# Patient Record
Sex: MALE | Race: WHITE | NOT HISPANIC OR LATINO | Employment: OTHER | URBAN - METROPOLITAN AREA
[De-identification: names, ages, dates, MRNs, and addresses within clinical notes are randomized per-mention and may not be internally consistent; named-entity substitution may affect disease eponyms.]

---

## 2017-01-12 ENCOUNTER — GENERIC CONVERSION - ENCOUNTER (OUTPATIENT)
Dept: OTHER | Facility: OTHER | Age: 72
End: 2017-01-12

## 2017-01-24 ENCOUNTER — GENERIC CONVERSION - ENCOUNTER (OUTPATIENT)
Dept: OTHER | Facility: OTHER | Age: 72
End: 2017-01-24

## 2017-02-28 ENCOUNTER — ALLSCRIPTS OFFICE VISIT (OUTPATIENT)
Dept: OTHER | Facility: OTHER | Age: 72
End: 2017-02-28

## 2017-04-20 ENCOUNTER — ALLSCRIPTS OFFICE VISIT (OUTPATIENT)
Dept: OTHER | Facility: OTHER | Age: 72
End: 2017-04-20

## 2017-04-24 RX ORDER — MOMETASONE FUROATE 50 UG/1
2 SPRAY, METERED NASAL DAILY
COMMUNITY
End: 2020-04-06

## 2017-04-24 RX ORDER — PRAVASTATIN SODIUM 20 MG
20 TABLET ORAL DAILY
COMMUNITY
End: 2018-03-24 | Stop reason: SDUPTHER

## 2017-04-24 RX ORDER — ASPIRIN 81 MG/1
81 TABLET ORAL EVERY OTHER DAY
COMMUNITY

## 2017-04-24 RX ORDER — ZOLPIDEM TARTRATE 5 MG/1
5 TABLET ORAL
COMMUNITY
End: 2018-12-20

## 2017-04-24 RX ORDER — CALCIUM POLYCARBOPHIL 625 MG 625 MG/1
625 TABLET ORAL DAILY
COMMUNITY

## 2017-04-24 RX ORDER — MELOXICAM 15 MG/1
15 TABLET ORAL DAILY
COMMUNITY
End: 2018-12-20

## 2017-04-24 RX ORDER — TRIAMTERENE AND HYDROCHLOROTHIAZIDE 75; 50 MG/1; MG/1
1 TABLET ORAL DAILY
COMMUNITY
End: 2018-02-23 | Stop reason: SDUPTHER

## 2017-04-24 RX ORDER — FERROUS SULFATE 325(65) MG
325 TABLET ORAL
COMMUNITY
End: 2018-12-20

## 2017-04-24 RX ORDER — MULTIVITAMIN
1 CAPSULE ORAL DAILY
COMMUNITY

## 2017-04-24 RX ORDER — CLOTRIMAZOLE AND BETAMETHASONE DIPROPIONATE 10; .64 MG/G; MG/G
CREAM TOPICAL 2 TIMES DAILY
COMMUNITY
End: 2018-10-20 | Stop reason: SDUPTHER

## 2017-04-24 RX ORDER — COLCHICINE 0.6 MG/1
CAPSULE ORAL
COMMUNITY
End: 2018-05-01 | Stop reason: SDUPTHER

## 2017-04-25 ENCOUNTER — ANESTHESIA EVENT (OUTPATIENT)
Dept: PERIOP | Facility: HOSPITAL | Age: 72
End: 2017-04-25
Payer: MEDICARE

## 2017-04-25 ENCOUNTER — HOSPITAL ENCOUNTER (OUTPATIENT)
Facility: HOSPITAL | Age: 72
Setting detail: OUTPATIENT SURGERY
Discharge: HOME | End: 2017-04-25
Attending: SURGERY | Admitting: SURGERY
Payer: MEDICARE

## 2017-04-25 ENCOUNTER — ANESTHESIA (OUTPATIENT)
Dept: PERIOP | Facility: HOSPITAL | Age: 72
End: 2017-04-25
Payer: MEDICARE

## 2017-04-25 VITALS
SYSTOLIC BLOOD PRESSURE: 127 MMHG | OXYGEN SATURATION: 98 % | WEIGHT: 198 LBS | TEMPERATURE: 98.7 F | BODY MASS INDEX: 28.35 KG/M2 | HEART RATE: 78 BPM | HEIGHT: 70 IN | DIASTOLIC BLOOD PRESSURE: 76 MMHG | RESPIRATION RATE: 16 BRPM

## 2017-04-25 DIAGNOSIS — Z12.11 ENCOUNTER FOR SCREENING FOR MALIGNANT NEOPLASM OF COLON: ICD-10-CM

## 2017-04-25 PROCEDURE — 88305 TISSUE EXAM BY PATHOLOGIST: CPT | Performed by: SURGERY

## 2017-04-25 RX ORDER — ONDANSETRON 2 MG/ML
4 INJECTION INTRAMUSCULAR; INTRAVENOUS ONCE
Status: DISCONTINUED | OUTPATIENT
Start: 2017-04-25 | End: 2017-04-25 | Stop reason: HOSPADM

## 2017-04-25 RX ORDER — SODIUM CHLORIDE 9 MG/ML
20 INJECTION, SOLUTION INTRAVENOUS CONTINUOUS
Status: DISCONTINUED | OUTPATIENT
Start: 2017-04-25 | End: 2017-04-25 | Stop reason: HOSPADM

## 2017-04-25 RX ORDER — PROPOFOL 10 MG/ML
INJECTION, EMULSION INTRAVENOUS AS NEEDED
Status: DISCONTINUED | OUTPATIENT
Start: 2017-04-25 | End: 2017-04-25 | Stop reason: SURG

## 2017-04-25 RX ORDER — SODIUM CHLORIDE 9 MG/ML
125 INJECTION, SOLUTION INTRAVENOUS CONTINUOUS
Status: CANCELLED | OUTPATIENT
Start: 2017-04-25

## 2017-04-25 RX ORDER — MIDAZOLAM HYDROCHLORIDE 1 MG/ML
INJECTION INTRAMUSCULAR; INTRAVENOUS AS NEEDED
Status: DISCONTINUED | OUTPATIENT
Start: 2017-04-25 | End: 2017-04-25 | Stop reason: SURG

## 2017-04-25 RX ADMIN — MIDAZOLAM HYDROCHLORIDE 2 MG: 1 INJECTION, SOLUTION INTRAMUSCULAR; INTRAVENOUS at 07:33

## 2017-04-25 RX ADMIN — PROPOFOL 30 MG: 10 INJECTION, EMULSION INTRAVENOUS at 07:50

## 2017-04-25 RX ADMIN — PROPOFOL 20 MG: 10 INJECTION, EMULSION INTRAVENOUS at 07:40

## 2017-04-25 RX ADMIN — SODIUM CHLORIDE: 0.9 INJECTION, SOLUTION INTRAVENOUS at 07:00

## 2017-04-25 RX ADMIN — PROPOFOL 150 MG: 10 INJECTION, EMULSION INTRAVENOUS at 07:33

## 2017-04-25 RX ADMIN — PROPOFOL 20 MG: 10 INJECTION, EMULSION INTRAVENOUS at 07:44

## 2017-04-25 RX ADMIN — PROPOFOL 30 MG: 10 INJECTION, EMULSION INTRAVENOUS at 07:36

## 2017-05-12 ENCOUNTER — TRANSCRIBE ORDERS (OUTPATIENT)
Dept: ADMINISTRATIVE | Facility: HOSPITAL | Age: 72
End: 2017-05-12

## 2017-05-12 ENCOUNTER — APPOINTMENT (OUTPATIENT)
Dept: LAB | Facility: HOSPITAL | Age: 72
End: 2017-05-12
Payer: MEDICARE

## 2017-05-12 DIAGNOSIS — D50.9 IRON DEFICIENCY ANEMIA: ICD-10-CM

## 2017-05-12 DIAGNOSIS — E78.5 HYPERLIPIDEMIA: ICD-10-CM

## 2017-05-12 DIAGNOSIS — R73.01 IMPAIRED FASTING GLUCOSE: ICD-10-CM

## 2017-05-12 DIAGNOSIS — M19.90 OSTEOARTHRITIS: ICD-10-CM

## 2017-05-12 DIAGNOSIS — M10.9 GOUT: ICD-10-CM

## 2017-05-12 DIAGNOSIS — I10 ESSENTIAL (PRIMARY) HYPERTENSION: ICD-10-CM

## 2017-05-12 LAB
EST. AVERAGE GLUCOSE BLD GHB EST-MCNC: 108 MG/DL
GLUCOSE P FAST SERPL-MCNC: 97 MG/DL (ref 65–99)
HBA1C MFR BLD: 5.4 % (ref 4.2–6.3)

## 2017-05-12 PROCEDURE — 36415 COLL VENOUS BLD VENIPUNCTURE: CPT

## 2017-05-12 PROCEDURE — 82947 ASSAY GLUCOSE BLOOD QUANT: CPT

## 2017-05-12 PROCEDURE — 83036 HEMOGLOBIN GLYCOSYLATED A1C: CPT

## 2017-05-30 ENCOUNTER — ALLSCRIPTS OFFICE VISIT (OUTPATIENT)
Dept: OTHER | Facility: OTHER | Age: 72
End: 2017-05-30

## 2017-06-12 ENCOUNTER — GENERIC CONVERSION - ENCOUNTER (OUTPATIENT)
Dept: OTHER | Facility: OTHER | Age: 72
End: 2017-06-12

## 2017-06-26 ENCOUNTER — GENERIC CONVERSION - ENCOUNTER (OUTPATIENT)
Dept: OTHER | Facility: OTHER | Age: 72
End: 2017-06-26

## 2017-08-29 ENCOUNTER — APPOINTMENT (OUTPATIENT)
Dept: URGENT CARE | Facility: CLINIC | Age: 72
End: 2017-08-29
Payer: OTHER MISCELLANEOUS

## 2017-08-29 ENCOUNTER — TRANSCRIBE ORDERS (OUTPATIENT)
Dept: URGENT CARE | Facility: CLINIC | Age: 72
End: 2017-08-29

## 2017-08-29 DIAGNOSIS — M25.531 WRIST PAIN, RIGHT: Primary | ICD-10-CM

## 2017-09-07 ENCOUNTER — ALLSCRIPTS OFFICE VISIT (OUTPATIENT)
Dept: OTHER | Facility: OTHER | Age: 72
End: 2017-09-07

## 2017-11-13 DIAGNOSIS — D50.9 IRON DEFICIENCY ANEMIA: ICD-10-CM

## 2017-11-13 DIAGNOSIS — I10 ESSENTIAL (PRIMARY) HYPERTENSION: ICD-10-CM

## 2017-11-13 DIAGNOSIS — G47.00 INSOMNIA: ICD-10-CM

## 2017-11-13 DIAGNOSIS — R73.01 IMPAIRED FASTING GLUCOSE: ICD-10-CM

## 2017-11-13 DIAGNOSIS — E78.5 HYPERLIPIDEMIA: ICD-10-CM

## 2017-11-13 DIAGNOSIS — M10.9 GOUT: ICD-10-CM

## 2017-11-13 DIAGNOSIS — K63.5 POLYP OF COLON: ICD-10-CM

## 2017-11-13 DIAGNOSIS — Z12.5 ENCOUNTER FOR SCREENING FOR MALIGNANT NEOPLASM OF PROSTATE: ICD-10-CM

## 2017-11-17 ENCOUNTER — APPOINTMENT (OUTPATIENT)
Dept: LAB | Facility: HOSPITAL | Age: 72
End: 2017-11-17
Payer: MEDICARE

## 2017-11-17 ENCOUNTER — TRANSCRIBE ORDERS (OUTPATIENT)
Dept: ADMINISTRATIVE | Facility: HOSPITAL | Age: 72
End: 2017-11-17

## 2017-11-17 DIAGNOSIS — I10 ESSENTIAL (PRIMARY) HYPERTENSION: ICD-10-CM

## 2017-11-17 DIAGNOSIS — K63.5 POLYP OF COLON: ICD-10-CM

## 2017-11-17 DIAGNOSIS — E78.5 HYPERLIPIDEMIA: ICD-10-CM

## 2017-11-17 DIAGNOSIS — R73.01 IMPAIRED FASTING GLUCOSE: ICD-10-CM

## 2017-11-17 DIAGNOSIS — M10.9 GOUT: ICD-10-CM

## 2017-11-17 DIAGNOSIS — Z12.5 ENCOUNTER FOR SCREENING FOR MALIGNANT NEOPLASM OF PROSTATE: ICD-10-CM

## 2017-11-17 DIAGNOSIS — D50.9 IRON DEFICIENCY ANEMIA: ICD-10-CM

## 2017-11-17 DIAGNOSIS — G47.00 INSOMNIA: ICD-10-CM

## 2017-11-17 LAB
ALBUMIN SERPL BCP-MCNC: 4.2 G/DL (ref 3.5–5)
ALP SERPL-CCNC: 53 U/L (ref 46–116)
ALT SERPL W P-5'-P-CCNC: 44 U/L (ref 12–78)
ANION GAP SERPL CALCULATED.3IONS-SCNC: 10 MMOL/L (ref 4–13)
AST SERPL W P-5'-P-CCNC: 33 U/L (ref 5–45)
BILIRUB SERPL-MCNC: 0.8 MG/DL (ref 0.2–1)
BUN SERPL-MCNC: 16 MG/DL (ref 5–25)
CALCIUM SERPL-MCNC: 9.4 MG/DL (ref 8.3–10.1)
CHLORIDE SERPL-SCNC: 102 MMOL/L (ref 100–108)
CHOLEST SERPL-MCNC: 202 MG/DL (ref 50–200)
CO2 SERPL-SCNC: 31 MMOL/L (ref 21–32)
CREAT SERPL-MCNC: 1.27 MG/DL (ref 0.6–1.3)
ERYTHROCYTE [DISTWIDTH] IN BLOOD BY AUTOMATED COUNT: 13.2 % (ref 11.6–15.1)
EST. AVERAGE GLUCOSE BLD GHB EST-MCNC: 114 MG/DL
FERRITIN SERPL-MCNC: 39 NG/ML (ref 8–388)
GFR SERPL CREATININE-BSD FRML MDRD: 56 ML/MIN/1.73SQ M
GLUCOSE P FAST SERPL-MCNC: 115 MG/DL (ref 65–99)
HBA1C MFR BLD: 5.6 % (ref 4.2–6.3)
HCT VFR BLD AUTO: 48.1 % (ref 36.5–49.3)
HDLC SERPL-MCNC: 57 MG/DL (ref 40–60)
HGB BLD-MCNC: 17.1 G/DL (ref 12–17)
IRON SERPL-MCNC: 105 UG/DL (ref 65–175)
LDLC SERPL CALC-MCNC: 123 MG/DL (ref 0–100)
MCH RBC QN AUTO: 31 PG (ref 26.8–34.3)
MCHC RBC AUTO-ENTMCNC: 35.6 G/DL (ref 31.4–37.4)
MCV RBC AUTO: 87 FL (ref 82–98)
PLATELET # BLD AUTO: 234 THOUSANDS/UL (ref 149–390)
PMV BLD AUTO: 9.1 FL (ref 8.9–12.7)
POTASSIUM SERPL-SCNC: 3.7 MMOL/L (ref 3.5–5.3)
PROT SERPL-MCNC: 7.6 G/DL (ref 6.4–8.2)
PSA SERPL-MCNC: 3.1 NG/ML (ref 0–4)
RBC # BLD AUTO: 5.51 MILLION/UL (ref 3.88–5.62)
SODIUM SERPL-SCNC: 143 MMOL/L (ref 136–145)
TIBC SERPL-MCNC: 409 UG/DL (ref 250–450)
TRIGL SERPL-MCNC: 112 MG/DL
TSH SERPL DL<=0.05 MIU/L-ACNC: 1.14 UIU/ML (ref 0.36–3.74)
WBC # BLD AUTO: 7.15 THOUSAND/UL (ref 4.31–10.16)

## 2017-11-17 PROCEDURE — 85027 COMPLETE CBC AUTOMATED: CPT

## 2017-11-17 PROCEDURE — 84443 ASSAY THYROID STIM HORMONE: CPT

## 2017-11-17 PROCEDURE — 80053 COMPREHEN METABOLIC PANEL: CPT

## 2017-11-17 PROCEDURE — 82728 ASSAY OF FERRITIN: CPT

## 2017-11-17 PROCEDURE — 83550 IRON BINDING TEST: CPT

## 2017-11-17 PROCEDURE — 83036 HEMOGLOBIN GLYCOSYLATED A1C: CPT

## 2017-11-17 PROCEDURE — G0103 PSA SCREENING: HCPCS

## 2017-11-17 PROCEDURE — 80061 LIPID PANEL: CPT

## 2017-11-17 PROCEDURE — 83540 ASSAY OF IRON: CPT

## 2017-11-17 PROCEDURE — 36415 COLL VENOUS BLD VENIPUNCTURE: CPT

## 2017-12-11 ENCOUNTER — ALLSCRIPTS OFFICE VISIT (OUTPATIENT)
Dept: OTHER | Facility: OTHER | Age: 72
End: 2017-12-11

## 2018-01-11 NOTE — RESULT NOTES
Verified Results  (1) GLUCOSE,  FASTING [id number] 01[facility] Order Number: [id number]     Test Name Result Flag Reference   GLUCOSE FASTING 110 mg/dL H 65-99   - Patient Instructions: This is fasting bloodwork, only water, black tea, black coffee after 9pm the night before test   Please drink two glasses of water morning of bloodwork  - Patient Instructions: This is fasting bloodwork, only water, black tea, black coffee after 9pm the night before test Please drink two glasses of water morning of bloodwork  (1) HEMOGLOBIN A1C [id number] 01:[address] [place], [place] [address] Order Number: [id number]     Test Name Result Flag Reference   HEMOGLOBIN A1C 6 0 %  4 2-6 3   EST  AVG  GLUCOSE 126 mg/dl       (1) IRON [id number] 01:[address] [place], [place] [address] Order Number: [id number]     Test Name Result Flag Reference   IRON 33 ug/dL L    Patients treated with metal-binding drugs (ie  Deferoxamine) may have depressed iron values  (1) FERRITIN [id number] 01:[address] [place] [address] Order Number: [id number]     Test Name Result Flag Reference   FERRITIN 6 ng/mL L 8-388     (1) TIBC [id number] 01:[address] [place] [address] Order Number: [id number]     Test Name Result Flag Reference   TOTAL IRON BINDING CAPACITY 542 ug/dL H 250-450        Pt aware & will call back when he is able to write the information down  He is currently driving  [employer] called back & was given the # to [facility]  He agreed to start the OTC supplement          Discussion/Summary   please notify pt that his sugars were still elevated at 110 (nml is <99) and his iron studies indicate iron deficiency anemia - he needs to start iron supplement  mg 1 tab PO twice a day, he also needs to call GI that did his colonoscopy as discussed at last appt as new onset iron def can be associated with GI cancers and he has to see GI for this

## 2018-01-12 VITALS
TEMPERATURE: 97.6 F | SYSTOLIC BLOOD PRESSURE: 152 MMHG | WEIGHT: 219 LBS | DIASTOLIC BLOOD PRESSURE: 90 MMHG | HEIGHT: 69 IN | BODY MASS INDEX: 32.44 KG/M2 | HEART RATE: 72 BPM

## 2018-01-13 VITALS
DIASTOLIC BLOOD PRESSURE: 88 MMHG | TEMPERATURE: 96.6 F | SYSTOLIC BLOOD PRESSURE: 142 MMHG | HEIGHT: 69 IN | HEART RATE: 72 BPM | BODY MASS INDEX: 29.47 KG/M2 | WEIGHT: 199 LBS

## 2018-01-13 VITALS
BODY MASS INDEX: 30.36 KG/M2 | DIASTOLIC BLOOD PRESSURE: 78 MMHG | SYSTOLIC BLOOD PRESSURE: 132 MMHG | HEIGHT: 69 IN | TEMPERATURE: 96.8 F | HEART RATE: 68 BPM | WEIGHT: 205 LBS

## 2018-01-14 VITALS
RESPIRATION RATE: 16 BRPM | BODY MASS INDEX: 40.06 KG/M2 | TEMPERATURE: 98.6 F | HEART RATE: 68 BPM | SYSTOLIC BLOOD PRESSURE: 124 MMHG | DIASTOLIC BLOOD PRESSURE: 76 MMHG | HEIGHT: 60 IN | WEIGHT: 204.03 LBS

## 2018-01-22 VITALS
TEMPERATURE: 98.1 F | WEIGHT: 209 LBS | SYSTOLIC BLOOD PRESSURE: 142 MMHG | HEART RATE: 78 BPM | BODY MASS INDEX: 30.96 KG/M2 | DIASTOLIC BLOOD PRESSURE: 80 MMHG | HEIGHT: 69 IN

## 2018-02-23 DIAGNOSIS — I10 HYPERTENSION, UNSPECIFIED TYPE: Primary | ICD-10-CM

## 2018-02-23 RX ORDER — TRIAMTERENE AND HYDROCHLOROTHIAZIDE 75; 50 MG/1; MG/1
TABLET ORAL
Qty: 30 TABLET | Refills: 3 | Status: SHIPPED | OUTPATIENT
Start: 2018-02-23 | End: 2018-05-26 | Stop reason: SDUPTHER

## 2018-03-24 DIAGNOSIS — E78.5 HYPERLIPIDEMIA, UNSPECIFIED HYPERLIPIDEMIA TYPE: Primary | ICD-10-CM

## 2018-03-26 RX ORDER — PRAVASTATIN SODIUM 20 MG
TABLET ORAL
Qty: 30 TABLET | Refills: 6 | Status: SHIPPED | OUTPATIENT
Start: 2018-03-26 | End: 2018-10-13 | Stop reason: SDUPTHER

## 2018-05-01 DIAGNOSIS — M10.9 GOUT, UNSPECIFIED CAUSE, UNSPECIFIED CHRONICITY, UNSPECIFIED SITE: Primary | ICD-10-CM

## 2018-05-01 RX ORDER — COLCHICINE 0.6 MG/1
0.6 CAPSULE ORAL 4 TIMES DAILY PRN
Qty: 100 CAPSULE | Refills: 3 | Status: SHIPPED | OUTPATIENT
Start: 2018-05-01 | End: 2019-09-21 | Stop reason: SDUPTHER

## 2018-05-17 ENCOUNTER — HOSPITAL ENCOUNTER (EMERGENCY)
Facility: HOSPITAL | Age: 73
Discharge: HOME | End: 2018-05-17
Attending: EMERGENCY MEDICINE | Admitting: EMERGENCY MEDICINE
Payer: OTHER MISCELLANEOUS

## 2018-05-17 ENCOUNTER — APPOINTMENT (OUTPATIENT)
Dept: RADIOLOGY | Facility: CLINIC | Age: 73
End: 2018-05-17
Payer: OTHER MISCELLANEOUS

## 2018-05-17 ENCOUNTER — APPOINTMENT (OUTPATIENT)
Dept: URGENT CARE | Facility: CLINIC | Age: 73
End: 2018-05-17
Payer: OTHER MISCELLANEOUS

## 2018-05-17 VITALS
HEART RATE: 78 BPM | TEMPERATURE: 98 F | DIASTOLIC BLOOD PRESSURE: 88 MMHG | BODY MASS INDEX: 30.86 KG/M2 | RESPIRATION RATE: 20 BRPM | OXYGEN SATURATION: 97 % | SYSTOLIC BLOOD PRESSURE: 177 MMHG | WEIGHT: 209 LBS

## 2018-05-17 DIAGNOSIS — E78.5 HYPERLIPIDEMIA: ICD-10-CM

## 2018-05-17 DIAGNOSIS — T14.8XXA WOUND INFECTION: Primary | ICD-10-CM

## 2018-05-17 DIAGNOSIS — R52 PAIN: ICD-10-CM

## 2018-05-17 DIAGNOSIS — R52 PAIN: Primary | ICD-10-CM

## 2018-05-17 DIAGNOSIS — L03.115 CELLULITIS OF FOOT, RIGHT: ICD-10-CM

## 2018-05-17 DIAGNOSIS — L08.9 WOUND INFECTION: Primary | ICD-10-CM

## 2018-05-17 DIAGNOSIS — R73.01 IMPAIRED FASTING GLUCOSE: ICD-10-CM

## 2018-05-17 PROCEDURE — G0382 LEV 3 HOSP TYPE B ED VISIT: HCPCS

## 2018-05-17 PROCEDURE — 99283 EMERGENCY DEPT VISIT LOW MDM: CPT

## 2018-05-17 PROCEDURE — 73630 X-RAY EXAM OF FOOT: CPT

## 2018-05-17 PROCEDURE — 99282 EMERGENCY DEPT VISIT SF MDM: CPT

## 2018-05-17 RX ORDER — CEPHALEXIN 250 MG/1
500 CAPSULE ORAL ONCE
Status: COMPLETED | OUTPATIENT
Start: 2018-05-17 | End: 2018-05-17

## 2018-05-17 RX ORDER — CEPHALEXIN 500 MG/1
500 CAPSULE ORAL 3 TIMES DAILY
Qty: 15 CAPSULE | Refills: 0 | Status: SHIPPED | OUTPATIENT
Start: 2018-05-17 | End: 2018-05-22

## 2018-05-17 RX ORDER — GINSENG 100 MG
1 CAPSULE ORAL ONCE
Status: COMPLETED | OUTPATIENT
Start: 2018-05-17 | End: 2018-05-17

## 2018-05-17 RX ADMIN — CEPHALEXIN 500 MG: 250 CAPSULE ORAL at 15:39

## 2018-05-17 RX ADMIN — BACITRACIN ZINC 1 SMALL APPLICATION: 500 OINTMENT TOPICAL at 15:39

## 2018-05-17 NOTE — ED PROVIDER NOTES
History  Chief Complaint   Patient presents with    Wound Check     To ED for evaluation of non healing wound on dorsal foot right  Pt was referred by [facility]  States that he initially dropped a box on it  67 yo male who has hx of R ankle injuries stemming from youth and reinjury during time served in [employer] who had ankle "rebroke" for straightening in 2006 by Dr Brendan Cardenas and now presents 1 month after heavy box fell on it while at work 1 mo ago  Pt reports there being small open area which has not healed and now erythema surrounding it  NV intact distally  No fever or chills  Able to ambulate without difficulty  No lymphangitic streaking  History provided by:  Patient   used: No    Foot Injury - Major   Location:  Foot  Time since incident:  4 weeks  Injury: yes    Mechanism of injury: crush    Crush:     Mechanism:  Falling object  Foot location:  Dorsum of R foot  Pain details:     Quality:  Aching and dull    Radiates to:  Does not radiate    Severity:  Mild    Onset quality:  Sudden    Duration:  4 weeks    Timing:  Constant    Progression:  Unchanged (mild pain same, redness around wound worsening in recent days)  Chronicity:  New  Dislocation: no    Foreign body present:  No foreign bodies  Tetanus status:  Up to date  Prior injury to area:  Yes  Relieved by:  Nothing  Worsened by:  Nothing  Ineffective treatments:  None tried  Associated symptoms: no decreased ROM, no fever, no neck pain, no swelling and no tingling        Prior to Admission Medications   Prescriptions Last Dose Informant Patient Reported? Taking?    Colchicine (MITIGARE) 0 6 MG CAPS   No No   Sig: Take 1 capsule (0 6 mg total) by mouth 4 (four) times a day as needed (gout)   Multiple Vitamin (MULTIVITAMIN) capsule   Yes No   Sig: Take 1 capsule by mouth daily   aspirin (ECOTRIN LOW STRENGTH) 81 mg EC tablet   Yes No   Sig: Take 81 mg by mouth daily   calcium polycarbophil (FIBERCON) 625 mg tablet   Yes No   Sig: Take 625 mg by mouth daily   clotrimazole-betamethasone (LOTRISONE) 1-0 05 % cream   Yes No   Sig: Apply topically 2 (two) times a day   ferrous sulfate 325 (65 Fe) mg tablet   Yes No   Sig: Take 325 mg by mouth daily with breakfast   meloxicam (MOBIC) 15 mg tablet   Yes No   Sig: Take 15 mg by mouth daily   mometasone (NASONEX) 50 mcg/act nasal spray   Yes No   Si sprays into each nostril daily   pravastatin (PRAVACHOL) 20 mg tablet   No No   Sig: take 1 tablet by mouth once daily at bedtime   triamterene-hydrochlorothiazide (MAXZIDE) 75-50 MG per tablet   No No   Sig: take 1 tablet by mouth once daily   zolpidem (AMBIEN) 5 mg tablet   Yes No   Sig: Take 5 mg by mouth daily at bedtime as needed for sleep      Facility-Administered Medications: None       Past Medical History:   Diagnosis Date    Anemia     iron def    Arthritis     GERD (gastroesophageal reflux disease)     Gout     Hyperlipidemia     Hypertension     Insomnia     Kidney stone        Past Surgical History:   Procedure Laterality Date    ANKLE SURGERY Right     COLONOSCOPY  2013    polypectomy; complete - 3 yrs d/t polyp - benign submucosal lipoma- path c/w lipoma    COLONOSCOPY  2017    complete - tubular adenoma - repeat 5yrs    CYSTOSCOPY      CYSTOTOMY      bladder  w/ basket extraction of calculus    FEMUR FRACTURE SURGERY      HERNIA REPAIR      inguinal ; sliding    INGUINAL HERNIA REPAIR Right     unilateral    KNEE ARTHROSCOPY Right     w/medial menisectomy    LASIK      corneal    LITHOTRIPSY      renal    MA COLONOSCOPY FLX DX W/COLLJ SPEC WHEN PFRMD N/A 2017    Procedure: SCREENING COLONOSCOPY;  Surgeon: Kush Uribe MD;  Location:  MAIN OR;  Service: General    ROTATOR CUFF REPAIR Bilateral     TONSILLECTOMY         Family History   Problem Relation Age of Onset    Alzheimer's disease Mother     Alzheimer's disease Father     Heart disease Father      CAD    Other Father prediabetes    Breast cancer Other      I have reviewed and agree with the history as documented  Social History   Substance Use Topics    Smoking status: Former Smoker     Packs/day: 0 50     Years: 22 00     Quit date: 1985    Smokeless tobacco: Never Used    Alcohol use No      Comment: stopped drinking alcohol        Review of Systems   Constitutional: Negative for chills and fever  HENT: Negative for congestion and sore throat  Eyes: Negative for visual disturbance  Respiratory: Negative for shortness of breath and wheezing  Cardiovascular: Negative for chest pain and palpitations  Gastrointestinal: Negative for abdominal pain, diarrhea, nausea and vomiting  Genitourinary: Negative for dysuria  Musculoskeletal: Negative for neck pain and neck stiffness  Skin: Positive for wound (dorsal aspect R foot - medial aspect - wound with surrounding erythema)  Negative for pallor and rash  Neurological: Negative for headaches  Psychiatric/Behavioral: Negative for confusion  All other systems reviewed and are negative  Physical Exam  ED Triage Vitals [05/17/18 1536]   Temperature Pulse Respirations Blood Pressure SpO2   98 °F (36 7 °C) 78 20 (!) 177/88 97 %      Temp Source Heart Rate Source Patient Position - Orthostatic VS BP Location FiO2 (%)   Temporal Monitor Sitting Right arm --      Pain Score       2           Orthostatic Vital Signs  Vitals:    05/17/18 1536   BP: (!) 177/88   Pulse: 78   Patient Position - Orthostatic VS: Sitting       Physical Exam   Constitutional: He is oriented to person, place, and time  He appears well-developed and well-nourished  No distress  HENT:   Head: Atraumatic  Cardiovascular: Normal rate and regular rhythm  Pulmonary/Chest: Effort normal and breath sounds normal    Musculoskeletal: Normal range of motion   He exhibits tenderness (dorsal aspect R foot - medial aspect - wound with surrounding erythema - 8cm circumferential, no lymphangitic streaking - NV intact distally)  Neurological: He is alert and oriented to person, place, and time  Skin: Capillary refill takes less than 2 seconds  Psychiatric: He has a normal mood and affect  His behavior is normal        ED Medications  Medications   cephalexin (KEFLEX) capsule 500 mg (500 mg Oral Given 5/17/18 1539)   bacitracin topical ointment 1 small application (1 small application Topical Given 5/17/18 1539)       Diagnostic Studies  Results Reviewed     None                 No orders to display              Procedures  Procedures       Phone Contacts  ED Phone Contact    ED Course  ED Course as of May 17 1623   Thu May 17, 2018   1526 Pt seen and examined  69 yo male who has hx of R ankle injuries stemming from youth and reinjury during time served in [employer] who had ankle "rebroke" for straightening in 2006 by Dr Prasanth Ndiaye and now presents 1 month after heavy box fell on it while at work 1 mo ago  Pt reports there being small open area which has not healed and now erythema surrounding it  NV intact distally  No fever or chills  Able to ambulate without difficulty  No lymphangitic streaking  Will place bacitracin, dress and treat with keflex and f/u with PCP  MDM  CritCare Time    Disposition  Final diagnoses:   Wound infection   Cellulitis of foot, right     Time reflects when diagnosis was documented in both MDM as applicable and the Disposition within this note     Time User Action Codes Description Comment    5/17/2018  3:38 PM Thanh Sr  8XXA,  L08 9] Wound infection     5/17/2018  3:38 PM Bruna Stinson Add [L03 115] Cellulitis of foot, right       ED Disposition     ED Disposition Condition Comment    Discharge  Marvin Brooke  discharge to home/self care      Condition at discharge: Good        Follow-up Information     Follow up With Specialties Details Why Peter Ellis DO Internal Medicine, Family Medicine Call As needed [place]  [address] [place] [address] Road  [phone]          Discharge Medication List as of 5/17/2018  3:39 PM      START taking these medications    Details   cephalexin (KEFLEX) 500 mg capsule Take 1 capsule (500 mg total) by mouth 3 (three) times a day for 5 days, Starting u 5/17/2018, Until Tue 5/22/2018, Print         CONTINUE these medications which have NOT CHANGED    Details   aspirin (ECOTRIN LOW STRENGTH) 81 mg EC tablet Take 81 mg by mouth daily, Until Discontinued, Historical Med      calcium polycarbophil (FIBERCON) 625 mg tablet Take 625 mg by mouth daily, Until Discontinued, Historical Med      clotrimazole-betamethasone (LOTRISONE) 1-0 05 % cream Apply topically 2 (two) times a day, Until Discontinued, Historical Med      Colchicine (MITIGARE) 0 6 MG CAPS Take 1 capsule (0 6 mg total) by mouth 4 (four) times a day as needed (gout), Starting Tue 5/1/2018, Normal      ferrous sulfate 325 (65 Fe) mg tablet Take 325 mg by mouth daily with breakfast, Until Discontinued, Historical Med      meloxicam (MOBIC) 15 mg tablet Take 15 mg by mouth daily, Until Discontinued, Historical Med      mometasone (NASONEX) 50 mcg/act nasal spray 2 sprays into each nostril daily, Until Discontinued, Historical Med      Multiple Vitamin (MULTIVITAMIN) capsule Take 1 capsule by mouth daily, Until Discontinued, Historical Med      pravastatin (PRAVACHOL) 20 mg tablet take 1 tablet by mouth once daily at bedtime, Normal      triamterene-hydrochlorothiazide (MAXZIDE) 75-50 MG per tablet take 1 tablet by mouth once daily, Normal      zolpidem (AMBIEN) 5 mg tablet Take 5 mg by mouth daily at bedtime as needed for sleep, Until Discontinued, Historical Med           No discharge procedures on file      ED Provider  Electronically Signed by           Janell Bolanos DO  05/17/18 5363

## 2018-05-17 NOTE — DISCHARGE INSTRUCTIONS
Cellulitis   WHAT YOU NEED TO KNOW:   Cellulitis is a skin infection caused by bacteria  Cellulitis may go away on its own or you may need treatment  Your healthcare provider may draw a [place] around the outside edges of your cellulitis  If your cellulitis spreads, your healthcare provider will see it outside of the [place]  DISCHARGE INSTRUCTIONS:   Call 911 if:   · You have sudden trouble breathing or chest pain  Seek care immediately if:   · Your wound gets larger and more painful  · You feel a crackling under your skin when you touch it  · You have purple dots or bumps on your skin, or you see bleeding under your skin  · You have new swelling and pain in your legs  · The red, warm, swollen area gets larger  · You see red streaks coming from the infected area  Contact your healthcare provider if:   · You have a fever  · Your fever or pain does not go away or gets worse  · The area does not get smaller after 2 days of antibiotics  · Your skin is flaking or peeling off  · You have questions or concerns about your condition or care  Medicines:   · Antibiotics  help treat the bacterial infection  · NSAIDs , such as ibuprofen, help decrease swelling, pain, and fever  NSAIDs can cause stomach bleeding or kidney problems in certain people  If you take blood thinner medicine, always ask if NSAIDs are safe for you  Always read the medicine label and follow directions  Do not give these medicines to children under 10months of age without direction from your child's healthcare provider  · Acetaminophen  decreases pain and fever  It is available without a doctor's order  Ask how much to take and how often to take it  Follow directions  Read the labels of all other medicines you are using to see if they also contain acetaminophen, or ask your doctor or pharmacist  Acetaminophen can cause liver damage if not taken correctly   Do not use more than 4 grams (4,000 milligrams) total of acetaminophen in one day  · Take your medicine as directed  Contact your healthcare provider if you think your medicine is not helping or if you have side effects  Tell him or her if you are allergic to any medicine  Keep a list of the medicines, vitamins, and herbs you take  Include the amounts, and when and why you take them  Bring the list or the pill bottles to follow-up visits  Carry your medicine list with you in case of an emergency  Self-care:   · Elevate the area above the level of your heart  as often as you can  This will help decrease swelling and pain  Prop the area on pillows or blankets to keep it elevated comfortably  · Clean the area daily until the wound scabs over  Gently wash the area with soap and water  Pat dry  Use dressings as directed  · Place cool or warm, wet cloths on the area as directed  Use clean cloths and clean water  Leave it on the area until the cloth is room temperature  Pat the area dry with a clean, dry cloth  The cloths may help decrease pain  Prevent cellulitis:   · Do not scratch bug bites or areas of injury  You increase your risk for cellulitis by scratching these areas  · Do not share personal items, such as towels, clothing, and razors  · Clean exercise equipment  with germ-killing  before and after you use it  · Wash your hands often  Use soap and water  Wash your hands after you use the bathroom, change a child's diapers, or sneeze  Wash your hands before you prepare or eat food  Use lotion to prevent dry, cracked skin  · Wear pressure stockings as directed  You may be told to wear the stockings if you have peripheral edema  The stockings improve blood flow and decrease swelling  · Treat athlete's foot  This can help prevent the spread of a bacterial skin infection  Follow up with your healthcare provider within 3 days, or as directed: Your healthcare provider will check if your cellulitis is getting better   You may need different medicine  Write down your questions so you remember to ask them during your visits  © 2017 [employer] Information is for End User's use only and may not be sold, redistributed or otherwise used for commercial purposes  All illustrations and images included in CareNotes® are the copyrighted property of [employer]  or [employer]  The above information is an  only  It is not intended as medical advice for individual conditions or treatments  Talk to your doctor, nurse or pharmacist before following any medical regimen to see if it is safe and effective for you  Wound Infection   WHAT YOU NEED TO KNOW:   A wound infection occurs when bacteria enters a break in the skin  The infection may involve just the skin, or affect deeper tissues or organs close to the wound  DISCHARGE INSTRUCTIONS:   Seek care immediately if:   · You feel short of breath  · Your heart is beating faster than usual      · You feel confused  · Blood soaks through your bandages  · Your wound comes apart or feels like it is ripping  · You have severe pain  · You see red streaks coming from the infected area  Contact your healthcare provider if:   · You have a fever or chills  · You have more pain, redness, or swelling near your wound  · Your symptoms do not improve  · The skin around your wound feels numb  · You have questions or concerns about your condition or care  Medicines: You may need any of the following:  · NSAIDs , such as ibuprofen, help decrease swelling, pain, and fever  This medicine is available with or without a doctor's order  NSAIDs can cause stomach bleeding or kidney problems in certain people  If you take blood thinner medicine, always ask your healthcare provider if NSAIDs are safe for you  Always read the medicine label and follow directions  · Antibiotics  help treat a bacterial infection       · Take your medicine as directed  Contact your healthcare provider if you think your medicine is not helping or if you have side effects  Tell him or her if you are allergic to any medicine  Keep a list of the medicines, vitamins, and herbs you take  Include the amounts, and when and why you take them  Bring the list or the pill bottles to follow-up visits  Carry your medicine list with you in case of an emergency  Care for your wound as directed:  Keep your wound clean and dry  You may need to cover your wound when you bathe so it does not get wet  Clean your wound as directed with soap and water or wound   Put on new, clean bandages as directed  Change your bandages when they get wet or dirty  Help your wound heal:   · Eat a variety of healthy foods  Examples include fruits, vegetables, whole-grain breads, low-fat dairy products, beans, lean meats, and fish  Healthy foods may help you heal faster  You may also need to take vitamins and minerals  Ask if you need to be on a special diet  · Manage other health conditions  Follow your healthcare provider's directions to manage health conditions that can cause slow wound healing  Examples include high blood pressure and diabetes  · Do not smoke  Nicotine and other chemicals in cigarettes and cigars can cause slow wound healing  Ask your healthcare provider for information if you currently smoke and need help to quit  E-cigarettes or smokeless tobacco still contain nicotine  Talk to your healthcare provider before you use these products  Follow up with your healthcare provider in 1 to 2 days:  Write down your questions so you remember to ask them during your visits  © 2017 [employer]  Information is for End User's use only and may not be sold, redistributed or otherwise used for commercial purposes  All illustrations and images included in CareNotes® are the copyrighted property of [employer]  or [employer]    The above information is an  only  It is not intended as medical advice for individual conditions or treatments  Talk to your doctor, nurse or pharmacist before following any medical regimen to see if it is safe and effective for you

## 2018-05-18 ENCOUNTER — APPOINTMENT (OUTPATIENT)
Dept: URGENT CARE | Facility: CLINIC | Age: 73
End: 2018-05-18
Payer: OTHER MISCELLANEOUS

## 2018-05-18 DIAGNOSIS — M79.671 RIGHT FOOT PAIN: Primary | ICD-10-CM

## 2018-05-18 PROCEDURE — 99213 OFFICE O/P EST LOW 20 MIN: CPT

## 2018-05-18 PROCEDURE — 87070 CULTURE OTHR SPECIMN AEROBIC: CPT

## 2018-05-18 PROCEDURE — 87205 SMEAR GRAM STAIN: CPT

## 2018-05-18 PROCEDURE — 87186 SC STD MICRODIL/AGAR DIL: CPT

## 2018-05-18 PROCEDURE — 87147 CULTURE TYPE IMMUNOLOGIC: CPT

## 2018-05-22 LAB
BACTERIA WND AEROBE CULT: ABNORMAL
BACTERIA WND AEROBE CULT: ABNORMAL
GRAM STN SPEC: ABNORMAL
GRAM STN SPEC: ABNORMAL

## 2018-05-23 ENCOUNTER — APPOINTMENT (OUTPATIENT)
Dept: URGENT CARE | Facility: CLINIC | Age: 73
End: 2018-05-23
Payer: OTHER MISCELLANEOUS

## 2018-05-23 PROCEDURE — 99213 OFFICE O/P EST LOW 20 MIN: CPT

## 2018-05-26 DIAGNOSIS — I10 HYPERTENSION, UNSPECIFIED TYPE: ICD-10-CM

## 2018-05-28 RX ORDER — TRIAMTERENE AND HYDROCHLOROTHIAZIDE 75; 50 MG/1; MG/1
TABLET ORAL
Qty: 30 TABLET | Refills: 3 | Status: SHIPPED | OUTPATIENT
Start: 2018-05-28 | End: 2018-10-13 | Stop reason: SDUPTHER

## 2018-05-31 ENCOUNTER — APPOINTMENT (OUTPATIENT)
Dept: WOUND CARE | Facility: HOSPITAL | Age: 73
End: 2018-05-31
Attending: PODIATRIST
Payer: OTHER MISCELLANEOUS

## 2018-05-31 PROCEDURE — 99213 OFFICE O/P EST LOW 20 MIN: CPT | Performed by: PODIATRIST

## 2018-05-31 PROCEDURE — 11042 DBRDMT SUBQ TIS 1ST 20SQCM/<: CPT | Performed by: PODIATRIST

## 2018-06-04 PROBLEM — R94.31 PROLONGED QT INTERVAL: Status: ACTIVE | Noted: 2017-09-07

## 2018-06-04 PROBLEM — D50.9 IRON DEFICIENCY ANEMIA: Status: ACTIVE | Noted: 2017-02-28

## 2018-06-04 PROBLEM — M19.90 OSTEOARTHRITIS: Status: ACTIVE | Noted: 2017-02-28

## 2018-06-04 RX ORDER — COLLAGENASE SANTYL 250 [ARB'U]/G
OINTMENT TOPICAL
COMMUNITY
Start: 2018-06-01 | End: 2020-12-15 | Stop reason: ALTCHOICE

## 2018-06-04 RX ORDER — CALCIUM POLYCARBOPHIL 625 MG 625 MG/1
TABLET ORAL
COMMUNITY
Start: 2013-01-29 | End: 2018-12-20

## 2018-06-07 ENCOUNTER — APPOINTMENT (OUTPATIENT)
Dept: WOUND CARE | Facility: HOSPITAL | Age: 73
End: 2018-06-07
Attending: PODIATRIST
Payer: OTHER MISCELLANEOUS

## 2018-06-07 PROCEDURE — 11042 DBRDMT SUBQ TIS 1ST 20SQCM/<: CPT | Performed by: PODIATRIST

## 2018-06-08 ENCOUNTER — OFFICE VISIT (OUTPATIENT)
Dept: FAMILY MEDICINE CLINIC | Facility: HOSPITAL | Age: 73
End: 2018-06-08
Payer: MEDICARE

## 2018-06-08 VITALS
DIASTOLIC BLOOD PRESSURE: 70 MMHG | BODY MASS INDEX: 31.32 KG/M2 | WEIGHT: 218.8 LBS | HEIGHT: 70 IN | TEMPERATURE: 99.1 F | SYSTOLIC BLOOD PRESSURE: 138 MMHG | HEART RATE: 96 BPM

## 2018-06-08 DIAGNOSIS — J20.9 BRONCHITIS WITH BRONCHOSPASM: Primary | ICD-10-CM

## 2018-06-08 PROCEDURE — 99213 OFFICE O/P EST LOW 20 MIN: CPT | Performed by: FAMILY MEDICINE

## 2018-06-08 RX ORDER — AZITHROMYCIN 250 MG/1
TABLET, FILM COATED ORAL
Qty: 6 TABLET | Refills: 0 | Status: SHIPPED | COMMUNITY
Start: 2018-06-08 | End: 2018-06-12

## 2018-06-08 RX ORDER — FLUTICASONE FUROATE AND VILANTEROL 100; 25 UG/1; UG/1
1 POWDER RESPIRATORY (INHALATION) DAILY
Qty: 1 INHALER | Refills: 0 | Status: SHIPPED | OUTPATIENT
Start: 2018-06-08 | End: 2018-06-25

## 2018-06-08 NOTE — PATIENT INSTRUCTIONS

## 2018-06-08 NOTE — PROGRESS NOTES
Assessment/Plan:         Diagnoses and all orders for this visit:    Bronchitis with bronchospasm  -     azithromycin (ZITHROMAX) 250 mg tablet; Take 2 tablets today then 1 tablet daily x 4 days  -     fluticasone-vilanterol (BREO ELLIPTA) 100-25 mcg/inh inhaler; Inhale 1 puff daily Rinse mouth after use  Subjective:      Patient ID: Dario Murillo  is a 68 y o  male  Started with cough 5 days ago, despite Mucinex    Producing whitish phlegm  Not SOB at all  Some sore throat but not interfering        The following portions of the patient's history were reviewed and updated as appropriate: allergies, current medications, past family history, past medical history, past social history, past surgical history and problem list     Review of Systems   Constitutional: Positive for fever  Negative for fatigue  HENT: Positive for congestion, sinus pressure and sore throat  Eyes: Negative for visual disturbance  Respiratory: Positive for cough and wheezing  Gastrointestinal: Negative  Objective:      /70 (BP Location: Left arm, Patient Position: Sitting, Cuff Size: Large)   Pulse 96   Temp 99 1 °F (37 3 °C) (Tympanic)   Ht 5' 10" (1 778 m)   Wt 99 2 kg (218 lb 12 8 oz)   BMI 31 39 kg/m²          Physical Exam   Constitutional: He appears well-developed and well-nourished  HENT:   Right Ear: External ear normal    Left Ear: External ear normal    Mouth/Throat: No oropharyngeal exudate  Eyes: Conjunctivae are normal    Cardiovascular: Normal rate and regular rhythm  Pulmonary/Chest: No respiratory distress  He has wheezes  Skin: No rash noted  Nursing note and vitals reviewed

## 2018-06-11 ENCOUNTER — APPOINTMENT (OUTPATIENT)
Dept: LAB | Facility: HOSPITAL | Age: 73
End: 2018-06-11
Payer: MEDICARE

## 2018-06-11 DIAGNOSIS — R73.01 IMPAIRED FASTING GLUCOSE: ICD-10-CM

## 2018-06-11 DIAGNOSIS — E78.5 HYPERLIPIDEMIA: ICD-10-CM

## 2018-06-11 LAB
CHOLEST SERPL-MCNC: 151 MG/DL (ref 50–200)
EST. AVERAGE GLUCOSE BLD GHB EST-MCNC: 114 MG/DL
GLUCOSE P FAST SERPL-MCNC: 107 MG/DL (ref 65–99)
HBA1C MFR BLD: 5.6 % (ref 4.2–6.3)
HDLC SERPL-MCNC: 53 MG/DL (ref 40–60)
LDLC SERPL CALC-MCNC: 83 MG/DL (ref 0–100)
NONHDLC SERPL-MCNC: 98 MG/DL
TRIGL SERPL-MCNC: 73 MG/DL

## 2018-06-11 PROCEDURE — 82947 ASSAY GLUCOSE BLOOD QUANT: CPT

## 2018-06-11 PROCEDURE — 80061 LIPID PANEL: CPT

## 2018-06-11 PROCEDURE — 36415 COLL VENOUS BLD VENIPUNCTURE: CPT

## 2018-06-11 PROCEDURE — 83036 HEMOGLOBIN GLYCOSYLATED A1C: CPT

## 2018-06-21 ENCOUNTER — APPOINTMENT (OUTPATIENT)
Dept: WOUND CARE | Facility: HOSPITAL | Age: 73
End: 2018-06-21
Attending: PODIATRIST
Payer: OTHER MISCELLANEOUS

## 2018-06-21 PROCEDURE — 11042 DBRDMT SUBQ TIS 1ST 20SQCM/<: CPT | Performed by: PODIATRIST

## 2018-06-25 ENCOUNTER — OFFICE VISIT (OUTPATIENT)
Dept: FAMILY MEDICINE CLINIC | Facility: HOSPITAL | Age: 73
End: 2018-06-25
Payer: MEDICARE

## 2018-06-25 VITALS
BODY MASS INDEX: 31.07 KG/M2 | HEIGHT: 70 IN | SYSTOLIC BLOOD PRESSURE: 142 MMHG | HEART RATE: 95 BPM | WEIGHT: 217 LBS | TEMPERATURE: 98.3 F | DIASTOLIC BLOOD PRESSURE: 82 MMHG

## 2018-06-25 DIAGNOSIS — I10 ESSENTIAL HYPERTENSION: ICD-10-CM

## 2018-06-25 DIAGNOSIS — R73.01 IMPAIRED FASTING GLUCOSE: Primary | ICD-10-CM

## 2018-06-25 DIAGNOSIS — Z12.5 SCREENING FOR PROSTATE CANCER: ICD-10-CM

## 2018-06-25 DIAGNOSIS — E78.5 DYSLIPIDEMIA: ICD-10-CM

## 2018-06-25 DIAGNOSIS — Z00.00 MEDICARE ANNUAL WELLNESS VISIT, SUBSEQUENT: ICD-10-CM

## 2018-06-25 PROBLEM — K21.9 GERD (GASTROESOPHAGEAL REFLUX DISEASE): Status: RESOLVED | Noted: 2018-06-25 | Resolved: 2018-06-25

## 2018-06-25 PROBLEM — D64.9 ANEMIA: Status: RESOLVED | Noted: 2018-06-25 | Resolved: 2018-06-25

## 2018-06-25 PROBLEM — J20.9 BRONCHITIS WITH BRONCHOSPASM: Status: RESOLVED | Noted: 2018-06-08 | Resolved: 2018-06-25

## 2018-06-25 PROBLEM — N20.0 KIDNEY STONE: Status: RESOLVED | Noted: 2018-06-25 | Resolved: 2018-06-25

## 2018-06-25 PROCEDURE — G0439 PPPS, SUBSEQ VISIT: HCPCS | Performed by: INTERNAL MEDICINE

## 2018-06-25 PROCEDURE — 99214 OFFICE O/P EST MOD 30 MIN: CPT | Performed by: INTERNAL MEDICINE

## 2018-06-25 NOTE — PROGRESS NOTES
Assessment/Plan:    Impaired fasting glucose  Sugar still up in IFG range but A1C 5 6 - urged to con't watching sugars/carbs and get back to exercise when foot allow, recheck Bw q 6 mos- order given    Hypertension  Bp upper end of goal but pt ran up the stairs, cont' current meds for now, recheck in 6 mos    Dyslipidemia  FLP at goal, co't' current statin, again diet/exercise/wgt loss encouraged, recheck FLP annually       Diagnoses and all orders for this visit:    Impaired fasting glucose  -     CBC and differential; Future  -     Comprehensive metabolic panel; Future  -     Hemoglobin A1C; Future  -     TSH, 3rd generation with Free T4 reflex; Future    Dyslipidemia  -     CBC and differential; Future  -     Comprehensive metabolic panel; Future  -     Hemoglobin A1C; Future  -     TSH, 3rd generation with Free T4 reflex; Future    Essential hypertension  -     CBC and differential; Future  -     Comprehensive metabolic panel; Future  -     Hemoglobin A1C; Future  -     TSH, 3rd generation with Free T4 reflex; Future    Medicare annual wellness visit, subsequent    Screening for prostate cancer  -     PSA, Total Screen; Future      [place] done Jan 2013    Subjective:      Patient ID: Jules Larry  is a 68 y o  male  HPI Pt here for routine follow up appt and BW results    BW results were d/w pt in detail: FBS/A1C 107/5 6, FLP with TC/LDL/TG/HDL at goal     Def of nml vs IFG vs DM was d/w pt in detail  Diet/exercise was reviewed - wgt up 8 lbs from Dec 2018  He is watching sugar/carbs but is eating [employer] ice cream recently  He has not been walking as much d/t dropping a box on his foot at work  He was following with [facility]  He has appt on Thursday  Goal FLP was reviewed  Diet/exercise was reviewed as noted above  He is taking his Pravastatin once daily as directed w/o SE  He notes no recent stroke/TIA symptoms/CP       BP above goal today and meds were reviewed and no changes have occurred  He denies missing doses of meds or SE with the meds  He does not check his BP outside the office  He notes no frequent Ha's/dizziness/double vision/CP  [place] done Jan 2013    Review of Systems   Constitutional: Negative for chills, fatigue and fever  HENT: Negative for congestion and sinus pain  Eyes: Negative for pain and redness  Respiratory: Negative for cough, chest tightness and shortness of breath  Cardiovascular: Negative for chest pain, palpitations and leg swelling  Gastrointestinal: Negative for abdominal pain, blood in stool, constipation, diarrhea, nausea and vomiting  Endocrine: Negative for polydipsia and polyuria  Genitourinary: Negative for difficulty urinating and dysuria  Musculoskeletal: Negative for arthralgias and myalgias  Skin: Negative for rash and wound  Neurological: Negative for dizziness and headaches  Hematological: Does not bruise/bleed easily  Psychiatric/Behavioral: Negative for behavioral problems and confusion  Objective:    /82   Pulse 95   Temp 98 3 °F (36 8 °C)   Ht 5' 10" (1 778 m)   Wt 98 4 kg (217 lb)   BMI 31 14 kg/m²      Physical Exam   Constitutional: He appears well-developed and well-nourished  No distress  HENT:   Head: Normocephalic and atraumatic  Right Ear: External ear normal    Left Ear: External ear normal    Mouth/Throat: No oropharyngeal exudate  Eyes: Conjunctivae are normal  Right eye exhibits no discharge  Left eye exhibits no discharge  Neck: Neck supple  No tracheal deviation present  Cardiovascular: Normal rate and regular rhythm  No murmur heard  Neg carotid bruits B/L   Pulmonary/Chest: Effort normal and breath sounds normal  No respiratory distress  He has no wheezes  He has no rales  Abdominal: Soft  He exhibits no distension  There is no tenderness  Musculoskeletal: He exhibits no edema or deformity  Lymphadenopathy:     He has no cervical adenopathy  Neurological: He is alert  He exhibits normal muscle tone  Skin: Skin is warm and dry  No rash noted  Psychiatric: He has a normal mood and affect  His behavior is normal    Nursing note and vitals reviewed

## 2018-06-25 NOTE — ASSESSMENT & PLAN NOTE
Sugar still up in IFG range but A1C 5 6 - urged to con't watching sugars/carbs and get back to exercise when foot allow, recheck Bw q 6 mos- order given

## 2018-06-25 NOTE — PATIENT INSTRUCTIONS
Obesity   AMBULATORY CARE:   Obesity  is when your body mass index (BMI) is greater than 30  Your healthcare provider will use your height and weight to measure your BMI  The risks of obesity include  many health problems, such as injuries or physical disability  You may need tests to check for the following:  · Diabetes     · High blood pressure or high cholesterol     · Heart disease     · Gallbladder or liver disease     · Cancer of the colon, breast, prostate, liver, or kidney     · Sleep apnea     · Arthritis or gout  Seek care immediately if:   · You have a severe headache, confusion, or difficulty speaking  · You have weakness on one side of your body  · You have chest pain, sweating, or shortness of breath  Contact your healthcare provider if:   · You have symptoms of gallbladder or liver disease, such as pain in your upper abdomen  · You have knee or hip pain and discomfort while walking  · You have symptoms of diabetes, such as intense hunger and thirst, and frequent urination  · You have symptoms of sleep apnea, such as snoring or daytime sleepiness  · You have questions or concerns about your condition or care  Treatment for obesity  focuses on helping you lose weight to improve your health  Even a small decrease in BMI can reduce the risk for many health problems  Your healthcare provider will help you set a weight-loss goal   · Lifestyle changes  are the first step in treating obesity  These include making healthy food choices and getting regular physical activity  Your healthcare provider may suggest a weight-loss program that involves coaching, education, and therapy  · Medicine  may help you lose weight when it is used with a healthy diet and physical activity  · Surgery  can help you lose weight if you are very obese and have other health problems  There are several types of weight-loss surgery  Ask your healthcare provider for more information    Be successful losing weight:   · Set small, realistic goals  An example of a small goal is to walk for 20 minutes 5 days a week  Anther goal is to lose 5% of your body weight  · Tell friends, family members, and coworkers about your goals  and ask for their support  Ask a friend to lose weight with you, or join a weight-loss support group  · Identify foods or triggers that may cause you to overeat , and find ways to avoid them  Remove tempting high-calorie foods from your home and workplace  Place a bowl of fresh fruit on your kitchen counter  If stress causes you to eat, then find other ways to cope with stress  · Keep a diary to track what you eat and drink  Also write down how many minutes of physical activity you do each day  Weigh yourself once a week and record it in your diary  Eating changes: You will need to eat 500 to 1,000 fewer calories each day than you currently eat to lose 1 to 2 pounds a week  The following changes will help you cut calories:  · Eat smaller portions  Use small plates, no larger than 9 inches in diameter  Fill your plate half full of fruits and vegetables  Measure your food using measuring cups until you know what a serving size looks like  · Eat 3 meals and 1 or 2 snacks each day  Plan your meals in advance  Ameena Lovett and eat at home most of the time  Eat slowly  · Eat fruits and vegetables at every meal   They are low in calories and high in fiber, which makes you feel full  Do not add butter, margarine, or cream sauce to vegetables  Use herbs to season steamed vegetables  · Eat less fat and fewer fried foods  Eat more baked or grilled chicken and fish  These protein sources are lower in calories and fat than red meat  Limit fast food  Dress your salads with olive oil and vinegar instead of bottled dressing  · Limit the amount of sugar you eat  Do not drink sugary beverages  Limit alcohol  Activity changes:  Physical activity is good for your body in many ways   It helps you burn calories and build strong muscles  It decreases stress and depression, and improves your mood  It can also help you sleep better  Talk to your healthcare provider before you begin an exercise program   · Exercise for at least 30 minutes 5 days a week  Start slowly  Set aside time each day for physical activity that you enjoy and that is convenient for you  It is best to do both weight training and an activity that increases your heart rate, such as walking, bicycling, or swimming  · Find ways to be more active  Do yard work and housecleaning  Walk up the stairs instead of using elevators  Spend your leisure time going to events that require walking, such as outdoor festivals or fairs  This extra physical activity can help you lose weight and keep it off  Follow up with your healthcare provider as directed: You may need to meet with a dietitian  Write down your questions so you remember to ask them during your visits  © 2017 [address] [employer] Information is for End User's use only and may not be sold, redistributed or otherwise used for commercial purposes  All illustrations and images included in CareNotes® are the copyrighted property of [employer]  or Berhane Tom  The above information is an  only  It is not intended as medical advice for individual conditions or treatments  Talk to your doctor, nurse or pharmacist before following any medical regimen to see if it is safe and effective for you  Urinary Incontinence   WHAT YOU NEED TO KNOW:   What is urinary incontinence? Urinary incontinence (UI) is when you lose control of your bladder  What causes UI? UI occurs because your bladder cannot store or empty urine properly  The following are the most common types of UI:  · Stress incontinence  is when you leak urine due to increased bladder pressure  This may happen when you cough, sneeze, or exercise       · Urge incontinence  is when you feel the need to urinate right away and leak urine accidentally  · Mixed incontinence  is when you have both stress and urge UI  What are the signs and symptoms of UI?   · You feel like your bladder does not empty completely when you urinate  · You urinate often and need to urinate immediately  · You leak urine when you sleep, or you wake up with the urge to urinate  · You leak urine when you cough, sneeze, exercise, or laugh  How is UI diagnosed? Your healthcare provider will ask how often you leak urine and whether you have stress or urge symptoms  Tell him which medicines you take, how often you urinate, and how much liquid you drink each day  You may need any of the following tests:  · Urine tests  may show infection or kidney function  · A pelvic exam  may be done to check for blockages  A pelvic exam will also show if your bladder, uterus, or other organs have moved out of place  · An x-ray, ultrasound, or CT  may show problems with parts of your urinary system  You may be given contrast liquid to help your organs show up better in the pictures  Tell the healthcare provider if you have ever had an allergic reaction to contrast liquid  Do not enter the MRI room with anything metal  Metal can cause serious injury  Tell the healthcare provider if you have any metal in or on your body  · A bladder scan  will show how much urine is left in your bladder after you urinate  You will be asked to urinate and then healthcare providers will use a small ultrasound machine to check the urine left in your bladder  · Cystometry  is used to check the function of your urinary system  Your healthcare provider checks the pressure in your bladder while filling it with fluid  Your bladder pressure may also be tested when your bladder is full and while you urinate  How is UI treated? · Medicines  can help strengthen your bladder control      · Electrical stimulation  is used to send a small amount of electrical energy to your pelvic floor muscles  This helps control your bladder function  Electrodes may be placed outside your body or in your rectum  For women, the electrodes may be placed in the vagina  · A bulking agent  may be injected into the wall of your urethra to make it thicker  This helps keep your urethra closed and decreases urine leakage  · Devices  such as a clamp, pessary, or tampon may help stop urine leaks  Ask your healthcare provider for more information about these and other devices  · Surgery  may be needed if other treatments do not work  Several types of surgery can help improve your bladder control  Ask your healthcare provider for more information about the surgery you may need  How can I manage my symptoms? · Do pelvic muscle exercises often  Your pelvic muscles help you stop urinating  Squeeze these muscles tight for 5 seconds, then relax for 5 seconds  Gradually work up to squeezing for 10 seconds  Do 3 sets of 15 repetitions a day, or as directed  This will help strengthen your pelvic muscles and improve bladder control  · A catheter  may be used to help empty your bladder  A catheter is a tiny, plastic tube that is put into your bladder to drain your urine  Your healthcare provider may tell you to use a catheter to prevent your bladder from getting too full and leaking urine  · Keep a UI record  Write down how often you leak urine and how much you leak  Make a note of what you were doing when you leaked urine  · Train your bladder  Go to the bathroom at set times, such as every 2 hours, even if you do not feel the urge to go  You can also try to hold your urine when you feel the urge to go  For example, hold your urine for 5 minutes when you feel the urge to go  As that becomes easier, hold your urine for 10 minutes  · Drink liquids as directed  Ask your healthcare provider how much liquid to drink each day and which liquids are best for you   You may need to limit the amount of liquid you drink to help control your urine leakage  Limit or do not have drinks that contain caffeine or alcohol  Do not drink any liquid right before you go to bed  · Prevent constipation  Eat a variety of high-fiber foods  Good examples are high-fiber cereals, beans, vegetables, and whole-grain breads  Prune juice may help make your bowel movement softer  Walking is the best way to trigger your intestines to have a bowel movement  · Exercise regularly and maintain a healthy weight  Ask your healthcare provider how much you should weigh and about the best exercise plan for you  Weight loss and exercise will decrease pressure on your bladder and help you control your leakage  Ask him to help you create a weight loss plan if you are overweight  When should I seek immediate care? · You have severe pain  · You are confused or cannot think clearly  When should I contact my healthcare provider? · You have a fever  · You see blood in your urine  · You have pain when you urinate  · You have new or worse pain, even after treatment  · Your mouth feels dry or you have vision changes  · Your urine is cloudy or smells bad  · You have questions or concerns about your condition or care  CARE AGREEMENT:   You have the right to help plan your care  Learn about your health condition and how it may be treated  Discuss treatment options with your caregivers to decide what care you want to receive  You always have the right to refuse treatment  The above information is an  only  It is not intended as medical advice for individual conditions or treatments  Talk to your doctor, nurse or pharmacist before following any medical regimen to see if it is safe and effective for you  © 2017 [address] [employer] Information is for End User's use only and may not be sold, redistributed or otherwise used for commercial purposes   All illustrations and images included in CareNotes® are the copyrighted property of [employer] M [employer] Inc  or Berhane Tom  Cigarette Smoking and Your Health   AMBULATORY CARE:   Risks to your health if you smoke:  Nicotine and other chemicals found in tobacco damage every cell in your body  Even if you are a light smoker, you have an increased risk for cancer, heart disease, and lung disease  If you are pregnant or have diabetes, smoking increases your risk for complications  Benefits to your health if you stop smoking:   · You decrease respiratory symptoms such as coughing, wheezing, and shortness of breath  · You reduce your risk for cancers of the lung, mouth, throat, kidney, bladder, pancreas, stomach, and cervix  If you already have cancer, you increase the benefits of chemotherapy  You also reduce your risk for cancer returning or a second cancer from developing  · You reduce your risk for heart disease, blood clots, heart attack, and stroke  · You reduce your risk for lung infections, and diseases such as pneumonia, asthma, chronic bronchitis, and emphysema  · Your circulation improves  More oxygen can be delivered to your body  If you have diabetes, you lower your risk for complications, such as kidney, artery, and eye diseases  You also lower your risk for nerve damage  Nerve damage can lead to amputations, poor vision, and blindness  · You improve your body's ability to heal and to fight infections  Benefits to the health of others if you stop smoking:  Tobacco is harmful to nonsmokers who breathe in your secondhand smoke  The following are ways the health of others around you may improve when you stop smoking:  · You lower the risks for lung cancer and heart disease in nonsmoking adults  · If you are pregnant, you lower the risk for miscarriage, early delivery, low birth weight, and stillbirth  You also lower your baby's risk for SIDS, obesity, developmental delay, and neurobehavioral problems, such as ADHD  · If you have children, you lower their risk for ear infections, colds, pneumonia, bronchitis, and asthma  For more information and support to stop smoking:   · Smokefree  gov  Phone: 8- 450 - 573-8386  Web Address: www smokefr[employer]  Follow up with your healthcare provider as directed:  Write down your questions so you remember to ask them during your visits  © 2017 [address] [employer] Information is for End User's use only and may not be sold, redistributed or otherwise used for commercial purposes  All illustrations and images included in CareNotes® are the copyrighted property of [employer]  or Berhane Tom  The above information is an  only  It is not intended as medical advice for individual conditions or treatments  Talk to your doctor, nurse or pharmacist before following any medical regimen to see if it is safe and effective for you  Fall Prevention   WHAT YOU NEED TO KNOW:   What is fall prevention? Fall prevention includes ways to make your home and other areas safer  It also includes ways you can move more carefully to prevent a fall  What increases my risk for falls? · Lack of vitamin D    · Not getting enough sleep each night    · Trouble walking or keeping your balance, or foot problems    · Health conditions that cause changes in your blood pressure, vision, or muscle strength and coordination    · Medicines that make you dizzy, weak, or sleepy    · Problems seeing clearly    · Shoes that have high heels or are not supportive    · Tripping hazards, such as items left on the floor, no handrails on the stairs, or broken steps  How can I help protect myself from falls? · Stand or sit up slowly  This may help you keep your balance and prevent falls  If you need to get up during the night, sit up first  Be sure you are fully awake before you stand  Turn on the light before you start walking  Go slowly in case you are still sleepy   Make sure you will not trip over any pets sleeping in the bedroom  · Use assistive devices as directed  Your healthcare provider may suggest that you use a cane or walker to help you keep your balance  You may need to have grab bars put in your bathroom near the toilet or in the shower  · Wear shoes that fit well and have soles that   Wear shoes both inside and outside  Use slippers with good   Do not wear shoes with high heels  · Wear a personal alarm  This is a device that allows you to call 911 if you fall and need help  Ask your healthcare provider for more information  · Stay active  Exercise can help strengthen your muscles and improve your balance  Your healthcare provider may recommend water aerobics or walking  He or she may also recommend physical therapy to improve your coordination  Never start an exercise program without talking to your healthcare provider first      · Manage medical conditions  Keep all appointments with your healthcare providers  Visit your eye doctor as directed  How can I make my home safer? · Add items to prevent falls in the bathroom  Put nonslip strips on your bath or shower floor to prevent you from slipping  Use a bath mat if you do not have carpet in the bathroom  This will prevent you from falling when you step out of the bath or shower  Use a shower seat so you do not need to stand while you shower  Sit on the toilet or a chair in your bathroom to dry yourself and put on clothing  This will prevent you from losing your balance from drying or dressing yourself while you are standing  · Keep paths clear  Remove books, shoes, and other objects from walkways and stairs  Place cords for telephones and lamps out of the way so that you do not need to walk over them  Tape them down if you cannot move them  Remove small rugs  If you cannot remove a rug, secure it with double-sided tape  This will prevent you from tripping  · Install bright lights in your home  Use night lights to help light paths to the bathroom or kitchen  Always turn on the light before you start walking  · Keep items you use often on shelves within reach  Do not use a step stool to help you reach an item  · Paint or place reflective tape on the edges of your stairs  This will help you see the stairs better  Call 911 or have someone else call if:   · You have fallen and are unconscious  · You have fallen and cannot move part of your body  Contact your healthcare provider if:   · You have fallen and have pain or a headache  · You have questions or concerns about your condition or care  CARE AGREEMENT:   You have the right to help plan your care  Learn about your health condition and how it may be treated  Discuss treatment options with your caregivers to decide what care you want to receive  You always have the right to refuse treatment  The above information is an  only  It is not intended as medical advice for individual conditions or treatments  Talk to your doctor, nurse or pharmacist before following any medical regimen to see if it is safe and effective for you  © 2017 [address] [facility] Information is for End User's use only and may not be sold, redistributed or otherwise used for commercial purposes  All illustrations and images included in CareNotes® are the copyrighted property of [employer]  or Berhane Tom  Advance Directives   WHAT YOU NEED TO KNOW:   What are advance directives? Advance directives are legal documents that state your wishes and plans for medical care  These plans are made ahead of time in case you lose your ability to make decisions for yourself  Advance directives can apply to any medical decision, such as the treatments you want, and if you want to donate organs  What are the types of advance directives? There are many types of advance directives, and each state has rules about how to use them   You may choose a combination of any of the following:  · Living will: This is a written record of the treatment you want  You can also choose which treatments you do not want, which to limit, and which to stop at a certain time  This includes surgery, medicine, IV fluid, and tube feedings  · Durable power of  for healthcare [facility]): This is a written record that states who you want to make healthcare choices for you when you are unable to make them for yourself  This person, called a proxy, is usually a family member or a friend  You may choose more than 1 proxy  · Do not resuscitate (DNR) order:  A DNR order is used in case your heart stops beating or you stop breathing  It is a request not to have certain forms of treatment, such as CPR  A DNR order may be included in other types of advance directives  · Medical directive: This covers the care that you want if you are in a coma, near death, or unable to make decisions for yourself  You can list the treatments you want for each condition  Treatment may include pain medicine, surgery, blood transfusions, dialysis, IV or tube feedings, and a ventilator (breathing machine)  · Values history: This document has questions about your views, beliefs, and how you feel and think about life  This information can help others choose the care that you would choose  Why are advance directives important? An advance directive helps you control your care  Although spoken wishes may be used, it is better to have your wishes written down  Spoken wishes can be misunderstood, or not followed  Treatments may be given even if you do not want them  An advance directive may make it easier for your family to make difficult choices about your care  How do I decide what to put in my advance directives? · Make informed decisions:  Make sure you fully understand treatments or care you may receive   Think about the benefits and problems your decisions could cause for you or your family  Talk to healthcare providers if you have concerns or questions before you write down your wishes  You may also want to talk with your Lutheran or , or a   Check your state laws to make sure that what you put in your advance directive is legal      · Sign all forms:  Sign and date your advance directive when you have finished  You may also need 2 witnesses to sign the forms  Witnesses cannot be your doctor or his staff, your spouse, heirs or beneficiaries, people you owe money to, or your chosen proxy  Talk to your family, proxy, and healthcare providers about your advance directive  Give each person a copy, and keep one for yourself in a place you can get to easily  Do not keep it hidden or locked away  · Review and revise your plans: You can revise your advance directive at any time, as long as you are able to make decisions  Review your plan every year, and when there are changes in your life, or your health  When you make changes, let your family, proxy, and healthcare providers know  Give each a new copy  Where can I find more information? · [employer]  [address] , [address]  Phone: 8- 519 - [phone]  Phone: 0- 287 - [phone]  Web Address: http://www  aafp org  · [address] [facility]  [address] [facility], [address] [facility] , [address] [place] [address]  Phone: 1- [phone] - [phone]  Phone: [phone]  Web Address: Addie leal  [facility] AGREEMENT:   You have the right to help plan your care  To help with this plan, you must learn about your health condition and treatment options  You must also learn about advance directives and how they are used  Work with your healthcare providers to decide what care will be used to treat you  You always have the right to refuse treatment  The above information is an  only   It is not intended as medical advice for individual conditions or treatments  Talk to your doctor, nurse or pharmacist before following any medical regimen to see if it is safe and effective for you  © 2017 [address] [employer] Information is for End User's use only and may not be sold, redistributed or otherwise used for commercial purposes  All illustrations and images included in CareNotes® are the copyrighted property of [employer] M [employer]  or Berhane Tom

## 2018-06-28 ENCOUNTER — APPOINTMENT (OUTPATIENT)
Dept: WOUND CARE | Facility: HOSPITAL | Age: 73
End: 2018-06-28
Attending: PODIATRIST
Payer: OTHER MISCELLANEOUS

## 2018-06-28 PROCEDURE — 11042 DBRDMT SUBQ TIS 1ST 20SQCM/<: CPT | Performed by: PODIATRIST

## 2018-07-05 ENCOUNTER — APPOINTMENT (OUTPATIENT)
Dept: WOUND CARE | Facility: HOSPITAL | Age: 73
End: 2018-07-05
Attending: PODIATRIST
Payer: OTHER MISCELLANEOUS

## 2018-07-05 PROCEDURE — 11042 DBRDMT SUBQ TIS 1ST 20SQCM/<: CPT

## 2018-07-12 ENCOUNTER — APPOINTMENT (OUTPATIENT)
Dept: WOUND CARE | Facility: HOSPITAL | Age: 73
End: 2018-07-12
Attending: PODIATRIST
Payer: OTHER MISCELLANEOUS

## 2018-07-12 PROCEDURE — 11042 DBRDMT SUBQ TIS 1ST 20SQCM/<: CPT | Performed by: PODIATRIST

## 2018-07-19 ENCOUNTER — APPOINTMENT (OUTPATIENT)
Dept: WOUND CARE | Facility: HOSPITAL | Age: 73
End: 2018-07-19
Attending: PODIATRIST
Payer: OTHER MISCELLANEOUS

## 2018-07-19 PROCEDURE — 11042 DBRDMT SUBQ TIS 1ST 20SQCM/<: CPT | Performed by: PODIATRIST

## 2018-08-02 ENCOUNTER — APPOINTMENT (OUTPATIENT)
Dept: WOUND CARE | Facility: HOSPITAL | Age: 73
End: 2018-08-02
Attending: PODIATRIST
Payer: OTHER MISCELLANEOUS

## 2018-08-02 PROCEDURE — 99212 OFFICE O/P EST SF 10 MIN: CPT | Performed by: PODIATRIST

## 2018-08-23 ENCOUNTER — APPOINTMENT (OUTPATIENT)
Dept: WOUND CARE | Facility: HOSPITAL | Age: 73
End: 2018-08-23
Attending: PODIATRIST
Payer: OTHER MISCELLANEOUS

## 2018-08-23 PROCEDURE — 99212 OFFICE O/P EST SF 10 MIN: CPT | Performed by: PODIATRIST

## 2018-09-13 ENCOUNTER — APPOINTMENT (OUTPATIENT)
Dept: WOUND CARE | Facility: HOSPITAL | Age: 73
End: 2018-09-13
Attending: PODIATRIST
Payer: OTHER MISCELLANEOUS

## 2018-09-13 PROCEDURE — 99212 OFFICE O/P EST SF 10 MIN: CPT

## 2018-10-13 DIAGNOSIS — I10 HYPERTENSION, UNSPECIFIED TYPE: ICD-10-CM

## 2018-10-13 DIAGNOSIS — E78.5 HYPERLIPIDEMIA, UNSPECIFIED HYPERLIPIDEMIA TYPE: ICD-10-CM

## 2018-10-14 RX ORDER — TRIAMTERENE AND HYDROCHLOROTHIAZIDE 75; 50 MG/1; MG/1
TABLET ORAL
Qty: 30 TABLET | Refills: 6 | Status: SHIPPED | OUTPATIENT
Start: 2018-10-14 | End: 2019-04-14 | Stop reason: SDUPTHER

## 2018-10-15 RX ORDER — PRAVASTATIN SODIUM 20 MG
TABLET ORAL
Qty: 30 TABLET | Refills: 6 | Status: SHIPPED | OUTPATIENT
Start: 2018-10-15 | End: 2019-05-19 | Stop reason: SDUPTHER

## 2018-10-20 DIAGNOSIS — Z00.00 HEALTH CARE MAINTENANCE: Primary | ICD-10-CM

## 2018-10-21 RX ORDER — CLOTRIMAZOLE AND BETAMETHASONE DIPROPIONATE 10; .64 MG/G; MG/G
CREAM TOPICAL
Qty: 45 G | Refills: 1 | Status: SHIPPED | OUTPATIENT
Start: 2018-10-21 | End: 2018-12-20

## 2018-12-11 ENCOUNTER — APPOINTMENT (OUTPATIENT)
Dept: LAB | Facility: HOSPITAL | Age: 73
End: 2018-12-11
Payer: MEDICARE

## 2018-12-11 DIAGNOSIS — Z12.5 SCREENING FOR PROSTATE CANCER: ICD-10-CM

## 2018-12-11 DIAGNOSIS — I10 ESSENTIAL HYPERTENSION: ICD-10-CM

## 2018-12-11 DIAGNOSIS — E78.5 DYSLIPIDEMIA: ICD-10-CM

## 2018-12-11 DIAGNOSIS — R73.01 IMPAIRED FASTING GLUCOSE: ICD-10-CM

## 2018-12-11 LAB
ALBUMIN SERPL BCP-MCNC: 3.9 G/DL (ref 3.5–5)
ALP SERPL-CCNC: 51 U/L (ref 46–116)
ALT SERPL W P-5'-P-CCNC: 32 U/L (ref 12–78)
ANION GAP SERPL CALCULATED.3IONS-SCNC: 12 MMOL/L (ref 4–13)
AST SERPL W P-5'-P-CCNC: 31 U/L (ref 5–45)
BASOPHILS # BLD AUTO: 0.05 THOUSANDS/ΜL (ref 0–0.1)
BASOPHILS NFR BLD AUTO: 1 % (ref 0–1)
BILIRUB SERPL-MCNC: 0.5 MG/DL (ref 0.2–1)
BUN SERPL-MCNC: 15 MG/DL (ref 5–25)
CALCIUM SERPL-MCNC: 9.1 MG/DL (ref 8.3–10.1)
CHLORIDE SERPL-SCNC: 104 MMOL/L (ref 100–108)
CO2 SERPL-SCNC: 29 MMOL/L (ref 21–32)
CREAT SERPL-MCNC: 1.22 MG/DL (ref 0.6–1.3)
EOSINOPHIL # BLD AUTO: 0.6 THOUSAND/ΜL (ref 0–0.61)
EOSINOPHIL NFR BLD AUTO: 11 % (ref 0–6)
ERYTHROCYTE [DISTWIDTH] IN BLOOD BY AUTOMATED COUNT: 12.8 % (ref 11.6–15.1)
EST. AVERAGE GLUCOSE BLD GHB EST-MCNC: 123 MG/DL
GFR SERPL CREATININE-BSD FRML MDRD: 58 ML/MIN/1.73SQ M
GLUCOSE P FAST SERPL-MCNC: 107 MG/DL (ref 65–99)
HBA1C MFR BLD: 5.9 % (ref 4.2–6.3)
HCT VFR BLD AUTO: 47.4 % (ref 36.5–49.3)
HGB BLD-MCNC: 15.8 G/DL (ref 12–17)
IMM GRANULOCYTES # BLD AUTO: 0.01 THOUSAND/UL (ref 0–0.2)
IMM GRANULOCYTES NFR BLD AUTO: 0 % (ref 0–2)
LYMPHOCYTES # BLD AUTO: 1.28 THOUSANDS/ΜL (ref 0.6–4.47)
LYMPHOCYTES NFR BLD AUTO: 23 % (ref 14–44)
MCH RBC QN AUTO: 30.3 PG (ref 26.8–34.3)
MCHC RBC AUTO-ENTMCNC: 33.3 G/DL (ref 31.4–37.4)
MCV RBC AUTO: 91 FL (ref 82–98)
MONOCYTES # BLD AUTO: 0.58 THOUSAND/ΜL (ref 0.17–1.22)
MONOCYTES NFR BLD AUTO: 10 % (ref 4–12)
NEUTROPHILS # BLD AUTO: 3.08 THOUSANDS/ΜL (ref 1.85–7.62)
NEUTS SEG NFR BLD AUTO: 55 % (ref 43–75)
NRBC BLD AUTO-RTO: 0 /100 WBCS
PLATELET # BLD AUTO: 267 THOUSANDS/UL (ref 149–390)
PMV BLD AUTO: 8.6 FL (ref 8.9–12.7)
POTASSIUM SERPL-SCNC: 3.5 MMOL/L (ref 3.5–5.3)
PROT SERPL-MCNC: 7.4 G/DL (ref 6.4–8.2)
PSA SERPL-MCNC: 4.1 NG/ML (ref 0–4)
RBC # BLD AUTO: 5.21 MILLION/UL (ref 3.88–5.62)
SODIUM SERPL-SCNC: 145 MMOL/L (ref 136–145)
TSH SERPL DL<=0.05 MIU/L-ACNC: 1.29 UIU/ML (ref 0.36–3.74)
WBC # BLD AUTO: 5.6 THOUSAND/UL (ref 4.31–10.16)

## 2018-12-11 PROCEDURE — 84443 ASSAY THYROID STIM HORMONE: CPT

## 2018-12-11 PROCEDURE — 80053 COMPREHEN METABOLIC PANEL: CPT

## 2018-12-11 PROCEDURE — G0103 PSA SCREENING: HCPCS

## 2018-12-11 PROCEDURE — 85025 COMPLETE CBC W/AUTO DIFF WBC: CPT

## 2018-12-11 PROCEDURE — 36415 COLL VENOUS BLD VENIPUNCTURE: CPT

## 2018-12-11 PROCEDURE — 83036 HEMOGLOBIN GLYCOSYLATED A1C: CPT

## 2018-12-20 ENCOUNTER — OFFICE VISIT (OUTPATIENT)
Dept: FAMILY MEDICINE CLINIC | Facility: HOSPITAL | Age: 73
End: 2018-12-20

## 2018-12-20 VITALS
HEART RATE: 94 BPM | WEIGHT: 219 LBS | BODY MASS INDEX: 31.35 KG/M2 | SYSTOLIC BLOOD PRESSURE: 140 MMHG | DIASTOLIC BLOOD PRESSURE: 80 MMHG | TEMPERATURE: 98 F | HEIGHT: 70 IN

## 2018-12-20 DIAGNOSIS — R97.20 ELEVATED PSA: ICD-10-CM

## 2018-12-20 DIAGNOSIS — E78.5 DYSLIPIDEMIA: ICD-10-CM

## 2018-12-20 DIAGNOSIS — E66.9 OBESITY (BMI 30.0-34.9): ICD-10-CM

## 2018-12-20 DIAGNOSIS — I10 ESSENTIAL HYPERTENSION: ICD-10-CM

## 2018-12-20 DIAGNOSIS — R73.01 IMPAIRED FASTING GLUCOSE: Primary | ICD-10-CM

## 2018-12-20 DIAGNOSIS — M10.9 GOUT, UNSPECIFIED CAUSE, UNSPECIFIED CHRONICITY, UNSPECIFIED SITE: ICD-10-CM

## 2018-12-20 PROBLEM — E66.811 OBESITY (BMI 30.0-34.9): Status: ACTIVE | Noted: 2018-12-20

## 2018-12-20 PROBLEM — D50.9 IRON DEFICIENCY ANEMIA: Status: RESOLVED | Noted: 2017-02-28 | Resolved: 2018-12-20

## 2018-12-20 PROCEDURE — 99214 OFFICE O/P EST MOD 30 MIN: CPT | Performed by: INTERNAL MEDICINE

## 2018-12-20 NOTE — ASSESSMENT & PLAN NOTE
Very slight elevation at 4 1 - has slowly trended up from 2 2 to 3 1 to now 4 1 - no symptoms, recheck in 6 mos, d/w pt that can be elevated with benign prostatic hypertrophy but can also be elevated with prostate CA - will need to see Uro if still elevated/con't to trend up

## 2018-12-20 NOTE — PATIENT INSTRUCTIONS
Obesity   AMBULATORY CARE:   Obesity  is when your body mass index (BMI) is greater than 30  Your healthcare provider will use your height and weight to measure your BMI  The risks of obesity include  many health problems, such as injuries or physical disability  You may need tests to check for the following:  · Diabetes     · High blood pressure or high cholesterol     · Heart disease     · Gallbladder or liver disease     · Cancer of the colon, breast, prostate, liver, or kidney     · Sleep apnea     · Arthritis or gout  Seek care immediately if:   · You have a severe headache, confusion, or difficulty speaking  · You have weakness on one side of your body  · You have chest pain, sweating, or shortness of breath  Contact your healthcare provider if:   · You have symptoms of gallbladder or liver disease, such as pain in your upper abdomen  · You have knee or hip pain and discomfort while walking  · You have symptoms of diabetes, such as intense hunger and thirst, and frequent urination  · You have symptoms of sleep apnea, such as snoring or daytime sleepiness  · You have questions or concerns about your condition or care  Treatment for obesity  focuses on helping you lose weight to improve your health  Even a small decrease in BMI can reduce the risk for many health problems  Your healthcare provider will help you set a weight-loss goal   · Lifestyle changes  are the first step in treating obesity  These include making healthy food choices and getting regular physical activity  Your healthcare provider may suggest a weight-loss program that involves coaching, education, and therapy  · Medicine  may help you lose weight when it is used with a healthy diet and physical activity  · Surgery  can help you lose weight if you are very obese and have other health problems  There are several types of weight-loss surgery  Ask your healthcare provider for more information    Be successful losing weight:   · Set small, realistic goals  An example of a small goal is to walk for 20 minutes 5 days a week  Anther goal is to lose 5% of your body weight  · Tell friends, family members, and coworkers about your goals  and ask for their support  Ask a friend to lose weight with you, or join a weight-loss support group  · Identify foods or triggers that may cause you to overeat , and find ways to avoid them  Remove tempting high-calorie foods from your home and workplace  Place a bowl of fresh fruit on your kitchen counter  If stress causes you to eat, then find other ways to cope with stress  · Keep a diary to track what you eat and drink  Also write down how many minutes of physical activity you do each day  Weigh yourself once a week and record it in your diary  Eating changes: You will need to eat 500 to 1,000 fewer calories each day than you currently eat to lose 1 to 2 pounds a week  The following changes will help you cut calories:  · Eat smaller portions  Use small plates, no larger than 9 inches in diameter  Fill your plate half full of fruits and vegetables  Measure your food using measuring cups until you know what a serving size looks like  · Eat 3 meals and 1 or 2 snacks each day  Plan your meals in advance  [place] and eat at home most of the time  Eat slowly  · Eat fruits and vegetables at every meal   They are low in calories and high in fiber, which makes you feel full  Do not add butter, margarine, or cream sauce to vegetables  Use herbs to season steamed vegetables  · Eat less fat and fewer fried foods  Eat more baked or grilled chicken and fish  These protein sources are lower in calories and fat than red meat  Limit fast food  Dress your salads with olive oil and vinegar instead of bottled dressing  · Limit the amount of sugar you eat  Do not drink sugary beverages  Limit alcohol  Activity changes:  Physical activity is good for your body in many ways   It helps you burn calories and build strong muscles  It decreases stress and depression, and improves your mood  It can also help you sleep better  Talk to your healthcare provider before you begin an exercise program   · Exercise for at least 30 minutes 5 days a week  Start slowly  Set aside time each day for physical activity that you enjoy and that is convenient for you  It is best to do both weight training and an activity that increases your heart rate, such as walking, bicycling, or swimming  · Find ways to be more active  Do yard work and housecleaning  Walk up the stairs instead of using elevators  Spend your leisure time going to events that require walking, such as outdoor festivals or fairs  This extra physical activity can help you lose weight and keep it off  Follow up with your healthcare provider as directed: You may need to meet with a dietitian  Write down your questions so you remember to ask them during your visits  © 2017 [employer] [employer] Information is for End User's use only and may not be sold, redistributed or otherwise used for commercial purposes  All illustrations and images included in CareNotes® are the copyrighted property of [employer]  or [employer]  The above information is an  only  It is not intended as medical advice for individual conditions or treatments  Talk to your doctor, nurse or pharmacist before following any medical regimen to see if it is safe and effective for you  Weight Management   AMBULATORY CARE:   Why it is important to manage your weight:  Being overweight increases your risk of health conditions such as heart disease, high blood pressure, type 2 diabetes, and certain types of cancer  It can also increase your risk for osteoarthritis, sleep apnea, and other respiratory problems  Aim for a slow, steady weight loss  Even a small amount of weight loss can lower your risk of health problems    How to lose weight safely:  A safe and healthy way to lose weight is to eat fewer calories and get regular exercise  You can lose up about 1 pound a week by decreasing the number of calories you eat by 500 calories each day  You can decrease calories by eating smaller portion sizes or by cutting out high-calorie foods  Read labels to find out how many calories are in the foods you eat  You can also burn calories with exercise such as walking, swimming, or biking  You will be more likely to keep weight off if you make these changes part of your lifestyle  Healthy meal plan for weight management:  A healthy meal plan includes a variety of foods, contains fewer calories, and helps you stay healthy  A healthy meal plan includes the following:  · Eat whole-grain foods more often  A healthy meal plan should contain fiber  Fiber is the part of grains, fruits, and vegetables that is not broken down by your body  Whole-grain foods are healthy and provide extra fiber in your diet  Some examples of whole-grain foods are whole-wheat breads and pastas, oatmeal, brown rice, and bulgur  · Eat a variety of vegetables every day  Include dark, leafy greens such as spinach, kale, joss greens, and mustard greens  Eat yellow and orange vegetables such as carrots, sweet potatoes, and winter squash  · Eat a variety of fruits every day  Choose fresh or canned fruit (canned in its own juice or light syrup) instead of juice  Fruit juice has very little or no fiber  · Eat low-fat dairy foods  Drink fat-free (skim) milk or 1% milk  Eat fat-free yogurt and low-fat cottage cheese  Try low-fat cheeses such as mozzarella and other reduced-fat cheeses  · Choose meat and other protein foods that are low in fat  Choose beans or other legumes such as split peas or lentils  Choose fish, skinless poultry (chicken or turkey), or lean cuts of red meat (beef or pork)  Before you cook meat or poultry, cut off any visible fat  · Use less fat and oil  Try baking foods instead of frying them  Add less fat, such as margarine, sour cream, regular salad dressing and mayonnaise to foods  Eat fewer high-fat foods  Some examples of high-fat foods include french fries, doughnuts, ice cream, and cakes  · Eat fewer sweets  Limit foods and drinks that are high in sugar  This includes candy, cookies, regular soda, and sweetened drinks  Ways to decrease calories:   · Eat smaller portions  ¨ Use a small plate with smaller servings  ¨ Do not eat second helpings  ¨ When you eat at a restaurant, ask for a box and place half of your meal in the box before you eat  ¨ Share an entrée with someone else  · Replace high-calorie snacks with healthy, low-calorie snacks  ¨ Choose fresh fruit, vegetables, fat-free rice cakes, or air-popped popcorn instead of potato chips, nuts, or chocolate  ¨ Choose water or calorie-free drinks instead of soda or sweetened drinks  · Eat regular meals  Skipping meals can lead to overeating later in the day  Eat a healthy snack in place of a meal if you do not have time to eat a regular meal      · Do not shop for groceries when you are hungry  You may be more likely to make unhealthy food choices  Take a grocery list of healthy foods and shop after you have eaten  Exercise:  Exercise at least 30 minutes per day on most days of the week  Some examples of exercise include walking, biking, dancing, and swimming  You can also fit in more physical activity by taking the stairs instead of the elevator or parking farther away from stores  Ask your healthcare provider about the best exercise plan for you  Other things to consider as you try to lose weight:   · Be aware of situations that may give you the urge to overeat, such as eating while watching television  Find ways to avoid these situations  For example, read a book, go for a walk, or do crafts      · Meet with a weight loss support group or friends who are also trying to lose weight  This may help you stay motivated to continue working on your weight loss goals  © 2017 [employer] Information is for End User's use only and may not be sold, redistributed or otherwise used for commercial purposes  All illustrations and images included in CareNotes® are the copyrighted property of [employer]  or [employer]  The above information is an  only  It is not intended as medical advice for individual conditions or treatments  Talk to your doctor, nurse or pharmacist before following any medical regimen to see if it is safe and effective for you  Heart Healthy Diet   AMBULATORY CARE:   A heart healthy diet  is an eating plan low in total fat, unhealthy fats, and sodium (salt)  A heart healthy diet helps decrease your risk for heart disease and stroke  Limit the amount of fat you eat to 25% to 35% of your total daily calories  Limit sodium to less than 2,300 mg each day  Healthy fats:  Healthy fats can help improve cholesterol levels  The risk for heart disease is decreased when cholesterol levels are normal  Choose healthy fats, such as the following:  · Unsaturated fat  is found in foods such as soybean, canola, olive, corn, and safflower oils  It is also found in soft tub margarine that is made with liquid vegetable oil  · Omega-3 fat  is found in certain fish, such as salmon, tuna, and trout, and in walnuts and flaxseed  Unhealthy fats:  Unhealthy fats can cause unhealthy cholesterol levels in your blood and increase your risk of heart disease  Limit unhealthy fats, such as the following:  · Cholesterol  is found in animal foods, such as eggs and lobster, and in dairy products made from whole milk  Limit cholesterol to less than 300 milligrams (mg) each day  You may need to limit cholesterol to 200 mg each day if you have heart disease  · Saturated fat  is found in meats, such as [place] and hamburger   It is also found in chicken or turkey skin, whole milk, and butter  Limit saturated fat to less than 7% of your total daily calories  Limit saturated fat to less than 6% if you have heart disease or are at increased risk for it  · Trans fat  is found in packaged foods, such as potato chips and cookies  It is also in hard margarine, some fried foods, and shortening  Avoid trans fats as much as possible    Heart healthy foods and drinks to include:  Ask your dietitian or healthcare provider how many servings to have from each of the following food groups:  · Grains:      ¨ Whole-wheat breads, cereals, and pastas, and brown rice    ¨ Low-fat, low-sodium crackers and chips    · Vegetables:      ¨ Broccoli, green beans, green peas, and spinach    ¨ Collards, kale, and lima beans    ¨ Carrots, sweet potatoes, tomatoes, and peppers    ¨ Canned vegetables with no salt added    · Fruits:      ¨ Bananas, peaches, pears, and pineapple    ¨ Grapes, raisins, and dates    ¨ Oranges, tangerines, grapefruit, orange juice, and grapefruit juice    ¨ Apricots, mangoes, melons, and papaya    ¨ Raspberries and strawberries    ¨ Canned fruit with no added sugar    · Low-fat dairy products:      ¨ Nonfat (skim) milk, 1% milk, and low-fat almond, cashew, or soy milks fortified with calcium    ¨ Low-fat cheese, regular or frozen yogurt, and cottage cheese    · Meats and proteins , such as lean cuts of beef and pork (loin, leg, round), skinless chicken and turkey, legumes, soy products, egg whites, and nuts  Foods and drinks to limit or avoid:  Ask your dietitian or healthcare provider about these and other foods that are high in unhealthy fat, sodium, and sugar:  · Snack or packaged foods , such as frozen dinners, cookies, macaroni and cheese, and cereals with more than 300 mg of sodium per serving    · Canned or dry mixes  for cakes, soups, sauces, or gravies    · Vegetables with added sodium , such as instant potatoes, vegetables with added sauces, or regular canned vegetables    · Other foods high in sodium , such as ketchup, barbecue sauce, salad dressing, pickles, olives, soy sauce, and miso    · High-fat dairy foods  such as whole or 2% milk, cream cheese, or sour cream, and cheeses     · High-fat protein foods  such as high-fat cuts of beef (T-bone steaks, ribs), chicken or turkey with skin, and organ meats, such as liver    · Cured or smoked meats , such as hot dogs, austin, and sausage    · Unhealthy fats and oils , such as butter, stick margarine, shortening, and cooking oils such as coconut or palm oil    · Food and drinks high in sugar , such as soft drinks (soda), sports drinks, sweetened tea, candy, cake, cookies, pies, and doughnuts  Other diet guidelines to follow:   · Eat more foods containing omega-3 fats  Eat fish high in omega-3 fats at least 2 times a week  · Limit alcohol  Too much alcohol can damage your heart and raise your blood pressure  Women should limit alcohol to 1 drink a day  Men should limit alcohol to 2 drinks a day  A drink of alcohol is 12 ounces of beer, 5 ounces of wine, or 1½ ounces of liquor  · Choose low-sodium foods  High-sodium foods can lead to high blood pressure  Add little or no salt to food you prepare  Use herbs and spices in place of salt  · Eat more fiber  to help lower cholesterol levels  Eat at least 5 servings of fruits and vegetables each day  Eat 3 ounces of whole-grain foods each day  Legumes (beans) are also a good source of fiber  Lifestyle guidelines:   · Do not smoke  Nicotine and other chemicals in cigarettes and cigars can cause lung and heart damage  Ask your healthcare provider for information if you currently smoke and need help to quit  E-cigarettes or smokeless tobacco still contain nicotine  Talk to your healthcare provider before you use these products  · Exercise regularly  to help you maintain a healthy weight and improve your blood pressure and cholesterol levels   Ask your healthcare provider about the best exercise plan for you  Do not start an exercise program without asking your healthcare provider  Follow up with your healthcare provider as directed:  Write down your questions so you remember to ask them during your visits  © 2017 [employer] Information is for End User's use only and may not be sold, redistributed or otherwise used for commercial purposes  All illustrations and images included in CareNotes® are the copyrighted property of [employer]  or [employer]  The above information is an  only  It is not intended as medical advice for individual conditions or treatments  Talk to your doctor, nurse or pharmacist before following any medical regimen to see if it is safe and effective for you

## 2018-12-20 NOTE — ASSESSMENT & PLAN NOTE
Bp again elevated - extensive d/w pt about wgt loss and exercise and diet - con't current meds for now and recheck in 6 mos - if not better will need to add new BP med

## 2018-12-20 NOTE — ASSESSMENT & PLAN NOTE
FBS stable but A1C up at 5 9 - urged to cut back on ice cream and keep active and get wgt down, recheck BW in 6 mos

## 2018-12-20 NOTE — PROGRESS NOTES
Assessment/Plan:    Impaired fasting glucose  FBS stable but A1C up at 5 9 - urged to cut back on ice cream and keep active and get wgt down, recheck BW in 6 mos    Elevated PSA  Very slight elevation at 4 1 - has slowly trended up from 2 2 to 3 1 to now 4 1 - no symptoms, recheck in 6 mos, d/w pt that can be elevated with benign prostatic hypertrophy but can also be elevated with prostate CA - will need to see Uro if still elevated/con't to trend up    Hypertension  Bp again elevated - extensive d/w pt about wgt loss and exercise and diet - con't current meds for now and recheck in 6 mos - if not better will need to add new BP med    Gout  No recent gout flares, uses colchicine prn - will check uric acid with next labs - order given    Dyslipidemia  Due for FLP in June - order given, con't current statin for now       Diagnoses and all orders for this visit:    Impaired fasting glucose  -     Glucose, fasting; Future  -     Hemoglobin A1C; Future    Elevated PSA  -     PSA Total, Diagnostic; Future    Essential hypertension    Dyslipidemia  -     Lipid panel; Future    Gout, unspecified cause, unspecified chronicity, unspecified site  -     Uric acid; Future    Obesity (BMI 30 0-34  9)  Comments:  Diet/exercise/wgt loss encouraged      [place] 4/17 - 5 yr follow up    He had his flu vaccine this year already    Subjective:      Patient ID: Waleska Wade  is a 68 y o  male  HPI Pt here for follow up appt and BW results    BW results were d/w pt in detail: FBS/A1C 107/5 9, rest of CMP/CBC/TSH was wnl, PSA mildly elevated at 4 1  Def of nml vs IFG vs DM was d/w pt in detail  Diet/exercise was reviewed - wgt up 10 lbs from Dec 2017  He states he has increased lean proteins and has decreased carbs  He is very active walking  He thinks he father may have been diabetic  Nml range for PSA was reviewed with pt  PSA has slowly trended up from 2 2 to 3 1 to now 4 1  He does not follow with urology    He denies nocturia/incontinence//hesitancy or weakness to stream   He notes no known family h/o prostate CA  BP above goal today and meds were reviewed and no changes have occurred  He denies missing doses of meds or SE with the meds  He does not check his BP outside the office  He notes no frequent HA's/dizziness/double vision/CP  [place] 4/17 - 5 yr follow up    He had his flu vaccine this year already    Review of Systems   Constitutional: Negative for chills, fatigue and fever  HENT: Negative for congestion and sinus pain  Eyes: Negative for pain and visual disturbance  Respiratory: Negative for cough, chest tightness and shortness of breath  Cardiovascular: Negative for chest pain, palpitations and leg swelling  Gastrointestinal: Negative for abdominal pain, constipation, diarrhea, nausea and vomiting  Endocrine: Negative for polydipsia and polyuria  Genitourinary: Negative for difficulty urinating, dysuria and frequency  Musculoskeletal: Positive for arthralgias  Negative for myalgias  Skin: Negative for rash and wound  Neurological: Negative for dizziness and headaches  Hematological: Does not bruise/bleed easily  Psychiatric/Behavioral: Negative for behavioral problems and confusion  Objective:    /80   Pulse 94   Temp 98 °F (36 7 °C)   Ht 5' 10" (1 778 m)   Wt 99 3 kg (219 lb)   BMI 31 42 kg/m²      Physical Exam   Constitutional: He appears well-developed and well-nourished  No distress  HENT:   Head: Normocephalic and atraumatic  Mouth/Throat: No oropharyngeal exudate  Eyes: Conjunctivae are normal  Right eye exhibits no discharge  Left eye exhibits no discharge  Neck: Neck supple  No tracheal deviation present  Cardiovascular: Normal rate and regular rhythm  No murmur heard  Pulmonary/Chest: Effort normal and breath sounds normal  No respiratory distress  He has no wheezes  He has no rales  Abdominal: Soft  He exhibits no distension   There is no tenderness  Musculoskeletal: He exhibits no deformity  Neurological: He is alert  He exhibits normal muscle tone  Skin: Skin is warm and dry  No rash noted  Psychiatric: He has a normal mood and affect  His behavior is normal    Nursing note and vitals reviewed  BMI Counseling: Body mass index is 31 42 kg/m²  Discussed the patient's BMI with him  The BMI is above average  BMI counseling and education was provided to the patient  Nutrition recommendations include reducing portion sizes, decreasing overall calorie intake, consuming healthier snacks, decreasing soda and/or juice intake, increasing intake of lean protein and reducing intake of saturated fat and trans fat  Exercise recommendations include moderate aerobic physical activity for 150 minutes/week and exercising 3-5 times per week

## 2019-04-14 DIAGNOSIS — I10 HYPERTENSION, UNSPECIFIED TYPE: ICD-10-CM

## 2019-04-14 RX ORDER — TRIAMTERENE AND HYDROCHLOROTHIAZIDE 75; 50 MG/1; MG/1
TABLET ORAL
Qty: 90 TABLET | Refills: 1 | Status: SHIPPED | OUTPATIENT
Start: 2019-04-14 | End: 2019-09-21 | Stop reason: SDUPTHER

## 2019-05-17 DIAGNOSIS — E78.5 HYPERLIPIDEMIA, UNSPECIFIED HYPERLIPIDEMIA TYPE: ICD-10-CM

## 2019-05-19 DIAGNOSIS — Z00.00 HEALTH CARE MAINTENANCE: ICD-10-CM

## 2019-05-19 DIAGNOSIS — E78.5 HYPERLIPIDEMIA, UNSPECIFIED HYPERLIPIDEMIA TYPE: ICD-10-CM

## 2019-05-19 RX ORDER — PRAVASTATIN SODIUM 20 MG
20 TABLET ORAL
Qty: 30 TABLET | Refills: 6 | Status: SHIPPED | OUTPATIENT
Start: 2019-05-19 | End: 2019-06-24 | Stop reason: SDUPTHER

## 2019-05-19 RX ORDER — CLOTRIMAZOLE AND BETAMETHASONE DIPROPIONATE 10; .64 MG/G; MG/G
CREAM TOPICAL
Qty: 45 G | Refills: 1 | Status: SHIPPED | OUTPATIENT
Start: 2019-05-19 | End: 2020-10-14 | Stop reason: ALTCHOICE

## 2019-05-19 RX ORDER — PRAVASTATIN SODIUM 20 MG
TABLET ORAL
Qty: 30 TABLET | Refills: 6 | OUTPATIENT
Start: 2019-05-19

## 2019-05-20 RX ORDER — PRAVASTATIN SODIUM 20 MG
TABLET ORAL
Qty: 30 TABLET | Refills: 6 | Status: SHIPPED | OUTPATIENT
Start: 2019-05-20 | End: 2019-06-24

## 2019-06-11 ENCOUNTER — APPOINTMENT (OUTPATIENT)
Dept: LAB | Facility: HOSPITAL | Age: 74
End: 2019-06-11
Payer: MEDICARE

## 2019-06-11 DIAGNOSIS — M10.9 GOUT, UNSPECIFIED CAUSE, UNSPECIFIED CHRONICITY, UNSPECIFIED SITE: ICD-10-CM

## 2019-06-11 DIAGNOSIS — R97.20 ELEVATED PSA: ICD-10-CM

## 2019-06-11 DIAGNOSIS — R73.01 IMPAIRED FASTING GLUCOSE: ICD-10-CM

## 2019-06-11 DIAGNOSIS — E78.5 DYSLIPIDEMIA: ICD-10-CM

## 2019-06-11 LAB
CHOLEST SERPL-MCNC: 186 MG/DL (ref 50–200)
EST. AVERAGE GLUCOSE BLD GHB EST-MCNC: 126 MG/DL
GLUCOSE P FAST SERPL-MCNC: 107 MG/DL (ref 65–99)
HBA1C MFR BLD: 6 % (ref 4.2–6.3)
HDLC SERPL-MCNC: 37 MG/DL (ref 40–60)
LDLC SERPL CALC-MCNC: 117 MG/DL (ref 0–100)
NONHDLC SERPL-MCNC: 149 MG/DL
PSA SERPL-MCNC: 4.1 NG/ML (ref 0–4)
TRIGL SERPL-MCNC: 158 MG/DL
URATE SERPL-MCNC: 5.6 MG/DL (ref 4.2–8)

## 2019-06-11 PROCEDURE — 82947 ASSAY GLUCOSE BLOOD QUANT: CPT

## 2019-06-11 PROCEDURE — 83036 HEMOGLOBIN GLYCOSYLATED A1C: CPT

## 2019-06-11 PROCEDURE — 84550 ASSAY OF BLOOD/URIC ACID: CPT

## 2019-06-11 PROCEDURE — 36415 COLL VENOUS BLD VENIPUNCTURE: CPT

## 2019-06-11 PROCEDURE — 84153 ASSAY OF PSA TOTAL: CPT

## 2019-06-11 PROCEDURE — 80061 LIPID PANEL: CPT

## 2019-06-24 ENCOUNTER — OFFICE VISIT (OUTPATIENT)
Dept: FAMILY MEDICINE CLINIC | Facility: HOSPITAL | Age: 74
End: 2019-06-24
Payer: MEDICARE

## 2019-06-24 VITALS
WEIGHT: 214 LBS | BODY MASS INDEX: 30.64 KG/M2 | SYSTOLIC BLOOD PRESSURE: 132 MMHG | HEART RATE: 103 BPM | TEMPERATURE: 98 F | DIASTOLIC BLOOD PRESSURE: 78 MMHG | HEIGHT: 70 IN

## 2019-06-24 DIAGNOSIS — I10 ESSENTIAL HYPERTENSION: ICD-10-CM

## 2019-06-24 DIAGNOSIS — R73.01 IMPAIRED FASTING GLUCOSE: Primary | ICD-10-CM

## 2019-06-24 DIAGNOSIS — E78.5 DYSLIPIDEMIA: ICD-10-CM

## 2019-06-24 DIAGNOSIS — E78.5 HYPERLIPIDEMIA, UNSPECIFIED HYPERLIPIDEMIA TYPE: ICD-10-CM

## 2019-06-24 DIAGNOSIS — Z00.00 MEDICARE ANNUAL WELLNESS VISIT, SUBSEQUENT: ICD-10-CM

## 2019-06-24 DIAGNOSIS — Z13.29 THYROID DISORDER SCREEN: ICD-10-CM

## 2019-06-24 DIAGNOSIS — R97.20 ELEVATED PSA: ICD-10-CM

## 2019-06-24 PROCEDURE — 99214 OFFICE O/P EST MOD 30 MIN: CPT | Performed by: INTERNAL MEDICINE

## 2019-06-24 PROCEDURE — G0439 PPPS, SUBSEQ VISIT: HCPCS | Performed by: INTERNAL MEDICINE

## 2019-06-24 RX ORDER — PRAVASTATIN SODIUM 40 MG
40 TABLET ORAL
Qty: 90 TABLET | Refills: 1 | Status: SHIPPED | OUTPATIENT
Start: 2019-06-24 | End: 2020-01-06 | Stop reason: HOSPADM

## 2019-07-24 ENCOUNTER — TELEPHONE (OUTPATIENT)
Dept: UROLOGY | Facility: HOSPITAL | Age: 74
End: 2019-07-24

## 2019-07-24 NOTE — TELEPHONE ENCOUNTER
Called pt to clarify that he only had [facility] for his appt tomorrow or what insurances he currently is using

## 2019-07-25 ENCOUNTER — CONSULT (OUTPATIENT)
Dept: UROLOGY | Facility: HOSPITAL | Age: 74
End: 2019-07-25
Payer: MEDICARE

## 2019-07-25 VITALS
WEIGHT: 223 LBS | SYSTOLIC BLOOD PRESSURE: 152 MMHG | HEART RATE: 71 BPM | HEIGHT: 70 IN | BODY MASS INDEX: 31.92 KG/M2 | DIASTOLIC BLOOD PRESSURE: 90 MMHG

## 2019-07-25 DIAGNOSIS — R97.20 ELEVATED PSA: ICD-10-CM

## 2019-07-25 PROCEDURE — 99204 OFFICE O/P NEW MOD 45 MIN: CPT | Performed by: UROLOGY

## 2019-07-25 NOTE — ASSESSMENT & PLAN NOTE
We discussed prostate cancer screening and PSA at depth  We discussed that the only way to tell whether he has prostate cancer or not is with biopsy  We discussed the procedure of a prostate biopsy including the risks and benefits  We discussed that his PSA is actually still within normal limits for his age  It is also reassuring that has been stable for the past 6 months and that he has a history of a negative biopsy in the past   At this point I do not think it is necessary to proceed with another biopsy  To be conservative we will recheck a PSA in 6 months to make sure it is not climbing  All of his questions were answered

## 2019-07-25 NOTE — PROGRESS NOTES
Assessment/Plan:    Elevated PSA    We discussed prostate cancer screening and PSA at depth  We discussed that the only way to tell whether he has prostate cancer or not is with biopsy  We discussed the procedure of a prostate biopsy including the risks and benefits  We discussed that his PSA is actually still within normal limits for his age  It is also reassuring that has been stable for the past 6 months and that he has a history of a negative biopsy in the past   At this point I do not think it is necessary to proceed with another biopsy  To be conservative we will recheck a PSA in 6 months to make sure it is not climbing  All of his questions were answered  Diagnoses and all orders for this visit:    Elevated PSA  -     Ambulatory referral to Urology  -     PSA Total, Diagnostic; Future          Total visit time was 60 minutes of which over 50% was spent on counseling  Subjective:     Patient ID: Stella Minaya  is a 76 y o  male    51-year-old male presents for evaluation of elevated PSA  He denies any family history of prostate cancer  He has minimal lower urinary tract symptoms  He thinks he may have had a prostate biopsy a long time ago  He said this is negative  He denies any recent urinary tract infections, dysuria, gross hematuria, or new bone pain  He has no other complaints  The following portions of the patient's history were reviewed and updated as appropriate: allergies, current medications, past family history, past medical history, past social history, past surgical history and problem list     Review of Systems   Constitutional: Negative  HENT: Negative  Eyes: Negative  Respiratory: Negative  Cardiovascular: Negative  Gastrointestinal: Negative  Endocrine: Negative  Genitourinary:        As noted per HPI   Musculoskeletal: Negative  Skin: Negative  Allergic/Immunologic: Negative  Neurological: Negative  Hematological: Negative  Psychiatric/Behavioral: Negative  AUA SYMPTOM SCORE      Most Recent Value   AUA SYMPTOM SCORE   How often have you had a sensation of not emptying your bladder completely after you finished urinating? 0   How often have you had to urinate again less than two hours after you finished urinating? 1   How often have you found you stopped and started again several times when you urinate?  0   How often have you found it difficult to postpone urination? 0   How often have you had a weak urinary stream?  0   How often have you had to push or strain to begin urination? 0   How many times did you most typically get up to urinate from the time you went to bed at night until the time you got up in the morning? 1   Quality of Life: If you were to spend the rest of your life with your urinary condition just the way it is now, how would you feel about that?  1   AUA SYMPTOM SCORE  2              Objective:    Physical Exam   Constitutional: He is oriented to person, place, and time  He appears well-developed and well-nourished  Neck: Normal range of motion  Cardiovascular: Intact distal pulses  Pulmonary/Chest: Effort normal    Abdominal: Soft  Bowel sounds are normal  He exhibits no distension and no mass  There is no tenderness  There is no rebound and no guarding  Genitourinary: Rectum normal and penis normal    Genitourinary Comments: Prostate 30 g, smooth, no nodules, nontender  Musculoskeletal: Normal range of motion  Neurological: He is alert and oriented to person, place, and time  Skin: Skin is warm and dry  Psychiatric: He has a normal mood and affect  Vitals reviewed          Results  Lab Results   Component Value Date    PSA 4 1 (H) 06/11/2019    PSA 4 1 (H) 12/11/2018    PSA 3 1 11/17/2017     Lab Results   Component Value Date    GLUCOSE 112 05/14/2015    CALCIUM 9 1 12/11/2018     05/14/2015    K 3 5 12/11/2018    CO2 29 12/11/2018     12/11/2018    BUN 15 12/11/2018 CREATININE 1 22 12/11/2018     Lab Results   Component Value Date    WBC 5 60 12/11/2018    HGB 15 8 12/11/2018    HCT 47 4 12/11/2018    MCV 91 12/11/2018     12/11/2018       No results found for this or any previous visit (from the past 1 hour(s)) ]

## 2019-09-21 DIAGNOSIS — I10 HYPERTENSION, UNSPECIFIED TYPE: ICD-10-CM

## 2019-09-21 DIAGNOSIS — M10.9 GOUT, UNSPECIFIED CAUSE, UNSPECIFIED CHRONICITY, UNSPECIFIED SITE: ICD-10-CM

## 2019-09-21 RX ORDER — TRIAMTERENE AND HYDROCHLOROTHIAZIDE 75; 50 MG/1; MG/1
TABLET ORAL
Qty: 90 TABLET | Refills: 1 | Status: SHIPPED | OUTPATIENT
Start: 2019-09-21 | End: 2020-03-16

## 2019-09-21 RX ORDER — COLCHICINE 0.6 MG/1
CAPSULE ORAL
Qty: 100 CAPSULE | Refills: 3 | Status: SHIPPED | OUTPATIENT
Start: 2019-09-21 | End: 2020-11-03

## 2019-11-08 DIAGNOSIS — E78.5 HYPERLIPIDEMIA, UNSPECIFIED HYPERLIPIDEMIA TYPE: ICD-10-CM

## 2019-11-08 RX ORDER — PRAVASTATIN SODIUM 40 MG
40 TABLET ORAL
Qty: 90 TABLET | Refills: 1 | Status: SHIPPED | OUTPATIENT
Start: 2019-11-08 | End: 2020-01-06 | Stop reason: HOSPADM

## 2019-12-11 ENCOUNTER — APPOINTMENT (OUTPATIENT)
Dept: LAB | Facility: HOSPITAL | Age: 74
End: 2019-12-11
Payer: MEDICARE

## 2019-12-11 DIAGNOSIS — E78.5 HYPERLIPIDEMIA, UNSPECIFIED HYPERLIPIDEMIA TYPE: ICD-10-CM

## 2019-12-11 DIAGNOSIS — Z13.29 THYROID DISORDER SCREEN: ICD-10-CM

## 2019-12-11 DIAGNOSIS — E78.5 DYSLIPIDEMIA: ICD-10-CM

## 2019-12-11 DIAGNOSIS — R73.01 IMPAIRED FASTING GLUCOSE: ICD-10-CM

## 2019-12-11 DIAGNOSIS — I10 ESSENTIAL HYPERTENSION: ICD-10-CM

## 2019-12-11 LAB
ALBUMIN SERPL BCP-MCNC: 4.4 G/DL (ref 3.5–5)
ALP SERPL-CCNC: 58 U/L (ref 46–116)
ALT SERPL W P-5'-P-CCNC: 43 U/L (ref 12–78)
ANION GAP SERPL CALCULATED.3IONS-SCNC: 6 MMOL/L (ref 4–13)
AST SERPL W P-5'-P-CCNC: 33 U/L (ref 5–45)
BASOPHILS # BLD AUTO: 0.04 THOUSANDS/ΜL (ref 0–0.1)
BASOPHILS NFR BLD AUTO: 1 % (ref 0–1)
BILIRUB SERPL-MCNC: 0.54 MG/DL (ref 0.2–1)
BUN SERPL-MCNC: 17 MG/DL (ref 5–25)
CALCIUM SERPL-MCNC: 9.1 MG/DL (ref 8.3–10.1)
CHLORIDE SERPL-SCNC: 106 MMOL/L (ref 100–108)
CHOLEST SERPL-MCNC: 154 MG/DL (ref 50–200)
CO2 SERPL-SCNC: 32 MMOL/L (ref 21–32)
CREAT SERPL-MCNC: 1.13 MG/DL (ref 0.6–1.3)
EOSINOPHIL # BLD AUTO: 0.42 THOUSAND/ΜL (ref 0–0.61)
EOSINOPHIL NFR BLD AUTO: 5 % (ref 0–6)
ERYTHROCYTE [DISTWIDTH] IN BLOOD BY AUTOMATED COUNT: 13 % (ref 11.6–15.1)
EST. AVERAGE GLUCOSE BLD GHB EST-MCNC: 143 MG/DL
GFR SERPL CREATININE-BSD FRML MDRD: 64 ML/MIN/1.73SQ M
GLUCOSE P FAST SERPL-MCNC: 109 MG/DL (ref 65–99)
HBA1C MFR BLD: 6.6 % (ref 4.2–6.3)
HCT VFR BLD AUTO: 42.8 % (ref 36.5–49.3)
HDLC SERPL-MCNC: 41 MG/DL
HGB BLD-MCNC: 13.8 G/DL (ref 12–17)
IMM GRANULOCYTES # BLD AUTO: 0.04 THOUSAND/UL (ref 0–0.2)
IMM GRANULOCYTES NFR BLD AUTO: 1 % (ref 0–2)
LDLC SERPL CALC-MCNC: 88 MG/DL (ref 0–100)
LYMPHOCYTES # BLD AUTO: 2.09 THOUSANDS/ΜL (ref 0.6–4.47)
LYMPHOCYTES NFR BLD AUTO: 24 % (ref 14–44)
MCH RBC QN AUTO: 28.2 PG (ref 26.8–34.3)
MCHC RBC AUTO-ENTMCNC: 32.2 G/DL (ref 31.4–37.4)
MCV RBC AUTO: 88 FL (ref 82–98)
MONOCYTES # BLD AUTO: 0.66 THOUSAND/ΜL (ref 0.17–1.22)
MONOCYTES NFR BLD AUTO: 7 % (ref 4–12)
NEUTROPHILS # BLD AUTO: 5.61 THOUSANDS/ΜL (ref 1.85–7.62)
NEUTS SEG NFR BLD AUTO: 62 % (ref 43–75)
NONHDLC SERPL-MCNC: 113 MG/DL
NRBC BLD AUTO-RTO: 0 /100 WBCS
PLATELET # BLD AUTO: 247 THOUSANDS/UL (ref 149–390)
PMV BLD AUTO: 9.3 FL (ref 8.9–12.7)
POTASSIUM SERPL-SCNC: 3.6 MMOL/L (ref 3.5–5.3)
PROT SERPL-MCNC: 7.2 G/DL (ref 6.4–8.2)
RBC # BLD AUTO: 4.89 MILLION/UL (ref 3.88–5.62)
SODIUM SERPL-SCNC: 144 MMOL/L (ref 136–145)
TRIGL SERPL-MCNC: 127 MG/DL
TSH SERPL DL<=0.05 MIU/L-ACNC: 1.56 UIU/ML (ref 0.36–3.74)
WBC # BLD AUTO: 8.86 THOUSAND/UL (ref 4.31–10.16)

## 2019-12-11 PROCEDURE — 83036 HEMOGLOBIN GLYCOSYLATED A1C: CPT

## 2019-12-11 PROCEDURE — 80061 LIPID PANEL: CPT

## 2019-12-11 PROCEDURE — 85025 COMPLETE CBC W/AUTO DIFF WBC: CPT

## 2019-12-11 PROCEDURE — 80053 COMPREHEN METABOLIC PANEL: CPT

## 2019-12-11 PROCEDURE — 84443 ASSAY THYROID STIM HORMONE: CPT

## 2019-12-11 PROCEDURE — 36415 COLL VENOUS BLD VENIPUNCTURE: CPT

## 2020-01-03 ENCOUNTER — APPOINTMENT (INPATIENT)
Dept: MRI IMAGING | Facility: HOSPITAL | Age: 75
DRG: 068 | End: 2020-01-03
Payer: COMMERCIAL

## 2020-01-03 ENCOUNTER — HOSPITAL ENCOUNTER (INPATIENT)
Facility: HOSPITAL | Age: 75
LOS: 3 days | Discharge: HOME | DRG: 068 | End: 2020-01-06
Attending: EMERGENCY MEDICINE | Admitting: INTERNAL MEDICINE
Payer: COMMERCIAL

## 2020-01-03 ENCOUNTER — APPOINTMENT (EMERGENCY)
Dept: CT IMAGING | Facility: HOSPITAL | Age: 75
DRG: 068 | End: 2020-01-03
Payer: COMMERCIAL

## 2020-01-03 ENCOUNTER — APPOINTMENT (EMERGENCY)
Dept: RADIOLOGY | Facility: HOSPITAL | Age: 75
DRG: 068 | End: 2020-01-03
Payer: COMMERCIAL

## 2020-01-03 DIAGNOSIS — I63.9 STROKE (HCC): Primary | ICD-10-CM

## 2020-01-03 DIAGNOSIS — I48.92 ATRIAL FLUTTER (HCC): ICD-10-CM

## 2020-01-03 DIAGNOSIS — R73.01 IMPAIRED FASTING GLUCOSE: ICD-10-CM

## 2020-01-03 DIAGNOSIS — E78.5 DYSLIPIDEMIA: ICD-10-CM

## 2020-01-03 DIAGNOSIS — I10 HIGH BLOOD PRESSURE: ICD-10-CM

## 2020-01-03 DIAGNOSIS — I10 ESSENTIAL HYPERTENSION: ICD-10-CM

## 2020-01-03 DIAGNOSIS — I65.21 CAROTID STENOSIS, RIGHT: ICD-10-CM

## 2020-01-03 PROBLEM — R53.1 LEFT-SIDED WEAKNESS: Status: ACTIVE | Noted: 2020-01-03

## 2020-01-03 PROBLEM — E11.9 TYPE II DIABETES MELLITUS (HCC): Status: ACTIVE | Noted: 2020-01-03

## 2020-01-03 LAB
ANION GAP SERPL CALCULATED.3IONS-SCNC: 7 MMOL/L (ref 4–13)
APTT PPP: 29 SECONDS (ref 23–37)
BUN SERPL-MCNC: 17 MG/DL (ref 5–25)
CALCIUM SERPL-MCNC: 9 MG/DL (ref 8.3–10.1)
CHLORIDE SERPL-SCNC: 104 MMOL/L (ref 100–108)
CO2 SERPL-SCNC: 33 MMOL/L (ref 21–32)
CREAT SERPL-MCNC: 1.05 MG/DL (ref 0.6–1.3)
ERYTHROCYTE [DISTWIDTH] IN BLOOD BY AUTOMATED COUNT: 12.8 % (ref 11.6–15.1)
GFR SERPL CREATININE-BSD FRML MDRD: 70 ML/MIN/1.73SQ M
GLUCOSE SERPL-MCNC: 115 MG/DL (ref 65–140)
GLUCOSE SERPL-MCNC: 158 MG/DL (ref 65–140)
GLUCOSE SERPL-MCNC: 206 MG/DL (ref 65–140)
HCT VFR BLD AUTO: 43.2 % (ref 36.5–49.3)
HGB BLD-MCNC: 13.9 G/DL (ref 12–17)
INR PPP: 1.11 (ref 0.84–1.19)
MCH RBC QN AUTO: 28.1 PG (ref 26.8–34.3)
MCHC RBC AUTO-ENTMCNC: 32.2 G/DL (ref 31.4–37.4)
MCV RBC AUTO: 87 FL (ref 82–98)
PLATELET # BLD AUTO: 254 THOUSANDS/UL (ref 149–390)
PMV BLD AUTO: 9.3 FL (ref 8.9–12.7)
POTASSIUM SERPL-SCNC: 4.1 MMOL/L (ref 3.5–5.3)
PROTHROMBIN TIME: 14 SECONDS (ref 11.6–14.5)
RBC # BLD AUTO: 4.94 MILLION/UL (ref 3.88–5.62)
SODIUM SERPL-SCNC: 144 MMOL/L (ref 136–145)
TROPONIN I SERPL-MCNC: <0.02 NG/ML
WBC # BLD AUTO: 7.72 THOUSAND/UL (ref 4.31–10.16)

## 2020-01-03 PROCEDURE — 99222 1ST HOSP IP/OBS MODERATE 55: CPT | Performed by: INTERNAL MEDICINE

## 2020-01-03 PROCEDURE — 99285 EMERGENCY DEPT VISIT HI MDM: CPT

## 2020-01-03 PROCEDURE — 70551 MRI BRAIN STEM W/O DYE: CPT

## 2020-01-03 PROCEDURE — 99291 CRITICAL CARE FIRST HOUR: CPT | Performed by: EMERGENCY MEDICINE

## 2020-01-03 PROCEDURE — 80048 BASIC METABOLIC PNL TOTAL CA: CPT | Performed by: EMERGENCY MEDICINE

## 2020-01-03 PROCEDURE — 82948 REAGENT STRIP/BLOOD GLUCOSE: CPT

## 2020-01-03 PROCEDURE — 85730 THROMBOPLASTIN TIME PARTIAL: CPT | Performed by: EMERGENCY MEDICINE

## 2020-01-03 PROCEDURE — 71045 X-RAY EXAM CHEST 1 VIEW: CPT

## 2020-01-03 PROCEDURE — 85610 PROTHROMBIN TIME: CPT | Performed by: EMERGENCY MEDICINE

## 2020-01-03 PROCEDURE — 85027 COMPLETE CBC AUTOMATED: CPT | Performed by: EMERGENCY MEDICINE

## 2020-01-03 PROCEDURE — 36415 COLL VENOUS BLD VENIPUNCTURE: CPT | Performed by: EMERGENCY MEDICINE

## 2020-01-03 PROCEDURE — 70496 CT ANGIOGRAPHY HEAD: CPT

## 2020-01-03 PROCEDURE — 70498 CT ANGIOGRAPHY NECK: CPT

## 2020-01-03 PROCEDURE — 84484 ASSAY OF TROPONIN QUANT: CPT | Performed by: EMERGENCY MEDICINE

## 2020-01-03 PROCEDURE — 99205 OFFICE O/P NEW HI 60 MIN: CPT | Performed by: PHYSICIAN ASSISTANT

## 2020-01-03 PROCEDURE — 93005 ELECTROCARDIOGRAM TRACING: CPT

## 2020-01-03 RX ORDER — ASPIRIN 325 MG
325 TABLET ORAL ONCE
Status: COMPLETED | OUTPATIENT
Start: 2020-01-03 | End: 2020-01-03

## 2020-01-03 RX ORDER — ACETAMINOPHEN 325 MG/1
650 TABLET ORAL EVERY 6 HOURS PRN
Status: DISCONTINUED | OUTPATIENT
Start: 2020-01-03 | End: 2020-01-06 | Stop reason: HOSPADM

## 2020-01-03 RX ORDER — FLUTICASONE PROPIONATE 50 MCG
1 SPRAY, SUSPENSION (ML) NASAL 2 TIMES DAILY
Status: DISCONTINUED | OUTPATIENT
Start: 2020-01-03 | End: 2020-01-04

## 2020-01-03 RX ORDER — GUAIFENESIN 600 MG
600 TABLET, EXTENDED RELEASE 12 HR ORAL EVERY 12 HOURS SCHEDULED
Status: DISCONTINUED | OUTPATIENT
Start: 2020-01-03 | End: 2020-01-06 | Stop reason: HOSPADM

## 2020-01-03 RX ORDER — ONDANSETRON 2 MG/ML
4 INJECTION INTRAMUSCULAR; INTRAVENOUS EVERY 6 HOURS PRN
Status: DISCONTINUED | OUTPATIENT
Start: 2020-01-03 | End: 2020-01-06 | Stop reason: HOSPADM

## 2020-01-03 RX ORDER — CLOPIDOGREL BISULFATE 75 MG/1
225 TABLET ORAL ONCE
Status: COMPLETED | OUTPATIENT
Start: 2020-01-03 | End: 2020-01-03

## 2020-01-03 RX ORDER — ASPIRIN 81 MG/1
81 TABLET ORAL DAILY
Status: DISCONTINUED | OUTPATIENT
Start: 2020-01-04 | End: 2020-01-06 | Stop reason: HOSPADM

## 2020-01-03 RX ORDER — CLOPIDOGREL BISULFATE 75 MG/1
75 TABLET ORAL ONCE
Status: COMPLETED | OUTPATIENT
Start: 2020-01-03 | End: 2020-01-03

## 2020-01-03 RX ORDER — OXYMETAZOLINE HYDROCHLORIDE 0.05 G/100ML
2 SPRAY NASAL EVERY 12 HOURS PRN
Status: DISCONTINUED | OUTPATIENT
Start: 2020-01-03 | End: 2020-01-05

## 2020-01-03 RX ORDER — LABETALOL 20 MG/4 ML (5 MG/ML) INTRAVENOUS SYRINGE
10 ONCE
Status: DISCONTINUED | OUTPATIENT
Start: 2020-01-03 | End: 2020-01-03

## 2020-01-03 RX ORDER — ATORVASTATIN CALCIUM 40 MG/1
40 TABLET, FILM COATED ORAL EVERY EVENING
Status: DISCONTINUED | OUTPATIENT
Start: 2020-01-03 | End: 2020-01-06 | Stop reason: HOSPADM

## 2020-01-03 RX ORDER — CLOPIDOGREL BISULFATE 75 MG/1
75 TABLET ORAL DAILY
Status: DISCONTINUED | OUTPATIENT
Start: 2020-01-04 | End: 2020-01-06 | Stop reason: HOSPADM

## 2020-01-03 RX ADMIN — CLOPIDOGREL BISULFATE 225 MG: 75 TABLET ORAL at 18:37

## 2020-01-03 RX ADMIN — ASPIRIN 325 MG ORAL TABLET 325 MG: 325 PILL ORAL at 16:28

## 2020-01-03 RX ADMIN — IOHEXOL 85 ML: 350 INJECTION, SOLUTION INTRAVENOUS at 16:13

## 2020-01-03 RX ADMIN — GUAIFENESIN 600 MG: 600 TABLET ORAL at 18:38

## 2020-01-03 RX ADMIN — FLUTICASONE PROPIONATE 1 SPRAY: 50 SPRAY, METERED NASAL at 18:08

## 2020-01-03 RX ADMIN — CLOPIDOGREL BISULFATE 75 MG: 75 TABLET ORAL at 16:28

## 2020-01-03 RX ADMIN — ATORVASTATIN CALCIUM 40 MG: 40 TABLET, FILM COATED ORAL at 18:37

## 2020-01-03 NOTE — H&P
H&P- Nuris Washington  1945, 76 y o  male MRN: [id number]    Unit/Bed#: -01 Encounter: [id number]    Primary Care Provider: Angeline De León DO   Date and time admitted to hospital: 1/3/2020  3:18 PM    * Left-sided weakness  Assessment & Plan  · Presented to the emergency department with sudden left sided weakness  NIHSS = 3 on time of arrival   Patient not tpa candidate as symptoms were non-debilitating and resolving  NIHSS = 1 at time of admission for mild LUE ataxia  · Accelerated hypertension on presentation  Allow permissive hypertension, 's presently  · CT head: "No acute intracranial abnormality  Microangiopathic changes and volume loss/atrophy "  · CTA head/neck: "Hemodynamic significant /severe calcified atherosclerotic plaque stenosis proximal right cervical ICA at the bifurcation and just beyond "  · Evaluated by neurology in the ED due to stroke alert  Admit to stroke pathway  · Monitor on telemetry  · Frequent neuro checks - STAT head CT with any changes  · Loaded with ASA and plavix (only received 75 mg in the ED - will give additional 225 mg now for total 300 mg loading dose)  Initiate statin therapy  · Recent lipid panel acceptable  Hgba1c was 6 6 as of 12/11/19  · PT/OT consult  · Obtain MRI brain, echocardiogram  · Neurology following    Internal carotid artery stenosis, right  Assessment & Plan  · As noted on CTA head/neck  · Patient loaded with ASA and plavix    Initiated on statin  · Consult vascular surgery for further recommendations    Type II diabetes mellitus [facility])  Assessment & Plan  Lab Results   Component Value Date    HGBA1C 6 6 (H) 12/11/2019       Recent Labs     01/03/20  1536   POCGLU 206*       Blood Sugar Average: Last 72 hrs:  (P) 206   · Hgba1c as of 12/11/19 was 6 6 - prior to this did have impaired fasting glucose measures  · SSI + accuchek  · Diabetic diet  · Nutrition consult    Hypertension  Assessment & Plan  · Accelerated on arrival, SBP > 200 but now improved to 160's without intervention  · Hold traimterine-hctz - allow permissive hypertension    VTE Prophylaxis: Pharmacologic VTE Prophylaxis contraindicated due to possible CVA  / sequential compression device   Code Status: Level 1 - Full Code  POLST: There is no POLST form on file for this patient (pre-hospital)  Discussion with family: Discussed with patient directly at bedside  Anticipated Length of Stay:  Patient will be admitted on an Inpatient basis with an anticipated length of stay of  Greater than 2 midnights  Justification for Hospital Stay: left arm weakness    Total Time for Visit, including Counseling / Coordination of Care: 1 hour  Greater than 50% of this total time spent on direct patient counseling and coordination of care  Chief Complaint:   "My arm went all wacky"     History of Present Illness:    Wyatt Casillas  is a 76 y o  male who presents with left arm weakness  Past medical history significant for GERD, essential hypertension, elevated fasting blood glucose, hyperlipidemia  Patient states that he was in his normal state of health approximately 30 minutes prior to arrival to the ED  He states that he suddenly became lightheaded and noticed his left arm was heavy, like it had a mind of its own"  Also with associated numbness  Denies any headache, visual changes, chest pain/palpitations or shortness of breath  Fellow coworkers did not notice any facial droop or slurred speech  Patient was able to ambulate, did not have any deficits of his left lower extremity  Patient reports that his symptoms have now resolved  In the emergency department patient was a stroke alert and subsequently was found to have right ICA with severe stenosis  Labs otherwise unremarkable aside from hyperglycemia  Patient will be admitted inpatient to [facility] for further evaluation on stroke pathway and vascular consult      Review of Systems:    Review of Systems Constitutional: Negative for chills, fatigue, fever and unexpected weight change  HENT: Negative for congestion, sore throat and trouble swallowing  Eyes: Negative for photophobia, pain and visual disturbance  Respiratory: Negative for cough, shortness of breath and wheezing  Cardiovascular: Negative for chest pain, palpitations and leg swelling  Gastrointestinal: Negative for abdominal pain, constipation, diarrhea, nausea and vomiting  Endocrine: Negative for polyuria  Genitourinary: Negative for difficulty urinating, dysuria, flank pain, hematuria and urgency  Musculoskeletal: Negative for back pain, myalgias, neck pain and neck stiffness  Skin: Negative for pallor and rash  Neurological: Positive for weakness and numbness  Negative for dizziness, tremors, syncope, speech difficulty, light-headedness and headaches  Hematological: Does not bruise/bleed easily  Psychiatric/Behavioral: Negative for agitation and confusion         Past Medical and Surgical History:     Past Medical History:   Diagnosis Date    Anemia     iron def    GERD (gastroesophageal reflux disease)     Gout     Hypertension     Insomnia     Kidney stone        Past Surgical History:   Procedure Laterality Date    ANKLE SURGERY Right     COLONOSCOPY  01/25/2013    polypectomy; complete - 3 yrs d/t polyp - benign submucosal lipoma- path c/w lipoma    COLONOSCOPY  04/25/2017    complete - tubular adenoma - repeat 5yrs    CYSTOSCOPY      CYSTOTOMY      bladder  w/ basket extraction of calculus    FEMUR FRACTURE SURGERY      HERNIA REPAIR      inguinal ; sliding    INGUINAL HERNIA REPAIR Right     unilateral    KNEE ARTHROSCOPY Right     w/medial menisectomy    LASIK      corneal    LITHOTRIPSY      renal    OK COLONOSCOPY FLX DX W/COLLJ SPEC WHEN PFRMD N/A 4/25/2017    Procedure: SCREENING COLONOSCOPY;  Surgeon: Jorge Gallardo MD;  Location: [facility];  Service: General    [address] Bilateral     TONSILLECTOMY         Meds/Allergies:    Prior to Admission medications    Medication Sig Start Date End Date Taking? Authorizing Provider   aspirin (ECOTRIN LOW STRENGTH) 81 mg EC tablet Take 81 mg by mouth daily    Historical Provider, MD   calcium polycarbophil (FIBERCON) 625 mg tablet Take 625 mg by mouth daily    Historical Provider, MD   clotrimazole-betamethasone (LOTRISONE) 1-0 05 % cream apply sparingly to affected area twice a day 19   Rigoberto Brown, DO   MITIGARE 0 6 MG CAPS take 1 capsule by mouth four times a day if needed 19   Rigoberto Brown, DO   mometasone (NASONEX) 50 mcg/act nasal spray 2 sprays into each nostril daily    Historical Provider, MD   Multiple Vitamin (MULTIVITAMIN) capsule Take 1 capsule by mouth daily    Historical Provider, MD   pravastatin (PRAVACHOL) 40 mg tablet Take 1 tablet (40 mg total) by mouth daily at bedtime 19   Rigoberto Brown, DO   pravastatin (PRAVACHOL) 40 mg tablet Take 1 tablet (40 mg total) by mouth daily at bedtime 19   Rigoberto Brown, DO   SANTYL ointment  18   Historical Provider, MD   triamterene-hydrochlorothiazide (MAXZIDE) 75-50 MG per tablet take 1 tablet by mouth once daily 19   Rigoberto Brown, DO     I have reviewed home medications with patient personally  Allergies: No Known Allergies    Social History:     Marital Status:     Occupation: Works at [employer], transports parts  Patient Pre-hospital Living Situation: Lives alone  Patient Pre-hospital Level of Mobility: Ambulatory  Patient Pre-hospital Diet Restrictions: None  Substance Use History:   Social History     Substance and Sexual Activity   Alcohol Use No    Comment: stopped drinking alcohol     Social History     Tobacco Use   Smoking Status Former Smoker    Packs/day: 0 50    Years: 22 00    Pack years: 11 00    Last attempt to quit: [place] Years since quittin 0   Smokeless Tobacco Never Used     Social History     Substance and Sexual Activity   Drug Use No       Family History:    Family History   Problem Relation Age of Onset    Alzheimer's disease Mother     Alzheimer's disease Father     Heart disease Father         CAD    Other Father         prediabetes    Diabetes Father     Breast cancer Other        Physical Exam:     Vitals:   Blood Pressure: 170/85 (01/03/20 1706)  Pulse: 71 (01/03/20 1706)  Temperature: 98 °F (36 7 °C) (01/03/20 1706)  Temp Source: Temporal (01/03/20 1519)  Respirations: 17 (01/03/20 1645)  Height: 5' 10" (177 8 cm) (01/03/20 1519)  Weight - Scale: 101 kg (222 lb 10 6 oz) (01/03/20 1519)  SpO2: 95 % (01/03/20 1706)    Physical Exam   Constitutional: He is oriented to person, place, and time  Vital signs are normal  He appears well-developed  Appears comfortable, no acute distress   HENT:   Head: Normocephalic  Eyes: Pupils are equal, round, and reactive to light  Conjunctivae and EOM are normal  No scleral icterus  Neck: Normal range of motion  Cardiovascular: Normal rate, regular rhythm and normal heart sounds  No murmur heard  Pulmonary/Chest: Effort normal and breath sounds normal  No respiratory distress  He has no wheezes  He has no rhonchi  He has no rales  Abdominal: Soft  Bowel sounds are normal  There is no tenderness  There is no rigidity, no rebound and no guarding  Musculoskeletal: He exhibits no edema, tenderness or deformity  Able to move upper and lower extremities bilaterally  Ambulates without difficulty   Neurological: He is alert and oriented to person, place, and time  He has normal strength  No cranial nerve deficit or sensory deficit  CN II-XII grossly intact  Sensation intact  Strength 5/5 upper and lower extremities bilaterally  Very minimal left upper extremity ataxia with finger to nose  Negative drift  Skin: Skin is warm and dry  Nursing note and vitals reviewed            Additional Data:     Lab Results: I have personally reviewed pertinent reports  Results from last 7 days   Lab Units 01/03/20  1531   WBC Thousand/uL 7 72   HEMOGLOBIN g/dL 13 9   HEMATOCRIT % 43 2   PLATELETS Thousands/uL 254     Results from last 7 days   Lab Units 01/03/20  1531   SODIUM mmol/L 144   POTASSIUM mmol/L 4 1   CHLORIDE mmol/L 104   CO2 mmol/L 33*   BUN mg/dL 17   CREATININE mg/dL 1 05   ANION GAP mmol/L 7   CALCIUM mg/dL 9 0   GLUCOSE RANDOM mg/dL 158*     Results from last 7 days   Lab Units 01/03/20  1531   INR  1 11     Results from last 7 days   Lab Units 01/03/20  1536   POC GLUCOSE mg/dl 206*               Imaging: I have personally reviewed pertinent reports  CTA stroke alert (head/neck)   Final Result by Uyen Frias MD (01/03 1611)         Hemodynamic significant /severe calcified atherosclerotic plaque stenosis proximal right cervical ICA at the bifurcation and just beyond  Other chronic findings as above  Findings were directly discussed with Jessica DIANE on 1/3/2020 3:56 PM                      Workstation performed: [id number]         X-ray chest 1 view portable   Final Result by Shilpi Murillo MD (01/03 1622)      No acute abnormality in the chest             Workstation performed: [id number]         CT stroke alert brain   Final Result by Uyen Frias MD (01/03 3535)      No acute intracranial abnormality  Microangiopathic changes and volume loss/atrophy  Findings were directly discussed with Jessica DIANE on 1/3/2020 3:48 PM       Workstation performed: [id number]         MRI Inpatient Order    (Results Pending)       EKG, Pathology, and Other Studies Reviewed on Admission:   · EKG: NSR    Allscripts / Epic Records Reviewed: Yes     ** Please Note: This note has been constructed using a voice recognition system   **

## 2020-01-03 NOTE — CONSULTS
Consultation - Stroke   Christine Blackwood  76 y o  male MRN: [id number]  Unit/Bed#: ED 10 Encounter: [id number]      Assessment/Plan   Assessment:   3 76year-old with HTN and HLD who presented as a stroke alert for left arm weakness and numbness  NIHSS of 3  Patient was deemed not a tPA candidate due to low NIHSS and improving symptoms  CT head negative for hemorrhage  CTA head and neck showed critical stenosis of R ICA, concern for acute infarction in setting of symptomatic carotid  Will admit to stroke pathway  TPA Decision: Patient not a TPA candidate  Symptoms resolved/clearly non disabling  Plan:   - ASA 325mg and Plavix 300mg load now   - Recommend Vascular Surgery consultation  - Recommend ASA 81mg and Plavix 75mg tomorrow  - Recommend Lipitor 40mg  - MRI brain without contrast  - Echocardiogram  - Telemetry  - Lipid panel   - HbA1C   - Secondary risk factor modification  - PT/OT/ST  - Stroke Education  - Frequent neurological checks  - Stat CT head with acute worsening in neuro exam/mental status  - Notify neurology with any changes in examination  Results:  1  CT head: No acute intracranial abnormality  Microangiopathic changes and volume loss/atrophy  2  CTA head and neck: Hemodynamic significant /severe calcified atherosclerotic plaque stenosis proximal right cervical ICA at the bifurcation and just beyond  Other chronic findings as above  History of Present Illness     Reason for Consult / Principal Problem: Stroke  Hx and PE limited by: N/a  Patient last known well: Approximately 3:00pm  Stroke alert called: 3:30pm  Neurology time of arrival: 3:34pm  HPI: Christine Blackwood  is a 76 y o  male with HTN, HLD, and history of tobacco use who presented to [facility] as a stroke alert for left arm weakness and numbness  The symptoms started approximately 30 minutes prior to arrival  Patient reported decreased coordination and weakness in his left arm   He stated that his left arm was "doing what it wanted" without his control  It was reported that patient was having difficulty getting his jacket on  BP on arrival was 209/88  NIHSS of 3 for mild left sensory deficit, LUE ataxia, and mild LUE drift  In the ED, patient reported that his symptoms were improving  Patient denied changes in speech, headache, and dizziness  At baseline, patient is on aspirin 81mg and pravastatin 40mg  CT head was negative for hemorrhage  CTA head and neck showed critical stenosis of R ICA  Inpatient consult to Neurology  Consult performed by: Jacquelyn Moreno PA-C  Consult ordered by: Rufino Webster DO          ROS:  A 12 point ROS was completed  Other than the above mentioned complaints in the HPI, all remaining systems were negative      Historical Information   Past Medical History:   Diagnosis Date    Anemia     iron def    GERD (gastroesophageal reflux disease)     Gout     Hypertension     Insomnia     Kidney stone      Past Surgical History:   Procedure Laterality Date    ANKLE SURGERY Right     COLONOSCOPY  01/25/2013    polypectomy; complete - 3 yrs d/t polyp - benign submucosal lipoma- path c/w lipoma    COLONOSCOPY  04/25/2017    complete - tubular adenoma - repeat 5yrs    CYSTOSCOPY      CYSTOTOMY      bladder  w/ basket extraction of calculus    FEMUR FRACTURE SURGERY      HERNIA REPAIR      inguinal ; sliding    INGUINAL HERNIA REPAIR Right     unilateral    KNEE ARTHROSCOPY Right     w/medial menisectomy    LASIK      corneal    LITHOTRIPSY      renal    OR COLONOSCOPY FLX DX W/COLLJ SPEC WHEN PFRMD N/A 4/25/2017    Procedure: SCREENING COLONOSCOPY;  Surgeon: Garfield Aviles MD;  Location:  MAIN OR;  Service: General    ROTATOR CUFF REPAIR Bilateral     TONSILLECTOMY       Social History   Social History     Substance and Sexual Activity   Alcohol Use No    Comment: stopped drinking alcohol     Social History     Substance and Sexual Activity   Drug Use No     Social History Tobacco Use   Smoking Status Former Smoker    Packs/day: 0 50    Years: 22 00    Pack years: 11 00    Last attempt to quit: [employer] Years since quittin 0   Smokeless Tobacco Never Used     Family History:   Family History   Problem Relation Age of Onset    Alzheimer's disease Mother     Alzheimer's disease Father     Heart disease Father         CAD    Other Father         prediabetes    Diabetes Father     Breast cancer Other      Review of previous medical records was completed  Meds/Allergies   current meds:   Current Facility-Administered Medications   Medication Dose Route Frequency    aspirin tablet 325 mg  325 mg Oral Once    clopidogrel (PLAVIX) tablet 75 mg  75 mg Oral Once    Labetalol HCl (NORMODYNE) injection 10 mg  10 mg Intravenous Once    and PTA meds:   Prior to Admission Medications   Prescriptions Last Dose Informant Patient Reported? Taking?    MITIGARE 0 6 MG CAPS   No No   Sig: take 1 capsule by mouth four times a day if needed   Multiple Vitamin (MULTIVITAMIN) capsule  Self Yes No   Sig: Take 1 capsule by mouth daily   SANTYL ointment  Self Yes No   aspirin (ECOTRIN LOW STRENGTH) 81 mg EC tablet  Self Yes No   Sig: Take 81 mg by mouth daily   calcium polycarbophil (FIBERCON) 625 mg tablet  Self Yes No   Sig: Take 625 mg by mouth daily   clotrimazole-betamethasone (LOTRISONE) 1-0 05 % cream  Self No No   Sig: apply sparingly to affected area twice a day   mometasone (NASONEX) 50 mcg/act nasal spray  Self Yes No   Si sprays into each nostril daily   pravastatin (PRAVACHOL) 40 mg tablet  Self No No   Sig: Take 1 tablet (40 mg total) by mouth daily at bedtime   pravastatin (PRAVACHOL) 40 mg tablet   No No   Sig: Take 1 tablet (40 mg total) by mouth daily at bedtime   triamterene-hydrochlorothiazide (MAXZIDE) 75-50 MG per tablet   No No   Sig: take 1 tablet by mouth once daily      Facility-Administered Medications: None       No Known Allergies    Objective Vitals:Blood pressure (!) 194/86, pulse 77, temperature 97 6 °F (36 4 °C), temperature source Temporal, resp  rate 18, height 5' 10" (1 778 m), weight 101 kg (222 lb 10 6 oz), SpO2 97 %  ,Body mass index is 31 95 kg/m²  No intake or output data in the 24 hours ending 01/03/20 1601    Invasive Devices: Invasive Devices     Peripheral Intravenous Line            Peripheral IV 01/03/20 Left Antecubital less than 1 day    Peripheral IV 01/03/20 Right Antecubital less than 1 day                Physical Exam   Constitutional: No distress  HENT:   Head: Normocephalic and atraumatic  Right Ear: External ear normal    Left Ear: External ear normal    Nose: Nose normal    Eyes: EOM are normal    Pulmonary/Chest: No respiratory distress  Neurological: He has a normal Heel to [employer]  Finger-nose-finger test: Ataxia noted in LUE with finger to nose testing  Reflex Scores:       Bicep reflexes are 1+ on the right side and 1+ on the left side  Brachioradialis reflexes are 1+ on the right side and 1+ on the left side  Patellar reflexes are 0 on the right side and 2+ on the left side  Achilles reflexes are 0 on the right side and 0 on the left side  Skin: Skin is warm and dry  He is not diaphoretic  Psychiatric: His speech is normal  Judgment and thought content normal      Neurologic Exam     Mental Status   Speech: speech is normal     Cranial Nerves     CN II   Visual fields full to confrontation  CN III, IV, VI   Extraocular motions are normal    Right pupil: Shape: regular  Left pupil: Shape: regular  Conjugate gaze: present    CN VII   Facial expression full, symmetric       CN XII   Tongue deviation: left    Motor Exam   Good  strength bilaterally  Patient is able to move and lift all four extremities     Sensory Exam   Vibration normal    Temperature sensation intact X 4 extremities     Gait, Coordination, and Reflexes     Coordination   Finger-nose-finger test: Ataxia noted in LUE with finger to nose testing  Heel to shin coordination: normal    Reflexes   Right brachioradialis: 1+  Left brachioradialis: 1+  Right biceps: 1+  Left biceps: 1+  Right patellar: 0  Left patellar: 2+  Right achilles: 0  Left achilles: 0    NIHSS:  1a Level of Consciousness: 0 = Alert   1b  LOC Questions: 0 = Answers both correctly   1c  LOC Commands: 0 = Obeys both correctly   2  Best Gaze: 0 = Normal   3  Visual: 0 = No visual field loss   4  Facial Palsy: 0=Normal symmetric movement   5a  Motor Right Arm: 0=No drift, limb holds 90 (or 45) degrees for full 10 seconds   5b  Motor Left Arm: 1=Drift, limb holds 90 (or 45) degrees but drifts down before full 10 seconds: does not hit bed   6a  Motor Right Le=No drift, limb holds 90 (or 45) degrees for full 10 seconds   6b  Motor Left Le=No drift, limb holds 90 (or 45) degrees for full 10 seconds   7  Limb Ataxia:  1=Present in one limb   8  Sensory: 1=Mild to moderate sensory loss; patient feels pinprick is less sharp or is dull on the affected side; there is a loss of superficial pain with pinprick but patient is aware He is being touched   9  Best Language:  0=No aphasia, normal   10  Dysarthria: 0=Normal articulation   11  Extinction and Inattention (formerly Neglect): 0=No abnormality   Total Score: 3     Time NIHSS was completed: 3:40pm    Lab Results: I have personally reviewed pertinent reports  Imaging Studies: I have personally reviewed pertinent reports  and I have personally reviewed pertinent films in PACS  EKG, Pathology, and Other Studies: I have personally reviewed pertinent reports      VTE Prophylaxis: Sequential compression device Palmer Peña     Code Status: No Order  Advance Directive and Living Will:      Power of :    POLST:

## 2020-01-03 NOTE — ASSESSMENT & PLAN NOTE
· Presented to the emergency department with sudden left sided weakness  NIHSS = 3 on time of arrival   Patient not tpa candidate as symptoms were non-debilitating and resolving  NIHSS = 1 at time of admission for mild LUE ataxia  · Accelerated hypertension on presentation  Allow permissive hypertension, 's presently  · CT head: "No acute intracranial abnormality  Microangiopathic changes and volume loss/atrophy "  · CTA head/neck: "Hemodynamic significant /severe calcified atherosclerotic plaque stenosis proximal right cervical ICA at the bifurcation and just beyond "  · Evaluated by neurology in the ED due to stroke alert  Admit to stroke pathway  · Monitor on telemetry  · Frequent neuro checks - STAT head CT with any changes  · Loaded with ASA and plavix (only received 75 mg in the ED - will give additional 225 mg now for total 300 mg loading dose)  Initiate statin therapy  · Recent lipid panel acceptable  Hgba1c was 6 6 as of 12/11/19    · PT/OT consult  · Obtain MRI brain, echocardiogram  · Neurology following

## 2020-01-03 NOTE — ED PROVIDER NOTES
History  Chief Complaint   Patient presents with    Extremity Weakness     pt presents to ED c/o left arm weakness that began half hour ago  Pt unable to even take his coat off during triage  77-year-old male presents for evaluation of sudden onset of left upper extremity weakness beginning approximately 30 minutes prior to arrival   The patient states that his arm was just kind doing wanted to was weak  He states a trouble getting his jacket on off  The patient denies any associated headache, chest pain, visual disturbance or ringing in his ears  Patient denies previous history of focal weakness  Neurologic Problem   Severity:  Moderate  Onset quality:  Sudden  Duration:  30 minutes  Timing:  Constant  Progression:  Improving  Chronicity:  New  Associated symptoms: no chest pain, no fatigue, no fever, no headaches, no loss of consciousness and no shortness of breath        Prior to Admission Medications   Prescriptions Last Dose Informant Patient Reported? Taking?    MITIGARE 0 6 MG CAPS   No No   Sig: take 1 capsule by mouth four times a day if needed   Multiple Vitamin (MULTIVITAMIN) capsule  Self Yes No   Sig: Take 1 capsule by mouth daily   SANTYL ointment  Self Yes No   aspirin (ECOTRIN LOW STRENGTH) 81 mg EC tablet  Self Yes No   Sig: Take 81 mg by mouth daily   calcium polycarbophil (FIBERCON) 625 mg tablet  Self Yes No   Sig: Take 625 mg by mouth daily   clotrimazole-betamethasone (LOTRISONE) 1-0 05 % cream  Self No No   Sig: apply sparingly to affected area twice a day   mometasone (NASONEX) 50 mcg/act nasal spray  Self Yes No   Si sprays into each nostril daily   pravastatin (PRAVACHOL) 40 mg tablet  Self No No   Sig: Take 1 tablet (40 mg total) by mouth daily at bedtime   pravastatin (PRAVACHOL) 40 mg tablet   No No   Sig: Take 1 tablet (40 mg total) by mouth daily at bedtime   triamterene-hydrochlorothiazide (MAXZIDE) 75-50 MG per tablet   No No   Sig: take 1 tablet by mouth once daily Facility-Administered Medications: None       Past Medical History:   Diagnosis Date    Anemia     iron def    GERD (gastroesophageal reflux disease)     Gout     Hypertension     Insomnia     Kidney stone        Past Surgical History:   Procedure Laterality Date    ANKLE SURGERY Right     COLONOSCOPY  2013    polypectomy; complete - 3 yrs d/t polyp - benign submucosal lipoma- path c/w lipoma    COLONOSCOPY  2017    complete - tubular adenoma - repeat 5yrs    CYSTOSCOPY      CYSTOTOMY      bladder  w/ basket extraction of calculus    FEMUR FRACTURE SURGERY      HERNIA REPAIR      inguinal ; sliding    INGUINAL HERNIA REPAIR Right     unilateral    KNEE ARTHROSCOPY Right     w/medial menisectomy    LASIK      corneal    LITHOTRIPSY      renal    IA COLONOSCOPY FLX DX W/COLLJ SPEC WHEN PFRMD N/A 2017    Procedure: SCREENING COLONOSCOPY;  Surgeon: Abad Saab MD;  Location:  MAIN OR;  Service: General    ROTATOR CUFF REPAIR Bilateral     TONSILLECTOMY         Family History   Problem Relation Age of Onset    Alzheimer's disease Mother     Alzheimer's disease Father     Heart disease Father         CAD    Other Father         prediabetes    Diabetes Father     Breast cancer Other      I have reviewed and agree with the history as documented  Social History     Tobacco Use    Smoking status: Former Smoker     Packs/day: 0 50     Years: 22 00     Pack years: 11 00     Last attempt to quit:      Years since quittin 0    Smokeless tobacco: Never Used   Substance Use Topics    Alcohol use: No     Comment: stopped drinking alcohol    Drug use: No        Review of Systems   Constitutional: Negative for fatigue and fever  Respiratory: Negative for shortness of breath  Cardiovascular: Negative for chest pain  Neurological: Positive for weakness  Negative for loss of consciousness and headaches     All other systems reviewed and are negative  Physical Exam  Physical Exam   Constitutional: He is oriented to person, place, and time  He appears well-developed and well-nourished  No distress  HENT:   Head: Normocephalic and atraumatic  Right Ear: External ear normal    Left Ear: External ear normal    Mouth/Throat: No oropharyngeal exudate  Eyes: Pupils are equal, round, and reactive to light  EOM are normal  No scleral icterus  Neck: Normal range of motion  Neck supple  Cardiovascular: Normal rate, regular rhythm and normal heart sounds  Pulmonary/Chest: Effort normal and breath sounds normal  No respiratory distress  Abdominal: Soft  Bowel sounds are normal  There is no tenderness  There is no rebound and no guarding  Musculoskeletal: Normal range of motion  Neurological: He is alert and oriented to person, place, and time  A sensory deficit ( Mild decreased left upper extremity) is present  No cranial nerve deficit  He exhibits abnormal muscle tone ( Mild weakness of the left upper extremity)  Coordination ( Left upper extremity) abnormal  GCS eye subscore is 4  GCS verbal subscore is 5  GCS motor subscore is 6  Skin: Skin is warm and dry  No rash noted  Psychiatric: He has a normal mood and affect  Nursing note and vitals reviewed        Vital Signs  ED Triage Vitals   Temperature Pulse Respirations Blood Pressure SpO2   01/03/20 1519 01/03/20 1521 01/03/20 1519 01/03/20 1521 01/03/20 1521   97 6 °F (36 4 °C) 82 18 (!) 209/88 97 %      Temp Source Heart Rate Source Patient Position - Orthostatic VS BP Location FiO2 (%)   01/03/20 1519 01/03/20 1519 01/03/20 1519 01/03/20 1519 --   Temporal Monitor Sitting Right arm       Pain Score       --                  Vitals:    01/03/20 1537 01/03/20 1559 01/03/20 1601 01/03/20 1615   BP: (!) 183/84 (!) 194/86 (!) 189/78 162/75   Pulse: 76 77 72 69   Patient Position - Orthostatic VS:             Visual Acuity  Visual Acuity      Most Recent Value   L Pupil Size (mm)  3   R Pupil Size (mm)  3          ED Medications  Medications   Labetalol HCl (NORMODYNE) injection 10 mg (has no administration in time range)   iohexol (OMNIPAQUE) 350 MG/ML injection (MULTI-DOSE) 100 mL (85 mL Intravenous Given 1/3/20 1613)   aspirin tablet 325 mg (325 mg Oral Given 1/3/20 1628)   clopidogrel (PLAVIX) tablet 75 mg (75 mg Oral Given 1/3/20 1628)       Diagnostic Studies  Results Reviewed     Procedure Component Value Units Date/Time    Troponin I [[id number]]  (Normal) Collected:  01/03/20 1531    Lab Status:  Final result Specimen:  Blood from Arm, Right Updated:  01/03/20 1601     Troponin I <0 02 ng/mL     Basic metabolic panel [[id number]]  (Abnormal) Collected:  01/03/20 1531    Lab Status:  Final result Specimen:  Blood from Arm, Right Updated:  01/03/20 1559     Sodium 144 mmol/L      Potassium 4 1 mmol/L      Chloride 104 mmol/L      CO2 33 mmol/L      ANION GAP 7 mmol/L      BUN 17 mg/dL      Creatinine 1 05 mg/dL      Glucose 158 mg/dL      Calcium 9 0 mg/dL      eGFR 70 ml/min/1 73sq m     Narrative:       [place] guidelines for Chronic Kidney Disease (CKD):     Stage 1 with normal or high GFR (GFR > 90 mL/min/1 73 square meters)    Stage 2 Mild CKD (GFR = 60-89 mL/min/1 73 square meters)    Stage 3A Moderate CKD (GFR = 45-59 mL/min/1 73 square meters)    Stage 3B Moderate CKD (GFR = 30-44 mL/min/1 73 square meters)    Stage 4 Severe CKD (GFR = 15-29 mL/min/1 73 square meters)    Stage 5 End Stage CKD (GFR <15 mL/min/1 73 square meters)  Note: GFR calculation is accurate only with a steady state creatinine    Protime-INR [[id number]]  (Normal) Collected:  01/03/20 1531    Lab Status:  Final result Specimen:  Blood from Arm, Right Updated:  01/03/20 1557     Protime 14 0 seconds      INR 1 11    APTT [[id number]]  (Normal) Collected:  01/03/20 1531    Lab Status:  Final result Specimen:  Blood from Arm, Right Updated:  01/03/20 1557     PTT 29 seconds     CBC and Platelet [[id number]]  (Normal) Collected:  01/03/20 1531    Lab Status:  Final result Specimen:  Blood from Arm, Right Updated:  01/03/20 1539     WBC 7 72 Thousand/uL      RBC 4 94 Million/uL      Hemoglobin 13 9 g/dL      Hematocrit 43 2 %      MCV 87 fL      MCH 28 1 pg      MCHC 32 2 g/dL      RDW 12 8 %      Platelets 244 Thousands/uL      MPV 9 3 fL     Fingerstick Glucose (POCT) [[id number]]  (Abnormal) Collected:  01/03/20 1536    Lab Status:  Final result Updated:  01/03/20 1537     POC Glucose 206 mg/dl                  CTA stroke alert (head/neck)   Final Result by Laquita Lucas MD (01/03 1611)         Hemodynamic significant /severe calcified atherosclerotic plaque stenosis proximal right cervical ICA at the bifurcation and just beyond  Other chronic findings as above  Findings were directly discussed with Carlitos DIANE on 1/3/2020 3:56 PM                      Workstation performed: [id number]         X-ray chest 1 view portable   Final Result by Oksana Stephenson MD (01/03 1622)      No acute abnormality in the chest             Workstation performed: [id number]         CT stroke alert brain   Final Result by Laquita Lucas MD (01/03 1267)      No acute intracranial abnormality  Microangiopathic changes and volume loss/atrophy  Findings were directly discussed with Carlitos DIANE on 1/3/2020 3:48 PM       Workstation performed: [id number]                    Procedures  ECG 12 Lead Documentation Only  Date/Time: 1/3/2020 3:40 PM  Performed by: Claire Nicole DO  Authorized by:  Claire Nicole DO     Indications / Diagnosis:  Stroke  ECG reviewed by me, the ED Provider: yes    Patient location:  ED  Previous ECG:     Previous ECG:  Compared to current    Comparison ECG info:  A flutter has replaced normal sinus rhythm from 5/14/2015    Similarity:  Changes noted  Interpretation:     Interpretation: normal    Rate:     ECG rate:  71    ECG rate assessment: normal    Rhythm:     Rhythm: atrial flutter    Ectopy:     Ectopy: none    QRS:     QRS axis:  Normal    QRS intervals:  Normal  Conduction:     Conduction: normal    ST segments:     ST segments:  Normal  T waves:     T waves: normal      CriticalCare Time  Performed by: Luisa Cifuentes DO  Authorized by: Luisa Cifuentes DO     Critical care provider statement:     Critical care time (minutes):  35    Critical care time was exclusive of:  Separately billable procedures and treating other patients and teaching time    Critical care was necessary to treat or prevent imminent or life-threatening deterioration of the following conditions:  CNS failure or compromise    Critical care was time spent personally by me on the following activities:  Blood draw for specimens, obtaining history from patient or surrogate, development of treatment plan with patient or surrogate, discussions with consultants, evaluation of patient's response to treatment, examination of patient, ordering and performing treatments and interventions, ordering and review of laboratory studies, ordering and review of radiographic studies, re-evaluation of patient's condition, review of old charts and interpretation of cardiac output measurements    I assumed direction of critical care for this patient from another provider in my specialty: no               ED Course  ED Course as of Jan 03 1634   Fri Jan 03, 2020   1530 Stroke alert called       1538 Dr Ad Mcconnell of Neurology at bedside      1552 D/W rads  No acute process      1608 Proximal right ICA narrowing at bifurcation with critical narrowing >80%  [facility] paged for admit      [phone] I discussed the case with the hospitalist  We reviewed the HPI, pertinent PMH, ED course and workup   Hospitalist agreed with plan and will admit the patient to the hospital                 Stroke Assessment     Row Name 01/03/20 1533             NIH Stroke Scale    Interval  Baseline      Level of Consciousness (1a )  0      LOC Questions (1b )  0      LOC Commands (1c )  0      Best Gaze (2 )  0      Visual (3 )  0      Facial Palsy (4 )  0      Motor Arm, Left (5a )  1      Motor Arm, Right (5b )  0      Motor Leg, Left (6a )  0      Motor Leg, Right (6b )  0      Limb Ataxia (7 )  1      Sensory (8 )  1      Best Language (9 )  0      Dysarthria (10 )  0      Extinction and Inattention (11 ) (Formerly Neglect)  0      Total  3                          MDM  Number of Diagnoses or Management Options  Atrial flutter ([facility] [address] ): new and requires workup  Carotid stenosis, right: new and requires workup  High blood pressure:   Stroke [facility]): new and requires workup  Diagnosis management comments: Patient with acute onset of focal neurologic deficit consistent with acute stroke  Stroke alert pathway initiated  Patient was evaluated by myself from Neurology  Diagnostics and imaging reviewed and discussed with Neurology  The plan is for admission Internal Medicine stroke pathway         Amount and/or Complexity of Data Reviewed  Clinical lab tests: reviewed and ordered  Tests in the radiology section of CPT®: reviewed and ordered  Tests in the medicine section of CPT®: ordered and reviewed  Review and summarize past medical records: yes  Discuss the patient with other providers: yes  Independent visualization of images, tracings, or specimens: yes          Disposition  Final diagnoses:   High blood pressure   Carotid stenosis, right   Atrial flutter ([facility] [address] )   Stroke [facility])     Time reflects when diagnosis was documented in both MDM as applicable and the Disposition within this note     Time User Action Codes Description Comment    1/3/2020  3:51 PM Pearl Boom Add [I63 013] 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 ([facility] [address] )     1/3/2020  4:26 PM Fernanda Sack High blood pressure     1/3/2020  4:26 PM Pearl Boom Add [I65 21] Carotid stenosis, right     1/3/2020  4:32 PM Pearl Boom Add [I48 92] Atrial flutter ([facility] )     1/3/2020  4:33 PM Honora Gilmer B Modify [I10] High blood pressure     1/3/2020  4:33 PM Honora Gilmer B Remove [I63 013] 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 ([facility] )     1/3/2020  4:33 PM Sherman Juan Add [I63 9] Stroke ([facility] )     1/3/2020  4:33 PM Sherman Juan Modify [I10] High blood pressure     1/3/2020  4:33 PM Sherman Juan Modify [I63 9] Stroke [facility])       ED Disposition     ED Disposition Condition Date/Time Comment    Admit Stable Fri Fabián 3, 2020  4:30 PM Case was discussed with Dr Bhanu Morgan and the patient's admission status was agreed to be Admission Status: inpatient status to the service of Dr Bhanu Morgan   Follow-up Information    None         Patient's Medications   Discharge Prescriptions    No medications on file     No discharge procedures on file      ED Provider  Electronically Signed by           Rick Fuller DO  01/03/20 0346

## 2020-01-03 NOTE — ASSESSMENT & PLAN NOTE
Lab Results   Component Value Date    HGBA1C 6 6 (H) 12/11/2019       Recent Labs     01/03/20  1536   POCGLU 206*       Blood Sugar Average: Last 72 hrs:  (P) 206   · Hgba1c as of 12/11/19 was 6 6 - prior to this did have impaired fasting glucose measures  · SSI + accuchek  · Diabetic diet  · Nutrition consult

## 2020-01-03 NOTE — ASSESSMENT & PLAN NOTE
· As noted on CTA head/neck  · Patient loaded with ASA and plavix    Initiated on statin  · Consult vascular surgery for further recommendations

## 2020-01-03 NOTE — ASSESSMENT & PLAN NOTE
· Accelerated on arrival, SBP > 200 but now improved to 160's without intervention  · Hold traimterine-hctz - allow permissive hypertension

## 2020-01-04 LAB
ANION GAP SERPL CALCULATED.3IONS-SCNC: 10 MMOL/L (ref 4–13)
ATRIAL RATE: 156 BPM
BASOPHILS # BLD AUTO: 0.04 THOUSANDS/ΜL (ref 0–0.1)
BASOPHILS NFR BLD AUTO: 1 % (ref 0–1)
BUN SERPL-MCNC: 17 MG/DL (ref 5–25)
CALCIUM SERPL-MCNC: 9 MG/DL (ref 8.3–10.1)
CHLORIDE SERPL-SCNC: 104 MMOL/L (ref 100–108)
CO2 SERPL-SCNC: 27 MMOL/L (ref 21–32)
CREAT SERPL-MCNC: 1.11 MG/DL (ref 0.6–1.3)
EOSINOPHIL # BLD AUTO: 0.66 THOUSAND/ΜL (ref 0–0.61)
EOSINOPHIL NFR BLD AUTO: 9 % (ref 0–6)
ERYTHROCYTE [DISTWIDTH] IN BLOOD BY AUTOMATED COUNT: 12.8 % (ref 11.6–15.1)
GFR SERPL CREATININE-BSD FRML MDRD: 65 ML/MIN/1.73SQ M
GLUCOSE SERPL-MCNC: 113 MG/DL (ref 65–140)
GLUCOSE SERPL-MCNC: 122 MG/DL (ref 65–140)
GLUCOSE SERPL-MCNC: 131 MG/DL (ref 65–140)
GLUCOSE SERPL-MCNC: 145 MG/DL (ref 65–140)
GLUCOSE SERPL-MCNC: 163 MG/DL (ref 65–140)
HCT VFR BLD AUTO: 41.6 % (ref 36.5–49.3)
HGB BLD-MCNC: 13.4 G/DL (ref 12–17)
IMM GRANULOCYTES # BLD AUTO: 0.01 THOUSAND/UL (ref 0–0.2)
IMM GRANULOCYTES NFR BLD AUTO: 0 % (ref 0–2)
LYMPHOCYTES # BLD AUTO: 2.12 THOUSANDS/ΜL (ref 0.6–4.47)
LYMPHOCYTES NFR BLD AUTO: 29 % (ref 14–44)
MAGNESIUM SERPL-MCNC: 1.7 MG/DL (ref 1.6–2.6)
MCH RBC QN AUTO: 27.9 PG (ref 26.8–34.3)
MCHC RBC AUTO-ENTMCNC: 32.2 G/DL (ref 31.4–37.4)
MCV RBC AUTO: 87 FL (ref 82–98)
MONOCYTES # BLD AUTO: 0.67 THOUSAND/ΜL (ref 0.17–1.22)
MONOCYTES NFR BLD AUTO: 9 % (ref 4–12)
NEUTROPHILS # BLD AUTO: 3.82 THOUSANDS/ΜL (ref 1.85–7.62)
NEUTS SEG NFR BLD AUTO: 52 % (ref 43–75)
NRBC BLD AUTO-RTO: 0 /100 WBCS
P AXIS: 63 DEGREES
PLATELET # BLD AUTO: 232 THOUSANDS/UL (ref 149–390)
PMV BLD AUTO: 8.9 FL (ref 8.9–12.7)
POTASSIUM SERPL-SCNC: 3.5 MMOL/L (ref 3.5–5.3)
QRS AXIS: 31 DEGREES
QRSD INTERVAL: 98 MS
QT INTERVAL: 384 MS
QTC INTERVAL: 417 MS
RBC # BLD AUTO: 4.81 MILLION/UL (ref 3.88–5.62)
SODIUM SERPL-SCNC: 141 MMOL/L (ref 136–145)
T WAVE AXIS: 90 DEGREES
VENTRICULAR RATE: 71 BPM
WBC # BLD AUTO: 7.32 THOUSAND/UL (ref 4.31–10.16)

## 2020-01-04 PROCEDURE — 93010 ELECTROCARDIOGRAM REPORT: CPT | Performed by: INTERNAL MEDICINE

## 2020-01-04 PROCEDURE — 97162 PT EVAL MOD COMPLEX 30 MIN: CPT

## 2020-01-04 PROCEDURE — 83735 ASSAY OF MAGNESIUM: CPT | Performed by: PHYSICIAN ASSISTANT

## 2020-01-04 PROCEDURE — 82948 REAGENT STRIP/BLOOD GLUCOSE: CPT

## 2020-01-04 PROCEDURE — 85025 COMPLETE CBC W/AUTO DIFF WBC: CPT | Performed by: PHYSICIAN ASSISTANT

## 2020-01-04 PROCEDURE — 80048 BASIC METABOLIC PNL TOTAL CA: CPT | Performed by: PHYSICIAN ASSISTANT

## 2020-01-04 PROCEDURE — 97165 OT EVAL LOW COMPLEX 30 MIN: CPT

## 2020-01-04 PROCEDURE — 99232 SBSQ HOSP IP/OBS MODERATE 35: CPT | Performed by: PHYSICIAN ASSISTANT

## 2020-01-04 RX ORDER — LANOLIN ALCOHOL/MO/W.PET/CERES
6 CREAM (GRAM) TOPICAL
Status: DISCONTINUED | OUTPATIENT
Start: 2020-01-04 | End: 2020-01-06 | Stop reason: HOSPADM

## 2020-01-04 RX ADMIN — MELATONIN 6 MG: at 22:05

## 2020-01-04 RX ADMIN — ATORVASTATIN CALCIUM 40 MG: 40 TABLET, FILM COATED ORAL at 18:37

## 2020-01-04 RX ADMIN — GUAIFENESIN 600 MG: 600 TABLET ORAL at 09:00

## 2020-01-04 RX ADMIN — ASPIRIN 81 MG: 81 TABLET, COATED ORAL at 09:00

## 2020-01-04 RX ADMIN — GUAIFENESIN 600 MG: 600 TABLET ORAL at 20:17

## 2020-01-04 RX ADMIN — CLOPIDOGREL BISULFATE 75 MG: 75 TABLET ORAL at 09:00

## 2020-01-04 RX ADMIN — INSULIN LISPRO 1 UNITS: 100 INJECTION, SOLUTION INTRAVENOUS; SUBCUTANEOUS at 14:32

## 2020-01-04 NOTE — SPEECH THERAPY NOTE
Speech Language/Pathology    Patient is a 77 y/o male admitted to [facility] with left arm numbness and weakness  Patient placed on stroke alert; MRI with right parietal lobe small cortical areas of acute ischemia  Volume loss/atrophy and microangiopathy  Most recent NIH is 0       Patient passed RN dysphagia screening, and placed on a regular textured diet  Patient denies difficulty swallowing at this time  Patient reported speech/language is at baseline  Family denies any noticeable changes in speech or language  Patient is able to follow one and two-step directions, name items in environment, count backwards from 10, and is oriented  Patient is able to participate in conversation and speech is fluent  No significant change in speech/langauge noted at this time  No further speech therapy services warranted, please re-consult if needed  room air

## 2020-01-04 NOTE — PLAN OF CARE
Problem: PAIN - ADULT  Goal: Verbalizes/displays adequate comfort level or baseline comfort level  Description  Interventions:  - Encourage patient to monitor pain and request assistance  - Assess pain using appropriate pain scale  - Administer analgesics based on type and severity of pain and evaluate response  - Implement non-pharmacological measures as appropriate and evaluate response  - Consider cultural and social influences on pain and pain management  - Notify physician/advanced practitioner if interventions unsuccessful or patient reports new pain  Outcome: Progressing     Problem: INFECTION - ADULT  Goal: Absence or prevention of progression during hospitalization  Description  INTERVENTIONS:  - Assess and monitor for signs and symptoms of infection  - Monitor lab/diagnostic results  - Monitor all insertion sites, i e  indwelling lines, tubes, and drains  - Monitor endotracheal if appropriate and nasal secretions for changes in amount and color  - Brea appropriate cooling/warming therapies per order  - Administer medications as ordered  - Instruct and encourage patient and family to use good hand hygiene technique  - Identify and instruct in appropriate isolation precautions for identified infection/condition  Outcome: Progressing  Goal: Absence of fever/infection during neutropenic period  Description  INTERVENTIONS:  - Monitor WBC    Outcome: Progressing

## 2020-01-04 NOTE — CONSULTS
Pt denies need for diet ed at this time, please see full not for details, follows appropriate diet at home  Thank you for consult, will continue to follow per protocol

## 2020-01-04 NOTE — ASSESSMENT & PLAN NOTE
· As noted on CTA head/neck  · Patient loaded with ASA and plavix    Initiated on statin  · Consult vascular surgery for further recommendations - pending

## 2020-01-04 NOTE — PROGRESS NOTES
Progress Note - Eddi Flores  1945, 76 y o  male MRN: [id number]    Unit/Bed#: -01 Encounter: [id number]    Primary Care Provider: Amanda Sanchez DO   Date and time admitted to hospital: 1/3/2020  3:18 PM    * Left-sided weakness  Assessment & Plan  · Presented to the emergency department with sudden left sided weakness  NIHSS = 3 on time of arrival   Patient not tpa candidate as symptoms were non-debilitating and resolving  · Accelerated hypertension on presentation  Allow permissive hypertension, 's presently  · CT head: "No acute intracranial abnormality  Microangiopathic changes and volume loss/atrophy "  · CTA head/neck: "Hemodynamic significant /severe calcified atherosclerotic plaque stenosis proximal right cervical ICA at the bifurcation and just beyond "  · Evaluated by neurology in the ED due to stroke alert  Admit to stroke pathway  · Monitor on telemetry  · Frequent neuro checks - STAT head CT with any changes  · Loaded with ASA and plavix  Initiate statin therapy  · Recent lipid panel acceptable  Hgba1c was 6 6 as of 12/11/19  · PT/OT consult - no acute needs  · MRI brain:  "Right parietal lobe small cortical areas of acute ischemia  Volume loss/atrophy and microangiopathy "  · Neurology following    Internal carotid artery stenosis, right  Assessment & Plan  · As noted on CTA head/neck  · Patient loaded with ASA and plavix  Initiated on statin  · Consult vascular surgery for further recommendations - pending    Type II diabetes mellitus [facility])  Assessment & Plan  Lab Results   Component Value Date    HGBA1C 6 6 (H) 12/11/2019       Recent Labs     01/03/20  1536 01/03/20  2053 01/04/20  0802 01/04/20  1114   POCGLU 206* 115 145* 163*       Blood Sugar Average: Last 72 hrs:  (P) 157 25   · Hgba1c as of 12/11/19 was 6 6 - prior to this did have impaired fasting glucose measures  · SSI + accuchek  · Diabetic diet    · Nutrition consult    Hypertension  Assessment & Plan  · Accelerated on arrival, SBP > 200 but now improved to 160's without intervention  Stable  · Hold traimterine-hctz - allow permissive hypertension  VTE Pharmacologic Prophylaxis:   Pharmacologic: Pharmacologic VTE Prophylaxis contraindicated due to acute CVA  Mechanical VTE Prophylaxis in Place: Yes    Patient Centered Rounds: I have performed bedside rounds with nursing staff today  Discussions with Specialists or Other Care Team Provider: Nursing, Neurology    Education and Discussions with Family / Patient: Discussed with patient and daughter at bedside  Time Spent for Care: 30 minutes  More than 50% of total time spent on counseling and coordination of care as described above  Current Length of Stay: 1 day(s)    Current Patient Status: Inpatient   Certification Statement: The patient will continue to require additional inpatient hospital stay due to neurology, vascular evaluation    Discharge Plan: Likely discharge 24-48 hours pending neurology and vascular evaluations    Code Status: Level 1 - Full Code      Subjective:   "I'm fine, but my hand feels weird "    Patient states he slept well last night  Feels that his arm is better, the numbness is gone but he feels like his left hand is swollen  States that when he picks up an object he feels his fine motor skills are diminished but that that overall he still has complete function  Denies chest pain/palpitations, shortness of breath, nausea/vomiting, abdominal pain  Has been ambulating halls without difficulty  Objective:     Vitals:   Temp (24hrs), Av 1 °F (36 7 °C), Min:97 6 °F (36 4 °C), Max:98 9 °F (37 2 °C)    Temp:  [97 6 °F (36 4 °C)-98 9 °F (37 2 °C)] 98 9 °F (37 2 °C)  HR:  [66-82] 69  Resp:  [15-19] 18  BP: (132-209)/() 132/66  SpO2:  [95 %-99 %] 99 %  Body mass index is 31 95 kg/m²  Input and Output Summary (last 24 hours):        Intake/Output Summary (Last 24 hours) at 2020 5263  Last data filed at 1/3/2020 2001  Gross per 24 hour   Intake 240 ml   Output    Net 240 ml       Physical Exam:     Physical Exam   Constitutional: He is oriented to person, place, and time  Vital signs are normal  He appears well-developed  Appears comfortable, no acute distress  HENT:   Head: Normocephalic  Eyes: Pupils are equal, round, and reactive to light  Conjunctivae and EOM are normal  No scleral icterus  Neck: Normal range of motion  Cardiovascular: Normal rate, regular rhythm and normal heart sounds  No murmur heard  Pulmonary/Chest: Effort normal and breath sounds normal  No respiratory distress  He has no wheezes  He has no rhonchi  He has no rales  Abdominal: Soft  Bowel sounds are normal  There is no tenderness  There is no rigidity, no rebound and no guarding  Musculoskeletal: He exhibits no edema, tenderness or deformity  Able to ambulate without difficulty   Neurological: He is alert and oriented to person, place, and time  He has normal strength  No cranial nerve deficit or sensory deficit  Minimal LUE ataxia, similar to yesterday at time of admission  Skin: Skin is warm and dry  Psychiatric: He has a normal mood and affect  His speech is normal and behavior is normal    Nursing note and vitals reviewed          Additional Data:     Labs:    Results from last 7 days   Lab Units 01/04/20  0438   WBC Thousand/uL 7 32   HEMOGLOBIN g/dL 13 4   HEMATOCRIT % 41 6   PLATELETS Thousands/uL 232   NEUTROS PCT % 52   LYMPHS PCT % 29   MONOS PCT % 9   EOS PCT % 9*     Results from last 7 days   Lab Units 01/04/20  0438   SODIUM mmol/L 141   POTASSIUM mmol/L 3 5   CHLORIDE mmol/L 104   CO2 mmol/L 27   BUN mg/dL 17   CREATININE mg/dL 1 11   ANION GAP mmol/L 10   CALCIUM mg/dL 9 0   GLUCOSE RANDOM mg/dL 122     Results from last 7 days   Lab Units 01/03/20  1531   INR  1 11     Results from last 7 days   Lab Units 01/04/20  1114 01/04/20  0802 01/03/20  2053 01/03/20  1536   POC GLUCOSE mg/dl 163* 145* 115 206*                   * I Have Reviewed All Lab Data Listed Above  * Additional Pertinent Lab Tests Reviewed: All Labs Within Last 24 Hours Reviewed    Imaging:    Imaging Reports Reviewed Today Include: MRI brain  Imaging Personally Reviewed by Myself Includes:      Recent Cultures (last 7 days):           Last 24 Hours Medication List:     Current Facility-Administered Medications:  acetaminophen 650 mg Oral Q6H PRN Sandeep Robledo PA-C   aspirin 81 mg Oral Daily Rubi Polo PA-C   atorvastatin 40 mg Oral QPM Sandeep Robledo PA-C   clopidogrel 75 mg Oral Daily Rubi Polo PA-C   guaiFENesin 600 mg Oral Q12H [address] Rubi Hilton PA-C   insulin lispro 1-5 Units Subcutaneous TID AC Ana Hilton PA-C   insulin lispro 1-5 Units Subcutaneous HS Rubi Hilton PA-C   ondansetron 4 mg Intravenous Q6H PRN Sandeep Robledo PA-C   oxymetazoline 2 spray Each Nare Q12H PRN Sandeep Robledo PA-C        Today, Patient Was Seen By: Sandeep Robledo PA-C    ** Please Note: Dictation voice to text software may have been used in the creation of this document   **

## 2020-01-04 NOTE — PHYSICAL THERAPY NOTE
PHYSICAL THERAPY EVAL       01/04/20 0807   Note Type   Note type Eval only   Pain Assessment   Pain Assessment No/denies pain   Pain Score No Pain   Patient's Stated Pain Goal No pain   Home Living   Type of Home   (condo)   Home Layout Two level;1/2 bath on main level;Bed/bath upstairs  (reports that he primarily stays on the first floor )   Prior Function   Level of [place] Independent with ADLs and functional mobility   Lives With Alone   ADL Assistance Independent   IADLs Independent   Falls in the last 6 months 0   Vocational Part time employment   Restrictions/Precautions   Weight Bearing Precautions Per Order No   Other Precautions Telemetry   General   Family/Caregiver Present No   Cognition   Overall Cognitive Status WFL   Arousal/Participation Alert   Attention Within functional limits   Orientation Level Oriented X4   Memory Within functional limits   Following Commands Follows all commands and directions without difficulty   RLE Assessment   RLE Assessment WFL   LLE Assessment   LLE Assessment WFL   Coordination   Movements are Fluid and Coordinated 1   Sensation WFL   Light Touch   RLE Light Touch Grossly intact   LLE Light Touch Grossly intact   Proprioception   RLE Proprioception Grossly intact   LLE Proprioception Grossly Intact   Bed Mobility   Supine to Sit 7  Independent   Additional Comments sitting edge of bed at end of session with call bell and all needs met     Transfers   Sit to Stand 7  Independent   Stand to Sit 7  Independent   Ambulation/Elevation   Gait pattern WNL   Gait Assistance 7  Independent   Assistive Device None   Distance 150ft   Stair Management Assistance 6  Modified independent   Stair Management Technique One rail R;Reciprocal   Number of Stairs 5   Balance   Static Sitting Normal   Dynamic Sitting Normal   Static Standing Normal   Dynamic Standing Normal   Ambulatory Normal   Endurance Deficit   Endurance Deficit No   Activity Tolerance   Activity Tolerance Patient tolerated treatment well   Medical Staff Made Aware OT Renée   Assessment   Prognosis Good   Problem List Decreased coordination   Assessment Patient is a 79y/o M who presented with lightheadedness and L arm heaviness with numbness  CT: negative  MRI: R Parietal lobe acute ischemia  CTA head/neck: plaque stenosis in the proximal R cervical ICA  PMH significant for anemia, GERD, gout, HTN, kidney stones, insomnia, DM  Patient lives alone in a condo with 2 levels  There is a full flight to the second floor which is where he showers  PLOF was independent with all mobility without an AD  He drives  Current medical status includes telemetry and decreased coordination in the LUE  Patient's strength, ROM, sensation, coordination, and proprioception on B/L LE's was all normal  He performed bed mobility, transfers, amb and stairs all at an independent/mod I level  He appears to be at his baseline  No further inpatient P T  Needs  D/C P T  Barriers to Discharge None   Goals   Patient Goals To go home   PT Treatment Day 0   Plan   Treatment/Interventions OT   Recommendation   Recommendation D/C PT   Modified [place] Scale   Modified [place] Scale 1   Barthel Index   Feeding 10   Bathing 5   Grooming Score 5   Dressing Score 10   Bladder Score 10   Bowels Score 10   Toilet Use Score 10   Transfers (Bed/Chair) Score 15   Mobility (Level Surface) Score 15   Stairs Score 10   Barthel Index Score 100   Helene Saravia, PT            Patient Name: Pj Cleveland    Today's Date: 1/4/2020

## 2020-01-04 NOTE — ASSESSMENT & PLAN NOTE
· Presented to the emergency department with sudden left sided weakness  NIHSS = 3 on time of arrival   Patient not tpa candidate as symptoms were non-debilitating and resolving  · Accelerated hypertension on presentation  Allow permissive hypertension, 's presently  · CT head: "No acute intracranial abnormality  Microangiopathic changes and volume loss/atrophy "  · CTA head/neck: "Hemodynamic significant /severe calcified atherosclerotic plaque stenosis proximal right cervical ICA at the bifurcation and just beyond "  · Evaluated by neurology in the ED due to stroke alert  Admit to stroke pathway  · Monitor on telemetry  · Frequent neuro checks - STAT head CT with any changes  · Loaded with ASA and plavix  Initiate statin therapy  · Recent lipid panel acceptable  Hgba1c was 6 6 as of 12/11/19  · PT/OT consult - no acute needs  · MRI brain:  "Right parietal lobe small cortical areas of acute ischemia    Volume loss/atrophy and microangiopathy "  · Neurology following

## 2020-01-04 NOTE — ASSESSMENT & PLAN NOTE
· Accelerated on arrival, SBP > 200 but now improved to 160's without intervention  Stable  · Hold traimterine-hctz - allow permissive hypertension

## 2020-01-04 NOTE — ASSESSMENT & PLAN NOTE
Lab Results   Component Value Date    HGBA1C 6 6 (H) 12/11/2019       Recent Labs     01/03/20  1536 01/03/20  2053 01/04/20  0802 01/04/20  1114   POCGLU 206* 115 145* 163*       Blood Sugar Average: Last 72 hrs:  (P) 157 25   · Hgba1c as of 12/11/19 was 6 6 - prior to this did have impaired fasting glucose measures  · SSI + accuchek  · Diabetic diet    · Nutrition consult

## 2020-01-04 NOTE — OCCUPATIONAL THERAPY NOTE
Occupational Therapy Evaluation      [facility]     1/4/2020    Principal Problem:    Left-sided weakness  Active Problems:    Hypertension    Internal carotid artery stenosis, right    Type II diabetes mellitus ([address] )      Past Medical History:   Diagnosis Date    Anemia     iron def    GERD (gastroesophageal reflux disease)     Gout     Hypertension     Insomnia     Kidney stone        Past Surgical History:   Procedure Laterality Date    ANKLE SURGERY Right     COLONOSCOPY  01/25/2013    polypectomy; complete - 3 yrs d/t polyp - benign submucosal lipoma- path c/w lipoma    COLONOSCOPY  04/25/2017    complete - tubular adenoma - repeat 5yrs    CYSTOSCOPY      CYSTOTOMY      bladder  w/ basket extraction of calculus    FEMUR FRACTURE SURGERY      HERNIA REPAIR      inguinal ; sliding    INGUINAL HERNIA REPAIR Right     unilateral    KNEE ARTHROSCOPY Right     w/medial menisectomy    LASIK      corneal    LITHOTRIPSY      renal    DE COLONOSCOPY FLX DX W/COLLJ SPEC WHEN PFRMD N/A 4/25/2017    Procedure: SCREENING COLONOSCOPY;  Surgeon: Karlos Chávez MD;  Location:  MAIN OR;  Service: General    ROTATOR CUFF REPAIR Bilateral     TONSILLECTOMY          01/04/20 0806   Note Type   Note type Eval only   Restrictions/Precautions   Weight Bearing Precautions Per Order No   Pain Assessment   Pain Assessment No/denies pain   Home Living   Type of Home Apartment  (Condo)   Home Layout Two level;Bed/bath upstairs;1/2 bath on main level  (reports he tries to stay on the 1st fl as much as possible)   Home Equipment   (denies)   Prior Function   Level of [place] Independent with ADLs and functional mobility   Lives With Alone   ADL Assistance Independent   IADLs Independent   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)   Psychosocial   Psychosocial (WDL) WDL   Subjective   Subjective "I just want to get these wires off"   ADL   Eating Assistance 7  Independent Grooming Assistance 7  Independent   UB Bathing Assistance [address]  7  Independent   Bed Mobility   Supine to Sit 7  Independent   Sit to Supine 7  Independent   Transfers   Sit to Stand 7  Independent   Stand to Sit 7  Independent   Stand pivot 7  Independent   Functional Mobility   Functional Mobility 7  Independent   Additional Comments household and community distances, good safety, no assistive device   Activity Tolerance   Activity Tolerance Patient tolerated treatment well   Medical Staff Made Aware PT Lynn   RUE Assessment   RUE Assessment WFL  (R hand dominant)   LUE Assessment   LUE Assessment X  (Strength WFL; impaired fine motor control)   Hand Function   Gross Motor Coordination Functional   Fine Motor Coordination Impaired  (L hand FMC deficits present; minimal impact on function 2* non-dominant hand; pt encouraged to continue utilizing L hand as much as possible to increase return)   Sensation   Additional Comments Pt with no sensation deficits   Vision - Complex Assessment   Additional Comments Pt reports no visual deficits   Perception   Inattention/Neglect Appears intact   Motor Planning Appears intact   Cognition   Overall Cognitive Status WFL   Arousal/Participation Alert; Cooperative   Attention Within functional limits   Orientation Level Oriented X4   Memory Within functional limits   Following Commands Follows all commands and directions without difficulty   Assessment   Limitation Decreased fine motor control   Prognosis Good   Assessment Pt is a 76 y o  male seen for OT evaluation at [address] [place] [address], admitted 1/3/2020 w/ Left-sided weakness  OT completed brief review of pt's medical and social history   Comorbidities affecting pt's functional performance at time of assessment include: HTN, internal carotid artery stenosis (R), DM type 2, carpal tunnel syndrome, gout, osteoarthritis, obesity  Prior to admission, pt was living in [address] [place] [address] alone, no ROHAN, with full bath on 2nd floor and was independent with ADL, IADL, ambulation  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, transporting parts  Upon evaluation, pt presents to OT at functional baseline  Pt has L hand coordination deficits, which do not impact basic function 2* non-dominant hand, but would benefit from followup on an outpatient basis  Based on findings, pt is of low complexity  At this time, OT recommendations at time of discharge are outpatient OT to address L hand fine motor coordination  No further acute OT needs indicated at this time - Recommend pt continue to be OOB for meals, ambulation to/from , perform self care tasks, and mobility in hallway  D/C from OT caseload with above recommendations     Goals   Patient Goals go home   Plan   OT Frequency Eval only   Recommendation   OT Discharge Recommendation Outpatient OT  (for L hand fine motor coordination)   OT - OK to Discharge Yes  (when medically cleared with outpt OT orders)     Sorin Fire, OT

## 2020-01-05 LAB
GLUCOSE SERPL-MCNC: 133 MG/DL (ref 65–140)
GLUCOSE SERPL-MCNC: 148 MG/DL (ref 65–140)
GLUCOSE SERPL-MCNC: 169 MG/DL (ref 65–140)
GLUCOSE SERPL-MCNC: 98 MG/DL (ref 65–140)

## 2020-01-05 PROCEDURE — 82948 REAGENT STRIP/BLOOD GLUCOSE: CPT

## 2020-01-05 PROCEDURE — 99232 SBSQ HOSP IP/OBS MODERATE 35: CPT | Performed by: PHYSICIAN ASSISTANT

## 2020-01-05 PROCEDURE — 99223 1ST HOSP IP/OBS HIGH 75: CPT | Performed by: NURSE PRACTITIONER

## 2020-01-05 RX ORDER — DOCUSATE SODIUM 100 MG/1
100 CAPSULE, LIQUID FILLED ORAL 2 TIMES DAILY
Status: DISCONTINUED | OUTPATIENT
Start: 2020-01-05 | End: 2020-01-06 | Stop reason: HOSPADM

## 2020-01-05 RX ORDER — ALPRAZOLAM 0.25 MG/1
0.25 TABLET ORAL
Status: DISCONTINUED | OUTPATIENT
Start: 2020-01-05 | End: 2020-01-06 | Stop reason: HOSPADM

## 2020-01-05 RX ORDER — ALPRAZOLAM 0.5 MG/1
0.5 TABLET ORAL ONCE
Status: COMPLETED | OUTPATIENT
Start: 2020-01-05 | End: 2020-01-05

## 2020-01-05 RX ORDER — OXYMETAZOLINE HYDROCHLORIDE 0.05 G/100ML
2 SPRAY NASAL EVERY 12 HOURS PRN
Status: DISCONTINUED | OUTPATIENT
Start: 2020-01-05 | End: 2020-01-06 | Stop reason: HOSPADM

## 2020-01-05 RX ORDER — POLYETHYLENE GLYCOL 3350 17 G/17G
17 POWDER, FOR SOLUTION ORAL DAILY PRN
Status: DISCONTINUED | OUTPATIENT
Start: 2020-01-05 | End: 2020-01-06 | Stop reason: HOSPADM

## 2020-01-05 RX ADMIN — DOCUSATE SODIUM 100 MG: 100 CAPSULE, LIQUID FILLED ORAL at 13:13

## 2020-01-05 RX ADMIN — ATORVASTATIN CALCIUM 40 MG: 40 TABLET, FILM COATED ORAL at 17:37

## 2020-01-05 RX ADMIN — MELATONIN 6 MG: at 21:19

## 2020-01-05 RX ADMIN — ASPIRIN 81 MG: 81 TABLET, COATED ORAL at 09:53

## 2020-01-05 RX ADMIN — ALPRAZOLAM 0.5 MG: 0.5 TABLET ORAL at 09:53

## 2020-01-05 RX ADMIN — INSULIN LISPRO 1 UNITS: 100 INJECTION, SOLUTION INTRAVENOUS; SUBCUTANEOUS at 13:14

## 2020-01-05 RX ADMIN — CLOPIDOGREL BISULFATE 75 MG: 75 TABLET ORAL at 09:53

## 2020-01-05 RX ADMIN — GUAIFENESIN 600 MG: 600 TABLET ORAL at 21:19

## 2020-01-05 RX ADMIN — INSULIN LISPRO 1 UNITS: 100 INJECTION, SOLUTION INTRAVENOUS; SUBCUTANEOUS at 16:30

## 2020-01-05 RX ADMIN — GUAIFENESIN 600 MG: 600 TABLET ORAL at 09:53

## 2020-01-05 RX ADMIN — DOCUSATE SODIUM 100 MG: 100 CAPSULE, LIQUID FILLED ORAL at 17:37

## 2020-01-05 RX ADMIN — ALPRAZOLAM 0.25 MG: 0.25 TABLET ORAL at 21:19

## 2020-01-05 NOTE — ASSESSMENT & PLAN NOTE
Lab Results   Component Value Date    HGBA1C 6 6 (H) 12/11/2019       Recent Labs     01/04/20  1114 01/04/20  1621 01/04/20  2049 01/05/20  0749   POCGLU 163* 131 113 148*       Blood Sugar Average: Last 72 hrs:  (P) 145 [id number]   · Hgba1c as of 12/11/19 was 6 6 - prior to this did have impaired fasting glucose measures  · SSI + accuchek  · Diabetic diet    · Nutrition consult completed

## 2020-01-05 NOTE — ASSESSMENT & PLAN NOTE
Lab Results   Component Value Date    HGBA1C 6 6 (H) 12/11/2019       Recent Labs     01/04/20  1114 01/04/20  1621 01/04/20 2049 01/05/20  0749   POCGLU 163* 131 113 148*       Blood Sugar Average: Last 72 hrs:  (P) 145 [id number]   -continue to optimize BS per SLIM

## 2020-01-05 NOTE — CONSULTS
Consult- Veronica Apa  1945, 76 y o  male MRN: [id number]    Unit/Bed#: -01 Encounter: [id number]    Primary Care Provider: Stephanie Ramos DO   Date and time admitted to hospital: 1/3/2020  3:18 PM      Inpatient consult to Vascular Surgery  Consult performed by: BLAS Mtz  Consult ordered by: Kamryn Tejeda DO          Internal carotid artery stenosis, right  Assessment & Plan  77yo M with PMH HTN, HLD, elevated fasting glucose with HbA1c 6 6, former smoker presented to [facility] with some lightheadedness and left arm weakness/numbness  Stroke workup with +right parietal lobe ischemia and significant right ICA stenosis  Vascular consulted for symptomatic right carotid artery stenosis  Diagnostics:  -01/03/2020 CT brain:  No acute intracranial abnormality  -01/03/2020 CTA head and neck:  Right vertebral artery with moderate stenosis, left vertebral artery with moderate to severe stenosis, non dominant  Right ICA stenosis greater than 80%  Left ICA stenosis of moderate disease bifurcation  -01/03/2020 MRI brain with right parietal lobe small acute ischemia    Recommendations:  -continue with stroke pathway  -Echo pending  -Neurology following; recommending intervention soon or this admission  -ASA/Plavix loaded; continue daily DAPT per Neuro  -Lipid panel reviewed; continue statin therapy  -PT/OT  -permissive hypertension; management per SLIM  -carotid duplex for baseline assessment  -will d/w Dr Bj Pantoja Doctor  Patient would likely benefit from intervention to right ICA for high-grade, symptomatic stenosis  Continue pre-op workup including Cardiology evaluation and risk stratification; Will have office start looking for a date for OR      -Cards consult placed   -Discussed with SLIM    Type II diabetes mellitus [facility])  Assessment & Plan  Lab Results   Component Value Date    HGBA1C 6 6 (H) 12/11/2019       Recent Labs     01/04/20  1114 01/04/20  1621 01/04/20 2049 01/05/20  0749   POCGLU 163* 131 113 148*       Blood Sugar Average: Last 72 hrs:  (P) 145 [id number]   -continue to optimize BS per SLIM    Hypertension  Assessment & Plan  HTN  -BP Stable  -allow for permissive HTN   -management per SLIM      Dyslipidemia  Assessment & Plan  HLD  -Lipid panel reviewed  -statin medication initiated on admission  -management per SLIM    * Left-sided weakness  Assessment & Plan  Left-sided weakness  -acute R parietal lobe ischemia on MRI  -high-grade R ICA stenosis  -see plan above    Consult Note - Vascular Surgery     Consulting Service: SLIM    Chief Complaint: Left sided weakness; R ICA stenosis    HPI: Rhoda Buenrostro  is a 76 y o  male who presents with PMH HTN, HLD, elevated fasting glucose with HbA1c 6 6, former smoker presented to [address] [place] [address] with some lightheadedness and left arm weakness/numbness  Stroke workup with +right parietal lobe ischemia and significant left ICA stenosis  Vascular consulted for symptomatic left carotid artery stenosis  Patient reports some lightheaded and dizziness that occurred with associated left arm weakness, numbness and some fine motor deficit  He denies any symptoms to the left lower extremity, denies any vision changes or amaurosis fugax, denies any speech or swallowing difficulties, denies headache, chest pain, shortness of breath  He was taking aspirin every other day at home but was not on any statin medication  He remains active and does walking exercise on a regular basis      Review of Systems:  General: negative  Cardiovascular: no chest pain or dyspnea on exertion  Respiratory: no cough, shortness of breath, or wheezing  Gastrointestinal: no abdominal pain, change in bowel habits, or black or bloody stools  Genitourinary ROS: no dysuria, trouble voiding, or hematuria  Musculoskeletal ROS: negative  Neurological ROS: positive for - dizziness, numbness/tingling and weakness  Hematological and Lymphatic ROS: negative  Dermatological ROS: negative  Psychological ROS: negative  Ophthalmic ROS: negative  ENT ROS: negative    Past Medical History:  Past Medical History:   Diagnosis Date    Anemia     iron def    GERD (gastroesophageal reflux disease)     Gout     Hypertension     Insomnia     Kidney stone        Past Surgical History:  Past Surgical History:   Procedure Laterality Date    ANKLE SURGERY Right     COLONOSCOPY  2013    polypectomy; complete - 3 yrs d/t polyp - benign submucosal lipoma- path c/w lipoma    COLONOSCOPY  2017    complete - tubular adenoma - repeat 5yrs    CYSTOSCOPY      CYSTOTOMY      bladder  w/ basket extraction of calculus    FEMUR FRACTURE SURGERY      HERNIA REPAIR      inguinal ; sliding    INGUINAL HERNIA REPAIR Right     unilateral    KNEE ARTHROSCOPY Right     w/medial menisectomy    LASIK      corneal    LITHOTRIPSY      renal    NV COLONOSCOPY FLX DX W/COLLJ SPEC WHEN PFRMD N/A 2017    Procedure: SCREENING COLONOSCOPY;  Surgeon: Jorge Gallardo MD;  Location:  MAIN OR;  Service: General    ROTATOR CUFF REPAIR Bilateral     TONSILLECTOMY         Social History:  Social History     Substance and Sexual Activity   Alcohol Use Not Currently    Comment: stopped drinking alcohol     Social History     Substance and Sexual Activity   Drug Use No     Social History     Tobacco Use   Smoking Status Former Smoker    Packs/day: 0 50    Years: 22 00    Pack years: 11 00    Last attempt to quit: Christine Cowan Years since quittin 0   Smokeless Tobacco Never Used       Family History:  Family History   Problem Relation Age of Onset    Alzheimer's disease Mother     Alzheimer's disease Father     Heart disease Father         CAD    Other Father         prediabetes    Diabetes Father     Breast cancer Other        Allergies:  No Known Allergies    Medications:  Current Facility-Administered Medications   Medication Dose Route Frequency  acetaminophen (TYLENOL) tablet 650 mg  650 mg Oral Q6H PRN    aspirin (ECOTRIN LOW STRENGTH) EC tablet 81 mg  81 mg Oral Daily    atorvastatin (LIPITOR) tablet 40 mg  40 mg Oral QPM    clopidogrel (PLAVIX) tablet 75 mg  75 mg Oral Daily    guaiFENesin (MUCINEX) 12 hr tablet 600 mg  600 mg Oral Q12H [address]    insulin lispro (HumaLOG) 100 units/mL subcutaneous injection 1-5 Units  1-5 Units Subcutaneous TID AC    insulin lispro (HumaLOG) 100 units/mL subcutaneous injection 1-5 Units  1-5 Units Subcutaneous HS    melatonin tablet 6 mg  6 mg Oral HS    ondansetron (ZOFRAN) injection 4 mg  4 mg Intravenous Q6H PRN    oxymetazoline (AFRIN) 0 05 % nasal spray 2 spray  2 spray Each Nare Q12H PRN       Vitals:  Vitals:    01/04/20 1900 01/04/20 2325 01/05/20 0333 01/05/20 0727   BP: (!) 184/87 (!) 178/83 (!) 183/88 162/75   BP Location: Right arm Right arm Right arm Right arm   Pulse: 60 70 66 59   Resp: 18 18 18 18   Temp: 98 4 °F (36 9 °C) 98 1 °F (36 7 °C) 97 9 °F (36 6 °C) 98 °F (36 7 °C)   TempSrc: Oral Oral Oral Oral   SpO2: 95% 97% 94% 94%   Weight:       Height:           I/Os:  I/O last 3 completed shifts: In: 240 [P O :240]  Out: -   No intake/output data recorded      Lab Results and Cultures:   CBC with diff:   Lab Results   Component Value Date    WBC 7 32 01/04/2020    HGB 13 4 01/04/2020    HCT 41 6 01/04/2020    MCV 87 01/04/2020     01/04/2020    MCH 27 9 01/04/2020    MCHC 32 2 01/04/2020    RDW 12 8 01/04/2020    MPV 8 9 01/04/2020    NRBC 0 01/04/2020   ,   BMP/CMP:  Lab Results   Component Value Date     05/14/2015    K 3 5 01/04/2020    K 3 7 05/14/2015     01/04/2020     05/14/2015    CO2 27 01/04/2020    CO2 28 05/14/2015    ANIONGAP 10 05/14/2015    BUN 17 01/04/2020    BUN 12 05/14/2015    CREATININE 1 11 01/04/2020    CREATININE 1 02 05/14/2015    GLUCOSE 112 05/14/2015    CALCIUM 9 0 01/04/2020    CALCIUM 9 2 05/14/2015    AST 33 12/11/2019    AST 22 03/28/2015 ALT 43 12/11/2019    ALT 22 03/28/2015    ALKPHOS 58 12/11/2019    ALKPHOS 53 03/28/2015    PROT 6 9 03/28/2015    BILITOT 0 4 03/28/2015    EGFR 65 01/04/2020   ,   Lipid Panel:   Lab Results   Component Value Date    CHOL 141 09/25/2015   ,   Coags:   Lab Results   Component Value Date    PTT 29 01/03/2020    INR 1 11 01/03/2020   ,     Blood Culture: No results found for: BLOODCX,   Urinalysis: No results found for: Shelda Chock, SPECGRAV, PHUR, LEUKOCYTESUR, NITRITE, PROTEINUA, GLUCOSEU, KETONESU, BILIRUBINUR, BLOODU,   Urine Culture: No results found for: URINECX,   Wound Culure:   Lab Results   Component Value Date    WOUNDCULT 1+ Growth of Staphylococcus aureus (A) 05/18/2018    WOUNDCULT 2+ Growth of  05/18/2018       Imaging:  -01/03/2020 CT brain:  No acute intracranial abnormality  -01/03/2020 CTA head and neck:  Right vertebral artery with moderate stenosis, left vertebral artery with moderate to severe stenosis, non dominant  Right ICA stenosis greater than 80%  Left ICA stenosis of moderate disease bifurcation  -01/03/2020 MRI brain with right parietal lobe small acute ischemia        Physical Exam:    General appearance: alert and oriented, in no acute distress, cooperative and no distress  Head: Normocephalic, without obvious abnormality, atraumatic  Eyes: PERRL, EOMI  Throat: lips, mucosa, and tongue normal; teeth and gums normal  Neck: no adenopathy, no JVD, supple, symmetrical, trachea midline, thyroid not enlarged, symmetric, no tenderness/mass/nodules and +Carotid bruit  Back: symmetric, no curvature  ROM normal  No CVA tenderness    Lungs: clear to auscultation bilaterally  Chest wall: no tenderness  Heart: regular rate and rhythm, S1, S2 normal, no murmur, click, rub or gallop  Abdomen: soft, non-tender; bowel sounds normal; no masses,  no organomegaly  Extremities: extremities normal, warm and well-perfused; no cyanosis, clubbing, or edema  Skin: Skin color, texture, turgor normal  No rashes or lesions  Neurologic: Grossly normal; AAOx3, JIMENES 5/5x4, no motor deficit noted  Smile symmetrical, speech clear         Pulse exam:  Radial: Right: 2+ Left[de-identified] 2+  DP: Right: 1+ Left: 1+  PT: Right: non-palpable Left: non-palpable        BLAS Carranza  1/5/2020  [facility]  [phone]

## 2020-01-05 NOTE — ASSESSMENT & PLAN NOTE
Left-sided weakness  -acute R parietal lobe ischemia on MRI  -high-grade R ICA stenosis  -see plan above

## 2020-01-05 NOTE — ASSESSMENT & PLAN NOTE
HLD  -Lipid panel reviewed  -statin medication initiated on admission  -management per [facility]

## 2020-01-05 NOTE — ASSESSMENT & PLAN NOTE
75yo M with PMH HTN, HLD, elevated fasting glucose with HbA1c 6 6, former smoker presented to [address]  [facility] with some lightheadedness and left arm weakness/numbness  Stroke workup with +right parietal lobe ischemia and significant right ICA stenosis  Vascular consulted for symptomatic right carotid artery stenosis  Diagnostics:  -01/03/2020 CT brain:  No acute intracranial abnormality  -01/03/2020 CTA head and neck:  Right vertebral artery with moderate stenosis, left vertebral artery with moderate to severe stenosis, non dominant  Right ICA stenosis greater than 80%  Left ICA stenosis of moderate disease bifurcation  -01/03/2020 MRI brain with right parietal lobe small acute ischemia    Recommendations:  -continue with stroke pathway  -Echo pending  -Neurology following; recommending intervention soon or this admission  -ASA/Plavix loaded; continue daily DAPT per Neuro  -Lipid panel reviewed; continue statin therapy  -PT/OT  -permissive hypertension; management per SLIM  -carotid duplex for baseline assessment  -will d/w Dr Kimmy Aaron Doctor  Patient would likely benefit from intervention to right ICA for high-grade, symptomatic stenosis  Continue pre-op workup including Cardiology evaluation and risk stratification; Will have office start looking for a date for OR      -Cards consult placed   -Discussed with SLIM

## 2020-01-05 NOTE — PROGRESS NOTES
Progress Note - Alia Holt  1945, 76 y o  male MRN: [id number]    Unit/Bed#: -01 Encounter: [id number]    Primary Care Provider: Nikolas Elizabeth DO   Date and time admitted to hospital: 1/3/2020  3:18 PM    * Left-sided weakness  Assessment & Plan  · Presented to the emergency department with sudden left sided weakness  NIHSS = 3 on time of arrival   Patient not tpa candidate as symptoms were non-debilitating and resolving  · Accelerated hypertension on presentation  Allow permissive hypertension, 's presently  · CT head: "No acute intracranial abnormality  Microangiopathic changes and volume loss/atrophy "  · CTA head/neck: "Hemodynamic significant /severe calcified atherosclerotic plaque stenosis proximal right cervical ICA at the bifurcation and just beyond "  · Evaluated by neurology in the ED due to stroke alert  Admit to stroke pathway  · Monitor on telemetry  · Frequent neuro checks - STAT head CT with any changes  · Loaded with ASA and plavix  Initiate statin therapy  · Recent lipid panel acceptable  Hgba1c was 6 6 as of 12/11/19  · PT/OT consult - no acute needs  · MRI brain:  "Right parietal lobe small cortical areas of acute ischemia  Volume loss/atrophy and microangiopathy "  · Neurology following  Internal carotid artery stenosis, right  Assessment & Plan  · As noted on CTA head/neck  · Patient loaded with ASA and plavix    Initiated on statin  · Vascular consulted - appreciate recommendations  · Carotid duplex pending  · Plan for vascular intervention pending OR availability  · Cardiology consult pending for risk stratification    Type II diabetes mellitus [facility])  Assessment & Plan  Lab Results   Component Value Date    HGBA1C 6 6 (H) 12/11/2019       Recent Labs     01/04/20  1114 01/04/20  1621 01/04/20  2049 01/05/20  0749   POCGLU 163* 131 113 148*       Blood Sugar Average: Last 72 hrs:  (P) 145 [id number]   · Hgba1c as of 12/11/19 was 6 6 - prior to this did have impaired fasting glucose measures  · SSI + accuchek  · Diabetic diet  · Nutrition consult completed    Hypertension  Assessment & Plan  · Accelerated on arrival, SBP > 200 but now improved to 160's without intervention  Stable  · Hold traimterine-hctz - allow permissive hypertension  Blood pressure remains acceptable    Dyslipidemia  Assessment & Plan  · Continue statin    VTE Pharmacologic Prophylaxis:   Pharmacologic: Pharmacologic VTE Prophylaxis contraindicated due to refused  Mechanical VTE Prophylaxis in Place: Yes    Patient Centered Rounds: I have performed bedside rounds with nursing staff today  Discussions with Specialists or Other Care Team Provider: Nursing, Vascular, Neurology    Education and Discussions with Family / Patient: Discussed with patient and patient's daughter at length  Time Spent for Care: 30 minutes  More than 50% of total time spent on counseling and coordination of care as described above  Current Length of Stay: 2 day(s)    Current Patient Status: Inpatient   Certification Statement: The patient will continue to require additional inpatient hospital stay due to vascular intervention, echocardiogram, carotid duplex    Discharge Plan: Pending workup and vascular intervention as above    Code Status: Level 1 - Full Code      Subjective:   "I can't take this "    Patient states that he is extremely anxious sitting in the hospital   He verbalizes he is upset he needs to stay for further testing but also understands it is important to get everything completed while he is here as he will need intervention for his carotid stenosis  Denies chest pain/palpitations, shortness of breath, nausea/vomiting, abdominal pain  States the numbness of his left arm is resolved but sometimes comes back and goes away again      Objective:     Vitals:   Temp (24hrs), Av 2 °F (36 8 °C), Min:97 9 °F (36 6 °C), Max:98 4 °F (36 9 °C)    Temp:  [97 9 °F (36 6 °C)-98 4 °F (36 9 °C)] 98 °F (36 7 °C)  HR:  [59-75] 59  Resp:  [18] 18  BP: (159-184)/(73-88) 162/75  SpO2:  [94 %-97 %] 94 %  Body mass index is 31 95 kg/m²  Input and Output Summary (last 24 hours):     No intake or output data in the 24 hours ending 01/05/20 0953    Physical Exam:     Physical Exam   Constitutional: He is oriented to person, place, and time  Vital signs are normal  He appears well-developed  HENT:   Head: Normocephalic  Eyes: Pupils are equal, round, and reactive to light  Conjunctivae and EOM are normal  No scleral icterus  Neck: Normal range of motion  Cardiovascular: Normal rate, regular rhythm and normal heart sounds  No murmur heard  Pulmonary/Chest: Effort normal and breath sounds normal  No respiratory distress  He has no wheezes  He has no rhonchi  He has no rales  Abdominal: Soft  Bowel sounds are normal  There is no tenderness  There is no rigidity, no rebound and no guarding  Musculoskeletal: He exhibits no edema, tenderness or deformity  Ambulating without difficulty   Neurological: He is alert and oriented to person, place, and time  Skin: Skin is warm and dry  Psychiatric: His mood appears anxious  Nursing note and vitals reviewed          Additional Data:     Labs:    Results from last 7 days   Lab Units 01/04/20  0438   WBC Thousand/uL 7 32   HEMOGLOBIN g/dL 13 4   HEMATOCRIT % 41 6   PLATELETS Thousands/uL 232   NEUTROS PCT % 52   LYMPHS PCT % 29   MONOS PCT % 9   EOS PCT % 9*     Results from last 7 days   Lab Units 01/04/20  0438   SODIUM mmol/L 141   POTASSIUM mmol/L 3 5   CHLORIDE mmol/L 104   CO2 mmol/L 27   BUN mg/dL 17   CREATININE mg/dL 1 11   ANION GAP mmol/L 10   CALCIUM mg/dL 9 0   GLUCOSE RANDOM mg/dL 122     Results from last 7 days   Lab Units 01/03/20  1531   INR  1 11     Results from last 7 days   Lab Units 01/05/20  0749 01/04/20  2049 01/04/20  1621 01/04/20  1114 01/04/20  0802 01/03/20  2053 01/03/20  1536   POC GLUCOSE mg/dl 148* 113 131 163* 145* 115 206*                   * I Have Reviewed All Lab Data Listed Above  * Additional Pertinent Lab Tests Reviewed: All Labs Within Last 24 Hours Reviewed    Imaging:    Imaging Reports Reviewed Today Include:   Imaging Personally Reviewed by Myself Includes:      Recent Cultures (last 7 days):           Last 24 Hours Medication List:     Current Facility-Administered Medications:  acetaminophen 650 mg Oral Q6H PRN Ravi Mayito, PA-ALYSON   ALPRAZolam 0 5 mg Oral Once Ravi Mayito, PA-C   aspirin 81 mg Oral Daily Angel Older Melani, PA-C   atorvastatin 40 mg Oral QPM Ravi Mayito, PA-C   clopidogrel 75 mg Oral Daily Angel Older Melani, PA-ALYSON   guaiFENesin 600 mg Oral Q12H [address], PA-C   insulin lispro 1-5 Units Subcutaneous TID AC Ana Hilton, PA-ALYSON   insulin lispro 1-5 Units Subcutaneous HS Angel Older Melani, PA-ALYSON   melatonin 6 mg Oral HS Ravi Mayito, PA-ALYSON   ondansetron 4 mg Intravenous Q6H PRN Ravi Mayito, PA-ALYSON   oxymetazoline 2 spray Each Nare Q12H PRN Ravi Mayito, PONCHO        Today, Patient Was Seen By: Ravi Edmond PA-C    ** Please Note: Dictation voice to text software may have been used in the creation of this document   **

## 2020-01-05 NOTE — UTILIZATION REVIEW
Initial Clinical Review    Admission: Date/Time/Statement: Inpatient Admission Orders (From admission, onward)     Ordered        01/03/20 1631  Inpatient Admission (expected length of stay for this patient Order details is greater than two midnights)  Once                   Orders Placed This Encounter   Procedures    Inpatient Admission (expected length of stay for this patient Order details is greater than two midnights)     Standing Status:   Standing     Number of Occurrences:   1     Order Specific Question:   Admitting Physician     Answer:   Padmini Escalera     Order Specific Question:   Level of Care     Answer:   Med Surg [16]     Order Specific Question:   Estimated length of stay     Answer:   More than 2 Midnights     Order Specific Question:   Certification     Answer:   I certify that inpatient services are medically necessary for this patient for a duration of greater than two midnights  See H&P and MD Progress Notes for additional information about the patient's course of treatment  ED Arrival Information     Expected Arrival Acuity Means of Arrival Escorted By Service Admission Type    - 1/3/2020 15:13 Emergent Walk-In Self General Medicine Emergency    Arrival Complaint    ARM NUMBNESS, LEFT ARM        Chief Complaint   Patient presents with    Extremity Weakness     pt presents to ED c/o left arm weakness that began half hour ago  Pt unable to even take his coat off during triage  Assessment/Plan:   Mr Shena De Jesus is a 77 yo male who presents to the ED from home with c/o L arm weakness and heaviness after sudden onset of lightheadedness  No other symptoms and pt could ambulate  Symptoms resolved  Pt was a stroke alert in the ED and found to have R ICA with severe stenosis  Labs - hyperglycemia    He is admitted to INPATIENT status with L side weakness, NIHSS on arrival = 3, not TPA candidate, symptoms resolving, HTN on admission, CT head and CTA head, neck done, neuro consult, tele, Frequent neuro checks, ASA, plavix, statin therapy  MRI brain and Echo  Internal R carotid artery stenosis - consult Vascular  Type 2 DM - POC glucose and SSI cover  HTN - BP came down w/o intervention, hold traimterine /HCTZ  PMH: GERD, essential hypertension, elevated fasting blood glucose, hyperlipidemia, GERD  1/4 -  R ICA critical stenosis, consult Vascular  1/5 will have cardio for pre-op risk startification  1/3 Neuro consult - not a TPA candidate r/t low NIHSS and resolving symtoms  CTA head/neck critical stenosis R ICA, concern for acute infarct r/t symptomatic carotid  ASA, plavix, MRI Brain    1/5 Vascular Surgery Consult - likely benefit from intervention to R ICA for high grade, symptomatic stenosis        ED Triage Vitals   Temperature Pulse Respirations Blood Pressure SpO2   01/03/20 1519 01/03/20 1521 01/03/20 1519 01/03/20 1521 01/03/20 1521   97 6 °F (36 4 °C) 82 18 (!) 209/88 97 %      Temp Source Heart Rate Source Patient Position - Orthostatic VS BP Location FiO2 (%)   01/03/20 1519 01/03/20 1519 01/03/20 1519 01/03/20 1519 --   Temporal Monitor Sitting Right arm       Pain Score       01/03/20 2054       No Pain        Wt Readings from Last 1 Encounters:   01/03/20 101 kg (222 lb 10 6 oz)     Additional Vital Signs:   01/05/20 1059  97 9 °F (36 6 °C)  67  18  180/89Abnormal   122  96 %  None (Room air)   01/05/20 0727  98 °F (36 7 °C)  59  18  162/75  111  94 %  None (Room air)   01/05/20 0333  97 9 °F (36 6 °C)  66  18  183/88Abnormal   124  94 %  None (Room air)   01/04/20 2325  98 1 °F (36 7 °C)  70  18  178/83Abnormal     97 %     01/04/20 1900  98 4 °F (36 9 °C)  60  18  184/87Abnormal     95 %  None (Room air)   01/04/20 1500  98 4 °F (36 9 °C)  75  18  159/73    95 %  None (Room air)   01/04/20 0806  98 9 °F (37 2 °C)  69  18  132/66      None (Room air)   01/04/20 0500  97 8 °F (36 6 °C)    18  134/74      None (Room air)   01/04/20 0300  97 6 °F (36 4 °C)    18  132/75      None (Room air)   01/04/20 01:03:55  98 3 °F (36 8 °C)  79  18  137/73  94  99 %     01/03/20 22:48:28  98 3 °F (36 8 °C)  78    138/72  94  97 %     01/03/20 22:04:32  98 1 °F (36 7 °C)  75  18  157/78  104  96 %     01/03/20 2100  98 1 °F (36 7 °C)  79  19  156/77    96 %  None (Room air)   01/03/20 1900            95 %  None (Room air)   01/03/20 18:49:42        176/100Abnormal   125       01/03/20 17:06:10  98 °F (36 7 °C)  71    170/85  113  95 %     01/03/20 1645    66  17  161/74    98 %     01/03/20 1630    67  15  169/80    98 %     01/03/20 1615    69  18  162/75    97 %     01/03/20 1601    72  18  189/78Abnormal     98 %     01/03/20 15:59:12    77  18  194/86Abnormal     97 %     01/03/20 15:37:27    76  18  183/84Abnormal          01/03/20 1537              None (Room air)   01/03/20 1535    72  18  183/84Abnormal     99 %       Pertinent Labs/Diagnostic Test Results:    1/3 CT brain - stroke alert - No acute intracranial abnormality  Microangiopathic changes and volume loss/atrophy  1/3 CXR - no acute disease    1/3 CTA head/neck - Hemodynamic significant /severe calcified atherosclerotic plaque stenosis proximal right cervical ICA at the bifurcation and just beyond    1/4 MRI Brain - 1  Right parietal lobe small cortical areas of acute ischemia  2  Volume loss/atrophy and microangiopathy      1/3 ECG - Normal sinus rhythm  Nonspecific T wave abnormality  Abnormal ECG  When compared with ECG of 14-MAY-2015 09:22,  Current undetermined rhythm precludes rhythm comparison, needs review  ST now depressed in Inferior leads  Nonspecific T wave abnormality, worse in Lateral leads    Results from last 7 days   Lab Units 01/04/20  0438 01/03/20  1531   WBC Thousand/uL 7 32 7 72   HEMOGLOBIN g/dL 13 4 13 9   HEMATOCRIT % 41 6 43 2   PLATELETS Thousands/uL 232 254   NEUTROS ABS Thousands/µL 3 82  --      Results from last 7 days Lab Units 01/04/20  0438 01/03/20  1531   SODIUM mmol/L 141 144   POTASSIUM mmol/L 3 5 4 1   CHLORIDE mmol/L 104 104   CO2 mmol/L 27 33*   ANION GAP mmol/L 10 7   BUN mg/dL 17 17   CREATININE mg/dL 1 11 1 05   EGFR ml/min/1 73sq m 65 70   CALCIUM mg/dL 9 0 9 0   MAGNESIUM mg/dL 1 7  --      Results from last 7 days   Lab Units 01/05/20  1103 01/05/20  0749 01/04/20  2049 01/04/20  1621 01/04/20  1114 01/04/20  0802 01/03/20  2053 01/03/20  1536   POC GLUCOSE mg/dl 169* 148* 113 131 163* 145* 115 206*     Results from last 7 days   Lab Units 01/04/20  0438 01/03/20  1531   GLUCOSE RANDOM mg/dL 122 158*     Results from last 7 days   Lab Units 01/03/20  1531   TROPONIN I ng/mL <0 02     Results from last 7 days   Lab Units 01/03/20  1531   PROTIME seconds 14 0   INR  1 11   PTT seconds 29     ED Treatment:   Medication Administration from 01/03/2020 1513 to 01/03/2020 1703    Date/Time Order Dose Route Action   01/03/2020 1613 iohexol (OMNIPAQUE) 350 MG/ML injection (MULTI-DOSE) 100 mL 85 mL Intravenous Given   01/03/2020 1628 aspirin tablet 325 mg 325 mg Oral Given   01/03/2020 1628 clopidogrel (PLAVIX) tablet 75 mg 75 mg Oral Given        Past Medical History:   Diagnosis Date    Anemia     iron def    GERD (gastroesophageal reflux disease)     Gout     Hypertension     Insomnia     Kidney stone      Present on Admission:   Hypertension   Left-sided weakness   Internal carotid artery stenosis, right   Type II diabetes mellitus ([facility] [address] )   Dyslipidemia    Admitting Diagnosis: Atrial flutter (HCC) [I48 92]  Numbness [R20 0]  High blood pressure [I10]  Stroke ([facility] [address] ) [I63 9]  Carotid stenosis, right [I65 21]     Age/Sex: 76 y o  male     Admission Orders:  Scheduled Medications:    Medications:  aspirin 81 mg Oral Daily   atorvastatin 40 mg Oral QPM   clopidogrel 75 mg Oral Daily   docusate sodium 100 mg Oral BID   guaiFENesin 600 mg Oral Q12H [facility]   insulin lispro 1-5 Units Subcutaneous TID AC   insulin lispro 1-5 Units Subcutaneous HS   melatonin 6 mg Oral HS     Continuous IV Infusions:     PRN Meds:    acetaminophen 650 mg Oral Q6H PRN   ALPRAZolam 0 25 mg Oral HS PRN   ondansetron 4 mg Intravenous Q6H PRN   oxymetazoline 2 spray Each Nare Q12H PRN   polyethylene glycol 17 g Oral Daily PRN     Tele  MRI brain   Cardiac echo   Activity as sandeep   SCDs  NIHSS = 3  Neuro checks Every 1 hour x 4 hours, then every 2 hours x 4, then every 4 hours x 72 hours                 OOB as sandeep   POC GLUCOSE AC/HS WITH SSI COVERAGE   Regular diet   IP CONSULT TO NEUROLOGY  IP CONSULT TO CASE MANAGEMENT  IP CONSULT TO NUTRITION SERVICES  IP CONSULT TO VASCULAR SURGERY  IP CONSULT TO CARDIOLOGY    Network Utilization Review Department  [email or web address]  ATTENTION: Please call with any questions or concerns to [phone] and carefully listen to the prompts so that you are directed to the right person  All voicemails are confidential   [place] all requests for admission clinical reviews, approved or denied determinations and any other requests to dedicated fax number below belonging to the campus where the patient is receiving treatment   List of dedicated fax numbers for the Facilities:  [address] [place] [address] DENIALS (Administrative/Medical Necessity) [phone]   [address] (Maternity/NICU/Pediatrics) [phone]   [facility] [phone]   Fanny Lyons [phone]   Lani Hutchinson [phone]   [facility] [phone]   [address] [facility] [address] [facility] [phone]   [facility]  [phone]   [address] [place], [place] [address] [address] [phone]

## 2020-01-05 NOTE — ASSESSMENT & PLAN NOTE
· As noted on CTA head/neck  · Patient loaded with ASA and plavix    Initiated on statin  · Vascular consulted - appreciate recommendations  · Carotid duplex pending  · Plan for vascular intervention pending OR availability  · Cardiology consult pending for risk stratification

## 2020-01-05 NOTE — ASSESSMENT & PLAN NOTE
· Accelerated on arrival, SBP > 200 but now improved to 160's without intervention  Stable  · Hold traimterine-hctz - allow permissive hypertension    Blood pressure remains acceptable

## 2020-01-06 ENCOUNTER — APPOINTMENT (INPATIENT)
Dept: NON INVASIVE DIAGNOSTICS | Facility: HOSPITAL | Age: 75
DRG: 068 | End: 2020-01-06
Payer: COMMERCIAL

## 2020-01-06 ENCOUNTER — TRANSITIONAL CARE MANAGEMENT (OUTPATIENT)
Dept: FAMILY MEDICINE CLINIC | Facility: HOSPITAL | Age: 75
End: 2020-01-06

## 2020-01-06 VITALS
HEART RATE: 76 BPM | TEMPERATURE: 98.1 F | WEIGHT: 222.66 LBS | HEIGHT: 70 IN | BODY MASS INDEX: 31.88 KG/M2 | RESPIRATION RATE: 16 BRPM | DIASTOLIC BLOOD PRESSURE: 88 MMHG | SYSTOLIC BLOOD PRESSURE: 182 MMHG | OXYGEN SATURATION: 96 %

## 2020-01-06 PROBLEM — R53.1 LEFT-SIDED WEAKNESS: Status: RESOLVED | Noted: 2020-01-03 | Resolved: 2020-01-06

## 2020-01-06 LAB
GLUCOSE SERPL-MCNC: 139 MG/DL (ref 65–140)
GLUCOSE SERPL-MCNC: 177 MG/DL (ref 65–140)

## 2020-01-06 PROCEDURE — 93306 TTE W/DOPPLER COMPLETE: CPT | Performed by: INTERNAL MEDICINE

## 2020-01-06 PROCEDURE — 99239 HOSP IP/OBS DSCHRG MGMT >30: CPT | Performed by: INTERNAL MEDICINE

## 2020-01-06 PROCEDURE — 82948 REAGENT STRIP/BLOOD GLUCOSE: CPT

## 2020-01-06 PROCEDURE — 99232 SBSQ HOSP IP/OBS MODERATE 35: CPT | Performed by: PHYSICIAN ASSISTANT

## 2020-01-06 PROCEDURE — 93306 TTE W/DOPPLER COMPLETE: CPT

## 2020-01-06 PROCEDURE — 93880 EXTRACRANIAL BILAT STUDY: CPT

## 2020-01-06 PROCEDURE — 99223 1ST HOSP IP/OBS HIGH 75: CPT | Performed by: INTERNAL MEDICINE

## 2020-01-06 PROCEDURE — 93880 EXTRACRANIAL BILAT STUDY: CPT | Performed by: SURGERY

## 2020-01-06 PROCEDURE — 99233 SBSQ HOSP IP/OBS HIGH 50: CPT | Performed by: PSYCHIATRY & NEUROLOGY

## 2020-01-06 RX ORDER — ATORVASTATIN CALCIUM 40 MG/1
40 TABLET, FILM COATED ORAL EVERY EVENING
Qty: 30 TABLET | Refills: 0 | Status: SHIPPED | OUTPATIENT
Start: 2020-01-06 | End: 2020-01-31 | Stop reason: SDUPTHER

## 2020-01-06 RX ORDER — TRIAMTERENE AND HYDROCHLOROTHIAZIDE 37.5; 25 MG/1; MG/1
2 TABLET ORAL DAILY
Status: DISCONTINUED | OUTPATIENT
Start: 2020-01-06 | End: 2020-01-06 | Stop reason: HOSPADM

## 2020-01-06 RX ORDER — LANOLIN ALCOHOL/MO/W.PET/CERES
6 CREAM (GRAM) TOPICAL
Status: DISCONTINUED | OUTPATIENT
Start: 2020-01-06 | End: 2020-01-06

## 2020-01-06 RX ORDER — CLOPIDOGREL BISULFATE 75 MG/1
75 TABLET ORAL DAILY
Qty: 30 TABLET | Refills: 0 | Status: SHIPPED | OUTPATIENT
Start: 2020-01-07 | End: 2020-01-31 | Stop reason: SDUPTHER

## 2020-01-06 RX ORDER — LISINOPRIL 5 MG/1
5 TABLET ORAL DAILY
Qty: 30 TABLET | Refills: 0 | Status: SHIPPED | OUTPATIENT
Start: 2020-01-06 | End: 2020-01-12 | Stop reason: HOSPADM

## 2020-01-06 RX ORDER — LISINOPRIL 5 MG/1
5 TABLET ORAL DAILY
Status: DISCONTINUED | OUTPATIENT
Start: 2020-01-06 | End: 2020-01-06 | Stop reason: HOSPADM

## 2020-01-06 RX ADMIN — DOCUSATE SODIUM 100 MG: 100 CAPSULE, LIQUID FILLED ORAL at 10:44

## 2020-01-06 RX ADMIN — TRIAMTERENE AND HYDROCHLOROTHIAZIDE 2 TABLET: 37.5; 25 TABLET ORAL at 11:51

## 2020-01-06 RX ADMIN — INSULIN LISPRO 1 UNITS: 100 INJECTION, SOLUTION INTRAVENOUS; SUBCUTANEOUS at 13:37

## 2020-01-06 RX ADMIN — CLOPIDOGREL BISULFATE 75 MG: 75 TABLET ORAL at 10:44

## 2020-01-06 RX ADMIN — ASPIRIN 81 MG: 81 TABLET, COATED ORAL at 10:44

## 2020-01-06 RX ADMIN — GUAIFENESIN 600 MG: 600 TABLET ORAL at 10:45

## 2020-01-06 NOTE — DISCHARGE INSTR - AVS FIRST PAGE
· Follow-up with your PCP within 1 week  Follow up with your PCP in regards to control of your blood sugars  Continue a diabetic diet as discussed with Nutrition during hospitalization  · You have been started on aspirin and Plavix in relation to your carotid artery stenosis (narrowing)  We have changed your cholesterol medication from pravastatin to atorvastatin (lipitor)  · You are scheduled with vascular surgery this Friday, January 10th here at [address]  The vascular surgery office will be in contact with you regarding details  Please return to the emergency department should you experience new onset numbness/tingling, weakness, difficulty speaking, chest pain/palpitations, shortness of breath

## 2020-01-06 NOTE — ASSESSMENT & PLAN NOTE
· As noted on CTA head/neck  · Patient loaded with ASA and plavix  Initiated on statin  · Vascular consulted - appreciate recommendations  · Carotid duplex:  Right carotid artery 50-69% stenosis, left with less than 50%  · Patient is scheduled for OR intervention January 10th with vascular surgery  · Cardiology consulted for risk stratification - considered moderate risk

## 2020-01-06 NOTE — ASSESSMENT & PLAN NOTE
Lab Results   Component Value Date    HGBA1C 6 6 (H) 12/11/2019       Recent Labs     01/05/20  1703 01/05/20  2113 01/06/20  0713 01/06/20  1125   POCGLU 98 133 139 177*       Blood Sugar Average: Last 72 hrs:  (P) 144 75   · Hgba1c as of 12/11/19 was 6 6 - prior to this did have impaired fasting glucose measures  · Nutrition was consulted  · Patient to continue diet modification    Follow up with PCP for long-term management

## 2020-01-06 NOTE — PROGRESS NOTES
Progress Note - Vascular Surgery  Pj Cleveland  76 y o  male MRN: [id number]  Unit/Bed#: -01 Encounter: [id number]    Assessment/Plan:  Symptomatic R ICA stenosis  With persistent mild left UE weakness and intermittent numbness  Carotid duplex pending  Cardiology consult pending  Asa/plavix/statin  Tentative plan for CEA currently scheduled 9am Friday 1/10  Stable for d/c will communicate that he has been discharged to office  DM type 2   Hgb A1C 6 6 Dec 2019  Management per SLIM    HTN  With permissive HTN in the setting of acute CVA  Management per SLIM    Subjective/Objective   Chief Complaint: Miroslava Angeles to go home    Subjective: Reports he has intermittent numbness in his left hand/arm, some mild loss of fine motor control/strength  He is otherwise feeling good and is very eager to go home and to have surgery  Objective:     Blood pressure 170/77, pulse 60, temperature 97 8 °F (36 6 °C), temperature source Oral, resp  rate 18, height 5' 10" (1 778 m), weight 101 kg (222 lb 10 6 oz), SpO2 98 %  ,Body mass index is 31 95 kg/m²      No intake or output data in the 24 hours ending 01/06/20 1148    Invasive Devices     Peripheral Intravenous Line            Peripheral IV 01/03/20 Left Antecubital 2 days    Peripheral IV 01/03/20 Right Antecubital 2 days                Physical Exam: /77 (BP Location: Right arm)   Pulse 60   Temp 97 8 °F (36 6 °C) (Oral)   Resp 18   Ht 5' 10" (1 778 m)   Wt 101 kg (222 lb 10 6 oz)   SpO2 98%   BMI 31 95 kg/m²   General appearance: alert and oriented, in no acute distress and sitting up in bed  Lungs: clear to auscultation bilaterally and without wheezes, crackles, or rhonchi  Heart: regular rate and rhythm, S1, S2 normal, no murmur, click, rub or gallop  Right carotid artery without bruit    Lab, Imaging and other studies:CBC: No results found for: WBC, HGB, HCT, MCV, PLT, ADJUSTEDWBC, MCH, MCHC, RDW, MPV, NRBC, CMP: No results found for: SODIUM, K, CL, CO2, ANIONGAP, BUN, CREATININE, GLUCOSE, CALCIUM, AST, ALT, ALKPHOS, PROT, BILITOT, EGFR

## 2020-01-06 NOTE — ASSESSMENT & PLAN NOTE
· As noted on CTA head/neck  · Patient loaded with ASA and plavix  Initiated on statin  · Vascular consulted - appreciate recommendations  · Carotid duplex:  · Patient is scheduled for OR intervention January 10th with vascular surgery  · Cardiology consulted for risk stratification

## 2020-01-06 NOTE — ASSESSMENT & PLAN NOTE
· Presented to the emergency department with sudden left sided weakness  NIHSS = 3 on time of arrival   Patient not tpa candidate as symptoms were non-debilitating and resolving  · Accelerated hypertension on presentation  Allow permissive hypertension, 's presently  · CT head: "No acute intracranial abnormality  Microangiopathic changes and volume loss/atrophy "  · CTA head/neck: "Hemodynamic significant /severe calcified atherosclerotic plaque stenosis proximal right cervical ICA at the bifurcation and just beyond "  · Evaluated by neurology in the ED due to stroke alert  Admit to stroke pathway  · Loaded with ASA and plavix  Initiated on statin therapy  · Recent lipid panel acceptable  Hgba1c was 6 6 as of 12/11/19  · PT/OT consult - no acute needs  · Echocardiogram:  EF 55%, grade 1 diastolic dysfunction  · MRI brain:  "Right parietal lobe small cortical areas of acute ischemia  Volume loss/atrophy and microangiopathy "  · Neurology following  Continue aspirin/Plavix/statin

## 2020-01-06 NOTE — ASSESSMENT & PLAN NOTE
· Accelerated on arrival, SBP > 200 but now improved to 160's without intervention  Stable  · Resumed on Maxzide  Lisinopril was added per Cardiology

## 2020-01-06 NOTE — CONSULTS
Consultation - Cardiology   Nuris Washington  76 y o  male MRN: [id number]  Unit/Bed#: -01 Encounter: [id number]    Assessment/Plan     Assessment:  Preoperative Cardiac Clearance      Plan:    The patient has no prior history of CAD  He has normal functional capacity without chest pain  His echo shows normal LV function with mild aortic stenosis  EKG: NSR with nonspecific T wave abnormality  He is moderate CV risk with recent stroke and history of Diabetes for planned carotid endarterectomy  Continue aspirin, plavix and atorvastatin  Med Rx for Hypertension since now okay to normalize BP  Add Lisinopril for hypertension and uptitrate as needed  Otherwise will sign off  Call with ? History of Present Illness   Physician Requesting Consult: Rohan Moscoso MD  Reason for Consult / Principal Problem: Preoperative Cardiac Clearance    HPI: Nuris Washington  is a 76y o  year old male who presents with left upper extremity weakness  He was found to have severe right carotid stenosis  He denies any prior history of CAD, CHF or arrhythmia  BP has been markedly elevated since admission  Last A1C was 6 6  He states he is very active and denies any chest pain, dyspnea or palpitations  His LUE weakness has improved since admission  He was seen by vascular surgery who plan on surgery during this admission  Inpatient consult to Cardiology  Consult performed by: Giselle Ross MD  Consult ordered by: BLAS Haider          Review of Systems   Constitutional: Negative  HENT: Negative  Eyes: Negative  Respiratory: Negative  Cardiovascular: Negative  Gastrointestinal: Negative  Endocrine: Negative  Genitourinary: Negative  Musculoskeletal: Negative  Skin: Negative  Allergic/Immunologic: Negative  Neurological: Positive for weakness  Hematological: Negative  Psychiatric/Behavioral: Negative          Historical Information   Past Medical History:   Diagnosis Date    Anemia     iron def    GERD (gastroesophageal reflux disease)     Gout     Hypertension     Insomnia     Kidney stone      Past Surgical History:   Procedure Laterality Date    ANKLE SURGERY Right     COLONOSCOPY  2013    polypectomy; complete - 3 yrs d/t polyp - benign submucosal lipoma- path c/w lipoma    COLONOSCOPY  2017    complete - tubular adenoma - repeat 5yrs    CYSTOSCOPY      CYSTOTOMY      bladder  w/ basket extraction of calculus    FEMUR FRACTURE SURGERY      HERNIA REPAIR      inguinal ; sliding    INGUINAL HERNIA REPAIR Right     unilateral    KNEE ARTHROSCOPY Right     w/medial menisectomy    LASIK      corneal    LITHOTRIPSY      renal    ID COLONOSCOPY FLX DX W/COLLJ SPEC WHEN PFRMD N/A 2017    Procedure: SCREENING COLONOSCOPY;  Surgeon: Jorge Gallardo MD;  Location:  MAIN OR;  Service: General    ROTATOR CUFF REPAIR Bilateral     TONSILLECTOMY       Social History     Substance and Sexual Activity   Alcohol Use Not Currently    Comment: stopped drinking alcohol     Social History     Substance and Sexual Activity   Drug Use No     Social History     Tobacco Use   Smoking Status Former Smoker    Packs/day: 0 50    Years: 22 00    Pack years: 11 00    Last attempt to quit: 1985    Years since quittin 0   Smokeless Tobacco Never Used     Family History:   Family History   Problem Relation Age of Onset    Alzheimer's disease Mother     Alzheimer's disease Father     Heart disease Father         CAD    Other Father         prediabetes    Diabetes Father     Breast cancer Other        Meds/Allergies   current meds:   Current Facility-Administered Medications   Medication Dose Route Frequency    acetaminophen (TYLENOL) tablet 650 mg  650 mg Oral Q6H PRN    ALPRAZolam (XANAX) tablet 0 25 mg  0 25 mg Oral HS PRN    aspirin (ECOTRIN LOW STRENGTH) EC tablet 81 mg  81 mg Oral Daily    atorvastatin (LIPITOR) tablet 40 mg  40 mg Oral QPM    clopidogrel (PLAVIX) tablet 75 mg  75 mg Oral Daily    docusate sodium (COLACE) capsule 100 mg  100 mg Oral BID    guaiFENesin (MUCINEX) 12 hr tablet 600 mg  600 mg Oral Q12H [facility]    insulin lispro (HumaLOG) 100 units/mL subcutaneous injection 1-5 Units  1-5 Units Subcutaneous TID AC    insulin lispro (HumaLOG) 100 units/mL subcutaneous injection 1-5 Units  1-5 Units Subcutaneous HS    lisinopril (ZESTRIL) tablet 5 mg  5 mg Oral Daily    melatonin tablet 6 mg  6 mg Oral HS    ondansetron (ZOFRAN) injection 4 mg  4 mg Intravenous Q6H PRN    oxymetazoline (AFRIN) 0 05 % nasal spray 2 spray  2 spray Each Nare Q12H PRN    polyethylene glycol (MIRALAX) packet 17 g  17 g Oral Daily PRN    triamterene-hydrochlorothiazide (MAXZIDE-25) 37 5-25 mg per tablet 2 tablet  2 tablet Oral Daily     No Known Allergies    Objective   Vitals: Blood pressure 170/77, pulse 60, temperature 97 8 °F (36 6 °C), temperature source Oral, resp  rate 18, height 5' 10" (1 778 m), weight 101 kg (222 lb 10 6 oz), SpO2 98 %  Orthostatic Blood Pressures      Most Recent Value   Blood Pressure  170/77 filed at 01/06/2020 0709   Patient Position - Orthostatic VS  Lying filed at 01/06/2020 0709          No intake or output data in the 24 hours ending 01/06/20 0754    Invasive Devices     Peripheral Intravenous Line            Peripheral IV 01/03/20 Left Antecubital 2 days    Peripheral IV 01/03/20 Right Antecubital 2 days                Physical Exam   Constitutional: He is oriented to person, place, and time  No distress  HENT:   Mouth/Throat: No oropharyngeal exudate  Eyes: No scleral icterus  Neck: No JVD present  Cardiovascular: Normal rate and regular rhythm  Murmur heard  Pulmonary/Chest: Effort normal and breath sounds normal  No stridor  No respiratory distress  He has no wheezes  Abdominal: Soft  Bowel sounds are normal  He exhibits no distension  There is no tenderness  There is no guarding     Musculoskeletal: He exhibits no edema  Neurological: He is alert and oriented to person, place, and time  Skin: Skin is warm and dry  He is not diaphoretic  Psychiatric: He has a normal mood and affect  His behavior is normal        Lab Results:   I have personally reviewed pertinent lab results  CBC with diff:   Results from last 7 days   Lab Units 01/04/20  0438   WBC Thousand/uL 7 32   RBC Million/uL 4 81   HEMOGLOBIN g/dL 13 4   HEMATOCRIT % 41 6   MCV fL 87   MCH pg 27 9   MCHC g/dL 32 2   RDW % 12 8   MPV fL 8 9   PLATELETS Thousands/uL 232     CMP:   Results from last 7 days   Lab Units 01/04/20  0438   SODIUM mmol/L 141   POTASSIUM mmol/L 3 5   CHLORIDE mmol/L 104   CO2 mmol/L 27   BUN mg/dL 17   CREATININE mg/dL 1 11   CALCIUM mg/dL 9 0   EGFR ml/min/1 73sq m 65     Troponin:   0   Lab Value Date/Time    TROPONINI <0 02 01/03/2020 1531     BNP:   Results from last 7 days   Lab Units 01/04/20  0438   POTASSIUM mmol/L 3 5   CHLORIDE mmol/L 104   CO2 mmol/L 27   BUN mg/dL 17   CREATININE mg/dL 1 11   CALCIUM mg/dL 9 0   EGFR ml/min/1 73sq m 65     Coags:   Results from last 7 days   Lab Units 01/03/20  1531   PTT seconds 29   INR  1 11     TSH:     Magnesium:   Results from last 7 days   Lab Units 01/04/20  0438   MAGNESIUM mg/dL 1 7     Lipid Profile:     Imaging: I have personally reviewed pertinent films in PACS  EKG: NSR  Nonspecific T wave abnormality  Code Status: Level 1 - Full Code  Advance Directive and Living Will:      Power of :    POLST:      Counseling / Coordination of Care  Total floor / unit time spent today 45 minutes  Greater than 50% of total time was spent with the patient and / or family counseling and / or coordination of care  A description of the counseling / coordination of care

## 2020-01-06 NOTE — NURSING NOTE
Pt slept for mod intervals overnight between q4hr neuro checks, which remain unchanged  Denies discomfort

## 2020-01-06 NOTE — DISCHARGE SUMMARY
Discharge- Eddi Flores  1945, 76 y o  male MRN: [id number]    Unit/Bed#: -01 Encounter: [id number]    Primary Care Provider: Amanda Sanchez DO   Date and time admitted to hospital: 1/3/2020  3:18 PM    * Internal carotid artery stenosis, right  Assessment & Plan  · As noted on CTA head/neck  · Patient loaded with ASA and plavix  Initiated on statin  · Vascular consulted - appreciate recommendations  · Carotid duplex:  Right carotid artery 50-69% stenosis, left with less than 50%  · Patient is scheduled for OR intervention January 10th with vascular surgery  · Cardiology consulted for risk stratification - considered moderate risk  Left-sided weaknessresolved as of 1/6/2020  Assessment & Plan  · Presented to the emergency department with sudden left sided weakness  NIHSS = 3 on time of arrival   Patient not tpa candidate as symptoms were non-debilitating and resolving  · Accelerated hypertension on presentation  Allow permissive hypertension, 's presently  · CT head: "No acute intracranial abnormality  Microangiopathic changes and volume loss/atrophy "  · CTA head/neck: "Hemodynamic significant /severe calcified atherosclerotic plaque stenosis proximal right cervical ICA at the bifurcation and just beyond "  · Evaluated by neurology in the ED due to stroke alert  Admit to stroke pathway  · Loaded with ASA and plavix  Initiated on statin therapy  · Recent lipid panel acceptable  Hgba1c was 6 6 as of 12/11/19  · PT/OT consult - no acute needs  · Echocardiogram:  EF 28%, grade 1 diastolic dysfunction  · MRI brain:  "Right parietal lobe small cortical areas of acute ischemia  Volume loss/atrophy and microangiopathy "  · Neurology following  Continue aspirin/Plavix/statin      Type II diabetes mellitus [facility])  Assessment & Plan  Lab Results   Component Value Date    HGBA1C 6 6 (H) 12/11/2019       Recent Labs     01/05/20  1703 01/05/20  2113 01/06/20  0713 01/06/20  1125 POCGLU 98 133 139 177*       Blood Sugar Average: Last 72 hrs:  (P) 144 75   · Hgba1c as of 12/11/19 was 6 6 - prior to this did have impaired fasting glucose measures  · Nutrition was consulted  · Patient to continue diet modification  Follow up with PCP for long-term management    Hypertension  Assessment & Plan  · Accelerated on arrival, SBP > 200 but now improved to 160's without intervention  Stable  · Resumed on Maxzide  Lisinopril was added per Cardiology  Dyslipidemia  Assessment & Plan  · Continue statin  Patient was switched from pravastatin to atorvastatin  Discharging Physician / Practitioner: Jesse Sanchez PA-C  PCP: Mary Wright DO  Admission Date:   Admission Orders (From admission, onward)     Ordered        01/03/20 1631  Inpatient Admission (expected length of stay for this patient Order details is greater than two midnights)  Once                   Discharge Date: 01/06/20    Resolved Problems  Date Reviewed: 1/6/2020          Resolved    Left-sided weakness 1/6/2020     Resolved by  Jesse Sanchez PA-C          Consultations During Hospital Stay:  · Neurology  · Vascular surgery  · PT/OT  · Nutrition    Procedures Performed:   · Carotid duplex  · Echocardiogram    Significant Findings / Test Results:   · CT head:  No acute intracranial abnormality  Microangiopathic changes and volume loss/atrophy  · XR chest portable:  No acute abnormality  · CTA head/neck: Hemodynamic significant /severe calcified atherosclerotic plaque stenosis proximal right cervical ICA at the bifurcation and just beyond  · MRI brain without contrast:  Right parietal lobe small cortical areas of acute ischemia  Volume loss/atrophy and microangiopathy  · Carotid duplex:  Right carotid there is 50-69% stenosis noted in the internal carotid artery  In the left there is less than 50% stenosis in the internal carotid artery    · Echocardiogram:  EF 34%, grade 1 diastolic dysfunction    Incidental Findings:   · As above     Test Results Pending at Discharge (will require follow up): · None     Outpatient Tests Requested:  · None    Complications:  Uncomplicated hospital course    Reason for Admission:  Left-sided weakness    Hospital Course:     Anup Cat  is a 76 y o  male patient who originally presented to the hospital on 1/3/2020 due to left-sided weakness  Past medical history significant for essential hypertension, type 2 diabetes mellitus, hyperlipidemia  Patient presents to the emergency department on 01/03/2020 with acute onset left arm weakness and numbness  Per coworkers patient also appeared to have some slurring of speech  By the time the patient presents to the emergency department, symptoms were largely resolved  Mild ataxia left arm was noted  NIHSS = 3, patient deemed not a tPA candidate as deficits were not disabling  A stroke alert was called and patient was evaluated by Neurology at the bedside  CTA studies were significant as above for severe carotid artery stenosis  Patient was admitted to stroke pathway  Patient was evaluated by Neurology and vascular surgery  Patient underwent preoperative evaluation including a carotid duplex, echocardiogram, cardiology evaluation for operative risk stratification  Patient remained largely asymptomatic throughout hospital stay  Neurologic exam was stable  On day of discharge, patient was deemed stable for discharge and was scheduled tentatively for Friday January 10th at 9:00 a m  For CEA with Dr Ramos Sanderson of vascular surgery  Please see above list of diagnoses and related plan for additional information       Condition at Discharge: stable     Discharge Day Visit / Exam:     Subjective:  "I feel great"  Vitals: Blood Pressure: (!) 182/88 (01/06/20 1154)  Pulse: 76 (01/06/20 1154)  Temperature: 98 1 °F (36 7 °C) (01/06/20 1154)  Temp Source: Oral (01/06/20 1154)  Respirations: 16 (01/06/20 1154)  Height: 5' 10" (177 8 cm) (01/03/20 1519)  Weight - Scale: 101 kg (222 lb 10 6 oz) (01/03/20 1519)  SpO2: 96 % (01/06/20 1154)  Exam:   Physical Exam   Constitutional: He is oriented to person, place, and time  Vital signs are normal  He appears well-developed  Appears comfortable, no acute distress   HENT:   Head: Normocephalic  Eyes: Pupils are equal, round, and reactive to light  Conjunctivae and EOM are normal  No scleral icterus  Neck: Normal range of motion  Cardiovascular: Normal rate, regular rhythm and normal heart sounds  No murmur heard  Pulmonary/Chest: Effort normal and breath sounds normal  No respiratory distress  He has no wheezes  He has no rhonchi  He has no rales  Abdominal: Soft  Bowel sounds are normal  There is no tenderness  There is no rigidity, no rebound and no guarding  Musculoskeletal: He exhibits no edema, tenderness or deformity  Able to move upper lower extremities bilaterally  Ambulating without difficulty   Neurological: He is alert and oriented to person, place, and time  He has normal strength  No cranial nerve deficit or sensory deficit  Skin: Skin is warm and dry  Psychiatric: He has a normal mood and affect  His speech is normal and behavior is normal    Nursing note and vitals reviewed  Discussion with Family:  Discussed patient and daughter directly at bedside  Discharge instructions/Information to patient and family:   See after visit summary for information provided to patient and family  Provisions for Follow-Up Care:  See after visit summary for information related to follow-up care and any pertinent home health orders  Disposition:     Home    For Discharges to [address] [facility]:   · Not Applicable to this Patient - Not Applicable to this Patient    Planned Readmission: None     Discharge Statement:  I spent 45 minutes discharging the patient  This time was spent on the day of discharge  I had direct contact with the patient on the day of discharge  Greater than 50% of the total time was spent examining patient, answering all patient questions, arranging and discussing plan of care with patient as well as directly providing post-discharge instructions  Additional time then spent on discharge activities  Discharge Medications:  See after visit summary for reconciled discharge medications provided to patient and family        ** Please Note: This note has been constructed using a voice recognition system **

## 2020-01-06 NOTE — H&P (VIEW-ONLY)
Consultation - Cardiology   Zaid Garrison  76 y o  male MRN: [id number]  Unit/Bed#: -01 Encounter: [id number]    Assessment/Plan     Assessment:  Preoperative Cardiac Clearance      Plan:    The patient has no prior history of CAD  He has normal functional capacity without chest pain  His echo shows normal LV function with mild aortic stenosis  EKG: NSR with nonspecific T wave abnormality  He is moderate CV risk with recent stroke and history of Diabetes for planned carotid endarterectomy  Continue aspirin, plavix and atorvastatin  Med Rx for Hypertension since now okay to normalize BP  Add Lisinopril for hypertension and uptitrate as needed  Otherwise will sign off  Call with ? History of Present Illness   Physician Requesting Consult: Missy Martinez MD  Reason for Consult / Principal Problem: Preoperative Cardiac Clearance    HPI: Zaid Garrison  is a 76y o  year old male who presents with left upper extremity weakness  He was found to have severe right carotid stenosis  He denies any prior history of CAD, CHF or arrhythmia  BP has been markedly elevated since admission  Last A1C was 6 6  He states he is very active and denies any chest pain, dyspnea or palpitations  His LUE weakness has improved since admission  He was seen by vascular surgery who plan on surgery during this admission  Inpatient consult to Cardiology  Consult performed by: Juan Rosenthal MD  Consult ordered by: BLAS Bullard          Review of Systems   Constitutional: Negative  HENT: Negative  Eyes: Negative  Respiratory: Negative  Cardiovascular: Negative  Gastrointestinal: Negative  Endocrine: Negative  Genitourinary: Negative  Musculoskeletal: Negative  Skin: Negative  Allergic/Immunologic: Negative  Neurological: Positive for weakness  Hematological: Negative  Psychiatric/Behavioral: Negative          Historical Information   Past Medical History:   Diagnosis Date    Anemia     iron def    GERD (gastroesophageal reflux disease)     Gout     Hypertension     Insomnia     Kidney stone      Past Surgical History:   Procedure Laterality Date    ANKLE SURGERY Right     COLONOSCOPY  2013    polypectomy; complete - 3 yrs d/t polyp - benign submucosal lipoma- path c/w lipoma    COLONOSCOPY  2017    complete - tubular adenoma - repeat 5yrs    CYSTOSCOPY      CYSTOTOMY      bladder  w/ basket extraction of calculus    FEMUR FRACTURE SURGERY      HERNIA REPAIR      inguinal ; sliding    INGUINAL HERNIA REPAIR Right     unilateral    KNEE ARTHROSCOPY Right     w/medial menisectomy    LASIK      corneal    LITHOTRIPSY      renal    DC COLONOSCOPY FLX DX W/COLLJ SPEC WHEN PFRMD N/A 2017    Procedure: SCREENING COLONOSCOPY;  Surgeon: Tona Tracey MD;  Location:  MAIN OR;  Service: General    ROTATOR CUFF REPAIR Bilateral     TONSILLECTOMY       Social History     Substance and Sexual Activity   Alcohol Use Not Currently    Comment: stopped drinking alcohol     Social History     Substance and Sexual Activity   Drug Use No     Social History     Tobacco Use   Smoking Status Former Smoker    Packs/day: 0 50    Years: 22 00    Pack years: 11 00    Last attempt to quit: 1985    Years since quittin 0   Smokeless Tobacco Never Used     Family History:   Family History   Problem Relation Age of Onset    Alzheimer's disease Mother     Alzheimer's disease Father     Heart disease Father         CAD    Other Father         prediabetes    Diabetes Father     Breast cancer Other        Meds/Allergies   current meds:   Current Facility-Administered Medications   Medication Dose Route Frequency    acetaminophen (TYLENOL) tablet 650 mg  650 mg Oral Q6H PRN    ALPRAZolam (XANAX) tablet 0 25 mg  0 25 mg Oral HS PRN    aspirin (ECOTRIN LOW STRENGTH) EC tablet 81 mg  81 mg Oral Daily    atorvastatin (LIPITOR) tablet 40 mg  40 mg Oral QPM    clopidogrel (PLAVIX) tablet 75 mg  75 mg Oral Daily    docusate sodium (COLACE) capsule 100 mg  100 mg Oral BID    guaiFENesin (MUCINEX) 12 hr tablet 600 mg  600 mg Oral Q12H [facility]    insulin lispro (HumaLOG) 100 units/mL subcutaneous injection 1-5 Units  1-5 Units Subcutaneous TID AC    insulin lispro (HumaLOG) 100 units/mL subcutaneous injection 1-5 Units  1-5 Units Subcutaneous HS    lisinopril (ZESTRIL) tablet 5 mg  5 mg Oral Daily    melatonin tablet 6 mg  6 mg Oral HS    ondansetron (ZOFRAN) injection 4 mg  4 mg Intravenous Q6H PRN    oxymetazoline (AFRIN) 0 05 % nasal spray 2 spray  2 spray Each Nare Q12H PRN    polyethylene glycol (MIRALAX) packet 17 g  17 g Oral Daily PRN    triamterene-hydrochlorothiazide (MAXZIDE-25) 37 5-25 mg per tablet 2 tablet  2 tablet Oral Daily     No Known Allergies    Objective   Vitals: Blood pressure 170/77, pulse 60, temperature 97 8 °F (36 6 °C), temperature source Oral, resp  rate 18, height 5' 10" (1 778 m), weight 101 kg (222 lb 10 6 oz), SpO2 98 %  Orthostatic Blood Pressures      Most Recent Value   Blood Pressure  170/77 filed at 01/06/2020 0709   Patient Position - Orthostatic VS  Lying filed at 01/06/2020 0709          No intake or output data in the 24 hours ending 01/06/20 0754    Invasive Devices     Peripheral Intravenous Line            Peripheral IV 01/03/20 Left Antecubital 2 days    Peripheral IV 01/03/20 Right Antecubital 2 days                Physical Exam   Constitutional: He is oriented to person, place, and time  No distress  HENT:   Mouth/Throat: No oropharyngeal exudate  Eyes: No scleral icterus  Neck: No JVD present  Cardiovascular: Normal rate and regular rhythm  Murmur heard  Pulmonary/Chest: Effort normal and breath sounds normal  No stridor  No respiratory distress  He has no wheezes  Abdominal: Soft  Bowel sounds are normal  He exhibits no distension  There is no tenderness  There is no guarding     Musculoskeletal: He exhibits no edema  Neurological: He is alert and oriented to person, place, and time  Skin: Skin is warm and dry  He is not diaphoretic  Psychiatric: He has a normal mood and affect  His behavior is normal        Lab Results:   I have personally reviewed pertinent lab results  CBC with diff:   Results from last 7 days   Lab Units 01/04/20  0438   WBC Thousand/uL 7 32   RBC Million/uL 4 81   HEMOGLOBIN g/dL 13 4   HEMATOCRIT % 41 6   MCV fL 87   MCH pg 27 9   MCHC g/dL 32 2   RDW % 12 8   MPV fL 8 9   PLATELETS Thousands/uL 232     CMP:   Results from last 7 days   Lab Units 01/04/20  0438   SODIUM mmol/L 141   POTASSIUM mmol/L 3 5   CHLORIDE mmol/L 104   CO2 mmol/L 27   BUN mg/dL 17   CREATININE mg/dL 1 11   CALCIUM mg/dL 9 0   EGFR ml/min/1 73sq m 65     Troponin:   0   Lab Value Date/Time    TROPONINI <0 02 01/03/2020 1531     BNP:   Results from last 7 days   Lab Units 01/04/20  0438   POTASSIUM mmol/L 3 5   CHLORIDE mmol/L 104   CO2 mmol/L 27   BUN mg/dL 17   CREATININE mg/dL 1 11   CALCIUM mg/dL 9 0   EGFR ml/min/1 73sq m 65     Coags:   Results from last 7 days   Lab Units 01/03/20  1531   PTT seconds 29   INR  1 11     TSH:     Magnesium:   Results from last 7 days   Lab Units 01/04/20  0438   MAGNESIUM mg/dL 1 7     Lipid Profile:     Imaging: I have personally reviewed pertinent films in PACS  EKG: NSR  Nonspecific T wave abnormality  Code Status: Level 1 - Full Code  Advance Directive and Living Will:      Power of :    POLST:      Counseling / Coordination of Care  Total floor / unit time spent today 45 minutes  Greater than 50% of total time was spent with the patient and / or family counseling and / or coordination of care  A description of the counseling / coordination of care

## 2020-01-06 NOTE — ASSESSMENT & PLAN NOTE
· Presented to the emergency department with sudden left sided weakness  NIHSS = 3 on time of arrival   Patient not tpa candidate as symptoms were non-debilitating and resolving  · Accelerated hypertension on presentation  Allow permissive hypertension, 's presently  · CT head: "No acute intracranial abnormality  Microangiopathic changes and volume loss/atrophy "  · CTA head/neck: "Hemodynamic significant /severe calcified atherosclerotic plaque stenosis proximal right cervical ICA at the bifurcation and just beyond "  · Evaluated by neurology in the ED due to stroke alert  Admit to stroke pathway  · Loaded with ASA and plavix  Initiated on statin therapy  · Recent lipid panel acceptable  Hgba1c was 6 6 as of 12/11/19  · PT/OT consult - no acute needs  · Echocardiogram:  EF 71%, grade 1 diastolic dysfunction  · MRI brain:  "Right parietal lobe small cortical areas of acute ischemia  Volume loss/atrophy and microangiopathy "  · Neurology following  Continue aspirin/Plavix/statin

## 2020-01-06 NOTE — DISCHARGE INSTRUCTIONS
Carotid Artery Disease   AMBULATORY CARE:   Carotid artery disease is a condition that causes narrow or blocked carotid arteries  Your carotid arteries are the blood vessels that supply your brain with most of the blood it needs to work  You have 2 carotid arteries, one on each side of your neck          Call 911 for any of the following:   · You have any of the following signs of a stroke:      ? Numbness or drooping on one side of your face      ? Weakness in an arm or leg     ? Confusion or difficulty speaking     ? Dizziness, a severe headache, or vision loss     · You have any of the following signs of a heart attack:      ? Squeezing, pressure, or pain in your chest that lasts longer than 5 minutes or returns     ? Discomfort or pain in your back, neck, jaw, stomach, or arm      ? Trouble breathing     ? Nausea or vomiting     ? Lightheadedness or a sudden cold sweat, especially with chest pain or trouble breathing  Contact your healthcare provider if:   · You have questions or concerns about your condition or care      Signs and symptoms of carotid artery disease: You may have no signs or symptoms  Most commonly, carotid artery disease causes transient ischemic attacks (TIAs), or mini-strokes  You may have numbness, weakness, lack of movement, or vision or speech problems  A TIA goes away quickly and does not cause permanent damage  A TIA may be a warning sign that you are about to have a stroke  If you have any symptoms of a TIA or stroke, seek care immediately  Warning signs of a stroke: The word F A S T  can help you remember and recognize warning signs of a stroke  · F = Face: One side of the face droops      · A = Arms: One arm starts to drop when both arms are raised      · S = Speech: Speech is slurred or sounds different than usual      · T = Time: A person who is having a stroke needs to be seen immediately  A stroke is a medical emergency that needs immediate treatment   Some medicines and treatments work best if given within a few hours of a stroke  Treatment for carotid artery disease depends on how narrow your arteries have become, your symptoms, and your general health  The goal of treatment is to lower your risk for a stroke  You may need any of the following:  · Take aspirin if directed  Your healthcare provider may suggest that you take an aspirin a day to prevent blood clots from forming in the carotid arteries  If your healthcare provider wants you to take aspirin daily, do not take acetaminophen or ibuprofen instead       · Control risk factors  High blood pressure, high cholesterol, heart disease, diabetes, and being overweight increase your risk for atherosclerosis       · Procedures can help open blocked arteries  A carotid endarterectomy is used to cut plaque out of the artery  An angioplasty is used to push the plaque against the artery wall with a balloon device  Sometimes a stent is placed during an angioplasty  A stent is a metal mesh tube that is placed in the artery to keep it open  Manage carotid artery disease:   · Eat a variety of healthy foods  Healthy foods include fruit, vegetables, whole-grain breads, low-fat dairy products, lean meat, and fish  Choose fish that are high in omega-3 fatty acids, such as salmon and fresh tuna  Ask your healthcare provider for more information on a heart healthy diet and the DASH eating plan       · Limit sodium (salt)  Sodium may increase your blood pressure  Add less table salt to your foods  Read food labels and choose foods that are low in sodium  Your healthcare provider may suggest you follow a low sodium diet      · Reach or maintain a healthy weight  Extra weight makes your heart work harder  Ask your healthcare provider how much you should weight  He can help you create a safe weight loss plan  Even a weight loss of 10% of your body weight can help your heart function better      · Exercise as directed   Exercise helps improve heart function and can help you manage your weight  Exercise can also help lower your cholesterol and blood sugar levels  Try to get at least 30 minutes of exercise at least 5 times each week  Try to be active every day  Your healthcare provider can help you create an exercise plan that works best for you      · Limit alcohol  Alcohol can increase your blood pressure and triglyceride levels  Men should limit alcohol to 2 drinks per day  Women should limit alcohol to 1 drink per day  A drink of alcohol is 12 ounces of beer, 5 ounces of wine, or 1½ ounces of liquor      · Do not smoke  Nicotine and other chemicals in cigarettes and cigars can cause heart and lung damage  Ask your healthcare provider for information if you currently smoke and need help to quit  E-cigarettes or smokeless tobacco still contain nicotine  Talk to your healthcare provider before you use these products  Follow up with your healthcare provider as directed: Write down your questions so you remember to ask them during your visits  © 2017 [address]  Information is for End User's use only and may not be sold, redistributed or otherwise used for commercial purposes  All illustrations and images included in CareNotes® are the copyrighted property of [employer]  or [employer]  The above information is an  only  It is not intended as medical advice for individual conditions or treatments   Talk to your doctor, nurse or pharmacist before following any medical regimen to see if it is safe and effective for you

## 2020-01-07 ENCOUNTER — ANESTHESIA EVENT (OUTPATIENT)
Dept: PERIOP | Facility: HOSPITAL | Age: 75
DRG: 039 | End: 2020-01-07
Payer: COMMERCIAL

## 2020-01-07 ENCOUNTER — TRANSITIONAL CARE MANAGEMENT (OUTPATIENT)
Dept: FAMILY MEDICINE CLINIC | Facility: HOSPITAL | Age: 75
End: 2020-01-07

## 2020-01-07 ENCOUNTER — TELEPHONE (OUTPATIENT)
Dept: FAMILY MEDICINE CLINIC | Facility: HOSPITAL | Age: 75
End: 2020-01-07

## 2020-01-07 ENCOUNTER — TELEPHONE (OUTPATIENT)
Dept: VASCULAR SURGERY | Facility: CLINIC | Age: 75
End: 2020-01-07

## 2020-01-07 ENCOUNTER — OFFICE VISIT (OUTPATIENT)
Dept: FAMILY MEDICINE CLINIC | Facility: HOSPITAL | Age: 75
End: 2020-01-07
Payer: COMMERCIAL

## 2020-01-07 VITALS
HEIGHT: 70 IN | HEART RATE: 80 BPM | BODY MASS INDEX: 32.35 KG/M2 | TEMPERATURE: 98 F | DIASTOLIC BLOOD PRESSURE: 86 MMHG | SYSTOLIC BLOOD PRESSURE: 132 MMHG | WEIGHT: 226 LBS

## 2020-01-07 DIAGNOSIS — I10 ESSENTIAL HYPERTENSION: ICD-10-CM

## 2020-01-07 DIAGNOSIS — I65.21 INTERNAL CAROTID ARTERY STENOSIS, RIGHT: ICD-10-CM

## 2020-01-07 DIAGNOSIS — I63.9 CEREBROVASCULAR ACCIDENT (CVA), UNSPECIFIED MECHANISM (HCC): ICD-10-CM

## 2020-01-07 DIAGNOSIS — Z09 HOSPITAL DISCHARGE FOLLOW-UP: Primary | ICD-10-CM

## 2020-01-07 DIAGNOSIS — I35.0 AORTIC VALVE STENOSIS, ETIOLOGY OF CARDIAC VALVE DISEASE UNSPECIFIED: ICD-10-CM

## 2020-01-07 PROCEDURE — 99496 TRANSJ CARE MGMT HIGH F2F 7D: CPT | Performed by: INTERNAL MEDICINE

## 2020-01-07 NOTE — UTILIZATION REVIEW
Notification of Discharge  This is a Notification of Discharge from our facility [address]  Please be advised that this patient has been discharge from our facility  Below you will find the admission and discharge date and time including the patients disposition  PRESENTATION DATE: 1/3/2020  3:18 PM  OBS ADMISSION DATE:   IP ADMISSION DATE: 1/3/20 1631   DISCHARGE DATE: 1/6/2020  5:33 PM  DISPOSITION: Home/Self Care Home/Self Care   Admission Orders listed below:  Admission Orders (From admission, onward)     Ordered        01/03/20 1631  Inpatient Admission (expected length of stay for this patient Order details is greater than two midnights)  Once                   Please contact the UR Department if additional information is required to close this patient's authorization/case  Chelsy Hinton  [facility] Utilization Review Department  Main: [phone] x carefully listen to the prompts  All voicemails are confidential   [email or web address]  Send all requests for admission clinical reviews, approved or denied determinations and any other requests to dedicated fax number below belonging to the campus where the patient is receiving treatment   List of dedicated fax numbers:  [address] DENIALS (Administrative/Medical Necessity) [phone]   [address] (Maternity/NICU/Pediatrics) [phone]   Gabbi Berg [phone]   Lakesha Momin [phone]   [facility] [phone]   [address] [phone]   [facility]  [phone]   [address] [facility] [address]  [address] [facility] [address] [facility] [phone]

## 2020-01-07 NOTE — PROGRESS NOTES
Assessment/Plan:    Cerebrovascular accident (CVA) ([facility] [address] )  Thought to be d/t R ICA stenosis - for carotid surgery on Friday, on ASA/Plavix/Statin, he does not smoke, call with any new/worse neuro symptoms    Internal carotid artery stenosis, right  For R ICA surgery on Friday, pt states he received call from vascular today to hold ASA/Plavix/statin, follow directions as per vascular, call with any new/worse Neuro symptoms    Aortic valve stenosis  Very faint murmur appreciated today, AS d/w pt, he has no current syncope/angina/dyspnea, will monitor    Hypertension  BP controlled, con't current meds       Diagnoses and all orders for this visit:    Hospital discharge follow-up    Cerebrovascular accident (CVA), unspecified mechanism ([facility] [address] )    Internal carotid artery stenosis, right    Aortic valve stenosis, etiology of cardiac valve disease unspecified    Essential hypertension      [place] 2017 - 5 yrs    BW 12/19      Subjective:      Patient ID: Nuris Washington  is a 76 y o  male  HPI Pt here for [facility]/Hospital follow up  Pt was admitted to [facility] from 1/3/20 to 1/6/20  Records were reviewed by myself in detail and events are summarized below  TCM Call (since 12/7/2019)     Date and time call was made  1/7/2020 12:48 PM    Hospital care reviewed  Records reviewed    Patient was hospitialized at  [address] [facility]    Date of Admission  01/03/20    Date of discharge  01/06/20    Diagnosis  Partial Stroke    Disposition  Home    Were the patients medications reviewed and updated  No    Current Symptoms  None      TCM Call (since 12/7/2019)     Should patient be enrolled in anticoag monitoring? No    Scheduled for follow up?   Yes    Not clinically warranted  Patient being readmitted for surgery 1/10/20    Patients specialists  Other (comment)    Other specialists names  vascular    Did you obtain your prescribed medications  Yes    Do you need help managing your prescriptions or medications  No    Is transportation to your appointment needed  Yes    Specify why  Seymour Ahn MA    I have advised the patient to call PCP with any new or worsening symptoms  Juan Mcclure MA        Pt presented to [facility] ER on 1/3/20 with c/o 30 min of dizziness and slightly slurred speech  He then started to note a tingle above his L lip and some weakness to LUE  In the ED vitals showed /88 but rest of vitals were wnl  Exam notable for dec sensation and decreased muscle tone to LUE  Stroke alert was called  Pt had CT and CTA head - microangiopathic changes and volume loss/atrophy were noted as was a severe calcified plaque at the R cervical ICA  Troponin was neg and Glu was 150  Rest of BMP/CBC/PT/PTT were wnl  ECG was documented as A flutter  CXR showed no acute dz  Neuro evaluated pt and no tPA was given as symptoms were already resolving and his score was low  He was loaded with ASA and Plavix and started on Lipitor  MRI of brain was done and showed R parietal lobe with a small cortical area of acute ischemia  He was seen by Cardio who recommended starting lisinopril and titrating to BP control  His BP improved slowly  He was seen by vascular surgery and a carotid duplex was done  CUS showed <50% L ICA stenosis and 50-69% R ICA stenosis  Echo was done and EF was 65% and mild AS and trace MR was noted  Pt improved symptomatically and was discharged home on 1/6/20  Med list was reviewed  Pt has been doing well since returning home  He con't to have some L hand weakness but has no weakness at his L arm  He has had some issues with dropping objects  He notes no slurred speech or vision issues/loss of vision  He states his balance is good and notes no dizziness  He received a call today from the vascular center telling him to hold his Plavix/ASA/Lipitor btw now and surgery on Friday  [place] 2017 - 5 yrs    BW 12/19    Review of Systems   Constitutional: Negative for chills and fever     HENT: Negative for congestion and sinus pain  Eyes: Negative for pain and visual disturbance  Respiratory: Negative for cough and shortness of breath  Cardiovascular: Negative for chest pain, palpitations and leg swelling  Gastrointestinal: Negative for abdominal pain, blood in stool, constipation, diarrhea, nausea and vomiting  Endocrine: Negative for polydipsia and polyuria  Genitourinary: Negative for difficulty urinating and dysuria  Musculoskeletal: Negative for arthralgias, gait problem and myalgias  Skin: Negative for rash and wound  Neurological: Positive for weakness and numbness  Negative for dizziness, speech difficulty and headaches  Hematological: Does not bruise/bleed easily  Psychiatric/Behavioral: Negative for behavioral problems and confusion  Objective:    /86   Pulse 80   Temp 98 °F (36 7 °C)   Ht 5' 10" (1 778 m)   Wt 103 kg (226 lb)   BMI 32 43 kg/m²      Physical Exam   Constitutional: He appears well-developed and well-nourished  No distress  HENT:   Head: Normocephalic and atraumatic  Mouth/Throat: No oropharyngeal exudate  Eyes: Conjunctivae are normal  Right eye exhibits no discharge  Left eye exhibits no discharge  Neck: Neck supple  No tracheal deviation present  Cardiovascular: Normal rate and regular rhythm  Murmur heard  Pulmonary/Chest: Effort normal and breath sounds normal  No respiratory distress  He has no wheezes  He has no rales  Abdominal: Soft  He exhibits no distension  There is no tenderness  There is no rebound and no guarding  Musculoskeletal: He exhibits no deformity  Neurological: He is alert  He exhibits normal muscle tone  Skin: Skin is warm and dry  No rash noted  Psychiatric: He has a normal mood and affect  His behavior is normal    Nursing note and vitals reviewed

## 2020-01-07 NOTE — ASSESSMENT & PLAN NOTE
For R ICA surgery on Friday, pt states he received call from vascular today to hold ASA/Plavix/statin, follow directions as per vascular, call with any new/worse Neuro symptoms

## 2020-01-07 NOTE — ASSESSMENT & PLAN NOTE
Thought to be d/t R ICA stenosis - for carotid surgery on Friday, on ASA/Plavix/Statin, he does not smoke, call with any new/worse neuro symptoms

## 2020-01-07 NOTE — ASSESSMENT & PLAN NOTE
Very faint murmur appreciated today, AS d/w pt, he has no current syncope/angina/dyspnea, will monitor

## 2020-01-08 ENCOUNTER — PREP FOR PROCEDURE (OUTPATIENT)
Dept: VASCULAR SURGERY | Facility: CLINIC | Age: 75
End: 2020-01-08

## 2020-01-09 NOTE — PRE-PROCEDURE INSTRUCTIONS
No outpatient medications have been marked as taking for the 1/10/20 encounter [facility] Encounter)  Pre-op Showering Instructions for Surgery Patients    Before your operation, you play an important role in decreasing your risk for infection by washing with special antiseptic soap  This is an effective way to reduce bacteria on the skin which may help to prevent infections at the surgical site  Please read the following directions in advance  1  In the week before your operation, purchase a 4 ounce bottle of antiseptic soap containing chlorhexidine gluconate (CHG)  4%  Some brand names include: Aplicare®, Endure, and Hibiclens®  The cost is usually less than $5 00   For your convenience, the [employer] carries the soap   It may also be available at your doctors office or pre-admission testing center, and at most retail pharmacies   If you are allergic or sensitive to soaps containing CHG, please let your doctor know so another antiseptic can be suggested   CHG antiseptic soap is for external use only  2  The day before your operation, follow these instructions carefully to get ready   Please clean linens (sheets) on your bed; you should sleep on clean sheets after your evening shower   Get clean towels and washcloth ready  you need enough for 2 showers   Set aside clean underwear, pajamas, and clothing to wear after the showers     Reminders:   DO NOT use any other soap or body rinse on your skin during or after the antiseptic showers   DO NOT use lotion, powder, deodorant, or perfume/aftershave of any kind on your skin after your antiseptic shower   DO NOT shave any body parts in the 24 hours/day before your operation   DO NOT get the antiseptic soap in your eyes, ears, nose, mouth, or vaginal area    3   You will need to shower the night before AND the morning of your surgery  Shower 1:   The first evening before the operation, take the first shower   First, shampoo your hair with regular shampoo and rinse it completely before you use the antiseptic soap  After washing and rinsing your hair, rinse your body   Next, use a clean washcloth to apply the antiseptic soap and wash your body from the neck down to your toes using ½ bottle of the antiseptic soap  You will use the other ½ bottle for the second shower   Clean the area where your incision will be; lather this area well for about 2 minutes   If you are having head or neck surgery, wash areas with the antiseptic soap   Rinse yourself completely with running water   Use a clean towel to dry off   Wear clean underwear and clothing/pajamas  Shower 2   The morning of your operation, take the second shower following the same steps as Shower 1 using the second ½ of the bottle of antiseptic soap   Use clean cloths and towels to wash and dry yourself   Wear clean underwear and clothing    Pre-procedure instructions given without any further questions or concerns a this time  Soap given and instructed  Patient will bring a complete list of medications dos

## 2020-01-10 ENCOUNTER — HOSPITAL ENCOUNTER (OUTPATIENT)
Dept: NON INVASIVE DIAGNOSTICS | Facility: HOSPITAL | Age: 75
Discharge: HOME | DRG: 039 | End: 2020-01-10
Payer: COMMERCIAL

## 2020-01-10 ENCOUNTER — ANESTHESIA (OUTPATIENT)
Dept: PERIOP | Facility: HOSPITAL | Age: 75
DRG: 039 | End: 2020-01-10
Payer: COMMERCIAL

## 2020-01-10 ENCOUNTER — HOSPITAL ENCOUNTER (INPATIENT)
Facility: HOSPITAL | Age: 75
LOS: 2 days | Discharge: HOME | DRG: 039 | End: 2020-01-12
Attending: SURGERY | Admitting: SURGERY
Payer: COMMERCIAL

## 2020-01-10 DIAGNOSIS — I65.21 STENOSIS OF RIGHT CAROTID ARTERY: ICD-10-CM

## 2020-01-10 DIAGNOSIS — I10 ESSENTIAL HYPERTENSION: Primary | Chronic | ICD-10-CM

## 2020-01-10 PROBLEM — I63.9 CEREBROVASCULAR ACCIDENT (CVA) (HCC): Chronic | Status: ACTIVE | Noted: 2020-01-07

## 2020-01-10 PROBLEM — Z98.890 S/P CAROTID ENDARTERECTOMY: Status: ACTIVE | Noted: 2020-01-10

## 2020-01-10 LAB
ABO GROUP BLD: NORMAL
ANION GAP SERPL CALCULATED.3IONS-SCNC: 6 MMOL/L (ref 4–13)
BASOPHILS # BLD AUTO: 0.04 THOUSANDS/ΜL (ref 0–0.1)
BASOPHILS NFR BLD AUTO: 0 % (ref 0–1)
BLD GP AB SCN SERPL QL: NEGATIVE
BUN SERPL-MCNC: 19 MG/DL (ref 5–25)
CALCIUM SERPL-MCNC: 8.5 MG/DL (ref 8.3–10.1)
CHLORIDE SERPL-SCNC: 106 MMOL/L (ref 100–108)
CO2 SERPL-SCNC: 30 MMOL/L (ref 21–32)
CREAT SERPL-MCNC: 1.15 MG/DL (ref 0.6–1.3)
EOSINOPHIL # BLD AUTO: 0.36 THOUSAND/ΜL (ref 0–0.61)
EOSINOPHIL NFR BLD AUTO: 4 % (ref 0–6)
ERYTHROCYTE [DISTWIDTH] IN BLOOD BY AUTOMATED COUNT: 13.2 % (ref 11.6–15.1)
GFR SERPL CREATININE-BSD FRML MDRD: 62 ML/MIN/1.73SQ M
GLUCOSE SERPL-MCNC: 120 MG/DL (ref 65–140)
GLUCOSE SERPL-MCNC: 123 MG/DL (ref 65–140)
GLUCOSE SERPL-MCNC: 133 MG/DL (ref 65–140)
HCT VFR BLD AUTO: 36.9 % (ref 36.5–49.3)
HGB BLD-MCNC: 12 G/DL (ref 12–17)
IMM GRANULOCYTES # BLD AUTO: 0.01 THOUSAND/UL (ref 0–0.2)
IMM GRANULOCYTES NFR BLD AUTO: 0 % (ref 0–2)
KCT BLD-ACNC: 132 SEC (ref 89–137)
LYMPHOCYTES # BLD AUTO: 1.74 THOUSANDS/ΜL (ref 0.6–4.47)
LYMPHOCYTES NFR BLD AUTO: 19 % (ref 14–44)
MCH RBC QN AUTO: 28.3 PG (ref 26.8–34.3)
MCHC RBC AUTO-ENTMCNC: 32.5 G/DL (ref 31.4–37.4)
MCV RBC AUTO: 87 FL (ref 82–98)
MONOCYTES # BLD AUTO: 0.77 THOUSAND/ΜL (ref 0.17–1.22)
MONOCYTES NFR BLD AUTO: 8 % (ref 4–12)
NEUTROPHILS # BLD AUTO: 6.29 THOUSANDS/ΜL (ref 1.85–7.62)
NEUTS SEG NFR BLD AUTO: 69 % (ref 43–75)
NRBC BLD AUTO-RTO: 0 /100 WBCS
PLATELET # BLD AUTO: 220 THOUSANDS/UL (ref 149–390)
PMV BLD AUTO: 9.3 FL (ref 8.9–12.7)
POTASSIUM SERPL-SCNC: 3.8 MMOL/L (ref 3.5–5.3)
RBC # BLD AUTO: 4.24 MILLION/UL (ref 3.88–5.62)
RH BLD: NEGATIVE
SODIUM SERPL-SCNC: 142 MMOL/L (ref 136–145)
SPECIMEN EXPIRATION DATE: NORMAL
SPECIMEN SOURCE: NORMAL
WBC # BLD AUTO: 9.21 THOUSAND/UL (ref 4.31–10.16)

## 2020-01-10 PROCEDURE — 35301 RECHANNELING OF ARTERY: CPT | Performed by: SURGERY

## 2020-01-10 PROCEDURE — 80048 BASIC METABOLIC PNL TOTAL CA: CPT | Performed by: NURSE PRACTITIONER

## 2020-01-10 PROCEDURE — 88304 TISSUE EXAM BY PATHOLOGIST: CPT | Performed by: PATHOLOGY

## 2020-01-10 PROCEDURE — 86900 BLOOD TYPING SEROLOGIC ABO: CPT | Performed by: SURGERY

## 2020-01-10 PROCEDURE — 82948 REAGENT STRIP/BLOOD GLUCOSE: CPT

## 2020-01-10 PROCEDURE — 93882 EXTRACRANIAL UNI/LTD STUDY: CPT

## 2020-01-10 PROCEDURE — 03CK0ZZ EXTIRPATION OF MATTER FROM RIGHT INTERNAL CAROTID ARTERY, OPEN APPROACH: ICD-10-PCS | Performed by: SURGERY

## 2020-01-10 PROCEDURE — 03CH0ZZ EXTIRPATION OF MATTER FROM RIGHT COMMON CAROTID ARTERY, OPEN APPROACH: ICD-10-PCS | Performed by: SURGERY

## 2020-01-10 PROCEDURE — 88311 DECALCIFY TISSUE: CPT | Performed by: PATHOLOGY

## 2020-01-10 PROCEDURE — 86850 RBC ANTIBODY SCREEN: CPT | Performed by: SURGERY

## 2020-01-10 PROCEDURE — 03UK0JZ SUPPLEMENT RIGHT INTERNAL CAROTID ARTERY WITH SYNTHETIC SUBSTITUTE, OPEN APPROACH: ICD-10-PCS | Performed by: SURGERY

## 2020-01-10 PROCEDURE — 85347 COAGULATION TIME ACTIVATED: CPT

## 2020-01-10 PROCEDURE — 03UH0JZ SUPPLEMENT RIGHT COMMON CAROTID ARTERY WITH SYNTHETIC SUBSTITUTE, OPEN APPROACH: ICD-10-PCS | Performed by: SURGERY

## 2020-01-10 PROCEDURE — 03UM0JZ SUPPLEMENT RIGHT EXTERNAL CAROTID ARTERY WITH SYNTHETIC SUBSTITUTE, OPEN APPROACH: ICD-10-PCS | Performed by: SURGERY

## 2020-01-10 PROCEDURE — 85025 COMPLETE CBC W/AUTO DIFF WBC: CPT | Performed by: NURSE PRACTITIONER

## 2020-01-10 PROCEDURE — 99233 SBSQ HOSP IP/OBS HIGH 50: CPT | Performed by: INTERNAL MEDICINE

## 2020-01-10 PROCEDURE — 86901 BLOOD TYPING SEROLOGIC RH(D): CPT | Performed by: SURGERY

## 2020-01-10 PROCEDURE — 03CM0ZZ EXTIRPATION OF MATTER FROM RIGHT EXTERNAL CAROTID ARTERY, OPEN APPROACH: ICD-10-PCS | Performed by: SURGERY

## 2020-01-10 PROCEDURE — 35301 RECHANNELING OF ARTERY: CPT | Performed by: PHYSICIAN ASSISTANT

## 2020-01-10 DEVICE — XENOSURE BIOLOGIC PATCH, 0.8CM X 8CM, EIFU
Type: IMPLANTABLE DEVICE | Site: CAROTID | Status: FUNCTIONAL
Brand: XENOSURE BIOLOGIC PATCH

## 2020-01-10 RX ORDER — EPHEDRINE SULFATE 50 MG/ML
INJECTION INTRAVENOUS AS NEEDED
Status: DISCONTINUED | OUTPATIENT
Start: 2020-01-10 | End: 2020-01-10 | Stop reason: SURG

## 2020-01-10 RX ORDER — HYDROMORPHONE HCL/PF 1 MG/ML
0.5 SYRINGE (ML) INJECTION
Status: DISCONTINUED | OUTPATIENT
Start: 2020-01-10 | End: 2020-01-10

## 2020-01-10 RX ORDER — OXYCODONE HYDROCHLORIDE AND ACETAMINOPHEN 5; 325 MG/1; MG/1
1 TABLET ORAL ONCE
Status: COMPLETED | OUTPATIENT
Start: 2020-01-10 | End: 2020-01-10

## 2020-01-10 RX ORDER — SODIUM CHLORIDE, SODIUM LACTATE, POTASSIUM CHLORIDE, CALCIUM CHLORIDE 600; 310; 30; 20 MG/100ML; MG/100ML; MG/100ML; MG/100ML
125 INJECTION, SOLUTION INTRAVENOUS CONTINUOUS
Status: DISCONTINUED | OUTPATIENT
Start: 2020-01-10 | End: 2020-01-11

## 2020-01-10 RX ORDER — OXYCODONE HYDROCHLORIDE AND ACETAMINOPHEN 5; 325 MG/1; MG/1
2 TABLET ORAL EVERY 6 HOURS PRN
Status: DISCONTINUED | OUTPATIENT
Start: 2020-01-10 | End: 2020-01-12 | Stop reason: HOSPADM

## 2020-01-10 RX ORDER — MIDAZOLAM HYDROCHLORIDE 2 MG/2ML
INJECTION, SOLUTION INTRAMUSCULAR; INTRAVENOUS AS NEEDED
Status: DISCONTINUED | OUTPATIENT
Start: 2020-01-10 | End: 2020-01-10 | Stop reason: SURG

## 2020-01-10 RX ORDER — PROTAMINE SULFATE 10 MG/ML
INJECTION, SOLUTION INTRAVENOUS AS NEEDED
Status: DISCONTINUED | OUTPATIENT
Start: 2020-01-10 | End: 2020-01-10 | Stop reason: SURG

## 2020-01-10 RX ORDER — DOPAMINE HYDROCHLORIDE 160 MG/100ML
1-20 INJECTION, SOLUTION INTRAVENOUS
Status: DISCONTINUED | OUTPATIENT
Start: 2020-01-10 | End: 2020-01-12

## 2020-01-10 RX ORDER — BUPIVACAINE HYDROCHLORIDE AND EPINEPHRINE 5; 5 MG/ML; UG/ML
INJECTION, SOLUTION EPIDURAL; INTRACAUDAL; PERINEURAL AS NEEDED
Status: DISCONTINUED | OUTPATIENT
Start: 2020-01-10 | End: 2020-01-10 | Stop reason: HOSPADM

## 2020-01-10 RX ORDER — OXYCODONE HYDROCHLORIDE AND ACETAMINOPHEN 5; 325 MG/1; MG/1
1 TABLET ORAL EVERY 4 HOURS PRN
Status: DISCONTINUED | OUTPATIENT
Start: 2020-01-10 | End: 2020-01-12 | Stop reason: HOSPADM

## 2020-01-10 RX ORDER — ACETAMINOPHEN 325 MG/1
650 TABLET ORAL EVERY 6 HOURS PRN
Status: DISCONTINUED | OUTPATIENT
Start: 2020-01-10 | End: 2020-01-12 | Stop reason: HOSPADM

## 2020-01-10 RX ORDER — LIDOCAINE HYDROCHLORIDE 10 MG/ML
INJECTION, SOLUTION EPIDURAL; INFILTRATION; INTRACAUDAL; PERINEURAL AS NEEDED
Status: DISCONTINUED | OUTPATIENT
Start: 2020-01-10 | End: 2020-01-10 | Stop reason: HOSPADM

## 2020-01-10 RX ORDER — CEFAZOLIN SODIUM 2 G/50ML
2000 SOLUTION INTRAVENOUS ONCE
Status: COMPLETED | OUTPATIENT
Start: 2020-01-10 | End: 2020-01-10

## 2020-01-10 RX ORDER — ROCURONIUM BROMIDE 10 MG/ML
INJECTION, SOLUTION INTRAVENOUS AS NEEDED
Status: DISCONTINUED | OUTPATIENT
Start: 2020-01-10 | End: 2020-01-10 | Stop reason: SURG

## 2020-01-10 RX ORDER — CHLORHEXIDINE GLUCONATE 0.12 MG/ML
15 RINSE ORAL EVERY 12 HOURS SCHEDULED
Status: DISCONTINUED | OUTPATIENT
Start: 2020-01-10 | End: 2020-01-10

## 2020-01-10 RX ORDER — HEPARIN SODIUM 5000 [USP'U]/ML
5000 INJECTION, SOLUTION INTRAVENOUS; SUBCUTANEOUS EVERY 8 HOURS SCHEDULED
Status: DISCONTINUED | OUTPATIENT
Start: 2020-01-10 | End: 2020-01-12 | Stop reason: HOSPADM

## 2020-01-10 RX ORDER — LIDOCAINE HYDROCHLORIDE 10 MG/ML
INJECTION, SOLUTION EPIDURAL; INFILTRATION; INTRACAUDAL; PERINEURAL
Status: COMPLETED | OUTPATIENT
Start: 2020-01-10 | End: 2020-01-10

## 2020-01-10 RX ORDER — HEPARIN SODIUM 1000 [USP'U]/ML
INJECTION, SOLUTION INTRAVENOUS; SUBCUTANEOUS AS NEEDED
Status: DISCONTINUED | OUTPATIENT
Start: 2020-01-10 | End: 2020-01-10 | Stop reason: SURG

## 2020-01-10 RX ORDER — ASPIRIN 81 MG/1
81 TABLET ORAL DAILY
Status: DISCONTINUED | OUTPATIENT
Start: 2020-01-10 | End: 2020-01-12 | Stop reason: HOSPADM

## 2020-01-10 RX ORDER — CEFAZOLIN SODIUM 2 G/50ML
SOLUTION INTRAVENOUS AS NEEDED
Status: DISCONTINUED | OUTPATIENT
Start: 2020-01-10 | End: 2020-01-10 | Stop reason: SURG

## 2020-01-10 RX ORDER — LIDOCAINE HYDROCHLORIDE 10 MG/ML
INJECTION, SOLUTION EPIDURAL; INFILTRATION; INTRACAUDAL; PERINEURAL AS NEEDED
Status: DISCONTINUED | OUTPATIENT
Start: 2020-01-10 | End: 2020-01-10

## 2020-01-10 RX ORDER — SODIUM CHLORIDE, SODIUM LACTATE, POTASSIUM CHLORIDE, CALCIUM CHLORIDE 600; 310; 30; 20 MG/100ML; MG/100ML; MG/100ML; MG/100ML
75 INJECTION, SOLUTION INTRAVENOUS CONTINUOUS
Status: DISCONTINUED | OUTPATIENT
Start: 2020-01-10 | End: 2020-01-10

## 2020-01-10 RX ORDER — PROPOFOL 10 MG/ML
INJECTION, EMULSION INTRAVENOUS AS NEEDED
Status: DISCONTINUED | OUTPATIENT
Start: 2020-01-10 | End: 2020-01-10 | Stop reason: SURG

## 2020-01-10 RX ORDER — NEOSTIGMINE METHYLSULFATE 1 MG/ML
INJECTION INTRAVENOUS AS NEEDED
Status: DISCONTINUED | OUTPATIENT
Start: 2020-01-10 | End: 2020-01-10 | Stop reason: SURG

## 2020-01-10 RX ORDER — CLOPIDOGREL BISULFATE 75 MG/1
75 TABLET ORAL DAILY
Status: DISCONTINUED | OUTPATIENT
Start: 2020-01-10 | End: 2020-01-12 | Stop reason: HOSPADM

## 2020-01-10 RX ORDER — FENTANYL CITRATE/PF 50 MCG/ML
25 SYRINGE (ML) INJECTION
Status: DISCONTINUED | OUTPATIENT
Start: 2020-01-10 | End: 2020-01-10

## 2020-01-10 RX ORDER — GLYCOPYRROLATE 0.2 MG/ML
INJECTION INTRAMUSCULAR; INTRAVENOUS AS NEEDED
Status: DISCONTINUED | OUTPATIENT
Start: 2020-01-10 | End: 2020-01-10 | Stop reason: SURG

## 2020-01-10 RX ORDER — NITROGLYCERIN 20 MG/100ML
5-200 INJECTION INTRAVENOUS
Status: DISCONTINUED | OUTPATIENT
Start: 2020-01-10 | End: 2020-01-12

## 2020-01-10 RX ORDER — FENTANYL CITRATE 50 UG/ML
INJECTION, SOLUTION INTRAMUSCULAR; INTRAVENOUS AS NEEDED
Status: DISCONTINUED | OUTPATIENT
Start: 2020-01-10 | End: 2020-01-10 | Stop reason: SURG

## 2020-01-10 RX ADMIN — FENTANYL CITRATE 25 MCG: 50 INJECTION, SOLUTION INTRAMUSCULAR; INTRAVENOUS at 12:14

## 2020-01-10 RX ADMIN — FENTANYL CITRATE 25 MCG: 50 INJECTION, SOLUTION INTRAMUSCULAR; INTRAVENOUS at 13:22

## 2020-01-10 RX ADMIN — FENTANYL CITRATE 25 MCG: 50 INJECTION, SOLUTION INTRAMUSCULAR; INTRAVENOUS at 10:00

## 2020-01-10 RX ADMIN — FENTANYL CITRATE 25 MCG: 50 INJECTION, SOLUTION INTRAMUSCULAR; INTRAVENOUS at 10:20

## 2020-01-10 RX ADMIN — GLYCOPYRROLATE 0.6 MG: 0.2 INJECTION, SOLUTION INTRAMUSCULAR; INTRAVENOUS at 11:58

## 2020-01-10 RX ADMIN — SODIUM CHLORIDE, SODIUM LACTATE, POTASSIUM CHLORIDE, AND CALCIUM CHLORIDE 125 ML/HR: .6; .31; .03; .02 INJECTION, SOLUTION INTRAVENOUS at 15:12

## 2020-01-10 RX ADMIN — OXYCODONE HYDROCHLORIDE AND ACETAMINOPHEN 1 TABLET: 5; 325 TABLET ORAL at 17:16

## 2020-01-10 RX ADMIN — HEPARIN SODIUM 5000 UNITS: 5000 INJECTION INTRAVENOUS; SUBCUTANEOUS at 17:06

## 2020-01-10 RX ADMIN — PROPOFOL 50 MG: 10 INJECTION, EMULSION INTRAVENOUS at 09:27

## 2020-01-10 RX ADMIN — MIDAZOLAM 2 MG: 1 INJECTION INTRAMUSCULAR; INTRAVENOUS at 09:12

## 2020-01-10 RX ADMIN — PROPOFOL 100 MG: 10 INJECTION, EMULSION INTRAVENOUS at 09:25

## 2020-01-10 RX ADMIN — ROCURONIUM BROMIDE 5 MG: 10 INJECTION, SOLUTION INTRAVENOUS at 11:28

## 2020-01-10 RX ADMIN — FENTANYL CITRATE 25 MCG: 50 INJECTION, SOLUTION INTRAMUSCULAR; INTRAVENOUS at 09:36

## 2020-01-10 RX ADMIN — SODIUM CHLORIDE, SODIUM LACTATE, POTASSIUM CHLORIDE, AND CALCIUM CHLORIDE 75 ML/HR: .6; .31; .03; .02 INJECTION, SOLUTION INTRAVENOUS at 07:24

## 2020-01-10 RX ADMIN — CHLORHEXIDINE GLUCONATE 0.12% ORAL RINSE 15 ML: 1.2 LIQUID ORAL at 07:39

## 2020-01-10 RX ADMIN — LIDOCAINE HYDROCHLORIDE 5 ML: 10 INJECTION, SOLUTION EPIDURAL; INFILTRATION; INTRACAUDAL; PERINEURAL at 09:25

## 2020-01-10 RX ADMIN — SODIUM CHLORIDE, SODIUM LACTATE, POTASSIUM CHLORIDE, AND CALCIUM CHLORIDE: .6; .31; .03; .02 INJECTION, SOLUTION INTRAVENOUS at 11:27

## 2020-01-10 RX ADMIN — PROPOFOL 50 MG: 10 INJECTION, EMULSION INTRAVENOUS at 09:26

## 2020-01-10 RX ADMIN — NEOSTIGMINE METHYLSULFATE 3 MG: 1 INJECTION INTRAVENOUS at 11:58

## 2020-01-10 RX ADMIN — FENTANYL CITRATE 25 MCG: 50 INJECTION, SOLUTION INTRAMUSCULAR; INTRAVENOUS at 14:02

## 2020-01-10 RX ADMIN — OXYCODONE HYDROCHLORIDE AND ACETAMINOPHEN 1 TABLET: 5; 325 TABLET ORAL at 15:12

## 2020-01-10 RX ADMIN — CLOPIDOGREL BISULFATE 75 MG: 75 TABLET ORAL at 17:06

## 2020-01-10 RX ADMIN — EPHEDRINE SULFATE 5 MG: 50 INJECTION, SOLUTION INTRAVENOUS at 11:27

## 2020-01-10 RX ADMIN — NITROGLYCERIN 5 MCG/MIN: 20 INJECTION INTRAVENOUS at 12:32

## 2020-01-10 RX ADMIN — ASPIRIN 81 MG: 81 TABLET, COATED ORAL at 17:07

## 2020-01-10 RX ADMIN — HEPARIN SODIUM 5000 UNITS: 5000 INJECTION INTRAVENOUS; SUBCUTANEOUS at 23:30

## 2020-01-10 RX ADMIN — FENTANYL CITRATE 25 MCG: 50 INJECTION, SOLUTION INTRAMUSCULAR; INTRAVENOUS at 09:53

## 2020-01-10 RX ADMIN — HEPARIN SODIUM 6000 UNITS: 1000 INJECTION, SOLUTION INTRAVENOUS; SUBCUTANEOUS at 10:09

## 2020-01-10 RX ADMIN — FENTANYL CITRATE 25 MCG: 50 INJECTION, SOLUTION INTRAMUSCULAR; INTRAVENOUS at 12:36

## 2020-01-10 RX ADMIN — ROCURONIUM BROMIDE 10 MG: 10 INJECTION, SOLUTION INTRAVENOUS at 10:35

## 2020-01-10 RX ADMIN — CEFAZOLIN SODIUM 2000 MG: 2 SOLUTION INTRAVENOUS at 09:23

## 2020-01-10 RX ADMIN — ROCURONIUM BROMIDE 20 MG: 10 INJECTION, SOLUTION INTRAVENOUS at 10:13

## 2020-01-10 RX ADMIN — FENTANYL CITRATE 50 MCG: 50 INJECTION, SOLUTION INTRAMUSCULAR; INTRAVENOUS at 09:13

## 2020-01-10 RX ADMIN — ROCURONIUM BROMIDE 50 MG: 10 INJECTION, SOLUTION INTRAVENOUS at 09:26

## 2020-01-10 RX ADMIN — PROTAMINE SULFATE 10 MG: 10 INJECTION, SOLUTION INTRAVENOUS at 11:30

## 2020-01-10 RX ADMIN — LIDOCAINE HYDROCHLORIDE 1 ML: 10 INJECTION, SOLUTION EPIDURAL; INFILTRATION; INTRACAUDAL; PERINEURAL at 08:21

## 2020-01-10 RX ADMIN — FENTANYL CITRATE 25 MCG: 50 INJECTION, SOLUTION INTRAMUSCULAR; INTRAVENOUS at 12:12

## 2020-01-10 NOTE — ASSESSMENT & PLAN NOTE
· 2/2 Stenosis of internal carotid artery per carotid U/S on 1/6  · 1/3 MRI- Right parietal lobe small cortical areas of acute ischemia  · Was on ASA/ Plavix/Statin but Plavix has been on hold all week for the endarterectomy today  · Will restart ASA and statin now    · Will restart Plavix per vascular

## 2020-01-10 NOTE — INTERVAL H&P NOTE
H&P reviewed  After examining the patient I find no changes in the patients condition since the H&P had been written      Vitals:    01/10/20 0720   BP: 145/68   Pulse: 64   Resp: 18   Temp: 98 2 °F (36 8 °C)   SpO2: 96%

## 2020-01-10 NOTE — OP NOTE
OPERATIVE REPORT  PATIENT NAME: Natalie Luevano  :  1945  MRN: [id number]  Pt Location: [facility] OR ROOM 02    SURGERY DATE: 1/10/2020    Surgeon(s) and Role:     * Agnieszka Graham MD - Primary     * Ananth Castro PA-C - Assisting    Preop Diagnosis:  Stenosis of right carotid artery [I65 21]    Post-Op Diagnosis Codes:     * Stenosis of right carotid artery [I65 21]    Procedure(s) (LRB):  ENDARTERECTOMY ARTERY CAROTID (Right)    Specimen(s):  ID Type Source Tests Collected by Time Destination   1 : Right Carotid plaque Tissue Artery TISSUE EXAM Agnieszka Graham MD 1/10/2020 1047        Estimated Blood Loss:   Minimal    Drains:  * No LDAs found *    Anesthesia Type:   General    Operative Indications:  Stenosis of right carotid artery [I65 21]      Operative Findings:  See op note: Anatomic variant with deep/medial  ICA  Routine repair with patch and completion duplex  Complications:   None      Procedure and Technique:    The patient was brought to the operating room and placed on the operating table in the supine position  The patient was identified by verbal confirmation and armband identification  The patient was prepped and draped in usual sterile fashion and a formal timeout was called  A longitudinal incision was made in a skin fold overlying the previously marked right carotid bifurcation  Dissection was carried through the subcutaneous tissue and platysma to the anterior border the sternocleidomastoid muscle  The Weitleaner retractor was inserted exposing the facial vein  The vein was doubly ligated and divided exposing the carotid artery  With careful mobilization the proximal and distal carotid artery were dissected and encircled with Vesseloops  The external carotid artery was separately encircled with a vessel loop as was the superior Thyroid artery   The hypoglossal nerve was carefully mobilized and the ansa hypoglossus was divided between clips enhancing exposure of the distal internal carotid artery  When adequate mobilization of the internal carotid artery was achieved the patient received 5000 units of heparin  After suitable delay traction was placed on the vessel loops and an anterolateral arteriotomy was created in the common carotid artery extending it into the internal carotid artery with the Cotter scissors  The 8 [place] Shunt was inserted first into the distal internal carotid artery with good backbleeding noted  The proximal end was placed in the common carotid artery and the endarterectomy was then performed  Plaque was removed from the common external and internal carotid artery to a good endpoint  Distal intimal tacking suture was placed  A bovine pericardial patch was then sewn into the arteriotomy with running 6-0 Prolene suture  When the suture line was nearly completed the shunt was removed the vessels were flushed vigorously in both directions and the suture line tied down  Flow was restored first into the external and then the internal carotid artery  Duplex ultrasound was then brought to the field and insonation of the external/internal and common carotid arteries showed excellent waveforms and no technical defects  The wound edges were infiltrated with half percent Marcaine with epinephrine  Fibrillar  was placed in the operative field for added hemostasis  When hemostasis was secured closure was performed with 3-0 Monocryl for the platysma and 4-0 Monocryl subcuticular skin closure with a sterile bandage and tegaderm  The patient awoke moving all 4 extremities           I was present for the entire procedure, A qualified resident physician was not available and A physician assistant was required during the procedure for retraction tissue handling,dissection and suturing    Patient Disposition:  PACU  and hemodynamically stable    SIGNATURE: Kayleigh Martinez MD  DATE: January 10, 2020  TIME: 12:08 PM

## 2020-01-10 NOTE — ASSESSMENT & PLAN NOTE
· Endarterectomy of Right carotid artery on 1/10  · A-line in place for strict B/P Monitoring  · Nitro gtt for targeted B/P less than 835 systolic  · If systolic B/P less than 90, start dopamine gtt  · PRN pain management at site

## 2020-01-10 NOTE — ANESTHESIA PREPROCEDURE EVALUATION
Review of Systems/Medical History  Patient summary reviewed  Chart reviewed  No history of anesthetic complications     Cardiovascular  EKG reviewed, Negative cardio ROS Hyperlipidemia, Hypertension controlled, Valvular heart disease , aortic valve stenosis,    Pulmonary  Negative pulmonary ROS        GI/Hepatic    GERD well controlled,        Kidney stones,        Endo/Other  Diabetes well controlled type 2 Oral agent,      GYN  Negative gynecology ROS          Hematology    Coagulation disorder currently taking oral anticoagulants,    Musculoskeletal  Gout,   Arthritis     Neurology    TIA, CVA , no residual symptoms,    Psychology   Negative psychology ROS              Physical Exam    Airway    Mallampati score: I  TM Distance: >3 FB  Neck ROM: full     Dental   No notable dental hx     Cardiovascular  Comment: Negative ROS, Rhythm: regular, Rate: normal, Cardiovascular exam normal    Pulmonary  Decreased breath sounds,     Other Findings        Anesthesia Plan  ASA Score- 3     Anesthesia Type- general with ASA Monitors  Additional Monitors: arterial line  Airway Plan: ETT  Comment: Pt cleared by cardiology  Dr Evelia Davies requesting GA  Plan GA with ETT, A-line  R/B/A d/w pt  Pt at risk for CV/neuro complications with recent CVA  Roger Herman Plan Factors-    Induction- intravenous  Postoperative Plan- Plan for postoperative opioid use  Planned trial extubation    Informed Consent- Anesthetic plan and risks discussed with patient  I personally reviewed this patient with the CRNA  Discussed and agreed on the Anesthesia Plan with the CRNA  Roger Herman

## 2020-01-10 NOTE — CONSULTS
Consult- Ashu Jason  1945, 76 y o  male MRN: [id number]    Unit/Bed#: -01 Encounter: [id number]    Primary Care Provider: Thania Shay DO   Date and time admitted to hospital: 1/10/2020  6:53 AM      Inpatient consult to Nico Hammond performed by: BLAS Jane  Consult ordered by: Sharon Hollis PA-C          Internal carotid artery stenosis, right  Assessment & Plan  · Endarterectomy of Right carotid artery on 1/10  · A-line in place for strict B/P Monitoring  · Nitro gtt for targeted B/P less than 640 systolic  · If systolic B/P less than 90, start dopamine gtt  · PRN pain management at site    Cerebrovascular accident (CVA) ([facility] [address] )  Assessment & Plan  · 2/2 Stenosis of internal carotid artery per carotid U/S on 1/6  · 1/3 MRI- Right parietal lobe small cortical areas of acute ischemia  · Was on ASA/ Plavix/Statin but Plavix has been on hold all week for the endarterectomy today  · Will restart ASA and statin now  · Will restart Plavix per vascular    Hypertension  Assessment & Plan  · See plan as above      -------------------------------------------------------------------------------------------------------------  Chief Complaint: elective endarterectomy     History of Present Illness     Ashu Jason  is a 76 y o  male with PMH of HTN, CVA, Gout and ankle surgery who presents for an elective R carotid endarterectomy  Patient was here on 1/3 after having left sided weakness and tingling on his left upper lip  He had a MRI of the brain and was found to have a Right parietal lobe small cortical areas of acute ischemia  He had a u/s of the right carotid artery which showed stenosis of 50-69% in the internal carotid artery  He was discharged with ASA/ Plavix/statin however he was told not to take any of the medications until after surgery  Patient had a successful surgery and was admitted to ICU for close monitoring    Patient has a a-line and is currently on Nitro [facility] for B/p control  History obtained from chart review and the patient   -------------------------------------------------------------------------------------------------------------  Dispo: Continue Critical Care     Code Status: Prior  --------------------------------------------------------------------------------------------------------------  Review of Systems   Constitutional: Negative  HENT: Negative  Eyes: Negative  Respiratory: Negative  Cardiovascular:        Neck tenderness at incision site   Gastrointestinal: Negative  Endocrine: Negative  Genitourinary: Negative  Musculoskeletal: Negative  Skin: Positive for wound  Right ankle wound   Allergic/Immunologic: Negative  Neurological: Negative  Hematological: Negative  Psychiatric/Behavioral: Negative  Physical Exam   Constitutional: He appears well-developed and well-nourished  HENT:   Head: Normocephalic  Eyes: Pupils are equal, round, and reactive to light  EOM are normal    Neck: Normal range of motion  Cardiovascular: Normal rate and regular rhythm  Pulmonary/Chest: Effort normal and breath sounds normal    Abdominal: Soft  Bowel sounds are normal    Musculoskeletal: Normal range of motion  Neurological: A cranial nerve deficit is present  Mild deficit when testing left hand coordination   Skin: Skin is warm and dry     Psychiatric: He has a normal mood and affect      --------------------------------------------------------------------------------------------------------------  Historical Information   Past Medical History:   Diagnosis Date    Anemia     iron def    GERD (gastroesophageal reflux disease)     Gout     Hypertension     Insomnia     Kidney stone      Past Surgical History:   Procedure Laterality Date    ANKLE SURGERY Right     COLONOSCOPY  01/25/2013    polypectomy; complete - 3 yrs d/t polyp - benign submucosal lipoma- path c/w lipoma    COLONOSCOPY  2017    complete - tubular adenoma - repeat 5yrs    CYSTOSCOPY      CYSTOTOMY      bladder  w/ basket extraction of calculus    FEMUR FRACTURE SURGERY      HERNIA REPAIR      inguinal ; sliding    INGUINAL HERNIA REPAIR Right     unilateral    KNEE ARTHROSCOPY Right     w/medial menisectomy    LASIK      corneal    LITHOTRIPSY      renal    WI COLONOSCOPY FLX DX W/COLLJ SPEC WHEN PFRMD N/A 2017    Procedure: SCREENING COLONOSCOPY;  Surgeon: Manda Landon MD;  Location:  MAIN OR;  Service: General    ROTATOR CUFF REPAIR Bilateral     TONSILLECTOMY       Social History   Social History     Substance and Sexual Activity   Alcohol Use Not Currently    Comment: stopped drinking alcohol     Social History     Substance and Sexual Activity   Drug Use No     Social History     Tobacco Use   Smoking Status Former Smoker    Packs/day: 0 50    Years: 22 00    Pack years: 11 00    Last attempt to quit:  Augmenix Years since quittin 0   Smokeless Tobacco Never Used     Exercise History: unknown  Family History:   Family History   Problem Relation Age of Onset    Alzheimer's disease Mother     Alzheimer's disease Father     Heart disease Father         CAD    Other Father         prediabetes    Diabetes Father     Breast cancer Other      I have reviewed this patient's family history and commented on sigificant items within the HPI    Vitals:   Vitals:    01/10/20 1339 01/10/20 1354 01/10/20 1400 01/10/20 1409   BP: 130/60 155/70 151/70 147/69   Pulse: 62 60 62 60   Resp: 14 12 12 16   Temp:       TempSrc:       SpO2: 95% 95% 96% 95%   Weight:       Height:         Temp  Min: 98 2 °F (36 8 °C)  Max: 99 2 °F (37 3 °C)  IBW: 70 7 kg  Height: 5' 9" (175 3 cm)  Body mass index is 33 15 kg/m²        Medications:  Current Facility-Administered Medications   Medication Dose Route Frequency    acetaminophen (TYLENOL) tablet 650 mg  650 mg Oral Q6H PRN    aspirin (ECOTRIN LOW STRENGTH) EC tablet 81 mg  81 mg Oral Daily    clopidogrel (PLAVIX) tablet 75 mg  75 mg Oral Daily    DOPamine (INTROPIN) 400 mg in 250 mL infusion (premix)  1-20 mcg/kg/min Intravenous Titrated    fentaNYL (SUBLIMAZE) injection 25 mcg  25 mcg Intravenous Q5 Min PRN    heparin (porcine) subcutaneous injection 5,000 Units  5,000 Units Subcutaneous Q8H [address]    HYDROmorphone (DILAUDID) injection 0 5 mg  0 5 mg Intravenous Q10 Min PRN    lactated ringers infusion  75 mL/hr Intravenous Continuous    lactated ringers infusion  125 mL/hr Intravenous Continuous    nitroGLYcerin (TRIDIL) 50 mg in 250 mL infusion (premix)  5-200 mcg/min Intravenous Titrated    oxyCODONE-acetaminophen (PERCOCET) 5-325 mg per tablet 1 tablet  1 tablet Oral Q4H PRN    oxyCODONE-acetaminophen (PERCOCET) 5-325 mg per tablet 2 tablet  2 tablet Oral Q6H PRN     Home medications:  Prior to Admission Medications   Prescriptions Last Dose Informant Patient Reported? Taking?    MITIGARE 0 6 MG CAPS 1/3/2020  No No   Sig: take 1 capsule by mouth four times a day if needed   Multiple Vitamin (MULTIVITAMIN) capsule 1/3/2020 Self Yes No   Sig: Take 1 capsule by mouth daily   SANTYL ointment 1/3/2020 Self Yes No   aspirin (ECOTRIN LOW STRENGTH) 81 mg EC tablet 1/3/2020 Self Yes No   Sig: Take 81 mg by mouth daily   atorvastatin (LIPITOR) 40 mg tablet 2020  No No   Sig: Take 1 tablet (40 mg total) by mouth every evening   calcium polycarbophil (FIBERCON) 625 mg tablet 1/3/2020 Self Yes No   Sig: Take 625 mg by mouth daily   clopidogrel (PLAVIX) 75 mg tablet 1/3/2020  No No   Sig: Take 1 tablet (75 mg total) by mouth daily   clotrimazole-betamethasone (LOTRISONE) 1-0 05 % cream Past Month at Unknown time Self No Yes   Sig: apply sparingly to affected area twice a day   lisinopril (ZESTRIL) 5 mg tablet 1/3/2020  No No   Sig: Take 1 tablet (5 mg total) by mouth daily   mometasone (NASONEX) 50 mcg/act nasal spray 1/3/2020 Self Yes No   Si sprays into each nostril daily   triamterene-hydrochlorothiazide (MAXZIDE) 75-50 MG per tablet 1/3/2020  No No   Sig: take 1 tablet by mouth once daily      Facility-Administered Medications: None     Allergies:  No Known Allergies      Laboratory and Diagnostics:  Results from last 7 days   Lab Units 01/04/20  0438 01/03/20  1531   WBC Thousand/uL 7 32 7 72   HEMOGLOBIN g/dL 13 4 13 9   HEMATOCRIT % 41 6 43 2   PLATELETS Thousands/uL 232 254   NEUTROS PCT % 52  --    MONOS PCT % 9  --      Results from last 7 days   Lab Units 01/04/20  0438 01/03/20  1531   SODIUM mmol/L 141 144   POTASSIUM mmol/L 3 5 4 1   CHLORIDE mmol/L 104 104   CO2 mmol/L 27 33*   ANION GAP mmol/L 10 7   BUN mg/dL 17 17   CREATININE mg/dL 1 11 1 05   CALCIUM mg/dL 9 0 9 0   GLUCOSE RANDOM mg/dL 122 158*     Results from last 7 days   Lab Units 01/04/20  0438   MAGNESIUM mg/dL 1 7      Results from last 7 days   Lab Units 01/03/20  1531   INR  1 11   PTT seconds 29      Results from last 7 days   Lab Units 01/03/20  1531   TROPONIN I ng/mL <0 02         ABG:    VBG:            Micro:          EKG: Last EKG 1/3- NSR   Imaging: I have personally reviewed pertinent reports  and I have personally reviewed pertinent films in PACS    ------------------------------------------------------------------------------------------------------------  Advance Directive and Living Will:      Power of :    POLST:    ------------------------------------------------------------------------------------------------------------  Counseling / Coordination of Care  Total Critical Care time spent 30 minutes excluding procedures, teaching and family updates  BLAS Tapia        Portions of the record may have been created with voice recognition software  Occasional wrong word or "sound a like" substitutions may have occurred due to the inherent limitations of voice recognition software    Read the chart carefully and recognize, using context, where substitutions have occurred

## 2020-01-10 NOTE — PLAN OF CARE
Problem: Potential for Falls  Goal: Patient will remain free of falls  Description  INTERVENTIONS:  - Assess patient frequently for physical needs  -  Identify cognitive and physical deficits and behaviors that affect risk of falls    -  [place] fall precautions as indicated by assessment   - Educate patient/family on patient safety including physical limitations  - Instruct patient to call for assistance with activity based on assessment  - Modify environment to reduce risk of injury  - Consider OT/PT consult to assist with strengthening/mobility  Outcome: Progressing     Problem: PAIN - ADULT  Goal: Verbalizes/displays adequate comfort level or baseline comfort level  Description  Interventions:  - Encourage patient to monitor pain and request assistance  - Assess pain using appropriate pain scale  - Administer analgesics based on type and severity of pain and evaluate response  - Implement non-pharmacological measures as appropriate and evaluate response  - Consider cultural and social influences on pain and pain management  - Notify physician/advanced practitioner if interventions unsuccessful or patient reports new pain  Outcome: Progressing     Problem: INFECTION - ADULT  Goal: Absence or prevention of progression during hospitalization  Description  INTERVENTIONS:  - Assess and monitor for signs and symptoms of infection  - Monitor lab/diagnostic results  - Monitor all insertion sites, i e  indwelling lines, tubes, and drains  - Monitor endotracheal if appropriate and nasal secretions for changes in amount and color  - [place] appropriate cooling/warming therapies per order  - Administer medications as ordered  - Instruct and encourage patient and family to use good hand hygiene technique  - Identify and instruct in appropriate isolation precautions for identified infection/condition  Outcome: Progressing  Goal: Absence of fever/infection during neutropenic period  Description  INTERVENTIONS:  - Monitor WBC    Outcome: Progressing     Problem: SAFETY ADULT  Goal: Patient will remain free of falls  Description  INTERVENTIONS:  - Assess patient frequently for physical needs  -  Identify cognitive and physical deficits and behaviors that affect risk of falls    -  [place] fall precautions as indicated by assessment   - Educate patient/family on patient safety including physical limitations  - Instruct patient to call for assistance with activity based on assessment  - Modify environment to reduce risk of injury  - Consider OT/PT consult to assist with strengthening/mobility  Outcome: Progressing  Goal: Maintain or return to baseline ADL function  Description  INTERVENTIONS:  -  Assess patient's ability to carry out ADLs; assess patient's baseline for ADL function and identify physical deficits which impact ability to perform ADLs (bathing, care of mouth/teeth, toileting, grooming, dressing, etc )  - Assess/evaluate cause of self-care deficits   - Assess range of motion  - Assess patient's mobility; develop plan if impaired  - Assess patient's need for assistive devices and provide as appropriate  - Encourage maximum independence but intervene and supervise when necessary  - Involve family in performance of ADLs  - Assess for home care needs following discharge   - Consider OT consult to assist with ADL evaluation and planning for discharge  - Provide patient education as appropriate  Outcome: Progressing  Goal: Maintain or return mobility status to optimal level  Description  INTERVENTIONS:  - Assess patient's baseline mobility status (ambulation, transfers, stairs, etc )    - Identify cognitive and physical deficits and behaviors that affect mobility  - Identify mobility aids required to assist with transfers and/or ambulation (gait belt, sit-to-stand, lift, walker, cane, etc )  - [place] fall precautions as indicated by assessment  - Record patient progress and toleration of activity level on Mobility SBAR; progress patient to next Phase/Stage  - Instruct patient to call for assistance with activity based on assessment  - Consider rehabilitation consult to assist with strengthening/weightbearing, etc   Outcome: Progressing     Problem: DISCHARGE PLANNING  Goal: Discharge to home or other facility with appropriate resources  Description  INTERVENTIONS:  - Identify barriers to discharge w/patient and caregiver  - Arrange for needed discharge resources and transportation as appropriate  - Identify discharge learning needs (meds, wound care, etc )  - Arrange for interpretive services to assist at discharge as needed  - Refer to Case Management Department for coordinating discharge planning if the patient needs post-hospital services based on physician/advanced practitioner order or complex needs related to functional status, cognitive ability, or social support system  Outcome: Progressing     Problem: Knowledge Deficit  Goal: Patient/family/caregiver demonstrates understanding of disease process, treatment plan, medications, and discharge instructions  Description  Complete learning assessment and assess knowledge base    Interventions:  - Provide teaching at level of understanding  - Provide teaching via preferred learning methods  Outcome: Progressing     Problem: NEUROSENSORY - ADULT  Goal: Achieves stable or improved neurological status  Description  INTERVENTIONS  - Monitor and report changes in neurological status  - Monitor vital signs such as temperature, blood pressure, glucose, and any other labs ordered   - Initiate measures to prevent increased intracranial pressure  - Monitor for seizure activity and implement precautions if appropriate      Outcome: Progressing  Goal: Remains free of injury related to seizures activity  Description  INTERVENTIONS  - Maintain airway, patient safety  and administer oxygen as ordered  - Monitor patient for seizure activity, document and report duration and description of seizure to physician/advanced practitioner  - If seizure occurs,  ensure patient safety during seizure  - Reorient patient post seizure  - Seizure pads on all 4 side rails  - Instruct patient/family to notify RN of any seizure activity including if an aura is experienced  - Instruct patient/family to call for assistance with activity based on nursing assessment  - Administer anti-seizure medications if ordered    Outcome: Progressing  Goal: Achieves maximal functionality and self care  Description  INTERVENTIONS  - Monitor swallowing and airway patency with patient fatigue and changes in neurological status  - Encourage and assist patient to increase activity and self care     - Encourage visually impaired, hearing impaired and aphasic patients to use assistive/communication devices  Outcome: Progressing     Problem: CARDIOVASCULAR - ADULT  Goal: Maintains optimal cardiac output and hemodynamic stability  Description  INTERVENTIONS:  - Monitor I/O, vital signs and rhythm  - Monitor for S/S and trends of decreased cardiac output  - Administer and titrate ordered vasoactive medications to optimize hemodynamic stability  - Assess quality of pulses, skin color and temperature  - Assess for signs of decreased coronary artery perfusion  - Instruct patient to report change in severity of symptoms  Outcome: Progressing  Goal: Absence of cardiac dysrhythmias or at baseline rhythm  Description  INTERVENTIONS:  - Continuous cardiac monitoring, vital signs, obtain 12 lead EKG if ordered  - Administer antiarrhythmic and heart rate control medications as ordered  - Monitor electrolytes and administer replacement therapy as ordered  Outcome: Progressing

## 2020-01-10 NOTE — ANESTHESIA PROCEDURE NOTES
Arterial Line Insertion  Date/Time: 1/10/2020 8:21 AM  Performed by: Sabas Harrison DO  Authorized by: Sabas Harrison DO   Consent: Verbal consent obtained  Written consent obtained  Risks and benefits: risks, benefits and alternatives were discussed  Consent given by: patient  Patient understanding: patient states understanding of the procedure being performed  Patient consent: the patient's understanding of the procedure matches consent given  Procedure consent: procedure consent matches procedure scheduled  Relevant documents: relevant documents present and verified  Test results: test results available and properly labeled  Site marked: the operative site was marked  Radiology Images: Radiology Images displayed and confirmed  If images not available, report reviewed  Required items: required blood products, implants, devices, and special equipment available  Patient identity confirmed: arm band and verbally with patient  Time out: Immediately prior to procedure a "time out" was called to verify the correct patient, procedure, equipment, support staff and site/side marked as required  Preparation: Patient was prepped and draped in the usual sterile fashion  Indications: hemodynamic monitoring  Orientation:  Right  Location: radial arterylidocaine (PF) (XYLOCAINE-MPF) 1 % infiltration, 1 mL  Sedation:  Patient sedated: no    Procedure Details:  Hua's test normal: yes  Needle gauge: 20  Seldinger technique: Seldinger technique used  Number of attempts: 1    Post-procedure:  Post-procedure: chlorhexidine patch applied and dressing applied  Waveform: good waveform  Post-procedure CNS: normal and unchanged  Patient tolerance: Patient tolerated the procedure well with no immediate complications  Comments: Right radial artery ID'd with Ultrasound  A-line placed with continuous USG  No Pain/paresthesias  Pt tolerated well

## 2020-01-10 NOTE — ANESTHESIA POSTPROCEDURE EVALUATION
Post-Op Assessment Note    CV Status:  Stable  Pain Score: 5    Pain management: adequate     Mental Status:  Alert and awake   Hydration Status:  Euvolemic   PONV Controlled:  Controlled   Airway Patency:  Patent   Post Op Vitals Reviewed: Yes      Staff: Anesthesiologist, with CRNAs   Comments: To PACU on 3L NC  Pt c/o pain and tereated with Fentanyl 50 mcg            BP (!) 192/79 (01/10/20 1209)    Temp 99 2 °F (37 3 °C) (01/10/20 1209)    Pulse 76 (01/10/20 1209)   Resp 18 (01/10/20 1209)    SpO2   98

## 2020-01-10 NOTE — PROGRESS NOTES
Pt with A line, monitoring blood pressure via cuff pressure along with titrating gtt via cuff pressure  Per Vivienne Dutta, stated pt's A line has a "whip", providing inaccurate reading

## 2020-01-11 PROBLEM — R33.9 URINARY RETENTION: Status: ACTIVE | Noted: 2020-01-11

## 2020-01-11 LAB
ANION GAP SERPL CALCULATED.3IONS-SCNC: 7 MMOL/L (ref 4–13)
APTT PPP: 32 SECONDS (ref 23–37)
BUN SERPL-MCNC: 15 MG/DL (ref 5–25)
CALCIUM SERPL-MCNC: 8.5 MG/DL (ref 8.3–10.1)
CHLORIDE SERPL-SCNC: 104 MMOL/L (ref 100–108)
CO2 SERPL-SCNC: 30 MMOL/L (ref 21–32)
CREAT SERPL-MCNC: 1.02 MG/DL (ref 0.6–1.3)
ERYTHROCYTE [DISTWIDTH] IN BLOOD BY AUTOMATED COUNT: 13.2 % (ref 11.6–15.1)
GFR SERPL CREATININE-BSD FRML MDRD: 72 ML/MIN/1.73SQ M
GLUCOSE SERPL-MCNC: 113 MG/DL (ref 65–140)
GLUCOSE SERPL-MCNC: 166 MG/DL (ref 65–140)
GLUCOSE SERPL-MCNC: 98 MG/DL (ref 65–140)
HCT VFR BLD AUTO: 36.1 % (ref 36.5–49.3)
HGB BLD-MCNC: 11.7 G/DL (ref 12–17)
INR PPP: 1.21 (ref 0.84–1.19)
MCH RBC QN AUTO: 28.5 PG (ref 26.8–34.3)
MCHC RBC AUTO-ENTMCNC: 32.4 G/DL (ref 31.4–37.4)
MCV RBC AUTO: 88 FL (ref 82–98)
PLATELET # BLD AUTO: 214 THOUSANDS/UL (ref 149–390)
PMV BLD AUTO: 9.1 FL (ref 8.9–12.7)
POTASSIUM SERPL-SCNC: 3.7 MMOL/L (ref 3.5–5.3)
PROTHROMBIN TIME: 15 SECONDS (ref 11.6–14.5)
RBC # BLD AUTO: 4.1 MILLION/UL (ref 3.88–5.62)
SODIUM SERPL-SCNC: 141 MMOL/L (ref 136–145)
WBC # BLD AUTO: 8.87 THOUSAND/UL (ref 4.31–10.16)

## 2020-01-11 PROCEDURE — 99024 POSTOP FOLLOW-UP VISIT: CPT | Performed by: NURSE PRACTITIONER

## 2020-01-11 PROCEDURE — 99233 SBSQ HOSP IP/OBS HIGH 50: CPT | Performed by: INTERNAL MEDICINE

## 2020-01-11 PROCEDURE — 85027 COMPLETE CBC AUTOMATED: CPT | Performed by: PHYSICIAN ASSISTANT

## 2020-01-11 PROCEDURE — 85610 PROTHROMBIN TIME: CPT | Performed by: PHYSICIAN ASSISTANT

## 2020-01-11 PROCEDURE — 80048 BASIC METABOLIC PNL TOTAL CA: CPT | Performed by: PHYSICIAN ASSISTANT

## 2020-01-11 PROCEDURE — 82948 REAGENT STRIP/BLOOD GLUCOSE: CPT

## 2020-01-11 PROCEDURE — 85730 THROMBOPLASTIN TIME PARTIAL: CPT | Performed by: PHYSICIAN ASSISTANT

## 2020-01-11 RX ORDER — ALPRAZOLAM 0.5 MG/1
0.5 TABLET ORAL 3 TIMES DAILY PRN
Status: DISCONTINUED | OUTPATIENT
Start: 2020-01-11 | End: 2020-01-12

## 2020-01-11 RX ORDER — ALPRAZOLAM 0.25 MG/1
0.25 TABLET ORAL ONCE
Status: COMPLETED | OUTPATIENT
Start: 2020-01-11 | End: 2020-01-11

## 2020-01-11 RX ORDER — FLUTICASONE PROPIONATE 50 MCG
1 SPRAY, SUSPENSION (ML) NASAL 2 TIMES DAILY
Status: DISCONTINUED | OUTPATIENT
Start: 2020-01-11 | End: 2020-01-12 | Stop reason: HOSPADM

## 2020-01-11 RX ORDER — TRIAMTERENE AND HYDROCHLOROTHIAZIDE 37.5; 25 MG/1; MG/1
2 TABLET ORAL DAILY
Status: DISCONTINUED | OUTPATIENT
Start: 2020-01-11 | End: 2020-01-12 | Stop reason: HOSPADM

## 2020-01-11 RX ORDER — ATORVASTATIN CALCIUM 40 MG/1
40 TABLET, FILM COATED ORAL EVERY EVENING
Status: DISCONTINUED | OUTPATIENT
Start: 2020-01-11 | End: 2020-01-12 | Stop reason: HOSPADM

## 2020-01-11 RX ORDER — LISINOPRIL 5 MG/1
5 TABLET ORAL DAILY
Status: DISCONTINUED | OUTPATIENT
Start: 2020-01-11 | End: 2020-01-12

## 2020-01-11 RX ORDER — FLUTICASONE PROPIONATE 50 MCG
1 SPRAY, SUSPENSION (ML) NASAL DAILY
Status: DISCONTINUED | OUTPATIENT
Start: 2020-01-11 | End: 2020-01-11

## 2020-01-11 RX ORDER — LORATADINE 10 MG/1
10 TABLET ORAL DAILY
Status: DISCONTINUED | OUTPATIENT
Start: 2020-01-11 | End: 2020-01-12

## 2020-01-11 RX ORDER — POTASSIUM CHLORIDE 20 MEQ/1
20 TABLET, EXTENDED RELEASE ORAL ONCE
Status: COMPLETED | OUTPATIENT
Start: 2020-01-11 | End: 2020-01-11

## 2020-01-11 RX ADMIN — SODIUM CHLORIDE, SODIUM LACTATE, POTASSIUM CHLORIDE, AND CALCIUM CHLORIDE 125 ML/HR: .6; .31; .03; .02 INJECTION, SOLUTION INTRAVENOUS at 01:25

## 2020-01-11 RX ADMIN — ALPRAZOLAM 0.5 MG: 0.5 TABLET ORAL at 20:09

## 2020-01-11 RX ADMIN — FLUTICASONE PROPIONATE 1 SPRAY: 50 SPRAY, METERED NASAL at 20:13

## 2020-01-11 RX ADMIN — LISINOPRIL 5 MG: 5 TABLET ORAL at 10:08

## 2020-01-11 RX ADMIN — NITROGLYCERIN 70 MCG/MIN: 20 INJECTION INTRAVENOUS at 02:21

## 2020-01-11 RX ADMIN — LORATADINE 10 MG: 10 TABLET ORAL at 08:45

## 2020-01-11 RX ADMIN — ASPIRIN 81 MG: 81 TABLET, COATED ORAL at 09:48

## 2020-01-11 RX ADMIN — FLUTICASONE PROPIONATE 1 SPRAY: 50 SPRAY, METERED NASAL at 08:45

## 2020-01-11 RX ADMIN — ATORVASTATIN CALCIUM 40 MG: 40 TABLET, FILM COATED ORAL at 17:23

## 2020-01-11 RX ADMIN — CLOPIDOGREL BISULFATE 75 MG: 75 TABLET ORAL at 09:48

## 2020-01-11 RX ADMIN — HEPARIN SODIUM 5000 UNITS: 5000 INJECTION INTRAVENOUS; SUBCUTANEOUS at 14:37

## 2020-01-11 RX ADMIN — TRIAMTERENE AND HYDROCHLOROTHIAZIDE 2 TABLET: 37.5; 25 TABLET ORAL at 10:24

## 2020-01-11 RX ADMIN — OXYCODONE HYDROCHLORIDE AND ACETAMINOPHEN 2 TABLET: 5; 325 TABLET ORAL at 05:46

## 2020-01-11 RX ADMIN — POTASSIUM CHLORIDE 20 MEQ: 1500 TABLET, EXTENDED RELEASE ORAL at 09:48

## 2020-01-11 RX ADMIN — Medication 1 SPRAY: at 08:45

## 2020-01-11 RX ADMIN — HEPARIN SODIUM 5000 UNITS: 5000 INJECTION INTRAVENOUS; SUBCUTANEOUS at 05:46

## 2020-01-11 RX ADMIN — ALPRAZOLAM 0.25 MG: 0.25 TABLET ORAL at 14:37

## 2020-01-11 RX ADMIN — HEPARIN SODIUM 5000 UNITS: 5000 INJECTION INTRAVENOUS; SUBCUTANEOUS at 22:55

## 2020-01-11 NOTE — UTILIZATION REVIEW
Initial Clinical Review    Elective   IP   surgical procedure    Age/Sex: 76 y o  male     Surgery Date:   1/10/2020    Procedure: ENDARTERECTOMY ARTERY CAROTID (Right)       Anesthesia:    general    Operative Findings: Anatomic variant with deep/medial  ICA  Routine repair with patch and completion duplex        POD#1 Progress Note:   1/11    BP elevated  Post op  On nitroglycerine drip  Attempting to wean  Drip  No neuro  changes  Cont post op care      Admission Orders: Date/Time/Statement: Inpatient Admission Orders (From admission, onward)     Ordered        01/10/20 1410  Inpatient Admission  Once                   Orders Placed This Encounter   Procedures    Inpatient Admission     Standing Status:   Standing     Number of Occurrences:   1     Order Specific Question:   Admitting Physician     Answer:   Zachary Garcia     Order Specific Question:   Level of Care     Answer:   Level 1 Stepdown [13]     Order Specific Question:   Estimated length of stay     Answer:   Inpatient Only Surgery     Vital Signs: /85   Pulse 101   Temp 98 3 °F (36 8 °C) (Oral)   Resp 19   Ht 5' 9" (1 753 m)   Wt 102 kg (224 lb 8 oz)   SpO2 95%   BMI 33 15 kg/m²      Diet:    CCD    Mobility:   As sandeep    DVT Prophylaxis: SCD'S    Medications/Pain Control:   Scheduled Medications:    Medications:  aspirin 81 mg Oral Daily   atorvastatin 40 mg Oral QPM   clopidogrel 75 mg Oral Daily   fluticasone 1 spray Each Nare Daily   heparin (porcine) 5,000 Units Subcutaneous Q8H [facility]   lisinopril 5 mg Oral Daily   loratadine 10 mg Oral Daily   triamterene-hydrochlorothiazide 2 tablet Oral Daily     Continuous IV Infusions:    DOPamine in dextrose 5% 1-20 mcg/kg/min Intravenous Titrated   nitroGLYcerin 5-200 mcg/min Intravenous Titrated     PRN Meds:    acetaminophen 650 mg Oral Q6H PRN   oxyCODONE-acetaminophen 1 tablet Oral Q4H PRN   oxyCODONE-acetaminophen 2 tablet Oral Q6H PRN   phenol 1 spray Mouth/Throat Q2H PRN Network Utilization Review Department  [email or web address]  ATTENTION: Please call with any questions or concerns to [phone] and carefully listen to the prompts so that you are directed to the right person  All voicemails are confidential   Yahaira Morin all requests for admission clinical reviews, approved or denied determinations and any other requests to dedicated fax number below belonging to the campus where the patient is receiving treatment   List of dedicated fax numbers for the Facilities:  [address] DENIALS (Administrative/Medical Necessity) [phone]   [address] (Maternity/NICU/Pediatrics) [phone]   [place] Sanders [phone]   [place] [phone]   Anastasia Strauss [phone]   Krystyna Bobby [phone]   [address] [place] [address] [phone]   [facility]  [phone]   [address] [facility] [address]  [address] [facility] [address] [facility] [phone]

## 2020-01-11 NOTE — ASSESSMENT & PLAN NOTE
77 yo M w/ DM, HTN, recent right hemispheric stroke secondary to high-grade right carotid stenosis now status post right carotid endarterectomy by Dr Katheryn Castaneda 1/10    Exam : R neck incision intact; mild soft swelling, minimal ecchymosis     Plan:  -Right neck incision intact  Neurologically intact   -Postoperatively has had a elevated blood pressures  On nitroglycerin drip 40 mg/hour  Resumed home meds - lisinopril +maxzide  Currently 's  Continue to wean gtt  Goal SBP<160  -ASA + Plavix   -Continue Lipitor   -Anxious for discharge   Xanax 0 25mg now  -Downgrade to step down   -Disposition planning, likely d/c tomorrow if gtt weaned off an BP controlled   -Will discuss with Dr Naif Mi

## 2020-01-11 NOTE — ASSESSMENT & PLAN NOTE
· 1/3 MRI- Right parietal lobe small cortical areas of acute ischemia  · Continue aspirin/Plavix/statin  · A1c was noted to be 6 6 in the setting of acute CVA/carotid endarterectomy consider oral anti diabetic medications    Plan last admission was dietary modification/outpatient follow-up with PCP

## 2020-01-11 NOTE — PROGRESS NOTES
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

## 2020-01-11 NOTE — ASSESSMENT & PLAN NOTE
· Echo with an EF of 45% grade 1 diastolic dysfunction  · Resume oral antihypertensives as above if okay with vascular

## 2020-01-11 NOTE — ASSESSMENT & PLAN NOTE
· Postop day 1 status post right CEA  · Vascular primary  · Goal blood pressures systolic greater than 995 less than 150 with p r n   Dopamine/nitroglycerin drip to meet these parameters  · Currently on nitroglycerin at 50 mics per minute  · Will discuss with vascular restarting home lisinopril/Maxzide and weaning off nitroglycerin drip  · Discontinue A-line as pattern with a whip and not accurate

## 2020-01-11 NOTE — ASSESSMENT & PLAN NOTE
· Postop day 1 status post right CEA  · Vascular primary  · Goal blood pressures systolic greater than 049 less than 150 with p r n   Dopamine/nitroglycerin drip to meet these parameters  · Currently on nitroglycerin at 50 mics per minute  · Will discuss with vascular restarting home lisinopril/Maxzide and weaning off nitroglycerin drip  · Discontinue A-line as pattern with a whip and not accurate

## 2020-01-11 NOTE — ASSESSMENT & PLAN NOTE
· Echo with an EF of 58% grade 1 diastolic dysfunction  · Resume oral antihypertensives as above if okay with vascular

## 2020-01-11 NOTE — PROGRESS NOTES
Progress Note - Efren Cleaning  1945, 76 y o  male MRN: [id number]    Unit/Bed#: -01 Encounter: [id number]    Primary Care Provider: Mustapha Hardy DO   Date and time admitted to hospital: 1/10/2020  6:53 AM        * S/P carotid endarterectomy  Assessment & Plan  75 yo M w/ DM, HTN, recent right hemispheric stroke secondary to high-grade right carotid stenosis now status post right carotid endarterectomy by Dr Evelia Davies 1/10    Exam : R neck incision intact; mild soft swelling, minimal ecchymosis     Plan:  -Right neck incision intact  Neurologically intact   -Postoperatively has had a elevated blood pressures  On nitroglycerin drip 40 mg/hour  Resumed home meds - lisinopril +maxzide  Currently 's  Continue to wean gtt  Goal SBP<160  -ASA + Plavix   -Continue Lipitor   -Anxious for discharge  Xanax 0 25mg now  -Downgrade to step down   -Disposition planning, likely d/c tomorrow if gtt weaned off an BP controlled   -Will discuss with Dr Leartis Brittle           Subjective:  POD #1 s/p R CEA w/ bovine patch by Dr Evelia Davies 1/10/20  Patient sitting up in chair  He denies any pain or discomfort to neck incision  No neurological changes  Blood pressure has been elevated on nitroglycerin drip currently infusing 40 mg/hr and attempting wean  He is anxious  He wants to be discharged  He tolerated a diet without any dysphagia  Initially had some issues urinating though now voiding without issue  Ambulated to the bathroom  Vitals:  /70   Pulse 101   Temp 98 3 °F (36 8 °C) (Oral)   Resp 20   Ht 5' 9" (1 753 m)   Wt 102 kg (224 lb 8 oz)   SpO2 98%   BMI 33 15 kg/m²     I/Os:  I/O last 3 completed shifts: In: 2188 4 [I V :2188 4]  Out: 1000 [Urine:1000]  I/O this shift:   In: 748 [P O :480; I V :268]  Out: 600 [Urine:600]    Lab Results and Cultures:   Lab Results   Component Value Date    WBC 8 87 01/11/2020    HGB 11 7 (L) 01/11/2020    HCT 36 1 (L) 01/11/2020    MCV 88 01/11/2020    PLT 214 01/11/2020     Lab Results   Component Value Date    GLUCOSE 112 05/14/2015    CALCIUM 8 5 01/11/2020     05/14/2015    K 3 7 01/11/2020    CO2 30 01/11/2020     01/11/2020    BUN 15 01/11/2020    CREATININE 1 02 01/11/2020     Lab Results   Component Value Date    INR 1 21 (H) 01/11/2020    INR 1 11 01/03/2020    PROTIME 15 0 (H) 01/11/2020    PROTIME 14 0 01/03/2020        Blood Culture: No results found for: BLOODCX,   Urinalysis: No results found for: COLORU, CLARITYU, SPECGRAV, PHUR, LEUKOCYTESUR, NITRITE, PROTEINUA, GLUCOSEU, KETONESU, BILIRUBINUR, BLOODU,   Urine Culture: No results found for: URINECX,   Wound Culure:   Lab Results   Component Value Date    WOUNDCULT 1+ Growth of Staphylococcus aureus (A) 05/18/2018    WOUNDCULT 2+ Growth of  05/18/2018       Medications:  Current Facility-Administered Medications   Medication Dose Route Frequency    acetaminophen (TYLENOL) tablet 650 mg  650 mg Oral Q6H PRN    aspirin (ECOTRIN LOW STRENGTH) EC tablet 81 mg  81 mg Oral Daily    atorvastatin (LIPITOR) tablet 40 mg  40 mg Oral QPM    clopidogrel (PLAVIX) tablet 75 mg  75 mg Oral Daily    DOPamine (INTROPIN) 400 mg in 250 mL infusion (premix)  1-20 mcg/kg/min Intravenous Titrated    fluticasone (FLONASE) 50 mcg/act nasal spray 1 spray  1 spray Each Nare Daily    heparin (porcine) subcutaneous injection 5,000 Units  5,000 Units Subcutaneous Q8H [address]    lisinopril (ZESTRIL) tablet 5 mg  5 mg Oral Daily    loratadine (CLARITIN) tablet 10 mg  10 mg Oral Daily    nitroGLYcerin (TRIDIL) 50 mg in 250 mL infusion (premix)  5-200 mcg/min Intravenous Titrated    oxyCODONE-acetaminophen (PERCOCET) 5-325 mg per tablet 1 tablet  1 tablet Oral Q4H PRN    oxyCODONE-acetaminophen (PERCOCET) 5-325 mg per tablet 2 tablet  2 tablet Oral Q6H PRN    phenol (CHLORASEPTIC) 1 4 % mucosal liquid 1 spray  1 spray Mouth/Throat Q2H PRN    triamterene-hydrochlorothiazide (MAXZIDE-25) 37 5-25 mg per tablet 2 tablet  2 tablet Oral Daily         Physical Exam:    General appearance: alert and oriented, in no acute distress  Skin: Skin color, texture, turgor normal  No rashes or lesions  Neurologic: Alert and oriented X 3, normal strength and tone  Normal symmetric reflexes  Normal coordination and gait  Face symmetrical   Tongue midline    Head: Normocephalic, without obvious abnormality, atraumatic  Neck: Right neck incision intact  Lungs: clear to auscultation bilaterally  Chest wall: no tenderness  Heart: regular rate and rhythm, S1, S2 normal, no murmur, click, rub or gallop  Abdomen: soft, non-tender; bowel sounds normal; no masses,  no organomegaly  Extremities: extremities normal, warm and well-perfused; no cyanosis, clubbing, or edema    Wound/Incision:  R neck dressing clean, dry, and intact    BLAS Ritter  1/11/2020  [facility]  [phone]

## 2020-01-11 NOTE — PROGRESS NOTES
Progress Note - Dimitri Aggarwal  1945, 76 y o  male MRN: [id number]    Unit/Bed#: -01 Encounter: [id number]    Primary Care Provider: Sarah Barker DO   Date and time admitted to hospital: 1/10/2020  6:53 AM        * S/P carotid endarterectomy  Assessment & Plan  · Postop day 1 status post right CEA  · Vascular primary  · Goal blood pressures systolic greater than 253 less than 150 with p r n  Dopamine/nitroglycerin drip to meet these parameters  · Currently on nitroglycerin at 50 mics per minute  · Will discuss with vascular restarting home lisinopril/Maxzide and weaning off nitroglycerin drip  · Discontinue A-line as pattern with a whip and not accurate    Cerebrovascular accident (CVA) ([facility] [address] )  Assessment & Plan  · 1/3 MRI- Right parietal lobe small cortical areas of acute ischemia  · Continue aspirin/Plavix/statin  · A1c was noted to be 6 6 in the setting of acute CVA/carotid endarterectomy consider oral anti diabetic medications  Plan last admission was dietary modification/outpatient follow-up with PCP    Hypertension  Assessment & Plan  · Echo with an EF of 13% grade 1 diastolic dysfunction  · Resume oral antihypertensives as above if okay with vascular    Dyslipidemia  Assessment & Plan  · Continue home statin    Impaired fasting glucose  Assessment & Plan  · A1c 6 6  · Consider treatment as above        Code Status: Level 1 - Full Code  POA:    POLST:      Reason for ICU admission:   B/P monitoring after elective CEA     Active problems:   Principal Problem:    S/P carotid endarterectomy  Active Problems:    Cerebrovascular accident (CVA) ([facility] [address] )    Hypertension    Dyslipidemia    Impaired fasting glucose    Urinary retention  Resolved Problems:    * No resolved hospital problems  *      Consultants:   SLIM    History of Present Illness:   Dimitri Aggarwal  is a 76 y o  male with PMH of HTN, CVA, Gout and ankle surgery who presents for an elective R carotid endarterectomy    Patient was here on 1/3 after having left sided weakness and tingling on his left upper lip  He had a MRI of the brain and was found to have a Right parietal lobe small cortical areas of acute ischemia  He had a u/s of the right carotid artery which showed stenosis of 50-69% in the internal carotid artery  He was discharged with ASA/ Plavix/statin however he was told not to take any of the medications until after surgery    Summary of clinical course:   1/10- elective CEA, on Nitro gtt for B/P control, restarted on ASA and Plavix  1/11- A-line removed, Nitro being weaned off, restarted on lisinopril     Recent or scheduled procedures:   Elective CEA on 1/10    Outstanding/pending diagnostics:   none    Cultures:   none       Mobilization Plan:   Independt OOB    Nutrition Plan:   Regular diet    VTE Pharmacologic Prophylaxis: Heparin  VTE Mechanical Prophylaxis: sequential compression device    Discharge Plan:   Patient should be ready for discharge to home on 1/12 after seeing vascular    Initial Physical Therapy Recommendations: none  Initial Occupational Therapy Recommendations: none  Initial /Plan: none    Home medications that are not reordered and reason why:   All home meds reordered      Spoke with Dr Chano Blankenship  regarding transfer  Please call [phone] with any questions or concerns  Portions of the record may have been created with voice recognition software  Occasional wrong word or "sound a like" substitutions may have occurred due to the inherent limitations of voice recognition software  Read the chart carefully and recognize, using context, where substitutions have occurred

## 2020-01-11 NOTE — PROGRESS NOTES
Progress Note - Anup Cat  1945, 76 y o  male MRN: [id number]    Unit/Bed#: -01 Encounter: [id number]    Primary Care Provider: Rigoberto Brown DO   Date and time admitted to hospital: 1/10/2020  6:53 AM        * S/P carotid endarterectomy  Assessment & Plan  · Postop day 1 status post right CEA  · Vascular primary  · Goal blood pressures systolic greater than 357 less than 150 with p r n  Dopamine/nitroglycerin drip to meet these parameters  · Currently on nitroglycerin at 50 mics per minute  · Will discuss with vascular restarting home lisinopril/Maxzide and weaning off nitroglycerin drip  · Discontinue A-line as pattern with a whip and not accurate    Cerebrovascular accident (CVA) ([facility] [address] )  Assessment & Plan  · 1/3 MRI- Right parietal lobe small cortical areas of acute ischemia  · Continue aspirin/Plavix/statin  · A1c was noted to be 6 6 in the setting of acute CVA/carotid endarterectomy consider oral anti diabetic medications  Plan last admission was dietary modification/outpatient follow-up with PCP    Hypertension  Assessment & Plan  · Echo with an EF of 37% grade 1 diastolic dysfunction  · Resume oral antihypertensives as above if okay with vascular    Impaired fasting glucose  Assessment & Plan  · A1c 6 6  · Consider treatment as above    Dyslipidemia  Assessment & Plan  · Continue home statin    ----------------------------------------------------------------------------------------  HPI/24hr events:  Patient maintain on nitroglycerin at 60 to 75 mics per minute  Four doses of Percocet for mild incisional pain  Disposition: Critical Care to sign off   Code Status: Level 1 - Full Code  ---------------------------------------------------------------------------------------  SUBJECTIVE  Patient complains of mild 3/10 right neck incisional pain  Denies any blurred vision/double vision/headaches    Anxious to ambulate/go home    Review of Systems   Musculoskeletal: Positive for neck pain    All other systems reviewed and are negative  Review of systems was reviewed and negative unless stated above in HPI/24-hour events   ---------------------------------------------------------------------------------------  OBJECTIVE    Physical Exam   Constitutional: He is oriented to person, place, and time  No distress  HENT:   Head: Normocephalic and atraumatic  Mouth/Throat: Oropharynx is clear and moist    Eyes: Pupils are equal, round, and reactive to light  No scleral icterus  Neck: Neck supple  No JVD present  Cardiovascular: Normal rate, regular rhythm, normal heart sounds and intact distal pulses  Exam reveals no gallop and no friction rub  No murmur heard  Pulmonary/Chest: Effort normal and breath sounds normal  No respiratory distress  He has no wheezes  He has no rales  Abdominal: Soft  Bowel sounds are normal  He exhibits no distension  There is no tenderness  Musculoskeletal: Normal range of motion  He exhibits no edema  Neurological: He is alert and oriented to person, place, and time  A cranial nerve deficit is present  Left hand rapid alternating movement abnormal   Skin: Skin is warm and dry  No rash noted  No erythema  No pallor  Right neck with dressing with some hold serosanguineous drainage  Mildly tender to palpation    No ecchymosis       Vitals   Vitals:    20 0430 20 0500 20 0530 20 0600   BP: 139/74 154/70 153/72 135/65   BP Location: Left arm Left arm Left arm    Pulse: 72 77 92 83   Resp: 15 15 17 17   Temp:       TempSrc:       SpO2: 92% 93%     Weight:       Height:         Temp (24hrs), Av 3 °F (36 8 °C), Min:97 6 °F (36 4 °C), Max:99 2 °F (37 3 °C)  Current: Temperature: 98 4 °F (36 9 °C)  Arterial Line BP: 175/51  Arterial Line MAP (mmHg): 80 mmHg    SpO2: SpO2: 93 %  O2 Flow Rate (L/min): 2 L/min    Invasive/non-invasive ventilation settings   Respiratory    Lab Data (Last 4 hours)    None         O2/Vent Data (Last 4 hours)    None                Height and Weights   Height: 5' 9" (175 3 cm)  IBW: 70 7 kg  Body mass index is 33 15 kg/m²  Weight (last 2 days)     Date/Time   Weight    01/10/20 0720   102 (224 5)              Intake and Output  I/O       01/09 0701 - 01/10 0700 01/10 0701 - 01/11 0700 01/11 0701 - 01/12 0700    I V  (mL/kg)  1876 7 (18 4)     Total Intake(mL/kg)  1876 7 (18 4)     Urine (mL/kg/hr)  1000 (0 4)     Total Output  1000     Net  +876 7                  Nutrition       Diet Orders   (From admission, onward)             Start     Ordered    01/11/20 0713  Diet Tesfaye/CHO Controlled; Consistent Carbohydrate Diet Level 2 (5 carb servings/75 grams CHO/meal)  Diet effective now     Question Answer Comment   Diet Type Tesfaye/CHO Controlled    Tesfaye/CHO Controlled Consistent Carbohydrate Diet Level 2 (5 carb servings/75 grams CHO/meal)    RD to adjust diet per protocol? No        01/11/20 0712                Laboratory and Diagnostics:  Results from last 7 days   Lab Units 01/11/20  0446 01/10/20  1717   WBC Thousand/uL 8 87 9 21   HEMOGLOBIN g/dL 11 7* 12 0   HEMATOCRIT % 36 1* 36 9   PLATELETS Thousands/uL 214 220   NEUTROS PCT %  --  69   MONOS PCT %  --  8     Results from last 7 days   Lab Units 01/11/20  0446 01/10/20  1717   SODIUM mmol/L 141 142   POTASSIUM mmol/L 3 7 3 8   CHLORIDE mmol/L 104 106   CO2 mmol/L 30 30   ANION GAP mmol/L 7 6   BUN mg/dL 15 19   CREATININE mg/dL 1 02 1 15   CALCIUM mg/dL 8 5 8 5   GLUCOSE RANDOM mg/dL 166* 133          Results from last 7 days   Lab Units 01/11/20  0446   INR  1 21*   PTT seconds 32              ABG:    VBG:          Micro        EKG:  Tele reviewed  Imaging: I have personally reviewed pertinent reports        Active Medications  Scheduled Meds:    Current Facility-Administered Medications:  acetaminophen 650 mg Oral Q6H PRN Ananya Swift PA-C    aspirin 81 mg Oral Daily Ananya Swift PA-C    atorvastatin 40 mg Oral QPM BLAS Scott clopidogrel 75 mg Oral Daily Ananya Swift PA-C    DOPamine in dextrose 5% 1-20 mcg/kg/min Intravenous Titrated Ananya Swift PA-C Last Rate: Stopped (01/10/20 1920)   heparin (porcine) 5,000 Units Subcutaneous Q8H [address] Ananya Swift PA-C    lactated ringers 125 mL/hr Intravenous Continuous Ananya Swift PA-C Last Rate: 125 mL/hr (01/11/20 0125)   nitroGLYcerin 5-200 mcg/min Intravenous Titrated Ananya Swift PA-C Last Rate: 60 mcg/min (01/11/20 0633)   oxyCODONE-acetaminophen 1 tablet Oral Q4H PRN Ananya Swift PA-C    oxyCODONE-acetaminophen 2 tablet Oral Q6H PRN Ananya Swift PA-C    phenol 1 spray Mouth/Throat Q2H PRN Liana Helm PA-C    potassium chloride 20 mEq Oral Once BLAS Nair      Continuous Infusions:    DOPamine in dextrose 5% 1-20 mcg/kg/min Last Rate: Stopped (01/10/20 1920)   lactated ringers 125 mL/hr Last Rate: 125 mL/hr (01/11/20 0125)   nitroGLYcerin 5-200 mcg/min Last Rate: 60 mcg/min (01/11/20 0634)     PRN Meds:     acetaminophen 650 mg Q6H PRN   oxyCODONE-acetaminophen 1 tablet Q4H PRN   oxyCODONE-acetaminophen 2 tablet Q6H PRN   phenol 1 spray Q2H PRN       ---------------------------------------------------------------------------------------  Advance Directive and Living Will:      Power of :    POLST:    ---------------------------------------------------------------------------------------  Counseling / Coordination of Care  Total time spent today 25 minutes  Greater than 50% of total time was spent with the patient and / or family counseling and / or coordination of care  A description of the counseling / coordination of care: Plan of care    BLAS Nair      Portions of the record may have been created with voice recognition software  Occasional wrong word or "sound a like" substitutions may have occurred due to the inherent limitations of voice recognition software    Read the chart carefully and recognize, using context, where substitutions have occurred

## 2020-01-12 VITALS
OXYGEN SATURATION: 96 % | SYSTOLIC BLOOD PRESSURE: 151 MMHG | DIASTOLIC BLOOD PRESSURE: 69 MMHG | BODY MASS INDEX: 33.25 KG/M2 | WEIGHT: 224.5 LBS | HEIGHT: 69 IN | TEMPERATURE: 98.2 F | HEART RATE: 74 BPM | RESPIRATION RATE: 14 BRPM

## 2020-01-12 PROBLEM — R33.9 URINARY RETENTION: Status: RESOLVED | Noted: 2020-01-11 | Resolved: 2020-01-12

## 2020-01-12 LAB — GLUCOSE SERPL-MCNC: 120 MG/DL (ref 65–140)

## 2020-01-12 PROCEDURE — 99024 POSTOP FOLLOW-UP VISIT: CPT | Performed by: NURSE PRACTITIONER

## 2020-01-12 PROCEDURE — 82948 REAGENT STRIP/BLOOD GLUCOSE: CPT

## 2020-01-12 PROCEDURE — 99232 SBSQ HOSP IP/OBS MODERATE 35: CPT | Performed by: NURSE PRACTITIONER

## 2020-01-12 RX ORDER — LISINOPRIL 10 MG/1
10 TABLET ORAL DAILY
Qty: 30 TABLET | Refills: 0 | Status: SHIPPED | OUTPATIENT
Start: 2020-01-13 | End: 2020-02-06

## 2020-01-12 RX ORDER — LISINOPRIL 10 MG/1
10 TABLET ORAL DAILY
Status: DISCONTINUED | OUTPATIENT
Start: 2020-01-12 | End: 2020-01-12 | Stop reason: HOSPADM

## 2020-01-12 RX ORDER — ACETAMINOPHEN 325 MG/1
650 TABLET ORAL EVERY 6 HOURS PRN
Qty: 30 TABLET | Refills: 0 | Status: SHIPPED | OUTPATIENT
Start: 2020-01-12 | End: 2020-04-06

## 2020-01-12 RX ORDER — POLYETHYLENE GLYCOL 3350 17 G/17G
17 POWDER, FOR SOLUTION ORAL ONCE
Status: COMPLETED | OUTPATIENT
Start: 2020-01-12 | End: 2020-01-12

## 2020-01-12 RX ADMIN — LISINOPRIL 10 MG: 10 TABLET ORAL at 08:10

## 2020-01-12 RX ADMIN — CLOPIDOGREL BISULFATE 75 MG: 75 TABLET ORAL at 08:09

## 2020-01-12 RX ADMIN — ASPIRIN 81 MG: 81 TABLET, COATED ORAL at 08:10

## 2020-01-12 RX ADMIN — FLUTICASONE PROPIONATE 1 SPRAY: 50 SPRAY, METERED NASAL at 08:10

## 2020-01-12 RX ADMIN — TRIAMTERENE AND HYDROCHLOROTHIAZIDE 2 TABLET: 37.5; 25 TABLET ORAL at 08:10

## 2020-01-12 RX ADMIN — POLYETHYLENE GLYCOL 3350 17 G: 17 POWDER, FOR SOLUTION ORAL at 11:21

## 2020-01-12 RX ADMIN — HEPARIN SODIUM 5000 UNITS: 5000 INJECTION INTRAVENOUS; SUBCUTANEOUS at 05:44

## 2020-01-12 NOTE — DISCHARGE SUMMARY
Discharge Summary   Natalie Duty  76 y o  male MRN: [id number]  Unit/Bed#: -01 Encounter: [id number]    Admission Date: 1/10/2020     Discharge Date:01/12/20    Attending:Jay Sheryle Overman, MD     Consultants:  Critical care and SLIM for medical management    Admitting Diagnosis: Stenosis of right carotid artery [I65 21]    Principle/ Secondary Diagnosis:  Past Medical History:   Diagnosis Date    Anemia     iron def    GERD (gastroesophageal reflux disease)     Gout     Hypertension     Insomnia     Kidney stone      Past Surgical History:   Procedure Laterality Date    ANKLE SURGERY Right     COLONOSCOPY  01/25/2013    polypectomy; complete - 3 yrs d/t polyp - benign submucosal lipoma- path c/w lipoma    COLONOSCOPY  04/25/2017    complete - tubular adenoma - repeat 5yrs    CYSTOSCOPY      CYSTOTOMY      bladder  w/ basket extraction of calculus    FEMUR FRACTURE SURGERY      HERNIA REPAIR      inguinal ; sliding    INGUINAL HERNIA REPAIR Right     unilateral    KNEE ARTHROSCOPY Right     w/medial menisectomy    LASIK      corneal    LITHOTRIPSY      renal    NH COLONOSCOPY FLX DX W/COLLJ SPEC WHEN PFRMD N/A 4/25/2017    Procedure: SCREENING COLONOSCOPY;  Surgeon: Garfield Aviles MD;  Location:  MAIN OR;  Service: General    ROTATOR CUFF REPAIR Bilateral     TONSILLECTOMY         Procedures Performed: No orders of the defined types were placed in this encounter      NH THROMBOENDARTECTMY NECK,NECK INCIS [[id number]] (ENDARTERECTOMY ARTERY CAROTID)  ENDARTERECTOMY ARTERY CAROTID (Right Neck)  Procedure(s):  ENDARTERECTOMY ARTERY CAROTID    Laboratory data at discharge:   Results from last 7 days   Lab Units 01/11/20  0446 01/10/20  1717   WBC Thousand/uL 8 87 9 21   HEMOGLOBIN g/dL 11 7* 12 0   HEMATOCRIT % 36 1* 36 9   PLATELETS Thousands/uL 214 220     Results from last 7 days   Lab Units 01/11/20  0446 01/10/20  1717   POTASSIUM mmol/L 3 7 3 8   CHLORIDE mmol/L 104 106   CO2 mmol/L 30 30 BUN mg/dL 15 19   CREATININE mg/dL 1 02 1 15   CALCIUM mg/dL 8 5 8 5     Results from last 7 days   Lab Units 01/11/20  0446   INR  1 21*   PTT seconds 32           Discharge instructions/Information to patient and family:   See after visit summary for information provided to patient and family  Discharge Medications:  See after visit summary for reconciled discharge medications provided to patient and family  Hospital Course:   40-year-old male with hypertension, diabetes who recently had a small right hemispheric stroke secondary to a high-grade right carotid artery stenosis on 01/03  He was recommended for a right carotid endarterectomy  He was discharged recently with a planned readmission  He underwent a right carotid endarterectomy with bovine patch angioplasty by Dr Gonzalez Monreal 1/10  He tolerated surgery well  Refer to operative report for specific details  He remained neurologically intact  He was observed in PACU and then ICU overnight  He was hypertensive with systolic blood pressure [phone]  He required a nitroglycerin drip for blood pressure control  On postoperative day 1 he continued to be hypertensive  On postoperative day 2 he was able to be weaned from nitro drip  His Zestril was increased to 10 mg  He was deemed stable for discharge  He will continue aspirin, Plavix and Lipitor  He will follow up at the vascular Center in [facility] 1/20/20 at 2:30 a m  with Dr Gonzalez Monreal  He will also need to follow-up with his PCP in 1 week for a blood pressure check  He was given specific carotid instructions and if he has any issues he will notify the vascular office  Hospital course was complicated by the following: HTN      Prior to discharge, the patient was given instructions for outpatient care and follow-up  The patient has been instructed to call w/ any questions, changes, or concerns for the medical condition      For further details of the hospitalization, please refer to the medical record  Condition at Discharge: stable     Provisions for Follow-Up Care:  See after visit summary for information related to follow-up care and any pertinent home health orders  Disposition: Home    Planned Readmission: No    Discharge Statement   I spent 30 minutes discharging the patient  This time was spent on the day of discharge  I had direct contact with the patient on the day of discharge  Additional documentation is required if more than 30 minutes were spent on discharge  BLAS Batista  1/12/2020      This text is generated with voice recognition software  There may be translation, syntax,  or grammatical errors  If you have any questions, please contact the dictating provider

## 2020-01-12 NOTE — ASSESSMENT & PLAN NOTE
· Postop day 2 status post right CEA  · Vascular primary  · Increase home lisinopril to 10mg given bp 140- isolated 170, continue home maxzide dosing  · outpt f/u PCP this week for bp check

## 2020-01-12 NOTE — PROGRESS NOTES
Progress Note - Omari Gonzalez  1945, 76 y o  male MRN: [id number]    Unit/Bed#: -01 Encounter: [id number]    Primary Care Provider: Sherine Naqvi DO   Date and time admitted to hospital: 1/10/2020  6:53 AM        * S/P carotid endarterectomy  Assessment & Plan  · Postop day 2 status post right CEA  · Vascular primary  · Increase home lisinopril to 10mg given bp 140- isolated 170, continue home maxzide dosing  · outpt f/u PCP this week for bp check       Cerebrovascular accident (CVA) ([address] )  Assessment & Plan  · 1/3 MRI- Right parietal lobe small cortical areas of acute ischemia  · Continue aspirin/Plavix/statin  · A1c was noted to be 6 6 in the setting of acute CVA/carotid endarterectomy consider oral anti diabetic medications  Plan last admission was dietary modification/outpatient follow-up with PCP    Hypertension  Assessment & Plan  · Echo with an EF of 89% grade 1 diastolic dysfunction  · Resume oral antihypertensives as above    Dyslipidemia  Assessment & Plan  · Continue home statin    Impaired fasting glucose  Assessment & Plan  · A1c 6 6  · treatment as above        VTE Pharmacologic Prophylaxis:   Pharmacologic: Heparin  Mechanical VTE Prophylaxis in Place: Yes    Patient Centered Rounds: I have performed bedside rounds with nursing staff today  Discussions with Specialists or Other Care Team Provider: will discuss d/c with vascular    Education and Discussions with Family / Patient: patient    Time Spent for Care: 15 minutes  More than 50% of total time spent on counseling and coordination of care as described above  Current Length of Stay: 2 day(s)    Current Patient Status: Inpatient   Certification Statement: likely d/c if ok with vasc    Discharge Plan: home    Code Status: Level 1 - Full Code      Subjective:   Anxious to go home   Denies c/o    Objective:     Vitals:   Temp (24hrs), Av 6 °F (37 °C), Min:98 1 °F (36 7 °C), Max:99 8 °F (37 7 °C)    Temp:  [98 1 °F (36 7 °C)-99 8 °F (37 7 °C)] 98 2 °F (36 8 °C)  HR:  [] 74  Resp:  [14-22] 14  BP: (125-175)/(60-85) 151/69  SpO2:  [92 %-99 %] 96 %  Body mass index is 33 15 kg/m²  Input and Output Summary (last 24 hours): Intake/Output Summary (Last 24 hours) at 1/12/2020 0754  Last data filed at 1/12/2020 0401  Gross per 24 hour   Intake 1308 37 ml   Output 2685 ml   Net -1376 63 ml       Physical Exam:     Physical Exam   Constitutional: He is oriented to person, place, and time  He appears well-nourished  HENT:   Head: Normocephalic and atraumatic  Eyes: Pupils are equal, round, and reactive to light  Conjunctivae are normal  No scleral icterus  Neck: Neck supple  No tracheal deviation present  Cardiovascular: Normal rate, regular rhythm, normal heart sounds and intact distal pulses  Exam reveals no gallop and no friction rub  No murmur heard  Pulmonary/Chest: Effort normal and breath sounds normal  No respiratory distress  He has no wheezes  He has no rales  Abdominal: Soft  Bowel sounds are normal  He exhibits no distension  There is no tenderness  Musculoskeletal: Normal range of motion  He exhibits no edema, tenderness or deformity  Neurological: He is alert and oriented to person, place, and time  A cranial nerve deficit is present  Left CASEY abnormal   Skin: Skin is warm and dry  No rash noted  No erythema  No pallor  R CEA dsg cdi   Psychiatric: He has a normal mood and affect   His behavior is normal          Additional Data:     Labs:    Results from last 7 days   Lab Units 01/11/20  0446 01/10/20  1717   WBC Thousand/uL 8 87 9 21   HEMOGLOBIN g/dL 11 7* 12 0   HEMATOCRIT % 36 1* 36 9   PLATELETS Thousands/uL 214 220   NEUTROS PCT %  --  69   LYMPHS PCT %  --  19   MONOS PCT %  --  8   EOS PCT %  --  4     Results from last 7 days   Lab Units 01/11/20  0446   SODIUM mmol/L 141   POTASSIUM mmol/L 3 7   CHLORIDE mmol/L 104   CO2 mmol/L 30   BUN mg/dL 15   CREATININE mg/dL 1 02   ANION GAP mmol/L 7   CALCIUM mg/dL 8 5   GLUCOSE RANDOM mg/dL 166*     Results from last 7 days   Lab Units 01/11/20  0446   INR  1 21*     Results from last 7 days   Lab Units 01/11/20  2029 01/11/20  1634 01/10/20  1240 01/10/20  0725 01/06/20  1125 01/06/20  0713 01/05/20  2113 01/05/20  1703 01/05/20  1103   POC GLUCOSE mg/dl 98 113 120 123 177* 139 133 98 169*                   * I Have Reviewed All Lab Data Listed Above  * Additional Pertinent Lab Tests Reviewed: [facility] Admission Reviewed    Imaging:    Imaging Reports Reviewed Today Include: na  Imaging Personally Reviewed by Myself Includes:  na    Recent Cultures (last 7 days):           Last 24 Hours Medication List:     Current Facility-Administered Medications:  acetaminophen 650 mg Oral Q6H PRN Ananya Swift PA-C   aspirin 81 mg Oral Daily Ananya Swift PA-C   atorvastatin 40 mg Oral QPM BLAS Pope   clopidogrel 75 mg Oral Daily Ananya Swift PA-C   fluticasone 1 spray Each Nare BID Liana Helm PA-C   heparin (porcine) 5,000 Units Subcutaneous Q8H [facility] longterm Ananya Swift PA-C   insulin lispro 1-5 Units Subcutaneous HS BLAS Pope   insulin lispro 1-6 Units Subcutaneous TID AC BLAS Pope   lisinopril 10 mg Oral Daily BLAS Pope   loratadine 10 mg Oral Daily BLAS Pope   oxyCODONE-acetaminophen 1 tablet Oral Q4H PRN Ananya Swift PA-C   oxyCODONE-acetaminophen 2 tablet Oral Q6H PRN Ananya Swift PA-C   phenol 1 spray Mouth/Throat Q2H PRN Liana Helm PA-C   triamterene-hydrochlorothiazide 2 tablet Oral Daily BLAS Mead        Today, Patient Was Seen By: BLAS Mead    ** Please Note: Dictation voice to text software may have been used in the creation of this document   **

## 2020-01-12 NOTE — ASSESSMENT & PLAN NOTE
75 yo M w/ DM, HTN, recent right hemispheric stroke secondary to high-grade right carotid stenosis now status post right carotid endarterectomy by Dr Stuart Peterson 1/10    Exam : R neck incision intact; mild soft swelling, minimal ecchymosis     Plan:  -Right neck incision intact  Neurologically intact   -Postoperatively has had a elevated blood pressures  Weaned off nitro gtt  Zestril increased 10mg daily   BP better controlled   -ASA + Plavix   -Continue Lipitor   -Discharge home  -Follow up as an outpatient w/ Dr Stuart Peterson   -Follow up with PCP in 1 week for BP check

## 2020-01-12 NOTE — DISCHARGE INSTRUCTIONS
DISCHARGE INSTRUCTIONS   CAROTID ENDARTERECTOMY    Following discharge from the hospital, you may have some questions about your operation, your activities or your general condition  These instructions may answer some of your questions and help you adjust during the first few weeks following your operation  ACTIVITY: Resume your normal activities and exercise schedule as tolerated  If you were driving prior to surgery, you may resume driving one week following discharge from the hospital  You may ride in a car upon discharge  DIET: Resume your normal diet  Try to eat low fat and low cholesterol foods  RECURRENT SYMPTOMS: If you develop any new numbness, weakness, blindness or difficulty with speech after discharge call 911 or go to an emergency department immediately  INCISION: Your surgeon may have chosen to use a type of adhesive glue to close your incision  The glue is used to cover the incision, assist in closure, and prevent contamination  This adhesive will darken and peel away on its own within one to two weeks  You may shower the day after your surgery, but do not scrub the incision  If you do not have this adhesive glue, you may include the operated area in a shower on the third day following surgery  It is normal to have swelling or discoloration around the incision  If increasing redness or pain develops, call our office immediately  Numbness in the region of the incision may occur following the surgery  This normally resolves in six to twelve months  FOLLOW UP STUDIES: Doppler ultrasound studies are very important to your post-operative care  Your surgeon will arrange them at your first postoperative visit  The first study is due 3 months after surgery  [phone] [facility] FREE [phone]  [address] [place] [address] , [address], [place], [facility]  [address] [facility] [address], [address] [place] [address], 210 [facility]  [address]   [address] [facility], [place]  [address] [facility] Juliano Corporal, [address] [facility] [facility] [address]    [address], [address]  Floor, Brennon [address]  [address], [address] [facility], [address] [address]   [address] [facility], [address], [facility] [place], 9[address] [facility]  One [facility], 532 [facility], [facility], [address], Avril [address]

## 2020-01-12 NOTE — PROGRESS NOTES
Discharge instructions taught to pt  Pt verbalized understanding  Pt stable upon discharge with brother as ride home  Pt ambulatory upon discharge without complaints

## 2020-01-12 NOTE — NURSING NOTE
Pt's SpO2 noted to be dropping into the 70-80s while sleeping  2L NC applied and O2 now in the 90s while asleep    WCTM

## 2020-01-13 ENCOUNTER — TRANSITIONAL CARE MANAGEMENT (OUTPATIENT)
Dept: FAMILY MEDICINE CLINIC | Facility: HOSPITAL | Age: 75
End: 2020-01-13

## 2020-01-13 ENCOUNTER — TELEPHONE (OUTPATIENT)
Dept: VASCULAR SURGERY | Facility: CLINIC | Age: 75
End: 2020-01-13

## 2020-01-13 PROCEDURE — NC001 PR NO CHARGE: Performed by: SURGERY

## 2020-01-13 NOTE — TELEPHONE ENCOUNTER
Spoke with patient and relayed message regarding lisinopril and to schedule appt with PCP this week  Patient also asked about taking ASA  Per discharge summary, patient was advised to continue ASA 81 mg and Plavix 75 mg  Patient verbalized understanding and agrees to plan

## 2020-01-13 NOTE — TELEPHONE ENCOUNTER
BLAS Parnell sent to [facility]             I discharged this patient over the weekend -- his Lisinopril went from 5mg to 10mg  Please call patient and reinforce with him that he can take two tabs of his current medication until finished and then  the new script  He was told to make an appt with PCP for BP check this week        Left patient message to call office

## 2020-01-14 ENCOUNTER — TELEPHONE (OUTPATIENT)
Dept: VASCULAR SURGERY | Facility: CLINIC | Age: 75
End: 2020-01-14

## 2020-01-14 ENCOUNTER — TELEPHONE (OUTPATIENT)
Dept: FAMILY MEDICINE CLINIC | Facility: HOSPITAL | Age: 75
End: 2020-01-14

## 2020-01-14 ENCOUNTER — OFFICE VISIT (OUTPATIENT)
Dept: FAMILY MEDICINE CLINIC | Facility: HOSPITAL | Age: 75
End: 2020-01-14
Payer: COMMERCIAL

## 2020-01-14 VITALS
TEMPERATURE: 98 F | SYSTOLIC BLOOD PRESSURE: 126 MMHG | WEIGHT: 225 LBS | HEIGHT: 69 IN | HEART RATE: 71 BPM | DIASTOLIC BLOOD PRESSURE: 82 MMHG | BODY MASS INDEX: 33.33 KG/M2

## 2020-01-14 DIAGNOSIS — Z09 HOSPITAL DISCHARGE FOLLOW-UP: Primary | ICD-10-CM

## 2020-01-14 DIAGNOSIS — Z98.890 S/P CAROTID ENDARTERECTOMY: ICD-10-CM

## 2020-01-14 DIAGNOSIS — R73.01 IMPAIRED FASTING GLUCOSE: ICD-10-CM

## 2020-01-14 DIAGNOSIS — I65.21 INTERNAL CAROTID ARTERY STENOSIS, RIGHT: Chronic | ICD-10-CM

## 2020-01-14 DIAGNOSIS — E78.5 DYSLIPIDEMIA: Chronic | ICD-10-CM

## 2020-01-14 DIAGNOSIS — E66.9 OBESITY (BMI 30.0-34.9): ICD-10-CM

## 2020-01-14 DIAGNOSIS — I10 ESSENTIAL HYPERTENSION: Chronic | ICD-10-CM

## 2020-01-14 PROCEDURE — 99215 OFFICE O/P EST HI 40 MIN: CPT | Performed by: INTERNAL MEDICINE

## 2020-01-14 PROCEDURE — 3074F SYST BP LT 130 MM HG: CPT | Performed by: INTERNAL MEDICINE

## 2020-01-14 PROCEDURE — 1111F DSCHRG MED/CURRENT MED MERGE: CPT | Performed by: INTERNAL MEDICINE

## 2020-01-14 PROCEDURE — 3079F DIAST BP 80-89 MM HG: CPT | Performed by: INTERNAL MEDICINE

## 2020-01-14 NOTE — ASSESSMENT & PLAN NOTE
Diet/exercise/wgt loss encouraged, pt asking specifics about diet, mediterranean diet and DASH diet reviewed, number for nutrition given

## 2020-01-14 NOTE — ASSESSMENT & PLAN NOTE
Had 1 A1C at 6 6 but all other BS and A1C in IFG range - will recheck BW in 6 mos - if still in DM range will officially be diabetic, diet/exercise/wgt loss encouraged

## 2020-01-14 NOTE — PROGRESS NOTES
Assessment/Plan:    Internal carotid artery stenosis, right  S/p CVA, s/p R carotid endarterectomy, on ASA/Plavix/statin, no current stroke/TIA symptoms, has f/u with vascular next week, call with any CV/Neuro symptoms    S/P carotid endarterectomy  Had some elvated BP's after procedure and Zestril was increased, on ASA/Plavix/statin, has f/u with vascular next week, call with any neuro or CV symptoms    Hypertension  BP elevated while IP and Zestril was increased, BP great today, con't current meds, pt is going to get BP cuff and start checking it at home - proper technique reviewed, call with readings consistently > 140/90, diet/exercise/wgt loss encouraged    Impaired fasting glucose  Had 1 A1C at 6 6 but all other BS and A1C in IFG range - will recheck BW in 6 mos - if still in DM range will officially be diabetic, diet/exercise/wgt loss encouraged    Dyslipidemia  Now on statin, FLP done 12/19, con't current regimen, recheck FLP in 1 yr, diet/exercise/wgt loss encouraged    Obesity (BMI 30 0-34  9)  Diet/exercise/wgt loss encouraged, pt asking specifics about diet, mediterranean diet and DASH diet reviewed, number for nutrition given       Diagnoses and all orders for this visit:    Hospital discharge follow-up    Internal carotid artery stenosis, right  -     Ambulatory referral to Nutrition Services; Future    S/P carotid endarterectomy    Essential hypertension  -     Ambulatory referral to Nutrition Services; Future    Impaired fasting glucose  -     Ambulatory referral to Nutrition Services; Future    Dyslipidemia  -     Ambulatory referral to Nutrition Services; Future    Obesity (BMI 30 0-34  9)      BW 12/19    Colonoscopy 1/13      Subjective:      Patient ID: Pj Cleveland  is a 76 y o  male  HPI Pt here for [facility]/Hospital follow up  Pt was [facility] to [facility] from 1/10/20 to 1/12/20  Records were reviewed by myself and events are summarized below      TCM Call (since 12/14/2019)     Date and time call was made  1/13/2020  2:58 PM    Hospital care reviewed  Records reviewed    Patient was hospitialized at  [facility]    Date of Admission  01/10/20    Date of discharge  01/12/20    Diagnosis  Surgery    Disposition  Home    Were the patients medications reviewed and updated  No    Current Symptoms  None      TCM Call (since 12/14/2019)     Post hospital issues  None    Should patient be enrolled in anticoag monitoring? No    Scheduled for follow up? Yes    Not clinically warranted  Patient being readmitted for surgery 1/10/20    Patients specialists  Other (comment)    Other specialists names  vascular    Did you obtain your prescribed medications  Yes    Do you need help managing your prescriptions or medications  No    Is transportation to your appointment needed  No    Specify why  Seymour Ahn MA    I have advised the patient to call PCP with any new or worsening symptoms  Seymour Ahn MA        Pt was admitted to [address] [place] [address] on 1/10/20 for scheduled R carotid endarterectomy  He underwent the procedure as planned and no complications were noted  He did have some elevated BP in the postop period that required tx with a nitro gtt  His Zestril was increased to 10 mg and he was able to be weaned off the nitro gtt  He had no neuro events after the procedure  He was discharged home on 1/12/20  Med list was reviewed  Pt has been doing well since returning home  He is taking his meds as directed  BP at goal today and meds were reviewed and med list is UTD  He denies missing doses of meds or SE with the meds  He does not check his BP outside the office  He notes no frequent Ha's/dizziness/double vision/CP/stroke/TIA symptoms  He is asking about what type of diet he should be on  He has f/u with vascular next Monday  He is asking specifics about what type of diet he should be on  BMI Counseling: Body mass index is 33 23 kg/m²   The BMI is above normal  Nutrition recommendations include decreasing portion sizes, encouraging healthy choices of fruits and vegetables, consuming healthier snacks, moderation in carbohydrate intake and increasing intake of lean protein  Exercise recommendations include moderate physical activity 150 minutes/week and exercising 3-5 times per week  No pharmacotherapy was ordered  Patient referred to nutritionist due to patient being overweight  BW 12/19    Colonoscopy 1/13    Review of Systems   Constitutional: Negative for chills and fever  HENT: Negative for congestion and sinus pain  Eyes: Negative for pain and visual disturbance  Respiratory: Negative for cough and shortness of breath  Cardiovascular: Negative for chest pain, palpitations and leg swelling  Gastrointestinal: Negative for abdominal pain, blood in stool, constipation, diarrhea, nausea and vomiting  Endocrine: Negative for polydipsia and polyuria  Genitourinary: Negative for difficulty urinating and dysuria  Musculoskeletal: Positive for back pain  Negative for arthralgias and myalgias  Skin: Positive for wound  Negative for rash  R medial malleoli ulcer that is not resolving   Neurological: Negative for dizziness, weakness, numbness and headaches  Hematological: Does not bruise/bleed easily  Psychiatric/Behavioral: Negative for behavioral problems and confusion  Objective:    /82   Pulse 71   Temp 98 °F (36 7 °C)   Ht 5' 9" (1 753 m)   Wt 102 kg (225 lb)   BMI 33 23 kg/m²      Physical Exam   Constitutional: He appears well-developed and well-nourished  No distress  HENT:   Head: Normocephalic and atraumatic  Mouth/Throat: No oropharyngeal exudate  Eyes: Conjunctivae are normal  Right eye exhibits no discharge  Left eye exhibits no discharge  Neck: Neck supple  No tracheal deviation present  Cardiovascular: Normal rate and regular rhythm  No murmur heard  Pulmonary/Chest: Effort normal and breath sounds normal  No respiratory distress  He has no wheezes  He has no rales  Abdominal: Soft  He exhibits no distension  There is no tenderness  There is no rebound and no guarding  Musculoskeletal: He exhibits no deformity  Neurological: He is alert  He exhibits normal muscle tone  Skin: Skin is warm and dry  No rash noted  Scabbed ulceration R medial malleoli, no drainage, surrounding erythema, tenderness noted   Psychiatric: He has a normal mood and affect  His behavior is normal    Nursing note and vitals reviewed

## 2020-01-14 NOTE — ASSESSMENT & PLAN NOTE
S/p CVA, s/p R carotid endarterectomy, on ASA/Plavix/statin, no current stroke/TIA symptoms, has f/u with vascular next week, call with any CV/Neuro symptoms

## 2020-01-14 NOTE — TELEPHONE ENCOUNTER
Yes Tylenol is fine can take 2 tabs every 6 hrs as needed and can use warm compresses on back of neck

## 2020-01-14 NOTE — TELEPHONE ENCOUNTER
Information and warnings  Appointment selection checkbox  Date Time [place] Providers Departments Visit Type                    [x] 1/20/20 2:30 PM 15 Lamont Mcgregor MD [[id number]] VAS QU [[id number]] POST OP [[id number]]     called pt to amos perez pt needs to be rs to 1/28- left message

## 2020-01-14 NOTE — UTILIZATION REVIEW
Notification of Discharge  This is a Notification of Discharge from our facility [address]  Please be advised that this patient has been discharge from our facility  Below you will find the admission and discharge date and time including the patients disposition  PRESENTATION DATE: 1/10/2020  6:53 AM  OBS ADMISSION DATE:   IP ADMISSION DATE: 1/10/20 1410   DISCHARGE DATE: 1/12/2020 12:00 PM  DISPOSITION: Home/Self Care Home/Self Care   Admission Orders listed below:  Admission Orders (From admission, onward)     Ordered        01/10/20 1410  Inpatient Admission  Once                   Please contact the UR Department if additional information is required to close this patient's authorization/case  [facility] Utilization Review Department  Main: [phone] x carefully listen to the prompts  All voicemails are confidential   [email or web address]  Send all requests for admission clinical reviews, approved or denied determinations and any other requests to dedicated fax number below belonging to the campus where the patient is receiving treatment   List of dedicated fax numbers:  [address] DENIALS (Administrative/Medical Necessity) [phone]   [address] (Maternity/NICU/Pediatrics) [phone]   [facility] [phone]   Sugey Polk [phone]   [email or web address] [phone]   Charissa Dave [address] [phone]   [facility]  [phone]   [address] [facility]  24[address] [facility] [address] Health system [phone]

## 2020-01-14 NOTE — ASSESSMENT & PLAN NOTE
Now on statin, FLP done 12/19, con't current regimen, recheck FLP in 1 yr, diet/exercise/wgt loss encouraged

## 2020-01-14 NOTE — ASSESSMENT & PLAN NOTE
BP elevated while IP and Zestril was increased, BP great today, con't current meds, pt is going to get BP cuff and start checking it at home - proper technique reviewed, call with readings consistently > 140/90, diet/exercise/wgt loss encouraged

## 2020-01-14 NOTE — ASSESSMENT & PLAN NOTE
Had some elvated BP's after procedure and Zestril was increased, on ASA/Plavix/statin, has f/u with vascular next week, call with any neuro or CV symptoms

## 2020-01-15 ENCOUNTER — APPOINTMENT (OUTPATIENT)
Dept: LAB | Facility: HOSPITAL | Age: 75
End: 2020-01-15
Attending: UROLOGY
Payer: COMMERCIAL

## 2020-01-15 DIAGNOSIS — R97.20 ELEVATED PSA: ICD-10-CM

## 2020-01-15 LAB — PSA SERPL-MCNC: 1.9 NG/ML (ref 0–4)

## 2020-01-15 PROCEDURE — 84153 ASSAY OF PSA TOTAL: CPT

## 2020-01-17 ENCOUNTER — TELEPHONE (OUTPATIENT)
Dept: VASCULAR SURGERY | Facility: CLINIC | Age: 75
End: 2020-01-17

## 2020-01-17 NOTE — TELEPHONE ENCOUNTER
Patient called and wanted to return to work  I spoke with Rissa Polanco NP and she said if patient can move neck all the way to the right and then to the left  withour pain, he can go to work   Patient said he can do all that and he is fine

## 2020-01-22 ENCOUNTER — OFFICE VISIT (OUTPATIENT)
Dept: UROLOGY | Facility: HOSPITAL | Age: 75
End: 2020-01-22
Payer: COMMERCIAL

## 2020-01-22 VITALS
HEIGHT: 69 IN | WEIGHT: 225 LBS | SYSTOLIC BLOOD PRESSURE: 138 MMHG | HEART RATE: 72 BPM | DIASTOLIC BLOOD PRESSURE: 82 MMHG | BODY MASS INDEX: 33.33 KG/M2

## 2020-01-22 DIAGNOSIS — R97.20 ELEVATED PSA: Primary | ICD-10-CM

## 2020-01-22 PROCEDURE — 99213 OFFICE O/P EST LOW 20 MIN: CPT | Performed by: NURSE PRACTITIONER

## 2020-01-22 PROCEDURE — 1111F DSCHRG MED/CURRENT MED MERGE: CPT | Performed by: NURSE PRACTITIONER

## 2020-01-22 PROCEDURE — 1160F RVW MEDS BY RX/DR IN RCRD: CPT | Performed by: NURSE PRACTITIONER

## 2020-01-22 NOTE — PROGRESS NOTES
1/22/2020    Rhoda Buenrostro   1945  [id number]      Assessment  -Elevated PSA    Discussion/Plan  Jammie Brooks is a 76 y o  male being managed by Dr Gilford Downs      -We reviewed results of his recent PSA which is 1 9, previously 4 1 (6/11/19)  Reassured patient that his PSA has returned below his baseline  I do not feel he requires any additional testing given his advanced age  Routine prostate cancer screening can be discontinued following AUA guidelines  He is otherwise doing well without any urinary complaints  Patient will follow up with our office on as-needed basis  He was instructed to call with any issues  -All questions answered, patient agrees with plan      History of Present Illness  76 y o  male with a history of elevated PSA presents today for follow up  Patient last seen in office in July 2019  His PSA at that time was 4 1 (6/11/19)  Previous PSA was also 4 1 (12/11/18), 3 1 (11/17/17), and 2 2 (10/10/16)  He is never received a prostate biopsy  Patient denies any strong family history of prostate cancer  He denies any lower urinary tract symptoms, gross hematuria, or dysuria  Patient recently underwent right carotid endarterectomy on 01/10/2020  Review of Systems  Review of Systems   Constitutional: Negative  HENT: Negative  Respiratory: Negative  Cardiovascular: Negative  Gastrointestinal: Negative  Genitourinary: Negative for decreased urine volume, difficulty urinating, dysuria, flank pain, frequency, hematuria and urgency  Musculoskeletal: Negative  Skin: Negative  Neurological: Negative  Psychiatric/Behavioral: Negative          Past Medical History  Past Medical History:   Diagnosis Date    Anemia     iron def    GERD (gastroesophageal reflux disease)     Gout     Hypertension     Insomnia     Kidney stone        Past Social History  Past Surgical History:   Procedure Laterality Date    ANKLE SURGERY Right     COLONOSCOPY  01/25/2013 polypectomy; complete - 3 yrs d/t polyp - benign submucosal lipoma- path c/w lipoma    COLONOSCOPY  2017    complete - tubular adenoma - repeat 5yrs    CYSTOSCOPY      CYSTOTOMY      bladder  w/ basket extraction of calculus    FEMUR FRACTURE SURGERY      HERNIA REPAIR      inguinal ; sliding    INGUINAL HERNIA REPAIR Right     unilateral    KNEE ARTHROSCOPY Right     w/medial menisectomy    LASIK      corneal    LITHOTRIPSY      renal    [place] COLONOSCOPY FLX DX W/COLLJ SPEC WHEN PFRMD N/A 2017    Procedure: SCREENING COLONOSCOPY;  Surgeon: Krish Cobos MD;  Location: [facility];  Service: General    DC THROMBOENDARTECTMY Padmini Arias Right 1/10/2020    Procedure: ENDARTERECTOMY ARTERY CAROTID;  Surgeon: Geronimo Harding MD;  Location:  MAIN OR;  Service: Vascular    ROTATOR CUFF REPAIR Bilateral     TONSILLECTOMY         Past Family History  Family History   Problem Relation Age of Onset    Alzheimer's disease Mother     Alzheimer's disease Father     Heart disease Father         CAD    Other Father         prediabetes    Diabetes Father     Breast cancer Other        Past Social history  Social History     Socioeconomic History    Marital status:       Spouse name: Not on file    Number of children: Not on file    Years of education: Not on file    Highest education level: Not on file   Occupational History     Comment: working full time   Social Needs    Financial resource strain: Not on file    Food insecurity:     Worry: Not on file     Inability: Not on file    Transportation needs:     Medical: Not on file     Non-medical: Not on file   Tobacco Use    Smoking status: Former Smoker     Packs/day: 0 50     Years: 22 00     Pack years: 11 00     Last attempt to quit:      Years since quittin 0    Smokeless tobacco: Never Used   Substance and Sexual Activity    Alcohol use: Not Currently     Comment: stopped drinking alcohol    Drug use: No    Sexual activity: Not on file   Lifestyle    Physical activity:     Days per week: Not on file     Minutes per session: Not on file    Stress: Not on file   Relationships    Social connections:     Talks on phone: Not on file     Gets together: Not on file     Attends Evangelical service: Not on file     Active member of club or organization: Not on file     Attends meetings of clubs or organizations: Not on file     Relationship status: Not on file    Intimate partner violence:     Fear of current or ex partner: Not on file     Emotionally abused: Not on file     Physically abused: Not on file     Forced sexual activity: Not on file   Other Topics Concern    Not on file   Social History Narrative    , lives alone, working full time       Current Medications  Current Outpatient Medications   Medication Sig Dispense Refill    acetaminophen (TYLENOL) 325 mg tablet Take 2 tablets (650 mg total) by mouth every 6 (six) hours as needed for mild pain 30 tablet 0    aspirin (ECOTRIN LOW STRENGTH) 81 mg EC tablet Take 81 mg by mouth daily      atorvastatin (LIPITOR) 40 mg tablet Take 1 tablet (40 mg total) by mouth every evening 30 tablet 0    calcium polycarbophil (FIBERCON) 625 mg tablet Take 625 mg by mouth daily      clopidogrel (PLAVIX) 75 mg tablet Take 1 tablet (75 mg total) by mouth daily 30 tablet 0    clotrimazole-betamethasone (LOTRISONE) 1-0 05 % cream apply sparingly to affected area twice a day 45 g 1    lisinopril (ZESTRIL) 10 mg tablet Take 1 tablet (10 mg total) by mouth daily 30 tablet 0    MITIGARE 0 6 MG CAPS take 1 capsule by mouth four times a day if needed 100 capsule 3    mometasone (NASONEX) 50 mcg/act nasal spray 2 sprays into each nostril daily      Multiple Vitamin (MULTIVITAMIN) capsule Take 1 capsule by mouth daily      SANTYL ointment       triamterene-hydrochlorothiazide (MAXZIDE) 75-50 MG per tablet take 1 tablet by mouth once daily 90 tablet 1     No current facility-administered medications for this visit  Allergies  No Known Allergies    Past Medical History, Social History, Family History, medications and allergies were reviewed  Vitals  There were no vitals filed for this visit  Physical Exam  Physical Exam   Constitutional: He is oriented to person, place, and time  He appears well-developed and well-nourished  HENT:   Head: Normocephalic  Eyes: Pupils are equal, round, and reactive to light  Neck: Normal range of motion  Cardiovascular: Normal rate and regular rhythm  Pulmonary/Chest: Effort normal    Abdominal: Soft  Normal appearance  There is no CVA tenderness  Musculoskeletal: Normal range of motion  Neurological: He is alert and oriented to person, place, and time  Skin: Skin is warm and dry  Psychiatric: He has a normal mood and affect  His behavior is normal  Judgment and thought content normal        Results    I have personally reviewed all pertinent lab results and reviewed with patient  Lab Results   Component Value Date    PSA 1 9 01/15/2020    PSA 4 1 (H) 06/11/2019    PSA 4 1 (H) 12/11/2018     Lab Results   Component Value Date    GLUCOSE 112 05/14/2015    CALCIUM 8 5 01/11/2020     05/14/2015    K 3 7 01/11/2020    CO2 30 01/11/2020     01/11/2020    BUN 15 01/11/2020    CREATININE 1 02 01/11/2020     Lab Results   Component Value Date    WBC 8 87 01/11/2020    HGB 11 7 (L) 01/11/2020    HCT 36 1 (L) 01/11/2020    MCV 88 01/11/2020     01/11/2020     No results found for this or any previous visit (from the past 1 hour(s))

## 2020-01-23 ENCOUNTER — OFFICE VISIT (OUTPATIENT)
Dept: VASCULAR SURGERY | Facility: CLINIC | Age: 75
End: 2020-01-23

## 2020-01-23 VITALS
HEIGHT: 69 IN | TEMPERATURE: 97.5 F | BODY MASS INDEX: 33.18 KG/M2 | DIASTOLIC BLOOD PRESSURE: 74 MMHG | WEIGHT: 224 LBS | HEART RATE: 60 BPM | SYSTOLIC BLOOD PRESSURE: 164 MMHG

## 2020-01-23 DIAGNOSIS — I65.21 INTERNAL CAROTID ARTERY STENOSIS, RIGHT: Primary | Chronic | ICD-10-CM

## 2020-01-23 PROCEDURE — 99024 POSTOP FOLLOW-UP VISIT: CPT | Performed by: SURGERY

## 2020-01-23 NOTE — PROGRESS NOTES
Assessment/Plan:    Internal carotid artery stenosis, right  Doing well several weeks status post right carotid endarterectomy with bovine patch angioplasty and completion duplex imaging showing wide patency of the carotid system  His deficit on the left arm is almost completely resolved he is eating and swallowing without difficulty as well as being back to work  He also has a nonhealing ulcer in the right medial malleolar area where he had surgical repair after a traumatic injury many years ago  There is no granulation tissue present some sending him to Dr Levon Cooks for podiatry evaluation and possible wound care plan  He has an easily palpable dorsalis pedis pulse on the right  Plan:  Follow-up carotid Doppler in 6 months, consult  Dr Levon Cooks         Diagnoses and all orders for this visit:    Internal carotid artery stenosis, right  -     VAS carotid complete study; Future        Subjective:      Patient ID: Jarrod Morales  is a 76 y o  male  HPI 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, underwent right carotid endarterectomy with patch and doing well with resolution of his symptoms    He also complains of a nonhealing ulcer in his right foot this was evaluated and appropriate referral to podiatry was made    The following portions of the patient's history were reviewed and updated as appropriate: allergies, current medications, past family history, past medical history, past social history, past surgical history and problem list     Review of Systems  history of right hemispheric CVA and subsequent right carotid endarterectomy, nonhealing ulcer right foot, no GI  symptoms no conscious symptoms or skin rash, all other systems    Objective:      /74 (BP Location: Right arm)   Pulse 60   Temp 97 5 °F (36 4 °C) (Tympanic)   Ht 5' 9" (1 753 m)   Wt 102 kg (224 lb)   BMI 33 08 kg/m²          Physical Exam      Right neck with minimal swelling no neurological changes tongue midline swallowing intact  Small nonhealing area in the superior aspect of the right medial malleolus no evidence of granulation tissue has an easily palpable dorsalis pedis pulse history of traumatic injury in this region    I have reviewed and made appropriate changes to the review of systems input by the medical assistant  Vitals:    01/23/20 0926 01/23/20 0928   BP: 160/90 164/74   BP Location: Left arm Right arm   Patient Position: Sitting    Pulse: 60    Temp: 97 5 °F (36 4 °C)    TempSrc: Tympanic    Weight: 102 kg (224 lb)    Height: 5' 9" (1 753 m)        Patient Active Problem List   Diagnosis    Benign colon polyp    Carpal tunnel syndrome    Dyslipidemia    Gout    Hypertension    Impaired fasting glucose    Insomnia    Osteoarthritis    Prolonged QT interval    Rhinitis    Other disorder of calcium metabolism    Elevated PSA    Obesity (BMI 30 0-34  9)    Internal carotid artery stenosis, right    Cerebrovascular accident (CVA) ([address] )    Aortic valve stenosis    S/P carotid endarterectomy       Past Surgical History:   Procedure Laterality Date    ANKLE SURGERY Right     COLONOSCOPY  01/25/2013    polypectomy; complete - 3 yrs d/t polyp - benign submucosal lipoma- path c/w lipoma    COLONOSCOPY  04/25/2017    complete - tubular adenoma - repeat 5yrs    CYSTOSCOPY      CYSTOTOMY      bladder  w/ basket extraction of calculus    FEMUR FRACTURE SURGERY      HERNIA REPAIR      inguinal ; sliding    INGUINAL HERNIA REPAIR Right     unilateral    KNEE ARTHROSCOPY Right     w/medial menisectomy    LASIK      corneal    LITHOTRIPSY      renal    [place] COLONOSCOPY FLX DX W/COLLJ SPEC WHEN PFRMD N/A 4/25/2017    Procedure: SCREENING COLONOSCOPY;  Surgeon: Yoly Enriquez MD;  Location:  MAIN OR;  Service: General    [place] THROMBOENDARTECTMY Alondra Thompson Right 1/10/2020    Procedure: ENDARTERECTOMY ARTERY CAROTID;  Surgeon: Naila Quinones MD;  Location:  MAIN OR;  Service: Vascular    ROTATOR CUFF REPAIR Bilateral     TONSILLECTOMY         Family History   Problem Relation Age of Onset    Alzheimer's disease Mother     Alzheimer's disease Father     Heart disease Father         CAD    Other Father         prediabetes    Diabetes Father     Breast cancer Other        Social History     Socioeconomic History    Marital status:       Spouse name: Not on file    Number of children: Not on file    Years of education: Not on file    Highest education level: Not on file   Occupational History     Comment: working full time   Social Needs    Financial resource strain: Not on file    Food insecurity:     Worry: Not on file     Inability: Not on file    Transportation needs:     Medical: Not on file     Non-medical: Not on file   Tobacco Use    Smoking status: Former Smoker     Packs/day: 0 50     Years: 22 00     Pack years: 11 00     Last attempt to quit:      Years since quittin 0    Smokeless tobacco: Never Used   Substance and Sexual Activity    Alcohol use: Not Currently     Comment: stopped drinking alcohol    Drug use: No    Sexual activity: Not on file   Lifestyle    Physical activity:     Days per week: Not on file     Minutes per session: Not on file    Stress: Not on file   Relationships    Social connections:     Talks on phone: Not on file     Gets together: Not on file     Attends Buddhist service: Not on file     Active member of club or organization: Not on file     Attends meetings of clubs or organizations: Not on file     Relationship status: Not on file    Intimate partner violence:     Fear of current or ex partner: Not on file     Emotionally abused: Not on file     Physically abused: Not on file     Forced sexual activity: Not on file   Other Topics Concern    Not on file   Social History Narrative    , lives alone, working full time       No Known Allergies      Current Outpatient Medications:     acetaminophen (TYLENOL) 325 mg tablet, Take 2 tablets (650 mg total) by mouth every 6 (six) hours as needed for mild pain, Disp: 30 tablet, Rfl: 0    aspirin (ECOTRIN LOW STRENGTH) 81 mg EC tablet, Take 81 mg by mouth daily, Disp: , Rfl:     atorvastatin (LIPITOR) 40 mg tablet, Take 1 tablet (40 mg total) by mouth every evening, Disp: 30 tablet, Rfl: 0    calcium polycarbophil (FIBERCON) 625 mg tablet, Take 625 mg by mouth daily, Disp: , Rfl:     clopidogrel (PLAVIX) 75 mg tablet, Take 1 tablet (75 mg total) by mouth daily, Disp: 30 tablet, Rfl: 0    clotrimazole-betamethasone (LOTRISONE) 1-0 05 % cream, apply sparingly to affected area twice a day, Disp: 45 g, Rfl: 1    lisinopril (ZESTRIL) 10 mg tablet, Take 1 tablet (10 mg total) by mouth daily, Disp: 30 tablet, Rfl: 0    MITIGARE 0 6 MG CAPS, take 1 capsule by mouth four times a day if needed, Disp: 100 capsule, Rfl: 3    mometasone (NASONEX) 50 mcg/act nasal spray, 2 sprays into each nostril daily, Disp: , Rfl:     Multiple Vitamin (MULTIVITAMIN) capsule, Take 1 capsule by mouth daily, Disp: , Rfl:     SANTYL ointment, , Disp: , Rfl:     triamterene-hydrochlorothiazide (MAXZIDE) 75-50 MG per tablet, take 1 tablet by mouth once daily, Disp: 90 tablet, Rfl: 1

## 2020-01-23 NOTE — PATIENT INSTRUCTIONS
Doing well several weeks status post right carotid endarterectomy with bovine patch angioplasty and completion duplex imaging showing wide patency of the carotid system  His deficit on the left arm is almost completely resolved he is eating and swallowing without difficulty as well as being back to work  He also has a nonhealing ulcer in the right medial malleolar area where he had surgical repair after a traumatic injury many years ago  There is no granulation tissue present some sending him to Dr Yemi Villa for podiatry evaluation and possible wound care plan  He has an easily palpable dorsalis pedis pulse on the right        Plan:  Follow-up carotid Doppler in 6 months, consult  Dr Yemi Villa

## 2020-01-23 NOTE — LETTER
January 23, 2020     Judith Lincoln, [address]  [address] [facility]  [address] [address] [address]    Patient: Christine Ferrara  YOB: 1945   Date of Visit: 1/23/2020       Dear Dr Ermias Orellana: Thank you for referring Arpan Chau to me for evaluation  Below are my notes for this consultation  If you have questions, please do not hesitate to call me  I look forward to following your patient along with you           Sincerely,        Coreen Ramey MD        CC: No Recipients

## 2020-01-23 NOTE — ASSESSMENT & PLAN NOTE
Doing well several weeks status post right carotid endarterectomy with bovine patch angioplasty and completion duplex imaging showing wide patency of the carotid system  His deficit on the left arm is almost completely resolved he is eating and swallowing without difficulty as well as being back to work  He also has a nonhealing ulcer in the right medial malleolar area where he had surgical repair after a traumatic injury many years ago  There is no granulation tissue present some sending him to Dr Jatin Callahan for podiatry evaluation and possible wound care plan  He has an easily palpable dorsalis pedis pulse on the right        Plan:  Follow-up carotid Doppler in 6 months, consult  Dr Jatin Callahan

## 2020-01-29 ENCOUNTER — TELEPHONE (OUTPATIENT)
Dept: VASCULAR SURGERY | Facility: CLINIC | Age: 75
End: 2020-01-29

## 2020-01-29 NOTE — TELEPHONE ENCOUNTER
Pt called because he bumped his neck where his incision is s/p R carotid endarterectomy 1/10  He said that it started draining clear fluid, which he said stopped but whenever he presses on the area it starts to drain a little again  Advised that he should come in for a wound check- gave to scheduling to bring in for OV

## 2020-01-29 NOTE — ASSESSMENT & PLAN NOTE
77 yo M w/ DM, HTN, recent right hemispheric stroke secondary to high-grade right carotid stenosis now status post right carotid endarterectomy by Dr Zabrina Hearn 1/10    -Patient bumped neck at incision site and it was draining clear fluid yesterday  Minimal swelling/redness at incision, small eschar, no open areas, no drainage today  Recommendations:  -Continue ASA + Plavix   -Continue Lipitor   -continue washing incision daily with soap and water, pat dry  No lotion or powder/ointments to incisions  Re-educated patient to stop touching incision  -CV duplex scheduled  -referral given for Dr Tammy Ward for foot wound  -f/u or call with questions

## 2020-01-30 ENCOUNTER — OFFICE VISIT (OUTPATIENT)
Dept: VASCULAR SURGERY | Facility: CLINIC | Age: 75
End: 2020-01-30

## 2020-01-30 VITALS
BODY MASS INDEX: 33.77 KG/M2 | DIASTOLIC BLOOD PRESSURE: 84 MMHG | SYSTOLIC BLOOD PRESSURE: 166 MMHG | WEIGHT: 228 LBS | HEIGHT: 69 IN | TEMPERATURE: 97.3 F | HEART RATE: 84 BPM

## 2020-01-30 DIAGNOSIS — I65.21 CAROTID STENOSIS, RIGHT: ICD-10-CM

## 2020-01-30 DIAGNOSIS — E78.5 DYSLIPIDEMIA: ICD-10-CM

## 2020-01-30 DIAGNOSIS — Z98.890 S/P CAROTID ENDARTERECTOMY: Primary | ICD-10-CM

## 2020-01-30 DIAGNOSIS — L97.509 ULCER OF FOOT, UNSPECIFIED LATERALITY, UNSPECIFIED ULCER STAGE (HCC): ICD-10-CM

## 2020-01-30 DIAGNOSIS — I65.21 INTERNAL CAROTID ARTERY STENOSIS, RIGHT: Chronic | ICD-10-CM

## 2020-01-30 PROCEDURE — 99024 POSTOP FOLLOW-UP VISIT: CPT | Performed by: NURSE PRACTITIONER

## 2020-01-30 NOTE — PROGRESS NOTES
Assessment/Plan:    S/P carotid endarterectomy  77 yo M w/ DM, HTN, recent right hemispheric stroke secondary to high-grade right carotid stenosis now status post right carotid endarterectomy by Dr Jordin Dolan 1/10    -Patient bumped neck at incision site and it was draining clear fluid yesterday  Minimal swelling/redness at incision, small eschar, no open areas, no drainage today  Recommendations:  -Continue ASA + Plavix   -Continue Lipitor   -continue washing incision daily with soap and water, pat dry  No lotion or powder/ointments to incisions  Re-educated patient to stop touching incision  -CV duplex scheduled  -referral given for Dr Ayden Novak for foot wound  -f/u or call with questions  Diagnoses and all orders for this visit:    S/P carotid endarterectomy  -     VAS carotid complete study; Future  -     VAS carotid complete study; Future    Internal carotid artery stenosis, right  -     VAS carotid complete study; Future  -     VAS carotid complete study; Future    Ulcer of foot, unspecified laterality, [facility]  -     Ambulatory referral to Podiatry; Future          Subjective:      Patient ID: Tashia Fields  is a 76 y o  male  Pt had a R CEA done by Dr Jordin Dolan on 1/10/20  Pt does not currently have any discharge from incision site  Pt does have some numbness under the chin on the R side  Pt denies having any fever, chills, trouble swallowing or sore throat  [place] is a 77 yo M w/ DM, HTN, recent right hemispheric stroke secondary to high-grade right carotid stenosis now status post right carotid endarterectomy by Dr Jordin Dolan 1/10/2020  He returns to the office for evaluation of right neck incision after he bumped it yesterday and experienced some clear drainage coming from incision  He has no F/C, CP, SOB  He denies any pain to the neck incision and no stroke-like symptoms  He has some continued numbness to the incisions and chin area which is improving    He denies any additional drainage from the incision  There is minimal swelling and slight redness at incision, small eschar, no open areas, no drainage today  Patient had been maintained on ASA, Plavix, statin  The following portions of the patient's history were reviewed and updated as appropriate: allergies, current medications, past family history, past medical history, past social history, past surgical history and problem list     Review of Systems   Constitutional: Negative  Negative for chills and fever  HENT: Negative for facial swelling, sore throat, trouble swallowing and voice change  Eyes: Negative  Negative for visual disturbance  Respiratory: Negative  Negative for shortness of breath  Cardiovascular: Negative  Negative for chest pain  Gastrointestinal: Negative  Endocrine: Negative  Genitourinary: Negative  Musculoskeletal: Negative  Skin: Positive for color change (redness around R CEA incision site ) and wound (R CEA incision site )  Allergic/Immunologic: Negative  Neurological: Positive for numbness  Negative for facial asymmetry, speech difficulty and weakness  Hematological: Negative  Psychiatric/Behavioral: Negative  Objective:      /84 (BP Location: Left arm, Patient Position: Sitting)   Pulse 84   Temp (!) 97 3 °F (36 3 °C) (Tympanic)   Ht 5' 9" (1 753 m)   Wt 103 kg (228 lb)   BMI 33 67 kg/m²          Physical Exam   Constitutional: He is oriented to person, place, and time  He appears well-developed and well-nourished  HENT:   Head: Normocephalic and atraumatic  Eyes: Pupils are equal, round, and reactive to light  EOM are normal    Neck: Normal range of motion  Neck supple  Right neck incision with minimal eschar otherwise incision is well healed  Small area of edema/swelling with slight redness, no drainage, no warmth noted  Cardiovascular: Normal rate and regular rhythm     Pulmonary/Chest: Effort normal and breath sounds normal    Musculoskeletal: Normal range of motion  He exhibits edema  Neurological: He is alert and oriented to person, place, and time  Skin: Skin is warm and dry  Nursing note and vitals reviewed  I have reviewed and made appropriate changes to the review of systems input by the medical assistant  Vitals:    01/30/20 1546   BP: 166/84   BP Location: Left arm   Patient Position: Sitting   Pulse: 84   Temp: (!) 97 3 °F (36 3 °C)   TempSrc: Tympanic   Weight: 103 kg (228 lb)   Height: 5' 9" (1 753 m)       Patient Active Problem List   Diagnosis    Benign colon polyp    Carpal tunnel syndrome    Dyslipidemia    Gout    Hypertension    Impaired fasting glucose    Insomnia    Osteoarthritis    Prolonged QT interval    Rhinitis    Other disorder of calcium metabolism    Elevated PSA    Obesity (BMI 30 0-34  9)    Internal carotid artery stenosis, right    Cerebrovascular accident (CVA) ([address] )    Aortic valve stenosis    S/P carotid endarterectomy       Past Surgical History:   Procedure Laterality Date    ANKLE SURGERY Right     COLONOSCOPY  01/25/2013    polypectomy; complete - 3 yrs d/t polyp - benign submucosal lipoma- path c/w lipoma    COLONOSCOPY  04/25/2017    complete - tubular adenoma - repeat 5yrs    CYSTOSCOPY      CYSTOTOMY      bladder  w/ basket extraction of calculus    FEMUR FRACTURE SURGERY      HERNIA REPAIR      inguinal ; sliding    INGUINAL HERNIA REPAIR Right     unilateral    KNEE ARTHROSCOPY Right     w/medial menisectomy    LASIK      corneal    LITHOTRIPSY      renal    SC COLONOSCOPY FLX DX W/COLLJ SPEC WHEN PFRMD N/A 4/25/2017    Procedure: SCREENING COLONOSCOPY;  Surgeon: Leonides Torres MD;  [facility];  Service: General    SC THROMBOENDARTECTMY Renée Power Right 1/10/2020    Procedure: ENDARTERECTOMY ARTERY CAROTID;  Surgeon: Yisel Rivera MD;  Location: [facility];  Service: Vascular    ROTATOR CUFF REPAIR Bilateral     TONSILLECTOMY         Family History   Problem Relation Age of Onset    Alzheimer's disease Mother     Alzheimer's disease Father     Heart disease Father         CAD    Other Father         prediabetes    Diabetes Father     Breast cancer Other        Social History     Socioeconomic History    Marital status:       Spouse name: Not on file    Number of children: Not on file    Years of education: Not on file    Highest education level: Not on file   Occupational History     Comment: working full time   Social Needs    Financial resource strain: Not on file    Food insecurity:     Worry: Not on file     Inability: Not on file    Transportation needs:     Medical: Not on file     Non-medical: Not on file   Tobacco Use    Smoking status: Former Smoker     Packs/day: 0 50     Years: 22 00     Pack years: 11 00     Last attempt to quit:      Years since quittin 1    Smokeless tobacco: Never Used   Substance and Sexual Activity    Alcohol use: Not Currently     Comment: stopped drinking alcohol    Drug use: No    Sexual activity: Not on file   Lifestyle    Physical activity:     Days per week: Not on file     Minutes per session: Not on file    Stress: Not on file   Relationships    Social connections:     Talks on phone: Not on file     Gets together: Not on file     Attends Protestant service: Not on file     Active member of club or organization: Not on file     Attends meetings of clubs or organizations: Not on file     Relationship status: Not on file    Intimate partner violence:     Fear of current or ex partner: Not on file     Emotionally abused: Not on file     Physically abused: Not on file     Forced sexual activity: Not on file   Other Topics Concern    Not on file   Social History Narrative    , lives alone, working full time       No Known Allergies      Current Outpatient Medications:     acetaminophen (TYLENOL) 325 mg tablet, Take 2 tablets (650 mg total) by mouth every 6 (six) hours as needed for mild pain, Disp: 30 tablet, Rfl: 0    aspirin (ECOTRIN LOW STRENGTH) 81 mg EC tablet, Take 81 mg by mouth daily, Disp: , Rfl:     atorvastatin (LIPITOR) 40 mg tablet, Take 1 tablet (40 mg total) by mouth every evening, Disp: 30 tablet, Rfl: 0    calcium polycarbophil (FIBERCON) 625 mg tablet, Take 625 mg by mouth daily, Disp: , Rfl:     clopidogrel (PLAVIX) 75 mg tablet, Take 1 tablet (75 mg total) by mouth daily, Disp: 30 tablet, Rfl: 0    clotrimazole-betamethasone (LOTRISONE) 1-0 05 % cream, apply sparingly to affected area twice a day, Disp: 45 g, Rfl: 1    lisinopril (ZESTRIL) 10 mg tablet, Take 1 tablet (10 mg total) by mouth daily, Disp: 30 tablet, Rfl: 0    MITIGARE 0 6 MG CAPS, take 1 capsule by mouth four times a day if needed, Disp: 100 capsule, Rfl: 3    mometasone (NASONEX) 50 mcg/act nasal spray, 2 sprays into each nostril daily, Disp: , Rfl:     Multiple Vitamin (MULTIVITAMIN) capsule, Take 1 capsule by mouth daily, Disp: , Rfl:     SANTYL ointment, , Disp: , Rfl:     triamterene-hydrochlorothiazide (MAXZIDE) 75-50 MG per tablet, take 1 tablet by mouth once daily, Disp: 90 tablet, Rfl: 1

## 2020-01-30 NOTE — PATIENT INSTRUCTIONS
PLAN:  -Continue aspirin 81mg daily  -Continue statin therapy daily  -Continue Plavix 75mg daily for 2 months  -if you have any signs or symptoms of TIA/CVA including vision changes, unilateral extremity weaknesses, speech difficulties, please call 911 and go to the ER for evaluation  -Carotid Duplex to be done at 3 month and 9 month post-op  -F/u with office after 9 month ultrasound is completed  -if you have any questions or concerns, please call our office  -continue to wash incision daily with soap and water; pat dry  Do not apply any lotion or ointments to the area  If there is continued drainage, please call office for re-evaluation   -Referral to Dr Tammy Ward for foot wound

## 2020-01-31 DIAGNOSIS — I65.21 CAROTID STENOSIS, RIGHT: ICD-10-CM

## 2020-01-31 DIAGNOSIS — E78.5 DYSLIPIDEMIA: ICD-10-CM

## 2020-01-31 RX ORDER — CLOPIDOGREL BISULFATE 75 MG/1
TABLET ORAL
Qty: 30 TABLET | Refills: 0 | OUTPATIENT
Start: 2020-01-31

## 2020-01-31 RX ORDER — ATORVASTATIN CALCIUM 40 MG/1
TABLET, FILM COATED ORAL
Qty: 30 TABLET | Refills: 0 | OUTPATIENT
Start: 2020-01-31

## 2020-01-31 RX ORDER — CLOPIDOGREL BISULFATE 75 MG/1
75 TABLET ORAL DAILY
Qty: 30 TABLET | Refills: 5 | Status: SHIPPED | OUTPATIENT
Start: 2020-01-31 | End: 2020-05-11 | Stop reason: SDUPTHER

## 2020-01-31 RX ORDER — ATORVASTATIN CALCIUM 40 MG/1
40 TABLET, FILM COATED ORAL EVERY EVENING
Qty: 30 TABLET | Refills: 5 | Status: SHIPPED | OUTPATIENT
Start: 2020-01-31 | End: 2020-05-11 | Stop reason: SDUPTHER

## 2020-02-04 DIAGNOSIS — I65.21 STENOSIS OF RIGHT CAROTID ARTERY: ICD-10-CM

## 2020-02-06 RX ORDER — LISINOPRIL 10 MG/1
TABLET ORAL
Qty: 30 TABLET | Refills: 5 | Status: SHIPPED | OUTPATIENT
Start: 2020-02-06 | End: 2020-05-11 | Stop reason: SDUPTHER

## 2020-02-19 ENCOUNTER — APPOINTMENT (OUTPATIENT)
Dept: WOUND CARE | Facility: HOSPITAL | Age: 75
End: 2020-02-19
Payer: COMMERCIAL

## 2020-02-19 PROCEDURE — 99213 OFFICE O/P EST LOW 20 MIN: CPT

## 2020-02-19 PROCEDURE — 11042 DBRDMT SUBQ TIS 1ST 20SQCM/<: CPT

## 2020-03-04 ENCOUNTER — APPOINTMENT (OUTPATIENT)
Dept: WOUND CARE | Facility: HOSPITAL | Age: 75
End: 2020-03-04
Payer: COMMERCIAL

## 2020-03-04 PROCEDURE — 11042 DBRDMT SUBQ TIS 1ST 20SQCM/<: CPT

## 2020-03-16 DIAGNOSIS — I10 HYPERTENSION, UNSPECIFIED TYPE: ICD-10-CM

## 2020-03-16 RX ORDER — TRIAMTERENE AND HYDROCHLOROTHIAZIDE 75; 50 MG/1; MG/1
TABLET ORAL
Qty: 90 TABLET | Refills: 1 | Status: SHIPPED | OUTPATIENT
Start: 2020-03-16 | End: 2020-08-20

## 2020-03-17 ENCOUNTER — APPOINTMENT (OUTPATIENT)
Dept: WOUND CARE | Facility: HOSPITAL | Age: 75
End: 2020-03-17
Payer: COMMERCIAL

## 2020-03-17 PROCEDURE — 11042 DBRDMT SUBQ TIS 1ST 20SQCM/<: CPT

## 2020-04-06 ENCOUNTER — OFFICE VISIT (OUTPATIENT)
Dept: FAMILY MEDICINE CLINIC | Facility: HOSPITAL | Age: 75
End: 2020-04-06
Payer: COMMERCIAL

## 2020-04-06 VITALS
WEIGHT: 223 LBS | SYSTOLIC BLOOD PRESSURE: 136 MMHG | HEART RATE: 75 BPM | HEIGHT: 69 IN | DIASTOLIC BLOOD PRESSURE: 84 MMHG | TEMPERATURE: 96.9 F | BODY MASS INDEX: 33.03 KG/M2

## 2020-04-06 DIAGNOSIS — R97.20 ELEVATED PSA: Primary | ICD-10-CM

## 2020-04-06 DIAGNOSIS — Z98.890 S/P CAROTID ENDARTERECTOMY: ICD-10-CM

## 2020-04-06 DIAGNOSIS — R73.01 IMPAIRED FASTING GLUCOSE: ICD-10-CM

## 2020-04-06 DIAGNOSIS — I65.21 INTERNAL CAROTID ARTERY STENOSIS, RIGHT: Chronic | ICD-10-CM

## 2020-04-06 DIAGNOSIS — I10 ESSENTIAL HYPERTENSION: Chronic | ICD-10-CM

## 2020-04-06 PROCEDURE — 3008F BODY MASS INDEX DOCD: CPT | Performed by: INTERNAL MEDICINE

## 2020-04-06 PROCEDURE — 3075F SYST BP GE 130 - 139MM HG: CPT | Performed by: INTERNAL MEDICINE

## 2020-04-06 PROCEDURE — 3079F DIAST BP 80-89 MM HG: CPT | Performed by: INTERNAL MEDICINE

## 2020-04-06 PROCEDURE — 1160F RVW MEDS BY RX/DR IN RCRD: CPT | Performed by: INTERNAL MEDICINE

## 2020-04-06 PROCEDURE — 4040F PNEUMOC VAC/ADMIN/RCVD: CPT | Performed by: INTERNAL MEDICINE

## 2020-04-06 PROCEDURE — 99214 OFFICE O/P EST MOD 30 MIN: CPT | Performed by: INTERNAL MEDICINE

## 2020-04-06 PROCEDURE — 1036F TOBACCO NON-USER: CPT | Performed by: INTERNAL MEDICINE

## 2020-04-10 ENCOUNTER — TELEPHONE (OUTPATIENT)
Dept: FAMILY MEDICINE CLINIC | Facility: HOSPITAL | Age: 75
End: 2020-04-10

## 2020-04-27 ENCOUNTER — HOSPITAL ENCOUNTER (OUTPATIENT)
Dept: NON INVASIVE DIAGNOSTICS | Facility: HOSPITAL | Age: 75
Discharge: HOME | End: 2020-04-27
Payer: COMMERCIAL

## 2020-04-27 DIAGNOSIS — Z98.890 S/P CAROTID ENDARTERECTOMY: ICD-10-CM

## 2020-04-27 DIAGNOSIS — I65.21 INTERNAL CAROTID ARTERY STENOSIS, RIGHT: Chronic | ICD-10-CM

## 2020-04-27 PROCEDURE — 93880 EXTRACRANIAL BILAT STUDY: CPT | Performed by: SURGERY

## 2020-04-27 PROCEDURE — 93880 EXTRACRANIAL BILAT STUDY: CPT

## 2020-05-11 DIAGNOSIS — I65.21 CAROTID STENOSIS, RIGHT: ICD-10-CM

## 2020-05-11 DIAGNOSIS — E78.5 DYSLIPIDEMIA: ICD-10-CM

## 2020-05-11 DIAGNOSIS — I65.21 STENOSIS OF RIGHT CAROTID ARTERY: ICD-10-CM

## 2020-05-11 RX ORDER — LISINOPRIL 10 MG/1
10 TABLET ORAL DAILY
Qty: 90 TABLET | Refills: 1 | Status: SHIPPED | OUTPATIENT
Start: 2020-05-11 | End: 2020-11-03

## 2020-05-11 RX ORDER — CLOPIDOGREL BISULFATE 75 MG/1
75 TABLET ORAL DAILY
Qty: 90 TABLET | Refills: 1 | Status: SHIPPED | OUTPATIENT
Start: 2020-05-11 | End: 2020-12-20

## 2020-05-11 RX ORDER — ATORVASTATIN CALCIUM 40 MG/1
40 TABLET, FILM COATED ORAL EVERY EVENING
Qty: 90 TABLET | Refills: 1 | Status: SHIPPED | OUTPATIENT
Start: 2020-05-11 | End: 2020-10-27 | Stop reason: SDUPTHER

## 2020-06-20 ENCOUNTER — APPOINTMENT (OUTPATIENT)
Dept: LAB | Facility: HOSPITAL | Age: 75
End: 2020-06-20
Payer: COMMERCIAL

## 2020-06-20 DIAGNOSIS — R73.01 IMPAIRED FASTING GLUCOSE: ICD-10-CM

## 2020-06-20 LAB
EST. AVERAGE GLUCOSE BLD GHB EST-MCNC: 134 MG/DL
GLUCOSE P FAST SERPL-MCNC: 119 MG/DL (ref 65–99)
HBA1C MFR BLD: 6.3 %

## 2020-06-20 PROCEDURE — 36415 COLL VENOUS BLD VENIPUNCTURE: CPT

## 2020-06-20 PROCEDURE — 82947 ASSAY GLUCOSE BLOOD QUANT: CPT

## 2020-06-20 PROCEDURE — 83036 HEMOGLOBIN GLYCOSYLATED A1C: CPT

## 2020-06-25 ENCOUNTER — OFFICE VISIT (OUTPATIENT)
Dept: FAMILY MEDICINE CLINIC | Facility: HOSPITAL | Age: 75
End: 2020-06-25
Payer: COMMERCIAL

## 2020-06-25 VITALS
DIASTOLIC BLOOD PRESSURE: 62 MMHG | BODY MASS INDEX: 33.33 KG/M2 | HEIGHT: 69 IN | HEART RATE: 74 BPM | TEMPERATURE: 97.6 F | WEIGHT: 225 LBS | SYSTOLIC BLOOD PRESSURE: 134 MMHG

## 2020-06-25 DIAGNOSIS — Z11.59 NEED FOR HEPATITIS C SCREENING TEST: ICD-10-CM

## 2020-06-25 DIAGNOSIS — M19.90 OSTEOARTHRITIS, UNSPECIFIED OSTEOARTHRITIS TYPE, UNSPECIFIED SITE: ICD-10-CM

## 2020-06-25 DIAGNOSIS — I10 ESSENTIAL HYPERTENSION: Chronic | ICD-10-CM

## 2020-06-25 DIAGNOSIS — R73.01 IMPAIRED FASTING GLUCOSE: Primary | ICD-10-CM

## 2020-06-25 DIAGNOSIS — I65.21 INTERNAL CAROTID ARTERY STENOSIS, RIGHT: Chronic | ICD-10-CM

## 2020-06-25 DIAGNOSIS — Z00.00 MEDICARE ANNUAL WELLNESS VISIT, SUBSEQUENT: ICD-10-CM

## 2020-06-25 PROCEDURE — 1160F RVW MEDS BY RX/DR IN RCRD: CPT | Performed by: INTERNAL MEDICINE

## 2020-06-25 PROCEDURE — 1036F TOBACCO NON-USER: CPT | Performed by: INTERNAL MEDICINE

## 2020-06-25 PROCEDURE — 99214 OFFICE O/P EST MOD 30 MIN: CPT | Performed by: INTERNAL MEDICINE

## 2020-06-25 PROCEDURE — 3288F FALL RISK ASSESSMENT DOCD: CPT | Performed by: INTERNAL MEDICINE

## 2020-06-25 PROCEDURE — 3008F BODY MASS INDEX DOCD: CPT | Performed by: INTERNAL MEDICINE

## 2020-06-25 PROCEDURE — G0439 PPPS, SUBSEQ VISIT: HCPCS | Performed by: INTERNAL MEDICINE

## 2020-06-25 PROCEDURE — 3075F SYST BP GE 130 - 139MM HG: CPT | Performed by: INTERNAL MEDICINE

## 2020-06-25 PROCEDURE — 1101F PT FALLS ASSESS-DOCD LE1/YR: CPT | Performed by: INTERNAL MEDICINE

## 2020-06-25 PROCEDURE — 1170F FXNL STATUS ASSESSED: CPT | Performed by: INTERNAL MEDICINE

## 2020-06-25 PROCEDURE — 1125F AMNT PAIN NOTED PAIN PRSNT: CPT | Performed by: INTERNAL MEDICINE

## 2020-06-25 PROCEDURE — 4040F PNEUMOC VAC/ADMIN/RCVD: CPT | Performed by: INTERNAL MEDICINE

## 2020-06-25 PROCEDURE — 3078F DIAST BP <80 MM HG: CPT | Performed by: INTERNAL MEDICINE

## 2020-07-03 ENCOUNTER — HOSPITAL ENCOUNTER (EMERGENCY)
Facility: HOSPITAL | Age: 75
Discharge: HOME | End: 2020-07-03
Attending: EMERGENCY MEDICINE | Admitting: EMERGENCY MEDICINE
Payer: COMMERCIAL

## 2020-07-03 VITALS
BODY MASS INDEX: 32.58 KG/M2 | TEMPERATURE: 97.7 F | RESPIRATION RATE: 18 BRPM | WEIGHT: 220 LBS | DIASTOLIC BLOOD PRESSURE: 91 MMHG | OXYGEN SATURATION: 100 % | HEART RATE: 67 BPM | SYSTOLIC BLOOD PRESSURE: 199 MMHG | HEIGHT: 69 IN

## 2020-07-03 DIAGNOSIS — R09.81 NASAL CONGESTION: Primary | ICD-10-CM

## 2020-07-03 PROCEDURE — 99283 EMERGENCY DEPT VISIT LOW MDM: CPT

## 2020-07-03 PROCEDURE — 99284 EMERGENCY DEPT VISIT MOD MDM: CPT | Performed by: PHYSICIAN ASSISTANT

## 2020-07-03 RX ORDER — OXYMETAZOLINE HYDROCHLORIDE 0.05 G/100ML
2 SPRAY NASAL ONCE
Status: COMPLETED | OUTPATIENT
Start: 2020-07-03 | End: 2020-07-03

## 2020-07-03 RX ADMIN — OXYMETAZOLINE HYDROCHLORIDE 2 SPRAY: 0.05 SPRAY NASAL at 20:15

## 2020-07-04 NOTE — ED PROVIDER NOTES
History  Chief Complaint   Patient presents with    Sinus Problem     pt tried mutiple OTC remedies for runny nose and sinus congestion with no relief  Denies headache or visual change  80-year-old male with history of hypertension and GERD presents emergency department for evaluation of right-sided nasal congestion  He states that abruptly 2 hours ago the right Jacelyn Crooked became congested  He denies any rhinorrhea  Denies any fevers, chills, sweats, nausea, vomiting, or diarrhea  He used 1 spray of Afrin and 1 spray of Flonase prior to arrival relief  Denies any chest pain or shortness of breath  States that he is desperate to find relief          Prior to Admission Medications   Prescriptions Last Dose Informant Patient Reported? Taking?    MITIGARE 0 6 MG CAPS 7/3/2020 at Unknown time Self No Yes   Sig: take 1 capsule by mouth four times a day if needed   Multiple Vitamin (MULTIVITAMIN) capsule 7/3/2020 at Unknown time Self Yes Yes   Sig: Take 1 capsule by mouth daily   SANTYL ointment 7/3/2020 at Unknown time Self Yes Yes   aspirin (ECOTRIN LOW STRENGTH) 81 mg EC tablet 7/3/2020 at Unknown time Self Yes Yes   Sig: Take 81 mg by mouth every other day 1 every other day   atorvastatin (LIPITOR) 40 mg tablet 7/3/2020 at Unknown time  No Yes   Sig: Take 1 tablet (40 mg total) by mouth every evening   calcium polycarbophil (FIBERCON) 625 mg tablet 7/3/2020 at Unknown time Self Yes Yes   Sig: Take 625 mg by mouth daily   clopidogrel (PLAVIX) 75 mg tablet 7/3/2020 at Unknown time  No Yes   Sig: Take 1 tablet (75 mg total) by mouth daily   clotrimazole-betamethasone (LOTRISONE) 1-0 05 % cream 7/3/2020 at Unknown time Self No Yes   Sig: apply sparingly to affected area twice a day   diclofenac sodium (VOLTAREN) 1 % 7/3/2020 at Unknown time  No Yes   Sig: Apply 2 g topically 4 (four) times a day   lisinopril (ZESTRIL) 10 mg tablet 7/3/2020 at Unknown time  No Yes   Sig: Take 1 tablet (10 mg total) by mouth daily triamterene-hydrochlorothiazide (MAXZIDE) 75-50 MG per tablet 7/3/2020 at Unknown time  No Yes   Sig: take 1 tablet by mouth once daily      Facility-Administered Medications: None       Past Medical History:   Diagnosis Date    Anemia     iron def    GERD (gastroesophageal reflux disease)     Gout     Hypertension     Insomnia     Kidney stone        Past Surgical History:   Procedure Laterality Date    ANKLE SURGERY Right     COLONOSCOPY  01/25/2013    polypectomy; complete - 3 yrs d/t polyp - benign submucosal lipoma- path c/w lipoma    COLONOSCOPY  04/25/2017    complete - tubular adenoma - repeat 5yrs    CYSTOSCOPY      CYSTOTOMY      bladder  w/ basket extraction of calculus    FEMUR FRACTURE SURGERY      HERNIA REPAIR      inguinal ; sliding    INGUINAL HERNIA REPAIR Right     unilateral    KNEE ARTHROSCOPY Right     w/medial menisectomy    LASIK      corneal    LITHOTRIPSY      renal    UT COLONOSCOPY FLX DX W/COLLJ SPEC WHEN PFRMD N/A 4/25/2017    Procedure: SCREENING COLONOSCOPY;  Surgeon: Dede Zaidi MD;  [facility];  Service: General    UT THROMBOENDARTECTMY Star Leonard Right 1/10/2020    Procedure: ENDARTERECTOMY ARTERY CAROTID;  Surgeon: Abhishek Russ MD;  Location:  MAIN OR;  Service: Vascular    ROTATOR CUFF REPAIR Bilateral     TONSILLECTOMY         Family History   Problem Relation Age of Onset    Alzheimer's disease Mother     Alzheimer's disease Father     Heart disease Father         CAD    Other Father         prediabetes    Diabetes Father     Breast cancer Other      I have reviewed and agree with the history as documented      E-Cigarette/Vaping    E-Cigarette Use Never User      E-Cigarette/Vaping Substances    Nicotine No     THC No     CBD No     Flavoring No     Other No     Unknown No      Social History     Tobacco Use    Smoking status: Former Smoker     Packs/day: 0 50     Years: 22 00     Pack years: 11 00     Last attempt to quit: 1985     Years since quittin 5    Smokeless tobacco: Never Used   Substance Use Topics    Alcohol use: Not Currently     Comment: stopped drinking alcohol    Drug use: No       Review of Systems   Constitutional: Negative for chills, diaphoresis and fever  HENT: Positive for congestion  Negative for dental problem, ear pain, facial swelling, rhinorrhea, sinus pressure, sore throat, tinnitus and trouble swallowing  Eyes: Negative for visual disturbance  Respiratory: Negative for cough and shortness of breath  Cardiovascular: Negative for chest pain and palpitations  Gastrointestinal: Negative for abdominal pain, diarrhea, nausea and vomiting  Genitourinary: Negative for dysuria, flank pain and frequency  Musculoskeletal: Negative for arthralgias and myalgias  Skin: Negative for color change, rash and wound  Allergic/Immunologic: Negative for immunocompromised state  Neurological: Negative for dizziness and light-headedness  Hematological: Does not bruise/bleed easily  Psychiatric/Behavioral: Negative for confusion  The patient is not nervous/anxious  Physical Exam  Physical Exam   Constitutional: He is oriented to person, place, and time  He appears well-developed and well-nourished  No distress  HENT:   Head: Normocephalic and atraumatic  Right Nare mucosal edema w/o active discharge left Nare patent   Eyes: Pupils are equal, round, and reactive to light  No scleral icterus  Neck: No JVD present  Cardiovascular: Normal rate and regular rhythm  Exam reveals no gallop and no friction rub  No murmur heard  Pulmonary/Chest: No respiratory distress  He has no wheezes  He has no rales  Neurological: He is alert and oriented to person, place, and time  Skin: Skin is warm and dry  He is not diaphoretic  Vitals reviewed        Vital Signs  ED Triage Vitals   Temperature Pulse Respirations Blood Pressure SpO2   20 2001 07/03/20 2012   97 7 °F (36 5 °C) 67 18 (!) 199/91 100 %      Temp Source Heart Rate Source Patient Position - Orthostatic VS BP Location FiO2 (%)   07/03/20 2001 -- -- -- --   Temporal          Pain Score       --                  Vitals:    07/03/20 2001   BP: (!) 199/91   Pulse: 67         Visual Acuity      ED Medications  Medications   oxymetazoline (AFRIN) 0 05 % nasal spray 2 spray (2 sprays Each Nare Given 7/3/20 2015)       Diagnostic Studies  Results Reviewed     None                 No orders to display              Procedures  Procedures         ED Course       US AUDIT      Most Recent Value   Initial Alcohol Screen: US AUDIT-C    1  How often do you have a drink containing alcohol?  0 Filed at: 07/03/2020 2003   2  How many drinks containing alcohol do you have on a typical day you are drinking? 0 Filed at: 07/03/2020 2003   3a  Male UNDER 65: How often do you have five or more drinks on one occasion? 0 Filed at: 07/03/2020 2003   3b  FEMALE Any Age, or MALE 65+: How often do you have 4 or more drinks on one occassion? 0 Filed at: 07/03/2020 2003   Audit-C Score  0 Filed at: 07/03/2020 2003                  JERONIMO/DAST-10      Most Recent Value   How many times in the past year have you    Used an illegal drug or used a prescription medication for non-medical reasons? Never Filed at: 07/03/2020 2003                                [facility]  Number of Diagnoses or Management Options  Nasal congestion: new and does not require workup  Diagnosis management comments: 2 sprays afrin administered  Pt educated to avoid decongestants orally due to cardiac/HTN risk          Amount and/or Complexity of Data Reviewed  Review and summarize past medical records: yes          Disposition  Final diagnoses:   Nasal congestion     Time reflects when diagnosis was documented in both MDM as applicable and the Disposition within this note     Time User Action Codes Description Comment    7/3/2020  8:17 PM Clayton Delgado Add [R09 81] Nasal congestion       ED Disposition     ED Disposition Condition Date/Time Comment    Discharge Stable Fri Jul 3, 2020  8:17 PM Kelsy Horn  discharge to home/self care  Follow-up Information     Follow up With Specialties Details Why Peter CalebDO Internal Medicine, Family Medicine   [facility]  [address] [place] [address]  [phone]            Discharge Medication List as of 7/3/2020  8:17 PM      CONTINUE these medications which have NOT CHANGED    Details   aspirin (ECOTRIN LOW STRENGTH) 81 mg EC tablet Take 81 mg by mouth every other day 1 every other day, Historical Med      atorvastatin (LIPITOR) 40 mg tablet Take 1 tablet (40 mg total) by mouth every evening, Starting Mon 5/11/2020, Normal      calcium polycarbophil (FIBERCON) 625 mg tablet Take 625 mg by mouth daily, Historical Med      clopidogrel (PLAVIX) 75 mg tablet Take 1 tablet (75 mg total) by mouth daily, Starting Mon 5/11/2020, Normal      clotrimazole-betamethasone (LOTRISONE) 1-0 05 % cream apply sparingly to affected area twice a day, Normal      diclofenac sodium (VOLTAREN) 1 % Apply 2 g topically 4 (four) times a day, Starting Thu 6/25/2020, Normal      lisinopril (ZESTRIL) 10 mg tablet Take 1 tablet (10 mg total) by mouth daily, Starting Mon 5/11/2020, Normal      MITIGARE 0 6 MG CAPS take 1 capsule by mouth four times a day if needed, Normal      Multiple Vitamin (MULTIVITAMIN) capsule Take 1 capsule by mouth daily, Historical Med      SANTYL ointment Starting Fri 6/1/2018, Historical Med      triamterene-hydrochlorothiazide (MAXZIDE) 75-50 MG per tablet take 1 tablet by mouth once daily, Normal           No discharge procedures on file      PDMP Review       Value Time User    PDMP Reviewed  Yes 1/6/2020  4:37 PM Milli Beth PA-C          ED Provider  Electronically Signed by           Albert Jc PA-C  07/05/20 3588

## 2020-07-07 ENCOUNTER — VBI (OUTPATIENT)
Dept: FAMILY MEDICINE CLINIC | Facility: HOSPITAL | Age: 75
End: 2020-07-07

## 2020-07-07 NOTE — TELEPHONE ENCOUNTER
Jorge Pierre  ED Visit Information     Ed visit date: 7/3/2020  Diagnosis Description:   Nasal congestion     In Network? Yes [facility]  Discharge status: Home  Discharged with meds ? No  Number of ED visits to date: 3 (1 admission)   ED Severity:N/a     Outreach Information    Outreach successful: Yes 1  Date letter mailed:n/a  Date Finalized:7/7/2020    Care Coordination    Follow up appointment with pcp: no No ED f/u appt scheduled  Transportation issues ? No    Value [employer] type:  7 Day Outreach  Emergent necessity warranted by diagnosis:  No  [facility]CP:  Yes  Transportation:  Self Transport  Called PCP first?:  No  Reason Patient went to ED instead of Urgent Care or PCP?:  [facility]  07/07/2020 01:28 PM Phone ([employer] Lynsey Crew  (Self) [phone] (J)   Call Complete  Personal communication with patient regarding his recent ED visit on 7/3/2020 for Nasal congestion  Patient was discharged without medication and advised to follow up with PCP  Patient stated that he was doing well and declined the need for a follow up appt a this time  I was not able to ask follow up questions as patient rushed off the phone

## 2020-07-20 ENCOUNTER — HOSPITAL ENCOUNTER (EMERGENCY)
Facility: HOSPITAL | Age: 75
Discharge: HOME | End: 2020-07-20
Attending: EMERGENCY MEDICINE
Payer: COMMERCIAL

## 2020-07-20 VITALS
SYSTOLIC BLOOD PRESSURE: 163 MMHG | OXYGEN SATURATION: 98 % | BODY MASS INDEX: 32.49 KG/M2 | WEIGHT: 220 LBS | TEMPERATURE: 98 F | HEART RATE: 84 BPM | RESPIRATION RATE: 18 BRPM | DIASTOLIC BLOOD PRESSURE: 76 MMHG

## 2020-07-20 DIAGNOSIS — R09.81 NASAL CONGESTION: Primary | ICD-10-CM

## 2020-07-20 PROCEDURE — 99284 EMERGENCY DEPT VISIT MOD MDM: CPT | Performed by: EMERGENCY MEDICINE

## 2020-07-20 PROCEDURE — 99282 EMERGENCY DEPT VISIT SF MDM: CPT

## 2020-07-20 RX ORDER — PSEUDOEPHEDRINE HYDROCHLORIDE 30 MG/1
60 TABLET ORAL ONCE
Status: COMPLETED | OUTPATIENT
Start: 2020-07-20 | End: 2020-07-20

## 2020-07-20 RX ORDER — PSEUDOEPHEDRINE HYDROCHLORIDE 30 MG/1
60 TABLET ORAL EVERY 8 HOURS PRN
Qty: 30 TABLET | Refills: 0 | Status: SHIPPED | OUTPATIENT
Start: 2020-07-20 | End: 2020-10-14 | Stop reason: ALTCHOICE

## 2020-07-20 RX ADMIN — PSEUDOEPHEDRINE HCL 60 MG: 30 TABLET, FILM COATED ORAL at 22:38

## 2020-07-21 NOTE — ED PROVIDER NOTES
History  Chief Complaint   Patient presents with    Nasal Congestion     c/o nasal congestion, has been using unknown nasal congestion spray every day for several months  76year old male presenting with nasal congestion that started last night  Has been having similar issues for the last few weeks  Used afrin but last dose was 3 weeks ago  Has been using saline spray and netti pot  Denies other associated symptoms of cough, fever, pain  Prior to Admission Medications   Prescriptions Last Dose Informant Patient Reported? Taking?    MITIGARE 0 6 MG CAPS  Self No No   Sig: take 1 capsule by mouth four times a day if needed   Multiple Vitamin (MULTIVITAMIN) capsule  Self Yes No   Sig: Take 1 capsule by mouth daily   SANTYL ointment  Self Yes No   aspirin (ECOTRIN LOW STRENGTH) 81 mg EC tablet  Self Yes No   Sig: Take 81 mg by mouth every other day 1 every other day   atorvastatin (LIPITOR) 40 mg tablet   No No   Sig: Take 1 tablet (40 mg total) by mouth every evening   calcium polycarbophil (FIBERCON) 625 mg tablet  Self Yes No   Sig: Take 625 mg by mouth daily   clopidogrel (PLAVIX) 75 mg tablet   No No   Sig: Take 1 tablet (75 mg total) by mouth daily   clotrimazole-betamethasone (LOTRISONE) 1-0 05 % cream  Self No No   Sig: apply sparingly to affected area twice a day   diclofenac sodium (VOLTAREN) 1 %   No No   Sig: Apply 2 g topically 4 (four) times a day   lisinopril (ZESTRIL) 10 mg tablet   No No   Sig: Take 1 tablet (10 mg total) by mouth daily   triamterene-hydrochlorothiazide (MAXZIDE) 75-50 MG per tablet   No No   Sig: take 1 tablet by mouth once daily      Facility-Administered Medications: None       Past Medical History:   Diagnosis Date    Anemia     iron def    GERD (gastroesophageal reflux disease)     Gout     Hypertension     Insomnia     Kidney stone        Past Surgical History:   Procedure Laterality Date    ANKLE SURGERY Right     COLONOSCOPY  01/25/2013    polypectomy; complete - 3 yrs d/t polyp - benign submucosal lipoma- path c/w lipoma    COLONOSCOPY  2017    complete - tubular adenoma - repeat 5yrs    CYSTOSCOPY      CYSTOTOMY      bladder  w/ basket extraction of calculus    FEMUR FRACTURE SURGERY      HERNIA REPAIR      inguinal ; sliding    INGUINAL HERNIA REPAIR Right     unilateral    KNEE ARTHROSCOPY Right     w/medial menisectomy    LASIK      corneal    LITHOTRIPSY      renal    UT COLONOSCOPY FLX DX W/COLLJ SPEC WHEN PFRMD N/A 2017    Procedure: SCREENING COLONOSCOPY;  Surgeon: Deya Chavez MD;  Location: [facility];  Service: General    UT THROMBOENDARTECTMY Jeff Carranza Right 1/10/2020    Procedure: ENDARTERECTOMY ARTERY CAROTID;  Surgeon: Nora Saucedo MD;  Location: [facility];  Service: Vascular    ROTATOR CUFF REPAIR Bilateral     TONSILLECTOMY         Family History   Problem Relation Age of Onset    Alzheimer's disease Mother     Alzheimer's disease Father     Heart disease Father         CAD    Other Father         prediabetes    Diabetes Father     Breast cancer Other      I have reviewed and agree with the history as documented  E-Cigarette/Vaping    E-Cigarette Use Never User      E-Cigarette/Vaping Substances    Nicotine No     THC No     CBD No     Flavoring No     Other No     Unknown No      Social History     Tobacco Use    Smoking status: Former Smoker     Packs/day: 0 50     Years: 22 00     Pack years: 11 00     Last attempt to quit:      Years since quittin 5    Smokeless tobacco: Never Used   Substance Use Topics    Alcohol use: Not Currently     Comment: stopped drinking alcohol    Drug use: No       Review of Systems   Constitutional: Negative for chills, diaphoresis and fever  HENT: Positive for congestion  Negative for ear pain, facial swelling and rhinorrhea  Eyes: Negative for pain and visual disturbance  Respiratory: Negative for cough, shortness of breath and wheezing  Cardiovascular: Negative for chest pain and leg swelling  Gastrointestinal: Negative for abdominal pain, diarrhea, nausea and vomiting  Genitourinary: Negative for difficulty urinating, dysuria, frequency and urgency  Musculoskeletal: Negative for back pain and neck pain  Skin: Negative for color change and rash  Neurological: Negative for syncope, numbness and headaches  All other systems reviewed and are negative  Physical Exam  Physical Exam   Constitutional: He is oriented to person, place, and time  He appears well-developed and well-nourished  HENT:   Head: Normocephalic and atraumatic  Nontender over sinuses  Eyes: Conjunctivae and EOM are normal    Neck: Normal range of motion  Neck supple  Cardiovascular: Normal rate and regular rhythm  Pulmonary/Chest: Effort normal  No respiratory distress  Abdominal: Soft  There is no tenderness  Musculoskeletal: Normal range of motion  He exhibits no tenderness  Neurological: He is alert and oriented to person, place, and time  Skin: Skin is warm  No erythema  Psychiatric: He has a normal mood and affect  His behavior is normal    Nursing note and vitals reviewed  Vital Signs  ED Triage Vitals [07/20/20 2214]   Temperature Pulse Respirations Blood Pressure SpO2   98 °F (36 7 °C) 84 18 163/76 98 %      Temp Source Heart Rate Source Patient Position - Orthostatic VS BP Location FiO2 (%)   Temporal Monitor Lying Right arm --      Pain Score       No Pain           Vitals:    07/20/20 2214   BP: 163/76   Pulse: 84   Patient Position - Orthostatic VS: Lying         Visual Acuity      ED Medications  Medications   pseudoephedrine (SUDAFED) tablet 60 mg (60 mg Oral Given 7/20/20 2238)       Diagnostic Studies  Results Reviewed     None                 No orders to display              Procedures  Procedures         ED Course       US AUDIT      Most Recent Value   Initial Alcohol Screen: US AUDIT-C    1   How often do you have a drink containing alcohol?  0 Filed at: 07/20/2020 2213   2  How many drinks containing alcohol do you have on a typical day you are drinking? 0 Filed at: 07/20/2020 2213   3a  Male UNDER 65: How often do you have five or more drinks on one occasion? 0 Filed at: 07/20/2020 2213   3b  FEMALE Any Age, or MALE 65+: How often do you have 4 or more drinks on one occassion? 0 Filed at: 07/20/2020 2213   Audit-C Score  0 Filed at: 07/20/2020 2213                  JERONIMO/DAST-10      Most Recent Value   How many times in the past year have you    Used an illegal drug or used a prescription medication for non-medical reasons? Never Filed at: 07/20/2020 2213                                MDM  Number of Diagnoses or Management Options  Nasal congestion:   Diagnosis management comments: 76year-old nasal congestion  Pseudoephedrine  Follow-up PCP  Return precautions provided  Disposition  Final diagnoses:   Nasal congestion     Time reflects when diagnosis was documented in both MDM as applicable and the Disposition within this note     Time User Action Codes Description Comment    7/20/2020 10:33 PM Red Kussmaul Add [R09 81] Nasal congestion       ED Disposition     ED Disposition Condition Date/Time Comment    Discharge Stable Mon Jul 20, 2020 10:33 PM Verdie   discharge to home/self care  Follow-up Information     Follow up With Specialties Details Why Contact Info Additional Stephen Donahue 1696, DO Internal Medicine, Family Medicine   [facility]  [address]       Jorge Hernandez MD Otolaryngology   [address] [facility]    [address]  [address] Emergency Department Emergency Medicine  If symptoms worsen [address] [place], [address]  [phone]  ED, [address] [place], [facility], [address]          Discharge Medication List as of 7/20/2020 10:35 PM      START taking these medications    Details   pseudoephedrine (SUDAFED) 30 mg tablet Take 2 tablets (60 mg total) by mouth every 8 (eight) hours as needed for congestion, Starting Mon 7/20/2020, Print         CONTINUE these medications which have NOT CHANGED    Details   aspirin (ECOTRIN LOW STRENGTH) 81 mg EC tablet Take 81 mg by mouth every other day 1 every other day, Historical Med      atorvastatin (LIPITOR) 40 mg tablet Take 1 tablet (40 mg total) by mouth every evening, Starting Mon 5/11/2020, Normal      calcium polycarbophil (FIBERCON) 625 mg tablet Take 625 mg by mouth daily, Historical Med      clopidogrel (PLAVIX) 75 mg tablet Take 1 tablet (75 mg total) by mouth daily, Starting Mon 5/11/2020, Normal      clotrimazole-betamethasone (LOTRISONE) 1-0 05 % cream apply sparingly to affected area twice a day, Normal      diclofenac sodium (VOLTAREN) 1 % Apply 2 g topically 4 (four) times a day, Starting u 6/25/2020, Normal      lisinopril (ZESTRIL) 10 mg tablet Take 1 tablet (10 mg total) by mouth daily, Starting Mon 5/11/2020, Normal      MITIGARE 0 6 MG CAPS take 1 capsule by mouth four times a day if needed, Normal      Multiple Vitamin (MULTIVITAMIN) capsule Take 1 capsule by mouth daily, Historical Med      SANTYL ointment Starting Fri 6/1/2018, Historical Med      triamterene-hydrochlorothiazide (MAXZIDE) 75-50 MG per tablet take 1 tablet by mouth once daily, Normal           No discharge procedures on file      PDMP Review       Value Time User    PDMP Reviewed  Yes 1/6/2020  4:37 PM Miroslava Montejo PA-C          ED Provider  Electronically Signed by           Alexandra Shi DO  07/20/20 0643

## 2020-08-11 ENCOUNTER — APPOINTMENT (EMERGENCY)
Dept: CT IMAGING | Facility: HOSPITAL | Age: 75
End: 2020-08-11
Payer: COMMERCIAL

## 2020-08-11 ENCOUNTER — TELEPHONE (OUTPATIENT)
Dept: FAMILY MEDICINE CLINIC | Facility: HOSPITAL | Age: 75
End: 2020-08-11

## 2020-08-11 ENCOUNTER — HOSPITAL ENCOUNTER (EMERGENCY)
Facility: HOSPITAL | Age: 75
Discharge: HOME | End: 2020-08-11
Attending: EMERGENCY MEDICINE | Admitting: EMERGENCY MEDICINE
Payer: COMMERCIAL

## 2020-08-11 VITALS
BODY MASS INDEX: 31.57 KG/M2 | OXYGEN SATURATION: 99 % | TEMPERATURE: 97.3 F | HEART RATE: 78 BPM | WEIGHT: 220 LBS | SYSTOLIC BLOOD PRESSURE: 144 MMHG | RESPIRATION RATE: 17 BRPM | DIASTOLIC BLOOD PRESSURE: 89 MMHG

## 2020-08-11 DIAGNOSIS — K59.00 CONSTIPATION: ICD-10-CM

## 2020-08-11 DIAGNOSIS — N20.0 KIDNEY STONE ON LEFT SIDE: Primary | ICD-10-CM

## 2020-08-11 LAB
ALBUMIN SERPL BCP-MCNC: 4.3 G/DL (ref 3.5–5)
ALP SERPL-CCNC: 44 U/L (ref 46–116)
ALT SERPL W P-5'-P-CCNC: 26 U/L (ref 12–78)
ANION GAP SERPL CALCULATED.3IONS-SCNC: 9 MMOL/L (ref 4–13)
AST SERPL W P-5'-P-CCNC: 23 U/L (ref 5–45)
BASOPHILS # BLD AUTO: 0.03 THOUSANDS/ΜL (ref 0–0.1)
BASOPHILS NFR BLD AUTO: 0 % (ref 0–1)
BILIRUB SERPL-MCNC: 0.3 MG/DL (ref 0.2–1)
BILIRUB UR QL STRIP: NEGATIVE
BUN SERPL-MCNC: 26 MG/DL (ref 5–25)
CALCIUM SERPL-MCNC: 9.3 MG/DL (ref 8.3–10.1)
CHLORIDE SERPL-SCNC: 103 MMOL/L (ref 100–108)
CLARITY UR: CLEAR
CO2 SERPL-SCNC: 27 MMOL/L (ref 21–32)
COLOR UR: YELLOW
CREAT SERPL-MCNC: 1.38 MG/DL (ref 0.6–1.3)
EOSINOPHIL # BLD AUTO: 0.19 THOUSAND/ΜL (ref 0–0.61)
EOSINOPHIL NFR BLD AUTO: 2 % (ref 0–6)
ERYTHROCYTE [DISTWIDTH] IN BLOOD BY AUTOMATED COUNT: 13.2 % (ref 11.6–15.1)
GFR SERPL CREATININE-BSD FRML MDRD: 50 ML/MIN/1.73SQ M
GLUCOSE SERPL-MCNC: 119 MG/DL (ref 65–140)
GLUCOSE UR STRIP-MCNC: NEGATIVE MG/DL
HCT VFR BLD AUTO: 34.9 % (ref 36.5–49.3)
HGB BLD-MCNC: 11 G/DL (ref 12–17)
HGB UR QL STRIP.AUTO: NEGATIVE
IMM GRANULOCYTES # BLD AUTO: 0.04 THOUSAND/UL (ref 0–0.2)
IMM GRANULOCYTES NFR BLD AUTO: 0 % (ref 0–2)
KETONES UR STRIP-MCNC: NEGATIVE MG/DL
LEUKOCYTE ESTERASE UR QL STRIP: NEGATIVE
LYMPHOCYTES # BLD AUTO: 1.17 THOUSANDS/ΜL (ref 0.6–4.47)
LYMPHOCYTES NFR BLD AUTO: 11 % (ref 14–44)
MCH RBC QN AUTO: 26.4 PG (ref 26.8–34.3)
MCHC RBC AUTO-ENTMCNC: 31.5 G/DL (ref 31.4–37.4)
MCV RBC AUTO: 84 FL (ref 82–98)
MONOCYTES # BLD AUTO: 0.76 THOUSAND/ΜL (ref 0.17–1.22)
MONOCYTES NFR BLD AUTO: 7 % (ref 4–12)
NEUTROPHILS # BLD AUTO: 8.26 THOUSANDS/ΜL (ref 1.85–7.62)
NEUTS SEG NFR BLD AUTO: 80 % (ref 43–75)
NITRITE UR QL STRIP: NEGATIVE
NRBC BLD AUTO-RTO: 0 /100 WBCS
PH UR STRIP.AUTO: 7 [PH]
PLATELET # BLD AUTO: 303 THOUSANDS/UL (ref 149–390)
PMV BLD AUTO: 9.1 FL (ref 8.9–12.7)
POTASSIUM SERPL-SCNC: 4.1 MMOL/L (ref 3.5–5.3)
PROT SERPL-MCNC: 7.8 G/DL (ref 6.4–8.2)
PROT UR STRIP-MCNC: NEGATIVE MG/DL
RBC # BLD AUTO: 4.17 MILLION/UL (ref 3.88–5.62)
SODIUM SERPL-SCNC: 139 MMOL/L (ref 136–145)
SP GR UR STRIP.AUTO: 1.02 (ref 1–1.03)
UROBILINOGEN UR QL STRIP.AUTO: 1 E.U./DL
WBC # BLD AUTO: 10.45 THOUSAND/UL (ref 4.31–10.16)

## 2020-08-11 PROCEDURE — 99285 EMERGENCY DEPT VISIT HI MDM: CPT | Performed by: EMERGENCY MEDICINE

## 2020-08-11 PROCEDURE — 81003 URINALYSIS AUTO W/O SCOPE: CPT | Performed by: EMERGENCY MEDICINE

## 2020-08-11 PROCEDURE — 85025 COMPLETE CBC W/AUTO DIFF WBC: CPT | Performed by: EMERGENCY MEDICINE

## 2020-08-11 PROCEDURE — 36415 COLL VENOUS BLD VENIPUNCTURE: CPT | Performed by: EMERGENCY MEDICINE

## 2020-08-11 PROCEDURE — 74176 CT ABD & PELVIS W/O CONTRAST: CPT

## 2020-08-11 PROCEDURE — G1004 CDSM NDSC: HCPCS

## 2020-08-11 PROCEDURE — 80053 COMPREHEN METABOLIC PANEL: CPT | Performed by: EMERGENCY MEDICINE

## 2020-08-11 PROCEDURE — 99284 EMERGENCY DEPT VISIT MOD MDM: CPT

## 2020-08-11 RX ORDER — SODIUM PHOSPHATE, DIBASIC AND SODIUM PHOSPHATE, MONOBASIC 7; 19 G/133ML; G/133ML
1 ENEMA RECTAL ONCE
Status: COMPLETED | OUTPATIENT
Start: 2020-08-11 | End: 2020-08-11

## 2020-08-11 RX ORDER — POLYETHYLENE GLYCOL 3350 17 G/17G
17 POWDER, FOR SOLUTION ORAL ONCE
Status: DISCONTINUED | OUTPATIENT
Start: 2020-08-11 | End: 2020-08-11 | Stop reason: HOSPADM

## 2020-08-11 RX ADMIN — SODIUM PHOSPHATE 1 ENEMA: 7; 19 ENEMA RECTAL at 19:09

## 2020-08-11 NOTE — ED PROVIDER NOTES
History  Chief Complaint   Patient presents with    Abdominal Pain     To ED with c/o difficulty urinating and moving bowels for several days  Denies any fever or chill, nausea or vomiting  This is a 70-year-old male who presents for evaluation of crampy lower abdominal pain with difficulty urinating and having a bowel movement over the past several days no fevers or chills no blood in the urine or stool no prior abdominal surgeries  Denies any nausea vomiting      History provided by:  Patient  Medical Problem   Location:  Lower abdomen  Quality:  Crampy pain  Severity:  Moderate  Onset quality:  Gradual  Timing:  Intermittent  Progression:  Unchanged  Chronicity:  New  Context:  Lower abdominal pain with difficulty urinating and having a bowel movement over the past several days  Relieved by:  Nothing  Associated symptoms: abdominal pain    Associated symptoms: no fever        Prior to Admission Medications   Prescriptions Last Dose Informant Patient Reported? Taking?    MITIGARE 0 6 MG CAPS  Self No No   Sig: take 1 capsule by mouth four times a day if needed   Multiple Vitamin (MULTIVITAMIN) capsule  Self Yes No   Sig: Take 1 capsule by mouth daily   SANTYL ointment  Self Yes No   aspirin (ECOTRIN LOW STRENGTH) 81 mg EC tablet  Self Yes No   Sig: Take 81 mg by mouth every other day 1 every other day   atorvastatin (LIPITOR) 40 mg tablet   No No   Sig: Take 1 tablet (40 mg total) by mouth every evening   calcium polycarbophil (FIBERCON) 625 mg tablet  Self Yes No   Sig: Take 625 mg by mouth daily   clopidogrel (PLAVIX) 75 mg tablet   No No   Sig: Take 1 tablet (75 mg total) by mouth daily   clotrimazole-betamethasone (LOTRISONE) 1-0 05 % cream  Self No No   Sig: apply sparingly to affected area twice a day   diclofenac sodium (VOLTAREN) 1 %   No No   Sig: Apply 2 g topically 4 (four) times a day   fluticasone (FLONASE) 50 mcg/act nasal spray   No No   Si sprays into each nostril daily   lisinopril (ZESTRIL) 10 mg tablet   No No   Sig: Take 1 tablet (10 mg total) by mouth daily   pseudoephedrine (SUDAFED) 30 mg tablet   No No   Sig: Take 2 tablets (60 mg total) by mouth every 8 (eight) hours as needed for congestion   triamterene-hydrochlorothiazide (MAXZIDE) 75-50 MG per tablet   No No   Sig: take 1 tablet by mouth once daily      Facility-Administered Medications: None       Past Medical History:   Diagnosis Date    Anemia     iron def    Arthritis     GERD (gastroesophageal reflux disease)     Gout     Hypertension     Insomnia     Kidney stone     Stroke (cerebrum) [facility])        Past Surgical History:   Procedure Laterality Date    ANKLE SURGERY Right     COLONOSCOPY  01/25/2013    polypectomy; complete - 3 yrs d/t polyp - benign submucosal lipoma- path c/w lipoma    COLONOSCOPY  04/25/2017    complete - tubular adenoma - repeat 5yrs    CYSTOSCOPY      CYSTOTOMY      bladder  w/ basket extraction of calculus    FEMUR FRACTURE SURGERY      HERNIA REPAIR      inguinal ; sliding    INGUINAL HERNIA REPAIR Right     unilateral    KNEE ARTHROSCOPY Right     w/medial menisectomy    LASIK      corneal    LITHOTRIPSY      renal    NM COLONOSCOPY FLX DX W/COLLJ SPEC WHEN PFRMD N/A 4/25/2017    Procedure: SCREENING COLONOSCOPY;  Surgeon: Roseann Martel MD;  Location:  MAIN OR;  Service: General    NM THROMBOENDARTECTMY Dene Records Right 1/10/2020    Procedure: ENDARTERECTOMY ARTERY CAROTID;  Surgeon: Kenna Pickard MD;  Location:  MAIN OR;  Service: Vascular    ROTATOR CUFF REPAIR Bilateral     TONSILLECTOMY         Family History   Problem Relation Age of Onset    Alzheimer's disease Mother     Alzheimer's disease Father     Heart disease Father         CAD    Other Father         prediabetes    Diabetes Father     Breast cancer Other     Arthritis Family     Hypertension Family      I have reviewed and agree with the history as documented      E-Cigarette/Vaping    E-Cigarette Use Never User      E-Cigarette/Vaping Substances    Nicotine No     THC No     CBD No     Flavoring No     Other No     Unknown No      Social History     Tobacco Use    Smoking status: Former Smoker     Packs/day:  00     Years: 22 00     Pack years: 22 00     Last attempt to quit:      Years since quittin 6    Smokeless tobacco: Never Used   Substance Use Topics    Alcohol use: Not Currently     Comment: stopped drinking alcohol    Drug use: No       Review of Systems   Constitutional: Negative for fever  Gastrointestinal: Positive for abdominal pain and constipation  Genitourinary: Positive for difficulty urinating  All other systems reviewed and are negative  Physical Exam  Physical Exam  Vitals signs and nursing note reviewed  Constitutional:       General: He is not in acute distress  Appearance: He is not ill-appearing, toxic-appearing or diaphoretic  HENT:      Head: Normocephalic and atraumatic  Right Ear: Tympanic membrane, ear canal and external ear normal  There is no impacted cerumen  Left Ear: Tympanic membrane, ear canal and external ear normal  There is no impacted cerumen  Eyes:      General: No scleral icterus  Right eye: No discharge  Left eye: No discharge  Extraocular Movements: Extraocular movements intact  Pupils: Pupils are equal, round, and reactive to light  Neck:      Musculoskeletal: Normal range of motion  No neck rigidity or muscular tenderness  Cardiovascular:      Rate and Rhythm: Normal rate and regular rhythm  Pulses: Normal pulses  Heart sounds: No murmur  No friction rub  No gallop  Pulmonary:      Effort: Pulmonary effort is normal  No respiratory distress  Breath sounds: No stridor  No wheezing, rhonchi or rales  Chest:      Chest wall: No tenderness  Abdominal:      General: Abdomen is flat  There is no distension  There are no signs of injury        Palpations: Abdomen is soft       Tenderness: There is abdominal tenderness (Mild suprapubic)  There is no guarding or rebound  Musculoskeletal: Normal range of motion  General: No swelling, tenderness, deformity or signs of injury  Right lower leg: No edema  Left lower leg: No edema  Skin:     General: Skin is warm and dry  Findings: No erythema or rash  Neurological:      General: No focal deficit present  Mental Status: He is alert and oriented to person, place, and time  Cranial Nerves: No cranial nerve deficit  Sensory: No sensory deficit  Coordination: Coordination normal    Psychiatric:         Mood and Affect: Mood normal          Behavior: Behavior normal          Thought Content:  Thought content normal          Vital Signs  ED Triage Vitals   Temperature Pulse Respirations Blood Pressure SpO2   08/11/20 1624 08/11/20 1624 08/11/20 1624 08/11/20 1627 08/11/20 1624   (!) 97 3 °F (36 3 °C) 69 20 143/77 98 %      Temp Source Heart Rate Source Patient Position - Orthostatic VS BP Location FiO2 (%)   08/11/20 1624 08/11/20 1624 08/11/20 1624 08/11/20 1624 --   Tympanic Monitor Sitting Right arm       Pain Score       08/11/20 1624       5           Vitals:    08/11/20 1624 08/11/20 1627   BP:  143/77   Pulse: 69    Patient Position - Orthostatic VS: Sitting          Visual Acuity      ED Medications  Medications   polyethylene glycol (MIRALAX) packet 17 g (has no administration in time range)   sodium phosphate-biphosphate (FLEET) enema 1 enema (has no administration in time range)       Diagnostic Studies  Results Reviewed     Procedure Component Value Units Date/Time    Comprehensive metabolic panel [[id number]]  (Abnormal) Collected:  08/11/20 1717    Lab Status:  Final result Specimen:  Blood from Arm, Left Updated:  08/11/20 1744     Sodium 139 mmol/L      Potassium 4 1 mmol/L      Chloride 103 mmol/L      CO2 27 mmol/L      ANION GAP 9 mmol/L      BUN 26 mg/dL      Creatinine 1 38 mg/dL      Glucose 119 mg/dL      Calcium 9 3 mg/dL      AST 23 U/L      ALT 26 U/L      Alkaline Phosphatase 44 U/L      Total Protein 7 8 g/dL      Albumin 4 3 g/dL      Total Bilirubin 0 30 mg/dL      eGFR 50 ml/min/1 73sq m     Narrative:       National Kidney Disease Foundation guidelines for Chronic Kidney Disease (CKD):     Stage 1 with normal or high GFR (GFR > 90 mL/min/1 73 square meters)    Stage 2 Mild CKD (GFR = 60-89 mL/min/1 73 square meters)    Stage 3A Moderate CKD (GFR = 45-59 mL/min/1 73 square meters)    Stage 3B Moderate CKD (GFR = 30-44 mL/min/1 73 square meters)    Stage 4 Severe CKD (GFR = 15-29 mL/min/1 73 square meters)    Stage 5 End Stage CKD (GFR <15 mL/min/1 73 square meters)  Note: GFR calculation is accurate only with a steady state creatinine    UA w Reflex to Microscopic w Reflex to Culture [[id number]] Collected:  08/11/20 1718    Lab Status:  Final result Specimen:  Urine, Clean Catch Updated:  08/11/20 1728     Color, UA Yellow     Clarity, UA Clear     Specific [place], UA 1 020     pH, UA 7 0     Leukocytes, UA Negative     Nitrite, UA Negative     Protein, UA Negative mg/dl      Glucose, UA Negative mg/dl      Ketones, UA Negative mg/dl      Urobilinogen, UA 1 0 E U /dl      Bilirubin, UA Negative     Blood, UA Negative    CBC and differential [[id number]]  (Abnormal) Collected:  08/11/20 1717    Lab Status:  Final result Specimen:  Blood from Arm, Left Updated:  08/11/20 1728     WBC 10 45 Thousand/uL      RBC 4 17 Million/uL      Hemoglobin 11 0 g/dL      Hematocrit 34 9 %      MCV 84 fL      MCH 26 4 pg      MCHC 31 5 g/dL      RDW 13 2 %      MPV 9 1 fL      Platelets 161 Thousands/uL      nRBC 0 /100 WBCs      Neutrophils Relative 80 %      Immat GRANS % 0 %      Lymphocytes Relative 11 %      Monocytes Relative 7 %      Eosinophils Relative 2 %      Basophils Relative 0 %      Neutrophils Absolute 8 26 Thousands/µL      Immature Grans Absolute 0 04 Thousand/uL Lymphocytes Absolute 1 17 Thousands/µL      Monocytes Absolute 0 76 Thousand/µL      Eosinophils Absolute 0 19 Thousand/µL      Basophils Absolute 0 03 Thousands/µL                  CT abdomen pelvis wo contrast   Final Result by Kamille Martinez MD (08/11 1716)         1  Moderate left hydronephrosis and hydroureter  Two  obstructing calculi in the distal ureter at the level of the acetabular roof, each measuring 7 mm  2   Findings compatible with constipation  Moderate hiatal hernia  Workstation performed: [id number]                    Procedures  Procedures         ED Course  ED Course as of Aug 11 1855   Tue Aug 11, 2020   1851 Discussed case with PA covering Urology Andres Goodman  At this time patient is comfortable and is okay with follow-up as an outpatient will likely need stent placement  He was instructed to return if pain worsens or he develops a fever otherwise call urology office 1st thing in the morning  US AUDIT      Most Recent Value   Initial Alcohol Screen: US AUDIT-C    1  How often do you have a drink containing alcohol?  0 Filed at: 08/11/2020 1625   2  How many drinks containing alcohol do you have on a typical day you are drinking? 0 Filed at: 08/11/2020 1625   3a  Male UNDER 65: How often do you have five or more drinks on one occasion? 0 Filed at: 08/11/2020 1625   3b  FEMALE Any Age, or MALE 65+: How often do you have 4 or more drinks on one occassion? 0 Filed at: 08/11/2020 1625   Audit-C Score  0 Filed at: 08/11/2020 1625                  JERONIMO/DAST-10      Most Recent Value   How many times in the past year have you    Used an illegal drug or used a prescription medication for non-medical reasons?   Never Filed at: 08/11/2020 1625                                MDM  Number of Diagnoses or Management Options  Diagnosis management comments: Lower abdominal pain with constipation and difficulty urinating will check labs and CT scan rule out obstruction versus kidney stone or diverticulitis       Amount and/or Complexity of Data Reviewed  Clinical lab tests: ordered  Tests in the radiology section of CPT®: ordered          Disposition  Final diagnoses:   Kidney stone on left side   Constipation     Time reflects when diagnosis was documented in both MDM as applicable and the Disposition within this note     Time User Action Codes Description Comment    8/11/2020  6:24 PM Giovanny Guerra Add [N20 0] Kidney stone on left side     8/11/2020  6:24 PM Giovanny Guerra Add [K59 00] Constipation       ED Disposition     ED Disposition Condition Date/Time Comment    Discharge Stable Tue Aug 11, 2020  6:55 PM Christine Richey  discharge to home/self care  Follow-up Information     Follow up With Specialties Details Why Contact Info    Michele Caro MD Urology In 1 day Call tomorrow to arrange close follow-up [address] [place] [address], [address] [place] [address] Internal Medicine, Family Medicine In 1 week Constipation evaluation [place]  [address] [place] [address]  [phone]            Patient's Medications   Discharge Prescriptions    No medications on file     No discharge procedures on file      PDMP Review       Value Time User    PDMP Reviewed  Yes 1/6/2020  4:37 PM Leana Ly PA-C          ED Provider  Electronically Signed by           Tanja Mcclain DO  08/11/20 5854

## 2020-08-11 NOTE — TELEPHONE ENCOUNTER
Pt had a bowel movement yesterday morning, was a small amount  Took a laxative last night, and he went a tiny bit, but is having pain in his lower abdomen and in his kidney area  Is asking if there is anything else he can do?

## 2020-08-11 NOTE — TELEPHONE ENCOUNTER
PATIENT TOOK LAXATIVE LAST NIGHT WITH [facility] - PATIENT COMPLAINT OF SEVERE PAIN IN ABDOMEN - IS THERE ANYTHING ELSE HE CAN TRY?   PLEASE CALL BACK

## 2020-08-11 NOTE — TELEPHONE ENCOUNTER
Pt aware  Said pain is getting worse  Did not want to come in to office for appointment, said he will go to ER for treatment

## 2020-08-11 NOTE — TELEPHONE ENCOUNTER
Laxatives can take 3 days to work - would recommend MiraLax or MOM daily x 3-4 days and high fiber diet and plenty of fluids and can try prune juice,  If pan in lower abd and flank then have to also be concerned to urinary infection or kidney stone - keep hydrated and if symptoms persist he needs to be seen in the office for further eval

## 2020-08-12 ENCOUNTER — TELEPHONE (OUTPATIENT)
Dept: UROLOGY | Facility: MEDICAL CENTER | Age: 75
End: 2020-08-12

## 2020-08-12 ENCOUNTER — OFFICE VISIT (OUTPATIENT)
Dept: UROLOGY | Facility: HOSPITAL | Age: 75
End: 2020-08-12
Payer: COMMERCIAL

## 2020-08-12 VITALS
BODY MASS INDEX: 31.5 KG/M2 | HEIGHT: 70 IN | TEMPERATURE: 97.4 F | DIASTOLIC BLOOD PRESSURE: 60 MMHG | SYSTOLIC BLOOD PRESSURE: 120 MMHG | WEIGHT: 220 LBS | HEART RATE: 82 BPM

## 2020-08-12 DIAGNOSIS — N20.1 URETERAL CALCULI: ICD-10-CM

## 2020-08-12 DIAGNOSIS — N20.0 NEPHROLITHIASIS: Primary | ICD-10-CM

## 2020-08-12 PROCEDURE — 99214 OFFICE O/P EST MOD 30 MIN: CPT | Performed by: NURSE PRACTITIONER

## 2020-08-12 PROCEDURE — 4040F PNEUMOC VAC/ADMIN/RCVD: CPT | Performed by: NURSE PRACTITIONER

## 2020-08-12 PROCEDURE — 1160F RVW MEDS BY RX/DR IN RCRD: CPT | Performed by: NURSE PRACTITIONER

## 2020-08-12 PROCEDURE — 3008F BODY MASS INDEX DOCD: CPT | Performed by: NURSE PRACTITIONER

## 2020-08-12 PROCEDURE — 3074F SYST BP LT 130 MM HG: CPT | Performed by: NURSE PRACTITIONER

## 2020-08-12 PROCEDURE — 3078F DIAST BP <80 MM HG: CPT | Performed by: NURSE PRACTITIONER

## 2020-08-12 PROCEDURE — 1036F TOBACCO NON-USER: CPT | Performed by: NURSE PRACTITIONER

## 2020-08-12 RX ORDER — CEFAZOLIN SODIUM 2 G/50ML
2000 SOLUTION INTRAVENOUS ONCE
Status: CANCELLED | OUTPATIENT
Start: 2020-08-12 | End: 2020-08-12

## 2020-08-12 NOTE — TELEPHONE ENCOUNTER
Called patient and he states used miralax and an enema not been able to have a bowel movement yet  Had a Ct scan in ER that shows 2 obstructing stone in left ureter 7mm each with hydronephrosis   Scheduled for an appointment today

## 2020-08-12 NOTE — TELEPHONE ENCOUNTER
This is a patient of Dr Parul Silverio and seen by Reinaldo Villegas in [address] [place] [address]  Patient called and was in the ER yesterday for kidney stone  Please call patient back at [phone]  Patient said he was told by ER that he should be seen today

## 2020-08-12 NOTE — PROGRESS NOTES
8/12/2020    Judit Andres   1945  [id number]      Assessment  -Ureteral calculi  -History of elevated PSA    Discussion/Plan  Franco Walker is a 76 y o  male who presents in consultation    1  Ureteral calculus- we reviewed the results of his recent CT scan which identified 2 obstructing left ureteral calculi measuring 7 mm each with moderate hydronephrosis  Given the size and location of stones, we discussed proceeding with surgical intervention  Fortunately, he is asymptomatic  Case requested for left-sided ureteroscopy, holmium laser lithotripsy, retrograde pyelogram, and ureteral stent  We did discuss that this may be a staged procedure  Surgical coordinator will be in contact with patient to arrange  Surgical consent to be signed by an MD Martinez screen negative  He denies any significant cardiac history  We reviewed ER precautions and he was instructed to call with any issues  -All questions answered, patient agrees with plan      History of Present Illness  76 y o  male with a history of nephrolithiasis and elevated PSA presents today for follow up  Patient last seen January 2020  At that time, his PSA had decreased from 4 1 to 1 9 and it was recommend follow-up as needed  He recently developed acute onset left-sided flank pain  Patient presented to ER for further evaluation  He was found to have 2 obstructing left distal ureteral calculi measuring 7 mm each with moderate hydronephrosis  Creatinine 1 38, WBC 10 45, and patient afebrile  He states pain has completely resolved, although he states he has not passed stones  Patient denies any lower urinary tract symptoms, gross hematuria, or dysuria  He denies any additional symptoms such as fever, chills, nausea, or vomiting  Patient reports he remote history of nephrolithiasis and underwent placement of a ureteral stent in the late 80s  Review of Systems  Review of Systems   Constitutional: Negative  HENT: Negative      Respiratory: Negative  Cardiovascular: Negative  Gastrointestinal: Negative  Genitourinary: Negative for decreased urine volume, difficulty urinating, dysuria, flank pain, frequency, hematuria and urgency  Musculoskeletal: Negative  Skin: Negative  Neurological: Negative  Psychiatric/Behavioral: Negative  Past Medical History  Past Medical History:   Diagnosis Date    Anemia     iron def    Arthritis     GERD (gastroesophageal reflux disease)     Gout     Hypertension     Insomnia     Kidney stone     Stroke (cerebrum) [facility])        Past Social History  Past Surgical History:   Procedure Laterality Date    ANKLE SURGERY Right     COLONOSCOPY  01/25/2013    polypectomy; complete - 3 yrs d/t polyp - benign submucosal lipoma- path c/w lipoma    COLONOSCOPY  04/25/2017    complete - tubular adenoma - repeat 5yrs    CYSTOSCOPY      CYSTOTOMY      bladder  w/ basket extraction of calculus    FEMUR FRACTURE SURGERY      HERNIA REPAIR      inguinal ; sliding    INGUINAL HERNIA REPAIR Right     unilateral    KNEE ARTHROSCOPY Right     w/medial menisectomy    LASIK      corneal    LITHOTRIPSY      renal    NM COLONOSCOPY FLX DX W/COLLJ SPEC WHEN PFRMD N/A 4/25/2017    Procedure: SCREENING COLONOSCOPY;  Surgeon: Stefan Godfrey MD;  Location: [facility];  Service: General    NM THROMBOENDARTECTMY [place] Right 1/10/2020    Procedure: ENDARTERECTOMY ARTERY CAROTID;  Surgeon: Flor Rocha MD;  Location:  MAIN OR;  Service: Vascular    ROTATOR CUFF REPAIR Bilateral     TONSILLECTOMY         Past Family History  Family History   Problem Relation Age of Onset    Alzheimer's disease Mother     Alzheimer's disease Father     Heart disease Father         CAD    Other Father         prediabetes    Diabetes Father     Breast cancer Other     Arthritis Family     Hypertension Family        Past Social history  Social History     Socioeconomic History    Marital status:   Spouse name: Not on file    Number of children: 1    Years of education: Not on file    Highest education level: Not on file   Occupational History    Occupation: Retired     Comment: working full time   Social Needs    Financial resource strain: Not on file    Food insecurity     Worry: Not on file     Inability: Not on file   [employer] needs     Medical: Not on file     Non-medical: Not on file   Tobacco Use    Smoking status: Former Smoker     Packs/day: 1 00     Years: 22 00     Pack years: 22 00     Last attempt to quit:      Years since quittin 6    Smokeless tobacco: Never Used   Substance and Sexual Activity    Alcohol use: Not Currently     Comment: stopped drinking alcohol    Drug use: No    Sexual activity: Not Currently   Lifestyle    Physical activity     Days per week: Not on file     Minutes per session: Not on file    Stress: Not on file   Relationships    Social connections     Talks on phone: Not on file     Gets together: Not on file     Attends Voodoo service: Not on file     Active member of club or organization: Not on file     Attends meetings of clubs or organizations: Not on file     Relationship status: Not on file    Intimate partner violence     Fear of current or ex partner: Not on file     Emotionally abused: Not on file     Physically abused: Not on file     Forced sexual activity: Not on file   Other Topics Concern    Not on file   Social History Narrative    , lives alone, working full time       Current Medications  Current Outpatient Medications   Medication Sig Dispense Refill    aspirin (ECOTRIN LOW STRENGTH) 81 mg EC tablet Take 81 mg by mouth every other day 1 every other day      atorvastatin (LIPITOR) 40 mg tablet Take 1 tablet (40 mg total) by mouth every evening 90 tablet 1    calcium polycarbophil (FIBERCON) 625 mg tablet Take 625 mg by mouth daily      clopidogrel (PLAVIX) 75 mg tablet Take 1 tablet (75 mg total) by mouth daily 90 tablet 1    clotrimazole-betamethasone (LOTRISONE) 1-0 05 % cream apply sparingly to affected area twice a day 45 g 1    diclofenac sodium (VOLTAREN) 1 % Apply 2 g topically 4 (four) times a day 100 g 1    fluticasone (FLONASE) 50 mcg/act nasal spray 2 sprays into each nostril daily 1 Bottle 5    lisinopril (ZESTRIL) 10 mg tablet Take 1 tablet (10 mg total) by mouth daily 90 tablet 1    MITIGARE 0 6 MG CAPS take 1 capsule by mouth four times a day if needed 100 capsule 3    Multiple Vitamin (MULTIVITAMIN) capsule Take 1 capsule by mouth daily      pseudoephedrine (SUDAFED) 30 mg tablet Take 2 tablets (60 mg total) by mouth every 8 (eight) hours as needed for congestion 30 tablet 0    SANTYL ointment       triamterene-hydrochlorothiazide (MAXZIDE) 75-50 MG per tablet take 1 tablet by mouth once daily 90 tablet 1     No current facility-administered medications for this visit  Allergies  No Known Allergies    Past Medical History, Social History, Family History, medications and allergies were reviewed  Vitals  Vitals:    08/12/20 1012   BP: 120/60   BP Location: Left arm   Patient Position: Sitting   Cuff Size: Adult   Pulse: 82   Temp: (!) 97 4 °F (36 3 °C)   Weight: 99 8 kg (220 lb)   Height: 5' 10" (1 778 m)       Physical Exam  Physical Exam  Constitutional:       Appearance: Normal appearance  He is well-developed  HENT:      Head: Normocephalic  Eyes:      Pupils: Pupils are equal, round, and reactive to light  Neck:      Musculoskeletal: Normal range of motion  Cardiovascular:      Rate and Rhythm: Normal rate and regular rhythm  Pulses: Normal pulses  Heart sounds: Normal heart sounds  No murmur  No friction rub  No gallop  Pulmonary:      Effort: Pulmonary effort is normal  No respiratory distress  Breath sounds: Normal breath sounds  No stridor  No wheezing, rhonchi or rales  Chest:      Chest wall: No tenderness     Abdominal:      Palpations: Abdomen is soft  Genitourinary:     Comments: Negative CVA tenderness  Musculoskeletal: Normal range of motion  Skin:     General: Skin is warm and dry  Neurological:      General: No focal deficit present  Mental Status: He is alert and oriented to person, place, and time  Psychiatric:         Mood and Affect: Mood normal          Behavior: Behavior normal          Thought Content: Thought content normal          Judgment: Judgment normal          Results    I have personally reviewed all pertinent lab results and reviewed with patient  Lab Results   Component Value Date    PSA 1 9 01/15/2020    PSA 4 1 (H) 06/11/2019    PSA 4 1 (H) 12/11/2018     Lab Results   Component Value Date    GLUCOSE 112 05/14/2015    CALCIUM 9 3 08/11/2020     05/14/2015    K 4 1 08/11/2020    CO2 27 08/11/2020     08/11/2020    BUN 26 (H) 08/11/2020    CREATININE 1 38 (H) 08/11/2020     Lab Results   Component Value Date    WBC 10 45 (H) 08/11/2020    HGB 11 0 (L) 08/11/2020    HCT 34 9 (L) 08/11/2020    MCV 84 08/11/2020     08/11/2020     No results found for this or any previous visit (from the past 1 hour(s))

## 2020-08-12 NOTE — TELEPHONE ENCOUNTER
Patient under impression he is having surgery for 08/13 and is concerned he has not received a call

## 2020-08-12 NOTE — PATIENT INSTRUCTIONS
Ureteroscopy   WHAT YOU NEED TO KNOW:   A ureteroscopy is a procedure to examine in the inside of your urinary tract, which includes your urethra, bladder, ureters, and kidneys  A ureteroscope is a small, thin tube with a light and camera on the end  Ureteroscopy can help your healthcare provider diagnose and treat problems in your urinary tract, such as kidney stones  DISCHARGE INSTRUCTIONS:   Medicine:   · Antibiotics  may be given to treat or prevent an infection  · Take your medicine as directed  Contact your healthcare provider if you think your medicine is not helping or if you have side effects  Tell him or her if you are allergic to any medicine  Keep a list of the medicines, vitamins, and herbs you take  Include the amounts, and when and why you take them  Bring the list or the pill bottles to follow-up visits  Carry your medicine list with you in case of an emergency  Follow up with your healthcare provider as directed:  Write down your questions so you remember to ask them during your visits  Drink liquids as directed  Liquids can help prevent kidney stones and urinary tract infections  Drink water and limit the amount of caffeine you drink  Caffeine may be found in coffee, tea, soda, sports drinks, and foods  Ask your healthcare provider how much liquid to drink each day  Contact your healthcare provider if:   · You have a fever  · You cannot urinate  · You have blood in your urine  · You are vomiting  · You have pain in your abdomen or side  · You have questions or concerns about your condition or care  © 2017 [employer] Information is for End User's use only and may not be sold, redistributed or otherwise used for commercial purposes  All illustrations and images included in CareNotes® are the copyrighted property of A [employer] M [employer]  or [employer]  The above information is an  only   It is not intended as medical advice for individual conditions or treatments  Talk to your doctor, nurse or pharmacist before following any medical regimen to see if it is safe and effective for you

## 2020-08-13 ENCOUNTER — TELEPHONE (OUTPATIENT)
Dept: UROLOGY | Facility: AMBULATORY SURGERY CENTER | Age: 75
End: 2020-08-13

## 2020-08-13 ENCOUNTER — HOSPITAL ENCOUNTER (OUTPATIENT)
Facility: HOSPITAL | Age: 75
Setting detail: OUTPATIENT SURGERY
End: 2020-08-13
Attending: UROLOGY | Admitting: UROLOGY
Payer: COMMERCIAL

## 2020-08-13 NOTE — TELEPHONE ENCOUNTER
Confirmed 9/18 with Dr Marin Varela @ [facility]  Instructions and testing reviewed  Paperwork sent

## 2020-08-20 DIAGNOSIS — I10 HYPERTENSION, UNSPECIFIED TYPE: ICD-10-CM

## 2020-08-20 RX ORDER — TRIAMTERENE AND HYDROCHLOROTHIAZIDE 75; 50 MG/1; MG/1
TABLET ORAL
Qty: 90 TABLET | Refills: 1 | Status: SHIPPED | OUTPATIENT
Start: 2020-08-20 | End: 2021-01-08

## 2020-08-26 ENCOUNTER — TELEPHONE (OUTPATIENT)
Dept: UROLOGY | Facility: MEDICAL CENTER | Age: 75
End: 2020-08-26

## 2020-08-26 DIAGNOSIS — N20.0 NEPHROLITHIASIS: Primary | ICD-10-CM

## 2020-08-26 NOTE — TELEPHONE ENCOUNTER
Patient of [facility]/[place] office  History of ureteral calculus  Patient last seen by [facility] on 8/12/2020  They reviewed results of his recent CT scan which identified 2 obstructing left ureteral calculi measuring 7 mm each with moderate hydronephrosis  Patient scheduled to have CYSTOSCOPY URETEROSCOPY WITH LITHOTRIPSY HOLMIUM LASER, RETROGRADE PYELOGRAM AND INSERTION STENT URETERAL (Left Bladder) 9/18/2020 with Dr Samia Blair  Patient now calling to advise that he has passed a stone and collected it  Would like to know if he should have any imaging to assess if surgery is still necessary at this time  Advised will route to provider to advise on plan

## 2020-08-26 NOTE — TELEPHONE ENCOUNTER
Call placed to patient, left detailed message per communication consent form to advise per provider that KUB and renal US are recommended for reassessment  Central scheduling number provided on voicemail  Orders are in [address]  Also advised that stone can be taken to lab in clean, dry container for stone analysis  That order is also in [facility]  Office to call with results of imaging  Office number provided on voicemail for any questions

## 2020-08-26 NOTE — TELEPHONE ENCOUNTER
Patient of Dr Lopez Mt seen in [place] office  Patient calling because he passed a stone and believes he may not need surgery  Patient would like to know what to do with stone  Please advise

## 2020-09-02 RX ORDER — SODIUM CHLORIDE, SODIUM LACTATE, POTASSIUM CHLORIDE, CALCIUM CHLORIDE 600; 310; 30; 20 MG/100ML; MG/100ML; MG/100ML; MG/100ML
75 INJECTION, SOLUTION INTRAVENOUS CONTINUOUS
Status: CANCELLED | OUTPATIENT
Start: 2020-09-02

## 2020-09-03 ENCOUNTER — TELEPHONE (OUTPATIENT)
Dept: UROLOGY | Facility: AMBULATORY SURGERY CENTER | Age: 75
End: 2020-09-03

## 2020-09-03 NOTE — TELEPHONE ENCOUNTER
Second call placed to patient, left detailed message per communication consent form to advise per provider that KUB and renal US are recommended for reassessment  Central scheduling number provided on voicemail  Orders are in [place]  Also advised that stone can be taken to lab in clean, dry container for stone analysis  That order is also in [place]  Office to call with results of imaging  Office number provided on voicemail for any questions

## 2020-09-03 NOTE — TELEPHONE ENCOUNTER
BLAS Lomas 3 minutes ago (4:04 PM)          Patient reportedly passed 2 stones  Follow-up KUB and renal ultrasound remain pending  He should proceed with imaging prior to his scheduled surgery on 09/18/2020

## 2020-09-03 NOTE — TELEPHONE ENCOUNTER
Thank you! I did look and see you had already notified Patient to have imaging  It is not completed as of today  I appreciate you calling him again

## 2020-09-03 NOTE — TELEPHONE ENCOUNTER
Patient reportedly passed 2 stones  Follow-up KUB and renal ultrasound remain pending  He should proceed with imaging prior to his scheduled surgery on 09/18/2020

## 2020-09-04 NOTE — TELEPHONE ENCOUNTER
Unfortunately no appt openings prior to 9/18 for office visit to review imaging results  Will plan to review the results over the phone and discuss change to plan if needed at that time  Called and left a message for patient that we are aware of his us scheduled tomorrow and will plan to fu over the phone next week as results become available  If he has not heard from the office by late next week he is to call back  Call with any questions or concerns

## 2020-09-04 NOTE — TELEPHONE ENCOUNTER
LMOM to follow up about imaging needed before surgery  If Patient doesn't have imaging is he to be r/s until it is completed? Please advise

## 2020-09-05 ENCOUNTER — HOSPITAL ENCOUNTER (OUTPATIENT)
Dept: ULTRASOUND IMAGING | Facility: HOSPITAL | Age: 75
Discharge: HOME | End: 2020-09-05
Payer: COMMERCIAL

## 2020-09-05 DIAGNOSIS — N20.0 NEPHROLITHIASIS: ICD-10-CM

## 2020-09-05 PROCEDURE — 51798 US URINE CAPACITY MEASURE: CPT

## 2020-09-08 NOTE — TELEPHONE ENCOUNTER
Called Radiology RR spoke to Mamie Noriega U/S to be read today  Looks like Patient did not have KUB

## 2020-09-09 ENCOUNTER — APPOINTMENT (OUTPATIENT)
Dept: LAB | Facility: HOSPITAL | Age: 75
End: 2020-09-09
Payer: COMMERCIAL

## 2020-09-09 PROCEDURE — 82360 CALCULUS ASSAY QUANT: CPT

## 2020-09-09 NOTE — TELEPHONE ENCOUNTER
CHAPIN for matthewp that he has no hydronephrosis and wondering if he noted that he passed a stone and if has he having and discomfort

## 2020-09-09 NOTE — TELEPHONE ENCOUNTER
Patient's ultrasound is negative for evidence of residual obstruction (no hydronephrosis, bilateral ureteral jets detected  If patient has passed stones and is asymptomatic at this time, reasonable to cancel his upcoming surgery and reschedule for 1 year with KUB and ultrasound prior to visit

## 2020-09-10 ENCOUNTER — TELEPHONE (OUTPATIENT)
Dept: FAMILY MEDICINE CLINIC | Facility: HOSPITAL | Age: 75
End: 2020-09-10

## 2020-09-10 NOTE — TELEPHONE ENCOUNTER
Pt stopped by the office asking for an appt   Pt has a     cough chest congestion  Gets a little dizzy when     coughing    Do you want a virtual or office visit   Please     advise    thanks

## 2020-09-11 ENCOUNTER — OFFICE VISIT (OUTPATIENT)
Dept: FAMILY MEDICINE CLINIC | Facility: HOSPITAL | Age: 75
End: 2020-09-11
Payer: COMMERCIAL

## 2020-09-11 VITALS
HEART RATE: 68 BPM | SYSTOLIC BLOOD PRESSURE: 128 MMHG | BODY MASS INDEX: 31.9 KG/M2 | OXYGEN SATURATION: 98 % | DIASTOLIC BLOOD PRESSURE: 76 MMHG | WEIGHT: 222.8 LBS | HEIGHT: 70 IN | TEMPERATURE: 96.9 F

## 2020-09-11 DIAGNOSIS — I10 ESSENTIAL HYPERTENSION: Chronic | ICD-10-CM

## 2020-09-11 DIAGNOSIS — R05.9 COUGH: ICD-10-CM

## 2020-09-11 DIAGNOSIS — R06.2 WHEEZING: Primary | ICD-10-CM

## 2020-09-11 PROCEDURE — 99214 OFFICE O/P EST MOD 30 MIN: CPT | Performed by: INTERNAL MEDICINE

## 2020-09-11 RX ORDER — DOXYCYCLINE HYCLATE 100 MG/1
100 CAPSULE ORAL EVERY 12 HOURS SCHEDULED
Qty: 14 CAPSULE | Refills: 0 | Status: SHIPPED | OUTPATIENT
Start: 2020-09-11 | End: 2020-09-18

## 2020-09-11 RX ORDER — ALBUTEROL SULFATE 90 UG/1
2 AEROSOL, METERED RESPIRATORY (INHALATION) EVERY 6 HOURS PRN
Qty: 8.5 G | Refills: 1 | Status: SHIPPED | OUTPATIENT
Start: 2020-09-11 | End: 2020-10-14 | Stop reason: ALTCHOICE

## 2020-09-11 RX ORDER — FLUOROURACIL 50 MG/G
CREAM TOPICAL
COMMUNITY
Start: 2020-08-31 | End: 2020-12-15 | Stop reason: ALTCHOICE

## 2020-09-11 NOTE — PROGRESS NOTES
Assessment/Plan:    Hypertension  BP better by end of visit - con't current meds for now - re-eval at regular appt in Dec       Diagnoses and all orders for this visit:    Wheezing and Cough  Comments: With abnormal exam (wheezing) and cough will start Doxy 100 mg bid,   Rest/fluids/lozenges/hot tea/garles and OTC decongestant and cough medication was recommended; d/w pt that cough can last 4-6 wks but call with new/worsening symptoms, encouraged to check temp and to call w/any fever or any other COVID symptoms - will defer testing at this time d/t low likelihood of COVID - s/sx reviewed in detail and urged to call if they occur, call if no better at all by end of abx OR with new/worse symptoms/fever  Orders:  -     doxycycline hyclate (VIBRAMYCIN) 100 mg capsule; Take 1 capsule (100 mg total) by mouth every 12 (twelve) hours for 7 days  -     albuterol (ProAir HFA) 90 mcg/act inhaler; Inhale 2 puffs every 6 (six) hours as needed for wheezing or shortness of breath      Essential hypertension    Other orders  -     fluorouracil (EFUDEX) 5 % cream; APPLY TO ROUGH SPOTS ON FACE 2 TIMES A DAY FOR 2 WEEKS          Subjective:      Patient ID: Matthew Zamora  is a 76 y o  male  HPI  Pt here with c/o dry cough x 2 days  He notes no congestion or runny nose or ST or ear pain  He notes no SOB or wheezing with the cough  He notes no chills and has not checked for a temp  He notes no N/V/D/abd pain/rashes/red eye/dec appetite/loss of taste or smell  He has had no recent travel or known COVID exposure  He wears a mask when he goes out  He has no h/o asthma/COPD/CONCHITA  He quit smoking in the 1960's  He has tried Mucinex Fast Max for Severe congestion and cough and Flonase daily as well  He is on Lisinopril 10 mg PO q day  He notes no reflux/indegestion  Bp elevated on presentation today  He is taking his lisinopril daily as directed  He notes no frequent HA's/dizziness/double vision    He does check his BP at home and it is usually 120-130's/80's  BP better by end of appt on recheck by myself  Colonoscopy 1/13- 5 yrs - no further needed after age 76    BW 6/20 - has order to repeat 12/20      Review of Systems   Constitutional: Negative for chills, fatigue and fever  HENT: Negative for congestion, ear pain, postnasal drip, rhinorrhea, sinus pain and sore throat  Eyes: Positive for discharge  Negative for pain and redness  Respiratory: Positive for cough  Negative for chest tightness, shortness of breath and wheezing  Cardiovascular: Positive for chest pain  Negative for palpitations and leg swelling  CP ONLY with taking a deep breath or with coughing   Gastrointestinal: Negative for abdominal pain, diarrhea, nausea and vomiting  Genitourinary: Negative for difficulty urinating and dysuria  Musculoskeletal: Negative for arthralgias and myalgias  Skin: Negative for rash and wound  Neurological: Negative for dizziness and headaches  Psychiatric/Behavioral: Negative for behavioral problems and confusion  Objective:    /76   Pulse 68   Temp (!) 96 9 °F (36 1 °C)   Ht 5' 10" (1 778 m)   Wt 101 kg (222 lb 12 8 oz)   SpO2 98%   BMI 31 97 kg/m²      Physical Exam  Vitals signs and nursing note reviewed  Constitutional:       General: He is not in acute distress  Appearance: Normal appearance  He is well-developed  He is not ill-appearing  HENT:      Head: Normocephalic and atraumatic  Right Ear: Tympanic membrane normal  There is no impacted cerumen  Left Ear: Tympanic membrane normal  There is no impacted cerumen  Eyes:      General:         Right eye: No discharge  Left eye: No discharge  Conjunctiva/sclera: Conjunctivae normal    Neck:      Musculoskeletal: Neck supple  Trachea: No tracheal deviation  Cardiovascular:      Rate and Rhythm: Normal rate and regular rhythm  Heart sounds: No murmur     Pulmonary:      Effort: Pulmonary effort is normal  No respiratory distress  Breath sounds: Normal breath sounds  No rhonchi or rales  Comments: + end expiratory wheezing throughout  Abdominal:      General: There is no distension  Palpations: Abdomen is soft  Tenderness: There is no abdominal tenderness  There is no guarding or rebound  Musculoskeletal:         General: No deformity  Right lower leg: No edema  Left lower leg: No edema  Lymphadenopathy:      Cervical: Cervical adenopathy present  Skin:     General: Skin is warm and dry  Coloration: Skin is not pale  Findings: No rash  Neurological:      General: No focal deficit present  Mental Status: He is alert  Motor: No abnormal muscle tone  Gait: Gait normal    Psychiatric:         Mood and Affect: Mood normal          Behavior: Behavior normal          Thought Content:  Thought content normal          Judgment: Judgment normal

## 2020-09-16 ENCOUNTER — HOSPITAL ENCOUNTER (OUTPATIENT)
Dept: NON INVASIVE DIAGNOSTICS | Age: 75
Discharge: HOME | End: 2020-09-16

## 2020-09-16 DIAGNOSIS — I65.21 INTERNAL CAROTID ARTERY STENOSIS, RIGHT: Chronic | ICD-10-CM

## 2020-09-16 DIAGNOSIS — Z98.890 S/P CAROTID ENDARTERECTOMY: ICD-10-CM

## 2020-09-19 LAB
COLOR STONE: NORMAL
COM MFR STONE: 80 %
COMMENT-STONE3: NORMAL
COMPOSITION: NORMAL
HYDROXYAPATITE 24H ENGDIFF UR: 10 %
LABORATORY COMMENT REPORT: NORMAL
PHOTO: NORMAL
SIZE STONE: NORMAL MM
SPEC SOURCE SUBJ: NORMAL
STONE ANALYSIS-IMP: NORMAL
STONE ANALYSIS-IMP: NORMAL
TRIAMTERENE MFR STONE: 10 %
WT STONE: 444 MG

## 2020-09-24 ENCOUNTER — OFFICE VISIT (OUTPATIENT)
Dept: FAMILY MEDICINE CLINIC | Facility: HOSPITAL | Age: 75
End: 2020-09-24
Payer: COMMERCIAL

## 2020-09-24 VITALS
OXYGEN SATURATION: 95 % | BODY MASS INDEX: 32.61 KG/M2 | HEIGHT: 70 IN | DIASTOLIC BLOOD PRESSURE: 64 MMHG | WEIGHT: 227.8 LBS | TEMPERATURE: 96.9 F | HEART RATE: 68 BPM | SYSTOLIC BLOOD PRESSURE: 136 MMHG

## 2020-09-24 DIAGNOSIS — I10 ESSENTIAL HYPERTENSION: Chronic | ICD-10-CM

## 2020-09-24 DIAGNOSIS — R07.9 CHEST PAIN, UNSPECIFIED TYPE: Primary | ICD-10-CM

## 2020-09-24 DIAGNOSIS — R05.9 COUGH: ICD-10-CM

## 2020-09-24 PROCEDURE — 99214 OFFICE O/P EST MOD 30 MIN: CPT | Performed by: INTERNAL MEDICINE

## 2020-09-24 PROCEDURE — 93000 ELECTROCARDIOGRAM COMPLETE: CPT | Performed by: INTERNAL MEDICINE

## 2020-09-24 NOTE — PROGRESS NOTES
Assessment/Plan:    Hypertension  BP better by end of visit, con't current meds       Diagnoses and all orders for this visit:    Chest pain, unspecified type  Comments:  Concerning as exertional but ECG w/o ischemia, he has no associated symptoms with the pain but has mult RF for CVD, urged pt to go to ED with any new/worse symptoms or if the pain lasts longer/occurs more frequently or has associated SOB/dizziness/diaphoresis/N  Orders:  -     POCT ECG  -     NM myocardial perfusion spect (stress and/or rest); Future    Cough  Comments:  Reassured cough can persist 4-6 wks, finished abx and symptoms have improved, reassured exam nml and no fever, no CXR at this time, call with new/worse symptoms    Essential hypertension          Subjective:      Patient ID: Syl Archer  is a 76 y o  male  HPI Pt here with SOB/CP    Pt was seen 9/11/20 for cough and wheezing  He was tx with Doxy 100 mg bid and told to call with any new/worse symptoms  COVID testing was not done at that time as pt had no other significant COVID symptoms  Pt finished his course of Doxy and returns today with con't cough and now CP  He states the wheezing has resolved  The cough has improved but is still present  He denies F/C/N/V/loss of taste smell/red eye/myalgias/excessive fatigue/ST  He states the CP started a few weeks ago  The pain is with exertion - when he gets up out of the car and starts walking  The pain is described as "tightness" and will last 5 min when it occurs  The pain is better if he takes his mask off and breaths normally  The pain is in the center of the chest and does to go to both shoulder regions  He has no associated SOB/dizziness/N/diphoresis  Colonoscopy - 1/13 - 5 yrs but pt is now 76 yr old    BW 6/20 - has order for repeat 12/20      Review of Systems   Constitutional: Negative for chills, fatigue and fever  HENT: Negative for congestion, ear pain and sore throat      Eyes: Negative for pain and redness  Respiratory: Positive for cough  Negative for shortness of breath and wheezing  Cardiovascular: Positive for chest pain  Negative for palpitations and leg swelling  Gastrointestinal: Negative for abdominal pain, constipation, diarrhea, nausea and vomiting  Genitourinary: Negative for difficulty urinating and dysuria  Musculoskeletal: Positive for arthralgias  Negative for myalgias  Skin: Negative for rash and wound  Neurological: Negative for dizziness and headaches  Hematological: Does not bruise/bleed easily  Psychiatric/Behavioral: Negative for behavioral problems and confusion  Objective:    /64   Pulse 68   Temp (!) 96 9 °F (36 1 °C) (Tympanic)   Ht 5' 10" (1 778 m)   Wt 103 kg (227 lb 12 8 oz)   SpO2 95%   BMI 32 69 kg/m²      Physical Exam  Vitals signs and nursing note reviewed  Constitutional:       General: He is not in acute distress  Appearance: He is well-developed  He is not ill-appearing  HENT:      Head: Normocephalic and atraumatic  Eyes:      General:         Right eye: No discharge  Left eye: No discharge  Conjunctiva/sclera: Conjunctivae normal    Neck:      Musculoskeletal: Neck supple  Trachea: No tracheal deviation  Cardiovascular:      Rate and Rhythm: Normal rate and regular rhythm  Heart sounds: Murmur present  Pulmonary:      Effort: Pulmonary effort is normal  No respiratory distress  Breath sounds: Normal breath sounds  No wheezing or rales  Chest:      Chest wall: No tenderness  Abdominal:      General: There is no distension  Palpations: Abdomen is soft  Tenderness: There is no abdominal tenderness  There is no guarding or rebound  Musculoskeletal:         General: No deformity  Right lower leg: No edema  Left lower leg: No edema  Lymphadenopathy:      Cervical: No cervical adenopathy  Skin:     General: Skin is warm and dry  Coloration: Skin is not pale  Findings: No rash  Neurological:      General: No focal deficit present  Mental Status: He is alert  Motor: No abnormal muscle tone  Psychiatric:         Mood and Affect: Mood is not anxious  Behavior: Behavior normal  Behavior is not agitated

## 2020-10-13 ENCOUNTER — HOSPITAL ENCOUNTER (OUTPATIENT)
Dept: NON INVASIVE DIAGNOSTICS | Age: 75
Discharge: HOME | End: 2020-10-13
Payer: COMMERCIAL

## 2020-10-13 DIAGNOSIS — R07.9 CHEST PAIN, UNSPECIFIED TYPE: ICD-10-CM

## 2020-10-13 LAB
CHEST PAIN STATEMENT: NORMAL
MAX DIASTOLIC BP: 58 MMHG
MAX HEART RATE: 123 BPM
MAX PREDICTED HEART RATE: 145 BPM
MAX. SYSTOLIC BP: 152 MMHG
PROTOCOL NAME: NORMAL
REASON FOR TERMINATION: NORMAL
TARGET HR FORMULA: NORMAL
TEST INDICATION: NORMAL
TIME IN EXERCISE PHASE: NORMAL

## 2020-10-13 PROCEDURE — A9502 TC99M TETROFOSMIN: HCPCS

## 2020-10-13 PROCEDURE — G1004 CDSM NDSC: HCPCS

## 2020-10-13 PROCEDURE — 93018 CV STRESS TEST I&R ONLY: CPT | Performed by: INTERNAL MEDICINE

## 2020-10-13 PROCEDURE — 78452 HT MUSCLE IMAGE SPECT MULT: CPT

## 2020-10-13 PROCEDURE — 93017 CV STRESS TEST TRACING ONLY: CPT

## 2020-10-13 PROCEDURE — 78452 HT MUSCLE IMAGE SPECT MULT: CPT | Performed by: INTERNAL MEDICINE

## 2020-10-13 PROCEDURE — 93016 CV STRESS TEST SUPVJ ONLY: CPT | Performed by: INTERNAL MEDICINE

## 2020-10-13 RX ADMIN — REGADENOSON 0.4 MG: 0.08 INJECTION, SOLUTION INTRAVENOUS at 09:35

## 2020-10-14 ENCOUNTER — CONSULT (OUTPATIENT)
Dept: CARDIOLOGY CLINIC | Facility: CLINIC | Age: 75
End: 2020-10-14
Payer: COMMERCIAL

## 2020-10-14 VITALS
BODY MASS INDEX: 33.21 KG/M2 | DIASTOLIC BLOOD PRESSURE: 70 MMHG | WEIGHT: 232 LBS | HEART RATE: 88 BPM | SYSTOLIC BLOOD PRESSURE: 120 MMHG | HEIGHT: 70 IN

## 2020-10-14 DIAGNOSIS — I10 ESSENTIAL HYPERTENSION: ICD-10-CM

## 2020-10-14 DIAGNOSIS — E78.5 DYSLIPIDEMIA: ICD-10-CM

## 2020-10-14 DIAGNOSIS — I25.10 CORONARY ARTERY DISEASE INVOLVING NATIVE CORONARY ARTERY OF NATIVE HEART WITHOUT ANGINA PECTORIS: Primary | ICD-10-CM

## 2020-10-14 PROCEDURE — 3078F DIAST BP <80 MM HG: CPT | Performed by: INTERNAL MEDICINE

## 2020-10-14 PROCEDURE — 3074F SYST BP LT 130 MM HG: CPT | Performed by: INTERNAL MEDICINE

## 2020-10-14 PROCEDURE — 1160F RVW MEDS BY RX/DR IN RCRD: CPT | Performed by: INTERNAL MEDICINE

## 2020-10-14 PROCEDURE — 1036F TOBACCO NON-USER: CPT | Performed by: INTERNAL MEDICINE

## 2020-10-14 PROCEDURE — 99204 OFFICE O/P NEW MOD 45 MIN: CPT | Performed by: INTERNAL MEDICINE

## 2020-10-14 RX ORDER — METOPROLOL SUCCINATE 25 MG/1
25 TABLET, EXTENDED RELEASE ORAL DAILY
Qty: 30 TABLET | Refills: 5 | Status: SHIPPED | OUTPATIENT
Start: 2020-10-14 | End: 2021-03-17

## 2020-10-14 RX ORDER — NITROGLYCERIN 0.4 MG/1
0.4 TABLET SUBLINGUAL
Qty: 15 TABLET | Refills: 5 | Status: SHIPPED | OUTPATIENT
Start: 2020-10-14

## 2020-10-27 DIAGNOSIS — E78.5 DYSLIPIDEMIA: ICD-10-CM

## 2020-10-27 DIAGNOSIS — I25.10 CORONARY ARTERY DISEASE INVOLVING NATIVE CORONARY ARTERY OF NATIVE HEART WITHOUT ANGINA PECTORIS: ICD-10-CM

## 2020-10-27 RX ORDER — ATORVASTATIN CALCIUM 40 MG/1
40 TABLET, FILM COATED ORAL EVERY EVENING
Qty: 90 TABLET | Refills: 3 | Status: SHIPPED | OUTPATIENT
Start: 2020-10-27 | End: 2021-09-30

## 2020-10-28 ENCOUNTER — HOSPITAL ENCOUNTER (OUTPATIENT)
Dept: NON INVASIVE DIAGNOSTICS | Age: 75
Discharge: HOME | End: 2020-10-28
Payer: COMMERCIAL

## 2020-10-28 PROCEDURE — 93880 EXTRACRANIAL BILAT STUDY: CPT

## 2020-10-28 PROCEDURE — 93880 EXTRACRANIAL BILAT STUDY: CPT | Performed by: SURGERY

## 2020-11-03 DIAGNOSIS — I65.21 STENOSIS OF RIGHT CAROTID ARTERY: ICD-10-CM

## 2020-11-03 DIAGNOSIS — M10.9 GOUT, UNSPECIFIED CAUSE, UNSPECIFIED CHRONICITY, UNSPECIFIED SITE: ICD-10-CM

## 2020-11-03 RX ORDER — COLCHICINE 0.6 MG/1
CAPSULE ORAL
Qty: 100 CAPSULE | Refills: 2 | Status: SHIPPED | OUTPATIENT
Start: 2020-11-03 | End: 2021-12-13 | Stop reason: ALTCHOICE

## 2020-11-03 RX ORDER — LISINOPRIL 10 MG/1
TABLET ORAL
Qty: 90 TABLET | Refills: 1 | Status: SHIPPED | OUTPATIENT
Start: 2020-11-03 | End: 2021-04-04

## 2020-11-04 ENCOUNTER — OFFICE VISIT (OUTPATIENT)
Dept: CARDIOLOGY CLINIC | Facility: CLINIC | Age: 75
End: 2020-11-04
Payer: COMMERCIAL

## 2020-11-04 VITALS
TEMPERATURE: 98 F | HEIGHT: 70 IN | DIASTOLIC BLOOD PRESSURE: 68 MMHG | WEIGHT: 235 LBS | BODY MASS INDEX: 33.64 KG/M2 | OXYGEN SATURATION: 97 % | SYSTOLIC BLOOD PRESSURE: 132 MMHG | HEART RATE: 68 BPM

## 2020-11-04 DIAGNOSIS — I20.8 STABLE ANGINA PECTORIS (HCC): Primary | ICD-10-CM

## 2020-11-04 DIAGNOSIS — R94.39 ABNORMAL STRESS TEST: ICD-10-CM

## 2020-11-04 PROCEDURE — 99214 OFFICE O/P EST MOD 30 MIN: CPT | Performed by: INTERNAL MEDICINE

## 2020-11-05 ENCOUNTER — APPOINTMENT (OUTPATIENT)
Dept: LAB | Facility: HOSPITAL | Age: 75
End: 2020-11-05
Attending: INTERNAL MEDICINE
Payer: COMMERCIAL

## 2020-11-05 ENCOUNTER — TELEPHONE (OUTPATIENT)
Dept: CARDIOLOGY CLINIC | Facility: CLINIC | Age: 75
End: 2020-11-05

## 2020-11-05 DIAGNOSIS — I20.8 STABLE ANGINA PECTORIS (HCC): ICD-10-CM

## 2020-11-05 DIAGNOSIS — R94.39 ABNORMAL STRESS TEST: ICD-10-CM

## 2020-11-05 LAB
ANION GAP SERPL CALCULATED.3IONS-SCNC: 8 MMOL/L (ref 4–13)
BUN SERPL-MCNC: 16 MG/DL (ref 5–25)
CALCIUM SERPL-MCNC: 8.7 MG/DL (ref 8.3–10.1)
CHLORIDE SERPL-SCNC: 105 MMOL/L (ref 100–108)
CO2 SERPL-SCNC: 28 MMOL/L (ref 21–32)
CREAT SERPL-MCNC: 1.41 MG/DL (ref 0.6–1.3)
ERYTHROCYTE [DISTWIDTH] IN BLOOD BY AUTOMATED COUNT: 15.4 % (ref 11.6–15.1)
GFR SERPL CREATININE-BSD FRML MDRD: 48 ML/MIN/1.73SQ M
GLUCOSE SERPL-MCNC: 100 MG/DL (ref 65–140)
HCT VFR BLD AUTO: 28.6 % (ref 36.5–49.3)
HGB BLD-MCNC: 8.2 G/DL (ref 12–17)
MCH RBC QN AUTO: 22.4 PG (ref 26.8–34.3)
MCHC RBC AUTO-ENTMCNC: 28.7 G/DL (ref 31.4–37.4)
MCV RBC AUTO: 78 FL (ref 82–98)
PLATELET # BLD AUTO: 217 THOUSANDS/UL (ref 149–390)
PMV BLD AUTO: 9 FL (ref 8.9–12.7)
POTASSIUM SERPL-SCNC: 4.6 MMOL/L (ref 3.5–5.3)
RBC # BLD AUTO: 3.66 MILLION/UL (ref 3.88–5.62)
SODIUM SERPL-SCNC: 141 MMOL/L (ref 136–145)
WBC # BLD AUTO: 6.37 THOUSAND/UL (ref 4.31–10.16)

## 2020-11-05 PROCEDURE — 36415 COLL VENOUS BLD VENIPUNCTURE: CPT | Performed by: INTERNAL MEDICINE

## 2020-11-05 PROCEDURE — 85027 COMPLETE CBC AUTOMATED: CPT | Performed by: INTERNAL MEDICINE

## 2020-11-05 PROCEDURE — 80048 BASIC METABOLIC PNL TOTAL CA: CPT

## 2020-11-06 ENCOUNTER — TELEPHONE (OUTPATIENT)
Dept: INPATIENT UNIT | Facility: HOSPITAL | Age: 75
End: 2020-11-06

## 2020-11-06 ENCOUNTER — TELEPHONE (OUTPATIENT)
Dept: GASTROENTEROLOGY | Facility: CLINIC | Age: 75
End: 2020-11-06

## 2020-11-06 ENCOUNTER — TELEPHONE (OUTPATIENT)
Dept: CARDIOLOGY CLINIC | Facility: CLINIC | Age: 75
End: 2020-11-06

## 2020-11-06 ENCOUNTER — TELEPHONE (OUTPATIENT)
Dept: OTHER | Facility: OTHER | Age: 75
End: 2020-11-06

## 2020-11-06 ENCOUNTER — TELEMEDICINE (OUTPATIENT)
Dept: GASTROENTEROLOGY | Facility: CLINIC | Age: 75
End: 2020-11-06

## 2020-11-06 VITALS — BODY MASS INDEX: 32.21 KG/M2 | WEIGHT: 225 LBS | HEIGHT: 70 IN

## 2020-11-06 DIAGNOSIS — K63.5 BENIGN COLON POLYP: Primary | ICD-10-CM

## 2020-11-06 RX ORDER — ASPIRIN 81 MG/1
324 TABLET, CHEWABLE ORAL ONCE
Status: CANCELLED | OUTPATIENT
Start: 2020-11-06 | End: 2020-11-06

## 2020-11-06 RX ORDER — SODIUM, POTASSIUM,MAG SULFATES 17.5-3.13G
SOLUTION, RECONSTITUTED, ORAL ORAL
Qty: 2 BOTTLE | Refills: 0 | Status: SHIPPED | COMMUNITY
Start: 2020-11-06 | End: 2020-12-15 | Stop reason: ALTCHOICE

## 2020-11-09 ENCOUNTER — HOSPITAL ENCOUNTER (OUTPATIENT)
Dept: NON INVASIVE DIAGNOSTICS | Facility: HOSPITAL | Age: 75
Discharge: HOME | End: 2020-11-10
Attending: INTERNAL MEDICINE | Admitting: INTERNAL MEDICINE
Payer: COMMERCIAL

## 2020-11-09 DIAGNOSIS — D64.9 ANEMIA: Primary | ICD-10-CM

## 2020-11-09 DIAGNOSIS — N18.9 CKD (CHRONIC KIDNEY DISEASE): ICD-10-CM

## 2020-11-09 DIAGNOSIS — I20.8 STABLE ANGINA PECTORIS (HCC): ICD-10-CM

## 2020-11-09 DIAGNOSIS — R94.39 ABNORMAL STRESS TEST: ICD-10-CM

## 2020-11-09 PROBLEM — I25.10 CORONARY ARTERY DISEASE INVOLVING NATIVE CORONARY ARTERY: Status: ACTIVE | Noted: 2020-11-09

## 2020-11-09 LAB
ANION GAP SERPL CALCULATED.3IONS-SCNC: 3 MMOL/L (ref 4–13)
ATRIAL RATE: 49 BPM
ATRIAL RATE: 55 BPM
BUN SERPL-MCNC: 24 MG/DL (ref 5–25)
CALCIUM SERPL-MCNC: 9 MG/DL (ref 8.3–10.1)
CHLORIDE SERPL-SCNC: 112 MMOL/L (ref 100–108)
CO2 SERPL-SCNC: 28 MMOL/L (ref 21–32)
CREAT SERPL-MCNC: 1.48 MG/DL (ref 0.6–1.3)
GFR SERPL CREATININE-BSD FRML MDRD: 46 ML/MIN/1.73SQ M
GLUCOSE P FAST SERPL-MCNC: 114 MG/DL (ref 65–99)
GLUCOSE SERPL-MCNC: 114 MG/DL (ref 65–140)
KCT BLD-ACNC: 249 SEC (ref 89–137)
P AXIS: 37 DEGREES
P AXIS: 50 DEGREES
POTASSIUM SERPL-SCNC: 4.7 MMOL/L (ref 3.5–5.3)
PR INTERVAL: 174 MS
PR INTERVAL: 178 MS
QRS AXIS: 16 DEGREES
QRS AXIS: 6 DEGREES
QRSD INTERVAL: 88 MS
QRSD INTERVAL: 90 MS
QT INTERVAL: 434 MS
QT INTERVAL: 464 MS
QTC INTERVAL: 415 MS
QTC INTERVAL: 419 MS
SODIUM SERPL-SCNC: 143 MMOL/L (ref 136–145)
SPECIMEN SOURCE: ABNORMAL
T WAVE AXIS: 13 DEGREES
T WAVE AXIS: 18 DEGREES
VENTRICULAR RATE: 49 BPM
VENTRICULAR RATE: 55 BPM

## 2020-11-09 PROCEDURE — 93010 ELECTROCARDIOGRAM REPORT: CPT | Performed by: INTERNAL MEDICINE

## 2020-11-09 PROCEDURE — C1725 CATH, TRANSLUMIN NON-LASER: HCPCS | Performed by: INTERNAL MEDICINE

## 2020-11-09 PROCEDURE — C1769 GUIDE WIRE: HCPCS | Performed by: INTERNAL MEDICINE

## 2020-11-09 PROCEDURE — C9602 PERC D-E COR STENT ATHER S: HCPCS | Performed by: INTERNAL MEDICINE

## 2020-11-09 PROCEDURE — C1887 CATHETER, GUIDING: HCPCS | Performed by: INTERNAL MEDICINE

## 2020-11-09 PROCEDURE — 80048 BASIC METABOLIC PNL TOTAL CA: CPT | Performed by: INTERNAL MEDICINE

## 2020-11-09 PROCEDURE — 93454 CORONARY ARTERY ANGIO S&I: CPT | Performed by: INTERNAL MEDICINE

## 2020-11-09 PROCEDURE — 99152 MOD SED SAME PHYS/QHP 5/>YRS: CPT | Performed by: INTERNAL MEDICINE

## 2020-11-09 PROCEDURE — 93005 ELECTROCARDIOGRAM TRACING: CPT

## 2020-11-09 PROCEDURE — C1724 CATH, TRANS ATHEREC,ROTATION: HCPCS | Performed by: INTERNAL MEDICINE

## 2020-11-09 PROCEDURE — C1874 STENT, COATED/COV W/DEL SYS: HCPCS

## 2020-11-09 PROCEDURE — 99153 MOD SED SAME PHYS/QHP EA: CPT | Performed by: INTERNAL MEDICINE

## 2020-11-09 PROCEDURE — 85347 COAGULATION TIME ACTIVATED: CPT

## 2020-11-09 PROCEDURE — 92933 PRQ TRLML C ATHRC ST ANGIOP1: CPT | Performed by: INTERNAL MEDICINE

## 2020-11-09 PROCEDURE — C1894 INTRO/SHEATH, NON-LASER: HCPCS | Performed by: INTERNAL MEDICINE

## 2020-11-09 RX ORDER — CLOPIDOGREL BISULFATE 75 MG/1
TABLET ORAL CODE/TRAUMA/SEDATION MEDICATION
Status: COMPLETED | OUTPATIENT
Start: 2020-11-09 | End: 2020-11-09

## 2020-11-09 RX ORDER — LANOLIN ALCOHOL/MO/W.PET/CERES
3 CREAM (GRAM) TOPICAL ONCE
Status: DISCONTINUED | OUTPATIENT
Start: 2020-11-09 | End: 2020-11-10 | Stop reason: HOSPADM

## 2020-11-09 RX ORDER — CLOPIDOGREL BISULFATE 75 MG/1
75 TABLET ORAL DAILY
Status: DISCONTINUED | OUTPATIENT
Start: 2020-11-10 | End: 2020-11-10 | Stop reason: HOSPADM

## 2020-11-09 RX ORDER — NITROGLYCERIN 20 MG/100ML
INJECTION INTRAVENOUS CODE/TRAUMA/SEDATION MEDICATION
Status: COMPLETED | OUTPATIENT
Start: 2020-11-09 | End: 2020-11-09

## 2020-11-09 RX ORDER — VERAPAMIL HYDROCHLORIDE 2.5 MG/ML
INJECTION, SOLUTION INTRAVENOUS CODE/TRAUMA/SEDATION MEDICATION
Status: COMPLETED | OUTPATIENT
Start: 2020-11-09 | End: 2020-11-09

## 2020-11-09 RX ORDER — FLUTICASONE PROPIONATE 50 MCG
2 SPRAY, SUSPENSION (ML) NASAL DAILY
Status: DISCONTINUED | OUTPATIENT
Start: 2020-11-10 | End: 2020-11-10 | Stop reason: HOSPADM

## 2020-11-09 RX ORDER — AMLODIPINE BESYLATE 5 MG/1
5 TABLET ORAL DAILY
Status: DISCONTINUED | OUTPATIENT
Start: 2020-11-10 | End: 2020-11-10 | Stop reason: HOSPADM

## 2020-11-09 RX ORDER — SODIUM CHLORIDE 9 MG/ML
200 INJECTION, SOLUTION INTRAVENOUS CONTINUOUS
Status: DISPENSED | OUTPATIENT
Start: 2020-11-09 | End: 2020-11-09

## 2020-11-09 RX ORDER — LANOLIN ALCOHOL/MO/W.PET/CERES
3 CREAM (GRAM) TOPICAL ONCE
Status: COMPLETED | OUTPATIENT
Start: 2020-11-09 | End: 2020-11-09

## 2020-11-09 RX ORDER — ASPIRIN 81 MG/1
324 TABLET, CHEWABLE ORAL ONCE
Status: COMPLETED | OUTPATIENT
Start: 2020-11-09 | End: 2020-11-09

## 2020-11-09 RX ORDER — METOPROLOL SUCCINATE 25 MG/1
25 TABLET, EXTENDED RELEASE ORAL DAILY
Status: DISCONTINUED | OUTPATIENT
Start: 2020-11-10 | End: 2020-11-10 | Stop reason: HOSPADM

## 2020-11-09 RX ORDER — MIDAZOLAM HYDROCHLORIDE 2 MG/2ML
INJECTION, SOLUTION INTRAMUSCULAR; INTRAVENOUS CODE/TRAUMA/SEDATION MEDICATION
Status: COMPLETED | OUTPATIENT
Start: 2020-11-09 | End: 2020-11-09

## 2020-11-09 RX ORDER — HEPARIN SODIUM 1000 [USP'U]/ML
INJECTION, SOLUTION INTRAVENOUS; SUBCUTANEOUS CODE/TRAUMA/SEDATION MEDICATION
Status: COMPLETED | OUTPATIENT
Start: 2020-11-09 | End: 2020-11-09

## 2020-11-09 RX ORDER — ONDANSETRON 2 MG/ML
4 INJECTION INTRAMUSCULAR; INTRAVENOUS EVERY 8 HOURS PRN
Status: DISCONTINUED | OUTPATIENT
Start: 2020-11-09 | End: 2020-11-10 | Stop reason: HOSPADM

## 2020-11-09 RX ORDER — ASPIRIN 81 MG/1
81 TABLET, CHEWABLE ORAL DAILY
Status: CANCELLED | OUTPATIENT
Start: 2020-11-09

## 2020-11-09 RX ORDER — ATORVASTATIN CALCIUM 40 MG/1
40 TABLET, FILM COATED ORAL EVERY EVENING
Status: DISCONTINUED | OUTPATIENT
Start: 2020-11-09 | End: 2020-11-10 | Stop reason: HOSPADM

## 2020-11-09 RX ORDER — ATORVASTATIN CALCIUM 80 MG/1
80 TABLET, FILM COATED ORAL EVERY EVENING
Status: CANCELLED | OUTPATIENT
Start: 2020-11-09

## 2020-11-09 RX ORDER — NITROGLYCERIN 0.4 MG/1
0.4 TABLET SUBLINGUAL
Status: DISCONTINUED | OUTPATIENT
Start: 2020-11-09 | End: 2020-11-10 | Stop reason: HOSPADM

## 2020-11-09 RX ORDER — ACETAMINOPHEN 325 MG/1
650 TABLET ORAL EVERY 6 HOURS PRN
Status: DISCONTINUED | OUTPATIENT
Start: 2020-11-09 | End: 2020-11-10 | Stop reason: HOSPADM

## 2020-11-09 RX ORDER — FENTANYL CITRATE 50 UG/ML
INJECTION, SOLUTION INTRAMUSCULAR; INTRAVENOUS CODE/TRAUMA/SEDATION MEDICATION
Status: COMPLETED | OUTPATIENT
Start: 2020-11-09 | End: 2020-11-09

## 2020-11-09 RX ORDER — AMINOPHYLLINE DIHYDRATE 25 MG/ML
INJECTION, SOLUTION INTRAVENOUS CODE/TRAUMA/SEDATION MEDICATION
Status: COMPLETED | OUTPATIENT
Start: 2020-11-09 | End: 2020-11-09

## 2020-11-09 RX ORDER — ASPIRIN 81 MG/1
81 TABLET ORAL EVERY OTHER DAY
Status: DISCONTINUED | OUTPATIENT
Start: 2020-11-09 | End: 2020-11-10 | Stop reason: HOSPADM

## 2020-11-09 RX ORDER — ASPIRIN 81 MG/1
TABLET, CHEWABLE ORAL
Status: COMPLETED
Start: 2020-11-09 | End: 2020-11-09

## 2020-11-09 RX ORDER — LIDOCAINE HYDROCHLORIDE 10 MG/ML
INJECTION, SOLUTION EPIDURAL; INFILTRATION; INTRACAUDAL; PERINEURAL CODE/TRAUMA/SEDATION MEDICATION
Status: COMPLETED | OUTPATIENT
Start: 2020-11-09 | End: 2020-11-09

## 2020-11-09 RX ADMIN — VERAPAMIL HYDROCHLORIDE 2.5 MG: 2.5 INJECTION INTRAVENOUS at 11:12

## 2020-11-09 RX ADMIN — Medication 200 MCG: at 11:12

## 2020-11-09 RX ADMIN — FENTANYL CITRATE 50 MCG: 50 INJECTION, SOLUTION INTRAMUSCULAR; INTRAVENOUS at 10:57

## 2020-11-09 RX ADMIN — Medication 200 MCG: at 11:59

## 2020-11-09 RX ADMIN — FENTANYL CITRATE 50 MCG: 50 INJECTION, SOLUTION INTRAMUSCULAR; INTRAVENOUS at 11:23

## 2020-11-09 RX ADMIN — SODIUM CHLORIDE 200 ML/HR: 0.9 INJECTION, SOLUTION INTRAVENOUS at 13:23

## 2020-11-09 RX ADMIN — MIDAZOLAM 2 MG: 1 INJECTION INTRAMUSCULAR; INTRAVENOUS at 10:57

## 2020-11-09 RX ADMIN — FENTANYL CITRATE 25 MCG: 50 INJECTION, SOLUTION INTRAMUSCULAR; INTRAVENOUS at 12:04

## 2020-11-09 RX ADMIN — ASPIRIN 324 MG: 81 TABLET, CHEWABLE ORAL at 07:37

## 2020-11-09 RX ADMIN — HEPARIN SODIUM 7000 UNITS: 1000 INJECTION INTRAVENOUS; SUBCUTANEOUS at 11:21

## 2020-11-09 RX ADMIN — NITROGLYCERIN 1 INCH: 20 OINTMENT TOPICAL at 11:57

## 2020-11-09 RX ADMIN — AMINOPHYLLINE 250 MG: 25 INJECTION, SOLUTION INTRAVENOUS at 11:22

## 2020-11-09 RX ADMIN — ATORVASTATIN CALCIUM 40 MG: 40 TABLET, FILM COATED ORAL at 16:41

## 2020-11-09 RX ADMIN — MIDAZOLAM 0.5 MG: 1 INJECTION INTRAMUSCULAR; INTRAVENOUS at 12:28

## 2020-11-09 RX ADMIN — HEPARIN SODIUM 3000 UNITS: 1000 INJECTION INTRAVENOUS; SUBCUTANEOUS at 11:32

## 2020-11-09 RX ADMIN — MELATONIN 3 MG: at 22:21

## 2020-11-09 RX ADMIN — FENTANYL CITRATE 25 MCG: 50 INJECTION, SOLUTION INTRAMUSCULAR; INTRAVENOUS at 12:05

## 2020-11-09 RX ADMIN — LIDOCAINE HYDROCHLORIDE 1 ML: 10 INJECTION, SOLUTION EPIDURAL; INFILTRATION; INTRACAUDAL; PERINEURAL at 11:04

## 2020-11-09 RX ADMIN — SODIUM CHLORIDE 321 ML: 0.9 INJECTION, SOLUTION INTRAVENOUS at 07:37

## 2020-11-09 RX ADMIN — MIDAZOLAM 1 MG: 1 INJECTION INTRAMUSCULAR; INTRAVENOUS at 12:03

## 2020-11-09 RX ADMIN — MIDAZOLAM 1 MG: 1 INJECTION INTRAMUSCULAR; INTRAVENOUS at 11:24

## 2020-11-09 RX ADMIN — IOHEXOL 180 ML: 350 INJECTION, SOLUTION INTRAVENOUS at 12:37

## 2020-11-09 RX ADMIN — MIDAZOLAM 2 MG: 1 INJECTION INTRAMUSCULAR; INTRAVENOUS at 11:58

## 2020-11-09 RX ADMIN — HEPARIN SODIUM 4000 UNITS: 1000 INJECTION INTRAVENOUS; SUBCUTANEOUS at 11:13

## 2020-11-09 RX ADMIN — HEPARIN SODIUM 2000 UNITS: 1000 INJECTION INTRAVENOUS; SUBCUTANEOUS at 12:24

## 2020-11-09 RX ADMIN — FENTANYL CITRATE 25 MCG: 50 INJECTION, SOLUTION INTRAMUSCULAR; INTRAVENOUS at 12:28

## 2020-11-09 RX ADMIN — CLOPIDOGREL BISULFATE 300 MG: 75 TABLET ORAL at 11:18

## 2020-11-10 VITALS
HEIGHT: 69 IN | WEIGHT: 225 LBS | TEMPERATURE: 98.6 F | HEART RATE: 88 BPM | DIASTOLIC BLOOD PRESSURE: 77 MMHG | BODY MASS INDEX: 33.33 KG/M2 | SYSTOLIC BLOOD PRESSURE: 157 MMHG | OXYGEN SATURATION: 97 % | RESPIRATION RATE: 17 BRPM

## 2020-11-10 LAB
ANION GAP SERPL CALCULATED.3IONS-SCNC: 5 MMOL/L (ref 4–13)
BUN SERPL-MCNC: 16 MG/DL (ref 5–25)
CALCIUM SERPL-MCNC: 8.7 MG/DL (ref 8.3–10.1)
CHLORIDE SERPL-SCNC: 107 MMOL/L (ref 100–108)
CO2 SERPL-SCNC: 27 MMOL/L (ref 21–32)
CREAT SERPL-MCNC: 1.09 MG/DL (ref 0.6–1.3)
ERYTHROCYTE [DISTWIDTH] IN BLOOD BY AUTOMATED COUNT: 15.5 % (ref 11.6–15.1)
GFR SERPL CREATININE-BSD FRML MDRD: 66 ML/MIN/1.73SQ M
GLUCOSE P FAST SERPL-MCNC: 102 MG/DL (ref 65–99)
GLUCOSE SERPL-MCNC: 102 MG/DL (ref 65–140)
HCT VFR BLD AUTO: 26.2 % (ref 36.5–49.3)
HGB BLD-MCNC: 7.4 G/DL (ref 12–17)
MCH RBC QN AUTO: 22 PG (ref 26.8–34.3)
MCHC RBC AUTO-ENTMCNC: 28.2 G/DL (ref 31.4–37.4)
MCV RBC AUTO: 78 FL (ref 82–98)
PLATELET # BLD AUTO: 221 THOUSANDS/UL (ref 149–390)
PMV BLD AUTO: 9.1 FL (ref 8.9–12.7)
POTASSIUM SERPL-SCNC: 3.8 MMOL/L (ref 3.5–5.3)
RBC # BLD AUTO: 3.37 MILLION/UL (ref 3.88–5.62)
SODIUM SERPL-SCNC: 139 MMOL/L (ref 136–145)
WBC # BLD AUTO: 7.19 THOUSAND/UL (ref 4.31–10.16)

## 2020-11-10 PROCEDURE — 85027 COMPLETE CBC AUTOMATED: CPT | Performed by: INTERNAL MEDICINE

## 2020-11-10 PROCEDURE — NC001 PR NO CHARGE: Performed by: INTERNAL MEDICINE

## 2020-11-10 PROCEDURE — 80048 BASIC METABOLIC PNL TOTAL CA: CPT | Performed by: INTERNAL MEDICINE

## 2020-11-10 RX ADMIN — CLOPIDOGREL BISULFATE 75 MG: 75 TABLET ORAL at 10:49

## 2020-11-10 RX ADMIN — AMLODIPINE BESYLATE 5 MG: 5 TABLET ORAL at 10:46

## 2020-11-10 RX ADMIN — METOPROLOL SUCCINATE 25 MG: 25 TABLET, EXTENDED RELEASE ORAL at 10:46

## 2020-11-11 ENCOUNTER — TELEPHONE (OUTPATIENT)
Dept: CARDIOLOGY CLINIC | Facility: CLINIC | Age: 75
End: 2020-11-11

## 2020-11-11 DIAGNOSIS — I10 ESSENTIAL HYPERTENSION: Primary | Chronic | ICD-10-CM

## 2020-11-12 ENCOUNTER — APPOINTMENT (OUTPATIENT)
Dept: LAB | Facility: HOSPITAL | Age: 75
End: 2020-11-12
Payer: COMMERCIAL

## 2020-11-12 ENCOUNTER — TRANSCRIBE ORDERS (OUTPATIENT)
Dept: ADMINISTRATIVE | Facility: HOSPITAL | Age: 75
End: 2020-11-12

## 2020-11-12 ENCOUNTER — HOSPITAL ENCOUNTER (OUTPATIENT)
Dept: GASTROENTEROLOGY | Facility: HOSPITAL | Age: 75
Setting detail: OUTPATIENT SURGERY
Discharge: HOME | End: 2020-11-12
Attending: INTERNAL MEDICINE

## 2020-11-12 DIAGNOSIS — D64.9 ANEMIA, UNSPECIFIED TYPE: ICD-10-CM

## 2020-11-12 DIAGNOSIS — D64.9 ANEMIA: ICD-10-CM

## 2020-11-12 DIAGNOSIS — Z11.59 NEED FOR HEPATITIS C SCREENING TEST: ICD-10-CM

## 2020-11-12 DIAGNOSIS — D50.9 MICROCYTIC ANEMIA: ICD-10-CM

## 2020-11-12 DIAGNOSIS — D64.9 ANEMIA, UNSPECIFIED TYPE: Primary | ICD-10-CM

## 2020-11-12 DIAGNOSIS — N18.9 CKD (CHRONIC KIDNEY DISEASE): ICD-10-CM

## 2020-11-12 DIAGNOSIS — D50.9 MICROCYTIC ANEMIA: Primary | ICD-10-CM

## 2020-11-12 LAB
ANION GAP SERPL CALCULATED.3IONS-SCNC: 5 MMOL/L (ref 4–13)
BASOPHILS # BLD AUTO: 0.03 THOUSANDS/ΜL (ref 0–0.1)
BASOPHILS NFR BLD AUTO: 0 % (ref 0–1)
BUN SERPL-MCNC: 18 MG/DL (ref 5–25)
CALCIUM SERPL-MCNC: 9.4 MG/DL (ref 8.3–10.1)
CHLORIDE SERPL-SCNC: 106 MMOL/L (ref 100–108)
CO2 SERPL-SCNC: 27 MMOL/L (ref 21–32)
CREAT SERPL-MCNC: 1.3 MG/DL (ref 0.6–1.3)
EOSINOPHIL # BLD AUTO: 0.39 THOUSAND/ΜL (ref 0–0.61)
EOSINOPHIL NFR BLD AUTO: 4 % (ref 0–6)
ERYTHROCYTE [DISTWIDTH] IN BLOOD BY AUTOMATED COUNT: 15.9 % (ref 11.6–15.1)
FERRITIN SERPL-MCNC: 5 NG/ML (ref 8–388)
GFR SERPL CREATININE-BSD FRML MDRD: 53 ML/MIN/1.73SQ M
GLUCOSE P FAST SERPL-MCNC: 89 MG/DL (ref 65–99)
HCT VFR BLD AUTO: 28.8 % (ref 36.5–49.3)
HCV AB SER QL: NORMAL
HGB BLD-MCNC: 8 G/DL (ref 12–17)
IMM GRANULOCYTES # BLD AUTO: 0.04 THOUSAND/UL (ref 0–0.2)
IMM GRANULOCYTES NFR BLD AUTO: 0 % (ref 0–2)
IRON SERPL-MCNC: 20 UG/DL (ref 65–175)
LYMPHOCYTES # BLD AUTO: 1.54 THOUSANDS/ΜL (ref 0.6–4.47)
LYMPHOCYTES NFR BLD AUTO: 15 % (ref 14–44)
MCH RBC QN AUTO: 21.9 PG (ref 26.8–34.3)
MCHC RBC AUTO-ENTMCNC: 27.8 G/DL (ref 31.4–37.4)
MCV RBC AUTO: 79 FL (ref 82–98)
MONOCYTES # BLD AUTO: 0.86 THOUSAND/ΜL (ref 0.17–1.22)
MONOCYTES NFR BLD AUTO: 8 % (ref 4–12)
NEUTROPHILS # BLD AUTO: 7.67 THOUSANDS/ΜL (ref 1.85–7.62)
NEUTS SEG NFR BLD AUTO: 73 % (ref 43–75)
NRBC BLD AUTO-RTO: 0 /100 WBCS
PLATELET # BLD AUTO: 245 THOUSANDS/UL (ref 149–390)
PMV BLD AUTO: 9.3 FL (ref 8.9–12.7)
POTASSIUM SERPL-SCNC: 4.4 MMOL/L (ref 3.5–5.3)
RBC # BLD AUTO: 3.65 MILLION/UL (ref 3.88–5.62)
SODIUM SERPL-SCNC: 138 MMOL/L (ref 136–145)
TIBC SERPL-MCNC: 499 UG/DL (ref 250–450)
WBC # BLD AUTO: 10.53 THOUSAND/UL (ref 4.31–10.16)

## 2020-11-12 PROCEDURE — 82728 ASSAY OF FERRITIN: CPT

## 2020-11-12 PROCEDURE — 83550 IRON BINDING TEST: CPT

## 2020-11-12 PROCEDURE — 36415 COLL VENOUS BLD VENIPUNCTURE: CPT

## 2020-11-12 PROCEDURE — 85025 COMPLETE CBC W/AUTO DIFF WBC: CPT

## 2020-11-12 PROCEDURE — 80048 BASIC METABOLIC PNL TOTAL CA: CPT

## 2020-11-12 PROCEDURE — 86803 HEPATITIS C AB TEST: CPT

## 2020-11-12 PROCEDURE — 83540 ASSAY OF IRON: CPT

## 2020-11-13 ENCOUNTER — TELEPHONE (OUTPATIENT)
Dept: VASCULAR SURGERY | Facility: CLINIC | Age: 75
End: 2020-11-13

## 2020-11-13 DIAGNOSIS — D50.9 IRON DEFICIENCY ANEMIA, UNSPECIFIED IRON DEFICIENCY ANEMIA TYPE: Primary | ICD-10-CM

## 2020-11-13 RX ORDER — FERROUS SULFATE TAB EC 324 MG (65 MG FE EQUIVALENT) 324 (65 FE) MG
324 TABLET DELAYED RESPONSE ORAL
Qty: 30 TABLET | Refills: 2 | Status: SHIPPED | OUTPATIENT
Start: 2020-11-13

## 2020-11-16 ENCOUNTER — TELEPHONE (OUTPATIENT)
Dept: FAMILY MEDICINE CLINIC | Facility: HOSPITAL | Age: 75
End: 2020-11-16

## 2020-11-16 ENCOUNTER — OFFICE VISIT (OUTPATIENT)
Dept: VASCULAR SURGERY | Facility: CLINIC | Age: 75
End: 2020-11-16
Payer: COMMERCIAL

## 2020-11-16 ENCOUNTER — TELEPHONE (OUTPATIENT)
Dept: SURGICAL ONCOLOGY | Facility: CLINIC | Age: 75
End: 2020-11-16

## 2020-11-16 VITALS
RESPIRATION RATE: 18 BRPM | SYSTOLIC BLOOD PRESSURE: 118 MMHG | BODY MASS INDEX: 33.77 KG/M2 | HEIGHT: 69 IN | TEMPERATURE: 98.1 F | DIASTOLIC BLOOD PRESSURE: 74 MMHG | HEART RATE: 71 BPM | WEIGHT: 228 LBS

## 2020-11-16 DIAGNOSIS — D50.9 IRON DEFICIENCY ANEMIA, UNSPECIFIED IRON DEFICIENCY ANEMIA TYPE: Primary | ICD-10-CM

## 2020-11-16 DIAGNOSIS — I65.23 BILATERAL CAROTID ARTERY STENOSIS: Primary | ICD-10-CM

## 2020-11-16 PROCEDURE — 99213 OFFICE O/P EST LOW 20 MIN: CPT | Performed by: NURSE PRACTITIONER

## 2020-11-26 PROBLEM — D50.0 IRON DEFICIENCY ANEMIA DUE TO CHRONIC BLOOD LOSS: Status: ACTIVE | Noted: 2017-02-28

## 2020-11-27 ENCOUNTER — TELEPHONE (OUTPATIENT)
Dept: HEMATOLOGY ONCOLOGY | Facility: CLINIC | Age: 75
End: 2020-11-27

## 2020-11-27 ENCOUNTER — CONSULT (OUTPATIENT)
Dept: HEMATOLOGY ONCOLOGY | Facility: HOSPITAL | Age: 75
End: 2020-11-27
Payer: COMMERCIAL

## 2020-11-27 VITALS
HEART RATE: 73 BPM | WEIGHT: 230 LBS | DIASTOLIC BLOOD PRESSURE: 62 MMHG | RESPIRATION RATE: 16 BRPM | OXYGEN SATURATION: 95 % | SYSTOLIC BLOOD PRESSURE: 126 MMHG | HEIGHT: 69 IN | TEMPERATURE: 96.3 F | BODY MASS INDEX: 34.07 KG/M2

## 2020-11-27 DIAGNOSIS — D50.0 IRON DEFICIENCY ANEMIA DUE TO CHRONIC BLOOD LOSS: Primary | ICD-10-CM

## 2020-11-27 DIAGNOSIS — D50.9 IRON DEFICIENCY ANEMIA, UNSPECIFIED IRON DEFICIENCY ANEMIA TYPE: ICD-10-CM

## 2020-11-27 PROCEDURE — 99205 OFFICE O/P NEW HI 60 MIN: CPT | Performed by: NURSE PRACTITIONER

## 2020-11-27 PROCEDURE — 3078F DIAST BP <80 MM HG: CPT | Performed by: NURSE PRACTITIONER

## 2020-11-27 PROCEDURE — 1036F TOBACCO NON-USER: CPT | Performed by: NURSE PRACTITIONER

## 2020-11-27 PROCEDURE — 3008F BODY MASS INDEX DOCD: CPT | Performed by: NURSE PRACTITIONER

## 2020-11-27 PROCEDURE — 1160F RVW MEDS BY RX/DR IN RCRD: CPT | Performed by: NURSE PRACTITIONER

## 2020-11-27 PROCEDURE — 3074F SYST BP LT 130 MM HG: CPT | Performed by: NURSE PRACTITIONER

## 2020-11-27 RX ORDER — SODIUM CHLORIDE 9 MG/ML
20 INJECTION, SOLUTION INTRAVENOUS ONCE
Status: CANCELLED | OUTPATIENT
Start: 2020-11-30

## 2020-11-30 ENCOUNTER — VBI (OUTPATIENT)
Dept: ADMINISTRATIVE | Facility: OTHER | Age: 75
End: 2020-11-30

## 2020-11-30 ENCOUNTER — LAB (OUTPATIENT)
Dept: LAB | Facility: HOSPITAL | Age: 75
End: 2020-11-30
Payer: COMMERCIAL

## 2020-11-30 DIAGNOSIS — D50.0 IRON DEFICIENCY ANEMIA DUE TO CHRONIC BLOOD LOSS: ICD-10-CM

## 2020-11-30 LAB
ALBUMIN SERPL BCP-MCNC: 4.1 G/DL (ref 3.5–5)
ALP SERPL-CCNC: 49 U/L (ref 46–116)
ALT SERPL W P-5'-P-CCNC: 25 U/L (ref 12–78)
ANION GAP SERPL CALCULATED.3IONS-SCNC: 9 MMOL/L (ref 4–13)
AST SERPL W P-5'-P-CCNC: 22 U/L (ref 5–45)
BASOPHILS # BLD AUTO: 0.04 THOUSANDS/ΜL (ref 0–0.1)
BASOPHILS NFR BLD AUTO: 1 % (ref 0–1)
BILIRUB SERPL-MCNC: 0.4 MG/DL (ref 0.2–1)
BUN SERPL-MCNC: 29 MG/DL (ref 5–25)
CALCIUM SERPL-MCNC: 8.9 MG/DL (ref 8.3–10.1)
CHLORIDE SERPL-SCNC: 105 MMOL/L (ref 100–108)
CO2 SERPL-SCNC: 27 MMOL/L (ref 21–32)
CREAT SERPL-MCNC: 1.55 MG/DL (ref 0.6–1.3)
EOSINOPHIL # BLD AUTO: 0.53 THOUSAND/ΜL (ref 0–0.61)
EOSINOPHIL NFR BLD AUTO: 8 % (ref 0–6)
ERYTHROCYTE [DISTWIDTH] IN BLOOD BY AUTOMATED COUNT: 24.2 % (ref 11.6–15.1)
FERRITIN SERPL-MCNC: 30 NG/ML (ref 8–388)
GFR SERPL CREATININE-BSD FRML MDRD: 43 ML/MIN/1.73SQ M
GLUCOSE P FAST SERPL-MCNC: 75 MG/DL (ref 65–99)
HCT VFR BLD AUTO: 36.8 % (ref 36.5–49.3)
HEMOCCULT STL QL IA: POSITIVE
HGB BLD-MCNC: 10.4 G/DL (ref 12–17)
IMM GRANULOCYTES # BLD AUTO: 0.02 THOUSAND/UL (ref 0–0.2)
IMM GRANULOCYTES NFR BLD AUTO: 0 % (ref 0–2)
IRON SATN MFR SERPL: 90 %
IRON SERPL-MCNC: 426 UG/DL (ref 65–175)
LYMPHOCYTES # BLD AUTO: 1.5 THOUSANDS/ΜL (ref 0.6–4.47)
LYMPHOCYTES NFR BLD AUTO: 24 % (ref 14–44)
MCH RBC QN AUTO: 24.3 PG (ref 26.8–34.3)
MCHC RBC AUTO-ENTMCNC: 28.3 G/DL (ref 31.4–37.4)
MCV RBC AUTO: 86 FL (ref 82–98)
MONOCYTES # BLD AUTO: 0.58 THOUSAND/ΜL (ref 0.17–1.22)
MONOCYTES NFR BLD AUTO: 9 % (ref 4–12)
NEUTROPHILS # BLD AUTO: 3.69 THOUSANDS/ΜL (ref 1.85–7.62)
NEUTS SEG NFR BLD AUTO: 58 % (ref 43–75)
NRBC BLD AUTO-RTO: 0 /100 WBCS
PLATELET # BLD AUTO: 386 THOUSANDS/UL (ref 149–390)
PMV BLD AUTO: 9 FL (ref 8.9–12.7)
POTASSIUM SERPL-SCNC: 4.3 MMOL/L (ref 3.5–5.3)
PROT SERPL-MCNC: 7.6 G/DL (ref 6.4–8.2)
RBC # BLD AUTO: 4.28 MILLION/UL (ref 3.88–5.62)
SODIUM SERPL-SCNC: 141 MMOL/L (ref 136–145)
TIBC SERPL-MCNC: 473 UG/DL (ref 250–450)
WBC # BLD AUTO: 6.36 THOUSAND/UL (ref 4.31–10.16)

## 2020-11-30 PROCEDURE — 36415 COLL VENOUS BLD VENIPUNCTURE: CPT

## 2020-11-30 PROCEDURE — 85025 COMPLETE CBC W/AUTO DIFF WBC: CPT

## 2020-11-30 PROCEDURE — 80053 COMPREHEN METABOLIC PANEL: CPT

## 2020-11-30 PROCEDURE — 82728 ASSAY OF FERRITIN: CPT

## 2020-11-30 PROCEDURE — 83550 IRON BINDING TEST: CPT

## 2020-11-30 PROCEDURE — G0328 FECAL BLOOD SCRN IMMUNOASSAY: HCPCS

## 2020-11-30 PROCEDURE — 83540 ASSAY OF IRON: CPT

## 2020-12-01 ENCOUNTER — TELEPHONE (OUTPATIENT)
Dept: HEMATOLOGY ONCOLOGY | Facility: CLINIC | Age: 75
End: 2020-12-01

## 2020-12-01 DIAGNOSIS — D50.0 IRON DEFICIENCY ANEMIA DUE TO CHRONIC BLOOD LOSS: Primary | ICD-10-CM

## 2020-12-01 DIAGNOSIS — R19.5 POSITIVE FECAL OCCULT BLOOD TEST: ICD-10-CM

## 2020-12-02 ENCOUNTER — HOSPITAL ENCOUNTER (OUTPATIENT)
Dept: INFUSION CENTER | Facility: HOSPITAL | Age: 75
Discharge: HOME | End: 2020-12-02
Attending: INTERNAL MEDICINE

## 2020-12-04 ENCOUNTER — TELEPHONE (OUTPATIENT)
Dept: INPATIENT UNIT | Facility: HOSPITAL | Age: 75
End: 2020-12-04

## 2020-12-04 DIAGNOSIS — N18.30 CKD (CHRONIC KIDNEY DISEASE) STAGE 3, GFR 30-59 ML/MIN (HCC): Primary | ICD-10-CM

## 2020-12-04 DIAGNOSIS — I25.10 CAD (CORONARY ARTERY DISEASE): ICD-10-CM

## 2020-12-04 RX ORDER — ASPIRIN 81 MG/1
324 TABLET, CHEWABLE ORAL ONCE
Status: CANCELLED | OUTPATIENT
Start: 2020-12-04 | End: 2020-12-04

## 2020-12-07 ENCOUNTER — HOSPITAL ENCOUNTER (OUTPATIENT)
Dept: NON INVASIVE DIAGNOSTICS | Facility: HOSPITAL | Age: 75
Discharge: HOME | End: 2020-12-08
Attending: INTERNAL MEDICINE | Admitting: INTERNAL MEDICINE
Payer: COMMERCIAL

## 2020-12-07 DIAGNOSIS — I10 ESSENTIAL HYPERTENSION: Chronic | ICD-10-CM

## 2020-12-07 DIAGNOSIS — D50.0 IRON DEFICIENCY ANEMIA DUE TO CHRONIC BLOOD LOSS: ICD-10-CM

## 2020-12-07 DIAGNOSIS — Z98.61 CAD S/P PERCUTANEOUS CORONARY ANGIOPLASTY: Primary | ICD-10-CM

## 2020-12-07 DIAGNOSIS — I25.10 CAD S/P PERCUTANEOUS CORONARY ANGIOPLASTY: Primary | ICD-10-CM

## 2020-12-07 DIAGNOSIS — N18.30 CKD (CHRONIC KIDNEY DISEASE) STAGE 3, GFR 30-59 ML/MIN (HCC): ICD-10-CM

## 2020-12-07 LAB
ANION GAP SERPL CALCULATED.3IONS-SCNC: 8 MMOL/L (ref 4–13)
ATRIAL RATE: 51 BPM
ATRIAL RATE: 59 BPM
BASOPHILS # BLD AUTO: 0.06 THOUSANDS/ΜL (ref 0–0.1)
BASOPHILS NFR BLD AUTO: 1 % (ref 0–1)
BUN SERPL-MCNC: 30 MG/DL (ref 5–25)
CALCIUM SERPL-MCNC: 9.5 MG/DL (ref 8.3–10.1)
CHLORIDE SERPL-SCNC: 107 MMOL/L (ref 100–108)
CO2 SERPL-SCNC: 25 MMOL/L (ref 21–32)
CREAT SERPL-MCNC: 1.66 MG/DL (ref 0.6–1.3)
EOSINOPHIL # BLD AUTO: 0.76 THOUSAND/ΜL (ref 0–0.61)
EOSINOPHIL NFR BLD AUTO: 11 % (ref 0–6)
ERYTHROCYTE [DISTWIDTH] IN BLOOD BY AUTOMATED COUNT: 25.2 % (ref 11.6–15.1)
GFR SERPL CREATININE-BSD FRML MDRD: 40 ML/MIN/1.73SQ M
GLUCOSE P FAST SERPL-MCNC: 114 MG/DL (ref 65–99)
GLUCOSE SERPL-MCNC: 114 MG/DL (ref 65–140)
HCT VFR BLD AUTO: 34.3 % (ref 36.5–49.3)
HGB BLD-MCNC: 10.3 G/DL (ref 12–17)
IMM GRANULOCYTES # BLD AUTO: 0.02 THOUSAND/UL (ref 0–0.2)
IMM GRANULOCYTES NFR BLD AUTO: 0 % (ref 0–2)
KCT BLD-ACNC: 286 SEC (ref 89–137)
LYMPHOCYTES # BLD AUTO: 1.39 THOUSANDS/ΜL (ref 0.6–4.47)
LYMPHOCYTES NFR BLD AUTO: 21 % (ref 14–44)
MCH RBC QN AUTO: 25.5 PG (ref 26.8–34.3)
MCHC RBC AUTO-ENTMCNC: 30 G/DL (ref 31.4–37.4)
MCV RBC AUTO: 85 FL (ref 82–98)
MONOCYTES # BLD AUTO: 0.52 THOUSAND/ΜL (ref 0.17–1.22)
MONOCYTES NFR BLD AUTO: 8 % (ref 4–12)
NEUTROPHILS # BLD AUTO: 4 THOUSANDS/ΜL (ref 1.85–7.62)
NEUTS SEG NFR BLD AUTO: 59 % (ref 43–75)
NRBC BLD AUTO-RTO: 0 /100 WBCS
P AXIS: 25 DEGREES
P AXIS: 27 DEGREES
PLATELET # BLD AUTO: 277 THOUSANDS/UL (ref 149–390)
PMV BLD AUTO: 8.9 FL (ref 8.9–12.7)
POTASSIUM SERPL-SCNC: 4.2 MMOL/L (ref 3.5–5.3)
PR INTERVAL: 194 MS
PR INTERVAL: 202 MS
QRS AXIS: -3 DEGREES
QRS AXIS: -6 DEGREES
QRSD INTERVAL: 108 MS
QRSD INTERVAL: 96 MS
QT INTERVAL: 422 MS
QT INTERVAL: 446 MS
QTC INTERVAL: 411 MS
QTC INTERVAL: 417 MS
RBC # BLD AUTO: 4.04 MILLION/UL (ref 3.88–5.62)
SODIUM SERPL-SCNC: 140 MMOL/L (ref 136–145)
SPECIMEN SOURCE: ABNORMAL
T WAVE AXIS: 22 DEGREES
T WAVE AXIS: 23 DEGREES
VENTRICULAR RATE: 51 BPM
VENTRICULAR RATE: 59 BPM
WBC # BLD AUTO: 6.75 THOUSAND/UL (ref 4.31–10.16)

## 2020-12-07 PROCEDURE — 99215 OFFICE O/P EST HI 40 MIN: CPT | Performed by: INTERNAL MEDICINE

## 2020-12-07 PROCEDURE — 93005 ELECTROCARDIOGRAM TRACING: CPT

## 2020-12-07 PROCEDURE — 80048 BASIC METABOLIC PNL TOTAL CA: CPT | Performed by: INTERNAL MEDICINE

## 2020-12-07 PROCEDURE — NC001 PR NO CHARGE: Performed by: INTERNAL MEDICINE

## 2020-12-07 PROCEDURE — 99152 MOD SED SAME PHYS/QHP 5/>YRS: CPT | Performed by: INTERNAL MEDICINE

## 2020-12-07 PROCEDURE — C1894 INTRO/SHEATH, NON-LASER: HCPCS | Performed by: INTERNAL MEDICINE

## 2020-12-07 PROCEDURE — 92928 PRQ TCAT PLMT NTRAC ST 1 LES: CPT | Performed by: INTERNAL MEDICINE

## 2020-12-07 PROCEDURE — C1769 GUIDE WIRE: HCPCS | Performed by: INTERNAL MEDICINE

## 2020-12-07 PROCEDURE — C9600 PERC DRUG-EL COR STENT SING: HCPCS | Performed by: INTERNAL MEDICINE

## 2020-12-07 PROCEDURE — C1887 CATHETER, GUIDING: HCPCS | Performed by: INTERNAL MEDICINE

## 2020-12-07 PROCEDURE — C1725 CATH, TRANSLUMIN NON-LASER: HCPCS | Performed by: INTERNAL MEDICINE

## 2020-12-07 PROCEDURE — 93454 CORONARY ARTERY ANGIO S&I: CPT | Performed by: INTERNAL MEDICINE

## 2020-12-07 PROCEDURE — 85347 COAGULATION TIME ACTIVATED: CPT

## 2020-12-07 PROCEDURE — 93010 ELECTROCARDIOGRAM REPORT: CPT | Performed by: INTERNAL MEDICINE

## 2020-12-07 PROCEDURE — C1874 STENT, COATED/COV W/DEL SYS: HCPCS

## 2020-12-07 PROCEDURE — 85025 COMPLETE CBC W/AUTO DIFF WBC: CPT | Performed by: INTERNAL MEDICINE

## 2020-12-07 PROCEDURE — 99153 MOD SED SAME PHYS/QHP EA: CPT | Performed by: INTERNAL MEDICINE

## 2020-12-07 RX ORDER — NITROGLYCERIN 20 MG/100ML
INJECTION INTRAVENOUS CODE/TRAUMA/SEDATION MEDICATION
Status: COMPLETED | OUTPATIENT
Start: 2020-12-07 | End: 2020-12-07

## 2020-12-07 RX ORDER — SODIUM CHLORIDE 9 MG/ML
100 INJECTION, SOLUTION INTRAVENOUS CONTINUOUS
Status: DISPENSED | OUTPATIENT
Start: 2020-12-07 | End: 2020-12-07

## 2020-12-07 RX ORDER — HEPARIN SODIUM 1000 [USP'U]/ML
INJECTION, SOLUTION INTRAVENOUS; SUBCUTANEOUS CODE/TRAUMA/SEDATION MEDICATION
Status: COMPLETED | OUTPATIENT
Start: 2020-12-07 | End: 2020-12-07

## 2020-12-07 RX ORDER — CLOPIDOGREL BISULFATE 75 MG/1
75 TABLET ORAL DAILY
Status: DISCONTINUED | OUTPATIENT
Start: 2020-12-08 | End: 2020-12-08 | Stop reason: HOSPADM

## 2020-12-07 RX ORDER — ASPIRIN 81 MG/1
324 TABLET, CHEWABLE ORAL ONCE
Status: COMPLETED | OUTPATIENT
Start: 2020-12-07 | End: 2020-12-07

## 2020-12-07 RX ORDER — ASPIRIN 81 MG/1
81 TABLET ORAL EVERY OTHER DAY
Status: DISCONTINUED | OUTPATIENT
Start: 2020-12-08 | End: 2020-12-08 | Stop reason: HOSPADM

## 2020-12-07 RX ORDER — METOPROLOL SUCCINATE 25 MG/1
25 TABLET, EXTENDED RELEASE ORAL DAILY
Status: DISCONTINUED | OUTPATIENT
Start: 2020-12-08 | End: 2020-12-08 | Stop reason: HOSPADM

## 2020-12-07 RX ORDER — FENTANYL CITRATE 50 UG/ML
INJECTION, SOLUTION INTRAMUSCULAR; INTRAVENOUS CODE/TRAUMA/SEDATION MEDICATION
Status: COMPLETED | OUTPATIENT
Start: 2020-12-07 | End: 2020-12-07

## 2020-12-07 RX ORDER — LISINOPRIL 10 MG/1
10 TABLET ORAL DAILY
Status: DISCONTINUED | OUTPATIENT
Start: 2020-12-08 | End: 2020-12-08

## 2020-12-07 RX ORDER — LIDOCAINE HYDROCHLORIDE 10 MG/ML
INJECTION, SOLUTION EPIDURAL; INFILTRATION; INTRACAUDAL; PERINEURAL CODE/TRAUMA/SEDATION MEDICATION
Status: COMPLETED | OUTPATIENT
Start: 2020-12-07 | End: 2020-12-07

## 2020-12-07 RX ORDER — ACETAMINOPHEN 325 MG/1
650 TABLET ORAL EVERY 4 HOURS PRN
Status: DISCONTINUED | OUTPATIENT
Start: 2020-12-07 | End: 2020-12-08 | Stop reason: HOSPADM

## 2020-12-07 RX ORDER — NITROGLYCERIN 0.4 MG/1
0.4 TABLET SUBLINGUAL
Status: DISCONTINUED | OUTPATIENT
Start: 2020-12-07 | End: 2020-12-08 | Stop reason: HOSPADM

## 2020-12-07 RX ORDER — MIDAZOLAM HYDROCHLORIDE 2 MG/2ML
INJECTION, SOLUTION INTRAMUSCULAR; INTRAVENOUS CODE/TRAUMA/SEDATION MEDICATION
Status: COMPLETED | OUTPATIENT
Start: 2020-12-07 | End: 2020-12-07

## 2020-12-07 RX ORDER — VERAPAMIL HYDROCHLORIDE 2.5 MG/ML
INJECTION, SOLUTION INTRAVENOUS CODE/TRAUMA/SEDATION MEDICATION
Status: COMPLETED | OUTPATIENT
Start: 2020-12-07 | End: 2020-12-07

## 2020-12-07 RX ORDER — ACETYLCYSTEINE 200 MG/ML
600 SOLUTION ORAL; RESPIRATORY (INHALATION) 2 TIMES DAILY
Status: COMPLETED | OUTPATIENT
Start: 2020-12-07 | End: 2020-12-07

## 2020-12-07 RX ORDER — TRIAMTERENE AND HYDROCHLOROTHIAZIDE 37.5; 25 MG/1; MG/1
2 TABLET ORAL DAILY
Status: DISCONTINUED | OUTPATIENT
Start: 2020-12-08 | End: 2020-12-08 | Stop reason: HOSPADM

## 2020-12-07 RX ORDER — ATORVASTATIN CALCIUM 40 MG/1
40 TABLET, FILM COATED ORAL EVERY EVENING
Status: DISCONTINUED | OUTPATIENT
Start: 2020-12-08 | End: 2020-12-08 | Stop reason: HOSPADM

## 2020-12-07 RX ORDER — FLUTICASONE PROPIONATE 50 MCG
2 SPRAY, SUSPENSION (ML) NASAL DAILY
Status: DISCONTINUED | OUTPATIENT
Start: 2020-12-07 | End: 2020-12-08 | Stop reason: HOSPADM

## 2020-12-07 RX ORDER — FERROUS SULFATE 325(65) MG
325 TABLET ORAL
Status: DISCONTINUED | OUTPATIENT
Start: 2020-12-08 | End: 2020-12-08 | Stop reason: HOSPADM

## 2020-12-07 RX ORDER — SODIUM CHLORIDE 9 MG/ML
100 INJECTION, SOLUTION INTRAVENOUS CONTINUOUS
Status: DISCONTINUED | OUTPATIENT
Start: 2020-12-07 | End: 2020-12-07

## 2020-12-07 RX ADMIN — SODIUM CHLORIDE 312 ML: 0.9 INJECTION, SOLUTION INTRAVENOUS at 07:18

## 2020-12-07 RX ADMIN — MIDAZOLAM 1 MG: 1 INJECTION INTRAMUSCULAR; INTRAVENOUS at 11:57

## 2020-12-07 RX ADMIN — VERAPAMIL HYDROCHLORIDE 2.5 MG: 2.5 INJECTION INTRAVENOUS at 11:40

## 2020-12-07 RX ADMIN — FENTANYL CITRATE 25 MCG: 50 INJECTION INTRAMUSCULAR; INTRAVENOUS at 11:57

## 2020-12-07 RX ADMIN — HEPARIN SODIUM 1000 UNITS: 1000 INJECTION INTRAVENOUS; SUBCUTANEOUS at 11:48

## 2020-12-07 RX ADMIN — MIDAZOLAM 2 MG: 1 INJECTION INTRAMUSCULAR; INTRAVENOUS at 11:37

## 2020-12-07 RX ADMIN — ACETYLCYSTEINE 600 MG: 200 SOLUTION ORAL; RESPIRATORY (INHALATION) at 16:35

## 2020-12-07 RX ADMIN — Medication 200 MCG: at 11:55

## 2020-12-07 RX ADMIN — ASPIRIN 81 MG CHEWABLE TABLET 243 MG: 81 TABLET CHEWABLE at 07:24

## 2020-12-07 RX ADMIN — FENTANYL CITRATE 50 MCG: 50 INJECTION INTRAMUSCULAR; INTRAVENOUS at 11:37

## 2020-12-07 RX ADMIN — ACETYLCYSTEINE 600 MG: 200 SOLUTION ORAL; RESPIRATORY (INHALATION) at 09:26

## 2020-12-07 RX ADMIN — Medication 200 MCG: at 11:40

## 2020-12-07 RX ADMIN — NITROGLYCERIN 1 INCH: 20 OINTMENT TOPICAL at 11:42

## 2020-12-07 RX ADMIN — IOHEXOL 75 ML: 350 INJECTION, SOLUTION INTRAVENOUS at 12:04

## 2020-12-07 RX ADMIN — LIDOCAINE HYDROCHLORIDE 2 ML: 10 INJECTION, SOLUTION EPIDURAL; INFILTRATION; INTRACAUDAL; PERINEURAL at 11:38

## 2020-12-07 RX ADMIN — HEPARIN SODIUM 10000 UNITS: 1000 INJECTION INTRAVENOUS; SUBCUTANEOUS at 11:40

## 2020-12-08 ENCOUNTER — TELEPHONE (OUTPATIENT)
Dept: NEPHROLOGY | Facility: CLINIC | Age: 75
End: 2020-12-08

## 2020-12-08 ENCOUNTER — VBI (OUTPATIENT)
Dept: ADMINISTRATIVE | Facility: OTHER | Age: 75
End: 2020-12-08

## 2020-12-08 VITALS
WEIGHT: 230 LBS | HEART RATE: 73 BPM | RESPIRATION RATE: 18 BRPM | TEMPERATURE: 97.9 F | BODY MASS INDEX: 32.93 KG/M2 | DIASTOLIC BLOOD PRESSURE: 70 MMHG | HEIGHT: 70 IN | SYSTOLIC BLOOD PRESSURE: 147 MMHG | OXYGEN SATURATION: 96 %

## 2020-12-08 LAB
ANION GAP SERPL CALCULATED.3IONS-SCNC: 6 MMOL/L (ref 4–13)
BUN SERPL-MCNC: 29 MG/DL (ref 5–25)
CALCIUM SERPL-MCNC: 9.3 MG/DL (ref 8.3–10.1)
CHLORIDE SERPL-SCNC: 109 MMOL/L (ref 100–108)
CO2 SERPL-SCNC: 24 MMOL/L (ref 21–32)
CREAT SERPL-MCNC: 1.33 MG/DL (ref 0.6–1.3)
ERYTHROCYTE [DISTWIDTH] IN BLOOD BY AUTOMATED COUNT: 25.2 % (ref 11.6–15.1)
GFR SERPL CREATININE-BSD FRML MDRD: 52 ML/MIN/1.73SQ M
GLUCOSE P FAST SERPL-MCNC: 92 MG/DL (ref 65–99)
GLUCOSE SERPL-MCNC: 92 MG/DL (ref 65–140)
HCT VFR BLD AUTO: 32.7 % (ref 36.5–49.3)
HGB BLD-MCNC: 9.7 G/DL (ref 12–17)
MCH RBC QN AUTO: 25.7 PG (ref 26.8–34.3)
MCHC RBC AUTO-ENTMCNC: 29.7 G/DL (ref 31.4–37.4)
MCV RBC AUTO: 87 FL (ref 82–98)
PLATELET # BLD AUTO: 241 THOUSANDS/UL (ref 149–390)
PMV BLD AUTO: 8.9 FL (ref 8.9–12.7)
POTASSIUM SERPL-SCNC: 4.1 MMOL/L (ref 3.5–5.3)
RBC # BLD AUTO: 3.78 MILLION/UL (ref 3.88–5.62)
SODIUM SERPL-SCNC: 139 MMOL/L (ref 136–145)
WBC # BLD AUTO: 6.12 THOUSAND/UL (ref 4.31–10.16)

## 2020-12-08 PROCEDURE — 80048 BASIC METABOLIC PNL TOTAL CA: CPT | Performed by: INTERNAL MEDICINE

## 2020-12-08 PROCEDURE — 99226 PR SBSQ OBSERVATION CARE/DAY 35 MINUTES: CPT | Performed by: INTERNAL MEDICINE

## 2020-12-08 PROCEDURE — 85027 COMPLETE CBC AUTOMATED: CPT | Performed by: INTERNAL MEDICINE

## 2020-12-08 RX ORDER — LISINOPRIL 10 MG/1
10 TABLET ORAL DAILY
Status: DISCONTINUED | OUTPATIENT
Start: 2020-12-09 | End: 2020-12-08 | Stop reason: HOSPADM

## 2020-12-08 RX ADMIN — FLUTICASONE PROPIONATE 2 SPRAY: 50 SPRAY, METERED NASAL at 08:37

## 2020-12-08 RX ADMIN — LISINOPRIL 10 MG: 10 TABLET ORAL at 08:40

## 2020-12-08 RX ADMIN — FERROUS SULFATE TAB 325 MG (65 MG ELEMENTAL FE) 325 MG: 325 (65 FE) TAB at 08:40

## 2020-12-08 RX ADMIN — METOPROLOL SUCCINATE 25 MG: 25 TABLET, FILM COATED, EXTENDED RELEASE ORAL at 08:40

## 2020-12-08 RX ADMIN — ASPIRIN 81 MG: 81 TABLET ORAL at 08:40

## 2020-12-08 RX ADMIN — CLOPIDOGREL BISULFATE 75 MG: 75 TABLET ORAL at 08:40

## 2020-12-09 ENCOUNTER — HOSPITAL ENCOUNTER (OUTPATIENT)
Dept: INFUSION CENTER | Facility: HOSPITAL | Age: 75
End: 2020-12-09
Attending: INTERNAL MEDICINE

## 2020-12-10 ENCOUNTER — TELEPHONE (OUTPATIENT)
Dept: NEPHROLOGY | Facility: CLINIC | Age: 75
End: 2020-12-10

## 2020-12-10 DIAGNOSIS — I10 ESSENTIAL HYPERTENSION: Primary | Chronic | ICD-10-CM

## 2020-12-10 DIAGNOSIS — M10.9 GOUT, UNSPECIFIED CAUSE, UNSPECIFIED CHRONICITY, UNSPECIFIED SITE: ICD-10-CM

## 2020-12-10 DIAGNOSIS — N18.30 STAGE 3 CHRONIC KIDNEY DISEASE, UNSPECIFIED WHETHER STAGE 3A OR 3B CKD (HCC): ICD-10-CM

## 2020-12-10 DIAGNOSIS — E55.9 VITAMIN D DEFICIENCY: ICD-10-CM

## 2020-12-14 ENCOUNTER — LAB (OUTPATIENT)
Dept: LAB | Facility: HOSPITAL | Age: 75
End: 2020-12-14
Payer: COMMERCIAL

## 2020-12-14 DIAGNOSIS — Z11.59 NEED FOR HEPATITIS C SCREENING TEST: ICD-10-CM

## 2020-12-14 DIAGNOSIS — E55.9 VITAMIN D DEFICIENCY: ICD-10-CM

## 2020-12-14 DIAGNOSIS — I65.21 INTERNAL CAROTID ARTERY STENOSIS, RIGHT: Chronic | ICD-10-CM

## 2020-12-14 DIAGNOSIS — M10.9 GOUT, UNSPECIFIED CAUSE, UNSPECIFIED CHRONICITY, UNSPECIFIED SITE: ICD-10-CM

## 2020-12-14 DIAGNOSIS — Z00.00 MEDICARE ANNUAL WELLNESS VISIT, SUBSEQUENT: ICD-10-CM

## 2020-12-14 DIAGNOSIS — R73.01 IMPAIRED FASTING GLUCOSE: ICD-10-CM

## 2020-12-14 DIAGNOSIS — D50.0 IRON DEFICIENCY ANEMIA DUE TO CHRONIC BLOOD LOSS: ICD-10-CM

## 2020-12-14 DIAGNOSIS — I10 ESSENTIAL HYPERTENSION: ICD-10-CM

## 2020-12-14 DIAGNOSIS — N18.30 CKD (CHRONIC KIDNEY DISEASE) STAGE 3, GFR 30-59 ML/MIN (HCC): ICD-10-CM

## 2020-12-14 DIAGNOSIS — N18.30 STAGE 3 CHRONIC KIDNEY DISEASE, UNSPECIFIED WHETHER STAGE 3A OR 3B CKD (HCC): ICD-10-CM

## 2020-12-14 LAB
25(OH)D3 SERPL-MCNC: 73.9 NG/ML (ref 30–100)
ALBUMIN SERPL BCP-MCNC: 4.4 G/DL (ref 3.5–5)
ALP SERPL-CCNC: 47 U/L (ref 46–116)
ALT SERPL W P-5'-P-CCNC: 24 U/L (ref 12–78)
ANION GAP SERPL CALCULATED.3IONS-SCNC: 7 MMOL/L (ref 4–13)
AST SERPL W P-5'-P-CCNC: 19 U/L (ref 5–45)
BASOPHILS # BLD AUTO: 0.05 THOUSANDS/ΜL (ref 0–0.1)
BASOPHILS NFR BLD AUTO: 1 % (ref 0–1)
BILIRUB SERPL-MCNC: 0.41 MG/DL (ref 0.2–1)
BUN SERPL-MCNC: 22 MG/DL (ref 5–25)
CALCIUM SERPL-MCNC: 10 MG/DL (ref 8.3–10.1)
CHLORIDE SERPL-SCNC: 107 MMOL/L (ref 100–108)
CHOLEST SERPL-MCNC: 110 MG/DL (ref 50–200)
CO2 SERPL-SCNC: 26 MMOL/L (ref 21–32)
CREAT SERPL-MCNC: 1.65 MG/DL (ref 0.6–1.3)
EOSINOPHIL # BLD AUTO: 0.48 THOUSAND/ΜL (ref 0–0.61)
EOSINOPHIL NFR BLD AUTO: 7 % (ref 0–6)
GFR SERPL CREATININE-BSD FRML MDRD: 40 ML/MIN/1.73SQ M
GLUCOSE P FAST SERPL-MCNC: 113 MG/DL (ref 65–99)
HCT VFR BLD AUTO: 37.2 % (ref 36.5–49.3)
HDLC SERPL-MCNC: 42 MG/DL
HGB BLD-MCNC: 11.1 G/DL (ref 12–17)
IMM GRANULOCYTES # BLD AUTO: 0.01 THOUSAND/UL (ref 0–0.2)
IMM GRANULOCYTES NFR BLD AUTO: 0 % (ref 0–2)
LDLC SERPL CALC-MCNC: 45 MG/DL (ref 0–100)
LYMPHOCYTES # BLD AUTO: 1.15 THOUSANDS/ΜL (ref 0.6–4.47)
LYMPHOCYTES NFR BLD AUTO: 17 % (ref 14–44)
MAGNESIUM SERPL-MCNC: 2.3 MG/DL (ref 1.6–2.6)
MCH RBC QN AUTO: 26.3 PG (ref 26.8–34.3)
MCHC RBC AUTO-ENTMCNC: 29.8 G/DL (ref 31.4–37.4)
MCV RBC AUTO: 88 FL (ref 82–98)
MONOCYTES # BLD AUTO: 0.48 THOUSAND/ΜL (ref 0.17–1.22)
MONOCYTES NFR BLD AUTO: 7 % (ref 4–12)
NEUTROPHILS # BLD AUTO: 4.53 THOUSANDS/ΜL (ref 1.85–7.62)
NEUTS SEG NFR BLD AUTO: 68 % (ref 43–75)
NONHDLC SERPL-MCNC: 68 MG/DL
NRBC BLD AUTO-RTO: 0 /100 WBCS
PHOSPHATE SERPL-MCNC: 3.4 MG/DL (ref 2.3–4.1)
PLATELET # BLD AUTO: 261 THOUSANDS/UL (ref 149–390)
PMV BLD AUTO: 8.9 FL (ref 8.9–12.7)
POTASSIUM SERPL-SCNC: 4.8 MMOL/L (ref 3.5–5.3)
PROT SERPL-MCNC: 7.4 G/DL (ref 6.4–8.2)
PTH-INTACT SERPL-MCNC: 33.6 PG/ML (ref 18.4–80.1)
RBC # BLD AUTO: 4.22 MILLION/UL (ref 3.88–5.62)
SODIUM SERPL-SCNC: 140 MMOL/L (ref 136–145)
TRIGL SERPL-MCNC: 114 MG/DL
TSH SERPL DL<=0.05 MIU/L-ACNC: 2.69 UIU/ML (ref 0.36–3.74)
WBC # BLD AUTO: 6.7 THOUSAND/UL (ref 4.31–10.16)

## 2020-12-14 PROCEDURE — 83036 HEMOGLOBIN GLYCOSYLATED A1C: CPT

## 2020-12-14 PROCEDURE — 80053 COMPREHEN METABOLIC PANEL: CPT

## 2020-12-14 PROCEDURE — 85025 COMPLETE CBC W/AUTO DIFF WBC: CPT

## 2020-12-14 PROCEDURE — 83970 ASSAY OF PARATHORMONE: CPT

## 2020-12-14 PROCEDURE — 36415 COLL VENOUS BLD VENIPUNCTURE: CPT

## 2020-12-14 PROCEDURE — 80061 LIPID PANEL: CPT

## 2020-12-14 PROCEDURE — 84100 ASSAY OF PHOSPHORUS: CPT

## 2020-12-14 PROCEDURE — 82306 VITAMIN D 25 HYDROXY: CPT

## 2020-12-14 PROCEDURE — 84443 ASSAY THYROID STIM HORMONE: CPT

## 2020-12-14 PROCEDURE — 83735 ASSAY OF MAGNESIUM: CPT

## 2020-12-15 ENCOUNTER — OFFICE VISIT (OUTPATIENT)
Dept: NEPHROLOGY | Facility: HOSPITAL | Age: 75
End: 2020-12-15
Payer: COMMERCIAL

## 2020-12-15 ENCOUNTER — DOCUMENTATION (OUTPATIENT)
Dept: FAMILY MEDICINE CLINIC | Facility: HOSPITAL | Age: 75
End: 2020-12-15

## 2020-12-15 VITALS
HEART RATE: 72 BPM | WEIGHT: 225 LBS | RESPIRATION RATE: 16 BRPM | SYSTOLIC BLOOD PRESSURE: 110 MMHG | DIASTOLIC BLOOD PRESSURE: 66 MMHG | HEIGHT: 70 IN | TEMPERATURE: 97.6 F | BODY MASS INDEX: 32.21 KG/M2

## 2020-12-15 DIAGNOSIS — I10 ESSENTIAL HYPERTENSION: Chronic | ICD-10-CM

## 2020-12-15 DIAGNOSIS — N17.9 AKI (ACUTE KIDNEY INJURY) (HCC): Primary | ICD-10-CM

## 2020-12-15 DIAGNOSIS — N18.2 CKD (CHRONIC KIDNEY DISEASE) STAGE 2, GFR 60-89 ML/MIN: Chronic | ICD-10-CM

## 2020-12-15 DIAGNOSIS — M10.9 GOUT, UNSPECIFIED CAUSE, UNSPECIFIED CHRONICITY, UNSPECIFIED SITE: ICD-10-CM

## 2020-12-15 DIAGNOSIS — D50.9 IRON DEFICIENCY ANEMIA, UNSPECIFIED IRON DEFICIENCY ANEMIA TYPE: ICD-10-CM

## 2020-12-15 LAB
EST. AVERAGE GLUCOSE BLD GHB EST-MCNC: 94 MG/DL
HBA1C MFR BLD: 4.9 %

## 2020-12-15 PROCEDURE — 99214 OFFICE O/P EST MOD 30 MIN: CPT | Performed by: INTERNAL MEDICINE

## 2020-12-15 PROCEDURE — 1160F RVW MEDS BY RX/DR IN RCRD: CPT | Performed by: INTERNAL MEDICINE

## 2020-12-15 PROCEDURE — 3008F BODY MASS INDEX DOCD: CPT | Performed by: INTERNAL MEDICINE

## 2020-12-15 PROCEDURE — 3078F DIAST BP <80 MM HG: CPT | Performed by: INTERNAL MEDICINE

## 2020-12-15 PROCEDURE — 3074F SYST BP LT 130 MM HG: CPT | Performed by: INTERNAL MEDICINE

## 2020-12-15 PROCEDURE — 1036F TOBACCO NON-USER: CPT | Performed by: INTERNAL MEDICINE

## 2020-12-19 DIAGNOSIS — I65.21 CAROTID STENOSIS, RIGHT: ICD-10-CM

## 2020-12-20 RX ORDER — CLOPIDOGREL BISULFATE 75 MG/1
TABLET ORAL
Qty: 90 TABLET | Refills: 0 | Status: SHIPPED | OUTPATIENT
Start: 2020-12-20 | End: 2021-01-31

## 2020-12-31 ENCOUNTER — OFFICE VISIT (OUTPATIENT)
Dept: FAMILY MEDICINE CLINIC | Facility: HOSPITAL | Age: 75
End: 2020-12-31
Payer: COMMERCIAL

## 2020-12-31 VITALS
WEIGHT: 223 LBS | HEIGHT: 70 IN | DIASTOLIC BLOOD PRESSURE: 58 MMHG | BODY MASS INDEX: 31.92 KG/M2 | HEART RATE: 74 BPM | TEMPERATURE: 97 F | SYSTOLIC BLOOD PRESSURE: 102 MMHG

## 2020-12-31 DIAGNOSIS — I25.10 CORONARY ARTERY DISEASE INVOLVING NATIVE CORONARY ARTERY OF NATIVE HEART WITHOUT ANGINA PECTORIS: Primary | ICD-10-CM

## 2020-12-31 DIAGNOSIS — I65.23 BILATERAL CAROTID ARTERY STENOSIS: ICD-10-CM

## 2020-12-31 DIAGNOSIS — N18.2 CKD (CHRONIC KIDNEY DISEASE) STAGE 2, GFR 60-89 ML/MIN: Chronic | ICD-10-CM

## 2020-12-31 DIAGNOSIS — I10 ESSENTIAL HYPERTENSION: Chronic | ICD-10-CM

## 2020-12-31 DIAGNOSIS — R73.01 IMPAIRED FASTING GLUCOSE: ICD-10-CM

## 2020-12-31 DIAGNOSIS — N17.9 AKI (ACUTE KIDNEY INJURY) (HCC): ICD-10-CM

## 2020-12-31 DIAGNOSIS — D50.0 IRON DEFICIENCY ANEMIA DUE TO CHRONIC BLOOD LOSS: ICD-10-CM

## 2020-12-31 DIAGNOSIS — E78.5 DYSLIPIDEMIA: Chronic | ICD-10-CM

## 2020-12-31 PROCEDURE — 1036F TOBACCO NON-USER: CPT | Performed by: INTERNAL MEDICINE

## 2020-12-31 PROCEDURE — 99215 OFFICE O/P EST HI 40 MIN: CPT | Performed by: INTERNAL MEDICINE

## 2020-12-31 PROCEDURE — 1160F RVW MEDS BY RX/DR IN RCRD: CPT | Performed by: INTERNAL MEDICINE

## 2020-12-31 PROCEDURE — 3074F SYST BP LT 130 MM HG: CPT | Performed by: INTERNAL MEDICINE

## 2020-12-31 PROCEDURE — 3078F DIAST BP <80 MM HG: CPT | Performed by: INTERNAL MEDICINE

## 2020-12-31 PROCEDURE — 3008F BODY MASS INDEX DOCD: CPT | Performed by: INTERNAL MEDICINE

## 2021-01-05 ENCOUNTER — OFFICE VISIT (OUTPATIENT)
Dept: GASTROENTEROLOGY | Facility: CLINIC | Age: 76
End: 2021-01-05
Payer: COMMERCIAL

## 2021-01-05 VITALS
HEART RATE: 76 BPM | TEMPERATURE: 97.2 F | DIASTOLIC BLOOD PRESSURE: 84 MMHG | BODY MASS INDEX: 31.78 KG/M2 | SYSTOLIC BLOOD PRESSURE: 126 MMHG | WEIGHT: 222 LBS | HEIGHT: 70 IN

## 2021-01-05 DIAGNOSIS — R19.5 POSITIVE FECAL OCCULT BLOOD TEST: ICD-10-CM

## 2021-01-05 DIAGNOSIS — D50.0 IRON DEFICIENCY ANEMIA DUE TO CHRONIC BLOOD LOSS: Primary | ICD-10-CM

## 2021-01-05 DIAGNOSIS — I25.10 CAD, MULTIPLE VESSEL: ICD-10-CM

## 2021-01-05 PROCEDURE — 99204 OFFICE O/P NEW MOD 45 MIN: CPT | Performed by: PHYSICIAN ASSISTANT

## 2021-01-05 NOTE — PROGRESS NOTES
[facility] - Outpatient Consultation  Wale Ricardo  68 y o  male MRN: [id number]  Encounter: [id number]          ASSESSMENT AND PLAN:      1  Iron deficiency anemia due to chronic blood loss  2  Positive fecal occult blood test  3  CAD, multiple vessel  He has iron deficiency anemia and recently had positive fecal immunochemical test  In November 2020, hemoglobin was as low as 7 4 and ferritin 5  He has been taking a daily iron supplement and most recent blood work from 12/14/2020 shows hemoglobin 11 1  He denies overt GI bleeding  He denies prior EGD  Last colonoscopy from 2017 showed diverticulosis, small tubular adenoma, and internal hemorrhoids  Since we cannot stop his dual antiplatelet therapy due to recent cardiac stent placement, I discussed scheduling diagnostic EGD and colonoscopy to rule out sources of GI blood loss like AVM, ulcer, large polyp, malignancy and celiac disease  Again, these would be diagnostic so we could not remove any large polyps due to Plavix  I will discuss with Cardiology to make sure they are in agreement with this plan and contact patient with our final recommendations  - Ambulatory referral to Gastroenterology    Follow-up in 3 months  ______________________________________________________________________    HPI:  14-year-old male with history of multivessel CAD status post SANDRINE x 2 (Nov and Dec 2020) on aspirin and Plavix, carotid artery stenosis status post endarterectomy (Jan 2020), CKD stage 2, impaired fasting glucose, dyslipidemia, hypertension referred to GI for iron deficiency anemia  Patient states he was found to be iron deficient around the time of his cardiac catheterization in November 2020  He states he was scheduled for EGD and colonoscopy but these were canceled  He has been on aspirin and Plavix for a long time, even prior to his stents  He has not required blood transfusion or iron infusion   He has been taking ferrous sulfate 324 mg by mouth daily for the past month  Most recent CBC from 12/14/2020 shows hemoglobin 11 1 and MCV 88  (Hemoglobin was as low as 7 4 in early November 2020)  Ferritin 5 on 11/12/2020  This increased to 30 on 11/30/2020  He has history of chronic GERD and has taken Prilosec OTC every other day for several years  His heartburn is under good control  He denies dysphagia, odynophagia, nausea, vomiting, abdominal pain  He has regular bowel movements on a daily basis  He denies any hematochezia or melena  He denies abnormal weight loss  He had a colonoscopy in April 2017 with General Surgery  He had moderately severe diverticulosis in the sigmoid colon  A single small polyp was removed from the sigmoid colon which was a tubular adenoma  He had small internal hemorrhoids  He has never had an EGD before  REVIEW OF SYSTEMS:    CONSTITUTIONAL: Denies any fever, chills, rigors, and weight loss  HEENT: No earache or tinnitus  Denies hearing loss or visual disturbances  CARDIOVASCULAR: No chest pain or palpitations  RESPIRATORY: Denies any cough, hemoptysis, shortness of breath or dyspnea on exertion  GASTROINTESTINAL: As noted in the History of Present Illness  GENITOURINARY: No problems with urination  Denies any hematuria or dysuria  NEUROLOGIC: No dizziness or vertigo, denies headaches  MUSCULOSKELETAL: Denies any muscle or joint pain  SKIN: Denies skin rashes or itching  ENDOCRINE: Denies excessive thirst  Denies intolerance to heat or cold  PSYCHOSOCIAL: Denies depression or anxiety  Denies any recent memory loss         Historical Information   Past Medical History:   Diagnosis Date    Anemia     iron def    Arthritis     Chronic kidney disease     GERD (gastroesophageal reflux disease)     Gout     Hypertension     Insomnia     Kidney stone     Stroke (cerebrum) [facility])      Past Surgical History:   Procedure Laterality Date    ANKLE SURGERY Right     COLONOSCOPY 2013    polypectomy; complete - 3 yrs d/t polyp - benign submucosal lipoma- path c/w lipoma    COLONOSCOPY  2017    complete - tubular adenoma - repeat 5yrs    CYSTOSCOPY      CYSTOTOMY      bladder  w/ basket extraction of calculus    FEMUR FRACTURE SURGERY      HERNIA REPAIR      inguinal ; sliding    INGUINAL HERNIA REPAIR Right     unilateral    KNEE ARTHROSCOPY Right     w/medial menisectomy    LASIK      corneal    LITHOTRIPSY      renal    VA COLONOSCOPY FLX DX W/COLLJ SPEC WHEN PFRMD N/A 2017    Procedure: SCREENING COLONOSCOPY;  Surgeon: Jeanne York MD;  Location: [facility];  Service: General    VA THROMBOENDARTECTMY Carlo Delgado Right 1/10/2020    Procedure: ENDARTERECTOMY ARTERY CAROTID;  Surgeon: Yadiel Coleman MD;  Location: [facility];  Service: Vascular    ROTATOR CUFF REPAIR Bilateral     TONSILLECTOMY       Social History   Social History     Substance and Sexual Activity   Alcohol Use Not Currently    Comment: stopped drinking alcohol     Social History     Substance and Sexual Activity   Drug Use No     Social History     Tobacco Use   Smoking Status Former Smoker    Packs/day:  00    Years: 22 00    Pack years: 22 00    Quit date: 5    Years since quittin 0   Smokeless Tobacco Never Used     Family History   Problem Relation Age of Onset    Alzheimer's disease Mother     Alzheimer's disease Father     Heart disease Father         CAD    Other Father         prediabetes    Diabetes Father     Breast cancer Other     Arthritis Family     Hypertension Family        Meds/Allergies       Current Outpatient Medications:     Ascorbic Acid (VITAMIN C IMMUNE HEALTH PO)    aspirin (ECOTRIN LOW STRENGTH) 81 mg EC tablet    atorvastatin (LIPITOR) 40 mg tablet    calcium polycarbophil (FIBERCON) 625 mg tablet    clopidogrel (PLAVIX) 75 mg tablet    Colchicine 0 6 MG CAPS    ferrous sulfate 324 (65 Fe) mg    lisinopril (ZESTRIL) 10 mg tablet   metoprolol succinate (TOPROL-XL) 25 mg 24 hr tablet    Multiple Vitamin (MULTIVITAMIN) capsule    nitroglycerin (NITROSTAT) 0 4 mg SL tablet    triamterene-hydrochlorothiazide (MAXZIDE) 75-50 MG per tablet    No Known Allergies        Objective     Blood pressure 126/84, pulse 76, temperature (!) 97 2 °F (36 2 °C), temperature source Tympanic, height 5' 10" (1 778 m), weight 101 kg (222 lb)  Body mass index is 31 85 kg/m²  PHYSICAL EXAM:      General Appearance:   Alert, cooperative, no distress   HEENT:   Normocephalic, atraumatic, anicteric      Neck:  Supple, symmetrical, trachea midline   Lungs:   Clear to auscultation bilaterally   Heart[de-identified]   Regular rate and rhythm   Abdomen:   Soft, non-tender, non-distended; normal bowel sounds   Genitalia:   Deferred    Rectal:   Deferred    Extremities:  No cyanosis, clubbing or edema    Pulses:  2+ and symmetric    Skin:  No jaundice, rashes, or lesions    Lymph nodes:  No palpable cervical lymphadenopathy        Lab Results:   No visits with results within 1 Day(s) from this visit     Latest known visit with results is:   Lab on 12/14/2020   Component Date Value    PTH 12/14/2020 33 6     Vit D, 25-Hydroxy 12/14/2020 73 9     Magnesium 12/14/2020 2 3     WBC 12/14/2020 6 70     RBC 12/14/2020 4 22     Hemoglobin 12/14/2020 11 1*    Hematocrit 12/14/2020 37 2     MCV 12/14/2020 88     MCH 12/14/2020 26 3*    MCHC 12/14/2020 29 8*    MPV 12/14/2020 8 9     Platelets 45/57/8228 261     nRBC 12/14/2020 0     Neutrophils Relative 12/14/2020 68     Immat GRANS % 12/14/2020 0     Lymphocytes Relative 12/14/2020 17     Monocytes Relative 12/14/2020 7     Eosinophils Relative 12/14/2020 7*    Basophils Relative 12/14/2020 1     Neutrophils Absolute 12/14/2020 4 53     Immature Grans Absolute 12/14/2020 0 01     Lymphocytes Absolute 12/14/2020 1 15     Monocytes Absolute 12/14/2020 0 48     Eosinophils Absolute 12/14/2020 0 48     Basophils Absolute 12/14/2020 0 05     Sodium 12/14/2020 140     Potassium 12/14/2020 4 8     Chloride 12/14/2020 107     CO2 12/14/2020 26     ANION GAP 12/14/2020 7     BUN 12/14/2020 22     Creatinine 12/14/2020 1 65*    Glucose, Fasting 12/14/2020 113*    Calcium 12/14/2020 10 0     AST 12/14/2020 19     ALT 12/14/2020 24     Alkaline Phosphatase 12/14/2020 47     Total Protein 12/14/2020 7 4     Albumin 12/14/2020 4 4     Total Bilirubin 12/14/2020 0 41     eGFR 12/14/2020 40     Hemoglobin A1C 12/14/2020 4 9     EAG 12/14/2020 94     Cholesterol 12/14/2020 110     Triglycerides 12/14/2020 114     HDL, Direct 12/14/2020 42     LDL Calculated 12/14/2020 45     Non-HDL-Chol (CHOL-HDL) 12/14/2020 68     TSH 3RD GENERATON 12/14/2020 2 690     Phosphorus 12/14/2020 3 4          Radiology Results:   No results found

## 2021-01-05 NOTE — LETTER
January 5, 2021     Azalea Singh, [address]  [address] [facility]  [address] [address] [address]    Patient: Natalie Luevano  YOB: 1945   Date of Visit: 1/5/2021       Dear Dr Stephon Mckeon: Thank you for referring Yusra Sandoval to me for evaluation  Below are my notes for this consultation  If you have questions, please do not hesitate to call me  I look forward to following your patient along with you  Sincerely,        Dinah Enriquez PA-C        CC: BLAS Caceres PA-C  1/5/2021  1:18 PM  Sign when Signing Visit  [facility] - Outpatient Consultation  Natalie Luevano  68 y o  male MRN: [id number]  Encounter: [id number]          ASSESSMENT AND PLAN:      1  Iron deficiency anemia due to chronic blood loss  2  Positive fecal occult blood test  3  CAD, multiple vessel  He has iron deficiency anemia and recently had positive fecal immunochemical test  In November 2020, hemoglobin was as low as 7 4 and ferritin 5  He has been taking a daily iron supplement and most recent blood work from 12/14/2020 shows hemoglobin 11 1  He denies overt GI bleeding  He denies prior EGD  Last colonoscopy from 2017 showed diverticulosis, small tubular adenoma, and internal hemorrhoids  Since we cannot stop his dual antiplatelet therapy due to recent cardiac stent placement, I discussed scheduling diagnostic EGD and colonoscopy to rule out sources of GI blood loss like AVM, ulcer, large polyp, malignancy and celiac disease  Again, these would be diagnostic so we could not remove any large polyps due to Plavix  I will discuss with Cardiology to make sure they are in agreement with this plan and contact patient with our final recommendations      - Ambulatory referral to Gastroenterology    Follow-up in 3 months  ______________________________________________________________________    HPI:  49-year-old male with history of multivessel CAD status post SANDRINE x 2 (Nov and Dec 2020) on aspirin and Plavix, carotid artery stenosis status post endarterectomy (Jan 2020), CKD stage 2, impaired fasting glucose, dyslipidemia, hypertension referred to GI for iron deficiency anemia  Patient states he was found to be iron deficient around the time of his cardiac catheterization in November 2020  He states he was scheduled for EGD and colonoscopy but these were canceled  He has been on aspirin and Plavix for a long time, even prior to his stents  He has not required blood transfusion or iron infusion  He has been taking ferrous sulfate 324 mg by mouth daily for the past month  Most recent CBC from 12/14/2020 shows hemoglobin 11 1 and MCV 88  (Hemoglobin was as low as 7 4 in early November 2020)  Ferritin 5 on 11/12/2020  This increased to 30 on 11/30/2020  He has history of chronic GERD and has taken Prilosec OTC every other day for several years  His heartburn is under good control  He denies dysphagia, odynophagia, nausea, vomiting, abdominal pain  He has regular bowel movements on a daily basis  He denies any hematochezia or melena  He denies abnormal weight loss  He had a colonoscopy in April 2017 with General Surgery  He had moderately severe diverticulosis in the sigmoid colon  A single small polyp was removed from the sigmoid colon which was a tubular adenoma  He had small internal hemorrhoids  He has never had an EGD before  REVIEW OF SYSTEMS:    CONSTITUTIONAL: Denies any fever, chills, rigors, and weight loss  HEENT: No earache or tinnitus  Denies hearing loss or visual disturbances  CARDIOVASCULAR: No chest pain or palpitations  RESPIRATORY: Denies any cough, hemoptysis, shortness of breath or dyspnea on exertion  GASTROINTESTINAL: As noted in the History of Present Illness  GENITOURINARY: No problems with urination  Denies any hematuria or dysuria  NEUROLOGIC: No dizziness or vertigo, denies headaches     MUSCULOSKELETAL: Denies any muscle or joint pain  SKIN: Denies skin rashes or itching  ENDOCRINE: Denies excessive thirst  Denies intolerance to heat or cold  PSYCHOSOCIAL: Denies depression or anxiety  Denies any recent memory loss         Historical Information   Past Medical History:   Diagnosis Date    Anemia     iron def    Arthritis     Chronic kidney disease     GERD (gastroesophageal reflux disease)     Gout     Hypertension     Insomnia     Kidney stone     [facility]rebrum[facility])      Past Surgical History:   Procedure Laterality Date    ANKLE SURGERY Right     COLONOSCOPY  2013    polypectomy; complete - 3 yrs d/t polyp - benign submucosal lipoma- path c/w lipoma    COLONOSCOPY  2017    complete - tubular adenoma - repeat 5yrs    CYSTOSCOPY      CYSTOTOMY      bladder  w/ basket extraction of calculus    FEMUR FRACTURE SURGERY      HERNIA REPAIR      inguinal ; sliding    INGUINAL HERNIA REPAIR Right     unilateral    KNEE ARTHROSCOPY Right     w/medial menisectomy    LASIK      corneal    LITHOTRIPSY      renal    [place] COLONOSCOPY FLX DX W/COLLJ SPEC WHEN PFRMD N/A 2017    Procedure: SCREENING COLONOSCOPY;  Surgeon: Ana Juárez MD;  Location:  MAIN OR;  Service: General    AZ THROMBOENDARTECTMY Ángel Bernardino Right 1/10/2020    Procedure: ENDARTERECTOMY ARTERY CAROTID;  Surgeon: Bob Douglas MD;  Location:  MAIN OR;  Service: Vascular    ROTATOR CUFF REPAIR Bilateral     TONSILLECTOMY       Social History   Social History     Substance and Sexual Activity   Alcohol Use Not Currently    Comment: stopped drinking alcohol     Social History     Substance and Sexual Activity   Drug Use No     Social History     Tobacco Use   Smoking Status Former Smoker    Packs/day: 1 00    Years: 22 00    Pack years: 22 00    Quit date: 5    Years since quittin 0   Smokeless Tobacco Never Used     Family History   Problem Relation Age of Onset    Alzheimer's disease Mother     Alzheimer's disease Father     Heart disease Father         CAD    Other Father         prediabetes    Diabetes Father     Breast cancer Other     Arthritis Family     Hypertension Family        Meds/Allergies       Current Outpatient Medications:     Ascorbic Acid (VITAMIN C IMMUNE HEALTH PO)    aspirin (ECOTRIN LOW STRENGTH) 81 mg EC tablet    atorvastatin (LIPITOR) 40 mg tablet    calcium polycarbophil (FIBERCON) 625 mg tablet    clopidogrel (PLAVIX) 75 mg tablet    Colchicine 0 6 MG CAPS    ferrous sulfate 324 (65 Fe) mg    lisinopril (ZESTRIL) 10 mg tablet    metoprolol succinate (TOPROL-XL) 25 mg 24 hr tablet    Multiple Vitamin (MULTIVITAMIN) capsule    nitroglycerin (NITROSTAT) 0 4 mg SL tablet    triamterene-hydrochlorothiazide (MAXZIDE) 75-50 MG per tablet    No Known Allergies        Objective     Blood pressure 126/84, pulse 76, temperature (!) 97 2 °F (36 2 °C), temperature source Tympanic, height 5' 10" (1 778 m), weight 101 kg (222 lb)  Body mass index is 31 85 kg/m²  PHYSICAL EXAM:      General Appearance:   Alert, cooperative, no distress   HEENT:   Normocephalic, atraumatic, anicteric      Neck:  Supple, symmetrical, trachea midline   Lungs:   Clear to auscultation bilaterally   Heart[de-identified]   Regular rate and rhythm   Abdomen:   Soft, non-tender, non-distended; normal bowel sounds   Genitalia:   Deferred    Rectal:   Deferred    Extremities:  No cyanosis, clubbing or edema    Pulses:  2+ and symmetric    Skin:  No jaundice, rashes, or lesions    Lymph nodes:  No palpable cervical lymphadenopathy        Lab Results:   No visits with results within 1 Day(s) from this visit     Latest known visit with results is:   Lab on 12/14/2020   Component Date Value    PTH 12/14/2020 33 6     Vit D, 25-Hydroxy 12/14/2020 73 9     Magnesium 12/14/2020 2 3     WBC 12/14/2020 6 70     RBC 12/14/2020 4 22     Hemoglobin 12/14/2020 11 1*    Hematocrit 12/14/2020 37 2     MCV 12/14/2020 88     MCH 12/14/2020 26 3*    MCHC 12/14/2020 29 8*    MPV 12/14/2020 8 9     Platelets 24/35/1710 261     nRBC 12/14/2020 0     Neutrophils Relative 12/14/2020 68     Immat GRANS % 12/14/2020 0     Lymphocytes Relative 12/14/2020 17     Monocytes Relative 12/14/2020 7     Eosinophils Relative 12/14/2020 7*    Basophils Relative 12/14/2020 1     Neutrophils Absolute 12/14/2020 4 53     Immature Grans Absolute 12/14/2020 0 01     Lymphocytes Absolute 12/14/2020 1 15     Monocytes Absolute 12/14/2020 0 48     Eosinophils Absolute 12/14/2020 0 48     Basophils Absolute 12/14/2020 0 05     Sodium 12/14/2020 140     Potassium 12/14/2020 4 8     Chloride 12/14/2020 107     CO2 12/14/2020 26     ANION GAP 12/14/2020 7     BUN 12/14/2020 22     Creatinine 12/14/2020 1 65*    Glucose, Fasting 12/14/2020 113*    Calcium 12/14/2020 10 0     AST 12/14/2020 19     ALT 12/14/2020 24     Alkaline Phosphatase 12/14/2020 47     Total Protein 12/14/2020 7 4     Albumin 12/14/2020 4 4     Total Bilirubin 12/14/2020 0 41     eGFR 12/14/2020 40     Hemoglobin A1C 12/14/2020 4 9     EAG 12/14/2020 94     Cholesterol 12/14/2020 110     Triglycerides 12/14/2020 114     HDL, Direct 12/14/2020 42     LDL Calculated 12/14/2020 45     Non-HDL-Chol (CHOL-HDL) 12/14/2020 68     TSH 3RD GENERATON 12/14/2020 2 690     Phosphorus 12/14/2020 3 4          Radiology Results:   No results found

## 2021-01-06 ENCOUNTER — CLINICAL SUPPORT (OUTPATIENT)
Dept: CARDIAC REHAB | Facility: HOSPITAL | Age: 76
End: 2021-01-06
Payer: COMMERCIAL

## 2021-01-06 ENCOUNTER — OFFICE VISIT (OUTPATIENT)
Dept: CARDIOLOGY CLINIC | Facility: CLINIC | Age: 76
End: 2021-01-06
Payer: COMMERCIAL

## 2021-01-06 VITALS
BODY MASS INDEX: 31.35 KG/M2 | HEART RATE: 75 BPM | DIASTOLIC BLOOD PRESSURE: 60 MMHG | SYSTOLIC BLOOD PRESSURE: 104 MMHG | HEIGHT: 70 IN | WEIGHT: 219 LBS

## 2021-01-06 DIAGNOSIS — I25.10 CAD S/P PERCUTANEOUS CORONARY ANGIOPLASTY: ICD-10-CM

## 2021-01-06 DIAGNOSIS — I25.10 CORONARY ARTERY DISEASE INVOLVING NATIVE CORONARY ARTERY OF NATIVE HEART WITHOUT ANGINA PECTORIS: Primary | ICD-10-CM

## 2021-01-06 DIAGNOSIS — Z98.61 CAD S/P PERCUTANEOUS CORONARY ANGIOPLASTY: ICD-10-CM

## 2021-01-06 DIAGNOSIS — Z95.5 S/P CORONARY ARTERY STENT PLACEMENT: Primary | ICD-10-CM

## 2021-01-06 PROCEDURE — 93797 PHYS/QHP OP CAR RHAB WO ECG: CPT

## 2021-01-06 PROCEDURE — 99214 OFFICE O/P EST MOD 30 MIN: CPT | Performed by: INTERNAL MEDICINE

## 2021-01-06 PROCEDURE — 3078F DIAST BP <80 MM HG: CPT | Performed by: INTERNAL MEDICINE

## 2021-01-06 PROCEDURE — 3074F SYST BP LT 130 MM HG: CPT | Performed by: INTERNAL MEDICINE

## 2021-01-06 PROCEDURE — 1160F RVW MEDS BY RX/DR IN RCRD: CPT | Performed by: INTERNAL MEDICINE

## 2021-01-06 PROCEDURE — 1036F TOBACCO NON-USER: CPT | Performed by: INTERNAL MEDICINE

## 2021-01-06 NOTE — PROGRESS NOTES
CARDIAC REHAB ASSESSMENT    Today's date: 2021  Patient name: Rajinder Gonzalez  : 1945       MRN: [id number]  PCP: Afshan Hernandez DO  Referring Physician: Aylin Macario  Cardiologist: Dr Tyree English  Surgeon: n/a  Dx:   Encounter Diagnosis   Name Primary?  CAD S/P percutaneous coronary angioplasty        Date of onset: 2020  Cultural needs: n/a    Weight:  218 lb     Height:   Ht Readings from Last 1 Encounters:   21 5' 10" (1 778 m)     Medical History:   Past Medical History:   Diagnosis Date    Anemia     iron def    Arthritis     Chronic kidney disease     GERD (gastroesophageal reflux disease)     Gout     Hypertension     Insomnia     Kidney stone     Stroke (cerebrum) (HCC)          Physical Limitations: None    Fall Risk: Low   Comments: Ambulates with a steady gait with no assist device    Anginal Equivalent: None/denies angina   NTG use: No prescription    Risk Factors   Cholesterol: No  Smoking: Former user  HTN: Yes  DM: No  Obesity: Yes   Inactivity: Yes  Stress:  perceived  stress: 4/10   Stressors:reports minimal stress   Goals for Stress Management:Practice Relaxation Techniques, Exercise, Enjoy a hobby and Spend time with family    Family History:  Family History   Problem Relation Age of Onset    Alzheimer's disease Mother     Alzheimer's disease Father     Heart disease Father         CAD    Other Father         prediabetes    Diabetes Father     Breast cancer Other     Arthritis Family     Hypertension Family        Allergies: Patient has no known allergies    ETOH:   Social History     Substance and Sexual Activity   Alcohol Use Not Currently    Comment: stopped drinking alcohol         Current Medications:   Current Outpatient Medications   Medication Sig Dispense Refill    Ascorbic Acid (VITAMIN C IMMUNE HEALTH PO) Take by mouth daily      aspirin (ECOTRIN LOW STRENGTH) 81 mg EC tablet Take 81 mg by mouth every other day 1 every other day      atorvastatin (LIPITOR) 40 mg tablet Take 1 tablet (40 mg total) by mouth every evening 90 tablet 3    calcium polycarbophil (FIBERCON) 625 mg tablet Take 625 mg by mouth daily      clopidogrel (PLAVIX) 75 mg tablet TAKE 1 TABLET BY MOUTH ONCE DAILY 90 tablet 0    Colchicine 0 6 MG CAPS TAKE 1 CAPSULE BY MOUTH 4 TIMES DAILY IF NEEDED 100 capsule 2    ferrous sulfate 324 (65 Fe) mg Take 1 tablet (324 mg total) by mouth daily before breakfast 30 tablet 2    lisinopril (ZESTRIL) 10 mg tablet TAKE 1 TABLET BY MOUTH ONCE DAILY 90 tablet 1    metoprolol succinate (TOPROL-XL) 25 mg 24 hr tablet Take 1 tablet (25 mg total) by mouth daily 30 tablet 5    Multiple Vitamin (MULTIVITAMIN) capsule Take 1 capsule by mouth daily      nitroglycerin (NITROSTAT) 0 4 mg SL tablet Place 1 tablet (0 4 mg total) under the tongue every 5 (five) minutes as needed for chest pain 15 tablet 5    triamterene-hydrochlorothiazide (MAXZIDE) 75-50 MG per tablet take 1 tablet by mouth once daily 90 tablet 1     No current facility-administered medications for this visit  Functional Status Prior to Diagnosis for Treatment   Occupation: retired  Recreation: active in community  ADLs: No limitations  [place]: No limitations  Exercise: no regular exercise  Other: n/a    Current Functional Status  Occupation: retired  Recreation: active in community  ADLs:No limitations  [place]: No limitations  Exercise: has started light walking over past 2 weeks   Other: n/a    Patient Specific Goals:   Increase strength ans stamina, increase energy levels, establish regular exercise program he is able to maintain at home, lose weight with goal of reaching 200 lbs LT    Short Term Program Goals: dietary modifications increased strength improved energy/stamina with ADLs exercise 120-150 mins/wk wt loss 1-2 ppw    Long Term Goals: increased maximal walking duration  increased intial training workload  Improved [facility] Activity Status score  Improved functional capacity  Improved Quality of Life - [facility] score reduced  Reduced body fat%  Reduced waist circumference  weight loss goal of 200 lb  improved Rate Your Plate Score    Ability to reach goals/rehabilitation potential:  Very Good     Projected return to function: 12 weeks  Objective tests: sub-max TM ETT      Nutritional   Reviewed details of Rate your Plate  Discussed key elements of heart healthy eating  Reviewed patient goals for dietary modifications and their clinical implications  Reviewed most recent lipid profile       Goals for dietary modification: choose lean cuts of meat  poultry without the skin  low fat ground meat and poultry  eliminate processed meats  reduce portions of meat to 3 oz  increase fish intake  more meatless meals  low fat dairy   reduced fat cheese  increase whole grains  increase fruits and vegetables  eliminate butter  low sodium  improved snack choices  more nuts/seeds  reduce sweets/frozen desserts  heathier choices while dining out      Emotional/Social  Patient reports he/she is coping well with good social support and denies depression or anxiety  Reports sufficient emotional support    SOCIAL SUPPORT NETWORK    Marital status:     Rate 1-5:    Marriage: n/a   Family: 5   Financial: 5   Relationships: 5   Spirituality: 5   Intellectual: 5      Domestic Violence Screening: No    Comments: n/a

## 2021-01-06 NOTE — PROGRESS NOTES
Cardiac Rehabilitation Plan of Care   Initial Care Plan          Today's date: 2021   # of Exercise Sessions Completed: 1-evaluation  Patient name: Kymberly Garcia  : 1945  Age: 68 y o  MRN: [id number]  Referring Physician: Aylin Borrego  Cardiologist: Dr Jewel Bridges  Provider: Jennifer lindquist  Clinician: Clarise Brittle, MS, CEP    Dx:   Encounter Diagnosis   Name Primary?  CAD S/P percutaneous coronary angioplasty      Date of onset: 2020      SUMMARY OF PROGRESS:  Mr Glen Ayala is here today for his cardiac rehabilitation evaluation after recent stenting procedure  He reports he has been doing well since procedure  He reports he has started walking around his neighborhood over the past two weeks  He has also been making changes to his diet and feels he is doing well with that  His daughter had been helping his with making dietary changes also  His main goals for his rehab program are to increase his strength and endurance, increase energy levels, establish a regular exercise program that he is able to maintain on his own at home, and to lose weight with LT goal of reaching 200 lbs  Will plan to give education on heart healthy eating and weight loss  He denies depression or anxiety, and reports he is a positive thinker  He reports he has good support from his daughter  He completed TM ETT today reaching THR at 4 3 METs at 7:30 min  He tolerated assessment well without cardiac complaint  Telemetry showed NSR  Will plan to start exercise at 3 0-3 5 METs and increase as tolerated by patient over first 30 days of rehab        Medication compliance: Yes   Comments: Pt reports to be compliant with medications  Fall Risk: Low   Comments: Ambulates with a steady gait with no assist device    EKG Interpretation: NSR      EXERCISE ASSESSMENT and PLAN    Current Exercise Program in Rehab:       Frequency: 3 days/week Supplement with home exercise 2+ days/wk as tolerated       Minutes: 30         METS: 3 0-3 5            HR: 30 beats above resting   RPE: 4-6         Modalities: Treadmill, Airdyne bike, UBE, Rower, NuStep and Recumbent bike      Exercise Progression 30 Day Goals :    Frequency: 3 days/week of cardiac rehab     Supplement with home exercise 2+ days/wk as tolerated    Minutes: 30-40                            >150 mins/wk of moderate intensity exercise   METS: 3 5   HR: 30 beats above resting    RPE: 4-6   Modalities: Treadmill, Airdyne bike, UBE, Rower, NuStep and Recumbent bike    Strength trainin-3 days / week  12-15 repetitions  1-2 sets per modality   Will be added following 2-3 weeks of monitored exercise sessions   Modalities: Pull Downs, Lateral Raise, Arm Extension, Arm Curl, Sit to [employer] Exercise: Type: walking, Frequency: 5 days/week, Duration: 20 mins    Goals: 10% improvement in functional capacity - based on max METs achieved in fitness assessment, improved DASI score by 10%, Increase in exercise capacity by 40% - based on peak METs tolerated in cardiac rehab exercise session, Exercise 5 days/wk, >150mins/wk of moderate intensity exercise, Resume ADLs with increased strength, Attend Rehab regularly, Decrease sitting time and Start a walking program    Progression Toward Goals:  Reviewed Pt goals and determined plan of care    Education: Benefit of exercise for CAD risk factors, home exercise guidelines, AHA guidelines to achieve >150 mins/wk of moderate exercise and RPE scale   Plan:education on home exercise guidelines and home exercise 30+ mins 2 days opposite CR  Readiness to change: Action:  (Changing behavior)      NUTRITION ASSESSMENT AND PLAN    Weight control:    Starting weight: 218 lb   Current weight:     Waist circumference:    Startin"   Current:    Diabetes: N/A  Lipid management: Discussed diet and lipid management and Last lipid profile 2020  Chol 110    HDL 42  LDL 45    Goals:LDL <100, HDL >40, TRG <150, CHOL <200, reduced waist circumference <40 inches (M), fasting BG , Improved Rate Your Plate score  >67, Wt  loss 1-2 ppw,  goal of 200 lbs   and 2 5-5%  wt loss    Progression Toward Goals: Reviewed Pt goals and determined plan of care    Education: heart healthy eating  low sodium diet  hydration  nutrition for  lipid management  nutrition for Improved BG control  wt  loss   portion control  Plan: Education class: Reading Food Labels and Education Class: Heart Healthy Eating  Readiness to change: Action:  (Changing behavior)      PSYCHOSOCIAL ASSESSMENT AND PLAN    Emotional:  Depression assessment:  PHQ-9 = 0 =No Depression            Anxiety measure:  NITIN-7 = 0-4  = Not anxious  Self-reported stress level:  4  Social support: Very Good    Goals:  Physical Fitness in [facility] Score < 3 and increased energy    Progression Toward Goals: Reviewed Pt goals and determined plan of care    Education: no needs  Plan: Exercise and Enjoy a hobby  Readiness to change: Action:  (Changing behavior)      OTHER CORE COMPONENTS     Tobacco:   Social History     Tobacco Use   Smoking Status Former Smoker    Packs/day: 1     Years: 22 00    Pack years: 22 00    Quit date: 5    Years since quittin 0   Smokeless Tobacco Never Used       Tobacco Use Intervention:   N/A:  Patient is a non-smoker     Anginal Symptoms:  None   NTG use: No prescription    Blood pressure:    Restin/72   Exercise: 158/78    Goals: consistent BP < 130/80, reduced dietary sodium <2300mg, moderate intensity exercise >150 mins/wk and medication compliance    Progression Toward Goals: Reviewed Pt goals and determined plan of care    Education:  understanding high blood pressure and it's relationship to CAD and low sodium diet and HTN  Plan: Class: Understanding Heart Disease and Class: Common Heart Medications  Readiness to change: Action:  (Changing behavior)

## 2021-01-06 NOTE — PROGRESS NOTES
Cardiology Follow Up    Anshu Hawkins   1945  [id number]  [facility] [address] [address] [phone]    1  Coronary artery disease involving native coronary artery of native heart without angina pectoris         Interval History: Followup cad    He is doing well  He has no chest pain, no dyspnea and no palpitations  He started cardiac rehab  Problem List     Benign colon polyp    Carpal tunnel syndrome    Dyslipidemia (Chronic)    Gout    Hypertension (Chronic)    Impaired fasting glucose    Overview Signed 6/4/2018 11:07 AM by Vishal Joiner DO     Transitioned From: Hyperglycemia         Insomnia    Iron deficiency anemia due to chronic blood loss    Overview Signed 6/4/2018 11:07 AM by Vishal Jioner DO     Transitioned From: Hypochromic microcytic anemia         Osteoarthritis    Prolonged QT interval    Rhinitis    Other disorder of calcium metabolism    Elevated PSA    Obesity (BMI 30 0-34  9)    Internal carotid artery stenosis, right (Chronic)    Cerebrovascular accident (CVA) ([address] ) (Chronic)    Aortic valve stenosis    S/P carotid endarterectomy    Coronary artery disease involving native coronary artery    Bilateral carotid artery stenosis    Iron deficiency anemia    HELIO (acute kidney injury) ([address] )    CKD (chronic kidney disease) stage 2, GFR 60-89 ml/min (Chronic)    Lab Results   Component Value Date    EGFR 40 12/14/2020    EGFR 52 12/08/2020    EGFR 40 12/07/2020    CREATININE 1 65 (H) 12/14/2020    CREATININE 1 33 (H) 12/08/2020    CREATININE 1 66 (H) 12/07/2020             Past Medical History:   Diagnosis Date    Anemia     iron def    Arthritis     Chronic kidney disease     GERD (gastroesophageal reflux disease)     Gout     Hypertension     Insomnia     Kidney stone     [facility]brum[facility])      Social History     Socioeconomic History    Marital status:      Spouse name: Not on file    Number of children: 1    Years of education: Not on file    Highest education level: Not on file   Occupational History    Occupation: Retired     Comment: working full time   Social Needs    Financial resource strain: Not on file    Food insecurity     Worry: Not on file     Inability: Not on file   [employer] needs     Medical: Not on file     Non-medical: Not on file   Tobacco Use    Smoking status: Former Smoker     Packs/day: 1 00     Years: 22 00     Pack years: 22 00     Quit date:      Years since quittin 0    Smokeless tobacco: Never Used   Substance and Sexual Activity    Alcohol use: Not Currently     Comment: stopped drinking alcohol    Drug use: No    Sexual activity: Not Currently   Lifestyle    Physical activity     Days per week: Not on file     Minutes per session: Not on file    Stress: Not on file   Relationships    Social connections     Talks on phone: Not on file     Gets together: Not on file     Attends Buddhist service: Not on file     Active member of club or organization: Not on file     Attends meetings of clubs or organizations: Not on file     Relationship status: Not on file    Intimate partner violence     Fear of current or ex partner: Not on file     Emotionally abused: Not on file     Physically abused: Not on file     Forced sexual activity: Not on file   Other Topics Concern    Not on file   Social History Narrative    , lives alone, working full time      Family History   Problem Relation Age of Onset    Alzheimer's disease Mother     Alzheimer's disease Father     Heart disease Father         CAD    Other Father         prediabetes    Diabetes Father     Breast cancer Other     Arthritis Family     Hypertension Family      Past Surgical History:   Procedure Laterality Date    ANKLE SURGERY Right     COLONOSCOPY  2013    polypectomy; complete - 3 yrs d/t polyp - benign submucosal lipoma- path c/w lipoma    COLONOSCOPY  04/25/2017    complete - tubular adenoma - repeat 5yrs    CYSTOSCOPY      CYSTOTOMY      bladder  w/ basket extraction of calculus    FEMUR FRACTURE SURGERY      HERNIA REPAIR      inguinal ; sliding    INGUINAL HERNIA REPAIR Right     unilateral    KNEE ARTHROSCOPY Right     w/medial menisectomy    LASIK      corneal    LITHOTRIPSY      renal    MT COLONOSCOPY FLX DX W/COLLJ SPEC WHEN PFRMD N/A 4/25/2017    Procedure: SCREENING COLONOSCOPY;  Surgeon: Rema Gorman MD;  Location: [facility];  Service: General    MT THROMBOENDARTECTMY Nico Schultz Right 1/10/2020    Procedure: ENDARTERECTOMY ARTERY CAROTID;  Surgeon: Christal Dupree MD;  Location: [facility];  Service: Vascular    ROTATOR CUFF REPAIR Bilateral     TONSILLECTOMY         Current Outpatient Medications:     Ascorbic Acid (VITAMIN C IMMUNE HEALTH PO), Take by mouth daily, Disp: , Rfl:     aspirin (ECOTRIN LOW STRENGTH) 81 mg EC tablet, Take 81 mg by mouth every other day 1 every other day, Disp: , Rfl:     atorvastatin (LIPITOR) 40 mg tablet, Take 1 tablet (40 mg total) by mouth every evening, Disp: 90 tablet, Rfl: 3    calcium polycarbophil (FIBERCON) 625 mg tablet, Take 625 mg by mouth daily, Disp: , Rfl:     clopidogrel (PLAVIX) 75 mg tablet, TAKE 1 TABLET BY MOUTH ONCE DAILY, Disp: 90 tablet, Rfl: 0    Colchicine 0 6 MG CAPS, TAKE 1 CAPSULE BY MOUTH 4 TIMES DAILY IF NEEDED, Disp: 100 capsule, Rfl: 2    ferrous sulfate 324 (65 Fe) mg, Take 1 tablet (324 mg total) by mouth daily before breakfast, Disp: 30 tablet, Rfl: 2    lisinopril (ZESTRIL) 10 mg tablet, TAKE 1 TABLET BY MOUTH ONCE DAILY, Disp: 90 tablet, Rfl: 1    metoprolol succinate (TOPROL-XL) 25 mg 24 hr tablet, Take 1 tablet (25 mg total) by mouth daily, Disp: 30 tablet, Rfl: 5    Multiple Vitamin (MULTIVITAMIN) capsule, Take 1 capsule by mouth daily, Disp: , Rfl:     nitroglycerin (NITROSTAT) 0 4 mg SL tablet, Place 1 tablet (0 4 mg total) under the tongue every 5 (five) minutes as needed for chest pain, Disp: 15 tablet, Rfl: 5    triamterene-hydrochlorothiazide (MAXZIDE) 75-50 MG per tablet, take 1 tablet by mouth once daily, Disp: 90 tablet, Rfl: 1  No Known Allergies    Labs:     Chemistry        Component Value Date/Time     05/14/2015 1002    K 4 8 12/14/2020 1005    K 3 7 05/14/2015 1002     12/14/2020 1005     05/14/2015 1002    CO2 26 12/14/2020 1005    CO2 28 05/14/2015 1002    BUN 22 12/14/2020 1005    BUN 12 05/14/2015 1002    CREATININE 1 65 (H) 12/14/2020 1005    CREATININE 1 02 05/14/2015 1002        Component Value Date/Time    CALCIUM 10 0 12/14/2020 1005    CALCIUM 9 2 05/14/2015 1002    ALKPHOS 47 12/14/2020 1005    ALKPHOS 53 03/28/2015 0835    AST 19 12/14/2020 1005    AST 22 03/28/2015 0835    ALT 24 12/14/2020 1005    ALT 22 03/28/2015 0835    BILITOT 0 4 03/28/2015 0835            Lab Results   Component Value Date    CHOL 141 09/25/2015    CHOL 161 08/08/2014     Lab Results   Component Value Date    HDL 42 12/14/2020    HDL 41 12/11/2019    HDL 37 (L) 06/11/2019     Lab Results   Component Value Date    LDLCALC 45 12/14/2020    LDLCALC 88 12/11/2019    LDLCALC 117 (H) 06/11/2019     Lab Results   Component Value Date    TRIG 114 12/14/2020    TRIG 127 12/11/2019    TRIG 158 (H) 06/11/2019     No results found for: CHOLHDL    Imaging: No results found  Review of Systems   Constitution: Negative  HENT: Negative  Eyes: Negative  Cardiovascular: Negative  Respiratory: Negative  Endocrine: Negative  Hematologic/Lymphatic: Negative  Skin: Negative  Musculoskeletal: Negative  Gastrointestinal: Negative  Genitourinary: Negative  Neurological: Negative  Psychiatric/Behavioral: Negative  Allergic/Immunologic: Negative  Vitals:    01/06/21 1042   BP: 104/60   Pulse: 75           Physical Exam  Vitals signs and nursing note reviewed     Constitutional: Appearance: Normal appearance  HENT:      Head: Normocephalic and atraumatic  Nose: Nose normal       Mouth/Throat:      Mouth: Mucous membranes are moist    Eyes:      General: No scleral icterus  Conjunctiva/sclera: Conjunctivae normal    Neck:      Musculoskeletal: Normal range of motion and neck supple  Cardiovascular:      Rate and Rhythm: Normal rate and regular rhythm  Pulses: Normal pulses  Heart sounds: Normal heart sounds  No murmur  No gallop  Pulmonary:      Effort: Pulmonary effort is normal       Breath sounds: Normal breath sounds  Abdominal:      General: Abdomen is flat  Palpations: Abdomen is soft  There is no mass  Hernia: No hernia is present  Musculoskeletal:      Right lower leg: No edema  Left lower leg: No edema  Skin:     General: Skin is warm and dry  Neurological:      General: No focal deficit present  Mental Status: He is alert and oriented to person, place, and time  Psychiatric:         Mood and Affect: Mood normal          Behavior: Behavior normal          Discussion/Summary:    CAD: S/P SANDRINE to the LAD  Continue with current medical therapy including DAPT  His LDL is 45     His hemoglobin normalized to 11  He can have an EGD/Colon but needs to stay on aspirin and plavix currently  WIll touch base with his other providers re: timeing  Mild Aortic Stenosis: will follow    CKD: Cr 1 65 and relatively stable  The patient was counseled regarding diagnostic results, instructions for management, risk factor reductions, impressions  total time of encounter was 25 minutes and 15 minutes was spent counseling

## 2021-01-08 ENCOUNTER — CLINICAL SUPPORT (OUTPATIENT)
Dept: CARDIAC REHAB | Facility: HOSPITAL | Age: 76
End: 2021-01-08
Payer: COMMERCIAL

## 2021-01-08 ENCOUNTER — TELEPHONE (OUTPATIENT)
Dept: GASTROENTEROLOGY | Facility: CLINIC | Age: 76
End: 2021-01-08

## 2021-01-08 DIAGNOSIS — Z95.5 S/P CORONARY ARTERY STENT PLACEMENT: ICD-10-CM

## 2021-01-08 DIAGNOSIS — I10 HYPERTENSION, UNSPECIFIED TYPE: ICD-10-CM

## 2021-01-08 PROCEDURE — 93798 PHYS/QHP OP CAR RHAB W/ECG: CPT

## 2021-01-08 RX ORDER — TRIAMTERENE AND HYDROCHLOROTHIAZIDE 75; 50 MG/1; MG/1
TABLET ORAL
Qty: 90 TABLET | Refills: 1 | Status: SHIPPED | OUTPATIENT
Start: 2021-01-08 | End: 2021-06-30

## 2021-01-08 NOTE — TELEPHONE ENCOUNTER
I spoke to patient over the phone  I let him know I spoke to my GI attending, Dr Brynn Ross, and Dr Cristal Cruz  We have come to the agreement to schedule EGD and colonoscopy now (on Plavix) rather than waiting 6-12 months until he can safely come off the Plavix  Patient understands that large polyps could not be removed at the time of this colonoscopy  Our office will reach out to him to schedule the procedures  All questions were answered  I called and left a voice message for his daughter Belen Fairchild and asked her to call me back if she had any specific questions or concerns

## 2021-01-11 ENCOUNTER — CLINICAL SUPPORT (OUTPATIENT)
Dept: CARDIAC REHAB | Facility: HOSPITAL | Age: 76
End: 2021-01-11
Payer: COMMERCIAL

## 2021-01-11 DIAGNOSIS — Z95.5 S/P CORONARY ARTERY STENT PLACEMENT: ICD-10-CM

## 2021-01-11 PROCEDURE — 93798 PHYS/QHP OP CAR RHAB W/ECG: CPT

## 2021-01-13 ENCOUNTER — CLINICAL SUPPORT (OUTPATIENT)
Dept: CARDIAC REHAB | Facility: HOSPITAL | Age: 76
End: 2021-01-13
Payer: COMMERCIAL

## 2021-01-13 DIAGNOSIS — Z95.5 S/P CORONARY ARTERY STENT PLACEMENT: ICD-10-CM

## 2021-01-13 PROCEDURE — 93798 PHYS/QHP OP CAR RHAB W/ECG: CPT

## 2021-01-15 ENCOUNTER — CLINICAL SUPPORT (OUTPATIENT)
Dept: CARDIAC REHAB | Facility: HOSPITAL | Age: 76
End: 2021-01-15
Payer: COMMERCIAL

## 2021-01-15 DIAGNOSIS — Z95.5 S/P CORONARY ARTERY STENT PLACEMENT: ICD-10-CM

## 2021-01-15 PROCEDURE — 93798 PHYS/QHP OP CAR RHAB W/ECG: CPT

## 2021-01-16 ENCOUNTER — IMMUNIZATIONS (OUTPATIENT)
Dept: FAMILY MEDICINE CLINIC | Facility: HOSPITAL | Age: 76
End: 2021-01-16

## 2021-01-16 DIAGNOSIS — Z23 ENCOUNTER FOR IMMUNIZATION: Primary | ICD-10-CM

## 2021-01-16 PROCEDURE — 91300 SARS-COV-2 / COVID-19 MRNA VACCINE (PFIZER-BIONTECH) 30 MCG: CPT

## 2021-01-16 PROCEDURE — 0001A SARS-COV-2 / COVID-19 MRNA VACCINE (PFIZER-BIONTECH) 30 MCG: CPT

## 2021-01-18 ENCOUNTER — CLINICAL SUPPORT (OUTPATIENT)
Dept: CARDIAC REHAB | Facility: HOSPITAL | Age: 76
End: 2021-01-18
Payer: COMMERCIAL

## 2021-01-18 DIAGNOSIS — Z95.5 S/P CORONARY ARTERY STENT PLACEMENT: ICD-10-CM

## 2021-01-18 PROCEDURE — 93798 PHYS/QHP OP CAR RHAB W/ECG: CPT

## 2021-01-20 ENCOUNTER — CLINICAL SUPPORT (OUTPATIENT)
Dept: CARDIAC REHAB | Facility: HOSPITAL | Age: 76
End: 2021-01-20
Payer: COMMERCIAL

## 2021-01-20 DIAGNOSIS — Z95.5 S/P CORONARY ARTERY STENT PLACEMENT: ICD-10-CM

## 2021-01-20 PROCEDURE — 93798 PHYS/QHP OP CAR RHAB W/ECG: CPT

## 2021-01-22 ENCOUNTER — CLINICAL SUPPORT (OUTPATIENT)
Dept: CARDIAC REHAB | Facility: HOSPITAL | Age: 76
End: 2021-01-22
Payer: COMMERCIAL

## 2021-01-22 DIAGNOSIS — Z95.5 S/P CORONARY ARTERY STENT PLACEMENT: ICD-10-CM

## 2021-01-22 PROCEDURE — 93798 PHYS/QHP OP CAR RHAB W/ECG: CPT

## 2021-01-25 ENCOUNTER — CLINICAL SUPPORT (OUTPATIENT)
Dept: CARDIAC REHAB | Facility: HOSPITAL | Age: 76
End: 2021-01-25
Payer: COMMERCIAL

## 2021-01-25 DIAGNOSIS — Z95.5 S/P CORONARY ARTERY STENT PLACEMENT: ICD-10-CM

## 2021-01-25 PROCEDURE — 93798 PHYS/QHP OP CAR RHAB W/ECG: CPT

## 2021-01-27 ENCOUNTER — CLINICAL SUPPORT (OUTPATIENT)
Dept: CARDIAC REHAB | Facility: HOSPITAL | Age: 76
End: 2021-01-27
Payer: COMMERCIAL

## 2021-01-27 DIAGNOSIS — Z95.5 S/P CORONARY ARTERY STENT PLACEMENT: ICD-10-CM

## 2021-01-27 PROCEDURE — 93798 PHYS/QHP OP CAR RHAB W/ECG: CPT

## 2021-01-29 ENCOUNTER — CLINICAL SUPPORT (OUTPATIENT)
Dept: CARDIAC REHAB | Facility: HOSPITAL | Age: 76
End: 2021-01-29
Payer: COMMERCIAL

## 2021-01-29 DIAGNOSIS — Z95.5 S/P CORONARY ARTERY STENT PLACEMENT: ICD-10-CM

## 2021-01-29 PROCEDURE — 93798 PHYS/QHP OP CAR RHAB W/ECG: CPT

## 2021-01-31 DIAGNOSIS — I65.21 CAROTID STENOSIS, RIGHT: ICD-10-CM

## 2021-01-31 RX ORDER — CLOPIDOGREL BISULFATE 75 MG/1
TABLET ORAL
Qty: 30 TABLET | Refills: 1 | Status: SHIPPED | OUTPATIENT
Start: 2021-01-31 | End: 2021-03-15

## 2021-02-01 ENCOUNTER — APPOINTMENT (OUTPATIENT)
Dept: CARDIAC REHAB | Facility: HOSPITAL | Age: 76
End: 2021-02-01
Payer: COMMERCIAL

## 2021-02-03 ENCOUNTER — TELEPHONE (OUTPATIENT)
Dept: GASTROENTEROLOGY | Facility: CLINIC | Age: 76
End: 2021-02-03

## 2021-02-03 ENCOUNTER — CLINICAL SUPPORT (OUTPATIENT)
Dept: CARDIAC REHAB | Facility: HOSPITAL | Age: 76
End: 2021-02-03
Payer: COMMERCIAL

## 2021-02-03 DIAGNOSIS — Z95.5 S/P CORONARY ARTERY STENT PLACEMENT: ICD-10-CM

## 2021-02-03 PROCEDURE — 93798 PHYS/QHP OP CAR RHAB W/ECG: CPT

## 2021-02-03 NOTE — TELEPHONE ENCOUNTER
Colon/EGD scheduled for 02/17/21 with Dr Jones [facility]  Patient stated that he has a suprep kit, instructions given verbally/emailed to patient

## 2021-02-03 NOTE — TELEPHONE ENCOUNTER
Our mutual patient is scheduled for procedure: Colon/EGD    On: 02/17/21      With: Dr Fang Costello is taking the following blood thinner:    Plavix      Can this be stopped ____5__ days prior to the procedure?       Physician Approving clearance: ________________________

## 2021-02-04 ENCOUNTER — IMMUNIZATIONS (OUTPATIENT)
Dept: FAMILY MEDICINE CLINIC | Facility: HOSPITAL | Age: 76
End: 2021-02-04

## 2021-02-04 DIAGNOSIS — Z23 ENCOUNTER FOR IMMUNIZATION: Primary | ICD-10-CM

## 2021-02-04 PROCEDURE — 91300 SARS-COV-2 / COVID-19 MRNA VACCINE (PFIZER-BIONTECH) 30 MCG: CPT

## 2021-02-04 PROCEDURE — 0002A SARS-COV-2 / COVID-19 MRNA VACCINE (PFIZER-BIONTECH) 30 MCG: CPT

## 2021-02-05 ENCOUNTER — TELEPHONE (OUTPATIENT)
Dept: HEMATOLOGY ONCOLOGY | Facility: CLINIC | Age: 76
End: 2021-02-05

## 2021-02-05 ENCOUNTER — CLINICAL SUPPORT (OUTPATIENT)
Dept: CARDIAC REHAB | Facility: HOSPITAL | Age: 76
End: 2021-02-05
Payer: COMMERCIAL

## 2021-02-05 DIAGNOSIS — Z95.5 S/P CORONARY ARTERY STENT PLACEMENT: ICD-10-CM

## 2021-02-05 PROCEDURE — 93798 PHYS/QHP OP CAR RHAB W/ECG: CPT

## 2021-02-05 NOTE — TELEPHONE ENCOUNTER
Reschedule Appointment     Who is calling in Patient    Doctor Appointment Scheduled with Bernice Mtz date and time 03/03 at [address] date and time 03/03 at 9:30am   Location Jennifer lindquist   Patient verbalized understanding   Yes

## 2021-02-05 NOTE — PROGRESS NOTES
Cardiac Rehabilitation Plan of Care   30 Day Reassessment          Today's date: 2021   # of Exercise Sessions Completed: 13  Patient name: Araceli Kenyon  : 1945  Age: 68 y o  MRN: [id number]  Referring Physician: Josiane Borges MD  Cardiologist: Dr Steve Taylor  Provider: Jennifer lindquist  Clinician: Saige Talley MS, CEP    Dx:   Encounter Diagnosis   Name Primary?  S/P coronary artery stent placement      Date of onset: 2020      SUMMARY OF PROGRESS: Flakito has been regularly attending his cardiac rehab sessions, completing session 13 today  He has been working at a Met level of 3 17-3 26, completing 40 minutes of aerobic exercise  He has been exercising on the TRD, recumbent bike, nustep and UBE  Additional strength training will be added in the following weeks  He has been doing well after recent stenting procedure  His telemetry reveals NSR  He has had no cardiac complaints or complications  Occasionally, he experiences some knee and ankle pain after the treadmill and recumbent bike  He thinks it is arthritis  Exercise difficulty 4/10  He reports that he has made dietary changes and stuck to them  He doesn't use salt and inspects all food nutrition labels  His main goals for rehab are to increase his strength and endurance, increase energy levels, establish a regular exercise program that he is able to maintain on his own at home, and to lose weight with LT goal of reaching 200 lbs  Currently he is weighing 212 lbs  He has had a loss of 6 lbs since starting CR  We will continue to educate him on lifestyle modifications  He will be progressed in intensities and durations as tolerated  We will continue to follow up and modify his program to achieve his goals       Medication compliance: Yes   Comments: Pt reports to be compliant with medications  Fall Risk: Low   Comments: Ambulates with a steady gait with no assist device    EKG Interpretation: NSR      EXERCISE ASSESSMENT and PLAN    Current Exercise Program in Rehab:       Frequency: 3 days/week Supplement with home exercise 2+ days/wk as tolerated   Minutes: 40         METS: 3 17-3 26            HR: 30 beats above resting   RPE: 4-6         Modalities: Treadmill, Airdyne bike, UBE, Rower, NuStep and Recumbent bike      Exercise Progression 30 Day Goals :    Frequency: 3 days/week of cardiac rehab     Supplement with home exercise 2+ days/wk as tolerated    Minutes: 40-50                            >150 mins/wk of moderate intensity exercise   METS: 3 3-3 6   HR: 30 beats above resting    RPE: 4-6   Modalities: Treadmill, Airdyne bike, UBE, Rower, NuStep and Recumbent bike    Strength trainin-3 days / week  12-15 repetitions  1-2 sets per modality   Will be added following 2-3 weeks of monitored exercise sessions   Modalities: Pull Downs, Lateral Raise, Arm Extension, Arm Curl, Sit to [employer] Exercise: Type: walking, Frequency: 5 days/week, Duration: 20 mins    Goals: 10% improvement in functional capacity - based on max METs achieved in fitness assessment, improved DASI score by 10%, Increase in exercise capacity by 40% - based on peak METs tolerated in cardiac rehab exercise session, Exercise 5 days/wk, >150mins/wk of moderate intensity exercise, Resume ADLs with increased strength, Attend Rehab regularly, Decrease sitting time and Start a walking program    Progression Toward Goals:  Pt is progressing and showing improvement  toward the following goals:  losing weight, incrased strength and endurance, forming an exercise routine   , Will continue to educate and progress as tolerated      Education: Benefit of exercise for CAD risk factors, home exercise guidelines, AHA guidelines to achieve >150 mins/wk of moderate exercise and RPE scale   Plan:education on home exercise guidelines and home exercise 30+ mins 2 days opposite CR  Readiness to change: Action:  (Changing behavior)      NUTRITION ASSESSMENT AND PLAN    Weight control:    Starting weight: 218 lb   Current weight: 212 lb  Waist circumference:    Startin"   Current:    Diabetes: N/A  Lipid management: Discussed diet and lipid management and Last lipid profile 2020  Chol 110    HDL 42  LDL 45    Goals:LDL <100, HDL >40, TRG <150, CHOL <200, reduced waist circumference <40 inches (M), fasting BG , Improved Rate Your Plate score  >46, Wt  loss 1-2 ppw,  goal of 200 lbs   and 2 5-5%  wt loss    Progression Toward Goals: Reviewed Pt goals and determined plan of care    Education: heart healthy eating  low sodium diet  hydration  nutrition for  lipid management  nutrition for Improved BG control  wt  loss   portion control  Plan: Education class: Reading Food Labels and Education Class: Heart Healthy Eating  Readiness to change: Action:  (Changing behavior)      PSYCHOSOCIAL ASSESSMENT AND PLAN    Emotional:  Depression assessment:  PHQ-9 = 0 =No Depression            Anxiety measure:  NITIN-7 = 0-4  = Not anxious  Self-reported stress level:  4  Social support: Very Good    Goals:  Physical Fitness in [facility] Score < 3 and increased energy    Progression Toward Goals: Reviewed Pt goals and determined plan of care    Education: no needs  Plan: Exercise and Enjoy a hobby  Readiness to change: Action:  (Changing behavior)      OTHER CORE COMPONENTS     Tobacco:   Social History     Tobacco Use   Smoking Status Former Smoker    Packs/day:  00    Years: 22     Pack years:     Quit date: 5    Years since quittin 1   Smokeless Tobacco Never Used       Tobacco Use Intervention:   N/A:  Patient is a non-smoker     Anginal Symptoms:  None   NTG use: No prescription    Blood pressure:    Restin/60   Exercise: 134/76    Goals: consistent BP < 130/80, reduced dietary sodium <2300mg, moderate intensity exercise >150 mins/wk and medication compliance    Progression Toward Goals: Pt is progressing and showing improvement  toward the following goals:  losing weight, increased strength and endurance, forming an exercise routine   , Will continue to educate and progress as tolerated      Education:  understanding high blood pressure and it's relationship to CAD and low sodium diet and HTN  Plan: Class: Understanding Heart Disease and Class: Common Heart Medications  Readiness to change: Action:  (Changing behavior)

## 2021-02-08 ENCOUNTER — CLINICAL SUPPORT (OUTPATIENT)
Dept: CARDIAC REHAB | Facility: HOSPITAL | Age: 76
End: 2021-02-08
Payer: COMMERCIAL

## 2021-02-08 DIAGNOSIS — Z95.5 S/P CORONARY ARTERY STENT PLACEMENT: ICD-10-CM

## 2021-02-08 PROCEDURE — 93798 PHYS/QHP OP CAR RHAB W/ECG: CPT

## 2021-02-10 ENCOUNTER — CLINICAL SUPPORT (OUTPATIENT)
Dept: CARDIAC REHAB | Facility: HOSPITAL | Age: 76
End: 2021-02-10
Payer: COMMERCIAL

## 2021-02-10 DIAGNOSIS — Z95.5 S/P CORONARY ARTERY STENT PLACEMENT: ICD-10-CM

## 2021-02-10 PROCEDURE — 93798 PHYS/QHP OP CAR RHAB W/ECG: CPT

## 2021-02-12 ENCOUNTER — CLINICAL SUPPORT (OUTPATIENT)
Dept: CARDIAC REHAB | Facility: HOSPITAL | Age: 76
End: 2021-02-12
Payer: COMMERCIAL

## 2021-02-12 DIAGNOSIS — Z95.5 S/P CORONARY ARTERY STENT PLACEMENT: ICD-10-CM

## 2021-02-12 PROCEDURE — 93798 PHYS/QHP OP CAR RHAB W/ECG: CPT

## 2021-02-15 ENCOUNTER — CLINICAL SUPPORT (OUTPATIENT)
Dept: CARDIAC REHAB | Facility: HOSPITAL | Age: 76
End: 2021-02-15
Payer: COMMERCIAL

## 2021-02-15 DIAGNOSIS — Z95.5 S/P CORONARY ARTERY STENT PLACEMENT: ICD-10-CM

## 2021-02-15 PROCEDURE — 93798 PHYS/QHP OP CAR RHAB W/ECG: CPT

## 2021-02-16 ENCOUNTER — ANESTHESIA EVENT (OUTPATIENT)
Dept: GASTROENTEROLOGY | Facility: HOSPITAL | Age: 76
End: 2021-02-16

## 2021-02-16 RX ORDER — LIDOCAINE HYDROCHLORIDE 10 MG/ML
0.5 INJECTION, SOLUTION EPIDURAL; INFILTRATION; INTRACAUDAL; PERINEURAL ONCE AS NEEDED
Status: CANCELLED | OUTPATIENT
Start: 2021-02-16

## 2021-02-16 RX ORDER — SODIUM CHLORIDE, SODIUM LACTATE, POTASSIUM CHLORIDE, CALCIUM CHLORIDE 600; 310; 30; 20 MG/100ML; MG/100ML; MG/100ML; MG/100ML
125 INJECTION, SOLUTION INTRAVENOUS CONTINUOUS
Status: CANCELLED | OUTPATIENT
Start: 2021-02-16

## 2021-02-17 ENCOUNTER — APPOINTMENT (OUTPATIENT)
Dept: CARDIAC REHAB | Facility: HOSPITAL | Age: 76
End: 2021-02-17
Payer: COMMERCIAL

## 2021-02-17 ENCOUNTER — HOSPITAL ENCOUNTER (OUTPATIENT)
Dept: GASTROENTEROLOGY | Facility: HOSPITAL | Age: 76
Setting detail: OUTPATIENT SURGERY
Discharge: HOME | End: 2021-02-17
Attending: INTERNAL MEDICINE | Admitting: INTERNAL MEDICINE
Payer: COMMERCIAL

## 2021-02-17 ENCOUNTER — ANESTHESIA (OUTPATIENT)
Dept: GASTROENTEROLOGY | Facility: HOSPITAL | Age: 76
End: 2021-02-17

## 2021-02-17 ENCOUNTER — PREP FOR PROCEDURE (OUTPATIENT)
Dept: GASTROENTEROLOGY | Facility: MEDICAL CENTER | Age: 76
End: 2021-02-17

## 2021-02-17 VITALS
SYSTOLIC BLOOD PRESSURE: 111 MMHG | DIASTOLIC BLOOD PRESSURE: 57 MMHG | RESPIRATION RATE: 18 BRPM | TEMPERATURE: 96.4 F | HEART RATE: 67 BPM | OXYGEN SATURATION: 97 %

## 2021-02-17 VITALS — HEART RATE: 56 BPM

## 2021-02-17 DIAGNOSIS — I10 HYPERTENSION, UNSPECIFIED TYPE: ICD-10-CM

## 2021-02-17 DIAGNOSIS — R19.5 POSITIVE FECAL OCCULT BLOOD TEST: ICD-10-CM

## 2021-02-17 DIAGNOSIS — D50.0 IRON DEFICIENCY ANEMIA DUE TO CHRONIC BLOOD LOSS: Primary | ICD-10-CM

## 2021-02-17 DIAGNOSIS — K21.9 GASTROESOPHAGEAL REFLUX DISEASE WITHOUT ESOPHAGITIS: Primary | ICD-10-CM

## 2021-02-17 DIAGNOSIS — D50.0 IRON DEFICIENCY ANEMIA DUE TO CHRONIC BLOOD LOSS: ICD-10-CM

## 2021-02-17 PROCEDURE — 88313 SPECIAL STAINS GROUP 2: CPT | Performed by: PATHOLOGY

## 2021-02-17 PROCEDURE — 88305 TISSUE EXAM BY PATHOLOGIST: CPT | Performed by: PATHOLOGY

## 2021-02-17 PROCEDURE — 88342 IMHCHEM/IMCYTCHM 1ST ANTB: CPT | Performed by: PATHOLOGY

## 2021-02-17 PROCEDURE — 45385 COLONOSCOPY W/LESION REMOVAL: CPT | Performed by: INTERNAL MEDICINE

## 2021-02-17 PROCEDURE — 43239 EGD BIOPSY SINGLE/MULTIPLE: CPT | Performed by: INTERNAL MEDICINE

## 2021-02-17 PROCEDURE — 88341 IMHCHEM/IMCYTCHM EA ADD ANTB: CPT | Performed by: PATHOLOGY

## 2021-02-17 RX ORDER — OMEPRAZOLE 40 MG/1
40 CAPSULE, DELAYED RELEASE ORAL DAILY
Qty: 30 CAPSULE | Refills: 3 | Status: SHIPPED | OUTPATIENT
Start: 2021-02-17 | End: 2021-04-12 | Stop reason: SDUPTHER

## 2021-02-17 RX ORDER — PROPOFOL 10 MG/ML
INJECTION, EMULSION INTRAVENOUS CONTINUOUS PRN
Status: DISCONTINUED | OUTPATIENT
Start: 2021-02-17 | End: 2021-02-17

## 2021-02-17 RX ORDER — PROPOFOL 10 MG/ML
INJECTION, EMULSION INTRAVENOUS AS NEEDED
Status: DISCONTINUED | OUTPATIENT
Start: 2021-02-17 | End: 2021-02-17

## 2021-02-17 RX ORDER — SODIUM CHLORIDE 9 MG/ML
INJECTION, SOLUTION INTRAVENOUS CONTINUOUS PRN
Status: DISCONTINUED | OUTPATIENT
Start: 2021-02-17 | End: 2021-02-17

## 2021-02-17 RX ORDER — LIDOCAINE HYDROCHLORIDE 10 MG/ML
INJECTION, SOLUTION EPIDURAL; INFILTRATION; INTRACAUDAL; PERINEURAL AS NEEDED
Status: DISCONTINUED | OUTPATIENT
Start: 2021-02-17 | End: 2021-02-17

## 2021-02-17 RX ORDER — TRIAMTERENE AND HYDROCHLOROTHIAZIDE 75; 50 MG/1; MG/1
TABLET ORAL
Qty: 90 TABLET | Refills: 1 | OUTPATIENT
Start: 2021-02-17

## 2021-02-17 RX ORDER — SODIUM CHLORIDE 9 MG/ML
50 INJECTION, SOLUTION INTRAVENOUS CONTINUOUS
Status: CANCELLED | OUTPATIENT
Start: 2021-02-17

## 2021-02-17 RX ADMIN — LIDOCAINE HYDROCHLORIDE 100 MG: 10 INJECTION, SOLUTION EPIDURAL; INFILTRATION; INTRACAUDAL; PERINEURAL at 14:34

## 2021-02-17 RX ADMIN — SODIUM CHLORIDE: 0.9 INJECTION, SOLUTION INTRAVENOUS at 14:56

## 2021-02-17 RX ADMIN — SODIUM CHLORIDE: 0.9 INJECTION, SOLUTION INTRAVENOUS at 14:29

## 2021-02-17 RX ADMIN — PROPOFOL 120 MG: 10 INJECTION, EMULSION INTRAVENOUS at 14:34

## 2021-02-17 RX ADMIN — PHENYLEPHRINE HYDROCHLORIDE 100 MCG: 10 INJECTION INTRAVENOUS at 14:51

## 2021-02-17 RX ADMIN — PROPOFOL 80 MCG/KG/MIN: 10 INJECTION, EMULSION INTRAVENOUS at 14:34

## 2021-02-17 RX ADMIN — PHENYLEPHRINE HYDROCHLORIDE 100 MCG: 10 INJECTION INTRAVENOUS at 15:03

## 2021-02-17 NOTE — H&P
History and Physical -  [facility]  Yaritza Shaw  68 y o  male MRN: [id number]    HPI: Yaritza Shaw  is a 68y o  year old male who presents with iron deficiency anemia, melena and positive FIT testing         Review of Systems    Historical Information   Past Medical History:   Diagnosis Date    Anemia     iron def    Arthritis     Chronic kidney disease     Coronary artery disease     GERD (gastroesophageal reflux disease)     Gout     Hypertension     Insomnia     Kidney stone     Stroke (cerebrum) [facility])      Past Surgical History:   Procedure Laterality Date    ANKLE SURGERY Right     CARDIAC CATHETERIZATION      COLONOSCOPY  2013    polypectomy; complete - 3 yrs d/t polyp - benign submucosal lipoma- path c/w lipoma    COLONOSCOPY  2017    complete - tubular adenoma - repeat 5yrs    CYSTOSCOPY      CYSTOTOMY      bladder  w/ basket extraction of calculus    FEMUR FRACTURE SURGERY      HERNIA REPAIR      inguinal ; sliding    INGUINAL HERNIA REPAIR Right     unilateral    KNEE ARTHROSCOPY Right     w/medial menisectomy    LASIK      corneal    LITHOTRIPSY      renal    NM COLONOSCOPY FLX DX W/COLLJ SPEC WHEN PFRMD N/A 2017    Procedure: SCREENING COLONOSCOPY;  Surgeon: Rasheed Pal MD;  Location:  MAIN OR;  Service: General    NM THROMBOENDARTECTMY Whit Araujo Right 1/10/2020    Procedure: ENDARTERECTOMY ARTERY CAROTID;  Surgeon: Yosvany Ryder MD;  Location: [facility];  Service: Vascular    ROTATOR CUFF REPAIR Bilateral     TONSILLECTOMY       Social History   Social History     Substance and Sexual Activity   Alcohol Use Not Currently    Comment: stopped drinking alcohol     Social History     Substance and Sexual Activity   Drug Use No     Social History     Tobacco Use   Smoking Status Former Smoker    Packs/day: 1 00    Years: 22 00    Pack years: 22 00    Quit date: 5    Years since quittin 1   Smokeless Tobacco Never Used     Family History   Problem Relation Age of Onset    Alzheimer's disease Mother     Alzheimer's disease Father     Heart disease Father         CAD    Other Father         prediabetes    Diabetes Father     Breast cancer Other     Arthritis Family     Hypertension Family        Meds/Allergies     (Not in a hospital admission)      No Known Allergies    Objective     /66   Pulse 77   Temp (!) 96 8 °F (36 °C) (Tympanic)   Resp 18   SpO2 97%       PHYSICAL EXAM    Gen: NAD  CV: RRR  CHEST: Clear  ABD: soft, NT/ND  EXT: no edema  Neuro: AAO      ASSESSMENT/PLAN:  This is a 68y o  year old male here for EGD for evaluation of anemia and melena and colonoscopy for FIT positive  previousl colonoscopy in 2017  He has cardiac disease s/p stents  He is off Plavix  PLAN:   Procedure: EGd/ Colonoscopy,

## 2021-02-17 NOTE — ANESTHESIA PREPROCEDURE EVALUATION
Procedure:  EGD  COLONOSCOPY    Recent PCI with SANDRINE x2 placement (12/2020) on Clopidegrel    EF 82%, diastolic dysfunction grade 1, mild AS with increased velocity    Relevant Problems   CARDIO   (+) Aortic valve stenosis   (+) Coronary artery disease involving native coronary artery   (+) Hypertension      /RENAL   (+) HELIO (acute kidney injury) (HCC)   (+) CKD (chronic kidney disease) stage 2, GFR 60-89 ml/min      HEMATOLOGY   (+) Iron deficiency anemia   (+) Iron deficiency anemia due to chronic blood loss      MUSCULOSKELETAL   (+) Gout   (+) Osteoarthritis      NEURO/PSYCH   (+) Cerebrovascular accident (CVA) ([facility] [address] )     CARDIAC PCI SUMMARY 12/07/2020     CORONARY CIRCULATION:  Proximal LAD: There was a discrete 99 % stenosis  The lesion was ulcerated, complex, eccentric, and moderately calcified  An intervention was performed      1ST LESION INTERVENTIONS:  A successful balloon angioplasty with stent procedure was performed on the 99 % lesion in the proximal LAD  Following intervention there was an excellent angiographic appearance with a 0 % residual stenosis  A [place] [employer] 4 0 x 12mm drug-eluting stent was placed across the lesion and deployed at a maximum inflation pressure of 12 robin      INDICATIONS:  --  Coronary artery disease: significant (50% or greater) lesion in a major coronary artery  ECHO SUMMARY 01/06/2020     LEFT VENTRICLE:  Systolic function was normal by visual assessment  Ejection fraction was estimated to be 65 %  There were no regional wall motion abnormalities  Wall thickness was mildly increased  Doppler parameters were consistent with abnormal left ventricular relaxation (grade 1 diastolic dysfunction)      MITRAL VALVE:  There was mild annular calcification  There was trace regurgitation      AORTIC VALVE:  Transaortic velocity was increased due to valvular stenosis  There was mild stenosis    There was mild regurgitation      TRICUSPID VALVE:  There was trace regurgitation  Physical Exam    Airway    Mallampati score: II  TM Distance: >3 FB  Neck ROM: full     Dental   No notable dental hx     Cardiovascular  Rhythm: regular, Rate: normal, Cardiovascular exam normal    Pulmonary  Pulmonary exam normal Breath sounds clear to auscultation,     Other Findings        Anesthesia Plan  ASA Score- 3     Anesthesia Type- IV sedation with anesthesia with ASA Monitors  Additional Monitors:   Airway Plan:     Comment: Discussed risks/benefits, including medication reactions, awareness, aspiration, and serious/life threatening complications  Plan to maintain native airway with IVGA, monitored with EtCO2  Plan Factors-Exercise tolerance (METS): >4 METS  Patient summary reviewed  Patient instructed to abstain from smoking on day of procedure  Patient did not smoke on day of surgery  Induction- intravenous  Postoperative Plan-     Informed Consent- Anesthetic plan and risks discussed with patient  I personally reviewed this patient with the CRNA  Discussed and agreed on the Anesthesia Plan with the CRNA  Annita Purcell

## 2021-02-17 NOTE — ANESTHESIA POSTPROCEDURE EVALUATION
Post-Op Assessment Note    CV Status:  Stable  Pain Score: 0    Pain management: adequate     Mental Status:  Alert and awake   Hydration Status:  Euvolemic   PONV Controlled:  Controlled   Airway Patency:  Patent      Post Op Vitals Reviewed: Yes      Staff: CRNA         No complications documented      BP   110/65   Temp     Pulse  62   Resp   16   SpO2   99%

## 2021-02-19 ENCOUNTER — CLINICAL SUPPORT (OUTPATIENT)
Dept: CARDIAC REHAB | Facility: HOSPITAL | Age: 76
End: 2021-02-19
Payer: COMMERCIAL

## 2021-02-19 DIAGNOSIS — Z95.5 S/P CORONARY ARTERY STENT PLACEMENT: ICD-10-CM

## 2021-02-19 PROCEDURE — 93798 PHYS/QHP OP CAR RHAB W/ECG: CPT

## 2021-02-19 NOTE — PROGRESS NOTES
Patient's BP has recently been dropping post exercise  Today 80/50  He denies any symptoms, and reports he feels good  Had him drink water and reminded him to stay hydrated  He reports his medication for BP has been changed recently  He was not sure which medication it is       Exercise session detail attached

## 2021-02-22 ENCOUNTER — CLINICAL SUPPORT (OUTPATIENT)
Dept: CARDIAC REHAB | Facility: HOSPITAL | Age: 76
End: 2021-02-22
Payer: COMMERCIAL

## 2021-02-22 DIAGNOSIS — Z95.5 S/P CORONARY ARTERY STENT PLACEMENT: ICD-10-CM

## 2021-02-22 PROCEDURE — 93798 PHYS/QHP OP CAR RHAB W/ECG: CPT

## 2021-02-24 ENCOUNTER — CLINICAL SUPPORT (OUTPATIENT)
Dept: CARDIAC REHAB | Facility: HOSPITAL | Age: 76
End: 2021-02-24
Payer: COMMERCIAL

## 2021-02-24 DIAGNOSIS — Z95.5 S/P CORONARY ARTERY STENT PLACEMENT: ICD-10-CM

## 2021-02-24 PROCEDURE — 93798 PHYS/QHP OP CAR RHAB W/ECG: CPT

## 2021-02-25 ENCOUNTER — LAB (OUTPATIENT)
Dept: LAB | Facility: HOSPITAL | Age: 76
End: 2021-02-25
Attending: INTERNAL MEDICINE
Payer: COMMERCIAL

## 2021-02-25 DIAGNOSIS — N17.9 AKI (ACUTE KIDNEY INJURY) (HCC): ICD-10-CM

## 2021-02-25 LAB
ANION GAP SERPL CALCULATED.3IONS-SCNC: 4 MMOL/L (ref 4–13)
BACTERIA UR QL AUTO: NORMAL /HPF
BILIRUB UR QL STRIP: NEGATIVE
BUN SERPL-MCNC: 27 MG/DL (ref 5–25)
CALCIUM SERPL-MCNC: 9.8 MG/DL (ref 8.3–10.1)
CHLORIDE SERPL-SCNC: 107 MMOL/L (ref 100–108)
CLARITY UR: CLEAR
CO2 SERPL-SCNC: 29 MMOL/L (ref 21–32)
COLOR UR: YELLOW
CREAT SERPL-MCNC: 1.75 MG/DL (ref 0.6–1.3)
CREAT UR-MCNC: 71 MG/DL
GFR SERPL CREATININE-BSD FRML MDRD: 37 ML/MIN/1.73SQ M
GLUCOSE P FAST SERPL-MCNC: 99 MG/DL (ref 65–99)
GLUCOSE UR STRIP-MCNC: NEGATIVE MG/DL
HGB UR QL STRIP.AUTO: NEGATIVE
HYALINE CASTS #/AREA URNS LPF: NORMAL /LPF
KETONES UR STRIP-MCNC: NEGATIVE MG/DL
LEUKOCYTE ESTERASE UR QL STRIP: NEGATIVE
NITRITE UR QL STRIP: NEGATIVE
NON-SQ EPI CELLS URNS QL MICRO: NORMAL /HPF
PH UR STRIP.AUTO: 6 [PH]
POTASSIUM SERPL-SCNC: 4.3 MMOL/L (ref 3.5–5.3)
PROT UR STRIP-MCNC: NEGATIVE MG/DL
PROT UR-MCNC: <6 MG/DL
PROT/CREAT UR: <0.08 MG/G{CREAT} (ref 0–0.1)
RBC #/AREA URNS AUTO: NORMAL /HPF
SODIUM SERPL-SCNC: 140 MMOL/L (ref 136–145)
SP GR UR STRIP.AUTO: 1.01 (ref 1–1.03)
UROBILINOGEN UR QL STRIP.AUTO: 0.2 E.U./DL
WBC #/AREA URNS AUTO: NORMAL /HPF

## 2021-02-25 PROCEDURE — 84156 ASSAY OF PROTEIN URINE: CPT

## 2021-02-25 PROCEDURE — 81001 URINALYSIS AUTO W/SCOPE: CPT

## 2021-02-25 PROCEDURE — 80048 BASIC METABOLIC PNL TOTAL CA: CPT

## 2021-02-25 PROCEDURE — 82570 ASSAY OF URINE CREATININE: CPT

## 2021-02-25 PROCEDURE — 36415 COLL VENOUS BLD VENIPUNCTURE: CPT

## 2021-03-01 ENCOUNTER — CLINICAL SUPPORT (OUTPATIENT)
Dept: CARDIAC REHAB | Facility: HOSPITAL | Age: 76
End: 2021-03-01
Payer: COMMERCIAL

## 2021-03-01 ENCOUNTER — APPOINTMENT (OUTPATIENT)
Dept: CARDIAC REHAB | Facility: HOSPITAL | Age: 76
End: 2021-03-01
Payer: COMMERCIAL

## 2021-03-01 DIAGNOSIS — Z95.5 S/P CORONARY ARTERY STENT PLACEMENT: ICD-10-CM

## 2021-03-01 PROCEDURE — 93798 PHYS/QHP OP CAR RHAB W/ECG: CPT

## 2021-03-02 ENCOUNTER — TELEPHONE (OUTPATIENT)
Dept: HEMATOLOGY ONCOLOGY | Facility: CLINIC | Age: 76
End: 2021-03-02

## 2021-03-02 ENCOUNTER — TELEPHONE (OUTPATIENT)
Dept: NEPHROLOGY | Facility: CLINIC | Age: 76
End: 2021-03-02

## 2021-03-02 NOTE — TELEPHONE ENCOUNTER
----- Message from Angel Luis Juarez DO sent at 3/2/2021  8:49 AM EST -----  sCr a bit higher than previous  Encourage increased hydration  Thanks

## 2021-03-02 NOTE — TELEPHONE ENCOUNTER
Reschedule Appointment     Who is calling in Patient    Doctor Appointment Scheduled with Patrizia Bynum date and time 3-3-21 @ 9:30am    New date and time 03/05/2021 @ 9:30am    Location [facility]    Patient verbalized understanding

## 2021-03-02 NOTE — TELEPHONE ENCOUNTER
I spoke to the patient and he is aware his kidney function is stable, but he needs to increase hydration  He will drink more water during the day  No further questions or concerns at this time

## 2021-03-03 ENCOUNTER — CLINICAL SUPPORT (OUTPATIENT)
Dept: CARDIAC REHAB | Facility: HOSPITAL | Age: 76
End: 2021-03-03
Payer: COMMERCIAL

## 2021-03-03 ENCOUNTER — TELEPHONE (OUTPATIENT)
Dept: HEMATOLOGY ONCOLOGY | Facility: CLINIC | Age: 76
End: 2021-03-03

## 2021-03-03 ENCOUNTER — APPOINTMENT (OUTPATIENT)
Dept: LAB | Facility: HOSPITAL | Age: 76
End: 2021-03-03
Payer: COMMERCIAL

## 2021-03-03 DIAGNOSIS — D50.0 IRON DEFICIENCY ANEMIA DUE TO CHRONIC BLOOD LOSS: Primary | ICD-10-CM

## 2021-03-03 DIAGNOSIS — D50.0 IRON DEFICIENCY ANEMIA DUE TO CHRONIC BLOOD LOSS: ICD-10-CM

## 2021-03-03 DIAGNOSIS — Z95.5 S/P CORONARY ARTERY STENT PLACEMENT: ICD-10-CM

## 2021-03-03 LAB
ALBUMIN SERPL BCP-MCNC: 4.2 G/DL (ref 3.5–5)
ALP SERPL-CCNC: 47 U/L (ref 46–116)
ALT SERPL W P-5'-P-CCNC: 27 U/L (ref 12–78)
ANION GAP SERPL CALCULATED.3IONS-SCNC: 5 MMOL/L (ref 4–13)
AST SERPL W P-5'-P-CCNC: 21 U/L (ref 5–45)
BASOPHILS # BLD AUTO: 0.04 THOUSANDS/ΜL (ref 0–0.1)
BASOPHILS NFR BLD AUTO: 1 % (ref 0–1)
BILIRUB SERPL-MCNC: 0.45 MG/DL (ref 0.2–1)
BUN SERPL-MCNC: 29 MG/DL (ref 5–25)
CALCIUM SERPL-MCNC: 9.7 MG/DL (ref 8.3–10.1)
CHLORIDE SERPL-SCNC: 108 MMOL/L (ref 100–108)
CO2 SERPL-SCNC: 28 MMOL/L (ref 21–32)
CREAT SERPL-MCNC: 1.73 MG/DL (ref 0.6–1.3)
EOSINOPHIL # BLD AUTO: 0.25 THOUSAND/ΜL (ref 0–0.61)
EOSINOPHIL NFR BLD AUTO: 4 % (ref 0–6)
ERYTHROCYTE [DISTWIDTH] IN BLOOD BY AUTOMATED COUNT: 12.8 % (ref 11.6–15.1)
FERRITIN SERPL-MCNC: 25 NG/ML (ref 8–388)
GFR SERPL CREATININE-BSD FRML MDRD: 38 ML/MIN/1.73SQ M
GLUCOSE P FAST SERPL-MCNC: 91 MG/DL (ref 65–99)
HCT VFR BLD AUTO: 43 % (ref 36.5–49.3)
HGB BLD-MCNC: 13.9 G/DL (ref 12–17)
IMM GRANULOCYTES # BLD AUTO: 0.02 THOUSAND/UL (ref 0–0.2)
IMM GRANULOCYTES NFR BLD AUTO: 0 % (ref 0–2)
IRON SATN MFR SERPL: 38 %
IRON SERPL-MCNC: 139 UG/DL (ref 65–175)
LYMPHOCYTES # BLD AUTO: 1.33 THOUSANDS/ΜL (ref 0.6–4.47)
LYMPHOCYTES NFR BLD AUTO: 19 % (ref 14–44)
MCH RBC QN AUTO: 30 PG (ref 26.8–34.3)
MCHC RBC AUTO-ENTMCNC: 32.3 G/DL (ref 31.4–37.4)
MCV RBC AUTO: 93 FL (ref 82–98)
MONOCYTES # BLD AUTO: 0.43 THOUSAND/ΜL (ref 0.17–1.22)
MONOCYTES NFR BLD AUTO: 6 % (ref 4–12)
NEUTROPHILS # BLD AUTO: 4.81 THOUSANDS/ΜL (ref 1.85–7.62)
NEUTS SEG NFR BLD AUTO: 70 % (ref 43–75)
NRBC BLD AUTO-RTO: 0 /100 WBCS
PLATELET # BLD AUTO: 236 THOUSANDS/UL (ref 149–390)
PMV BLD AUTO: 9.1 FL (ref 8.9–12.7)
POTASSIUM SERPL-SCNC: 4.3 MMOL/L (ref 3.5–5.3)
PROT SERPL-MCNC: 7.2 G/DL (ref 6.4–8.2)
RBC # BLD AUTO: 4.63 MILLION/UL (ref 3.88–5.62)
SODIUM SERPL-SCNC: 141 MMOL/L (ref 136–145)
TIBC SERPL-MCNC: 362 UG/DL (ref 250–450)
WBC # BLD AUTO: 6.88 THOUSAND/UL (ref 4.31–10.16)

## 2021-03-03 PROCEDURE — 93798 PHYS/QHP OP CAR RHAB W/ECG: CPT

## 2021-03-03 PROCEDURE — 85025 COMPLETE CBC W/AUTO DIFF WBC: CPT

## 2021-03-03 PROCEDURE — 83918 ORGANIC ACIDS TOTAL QUANT: CPT

## 2021-03-03 PROCEDURE — 83550 IRON BINDING TEST: CPT

## 2021-03-03 PROCEDURE — 82728 ASSAY OF FERRITIN: CPT

## 2021-03-03 PROCEDURE — 80053 COMPREHEN METABOLIC PANEL: CPT

## 2021-03-03 PROCEDURE — 36415 COLL VENOUS BLD VENIPUNCTURE: CPT

## 2021-03-03 PROCEDURE — 83540 ASSAY OF IRON: CPT

## 2021-03-03 NOTE — TELEPHONE ENCOUNTER
Patient is calling to have lab orders placed    He was told to have labs repeated prior to his appointment on 3/5/21    Will send to [facility] to confirm what labs are needed     Please let me know and I can return call to the patient     21 [phone]

## 2021-03-04 NOTE — PROGRESS NOTES
Hematology/Oncology Outpatient Follow- up Note  Efren Cleaning , 1945, [id number]  3/5/2021        Chief Complaint   Patient presents with    Follow-up       HPI:  Efren Cleaning  is a pleasant 66-year-old male with a significant cardiac history who is on dual antiplatelet therapy due to cardiac stent who was referred to hematology for evaluation of anemia and iron deficiency  In 2020, patient's hemoglobin was low at 7 4, his ferritin was 5  He had a positive fecal occult blood test   Patient was seen for initial consultation on 2020  At that visit, his most recent labs had indicated and iron deficiency and patient was set up for IV Venofer  Patient had repeat blood work done  Prior to starting parental iron replacement which demonstrated iron saturation of 90%, total iron 426, ferritin 30  Based on these labs patient's  IV Venofer infusions were canceled and patient was recommended to continue on daily oral iron supplementation  He was referred to GI for workup  Previous Hematologic/ Oncologic History:    oral iron    Current Hematologic/ Oncologic Treatment:    Oral iron     ECO - Asymptomatic    Interval History:   The patient presents for routine follow up  Since his initial consultation visit with me on 2020, patient did obtain cardiac clearance to undergo EGD and colonoscopy  These procedures were completed on 2021 and no source of bleeding was identified  EGD did demonstrate some gastritis  He is scheduled to undergo capsule endoscopy  Most recent blood work completed on 3/3 was reviewed  His CBC is completely normal   Hemoglobin 13 9  CMP shows some chronic renal insufficiency with creatinine of 1 73 this is stable  His iron panel is within normal limits  Serum iron 139, iron saturation 38, TIBC 362, ferritin 25    Patient states he feels well  He denies any concerning symptoms today  Denies any melena or abnormal bleeding   NO chest pain/palpitations  He does bruise easily as he continues on dual antiplatelet therapy  Patient remains active       Test Results:    Imaging: No results found  Labs:   Lab Results   Component Value Date    WBC 6 88 03/03/2021    HGB 13 9 03/03/2021    HCT 43 0 03/03/2021    MCV 93 03/03/2021     03/03/2021     Lab Results   Component Value Date     05/14/2015    K 4 3 03/03/2021     03/03/2021    CO2 28 03/03/2021    ANIONGAP 10 05/14/2015    BUN 29 (H) 03/03/2021    CREATININE 1 73 (H) 03/03/2021    GLUCOSE 112 05/14/2015    GLUF 91 03/03/2021    CALCIUM 9 7 03/03/2021    AST 21 03/03/2021    ALT 27 03/03/2021    ALKPHOS 47 03/03/2021    PROT 6 9 03/28/2015    BILITOT 0 4 03/28/2015    EGFR 38 03/03/2021           Review of Systems   Hematological: Bruises/bleeds easily  All other systems reviewed and are negative  Active Problems:   Patient Active Problem List   Diagnosis    Benign colon polyp    Carpal tunnel syndrome    Dyslipidemia    Gout    Hypertension    Impaired fasting glucose    Insomnia    Iron deficiency anemia due to chronic blood loss    Osteoarthritis    Prolonged QT interval    Rhinitis    Other disorder of calcium metabolism    Elevated PSA    Obesity (BMI 30 0-34  9)    Internal carotid artery stenosis, right    C[facility] accident ([facility]A) [facility] [address] [facility]    Aortic valve stenosis    S/P carotid endarterectomy    Coronary artery disease involving native coronary artery    Bilateral carotid artery stenosis    Iron deficiency anemia    HELIO (acute kidney injury) ([facility] [address] )    CKD (chronic kidney disease) stage 2, GFR 60-89 ml/min       Past Medical History:   Past Medical History:   Diagnosis Date    Anemia     iron def    Arthritis     Chronic kidney disease     Coronary artery disease     GERD (gastroesophageal reflux disease)     Gout     Hypertension     Insomnia     Kidney stone     Stroke (cerebrum) [facility])        Surgical History:   Past Surgical History:   Procedure Laterality Date    ANKLE SURGERY Right     CARDIAC CATHETERIZATION      COLONOSCOPY  01/25/2013    polypectomy; complete - 3 yrs d/t polyp - benign submucosal lipoma- path c/w lipoma    COLONOSCOPY  04/25/2017    complete - tubular adenoma - repeat 5yrs    CYSTOSCOPY      CYSTOTOMY      bladder  w/ basket extraction of calculus    FEMUR FRACTURE SURGERY      HERNIA REPAIR      inguinal ; sliding    INGUINAL HERNIA REPAIR Right     unilateral    KNEE ARTHROSCOPY Right     w/medial menisectomy    LASIK      corneal    LITHOTRIPSY      renal    OH COLONOSCOPY FLX DX W/COLLJ SPEC WHEN PFRMD N/A 4/25/2017    Procedure: SCREENING COLONOSCOPY;  Surgeon: Manda Landon MD;  Location: [facility];  Service: General    OH THROMBOENDARTECTMY [place] Right 1/10/2020    Procedure: ENDARTERECTOMY ARTERY CAROTID;  Surgeon: Jamie Perez MD;  Location:  MAIN OR;  Service: Vascular    ROTATOR CUFF REPAIR Bilateral     TONSILLECTOMY         Family History:    Family History   Problem Relation Age of Onset    Alzheimer's disease Mother     Alzheimer's disease Father     Heart disease Father         CAD    Other Father         prediabetes    Diabetes Father     Breast cancer Other     Arthritis Family     Hypertension Family        Cancer-related family history includes Breast cancer in his other  Social History:   Social History     Socioeconomic History    Marital status:       Spouse name: Not on file    Number of children: 1    Years of education: Not on file    Highest education level: Not on file   Occupational History    Occupation: Retired     Comment: working full time   Social Needs    Financial resource strain: Not on file    Food insecurity     Worry: Not on file     Inability: Not on file   [employer] needs     Medical: Not on file     Non-medical: Not on file   Tobacco Use    Smoking status: Former Smoker     Packs/day: 1 00     Years:  00     Pack years:      Quit date: 5     Years since quittin 1    Smokeless tobacco: Never Used   Substance and Sexual Activity    Alcohol use: Not Currently     Comment: stopped drinking alcohol    Drug use: No    Sexual activity: Not Currently   Lifestyle    Physical activity     Days per week: Not on file     Minutes per session: Not on file    Stress: Not on file   Relationships    Social connections     Talks on phone: Not on file     Gets together: Not on file     Attends [employer] service: Not on file     Active member of club or organization: Not on file     Attends meetings of clubs or organizations: Not on file     Relationship status: Not on file    Intimate partner violence     Fear of current or ex partner: Not on file     Emotionally abused: Not on file     Physically abused: Not on file     Forced sexual activity: Not on file   Other Topics Concern    Not on file   Social History Narrative    , lives alone, working full time       Current Medications:   Current Outpatient Medications   Medication Sig Dispense Refill    Ascorbic Acid (VITAMIN C IMMUNE HEALTH PO) Take by mouth daily      aspirin (ECOTRIN LOW STRENGTH) 81 mg EC tablet Take 81 mg by mouth every other day 1 every other day      atorvastatin (LIPITOR) 40 mg tablet Take 1 tablet (40 mg total) by mouth every evening 90 tablet 3    calcium polycarbophil (FIBERCON) 625 mg tablet Take 625 mg by mouth daily      clopidogrel (PLAVIX) 75 mg tablet take 1 tablet by mouth once daily 30 tablet 1    Colchicine 0 6 MG CAPS TAKE 1 CAPSULE BY MOUTH 4 TIMES DAILY IF NEEDED 100 capsule 2    ferrous sulfate 324 (65 Fe) mg Take 1 tablet (324 mg total) by mouth daily before breakfast 30 tablet 2    lisinopril (ZESTRIL) 10 mg tablet TAKE 1 TABLET BY MOUTH ONCE DAILY 90 tablet 1    metoprolol succinate (TOPROL-XL) 25 mg 24 hr tablet Take 1 tablet (25 mg total) by mouth daily 30 tablet 5    Multiple Vitamin (MULTIVITAMIN) capsule Take 1 capsule by mouth daily      nitroglycerin (NITROSTAT) 0 4 mg SL tablet Place 1 tablet (0 4 mg total) under the tongue every 5 (five) minutes as needed for chest pain 15 tablet 5    omeprazole (PriLOSEC) 40 MG capsule Take 1 capsule (40 mg total) by mouth daily 30 capsule 3    triamterene-hydrochlorothiazide (MAXZIDE) 75-50 MG per tablet take 1 tablet by mouth once daily 90 tablet 1     No current facility-administered medications for this visit  Allergies: No Known Allergies    Physical Exam:  /64 (BP Location: Right arm)   Pulse 82   Temp 98 °F (36 7 °C) (Temporal)   Resp 16   Ht 5' 9" (1 753 m)   Wt 94 3 kg (208 lb)   SpO2 94%   BMI 30 72 kg/m²   Body surface area is 2 1 meters squared  Wt Readings from Last 3 Encounters:   03/05/21 94 3 kg (208 lb)   01/06/21 99 3 kg (219 lb)   01/05/21 101 kg (222 lb)           Physical Exam  Constitutional:       General: He is not in acute distress  Appearance: He is well-developed  He is not diaphoretic  HENT:      Head: Normocephalic and atraumatic  Mouth/Throat:      Pharynx: No oropharyngeal exudate  Eyes:      General: No scleral icterus  Pupils: Pupils are equal, round, and reactive to light  Neck:      Musculoskeletal: Normal range of motion and neck supple  Cardiovascular:      Rate and Rhythm: Normal rate and regular rhythm  Heart sounds: No murmur  Pulmonary:      Effort: Pulmonary effort is normal  No respiratory distress  Breath sounds: Normal breath sounds  Abdominal:      General: Bowel sounds are normal  There is no distension  Palpations: Abdomen is soft  There is no mass  Tenderness: There is no abdominal tenderness  Musculoskeletal: Normal range of motion  Lymphadenopathy:      Cervical: No cervical adenopathy  Skin:     General: Skin is warm and dry  Findings: Bruising present     Neurological:      Mental Status: He is alert and oriented to person, place, and time    Psychiatric:         Behavior: Behavior normal          Thought Content: Thought content normal          Judgment: Judgment normal          Assessment / Plan:    1  Iron deficiency anemia due to chronic blood loss      The patient is a very pleasant 69 yo male with a significant cardiac history on dual anti-platelet therapy status post cardiac catheterization  He was found to be anemic with iron deficiency  His fecal occult blood test was positive  Patient was seen for initial consultation on 11/27/2020  At that visit, his most recent labs had indicated and iron deficiency and patient was set up for IV Venofer  Patient had repeat blood work done  Prior to starting parental iron replacement which demonstrated iron saturation of 90%, total iron 426, ferritin 30  Based on these labs patient's  IV Venofer infusions were canceled and patient was recommended to continue on daily oral iron supplementation  He was referred to GI for workup  He underwent EGD/Colonoscopy, these were negative for GI blood loss  He is scheduled to have capsule endoscopy  Patient's most recent CBC and iron panel was completely normal  He feels well and denies any abnormal bleeding or concerning symptoms today  From a hematological perspective, observation is recommended  I will repeat his blood work in 6 months to include a CBC, CMP, Iron panel  I reviewed symptoms of iron deficiency anemia with him today and he is instructed to call if he develops any abnormal bleeding or symptoms and we would repeat his blood work sooner  Patient verbalized understanding  He is in agreement with plan of care  Will seen back in 6 months with repeat blood work  Barriers to care: None  Patient able to self care  Portions of the record may have been created with voice recognition software  Occasional wrong word or "sound a like" substitutions may have occurred due to the inherent limitations of voice recognition software    Read the chart carefully and recognize, using context, where substitutions have occurred

## 2021-03-05 ENCOUNTER — OFFICE VISIT (OUTPATIENT)
Dept: HEMATOLOGY ONCOLOGY | Facility: HOSPITAL | Age: 76
End: 2021-03-05
Payer: COMMERCIAL

## 2021-03-05 ENCOUNTER — CLINICAL SUPPORT (OUTPATIENT)
Dept: CARDIAC REHAB | Facility: HOSPITAL | Age: 76
End: 2021-03-05
Payer: COMMERCIAL

## 2021-03-05 VITALS
DIASTOLIC BLOOD PRESSURE: 64 MMHG | HEART RATE: 82 BPM | TEMPERATURE: 98 F | SYSTOLIC BLOOD PRESSURE: 102 MMHG | BODY MASS INDEX: 30.81 KG/M2 | RESPIRATION RATE: 16 BRPM | OXYGEN SATURATION: 94 % | WEIGHT: 208 LBS | HEIGHT: 69 IN

## 2021-03-05 DIAGNOSIS — Z95.5 S/P CORONARY ARTERY STENT PLACEMENT: ICD-10-CM

## 2021-03-05 DIAGNOSIS — D50.0 IRON DEFICIENCY ANEMIA DUE TO CHRONIC BLOOD LOSS: Primary | ICD-10-CM

## 2021-03-05 PROCEDURE — 99214 OFFICE O/P EST MOD 30 MIN: CPT | Performed by: NURSE PRACTITIONER

## 2021-03-05 PROCEDURE — 3074F SYST BP LT 130 MM HG: CPT | Performed by: NURSE PRACTITIONER

## 2021-03-05 PROCEDURE — 93798 PHYS/QHP OP CAR RHAB W/ECG: CPT

## 2021-03-05 PROCEDURE — 3078F DIAST BP <80 MM HG: CPT | Performed by: NURSE PRACTITIONER

## 2021-03-05 NOTE — PROGRESS NOTES
Cardiac Rehabilitation Plan of Care   60 Day Reassessment          Today's date: 3/5/2021   # of Exercise Sessions Completed: 23  Patient name: Cleveland Rivera  : 1945  Age: 68 y o  MRN: [id number]  Referring Physician: Elvira Davison MD  Cardiologist: Dr Rizwan Ortega  Provider: [address] [place] [address]  Clinician: Romel Fraser MS, CEP      Dx:   Encounter Diagnosis   Name Primary?  S/P coronary artery stent placement      Date of onset: 2020      SUMMARY OF PROGRESS: Flakito has been regularly attending his cardiac rehab sessions, completing session 23 today  He has been working at a Met level of 3 4, completing 40-50 minutes of aerobic exercise  He has been exercising on the TRD, recumbent bike, nustep and UBE  Additional strength training will be added in the following weeks  He has to limit his time on the equipment due to ankle pain  Focusing on increasing intensity and keeping 10-15 min time intervals  He has been doing well after recent stenting procedure  His telemetry reveals NSR  He has had no cardiac complaints or complications  Exercise difficulty 4/10  He reports that he has made dietary changes and stuck to them  He doesn't use salt and inspects all food nutrition labels  His main goals for rehab are to increase his strength and endurance, increase energy levels, establish a regular exercise program that he is able to maintain on his own at home, and to lose weight with LT goal of reaching 200 lbs  Currently he is weighing 207 lbs  He has had a loss of 12 lbs since starting CR  We will continue to educate him on lifestyle modifications  He will be progressed in intensities and durations as tolerated  We will continue to follow up and modify his program to achieve his goals  He has decided to continue his program to reach 36 sessions   Will focus on home exercise plan for him once he completes his rehab program     Medication compliance: Yes   Comments: Pt reports to be compliant with medications  Fall Risk: Low   Comments: Ambulates with a steady gait with no assist device    EKG Interpretation: NSR      EXERCISE ASSESSMENT and PLAN    Current Exercise Program in Rehab:       Frequency: 3 days/week Supplement with home exercise 2+ days/wk as tolerated   Minutes: 40         METS: 3 17-3 26            HR: 30 beats above resting   RPE: 4-6         Modalities: Treadmill, Airdyne bike, UBE, Rower, NuStep and Recumbent bike      Exercise Progression 30 Day Goals :    Frequency: 3 days/week of cardiac rehab     Supplement with home exercise 2+ days/wk as tolerated    Minutes: 40-50                            >150 mins/wk of moderate intensity exercise   METS: 3 3-3 6   HR: 30 beats above resting    RPE: 4-6   Modalities: Treadmill, Airdyne bike, UBE, Rower, NuStep and Recumbent bike    Strength trainin-3 days / week  12-15 repetitions  1-2 sets per modality   Will be added following 2-3 weeks of monitored exercise sessions   Modalities: Pull Downs, Lateral Raise, Arm Extension, Arm Curl, Sit to [place] and [employer] Exercise: Type: walking, Frequency: 5 days/week, Duration: 20 mins    Goals: 10% improvement in functional capacity - based on max METs achieved in fitness assessment, improved DASI score by 10%, Increase in exercise capacity by 40% - based on peak METs tolerated in cardiac rehab exercise session, Exercise 5 days/wk, >150mins/wk of moderate intensity exercise, Resume ADLs with increased strength, Attend Rehab regularly, Decrease sitting time and Start a walking program    Progression Toward Goals:  Pt is progressing and showing improvement  toward the following goals:  losing weight, incrased strength and endurance, forming an exercise routine   , Will continue to educate and progress as tolerated      Education: Benefit of exercise for CAD risk factors, home exercise guidelines, AHA guidelines to achieve >150 mins/wk of moderate exercise and RPE scale   Plan:education on home exercise guidelines and home exercise 30+ mins 2 days opposite CR  Readiness to change: Action:  (Changing behavior)      NUTRITION ASSESSMENT AND PLAN    Weight control:    Starting weight: 218 lb   Current weight: 212 lb  Waist circumference:    Startin"   Current:    Diabetes: N/A  Lipid management: Discussed diet and lipid management and Last lipid profile 2020  Chol 110    HDL 42  LDL 45    Goals:LDL <100, HDL >40, TRG <150, CHOL <200, reduced waist circumference <40 inches (M), fasting BG , Improved Rate Your Plate score  >35, Wt  loss 1-2 ppw,  goal of 200 lbs  and 2 5-5%  wt loss    Progression Toward Goals: Pt is progressing and showing improvement  toward the following goals:  weight loss -12 lbs since starting rehab, decreasing portions, decreasing fat intake, increasing exercise  Education: heart healthy eating  low sodium diet  hydration  nutrition for  lipid management  nutrition for Improved BG control  wt  loss   portion control  Plan: Education class: Reading Food Labels and Education Class: Heart Healthy Eating  Readiness to change: Action:  (Changing behavior)      PSYCHOSOCIAL ASSESSMENT AND PLAN    Emotional:  Depression assessment:  PHQ-9 = 0 =No Depression            Anxiety measure:  NITIN-7 = 0-4  = Not anxious  Self-reported stress level:  4  Social support: Very Good    Goals:  Physical Fitness in [facility] Score < 3 and increased energy    Progression Toward Goals: Pt is progressing and showing improvement  toward the following goals:  stress management  No noncerns          Education: no needs  Plan: Exercise and Enjoy a hobby  Readiness to change: Action:  (Changing behavior)      OTHER CORE COMPONENTS     Tobacco:   Social History     Tobacco Use   Smoking Status Former Smoker    Packs/day:     Years:     Pack years:     Quit date: Ishan Jamison Years since quittin 1   Smokeless Tobacco Never Used       Tobacco Use Intervention:   N/A: Patient is a non-smoker     Anginal Symptoms:  None   NTG use: No prescription    Blood pressure:    Restin/60   Exercise: 134/76    Goals: consistent BP < 130/80, reduced dietary sodium <2300mg, moderate intensity exercise >150 mins/wk and medication compliance    Progression Toward Goals: Pt is progressing and showing improvement  toward the following goals:  losing weight, increased strength and endurance, forming an exercise routine   , Will continue to educate and progress as tolerated      Education:  understanding high blood pressure and it's relationship to CAD and low sodium diet and HTN  Plan: Class: Understanding Heart Disease and Class: Common Heart Medications  Readiness to change: Action:  (Changing behavior)

## 2021-03-08 ENCOUNTER — CLINICAL SUPPORT (OUTPATIENT)
Dept: CARDIAC REHAB | Facility: HOSPITAL | Age: 76
End: 2021-03-08
Payer: COMMERCIAL

## 2021-03-08 DIAGNOSIS — Z95.5 S/P CORONARY ARTERY STENT PLACEMENT: ICD-10-CM

## 2021-03-08 PROCEDURE — 93798 PHYS/QHP OP CAR RHAB W/ECG: CPT

## 2021-03-09 ENCOUNTER — TELEPHONE (OUTPATIENT)
Dept: GASTROENTEROLOGY | Facility: MEDICAL CENTER | Age: 76
End: 2021-03-09

## 2021-03-09 NOTE — TELEPHONE ENCOUNTER
----- Message from Saundra Wilson MD sent at 3/4/2021  2:01 PM EST -----  Please call patient :  Significant inflammation int he stomach  Continue treatment with omeprazole  We will discuss repeating the endoscopy in the near future and also consider hernia repair if symptoms persist    Benign but precancerous colon polyp - repeat in 5 years  Please follow up with us in office in 2 - 3 months

## 2021-03-10 ENCOUNTER — CLINICAL SUPPORT (OUTPATIENT)
Dept: CARDIAC REHAB | Facility: HOSPITAL | Age: 76
End: 2021-03-10
Payer: COMMERCIAL

## 2021-03-10 DIAGNOSIS — Z95.5 S/P CORONARY ARTERY STENT PLACEMENT: ICD-10-CM

## 2021-03-10 LAB
METHYLMALONATE SERPL-SCNC: 248 NMOL/L (ref 0–378)
SL AMB DISCLAIMER: NORMAL

## 2021-03-10 PROCEDURE — 93798 PHYS/QHP OP CAR RHAB W/ECG: CPT

## 2021-03-12 ENCOUNTER — APPOINTMENT (OUTPATIENT)
Dept: CARDIAC REHAB | Facility: HOSPITAL | Age: 76
End: 2021-03-12
Payer: COMMERCIAL

## 2021-03-14 DIAGNOSIS — I65.21 CAROTID STENOSIS, RIGHT: ICD-10-CM

## 2021-03-15 ENCOUNTER — TELEPHONE (OUTPATIENT)
Dept: NEPHROLOGY | Facility: CLINIC | Age: 76
End: 2021-03-15

## 2021-03-15 ENCOUNTER — CLINICAL SUPPORT (OUTPATIENT)
Dept: CARDIAC REHAB | Facility: HOSPITAL | Age: 76
End: 2021-03-15
Payer: COMMERCIAL

## 2021-03-15 DIAGNOSIS — Z95.5 S/P CORONARY ARTERY STENT PLACEMENT: ICD-10-CM

## 2021-03-15 PROCEDURE — 93798 PHYS/QHP OP CAR RHAB W/ECG: CPT

## 2021-03-15 RX ORDER — CLOPIDOGREL BISULFATE 75 MG/1
TABLET ORAL
Qty: 90 TABLET | Refills: 1 | Status: SHIPPED | OUTPATIENT
Start: 2021-03-15 | End: 2021-11-26

## 2021-03-15 NOTE — TELEPHONE ENCOUNTER
I called and left a detail message asking patient to contact the office to schedule the follow up appointment in April with Dr Schultz at the [place] office  Called patient from the recall list  Please schedule when patient returns the call back

## 2021-03-17 ENCOUNTER — CLINICAL SUPPORT (OUTPATIENT)
Dept: CARDIAC REHAB | Facility: HOSPITAL | Age: 76
End: 2021-03-17
Payer: COMMERCIAL

## 2021-03-17 DIAGNOSIS — I25.10 CORONARY ARTERY DISEASE INVOLVING NATIVE CORONARY ARTERY OF NATIVE HEART WITHOUT ANGINA PECTORIS: ICD-10-CM

## 2021-03-17 DIAGNOSIS — Z95.5 S/P CORONARY ARTERY STENT PLACEMENT: ICD-10-CM

## 2021-03-17 PROCEDURE — 93798 PHYS/QHP OP CAR RHAB W/ECG: CPT

## 2021-03-17 RX ORDER — METOPROLOL SUCCINATE 25 MG/1
TABLET, EXTENDED RELEASE ORAL
Qty: 90 TABLET | Refills: 1 | Status: SHIPPED | OUTPATIENT
Start: 2021-03-17 | End: 2021-09-09

## 2021-03-19 ENCOUNTER — APPOINTMENT (OUTPATIENT)
Dept: CARDIAC REHAB | Facility: HOSPITAL | Age: 76
End: 2021-03-19
Payer: COMMERCIAL

## 2021-03-22 ENCOUNTER — CLINICAL SUPPORT (OUTPATIENT)
Dept: CARDIAC REHAB | Facility: HOSPITAL | Age: 76
End: 2021-03-22
Payer: COMMERCIAL

## 2021-03-22 DIAGNOSIS — Z95.5 S/P CORONARY ARTERY STENT PLACEMENT: ICD-10-CM

## 2021-03-22 PROCEDURE — 93798 PHYS/QHP OP CAR RHAB W/ECG: CPT

## 2021-03-24 ENCOUNTER — APPOINTMENT (OUTPATIENT)
Dept: CARDIAC REHAB | Facility: HOSPITAL | Age: 76
End: 2021-03-24
Payer: COMMERCIAL

## 2021-03-25 ENCOUNTER — TELEPHONE (OUTPATIENT)
Dept: FAMILY MEDICINE CLINIC | Facility: HOSPITAL | Age: 76
End: 2021-03-25

## 2021-03-25 NOTE — TELEPHONE ENCOUNTER
Patient dropped off med list from pharmacy  Asking if it can be compared to his med list in our chart  Pharmacy med list given to se carter to patient

## 2021-03-26 ENCOUNTER — CLINICAL SUPPORT (OUTPATIENT)
Dept: CARDIAC REHAB | Facility: HOSPITAL | Age: 76
End: 2021-03-26
Payer: COMMERCIAL

## 2021-03-26 DIAGNOSIS — Z95.5 S/P CORONARY ARTERY STENT PLACEMENT: ICD-10-CM

## 2021-03-26 PROCEDURE — 93798 PHYS/QHP OP CAR RHAB W/ECG: CPT

## 2021-03-29 ENCOUNTER — CLINICAL SUPPORT (OUTPATIENT)
Dept: CARDIAC REHAB | Facility: HOSPITAL | Age: 76
End: 2021-03-29
Payer: COMMERCIAL

## 2021-03-29 DIAGNOSIS — Z95.5 S/P CORONARY ARTERY STENT PLACEMENT: ICD-10-CM

## 2021-03-29 PROCEDURE — 93798 PHYS/QHP OP CAR RHAB W/ECG: CPT

## 2021-03-31 ENCOUNTER — CLINICAL SUPPORT (OUTPATIENT)
Dept: CARDIAC REHAB | Facility: HOSPITAL | Age: 76
End: 2021-03-31
Payer: COMMERCIAL

## 2021-03-31 DIAGNOSIS — Z95.5 S/P CORONARY ARTERY STENT PLACEMENT: ICD-10-CM

## 2021-03-31 PROCEDURE — 93798 PHYS/QHP OP CAR RHAB W/ECG: CPT

## 2021-04-01 ENCOUNTER — OFFICE VISIT (OUTPATIENT)
Dept: FAMILY MEDICINE CLINIC | Facility: HOSPITAL | Age: 76
End: 2021-04-01
Payer: COMMERCIAL

## 2021-04-01 VITALS
TEMPERATURE: 96.3 F | HEART RATE: 62 BPM | BODY MASS INDEX: 31.07 KG/M2 | WEIGHT: 209.8 LBS | DIASTOLIC BLOOD PRESSURE: 64 MMHG | HEIGHT: 69 IN | SYSTOLIC BLOOD PRESSURE: 102 MMHG

## 2021-04-01 DIAGNOSIS — N17.9 AKI (ACUTE KIDNEY INJURY) (HCC): ICD-10-CM

## 2021-04-01 DIAGNOSIS — I10 ESSENTIAL HYPERTENSION: Chronic | ICD-10-CM

## 2021-04-01 DIAGNOSIS — E66.9 OBESITY (BMI 30.0-34.9): ICD-10-CM

## 2021-04-01 DIAGNOSIS — N18.32 STAGE 3B CHRONIC KIDNEY DISEASE (HCC): ICD-10-CM

## 2021-04-01 DIAGNOSIS — I25.10 CORONARY ARTERY DISEASE INVOLVING NATIVE CORONARY ARTERY OF NATIVE HEART WITHOUT ANGINA PECTORIS: Primary | ICD-10-CM

## 2021-04-01 DIAGNOSIS — D50.0 IRON DEFICIENCY ANEMIA DUE TO CHRONIC BLOOD LOSS: ICD-10-CM

## 2021-04-01 PROCEDURE — 1036F TOBACCO NON-USER: CPT | Performed by: INTERNAL MEDICINE

## 2021-04-01 PROCEDURE — 1160F RVW MEDS BY RX/DR IN RCRD: CPT | Performed by: INTERNAL MEDICINE

## 2021-04-01 PROCEDURE — 99214 OFFICE O/P EST MOD 30 MIN: CPT | Performed by: INTERNAL MEDICINE

## 2021-04-01 NOTE — ASSESSMENT & PLAN NOTE
Thought to be d/t mult exposures to contrast recently, stabilized and now at CKD stage 3, has f/u with Nephro, encouraged to avoid NSAIDs and keep hydrated, has labs ordered

## 2021-04-01 NOTE — PATIENT INSTRUCTIONS
Obesity   AMBULATORY CARE:   Obesity  is when your body mass index (BMI) is greater than 30  Your healthcare provider will use your height and weight to measure your BMI  The risks of obesity include  many health problems, such as injuries or physical disability  You may need tests to check for the following:  · Diabetes    · High blood pressure or high cholesterol    · Heart disease    · Gallbladder or liver disease    · Cancer of the colon, breast, prostate, liver, or kidney    · Sleep apnea    · Arthritis or gout    Seek care immediately if:   · You have a severe headache, confusion, or difficulty speaking  · You have weakness on one side of your body  · You have chest pain, sweating, or shortness of breath  Contact your healthcare provider if:   · You have symptoms of gallbladder or liver disease, such as pain in your upper abdomen  · You have knee or hip pain and discomfort while walking  · You have symptoms of diabetes, such as intense hunger and thirst, and frequent urination  · You have symptoms of sleep apnea, such as snoring or daytime sleepiness  · You have questions or concerns about your condition or care  Treatment for obesity  focuses on helping you lose weight to improve your health  Even a small decrease in BMI can reduce the risk for many health problems  Your healthcare provider will help you set a weight-loss goal   · Lifestyle changes  are the first step in treating obesity  These include making healthy food choices and getting regular physical activity  Your healthcare provider may suggest a weight-loss program that involves coaching, education, and therapy  · Medicine  may help you lose weight when it is used with a healthy diet and physical activity  · Surgery  can help you lose weight if you are very obese and have other health problems  There are several types of weight-loss surgery  Ask your healthcare provider for more information      Be successful losing weight:   · Set small, realistic goals  An example of a small goal is to walk for 20 minutes 5 days a week  Anther goal is to lose 5% of your body weight  · Tell friends, family members, and coworkers about your goals  and ask for their support  Ask a friend to lose weight with you, or join a weight-loss support group  · Identify foods or triggers that may cause you to overeat , and find ways to avoid them  Remove tempting high-calorie foods from your home and workplace  Place a bowl of fresh fruit on your kitchen counter  If stress causes you to eat, then find other ways to cope with stress  · Keep a diary to track what you eat and drink  Also write down how many minutes of physical activity you do each day  Weigh yourself once a week and record it in your diary  Eating changes: You will need to eat 500 to 1,000 fewer calories each day than you currently eat to lose 1 to 2 pounds a week  The following changes will help you cut calories:  · Eat smaller portions  Use small plates, no larger than 9 inches in diameter  Fill your plate half full of fruits and vegetables  Measure your food using measuring cups until you know what a serving size looks like  · Eat 3 meals and 1 or 2 snacks each day  Plan your meals in advance  [employer] and eat at home most of the time  Eat slowly  Do not skip meals  Skipping meals can lead to overeating later in the day  This can make it harder for you to lose weight  Talk with a dietitian to help you make a meal plan and schedule that is right for you  · Eat fruits and vegetables at every meal   They are low in calories and high in fiber, which makes you feel full  Do not add butter, margarine, or cream sauce to vegetables  Use herbs to season steamed vegetables  · Eat less fat and fewer fried foods  Eat more baked or grilled chicken and fish  These protein sources are lower in calories and fat than red meat  Limit fast food   Dress your salads with olive oil and vinegar instead of bottled dressing  · Limit the amount of sugar you eat  Do not drink sugary beverages  Limit alcohol  Activity changes:  Physical activity is good for your body in many ways  It helps you burn calories and build strong muscles  It decreases stress and depression, and improves your mood  It can also help you sleep better  Talk to your healthcare provider before you begin an exercise program   · Exercise for at least 30 minutes 5 days a week  Start slowly  Set aside time each day for physical activity that you enjoy and that is convenient for you  It is best to do both weight training and an activity that increases your heart rate, such as walking, bicycling, or swimming  · Find ways to be more active  Do yard work and housecleaning  Walk up the stairs instead of using elevators  Spend your leisure time going to events that require walking, such as outdoor festivals or fairs  This extra physical activity can help you lose weight and keep it off  Follow up with your healthcare provider as directed: You may need to meet with a dietitian  Write down your questions so you remember to ask them during your visits  © Copyright [address] [place] [address] Drive Information is for End User's use only and may not be sold, redistributed or otherwise used for commercial purposes  All illustrations and images included in CareNotes® are the copyrighted property of A D A M , [employer]  or [address] [place] [address]  The above information is an  only  It is not intended as medical advice for individual conditions or treatments  Talk to your doctor, nurse or pharmacist before following any medical regimen to see if it is safe and effective for you  Heart Healthy Diet   AMBULATORY CARE:   A heart healthy diet  is an eating plan low in unhealthy fats and sodium (salt)  The plan is high in healthy fats and fiber   A heart healthy diet helps improve your cholesterol levels and lowers your risk for heart disease and stroke  A dietitian will teach you how to read and understand food labels  Heart healthy diet guidelines to follow:   · Choose foods that contain healthy fats  ? Unsaturated fats  include monounsaturated and polyunsaturated fats  Unsaturated fat is found in foods such as soybean, canola, olive, corn, and safflower oils  It is also found in soft tub margarine that is made with liquid vegetable oil  ? Omega-3 fat  is found in certain fish, such as salmon, tuna, and trout, and in walnuts and flaxseed  Eat fish high in omega-3 fats at least 2 times a week  · Get 20 to 30 grams of fiber each day  Fruits, vegetables, whole-grain foods, and legumes (cooked beans) are good sources of fiber  · Limit or do not have unhealthy fats  ? Cholesterol  is found in animal foods, such as eggs and lobster, and in dairy products made from whole milk  Limit cholesterol to less than 200 mg each day  ? Saturated fat  is found in meats, such as austin and hamburger  It is also found in chicken or turkey skin, whole milk, and butter  Limit saturated fat to less than 7% of your total daily calories  ? Trans fat  is found in packaged foods, such as potato chips and cookies  It is also in hard margarine, some fried foods, and shortening  Do not eat foods that contain trans fats  · Limit sodium as directed  You may be told to limit sodium to 2,000 to 2,300 mg each day  Choose low-sodium or no-salt-added foods  Add little or no salt to food you prepare  Use herbs and spices in place of salt  Include the following in your heart healthy plan:  Ask your dietitian or healthcare provider how many servings to have from each of the following food groups:  · Grains:      ? Whole-wheat breads, cereals, and pastas, and brown rice    ? Low-fat, low-sodium crackers and chips    · Vegetables:      ? Broccoli, green beans, green peas, and spinach    ? Collards, kale, and lima beans    ?  Carrots, sweet potatoes, tomatoes, and peppers    ? Canned vegetables with no salt added    · Fruits:      ? Bananas, peaches, pears, and pineapple    ? Grapes, raisins, and dates    ? Oranges, tangerines, grapefruit, orange juice, and grapefruit juice    ? Apricots, mangoes, melons, and papaya    ? Raspberries and strawberries    ? Canned fruit with no added sugar    · Low-fat dairy:      ? Nonfat (skim) milk, 1% milk, and low-fat almond, cashew, or soy milks fortified with calcium    ? Low-fat cheese, regular or frozen yogurt, and cottage cheese    · Meats and proteins:      ? Lean cuts of beef and pork (loin, leg, round), skinless chicken and turkey    ? Legumes, soy products, egg whites, or nuts    Limit or do not include the following in your heart healthy plan:   · Unhealthy fats and oils:      ? Whole or 2% milk, cream cheese, sour cream, or cheese    ? High-fat cuts of beef (T-bone steaks, ribs), chicken or turkey with skin, and organ meats such as liver    ? Butter, stick margarine, shortening, and cooking oils such as coconut or palm oil    · Foods and liquids high in sodium:      ? Packaged foods, such as frozen dinners, cookies, macaroni and cheese, and cereals with more than 300 mg of sodium per serving    ? Vegetables with added sodium, such as instant potatoes, vegetables with added sauces, or regular canned vegetables    ? Cured or smoked meats, such as hot dogs, austin, and sausage    ? High-sodium ketchup, barbecue sauce, salad dressing, pickles, olives, soy sauce, or miso    · Foods and liquids high in sugar:      ? Candy, cake, cookies, pies, or doughnuts    ? Soft drinks (soda), sports drinks, or sweetened tea    ? Canned or dry mixes for cakes, soups, sauces, or gravies    Other healthy heart guidelines:   · Do not smoke  Nicotine and other chemicals in cigarettes and cigars can cause lung and heart damage  Ask your healthcare provider for information if you currently smoke and need help to quit   E-cigarettes or smokeless tobacco still contain nicotine  Talk to your healthcare provider before you use these products  · Limit or do not drink alcohol as directed  Alcohol can damage your heart and raise your blood pressure  Your healthcare provider may give you specific daily and weekly limits  The general recommended limit is 1 drink a day for women 21 or older and for men 72 or older  Do not have more than 3 drinks in a day or 7 in a week  The recommended limit is 2 drinks a day for men 24to 59years of age  Do not have more than 4 drinks in a day or 14 in a week  A drink of alcohol is 12 ounces of beer, 5 ounces of wine, or 1½ ounces of liquor  · Exercise regularly  Exercise can help you maintain a healthy weight and improve your blood pressure and cholesterol levels  Regular exercise can also decrease your risk for heart problems  Ask your healthcare provider about the best exercise plan for you  Do not start an exercise program without asking your healthcare provider  Follow up with your doctor or cardiologist as directed:  Write down your questions so you remember to ask them during your visits  © Copyright [address] Information is for End User's use only and may not be sold, redistributed or otherwise used for commercial purposes  All illustrations and images included in CareNotes® are the copyrighted property of A D A M , Inc  or [address] [place] [address]  The above information is an  only  It is not intended as medical advice for individual conditions or treatments  Talk to your doctor, nurse or pharmacist before following any medical regimen to see if it is safe and effective for you

## 2021-04-01 NOTE — ASSESSMENT & PLAN NOTE
Bp again a bit low today but pt tolerating w/o SE and has CV benefit with ACE and beta blocker, con't current meds for now, call with s/sx of hypotension

## 2021-04-01 NOTE — ASSESSMENT & PLAN NOTE
FIT test + s/p EGD and colonoscopy - significant gastric inflammation and hiatal hernia noted, benign polyp removed, on oral iron w/o SE and iron studies better, saw Onc -hold on iron infusions, order for repeat BW has been given, con't oral iron for now and f/u with labs and OV as per heme

## 2021-04-01 NOTE — PROGRESS NOTES
Assessment/Plan:    Coronary artery disease involving native coronary artery  Doing well with cardiac rehab - endurance increasing, on Plavix/ASA/Toprol/Lisionpril/Lipitor, con't meds and f/u as per Cardio, call with CV symptoms    Iron deficiency anemia due to chronic blood loss  FIT test + s/p EGD and colonoscopy - significant gastric inflammation and hiatal hernia noted, benign polyp removed, on oral iron w/o SE and iron studies better, saw Onc -hold on iron infusions, order for repeat BW has been given, con't oral iron for now and f/u with labs and OV as per heme    Hypertension  Bp again a bit low today but pt tolerating w/o SE and has CV benefit with ACE and beta blocker, con't current meds for now, call with s/sx of hypotension    HELIO (acute kidney injury) ([facility] [address] )  Thought to be d/t mult exposures to contrast recently, stabilized and now at CKD stage 3, has f/u with Nephro, encouraged to avoid NSAIDs and keep hydrated, has labs ordered    Stage 3b chronic kidney disease  Lab Results   Component Value Date    EGFR 38 03/03/2021    EGFR 37 02/25/2021    EGFR 40 12/14/2020    CREATININE 1 73 (H) 03/03/2021    CREATININE 1 75 (H) 02/25/2021    CREATININE 1 65 (H) 12/14/2020   Stable, again encouraged BP/BS control/avoiding NSAIDs and keeping hydrated, con't labs/follow up as per renal       Diagnoses and all orders for this visit:    Coronary artery disease involving native coronary artery of native heart without angina pectoris    Obesity (BMI 30 0-34  9)    BMI 30 0-30 9,adult    Iron deficiency anemia due to chronic blood loss    Essential hypertension    Stage 3b chronic kidney disease    HELIO (acute kidney injury) ([facility] [address] )      BW 12/20    [place] 2/21    He has had both COVID vaccines        Subjective:      Patient ID: Jordyn Preston  is a 68 y o  male  HPI Pt here for follow up appt    Pt saw Cardio (Dr Khris De La Rosa) in Jan 2021 for f/u CAD - OV note reviewed  He had no meds changed and no studies ordered   He was given the OK to hold his ASA/Plavix for his upcoming colonoscopy (see below)  He is currently still doing well in cardiac rehab  He states he has 2 more week of rehab  He feels it has helped his endurance and he is better up and down the steps  He notes no recent CP/palp/edema/SOB/orthopnea/PND  He is down 14 lbs from Dec 2020  He is eating better and is watching salt  He eats a lot of veggies and has a banana every day  He walks daily when weather permits  Pt had his colonoscopy 2/17/21 d/t + FIT test   One small polyp in transverse colon was noted  He was told to repeat colonoscopy in 5 yrs  He also had an EGD and significant inflammation and a grade 3 hiatal hernia was noted in the stomach  He was told to con't his Omeprazole  He was told that a capsule endoscopy may have to be done  He notes no pyrosis/regurgitation of food/abd pain/N/V/hoarseness/problems swallowing/blood in stool/black stools  He does note stools are "weird" since he had the colonoscopy "I don't go as often as I used"  Pt saw Marlo Ledezma) for f/u iron def anemia in March 2021 - OV note reviewed  Most recent iron studies were normal   He is taking ferrous sulfate 324 mg once daily  He was told to repeat BW in 6 mos and to call with any s/sx of KAMALA  BP a bit low again today and meds were reviewed and no changes have occurred  He denies missing doses of meds or SE with the meds  He does not check her BP outside the office regularly but when he does he states he has been told it was good at cardiac rehab  He notes no frequent Ha's/dizziness/double vision/CP  He has appt with renal May 3rd for f/u CKD stage 3  He had BW 3/3/21 - BUN/Cr 29/1 73 (GFR 38)    BW 12/20    [place] 2/21    He has had both COVID vaccines      Review of Systems   Constitutional: Negative for chills and fever  HENT: Negative for congestion and sinus pain  Eyes: Negative for pain and visual disturbance     Respiratory: Negative for cough and shortness of breath  Cardiovascular: Negative for chest pain, palpitations and leg swelling  Gastrointestinal: Negative for abdominal pain, blood in stool, constipation, diarrhea, nausea and vomiting  Endocrine: Negative for polydipsia and polyuria  Genitourinary: Negative for difficulty urinating and dysuria  Musculoskeletal: Negative for arthralgias and myalgias  Skin: Negative for rash and wound  Neurological: Negative for dizziness and headaches  Hematological: Bruises/bleeds easily  Psychiatric/Behavioral: Negative for behavioral problems and confusion  Objective:    /64   Pulse 62   Temp (!) 96 3 °F (35 7 °C) (Tympanic)   Ht 5' 9" (1 753 m)   Wt 95 2 kg (209 lb 12 8 oz)   BMI 30 98 kg/m²      Physical Exam  Vitals signs and nursing note reviewed  Constitutional:       General: He is not in acute distress  Appearance: He is well-developed  He is not ill-appearing  HENT:      Head: Normocephalic and atraumatic  Eyes:      General:         Right eye: No discharge  Left eye: No discharge  Conjunctiva/sclera: Conjunctivae normal    Neck:      Musculoskeletal: Neck supple  Trachea: No tracheal deviation  Cardiovascular:      Rate and Rhythm: Normal rate and regular rhythm  Heart sounds: No murmur  Pulmonary:      Effort: Pulmonary effort is normal  No respiratory distress  Breath sounds: Normal breath sounds  No wheezing, rhonchi or rales  Abdominal:      General: There is no distension  Palpations: Abdomen is soft  Tenderness: There is no abdominal tenderness  There is no guarding or rebound  Musculoskeletal:         General: No deformity  Right lower leg: No edema  Left lower leg: No edema  Skin:     General: Skin is warm and dry  Coloration: Skin is not pale  Findings: No rash  Neurological:      General: No focal deficit present  Mental Status: He is alert  Mental status is at baseline  Motor: No abnormal muscle tone  Gait: Gait normal    Psychiatric:         Mood and Affect: Mood normal          Behavior: Behavior normal          Thought Content: Thought content normal          BMI Counseling: Body mass index is 30 98 kg/m²  The BMI is above normal  Nutrition recommendations include reducing portion sizes, 3-5 servings of fruits/vegetables daily, consuming healthier snacks, moderation in carbohydrate intake, increasing intake of lean protein and reducing intake of saturated fat and trans fat  Exercise recommendations include exercising 3-5 times per week

## 2021-04-01 NOTE — ASSESSMENT & PLAN NOTE
Doing well with cardiac rehab - endurance increasing, on Plavix/ASA/Toprol/Lisionpril/Lipitor, con't meds and f/u as per Cardio, call with CV symptoms

## 2021-04-01 NOTE — ASSESSMENT & PLAN NOTE
Lab Results   Component Value Date    EGFR 38 03/03/2021    EGFR 37 02/25/2021    EGFR 40 12/14/2020    CREATININE 1 73 (H) 03/03/2021    CREATININE 1 75 (H) 02/25/2021    CREATININE 1 65 (H) 12/14/2020   Stable, again encouraged BP/BS control/avoiding NSAIDs and keeping hydrated, con't labs/follow up as per renal

## 2021-04-02 ENCOUNTER — CLINICAL SUPPORT (OUTPATIENT)
Dept: CARDIAC REHAB | Facility: HOSPITAL | Age: 76
End: 2021-04-02
Payer: COMMERCIAL

## 2021-04-02 DIAGNOSIS — Z95.5 S/P CORONARY ARTERY STENT PLACEMENT: ICD-10-CM

## 2021-04-02 PROCEDURE — 93798 PHYS/QHP OP CAR RHAB W/ECG: CPT

## 2021-04-02 NOTE — PROGRESS NOTES
Cardiac Rehabilitation Plan of Care   90 Day Reassessment          Today's date: 2021   # of Exercise Sessions Completed: 32  Patient name: Waleska Wade  : 1945  Age: 68 y o  MRN: [id number]  Referring Physician: Pearl Sung MD  Cardiologist: Dr Elyce Boast  Provider: Petra  Clinician: Nasra Cano, MS, CEP      Dx:   Encounter Diagnosis   Name Primary?  S/P coronary artery stent placement      Date of onset: 2020      SUMMARY OF PROGRESS: Flakito has been regularly attending his cardiac rehab sessions, completing session 32 today  He has been working at a Met level of 3 4, completing 50-60 minutes of aerobic exercise  He has been exercising on the TRD, recumbent bike, nustep and UBE  Additional strength training will be added in the following weeks  He has to limit his time on the equipment due to ankle pain  Focusing on increasing intensity and keeping 10-15 min time intervals  He has been doing well with increase in intensities  His telemetry reveals NSR  He has had no cardiac complaints or complications  Exercise difficulty 4/10  He reports that he has made dietary changes and stuck to them  He doesn't use salt and inspects all food nutrition labels  His main goals for rehab are to increase his strength and endurance, increase energy levels, establish a regular exercise program that he is able to maintain on his own at home, and to lose weight with LT goal of reaching 200 lbs  Currently he is weighing 206 lbs  He has had a loss of 13 lbs since starting CR  We will continue to educate him on lifestyle modifications  He will be progressed in intensities and durations as tolerated  We will continue to follow up and modify his program to achieve his goals  He reports overall he notices the benefits of attending rehab, and it has helped him to feel stronger and has more energy  He will be completing his program in the next 2 weeks   Will focus on home exercise planning  Medication compliance: Yes   Comments: Pt reports to be compliant with medications  Fall Risk: Low   Comments: Ambulates with a steady gait with no assist device    EKG Interpretation: NSR      EXERCISE ASSESSMENT and PLAN    Current Exercise Program in Rehab:       Frequency: 3 days/week Supplement with home exercise 2+ days/wk as tolerated   Minutes: 40         METS: 3 17-3 26            HR: 30 beats above resting   RPE: 4-6         Modalities: Treadmill, Airdyne bike, UBE, Rower, NuStep and Recumbent bike      Exercise Progression 30 Day Goals :    Frequency: 3 days/week of cardiac rehab     Supplement with home exercise 2+ days/wk as tolerated    Minutes: 40-50                            >150 mins/wk of moderate intensity exercise   METS: 3 3-3 6   HR: 30 beats above resting    RPE: 4-6   Modalities: Treadmill, Airdyne bike, UBE, Rower, NuStep and Recumbent bike    Strength trainin-3 days / week  12-15 repetitions  1-2 sets per modality   Will be added following 2-3 weeks of monitored exercise sessions   Modalities: Pull Downs, Lateral Raise, Arm Extension, Arm Curl, Sit to [employer] Exercise: Type: walking, Frequency: 5 days/week, Duration: 20 mins    Goals: 10% improvement in functional capacity - based on max METs achieved in fitness assessment, improved DASI score by 10%, Increase in exercise capacity by 40% - based on peak METs tolerated in cardiac rehab exercise session, Exercise 5 days/wk, >150mins/wk of moderate intensity exercise, Resume ADLs with increased strength, Attend Rehab regularly, Decrease sitting time and Start a walking program    Progression Toward Goals:  Pt is progressing and showing improvement  toward the following goals:  losing weight, incrased strength and endurance, forming an exercise routine   , Will continue to educate and progress as tolerated      Education: Benefit of exercise for CAD risk factors, home exercise guidelines, AHA guidelines to achieve >150 mins/wk of moderate exercise and RPE scale   Plan:education on home exercise guidelines and home exercise 30+ mins 2 days opposite CR  Readiness to change: Action:  (Changing behavior)      NUTRITION ASSESSMENT AND PLAN    Weight control:    Starting weight: 218 lb   Current weight: 206 lb  Waist circumference:    Startin"   Current:    Diabetes: N/A  Lipid management: Discussed diet and lipid management and Last lipid profile 2020  Chol 110    HDL 42  LDL 45    Goals:LDL <100, HDL >40, TRG <150, CHOL <200, reduced waist circumference <40 inches (M), fasting BG , Improved Rate Your Plate score  >86, Wt  loss 1-2 ppw,  goal of 200 lbs  and 2 5-5%  wt loss    Progression Toward Goals: Pt is progressing and showing improvement  toward the following goals:  weight loss -12 lbs since starting rehab, decreasing portions, decreasing fat intake, increasing exercise  Education: heart healthy eating  low sodium diet  hydration  nutrition for  lipid management  nutrition for Improved BG control  wt  loss   portion control  Plan: Education class: Reading Food Labels and Education Class: Heart Healthy Eating  Readiness to change: Action:  (Changing behavior)      PSYCHOSOCIAL ASSESSMENT AND PLAN    Emotional:  Depression assessment:  PHQ-9 = 0 =No Depression            Anxiety measure:  NITIN-7 = 0-4  = Not anxious  Self-reported stress level:  4  Social support: Very Good    Goals:  Physical Fitness in [facility] Score < 3 and increased energy    Progression Toward Goals: Pt is progressing and showing improvement  toward the following goals:  stress management  No noncerns          Education: no needs  Plan: Exercise and Enjoy a hobby  Readiness to change: Action:  (Changing behavior)      OTHER CORE COMPONENTS     Tobacco:   Social History     Tobacco Use   Smoking Status Former Smoker    Packs/day:     Years:  00    Pack years: 22 00    Quit date: 5    Years since quittin 2   Smokeless Tobacco Never Used       Tobacco Use Intervention:   N/A:  Patient is a non-smoker     Anginal Symptoms:  None   NTG use: No prescription    Blood pressure:    Restin/60   Exercise: 134/76    Goals: consistent BP < 130/80, reduced dietary sodium <2300mg, moderate intensity exercise >150 mins/wk and medication compliance    Progression Toward Goals: Pt is progressing and showing improvement  toward the following goals:  losing weight, increased strength and endurance, forming an exercise routine   , Will continue to educate and progress as tolerated      Education:  understanding high blood pressure and it's relationship to CAD and low sodium diet and HTN  Plan: Class: Understanding Heart Disease and Class: Common Heart Medications  Readiness to change: Action:  (Changing behavior)

## 2021-04-03 DIAGNOSIS — I65.21 STENOSIS OF RIGHT CAROTID ARTERY: ICD-10-CM

## 2021-04-04 RX ORDER — LISINOPRIL 10 MG/1
TABLET ORAL
Qty: 90 TABLET | Refills: 1 | Status: SHIPPED | OUTPATIENT
Start: 2021-04-04 | End: 2021-09-30

## 2021-04-05 ENCOUNTER — CLINICAL SUPPORT (OUTPATIENT)
Dept: CARDIAC REHAB | Facility: HOSPITAL | Age: 76
End: 2021-04-05
Payer: COMMERCIAL

## 2021-04-05 DIAGNOSIS — Z95.5 S/P CORONARY ARTERY STENT PLACEMENT: ICD-10-CM

## 2021-04-05 PROCEDURE — 93798 PHYS/QHP OP CAR RHAB W/ECG: CPT

## 2021-04-07 ENCOUNTER — CLINICAL SUPPORT (OUTPATIENT)
Dept: CARDIAC REHAB | Facility: HOSPITAL | Age: 76
End: 2021-04-07
Payer: COMMERCIAL

## 2021-04-07 DIAGNOSIS — Z95.5 S/P CORONARY ARTERY STENT PLACEMENT: ICD-10-CM

## 2021-04-07 PROCEDURE — 93798 PHYS/QHP OP CAR RHAB W/ECG: CPT

## 2021-04-08 ENCOUNTER — OFFICE VISIT (OUTPATIENT)
Dept: FAMILY MEDICINE CLINIC | Facility: HOSPITAL | Age: 76
End: 2021-04-08
Payer: COMMERCIAL

## 2021-04-08 VITALS
BODY MASS INDEX: 31.55 KG/M2 | WEIGHT: 213 LBS | HEART RATE: 70 BPM | HEIGHT: 69 IN | TEMPERATURE: 96.4 F | SYSTOLIC BLOOD PRESSURE: 102 MMHG | DIASTOLIC BLOOD PRESSURE: 60 MMHG

## 2021-04-08 DIAGNOSIS — H61.91 SKIN LESION OF RIGHT EAR: Primary | ICD-10-CM

## 2021-04-08 PROCEDURE — 99213 OFFICE O/P EST LOW 20 MIN: CPT | Performed by: INTERNAL MEDICINE

## 2021-04-08 PROCEDURE — 1101F PT FALLS ASSESS-DOCD LE1/YR: CPT | Performed by: INTERNAL MEDICINE

## 2021-04-08 PROCEDURE — 3725F SCREEN DEPRESSION PERFORMED: CPT | Performed by: INTERNAL MEDICINE

## 2021-04-08 PROCEDURE — 3288F FALL RISK ASSESSMENT DOCD: CPT | Performed by: INTERNAL MEDICINE

## 2021-04-08 NOTE — PROGRESS NOTES
Assessment/Plan:       Diagnoses and all orders for this visit:    Skin lesion of right ear  Comments:  D/w pt that any nonhealing skin lesion for that duration needs to be evaluated for skin CA - could be SCC or less likely BCC - urged needs f/u with Derm, reassured pt that there is no sign of infection at this time, call with any new/worse symptoms          Subjective:      Patient ID: Ernesto Dale  is a 68 y o  male  HPI Pt has a lesion on R ear that has not healed after about a month  He notes no specific injury causing the lesion  It does bleed intermittently  He has tried a cream that Derm gave him and he states it seemed to help a bit and is not as sore but its not healing and going away  He saw Derm 3-4 wks ago but did not show it to them then  He called Derm but they do not have an appt for some time  Review of Systems   Constitutional: Negative for chills and fever  Respiratory: Negative for cough and shortness of breath  Skin: Positive for wound  Negative for rash  Hematological: Does not bruise/bleed easily  Psychiatric/Behavioral: Negative for behavioral problems and confusion  Objective:    /60   Pulse 70   Temp (!) 96 4 °F (35 8 °C) (Temporal)   Ht 5' 9" (1 753 m)   Wt 96 6 kg (213 lb)   BMI 31 45 kg/m²      Physical Exam  Vitals signs and nursing note reviewed  Constitutional:       General: He is not in acute distress  Appearance: He is not ill-appearing  Pulmonary:      Effort: Pulmonary effort is normal  No respiratory distress     Skin:     Comments: R ear: top of pinna with oval approx 1 cm superficial ulceration, no surrounding erythema/warmth/swelling/drainage noted

## 2021-04-09 ENCOUNTER — CLINICAL SUPPORT (OUTPATIENT)
Dept: CARDIAC REHAB | Facility: HOSPITAL | Age: 76
End: 2021-04-09
Payer: COMMERCIAL

## 2021-04-09 DIAGNOSIS — Z95.5 S/P CORONARY ARTERY STENT PLACEMENT: ICD-10-CM

## 2021-04-09 PROCEDURE — 93798 PHYS/QHP OP CAR RHAB W/ECG: CPT

## 2021-04-12 ENCOUNTER — CLINICAL SUPPORT (OUTPATIENT)
Dept: CARDIAC REHAB | Facility: HOSPITAL | Age: 76
End: 2021-04-12
Payer: COMMERCIAL

## 2021-04-12 ENCOUNTER — OFFICE VISIT (OUTPATIENT)
Dept: GASTROENTEROLOGY | Facility: CLINIC | Age: 76
End: 2021-04-12
Payer: COMMERCIAL

## 2021-04-12 VITALS
BODY MASS INDEX: 31.4 KG/M2 | HEART RATE: 78 BPM | HEIGHT: 69 IN | DIASTOLIC BLOOD PRESSURE: 60 MMHG | SYSTOLIC BLOOD PRESSURE: 102 MMHG | WEIGHT: 212 LBS | TEMPERATURE: 96.1 F

## 2021-04-12 DIAGNOSIS — K21.9 GASTROESOPHAGEAL REFLUX DISEASE WITHOUT ESOPHAGITIS: ICD-10-CM

## 2021-04-12 DIAGNOSIS — K29.50 CHRONIC GASTRITIS, PRESENCE OF BLEEDING UNSPECIFIED, UNSPECIFIED GASTRITIS TYPE: ICD-10-CM

## 2021-04-12 DIAGNOSIS — D12.6 TUBULAR ADENOMA OF COLON: ICD-10-CM

## 2021-04-12 DIAGNOSIS — D50.0 IRON DEFICIENCY ANEMIA DUE TO CHRONIC BLOOD LOSS: Primary | ICD-10-CM

## 2021-04-12 DIAGNOSIS — Z95.5 S/P CORONARY ARTERY STENT PLACEMENT: ICD-10-CM

## 2021-04-12 PROCEDURE — 1036F TOBACCO NON-USER: CPT | Performed by: PHYSICIAN ASSISTANT

## 2021-04-12 PROCEDURE — 1160F RVW MEDS BY RX/DR IN RCRD: CPT | Performed by: PHYSICIAN ASSISTANT

## 2021-04-12 PROCEDURE — 93798 PHYS/QHP OP CAR RHAB W/ECG: CPT

## 2021-04-12 PROCEDURE — 99214 OFFICE O/P EST MOD 30 MIN: CPT | Performed by: PHYSICIAN ASSISTANT

## 2021-04-12 RX ORDER — OMEPRAZOLE 40 MG/1
CAPSULE, DELAYED RELEASE ORAL
Qty: 90 CAPSULE | Refills: 1 | Status: SHIPPED | OUTPATIENT
Start: 2021-04-12 | End: 2021-08-18

## 2021-04-12 NOTE — PROGRESS NOTES
Cardiac Rehabilitation Plan of Care   120 Day/Final Reassessment          Today's date: 2021   # of Exercise Sessions Completed: 36  Patient name: Leopoldo Eth  : 1945  Age: 68 y o  MRN: [id number]  Referring Physician: Laura Foreman MD  Cardiologist: Dr Alysha Paniagua  Provider: Jennifer lindquist  Clinician: Hattie Tucker MS, CEP      Dx:   Encounter Diagnosis   Name Primary?  S/P coronary artery stent placement      Date of onset: 2020      SUMMARY OF PROGRESS: Flakito has completed his cardiac rehab program at 36 sessions  He has been working at a Met level of 3 4, completing 50-60 minutes of aerobic exercise  He has been exercising on the TRD, recumbent bike, nustep and UBE  Additional strength training will be added in the following weeks  He has to limit his time on the equipment due to ankle pain  Focusing on increasing intensity and keeping 10-15 min time intervals  He has been doing well with increase in intensities  His telemetry reveals NSR  He has had no cardiac complaints or complications  Exercise difficulty 4/10  He reports that he has made dietary changes and stuck to them  He doesn't use salt and inspects all food nutrition labels  His main goals for rehab are to increase his strength and endurance, increase energy levels, establish a regular exercise program that he is able to maintain on his own at home, and to lose weight with LT goal of reaching 200 lbs  Currently he is weighing 2009 lbs  He has had a loss of 13 lbs since starting CR  We will continue to educate him on lifestyle modifications  He will be progressed in intensities and durations as tolerated  We will continue to follow up and modify his program to achieve his goals  He reports overall he notices the benefits of attending rehab, and it has helped him to feel stronger and has more energy  He has been encouraged to continue walking at home at least 3-5x/week with a goal of 150-200 min/week      Medication compliance: Yes   Comments: Pt reports to be compliant with medications  Fall Risk: Low   Comments: Ambulates with a steady gait with no assist device    EKG Interpretation: NSR      EXERCISE ASSESSMENT and PLAN    Current Exercise Program in Rehab:       Frequency: 3 days/week Supplement with home exercise 2+ days/wk as tolerated   Minutes: 40         METS: 3 17-3 26            HR: 30 beats above resting   RPE: 4-6         Modalities: Treadmill, Airdyne bike, UBE, Rower, NuStep and Recumbent bike      Exercise Progression 30 Day Goals :    Frequency: 3 days/week of cardiac rehab     Supplement with home exercise 2+ days/wk as tolerated    Minutes: 40-50                            >150 mins/wk of moderate intensity exercise   METS: 3 3-3 6   HR: 30 beats above resting    RPE: 4-6   Modalities: Treadmill, Airdyne bike, UBE, Rower, NuStep and Recumbent bike    Strength trainin-3 days / week  12-15 repetitions  1-2 sets per modality   Will be added following 2-3 weeks of monitored exercise sessions   Modalities: Pull Downs, Lateral Raise, Arm Extension, Arm Curl, Sit to [employer] Exercise: Type: walking, Frequency: 5 days/week, Duration: 20 mins    Goals: 10% improvement in functional capacity - based on max METs achieved in fitness assessment, improved DASI score by 10%, Increase in exercise capacity by 40% - based on peak METs tolerated in cardiac rehab exercise session, Exercise 5 days/wk, >150mins/wk of moderate intensity exercise, Resume ADLs with increased strength, Attend Rehab regularly, Decrease sitting time and Start a walking program    Progression Toward Goals:  Pt is progressing and showing improvement  toward the following goals:  losing weight, incrased strength and endurance, forming an exercise routine   , Will continue to educate and progress as tolerated      Education: Benefit of exercise for CAD risk factors, home exercise guidelines, AHA guidelines to achieve >150 mins/wk of moderate exercise and RPE scale   Plan:education on home exercise guidelines and home exercise 30+ mins 2 days opposite CR  Readiness to change: Action:  (Changing behavior)      NUTRITION ASSESSMENT AND PLAN    Weight control:    Starting weight: 218 lb   Current weight: 206 lb  Waist circumference:    Startin"   Current:    Diabetes: N/A  Lipid management: Discussed diet and lipid management and Last lipid profile 2020  Chol 110    HDL 42  LDL 45    Goals:LDL <100, HDL >40, TRG <150, CHOL <200, reduced waist circumference <40 inches (M), fasting BG , Improved Rate Your Plate score  >50, Wt  loss 1-2 ppw,  goal of 200 lbs  and 2 5-5%  wt loss    Progression Toward Goals: Pt is progressing and showing improvement  toward the following goals:  weight loss -12 lbs since starting rehab, decreasing portions, decreasing fat intake, increasing exercise  Education: heart healthy eating  low sodium diet  hydration  nutrition for  lipid management  nutrition for Improved BG control  wt  loss   portion control  Plan: Education class: Reading Food Labels and Education Class: Heart Healthy Eating  Readiness to change: Action:  (Changing behavior)      PSYCHOSOCIAL ASSESSMENT AND PLAN    Emotional:  Depression assessment:  PHQ-9 = 0 =No Depression            Anxiety measure:  NITIN-7 = 0-4  = Not anxious  Self-reported stress level:  4  Social support: Very Good    Goals:  Physical Fitness in [facility] Score < 3 and increased energy    Progression Toward Goals: Pt is progressing and showing improvement  toward the following goals:  stress management  No noncerns          Education: no needs  Plan: Exercise and Enjoy a hobby  Readiness to change: Action:  (Changing behavior)      OTHER CORE COMPONENTS     Tobacco:   Social History     Tobacco Use   Smoking Status Former Smoker    Packs/day:  00    Years:  00    Pack years: 22 00    Quit date: 5    Years since quittin 3   Smokeless Tobacco Never Used       Tobacco Use Intervention:   N/A:  Patient is a non-smoker     Anginal Symptoms:  None   NTG use: No prescription    Blood pressure:    Restin/60   Exercise: 134/76    Goals: consistent BP < 130/80, reduced dietary sodium <2300mg, moderate intensity exercise >150 mins/wk and medication compliance    Progression Toward Goals: Pt is progressing and showing improvement  toward the following goals:  losing weight, increased strength and endurance, forming an exercise routine   , Will continue to educate and progress as tolerated      Education:  understanding high blood pressure and it's relationship to CAD and low sodium diet and HTN  Plan: Class: Understanding Heart Disease and Class: Common Heart Medications  Readiness to change: Action:  (Changing behavior)

## 2021-04-12 NOTE — LETTER
April 12, 2021     Johnathan Novak, [address]  [address] [facility]  [address] [address] [address]    Patient: Jorge Pierre  YOB: 1945   Date of Visit: 4/12/2021       Dear Dr Denise So: Thank you for referring Michael Way to me for evaluation  Below are my notes for this consultation  If you have questions, please do not hesitate to call me  I look forward to following your patient along with you  Sincerely,        Chinmay Hart PA-C        CC: No Recipients  Chinmay Hart PA-C  4/12/2021 10:10 AM  Sign when Signing Visit  [facility] - Outpatient Follow-up Note  Jorge Pierre  68 y o  male MRN: [id number]  Encounter: [id number]          ASSESSMENT AND PLAN:      1  Iron deficiency anemia due to chronic blood loss  Recent EGD and colonoscopy from February 2021 without clear cause of iron deficiency  He did have large hiatal hernia but there was no evidence of Rambo lesions  There was severe gastritis and possible healed ulcer in the stomach  He was recommended capsule endoscopy, but this has not been done yet  I recommend scheduling this in the next few weeks to assess for small bowel AVMs  Fortunately, he responded well to IV iron and most recent hemoglobin and iron panel is within normal limits  He should continue his oral iron supplement and follow-up with Hematology  2  Chronic gastritis, presence of bleeding unspecified, unspecified gastritis type  He had gastritis with thickened fold in the hiatal hernia  Biopsies from the stomach revealed iron pill gastritis with focal ulceration  He has not increased his omeprazole  I recommend taking omeprazole 40 mg daily 30 minutes before breakfast or dinner  He should avoid NSAIDs  We can discuss repeat EGD at his next office visit to assess for healing  3  Gastroesophageal reflux disease without esophagitis  Continue omeprazole 40 mg daily and reflux precautions      - omeprazole (PriLOSEC) 40 MG capsule; Take 1 capsule by mouth 30 minutes before a meal daily  Dispense: 90 capsule; Refill: 1    4  Tubular adenoma of colon  He is due for repeat colonoscopy in 5 years due to history of tubular adenoma  Follow-up in 3 months  ______________________________________________________________________    SUBJECTIVE:   70-year-old male with history of multivessel CAD status post SANDRINE x 2 (Nov and Dec 2020) on aspirin and Plavix, carotid artery stenosis status post endarterectomy (Jan 2020), CKD stage 2, impaired fasting glucose, dyslipidemia, hypertension referred to GI for iron deficiency anemia  He underwent EGD and colonoscopy in February 2021  EGD showed large hiatal hernia with associated thickened fold and gastritis (no victorino lesion) and possible healed ulcer in the gastric body  Biopsies consistent with iron pill gastritis, negative for H pylori infection  Colonoscopy revealed 1 benign colon polyp and diverticulosis  He was recommended to have capsule endoscopy, which has not been done yet  Most recent blood work from March 2021 showed normal hemoglobin, MCV, and normal iron panel  He is feeling well overall  He has good appetite and is trying to eat high-fiber  His heartburn is controlled with Prilosec 40 mg every other day  He has been taking this for many years  He has never taken Prilosec daily, as recommended after his endoscopy  He denies abdominal pain  He has bowel movements every day  These are type 1-2 on the [facility] stool chart  He does strain  No blood in the stool  REVIEW OF SYSTEMS IS OTHERWISE NEGATIVE        Historical Information   Past Medical History:   Diagnosis Date    Anemia     iron def    Arthritis     GERD (gastroesophageal reflux disease)     Gout     Hypertension     Insomnia     Kidney stone     Stroke (cerebrum) [facility])      Past Surgical History:   Procedure Laterality Date    ANKLE SURGERY Right     CARDIAC CATHETERIZATION      COLONOSCOPY  01/25/2013 polypectomy; complete - 3 yrs d/t polyp - benign submucosal lipoma- path c/w lipoma    COLONOSCOPY  2017    complete - tubular adenoma - repeat 5yrs    CYSTOSCOPY      CYSTOTOMY      bladder  w/ basket extraction of calculus    FEMUR FRACTURE SURGERY      HERNIA REPAIR      inguinal ; sliding    INGUINAL HERNIA REPAIR Right     unilateral    KNEE ARTHROSCOPY Right     w/medial menisectomy    LASIK      corneal    LITHOTRIPSY      renal    SC COLONOSCOPY FLX DX W/COLLJ SPEC WHEN PFRMD N/A 2017    Procedure: SCREENING COLONOSCOPY;  Surgeon: Jonathon Aggarwal MD;  Location: [facility];  Service: General    SC THROMBOENDARTECTMY Zerita Mo Right 1/10/2020    Procedure: ENDARTERECTOMY ARTERY CAROTID;  Surgeon: Jay Wise MD;  Location: [facility];  Service: Vascular    ROTATOR CUFF REPAIR Bilateral     TONSILLECTOMY       Social History   Social History     Substance and Sexual Activity   Alcohol Use Not Currently    Comment: stopped drinking alcohol     Social History     Substance and Sexual Activity   Drug Use No     Social History     Tobacco Use   Smoking Status Former Smoker    Packs/day:  00    Years: 22 00    Pack years: 22 00    Quit date: 5    Years since quittin 3   Smokeless Tobacco Never Used     Family History   Problem Relation Age of Onset    Alzheimer's disease Mother     Alzheimer's disease Father     Heart disease Father         CAD    Other Father         prediabetes    Diabetes Father     Breast cancer Other     Arthritis Family     Hypertension Family        Meds/Allergies       Current Outpatient Medications:     Ascorbic Acid (VITAMIN C IMMUNE HEALTH PO)    aspirin (ECOTRIN LOW STRENGTH) 81 mg EC tablet    atorvastatin (LIPITOR) 40 mg tablet    calcium polycarbophil (FIBERCON) 625 mg tablet    clopidogrel (PLAVIX) 75 mg tablet    Colchicine 0 6 MG CAPS    ferrous sulfate 324 (65 Fe) mg    fluticasone (FLONASE) 50 mcg/act nasal spray   lisinopril (ZESTRIL) 10 mg tablet    metoprolol succinate (TOPROL-XL) 25 mg 24 hr tablet    Multiple Vitamin (MULTIVITAMIN) capsule    nitroglycerin (NITROSTAT) 0 4 mg SL tablet    triamterene-hydrochlorothiazide (MAXZIDE) 75-50 MG per tablet    omeprazole (PriLOSEC) 40 MG capsule    No Known Allergies        Objective     Blood pressure 102/60, pulse 78, temperature (!) 96 1 °F (35 6 °C), temperature source Tympanic, height 5' 9" (1 753 m), weight 96 2 kg (212 lb)  Body mass index is 31 31 kg/m²  PHYSICAL EXAM:      General Appearance:   Alert, cooperative, no distress   HEENT:   Normocephalic, atraumatic, anicteric      Neck:  Supple, symmetrical, trachea midline   Lungs:   Clear to auscultation bilaterally; no rales, rhonchi or wheezing; respirations unlabored    Heart[de-identified]   Regular rate and rhythm; no murmur, rub, or gallop  Abdomen:   Soft, non-tender, non-distended; normal bowel sounds; no masses, no organomegaly    Genitalia:   Deferred    Rectal:   Deferred    Extremities:  No cyanosis, clubbing or edema    Pulses:  2+ and symmetric    Skin:  No jaundice, rashes, or lesions    Lymph nodes:  No palpable cervical lymphadenopathy        Lab Results:   No visits with results within 1 Day(s) from this visit     Latest known visit with results is:   Appointment on 03/03/2021   Component Date Value    WBC 03/03/2021 6 88     RBC 03/03/2021 4 63     Hemoglobin 03/03/2021 13 9     Hematocrit 03/03/2021 43 0     MCV 03/03/2021 93     MCH 03/03/2021 30 0     MCHC 03/03/2021 32 3     RDW 03/03/2021 12 8     MPV 03/03/2021 9 1     Platelets 92/54/9881 236     nRBC 03/03/2021 0     Neutrophils Relative 03/03/2021 70     Immat GRANS % 03/03/2021 0     Lymphocytes Relative 03/03/2021 19     Monocytes Relative 03/03/2021 6     Eosinophils Relative 03/03/2021 4     Basophils Relative 03/03/2021 1     Neutrophils Absolute 03/03/2021 4 81     Immature Grans Absolute 03/03/2021 0 02     Lymphocytes Absolute 03/03/2021 1 33     Monocytes Absolute 03/03/2021 0 43     Eosinophils Absolute 03/03/2021 0 25     Basophils Absolute 03/03/2021 0 04     Sodium 03/03/2021 141     Potassium 03/03/2021 4 3     Chloride 03/03/2021 108     CO2 03/03/2021 28     ANION GAP 03/03/2021 5     BUN 03/03/2021 29*    Creatinine 03/03/2021 1 73*    Glucose, Fasting 03/03/2021 91     Calcium 03/03/2021 9 7     AST 03/03/2021 21     ALT 03/03/2021 27     Alkaline Phosphatase 03/03/2021 47     Total Protein 03/03/2021 7 2     Albumin 03/03/2021 4 2     Total Bilirubin 03/03/2021 0 45     eGFR 03/03/2021 38     Methylmalonic Acid, S 03/03/2021 248     Disclaimer: 03/03/2021 Comment     Iron Saturation 03/03/2021 38     TIBC 03/03/2021 362     Iron 03/03/2021 139     Ferritin 03/03/2021 25          Radiology Results:   No results found

## 2021-04-12 NOTE — PATIENT INSTRUCTIONS
You could add Metamucil (natural fiber supplement) 1-2 times daily   You could add a stool softener like Colace 1-2 times daily  You could add MiraLAX daily    High Fiber Diet   AMBULATORY CARE:   A high-fiber diet  includes foods that have a high amount of fiber  Fiber is the part of fruits, vegetables, and grains that is not broken down by your body  Fiber keeps your bowel movements regular  Fiber can also help lower your cholesterol level, control blood sugar in people with diabetes, and relieve constipation  Fiber can also help you control your weight because it helps you feel full faster  Most adults should eat 25 to 35 grams of fiber each day  Talk to your dietitian or healthcare provider about the amount of fiber you need  Good sources of fiber:       · Foods with at least 4 grams of fiber per serving:      ? ? to ½ cup of high-fiber cereal (check the nutrition label on the box)    ? ½ cup of blackberries or raspberries    ? 4 dried prunes    ? 1 cooked artichoke    ? ½ cup of cooked legumes, such as lentils, or red, kidney, and cash beans    · Foods with 1 to 3 grams of fiber per serving:      ? 1 slice of whole-wheat, pumpernickel, or rye bread    ? ½ cup of cooked brown rice    ? 4 whole-wheat crackers    ? 1 cup of oatmeal    ? ½ cup of cereal with 1 to 3 grams of fiber per serving (check the nutrition label on the box)    ? 1 small piece of fruit, such as an apple, banana, pear, kiwi, or orange    ? 3 dates    ? ½ cup of canned apricots, fruit cocktail, peaches, or pears    ? ½ cup of raw or cooked vegetables, such as carrots, cauliflower, cabbage, spinach, squash, or corn  Ways that you can increase fiber in your diet:   · Choose brown or wild rice instead of white rice  · Use whole wheat flour in recipes instead of white or all-purpose flour  · Add beans and peas to casseroles or soups  · Choose fresh fruit and vegetables with peels or skins on instead of juices      Other diet guidelines to follow:   · Add fiber to your diet slowly  You may have abdominal discomfort, bloating, and gas if you add fiber to your diet too quickly  · Drink plenty of liquids as you add fiber to your diet  You may have nausea or develop constipation if you do not drink enough water  Ask how much liquid to drink each day and which liquids are best for you  © Copyright [address] Information is for End User's use only and may not be sold, redistributed or otherwise used for commercial purposes  All illustrations and images included in CareNotes® are the copyrighted property of [employer]  or [address] [place], [place] [address]l[place]   The above information is an  only  It is not intended as medical advice for individual conditions or treatments  Talk to your doctor, nurse or pharmacist before following any medical regimen to see if it is safe and effective for you

## 2021-04-12 NOTE — PROGRESS NOTES
[facility] - Outpatient Follow-up Note  Mitch Barker  68 y o  male MRN: [id number]  Encounter: [id number]          ASSESSMENT AND PLAN:      1  Iron deficiency anemia due to chronic blood loss  Recent EGD and colonoscopy from February 2021 without clear cause of iron deficiency  He did have large hiatal hernia but there was no evidence of Rambo lesions  There was severe gastritis and possible healed ulcer in the stomach  He was recommended capsule endoscopy, but this has not been done yet  I recommend scheduling this in the next few weeks to assess for small bowel AVMs  Fortunately, he responded well to IV iron and most recent hemoglobin and iron panel is within normal limits  He should continue his oral iron supplement and follow-up with Hematology  2  Chronic gastritis, presence of bleeding unspecified, unspecified gastritis type  He had gastritis with thickened fold in the hiatal hernia  Biopsies from the stomach revealed iron pill gastritis with focal ulceration  He has not increased his omeprazole  I recommend taking omeprazole 40 mg daily 30 minutes before breakfast or dinner  He should avoid NSAIDs  We can discuss repeat EGD at his next office visit to assess for healing  3  Gastroesophageal reflux disease without esophagitis  Continue omeprazole 40 mg daily and reflux precautions  - omeprazole (PriLOSEC) 40 MG capsule; Take 1 capsule by mouth 30 minutes before a meal daily  Dispense: 90 capsule; Refill: 1    4  Tubular adenoma of colon  He is due for repeat colonoscopy in 5 years due to history of tubular adenoma      Follow-up in 3 months  ______________________________________________________________________    SUBJECTIVE:   51-year-old male with history of multivessel CAD status post SANDRINE x 2 (Nov and Dec 2020) on aspirin and Plavix, carotid artery stenosis status post endarterectomy (Jan 2020), CKD stage 2, impaired fasting glucose, dyslipidemia, hypertension referred to GI for iron deficiency anemia  He underwent EGD and colonoscopy in February 2021  EGD showed large hiatal hernia with associated thickened fold and gastritis (no victorino lesion) and possible healed ulcer in the gastric body  Biopsies consistent with iron pill gastritis, negative for H pylori infection  Colonoscopy revealed 1 benign colon polyp and diverticulosis  He was recommended to have capsule endoscopy, which has not been done yet  Most recent blood work from March 2021 showed normal hemoglobin, MCV, and normal iron panel  He is feeling well overall  He has good appetite and is trying to eat high-fiber  His heartburn is controlled with Prilosec 40 mg every other day  He has been taking this for many years  He has never taken Prilosec daily, as recommended after his endoscopy  He denies abdominal pain  He has bowel movements every day  These are type 1-2 on the [facility] stool chart  He does strain  No blood in the stool  REVIEW OF SYSTEMS IS OTHERWISE NEGATIVE        Historical Information   Past Medical History:   Diagnosis Date    Anemia     iron def    Arthritis     GERD (gastroesophageal reflux disease)     Gout     Hypertension     Insomnia     Kidney stone     [facility]br[facility]      Past Surgical History:   Procedure Laterality Date    ANKLE SURGERY Right     CARDIAC CATHETERIZATION      COLONOSCOPY  01/25/2013    polypectomy; complete - 3 yrs d/t polyp - benign submucosal lipoma- path c/w lipoma    COLONOSCOPY  04/25/2017    complete - tubular adenoma - repeat 5yrs    CYSTOSCOPY      CYSTOTOMY      bladder  w/ basket extraction of calculus    FEMUR FRACTURE SURGERY      HERNIA REPAIR      inguinal ; sliding    INGUINAL HERNIA REPAIR Right     unilateral    KNEE ARTHROSCOPY Right     w/medial menisectomy    LASIK      corneal    LITHOTRIPSY      renal    TX COLONOSCOPY FLX DX W/COLLJ SPEC WHEN PFRMD N/A 4/25/2017    Procedure: SCREENING COLONOSCOPY;  Surgeon: Melinda Hardy MD;  Location:  MAIN OR;  Service: General    PA Maame Broussard INCIS Right 1/10/2020    Procedure: ENDARTERECTOMY ARTERY CAROTID;  Surgeon: Nena Gutierres MD;  Location: [facility];  Service: Vascular    ROTATOR CUFF REPAIR Bilateral     TONSILLECTOMY       Social History   Social History     Substance and Sexual Activity   Alcohol Use Not Currently    Comment: stopped drinking alcohol     Social History     Substance and Sexual Activity   Drug Use No     Social History     Tobacco Use   Smoking Status Former Smoker    Packs/day:     Years:     Pack years:     Quit date: 5    Years since quittin 3   Smokeless Tobacco Never Used     Family History   Problem Relation Age of Onset    Alzheimer's disease Mother     Alzheimer's disease Father     Heart disease Father         CAD    Other Father         prediabetes    Diabetes Father     Breast cancer Other     Arthritis Family     Hypertension Family        Meds/Allergies       Current Outpatient Medications:     Ascorbic Acid (VITAMIN C IMMUNE HEALTH PO)    aspirin (ECOTRIN LOW STRENGTH) 81 mg EC tablet    atorvastatin (LIPITOR) 40 mg tablet    calcium polycarbophil (FIBERCON) 625 mg tablet    clopidogrel (PLAVIX) 75 mg tablet    Colchicine 0 6 MG CAPS    ferrous sulfate 324 (65 Fe) mg    fluticasone (FLONASE) 50 mcg/act nasal spray    lisinopril (ZESTRIL) 10 mg tablet    metoprolol succinate (TOPROL-XL) 25 mg 24 hr tablet    Multiple Vitamin (MULTIVITAMIN) capsule    nitroglycerin (NITROSTAT) 0 4 mg SL tablet    triamterene-hydrochlorothiazide (MAXZIDE) 75-50 MG per tablet    omeprazole (PriLOSEC) 40 MG capsule    No Known Allergies        Objective     Blood pressure 102/60, pulse 78, temperature (!) 96 1 °F (35 6 °C), temperature source Tympanic, height 5' 9" (1 753 m), weight 96 2 kg (212 lb)  Body mass index is 31 31 kg/m²        PHYSICAL EXAM:      General Appearance:   Alert, cooperative, no distress   HEENT:   Normocephalic, atraumatic, anicteric      Neck:  Supple, symmetrical, trachea midline   Lungs:   Clear to auscultation bilaterally; no rales, rhonchi or wheezing; respirations unlabored    Heart[de-identified]   Regular rate and rhythm; no murmur, rub, or gallop  Abdomen:   Soft, non-tender, non-distended; normal bowel sounds; no masses, no organomegaly    Genitalia:   Deferred    Rectal:   Deferred    Extremities:  No cyanosis, clubbing or edema    Pulses:  2+ and symmetric    Skin:  No jaundice, rashes, or lesions    Lymph nodes:  No palpable cervical lymphadenopathy        Lab Results:   No visits with results within 1 Day(s) from this visit     Latest known visit with results is:   Appointment on 03/03/2021   Component Date Value    WBC 03/03/2021 6 88     RBC 03/03/2021 4 63     Hemoglobin 03/03/2021 13 9     Hematocrit 03/03/2021 43 0     MCV 03/03/2021 93     MCH 03/03/2021 30 0     MCHC 03/03/2021 32 3     RDW 03/03/2021 12 8     MPV 03/03/2021 9 1     Platelets 05/40/0375 236     nRBC 03/03/2021 0     Neutrophils Relative 03/03/2021 70     Immat GRANS % 03/03/2021 0     Lymphocytes Relative 03/03/2021 19     Monocytes Relative 03/03/2021 6     Eosinophils Relative 03/03/2021 4     Basophils Relative 03/03/2021 1     Neutrophils Absolute 03/03/2021 4 81     Immature Grans Absolute 03/03/2021 0 02     Lymphocytes Absolute 03/03/2021 1 33     Monocytes Absolute 03/03/2021 0 43     Eosinophils Absolute 03/03/2021 0 25     Basophils Absolute 03/03/2021 0 04     Sodium 03/03/2021 141     Potassium 03/03/2021 4 3     Chloride 03/03/2021 108     CO2 03/03/2021 28     ANION GAP 03/03/2021 5     BUN 03/03/2021 29*    Creatinine 03/03/2021 1 73*    Glucose, Fasting 03/03/2021 91     Calcium 03/03/2021 9 7     AST 03/03/2021 21     ALT 03/03/2021 27     Alkaline Phosphatase 03/03/2021 47     Total Protein 03/03/2021 7 2     Albumin 03/03/2021 4 2     Total Bilirubin 03/03/2021 0 45     eGFR 03/03/2021 38     Methylmalonic Acid, S 03/03/2021 248     Disclaimer: 03/03/2021 Comment     Iron Saturation 03/03/2021 38     TIBC 03/03/2021 362     Iron 03/03/2021 139     Ferritin 03/03/2021 25          Radiology Results:   No results found

## 2021-05-03 ENCOUNTER — OFFICE VISIT (OUTPATIENT)
Dept: URGENT CARE | Facility: CLINIC | Age: 76
End: 2021-05-03
Payer: COMMERCIAL

## 2021-05-03 ENCOUNTER — OFFICE VISIT (OUTPATIENT)
Dept: NEPHROLOGY | Facility: HOSPITAL | Age: 76
End: 2021-05-03
Payer: COMMERCIAL

## 2021-05-03 VITALS
DIASTOLIC BLOOD PRESSURE: 54 MMHG | SYSTOLIC BLOOD PRESSURE: 102 MMHG | TEMPERATURE: 97.8 F | OXYGEN SATURATION: 99 % | WEIGHT: 211 LBS | HEIGHT: 69 IN | HEART RATE: 65 BPM | RESPIRATION RATE: 20 BRPM | BODY MASS INDEX: 31.25 KG/M2

## 2021-05-03 VITALS
BODY MASS INDEX: 31.25 KG/M2 | HEIGHT: 69 IN | RESPIRATION RATE: 16 BRPM | HEART RATE: 76 BPM | WEIGHT: 211 LBS | DIASTOLIC BLOOD PRESSURE: 72 MMHG | SYSTOLIC BLOOD PRESSURE: 118 MMHG

## 2021-05-03 DIAGNOSIS — D50.0 IRON DEFICIENCY ANEMIA DUE TO CHRONIC BLOOD LOSS: ICD-10-CM

## 2021-05-03 DIAGNOSIS — N18.32 STAGE 3B CHRONIC KIDNEY DISEASE (HCC): Primary | ICD-10-CM

## 2021-05-03 DIAGNOSIS — M10.00 IDIOPATHIC GOUT, UNSPECIFIED CHRONICITY, UNSPECIFIED SITE: ICD-10-CM

## 2021-05-03 DIAGNOSIS — N20.0 NEPHROLITHIASIS: ICD-10-CM

## 2021-05-03 DIAGNOSIS — L98.499 ULCER OF EXTERNAL EAR, UNSPECIFIED ULCER STAGE (HCC): Primary | ICD-10-CM

## 2021-05-03 PROCEDURE — 99213 OFFICE O/P EST LOW 20 MIN: CPT | Performed by: PHYSICIAN ASSISTANT

## 2021-05-03 PROCEDURE — 99214 OFFICE O/P EST MOD 30 MIN: CPT | Performed by: INTERNAL MEDICINE

## 2021-05-03 NOTE — PATIENT INSTRUCTIONS
1  Chronic kidney disease stage 3b likely d/t multiple episodes of contrast induced nephropathy +/- age related nephron loss +/- hypertensive nephrosclerosis +/- diuretic use  -avoid nonsteroidals(ibuprofen, aleve, advil, motrin, celebrex, naproxen, toradol, indomethacin)  -b/l sCr appears to be higher than previous at about 1 5-1 6 with last sCr 1 73 as of 3/3/21   -s/p IV dye on 12/7/20  -renal ultrasound shows left kidney 12 6cm, right kidney 9 7cm(foreshortened on imaging)  -repeat BMP every 3 months  -UA bland     2  HTN - BP well controlled on lisinopril 10mg daily, triamterene-hydrochlorothiazide 75-50mg daily, metoprolol 25mg daily     3  Mild anemia, iron deficiency - resolved on oral iron, monitor CBC     4  History of gout - on colchicine 0 6mg as needed for acute gout, last uric acid 5 6 as of June 2019     5  CAD - on aspirin, plavix, lipitor     6  History of nephrolithiasis - with obstruction in August 2020, stone analysis shows 80% calcium oxalate stone    -obtain litholink testing-bottle to be mailed to you  -stay hydrated  Drink enough to urinate 2-2 5L/day  -avoid a high salt/sodium diet     RTC in 3 months

## 2021-05-03 NOTE — PROGRESS NOTES
NEPHROLOGY OUTPATIENT PROGRESS NOTE   Shae Betancur  68 y o  male MRN: [id number]  DATE: 5/3/2021  Reason for visit:   Chief Complaint   Patient presents with    Chronic Kidney Disease    Follow-up        Patient Instructions   1  Chronic kidney disease stage 3b likely d/t multiple episodes of contrast induced nephropathy +/- age related nephron loss +/- hypertensive nephrosclerosis +/- diuretic use  -avoid nonsteroidals(ibuprofen, aleve, advil, motrin, celebrex, naproxen, toradol, indomethacin)  -b/l sCr appears to be higher than previous at about 1 5-1 6 with last sCr 1 73 as of 3/3/21   -s/p IV dye on 12/7/20  -renal ultrasound shows left kidney 12 6cm, right kidney 9 7cm(foreshortened on imaging)  -repeat BMP every 3 months  -UA bland     2  HTN - BP well controlled on lisinopril 10mg daily, triamterene-hydrochlorothiazide 75-50mg daily, metoprolol 25mg daily     3  Mild anemia, iron deficiency - resolved on oral iron, monitor CBC     4  History of gout - on colchicine 0 6mg as needed for acute gout, last uric acid 5 6 as of June 2019     5  CAD - on aspirin, plavix, lipitor     6  History of nephrolithiasis - with obstruction in August 2020, stone analysis shows 80% calcium oxalate stone    -obtain litholink testing-bottle to be mailed to you  -stay hydrated  Drink enough to urinate 2-2 5L/day  -avoid a high salt/sodium diet     RTC in 3 months  Gary Lorenzo was seen today for chronic kidney disease and follow-up  Diagnoses and all orders for this visit:    Stage 3b chronic kidney disease ([address] )  -     Basic metabolic panel; Future  -     Urinalysis with microscopic; Future  -     Protein / creatinine ratio, urine; Future  -     Litholink Kidney Stone Panel; Future    Iron deficiency anemia due to chronic blood loss  -     Litholink Kidney Stone Panel; Future    Idiopathic gout, unspecified chronicity, unspecified site  -     Litholink Kidney Stone Panel;  Future    Nephrolithiasis  -     Litholink Kidney Stone Panel; Future        Assessment/Plan:  1  Chronic kidney disease stage 3b likely d/t multiple episodes of contrast induced nephropathy +/- age related nephron loss +/- hypertensive nephrosclerosis +/- diuretic use  -avoid nonsteroidals(ibuprofen, aleve, advil, motrin, celebrex, naproxen, toradol, indomethacin)  -b/l sCr appears to be higher than previous at about 1 5-1 6 with last sCr 1 73 as of 3/3/21   -s/p IV dye on 12/7/20  -renal ultrasound shows left kidney 12 6cm, right kidney 9 7cm(foreshortened on imaging)  -repeat BMP every 3 months  -UA bland     2  HTN - BP well controlled on lisinopril 10mg daily, triamterene-hydrochlorothiazide 75-50mg daily, metoprolol 25mg daily     3  Mild anemia, iron deficiency - resolved on oral iron, monitor CBC     4  History of gout - on colchicine 0 6mg as needed for acute gout, last uric acid 5 6 as of June 2019     5  CAD - on aspirin, plavix, lipitor     6  History of nephrolithiasis - with obstruction in August 2020, stone analysis shows 80% calcium oxalate stone    -obtain litholink testing  -stay hydrated  Drink enough to urinate 2-2 5L/day  -avoid a high salt/sodium diet     RTC in 4 months  SUBJECTIVE / INTERVAL HISTORY:  68 y o  male presents in follow up of CKD  Sindy Palomo  denies any recent illness/hospitalizations/medication changes since last office visit  Denies NSAID use  HTN - BP well controlled  NO recent kidney stones  No recent episodes of gout  Review of Systems   Constitutional: Negative for chills and fever  HENT: Negative for sore throat  Eyes: Negative for visual disturbance  Respiratory: Negative for cough and shortness of breath  Cardiovascular: Negative for chest pain and leg swelling  Gastrointestinal: Negative for abdominal pain, constipation, diarrhea, nausea and vomiting  Endocrine: Negative for polyuria     Genitourinary: Negative for decreased urine volume, difficulty urinating, dysuria and hematuria  Musculoskeletal: Negative for back pain and myalgias  Skin: Negative for rash  Neurological: Negative for dizziness, light-headedness and numbness  Psychiatric/Behavioral: Negative for confusion  OBJECTIVE:  /72 (BP Location: Left arm, Patient Position: Sitting, Cuff Size: Large)   Pulse 76   Resp 16   Ht 5' 9" (1 753 m)   Wt 95 7 kg (211 lb)   BMI 31 16 kg/m²  Body mass index is 31 16 kg/m²  Physical exam:  Physical Exam  Vitals signs reviewed  Constitutional:       General: He is not in acute distress  Appearance: He is well-developed  He is not diaphoretic  HENT:      Head: Normocephalic and atraumatic  Mouth/Throat:      Pharynx: No oropharyngeal exudate  Eyes:      General: No scleral icterus  Right eye: No discharge  Left eye: No discharge  Neck:      Musculoskeletal: Neck supple  Thyroid: No thyromegaly  Cardiovascular:      Rate and Rhythm: Normal rate and regular rhythm  Heart sounds: Normal heart sounds  Pulmonary:      Effort: Pulmonary effort is normal       Breath sounds: Normal breath sounds  No wheezing or rales  Abdominal:      General: Bowel sounds are normal  There is no distension  Palpations: Abdomen is soft  Tenderness: There is no abdominal tenderness  Musculoskeletal: Normal range of motion  Lymphadenopathy:      Cervical: No cervical adenopathy  Skin:     General: Skin is warm and dry  Findings: No rash  Neurological:      Mental Status: He is alert        Comments: awake   Psychiatric:         Behavior: Behavior normal          Medications:    Current Outpatient Medications:     Ascorbic Acid (VITAMIN C IMMUNE HEALTH PO), Take by mouth daily, Disp: , Rfl:     aspirin (ECOTRIN LOW STRENGTH) 81 mg EC tablet, Take 81 mg by mouth every other day 1 every other day, Disp: , Rfl:     atorvastatin (LIPITOR) 40 mg tablet, Take 1 tablet (40 mg total) by mouth every evening, Disp: 90 tablet, Rfl: 3    calcium polycarbophil (FIBERCON) 625 mg tablet, Take 625 mg by mouth daily, Disp: , Rfl:     clopidogrel (PLAVIX) 75 mg tablet, TAKE 1 TABLET BY MOUTH ONCE DAILY, Disp: 90 tablet, Rfl: 1    Colchicine 0 6 MG CAPS, TAKE 1 CAPSULE BY MOUTH 4 TIMES DAILY IF NEEDED, Disp: 100 capsule, Rfl: 2    ferrous sulfate 324 (65 Fe) mg, Take 1 tablet (324 mg total) by mouth daily before breakfast, Disp: 30 tablet, Rfl: 2    fluticasone (FLONASE) 50 mcg/act nasal spray, 2 sprays into each nostril daily, Disp: 1 Bottle, Rfl: 5    lisinopril (ZESTRIL) 10 mg tablet, take 1 tablet by mouth once daily, Disp: 90 tablet, Rfl: 1    metoprolol succinate (TOPROL-XL) 25 mg 24 hr tablet, TAKE 1 TABLET BY MOUTH EVERY DAY, Disp: 90 tablet, Rfl: 1    Multiple Vitamin (MULTIVITAMIN) capsule, Take 1 capsule by mouth daily, Disp: , Rfl:     nitroglycerin (NITROSTAT) 0 4 mg SL tablet, Place 1 tablet (0 4 mg total) under the tongue every 5 (five) minutes as needed for chest pain, Disp: 15 tablet, Rfl: 5    omeprazole (PriLOSEC) 40 MG capsule, Take 1 capsule by mouth 30 minutes before a meal daily  , Disp: 90 capsule, Rfl: 1    triamterene-hydrochlorothiazide (MAXZIDE) 75-50 MG per tablet, take 1 tablet by mouth once daily, Disp: 90 tablet, Rfl: 1    Allergies:   Allergies as of 05/03/2021    (No Known Allergies)       The following portions of the patient's history were reviewed and updated as appropriate: past family history, past surgical history and problem list     Laboratory Results:  Lab Results   Component Value Date    SODIUM 141 03/03/2021    K 4 3 03/03/2021     03/03/2021    CO2 28 03/03/2021    BUN 29 (H) 03/03/2021    CREATININE 1 73 (H) 03/03/2021    GLUC 92 12/08/2020    CALCIUM 9 7 03/03/2021        Lab Results   Component Value Date    PTH 33 6 12/14/2020    CALCIUM 9 7 03/03/2021    PHOS 3 4 12/14/2020       Portions of the record may have been created with voice recognition software   Occasional wrong word or "sound a like" substitutions may have occurred due to the inherent limitations of voice recognition software   Read the chart carefully and recognize, using context, where substitutions have occurred

## 2021-05-03 NOTE — PROGRESS NOTES
NAME: Jean-Pierre Ruvalcaba  is a 68 y o  male  : 1945    MRN: [id number]      Assessment and Plan   Ulcer of external ear, unspecified ulcer stage ([facility] [L98 499]  1  Ulcer of external ear, unspecified ulcer stage [facility])  Ambulatory referral to Dermatology       Discussed with patient he needs to follow-up with dermatology at the earliest available appointment  States he has been having a hard time getting into the dermatologist   Place a referral for [facility] and suggested he try there  It does not appear infected so will not prescribe antibiotics at this time  Concern for cancerous lesion given the fact that has not healed for 1 month and did not occur from injury  Patient states he will try to follow-up with dermatology as soon as possible  Discussed if he is unable to get in at least follow-up with PCP  He acknowledges      Patient Instructions   Patient Instructions   Keep area clean and dry  Follow up with dermatology at earliest available appointment       Proceed to ER if symptoms worsen  Chief Complaint     Chief Complaint   Patient presents with    Ear Laceration     NOT a laceration, open area on ridge of right ear  started 1 month, no injury, medicated cream used from dermatology, and neosproin  area is painful  bleeding at times, when cleaning in the am          History of Present Illness    Patient with history of anemia, hypertension and gout presents complaining of a wound to his right ear x1 month  States he had no injury or inciting event but started to notice an open wound on the helix of his right ear a month ago  Reports he has been keeping it clean and putting "some cream from the dermatologist" on it  Reports it has not resolved and in fact has gotten bigger  Reports pain only when touching the area  Denies any fevers or chills or any discharge from the wound    Denies history of any skin cancers but does state he has had moles removed from his head on several occasions  Review of Systems   Review of Systems   Constitutional: Negative for chills and fever  HENT: Positive for ear pain (  External)  Negative for congestion and ear discharge  Skin: Positive for wound  Neurological: Negative for headaches  Current Medications       Current Outpatient Medications:     Ascorbic Acid (VITAMIN C IMMUNE HEALTH PO), Take by mouth daily, Disp: , Rfl:     aspirin (ECOTRIN LOW STRENGTH) 81 mg EC tablet, Take 81 mg by mouth every other day 1 every other day, Disp: , Rfl:     atorvastatin (LIPITOR) 40 mg tablet, Take 1 tablet (40 mg total) by mouth every evening, Disp: 90 tablet, Rfl: 3    calcium polycarbophil (FIBERCON) 625 mg tablet, Take 625 mg by mouth daily, Disp: , Rfl:     clopidogrel (PLAVIX) 75 mg tablet, TAKE 1 TABLET BY MOUTH ONCE DAILY, Disp: 90 tablet, Rfl: 1    Colchicine 0 6 MG CAPS, TAKE 1 CAPSULE BY MOUTH 4 TIMES DAILY IF NEEDED, Disp: 100 capsule, Rfl: 2    ferrous sulfate 324 (65 Fe) mg, Take 1 tablet (324 mg total) by mouth daily before breakfast, Disp: 30 tablet, Rfl: 2    fluticasone (FLONASE) 50 mcg/act nasal spray, 2 sprays into each nostril daily, Disp: 1 Bottle, Rfl: 5    lisinopril (ZESTRIL) 10 mg tablet, take 1 tablet by mouth once daily, Disp: 90 tablet, Rfl: 1    metoprolol succinate (TOPROL-XL) 25 mg 24 hr tablet, TAKE 1 TABLET BY MOUTH EVERY DAY, Disp: 90 tablet, Rfl: 1    Multiple Vitamin (MULTIVITAMIN) capsule, Take 1 capsule by mouth daily, Disp: , Rfl:     nitroglycerin (NITROSTAT) 0 4 mg SL tablet, Place 1 tablet (0 4 mg total) under the tongue every 5 (five) minutes as needed for chest pain, Disp: 15 tablet, Rfl: 5    omeprazole (PriLOSEC) 40 MG capsule, Take 1 capsule by mouth 30 minutes before a meal daily  , Disp: 90 capsule, Rfl: 1    triamterene-hydrochlorothiazide (MAXZIDE) 75-50 MG per tablet, take 1 tablet by mouth once daily, Disp: 90 tablet, Rfl: 1    Current Allergies     Allergies as of 05/03/2021    (No Known Allergies)              Past Medical History:   Diagnosis Date    Anemia     iron def    Arthritis     GERD (gastroesophageal reflux disease)     Gout     Hypertension     Insomnia     Kidney stone     Stroke (cerebrum) [facility])        Past Surgical History:   Procedure Laterality Date    ANKLE SURGERY Right     CARDIAC CATHETERIZATION      COLONOSCOPY  01/25/2013    polypectomy; complete - 3 yrs d/t polyp - benign submucosal lipoma- path c/w lipoma    COLONOSCOPY  04/25/2017    complete - tubular adenoma - repeat 5yrs    CYSTOSCOPY      CYSTOTOMY      bladder  w/ basket extraction of calculus    FEMUR FRACTURE SURGERY      HERNIA REPAIR      inguinal ; sliding    INGUINAL HERNIA REPAIR Right     unilateral    KNEE ARTHROSCOPY Right     w/medial menisectomy    LASIK      corneal    LITHOTRIPSY      renal    MN COLONOSCOPY FLX DX W/COLLJ SPEC WHEN PFRMD N/A 4/25/2017    Procedure: SCREENING COLONOSCOPY;  Surgeon: Filomena Oneal MD;  Location: [facility];  Service: General    MN THROMBOENDARTECTMY Shauna Ped INCIS Right 1/10/2020    Procedure: ENDARTERECTOMY ARTERY CAROTID;  Surgeon: Edmond Millan MD;  Location: [facility];  Service: Vascular    ROTATOR CUFF REPAIR Bilateral     TONSILLECTOMY         Family History   Problem Relation Age of Onset    Alzheimer's disease Mother     Alzheimer's disease Father     Heart disease Father         CAD    Other Father         prediabetes    Diabetes Father     Breast cancer Other     Arthritis Family     Hypertension Family          Medications have been verified      The following portions of the patient's history were reviewed and updated as appropriate: allergies, current medications, past family history, past medical history, past social history, past surgical history and problem list     Objective   /54   Pulse 65   Temp 97 8 °F (36 6 °C) (Tympanic)   Resp 20   Ht 5' 9" (1 753 m)   Wt 95 7 kg (211 lb)   SpO2 99%   BMI 31 16 kg/m²      Physical Exam     Physical Exam  Vitals signs and nursing note reviewed  Constitutional:       General: He is not in acute distress  Appearance: Normal appearance  He is not ill-appearing, toxic-appearing or diaphoretic  HENT:      Right Ear: Tympanic membrane normal       Left Ear: Tympanic membrane normal       Ears:        Comments:  Erosive ulceration to the area noted above  It appears that cartilage is exposed  Area does not appear to be infected is not erythematous, edematous  No purulent discharge  Tender to palpation over the wound  No other similar wounds or any rashes  Neurological:      Mental Status: He is alert

## 2021-05-06 ENCOUNTER — OFFICE VISIT (OUTPATIENT)
Dept: DERMATOLOGY | Facility: CLINIC | Age: 76
End: 2021-05-06
Payer: COMMERCIAL

## 2021-05-06 VITALS — WEIGHT: 212 LBS | TEMPERATURE: 98.4 F | HEIGHT: 66 IN | BODY MASS INDEX: 34.07 KG/M2

## 2021-05-06 DIAGNOSIS — D48.5 NEOPLASM OF UNCERTAIN BEHAVIOR OF SKIN: Primary | ICD-10-CM

## 2021-05-06 DIAGNOSIS — L98.499 ULCER OF EXTERNAL EAR, UNSPECIFIED ULCER STAGE (HCC): ICD-10-CM

## 2021-05-06 PROCEDURE — 99204 OFFICE O/P NEW MOD 45 MIN: CPT | Performed by: DERMATOLOGY

## 2021-05-06 PROCEDURE — 1160F RVW MEDS BY RX/DR IN RCRD: CPT | Performed by: DERMATOLOGY

## 2021-05-06 PROCEDURE — 88305 TISSUE EXAM BY PATHOLOGIST: CPT | Performed by: STUDENT IN AN ORGANIZED HEALTH CARE EDUCATION/TRAINING PROGRAM

## 2021-05-06 PROCEDURE — 1036F TOBACCO NON-USER: CPT | Performed by: DERMATOLOGY

## 2021-05-06 PROCEDURE — 11102 TANGNTL BX SKIN SINGLE LES: CPT | Performed by: DERMATOLOGY

## 2021-05-06 NOTE — PROGRESS NOTES
[facility] Note     Patient Name: Waleska Wade  Encounter Date: 05/06/2021     Have you been cared for by a [facility] in the last 3 years and, if so, which one? No    · Have you traveled outside of the [address] [place] [address] in the past 3 months or outside of the [facility] area in the last 2 weeks? No     May we call your Preferred Phone number to discuss your specific medical information? Yes     May we leave a detailed message that includes your specific medical information? Yes      Today's Chief Concerns:   Concern #1:  Spot of concern on right ear       Past Medical History:  Have you personally ever had or currently have any of the following? · Skin cancer (such as Melanoma, Basal Cell Carcinoma, Squamous Cell Carcinoma? (If Yes, please provide more detail)- No  · Eczema: No  · Psoriasis: No  · HIV/AIDS: No  · Hepatitis B or C: No  · Tuberculosis: No  · Systemic Immunosuppression such as Diabetes, Biologic or Immunotherapy, Chemotherapy, Organ Transplantation, Bone Marrow Transplantation (If YES, please provide more detail): No  · Radiation Treatment (If YES, please provide more detail): No  · Any other major medical conditions/concerns? (If Yes, which types)- No    Social History:     What is/was your primary occupation? Retired      What are your hobbies/past-times? Walking     Family History:  Have any of your "first degree relatives" (parent, brother, sister, or child) had any of the following       · Skin cancer such as Melanoma or Merkel Cell Carcinoma or Pancreatic Cancer? No  · Eczema, Asthma, Hay Fever or Seasonal Allergies: No  · Psoriasis or Psoriatic Arthritis: No  · Do any other medical conditions seem to run in your family? If Yes, what condition and which relatives?   No    Current Medications:         Current Outpatient Medications:     Ascorbic Acid (VITAMIN C IMMUNE HEALTH PO), Take by mouth daily, Disp: , Rfl:    aspirin (ECOTRIN LOW STRENGTH) 81 mg EC tablet, Take 81 mg by mouth every other day 1 every other day, Disp: , Rfl:     atorvastatin (LIPITOR) 40 mg tablet, Take 1 tablet (40 mg total) by mouth every evening, Disp: 90 tablet, Rfl: 3    calcium polycarbophil (FIBERCON) 625 mg tablet, Take 625 mg by mouth daily, Disp: , Rfl:     clopidogrel (PLAVIX) 75 mg tablet, TAKE 1 TABLET BY MOUTH ONCE DAILY, Disp: 90 tablet, Rfl: 1    Colchicine 0 6 MG CAPS, TAKE 1 CAPSULE BY MOUTH 4 TIMES DAILY IF NEEDED, Disp: 100 capsule, Rfl: 2    ferrous sulfate 324 (65 Fe) mg, Take 1 tablet (324 mg total) by mouth daily before breakfast, Disp: 30 tablet, Rfl: 2    fluticasone (FLONASE) 50 mcg/act nasal spray, 2 sprays into each nostril daily, Disp: 1 Bottle, Rfl: 5    lisinopril (ZESTRIL) 10 mg tablet, take 1 tablet by mouth once daily, Disp: 90 tablet, Rfl: 1    metoprolol succinate (TOPROL-XL) 25 mg 24 hr tablet, TAKE 1 TABLET BY MOUTH EVERY DAY, Disp: 90 tablet, Rfl: 1    Multiple Vitamin (MULTIVITAMIN) capsule, Take 1 capsule by mouth daily, Disp: , Rfl:     omeprazole (PriLOSEC) 40 MG capsule, Take 1 capsule by mouth 30 minutes before a meal daily  , Disp: 90 capsule, Rfl: 1    triamterene-hydrochlorothiazide (MAXZIDE) 75-50 MG per tablet, take 1 tablet by mouth once daily, Disp: 90 tablet, Rfl: 1    nitroglycerin (NITROSTAT) 0 4 mg SL tablet, Place 1 tablet (0 4 mg total) under the tongue every 5 (five) minutes as needed for chest pain (Patient not taking: Reported on 5/6/2021), Disp: 15 tablet, Rfl: 5      Review of Systems:  Have you recently had or currently have any of the following? If YES, what are you doing for the problem?     · Fever, chills or unintended weight loss: No  · Sudden loss or change in your vision: No  · Nausea, vomiting or blood in your stool: No  · Painful or swollen joints: YES,   · Wheezing or cough: No  · Changing mole or non-healing wound: YES,   · Nosebleeds: No  · Excessive sweating: No  · Easy or prolonged bleeding? YES,   · Over the last 2 weeks, how often have you been bothered by the following problems? · Taking little interest or pleasure in doing things: 1 - Not at All  · Feeling down, depressed, or hopeless: 1 - Not at All  · Rapid heartbeat with epinephrine:  No    · FEMALES ONLY:    · Are you pregnant or planning to become pregnant? No  · Are you currently or planning to be nursing or breast feeding? No    · Any known allergies? · No Known Allergies      Physical Exam:     Was a chaperone (Derm Clinical Assistant) present throughout the entire Physical Exam? Yes     Did the Dermatology Team specifically  the patient on the importance of a Full Skin Exam to be sure that nothing is missed clinically?  Yes}  o Did the patient ultimately request or accept a Full Skin Exam?  NO  o Did the patient specifically refuse to have the areas "under-the-bra" examined by the Dermatologist? No  o Did the patient specifically refuse to have the areas "under-the-underwear" examined by the Dermatologist? No    CONSTITUTIONAL:   Vitals:    05/06/21 1410   Temp: 98 4 °F (36 9 °C)   TempSrc: Temporal   Weight: 96 2 kg (212 lb)   Height: 5' 6" (1 676 m)       PSYCH: Normal mood and affect  EYES: Normal conjunctiva  ENT: Normal lips and oral mucosa  CARDIOVASCULAR: No edema  RESPIRATORY: Normal respirations  HEME/LYMPH/IMMUNO:  No regional lymphadenopathy except as noted below in "ASSESSMENT AND PLAN BY DIAGNOSIS"    SKIN:  FULL ORGAN SYSTEM EXAM   Hair, Scalp, Ears, Face Normal except as noted below in Assessment   Neck, Cervical Chain Nodes Normal except as noted below in Assessment   Right Arm/Hand/Fingers Normal except as noted below in Assessment   Left Arm/Hand/Fingers Normal except as noted below in Assessment   Chest/Breasts/Axillae Viewed areas Normal except as noted below in Assessment   Abdomen, Umbilicus Normal except as noted below in Assessment   Back/Spine Normal except as noted below in Assessment   Groin/Genitalia/Buttocks Normal except as noted below in Assessment   Right Leg, Foot, Toes Normal except as noted below in Assessment   Left Leg, Foot, Toes Normal except as noted below in Assessment        Assessment and Plan by Diagnosis:    History of Present Condition:     Duration:  How long has this been an issue for you?    o  2 month    Location Affected:  Where on the body is this affecting you?    o  Right ear    Quality:  Is there any bleeding, pain, itch, burning/irritation, or redness associated with the skin lesion?    o  Painful    Severity:  Describe any bleeding, pain, itch, burning/irritation, or redness on a scale of 1 to 10 (with 10 being the worst)  o  4   Timing:  Does this condition seem to be there pretty constantly or do you notice it more at specific times throughout the day?    o  Constant    Context:  Have you ever noticed that this condition seems to be associated with specific activities you do?    o  Denies   Modifying Factors:    o Anything that seems to make the condition worse?    -  Denies  o What have you tried to do to make the condition better?    -  Ointment from previous dermatologist unsure of name    Associated Signs and Symptoms:  Does this skin lesion seem to be associated with any of the following:  o  DENIES    1  NEOPLASM OF UNCERTAIN BEHAVIOR OF SKIN    Physical Exam:   (Anatomic Location); (Size and Morphological Description); (Differential Diagnosis):  o Right superior helix; Crust erosion; differential diagnosis; rule out actinic keratosis or CNH     Pertinent Positives: N/A   Pertinent Negatives: N/A              Additional History of Present Condition:  Located on right ear  Present for over 1 month  No treatment attempted  Assessment and Plan:   I have discussed with the patient that a sample of skin via a "skin biopsy would be potentially helpful to further make a specific diagnosis under the microscope     Based on a thorough discussion of this condition and the management approach to it (including a comprehensive discussion of the known risks, side effects and potential benefits of treatment), the patient (family) agrees to implement the following specific plan:    o Procedure:  Skin Biopsy  After a thorough discussion of treatment options and risk/benefits/alternatives (including but not limited to local pain, scarring, dyspigmentation, blistering, possible superinfection, and inability to confirm a diagnosis via histopathology), verbal and written consent were obtained and portion of the rash was biopsied for tissue sample  See below for consent that was obtained from patient and subsequent Procedure Note  PROCEDURE SHAVE BIOPSY NOTE:     Performing Physician: Aissatou Murrieta   Anatomic Location; Clinical Description with size (cm); Pre-Op Diagnosis:   Right superior helix; Crust erosion; differential diagnosis; rule out actinic keratosis or CNH     Post-op diagnosis: Same      Local anesthesia: 1% xylocaine with epi       Topical anesthesia: None     Hemostasis: Aluminum chloride       After obtaining informed consent  at which time there was a discussion about the purpose of biopsy  and low risks of infection and bleeding  The area was prepped and draped in the usual fashion  Anesthesia was obtained with 1% lidocaine with epinephrine  A shave biopsy to an appropriate sampling depth was obtained with a sterile blade (such as a 15-blade or DermaBlade)  The resulting wound was covered with surgical ointment and bandaged appropriately  The patient tolerated the procedure well without complications and was without signs of functional compromise  Specimen has been sent for review by Dermatopathology  Standard post-procedure care has been explained and has been included in written form within the patient's copy of Informed Consent      INFORMED CONSENT DISCUSSION AND POST-OPERATIVE INSTRUCTIONS FOR PATIENT    I  RATIONALE FOR PROCEDURE  I understand that a skin biopsy allows the Dermatologist to test a lesion or rash under the microscope to obtain a diagnosis  It usually involves numbing the area with numbing medication and removing a small piece of skin; sometimes the area will be closed with sutures  In this specific procedure, sutures are not usually needed  If any sutures are placed, then they are usually need to be removed in 2 weeks or less  I understand that my Dermatologist recommends that a skin "shave" biopsy be performed today  A local anesthetic, similar to the kind that a dentist uses when filling a cavity, will be injected with a very small needle into the skin area to be sampled  The injected skin and tissue underneath "will go to sleep and become numb so no pain should be felt afterwards  An instrument shaped like a tiny "razor blade" (shave biopsy instrument) will be used to cut a small piece of tissue and skin from the area so that a sample of tissue can be taken and examined more closely under the microscope  A slight amount of bleeding will occur, but it will be stopped with direct pressure and a pressure bandage and any other appropriate methods  I understands that a scar will form where the wound was created  Surgical ointment will be applied to help protect the wound  Sutures are not usually needed      II   RISKS AND POTENTIAL COMPLICATIONS   I understand the risks and potential complications of a skin biopsy include but are not limited to the following:   Bleeding   Infection   Pain   Scar/keloid   Skin discoloration   Incomplete Removal   Recurrence   Nerve Damage/Numbness/Loss of Function   Allergic Reaction to Anesthesia   Biopsies are diagnostic procedures and based on findings additional treatment or evaluation may be required   Loss or destruction of specimen resulting in no additional findings    My Dermatologist has explained to me the nature of the condition, the nature of the procedure, and the benefits to be reasonably expected compared with alternative approaches  My Dermatologist has discussed the likelihood of major risks or complications of this procedure including the specific risks listed above, such as bleeding, infection, and scarring/keloid  I understand that a scar is expected after this procedure  I understand that my physician cannot predict if the scar will form a "keloid," which extends beyond the borders of the wound that is created  A keloid is a thick, painful, and bumpy scar  A keloid can be difficult to treat, as it does not always respond well to therapy, which includes injecting cortisone directly into the keloid every few weeks  While this usually reduces the pain and size of the scar, it does not eliminate it  I understand that photographs may be taken before and after the procedure  These will be maintained as part of the medical providers confidential records and may not be made available to me  I further authorize the medical provider to use the photographs for teaching purposes or to illustrate scientific papers, books, or lectures if in his/her judgment, medical research, education, or science may benefit from its use  I have had an opportunity to fully inquire about the risks and benefits of this procedure and its alternatives  I have been given ample time and opportunity to ask questions and to seek a second opinion if I wished to do so  I acknowledge that there have specifically been no guarantees as to the cosmetic results from the procedure  I am aware that with any procedure there is always the possibility of an unexpected complication  III  POST-PROCEDURAL CARE (WHAT YOU WILL NEED TO DO "AFTER THE BIOPSY" TO OPTIMIZE HEALING)     Keep the area clean and dry  Try NOT to remove the bandage or get it wet for the first 24 hours       Gently clean the area and apply surgical ointment (such as Vaseline petrolatum ointment, which is available "over the counter" and not a prescription) to the biopsy site for up to 2 weeks straight  This acts to protect the wound from the outside world   Sutures are not usually placed in this procedure  If any sutures were placed, return for suture removal as instructed (generally 1 week for the face, 2 weeks for the body)   Take Acetaminophen (Tylenol) for discomfort, if no contraindications  Ibuprofen or aspirin could make bleeding worse   Call our office immediately for signs of infection: fever, chills, increased redness, warmth, tenderness, discomfort/pain, or pus or foul smell coming from the wound  WHAT TO DO IF THERE IS ANY BLEEDING? If a small amount of bleeding is noticed, place a clean cloth over the area and apply firm pressure for ten minutes  Check the wound after 10 minutes of direct pressure  If bleeding persists, try one more time for an additional 10 minutes of direct pressure on the area  If the bleeding becomes heavier or does not stop after the second attempt, or if you have any other questions about this procedure, then please call your [facility]'s Dermatologist by calling [phone] (SKIN)  I hereby acknowledge that I have reviewed and verified the site with my Dermatologist and have requested and authorized my Dermatologist to proceed with the procedure  Right superior helix, r/o ak vs cnh, shave obtained  Well tolerated  Will contact with final results        Scribe Attestation    I,:  Maame Reynoso am acting as a scribe while in the presence of the attending physician :       I,:  Robbin Alba MD personally performed the services described in this documentation    as scribed in my presence :

## 2021-05-06 NOTE — PATIENT INSTRUCTIONS
1  NEOPLASM OF UNCERTAIN BEHAVIOR OF SKIN    Assessment and Plan:   I have discussed with the patient that a sample of skin via a "skin biopsy would be potentially helpful to further make a specific diagnosis under the microscope   Based on a thorough discussion of this condition and the management approach to it (including a comprehensive discussion of the known risks, side effects and potential benefits of treatment), the patient (family) agrees to implement the following specific plan:    o Procedure:  Skin Biopsy  After a thorough discussion of treatment options and risk/benefits/alternatives (including but not limited to local pain, scarring, dyspigmentation, blistering, possible superinfection, and inability to confirm a diagnosis via histopathology), verbal and written consent were obtained and portion of the rash was biopsied for tissue sample  See below for consent that was obtained from patient and subsequent Procedure Note  PROCEDURE SHAVE BIOPSY NOTE:      INFORMED CONSENT DISCUSSION AND POST-OPERATIVE INSTRUCTIONS FOR PATIENT    I   RATIONALE FOR PROCEDURE  I understand that a skin biopsy allows the Dermatologist to test a lesion or rash under the microscope to obtain a diagnosis  It usually involves numbing the area with numbing medication and removing a small piece of skin; sometimes the area will be closed with sutures  In this specific procedure, sutures are not usually needed  If any sutures are placed, then they are usually need to be removed in 2 weeks or less  I understand that my Dermatologist recommends that a skin "shave" biopsy be performed today  A local anesthetic, similar to the kind that a dentist uses when filling a cavity, will be injected with a very small needle into the skin area to be sampled  The injected skin and tissue underneath "will go to sleep and become numb so no pain should be felt afterwards    An instrument shaped like a tiny "razor blade" (shave biopsy instrument) will be used to cut a small piece of tissue and skin from the area so that a sample of tissue can be taken and examined more closely under the microscope  A slight amount of bleeding will occur, but it will be stopped with direct pressure and a pressure bandage and any other appropriate methods  I understands that a scar will form where the wound was created  Surgical ointment will be applied to help protect the wound  Sutures are not usually needed  II   RISKS AND POTENTIAL COMPLICATIONS   I understand the risks and potential complications of a skin biopsy include but are not limited to the following:   Bleeding   Infection   Pain   Scar/keloid   Skin discoloration   Incomplete Removal   Recurrence   Nerve Damage/Numbness/Loss of Function   Allergic Reaction to Anesthesia   Biopsies are diagnostic procedures and based on findings additional treatment or evaluation may be required   Loss or destruction of specimen resulting in no additional findings    My Dermatologist has explained to me the nature of the condition, the nature of the procedure, and the benefits to be reasonably expected compared with alternative approaches  My Dermatologist has discussed the likelihood of major risks or complications of this procedure including the specific risks listed above, such as bleeding, infection, and scarring/keloid  I understand that a scar is expected after this procedure  I understand that my physician cannot predict if the scar will form a "keloid," which extends beyond the borders of the wound that is created  A keloid is a thick, painful, and bumpy scar  A keloid can be difficult to treat, as it does not always respond well to therapy, which includes injecting cortisone directly into the keloid every few weeks  While this usually reduces the pain and size of the scar, it does not eliminate it  I understand that photographs may be taken before and after the procedure    These will be maintained as part of the medical providers confidential records and may not be made available to me  I further authorize the medical provider to use the photographs for teaching purposes or to illustrate scientific papers, books, or lectures if in his/her judgment, medical research, education, or science may benefit from its use  I have had an opportunity to fully inquire about the risks and benefits of this procedure and its alternatives  I have been given ample time and opportunity to ask questions and to seek a second opinion if I wished to do so  I acknowledge that there have specifically been no guarantees as to the cosmetic results from the procedure  I am aware that with any procedure there is always the possibility of an unexpected complication  III  POST-PROCEDURAL CARE (WHAT YOU WILL NEED TO DO "AFTER THE BIOPSY" TO OPTIMIZE HEALING)     Keep the area clean and dry  Try NOT to remove the bandage or get it wet for the first 24 hours   Gently clean the area and apply surgical ointment (such as Vaseline petrolatum ointment, which is available "over the counter" and not a prescription) to the biopsy site for up to 2 weeks straight  This acts to protect the wound from the outside world   Sutures are not usually placed in this procedure  If any sutures were placed, return for suture removal as instructed (generally 1 week for the face, 2 weeks for the body)   Take Acetaminophen (Tylenol) for discomfort, if no contraindications  Ibuprofen or aspirin could make bleeding worse   Call our office immediately for signs of infection: fever, chills, increased redness, warmth, tenderness, discomfort/pain, or pus or foul smell coming from the wound  WHAT TO DO IF THERE IS ANY BLEEDING? If a small amount of bleeding is noticed, place a clean cloth over the area and apply firm pressure for ten minutes  Check the wound after 10 minutes of direct pressure    If bleeding persists, try one more time for an additional 10 minutes of direct pressure on the area  If the bleeding becomes heavier or does not stop after the second attempt, or if you have any other questions about this procedure, then please call your [facility] Dermatologist by calling [phone] (SKIN)  I hereby acknowledge that I have reviewed and verified the site with my Dermatologist and have requested and authorized my Dermatologist to proceed with the procedure

## 2021-05-27 ENCOUNTER — TELEPHONE (OUTPATIENT)
Dept: NEPHROLOGY | Facility: CLINIC | Age: 76
End: 2021-05-27

## 2021-05-27 DIAGNOSIS — N18.32 STAGE 3B CHRONIC KIDNEY DISEASE (HCC): Primary | ICD-10-CM

## 2021-05-27 DIAGNOSIS — R82.991 HYPOCITRATURIA: ICD-10-CM

## 2021-05-27 NOTE — TELEPHONE ENCOUNTER
----- Message from Scarlett Boas, DO sent at 5/26/2021  9:34 AM EDT -----  5/17/21 Urine volume 2 26L, citrate low at 262  Urine volume is adequate for stone prevention but low urine citrate will predispose to stone formation  Please ask the patient if he would be willing to take potassium citrate tablets  If so, would prescribe 30meq twice daily to raise citrate level  Would need BMP in 1 week after starting the citrate tablets to monitor potassium level  Thanks

## 2021-06-08 RX ORDER — POTASSIUM CITRATE 15 MEQ/1
2 TABLET, EXTENDED RELEASE ORAL 2 TIMES DAILY
Qty: 120 TABLET | Refills: 3 | Status: SHIPPED | OUTPATIENT
Start: 2021-06-08

## 2021-06-08 NOTE — TELEPHONE ENCOUNTER
Patient returned phone call and I have made him aware of recent results  Citrate level low at 262  Educated patient on the risks of low citrate level and advised to start on potassium citrate 30 meq twice daily to raise citrate level  Patient is agreeable and would like prescription to be sent to pharmacy  Patient is aware to have BMP one week after initiation

## 2021-06-11 ENCOUNTER — TELEPHONE (OUTPATIENT)
Dept: NEPHROLOGY | Facility: CLINIC | Age: 76
End: 2021-06-11

## 2021-06-11 NOTE — TELEPHONE ENCOUNTER
Patient called the office questioning when he should go for the BMP that was mailed to him  Explained to patient that he needs to go for this lab work about one week after taking his Citrate medication  Patient understood and had no additional questions

## 2021-06-16 ENCOUNTER — LAB (OUTPATIENT)
Dept: LAB | Facility: HOSPITAL | Age: 76
End: 2021-06-16
Attending: INTERNAL MEDICINE
Payer: COMMERCIAL

## 2021-06-16 DIAGNOSIS — N18.32 STAGE 3B CHRONIC KIDNEY DISEASE (HCC): ICD-10-CM

## 2021-06-16 LAB
ANION GAP SERPL CALCULATED.3IONS-SCNC: 8 MMOL/L (ref 4–13)
BUN SERPL-MCNC: 27 MG/DL (ref 5–25)
CALCIUM SERPL-MCNC: 9.2 MG/DL (ref 8.3–10.1)
CHLORIDE SERPL-SCNC: 109 MMOL/L (ref 100–108)
CO2 SERPL-SCNC: 24 MMOL/L (ref 21–32)
CREAT SERPL-MCNC: 1.37 MG/DL (ref 0.6–1.3)
GFR SERPL CREATININE-BSD FRML MDRD: 50 ML/MIN/1.73SQ M
GLUCOSE P FAST SERPL-MCNC: 102 MG/DL (ref 65–99)
POTASSIUM SERPL-SCNC: 4.5 MMOL/L (ref 3.5–5.3)
SODIUM SERPL-SCNC: 141 MMOL/L (ref 136–145)

## 2021-06-16 PROCEDURE — 36415 COLL VENOUS BLD VENIPUNCTURE: CPT

## 2021-06-16 PROCEDURE — 80048 BASIC METABOLIC PNL TOTAL CA: CPT

## 2021-06-30 DIAGNOSIS — I10 HYPERTENSION, UNSPECIFIED TYPE: ICD-10-CM

## 2021-06-30 RX ORDER — TRIAMTERENE AND HYDROCHLOROTHIAZIDE 75; 50 MG/1; MG/1
TABLET ORAL
Qty: 90 TABLET | Refills: 1 | Status: SHIPPED | OUTPATIENT
Start: 2021-06-30 | End: 2021-12-30

## 2021-07-22 ENCOUNTER — TELEPHONE (OUTPATIENT)
Dept: FAMILY MEDICINE CLINIC | Facility: HOSPITAL | Age: 76
End: 2021-07-22

## 2021-07-22 NOTE — TELEPHONE ENCOUNTER
Steroid /antifungal cream for what symptoms? Voltaren topical or PO? He CANNOT take PO d/t his CVD, if topical is wanted then for where is his pain?

## 2021-08-02 ENCOUNTER — APPOINTMENT (OUTPATIENT)
Dept: LAB | Facility: HOSPITAL | Age: 76
End: 2021-08-02
Attending: INTERNAL MEDICINE
Payer: COMMERCIAL

## 2021-08-02 DIAGNOSIS — D50.0 IRON DEFICIENCY ANEMIA DUE TO CHRONIC BLOOD LOSS: ICD-10-CM

## 2021-08-02 DIAGNOSIS — M10.00 IDIOPATHIC GOUT, UNSPECIFIED CHRONICITY, UNSPECIFIED SITE: ICD-10-CM

## 2021-08-02 DIAGNOSIS — N18.32 STAGE 3B CHRONIC KIDNEY DISEASE (HCC): ICD-10-CM

## 2021-08-02 DIAGNOSIS — N20.0 NEPHROLITHIASIS: ICD-10-CM

## 2021-08-02 LAB
ANION GAP SERPL CALCULATED.3IONS-SCNC: 5 MMOL/L (ref 4–13)
BUN SERPL-MCNC: 21 MG/DL (ref 5–25)
CALCIUM SERPL-MCNC: 9.3 MG/DL (ref 8.3–10.1)
CHLORIDE SERPL-SCNC: 107 MMOL/L (ref 100–108)
CO2 SERPL-SCNC: 26 MMOL/L (ref 21–32)
CREAT SERPL-MCNC: 1.22 MG/DL (ref 0.6–1.3)
GFR SERPL CREATININE-BSD FRML MDRD: 57 ML/MIN/1.73SQ M
GLUCOSE P FAST SERPL-MCNC: 96 MG/DL (ref 65–99)
POTASSIUM SERPL-SCNC: 4.2 MMOL/L (ref 3.5–5.3)
SODIUM SERPL-SCNC: 138 MMOL/L (ref 136–145)

## 2021-08-02 PROCEDURE — 80048 BASIC METABOLIC PNL TOTAL CA: CPT

## 2021-08-02 PROCEDURE — 36415 COLL VENOUS BLD VENIPUNCTURE: CPT

## 2021-08-17 ENCOUNTER — OFFICE VISIT (OUTPATIENT)
Dept: NEPHROLOGY | Facility: HOSPITAL | Age: 76
End: 2021-08-17
Payer: COMMERCIAL

## 2021-08-17 VITALS
BODY MASS INDEX: 32.78 KG/M2 | SYSTOLIC BLOOD PRESSURE: 102 MMHG | HEIGHT: 66 IN | WEIGHT: 204 LBS | RESPIRATION RATE: 16 BRPM | DIASTOLIC BLOOD PRESSURE: 64 MMHG | HEART RATE: 71 BPM

## 2021-08-17 DIAGNOSIS — I10 ESSENTIAL HYPERTENSION: Chronic | ICD-10-CM

## 2021-08-17 DIAGNOSIS — E66.9 OBESITY (BMI 30.0-34.9): ICD-10-CM

## 2021-08-17 DIAGNOSIS — D50.9 IRON DEFICIENCY ANEMIA, UNSPECIFIED IRON DEFICIENCY ANEMIA TYPE: ICD-10-CM

## 2021-08-17 DIAGNOSIS — M10.00 IDIOPATHIC GOUT, UNSPECIFIED CHRONICITY, UNSPECIFIED SITE: ICD-10-CM

## 2021-08-17 DIAGNOSIS — D50.0 IRON DEFICIENCY ANEMIA DUE TO CHRONIC BLOOD LOSS: ICD-10-CM

## 2021-08-17 DIAGNOSIS — N18.32 STAGE 3B CHRONIC KIDNEY DISEASE (HCC): Primary | ICD-10-CM

## 2021-08-17 PROCEDURE — 99214 OFFICE O/P EST MOD 30 MIN: CPT | Performed by: INTERNAL MEDICINE

## 2021-08-17 NOTE — PATIENT INSTRUCTIONS
1 Chronic kidney disease stage 3a likely d/t multiple episodes of contrast induced nephropathy +/- age related nephron loss +/- hypertensive nephrosclerosis +/- diuretic use  -avoid nonsteroidals(ibuprofen, aleve, advil, motrin, celebrex, naproxen, toradol, indomethacin)  -b/l sCr appears to be higher than previous at about 1 5-1 6 with last sCr 1 22 as of 8/2/21   -s/p IV dye on 12/7/20  -renal ultrasound shows left kidney 12 6cm, right kidney 9 7cm(foreshortened on imaging)  -repeat BMP every 3 months  -UA bland     2  HTN - BP well controlled on lisinopril 10mg daily, triamterene-hydrochlorothiazide 75-50mg daily, metoprolol 25mg daily     3  Mild anemia, iron deficiency - resolved on oral iron, monitor CBC     4  History of gout - on colchicine 0 6mg as needed for acute gout, last uric acid 5 6 as of June 2019     5  CAD - on aspirin, plavix, lipitor     6  History of nephrolithiasis - with obstruction in August 2020, stone analysis shows 80% calcium oxalate stone    -5/17/21 litholink showed Urine volume 2 26L, citrate low at 262  Urine volume is adequate for stone prevention but low urine citrate will predispose to stone formation  Not currently taking potassium citrate tablets 2 tabs twice daily   -obtain litholink testing-bottle to be mailed to you  -stay hydrated  Drink enough to urinate 2-2 5L/day  -avoid a high salt/sodium diet     RTC in 3 months    Please fill out medication card and bring to your next appointment to verify your current medication list at the office  Obtain blood and urine testing prior to next appt

## 2021-08-17 NOTE — PROGRESS NOTES
NEPHROLOGY OUTPATIENT PROGRESS NOTE   Pam Lou  68 y o  male MRN: [id number]  DATE: 8/17/2021  Reason for visit:   Chief Complaint   Patient presents with    Chronic Kidney Disease    Follow-up        Patient Instructions   1  Chronic kidney disease stage 3a likely d/t multiple episodes of contrast induced nephropathy +/- age related nephron loss +/- hypertensive nephrosclerosis +/- diuretic use  -avoid nonsteroidals(ibuprofen, aleve, advil, motrin, celebrex, naproxen, toradol, indomethacin)  -b/l sCr appears to be higher than previous at about 1 5-1 6 with last sCr 1 22 as of 8/2/21   -s/p IV dye on 12/7/20  -renal ultrasound shows left kidney 12 6cm, right kidney 9 7cm(foreshortened on imaging)  -repeat BMP every 3 months  -UA bland     2  HTN - BP well controlled on lisinopril 10mg daily, triamterene-hydrochlorothiazide 75-50mg daily, metoprolol 25mg daily     3  Mild anemia, iron deficiency - resolved on oral iron, monitor CBC     4  History of gout - on colchicine 0 6mg as needed for acute gout, last uric acid 5 6 as of June 2019     5  CAD - on aspirin, plavix, lipitor     6  History of nephrolithiasis - with obstruction in August 2020, stone analysis shows 80% calcium oxalate stone    -5/17/21 litholink showed Urine volume 2 26L, citrate low at 262  Urine volume is adequate for stone prevention but low urine citrate will predispose to stone formation  Not currently taking potassium citrate tablets 2 tabs twice daily   -obtain litholink testing-bottle to be mailed to you  -stay hydrated  Drink enough to urinate 2-2 5L/day  -avoid a high salt/sodium diet     RTC in 3 months    Please fill out medication card and bring to your next appointment to verify your current medication list at the office  Obtain blood and urine testing prior to next appt  Jonelleamy Hartmans was seen today for chronic kidney disease and follow-up      Diagnoses and all orders for this visit:    Stage 3b chronic kidney disease [facility])    Essential hypertension    Idiopathic gout, unspecified chronicity, unspecified site    Iron deficiency anemia due to chronic blood loss    Obesity (BMI 30 0-34 9)    Iron deficiency anemia, unspecified iron deficiency anemia type        Assessment/Plan: 1  Chronic kidney disease stage 3a likely d/t multiple episodes of contrast induced nephropathy +/- age related nephron loss +/- hypertensive nephrosclerosis +/- diuretic use  -avoid nonsteroidals(ibuprofen, aleve, advil, motrin, celebrex, naproxen, toradol, indomethacin)  -b/l sCr appears to be higher than previous at about 1 5-1 6 with last sCr 1 22 as of 8/2/21   -s/p IV dye on 12/7/20  -renal ultrasound shows left kidney 12 6cm, right kidney 9 7cm(foreshortened on imaging)  -repeat BMP every 3 months  -UA bland     2  HTN - BP well controlled on lisinopril 10mg daily, triamterene-hydrochlorothiazide 75-50mg daily, metoprolol 25mg daily     3  Mild anemia, iron deficiency - resolved on oral iron, monitor CBC     4  History of gout - on colchicine 0 6mg as needed for acute gout, last uric acid 5 6 as of June 2019     5  CAD - on aspirin, plavix, lipitor     6  History of nephrolithiasis - with obstruction in August 2020, stone analysis shows 80% calcium oxalate stone    -5/17/21 litholink showed Urine volume 2 26L, citrate low at 262  Urine volume is adequate for stone prevention but low urine citrate will predispose to stone formation  Not currently taking potassium citrate tablets 2 tabs BID  -obtain litholink testing-bottle to be mailed to you  -stay hydrated  Drink enough to urinate 2-2 5L/day  -avoid a high salt/sodium diet     RTC in 3 months    Please fill out medication card and bring to your next appointment to verify your current medication list at the office  SUBJECTIVE / INTERVAL HISTORY:  68 y o  male presents in follow up of HELIO Maria Pro  denies any recent illness/hospitalizations/medication changes since last office visit  Takes advil  No recent kidney stones  HTN - BP at home well controlled  No recent gout flares  Review of Systems   Constitutional: Negative for chills and fever  HENT: Negative for sore throat  Eyes: Negative for visual disturbance  Respiratory: Negative for cough and shortness of breath  Cardiovascular: Negative for chest pain and leg swelling  Gastrointestinal: Negative for abdominal pain, constipation, diarrhea, nausea and vomiting  Endocrine: Negative for polyuria  Genitourinary: Negative for decreased urine volume, difficulty urinating, dysuria and hematuria  Musculoskeletal: Negative for back pain and myalgias  Skin: Positive for rash (discoloration over arms)  Neurological: Negative for dizziness, light-headedness and numbness  Psychiatric/Behavioral: Negative for confusion  OBJECTIVE:  /64 (BP Location: Left arm, Patient Position: Sitting, Cuff Size: Standard)   Pulse 71   Resp 16   Ht 5' 6" (1 676 m)   Wt 92 5 kg (204 lb)   BMI 32 93 kg/m²  Body mass index is 32 93 kg/m²  Physical exam:  Physical Exam  Vitals reviewed  Constitutional:       General: He is not in acute distress  Appearance: Normal appearance  He is well-developed  He is not diaphoretic  HENT:      Head: Normocephalic and atraumatic  Nose: Nose normal       Mouth/Throat:      Mouth: Mucous membranes are moist       Pharynx: No oropharyngeal exudate  Eyes:      General: No scleral icterus  Right eye: No discharge  Left eye: No discharge  Neck:      Thyroid: No thyromegaly  Cardiovascular:      Rate and Rhythm: Normal rate and regular rhythm  Heart sounds: Normal heart sounds  Pulmonary:      Effort: Pulmonary effort is normal       Breath sounds: Normal breath sounds  No wheezing or rales  Abdominal:      General: Bowel sounds are normal  There is no distension  Palpations: Abdomen is soft  Tenderness:  There is no abdominal tenderness  Musculoskeletal:         General: No swelling  Normal range of motion  Cervical back: Neck supple  Lymphadenopathy:      Cervical: No cervical adenopathy  Skin:     General: Skin is warm and dry  Findings: No rash  Neurological:      General: No focal deficit present  Mental Status: He is alert        Comments: awake   Psychiatric:         Mood and Affect: Mood normal          Behavior: Behavior normal          Medications:    Current Outpatient Medications:     Ascorbic Acid (VITAMIN C IMMUNE HEALTH PO), Take by mouth daily, Disp: , Rfl:     aspirin (ECOTRIN LOW STRENGTH) 81 mg EC tablet, Take 81 mg by mouth every other day 1 every other day, Disp: , Rfl:     atorvastatin (LIPITOR) 40 mg tablet, Take 1 tablet (40 mg total) by mouth every evening, Disp: 90 tablet, Rfl: 3    calcium polycarbophil (FIBERCON) 625 mg tablet, Take 625 mg by mouth daily, Disp: , Rfl:     clopidogrel (PLAVIX) 75 mg tablet, TAKE 1 TABLET BY MOUTH ONCE DAILY, Disp: 90 tablet, Rfl: 1    clotrimazole-betamethasone (LOTRISONE) 1-0 05 % cream, Apply topically to affected area 2 x's a day x 10 days max, Disp: 45 g, Rfl: 0    Colchicine 0 6 MG CAPS, TAKE 1 CAPSULE BY MOUTH 4 TIMES DAILY IF NEEDED, Disp: 100 capsule, Rfl: 2    Diclofenac Sodium (VOLTAREN) 1 %, Apply 2 g topically 4 (four) times a day as needed (pain), Disp: 100 g, Rfl: 1    ferrous sulfate 324 (65 Fe) mg, Take 1 tablet (324 mg total) by mouth daily before breakfast, Disp: 30 tablet, Rfl: 2    fluticasone (FLONASE) 50 mcg/act nasal spray, 2 sprays into each nostril daily, Disp: 1 Bottle, Rfl: 5    lisinopril (ZESTRIL) 10 mg tablet, take 1 tablet by mouth once daily, Disp: 90 tablet, Rfl: 1    metoprolol succinate (TOPROL-XL) 25 mg 24 hr tablet, TAKE 1 TABLET BY MOUTH EVERY DAY, Disp: 90 tablet, Rfl: 1    Multiple Vitamin (MULTIVITAMIN) capsule, Take 1 capsule by mouth daily, Disp: , Rfl:     nitroglycerin (NITROSTAT) 0 4 mg SL tablet, Place 1 tablet (0 4 mg total) under the tongue every 5 (five) minutes as needed for chest pain, Disp: 15 tablet, Rfl: 5    omeprazole (PriLOSEC) 40 MG capsule, Take 1 capsule by mouth 30 minutes before a meal daily  , Disp: 90 capsule, Rfl: 1    triamterene-hydrochlorothiazide (MAXZIDE) 75-50 MG per tablet, take 1 tablet by mouth once daily, Disp: 90 tablet, Rfl: 1    Potassium Citrate ER 15 MEQ (1620 MG) TBCR, Take 2 tablets by mouth 2 (two) times a day (Patient not taking: Reported on 8/17/2021), Disp: 120 tablet, Rfl: 3    Allergies: Allergies as of 08/17/2021    (No Known Allergies)       The following portions of the patient's history were reviewed and updated as appropriate: past family history, past surgical history and problem list     Laboratory Results:  Lab Results   Component Value Date    SODIUM 138 08/02/2021    K 4 2 08/02/2021     08/02/2021    CO2 26 08/02/2021    BUN 21 08/02/2021    CREATININE 1 22 08/02/2021    GLUC 92 12/08/2020    CALCIUM 9 3 08/02/2021        Lab Results   Component Value Date    PTH 33 6 12/14/2020    CALCIUM 9 3 08/02/2021    PHOS 3 4 12/14/2020       Portions of the record may have been created with voice recognition software   Occasional wrong word or "sound a like" substitutions may have occurred due to the inherent limitations of voice recognition software   Read the chart carefully and recognize, using context, where substitutions have occurred

## 2021-08-18 ENCOUNTER — OFFICE VISIT (OUTPATIENT)
Dept: GASTROENTEROLOGY | Facility: MEDICAL CENTER | Age: 76
End: 2021-08-18
Payer: COMMERCIAL

## 2021-08-18 ENCOUNTER — TELEPHONE (OUTPATIENT)
Dept: GASTROENTEROLOGY | Facility: MEDICAL CENTER | Age: 76
End: 2021-08-18

## 2021-08-18 VITALS
BODY MASS INDEX: 32.93 KG/M2 | DIASTOLIC BLOOD PRESSURE: 74 MMHG | TEMPERATURE: 96.1 F | HEART RATE: 51 BPM | WEIGHT: 204 LBS | SYSTOLIC BLOOD PRESSURE: 124 MMHG

## 2021-08-18 DIAGNOSIS — N18.32 STAGE 3B CHRONIC KIDNEY DISEASE (HCC): ICD-10-CM

## 2021-08-18 DIAGNOSIS — D50.8 OTHER IRON DEFICIENCY ANEMIA: ICD-10-CM

## 2021-08-18 DIAGNOSIS — K29.00 OTHER ACUTE GASTRITIS WITHOUT HEMORRHAGE: ICD-10-CM

## 2021-08-18 DIAGNOSIS — D12.6 TUBULAR ADENOMA OF COLON: Primary | ICD-10-CM

## 2021-08-18 PROCEDURE — 99214 OFFICE O/P EST MOD 30 MIN: CPT | Performed by: INTERNAL MEDICINE

## 2021-08-18 RX ORDER — OMEPRAZOLE 20 MG/1
20 CAPSULE, DELAYED RELEASE ORAL DAILY
Qty: 90 CAPSULE | Refills: 2 | Status: SHIPPED | OUTPATIENT
Start: 2021-08-18 | End: 2022-04-14

## 2021-08-18 NOTE — TELEPHONE ENCOUNTER
BLOOD THINNER CLEARANCE    Our mutual patient is scheduled for procedure: EGD     On: 11/11/21      With: Dr Terrie Garcia    He is taking the following blood thinner:     Plavix    as  well as IRON                                       Can this be stopped 5 days prior to the procedure?        Physician Approving clearance: ________________________

## 2021-08-18 NOTE — PROGRESS NOTES
Outpatient Follow up  Sailaja Chappell  [address]  [address]  [facility] [address]  Moe Roe MD  Ph : [phone]  Fax : [phone]  Mobile : [phone]  Email : [email or web address]  Also available on [employer]    Twin Brown  68 y o  male MRN: [id number]    PCP: Donny Somers DO  Referring: No referring provider defined for this encounter  Twin Brown  presented for a follow up visit  My recommendations are included  Please do not hesitate to contact me with any questions you may have  ASSESSMENT AND PLAN:      No problem-specific Assessment & Plan notes found for this encounter  Diagnoses and all orders for this visit:    Tubular adenoma of colon    Other iron deficiency anemia  -     CBC and differential; Future  -     Iron Panel (Includes Ferritin, Iron Sat%, Iron, and TIBC); Future  -     EGD; Future    Stage 3b chronic kidney disease ([facility] [address] )    Other acute gastritis without hemorrhage  -     omeprazole (PriLOSEC) 20 mg delayed release capsule; Take 1 capsule (20 mg total) by mouth daily  -     EGD; Future    Other orders  -     Diet NPO; Sips with meds; Standing  -     Void on call to OR; Standing  -     Insert peripheral IV; Standing         1  Iron deficiency anemia due to chronic blood loss  Recent EGD and colonoscopy from February 2021 which showed a large hiatal hernia but there was no evidence of Rambo lesions  There was severe gastritis and possible healed ulcer in the stomach  This may have been responsible for his anemia  He was also recommended capsule endoscopy, but this has not been done yet  I recommend scheduling this in the next few weeks to assess for small bowel AVMs  Fortunately, he responded well to IV iron and most recent hemoglobin and iron panel is within normal limits  He should continue his oral iron supplement and follow-up with Hematology  Repeat Hb levels and iron studies     2  Chronic gastritis, presence of bleeding unspecified, unspecified gastritis type  He had gastritis with thickened fold in the hiatal hernia  Biopsies from the stomach revealed iron pill gastritis with focal ulceration  He should avoid NSAIDs  Cut down to 20 mg omeprazole  Continue since he is on Aspirin long term and had ulcerations  Repeat EGD to follow up       3  Gastroesophageal reflux disease without esophagitis  Asymptomatic at this time  He will cut down to omeprazole 20 mg daily instead      4  Tubular adenoma of colon  He is due for repeat colonoscopy in 5 years due to history of tubular adenoma      ______________________________________________________________________    SUBJECTIVE:  80-year-old gentleman with a history of chronic kidney disease and iron deficiency anemia with prior endoscopic workup (February 2021) revealing gastritis, large hiatal hernia and possibly a healed ulcer  Otherwise no source of bleeding was identified   He also has a history of coronary artery disease and is on aspirin and Plavix  His anemia is improved on iron supplementation-IV and p o  His stools are dark and harder  He takes miralax  He has not had a capsule endoscopy which was recommended on last visit  His colonoscopy did have a tubular adenoma for which a repeat colonoscopy is recommended in 5 years  He is not having any GERD symptoms and is on omeprazole 40 mg daily  He was not having symptoms prior either  REVIEW OF SYSTEMS IS OTHERWISE NEGATIVE        Historical Information   Past Medical History:   Diagnosis Date    Anemia     iron def    Arthritis     GERD (gastroesophageal reflux disease)     Gout     Hypertension     Insomnia     Kidney stone     Stroke (cerebrum) [facility])      Past Surgical History:   Procedure Laterality Date    ANKLE SURGERY Right     CARDIAC CATHETERIZATION      COLONOSCOPY  01/25/2013    polypectomy; complete - 3 yrs d/t polyp - benign submucosal lipoma- path c/w lipoma    COLONOSCOPY  2017    complete - tubular adenoma - repeat 5yrs    CYSTOSCOPY      CYSTOTOMY      bladder  w/ basket extraction of calculus    FEMUR FRACTURE SURGERY      HERNIA REPAIR      inguinal ; sliding    INGUINAL HERNIA REPAIR Right     unilateral    KNEE ARTHROSCOPY Right     w/medial menisectomy    LASIK      corneal    LITHOTRIPSY      renal    RI COLONOSCOPY FLX DX W/COLLJ SPEC WHEN PFRMD N/A 2017    Procedure: SCREENING COLONOSCOPY;  Surgeon: Carl Thibodeaux MD;  Location: [facility];  Service: General    RI THROMBOENDARTECTMY Janith Beams Right 1/10/2020    Procedure: ENDARTERECTOMY ARTERY CAROTID;  Surgeon: Irving Cintron MD;  Location: [facility];  Service: Vascular    ROTATOR CUFF REPAIR Bilateral     TONSILLECTOMY       Social History   Social History     Substance and Sexual Activity   Alcohol Use Not Currently    Comment: stopped drinking alcohol     Social History     Substance and Sexual Activity   Drug Use No     Social History     Tobacco Use   Smoking Status Former Smoker    Packs/day: 1 00    Years: 22 00    Pack years: 22 00    Quit date: 5    Years since quittin 6   Smokeless Tobacco Never Used     Family History   Problem Relation Age of Onset    Alzheimer's disease Mother     Alzheimer's disease Father     Heart disease Father         CAD    Other Father         prediabetes    Diabetes Father     Breast cancer Other     Arthritis Family     Hypertension Family        Meds/Allergies       Current Outpatient Medications:     Ascorbic Acid (VITAMIN C IMMUNE HEALTH PO)    aspirin (ECOTRIN LOW STRENGTH) 81 mg EC tablet    atorvastatin (LIPITOR) 40 mg tablet    calcium polycarbophil (FIBERCON) 625 mg tablet    clopidogrel (PLAVIX) 75 mg tablet    clotrimazole-betamethasone (LOTRISONE) 1-0 05 % cream    Colchicine 0 6 MG CAPS    Diclofenac Sodium (VOLTAREN) 1 %    ferrous sulfate 324 (65 Fe) mg    fluticasone (FLONASE) 50 mcg/act nasal spray    lisinopril (ZESTRIL) 10 mg tablet    metoprolol succinate (TOPROL-XL) 25 mg 24 hr tablet    Multiple Vitamin (MULTIVITAMIN) capsule    nitroglycerin (NITROSTAT) 0 4 mg SL tablet    Potassium Citrate ER 15 MEQ (1620 MG) TBCR    triamterene-hydrochlorothiazide (MAXZIDE) 75-50 MG per tablet    omeprazole (PriLOSEC) 20 mg delayed release capsule    No Known Allergies        Objective     Blood pressure 124/74, pulse (!) 51, temperature (!) 96 1 °F (35 6 °C), temperature source Tympanic, weight 92 5 kg (204 lb)  Body mass index is 32 93 kg/m²  PHYSICAL EXAM:      Physical Exam  Constitutional:       Appearance: Normal appearance  He is well-developed  HENT:      Head: Normocephalic and atraumatic  Eyes:      General: No scleral icterus  Conjunctiva/sclera: Conjunctivae normal       Pupils: Pupils are equal, round, and reactive to light  Cardiovascular:      Rate and Rhythm: Normal rate and regular rhythm  Heart sounds: Normal heart sounds  Pulmonary:      Effort: Pulmonary effort is normal  No respiratory distress  Breath sounds: Normal breath sounds  Abdominal:      General: Bowel sounds are normal  There is no distension  Palpations: Abdomen is soft  There is no mass  Tenderness: There is no abdominal tenderness  Hernia: No hernia is present  Musculoskeletal:         General: Normal range of motion  Cervical back: Normal range of motion  Lymphadenopathy:      Cervical: No cervical adenopathy  Skin:     General: Skin is warm  Neurological:      Mental Status: He is alert and oriented to person, place, and time  Psychiatric:         Behavior: Behavior normal          Thought Content: Thought content normal          Lab Results:   No visits with results within 1 Day(s) from this visit     Latest known visit with results is:   Appointment on 08/02/2021   Component Date Value    Sodium 08/02/2021 138     Potassium 08/02/2021 4 2  Chloride 08/02/2021 107     CO2 08/02/2021 26     ANION GAP 08/02/2021 5     BUN 08/02/2021 21     Creatinine 08/02/2021 1 22     Glucose, Fasting 08/02/2021 96     Calcium 08/02/2021 9 3     eGFR 08/02/2021 57          Radiology Results:   No results found

## 2021-08-18 NOTE — PATIENT INSTRUCTIONS
1  Repeat endoscopy  2  Avoid NSAIDs such as Advil Motrin ibuprofen  3  I would recommend to discontinue the omeprazole 40 mg daily and take omeprazole 20 mg instead  I have sent in this prescription  4  Colonoscopy in 5 years  5  Repeat blood tests  The patient is scheduled at [facility] for an EGD with Dr Tash Stewart on 11/11/2021  Prep instructions have been gone over in the office, with the patient, by the MA  The patient is aware that they will receive a call with the arrival time the day prior to procedure and that they will need a  the day of the procedure  I have asked the patient to call with any questions that they might have prior to procedure

## 2021-08-24 ENCOUNTER — OFFICE VISIT (OUTPATIENT)
Dept: FAMILY MEDICINE CLINIC | Facility: HOSPITAL | Age: 76
End: 2021-08-24
Payer: COMMERCIAL

## 2021-08-24 ENCOUNTER — TELEPHONE (OUTPATIENT)
Dept: NEPHROLOGY | Facility: CLINIC | Age: 76
End: 2021-08-24

## 2021-08-24 VITALS
HEART RATE: 64 BPM | SYSTOLIC BLOOD PRESSURE: 130 MMHG | DIASTOLIC BLOOD PRESSURE: 78 MMHG | BODY MASS INDEX: 32.66 KG/M2 | TEMPERATURE: 95.9 F | WEIGHT: 203.2 LBS | HEIGHT: 66 IN

## 2021-08-24 DIAGNOSIS — I25.10 CORONARY ARTERY DISEASE INVOLVING NATIVE CORONARY ARTERY OF NATIVE HEART WITHOUT ANGINA PECTORIS: ICD-10-CM

## 2021-08-24 DIAGNOSIS — I65.23 BILATERAL CAROTID ARTERY STENOSIS: ICD-10-CM

## 2021-08-24 DIAGNOSIS — R41.3 POOR SHORT TERM MEMORY: Primary | ICD-10-CM

## 2021-08-24 DIAGNOSIS — I63.9 CEREBROVASCULAR ACCIDENT (CVA), UNSPECIFIED MECHANISM (HCC): Chronic | ICD-10-CM

## 2021-08-24 PROCEDURE — 1160F RVW MEDS BY RX/DR IN RCRD: CPT | Performed by: INTERNAL MEDICINE

## 2021-08-24 PROCEDURE — 3078F DIAST BP <80 MM HG: CPT | Performed by: INTERNAL MEDICINE

## 2021-08-24 PROCEDURE — 1036F TOBACCO NON-USER: CPT | Performed by: INTERNAL MEDICINE

## 2021-08-24 PROCEDURE — 99214 OFFICE O/P EST MOD 30 MIN: CPT | Performed by: INTERNAL MEDICINE

## 2021-08-24 PROCEDURE — 3075F SYST BP GE 130 - 139MM HG: CPT | Performed by: INTERNAL MEDICINE

## 2021-08-24 NOTE — TELEPHONE ENCOUNTER
Patient states he is unsure if he is supposed to refill this medication, or stop due to the pharmacy not calling him for a refill  Patient stated on his last office visit 8-17 that he is not taking the medication because he is out  Would you like him to resume this medication? Please advise

## 2021-08-24 NOTE — ASSESSMENT & PLAN NOTE
No current CV symptoms, on ASA/Plavix/statin/beta blocker/ACE, will follow, call with any new/worse CV symptoms

## 2021-08-24 NOTE — PROGRESS NOTES
Assessment/Plan:    Cerebrovascular accident (CVA) ([facility] [address] )  RF modification reviewed, on ASA/Plavix/Statin, call with any new/worse Neuro symptoms    Coronary artery disease involving native coronary artery  No current CV symptoms, on ASA/Plavix/statin/beta blocker/ACE, will follow, call with any new/worse CV symptoms    Bilateral carotid artery stenosis  Has order for repeat CUS from vascular, on ASA/Plavix/statin, no longer smokes       Diagnoses and all orders for this visit:    Poor short term memory  Comments:  Reassured pt that he has no red flag Neuro symptoms, his Neuro exam was normal and his MMSE was nml at 28/30, pt asking for Aricept, indications and SE reviewed, will defer medication at this time, urged to keep mentally active with games/word searches or puzzles and reading, call with any sudden new/worse symptoms, will follow    Cerebrovascular accident (CVA), unspecified mechanism ([facility] [address] )    Coronary artery disease involving native coronary artery of native heart without angina pectoris    Bilateral carotid artery stenosis      BW 12/20    Colonoscopy 2/21    CUS 10/20    MRI brain 1/20        Subjective:      Patient ID: Pam Lou  is a 68 y o  male  HPI Pt here to discuss memory  He notes issues with STM the past year or so - more significantly since his stroke in Jan 2020  He has had cardiac issues over the past year as well  He notes forgetting things people told him or were discussed a week or so ago  He has had issues with forgetting appts and would be missing appt if not for automated appt reminder  He denies bouncing checks or missing bills  He has had issues forgetting names/birthdays/anniversaries  He has had no issues forgetting where he is going with driving  He denies any recent accidents and denies confusion with brake/gas  He denies putting things in atypical places  He denies being told he has had any personality changes  He is active during the day    He is going to look for another job that involves just driving and not heavy lifting  He notes no frequent HA's/double vision/loss of vision/slurred speech  Notes intermittent word finding difficulty  He denies focal weakness/numbness/tingling  He is ambulating w/o assistance  He denies recent falls  Review of Systems   Constitutional: Negative for chills, fatigue and fever  HENT: Negative for congestion, hearing loss and sinus pain  Eyes: Negative for pain and visual disturbance  Respiratory: Negative for cough and shortness of breath  Cardiovascular: Negative for chest pain, palpitations and leg swelling  Gastrointestinal: Negative for abdominal pain, blood in stool, constipation, diarrhea, nausea and vomiting  Endocrine: Negative for polydipsia and polyuria  Genitourinary: Negative for difficulty urinating and dysuria  Musculoskeletal: Negative for arthralgias and myalgias  Skin: Negative for rash and wound  Neurological: Negative for dizziness, speech difficulty, weakness, numbness and headaches  Hematological: Does not bruise/bleed easily  Psychiatric/Behavioral: Negative for behavioral problems and confusion  Objective:    /78   Pulse 64   Temp (!) 95 9 °F (35 5 °C) (Tympanic)   Ht 5' 6" (1 676 m)   Wt 92 2 kg (203 lb 3 2 oz)   BMI 32 80 kg/m²      Physical Exam  Constitutional:       General: He is not in acute distress  Appearance: He is well-developed  He is not ill-appearing  HENT:      Head: Normocephalic and atraumatic  Right Ear: Tympanic membrane normal  There is no impacted cerumen  Left Ear: Tympanic membrane normal  There is no impacted cerumen  Mouth/Throat:      Mouth: Mucous membranes are moist       Pharynx: Oropharynx is clear  No oropharyngeal exudate  Eyes:      General:         Right eye: No discharge  Left eye: No discharge  Extraocular Movements: Extraocular movements intact        Conjunctiva/sclera: Conjunctivae normal       Pupils: Pupils are equal, round, and reactive to light  Neck:      Vascular: No carotid bruit  Cardiovascular:      Rate and Rhythm: Normal rate and regular rhythm  Heart sounds: Murmur heard  Pulmonary:      Effort: Pulmonary effort is normal  No respiratory distress  Breath sounds: Normal breath sounds  No wheezing, rhonchi or rales  Abdominal:      General: Abdomen is flat  There is no distension  Palpations: Abdomen is soft  Tenderness: There is no abdominal tenderness  There is no guarding or rebound  Musculoskeletal:         General: No deformity or signs of injury  Comments: 5/5 global muscle strength   Lymphadenopathy:      Cervical: No cervical adenopathy  Skin:     General: Skin is warm and dry  Coloration: Skin is not pale  Findings: No rash  Neurological:      General: No focal deficit present  Mental Status: He is alert and oriented to person, place, and time  Cranial Nerves: No cranial nerve deficit  Sensory: No sensory deficit  Motor: No weakness  Coordination: Coordination normal       Gait: Gait normal       Deep Tendon Reflexes: Reflexes normal       Comments: CN II-XII intact, sensation intact to light touch globally, 2/4 patellar DTR B/L LE, nml FN and HS, neg Rhomberg   Psychiatric:         Mood and Affect: Mood normal          Behavior: Behavior normal          Thought Content:  Thought content normal          Judgment: Judgment normal        MMSE 28/30

## 2021-08-30 NOTE — TELEPHONE ENCOUNTER
Called patient to inform him of the following:   Please ask the patient to stop taking potassium citrate but should drink 1 tbsp lemon juice in 8 oz of water twice a day for kidney stone prevention  Patient verbally understood and had no further questions for me

## 2021-09-08 NOTE — PROGRESS NOTES
Hematology/Oncology Outpatient Follow- up Note  Kranthi Wills , 1945, [id number]  9/10/2021        Chief Complaint   Patient presents with    Follow-up       HPI:  Kranthi Wills  is a pleasant 26-year-old male with a significant cardiac history who is on dual antiplatelet therapy due to cardiac stent, CKD 3 who was referred to hematology for evaluation of anemia and iron deficiency  In 2020, patient's hemoglobin was low at 7 4, his ferritin was 5  He had a positive fecal occult blood test   Patient was seen for initial consultation on 2020  At that visit, his most recent labs had indicated and iron deficiency and patient was set up for IV Venofer  Patient had repeat blood work done  Prior to starting parental iron replacement which demonstrated iron saturation of 90%, total iron 426, ferritin 30  Based on these labs patient's  IV Venofer infusions were canceled and patient was recommended to continue on daily oral iron supplementation  He was referred to GI for workup which was completed in 2021 and was negative for GI blood loss  He did have severe gastritis that may have been responsible for his anemia      Previous Hematologic/ Oncologic History:    oral iron    Current Hematologic/ Oncologic Treatment:    Oral iron     ECO - Asymptomatic    Interval History:   The patient presents for routine follow up  He was last seen on 3/5/21  Most recent blood work completed on  was reviewed  His CBC and iron panel are normal  Hgb 14 5 serum iron 101, ferritin 95    Patient states he feels well  He denies any concerning symptoms today  Denies any melena or abnormal bleeding  No chest pain/palpitations  He does bruise easily as he continues on dual antiplatelet therapy  Patient remains active, he has enjoyed spending time in [place] this summer on his boat fishing/crabbing  Test Results:    Imaging: No results found      Labs:   Lab Results   Component Value Date WBC 5 92 09/09/2021    HGB 14 5 09/09/2021    HCT 43 9 09/09/2021    MCV 97 09/09/2021     09/09/2021     Lab Results   Component Value Date     05/14/2015    K 4 3 09/09/2021     09/09/2021    CO2 26 09/09/2021    ANIONGAP 10 05/14/2015    BUN 25 09/09/2021    CREATININE 1 45 (H) 09/09/2021    GLUCOSE 112 05/14/2015    GLUF 96 09/09/2021    CALCIUM 9 4 09/09/2021    AST 27 09/09/2021    ALT 30 09/09/2021    ALKPHOS 52 09/09/2021    PROT 6 9 03/28/2015    BILITOT 0 4 03/28/2015    EGFR 46 09/09/2021           Review of Systems   All other systems reviewed and are negative  Active Problems:   Patient Active Problem List   Diagnosis    Tubular adenoma of colon    Carpal tunnel syndrome    Dyslipidemia    Gout    Hypertension    Impaired fasting glucose    Insomnia    Iron deficiency anemia due to chronic blood loss    Osteoarthritis    Prolonged QT interval    Rhinitis    Other disorder of calcium metabolism    Elevated PSA    Obesity (BMI 30 0-34  9)    Internal carotid artery stenosis, right    C[facility]vascular accident ([facility]A) ([facility] [address] [facility]    Aortic valve stenosis    S/P carotid endarterectomy    Coronary artery disease involving native coronary artery    Bilateral carotid artery stenosis    Iron deficiency anemia    HELIO (acute kidney injury) ([facility] [address] )    Stage 3b chronic kidney disease ([facility] [address] )       Past Medical History:   Past Medical History:   Diagnosis Date    Anemia     iron def    Arthritis     GERD (gastroesophageal reflux disease)     Gout     Hypertension     Insomnia     Kidney stone     Stroke [facility]cerebrum[facility])        Surgical History:   Past Surgical History:   Procedure Laterality Date    ANKLE SURGERY Right     CARDIAC CATHETERIZATION      COLONOSCOPY  01/25/2013    polypectomy; complete - 3 yrs d/t polyp - benign submucosal lipoma- path c/w lipoma    COLONOSCOPY  04/25/2017    complete - tubular adenoma - repeat 5yrs    CYSTOSCOPY      CYSTOTOMY bladder  w/ basket extraction of calculus    FEMUR FRACTURE SURGERY      HERNIA REPAIR      inguinal ; sliding    INGUINAL HERNIA REPAIR Right     unilateral    KNEE ARTHROSCOPY Right     w/medial menisectomy    LASIK      corneal    LITHOTRIPSY      renal    CO COLONOSCOPY FLX DX W/COLLJ SPEC WHEN PFRMD N/A 2017    Procedure: SCREENING COLONOSCOPY;  Surgeon: Reg Faustin MD;  Location: [facility];  Service: General    CO THROMBOENDARTECTMY Cleavon So Right 1/10/2020    Procedure: ENDARTERECTOMY ARTERY CAROTID;  Surgeon: Rachel Reeves MD;  Location: [facility];  Service: Vascular    ROTATOR CUFF REPAIR Bilateral     TONSILLECTOMY         Family History:    Family History   Problem Relation Age of Onset    Alzheimer's disease Mother     Alzheimer's disease Father     Heart disease Father         CAD    Other Father         prediabetes    Diabetes Father     Breast cancer Other     Arthritis Family     Hypertension Family        Cancer-related family history includes Breast cancer in his other  Social History:   Social History     Socioeconomic History    Marital status:       Spouse name: Not on file    Number of children: 1    Years of education: Not on file    Highest education level: Not on file   Occupational History    Occupation: Retired     Comment: working full time   Tobacco Use    Smoking status: Former Smoker     Packs/day: 1 00     Years: 22 00     Pack years: 22 00     Quit date:      Years since quittin 7    Smokeless tobacco: Never Used   Vaping Use    Vaping Use: Never used   Substance and Sexual Activity    Alcohol use: Not Currently     Comment: stopped drinking alcohol    Drug use: No    Sexual activity: Not Currently   Other Topics Concern    Not on file   Social History Narrative    , lives alone, working full time     Social Determinants of Health     Financial Resource Strain:     Difficulty of Paying Living Expenses:    Food Insecurity:     Worried About Running Out of Food in the Last Year:     [address] in the Last Year:    Transportation Needs:     Lack of Transportation (Medical):      Lack of Transportation (Non-Medical):    Physical Activity:     Days of Exercise per Week:     Minutes of Exercise per Session:    Stress:     Feeling of Stress :    Social Connections:     Frequency of Communication with Friends and Family:     Frequency of Social Gatherings with Friends and Family:     Attends [employer] Services:     Active Member of Clubs or Organizations:     Attends Club or Organization Meetings:     Marital Status:    Intimate Partner Violence:     Fear of Current or Ex-Partner:     Emotionally Abused:     Physically Abused:     Sexually Abused:        Current Medications:   Current Outpatient Medications   Medication Sig Dispense Refill    Ascorbic Acid (VITAMIN C IMMUNE HEALTH PO) Take by mouth daily      aspirin (ECOTRIN LOW STRENGTH) 81 mg EC tablet Take 81 mg by mouth every other day 1 every other day      atorvastatin (LIPITOR) 40 mg tablet Take 1 tablet (40 mg total) by mouth every evening 90 tablet 3    calcium polycarbophil (FIBERCON) 625 mg tablet Take 625 mg by mouth daily      clopidogrel (PLAVIX) 75 mg tablet TAKE 1 TABLET BY MOUTH ONCE DAILY 90 tablet 1    clotrimazole-betamethasone (LOTRISONE) 1-0 05 % cream Apply topically to affected area 2 x's a day x 10 days max 45 g 0    Colchicine 0 6 MG CAPS TAKE 1 CAPSULE BY MOUTH 4 TIMES DAILY IF NEEDED 100 capsule 2    Diclofenac Sodium (VOLTAREN) 1 % Apply 2 g topically 4 (four) times a day as needed (pain) 100 g 1    ferrous sulfate 324 (65 Fe) mg Take 1 tablet (324 mg total) by mouth daily before breakfast 30 tablet 2    fluticasone (FLONASE) 50 mcg/act nasal spray 2 sprays into each nostril daily 1 Bottle 5    lisinopril (ZESTRIL) 10 mg tablet take 1 tablet by mouth once daily 90 tablet 1    metoprolol succinate (TOPROL-XL) 25 mg 24 hr tablet take 1 tablet by mouth once daily 90 tablet 1    Multiple Vitamin (MULTIVITAMIN) capsule Take 1 capsule by mouth daily      nitroglycerin (NITROSTAT) 0 4 mg SL tablet Place 1 tablet (0 4 mg total) under the tongue every 5 (five) minutes as needed for chest pain 15 tablet 5    omeprazole (PriLOSEC) 20 mg delayed release capsule Take 1 capsule (20 mg total) by mouth daily 90 capsule 2    Potassium Citrate ER 15 MEQ (1620 MG) TBCR Take 2 tablets by mouth 2 (two) times a day 120 tablet 3    triamterene-hydrochlorothiazide (MAXZIDE) 75-50 MG per tablet take 1 tablet by mouth once daily 90 tablet 1     No current facility-administered medications for this visit  Allergies: No Known Allergies    Physical Exam:  /58 (BP Location: Right arm)   Pulse 70   Temp 97 5 °F (36 4 °C) (Temporal)   Resp 16   Ht 5' 9" (1 753 m)   Wt 92 5 kg (204 lb)   SpO2 97%   BMI 30 13 kg/m²   Body surface area is 2 08 meters squared  Wt Readings from Last 3 Encounters:   09/10/21 92 5 kg (204 lb)   08/24/21 92 2 kg (203 lb 3 2 oz)   08/18/21 92 5 kg (204 lb)           Physical Exam  Constitutional:       General: He is not in acute distress  Appearance: He is well-developed  He is not diaphoretic  HENT:      Head: Normocephalic and atraumatic  Mouth/Throat:      Pharynx: No oropharyngeal exudate  Eyes:      General: No scleral icterus  Pupils: Pupils are equal, round, and reactive to light  Cardiovascular:      Rate and Rhythm: Normal rate and regular rhythm  Heart sounds: No murmur heard  Pulmonary:      Effort: Pulmonary effort is normal  No respiratory distress  Breath sounds: Normal breath sounds  Abdominal:      General: Bowel sounds are normal  There is no distension  Palpations: Abdomen is soft  There is no mass  Tenderness: There is no abdominal tenderness  Musculoskeletal:         General: Normal range of motion        Cervical back: Normal range of motion and neck supple  Lymphadenopathy:      Cervical: No cervical adenopathy  Skin:     General: Skin is warm and dry  Neurological:      General: No focal deficit present  Mental Status: He is alert and oriented to person, place, and time  Psychiatric:         Mood and Affect: Mood normal          Behavior: Behavior normal          Assessment / Plan:    1  Iron deficiency anemia due to chronic blood loss      The patient is a very pleasant 69 yo male with a significant cardiac history on dual anti-platelet therapy status post cardiac catheterization  He was found to be anemic with iron deficiency  His fecal occult blood test was positive  Patient was seen for initial consultation on 11/27/2020  At that visit, his most recent labs had indicated and iron deficiency and patient was set up for IV Venofer  Patient had repeat blood work done  Prior to starting parental iron replacement which demonstrated iron saturation of 90%, total iron 426, ferritin 30  Based on these labs patient's  IV Venofer infusions were canceled and patient was recommended to continue on daily oral iron supplementation  He was referred to GI for workup  He underwent EGD/Colonoscopy, these were negative for GI blood loss  He was noted to have severe gastritis that may have resulted in his anemia  Patient's most recent CBC and iron panel are completely normal  He feels well and denies any abnormal bleeding or concerning symptoms today  From a hematological perspective, observation is recommended  I will repeat his blood work in 9 months to include a CBC, CMP, Iron panel  I reviewed symptoms of iron deficiency anemia with him today and he is instructed to call if he develops any abnormal bleeding or symptoms and we would repeat his blood work sooner  Patient verbalized understanding  He is in agreement with plan of care  Will seen back in 9 months with repeat blood work  Barriers to care: None      Patient able to self care     Portions of the record may have been created with voice recognition software  Occasional wrong word or "sound a like" substitutions may have occurred due to the inherent limitations of voice recognition software  Read the chart carefully and recognize, using context, where substitutions have occurred

## 2021-09-09 ENCOUNTER — LAB (OUTPATIENT)
Dept: LAB | Facility: HOSPITAL | Age: 76
End: 2021-09-09
Payer: COMMERCIAL

## 2021-09-09 ENCOUNTER — TELEPHONE (OUTPATIENT)
Dept: HEMATOLOGY ONCOLOGY | Facility: CLINIC | Age: 76
End: 2021-09-09

## 2021-09-09 DIAGNOSIS — I25.10 CORONARY ARTERY DISEASE INVOLVING NATIVE CORONARY ARTERY OF NATIVE HEART WITHOUT ANGINA PECTORIS: ICD-10-CM

## 2021-09-09 DIAGNOSIS — D50.8 OTHER IRON DEFICIENCY ANEMIA: ICD-10-CM

## 2021-09-09 DIAGNOSIS — D50.0 IRON DEFICIENCY ANEMIA DUE TO CHRONIC BLOOD LOSS: ICD-10-CM

## 2021-09-09 LAB
ALBUMIN SERPL BCP-MCNC: 4 G/DL (ref 3.5–5)
ALP SERPL-CCNC: 52 U/L (ref 46–116)
ALT SERPL W P-5'-P-CCNC: 30 U/L (ref 12–78)
ANION GAP SERPL CALCULATED.3IONS-SCNC: 3 MMOL/L (ref 4–13)
AST SERPL W P-5'-P-CCNC: 27 U/L (ref 5–45)
BASOPHILS # BLD AUTO: 0.04 THOUSANDS/ΜL (ref 0–0.1)
BASOPHILS NFR BLD AUTO: 1 % (ref 0–1)
BILIRUB SERPL-MCNC: 0.5 MG/DL (ref 0.2–1)
BUN SERPL-MCNC: 25 MG/DL (ref 5–25)
CALCIUM SERPL-MCNC: 9.4 MG/DL (ref 8.3–10.1)
CHLORIDE SERPL-SCNC: 107 MMOL/L (ref 100–108)
CO2 SERPL-SCNC: 26 MMOL/L (ref 21–32)
CREAT SERPL-MCNC: 1.45 MG/DL (ref 0.6–1.3)
EOSINOPHIL # BLD AUTO: 0.28 THOUSAND/ΜL (ref 0–0.61)
EOSINOPHIL NFR BLD AUTO: 5 % (ref 0–6)
ERYTHROCYTE [DISTWIDTH] IN BLOOD BY AUTOMATED COUNT: 11.9 % (ref 11.6–15.1)
FERRITIN SERPL-MCNC: 95 NG/ML (ref 8–388)
GFR SERPL CREATININE-BSD FRML MDRD: 46 ML/MIN/1.73SQ M
GLUCOSE P FAST SERPL-MCNC: 96 MG/DL (ref 65–99)
HCT VFR BLD AUTO: 43.9 % (ref 36.5–49.3)
HGB BLD-MCNC: 14.5 G/DL (ref 12–17)
IMM GRANULOCYTES # BLD AUTO: 0.02 THOUSAND/UL (ref 0–0.2)
IMM GRANULOCYTES NFR BLD AUTO: 0 % (ref 0–2)
IRON SATN MFR SERPL: 24 % (ref 20–50)
IRON SERPL-MCNC: 101 UG/DL (ref 65–175)
LYMPHOCYTES # BLD AUTO: 1.44 THOUSANDS/ΜL (ref 0.6–4.47)
LYMPHOCYTES NFR BLD AUTO: 24 % (ref 14–44)
MCH RBC QN AUTO: 32.1 PG (ref 26.8–34.3)
MCHC RBC AUTO-ENTMCNC: 33 G/DL (ref 31.4–37.4)
MCV RBC AUTO: 97 FL (ref 82–98)
MONOCYTES # BLD AUTO: 0.42 THOUSAND/ΜL (ref 0.17–1.22)
MONOCYTES NFR BLD AUTO: 7 % (ref 4–12)
NEUTROPHILS # BLD AUTO: 3.72 THOUSANDS/ΜL (ref 1.85–7.62)
NEUTS SEG NFR BLD AUTO: 63 % (ref 43–75)
NRBC BLD AUTO-RTO: 0 /100 WBCS
PLATELET # BLD AUTO: 298 THOUSANDS/UL (ref 149–390)
PMV BLD AUTO: 9.1 FL (ref 8.9–12.7)
POTASSIUM SERPL-SCNC: 4.3 MMOL/L (ref 3.5–5.3)
PROT SERPL-MCNC: 7.4 G/DL (ref 6.4–8.2)
RBC # BLD AUTO: 4.52 MILLION/UL (ref 3.88–5.62)
SODIUM SERPL-SCNC: 136 MMOL/L (ref 136–145)
TIBC SERPL-MCNC: 425 UG/DL (ref 250–450)
WBC # BLD AUTO: 5.92 THOUSAND/UL (ref 4.31–10.16)

## 2021-09-09 PROCEDURE — 36415 COLL VENOUS BLD VENIPUNCTURE: CPT

## 2021-09-09 PROCEDURE — 83550 IRON BINDING TEST: CPT

## 2021-09-09 PROCEDURE — 85025 COMPLETE CBC W/AUTO DIFF WBC: CPT

## 2021-09-09 PROCEDURE — 82728 ASSAY OF FERRITIN: CPT

## 2021-09-09 PROCEDURE — 80053 COMPREHEN METABOLIC PANEL: CPT

## 2021-09-09 PROCEDURE — 83540 ASSAY OF IRON: CPT

## 2021-09-09 RX ORDER — METOPROLOL SUCCINATE 25 MG/1
TABLET, EXTENDED RELEASE ORAL
Qty: 90 TABLET | Refills: 1 | Status: SHIPPED | OUTPATIENT
Start: 2021-09-09 | End: 2022-02-28

## 2021-09-10 ENCOUNTER — OFFICE VISIT (OUTPATIENT)
Dept: HEMATOLOGY ONCOLOGY | Facility: HOSPITAL | Age: 76
End: 2021-09-10
Payer: COMMERCIAL

## 2021-09-10 VITALS
SYSTOLIC BLOOD PRESSURE: 102 MMHG | OXYGEN SATURATION: 97 % | HEART RATE: 70 BPM | HEIGHT: 69 IN | BODY MASS INDEX: 30.21 KG/M2 | TEMPERATURE: 97.5 F | DIASTOLIC BLOOD PRESSURE: 58 MMHG | WEIGHT: 204 LBS | RESPIRATION RATE: 16 BRPM

## 2021-09-10 DIAGNOSIS — D50.0 IRON DEFICIENCY ANEMIA DUE TO CHRONIC BLOOD LOSS: Primary | ICD-10-CM

## 2021-09-10 PROCEDURE — 99214 OFFICE O/P EST MOD 30 MIN: CPT | Performed by: NURSE PRACTITIONER

## 2021-09-10 PROCEDURE — 3078F DIAST BP <80 MM HG: CPT | Performed by: NURSE PRACTITIONER

## 2021-09-10 PROCEDURE — 3074F SYST BP LT 130 MM HG: CPT | Performed by: NURSE PRACTITIONER

## 2021-09-10 PROCEDURE — 1160F RVW MEDS BY RX/DR IN RCRD: CPT | Performed by: NURSE PRACTITIONER

## 2021-09-30 DIAGNOSIS — I65.21 STENOSIS OF RIGHT CAROTID ARTERY: ICD-10-CM

## 2021-09-30 DIAGNOSIS — E78.5 DYSLIPIDEMIA: ICD-10-CM

## 2021-09-30 RX ORDER — ATORVASTATIN CALCIUM 40 MG/1
TABLET, FILM COATED ORAL
Qty: 90 TABLET | Refills: 3 | Status: SHIPPED | OUTPATIENT
Start: 2021-09-30

## 2021-09-30 RX ORDER — LISINOPRIL 10 MG/1
TABLET ORAL
Qty: 90 TABLET | Refills: 1 | Status: SHIPPED | OUTPATIENT
Start: 2021-09-30 | End: 2022-03-27

## 2021-10-05 ENCOUNTER — OFFICE VISIT (OUTPATIENT)
Dept: FAMILY MEDICINE CLINIC | Facility: HOSPITAL | Age: 76
End: 2021-10-05
Payer: COMMERCIAL

## 2021-10-05 VITALS
HEIGHT: 69 IN | TEMPERATURE: 96.9 F | DIASTOLIC BLOOD PRESSURE: 64 MMHG | BODY MASS INDEX: 30.54 KG/M2 | SYSTOLIC BLOOD PRESSURE: 102 MMHG | HEART RATE: 60 BPM | WEIGHT: 206.2 LBS

## 2021-10-05 DIAGNOSIS — Z12.5 PROSTATE CANCER SCREENING: ICD-10-CM

## 2021-10-05 DIAGNOSIS — R73.01 IMPAIRED FASTING GLUCOSE: ICD-10-CM

## 2021-10-05 DIAGNOSIS — K59.00 CONSTIPATION, UNSPECIFIED CONSTIPATION TYPE: Primary | ICD-10-CM

## 2021-10-05 DIAGNOSIS — N18.32 STAGE 3B CHRONIC KIDNEY DISEASE (HCC): ICD-10-CM

## 2021-10-05 DIAGNOSIS — E66.9 OBESITY (BMI 30.0-34.9): ICD-10-CM

## 2021-10-05 DIAGNOSIS — Z00.00 MEDICARE ANNUAL WELLNESS VISIT, SUBSEQUENT: ICD-10-CM

## 2021-10-05 DIAGNOSIS — E78.5 DYSLIPIDEMIA: ICD-10-CM

## 2021-10-05 DIAGNOSIS — D50.8 OTHER IRON DEFICIENCY ANEMIA: ICD-10-CM

## 2021-10-05 DIAGNOSIS — N17.9 AKI (ACUTE KIDNEY INJURY) (HCC): ICD-10-CM

## 2021-10-05 DIAGNOSIS — I10 PRIMARY HYPERTENSION: ICD-10-CM

## 2021-10-05 PROCEDURE — 3288F FALL RISK ASSESSMENT DOCD: CPT | Performed by: INTERNAL MEDICINE

## 2021-10-05 PROCEDURE — G0439 PPPS, SUBSEQ VISIT: HCPCS | Performed by: INTERNAL MEDICINE

## 2021-10-05 PROCEDURE — 1125F AMNT PAIN NOTED PAIN PRSNT: CPT | Performed by: INTERNAL MEDICINE

## 2021-10-05 PROCEDURE — 99214 OFFICE O/P EST MOD 30 MIN: CPT | Performed by: INTERNAL MEDICINE

## 2021-10-05 PROCEDURE — 3725F SCREEN DEPRESSION PERFORMED: CPT | Performed by: INTERNAL MEDICINE

## 2021-10-05 PROCEDURE — 1170F FXNL STATUS ASSESSED: CPT | Performed by: INTERNAL MEDICINE

## 2021-10-05 PROCEDURE — 1101F PT FALLS ASSESS-DOCD LE1/YR: CPT | Performed by: INTERNAL MEDICINE

## 2021-10-06 ENCOUNTER — OFFICE VISIT (OUTPATIENT)
Dept: CARDIOLOGY CLINIC | Facility: CLINIC | Age: 76
End: 2021-10-06
Payer: COMMERCIAL

## 2021-10-06 VITALS
HEART RATE: 66 BPM | WEIGHT: 206.6 LBS | BODY MASS INDEX: 30.6 KG/M2 | SYSTOLIC BLOOD PRESSURE: 120 MMHG | HEIGHT: 69 IN | DIASTOLIC BLOOD PRESSURE: 70 MMHG

## 2021-10-06 DIAGNOSIS — I10 ESSENTIAL HYPERTENSION: ICD-10-CM

## 2021-10-06 DIAGNOSIS — I25.10 CORONARY ARTERY DISEASE INVOLVING NATIVE CORONARY ARTERY OF NATIVE HEART WITHOUT ANGINA PECTORIS: Primary | ICD-10-CM

## 2021-10-06 PROCEDURE — 3074F SYST BP LT 130 MM HG: CPT | Performed by: INTERNAL MEDICINE

## 2021-10-06 PROCEDURE — 1160F RVW MEDS BY RX/DR IN RCRD: CPT | Performed by: INTERNAL MEDICINE

## 2021-10-06 PROCEDURE — 99214 OFFICE O/P EST MOD 30 MIN: CPT | Performed by: INTERNAL MEDICINE

## 2021-10-06 PROCEDURE — 3078F DIAST BP <80 MM HG: CPT | Performed by: INTERNAL MEDICINE

## 2021-10-06 PROCEDURE — 1036F TOBACCO NON-USER: CPT | Performed by: INTERNAL MEDICINE

## 2021-10-08 NOTE — TELEPHONE ENCOUNTER
Left detailed message for patient to hold his Iron as well as Plavix 5 days prior to procedure and restart after completion of scope (approved by Dr Alina Lo)  Left back line phone number for patient to return call with any questions or concerns

## 2021-10-20 ENCOUNTER — TELEPHONE (OUTPATIENT)
Dept: NEPHROLOGY | Facility: HOSPITAL | Age: 76
End: 2021-10-20

## 2021-11-01 ENCOUNTER — LAB (OUTPATIENT)
Dept: LAB | Facility: HOSPITAL | Age: 76
End: 2021-11-01
Attending: INTERNAL MEDICINE
Payer: COMMERCIAL

## 2021-11-01 DIAGNOSIS — N18.32 STAGE 3B CHRONIC KIDNEY DISEASE (HCC): ICD-10-CM

## 2021-11-01 LAB
ANION GAP SERPL CALCULATED.3IONS-SCNC: 6 MMOL/L (ref 4–13)
BACTERIA UR QL AUTO: NORMAL /HPF
BILIRUB UR QL STRIP: NEGATIVE
BUN SERPL-MCNC: 16 MG/DL (ref 5–25)
CALCIUM SERPL-MCNC: 9.5 MG/DL (ref 8.3–10.1)
CHLORIDE SERPL-SCNC: 106 MMOL/L (ref 100–108)
CLARITY UR: NORMAL
CO2 SERPL-SCNC: 26 MMOL/L (ref 21–32)
COLOR UR: YELLOW
CREAT SERPL-MCNC: 1.33 MG/DL (ref 0.6–1.3)
CREAT UR-MCNC: 94.7 MG/DL
GFR SERPL CREATININE-BSD FRML MDRD: 52 ML/MIN/1.73SQ M
GLUCOSE P FAST SERPL-MCNC: 103 MG/DL (ref 65–99)
GLUCOSE UR STRIP-MCNC: NEGATIVE MG/DL
HGB UR QL STRIP.AUTO: NEGATIVE
KETONES UR STRIP-MCNC: NEGATIVE MG/DL
LEUKOCYTE ESTERASE UR QL STRIP: NEGATIVE
NITRITE UR QL STRIP: NEGATIVE
NON-SQ EPI CELLS URNS QL MICRO: NORMAL /HPF
OTHER STN SPEC: NORMAL
PH UR STRIP.AUTO: 6.5 [PH]
POTASSIUM SERPL-SCNC: 4.2 MMOL/L (ref 3.5–5.3)
PROT UR STRIP-MCNC: NEGATIVE MG/DL
PROT UR-MCNC: 15 MG/DL
PROT/CREAT UR: 0.16 MG/G{CREAT} (ref 0–0.1)
RBC #/AREA URNS AUTO: NORMAL /HPF
SODIUM SERPL-SCNC: 138 MMOL/L (ref 136–145)
SP GR UR STRIP.AUTO: 1.02 (ref 1–1.03)
UROBILINOGEN UR QL STRIP.AUTO: 0.2 E.U./DL
WBC #/AREA URNS AUTO: NORMAL /HPF

## 2021-11-01 PROCEDURE — 84156 ASSAY OF PROTEIN URINE: CPT

## 2021-11-01 PROCEDURE — 82570 ASSAY OF URINE CREATININE: CPT

## 2021-11-01 PROCEDURE — 36415 COLL VENOUS BLD VENIPUNCTURE: CPT

## 2021-11-01 PROCEDURE — 80048 BASIC METABOLIC PNL TOTAL CA: CPT

## 2021-11-01 PROCEDURE — 81001 URINALYSIS AUTO W/SCOPE: CPT

## 2021-11-04 ENCOUNTER — TELEPHONE (OUTPATIENT)
Dept: GASTROENTEROLOGY | Facility: CLINIC | Age: 76
End: 2021-11-04

## 2021-11-05 ENCOUNTER — TELEPHONE (OUTPATIENT)
Dept: GASTROENTEROLOGY | Facility: CLINIC | Age: 76
End: 2021-11-05

## 2021-11-10 ENCOUNTER — TELEPHONE (OUTPATIENT)
Dept: GASTROENTEROLOGY | Facility: HOSPITAL | Age: 76
End: 2021-11-10

## 2021-11-11 ENCOUNTER — ANESTHESIA (OUTPATIENT)
Dept: GASTROENTEROLOGY | Facility: HOSPITAL | Age: 76
End: 2021-11-11

## 2021-11-11 ENCOUNTER — ANESTHESIA EVENT (OUTPATIENT)
Dept: GASTROENTEROLOGY | Facility: HOSPITAL | Age: 76
End: 2021-11-11

## 2021-11-11 ENCOUNTER — HOSPITAL ENCOUNTER (OUTPATIENT)
Dept: GASTROENTEROLOGY | Facility: HOSPITAL | Age: 76
Setting detail: OUTPATIENT SURGERY
Discharge: HOME | End: 2021-11-11
Attending: INTERNAL MEDICINE | Admitting: INTERNAL MEDICINE
Payer: COMMERCIAL

## 2021-11-11 VITALS
DIASTOLIC BLOOD PRESSURE: 60 MMHG | HEART RATE: 56 BPM | SYSTOLIC BLOOD PRESSURE: 130 MMHG | OXYGEN SATURATION: 98 % | RESPIRATION RATE: 16 BRPM | TEMPERATURE: 96.4 F

## 2021-11-11 DIAGNOSIS — D50.8 OTHER IRON DEFICIENCY ANEMIA: ICD-10-CM

## 2021-11-11 DIAGNOSIS — K44.9 HIATAL HERNIA: Primary | ICD-10-CM

## 2021-11-11 DIAGNOSIS — K29.00 OTHER ACUTE GASTRITIS WITHOUT HEMORRHAGE: ICD-10-CM

## 2021-11-11 PROCEDURE — 43239 EGD BIOPSY SINGLE/MULTIPLE: CPT | Performed by: INTERNAL MEDICINE

## 2021-11-11 RX ORDER — PROPOFOL 10 MG/ML
INJECTION, EMULSION INTRAVENOUS AS NEEDED
Status: DISCONTINUED | OUTPATIENT
Start: 2021-11-11 | End: 2021-11-11

## 2021-11-11 RX ORDER — SODIUM CHLORIDE 9 MG/ML
INJECTION, SOLUTION INTRAVENOUS CONTINUOUS PRN
Status: DISCONTINUED | OUTPATIENT
Start: 2021-11-11 | End: 2021-11-11

## 2021-11-11 RX ADMIN — PROPOFOL 40 MG: 10 INJECTION, EMULSION INTRAVENOUS at 11:13

## 2021-11-11 RX ADMIN — PROPOFOL 40 MG: 10 INJECTION, EMULSION INTRAVENOUS at 11:17

## 2021-11-11 RX ADMIN — PROPOFOL 100 MG: 10 INJECTION, EMULSION INTRAVENOUS at 11:09

## 2021-11-11 RX ADMIN — SODIUM CHLORIDE: 0.9 INJECTION, SOLUTION INTRAVENOUS at 11:05

## 2021-11-12 ENCOUNTER — TELEPHONE (OUTPATIENT)
Dept: NEPHROLOGY | Facility: CLINIC | Age: 76
End: 2021-11-12

## 2021-11-17 ENCOUNTER — HOSPITAL ENCOUNTER (OUTPATIENT)
Dept: NON INVASIVE DIAGNOSTICS | Facility: HOSPITAL | Age: 76
Discharge: HOME | End: 2021-11-17
Payer: COMMERCIAL

## 2021-11-17 DIAGNOSIS — I65.23 BILATERAL CAROTID ARTERY STENOSIS: ICD-10-CM

## 2021-11-17 PROCEDURE — 93880 EXTRACRANIAL BILAT STUDY: CPT

## 2021-11-18 PROCEDURE — 93880 EXTRACRANIAL BILAT STUDY: CPT | Performed by: SURGERY

## 2021-11-24 ENCOUNTER — CONSULT (OUTPATIENT)
Dept: SURGERY | Facility: CLINIC | Age: 76
End: 2021-11-24
Payer: COMMERCIAL

## 2021-11-24 ENCOUNTER — TELEPHONE (OUTPATIENT)
Dept: CARDIOLOGY CLINIC | Facility: CLINIC | Age: 76
End: 2021-11-24

## 2021-11-24 VITALS
WEIGHT: 200.5 LBS | SYSTOLIC BLOOD PRESSURE: 114 MMHG | DIASTOLIC BLOOD PRESSURE: 74 MMHG | BODY MASS INDEX: 29.61 KG/M2 | HEART RATE: 70 BPM | TEMPERATURE: 96.5 F

## 2021-11-24 DIAGNOSIS — K44.9 HIATAL HERNIA: ICD-10-CM

## 2021-11-24 PROBLEM — K25.7: Status: ACTIVE | Noted: 2021-11-24

## 2021-11-24 PROCEDURE — 3078F DIAST BP <80 MM HG: CPT | Performed by: SURGERY

## 2021-11-24 PROCEDURE — 99205 OFFICE O/P NEW HI 60 MIN: CPT | Performed by: SURGERY

## 2021-11-24 PROCEDURE — 3074F SYST BP LT 130 MM HG: CPT | Performed by: SURGERY

## 2021-11-24 PROCEDURE — 1036F TOBACCO NON-USER: CPT | Performed by: SURGERY

## 2021-11-26 DIAGNOSIS — I65.21 CAROTID STENOSIS, RIGHT: ICD-10-CM

## 2021-11-26 RX ORDER — CLOPIDOGREL BISULFATE 75 MG/1
TABLET ORAL
Qty: 90 TABLET | Refills: 1 | Status: SHIPPED | OUTPATIENT
Start: 2021-11-26 | End: 2022-05-22

## 2021-12-07 ENCOUNTER — OFFICE VISIT (OUTPATIENT)
Dept: FAMILY MEDICINE CLINIC | Facility: HOSPITAL | Age: 76
End: 2021-12-07
Payer: COMMERCIAL

## 2021-12-07 VITALS
BODY MASS INDEX: 30.07 KG/M2 | SYSTOLIC BLOOD PRESSURE: 126 MMHG | TEMPERATURE: 97.4 F | OXYGEN SATURATION: 98 % | HEART RATE: 74 BPM | HEIGHT: 69 IN | WEIGHT: 203 LBS | DIASTOLIC BLOOD PRESSURE: 78 MMHG

## 2021-12-07 DIAGNOSIS — L03.116 CELLULITIS OF LEFT LOWER EXTREMITY: Primary | ICD-10-CM

## 2021-12-07 PROCEDURE — 99214 OFFICE O/P EST MOD 30 MIN: CPT | Performed by: NURSE PRACTITIONER

## 2021-12-07 RX ORDER — CEPHALEXIN 500 MG/1
500 CAPSULE ORAL EVERY 6 HOURS SCHEDULED
Qty: 40 CAPSULE | Refills: 0 | Status: SHIPPED | OUTPATIENT
Start: 2021-12-07 | End: 2021-12-13 | Stop reason: SDUPTHER

## 2021-12-13 ENCOUNTER — OFFICE VISIT (OUTPATIENT)
Dept: FAMILY MEDICINE CLINIC | Facility: HOSPITAL | Age: 76
End: 2021-12-13
Payer: COMMERCIAL

## 2021-12-13 ENCOUNTER — OFFICE VISIT (OUTPATIENT)
Dept: VASCULAR SURGERY | Facility: CLINIC | Age: 76
End: 2021-12-13
Payer: COMMERCIAL

## 2021-12-13 VITALS
WEIGHT: 204 LBS | HEART RATE: 76 BPM | DIASTOLIC BLOOD PRESSURE: 60 MMHG | HEIGHT: 69 IN | BODY MASS INDEX: 30.21 KG/M2 | SYSTOLIC BLOOD PRESSURE: 100 MMHG

## 2021-12-13 VITALS
SYSTOLIC BLOOD PRESSURE: 110 MMHG | WEIGHT: 203 LBS | DIASTOLIC BLOOD PRESSURE: 80 MMHG | TEMPERATURE: 97.2 F | BODY MASS INDEX: 29.98 KG/M2

## 2021-12-13 DIAGNOSIS — N18.32 STAGE 3B CHRONIC KIDNEY DISEASE (HCC): ICD-10-CM

## 2021-12-13 DIAGNOSIS — I10 ESSENTIAL HYPERTENSION: ICD-10-CM

## 2021-12-13 DIAGNOSIS — I65.23 BILATERAL CAROTID ARTERY STENOSIS: Primary | ICD-10-CM

## 2021-12-13 DIAGNOSIS — E78.5 DYSLIPIDEMIA: ICD-10-CM

## 2021-12-13 DIAGNOSIS — L03.116 CELLULITIS OF LEFT LOWER EXTREMITY: Primary | ICD-10-CM

## 2021-12-13 DIAGNOSIS — I25.10 CORONARY ARTERY DISEASE INVOLVING NATIVE CORONARY ARTERY OF NATIVE HEART WITHOUT ANGINA PECTORIS: ICD-10-CM

## 2021-12-13 DIAGNOSIS — E11.65 TYPE 2 DIABETES MELLITUS WITH HYPERGLYCEMIA, WITHOUT LONG-TERM CURRENT USE OF INSULIN (HCC): ICD-10-CM

## 2021-12-13 DIAGNOSIS — I10 PRIMARY HYPERTENSION: ICD-10-CM

## 2021-12-13 DIAGNOSIS — I48.92 ATRIAL FLUTTER, UNSPECIFIED TYPE (HCC): ICD-10-CM

## 2021-12-13 PROCEDURE — 1036F TOBACCO NON-USER: CPT | Performed by: NURSE PRACTITIONER

## 2021-12-13 PROCEDURE — 99213 OFFICE O/P EST LOW 20 MIN: CPT | Performed by: NURSE PRACTITIONER

## 2021-12-13 PROCEDURE — 99214 OFFICE O/P EST MOD 30 MIN: CPT | Performed by: PHYSICIAN ASSISTANT

## 2021-12-13 PROCEDURE — 3074F SYST BP LT 130 MM HG: CPT | Performed by: PHYSICIAN ASSISTANT

## 2021-12-13 PROCEDURE — 3079F DIAST BP 80-89 MM HG: CPT | Performed by: PHYSICIAN ASSISTANT

## 2021-12-13 PROCEDURE — 1160F RVW MEDS BY RX/DR IN RCRD: CPT | Performed by: NURSE PRACTITIONER

## 2021-12-13 RX ORDER — CEPHALEXIN 500 MG/1
500 CAPSULE ORAL
Qty: 40 CAPSULE | Refills: 0 | Status: SHIPPED | OUTPATIENT
Start: 2021-12-13 | End: 2021-12-23

## 2021-12-30 DIAGNOSIS — I10 HYPERTENSION, UNSPECIFIED TYPE: ICD-10-CM

## 2021-12-30 RX ORDER — TRIAMTERENE AND HYDROCHLOROTHIAZIDE 75; 50 MG/1; MG/1
TABLET ORAL
Qty: 90 TABLET | Refills: 1 | Status: SHIPPED | OUTPATIENT
Start: 2021-12-30 | End: 2022-06-21

## 2022-01-04 ENCOUNTER — OFFICE VISIT (OUTPATIENT)
Dept: FAMILY MEDICINE CLINIC | Facility: HOSPITAL | Age: 77
End: 2022-01-04
Payer: COMMERCIAL

## 2022-01-04 VITALS
BODY MASS INDEX: 30.36 KG/M2 | SYSTOLIC BLOOD PRESSURE: 126 MMHG | WEIGHT: 205 LBS | HEART RATE: 64 BPM | TEMPERATURE: 96 F | DIASTOLIC BLOOD PRESSURE: 72 MMHG | HEIGHT: 69 IN

## 2022-01-04 DIAGNOSIS — R21 RASH: Primary | ICD-10-CM

## 2022-01-04 PROCEDURE — 3078F DIAST BP <80 MM HG: CPT | Performed by: INTERNAL MEDICINE

## 2022-01-04 PROCEDURE — 3074F SYST BP LT 130 MM HG: CPT | Performed by: INTERNAL MEDICINE

## 2022-01-04 PROCEDURE — 99213 OFFICE O/P EST LOW 20 MIN: CPT | Performed by: INTERNAL MEDICINE

## 2022-01-04 NOTE — PROGRESS NOTES
Assessment/Plan:       Diagnoses and all orders for this visit:    Rash  Comments:  Reassured pt that rash c/w nearly resolving cellulitis - has finished 2 rounds of abx and region looks good, reassured itching and mild pink discolaration will gradually resolve, will get copy of recent OV from Derm, call with any relapse/worse symtpoms          Subjective:      Patient ID: Deonna Michele  is a 68 y o  male  HPI Pt here for rash    Pt has seen Genny Guzman and Trang Hackett for rash on back of LLE - OV notes reviewed  Pt was advised it was cellulitis and was tx with Keflex  He took the Keflex but had no great improvement in symptoms  He then saw Trang Hackett 12/13/21 and he was referred to derm  He saw Derm in [facility] ([facility]) and was given Doxy for presumed cellulitis  He finished the Doxy and notes symptoms are better  He notes some stretching sensation of the skin and itching  He notes no F/C/N/V/D  Review of Systems   Constitutional: Negative for chills and fever  Respiratory: Negative for cough and shortness of breath  Cardiovascular: Negative for chest pain, palpitations and leg swelling  Gastrointestinal: Negative for abdominal pain, diarrhea, nausea and vomiting  Musculoskeletal: Positive for myalgias  Negative for joint swelling  Skin: Negative for rash and wound  Hematological: Does not bruise/bleed easily  Psychiatric/Behavioral: Negative for behavioral problems and confusion  Objective:    /72   Pulse 64   Temp (!) 96 °F (35 6 °C) (Tympanic)   Ht 5' 9" (1 753 m)   Wt 93 kg (205 lb)   BMI 30 27 kg/m²      Physical Exam  Vitals reviewed  Constitutional:       General: He is not in acute distress  Appearance: He is well-developed  He is not ill-appearing  HENT:      Head: Normocephalic and atraumatic  Eyes:      General:         Right eye: No discharge  Left eye: No discharge        Conjunctiva/sclera: Conjunctivae normal    Cardiovascular: Rate and Rhythm: Normal rate and regular rhythm  Heart sounds: No murmur heard  Pulmonary:      Effort: Pulmonary effort is normal  No respiratory distress  Breath sounds: Normal breath sounds  No wheezing, rhonchi or rales  Musculoskeletal:         General: No deformity or signs of injury  Left lower leg: No edema  Skin:     General: Skin is warm and dry  Coloration: Skin is not pale  Findings: No rash  Comments: Very faint resolving pink region of skin posterior distal L calf - some mild peeling skin at periphery, no tenderness/warmth/bullae/pustules noted   Neurological:      General: No focal deficit present  Mental Status: He is alert  Gait: Gait normal    Psychiatric:         Mood and Affect: Mood normal          Behavior: Behavior normal          Thought Content:  Thought content normal          Judgment: Judgment normal

## 2022-01-17 ENCOUNTER — LAB (OUTPATIENT)
Dept: LAB | Facility: HOSPITAL | Age: 77
End: 2022-01-17
Payer: COMMERCIAL

## 2022-01-17 DIAGNOSIS — I10 PRIMARY HYPERTENSION: ICD-10-CM

## 2022-01-17 DIAGNOSIS — N18.32 STAGE 3B CHRONIC KIDNEY DISEASE (HCC): ICD-10-CM

## 2022-01-17 DIAGNOSIS — E66.9 OBESITY (BMI 30.0-34.9): ICD-10-CM

## 2022-01-17 DIAGNOSIS — R73.01 IMPAIRED FASTING GLUCOSE: ICD-10-CM

## 2022-01-17 DIAGNOSIS — I25.10 CORONARY ARTERY DISEASE INVOLVING NATIVE CORONARY ARTERY OF NATIVE HEART WITHOUT ANGINA PECTORIS: ICD-10-CM

## 2022-01-17 DIAGNOSIS — Z00.00 MEDICARE ANNUAL WELLNESS VISIT, SUBSEQUENT: ICD-10-CM

## 2022-01-17 DIAGNOSIS — E11.65 TYPE 2 DIABETES MELLITUS WITH HYPERGLYCEMIA, WITHOUT LONG-TERM CURRENT USE OF INSULIN (HCC): ICD-10-CM

## 2022-01-17 DIAGNOSIS — L03.116 CELLULITIS OF LEFT LOWER EXTREMITY: ICD-10-CM

## 2022-01-17 DIAGNOSIS — N17.9 AKI (ACUTE KIDNEY INJURY) (HCC): ICD-10-CM

## 2022-01-17 DIAGNOSIS — I48.92 ATRIAL FLUTTER, UNSPECIFIED TYPE (HCC): ICD-10-CM

## 2022-01-17 DIAGNOSIS — I10 ESSENTIAL HYPERTENSION: ICD-10-CM

## 2022-01-17 DIAGNOSIS — E78.5 DYSLIPIDEMIA: ICD-10-CM

## 2022-01-17 LAB
ALBUMIN SERPL BCP-MCNC: 4.4 G/DL (ref 3.5–5)
ALP SERPL-CCNC: 53 U/L (ref 46–116)
ALT SERPL W P-5'-P-CCNC: 28 U/L (ref 12–78)
ANION GAP SERPL CALCULATED.3IONS-SCNC: 4 MMOL/L (ref 4–13)
AST SERPL W P-5'-P-CCNC: 24 U/L (ref 5–45)
BASOPHILS # BLD AUTO: 0.04 THOUSANDS/ΜL (ref 0–0.1)
BASOPHILS NFR BLD AUTO: 1 % (ref 0–1)
BILIRUB SERPL-MCNC: 0.79 MG/DL (ref 0.2–1)
BUN SERPL-MCNC: 21 MG/DL (ref 5–25)
CALCIUM SERPL-MCNC: 9.4 MG/DL (ref 8.3–10.1)
CHLORIDE SERPL-SCNC: 109 MMOL/L (ref 100–108)
CHOLEST SERPL-MCNC: 120 MG/DL
CO2 SERPL-SCNC: 28 MMOL/L (ref 21–32)
CREAT SERPL-MCNC: 1.42 MG/DL (ref 0.6–1.3)
EOSINOPHIL # BLD AUTO: 0.56 THOUSAND/ΜL (ref 0–0.61)
EOSINOPHIL NFR BLD AUTO: 10 % (ref 0–6)
ERYTHROCYTE [DISTWIDTH] IN BLOOD BY AUTOMATED COUNT: 12.6 % (ref 11.6–15.1)
EST. AVERAGE GLUCOSE BLD GHB EST-MCNC: 108 MG/DL
GFR SERPL CREATININE-BSD FRML MDRD: 47 ML/MIN/1.73SQ M
GLUCOSE P FAST SERPL-MCNC: 93 MG/DL (ref 65–99)
HBA1C MFR BLD: 5.4 %
HCT VFR BLD AUTO: 44.6 % (ref 36.5–49.3)
HDLC SERPL-MCNC: 43 MG/DL
HGB BLD-MCNC: 14.5 G/DL (ref 12–17)
IMM GRANULOCYTES # BLD AUTO: 0.01 THOUSAND/UL (ref 0–0.2)
IMM GRANULOCYTES NFR BLD AUTO: 0 % (ref 0–2)
LDLC SERPL CALC-MCNC: 62 MG/DL (ref 0–100)
LYMPHOCYTES # BLD AUTO: 1.42 THOUSANDS/ΜL (ref 0.6–4.47)
LYMPHOCYTES NFR BLD AUTO: 24 % (ref 14–44)
MCH RBC QN AUTO: 31.5 PG (ref 26.8–34.3)
MCHC RBC AUTO-ENTMCNC: 32.5 G/DL (ref 31.4–37.4)
MCV RBC AUTO: 97 FL (ref 82–98)
MONOCYTES # BLD AUTO: 0.39 THOUSAND/ΜL (ref 0.17–1.22)
MONOCYTES NFR BLD AUTO: 7 % (ref 4–12)
NEUTROPHILS # BLD AUTO: 3.47 THOUSANDS/ΜL (ref 1.85–7.62)
NEUTS SEG NFR BLD AUTO: 58 % (ref 43–75)
NONHDLC SERPL-MCNC: 77 MG/DL
NRBC BLD AUTO-RTO: 0 /100 WBCS
PLATELET # BLD AUTO: 200 THOUSANDS/UL (ref 149–390)
PMV BLD AUTO: 9.1 FL (ref 8.9–12.7)
POTASSIUM SERPL-SCNC: 4.6 MMOL/L (ref 3.5–5.3)
PROT SERPL-MCNC: 7.4 G/DL (ref 6.4–8.2)
RBC # BLD AUTO: 4.6 MILLION/UL (ref 3.88–5.62)
SODIUM SERPL-SCNC: 141 MMOL/L (ref 136–145)
TRIGL SERPL-MCNC: 75 MG/DL
TSH SERPL DL<=0.05 MIU/L-ACNC: 1.36 UIU/ML (ref 0.36–3.74)
WBC # BLD AUTO: 5.89 THOUSAND/UL (ref 4.31–10.16)

## 2022-01-17 PROCEDURE — 83036 HEMOGLOBIN GLYCOSYLATED A1C: CPT

## 2022-01-17 PROCEDURE — 84443 ASSAY THYROID STIM HORMONE: CPT

## 2022-01-17 PROCEDURE — 80053 COMPREHEN METABOLIC PANEL: CPT

## 2022-01-17 PROCEDURE — 85025 COMPLETE CBC W/AUTO DIFF WBC: CPT

## 2022-01-17 PROCEDURE — 36415 COLL VENOUS BLD VENIPUNCTURE: CPT

## 2022-01-17 PROCEDURE — G0103 PSA SCREENING: HCPCS

## 2022-01-17 PROCEDURE — 80061 LIPID PANEL: CPT

## 2022-01-19 LAB — PSA SERPL-MCNC: 5 NG/ML (ref 0–4)

## 2022-01-20 ENCOUNTER — OFFICE VISIT (OUTPATIENT)
Dept: NEPHROLOGY | Facility: HOSPITAL | Age: 77
End: 2022-01-20
Payer: COMMERCIAL

## 2022-01-20 VITALS
DIASTOLIC BLOOD PRESSURE: 68 MMHG | SYSTOLIC BLOOD PRESSURE: 135 MMHG | HEART RATE: 66 BPM | BODY MASS INDEX: 30.63 KG/M2 | HEIGHT: 69 IN | WEIGHT: 206.8 LBS

## 2022-01-20 DIAGNOSIS — R97.20 ELEVATED PSA: ICD-10-CM

## 2022-01-20 DIAGNOSIS — I10 PRIMARY HYPERTENSION: Chronic | ICD-10-CM

## 2022-01-20 DIAGNOSIS — M10.00 IDIOPATHIC GOUT, UNSPECIFIED CHRONICITY, UNSPECIFIED SITE: ICD-10-CM

## 2022-01-20 DIAGNOSIS — N18.31 STAGE 3A CHRONIC KIDNEY DISEASE (HCC): Primary | ICD-10-CM

## 2022-01-20 DIAGNOSIS — D50.8 OTHER IRON DEFICIENCY ANEMIA: ICD-10-CM

## 2022-01-20 PROCEDURE — 99214 OFFICE O/P EST MOD 30 MIN: CPT | Performed by: INTERNAL MEDICINE

## 2022-01-20 PROCEDURE — 1160F RVW MEDS BY RX/DR IN RCRD: CPT | Performed by: INTERNAL MEDICINE

## 2022-01-20 PROCEDURE — 1036F TOBACCO NON-USER: CPT | Performed by: INTERNAL MEDICINE

## 2022-01-20 NOTE — PROGRESS NOTES
NEPHROLOGY OUTPATIENT PROGRESS NOTE   Syl Archer  68 y o  male MRN: [id number]  DATE: 1/20/2022  Reason for visit:   Chief Complaint   Patient presents with    Chronic Kidney Disease    Follow-up        Patient Instructions   1  Chronic kidney disease stage 3a likely d/t multiple episodes of contrast induced nephropathy +/- age related nephron loss +/- hypertensive nephrosclerosis +/- diuretic use  -avoid nonsteroidals(ibuprofen, aleve, advil, motrin, celebrex, naproxen, toradol, indomethacin)  -b/l sCr appears to be higher than previous at about 1 5-1 6 with last sCr 1 42 as of 1/17/22   -s/p IV dye on 12/7/20  -renal ultrasound shows left kidney 12 6cm, right kidney 9 7cm(foreshortened on imaging)  -repeat BMP every 3 months  -UA bland as of Nov 2021, UpCr 0 16g     2  HTN - BP well controlled on lisinopril 10mg daily, triamterene-hydrochlorothiazide 75-50mg daily, metoprolol 25mg daily     3  Mild anemia, iron deficiency - resolved on oral iron, monitor CBC     4  History of gout - on colchicine 0 6mg as needed for acute gout, last uric acid 5 6 as of June 2019     5  CAD - on aspirin, plavix, lipitor     6  History of nephrolithiasis - with obstruction in August 2020, stone analysis shows 80% calcium oxalate stone    -5/17/21 litholink showed Urine volume 2 26L, citrate low at 262  Urine volume is adequate for stone prevention but low urine citrate will predispose to stone formation  Not currently taking potassium citrate tablets 2 tabs twice daily  -stay hydrated  Drink enough to urinate 2-2 5L/day  -avoid a high salt/sodium diet    7  Elevated PSA level - to follow up with PCP, no urinary symptoms currently     RTC in 6 months    Obtain blood and urine testing prior to next appt        Collette Lin was seen today for chronic kidney disease and follow-up  Diagnoses and all orders for this visit:    Stage 3a chronic kidney disease ([address] )  -     Basic metabolic panel;  Future  -     Urinalysis with microscopic; Future  -     Protein / creatinine ratio, urine; Future    Primary hypertension    Elevated PSA    Idiopathic gout, unspecified chronicity, unspecified site    Other iron deficiency anemia        Assessment/Plan: 1  Chronic kidney disease stage 3a likely d/t multiple episodes of contrast induced nephropathy +/- age related nephron loss +/- hypertensive nephrosclerosis +/- diuretic use  -avoid nonsteroidals(ibuprofen, aleve, advil, motrin, celebrex, naproxen, toradol, indomethacin)  -b/l sCr appears to be higher than previous at about 1 5-1 6 with last sCr 1 42 as of 1/17/22   -s/p IV dye on 12/7/20  -renal ultrasound shows left kidney 12 6cm, right kidney 9 7cm(foreshortened on imaging)  -repeat BMP every 3 months  -UA bland as of Nov 2021, UpCr 0 16g     2  HTN - BP well controlled on lisinopril 10mg daily, triamterene-hydrochlorothiazide 75-50mg daily, metoprolol 25mg daily     3  Mild anemia, iron deficiency - resolved on oral iron, monitor CBC     4  History of gout - on colchicine 0 6mg as needed for acute gout, last uric acid 5 6 as of June 2019     5  CAD - on aspirin, plavix, lipitor     6  History of nephrolithiasis - with obstruction in August 2020, stone analysis shows 80% calcium oxalate stone    -5/17/21 litholink showed Urine volume 2 26L, citrate low at 262  Urine volume is adequate for stone prevention but low urine citrate will predispose to stone formation  Not currently taking potassium citrate tablets 2 tabs twice daily  -stay hydrated  Drink enough to urinate 2-2 5L/day  -avoid a high salt/sodium diet    7  Elevated PSA level - to follow up with PCP, no urinary symptoms currently     RTC in 6 months    Obtain blood and urine testing prior to next appt  SUBJECTIVE / INTERVAL HISTORY:  68 y o  male presents in follow up of CKD  Lucy Correa  denies any recent illness/hospitalizations/medication changes since last office visit  Denies NSAID use    HTN - BP at office visits has been well controlled  Recent flare ups of gout  Denies recent kidney stones  Review of Systems   Constitutional: Negative for chills and fever  HENT: Negative for sore throat  Eyes: Negative for visual disturbance  Respiratory: Negative for cough and shortness of breath  Cardiovascular: Negative for chest pain and leg swelling  Gastrointestinal: Negative for abdominal pain, constipation, diarrhea, nausea and vomiting  Endocrine: Negative for polyuria  Genitourinary: Negative for decreased urine volume, difficulty urinating, dysuria and hematuria  Wakes once a night to urinate   Musculoskeletal: Negative for back pain and myalgias  Skin: Negative for rash  Neurological: Negative for dizziness, light-headedness and numbness  Hematological: Bruises/bleeds easily (bruises easily)  Psychiatric/Behavioral: Negative for confusion  OBJECTIVE:  /68 (BP Location: Left arm, Patient Position: Sitting, Cuff Size: Standard)   Pulse 66   Ht 5' 9" (1 753 m)   Wt 93 8 kg (206 lb 12 8 oz)   BMI 30 54 kg/m²  Body mass index is 30 54 kg/m²  Physical exam:  Physical Exam  Vitals reviewed  Constitutional:       General: He is not in acute distress  Appearance: Normal appearance  He is well-developed  He is not diaphoretic  HENT:      Head: Normocephalic and atraumatic  Mouth/Throat:      Pharynx: No oropharyngeal exudate  Eyes:      General: No scleral icterus  Right eye: No discharge  Left eye: No discharge  Neck:      Thyroid: No thyromegaly  Cardiovascular:      Rate and Rhythm: Normal rate and regular rhythm  Heart sounds: Normal heart sounds  Pulmonary:      Effort: Pulmonary effort is normal       Breath sounds: Normal breath sounds  No wheezing or rales  Abdominal:      General: Bowel sounds are normal  There is no distension  Palpations: Abdomen is soft  Tenderness: There is no abdominal tenderness  Musculoskeletal:         General: No swelling  Normal range of motion  Cervical back: Neck supple  Lymphadenopathy:      Cervical: No cervical adenopathy  Skin:     General: Skin is warm and dry  Findings: No rash  Neurological:      General: No focal deficit present  Mental Status: He is alert        Comments: awake   Psychiatric:         Mood and Affect: Mood normal          Behavior: Behavior normal          Medications:    Current Outpatient Medications:     Ascorbic Acid (VITAMIN C IMMUNE HEALTH PO), Take by mouth daily, Disp: , Rfl:     aspirin (ECOTRIN LOW STRENGTH) 81 mg EC tablet, Take 81 mg by mouth every other day 1 every other day, Disp: , Rfl:     atorvastatin (LIPITOR) 40 mg tablet, take 1 tablet by mouth every evening, Disp: 90 tablet, Rfl: 3    calcium polycarbophil (FIBERCON) 625 mg tablet, Take 625 mg by mouth daily, Disp: , Rfl:     clopidogrel (PLAVIX) 75 mg tablet, take 1 tablet by mouth once daily, Disp: 90 tablet, Rfl: 1    ferrous sulfate 324 (65 Fe) mg, Take 1 tablet (324 mg total) by mouth daily before breakfast, Disp: 30 tablet, Rfl: 2    lisinopril (ZESTRIL) 10 mg tablet, take 1 tablet by mouth once daily, Disp: 90 tablet, Rfl: 1    metoprolol succinate (TOPROL-XL) 25 mg 24 hr tablet, take 1 tablet by mouth once daily, Disp: 90 tablet, Rfl: 1    Multiple Vitamin (MULTIVITAMIN) capsule, Take 1 capsule by mouth daily, Disp: , Rfl:     nitroglycerin (NITROSTAT) 0 4 mg SL tablet, Place 1 tablet (0 4 mg total) under the tongue every 5 (five) minutes as needed for chest pain, Disp: 15 tablet, Rfl: 5    omeprazole (PriLOSEC) 20 mg delayed release capsule, Take 1 capsule (20 mg total) by mouth daily, Disp: 90 capsule, Rfl: 2    Potassium Citrate ER 15 MEQ (1620 MG) TBCR, Take 2 tablets by mouth 2 (two) times a day, Disp: 120 tablet, Rfl: 3    triamterene-hydrochlorothiazide (MAXZIDE) 75-50 MG per tablet, take 1 tablet by mouth once daily, Disp: 90 tablet, Rfl: 1    clotrimazole-betamethasone (LOTRISONE) 1-0 05 % cream, Apply topically to affected area 2 x's a day x 10 days max (Patient not taking: Reported on 12/13/2021 ), Disp: 45 g, Rfl: 0    Allergies: Allergies as of 01/20/2022    (No Known Allergies)       The following portions of the patient's history were reviewed and updated as appropriate: past family history, past surgical history and problem list     Laboratory Results:  Lab Results   Component Value Date    SODIUM 141 01/17/2022    K 4 6 01/17/2022     (H) 01/17/2022    CO2 28 01/17/2022    BUN 21 01/17/2022    CREATININE 1 42 (H) 01/17/2022    GLUC 92 12/08/2020    CALCIUM 9 4 01/17/2022        Lab Results   Component Value Date    PTH 33 6 12/14/2020    CALCIUM 9 4 01/17/2022    PHOS 3 4 12/14/2020       Portions of the record may have been created with voice recognition software   Occasional wrong word or "sound a like" substitutions may have occurred due to the inherent limitations of voice recognition software   Read the chart carefully and recognize, using context, where substitutions have occurred

## 2022-01-20 NOTE — PATIENT INSTRUCTIONS
1 Chronic kidney disease stage 3a likely d/t multiple episodes of contrast induced nephropathy +/- age related nephron loss +/- hypertensive nephrosclerosis +/- diuretic use  -avoid nonsteroidals(ibuprofen, aleve, advil, motrin, celebrex, naproxen, toradol, indomethacin)  -b/l sCr appears to be higher than previous at about 1 5-1 6 with last sCr 1 42 as of 1/17/22   -s/p IV dye on 12/7/20  -renal ultrasound shows left kidney 12 6cm, right kidney 9 7cm(foreshortened on imaging)  -repeat BMP every 3 months  -UA bland as of Nov 2021, UpCr 0 16g     2  HTN - BP well controlled on lisinopril 10mg daily, triamterene-hydrochlorothiazide 75-50mg daily, metoprolol 25mg daily     3  Mild anemia, iron deficiency - resolved on oral iron, monitor CBC     4  History of gout - on colchicine 0 6mg as needed for acute gout, last uric acid 5 6 as of June 2019     5  CAD - on aspirin, plavix, lipitor     6  History of nephrolithiasis - with obstruction in August 2020, stone analysis shows 80% calcium oxalate stone    -5/17/21 litholink showed Urine volume 2 26L, citrate low at 262  Urine volume is adequate for stone prevention but low urine citrate will predispose to stone formation  Not currently taking potassium citrate tablets 2 tabs twice daily  -stay hydrated  Drink enough to urinate 2-2 5L/day  -avoid a high salt/sodium diet    7   Elevated PSA level - to follow up with PCP, no urinary symptoms currently     RTC in 6 months    Obtain blood and urine testing prior to next appt

## 2022-02-07 ENCOUNTER — OFFICE VISIT (OUTPATIENT)
Dept: UROLOGY | Facility: HOSPITAL | Age: 77
End: 2022-02-07
Payer: COMMERCIAL

## 2022-02-07 VITALS
BODY MASS INDEX: 30.66 KG/M2 | HEART RATE: 69 BPM | OXYGEN SATURATION: 97 % | WEIGHT: 207 LBS | SYSTOLIC BLOOD PRESSURE: 128 MMHG | DIASTOLIC BLOOD PRESSURE: 62 MMHG | HEIGHT: 69 IN

## 2022-02-07 DIAGNOSIS — R97.20 ELEVATED PSA: ICD-10-CM

## 2022-02-07 PROCEDURE — 3078F DIAST BP <80 MM HG: CPT | Performed by: NURSE PRACTITIONER

## 2022-02-07 PROCEDURE — 3074F SYST BP LT 130 MM HG: CPT | Performed by: NURSE PRACTITIONER

## 2022-02-07 PROCEDURE — 1160F RVW MEDS BY RX/DR IN RCRD: CPT | Performed by: NURSE PRACTITIONER

## 2022-02-07 PROCEDURE — 1036F TOBACCO NON-USER: CPT | Performed by: NURSE PRACTITIONER

## 2022-02-07 PROCEDURE — 99203 OFFICE O/P NEW LOW 30 MIN: CPT | Performed by: NURSE PRACTITIONER

## 2022-02-07 NOTE — PROGRESS NOTES
02/07/22    Britany Quevedo Jr    [birth year]   [id number]     Assessment  1 Elevated PSA     Discussion/Plan  1 Elevated PSA    1/17/22 PSA 5 0   We reviewed causes for false elevation of PSA  Discussed MRI or biopsy if PSA remains elevated  Repeat PSA In 8 weeks from 1/17/22    Patient will return in March with repeat PSA level  All questions answered at this time  Subjective  HPI   Skip Leonides Lacey is a 68year old male with a history of nephrolithiasis and elevated PSA presents today for follow up  His PSA has varied over the years  Most recently it was elevated at 5 0 in January 2022  He had elevations in the past which returned to baseline  Trend below  He is known to our office for history of nephrolithiasis as well  He has history of obstructing ureteral stones measuring up to 7 mm and causing moderate hydronephrosis  He required ureteral stent in the 1980's  He is doing well at this time  He denies urinary symptoms, dysuria, flank pain, pelvic pain, or gross hematuria       Component      Latest Ref Rng & Units 11/17/2017 12/11/2018 6/11/2019 1/15/2020          11:18 AM  8:38 AM 11:24 AM 10:40 AM   PSA, Total      0 0 - 4 0 ng/mL 3 1 4 1 (H) 4 1 (H) 1 9     Component      Latest Ref Rng & Units 1/17/2022          12:20 PM   PSA, Total      0 0 - 4 0 ng/mL 5 0 (H)     Review of Systems - History obtained from chart review and the patient  General ROS: negative  Psychological ROS: negative  Ophthalmic ROS: negative  Endocrine ROS: negative  Breast ROS: negative for breast lumps  Respiratory ROS: no cough, shortness of breath, or wheezing  Cardiovascular ROS: no chest pain or dyspnea on exertion  Gastrointestinal ROS: no abdominal pain, change in bowel habits, or black or bloody stools  Genito-Urinary ROS: no dysuria, trouble voiding, or hematuria  Musculoskeletal ROS: negative  Neurological ROS: no TIA or stroke symptoms  Dermatological ROS: negative     Objective  Physical Exam  Vitals and nursing note reviewed  Constitutional:       General: He is awake  He is not in acute distress  Appearance: Normal appearance  He is well-developed, well-groomed and normal weight  He is not ill-appearing, toxic-appearing or diaphoretic  Pulmonary:      Effort: Pulmonary effort is normal    Abdominal:      Tenderness: There is no right CVA tenderness or left CVA tenderness  Musculoskeletal:         General: Normal range of motion  Cervical back: Normal range of motion and neck supple  Skin:     General: Skin is warm  Neurological:      General: No focal deficit present  Mental Status: He is alert and oriented to person, place, and time  Mental status is at baseline  Psychiatric:         Attention and Perception: Attention normal          Mood and Affect: Mood normal          Speech: Speech normal          Behavior: Behavior normal  Behavior is cooperative  Thought Content: Thought content normal          Cognition and Memory: Cognition normal          Judgment: Judgment normal             BLAS Martinez     I have spent 15 minutes with Patient  today in which greater than 50% of this time was spent in counseling/coordination of care regarding Prognosis, Risks and benefits of tx options, Intructions for management, Patient and family education, Importance of tx compliance, Risk factor reductions and Impressions

## 2022-02-27 DIAGNOSIS — I25.10 CORONARY ARTERY DISEASE INVOLVING NATIVE CORONARY ARTERY OF NATIVE HEART WITHOUT ANGINA PECTORIS: ICD-10-CM

## 2022-02-28 RX ORDER — METOPROLOL SUCCINATE 25 MG/1
TABLET, EXTENDED RELEASE ORAL
Qty: 90 TABLET | Refills: 1 | Status: SHIPPED | OUTPATIENT
Start: 2022-02-28

## 2022-03-07 ENCOUNTER — LAB (OUTPATIENT)
Dept: LAB | Facility: HOSPITAL | Age: 77
End: 2022-03-07
Payer: COMMERCIAL

## 2022-03-07 DIAGNOSIS — R97.20 ELEVATED PSA: ICD-10-CM

## 2022-03-07 LAB — PSA SERPL-MCNC: 4.4 NG/ML (ref 0–4)

## 2022-03-07 PROCEDURE — G0103 PSA SCREENING: HCPCS

## 2022-03-07 PROCEDURE — 36415 COLL VENOUS BLD VENIPUNCTURE: CPT

## 2022-03-14 ENCOUNTER — OFFICE VISIT (OUTPATIENT)
Dept: FAMILY MEDICINE CLINIC | Facility: HOSPITAL | Age: 77
End: 2022-03-14
Payer: COMMERCIAL

## 2022-03-14 ENCOUNTER — OFFICE VISIT (OUTPATIENT)
Dept: UROLOGY | Facility: HOSPITAL | Age: 77
End: 2022-03-14
Payer: COMMERCIAL

## 2022-03-14 VITALS
WEIGHT: 209 LBS | DIASTOLIC BLOOD PRESSURE: 70 MMHG | BODY MASS INDEX: 30.96 KG/M2 | SYSTOLIC BLOOD PRESSURE: 142 MMHG | HEIGHT: 69 IN

## 2022-03-14 VITALS
DIASTOLIC BLOOD PRESSURE: 66 MMHG | SYSTOLIC BLOOD PRESSURE: 124 MMHG | HEART RATE: 64 BPM | WEIGHT: 209 LBS | TEMPERATURE: 95.7 F | BODY MASS INDEX: 30.96 KG/M2 | HEIGHT: 69 IN

## 2022-03-14 DIAGNOSIS — E78.5 DYSLIPIDEMIA: Chronic | ICD-10-CM

## 2022-03-14 DIAGNOSIS — R97.20 ELEVATED PSA: Primary | ICD-10-CM

## 2022-03-14 DIAGNOSIS — I10 PRIMARY HYPERTENSION: Chronic | ICD-10-CM

## 2022-03-14 DIAGNOSIS — R97.20 ELEVATED PSA: ICD-10-CM

## 2022-03-14 DIAGNOSIS — I48.92 ATRIAL FLUTTER, UNSPECIFIED TYPE (HCC): ICD-10-CM

## 2022-03-14 DIAGNOSIS — N18.32 STAGE 3B CHRONIC KIDNEY DISEASE (HCC): ICD-10-CM

## 2022-03-14 DIAGNOSIS — R73.01 IMPAIRED FASTING GLUCOSE: Primary | ICD-10-CM

## 2022-03-14 DIAGNOSIS — E66.9 OBESITY (BMI 30.0-34.9): ICD-10-CM

## 2022-03-14 PROBLEM — N17.9 AKI (ACUTE KIDNEY INJURY) (HCC): Status: RESOLVED | Noted: 2020-12-15 | Resolved: 2022-03-14

## 2022-03-14 PROCEDURE — 99213 OFFICE O/P EST LOW 20 MIN: CPT | Performed by: NURSE PRACTITIONER

## 2022-03-14 PROCEDURE — 99214 OFFICE O/P EST MOD 30 MIN: CPT | Performed by: INTERNAL MEDICINE

## 2022-03-14 PROCEDURE — 3077F SYST BP >= 140 MM HG: CPT | Performed by: NURSE PRACTITIONER

## 2022-03-14 PROCEDURE — 1036F TOBACCO NON-USER: CPT | Performed by: NURSE PRACTITIONER

## 2022-03-14 PROCEDURE — 1160F RVW MEDS BY RX/DR IN RCRD: CPT | Performed by: NURSE PRACTITIONER

## 2022-03-14 PROCEDURE — 3078F DIAST BP <80 MM HG: CPT | Performed by: NURSE PRACTITIONER

## 2022-03-14 NOTE — PROGRESS NOTES
03/14/22    Carol [facility]    1945   [id number]     Assessment  1 Elevated PSA      Discussion/Plan  1 Elevated PSA               3/7/22 PSA 4 4              We reviewed causes for false elevation of PSA   mpMRI ordered      Patient will obtain MRI and return to review imaging  All questions answered at this time  Subjective  HPI   Bhavanap Emy Alonzo is a 68year old male with a history of elevated PSA presents today for follow up  His PSA has varied over the years  Most recently it was elevated at 5 0 in January 2022  Repeated result is 4 4  He has not had biopsy or MRI  He is known to our office for history of nephrolithiasis as well  He has history of obstructing ureteral stones measuring up to 7 mm and causing moderate hydronephrosis  He required ureteral stent in the 1980's for nephrolithiasis  He is doing well at this time  He denies gross hematuria, dysuria, unintentional weight loss, or pelvic pain  No family history of prostate cancer  Component      Latest Ref Rng & Units 11/17/2017 12/11/2018 6/11/2019 1/15/2020          11:18 AM  8:38 AM 11:24 AM 10:40 AM   PSA, Total      0 0 - 4 0 ng/mL 3 1 4 1 (H) 4 1 (H) 1 9     Component      Latest Ref Rng & Units 1/17/2022 3/7/2022          12:20 PM 10:37 AM   PSA, Total      0 0 - 4 0 ng/mL 5 0 (H) 4 4 (H)       Review of Systems - History obtained from chart review and the patient  General ROS: negative  Psychological ROS: negative  Endocrine ROS: negative  Breast ROS: negative for breast lumps  Respiratory ROS: negative  Cardiovascular ROS: negative  Gastrointestinal ROS: negative  Genito-Urinary ROS: no dysuria, trouble voiding, or hematuria  Musculoskeletal ROS: negative  Neurological ROS: no TIA or stroke symptoms  Dermatological ROS: negative     Objective  Physical Exam  Vitals and nursing note reviewed  Constitutional:       General: He is awake  He is not in acute distress  Appearance: Normal appearance   He is well-developed, well-groomed and normal weight  He is not ill-appearing, toxic-appearing or diaphoretic  Pulmonary:      Effort: Pulmonary effort is normal    Abdominal:      Tenderness: There is no right CVA tenderness or left CVA tenderness  Musculoskeletal:         General: Normal range of motion  Cervical back: Normal range of motion and neck supple  Skin:     General: Skin is warm  Neurological:      General: No focal deficit present  Mental Status: He is alert and oriented to person, place, and time  Mental status is at baseline  Psychiatric:         Attention and Perception: Attention normal          Mood and Affect: Mood normal          Speech: Speech normal          Behavior: Behavior normal  Behavior is cooperative  Thought Content: Thought content normal          Cognition and Memory: Cognition normal          Judgment: Judgment normal          BLAS Wild     I have spent 15 minutes with Patient  today in which greater than 50% of this time was spent in counseling/coordination of care regarding Diagnostic results, Prognosis, Risks and benefits of tx options, Intructions for management, Patient and family education, Importance of tx compliance, Risk factor reductions and Impressions

## 2022-03-14 NOTE — ASSESSMENT & PLAN NOTE
Lab Results   Component Value Date    EGFR 47 01/17/2022    EGFR 52 11/01/2021    EGFR 46 09/09/2021    CREATININE 1 42 (H) 01/17/2022    CREATININE 1 33 (H) 11/01/2021    CREATININE 1 45 (H) 09/09/2021   Stable, is following with Nephro and has order for repeat BW prior to next appt, encouraged to avoid NSAIDs and importance of BP/BS control as well as avoiding dehydration reviewed, will follow

## 2022-03-14 NOTE — PATIENT INSTRUCTIONS
Obesity   AMBULATORY CARE:   Obesity  means your body mass index (BMI) is greater than 30  Your healthcare provider will use your height and weight to measure your BMI  The risks of obesity include  many health problems, including injuries or physical disability  · Diabetes (high blood sugar level)    · High blood pressure or high cholesterol    · Heart disease    · Stroke    · Gallbladder or liver disease    · Cancer of the colon, breast, prostate, liver, or kidney    · Sleep apnea    · Arthritis or gout    Screening  is done to check for health conditions before you have signs or symptoms  If you are 28to 79years old, your blood sugar level may be checked every 3 years for signs of prediabetes or diabetes  Your healthcare provider will check your blood pressure at each visit  High blood pressure can lead to a stroke or other problems  Your provider may check for signs of heart disease, cancer, or other health problems  Seek care immediately if:   · You have a severe headache, confusion, or difficulty speaking  · You have weakness on one side of your body  · You have chest pain, sweating, or shortness of breath  Call your doctor if:   · You have symptoms of gallbladder or liver disease, such as pain in your upper abdomen  · You have knee or hip pain and discomfort while walking  · You have symptoms of diabetes, such as intense hunger and thirst, and frequent urination  · You have symptoms of sleep apnea, such as snoring or daytime sleepiness  · You have questions or concerns about your condition or care  Treatment for obesity  focuses on helping you lose weight to improve your health  Even a small decrease in BMI can reduce the risk for many health problems  Your healthcare provider will help you set a weight-loss goal   · Lifestyle changes  are the first step in treating obesity  These include making healthy food choices and getting regular physical activity   Your healthcare provider may suggest a weight-loss program that involves coaching, education, and therapy  · Medicine  may help you lose weight when it is used with a healthy foods and physical activity  · Surgery  can help you lose weight if you are very obese and have other health problems  There are several types of weight-loss surgery  Ask your healthcare provider for more information  Tips for safe weight loss:   · Set small, realistic goals  An example of a small goal is to walk for 20 minutes 5 days a week  Anther goal is to lose 5% of your body weight  · Tell friends, family members, and coworkers about your goals  and ask for their support  Ask a friend to lose weight with you, or join a weight-loss support group  · Identify foods or triggers that may cause you to overeat , and find ways to avoid them  Remove tempting high-calorie foods from your home and workplace  Place a bowl of fresh fruit on your kitchen counter  If stress causes you to eat, then find other ways to cope with stress  A counselor or therapist may be able to help you  · Keep a diary to track what you eat and drink  Also write down how many minutes of physical activity you do each day  Weigh yourself once a week and record it in your diary  Eating changes: You will need to eat 500 to 1,000 fewer calories each day than you currently eat to lose 1 to 2 pounds a week  The following changes will help you cut calories:  · Eat smaller portions  Use small plates, no larger than 9 inches in diameter  Fill your plate half full of fruits and vegetables  Measure your food using measuring cups until you know what a serving size looks like  · Eat 3 meals and 1 or 2 snacks each day  Plan your meals in advance  T[place]l [place] and eat at home most of the time  Eat slowly  Do not skip meals  Skipping meals can lead to overeating later in the day  This can make it harder for you to lose weight   Talk with a dietitian to help you make a meal plan and schedule that is right for you  · Eat fruits and vegetables at every meal   They are low in calories and high in fiber, which makes you feel full  Do not add butter, margarine, or cream sauce to vegetables  Use herbs to season steamed vegetables  · Eat less fat and fewer fried foods  Eat more baked or grilled chicken and fish  These protein sources are lower in calories and fat than red meat  Limit fast food  Dress your salads with olive oil and vinegar instead of bottled dressing  · Limit the amount of sugar you eat  Do not drink sugary beverages  Limit alcohol  Activity changes:  Physical activity is good for your body in many ways  It helps you burn calories and build strong muscles  It decreases stress and depression, and improves your mood  It can also help you sleep better  Talk to your healthcare provider before you begin an exercise program   · Exercise for at least 30 minutes 5 days a week  Start slowly  Set aside time each day for physical activity that you enjoy and that is convenient for you  It is best to do both weight training and an activity that increases your heart rate, such as walking, bicycling, or swimming  · Find ways to be more active  Do yard work and housecleaning  Walk up the stairs instead of using elevators  Spend your leisure time going to events that require walking, such as outdoor festivals or fairs  This extra physical activity can help you lose weight and keep it off  Follow up with your doctor as directed: You may need to meet with a dietitian  Write down your questions so you remember to ask them during your visits  © Copyright [employer] 2022 Information is for End User's use only and may not be sold, redistributed or otherwise used for commercial purposes  All illustrations and images included in CareNotes® are the copyrighted property of [employer]  or Jesika Hale   The above information is an  only   It is not intended as medical advice for individual conditions or treatments  Talk to your doctor, nurse or pharmacist before following any medical regimen to see if it is safe and effective for you  Heart Healthy Diet   AMBULATORY CARE:   A heart healthy diet  is an eating plan low in unhealthy fats and sodium (salt)  The plan is high in healthy fats and fiber  A heart healthy diet helps improve your cholesterol levels and lowers your risk for heart disease and stroke  A dietitian will teach you how to read and understand food labels  Heart healthy diet guidelines to follow:   · Choose foods that contain healthy fats  ? Unsaturated fats  include monounsaturated and polyunsaturated fats  Unsaturated fat is found in foods such as soybean, canola, olive, corn, and safflower oils  It is also found in soft tub margarine that is made with liquid vegetable oil  ? Omega-3 fat  is found in certain fish, such as salmon, tuna, and trout, and in walnuts and flaxseed  Eat fish high in omega-3 fats at least 2 times a week  · Get 20 to 30 grams of fiber each day  Fruits, vegetables, whole-grain foods, and legumes (cooked beans) are good sources of fiber  · Limit or do not have unhealthy fats  ? Cholesterol  is found in animal foods, such as eggs and lobster, and in dairy products made from whole milk  Limit cholesterol to less than 200 mg each day  ? Saturated fat  is found in meats, such as austin and hamburger  It is also found in chicken or turkey skin, whole milk, and butter  Limit saturated fat to less than 7% of your total daily calories  ? Trans fat  is found in packaged foods, such as potato chips and cookies  It is also in hard margarine, some fried foods, and shortening  Do not eat foods that contain trans fats  · Limit sodium as directed  You may be told to limit sodium to 2,000 to 2,300 mg each day  Choose low-sodium or no-salt-added foods  Add little or no salt to food you prepare   Use herbs and spices in place of salt  Include the following in your heart healthy plan:  Ask your dietitian or healthcare provider how many servings to have from each of the following food groups:  · Grains:      ? Whole-wheat breads, cereals, and pastas, and brown rice    ? Low-fat, low-sodium crackers and chips    · Vegetables:      ? Broccoli, green beans, green peas, and spinach    ? Collards, kale, and lima beans    ? Carrots, sweet potatoes, tomatoes, and peppers    ? Canned vegetables with no salt added    · Fruits:      ? Bananas, peaches, pears, and pineapple    ? Grapes, raisins, and dates    ? Oranges, tangerines, grapefruit, orange juice, and grapefruit juice    ? Apricots, mangoes, melons, and papaya    ? Raspberries and strawberries    ? Canned fruit with no added sugar    · Low-fat dairy:      ? Nonfat (skim) milk, 1% milk, and low-fat almond, cashew, or soy milks fortified with calcium    ? Low-fat cheese, regular or frozen yogurt, and cottage cheese    · Meats and proteins:      ? Lean cuts of beef and pork (loin, leg, round), skinless chicken and turkey    ? Legumes, soy products, egg whites, or nuts    Limit or do not include the following in your heart healthy plan:   · Unhealthy fats and oils:      ? Whole or 2% milk, cream cheese, sour cream, or cheese    ? High-fat cuts of beef (T-bone steaks, ribs), chicken or turkey with skin, and organ meats such as liver    ? Butter, stick margarine, shortening, and cooking oils such as coconut or palm oil    · Foods and liquids high in sodium:      ? Packaged foods, such as frozen dinners, cookies, macaroni and cheese, and cereals with more than 300 mg of sodium per serving    ? Vegetables with added sodium, such as instant potatoes, vegetables with added sauces, or regular canned vegetables    ? Cured or smoked meats, such as hot dogs, austin, and sausage    ?  High-sodium ketchup, barbecue sauce, salad dressing, pickles, olives, soy sauce, or miso    · Foods and liquids high in sugar:      ? Candy, cake, cookies, pies, or doughnuts    ? Soft drinks (soda), sports drinks, or sweetened tea    ? Canned or dry mixes for cakes, soups, sauces, or gravies    Other healthy heart guidelines:   · Do not smoke  Nicotine and other chemicals in cigarettes and cigars can cause lung and heart damage  Ask your healthcare provider for information if you currently smoke and need help to quit  E-cigarettes or smokeless tobacco still contain nicotine  Talk to your healthcare provider before you use these products  · Limit or do not drink alcohol as directed  Alcohol can damage your heart and raise your blood pressure  Your healthcare provider may give you specific daily and weekly limits  The general recommended limit is 1 drink a day for women 21 or older and for men 72 or older  Do not have more than 3 drinks in a day or 7 in a week  The recommended limit is 2 drinks a day for men 24to 59years of age  Do not have more than 4 drinks in a day or 14 in a week  A drink of alcohol is 12 ounces of beer, 5 ounces of wine, or 1½ ounces of liquor  · Exercise regularly  Exercise can help you maintain a healthy weight and improve your blood pressure and cholesterol levels  Regular exercise can also decrease your risk for heart problems  Ask your healthcare provider about the best exercise plan for you  Do not start an exercise program without asking your healthcare provider  Follow up with your doctor or cardiologist as directed:  Write down your questions so you remember to ask them during your visits  © Copyright [employer] 2022 Information is for End User's use only and may not be sold, redistributed or otherwise used for commercial purposes  All illustrations and images included in CareNotes® are the copyrighted property of [employer]  or [facility] Asaf Hale   The above information is an  only   It is not intended as medical advice for individual conditions or treatments  Talk to your doctor, nurse or pharmacist before following any medical regimen to see if it is safe and effective for you

## 2022-03-14 NOTE — PROGRESS NOTES
Assessment/Plan:    Impaired fasting glucose  Both FBS/A1c in nml range, healthy diet/exercise/wgt loss encouraged, con't BW annually - will order at next appt    Dyslipidemia  LDL at goal, con't current statin, diet/exercise/wgt loss encouraged, recheck FLP annually    Stage 3b chronic kidney disease ([facility] [address] )  Lab Results   Component Value Date    EGFR 47 01/17/2022    EGFR 52 11/01/2021    EGFR 46 09/09/2021    CREATININE 1 42 (H) 01/17/2022    CREATININE 1 33 (H) 11/01/2021    CREATININE 1 45 (H) 09/09/2021   Stable, is following with Nephro and has order for repeat BW prior to next appt, encouraged to avoid NSAIDs and importance of BP/BS control as well as avoiding dehydration reviewed, will follow    Elevated PSA  Repeat PSA still elevated, saw Uro today and has MRI of prostate ordered, will follow    Obesity (BMI 30 0-34  9)  Diet/exercise/wgt loss encouraged    Atrial flutter, unspecified type (HCC)  Currently NSR, on ASA/Plavix and Toprol, con't current regimen until d/w Cardio and vascular    Hypertension  BP at goal, con't current meds       Diagnoses and all orders for this visit:    Impaired fasting glucose    Dyslipidemia    Stage 3b chronic kidney disease (HCC)    Elevated PSA    Atrial flutter, unspecified type ([facility] )    Primary hypertension    Obesity (BMI 30 0-34  9)    BMI 30 0-30 9,adult      Colonoscopy 2/21    CUS 11/21    ADDENDUM: MESSAGE SENT TO DR FRASER AND FATUMA BUCKLEY - AS PER CARDIO IT IS OK TO STOP PLAVIX AND CON'T JUST ASA - MESSAGE SENT TO STAFF TO NOTIFY PT OF THIS        Subjective:      Patient ID: Ashley Posey  is a 68 y o  male  HPI Pt here for follow up appt and BW results    BW results from Jan 2022 were d/w pt in detail: FBS/A1C 93/5 4 , BUN/Cr 21/1 42 (GFR 47), rest of CMP/CBC/TSH/FLP were all wnl, PSA a bit elevated at 5 0    Def of nml vs IFG vs DM was d/w pt in detail  Diet/exercise was reviewed - wgt up 3 lbs from Oct 2021  BMI reviewed    He states he is eating more portion size but is trying for healthier options  He does not feel he has a sweet tooth  He does not feel he has a high carb diet  He states he walks a lot - above the 30 min/5 days a week  Goal FLP was d/w pt in detail  Diet/exercise reviewed as noted above  He is taking his Atorvastatin daily as directed w/o Se  He notes no stroke/TIA symptoms/CP  Pt saw Nephro (Dr Nile Schultz) in the end of Jan for f/u CKD stage 3 - OV note reviewed  Pt had no meds changed or studies ordered  Importance of BP/BS control as well as avoiding NSAIDs and dehydration also reviewed  He was told to f/u in 6 mos    Pt saw Destin Smith) in Feb and March for f/u elevated PSA - OV note reviewed  Discussed MRI of prostate vs prostate bx if PSA remains elevated  Pt had repeat PSA 3/17/22 and it was down to 4 4  He was told to f/u in March  He saw Patel Mazariegos today and was given an order for MRI of prostate  He notes no new urinary symptoms/blood in urine  He has dx of A flutter and CAD with AS  He saw Cardio (Dr Pretty Bloton) in Oct and as per note pt was told he could stop Plavix after Dec - pt has not yet done so  He notes no CP/palp/LE edema/SOB/orthopnea/PND  He is on Plavix/ASA/statin and Toprol  Review of Systems   Constitutional: Negative for chills and fever  HENT: Negative for congestion and trouble swallowing  Eyes: Negative for pain and visual disturbance  Respiratory: Negative for cough and shortness of breath  Cardiovascular: Negative for chest pain, palpitations and leg swelling  Gastrointestinal: Negative for abdominal pain, blood in stool, constipation, diarrhea, nausea and vomiting  Genitourinary: Negative for difficulty urinating, dysuria and hematuria  Musculoskeletal: Negative for arthralgias and myalgias  Skin: Negative for rash and wound  Neurological: Negative for dizziness and headaches  Hematological: Does not bruise/bleed easily     Psychiatric/Behavioral: Negative for behavioral problems and confusion  Objective:    /66   Pulse 64   Temp (!) 95 7 °F (35 4 °C) (Tympanic)   Ht 5' 9" (1 753 m)   Wt 94 8 kg (209 lb)   BMI 30 86 kg/m²      Physical Exam  Vitals and nursing note reviewed  Constitutional:       General: He is not in acute distress  Appearance: He is well-developed  He is obese  He is not ill-appearing  HENT:      Head: Normocephalic and atraumatic  Eyes:      General:         Right eye: No discharge  Left eye: No discharge  Conjunctiva/sclera: Conjunctivae normal    Neck:      Trachea: No tracheal deviation  Cardiovascular:      Rate and Rhythm: Normal rate and regular rhythm  Heart sounds: Murmur heard  Pulmonary:      Effort: Pulmonary effort is normal  No respiratory distress  Breath sounds: Normal breath sounds  No wheezing, rhonchi or rales  Abdominal:      General: There is no distension  Palpations: Abdomen is soft  Tenderness: There is no abdominal tenderness  There is no guarding or rebound  Musculoskeletal:         General: No deformity or signs of injury  Cervical back: Neck supple  Right lower leg: No edema  Left lower leg: No edema  Lymphadenopathy:      Cervical: No cervical adenopathy  Skin:     General: Skin is warm and dry  Coloration: Skin is not pale  Findings: No rash  Neurological:      General: No focal deficit present  Mental Status: He is alert  Mental status is at baseline  Motor: No abnormal muscle tone  Gait: Gait normal    Psychiatric:         Mood and Affect: Mood normal          Behavior: Behavior normal          Thought Content: Thought content normal          Judgment: Judgment normal          BMI Counseling: Body mass index is 30 86 kg/m²   The BMI is above normal  Nutrition recommendations include reducing portion sizes, 3-5 servings of fruits/vegetables daily, consuming healthier snacks, moderation in carbohydrate intake, increasing intake of lean protein and reducing intake of saturated fat and trans fat  Exercise recommendations include exercising 3-5 times per week

## 2022-03-14 NOTE — ASSESSMENT & PLAN NOTE
Both FBS/A1c in nml range, healthy diet/exercise/wgt loss encouraged, con't BW annually - will order at next appt

## 2022-03-23 ENCOUNTER — APPOINTMENT (OUTPATIENT)
Dept: LAB | Facility: HOSPITAL | Age: 77
End: 2022-03-23
Payer: COMMERCIAL

## 2022-03-23 DIAGNOSIS — R97.20 ELEVATED PSA: ICD-10-CM

## 2022-03-23 LAB
ANION GAP SERPL CALCULATED.3IONS-SCNC: 4 MMOL/L (ref 4–13)
BUN SERPL-MCNC: 23 MG/DL (ref 5–25)
CALCIUM SERPL-MCNC: 9.5 MG/DL (ref 8.3–10.1)
CHLORIDE SERPL-SCNC: 107 MMOL/L (ref 100–108)
CO2 SERPL-SCNC: 28 MMOL/L (ref 21–32)
CREAT SERPL-MCNC: 1.6 MG/DL (ref 0.6–1.3)
GFR SERPL CREATININE-BSD FRML MDRD: 40 ML/MIN/1.73SQ M
GLUCOSE P FAST SERPL-MCNC: 98 MG/DL (ref 65–99)
POTASSIUM SERPL-SCNC: 4.5 MMOL/L (ref 3.5–5.3)
SODIUM SERPL-SCNC: 139 MMOL/L (ref 136–145)

## 2022-03-23 PROCEDURE — 80048 BASIC METABOLIC PNL TOTAL CA: CPT

## 2022-03-31 ENCOUNTER — APPOINTMENT (OUTPATIENT)
Dept: LAB | Facility: HOSPITAL | Age: 77
End: 2022-03-31
Payer: COMMERCIAL

## 2022-03-31 DIAGNOSIS — N18.32 STAGE 3B CHRONIC KIDNEY DISEASE (HCC): ICD-10-CM

## 2022-03-31 LAB
ANION GAP SERPL CALCULATED.3IONS-SCNC: 6 MMOL/L (ref 4–13)
BUN SERPL-MCNC: 20 MG/DL (ref 5–25)
CALCIUM SERPL-MCNC: 9.3 MG/DL (ref 8.3–10.1)
CHLORIDE SERPL-SCNC: 106 MMOL/L (ref 100–108)
CO2 SERPL-SCNC: 27 MMOL/L (ref 21–32)
CREAT SERPL-MCNC: 1.4 MG/DL (ref 0.6–1.3)
GFR SERPL CREATININE-BSD FRML MDRD: 48 ML/MIN/1.73SQ M
GLUCOSE P FAST SERPL-MCNC: 107 MG/DL (ref 65–99)
POTASSIUM SERPL-SCNC: 3.9 MMOL/L (ref 3.5–5.3)
SODIUM SERPL-SCNC: 139 MMOL/L (ref 136–145)

## 2022-03-31 PROCEDURE — 36415 COLL VENOUS BLD VENIPUNCTURE: CPT

## 2022-03-31 PROCEDURE — 80048 BASIC METABOLIC PNL TOTAL CA: CPT

## 2022-04-01 ENCOUNTER — HOSPITAL ENCOUNTER (OUTPATIENT)
Dept: RADIOLOGY | Age: 77
Discharge: HOME | End: 2022-04-01
Payer: COMMERCIAL

## 2022-04-01 DIAGNOSIS — R97.20 ELEVATED PSA: ICD-10-CM

## 2022-04-01 PROCEDURE — A9585 GADOBUTROL INJECTION: HCPCS | Performed by: NURSE PRACTITIONER

## 2022-04-01 PROCEDURE — G1004 CDSM NDSC: HCPCS

## 2022-04-01 PROCEDURE — 72197 MRI PELVIS W/O & W/DYE: CPT

## 2022-04-01 PROCEDURE — 76377 3D RENDER W/INTRP POSTPROCES: CPT

## 2022-04-01 RX ADMIN — GADOBUTROL 9 ML: 604.72 INJECTION INTRAVENOUS at 08:44

## 2022-04-11 ENCOUNTER — TELEMEDICINE (OUTPATIENT)
Dept: UROLOGY | Facility: HOSPITAL | Age: 77
End: 2022-04-11
Payer: COMMERCIAL

## 2022-04-11 DIAGNOSIS — R97.20 ELEVATED PSA: Primary | ICD-10-CM

## 2022-04-11 PROCEDURE — 99213 OFFICE O/P EST LOW 20 MIN: CPT | Performed by: NURSE PRACTITIONER

## 2022-04-11 NOTE — PROGRESS NOTES
04/11/22    Eddi Flores    1945   [id number]     Virtual Brief Visit     Assessment  1 Elevated PSA      Discussion/Plan  1 Elevated PSA               3/7/22 PSA 4 4              We reviewed causes for false elevation of PSA             4/1/22  mpMRI: PI-RADSv2 1 Category 2 - Low (clinically significant cancer is unlikely to be present)  No extraprostatic tumor, seminal vesicle invasion, pelvic lymphadenopathy, or pelvic osseous metastatic disease  Calculated prostate volume of 38 8 cc      Patient will obtain PSA in 6 months and return to review  All questions answered at this time      Subjective  HPI   Bhavanap Bryan Anna is a 68year old male with a history of elevated PSA presents today for follow up to review MRI  His PSA has varied over the years  Most recently it was elevated at 5 0 in January 2022  Repeated result is 4 4  He has not had biopsy or MRI  MRI was obtained last week and showed PIRADS-2 score with 38 8 cc volume prostate  He has no complaints and minimal symptoms  He is known to our office for history of nephrolithiasis as well  He has history of obstructing ureteral stones measuring up to 7 mm and causing moderate hydronephrosis  He required ureteral stent in the 1980's for nephrolithiasis  He is doing well at this time  He denies gross hematuria, dysuria, unintentional weight loss, or pelvic pain  No family history of prostate cancer       Component      Latest Ref Rng & Units 11/17/2017 12/11/2018 6/11/2019 1/15/2020          11:18 AM  8:38 AM 11:24 AM 10:40 AM   PSA, Total      0 0 - 4 0 ng/mL 3 1 4 1 (H) 4 1 (H) 1 9     Component      Latest Ref Rng & Units 1/17/2022 3/7/2022          12:20 PM 10:37 AM   PSA, Total      0 0 - 4 0 ng/mL 5 0 (H) 4 4 (H)       Review of Systems - History obtained from chart review and the patient  General ROS: negative  Psychological ROS: negative  Endocrine ROS: negative  Respiratory ROS: no cough, shortness of breath, or wheezing  Cardiovascular ROS: no chest pain or dyspnea on exertion  Gastrointestinal ROS: no abdominal pain, change in bowel habits, or black or bloody stools  Genito-Urinary ROS: no dysuria, trouble voiding, or hematuria  Musculoskeletal ROS: negative  Neurological ROS: negative  Dermatological ROS: negative     MULTIPARAMETRIC MRI OF THE PROSTATE WITH AND WITHOUT CONTRAST-WITH 3-D POSTPROCESSING      INDICATION:   R97 20: Elevated prostate specific antigen (PSA)      COMPARISON: None     PSA LEVEL: 4 4 ng/ml  3/7/2022  PRIOR BIOPSY DATE: No prior biopsy  BIOPSY RESULTS: Not applicable      TECHNIQUE: The following pulse sequences were obtained:  Small field-of-view axial T1-weighted and multiplanar T2-weighted images; DWI axial and ADC map; large field of view axial T2 weighted images; T1w in-phase and opposed-phase axials of entire pelvis   and dynamic 3D T1w axial before and during IV contrast injection  Imaging performed on 3 0T MRI      CONTRAST:  Gadobutrol (Gadavist) 9 mL of Gadobutrol injection (SINGLE-DOSE)     TECHNICAL LIMITATIONS: None      FINDINGS:     PROSTATE:     Size: 5 5 x 3 4 x 4 0 cm = 38 8 cc  Post-biopsy hemorrhage:  None  Central gland enlargement (BPH): Mild      Focal lesions - No dominant lesion  Heterogeneous peripheral zone  PI-RADSv2 1 Category 2 - Low (clinically significant cancer unlikely)      SEMINAL VESICLES: T1 hyperintense signal in the left seminal vesicle suggesting proteinaceous fluid/hematospermia  Otherwise unremarkable      Note: Clinically significant cancer is defined on pathology/histology as Devon score greater than or equal to 7, and/or volume of greater than or equal to 0 5 mL, and/or extraprostatic extension      URINARY BLADDER: Unremarkable      LYMPH NODES: No pelvic lymphadenopathy      BONES: No suspicious osseous lesion      OTHER:  Diverticulosis coli      IMPRESSION:     1  PI-RADSv2 1 Category 2 - Low (clinically significant cancer is unlikely to be present)      2   No extraprostatic tumor, seminal vesicle invasion, pelvic lymphadenopathy, or pelvic osseous metastatic disease      3  Calculated prostate volume of 38 8 cc  BLAS Conte     I have spent 15 minutes with Patient  today in which greater than 50% of this time was spent in counseling/coordination of care regarding Diagnostic results  Patient is located in the following state in which I hold an active license [place]      Assessment/Plan:    Problem List Items Addressed This Visit        Other    Elevated PSA - Primary    Relevant Orders    PSA, Total Screen          Recent Visits  No visits were found meeting these conditions  Showing recent visits within past 7 days and meeting all other requirements  Today's Visits  Date Type Provider Dept   04/11/22 Stephen Johnson 254, [address] [facility]   Showing today's visits and meeting all other requirements  Future Appointments  No visits were found meeting these conditions    Showing future appointments within next 150 days and meeting all other requirements

## 2022-04-14 DIAGNOSIS — K29.00 OTHER ACUTE GASTRITIS WITHOUT HEMORRHAGE: ICD-10-CM

## 2022-04-14 RX ORDER — OMEPRAZOLE 20 MG/1
CAPSULE, DELAYED RELEASE ORAL
Qty: 90 CAPSULE | Refills: 2 | Status: SHIPPED | OUTPATIENT
Start: 2022-04-14

## 2022-04-21 DIAGNOSIS — I65.21 STENOSIS OF RIGHT CAROTID ARTERY: ICD-10-CM

## 2022-04-21 RX ORDER — LISINOPRIL 10 MG/1
TABLET ORAL
Qty: 90 TABLET | Refills: 0 | Status: SHIPPED | OUTPATIENT
Start: 2022-04-21 | End: 2022-07-27

## 2022-04-25 DIAGNOSIS — R21 RASH: ICD-10-CM

## 2022-04-26 RX ORDER — CLOTRIMAZOLE AND BETAMETHASONE DIPROPIONATE 10; .64 MG/G; MG/G
CREAM TOPICAL
Qty: 45 G | Refills: 0 | Status: SHIPPED | OUTPATIENT
Start: 2022-04-26

## 2022-05-05 ENCOUNTER — TELEPHONE (OUTPATIENT)
Dept: HEMATOLOGY ONCOLOGY | Facility: CLINIC | Age: 77
End: 2022-05-05

## 2022-05-05 ENCOUNTER — APPOINTMENT (OUTPATIENT)
Dept: LAB | Facility: HOSPITAL | Age: 77
End: 2022-05-05
Payer: COMMERCIAL

## 2022-05-05 DIAGNOSIS — R97.20 ELEVATED PSA: ICD-10-CM

## 2022-05-05 DIAGNOSIS — D50.9 IRON DEFICIENCY ANEMIA, UNSPECIFIED IRON DEFICIENCY ANEMIA TYPE: ICD-10-CM

## 2022-05-05 DIAGNOSIS — D50.0 IRON DEFICIENCY ANEMIA DUE TO CHRONIC BLOOD LOSS: ICD-10-CM

## 2022-05-05 DIAGNOSIS — D50.0 IRON DEFICIENCY ANEMIA DUE TO CHRONIC BLOOD LOSS: Primary | ICD-10-CM

## 2022-05-05 LAB
ALBUMIN SERPL BCP-MCNC: 3.9 G/DL (ref 3.5–5)
ALP SERPL-CCNC: 47 U/L (ref 46–116)
ALT SERPL W P-5'-P-CCNC: 32 U/L (ref 12–78)
ANION GAP SERPL CALCULATED.3IONS-SCNC: 9 MMOL/L (ref 4–13)
AST SERPL W P-5'-P-CCNC: 24 U/L (ref 5–45)
BASOPHILS # BLD AUTO: 0.03 THOUSANDS/ΜL (ref 0–0.1)
BASOPHILS NFR BLD AUTO: 0 % (ref 0–1)
BILIRUB SERPL-MCNC: 0.8 MG/DL (ref 0.2–1)
BUN SERPL-MCNC: 25 MG/DL (ref 5–25)
CALCIUM SERPL-MCNC: 8.7 MG/DL (ref 8.3–10.1)
CHLORIDE SERPL-SCNC: 104 MMOL/L (ref 100–108)
CO2 SERPL-SCNC: 27 MMOL/L (ref 21–32)
CREAT SERPL-MCNC: 1.51 MG/DL (ref 0.6–1.3)
EOSINOPHIL # BLD AUTO: 0.18 THOUSAND/ΜL (ref 0–0.61)
EOSINOPHIL NFR BLD AUTO: 2 % (ref 0–6)
ERYTHROCYTE [DISTWIDTH] IN BLOOD BY AUTOMATED COUNT: 11.8 % (ref 11.6–15.1)
FERRITIN SERPL-MCNC: 44 NG/ML (ref 8–388)
GFR SERPL CREATININE-BSD FRML MDRD: 43 ML/MIN/1.73SQ M
GLUCOSE SERPL-MCNC: 95 MG/DL (ref 65–140)
HCT VFR BLD AUTO: 40.3 % (ref 36.5–49.3)
HGB BLD-MCNC: 13.6 G/DL (ref 12–17)
IMM GRANULOCYTES # BLD AUTO: 0.01 THOUSAND/UL (ref 0–0.2)
IMM GRANULOCYTES NFR BLD AUTO: 0 % (ref 0–2)
IRON SATN MFR SERPL: 31 % (ref 20–50)
IRON SERPL-MCNC: 105 UG/DL (ref 65–175)
LYMPHOCYTES # BLD AUTO: 1.53 THOUSANDS/ΜL (ref 0.6–4.47)
LYMPHOCYTES NFR BLD AUTO: 20 % (ref 14–44)
MCH RBC QN AUTO: 31.3 PG (ref 26.8–34.3)
MCHC RBC AUTO-ENTMCNC: 33.7 G/DL (ref 31.4–37.4)
MCV RBC AUTO: 93 FL (ref 82–98)
MONOCYTES # BLD AUTO: 0.52 THOUSAND/ΜL (ref 0.17–1.22)
MONOCYTES NFR BLD AUTO: 7 % (ref 4–12)
NEUTROPHILS # BLD AUTO: 5.27 THOUSANDS/ΜL (ref 1.85–7.62)
NEUTS SEG NFR BLD AUTO: 71 % (ref 43–75)
NRBC BLD AUTO-RTO: 0 /100 WBCS
PLATELET # BLD AUTO: 241 THOUSANDS/UL (ref 149–390)
PMV BLD AUTO: 9.1 FL (ref 8.9–12.7)
POTASSIUM SERPL-SCNC: 4.1 MMOL/L (ref 3.5–5.3)
PROT SERPL-MCNC: 7 G/DL (ref 6.4–8.2)
PSA SERPL-MCNC: 5 NG/ML (ref 0–4)
RBC # BLD AUTO: 4.35 MILLION/UL (ref 3.88–5.62)
SODIUM SERPL-SCNC: 140 MMOL/L (ref 136–145)
TIBC SERPL-MCNC: 338 UG/DL (ref 250–450)
WBC # BLD AUTO: 7.54 THOUSAND/UL (ref 4.31–10.16)

## 2022-05-05 PROCEDURE — 83550 IRON BINDING TEST: CPT

## 2022-05-05 PROCEDURE — 85025 COMPLETE CBC W/AUTO DIFF WBC: CPT

## 2022-05-05 PROCEDURE — G0103 PSA SCREENING: HCPCS

## 2022-05-05 PROCEDURE — 83540 ASSAY OF IRON: CPT

## 2022-05-05 PROCEDURE — 80053 COMPREHEN METABOLIC PANEL: CPT

## 2022-05-05 PROCEDURE — 36415 COLL VENOUS BLD VENIPUNCTURE: CPT

## 2022-05-05 PROCEDURE — 82728 ASSAY OF FERRITIN: CPT

## 2022-05-05 NOTE — TELEPHONE ENCOUNTER
05/05/22    Spoke with patient reminding updated labs prior to appt  I will place CBC  CMP Iron panel  Advising pt to go to any [facility] walk-in labs to get blood work done  Pt stated he will get labs done ASAP

## 2022-05-05 NOTE — PROGRESS NOTES
Hematology/Oncology Outpatient Follow- up Note  Caitlin Irvin , 1945, [id number]  2022        Chief Complaint   Patient presents with    Follow-up       HPI:  Caitlin Irvin  is a pleasant 79-year-old male with a significant cardiac history who is on dual antiplatelet therapy due to cardiac stent, CKD 3 who was referred to hematology for evaluation of anemia and iron deficiency  In 2020, patient's hemoglobin was low at 7 4, his ferritin was 5  He had a positive fecal occult blood test   Patient was seen for initial consultation on 2020  At that visit, his most recent labs had indicated and iron deficiency and patient was set up for IV Venofer  Patient had repeat blood work done  Prior to starting parental iron replacement which demonstrated iron saturation of 90%, total iron 426, ferritin 30  Based on these labs patient's  IV Venofer infusions were canceled and patient was recommended to continue on daily oral iron supplementation  He was referred to GI for workup which was completed in 2021 and was negative for GI blood loss  He did have severe gastritis that may have been responsible for his anemia      Previous Hematologic/ Oncologic History:    oral iron    Current Hematologic/ Oncologic Treatment:    Oral iron     ECO - Asymptomatic    Interval History:  The patient presents for routine follow up  He was last seen on 9/10/21  Most recent blood work completed on 22 was reviewed  CBC with no evidence of anemia, hemoglobin 13 6  Iron panel: Serum iron 105, iron sat 31, TIBC 338, ferritin 44  Patient states he feels well overall  He denies any concerning symptoms today  He denies fatigue, chest pain, SOB, lightheadedness, abdominal pain, melena, hematochezia, or hematuria  Patient is unsure if he is still taking oral iron  He had a mildly elevated PSA on labs   He was sent for an MRI of the prostate on 22 which demonstrated "PI-RADSv2 1 Category 2 - Low (clinically significant cancer is unlikely to be present)  " Patient has a follow-up appointment with urology in October  Test Results:    Imaging: No results found  Labs:   Lab Results   Component Value Date    WBC 7 54 05/05/2022    HGB 13 6 05/05/2022    HCT 40 3 05/05/2022    MCV 93 05/05/2022     05/05/2022     Lab Results   Component Value Date     05/14/2015    K 4 1 05/05/2022     05/05/2022    CO2 27 05/05/2022    ANIONGAP 10 05/14/2015    BUN 25 05/05/2022    CREATININE 1 51 (H) 05/05/2022    GLUCOSE 112 05/14/2015    GLUF 107 (H) 03/31/2022    CALCIUM 8 7 05/05/2022    AST 24 05/05/2022    ALT 32 05/05/2022    ALKPHOS 47 05/05/2022    PROT 6 9 03/28/2015    BILITOT 0 4 03/28/2015    EGFR 43 05/05/2022           Review of Systems   Constitutional: Negative  Negative for fatigue  Respiratory: Negative  Negative for shortness of breath  Cardiovascular: Negative  Negative for chest pain  Gastrointestinal: Negative  Negative for abdominal pain and blood in stool  Genitourinary: Negative for hematuria  Musculoskeletal: Positive for arthralgias  Neurological: Negative  Negative for dizziness and light-headedness  All other systems reviewed and are negative  Active Problems:   Patient Active Problem List   Diagnosis    Tubular adenoma of colon    Carpal tunnel syndrome    Dyslipidemia    Gout    Hypertension    Impaired fasting glucose    Insomnia    Iron deficiency anemia due to chronic blood loss    Osteoarthritis    Prolonged QT interval    Rhinitis    Other disorder of calcium metabolism    Elevated PSA    Obesity (BMI 30 0-34  9)    Internal carotid artery stenosis, right    Cerebrovascular accident (CVA) ([facility] [address]    Aortic valve stenosis    S/P carotid endarterectomy    Coronary artery disease involving native coronary artery    Bilateral carotid artery stenosis    Stage 3b chronic kidney disease ([facility] [address] )    Rambo lesion, chronic  Paraesophageal hernia    Atrial flutter, unspecified type [facility])       Past Medical History:   Past Medical History:   Diagnosis Date    Anemia     iron def    Arthritis     GERD (gastroesophageal reflux disease)     Gout     Hypertension     Insomnia     Kidney stone     Stroke (cerebrum) [facility])        Surgical History:   Past Surgical History:   Procedure Laterality Date    ANKLE SURGERY Right     CARDIAC CATHETERIZATION      COLONOSCOPY  01/25/2013    polypectomy; complete - 3 yrs d/t polyp - benign submucosal lipoma- path c/w lipoma    COLONOSCOPY  04/25/2017    complete - tubular adenoma - repeat 5yrs    CYSTOSCOPY      CYSTOTOMY      bladder  w/ basket extraction of calculus    FEMUR FRACTURE SURGERY      HERNIA REPAIR      inguinal ; sliding    INGUINAL HERNIA REPAIR Right     unilateral    KNEE ARTHROSCOPY Right     w/medial menisectomy    LASIK      corneal    LITHOTRIPSY      renal    SC COLONOSCOPY FLX DX W/COLLJ SPEC WHEN PFRMD N/A 4/25/2017    Procedure: SCREENING COLONOSCOPY;  Surgeon: Juany Lynch MD;  Location:  MAIN OR;  Service: General    SC THROMBOENDARTECTMY Dima Rocha Right 1/10/2020    Procedure: ENDARTERECTOMY ARTERY CAROTID;  Surgeon: Lilly Galeazzi, MD;  Location:  MAIN OR;  Service: Vascular    ROTATOR CUFF REPAIR Bilateral     TONSILLECTOMY         Family History:    Family History   Problem Relation Age of Onset    Alzheimer's disease Mother     Alzheimer's disease Father     Heart disease Father         CAD    Other Father         prediabetes    Diabetes Father     Breast cancer Other     Arthritis Family     Hypertension Family        Cancer-related family history includes Breast cancer in his other  Social History:   Social History     Socioeconomic History    Marital status:       Spouse name: Not on file    Number of children: 1    Years of education: Not on file    Highest education level: Not on file   Occupational History  Occupation: Retired     Comment: working full time   Tobacco Use    Smoking status: Former Smoker     Packs/day: 1 00     Years: 22 00     Pack years: 22 00     Quit date:      Years since quittin 3    Smokeless tobacco: Never Used   Vaping Use    Vaping Use: Never used   Substance and Sexual Activity    Alcohol use: Not Currently     Comment: stopped drinking alcohol    Drug use: No    Sexual activity: Not Currently   Other Topics Concern    Not on file   Social History Narrative    , lives alone, working full time     Social Determinants of Health     Financial Resource Strain: Not on file   Food Insecurity: Not on file   Transportation Needs: Not on file   Physical Activity: Not on file   Stress: Not on file   Social Connections: Not on file   Intimate Partner Violence: Not on file   Housing Stability: Not on file       Current Medications:   Current Outpatient Medications   Medication Sig Dispense Refill    Ascorbic Acid (VITAMIN C IMMUNE HEALTH PO) Take by mouth daily      aspirin (ECOTRIN LOW STRENGTH) 81 mg EC tablet Take 81 mg by mouth every other day 1 every other day      atorvastatin (LIPITOR) 40 mg tablet take 1 tablet by mouth every evening 90 tablet 3    calcium polycarbophil (FIBERCON) 625 mg tablet Take 625 mg by mouth daily      clopidogrel (PLAVIX) 75 mg tablet take 1 tablet by mouth once daily 90 tablet 1    clotrimazole-betamethasone (LOTRISONE) 1-0 05 % cream apply to affected area twice a day for 10 days MAX 45 g 0    ferrous sulfate 324 (65 Fe) mg Take 1 tablet (324 mg total) by mouth daily before breakfast 30 tablet 2    lisinopril (ZESTRIL) 10 mg tablet take 1 tablet by mouth once daily 90 tablet 0    metoprolol succinate (TOPROL-XL) 25 mg 24 hr tablet take 1 tablet by mouth once daily 90 tablet 1    Multiple Vitamin (MULTIVITAMIN) capsule Take 1 capsule by mouth daily      nitroglycerin (NITROSTAT) 0 4 mg SL tablet Place 1 tablet (0 4 mg total) under the tongue every 5 (five) minutes as needed for chest pain 15 tablet 5    omeprazole (PriLOSEC) 20 mg delayed release capsule take 1 capsule by mouth once daily 90 capsule 2    Potassium Citrate ER 15 MEQ (1620 MG) TBCR Take 2 tablets by mouth 2 (two) times a day 120 tablet 3    triamterene-hydrochlorothiazide (MAXZIDE) 75-50 MG per tablet take 1 tablet by mouth once daily 90 tablet 1     No current facility-administered medications for this visit  Allergies: No Known Allergies    Physical Exam:  /80 (BP Location: Left arm, Patient Position: Sitting, Cuff Size: Large)   Pulse 59   Temp 97 8 °F (36 6 °C) (Temporal)   Resp 14   Ht 5' 9" (1 753 m)   Wt 91 2 kg (201 lb)   SpO2 98%   BMI 29 68 kg/m²   Body surface area is 2 07 meters squared  Wt Readings from Last 3 Encounters:   05/09/22 91 2 kg (201 lb)   03/14/22 94 8 kg (209 lb)   04/01/22 94 8 kg (209 lb)           Physical Exam  Constitutional:       Appearance: Normal appearance  He is not ill-appearing or toxic-appearing  HENT:      Head: Normocephalic and atraumatic  Nose: Nose normal    Eyes:      General: No scleral icterus  Right eye: No discharge  Left eye: No discharge  Extraocular Movements: Extraocular movements intact  Conjunctiva/sclera: Conjunctivae normal    Cardiovascular:      Rate and Rhythm: Normal rate and regular rhythm  Heart sounds: No murmur heard  Pulmonary:      Effort: Pulmonary effort is normal       Breath sounds: Normal breath sounds  Abdominal:      General: Bowel sounds are normal  There is no distension  Palpations: Abdomen is soft  Skin:     General: Skin is warm and dry  Neurological:      General: No focal deficit present  Mental Status: He is alert and oriented to person, place, and time  Psychiatric:         Mood and Affect: Mood normal          Behavior: Behavior normal          Assessment / Plan:    1   H/O anemia due to chronic blood loss The patient is a very pleasant 67 yo male with a significant cardiac history on dual anti-platelet therapy status post cardiac catheterization  He was found to be anemic with iron deficiency  His fecal occult blood test was positive  Patient was seen for initial consultation on 11/27/2020  At that visit, his most recent labs had indicated and iron deficiency and patient was set up for IV Venofer  Patient had repeat blood work done  Prior to starting parental iron replacement which demonstrated iron saturation of 90%, total iron 426, ferritin 30  Based on these labs patient's  IV Venofer infusions were canceled and patient was recommended to continue on daily oral iron supplementation  He was referred to GI for workup  He underwent EGD/Colonoscopy, these were negative for GI blood loss  He was noted to have severe gastritis that may have resulted in his anemia  Patient's most recent CBC and iron panel are completely normal  He feels well and denies any abnormal bleeding or concerning symptoms today  His CBC and iron panel have remained in normal limits for over a year  From a hematological perspective, observation is recommended  Discussed with patient that he can complete his labs with his family doctor, and can return if he has any concerning symptoms  I reviewed symptoms of iron deficiency anemia with him today and he is instructed to call if he develops any abnormal bleeding or symptoms  Patient verbalized understanding  He is in agreement with plan of care  He will follow with hematology on an as needed basis     In regards to his mildly elevated PSA, he did have MRI of the prostate on 4/1/22 which demonstrated "PI-RADSv2 1 Category 2 - Low (clinically significant cancer is unlikely to be present)  " Patient has a follow-up appointment with urology in October  Barriers to care: None  Patient able to self care      Portions of the record may have been created with voice recognition software  Occasional wrong word or "sound a like" substitutions may have occurred due to the inherent limitations of voice recognition software  Read the chart carefully and recognize, using context, where substitutions have occurred

## 2022-05-09 ENCOUNTER — TELEPHONE (OUTPATIENT)
Dept: FAMILY MEDICINE CLINIC | Facility: HOSPITAL | Age: 77
End: 2022-05-09

## 2022-05-09 ENCOUNTER — OFFICE VISIT (OUTPATIENT)
Dept: HEMATOLOGY ONCOLOGY | Facility: HOSPITAL | Age: 77
End: 2022-05-09
Payer: COMMERCIAL

## 2022-05-09 VITALS
RESPIRATION RATE: 14 BRPM | HEART RATE: 59 BPM | TEMPERATURE: 97.8 F | BODY MASS INDEX: 29.77 KG/M2 | WEIGHT: 201 LBS | HEIGHT: 69 IN | OXYGEN SATURATION: 98 % | DIASTOLIC BLOOD PRESSURE: 80 MMHG | SYSTOLIC BLOOD PRESSURE: 126 MMHG

## 2022-05-09 DIAGNOSIS — D50.0 IRON DEFICIENCY ANEMIA DUE TO CHRONIC BLOOD LOSS: Primary | ICD-10-CM

## 2022-05-09 PROBLEM — D50.9 IRON DEFICIENCY ANEMIA: Status: RESOLVED | Noted: 2020-11-27 | Resolved: 2022-05-09

## 2022-05-09 PROCEDURE — 3074F SYST BP LT 130 MM HG: CPT | Performed by: NURSE PRACTITIONER

## 2022-05-09 PROCEDURE — 1036F TOBACCO NON-USER: CPT | Performed by: NURSE PRACTITIONER

## 2022-05-09 PROCEDURE — 3079F DIAST BP 80-89 MM HG: CPT | Performed by: NURSE PRACTITIONER

## 2022-05-09 PROCEDURE — 1160F RVW MEDS BY RX/DR IN RCRD: CPT | Performed by: NURSE PRACTITIONER

## 2022-05-09 PROCEDURE — 99214 OFFICE O/P EST MOD 30 MIN: CPT | Performed by: NURSE PRACTITIONER

## 2022-05-22 DIAGNOSIS — I65.21 CAROTID STENOSIS, RIGHT: ICD-10-CM

## 2022-05-22 RX ORDER — CLOPIDOGREL BISULFATE 75 MG/1
TABLET ORAL
Qty: 90 TABLET | Refills: 1 | Status: SHIPPED | OUTPATIENT
Start: 2022-05-22 | End: 2022-05-24

## 2022-05-24 ENCOUNTER — TELEPHONE (OUTPATIENT)
Dept: FAMILY MEDICINE CLINIC | Facility: HOSPITAL | Age: 77
End: 2022-05-24

## 2022-05-24 NOTE — TELEPHONE ENCOUNTER
Pharmacy sent an automatic refill for the Plavix - this was discontinued  He does not need to restart it - please call pharmacy and discontinue the rx as well   TY

## 2022-05-24 NOTE — TELEPHONE ENCOUNTER
Pt asking why he was recently prescribed Plavix  States he has never taken this and does not remember adding this to his medications   PCB

## 2022-06-03 ENCOUNTER — OFFICE VISIT (OUTPATIENT)
Dept: OBGYN CLINIC | Facility: CLINIC | Age: 77
End: 2022-06-03
Payer: COMMERCIAL

## 2022-06-03 ENCOUNTER — APPOINTMENT (OUTPATIENT)
Dept: RADIOLOGY | Facility: CLINIC | Age: 77
End: 2022-06-03
Payer: COMMERCIAL

## 2022-06-03 VITALS
HEIGHT: 69 IN | DIASTOLIC BLOOD PRESSURE: 80 MMHG | SYSTOLIC BLOOD PRESSURE: 120 MMHG | BODY MASS INDEX: 30.07 KG/M2 | WEIGHT: 203 LBS

## 2022-06-03 DIAGNOSIS — M79.642 BILATERAL HAND PAIN: ICD-10-CM

## 2022-06-03 DIAGNOSIS — M79.641 BILATERAL HAND PAIN: ICD-10-CM

## 2022-06-03 DIAGNOSIS — M19.031 ARTHRITIS OF RIGHT WRIST: Primary | ICD-10-CM

## 2022-06-03 DIAGNOSIS — M19.032 ARTHRITIS OF LEFT WRIST: ICD-10-CM

## 2022-06-03 PROCEDURE — 99203 OFFICE O/P NEW LOW 30 MIN: CPT | Performed by: ORTHOPAEDIC SURGERY

## 2022-06-03 PROCEDURE — 1036F TOBACCO NON-USER: CPT | Performed by: ORTHOPAEDIC SURGERY

## 2022-06-03 PROCEDURE — 73130 X-RAY EXAM OF HAND: CPT

## 2022-06-03 PROCEDURE — 1160F RVW MEDS BY RX/DR IN RCRD: CPT | Performed by: ORTHOPAEDIC SURGERY

## 2022-06-03 PROCEDURE — 3074F SYST BP LT 130 MM HG: CPT | Performed by: ORTHOPAEDIC SURGERY

## 2022-06-03 PROCEDURE — 3079F DIAST BP 80-89 MM HG: CPT | Performed by: ORTHOPAEDIC SURGERY

## 2022-06-03 NOTE — PROGRESS NOTES
Assessment:     1  Arthritis of right wrist    2  Arthritis of left wrist        Plan:     Problem List Items Addressed This Visit    None     Visit Diagnoses     Arthritis of right wrist    -  Primary    Relevant Orders    XR hand 3+ vw left    XR hand 3+ vw right    Ambulatory Referral to Hand Surgery    Cock Up Wrist Splint    Arthritis of left wrist        Relevant Orders    Ambulatory Referral to Hand Surgery    Cock Up Wrist Splint        Findings consistent with bilateral hands/wrist degenerative arthritis, severe right wrist with collapse of carpal bones  Imaging and prognosis reviewed with patient  At this point patient will be given bilateral cock up wrist splints to wear with activity  He will also be referred to hand specialist for further evaluation and treatment  He can use tylenol, asperecreme for pain control  All patient's questions were answered to his satisfaction  This note is created using dictation transcription  It may contain typographical errors, grammatical errors, improperly dictated words, background noise and other errors  Subjective:     Patient ID: Arcelia Schaeffer  is a 68 y o  male  Chief Complaint:  68 yr old male in for evaluation of bilateral hand pain  He states for some time(years) he has had pain in all his fingers and wrists  Denies any injury or trauma  Pain in hands primarily with use  His fingers feel tight at the joints when making a fist, gripping or grasping objects  He has pain in wrists with cracking and popping with motion  Denies any weakness, numbness or tingling in hands  No treatment  Right is worse than left  Information on patient's intake form was reviewed      Allergy:  No Known Allergies  Medications:  all current active meds have been reviewed  Past Medical History:  Past Medical History:   Diagnosis Date    Anemia     iron def    Arthritis     GERD (gastroesophageal reflux disease)     Gout     Hypertension     Insomnia     Kidney stone  Stroke [facility]rebrum[facility])      Past Surgical History:  Past Surgical History:   Procedure Laterality Date    ANKLE SURGERY Right     CARDIAC CATHETERIZATION      COLONOSCOPY  2013    polypectomy; complete - 3 yrs d/t polyp - benign submucosal lipoma- path c/w lipoma    COLONOSCOPY  2017    complete - tubular adenoma - repeat 5yrs    CYSTOSCOPY      CYSTOTOMY      bladder  w/ basket extraction of calculus    FEMUR FRACTURE SURGERY      HERNIA REPAIR      inguinal ; sliding    INGUINAL HERNIA REPAIR Right     unilateral    KNEE ARTHROSCOPY Right     w/medial menisectomy    LASIK      corneal    LITHOTRIPSY      renal    [place] COLONOSCOPY FLX DX W/COLLJ SPEC WHEN PFRMD N/A 2017    Procedure: SCREENING COLONOSCOPY;  Surgeon: Coby Smith MD;  Location:  MAIN OR;  Service: General    MA THROMBOENDARTECTMY Ramy Guzman Right 1/10/2020    Procedure: ENDARTERECTOMY ARTERY CAROTID;  Surgeon: Heri Espino MD;  Location:  MAIN OR;  Service: Vascular    ROTATOR CUFF REPAIR Bilateral     TONSILLECTOMY       Family History:  Family History   Problem Relation Age of Onset    Alzheimer's disease Mother     Alzheimer's disease Father     Heart disease Father         CAD    Other Father         prediabetes    Diabetes Father     Breast cancer Other     Arthritis Family     Hypertension Family      Social History:  Social History     Substance and Sexual Activity   Alcohol Use Not Currently    Comment: stopped drinking alcohol     Social History     Substance and Sexual Activity   Drug Use No     Social History     Tobacco Use   Smoking Status Former Smoker    Packs/day:     Years: 22     Pack years: 22 00    Quit date: 5    Years since quittin 4   Smokeless Tobacco Never Used     Review of Systems   Constitutional: Negative for chills and fever  HENT: Negative for ear pain and sore throat  Eyes: Negative for pain and visual disturbance     Respiratory: Negative for cough and shortness of breath  Cardiovascular: Negative for chest pain and palpitations  Gastrointestinal: Negative for abdominal pain and vomiting  Genitourinary: Negative for dysuria and hematuria  Musculoskeletal: Positive for arthralgias (Right wrist)  Negative for back pain  Skin: Negative for color change and rash  Neurological: Negative for seizures and syncope  Psychiatric/Behavioral: Negative  All other systems reviewed and are negative  Objective:  BP Readings from Last 1 Encounters:   06/03/22 120/80      Wt Readings from Last 1 Encounters:   06/03/22 92 1 kg (203 lb)      BMI:   Estimated body mass index is 29 98 kg/m² as calculated from the following:    Height as of this encounter: 5' 9" (1 753 m)  Weight as of this encounter: 92 1 kg (203 lb)  BSA:   Estimated body surface area is 2 08 meters squared as calculated from the following:    Height as of this encounter: 5' 9" (1 753 m)  Weight as of this encounter: 92 1 kg (203 lb)  Physical Exam  Vitals and nursing note reviewed  Constitutional:       Appearance: Normal appearance  He is well-developed  HENT:      Head: Normocephalic and atraumatic  Right Ear: External ear normal       Left Ear: External ear normal    Eyes:      Extraocular Movements: Extraocular movements intact  Conjunctiva/sclera: Conjunctivae normal    Pulmonary:      Effort: Pulmonary effort is normal    Musculoskeletal:         General: Tenderness (bilateral hand arthralgia ) present  Cervical back: Neck supple  Skin:     General: Skin is warm  Neurological:      Mental Status: He is alert and oriented to person, place, and time  Psychiatric:         Mood and Affect: Mood normal          Behavior: Behavior normal        Right Hand Exam     Tenderness   The patient is experiencing no tenderness       Range of Motion   Wrist   Extension: abnormal   Flexion: abnormal     Muscle Strength   Wrist extension: 5/5   Wrist flexion: 5/5   : 5/5     Tests   Phalens Sign: negative  Tinel's sign (median nerve): negative  Finkelstein's test: negative    Other   Erythema: absent  Scars: absent  Sensation: normal  Pulse: present    Comments:  Crepitation with motion of wrist with decreased motion in flexion and extension  Able to make composite fist with tightness       Left Hand Exam     Tenderness   The patient is experiencing no tenderness  Range of Motion   Wrist   Extension: abnormal   Flexion: abnormal     Muscle Strength   Wrist extension: 5/5   Wrist flexion: 5/5   :  5/5     Tests   Phalens Sign: negative  Tinel's sign (median nerve): negative  Finkelstein's test: negative    Other   Erythema: absent  Scars: absent  Sensation: normal  Pulse: present    Comments:  Able to make composite fist with tightness             I have personally reviewed pertinent films in PACS and my interpretation is xr bilateral hands demonstrates degenerative changes throughout wrists more advanced right carpal bones with evidence of collapse of the carpal joint       Scribe Attestation    I,:  Ira Hallman am acting as a scribe while in the presence of the attending physician :       I,:  Jennifer Shaw MD personally performed the services described in this documentation    as scribed in my presence :

## 2022-06-21 DIAGNOSIS — I10 HYPERTENSION, UNSPECIFIED TYPE: ICD-10-CM

## 2022-06-21 RX ORDER — TRIAMTERENE AND HYDROCHLOROTHIAZIDE 75; 50 MG/1; MG/1
TABLET ORAL
Qty: 90 TABLET | Refills: 1 | Status: SHIPPED | OUTPATIENT
Start: 2022-06-21

## 2022-07-11 ENCOUNTER — OFFICE VISIT (OUTPATIENT)
Dept: OBGYN CLINIC | Facility: HOSPITAL | Age: 77
End: 2022-07-11
Payer: COMMERCIAL

## 2022-07-11 VITALS
HEIGHT: 69 IN | DIASTOLIC BLOOD PRESSURE: 73 MMHG | BODY MASS INDEX: 30.07 KG/M2 | WEIGHT: 203 LBS | SYSTOLIC BLOOD PRESSURE: 130 MMHG | HEART RATE: 49 BPM

## 2022-07-11 DIAGNOSIS — M19.032 ARTHRITIS OF LEFT WRIST: ICD-10-CM

## 2022-07-11 DIAGNOSIS — M19.031 ARTHRITIS OF RIGHT WRIST: ICD-10-CM

## 2022-07-11 PROCEDURE — 20605 DRAIN/INJ JOINT/BURSA W/O US: CPT | Performed by: SURGERY

## 2022-07-11 PROCEDURE — 99214 OFFICE O/P EST MOD 30 MIN: CPT | Performed by: SURGERY

## 2022-07-11 RX ORDER — TRIAMCINOLONE ACETONIDE 40 MG/ML
20 INJECTION, SUSPENSION INTRA-ARTICULAR; INTRAMUSCULAR
Status: COMPLETED | OUTPATIENT
Start: 2022-07-11 | End: 2022-07-11

## 2022-07-11 RX ORDER — BUPIVACAINE HYDROCHLORIDE 2.5 MG/ML
0.5 INJECTION, SOLUTION INFILTRATION; PERINEURAL
Status: COMPLETED | OUTPATIENT
Start: 2022-07-11 | End: 2022-07-11

## 2022-07-11 RX ORDER — LIDOCAINE HYDROCHLORIDE 10 MG/ML
0.5 INJECTION, SOLUTION INFILTRATION; PERINEURAL
Status: COMPLETED | OUTPATIENT
Start: 2022-07-11 | End: 2022-07-11

## 2022-07-11 RX ADMIN — TRIAMCINOLONE ACETONIDE 20 MG: 40 INJECTION, SUSPENSION INTRA-ARTICULAR; INTRAMUSCULAR at 08:04

## 2022-07-11 RX ADMIN — BUPIVACAINE HYDROCHLORIDE 0.5 ML: 2.5 INJECTION, SOLUTION INFILTRATION; PERINEURAL at 08:04

## 2022-07-11 RX ADMIN — LIDOCAINE HYDROCHLORIDE 0.5 ML: 10 INJECTION, SOLUTION INFILTRATION; PERINEURAL at 08:04

## 2022-07-11 NOTE — PROGRESS NOTES
Andi DIAZ  Attending, Orthopaedic Surgery  Hand, Wrist, and Elbow Surgery  [facility]      ORTHOPAEDIC HAND, WRIST, AND ELBOW OFFICE  VISIT       ASSESSMENT/PLAN:      68year old male here for his bilateral wrist pain due to OA; right > left  Xrays were reviewed from 6/3/22 noting severe OA at the STT joints bilaterally and worse OA at the left thumb CMC joint  Non operative treatments were discussed today that included a localized cortisone injection to the right radiocarpal joint  Patient wished to proceed which he tolerated well  This was a better option due  oral antiinflammatories can interact with his BP medications  patient shows understanding  Educated the patient that he can receive injections safely every 3 months as needed  We will follow up in 3 months for re-evaluation  The patient verbalized understanding of exam findings and treatment plan  We engaged in the shared decision-making process and treatment options were discussed at length with the patient  Surgical and conservative management discussed today along with risks and benefits  Diagnoses and all orders for this visit:    Arthritis of right wrist  -     Ambulatory Referral to Hand Surgery    Arthritis of left wrist  -     Ambulatory Referral to Hand Surgery    Other orders  -     Medium joint arthrocentesis        Follow Up:  Return in about 3 months (around 10/11/2022) for Bilateral wrists  To Do Next Visit:  Re-evaluation of current issue      General Discussions:  Osteoarthritis:  The anatomy and physiology of osteoarthritis was discussed with the patient today in the office  Deterioration of the articular cartilage eventually leads to altered mobility at the joint, resulting in joint subluxation, osteophyte formation, cystic changes, as well as subchondral sclerosis  Eventually, pain, limited mobility, and compensatory hypermobility at surrounding joints may develop    While normal activity and usage of the joint may provide a painful experience to the patient, this typically does not result in damage to the limb  Treatment options include splints to decreased joint edema, pain, and inflammation  Therapy exercises to strengthen the surrounding musculature may relieve pain, but do not alter the overall continued development of osteoarthritis  Oral medications, topical medications, corticosteroid injections may decrease pain and increase overall function  Eventually, some patients may require surgical intervention  ____________________________________________________________________________________________________________________________________________      CHIEF COMPLAINT:  Chief Complaint   Patient presents with    Right Wrist - Pain    Left Wrist - Pain       SUBJECTIVE:  Saira Jackson  is a 68y o  year old RHD male who presents today for bilateral wrist pain  He notes that he has had pain in the bilateral wrists, right worse than left for years  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  He has pain over the dorsal aspect of the right wrist with limited extension  He has tried Voltaren gel, but no oral medications  He has tried cock up wrist brace with increase stiffness         Pain/symptom timing:  Worse during the day when active  Pain/symptom context:  Worse with activites and work  Pain/symptom modifying factors:  Rest makes better, activities make worse  Pain/symptom associated signs/symptoms: none    Prior treatment   · NSAIDsNo   · Injections No   · Bracing/Orthotics Yes    Physical Therapy No     I have personally reviewed all the relevant PMH, PSH, SH, FH, Medications and allergies      PAST MEDICAL HISTORY:  Past Medical History:   Diagnosis Date    Anemia     iron def    Arthritis     GERD (gastroesophageal reflux disease)     Gout     Hypertension     Insomnia     Kidney stone     Stroke (cerebrum) ([facility] [address] )        PAST SURGICAL HISTORY:  Past Surgical History: Procedure Laterality Date    ANKLE SURGERY Right     CARDIAC CATHETERIZATION      COLONOSCOPY  2013    polypectomy; complete - 3 yrs d/t polyp - benign submucosal lipoma- path c/w lipoma    COLONOSCOPY  2017    complete - tubular adenoma - repeat 5yrs    CYSTOSCOPY      CYSTOTOMY      bladder  w/ basket extraction of calculus    FEMUR FRACTURE SURGERY      HERNIA REPAIR      inguinal ; sliding    INGUINAL HERNIA REPAIR Right     unilateral    KNEE ARTHROSCOPY Right     w/medial menisectomy    LASIK      corneal    LITHOTRIPSY      renal    [place] COLONOSCOPY FLX DX W/COLLJ SPEC WHEN PFRMD N/A 2017    Procedure: SCREENING COLONOSCOPY;  Surgeon: Gardiner Sicard, MD;  Location: [facility];  Service: General    [place] THROMBOENDARTECTMY Tura Mantel Right 1/10/2020    Procedure: ENDARTERECTOMY ARTERY CAROTID;  Surgeon: Matias Bowen MD;  Location: [facility];  Service: Vascular    ROTATOR CUFF REPAIR Bilateral     TONSILLECTOMY         FAMILY HISTORY:  Family History   Problem Relation Age of Onset    Alzheimer's disease Mother     Alzheimer's disease Father     Heart disease Father         CAD    Other Father         prediabetes    Diabetes Father     Breast cancer Other     Arthritis Family     Hypertension Family        SOCIAL HISTORY:  Social History     Tobacco Use    Smoking status: Former Smoker     Packs/day: 1 00     Years: 22 00     Pack years: 22 00     Quit date:      Years since quittin 5    Smokeless tobacco: Never Used   Vaping Use    Vaping Use: Never used   Substance Use Topics    Alcohol use: Not Currently     Comment: stopped drinking alcohol    Drug use: No       MEDICATIONS:    Current Outpatient Medications:     Ascorbic Acid (VITAMIN C IMMUNE HEALTH PO), Take by mouth daily, Disp: , Rfl:     aspirin (ECOTRIN LOW STRENGTH) 81 mg EC tablet, Take 81 mg by mouth every other day 1 every other day, Disp: , Rfl:     atorvastatin (LIPITOR) 40 mg tablet, take 1 tablet by mouth every evening, Disp: 90 tablet, Rfl: 3    calcium polycarbophil (FIBERCON) 625 mg tablet, Take 625 mg by mouth daily, Disp: , Rfl:     clotrimazole-betamethasone (LOTRISONE) 1-0 05 % cream, apply to affected area twice a day for 10 days MAX, Disp: 45 g, Rfl: 0    ferrous sulfate 324 (65 Fe) mg, Take 1 tablet (324 mg total) by mouth daily before breakfast, Disp: 30 tablet, Rfl: 2    lisinopril (ZESTRIL) 10 mg tablet, take 1 tablet by mouth once daily, Disp: 90 tablet, Rfl: 0    metoprolol succinate (TOPROL-XL) 25 mg 24 hr tablet, take 1 tablet by mouth once daily, Disp: 90 tablet, Rfl: 1    Multiple Vitamin (MULTIVITAMIN) capsule, Take 1 capsule by mouth daily, Disp: , Rfl:     nitroglycerin (NITROSTAT) 0 4 mg SL tablet, Place 1 tablet (0 4 mg total) under the tongue every 5 (five) minutes as needed for chest pain, Disp: 15 tablet, Rfl: 5    omeprazole (PriLOSEC) 20 mg delayed release capsule, take 1 capsule by mouth once daily, Disp: 90 capsule, Rfl: 2    Potassium Citrate ER 15 MEQ (1620 MG) TBCR, Take 2 tablets by mouth 2 (two) times a day, Disp: 120 tablet, Rfl: 3    triamterene-hydrochlorothiazide (MAXZIDE) 75-50 MG per tablet, take 1 tablet by mouth once daily, Disp: 90 tablet, Rfl: 1    ALLERGIES:  No Known Allergies        REVIEW OF SYSTEMS:  Review of Systems   Constitutional: Negative for chills, fever and unexpected weight change  HENT: Negative for hearing loss, nosebleeds and sore throat  Eyes: Negative for pain, redness and visual disturbance  Respiratory: Negative for cough, shortness of breath and wheezing  Cardiovascular: Negative for chest pain, palpitations and leg swelling  Gastrointestinal: Negative for abdominal pain, nausea and vomiting  Endocrine: Negative for polydipsia and polyuria  Genitourinary: Negative for dysuria and hematuria  Musculoskeletal: Positive for arthralgias and joint swelling  Skin: Negative for rash and wound  Neurological: Negative for dizziness, light-headedness and headaches  Psychiatric/Behavioral: Negative for decreased concentration, dysphoric mood and suicidal ideas  The patient is not nervous/anxious  VITALS:  Vitals:    07/11/22 0742   BP: 130/73   Pulse: (!) 49       LABS:  HgA1c:   Lab Results   Component Value Date    HGBA1C 5 4 01/17/2022     BMP:   Lab Results   Component Value Date    GLUCOSE 112 05/14/2015    CALCIUM 8 7 05/05/2022     05/14/2015    K 4 1 05/05/2022    CO2 27 05/05/2022     05/05/2022    BUN 25 05/05/2022    CREATININE 1 51 (H) 05/05/2022       _____________________________________________________  PHYSICAL EXAMINATION:  General: well developed and well nourished, alert, oriented times 3 and appears comfortable  Psychiatric: Normal  HEENT: Normocephalic, Atraumatic Trachea Midline, No torticollis  Pulmonary: No audible wheezing or respiratory distress   Abdomen/GI: Non tender, non distended   Cardiovascular: No pitting edema, 2+ radial pulse   Skin: No masses, erythema, lacerations, fluctation, ulcerations  Neurovascular: Sensation Intact to the Median, Ulnar, Radial Nerve, Motor Intact to the Median, Ulnar, Radial Nerve and Pulses Intact  Musculoskeletal: Normal, except as noted in detailed exam and in HPI  MUSCULOSKELETAL EXAMINATION:    Right wrist  1st Dorsal Compartment non tender to palpation, pain at the STT joint  Range of motion of the right wrist  is 20 degrees flexion, 30 degress extension, full pronation,full supination  There is no swelling present  There is no ecchymosis noted  Pulses are present and capillary fill is normal      Rizwan Row Test is negative for pain and positive for clunk right wrist     Left wrist:   1st Dorsal Compartment non tender to palpation, pain at the STT joint  Range of motion of the right wrist  is 20 degrees flexion, 30 degress extension, full pronation,full supination  There is no swelling present     There is no ecchymosis noted  Pulses are present and capillary fill is normal    ___________________________________________________  STUDIES REVIEWED:  I have personally reviewed AP lateral and oblique radiographs of right and left wrist from 6/3/22  which demonstrate severe OA of the STT joints bilaterally  Worse arthritic change noted of the left thumb CMC joint  PROCEDURES PERFORMED:  Medium joint arthrocentesis: R radiocarpal  Universal Protocol:  Consent: Verbal consent obtained  Risks and benefits: risks, benefits and alternatives were discussed  Consent given by: patient  Time out: Immediately prior to procedure a "time out" was called to verify the correct patient, procedure, equipment, support staff and site/side marked as required    Timeout called at: 7/11/2022 8:04 AM   Patient understanding: patient states understanding of the procedure being performed  Site marked: the operative site was marked  Patient identity confirmed: verbally with patient    Supporting Documentation  Indications: pain   Procedure Details  Location: wrist - R radiocarpal  Preparation: Patient was prepped and draped in the usual sterile fashion  Needle size: 25 G  Ultrasound guidance: no  Approach: dorsal  Medications administered: 0 5 mL bupivacaine 0 25 %; 0 5 mL lidocaine 1 %; 20 mg triamcinolone acetonide 40 mg/mL    Patient tolerance: patient tolerated the procedure well with no immediate complications  Dressing:  Sterile dressing applied        _____________________________________________________      Scribe Attestation    I,:  Oksana Whitfield am acting as a scribe while in the presence of the attending physician :       I,:  Yaneth Mckeon MD personally performed the services described in this documentation    as scribed in my presence :

## 2022-07-19 DIAGNOSIS — I65.21 STENOSIS OF RIGHT CAROTID ARTERY: ICD-10-CM

## 2022-07-27 RX ORDER — LISINOPRIL 10 MG/1
TABLET ORAL
Qty: 90 TABLET | Refills: 0 | Status: SHIPPED | OUTPATIENT
Start: 2022-07-27 | End: 2022-10-20

## 2022-07-28 ENCOUNTER — TELEPHONE (OUTPATIENT)
Dept: NEPHROLOGY | Facility: HOSPITAL | Age: 77
End: 2022-07-28

## 2022-07-28 NOTE — TELEPHONE ENCOUNTER
Appointment Confirmation   Person confirmed appointment with  If not patient, name of the person Patient     Date and time of appointment 07/29   Patient acknowledged and will be at appointment?  yes   Did you advise the patient that they will need a urine sample if they are a new patient? no   Did you advise the patient to bring their current medications for verification? (including any OTC) yes   Additional Information

## 2022-07-29 ENCOUNTER — OFFICE VISIT (OUTPATIENT)
Dept: NEPHROLOGY | Facility: HOSPITAL | Age: 77
End: 2022-07-29
Payer: COMMERCIAL

## 2022-07-29 VITALS
SYSTOLIC BLOOD PRESSURE: 124 MMHG | HEIGHT: 69 IN | HEART RATE: 45 BPM | BODY MASS INDEX: 30.07 KG/M2 | DIASTOLIC BLOOD PRESSURE: 62 MMHG | WEIGHT: 203 LBS

## 2022-07-29 DIAGNOSIS — R35.0 URINARY FREQUENCY: ICD-10-CM

## 2022-07-29 DIAGNOSIS — M10.00 IDIOPATHIC GOUT, UNSPECIFIED CHRONICITY, UNSPECIFIED SITE: ICD-10-CM

## 2022-07-29 DIAGNOSIS — N20.0 NEPHROLITHIASIS: ICD-10-CM

## 2022-07-29 DIAGNOSIS — N18.32 STAGE 3B CHRONIC KIDNEY DISEASE (HCC): Primary | ICD-10-CM

## 2022-07-29 DIAGNOSIS — I10 PRIMARY HYPERTENSION: Chronic | ICD-10-CM

## 2022-07-29 DIAGNOSIS — D50.0 IRON DEFICIENCY ANEMIA DUE TO CHRONIC BLOOD LOSS: ICD-10-CM

## 2022-07-29 PROCEDURE — 99214 OFFICE O/P EST MOD 30 MIN: CPT | Performed by: INTERNAL MEDICINE

## 2022-07-29 PROCEDURE — 1160F RVW MEDS BY RX/DR IN RCRD: CPT | Performed by: INTERNAL MEDICINE

## 2022-07-29 PROCEDURE — 3078F DIAST BP <80 MM HG: CPT | Performed by: INTERNAL MEDICINE

## 2022-07-29 PROCEDURE — 3074F SYST BP LT 130 MM HG: CPT | Performed by: INTERNAL MEDICINE

## 2022-07-29 RX ORDER — TAMSULOSIN HYDROCHLORIDE 0.4 MG/1
0.4 CAPSULE ORAL
Qty: 90 CAPSULE | Refills: 1 | Status: SHIPPED | OUTPATIENT
Start: 2022-07-29 | End: 2022-10-11 | Stop reason: SDUPTHER

## 2022-07-29 NOTE — PROGRESS NOTES
NEPHROLOGY OUTPATIENT PROGRESS NOTE   Vidya Menjivar  68 y o  male MRN: [id number]  DATE: 7/29/2022  Reason for visit:   Chief Complaint   Patient presents with    Chronic Kidney Disease    Follow-up        Patient Instructions   1  Chronic kidney disease stage 3a likely d/t multiple episodes of contrast induced nephropathy +/- age related nephron loss +/- hypertensive nephrosclerosis +/- diuretic use  -avoid nonsteroidals(ibuprofen, aleve, advil, motrin, celebrex, naproxen, toradol, indomethacin)  Stop naproxen  Can take tylenol arthritis instead  -b/l sCr appears to be at about 1 5-1 6 with last sCr 1 51 as of 5/5/22   -s/p IV dye on 12/7/20  -renal ultrasound shows left kidney 12 6cm, right kidney 9 7cm(foreshortened on imaging)  -repeat BMP every 6 months  -UA bland as of Nov 2021, UpCr 0 16g     2  HTN - BP well controlled on lisinopril 10mg daily, triamterene-hydrochlorothiazide 75-50mg daily, metoprolol 25mg daily     3  Mild anemia, iron deficiency - resolved on oral iron, monitor CBC     4  History of gout - on colchicine 0 6mg as needed for acute gout, last uric acid 5 6 as of June 2019     5  CAD - on aspirin, plavix, lipitor     6  History of nephrolithiasis - with obstruction in August 2020, stone analysis shows 80% calcium oxalate stone    -5/17/21 litholink showed Urine volume 2 26L, citrate low at 262  Urine volume is adequate for stone prevention but low urine citrate will predispose to stone formation  Continue potassium citrate tablets 2 tabs twice daily  -stay hydrated  Drink enough to urinate 2-2 5L/day  -avoid a high salt/sodium diet     7  Elevated PSA level - to follow up with PCP, having urinary symptoms currently, will begin flomax daily, will refer to urology     RTC in 6 months  Obtain blood and urine testing prior to next appointment  Davis Stark was seen today for chronic kidney disease and follow-up      Diagnoses and all orders for this visit:    Stage 3b chronic kidney disease ([facility] [address] )  -     Basic metabolic panel; Future  -     Urinalysis with microscopic; Future  -     Protein / creatinine ratio, urine; Future    Primary hypertension    Idiopathic gout, unspecified chronicity, unspecified site    Iron deficiency anemia due to chronic blood loss    Nephrolithiasis    Urinary frequency  -     tamsulosin (FLOMAX) 0 4 mg; Take 1 capsule (0 4 mg total) by mouth daily with dinner  -     Ambulatory Referral to Urology; Future        Assessment/Plan: 1  Chronic kidney disease stage 3a likely d/t multiple episodes of contrast induced nephropathy +/- age related nephron loss +/- hypertensive nephrosclerosis +/- diuretic use  -avoid nonsteroidals(ibuprofen, aleve, advil, motrin, celebrex, naproxen, toradol, indomethacin)  Stop naproxen  Can take tylenol arthritis instead  -b/l sCr appears to be at about 1 5-1 6 with last sCr 1 51 as of 5/5/22   -s/p IV dye on 12/7/20  -renal ultrasound shows left kidney 12 6cm, right kidney 9 7cm(foreshortened on imaging)  -repeat BMP every 6 months  -UA bland as of Nov 2021, UpCr 0 16g     2  HTN - BP well controlled on lisinopril 10mg daily, triamterene-hydrochlorothiazide 75-50mg daily, metoprolol 25mg daily     3  Mild anemia, iron deficiency - resolved on oral iron, monitor CBC     4  History of gout - on colchicine 0 6mg as needed for acute gout, last uric acid 5 6 as of June 2019     5  CAD - on aspirin, plavix, lipitor     6  History of nephrolithiasis - with obstruction in August 2020, stone analysis shows 80% calcium oxalate stone    -5/17/21 litholink showed Urine volume 2 26L, citrate low at 262  Urine volume is adequate for stone prevention but low urine citrate will predispose to stone formation  Continue potassium citrate tablets 2 tabs twice daily  -stay hydrated   Drink enough to urinate 2-2 5L/day  -avoid a high salt/sodium diet     7  Elevated PSA level - to follow up with PCP, having urinary symptoms currently, will begin flomax daily, will refer to urology     RTC in 6 months    Obtain blood and urine testing prior to next appt    SUBJECTIVE / INTERVAL HISTORY:  68 y o  male presents in follow up of CKD  Lori Ramirez  denies any recent illness/hospitalizations/medication changes since last office visit  Taking naproxen 200mg twice daily  Recommend stopping this medication  HTN - BP at home well controlled  Denies recent kidney stones  Denies recent gout flares  Review of Systems   Constitutional: Negative for chills and fever  HENT: Negative for sore throat  Eyes: Negative for visual disturbance  Respiratory: Negative for cough and shortness of breath  Cardiovascular: Negative for chest pain and leg swelling  Gastrointestinal: Negative for abdominal pain, constipation, diarrhea, nausea and vomiting  Endocrine: Negative for polyuria  Genitourinary: Negative for decreased urine volume, difficulty urinating, dysuria and hematuria  Has slow stream, empties all the way   Musculoskeletal: Negative for back pain and myalgias  Skin: Negative for rash  Neurological: Negative for dizziness, light-headedness and numbness  Psychiatric/Behavioral: Negative for confusion  OBJECTIVE:  /62 (BP Location: Left arm, Patient Position: Sitting, Cuff Size: Standard)   Pulse (!) 45   Ht 5' 9" (1 753 m)   Wt 92 1 kg (203 lb)   BMI 29 98 kg/m²  Body mass index is 29 98 kg/m²  Physical exam:  Physical Exam  Vitals reviewed  Constitutional:       General: He is not in acute distress  Appearance: Normal appearance  He is well-developed  He is not diaphoretic  HENT:      Head: Normocephalic and atraumatic  Nose: Nose normal       Mouth/Throat:      Mouth: Mucous membranes are moist       Pharynx: No oropharyngeal exudate  Eyes:      General: No scleral icterus  Right eye: No discharge  Left eye: No discharge  Neck:      Thyroid: No thyromegaly     Cardiovascular: Rate and Rhythm: Normal rate and regular rhythm  Heart sounds: Normal heart sounds  Pulmonary:      Effort: Pulmonary effort is normal       Breath sounds: Normal breath sounds  No wheezing or rales  Abdominal:      General: Bowel sounds are normal  There is no distension  Palpations: Abdomen is soft  Tenderness: There is no abdominal tenderness  Musculoskeletal:         General: No swelling  Normal range of motion  Cervical back: Neck supple  Lymphadenopathy:      Cervical: No cervical adenopathy  Skin:     General: Skin is warm and dry  Findings: No rash  Neurological:      General: No focal deficit present  Mental Status: He is alert        Comments: awake   Psychiatric:         Mood and Affect: Mood normal          Behavior: Behavior normal          Medications:    Current Outpatient Medications:     Ascorbic Acid (VITAMIN C IMMUNE HEALTH PO), Take by mouth daily, Disp: , Rfl:     aspirin (ECOTRIN LOW STRENGTH) 81 mg EC tablet, Take 81 mg by mouth every other day 1 every other day, Disp: , Rfl:     atorvastatin (LIPITOR) 40 mg tablet, take 1 tablet by mouth every evening, Disp: 90 tablet, Rfl: 3    calcium polycarbophil (FIBERCON) 625 mg tablet, Take 625 mg by mouth daily, Disp: , Rfl:     ferrous sulfate 324 (65 Fe) mg, Take 1 tablet (324 mg total) by mouth daily before breakfast, Disp: 30 tablet, Rfl: 2    lisinopril (ZESTRIL) 10 mg tablet, take 1 tablet by mouth once daily, Disp: 90 tablet, Rfl: 0    metoprolol succinate (TOPROL-XL) 25 mg 24 hr tablet, take 1 tablet by mouth once daily, Disp: 90 tablet, Rfl: 1    Multiple Vitamin (MULTIVITAMIN) capsule, Take 1 capsule by mouth daily, Disp: , Rfl:     nitroglycerin (NITROSTAT) 0 4 mg SL tablet, Place 1 tablet (0 4 mg total) under the tongue every 5 (five) minutes as needed for chest pain, Disp: 15 tablet, Rfl: 5    omeprazole (PriLOSEC) 20 mg delayed release capsule, take 1 capsule by mouth once daily, Disp: 90 capsule, Rfl: 2    Potassium Citrate ER 15 MEQ (1620 MG) TBCR, Take 2 tablets by mouth 2 (two) times a day, Disp: 120 tablet, Rfl: 3    tamsulosin (FLOMAX) 0 4 mg, Take 1 capsule (0 4 mg total) by mouth daily with dinner, Disp: 90 capsule, Rfl: 1    triamterene-hydrochlorothiazide (MAXZIDE) 75-50 MG per tablet, take 1 tablet by mouth once daily, Disp: 90 tablet, Rfl: 1    clotrimazole-betamethasone (LOTRISONE) 1-0 05 % cream, apply to affected area twice a day for 10 days MAX (Patient not taking: Reported on 7/29/2022), Disp: 45 g, Rfl: 0    Allergies: Allergies as of 07/29/2022    (No Known Allergies)       The following portions of the patient's history were reviewed and updated as appropriate: past family history, past surgical history and problem list     Laboratory Results:  Lab Results   Component Value Date    SODIUM 140 05/05/2022    K 4 1 05/05/2022     05/05/2022    CO2 27 05/05/2022    BUN 25 05/05/2022    CREATININE 1 51 (H) 05/05/2022    GLUC 95 05/05/2022    CALCIUM 8 7 05/05/2022        Lab Results   Component Value Date    PTH 33 6 12/14/2020    CALCIUM 8 7 05/05/2022    PHOS 3 4 12/14/2020       Portions of the record may have been created with voice recognition software   Occasional wrong word or "sound a like" substitutions may have occurred due to the inherent limitations of voice recognition software   Read the chart carefully and recognize, using context, where substitutions have occurred

## 2022-07-29 NOTE — PATIENT INSTRUCTIONS
1 Chronic kidney disease stage 3a likely d/t multiple episodes of contrast induced nephropathy +/- age related nephron loss +/- hypertensive nephrosclerosis +/- diuretic use  -avoid nonsteroidals(ibuprofen, aleve, advil, motrin, celebrex, naproxen, toradol, indomethacin)  Stop naproxen  Can take tylenol arthritis instead  -b/l sCr appears to be at about 1 5-1 6 with last sCr 1 51 as of 5/5/22   -s/p IV dye on 12/7/20  -renal ultrasound shows left kidney 12 6cm, right kidney 9 7cm(foreshortened on imaging)  -repeat BMP every 6 months  -UA bland as of Nov 2021, UpCr 0 16g     2  HTN - BP well controlled on lisinopril 10mg daily, triamterene-hydrochlorothiazide 75-50mg daily, metoprolol 25mg daily     3  Mild anemia, iron deficiency - resolved on oral iron, monitor CBC     4  History of gout - on colchicine 0 6mg as needed for acute gout, last uric acid 5 6 as of June 2019     5  CAD - on aspirin, plavix, lipitor     6  History of nephrolithiasis - with obstruction in August 2020, stone analysis shows 80% calcium oxalate stone    -5/17/21 litholink showed Urine volume 2 26L, citrate low at 262  Urine volume is adequate for stone prevention but low urine citrate will predispose to stone formation  Continue potassium citrate tablets 2 tabs twice daily  -stay hydrated  Drink enough to urinate 2-2 5L/day  -avoid a high salt/sodium diet     7  Elevated PSA level - to follow up with PCP, having urinary symptoms currently, will begin flomax daily, will refer to urology     RTC in 6 months  Obtain blood and urine testing prior to next appointment

## 2022-08-24 DIAGNOSIS — I25.10 CORONARY ARTERY DISEASE INVOLVING NATIVE CORONARY ARTERY OF NATIVE HEART WITHOUT ANGINA PECTORIS: ICD-10-CM

## 2022-08-25 RX ORDER — METOPROLOL SUCCINATE 25 MG/1
TABLET, EXTENDED RELEASE ORAL
Qty: 90 TABLET | Refills: 1 | Status: SHIPPED | OUTPATIENT
Start: 2022-08-25

## 2022-09-22 DIAGNOSIS — E78.5 DYSLIPIDEMIA: ICD-10-CM

## 2022-09-22 RX ORDER — ATORVASTATIN CALCIUM 40 MG/1
40 TABLET, FILM COATED ORAL EVERY EVENING
Qty: 90 TABLET | Refills: 3 | Status: SHIPPED | OUTPATIENT
Start: 2022-09-22

## 2022-10-07 ENCOUNTER — APPOINTMENT (OUTPATIENT)
Dept: LAB | Facility: HOSPITAL | Age: 77
End: 2022-10-07
Payer: COMMERCIAL

## 2022-10-07 ENCOUNTER — TELEPHONE (OUTPATIENT)
Dept: NEPHROLOGY | Facility: CLINIC | Age: 77
End: 2022-10-07

## 2022-10-07 ENCOUNTER — TELEPHONE (OUTPATIENT)
Dept: UROLOGY | Facility: AMBULATORY SURGERY CENTER | Age: 77
End: 2022-10-07

## 2022-10-07 DIAGNOSIS — D50.0 IRON DEFICIENCY ANEMIA DUE TO CHRONIC BLOOD LOSS: ICD-10-CM

## 2022-10-07 DIAGNOSIS — N18.32 STAGE 3B CHRONIC KIDNEY DISEASE (HCC): ICD-10-CM

## 2022-10-07 DIAGNOSIS — R97.20 ELEVATED PSA: ICD-10-CM

## 2022-10-07 DIAGNOSIS — R97.20 ELEVATED PSA: Primary | ICD-10-CM

## 2022-10-07 DIAGNOSIS — I10 PRIMARY HYPERTENSION: Primary | ICD-10-CM

## 2022-10-07 LAB
ANION GAP SERPL CALCULATED.3IONS-SCNC: 5 MMOL/L (ref 4–13)
BACTERIA UR QL AUTO: ABNORMAL /HPF
BILIRUB UR QL STRIP: NEGATIVE
BUN SERPL-MCNC: 22 MG/DL (ref 5–25)
CALCIUM SERPL-MCNC: 9 MG/DL (ref 8.3–10.1)
CHLORIDE SERPL-SCNC: 108 MMOL/L (ref 96–108)
CLARITY UR: CLEAR
CO2 SERPL-SCNC: 23 MMOL/L (ref 21–32)
COLOR UR: YELLOW
CREAT SERPL-MCNC: 1.83 MG/DL (ref 0.6–1.3)
CREAT UR-MCNC: 121 MG/DL
GFR SERPL CREATININE-BSD FRML MDRD: 34 ML/MIN/1.73SQ M
GLUCOSE SERPL-MCNC: 114 MG/DL (ref 65–140)
GLUCOSE UR STRIP-MCNC: NEGATIVE MG/DL
HGB UR QL STRIP.AUTO: NEGATIVE
HYALINE CASTS #/AREA URNS LPF: ABNORMAL /LPF
KETONES UR STRIP-MCNC: NEGATIVE MG/DL
LEUKOCYTE ESTERASE UR QL STRIP: NEGATIVE
NITRITE UR QL STRIP: NEGATIVE
NON-SQ EPI CELLS URNS QL MICRO: ABNORMAL /HPF
PH UR STRIP.AUTO: 6 [PH]
POTASSIUM SERPL-SCNC: 4.3 MMOL/L (ref 3.5–5.3)
PROT UR STRIP-MCNC: NEGATIVE MG/DL
PROT UR-MCNC: 9 MG/DL
PROT/CREAT UR: 0.07 MG/G{CREAT} (ref 0–0.1)
PSA SERPL-MCNC: 5.9 NG/ML (ref 0–4)
RBC #/AREA URNS AUTO: ABNORMAL /HPF
SODIUM SERPL-SCNC: 136 MMOL/L (ref 135–147)
SP GR UR STRIP.AUTO: 1.02 (ref 1–1.03)
UROBILINOGEN UR STRIP-ACNC: <2 MG/DL
WBC #/AREA URNS AUTO: ABNORMAL /HPF

## 2022-10-07 PROCEDURE — 81001 URINALYSIS AUTO W/SCOPE: CPT

## 2022-10-07 PROCEDURE — 82570 ASSAY OF URINE CREATININE: CPT

## 2022-10-07 PROCEDURE — 80048 BASIC METABOLIC PNL TOTAL CA: CPT

## 2022-10-07 PROCEDURE — G0103 PSA SCREENING: HCPCS

## 2022-10-07 PROCEDURE — 84156 ASSAY OF PROTEIN URINE: CPT

## 2022-10-07 PROCEDURE — 36415 COLL VENOUS BLD VENIPUNCTURE: CPT

## 2022-10-07 NOTE — TELEPHONE ENCOUNTER
----- Message from Annita Ramos DO sent at 10/7/2022  3:52 PM EDT -----  sCr higher - ensure patient staying well hydrated and denies vomiting/diarrhea/decreased oral intake  Repeat BMP in 1 week  Thanks

## 2022-10-07 NOTE — TELEPHONE ENCOUNTER
Called patient and went over the following:    sCr higher - ensure patient staying well hydrated and denies vomiting/diarrhea/decreased oral intake  Repeat BMP in 1 week  Patient denies any symptoms and states he feels fine  He will stay well hydrated and have blood work at the end of next week

## 2022-10-11 ENCOUNTER — OFFICE VISIT (OUTPATIENT)
Dept: UROLOGY | Facility: HOSPITAL | Age: 77
End: 2022-10-11
Payer: COMMERCIAL

## 2022-10-11 VITALS
BODY MASS INDEX: 30.07 KG/M2 | OXYGEN SATURATION: 98 % | SYSTOLIC BLOOD PRESSURE: 118 MMHG | WEIGHT: 203 LBS | HEART RATE: 56 BPM | HEIGHT: 69 IN | DIASTOLIC BLOOD PRESSURE: 68 MMHG

## 2022-10-11 DIAGNOSIS — R97.20 ELEVATED PSA: Primary | ICD-10-CM

## 2022-10-11 DIAGNOSIS — R35.0 URINARY FREQUENCY: ICD-10-CM

## 2022-10-11 PROCEDURE — 99213 OFFICE O/P EST LOW 20 MIN: CPT | Performed by: NURSE PRACTITIONER

## 2022-10-11 RX ORDER — TAMSULOSIN HYDROCHLORIDE 0.4 MG/1
0.4 CAPSULE ORAL
Qty: 90 CAPSULE | Refills: 3 | Status: SHIPPED | OUTPATIENT
Start: 2022-10-11

## 2022-10-11 RX ORDER — FINASTERIDE 5 MG/1
5 TABLET, FILM COATED ORAL DAILY
Qty: 90 TABLET | Refills: 3 | Status: SHIPPED | OUTPATIENT
Start: 2022-10-11

## 2022-10-11 NOTE — PROGRESS NOTES
10/11/22    Jordyn Preston    1945   [id number]     Assessment  1 Elevated PSA s/p PIRADS-2 MRI 2022      Discussion/Plan  1 Elevated PSA s/p PIRADS-2 MRI 2022    10/7/22 PSA 4 4              4/1/22  mpMRI: PI-RADSv2 1 Category 2 - Low (clinically significant cancer is unlikely to be present)  No extraprostatic tumor, seminal vesicle invasion, pelvic lymphadenopathy, or pelvic osseous metastatic disease  Calculated prostate volume of 38 8 cc    nocturia     Patient will obtain PSA in 6 months and return to review  All questions answered at this time      Subjective  HPI   Skip Betzaida Aaron is a 68year old male with a history of elevated PSA presents today for follow up  His PSA has varied over the years  Most recently 5 9  MRI was obtained and showed PIRADS-2 score with 38 8 cc volume prostate  He has no complaints and minimal symptoms  He is known to our office for history of nephrolithiasis as well  He has history of obstructing ureteral stones measuring up to 7 mm and causing moderate hydronephrosis  He required ureteral stent in the 1980's for nephrolithiasis  He is doing well at this time  He denies gross hematuria, dysuria, unintentional weight loss, or pelvic pain   No family history of prostate cancer      Component      Latest Ref Rng & Units 11/17/2017 12/11/2018 6/11/2019 1/15/2020          11:18 AM  8:38 AM 11:24 AM 10:40 AM   PSA, Total      0 0 - 4 0 ng/mL 3 1 4 1 (H) 4 1 (H) 1 9     Component      Latest Ref Rng & Units 1/17/2022 3/7/2022 5/5/2022 10/7/2022          12:20 PM 10:37 AM  3:16 PM 10:24 AM   PSA, Total      0 0 - 4 0 ng/mL 5 0 (H) 4 4 (H) 5 0 (H) 5 9 (H)     Review of Systems - History obtained from chart review and the patient  General ROS: negative  Psychological ROS: negative  Respiratory ROS: no cough, shortness of breath, or wheezing  Cardiovascular ROS: negative  Gastrointestinal ROS: negative  Genito-Urinary ROS: negative  Musculoskeletal ROS: negative  Neurological ROS: no TIA or stroke symptoms  Dermatological ROS: negative       Objective  Physical Exam  Vitals and nursing note reviewed  Constitutional:       General: He is awake  He is not in acute distress  Appearance: Normal appearance  He is well-developed, well-groomed and normal weight  He is not ill-appearing, toxic-appearing or diaphoretic  Pulmonary:      Effort: Pulmonary effort is normal    Abdominal:      Tenderness: There is no right CVA tenderness or left CVA tenderness  Musculoskeletal:         General: Normal range of motion  Cervical back: Normal range of motion and neck supple  Skin:     General: Skin is warm  Neurological:      General: No focal deficit present  Mental Status: He is alert and oriented to person, place, and time  Mental status is at baseline  Psychiatric:         Attention and Perception: Attention normal          Mood and Affect: Mood normal          Speech: Speech normal          Behavior: Behavior normal  Behavior is cooperative  Thought Content: Thought content normal          Cognition and Memory: Cognition normal          Judgment: Judgment normal           MULTIPARAMETRIC MRI OF THE PROSTATE WITH AND WITHOUT CONTRAST-WITH 3-D POSTPROCESSING      INDICATION:   R97 20: Elevated prostate specific antigen (PSA)      COMPARISON: None     PSA LEVEL: 4 4 ng/ml  3/7/2022  PRIOR BIOPSY DATE: No prior biopsy  BIOPSY RESULTS: Not applicable      TECHNIQUE: The following pulse sequences were obtained:  Small field-of-view axial T1-weighted and multiplanar T2-weighted images; DWI axial and ADC map; large field of view axial T2 weighted images; T1w in-phase and opposed-phase axials of entire pelvis   and dynamic 3D T1w axial before and during IV contrast injection    Imaging performed on 3 0T MRI      CONTRAST:  Gadobutrol (Gadavist) 9 mL of Gadobutrol injection (SINGLE-DOSE)     TECHNICAL LIMITATIONS: None      FINDINGS:     PROSTATE:     Size: 5 5 x 3 4 x 4 0 cm = 38 8 cc      Post-biopsy hemorrhage:  None  Central gland enlargement (BPH): Mild      Focal lesions - No dominant lesion  Heterogeneous peripheral zone  PI-RADSv2 1 Category 2 - Low (clinically significant cancer unlikely)      SEMINAL VESICLES: T1 hyperintense signal in the left seminal vesicle suggesting proteinaceous fluid/hematospermia  Otherwise unremarkable      Note: Clinically significant cancer is defined on pathology/histology as Irvine score greater than or equal to 7, and/or volume of greater than or equal to 0 5 mL, and/or extraprostatic extension      URINARY BLADDER: Unremarkable      LYMPH NODES: No pelvic lymphadenopathy      BONES: No suspicious osseous lesion      OTHER:  Diverticulosis coli      IMPRESSION:     1  PI-RADSv2 1 Category 2 - Low (clinically significant cancer is unlikely to be present)      2  No extraprostatic tumor, seminal vesicle invasion, pelvic lymphadenopathy, or pelvic osseous metastatic disease      3  Calculated prostate volume of 38 8 cc  Fer Little     I have spent 15 minutes with Patient  today in which greater than 50% of this time was spent in counseling/coordination of care regarding Risks and benefits of tx options and Intructions for management

## 2022-10-12 ENCOUNTER — OFFICE VISIT (OUTPATIENT)
Dept: CARDIOLOGY CLINIC | Facility: CLINIC | Age: 77
End: 2022-10-12
Payer: COMMERCIAL

## 2022-10-12 VITALS
HEIGHT: 70 IN | SYSTOLIC BLOOD PRESSURE: 124 MMHG | DIASTOLIC BLOOD PRESSURE: 80 MMHG | WEIGHT: 203.6 LBS | BODY MASS INDEX: 29.15 KG/M2 | HEART RATE: 52 BPM

## 2022-10-12 DIAGNOSIS — I25.10 CORONARY ARTERY DISEASE INVOLVING NATIVE CORONARY ARTERY OF NATIVE HEART WITHOUT ANGINA PECTORIS: Primary | ICD-10-CM

## 2022-10-12 DIAGNOSIS — I35.0 NONRHEUMATIC AORTIC VALVE STENOSIS: ICD-10-CM

## 2022-10-12 DIAGNOSIS — I10 ESSENTIAL HYPERTENSION: ICD-10-CM

## 2022-10-12 PROCEDURE — 93000 ELECTROCARDIOGRAM COMPLETE: CPT | Performed by: INTERNAL MEDICINE

## 2022-10-12 PROCEDURE — 99214 OFFICE O/P EST MOD 30 MIN: CPT | Performed by: INTERNAL MEDICINE

## 2022-10-12 NOTE — PROGRESS NOTES
Cardiology Follow Up    Akua Mcneil   1945  [id number]  [address] [facility] [address] [address] [phone]    1  Coronary artery disease involving native coronary artery of native heart without angina pectoris  POCT ECG   2  Essential hypertension  POCT ECG       Interval History: Followup CAD  Doing well  No chest pain, dyspnea or palpitations  Medical Problems             Problem List     Tubular adenoma of colon    Carpal tunnel syndrome    Dyslipidemia (Chronic)    Gout    Hypertension (Chronic)    Impaired fasting glucose    Overview Signed 6/4/2018 11:07 AM by Ana María Erickson DO     Transitioned From: Hyperglycemia         Insomnia    Iron deficiency anemia due to chronic blood loss    Overview Signed 6/4/2018 11:07 AM by Ana María Erickson DO     Transitioned From: Hypochromic microcytic anemia         Osteoarthritis    Prolonged QT interval    Rhinitis    Other disorder of calcium metabolism    Nephrolithiasis    Elevated PSA    Obesity (BMI 30 0-34  9)    Internal carotid artery stenosis, right (Chronic)    Cerebrovascular accident (CVA) ([facility] [address] ) (Chronic)    Aortic valve stenosis    S/P carotid endarterectomy    Coronary artery disease involving native coronary artery    Bilateral carotid artery stenosis    Stage 3b chronic kidney disease ([facility] [address] )    Lab Results   Component Value Date    EGFR 34 10/07/2022    EGFR 43 05/05/2022    EGFR 48 03/31/2022    CREATININE 1 83 (H) 10/07/2022    CREATININE 1 51 (H) 05/05/2022    CREATININE 1 40 (H) 03/31/2022         Rambo lesion, chronic    Paraesophageal hernia    Atrial flutter, unspecified type [facility])              Past Medical History:   Diagnosis Date   • Anemia     iron def   • Arthritis    • GERD (gastroesophageal reflux disease)    • Gout    • Hypertension    • Insomnia    • Kidney stone    • Stroke (cerebrum) [facility])      Social History Socioeconomic History   • Marital status:       Spouse name: Not on file   • Number of children: 1   • Years of education: Not on file   • Highest education level: Not on file   Occupational History   • Occupation: Retired     Comment: working full time   Tobacco Use   • Smoking status: Former Smoker     Packs/day:  00     Years: 22 00     Pack years: 22 00     Quit date:      Years since quittin 8   • Smokeless tobacco: Never Used   Vaping Use   • Vaping Use: Never used   Substance and Sexual Activity   • Alcohol use: Not Currently     Comment: stopped drinking alcohol   • Drug use: No   • Sexual activity: Not Currently   Other Topics Concern   • Not on file   Social History Narrative    , lives alone, working full time     Social Determinants of Health     Financial Resource Strain: Not on file   Food Insecurity: Not on file   Transportation Needs: Not on file   Physical Activity: Not on file   Stress: Not on file   Social Connections: Not on file   Intimate Partner Violence: Not on file   Housing Stability: Not on file      Family History   Problem Relation Age of Onset   • Alzheimer's disease Mother    • Alzheimer's disease Father    • Heart disease Father         CAD   • Other Father         prediabetes   • Diabetes Father    • Breast cancer Other    • Arthritis Family    • Hypertension Family      Past Surgical History:   Procedure Laterality Date   • ANKLE SURGERY Right    • CARDIAC CATHETERIZATION     • COLONOSCOPY  2013    polypectomy; complete - 3 yrs d/t polyp - benign submucosal lipoma- path c/w lipoma   • COLONOSCOPY  2017    complete - tubular adenoma - repeat 5yrs   • CYSTOSCOPY     • CYSTOTOMY      bladder  w/ basket extraction of calculus   • FEMUR FRACTURE SURGERY     • HERNIA REPAIR      inguinal ; sliding   • INGUINAL HERNIA REPAIR Right     unilateral   • KNEE ARTHROSCOPY Right     w/medial menisectomy   • LASIK      corneal   • LITHOTRIPSY      renal   • NH COLONOSCOPY FLX DX W/COLLJ SPEC WHEN PFRMD N/A 4/25/2017    Procedure: SCREENING COLONOSCOPY;  Surgeon: Carlton Desouza MD;  Location: [facility];  Service: General   • NH THROMBOENDARTECTMY Kaley Conte Right 1/10/2020    Procedure: ENDARTERECTOMY ARTERY CAROTID;  Surgeon: Paul Nino MD;  Location: [facility];  Service: Vascular   • ROTATOR CUFF REPAIR Bilateral    • TONSILLECTOMY         Current Outpatient Medications:   •  Ascorbic Acid (VITAMIN C IMMUNE HEALTH PO), Take by mouth daily, Disp: , Rfl:   •  aspirin (ECOTRIN LOW STRENGTH) 81 mg EC tablet, Take 81 mg by mouth every other day 1 every other day, Disp: , Rfl:   •  atorvastatin (LIPITOR) 40 mg tablet, Take 1 tablet (40 mg total) by mouth every evening, Disp: 90 tablet, Rfl: 3  •  calcium polycarbophil (FIBERCON) 625 mg tablet, Take 625 mg by mouth daily, Disp: , Rfl:   •  clotrimazole-betamethasone (LOTRISONE) 1-0 05 % cream, apply to affected area twice a day for 10 days MAX, Disp: 45 g, Rfl: 0  •  ferrous sulfate 324 (65 Fe) mg, Take 1 tablet (324 mg total) by mouth daily before breakfast, Disp: 30 tablet, Rfl: 2  •  finasteride (PROSCAR) 5 mg tablet, Take 1 tablet (5 mg total) by mouth daily, Disp: 90 tablet, Rfl: 3  •  lisinopril (ZESTRIL) 10 mg tablet, take 1 tablet by mouth once daily, Disp: 90 tablet, Rfl: 0  •  metoprolol succinate (TOPROL-XL) 25 mg 24 hr tablet, take 1 tablet by mouth once daily, Disp: 90 tablet, Rfl: 1  •  Multiple Vitamin (MULTIVITAMIN) capsule, Take 1 capsule by mouth daily, Disp: , Rfl:   •  nitroglycerin (NITROSTAT) 0 4 mg SL tablet, Place 1 tablet (0 4 mg total) under the tongue every 5 (five) minutes as needed for chest pain, Disp: 15 tablet, Rfl: 5  •  omeprazole (PriLOSEC) 20 mg delayed release capsule, take 1 capsule by mouth once daily, Disp: 90 capsule, Rfl: 2  •  Potassium Citrate ER 15 MEQ (1620 MG) TBCR, Take 2 tablets by mouth 2 (two) times a day, Disp: 120 tablet, Rfl: 3  •  tamsulosin (FLOMAX) 0 4 mg, Take 1 capsule (0 4 mg total) by mouth daily with dinner, Disp: 90 capsule, Rfl: 3  •  triamterene-hydrochlorothiazide (MAXZIDE) 75-50 MG per tablet, take 1 tablet by mouth once daily, Disp: 90 tablet, Rfl: 1  No Known Allergies    Labs:     Chemistry        Component Value Date/Time     05/14/2015 1002    K 4 3 10/07/2022 1033    K 3 7 05/14/2015 1002     10/07/2022 1033     05/14/2015 1002    CO2 23 10/07/2022 1033    CO2 28 05/14/2015 1002    BUN 22 10/07/2022 1033    BUN 12 05/14/2015 1002    CREATININE 1 83 (H) 10/07/2022 1033    CREATININE 1 02 05/14/2015 1002        Component Value Date/Time    CALCIUM 9 0 10/07/2022 1033    CALCIUM 9 2 05/14/2015 1002    ALKPHOS 47 05/05/2022 1516    ALKPHOS 53 03/28/2015 0835    AST 24 05/05/2022 1516    AST 22 03/28/2015 0835    ALT 32 05/05/2022 1516    ALT 22 03/28/2015 0835    BILITOT 0 4 03/28/2015 0835            Lab Results   Component Value Date    CHOL 141 09/25/2015    CHOL 161 08/08/2014     Lab Results   Component Value Date    HDL 43 01/17/2022    HDL 42 12/14/2020    HDL 41 12/11/2019     Lab Results   Component Value Date    LDLCALC 62 01/17/2022    LDLCALC 45 12/14/2020    LDLCALC 88 12/11/2019     Lab Results   Component Value Date    TRIG 75 01/17/2022    TRIG 114 12/14/2020    TRIG 127 12/11/2019     No results found for: CHOLHDL    Imaging: No results found  Review of Systems   Constitutional: Negative  HENT: Negative  Eyes: Negative  Cardiovascular: Negative  Respiratory: Negative  Endocrine: Negative  Hematologic/Lymphatic: Negative  Skin: Negative  Musculoskeletal: Negative  Gastrointestinal: Negative  Genitourinary: Negative  Neurological: Negative  Psychiatric/Behavioral: Negative  Allergic/Immunologic: Negative  Vitals:    10/12/22 0754   BP: 124/80   Pulse: (!) 52           Physical Exam  Vitals reviewed  Constitutional:       Appearance: Normal appearance     HENT:      Head: Normocephalic  Nose: Nose normal       Mouth/Throat:      Mouth: Mucous membranes are moist       Pharynx: Oropharynx is clear  Eyes:      General: No scleral icterus  Conjunctiva/sclera: Conjunctivae normal    Cardiovascular:      Rate and Rhythm: Normal rate and regular rhythm  Heart sounds: No murmur heard  No friction rub  No gallop  Pulmonary:      Effort: Pulmonary effort is normal  No respiratory distress  Breath sounds: Normal breath sounds  No wheezing or rales  Abdominal:      General: Abdomen is flat  Bowel sounds are normal  There is no distension  Palpations: Abdomen is soft  Tenderness: There is no abdominal tenderness  There is no guarding  Musculoskeletal:      Cervical back: Normal range of motion and neck supple  Right lower leg: No edema  Left lower leg: No edema  Skin:     General: Skin is warm and dry  Neurological:      General: No focal deficit present  Mental Status: He is alert and oriented to person, place, and time  Psychiatric:         Mood and Affect: Mood normal          Behavior: Behavior normal          Discussion/Summary:    CAD: S/P SANDRINE to the LAD  Continue with current medical therapy  LDL 62      Mild Aortic Stenosis: Update echocardiogram       Hypertension with CKD: Cr 1 8 and relatively stable  BP is well controlled on current medical therapy           The patient was counseled regarding diagnostic results, instructions for management, risk factor reductions, impressions  total time of encounter was 25 minutes and 15 minutes was spent counseling

## 2022-10-17 ENCOUNTER — OFFICE VISIT (OUTPATIENT)
Dept: FAMILY MEDICINE CLINIC | Facility: HOSPITAL | Age: 77
End: 2022-10-17
Payer: COMMERCIAL

## 2022-10-17 VITALS
SYSTOLIC BLOOD PRESSURE: 108 MMHG | HEIGHT: 70 IN | TEMPERATURE: 96 F | BODY MASS INDEX: 29.09 KG/M2 | WEIGHT: 203.2 LBS | DIASTOLIC BLOOD PRESSURE: 64 MMHG | HEART RATE: 60 BPM

## 2022-10-17 DIAGNOSIS — I25.10 CORONARY ARTERY DISEASE INVOLVING NATIVE CORONARY ARTERY OF NATIVE HEART WITHOUT ANGINA PECTORIS: ICD-10-CM

## 2022-10-17 DIAGNOSIS — I35.0 AORTIC VALVE STENOSIS, ETIOLOGY OF CARDIAC VALVE DISEASE UNSPECIFIED: ICD-10-CM

## 2022-10-17 DIAGNOSIS — E78.5 DYSLIPIDEMIA: Chronic | ICD-10-CM

## 2022-10-17 DIAGNOSIS — R73.01 IMPAIRED FASTING GLUCOSE: ICD-10-CM

## 2022-10-17 DIAGNOSIS — R97.20 ELEVATED PSA: ICD-10-CM

## 2022-10-17 DIAGNOSIS — N18.32 STAGE 3B CHRONIC KIDNEY DISEASE (HCC): ICD-10-CM

## 2022-10-17 DIAGNOSIS — I10 PRIMARY HYPERTENSION: Primary | Chronic | ICD-10-CM

## 2022-10-17 DIAGNOSIS — D50.0 IRON DEFICIENCY ANEMIA DUE TO CHRONIC BLOOD LOSS: ICD-10-CM

## 2022-10-17 PROCEDURE — G0439 PPPS, SUBSEQ VISIT: HCPCS | Performed by: INTERNAL MEDICINE

## 2022-10-17 PROCEDURE — 99214 OFFICE O/P EST MOD 30 MIN: CPT | Performed by: INTERNAL MEDICINE

## 2022-10-17 NOTE — ASSESSMENT & PLAN NOTE
Discharged from [facility], check CBC/iron studies with BW in Jan - BW order given, will follow

## 2022-10-17 NOTE — ASSESSMENT & PLAN NOTE
Lab Results   Component Value Date    EGFR 34 10/07/2022    EGFR 43 05/05/2022    EGFR 48 03/31/2022    CREATININE 1 83 (H) 10/07/2022    CREATININE 1 51 (H) 05/05/2022    CREATININE 1 40 (H) 03/31/2022   Saw Nephro, encouraged NO NSAIDS (Tylenol ONLY), encouraged to keep hydrated and importance of BP/BS control reviewed, on an ACE

## 2022-10-17 NOTE — ASSESSMENT & PLAN NOTE
Just saw Carmine Davies, has prostate MRI 4/22, minimal urinary symptoms,  has repeat PSA as per Uro, will follow

## 2022-10-17 NOTE — PROGRESS NOTES
Assessment and Plan:     Problem List Items Addressed This Visit        Endocrine    Impaired fasting glucose     BW annually - order given, con't healthy diet and keep active, will follow         Relevant Orders    Comprehensive metabolic panel    Hemoglobin A1C       Cardiovascular and Mediastinum    Hypertension - Primary (Chronic)     BP at goal, con't current meds, recheck in 6  mos         Relevant Orders    CBC and differential    Comprehensive metabolic panel    Lipid panel    TSH, 3rd generation with Free T4 reflex    Iron    Ferritin    Hemoglobin A1C    Aortic valve stenosis     Has Echo scheduled as per Cardio, call with CV symptoms, will follow         Relevant Orders    CBC and differential    Comprehensive metabolic panel    Lipid panel    TSH, 3rd generation with Free T4 reflex    Iron    Ferritin    Hemoglobin A1C    Coronary artery disease involving native coronary artery     No current CV symptoms, just saw Cardio, on ASA/beta blocker/statinACE, will follow         Relevant Orders    CBC and differential    Comprehensive metabolic panel    Lipid panel    TSH, 3rd generation with Free T4 reflex    Iron    Ferritin    Hemoglobin A1C       Genitourinary    Stage 3b chronic kidney disease (HCC)     Lab Results   Component Value Date    EGFR 34 10/07/2022    EGFR 43 05/05/2022    EGFR 48 03/31/2022    CREATININE 1 83 (H) 10/07/2022    CREATININE 1 51 (H) 05/05/2022    CREATININE 1 40 (H) 03/31/2022   Saw Nephro, encouraged NO NSAIDS (Tylenol ONLY), encouraged to keep hydrated and importance of BP/BS control reviewed, on an ACE         Relevant Orders    CBC and differential    Comprehensive metabolic panel    Lipid panel    TSH, 3rd generation with Free T4 reflex    Iron    Ferritin    Hemoglobin A1C       Other    Dyslipidemia (Chronic)     FLP annually - order given, con't current statin for now         Relevant Orders    Lipid panel    Iron deficiency anemia due to chronic blood loss     Discharged from Heme, check CBC/iron studies with BW in Jan - BW order given, will follow         Relevant Orders    CBC and differential    Iron    Ferritin    Elevated PSA     Just saw Uro, has prostate MRI 4/22, minimal urinary symptoms,  has repeat PSA as per Uro, will follow                Preventive health issues were discussed with patient, and age appropriate screening tests were ordered as noted in patient's After Visit Summary  Colonoscopy 2/21 - 5 yrs    BW 10/22  FLP 1/22    CUS 11/21 - scheduled 12/12/22 by vascular    Had flu vaccine at pharmacy this year already    Personalized health advice and appropriate referrals for health education or preventive services given if needed, as noted in patient's After Visit Summary  History of Present Illness:     Patient presents for a Medicare Wellness Visit    HPI Pt here for follow up appt and AWV    Pt saw Cardio (Dr Lorene Johnson) last wee for f/u CAD/HTN/AS - OV note reviewed  He had no meds changed  He was given an order for an Echo to f/u on known mild AS  He has Echo scheduled for 10/26/22  He notes no CP/SOB/syncope/LE edema  Pt saw Natalie Ribera) last week as well for f/u elevated PSA - OV note reviewed  MRI of prostate was done 4/22  He has f/u PSA ordered to follow trend  He notes no urinary issues  Pt saw Neprho (Dr Edgardo Fontanez) in July for f/u CKD - OV note reviewed  He was told to avoid Naproxen and to stick with Tylenol arthritis  He was told to f/u in 6 mos with BW and urine test prior to f/u appt  He has f/u scheduled for Jan      Pt saw Heme/Onc Teresa Lim) in May for f/u iron def anemia - OV note reviewed  Recent CBC and iron studies were normal   He was told to con't with annual labs and f/u prn  CUS 11/21 - scheduled 12/12/22 by vascular, on ASA and statin    Notes no stroke/TIA symptoms      Patient Care Team:  Yanet Yang DO as PCP - General (Internal Medicine)  Flora Jackson (Hematology and Oncology)  Yee Bell MD (Otolaryngology)  Maddi Narayan MD (Cardiology)  Eric Young as Nurse Practitioner (Urology)  Emi Salgado DO (Nephrology)     Review of Systems:     Review of Systems   Constitutional: Negative for chills and fever  HENT: Negative for congestion and trouble swallowing  Eyes: Positive for discharge and visual disturbance  Negative for pain and redness  Respiratory: Negative for cough, shortness of breath and wheezing  Cardiovascular: Negative for chest pain, palpitations and leg swelling  Gastrointestinal: Negative for abdominal pain, blood in stool, constipation, diarrhea, nausea and vomiting  Endocrine: Negative for polydipsia and polyuria  Genitourinary: Negative for difficulty urinating, dysuria and hematuria  Musculoskeletal: Positive for arthralgias  Negative for myalgias  Skin: Negative for rash and wound  Neurological: Negative for dizziness and headaches  Hematological: Does not bruise/bleed easily  Psychiatric/Behavioral: Negative for confusion and dysphoric mood  Problem List:     Patient Active Problem List   Diagnosis   • Tubular adenoma of colon   • Carpal tunnel syndrome   • Dyslipidemia   • Gout   • Hypertension   • Impaired fasting glucose   • Insomnia   • Iron deficiency anemia due to chronic blood loss   • Osteoarthritis   • Prolonged QT interval   • Rhinitis   • Other disorder of calcium metabolism   • Nephrolithiasis   • Elevated PSA   • Obesity (BMI 30 0-34  9)   • Internal carotid artery stenosis, right   • Cerebrovascular accident (CVA) ([facility] [address] )   • Aortic valve stenosis   • S/P carotid endarterectomy   • Coronary artery disease involving native coronary artery   • Bilateral carotid artery stenosis   • Stage 3b chronic kidney disease ([facility] [address] )   • Rambo lesion, chronic   • Paraesophageal hernia   • Atrial flutter, unspecified type [facility])      Past Medical and Surgical History:     Past Medical History:   Diagnosis Date • Anemia     iron def   • Arthritis    • GERD (gastroesophageal reflux disease)    • Gout    • Hypertension    • Insomnia    • Stroke (cerebrum) [facility])      Past Surgical History:   Procedure Laterality Date   • ANKLE SURGERY Right    • CARDIAC CATHETERIZATION     • COLONOSCOPY  2013    polypectomy; complete - 3 yrs d/t polyp - benign submucosal lipoma- path c/w lipoma   • COLONOSCOPY  2017    complete - tubular adenoma - repeat 5yrs   • CYSTOSCOPY     • CYSTOTOMY      bladder  w/ basket extraction of calculus   • FEMUR FRACTURE SURGERY     • HERNIA REPAIR      inguinal ; sliding   • INGUINAL HERNIA REPAIR Right     unilateral   • KNEE ARTHROSCOPY Right     w/medial menisectomy   • LASIK      corneal   • LITHOTRIPSY      renal   • AK COLONOSCOPY FLX DX W/COLLJ SPEC WHEN PFRMD N/A 2017    Procedure: SCREENING COLONOSCOPY;  Surgeon: Cristo Lepe MD;  Location:  MAIN OR;  Service: General   • AK THROMBOENDARTECTMY Roberto Schroeder Right 1/10/2020    Procedure: ENDARTERECTOMY ARTERY CAROTID;  Surgeon: Karl De La O MD;  Location:  MAIN OR;  Service: Vascular   • ROTATOR CUFF REPAIR Bilateral    • TONSILLECTOMY        Family History:     Family History   Problem Relation Age of Onset   • Alzheimer's disease Mother    • Alzheimer's disease Father    • Heart disease Father         CAD   • Other Father         prediabetes   • Diabetes Father    • Breast cancer Other    • Arthritis Family    • Hypertension Family       Social History:     Social History     Socioeconomic History   • Marital status:       Spouse name: None   • Number of children: 1   • Years of education: None   • Highest education level: None   Occupational History   • Occupation: Retired     Comment: working full time   Tobacco Use   • Smoking status: Former Smoker     Packs/day:  00     Years: 22 00     Pack years: 22 00     Quit date:      Years since quittin 8   • Smokeless tobacco: Never Used   Vaping Use   • Vaping Use: Never used   Substance and Sexual Activity   • Alcohol use: Not Currently     Comment: stopped drinking alcohol   • Drug use: No   • Sexual activity: Not Currently   Other Topics Concern   • None   Social History Narrative    , lives alone, working full time     Social Determinants of Health     Financial Resource Strain: Low Risk    • Difficulty of Paying Living Expenses: Not hard at all   Food Insecurity: Not on file   Transportation Needs: No Transportation Needs   • Lack of Transportation (Medical): No   • Lack of Transportation (Non-Medical):  No   Physical Activity: Not on file   Stress: Not on file   Social Connections: Not on file   Intimate Partner Violence: Not on file   Housing Stability: Not on file      Medications and Allergies:     Current Outpatient Medications   Medication Sig Dispense Refill   • Ascorbic Acid (VITAMIN C IMMUNE HEALTH PO) Take by mouth daily     • aspirin (ECOTRIN LOW STRENGTH) 81 mg EC tablet Take 81 mg by mouth every other day 1 every other day     • atorvastatin (LIPITOR) 40 mg tablet Take 1 tablet (40 mg total) by mouth every evening 90 tablet 3   • calcium polycarbophil (FIBERCON) 625 mg tablet Take 625 mg by mouth daily     • clotrimazole-betamethasone (LOTRISONE) 1-0 05 % cream apply to affected area twice a day for 10 days MAX 45 g 0   • ferrous sulfate 324 (65 Fe) mg Take 1 tablet (324 mg total) by mouth daily before breakfast 30 tablet 2   • finasteride (PROSCAR) 5 mg tablet Take 1 tablet (5 mg total) by mouth daily 90 tablet 3   • lisinopril (ZESTRIL) 10 mg tablet take 1 tablet by mouth once daily 90 tablet 0   • metoprolol succinate (TOPROL-XL) 25 mg 24 hr tablet take 1 tablet by mouth once daily 90 tablet 1   • Multiple Vitamin (MULTIVITAMIN) capsule Take 1 capsule by mouth daily     • nitroglycerin (NITROSTAT) 0 4 mg SL tablet Place 1 tablet (0 4 mg total) under the tongue every 5 (five) minutes as needed for chest pain 15 tablet 5   • omeprazole (PriLOSEC) 20 mg delayed release capsule take 1 capsule by mouth once daily 90 capsule 2   • Potassium Citrate ER 15 MEQ (1620 MG) TBCR Take 2 tablets by mouth 2 (two) times a day 120 tablet 3   • tamsulosin (FLOMAX) 0 4 mg Take 1 capsule (0 4 mg total) by mouth daily with dinner 90 capsule 3   • triamterene-hydrochlorothiazide (MAXZIDE) 75-50 MG per tablet take 1 tablet by mouth once daily 90 tablet 1     No current facility-administered medications for this visit  No Known Allergies   Immunizations:     Immunization History   Administered Date(s) Administered   • COVID-19 PFIZER VACCINE 0 3 ML IM 01/16/2021, 02/04/2021, 09/28/2021   • COVID-19 Pfizer Vac BIVALENT Nghia-sucrose 12 Yr+ IM (BOOSTER ONLY) 09/09/2022   • INFLUENZA 09/13/2014, 08/10/2015, 08/29/2016, 09/02/2017, 08/27/2018, 08/17/2019, 09/23/2021, 09/23/2021, 08/29/2022   • Influenza Quadrivalent Preservative Free 3 years and older IM 08/10/2015   • Influenza Split High Dose Preservative Free IM 08/29/2016, 09/02/2017   • Influenza, high dose seasonal 0 7 mL 08/24/2020   • Influenza, seasonal, injectable 01/16/2014, 09/13/2014   • Pneumococcal Conjugate 13-Valent 07/17/2015, 10/26/2015   • Pneumococcal Polysaccharide PPV23 02/28/2017, 04/14/2017   • TD (adult) Preservative Free 02/15/2011   • Tdap 09/28/2021   • Tetanus, adsorbed 02/15/2011   • Zoster 02/15/2020   • Zoster Vaccine Recombinant 12/10/2019, 02/15/2020   • influenza, trivalent, adjuvanted 08/17/2019      Health Maintenance:         Topic Date Due   • Colorectal Cancer Screening  02/17/2026   • Hepatitis C Screening  Completed         Topic Date Due   • COVID-19 Vaccine (4 - Booster for [employer] series) 01/28/2022      Medicare Screening Tests and Risk Assessments:     Fernando Parkinson is here for his Subsequent Wellness visit  Last Medicare Wellness visit information reviewed, patient interviewed and updates made to the record today        Health Risk Assessment:   Patient rates overall health as good  Patient feels that their physical health rating is same  Patient is satisfied with their life  Eyesight was rated as slightly worse  Hearing was rated as same  Patient feels that their emotional and mental health rating is same  Patients states they are sometimes angry  Patient states they are never, rarely unusually tired/fatigued  Pain experienced in the last 7 days has been some  Patient's pain rating has been 5/10  Patient states that he has experienced no weight loss or gain in last 6 months  Vision slightly worse - eyes watering the past 5 mos, he saw optho and was given eye drops and "pills to take"  He finished the tx and notes mild benefit with the meds but still has watering and blurry vision with R eye    Depression Screening:   PHQ-2 Score: 0      Fall Risk Screening: In the past year, patient has experienced: no history of falling in past year      Home Safety:  Patient does not have trouble with stairs inside or outside of their home  Patient has working smoke alarms and has working carbon monoxide detector  Home safety hazards include: none  Nutrition:   Current diet is Limited junk food and Regular  Medications:   Patient is currently taking over-the-counter supplements  OTC medications include: see medication list  Patient is able to manage medications  Activities of Daily Living (ADLs)/Instrumental Activities of Daily Living (IADLs):   Walk and transfer into and out of bed and chair?: Yes  Dress and groom yourself?: Yes    Bathe or shower yourself?: Yes    Feed yourself? Yes  Do your laundry/housekeeping?: Yes  Manage your money, pay your bills and track your expenses?: Yes  Make your own meals?: Yes    Do your own shopping?: Yes    Previous Hospitalizations:   Any hospitalizations or ED visits within the last 12 months?: No      Advance Care Planning:   Living will: Yes    Durable POA for healthcare:  Yes    Advanced directive: Yes      Cognitive Screening:   Provider or family/friend/caregiver concerned regarding cognition?: No    PREVENTIVE SCREENINGS      Cardiovascular Screening:    General: Screening Current, Risks and Benefits Discussed and History Lipid Disorder      Diabetes Screening:     General: Screening Current and Risks and Benefits Discussed      Colorectal Cancer Screening:     General: Screening Current and Risks and Benefits Discussed      Prostate Cancer Screening:    General: Risks and Benefits Discussed and Screening Current      Osteoporosis Screening:    General: Risks and Benefits Discussed and Screening Not Indicated      Abdominal Aortic Aneurysm (AAA) Screening:    Risk factors include: tobacco use        General: Risks and Benefits Discussed and Screening Current      Lung Cancer Screening:     General: Screening Not Indicated and Risks and Benefits Discussed      Hepatitis C Screening:    General: Screening Current and Risks and Benefits Discussed    Screening, Brief Intervention, and Referral to Treatment (SBIRT)    Screening  Typical number of drinks in a day: 0  Typical number of drinks in a week: 0  Interpretation: Low risk drinking behavior  Single Item Drug Screening:  How often have you used an illegal drug (including marijuana) or a prescription medication for non-medical reasons in the past year? never    Single Item Drug Screen Score: 0  Interpretation: Negative screen for possible drug use disorder     Visual Acuity Screening    Right eye Left eye Both eyes   Without correction: 20/40 20/30 20/30   With correction:           Physical Exam:     /64   Pulse 60   Temp (!) 96 °F (35 6 °C) (Tympanic)   Ht 5' 10" (1 778 m)   Wt 92 2 kg (203 lb 3 2 oz)   BMI 29 16 kg/m²     Physical Exam  Vitals and nursing note reviewed  Constitutional:       General: He is not in acute distress  Appearance: He is well-developed  He is not ill-appearing  HENT:      Head: Normocephalic and atraumatic        Right Ear: Tympanic membrane and external ear normal       Left Ear: Tympanic membrane and external ear normal    Eyes:      General:         Right eye: No discharge  Left eye: No discharge  Conjunctiva/sclera: Conjunctivae normal    Neck:      Vascular: No carotid bruit  Trachea: No tracheal deviation  Cardiovascular:      Rate and Rhythm: Normal rate and regular rhythm  Heart sounds: Murmur heard  Pulmonary:      Effort: Pulmonary effort is normal  No respiratory distress  Breath sounds: Normal breath sounds  No wheezing, rhonchi or rales  Abdominal:      General: There is no distension  Palpations: Abdomen is soft  Tenderness: There is no abdominal tenderness  There is no guarding or rebound  Musculoskeletal:         General: No deformity or signs of injury  Cervical back: Neck supple  Right lower leg: No edema  Left lower leg: No edema  Lymphadenopathy:      Cervical: No cervical adenopathy  Skin:     General: Skin is warm and dry  Coloration: Skin is not pale  Findings: No rash  Neurological:      General: No focal deficit present  Mental Status: He is alert  Motor: No abnormal muscle tone  Gait: Gait normal    Psychiatric:         Mood and Affect: Mood normal          Behavior: Behavior normal          Thought Content:  Thought content normal          Judgment: Judgment normal           Chance Rowe, DO

## 2022-10-17 NOTE — PATIENT INSTRUCTIONS
Medicare Preventive Visit Patient Instructions  Thank you for completing your Welcome to Medicare Visit or Medicare Annual Wellness Visit today  Your next wellness visit will be due in one year (10/18/2023)  The screening/preventive services that you may require over the next 5-10 years are detailed below  Some tests may not apply to you based off risk factors and/or age  Screening tests ordered at today's visit but not completed yet may show as past due  Also, please note that scanned in results may not display below  Preventive Screenings:  Service Recommendations Previous Testing/Comments   Colorectal Cancer Screening  · Colonoscopy    · Fecal Occult Blood Test (FOBT)/Fecal Immunochemical Test (FIT)  · Fecal DNA/Cologuard Test  · Flexible Sigmoidoscopy Age: 39-70 years old   Colonoscopy: every 10 years (May be performed more frequently if at higher risk)  OR  FOBT/FIT: every 1 year  OR  Cologuard: every 3 years  OR  Sigmoidoscopy: every 5 years  Screening may be recommended earlier than age 39 if at higher risk for colorectal cancer  Also, an individualized decision between you and your healthcare provider will decide whether screening between the ages of 74-80 would be appropriate   Colonoscopy: 02/17/2021  FOBT/FIT: 11/30/2020  Cologuard: Not on file  Sigmoidoscopy: Not on file    Screening Current     Prostate Cancer Screening Individualized decision between patient and health care provider in men between ages of 53-78   Medicare will cover every 12 months beginning on the day after your 50th birthday PSA: 5 9 ng/mL     Screening Not Indicated     Hepatitis C Screening Once for adults born between 1945 and 1965  More frequently in patients at high risk for Hepatitis C Hep C Antibody: 11/12/2020    Screening Current   Diabetes Screening 1-2 times per year if you're at risk for diabetes or have pre-diabetes Fasting glucose: 107 mg/dL (3/31/2022)  A1C: 5 4 % (1/17/2022)  Screening Not Indicated  History Diabetes   Cholesterol Screening Once every 5 years if you don't have a lipid disorder  May order more often based on risk factors  Lipid panel: 01/17/2022  Screening Current      Other Preventive Screenings Covered by Medicare:  1  Abdominal Aortic Aneurysm (AAA) Screening: covered once if your at risk  You're considered to be at risk if you have a family history of AAA or a male between the age of 73-68 who smoking at least 100 cigarettes in your lifetime  2  Lung Cancer Screening: covers low dose CT scan once per year if you meet all of the following conditions: (1) Age 50-69; (2) No signs or symptoms of lung cancer; (3) Current smoker or have quit smoking within the last 15 years; (4) You have a tobacco smoking history of at least 20 pack years (packs per day x number of years you smoked); (5) You get a written order from a healthcare provider  3  Glaucoma Screening: covered annually if you're considered high risk: (1) You have diabetes OR (2) Family history of glaucoma OR (3)  aged 48 and older OR (3)  American aged 72 and older  3  Osteoporosis Screening: covered every 2 years if you meet one of the following conditions: (1) Have a vertebral abnormality; (2) On glucocorticoid therapy for more than 3 months; (3) Have primary hyperparathyroidism; (4) On osteoporosis medications and need to assess response to drug therapy  5  HIV Screening: covered annually if you're between the age of 12-76  Also covered annually if you are younger than 13 and older than 72 with risk factors for HIV infection  For pregnant patients, it is covered up to 3 times per pregnancy      Immunizations:  Immunization Recommendations   Influenza Vaccine Annual influenza vaccination during flu season is recommended for all persons aged >= 6 months who do not have contraindications   Pneumococcal Vaccine   * Pneumococcal conjugate vaccine = PCV13 (Prevnar 13), PCV15 (Vaxneuvance), PCV20 (Prevnar 20)  * Pneumococcal polysaccharide vaccine = PPSV23 (Pneumovax) Adults 2364 years old: 1-3 doses may be recommended based on certain risk factors  Adults 72 years old: 1-2 doses may be recommended based off what pneumonia vaccine you previously received   Hepatitis B Vaccine 3 dose series if at intermediate or high risk (ex: diabetes, end stage renal disease, liver disease)   Tetanus (Td) Vaccine - COST NOT COVERED BY MEDICARE PART B Following completion of primary series, a booster dose should be given every 10 years to maintain immunity against tetanus  Td may also be given as tetanus wound prophylaxis  Tdap Vaccine - COST NOT COVERED BY MEDICARE PART B Recommended at least once for all adults  For pregnant patients, recommended with each pregnancy  Shingles Vaccine (Shingrix) - COST NOT COVERED BY MEDICARE PART B  2 shot series recommended in those aged 48 and above     Health Maintenance Due:      Topic Date Due   • Colorectal Cancer Screening  02/17/2026   • Hepatitis C Screening  Completed     Immunizations Due:      Topic Date Due   • COVID-19 Vaccine (4 - Booster for Caro Peter series) 01/28/2022     Advance Directives   What are advance directives? Advance directives are legal documents that state your wishes and plans for medical care  These plans are made ahead of time in case you lose your ability to make decisions for yourself  Advance directives can apply to any medical decision, such as the treatments you want, and if you want to donate organs  What are the types of advance directives? There are many types of advance directives, and each state has rules about how to use them  You may choose a combination of any of the following:  · Living will: This is a written record of the treatment you want  You can also choose which treatments you do not want, which to limit, and which to stop at a certain time  This includes surgery, medicine, IV fluid, and tube feedings     · Durable power of  for healthcare [facility]): This is a written record that states who you want to make healthcare choices for you when you are unable to make them for yourself  This person, called a proxy, is usually a family member or a friend  You may choose more than 1 proxy  · Do not resuscitate (DNR) order:  A DNR order is used in case your heart stops beating or you stop breathing  It is a request not to have certain forms of treatment, such as CPR  A DNR order may be included in other types of advance directives  · Medical directive: This covers the care that you want if you are in a coma, near death, or unable to make decisions for yourself  You can list the treatments you want for each condition  Treatment may include pain medicine, surgery, blood transfusions, dialysis, IV or tube feedings, and a ventilator (breathing machine)  · Values history: This document has questions about your views, beliefs, and how you feel and think about life  This information can help others choose the care that you would choose  Why are advance directives important? An advance directive helps you control your care  Although spoken wishes may be used, it is better to have your wishes written down  Spoken wishes can be misunderstood, or not followed  Treatments may be given even if you do not want them  An advance directive may make it easier for your family to make difficult choices about your care  Weight Management   Why it is important to manage your weight:  Being overweight increases your risk of health conditions such as heart disease, high blood pressure, type 2 diabetes, and certain types of cancer  It can also increase your risk for osteoarthritis, sleep apnea, and other respiratory problems  Aim for a slow, steady weight loss  Even a small amount of weight loss can lower your risk of health problems  How to lose weight safely:  A safe and healthy way to lose weight is to eat fewer calories and get regular exercise   You can lose up about 1 pound a week by decreasing the number of calories you eat by 500 calories each day  Healthy meal plan for weight management:  A healthy meal plan includes a variety of foods, contains fewer calories, and helps you stay healthy  A healthy meal plan includes the following:  · Eat whole-grain foods more often  A healthy meal plan should contain fiber  Fiber is the part of grains, fruits, and vegetables that is not broken down by your body  Whole-grain foods are healthy and provide extra fiber in your diet  Some examples of whole-grain foods are whole-wheat breads and pastas, oatmeal, brown rice, and bulgur  · Eat a variety of vegetables every day  Include dark, leafy greens such as spinach, kale, joss greens, and mustard greens  Eat yellow and orange vegetables such as carrots, sweet potatoes, and winter squash  · Eat a variety of fruits every day  Choose fresh or canned fruit (canned in its own juice or light syrup) instead of juice  Fruit juice has very little or no fiber  · Eat low-fat dairy foods  Drink fat-free (skim) milk or 1% milk  Eat fat-free yogurt and low-fat cottage cheese  Try low-fat cheeses such as mozzarella and other reduced-fat cheeses  · Choose meat and other protein foods that are low in fat  Choose beans or other legumes such as split peas or lentils  Choose fish, skinless poultry (chicken or turkey), or lean cuts of red meat (beef or pork)  Before you cook meat or poultry, cut off any visible fat  · Use less fat and oil  Try baking foods instead of frying them  Add less fat, such as margarine, sour cream, regular salad dressing and mayonnaise to foods  Eat fewer high-fat foods  Some examples of high-fat foods include french fries, doughnuts, ice cream, and cakes  · Eat fewer sweets  Limit foods and drinks that are high in sugar  This includes candy, cookies, regular soda, and sweetened drinks  Exercise:  Exercise at least 30 minutes per day on most days of the week   Some examples of exercise include walking, biking, dancing, and swimming  You can also fit in more physical activity by taking the stairs instead of the elevator or parking farther away from stores  Ask your healthcare provider about the best exercise plan for you  © Copyright [employer] 2018 Information is for End User's use only and may not be sold, redistributed or otherwise used for commercial purposes   All illustrations and images included in CareNotes® are the copyrighted property of A D A M , Inc  or [address] [place] [address]

## 2022-10-24 ENCOUNTER — OFFICE VISIT (OUTPATIENT)
Dept: OBGYN CLINIC | Facility: HOSPITAL | Age: 77
End: 2022-10-24
Payer: COMMERCIAL

## 2022-10-24 VITALS
HEART RATE: 55 BPM | RESPIRATION RATE: 17 BRPM | WEIGHT: 202 LBS | SYSTOLIC BLOOD PRESSURE: 104 MMHG | BODY MASS INDEX: 28.92 KG/M2 | HEIGHT: 70 IN | DIASTOLIC BLOOD PRESSURE: 65 MMHG

## 2022-10-24 DIAGNOSIS — M19.031 ARTHRITIS OF RIGHT WRIST: Primary | ICD-10-CM

## 2022-10-24 DIAGNOSIS — M19.032 ARTHRITIS OF LEFT WRIST: ICD-10-CM

## 2022-10-24 PROCEDURE — 99213 OFFICE O/P EST LOW 20 MIN: CPT | Performed by: SURGERY

## 2022-10-24 NOTE — PROGRESS NOTES
ASSESSMENT/PLAN:      69 yo male with bilateral wrist OA, R>L     Patient had minimal change in symptoms with the last injection  Currently he notes his symptoms are not bothersome     The patient verbalized understanding of exam findings and treatment plan  We engaged in the shared decision-making process and treatment options were discussed at length with the patient  Surgical and conservative management discussed today along with risks and benefits  Diagnoses and all orders for this visit:    Arthritis of right wrist    Arthritis of left wrist          Follow Up:  No follow-ups on file  To Do Next Visit:  Re-evaluation of current issue    ____________________________________________________________________________________________________________________________________________      CHIEF COMPLAINT:  Chief Complaint   Patient presents with   • Left Wrist - Follow-up, Pain   • Right Wrist - Pain, Follow-up       SUBJECTIVE:  Yocasta Gtz  is a 68y o  year old RHD male who presents for follow up evaluation of right wrist osteoarthritis  He received a right radiocarpal steroid injection at his last visit which he states did not change his symptoms much  Overall he denies significant pain in the wrist and has begun wearing a copper bracelet which has helped his pain over the last several months  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 and is overall doing well  I have personally reviewed all the relevant PMH, PSH, SH, FH, Medications and allergies       PAST MEDICAL HISTORY:  Past Medical History:   Diagnosis Date   • Anemia     iron def   • Arthritis    • GERD (gastroesophageal reflux disease)    • Gout    • Hypertension    • Insomnia    • Stroke (cerebrum) ([facility] [address] )        PAST SURGICAL HISTORY:  Past Surgical History:   Procedure Laterality Date   • ANKLE SURGERY Right    • CARDIAC CATHETERIZATION     • COLONOSCOPY  01/25/2013    polypectomy; complete - 3 yrs d/t polyp - benign submucosal lipoma- path c/w lipoma   • COLONOSCOPY  2017    complete - tubular adenoma - repeat 5yrs   • CYSTOSCOPY     • CYSTOTOMY      bladder  w/ basket extraction of calculus   • FEMUR FRACTURE SURGERY     • HERNIA REPAIR      inguinal ; sliding   • INGUINAL HERNIA REPAIR Right     unilateral   • KNEE ARTHROSCOPY Right     w/medial menisectomy   • LASIK      corneal   • LITHOTRIPSY      renal   • WV COLONOSCOPY FLX DX W/COLLJ SPEC WHEN PFRMD N/A 2017    Procedure: SCREENING COLONOSCOPY;  Surgeon: Isaiah Cabello MD;  Location: [facility]IN OR;  Service: General   • WV Rocío Oswald Right 1/10/2020    Procedure: ENDARTERECTOMY ARTERY CAROTID;  Surgeon: Harlan Condon MD;  Location: [facility];  Service: Vascular   • ROTATOR CUFF REPAIR Bilateral    • TONSILLECTOMY         FAMILY HISTORY:  Family History   Problem Relation Age of Onset   • Alzheimer's disease Mother    • Alzheimer's disease Father    • Heart disease Father         CAD   • Other Father         prediabetes   • Diabetes Father    • Breast cancer Other    • Arthritis Family    • Hypertension Family        SOCIAL HISTORY:  Social History     Tobacco Use   • Smoking status: Former Smoker     Packs/day:      Years:      Pack years: 22      Quit date:      Years since quittin 8   • Smokeless tobacco: Never Used   Vaping Use   • Vaping Use: Never used   Substance Use Topics   • Alcohol use: Not Currently     Comment: stopped drinking alcohol   • Drug use: No       MEDICATIONS:    Current Outpatient Medications:   •  Ascorbic Acid (VITAMIN C IMMUNE HEALTH PO), Take by mouth daily, Disp: , Rfl:   •  aspirin (ECOTRIN LOW STRENGTH) 81 mg EC tablet, Take 81 mg by mouth every other day 1 every other day, Disp: , Rfl:   •  atorvastatin (LIPITOR) 40 mg tablet, Take 1 tablet (40 mg total) by mouth every evening, Disp: 90 tablet, Rfl: 3  •  calcium polycarbophil (FIBERCON) 625 mg tablet, Take 625 mg by mouth daily, Disp: , Rfl:   •  clotrimazole-betamethasone (LOTRISONE) 1-0 05 % cream, apply to affected area twice a day for 10 days MAX, Disp: 45 g, Rfl: 0  •  ferrous sulfate 324 (65 Fe) mg, Take 1 tablet (324 mg total) by mouth daily before breakfast, Disp: 30 tablet, Rfl: 2  •  finasteride (PROSCAR) 5 mg tablet, Take 1 tablet (5 mg total) by mouth daily, Disp: 90 tablet, Rfl: 3  •  lisinopril (ZESTRIL) 10 mg tablet, take 1 tablet by mouth once daily, Disp: 90 tablet, Rfl: 1  •  metoprolol succinate (TOPROL-XL) 25 mg 24 hr tablet, take 1 tablet by mouth once daily, Disp: 90 tablet, Rfl: 1  •  Multiple Vitamin (MULTIVITAMIN) capsule, Take 1 capsule by mouth daily, Disp: , Rfl:   •  nitroglycerin (NITROSTAT) 0 4 mg SL tablet, Place 1 tablet (0 4 mg total) under the tongue every 5 (five) minutes as needed for chest pain, Disp: 15 tablet, Rfl: 5  •  omeprazole (PriLOSEC) 20 mg delayed release capsule, take 1 capsule by mouth once daily, Disp: 90 capsule, Rfl: 2  •  Potassium Citrate ER 15 MEQ (1620 MG) TBCR, Take 2 tablets by mouth 2 (two) times a day, Disp: 120 tablet, Rfl: 3  •  tamsulosin (FLOMAX) 0 4 mg, Take 1 capsule (0 4 mg total) by mouth daily with dinner, Disp: 90 capsule, Rfl: 3  •  triamterene-hydrochlorothiazide (MAXZIDE) 75-50 MG per tablet, take 1 tablet by mouth once daily, Disp: 90 tablet, Rfl: 1    ALLERGIES:  No Known Allergies    REVIEW OF SYSTEMS:  Review of Systems    VITALS:  Vitals:    10/24/22 0907   BP: 104/65   Pulse: 55   Resp: 17       LABS:  HgA1c:   Lab Results   Component Value Date    HGBA1C 5 4 01/17/2022     BMP:   Lab Results   Component Value Date    GLUCOSE 112 05/14/2015    CALCIUM 9 0 10/07/2022     05/14/2015    K 4 3 10/07/2022    CO2 23 10/07/2022     10/07/2022    BUN 22 10/07/2022    CREATININE 1 83 (H) 10/07/2022       _____________________________________________________  PHYSICAL EXAMINATION:  General: well developed and well nourished, alert, oriented times 3 and appears comfortable  Psychiatric: Normal  HEENT: Normocephalic, Atraumatic Trachea Midline, No torticollis  Pulmonary: No audible wheezing or respiratory distress   Cardiovascular: No pitting edema, 2+ radial pulse   Skin: No masses, erythema, lacerations, fluctation, ulcerations  Neurovascular: Sensation Intact to the Median, Ulnar, Radial Nerve, Motor Intact to the Median, Ulnar, Radial Nerve and Pulses Intact  Musculoskeletal: Normal, except as noted in detailed exam and in HPI  MUSCULOSKELETAL EXAMINATION:  Right wrist  No significant tenderness  Range of motion of the right wrist  is 20 degrees flexion, 30 degress extension, 90 degrees pronation,90 degrees supination  There is no swelling present  There is no ecchymosis noted  Pulses are present and capillary fill is normal      ___________________________________________________  STUDIES REVIEWED:  No new studies to review       PROCEDURES PERFORMED:  Procedures  No Procedures performed today    _____________________________________________________      PA Cosign: I supervised the physician assistant and discussed the case with them  I personally performed history and physical exam  I agree with the assessment and plan of care as documented by the physician assistant

## 2022-10-26 ENCOUNTER — HOSPITAL ENCOUNTER (OUTPATIENT)
Dept: NON INVASIVE DIAGNOSTICS | Age: 77
Discharge: HOME | End: 2022-10-26
Payer: COMMERCIAL

## 2022-10-26 VITALS
HEART RATE: 50 BPM | SYSTOLIC BLOOD PRESSURE: 120 MMHG | HEIGHT: 70 IN | WEIGHT: 202 LBS | BODY MASS INDEX: 28.92 KG/M2 | DIASTOLIC BLOOD PRESSURE: 60 MMHG

## 2022-10-26 DIAGNOSIS — I25.10 CORONARY ARTERY DISEASE INVOLVING NATIVE CORONARY ARTERY OF NATIVE HEART WITHOUT ANGINA PECTORIS: ICD-10-CM

## 2022-10-26 DIAGNOSIS — I35.0 NONRHEUMATIC AORTIC VALVE STENOSIS: ICD-10-CM

## 2022-10-26 LAB
AORTIC ROOT: 3.3 CM
AORTIC VALVE MEAN VELOCITY: 14.5 M/S
APICAL FOUR CHAMBER EJECTION FRACTION: 60 %
ASCENDING AORTA: 3.9 CM
AV AREA BY CONTINUOUS VTI: 1.9 CM2
AV AREA PEAK VELOCITY: 1.6 CM2
AV LVOT MEAN GRADIENT: 1 MMHG
AV LVOT PEAK GRADIENT: 3 MMHG
AV MEAN GRADIENT: 10 MMHG
AV PEAK GRADIENT: 19 MMHG
AV VALVE AREA: 1.91 CM2
AV VELOCITY RATIO: 0.39
DOP CALC AO PEAK VEL: 2.2 M/S
DOP CALC AO VTI: 53.63 CM
DOP CALC LVOT AREA: 4.15 CM2
DOP CALC LVOT DIAMETER: 2.3 CM
DOP CALC LVOT PEAK VEL VTI: 24.69 CM
DOP CALC LVOT PEAK VEL: 0.86 M/S
DOP CALC LVOT STROKE INDEX: 48.1 ML/M2
DOP CALC LVOT STROKE VOLUME: 102.53
E WAVE DECELERATION TIME: 255 MS
FRACTIONAL SHORTENING: 48 (ref 28–44)
INTERVENTRICULAR SEPTUM IN DIASTOLE (PARASTERNAL SHORT AXIS VIEW): 1 CM
INTERVENTRICULAR SEPTUM: 1 CM (ref 0.6–1.1)
LAAS-AP2: 26.9 CM2
LAAS-AP4: 20.6 CM2
LEFT ATRIUM AREA SYSTOLE SINGLE PLANE A4C: 20.5 CM2
LEFT ATRIUM SIZE: 5.4 CM
LEFT INTERNAL DIMENSION IN SYSTOLE: 2.5 CM (ref 2.1–4)
LEFT VENTRICULAR INTERNAL DIMENSION IN DIASTOLE: 4.8 CM (ref 3.5–6)
LEFT VENTRICULAR POSTERIOR WALL IN END DIASTOLE: 1.1 CM
LEFT VENTRICULAR STROKE VOLUME: 87 ML
LVSV (TEICH): 87 ML
MV E'TISSUE VEL-SEP: 6 CM/S
MV PEAK A VEL: 0.8 M/S
MV PEAK E VEL: 91 CM/S
MV STENOSIS PRESSURE HALF TIME: 74 MS
MV VALVE AREA P 1/2 METHOD: 2.97
RA PRESSURE ESTIMATED: 5 MMHG
RIGHT ATRIUM AREA SYSTOLE A4C: 24.6 CM2
RIGHT VENTRICLE ID DIMENSION: 5.4 CM
RV PSP: 35 MMHG
SL CV LEFT ATRIUM LENGTH A2C: 6.5 CM
SL CV LV EF: 65
SL CV PED ECHO LEFT VENTRICLE DIASTOLIC VOLUME (MOD BIPLANE) 2D: 109 ML
SL CV PED ECHO LEFT VENTRICLE SYSTOLIC VOLUME (MOD BIPLANE) 2D: 23 ML
TR MAX PG: 30 MMHG
TR PEAK VELOCITY: 2.8 M/S
TRICUSPID ANNULAR PLANE SYSTOLIC EXCURSION: 3 CM
TRICUSPID VALVE PEAK REGURGITATION VELOCITY: 2.75 M/S

## 2022-10-26 PROCEDURE — 93306 TTE W/DOPPLER COMPLETE: CPT | Performed by: INTERNAL MEDICINE

## 2022-10-26 PROCEDURE — 93306 TTE W/DOPPLER COMPLETE: CPT

## 2022-10-31 ENCOUNTER — TELEPHONE (OUTPATIENT)
Dept: CARDIOLOGY CLINIC | Facility: CLINIC | Age: 77
End: 2022-10-31

## 2022-10-31 NOTE — TELEPHONE ENCOUNTER
----- Message from Jenny Ruiz MD sent at 10/31/2022  9:20 AM EDT -----  Normal EF and mild AS  We will continue to follow

## 2022-12-02 ENCOUNTER — OFFICE VISIT (OUTPATIENT)
Dept: FAMILY MEDICINE CLINIC | Facility: HOSPITAL | Age: 77
End: 2022-12-02

## 2022-12-02 VITALS
HEART RATE: 58 BPM | WEIGHT: 207.8 LBS | DIASTOLIC BLOOD PRESSURE: 62 MMHG | HEIGHT: 70 IN | BODY MASS INDEX: 29.75 KG/M2 | OXYGEN SATURATION: 99 % | SYSTOLIC BLOOD PRESSURE: 124 MMHG | TEMPERATURE: 96.8 F

## 2022-12-02 DIAGNOSIS — L97.519 ULCER OF RIGHT FOOT, UNSPECIFIED ULCER STAGE (HCC): Primary | ICD-10-CM

## 2022-12-02 RX ORDER — NEOMYCIN SULFATE, POLYMYXIN B SULFATE AND DEXAMETHASONE 3.5; 10000; 1 MG/ML; [USP'U]/ML; MG/ML
SUSPENSION/ DROPS OPHTHALMIC
COMMUNITY
Start: 2022-12-01

## 2022-12-02 NOTE — PROGRESS NOTES
Name: Omari Gonzalez  : 1945      MRN: [id number]  Encounter Provider: BLAS Palumbo  Encounter Date: 2022   Encounter department: [facility] PRIMARY CARE [address]     Assessment & Plan     1  Ulcer of right foot, unspecified ulcer stage ([facility] [address] )  Comments:  will arrange ASAP appt w/[facility] for further treatment, advise he hold further neosporin use and keep covered/protected w/bandaid  Orders:  -     Ambulatory Referral to Wound Care; Future           Subjective        States he banged right foot a couple weeks ago and has been applying neosporin to sore with a bandaid  Wound has not healed and may be getting bigger  Not diabetic  No fever/chills  Review of Systems   Constitutional: Negative for chills and fever  Skin: Positive for wound (right foot)         Current Outpatient Medications on File Prior to Visit   Medication Sig   • Ascorbic Acid (VITAMIN C IMMUNE HEALTH PO) Take by mouth daily   • aspirin (ECOTRIN LOW STRENGTH) 81 mg EC tablet Take 81 mg by mouth every other day 1 every other day   • atorvastatin (LIPITOR) 40 mg tablet Take 1 tablet (40 mg total) by mouth every evening   • calcium polycarbophil (FIBERCON) 625 mg tablet Take 625 mg by mouth daily   • ferrous sulfate 324 (65 Fe) mg Take 1 tablet (324 mg total) by mouth daily before breakfast   • finasteride (PROSCAR) 5 mg tablet Take 1 tablet (5 mg total) by mouth daily   • lisinopril (ZESTRIL) 10 mg tablet take 1 tablet by mouth once daily   • metoprolol succinate (TOPROL-XL) 25 mg 24 hr tablet take 1 tablet by mouth once daily   • Multiple Vitamin (MULTIVITAMIN) capsule Take 1 capsule by mouth daily   • neomycin-polymyxin-dexamethasone (MAXITROL) ophthalmic suspension    • nitroglycerin (NITROSTAT) 0 4 mg SL tablet Place 1 tablet (0 4 mg total) under the tongue every 5 (five) minutes as needed for chest pain   • omeprazole (PriLOSEC) 20 mg delayed release capsule take 1 capsule by mouth once daily   • Potassium Citrate ER 15 MEQ (1620 MG) TBCR Take 2 tablets by mouth 2 (two) times a day   • tamsulosin (FLOMAX) 0 4 mg Take 1 capsule (0 4 mg total) by mouth daily with dinner   • triamterene-hydrochlorothiazide (MAXZIDE) 75-50 MG per tablet take 1 tablet by mouth once daily   • clotrimazole-betamethasone (LOTRISONE) 1-0 05 % cream apply to affected area twice a day for 10 days MAX (Patient not taking: Reported on 12/2/2022)       Objective     /62   Pulse 58   Temp (!) 96 8 °F (36 °C)   Ht 5' 10" (1 778 m)   Wt 94 3 kg (207 lb 12 8 oz)   SpO2 99%   BMI 29 82 kg/m²       Physical Exam  Vitals reviewed  Constitutional:       General: He is not in acute distress  Appearance: Normal appearance  Pulmonary:      Effort: Pulmonary effort is normal  No respiratory distress  Musculoskeletal:      Right lower leg: No edema  Skin:     General: Skin is warm and dry  Neurological:      General: No focal deficit present  Mental Status: He is alert and oriented to person, place, and time     Psychiatric:         Mood and Affect: Mood normal          Behavior: Behavior normal           Alfreda Police, BLAS

## 2022-12-07 ENCOUNTER — OFFICE VISIT (OUTPATIENT)
Dept: WOUND CARE | Facility: CLINIC | Age: 77
End: 2022-12-07

## 2022-12-07 VITALS
DIASTOLIC BLOOD PRESSURE: 58 MMHG | RESPIRATION RATE: 18 BRPM | SYSTOLIC BLOOD PRESSURE: 100 MMHG | HEART RATE: 60 BPM | TEMPERATURE: 95.3 F
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
## 2022-12-07 DIAGNOSIS — S91.001A OPEN WOUND OF RIGHT ANKLE, INITIAL ENCOUNTER: Primary | ICD-10-CM

## 2022-12-07 DIAGNOSIS — I70.203 UNSP ATHSCL NATIVE ARTERIES OF EXTREMITIES, BILATERAL LEGS (HCC): ICD-10-CM

## 2022-12-07 RX ORDER — LIDOCAINE 40 MG/G
CREAM TOPICAL ONCE
Status: COMPLETED | OUTPATIENT
Start: 2022-12-07 | End: 2022-12-07

## 2022-12-07 RX ADMIN — LIDOCAINE: 40 CREAM TOPICAL at 15:21

## 2022-12-07 NOTE — PATIENT INSTRUCTIONS
Orders Placed This Encounter   Procedures    Wound cleansing and dressings     Wash your hands with soap and water  Remove old dressing, discard into plastic bag and place in trash  Cleanse the wound with normal saline solution prior to applying a clean dressing  Do not use tissue or cotton balls  Do not scrub the wound  Pat dry using gauze  Shower YES    Apply skin protectant to skin surrounding wound  Apply puracol ag to the wound  Cover with gauze    Secure with ezekiel and tape  Change dressing every other day    Consume 3-4 servings of protein daily  Follow up with Wound Management in 1 week    Today's Treatment:  Cleansed with NSS and dressed as ordered above     Standing Status:   Future     Standing Expiration Date:   12/7/2023

## 2022-12-07 NOTE — PROGRESS NOTES
Patient ID: Vidya Menjivar  is a 68 y o  male Date of Birth 1945     Chief Complaint  Chief Complaint   Patient presents with   • New Patient Visit     Right ankle traumatic wound       Allergies  Patient has no known allergies  Assessment:    No problem-specific Assessment & Plan notes found for this encounter  Diagnoses and all orders for this visit:    Open wound of right ankle, initial encounter  -     lidocaine (LMX) 4 % cream  -     Wound cleansing and dressings; Future  -     Debridement    Hit by object, initial encounter    Unsp athscl native arteries of extremities, bilateral legs ([facility] [address] )              Debridement   Universal Protocol:  Consent: Verbal consent obtained  Risks and benefits: risks, benefits and alternatives were discussed  Consent given by: patient  Time out: Immediately prior to procedure a "time out" was called to verify the correct patient, procedure, equipment, support staff and site/side marked as required    Timeout called at: 12/7/2022 3:41 PM   Patient identity confirmed: verbally with patient      Performed by: physician  Debridement type: surgical  Level of debridement: subcutaneous tissue  Pain control: lidocaine 4%  Post-debridement measurements  Length (cm): 1 1  Width (cm): 0 9  Depth (cm): 0 2  Percent debrided: 100%  Surface Area (cm^2): 0 99  Area debrided (cm^2): 0 99  Volume (cm^3): 0 2  Tissue and other material debrided: subcutaneous tissue  Devitalized tissue debrided: biofilm and fibrin  Instrument(s) utilized: blade  Bleeding: small  Hemostasis obtained with: pressure  Procedural pain (0-10): 0  Post-procedural pain: 0   Response to treatment: procedure was tolerated well          Plan:    - pt eval and managed    - Number and complexity of problems addressed: 1 undiagnosed new problem with uncertain prognosis as shown    - Amount/complexity of data reviewed and analyzed:     Category 1: prior patient notes were analyzed today before evaluating and managing patient  All PMH  were discussed with pt today  - Risk of complications: moderate risk of morbidity from additional testing or treatment involved with this patient, which includes but not limited to: VASCULAR EVALUATION  SURGICAL DEBRIDEMENT    - discussed anatomy, condition, treatment plan and options  They were instructed on proper foot care  This is total time spent today involving both face-to-face time and non face-to-face time  This time spent includes reviewing their past medical history, performing a medically appropriate examination and evaluation of the patient, counseling and educating the patient, documenting all findings in EMR, and independently interpreting results and communicating results with patient and discussing their condition and treatment options, risks, and potential complications  I have discussed the findings of this examination with the patient  The discussion included a complete verbal explanation of the examination results, diagnosis and planned treatment(s)  A schedule for future care needs was explained  The patient has verbalized the understanding of these instructions at this time  If any questions should arise after returning home I have encouraged the patient to feel free to call the office     - FARRAH results d/w pt today   low healing potential  - Patient is aware that today's procedure may be a part of a staged or collection of procedures for definitive and/or alleviation of symptoms   - I d/w pt that secondary to wound being in area of STSG, may have decreased healing potential and prolonged  - we will monitor weekly  - we will begin with purachol Ag  - pt may require surgical debridement with application of matrix to the area to stimulate proper wound healing  - if wounds do not progress, we will send pt for Vascular evaluation and management    - we will see pt weekly     Wound 12/07/22 Traumatic Ankle Anterior;Right (Active)   Wound Image Images linked 12/07/22 1447   Wound Description Slough;Pink 12/07/22 1447   Kathrin-wound Assessment Maceration 12/07/22 1447   Wound Length (cm) 1 1 cm 12/07/22 1447   Wound Width (cm) 0 9 cm 12/07/22 1447   Wound Depth (cm) 0 2 cm 12/07/22 1447   Wound Surface Area (cm^2) 0 99 cm^2 12/07/22 1447   Wound Volume (cm^3) 0 198 cm^3 12/07/22 1447   Calculated Wound Volume (cm^3) 0 2 cm^3 12/07/22 1447   Drainage Amount Moderate 12/07/22 1447   Drainage Description Yellow 12/07/22 1447   Patient Tolerance Tolerated well 12/07/22 1447   Dressing Status Removed 12/07/22 1447       Wound 12/07/22 Traumatic Ankle Anterior;Right (Active)   Date First Assessed/Time First Assessed: 12/07/22 1447   Primary Wound Type: Traumatic  Location: Ankle  Wound Location Orientation: Anterior;Right       [REMOVED] Wound 01/10/20 Neck Right (Removed)   Resolved Date/Resolved Time: 12/07/22 1446  Date First Assessed/Time First Assessed: 01/10/20 1215   Location: Neck  Wound Location Orientation: Right  Wound Description (Comments): [id number]  Wound Outcome: Other (Comment)       [REMOVED] Wound 01/10/20 Ankle Anterior;Right (Removed)   Resolved Date/Resolved Time: 12/07/22 1447  Date First Assessed/Time First Assessed: 01/10/20 1430   Pre-Existing Wound: Yes  Location: Ankle  Wound Location Orientation: Anterior;Right  Wound Description (Comments): non healing  Wound Outcome: Other     [REMOVED] Wound 11/09/20 Catheter entry/exit site Wrist Right (Removed)   Resolved Date/Resolved Time: 12/07/22 1446  Date First Assessed/Time First Assessed: 11/09/20 1238   Pre-Existing Wound: No  Traumatic Wound Type: Catheter entry/exit site  Location: Wrist  Wound Location Orientation: Right  Wound Outcome: Other (Comm           [REMOVED] Wound 12/07/20 Catheter entry/exit site Wrist Right (Removed)   Resolved Date/Resolved Time: 12/07/22 1446  Date First Assessed/Time First Assessed: 12/07/20 1207   Pre-Existing Wound: No  Traumatic Wound Type: Catheter entry/exit site Location: Wrist  Wound Location Orientation: Right  Wound Description (Comment    Subjective:      74 year old male presents for evaluation and management of new wound to the R ankle region  Pt states he did drop something on his foot about 2-3 weeks ago  States started as a small opening  Was utilizing betadine and topical antibiotic  States wound continued to get larger  Was seen by PCP whom sent patient to us  Pt denies any type of pain to the area  States where the wound is, is where he had skin graft placed several years ago  Has not no problems to this area before this injury  Denies redness or drainage  Denies n/v/f/c  The following portions of the patient's history were reviewed and updated as appropriate: allergies, current medications, past family history, past medical history, past social history, past surgical history and problem list     Review of Systems   Constitutional: Negative for chills and fever  Respiratory: Negative for shortness of breath  Cardiovascular: Negative for chest pain  Gastrointestinal: Negative for diarrhea, nausea and vomiting  All other systems reviewed and are negative  Objective:       Wound 12/07/22 Traumatic Ankle Anterior;Right (Active)   Wound Image Images linked 12/07/22 1447   Wound Description Slough;Pink 12/07/22 1447   Kathrin-wound Assessment Maceration 12/07/22 1447   Wound Length (cm) 1 1 cm 12/07/22 1447   Wound Width (cm) 0 9 cm 12/07/22 1447   Wound Depth (cm) 0 2 cm 12/07/22 1447   Wound Surface Area (cm^2) 0 99 cm^2 12/07/22 1447   Wound Volume (cm^3) 0 198 cm^3 12/07/22 1447   Calculated Wound Volume (cm^3) 0 2 cm^3 12/07/22 1447   Drainage Amount Moderate 12/07/22 1447   Drainage Description Yellow 12/07/22 1447   Patient Tolerance Tolerated well 12/07/22 1447   Dressing Status Removed 12/07/22 1447       /58   Pulse 60   Temp (!) 95 3 °F (35 2 °C)   Resp 18     Physical Exam  Vitals and nursing note reviewed  Constitutional:       Appearance: Normal appearance  He is normal weight  Cardiovascular:      Pulses:           Dorsalis pedis pulses are 0 on the right side and 0 on the left side  Posterior tibial pulses are 0 on the right side and 0 on the left side  Musculoskeletal:        Feet:    Feet:      Right foot:      Protective Sensation: 8 sites tested  8 sites sensed  Skin integrity: Ulcer present  Left foot:      Protective Sensation: 8 sites tested  8 sites sensed  Neurological:      Mental Status: He is alert  Wound Instructions:  Orders Placed This Encounter   Procedures   • Wound cleansing and dressings     Wash your hands with soap and water  Remove old dressing, discard into plastic bag and place in trash  Cleanse the wound with normal saline solution prior to applying a clean dressing  Do not use tissue or cotton balls  Do not scrub the wound  Pat dry using gauze  Shower YES    Apply skin protectant to skin surrounding wound  Apply puracol ag to the wound  Cover with gauze  Secure with ezekiel and tape  Change dressing every other day    Consume 3-4 servings of protein daily  Follow up with Wound Management in 1 week    Today's Treatment:  Cleansed with NSS and dressed as ordered above     Standing Status:   Future     Standing Expiration Date:   12/7/2023   • Debridement     This order was created via procedure documentation        Diagnosis ICD-10-CM Associated Orders   1  Open wound of right ankle, initial encounter  S91 001A lidocaine (LMX) 4 % cream     Wound cleansing and dressings     Debridement      2  Hit by object, initial encounter  [address]  8XXA       3   Unsp athscl native arteries of extremities, bilateral legs ([facility])  [id number]

## 2022-12-12 ENCOUNTER — HOSPITAL ENCOUNTER (OUTPATIENT)
Dept: NON INVASIVE DIAGNOSTICS | Age: 77
Discharge: HOME | End: 2022-12-12

## 2022-12-12 DIAGNOSIS — I65.23 BILATERAL CAROTID ARTERY STENOSIS: ICD-10-CM

## 2022-12-13 ENCOUNTER — TELEPHONE (OUTPATIENT)
Dept: VASCULAR SURGERY | Facility: CLINIC | Age: 77
End: 2022-12-13

## 2022-12-13 NOTE — TELEPHONE ENCOUNTER
Attempted to contact patient to schedule appointment(s) listed below  Requested patient call [phone] option 3 to schedule appointment(s)  Patient's appointment(s) are due now      Dopplers  [] Abdominal Aorta Iliac (AOIL)  [] Carotid (CV)   [] Celiac and/or Mesenteric  [] Endovascular Aortic Repair (EVAR)   [] Hemodialysis Access (HD)   [] Lower Limb Arterial (VANIA)  [] Lower Limb Venous Duplex with Reflux (LEVDR)  [] Renal Artery  [] Upper Limb Arterial (UEA)    [] Upper Limb Venous (UEV)              [] FARRAH and Waveform analysis     Advanced Imaging   [] CTA head/neck    [] CTA abdomen    [] CTA abdomen & pelvis    [] CT abdomen with/ without contrast  [] CT abdomen with contrast  [] CT abdomen without contrast    [] CT abdomen & pelvis with/ without contrast  [] CT abdomen & pelvis with contrast  [] CT abdomen & pelvis without contrast    Office Visit   [] New patient, patient last seen over 3 years ago  [] Risk factor modification (RFM)   [x] Follow up   [] Lost to follow up (LTFU)  Called patient & LMOM to schedule 1 yr fu w/Heather Gonsalez CV 12/12/22 @ [facility]

## 2022-12-14 ENCOUNTER — OFFICE VISIT (OUTPATIENT)
Dept: WOUND CARE | Facility: CLINIC | Age: 77
End: 2022-12-14

## 2022-12-14 VITALS
DIASTOLIC BLOOD PRESSURE: 60 MMHG | BODY MASS INDEX: 29.89 KG/M2 | TEMPERATURE: 95.7 F | RESPIRATION RATE: 18 BRPM | HEART RATE: 64 BPM | WEIGHT: 208.34 LBS | SYSTOLIC BLOOD PRESSURE: 100 MMHG

## 2022-12-14 DIAGNOSIS — S91.001A OPEN WOUND OF RIGHT ANKLE, INITIAL ENCOUNTER: Primary | ICD-10-CM

## 2022-12-14 RX ORDER — LIDOCAINE 40 MG/G
CREAM TOPICAL ONCE
Status: COMPLETED | OUTPATIENT
Start: 2022-12-14 | End: 2022-12-14

## 2022-12-14 RX ADMIN — LIDOCAINE: 40 CREAM TOPICAL at 14:36

## 2022-12-14 NOTE — PROGRESS NOTES
Patient ID: Homer Green  is a 68 y o  male Date of Birth 1945     Chief Complaint  Chief Complaint   Patient presents with   • Follow Up Wound Care Visit     Open wound of right ankle       Allergies  Patient has no known allergies  Assessment:    No problem-specific Assessment & Plan notes found for this encounter  Diagnoses and all orders for this visit:    Open wound of right ankle, initial encounter  -     lidocaine (LMX) 4 % cream  -     Wound cleansing and dressings; Future  -     VAS reflux lower limb venous duplex study with reflux assesment, unilateral; Future                Plan:    - pt eval and managed    - Number and complexity of problems addressed: 1 stable chronic illness as shown    - Amount/complexity of data reviewed and analyzed:     Category 1: prior patient notes were analyzed today before evaluating and managing patient  All PMH  were discussed with pt today  - Risk of complications: moderate risk of morbidity from additional testing or treatment involved with this patient, which includes but not limited to: VASCULAR EVALUATION  SURGICAL DEBRIDEMENT    - discussed anatomy, condition, treatment plan and options  They were instructed on proper foot care  This is total time spent today involving both face-to-face time and non face-to-face time  This time spent includes reviewing their past medical history, performing a medically appropriate examination and evaluation of the patient, counseling and educating the patient, documenting all findings in EMR, and independently interpreting results and communicating results with patient and discussing their condition and treatment options, risks, and potential complications  I have discussed the findings of this examination with the patient  The discussion included a complete verbal explanation of the examination results, diagnosis and planned treatment(s)  A schedule for future care needs was explained   The patient has verbalized the understanding of these instructions at this time  If any questions should arise after returning home I have encouraged the patient to feel free to call the office     - no wound debridement performed today  - stop purachol Ago  - we will start with hydrafera blue    - I d/w pt that secondary to wound being in area of STSG, may have decreased healing potential and prolonged    - pt may require surgical debridement with application of matrix/graft to the area to stimulate proper wound healing      - pt is being sent for Venous reflux studies    - pt lives in [facility], would like to follow up at wound center closer to home       - we will see pt weekly     Wound 12/07/22 Traumatic Ankle Anterior;Right (Active)   Wound Image Images linked 12/14/22 1359   Wound Description Slough;Pink 12/14/22 1401   Kathrin-wound Assessment Dry;Scaly 12/14/22 1401   Wound Length (cm) 1 2 cm 12/14/22 1401   Wound Width (cm) 1 1 cm 12/14/22 1401   Wound Depth (cm) 0 2 cm 12/14/22 1401   Wound Surface Area (cm^2) 1 32 cm^2 12/14/22 1401   Wound Volume (cm^3) 0 264 cm^3 12/14/22 1401   Calculated Wound Volume (cm^3) 0 26 cm^3 12/14/22 1401   Change in Wound Size % -30 12/14/22 1401   Undermining 0 1 12/14/22 1401   Undermining is depth extending from 5-8oclock 12/14/22 1401   Drainage Amount Moderate 12/14/22 1401   Drainage Description Yellow 12/14/22 1401   Patient Tolerance Tolerated well 12/14/22 1401   Dressing Status Removed 12/14/22 1401       Wound 12/07/22 Traumatic Ankle Anterior;Right (Active)   Date First Assessed/Time First Assessed: 12/07/22 1447   Primary Wound Type: Traumatic  Location: Ankle  Wound Location Orientation: Anterior;Right       [REMOVED] Wound 01/10/20 Neck Right (Removed)   Resolved Date/Resolved Time: 12/07/22 1446  Date First Assessed/Time First Assessed: 01/10/20 1215   Location: Neck  Wound Location Orientation: Right  Wound Description (Comments): [id number]  Wound Outcome: Other (Comment) [REMOVED] Wound 01/10/20 Ankle Anterior;Right (Removed)   Resolved Date/Resolved Time: 12/07/22 1447  Date First Assessed/Time First Assessed: 01/10/20 1430   Pre-Existing Wound: Yes  Location: Ankle  Wound Location Orientation: Anterior;Right  Wound Description (Comments): non healing  Wound Outcome: Other     [REMOVED] Wound 11/09/20 Catheter entry/exit site Wrist Right (Removed)   Resolved Date/Resolved Time: 12/07/22 1446  Date First Assessed/Time First Assessed: 11/09/20 1238   Pre-Existing Wound: No  Traumatic Wound Type: Catheter entry/exit site  Location: Wrist  Wound Location Orientation: Right  Wound Outcome: Other (Comm    [REMOVED] Wound 12/07/20 Catheter entry/exit site Wrist Right (Removed)   Resolved Date/Resolved Time: 12/07/22 1446  Date First Assessed/Time First Assessed: 12/07/20 1207   Pre-Existing Wound: No  Traumatic Wound Type: Catheter entry/exit site  Location: Wrist  Wound Location Orientation: Right  Wound Description (Comment    Subjective:      74 year old male presents for evaluation and management of wound to the R ankle region  Pt has been applying purachol Ag to the wound bed  Pt denies any pain to the area  Wound is in area of skin graft placed years ago  Denies any type of redness or drainage to the area  Unsure if wound has changed at this time  Denies n/v/f/c  Pt has seen Vascular surgery in the past  Pt had carotid studies on 12/12/2022  Per notes in patient chart, pt was supposed to call for follow up appointment  Pt states he did not receive voice mail  The following portions of the patient's history were reviewed and updated as appropriate: allergies, current medications, past family history, past medical history, past social history, past surgical history and problem list     Review of Systems   Constitutional: Negative for chills and fever  Respiratory: Negative for shortness of breath  Cardiovascular: Negative for chest pain  Gastrointestinal: Negative for diarrhea, nausea and vomiting  All other systems reviewed and are negative  Objective:       Wound 12/07/22 Traumatic Ankle Anterior;Right (Active)   Wound Image Images linked 12/14/22 1359   Wound Description Slough;Pink 12/14/22 1401   Kathrin-wound Assessment Dry;Scaly 12/14/22 1401   Wound Length (cm) 1 2 cm 12/14/22 1401   Wound Width (cm) 1 1 cm 12/14/22 1401   Wound Depth (cm) 0 2 cm 12/14/22 1401   Wound Surface Area (cm^2) 1 32 cm^2 12/14/22 1401   Wound Volume (cm^3) 0 264 cm^3 12/14/22 1401   Calculated Wound Volume (cm^3) 0 26 cm^3 12/14/22 1401   Change in Wound Size % -30 12/14/22 1401   Undermining 0 1 12/14/22 1401   Undermining is depth extending from 5-8oclock 12/14/22 1401   Drainage Amount Moderate 12/14/22 1401   Drainage Description Yellow 12/14/22 1401   Patient Tolerance Tolerated well 12/14/22 1401   Dressing Status Removed 12/14/22 1401       /60   Pulse 64   Temp (!) 95 7 °F (35 4 °C)   Resp 18   Wt 94 5 kg (208 lb 5 4 oz)   BMI 29 89 kg/m²     Physical Exam  Vitals and nursing note reviewed  Constitutional:       Appearance: Normal appearance  He is normal weight  Cardiovascular:      Pulses:           Dorsalis pedis pulses are 0 on the right side and 0 on the left side  Posterior tibial pulses are 0 on the right side and 0 on the left side  Musculoskeletal:        Feet:    Feet:      Right foot:      Protective Sensation: 8 sites tested  8 sites sensed  Skin integrity: Ulcer present  Left foot:      Protective Sensation: 8 sites tested  8 sites sensed  Neurological:      Mental Status: He is alert  Wound Instructions:  Orders Placed This Encounter   Procedures   • Wound cleansing and dressings     Wash your hands with soap and water  Remove old dressing, discard into plastic bag and place in trash  Cleanse the wound with normal saline solution prior to applying a clean dressing   Do not use tissue or cotton balls  Do not scrub the wound  Pat dry using gauze  Shower YES    STOP puracol ag  Apply skin protectant to skin surrounding wound  Apply Hydrofera Blue to the wound  Cover with gauze  Secure with ezekiel and tape  Change dressing every other day     Consume 3-4 servings of protein daily  Follow up with Wound Management in 1 week  Number for Vascular ([phone]     Today's Treatment:  Cleansed with NSS and dressed as ordered above     Standing Status:   Future     Standing Expiration Date:   12/14/2023        Diagnosis ICD-10-CM Associated Orders   1   Open wound of right ankle, initial encounter  S91 001A lidocaine (LMX) 4 % cream     Wound cleansing and dressings     VAS reflux lower limb venous duplex study with reflux assesment, unilateral

## 2022-12-14 NOTE — PATIENT INSTRUCTIONS
Orders Placed This Encounter   Procedures    Wound cleansing and dressings     Wash your hands with soap and water  Remove old dressing, discard into plastic bag and place in trash  Cleanse the wound with normal saline solution prior to applying a clean dressing  Do not use tissue or cotton balls  Do not scrub the wound  Pat dry using gauze  Shower YES    STOP puracol ag  Apply skin protectant to skin surrounding wound  Apply Hydrofera Blue to the wound  Cover with gauze    Secure with ezekiel and tape  Change dressing every other day     Consume 3-4 servings of protein daily  Follow up with Wound Management in 1 week  Number for Vascular ([phone]     Today's Treatment:  Cleansed with NSS and dressed as ordered above     Standing Status:   Future     Standing Expiration Date:   12/14/2023

## 2022-12-20 DIAGNOSIS — I10 HYPERTENSION, UNSPECIFIED TYPE: ICD-10-CM

## 2022-12-21 ENCOUNTER — OFFICE VISIT (OUTPATIENT)
Dept: WOUND CARE | Facility: HOSPITAL | Age: 77
End: 2022-12-21

## 2022-12-21 VITALS
RESPIRATION RATE: 16 BRPM | HEART RATE: 60 BPM | DIASTOLIC BLOOD PRESSURE: 62 MMHG | SYSTOLIC BLOOD PRESSURE: 120 MMHG | TEMPERATURE: 96.3 F

## 2022-12-21 DIAGNOSIS — S91.001A OPEN WOUND OF RIGHT ANKLE, INITIAL ENCOUNTER: Primary | ICD-10-CM

## 2022-12-21 RX ORDER — LIDOCAINE 40 MG/G
CREAM TOPICAL ONCE
Status: COMPLETED | OUTPATIENT
Start: 2022-12-21 | End: 2022-12-21

## 2022-12-21 RX ORDER — TRIAMTERENE AND HYDROCHLOROTHIAZIDE 75; 50 MG/1; MG/1
TABLET ORAL
Qty: 90 TABLET | Refills: 1 | Status: SHIPPED | OUTPATIENT
Start: 2022-12-21

## 2022-12-21 RX ADMIN — LIDOCAINE: 40 CREAM TOPICAL at 14:57

## 2022-12-21 NOTE — PROGRESS NOTES
Patient ID: Delaney Linda  is a 68 y o  male Date of Birth 1945       Chief Complaint   Patient presents with   • Follow Up Wound Care Visit     Right ankle wound       Allergies:  Patient has no known allergies  Diagnosis:  1  Open wound of right ankle, initial encounter  -     lidocaine (LMX) 4 % cream  -     Wound cleansing and dressings; Future       Diagnosis ICD-10-CM Associated Orders   1  Open wound of right ankle, initial encounter  S91 001A lidocaine (LMX) 4 % cream     Wound cleansing and dressings           Assessment & Plan:  1  Open wound anterior right ankle, wound was debrided through subcuticular tissues and dressed with Medihoney dry dressing, patient will do the same daily  2   Patient to wear a shoe or sneaker that does not put pressure over the wound, his current sneakers are appropriate  3   We discussed etiology of wound, it is in the place where he had had an open fracture with previous skin grafting many many years ago, I explained to him because of the scar tissue and prior skin graft there will be a delay in healing and he might require cellular tissue product or skin grafting in the future  4   Patient understands and agrees with the plan, I have stressed frequent elevation, low-sodium diet, proper protein intake, strict pressure and friction reduction  Debridement   Wound 12/07/22 Traumatic Ankle Anterior;Right    Universal Protocol:  Consent: Verbal consent obtained  Risks and benefits: risks, benefits and alternatives were discussed  Consent given by: patient  Time out: Immediately prior to procedure a "time out" was called to verify the correct patient, procedure, equipment, support staff and site/side marked as required    Patient understanding: patient states understanding of the procedure being performed  Patient identity confirmed: verbally with patient      Performed by: physician  Debridement type: surgical  Level of debridement: subcutaneous tissue  Pain control: lidocaine 4%  Pre-debridement measurements  Length (cm): 3 1  Width (cm): 1 1  Depth (cm): 0 2  Surface Area (cm^2): 3 41  Volume (cm^3): 0 68    Post-debridement measurements  Length (cm): 3 1  Width (cm): 1 1  Depth (cm): 0 3  Percent debrided: 50%  Surface Area (cm^2): 3 41  Area debrided (cm^2): 1 71  Volume (cm^3): 1 02  Tissue and other material debrided: subcutaneous tissue  Devitalized tissue debrided: biofilm, fibrin, necrotic debris, slough and eschar  Instrument(s) utilized: blade  Bleeding: small  Hemostasis obtained with: pressure  Procedural pain (0-10): insensate  Post-procedural pain: insensate   Response to treatment: procedure was tolerated well                   Subjective:   Flakito presents today for care of right anterior ankle wound which started when he dropped something approximately 6 to 8 weeks ago, he has been following at [facility] wound management but has transferred here as it is closer  Current treatment is [facility], he has been changing dressings 3 times a week  He states he stopped using the surgical shoe as the strap was putting pressure over the wound and he has gone back to wearing his soft topped sneakers  He does not complain of any pain, nausea, vomiting, fever, chills  The following portions of the patient's history were reviewed and updated as appropriate:   Patient Active Problem List   Diagnosis   • Tubular adenoma of colon   • Carpal tunnel syndrome   • Dyslipidemia   • Gout   • Hypertension   • Impaired fasting glucose   • Insomnia   • Iron deficiency anemia due to chronic blood loss   • Osteoarthritis   • Prolonged QT interval   • Rhinitis   • Other disorder of calcium metabolism   • Nephrolithiasis   • Elevated PSA   • Obesity (BMI 30 0-34  9)   • Internal carotid artery stenosis, right   • Cerebrovascular accident (CVA) [facility])   • Aortic valve stenosis   • S/P carotid endarterectomy   • Coronary artery disease involving native coronary artery • Bilateral carotid artery stenosis   • Stage 3b chronic kidney disease ([facility] [address] )   • Rambo lesion, chronic   • Paraesophageal hernia   • Atrial flutter, unspecified type [facility])     Past Medical History:   Diagnosis Date   • Anemia     iron def   • Arthritis    • GERD (gastroesophageal reflux disease)    • Gout    • Hypertension    • Insomnia    • Stroke (cerebrum) [facility])      Past Surgical History:   Procedure Laterality Date   • ANKLE SURGERY Right    • CARDIAC CATHETERIZATION     • COLONOSCOPY  2013    polypectomy; complete - 3 yrs d/t polyp - benign submucosal lipoma- path c/w lipoma   • COLONOSCOPY  2017    complete - tubular adenoma - repeat 5yrs   • CYSTOSCOPY     • CYSTOTOMY      bladder  w/ basket extraction of calculus   • FEMUR FRACTURE SURGERY     • HERNIA REPAIR      inguinal ; sliding   • INGUINAL HERNIA REPAIR Right     unilateral   • KNEE ARTHROSCOPY Right     w/medial menisectomy   • LASIK      corneal   • LITHOTRIPSY      renal   • MS COLONOSCOPY FLX DX W/COLLJ SPEC WHEN PFRMD N/A 2017    Procedure: SCREENING COLONOSCOPY;  Surgeon: Eric Foley MD;  Location:  MAIN OR;  Service: General   • MS THROMBOENDARTECTMY [place] Right 1/10/2020    Procedure: ENDARTERECTOMY ARTERY CAROTID;  Surgeon: Bessie Jackson MD;  Location:  MAIN OR;  Service: Vascular   • ROTATOR CUFF REPAIR Bilateral    • TONSILLECTOMY       Social History     Socioeconomic History   • Marital status:       Spouse name: None   • Number of children: 1   • Years of education: None   • Highest education level: None   Occupational History   • Occupation: Retired     Comment: working full time   Tobacco Use   • Smoking status: Former     Packs/day: 1 00     Years: 22 00     Pack years: 22 00     Types: Cigarettes     Start date: 0     Quit date:      Years since quittin 9   • Smokeless tobacco: Never   Vaping Use   • Vaping Use: Never used   Substance and Sexual Activity   • Alcohol use: Not Currently     Comment: stopped drinking alcohol   • Drug use: No   • Sexual activity: Not Currently   Other Topics Concern   • None   Social History Narrative    , lives alone, working full time     Social Determinants of Health     Financial Resource Strain: Low Risk    • Difficulty of Paying Living Expenses: Not hard at all   Food Insecurity: Not on file   Transportation Needs: No Transportation Needs   • Lack of Transportation (Medical): No   • Lack of Transportation (Non-Medical):  No   Physical Activity: Not on file   Stress: Not on file   Social Connections: Not on file   Intimate Partner Violence: Not on file   Housing Stability: Not on file        Current Outpatient Medications:   •  Ascorbic Acid (VITAMIN C IMMUNE HEALTH PO), Take by mouth daily, Disp: , Rfl:   •  aspirin (ECOTRIN LOW STRENGTH) 81 mg EC tablet, Take 81 mg by mouth every other day 1 every other day, Disp: , Rfl:   •  atorvastatin (LIPITOR) 40 mg tablet, Take 1 tablet (40 mg total) by mouth every evening, Disp: 90 tablet, Rfl: 3  •  calcium polycarbophil (FIBERCON) 625 mg tablet, Take 625 mg by mouth daily, Disp: , Rfl:   •  clotrimazole-betamethasone (LOTRISONE) 1-0 05 % cream, apply to affected area twice a day for 10 days MAX (Patient not taking: Reported on 12/2/2022), Disp: 45 g, Rfl: 0  •  ferrous sulfate 324 (65 Fe) mg, Take 1 tablet (324 mg total) by mouth daily before breakfast, Disp: 30 tablet, Rfl: 2  •  finasteride (PROSCAR) 5 mg tablet, Take 1 tablet (5 mg total) by mouth daily, Disp: 90 tablet, Rfl: 3  •  lisinopril (ZESTRIL) 10 mg tablet, take 1 tablet by mouth once daily, Disp: 90 tablet, Rfl: 1  •  metoprolol succinate (TOPROL-XL) 25 mg 24 hr tablet, take 1 tablet by mouth once daily, Disp: 90 tablet, Rfl: 1  •  Multiple Vitamin (MULTIVITAMIN) capsule, Take 1 capsule by mouth daily, Disp: , Rfl:   •  neomycin-polymyxin-dexamethasone (MAXITROL) ophthalmic suspension, , Disp: , Rfl:   •  nitroglycerin (NITROSTAT) 0 4 mg SL tablet, Place 1 tablet (0 4 mg total) under the tongue every 5 (five) minutes as needed for chest pain, Disp: 15 tablet, Rfl: 5  •  omeprazole (PriLOSEC) 20 mg delayed release capsule, take 1 capsule by mouth once daily, Disp: 90 capsule, Rfl: 2  •  Potassium Citrate ER 15 MEQ (1620 MG) TBCR, Take 2 tablets by mouth 2 (two) times a day, Disp: 120 tablet, Rfl: 3  •  tamsulosin (FLOMAX) 0 4 mg, Take 1 capsule (0 4 mg total) by mouth daily with dinner, Disp: 90 capsule, Rfl: 3  •  triamterene-hydrochlorothiazide (MAXZIDE) 75-50 MG per tablet, take 1 tablet by mouth once daily, Disp: 90 tablet, Rfl: 1  No current facility-administered medications for this visit  Family History   Problem Relation Age of Onset   • Alzheimer's disease Mother    • Alzheimer's disease Father    • Heart disease Father         CAD   • Other Father         prediabetes   • Diabetes Father    • Breast cancer Other    • Arthritis Family    • Hypertension Family        Review of Systems   Constitutional: Negative for chills and fever  HENT: Negative for ear pain and sore throat  Eyes: Negative for pain and visual disturbance  Respiratory: Negative for cough and shortness of breath  Cardiovascular: Negative for chest pain and palpitations  Gastrointestinal: Negative for abdominal pain and vomiting  Genitourinary: Negative for dysuria and hematuria  Musculoskeletal: Negative for arthralgias and back pain  Skin: Positive for color change and wound  Negative for rash  Neurological: Positive for numbness  Negative for seizures and syncope  Psychiatric/Behavioral: Negative  All other systems reviewed and are negative  Objective:  /62   Pulse 60   Temp (!) 96 3 °F (35 7 °C)   Resp 16     Physical Exam  Constitutional:       Appearance: Normal appearance  He is normal weight  HENT:      Head: Normocephalic and atraumatic        Right Ear: External ear normal       Left Ear: External ear normal       Nose: Nose normal       Mouth/Throat:      Mouth: Mucous membranes are moist       Pharynx: Oropharynx is clear  Eyes:      Conjunctiva/sclera: Conjunctivae normal    Cardiovascular:      Pulses: Normal pulses  Dorsalis pedis pulses are 2+ on the right side and 2+ on the left side  Posterior tibial pulses are 2+ on the right side and 2+ on the left side  Pulmonary:      Effort: Pulmonary effort is normal    Musculoskeletal:      Cervical back: Normal range of motion  Right lower leg: No edema  Left lower leg: No edema  Skin:     General: Skin is warm and dry  Capillary Refill: Capillary refill takes less than 2 seconds  Comments: See detailed wound assessment   Neurological:      General: No focal deficit present  Mental Status: He is alert and oriented to person, place, and time  Mental status is at baseline  Sensory: Sensory deficit present  Coordination: Coordination abnormal       Deep Tendon Reflexes: Reflexes abnormal    Psychiatric:         Mood and Affect: Mood normal          Behavior: Behavior normal          Thought Content: Thought content normal          Judgment: Judgment normal            Wound 12/07/22 Traumatic Ankle Anterior;Right (Active)   Wound Image   12/21/22 1520   Wound Description Pink;Yellow;Slough; Epithelialization 12/21/22 1451   Kathrin-wound Assessment Scar Tissue;Pink 12/21/22 1451   Wound Length (cm) 3 1 cm 12/21/22 1451   Wound Width (cm) 1 1 cm 12/21/22 1451   Wound Depth (cm) 0 2 cm 12/21/22 1451   Wound Surface Area (cm^2) 3 41 cm^2 12/21/22 1451   Wound Volume (cm^3) 0 682 cm^3 12/21/22 1451   Calculated Wound Volume (cm^3) 0 68 cm^3 12/21/22 1451   Change in Wound Size % -240 12/21/22 1451   Undermining 0 1 12/14/22 1401   Undermining is depth extending from 5-8oclock 12/14/22 1401   Drainage Amount Small 12/21/22 1451   Drainage Description Serosanguineous; Yellow 12/21/22 1451   Non-staged Wound Description Full thickness 12/21/22 1451   Patient Tolerance Tolerated well 12/14/22 1401   Dressing Status Intact 12/21/22 1451                VAS carotid complete study    Result Date: 12/12/2022  Narrative:  [facility] REPORT CLINICAL: Indications: Yearly surveillance of carotid artery disease  Patient is asymptomatic at this time  Operative History: 2020-01-10 Right Standard (ICA, ECA and CCA) endarterectomy Risk Factors The patient has history of HTN, Hyperlipidemia and previous smoking (quit >10yrs ago)  Clinical Right Pressure:  130/60 mm Hg, Left Pressure:  130/60 mm Hg  FINDINGS:  Right        Impression     PSV  EDV (cm/s)  Ratio  Dist  ICA                    72          11   1 02  Mid  ICA                     69          17   0 97  Prox  ICA    Widely Patent   44           8   0 62  Dist CCA                     65           9         Mid CCA                      71          10   1 15  Prox CCA                     62          10         Ext Carotid                 104           9   1 46  Prox Vert                    75          15         Subclavian                  113           0          Left         Impression  PSV  EDV (cm/s)  Ratio  Dist  ICA                 51          12   0 69  Mid  ICA                  61          12   0 83  Prox  ICA    1 - 49%     143          21   1 95  Dist CCA                  59           9         Mid CCA                   73          10   1 05  Prox CCA                  70           9         Ext Carotid              180          13   2 45  Prox Vert                 37           7         Subclavian                92           0            CONCLUSION:  Impression RIGHT: Widely patent internal carotid artery and endarterectomy site  Vertebral artery flow is antegrade  There is no significant subclavian artery disease  LEFT: There is <50% stenosis noted in the internal carotid artery  Plaque is heterogenous and irregular  Vertebral artery flow is antegrade   There is no significant subclavian artery disease  Compared to previous study on 11/17/2021, there is no significant change  Recommend repeat testing in 1 year as per protocol unless otherwise indicated  SIGNATURE: Electronically Signed by: Chase Barraza on 2022-12-12 02:18:05 PM      Wound Instructions:  Orders Placed This Encounter   Procedures   • Wound cleansing and dressings     Right Ankle Wound    Wash your hands with soap and water  Remove old dressing, discard into plastic bag and place in trash  Cleanse the wound with normal saline solution or mild soap and waterprior to applying a clean dressing  Do not use tissue or cotton balls  Do not scrub the wound  Pat dry using gauze  Shower YES    Apply skin protectant to skin surrounding wound  Apply Medihoney to the wound  Cover with half of an ABD pad  Secure with ezekiel and tape (use medfix tape to anchor the wound in place)  Change dressing every day  Consume 3-4 servings of protein daily  Apply the dressing above at the [facility]  Next appt  In 2 weeks     Standing Status:   Future     Standing Expiration Date:   12/21/2023         Caio Lundy DPM, DPM, FACFAS    Portions of the record may have been created with voice recognition software  Occasional wrong word or "sound a like" substitutions may have occurred due to the inherent limitations of voice recognition software  Read the chart carefully and recognize, using context, where substitutions have occurred

## 2022-12-21 NOTE — PATIENT INSTRUCTIONS
Orders Placed This Encounter   Procedures    Wound cleansing and dressings     Right Ankle Wound    Wash your hands with soap and water  Remove old dressing, discard into plastic bag and place in trash  Cleanse the wound with normal saline solution or mild soap and waterprior to applying a clean dressing  Do not use tissue or cotton balls  Do not scrub the wound  Pat dry using gauze  Shower YES    Apply skin protectant to skin surrounding wound  Apply Medihoney to the wound  Cover with half of an ABD pad  Secure with ezekiel and tape (use medfix tape to anchor the wound in place)  Change dressing every day  Consume 3-4 servings of protein daily  Apply the dressing above at the [facility] today  Next appt   In 2 weeks     Standing Status:   Future     Standing Expiration Date:   12/21/2023

## 2022-12-28 DIAGNOSIS — K29.00 OTHER ACUTE GASTRITIS WITHOUT HEMORRHAGE: ICD-10-CM

## 2022-12-28 RX ORDER — OMEPRAZOLE 20 MG/1
CAPSULE, DELAYED RELEASE ORAL
Qty: 90 CAPSULE | Refills: 2 | Status: SHIPPED | OUTPATIENT
Start: 2022-12-28

## 2023-01-04 ENCOUNTER — OFFICE VISIT (OUTPATIENT)
Dept: WOUND CARE | Facility: HOSPITAL | Age: 78
End: 2023-01-04

## 2023-01-04 VITALS
HEART RATE: 64 BPM | SYSTOLIC BLOOD PRESSURE: 112 MMHG | TEMPERATURE: 97 F | DIASTOLIC BLOOD PRESSURE: 60 MMHG | RESPIRATION RATE: 16 BRPM

## 2023-01-04 DIAGNOSIS — S91.001A OPEN WOUND OF RIGHT ANKLE, INITIAL ENCOUNTER: Primary | ICD-10-CM

## 2023-01-04 RX ORDER — LIDOCAINE HYDROCHLORIDE 40 MG/ML
5 SOLUTION TOPICAL ONCE
Status: COMPLETED | OUTPATIENT
Start: 2023-01-04 | End: 2023-01-04

## 2023-01-04 RX ADMIN — LIDOCAINE HYDROCHLORIDE 5 ML: 40 SOLUTION TOPICAL at 16:09

## 2023-01-04 RX ADMIN — LIDOCAINE HYDROCHLORIDE 5 ML: 40 SOLUTION TOPICAL at 15:34

## 2023-01-04 NOTE — PROGRESS NOTES
Patient ID: Jennifer Lua  is a 68 y o  male Date of Birth 1945       Chief Complaint   Patient presents with   • Follow Up Wound Care Visit     Right ankle wound       Allergies:  Patient has no known allergies  Diagnosis:  1  Open wound of right ankle, initial encounter  -     lidocaine (XYLOCAINE) 4 % topical solution 5 mL  -     Wound cleansing and dressings; Future       Diagnosis ICD-10-CM Associated Orders   1  Open wound of right ankle, initial encounter  S91 001A lidocaine (XYLOCAINE) 4 % topical solution 5 mL     Wound cleansing and dressings           Assessment & Plan:  1  Open wound anterior right ankle, wound was debrided through selective tissues and dressed with polymem dry dressing, patient will do the same 3 x a week  2   Patient to wear a shoe or sneaker that does not put pressure over the wound, his current sneakers are appropriate  There is motion at the anterior ankle upon dorsiflexion and plantarflexion of the lesser digits, tendon is not exposed at the wound base although it is allowing motion through the area of the wound secondary to prior skin grafting and scar contracture  I advised patient if there is no improvement with change of product and then I will consider putting him in a cam boot although I approach this cautiously as he already uses a walker to ambulate and I do not want to make him more unstable and put him at a greater fall risk  3   We discussed etiology of wound, it is in the place where he had had an open fracture with previous skin grafting many many years ago, I explained to him because of the scar tissue and prior skin graft there will be a delay in healing and he might require cellular tissue product or skin grafting in the future    4   Patient understands and agrees with the plan, I have stressed frequent elevation, low-sodium diet, proper protein intake, strict pressure and friction reduction    Debridement   Wound 12/07/22 Traumatic Ankle Anterior;Right    Universal Protocol:  Consent: Verbal consent obtained  Risks and benefits: risks, benefits and alternatives were discussed  Consent given by: patient  Time out: Immediately prior to procedure a "time out" was called to verify the correct patient, procedure, equipment, support staff and site/side marked as required  Patient understanding: patient states understanding of the procedure being performed  Patient identity confirmed: verbally with patient      Performed by: physician  Debridement type: selective  Pain control: lidocaine 4%  Pre-debridement measurements  Length (cm): 3 9  Width (cm): 1  Depth (cm): 0 2  Surface Area (cm^2): 3 9  Volume (cm^3): 0 78    Post-debridement measurements  Length (cm): 3 9  Width (cm): 1  Depth (cm): 0 2  Percent debrided: 100%  Surface Area (cm^2): 3 9  Area debrided (cm^2): 3 9  Volume (cm^3): 0 78  Devitalized tissue debrided: biofilm, callus and slough  Instrument(s) utilized: blade  Bleeding: small  Hemostasis obtained with: pressure  Procedural pain (0-10): insensate  Post-procedural pain: insensate   Response to treatment: procedure was tolerated well           Subjective:   Patient presents today for care of anterior right ankle open wounds, he has been changing dressings as directed, no new complaints  The following portions of the patient's history were reviewed and updated as appropriate:   Patient Active Problem List   Diagnosis   • Tubular adenoma of colon   • Carpal tunnel syndrome   • Dyslipidemia   • Gout   • Hypertension   • Impaired fasting glucose   • Insomnia   • Iron deficiency anemia due to chronic blood loss   • Osteoarthritis   • Prolonged QT interval   • Rhinitis   • Other disorder of calcium metabolism   • Nephrolithiasis   • Elevated PSA   • Obesity (BMI 30 0-34  9)   • Internal carotid artery stenosis, right   • Cerebrovascular accident (CVA) [facility])   • Aortic valve stenosis   • S/P carotid endarterectomy   • Coronary artery disease involving native coronary artery   • Bilateral carotid artery stenosis   • Stage 3b chronic kidney disease (HCC)   • Rambo lesion, chronic   • Paraesophageal hernia   • Atrial flutter, unspecified type [facility])     Past Medical History:   Diagnosis Date   • Anemia     iron def   • Arthritis    • GERD (gastroesophageal reflux disease)    • Gout    • Hypertension    • Insomnia    • Stroke (cerebrum) [facility])      Past Surgical History:   Procedure Laterality Date   • ANKLE SURGERY Right    • CARDIAC CATHETERIZATION     • COLONOSCOPY  2013    polypectomy; complete - 3 yrs d/t polyp - benign submucosal lipoma- path c/w lipoma   • COLONOSCOPY  2017    complete - tubular adenoma - repeat 5yrs   • CYSTOSCOPY     • CYSTOTOMY      bladder  w/ basket extraction of calculus   • FEMUR FRACTURE SURGERY     • HERNIA REPAIR      inguinal ; sliding   • INGUINAL HERNIA REPAIR Right     unilateral   • KNEE ARTHROSCOPY Right     w/medial menisectomy   • LASIK      corneal   • LITHOTRIPSY      renal   • WI COLONOSCOPY FLX DX W/COLLJ SPEC WHEN PFRMD N/A 2017    Procedure: SCREENING COLONOSCOPY;  Surgeon: Rosalba Santiago MD;  Location: [facility];  Service: General   • WI TEAEC W/PATCH GRF CAROTID VERTB SUBCLAV NECK INC Right 1/10/2020    Procedure: ENDARTERECTOMY ARTERY CAROTID;  Surgeon: Flor Quiñones MD;  Location:  MAIN OR;  Service: Vascular   • ROTATOR CUFF REPAIR Bilateral    • TONSILLECTOMY       Social History     Socioeconomic History   • Marital status:       Spouse name: None   • Number of children: 1   • Years of education: None   • Highest education level: None   Occupational History   • Occupation: Retired     Comment: working full time   Tobacco Use   • Smoking status: Former     Packs/day: 1 00     Years: 22 00     Pack years: 22 00     Types: Cigarettes     Start date: 0     Quit date:      Years since quittin 0   • Smokeless tobacco: Never   Vaping Use   • Vaping Use: Never used Substance and Sexual Activity   • Alcohol use: Not Currently     Comment: stopped drinking alcohol   • Drug use: No   • Sexual activity: Not Currently   Other Topics Concern   • None   Social History Narrative    , lives alone, working full time     Social Determinants of Health     Financial Resource Strain: Low Risk    • Difficulty of Paying Living Expenses: Not hard at all   Food Insecurity: Not on file   Transportation Needs: No Transportation Needs   • Lack of Transportation (Medical): No   • Lack of Transportation (Non-Medical):  No   Physical Activity: Not on file   Stress: Not on file   Social Connections: Not on file   Intimate Partner Violence: Not on file   Housing Stability: Not on file        Current Outpatient Medications:   •  Ascorbic Acid (VITAMIN C IMMUNE HEALTH PO), Take by mouth daily, Disp: , Rfl:   •  aspirin (ECOTRIN LOW STRENGTH) 81 mg EC tablet, Take 81 mg by mouth every other day 1 every other day, Disp: , Rfl:   •  atorvastatin (LIPITOR) 40 mg tablet, Take 1 tablet (40 mg total) by mouth every evening, Disp: 90 tablet, Rfl: 3  •  calcium polycarbophil (FIBERCON) 625 mg tablet, Take 625 mg by mouth daily, Disp: , Rfl:   •  clotrimazole-betamethasone (LOTRISONE) 1-0 05 % cream, apply to affected area twice a day for 10 days MAX (Patient not taking: Reported on 12/2/2022), Disp: 45 g, Rfl: 0  •  ferrous sulfate 324 (65 Fe) mg, Take 1 tablet (324 mg total) by mouth daily before breakfast, Disp: 30 tablet, Rfl: 2  •  finasteride (PROSCAR) 5 mg tablet, Take 1 tablet (5 mg total) by mouth daily, Disp: 90 tablet, Rfl: 3  •  lisinopril (ZESTRIL) 10 mg tablet, take 1 tablet by mouth once daily, Disp: 90 tablet, Rfl: 1  •  metoprolol succinate (TOPROL-XL) 25 mg 24 hr tablet, take 1 tablet by mouth once daily, Disp: 90 tablet, Rfl: 1  •  Multiple Vitamin (MULTIVITAMIN) capsule, Take 1 capsule by mouth daily, Disp: , Rfl:   •  neomycin-polymyxin-dexamethasone (MAXITROL) ophthalmic suspension, , Disp: , Rfl:   •  nitroglycerin (NITROSTAT) 0 4 mg SL tablet, Place 1 tablet (0 4 mg total) under the tongue every 5 (five) minutes as needed for chest pain, Disp: 15 tablet, Rfl: 5  •  omeprazole (PriLOSEC) 20 mg delayed release capsule, take 1 capsule by mouth once daily, Disp: 90 capsule, Rfl: 2  •  Potassium Citrate ER 15 MEQ (1620 MG) TBCR, Take 2 tablets by mouth 2 (two) times a day, Disp: 120 tablet, Rfl: 3  •  tamsulosin (FLOMAX) 0 4 mg, Take 1 capsule (0 4 mg total) by mouth daily with dinner, Disp: 90 capsule, Rfl: 3  •  triamterene-hydrochlorothiazide (MAXZIDE) 75-50 MG per tablet, take 1 tablet by mouth once daily, Disp: 90 tablet, Rfl: 1  No current facility-administered medications for this visit  Family History   Problem Relation Age of Onset   • Alzheimer's disease Mother    • Alzheimer's disease Father    • Heart disease Father         CAD   • Other Father         prediabetes   • Diabetes Father    • Breast cancer Other    • Arthritis Family    • Hypertension Family        Review of Systems   Constitutional: Negative for chills and fever  HENT: Negative for ear pain and sore throat  Eyes: Negative for pain and visual disturbance  Respiratory: Negative for cough and shortness of breath  Cardiovascular: Negative for chest pain and palpitations  Gastrointestinal: Negative for abdominal pain and vomiting  Genitourinary: Negative for dysuria and hematuria  Musculoskeletal: Positive for gait problem  Negative for arthralgias and back pain  Skin: Positive for color change and wound  Negative for rash  Neurological: Positive for numbness  Negative for seizures and syncope  Psychiatric/Behavioral: Negative  All other systems reviewed and are negative  Objective:  /60   Pulse 64   Temp (!) 97 °F (36 1 °C)   Resp 16     Physical Exam  Constitutional:       Appearance: Normal appearance  He is normal weight  HENT:      Head: Normocephalic and atraumatic        Right Ear: External ear normal       Left Ear: External ear normal       Nose: Nose normal       Mouth/Throat:      Mouth: Mucous membranes are moist       Pharynx: Oropharynx is clear  Eyes:      Conjunctiva/sclera: Conjunctivae normal    Cardiovascular:      Pulses: Normal pulses  Dorsalis pedis pulses are 2+ on the right side and 2+ on the left side  Posterior tibial pulses are 2+ on the right side and 2+ on the left side  Pulmonary:      Effort: Pulmonary effort is normal    Musculoskeletal:      Cervical back: Normal range of motion  Right lower leg: No edema  Left lower leg: No edema  Skin:     General: Skin is warm and dry  Capillary Refill: Capillary refill takes less than 2 seconds  Comments: See detailed wound assessment   Neurological:      General: No focal deficit present  Mental Status: He is alert and oriented to person, place, and time  Mental status is at baseline  Sensory: Sensory deficit present  Coordination: Coordination abnormal       Gait: Gait abnormal       Deep Tendon Reflexes: Reflexes abnormal    Psychiatric:         Mood and Affect: Mood normal          Behavior: Behavior normal          Thought Content:  Thought content normal          Judgment: Judgment normal            Wound 12/07/22 Traumatic Ankle Anterior;Right (Active)   Wound Image   01/04/23 1530   Wound Description Epithelialization;Yellow;Slough;Pink 01/04/23 1529   Kathrin-wound Assessment Pink 01/04/23 1529   Wound Length (cm) 3 9 cm 01/04/23 1529   Wound Width (cm) 1 cm 01/04/23 1529   Wound Depth (cm) 0 2 cm 01/04/23 1529   Wound Surface Area (cm^2) 3 9 cm^2 01/04/23 1529   Wound Volume (cm^3) 0 78 cm^3 01/04/23 1529   Calculated Wound Volume (cm^3) 0 78 cm^3 01/04/23 1529   Change in Wound Size % -290 01/04/23 1529   Undermining 0 1 12/14/22 1401   Undermining is depth extending from 5-8oclock 12/14/22 1401   Drainage Amount Moderate 01/04/23 1529   Drainage Description Serosanguineous; Yellow 01/04/23 1529   Non-staged Wound Description Full thickness 01/04/23 1529   Patient Tolerance Tolerated well 12/14/22 1401   Dressing Status Intact 01/04/23 1529                VAS carotid complete study    Result Date: 12/12/2022  Narrative:  [facility] REPORT [facility]IN[facility]: Indications: Yearly surveillance of carotid artery disease  Patient is asymptomatic at this time  Operative History: 2020-01-10 Right Standard (ICA, ECA and CCA) endarterectomy Risk Factors The patient has history of HTN, Hyperlipidemia and previous smoking (quit >10yrs ago)  Clinical Right Pressure:  130/60 mm Hg, Left Pressure:  130/60 mm Hg  FINDINGS:  Right        Impression     PSV  EDV (cm/s)  Ratio  Dist  ICA                    72          11   1 02  Mid  ICA                     69          17   0 97  Prox  ICA    Widely Patent   44           8   0 62  Dist CCA                     65           9         Mid CCA                      71          10   1 15  Prox CCA                     62          10         Ext Carotid                 104           9   1 46  Prox Vert                    75          15         Subclavian                  113           0          Left         Impression  PSV  EDV (cm/s)  Ratio  Dist  ICA                 51          12   0 69  Mid  ICA                  61          12   0 83  Prox  ICA    1 - 49%     143          21   1 95  Dist CCA                  59           9         Mid CCA                   73          10   1 05  Prox CCA                  70           9         Ext Carotid              180          13   2 45  Prox Vert                 37           7         Subclavian                92           0            CONCLUSION:  Impression RIGHT: Widely patent internal carotid artery and endarterectomy site  Vertebral artery flow is antegrade  There is no significant subclavian artery disease  LEFT: There is <50% stenosis noted in the internal carotid artery   Plaque is heterogenous and irregular  Vertebral artery flow is antegrade  There is no significant subclavian artery disease  Compared to previous study on 11/17/2021, there is no significant change  Recommend repeat testing in 1 year as per protocol unless otherwise indicated  SIGNATURE: Electronically Signed by: Sara Pro on 2022-12-12 02:18:05 PM      Wound Instructions:  Orders Placed This Encounter   Procedures   • Wound cleansing and dressings     Wash your hands with soap and water  Remove old dressing, discard into plastic bag and place in trash  Cleanse the wound with mild soap and water prior to applying a clean dressing  Do not use tissue or cotton balls  Do not scrub the wound  Pat dry using gauze  Shower yes  Apply polymem to the right leg wound  Secure with ezekiel wrap and tape  Change dressing three times per week     Standing Status:   Future     Standing Expiration Date:   1/4/2024         Ann Azul DPM, DPM, FACFAS    Portions of the record may have been created with voice recognition software  Occasional wrong word or "sound a like" substitutions may have occurred due to the inherent limitations of voice recognition software  Read the chart carefully and recognize, using context, where substitutions have occurred

## 2023-01-04 NOTE — PATIENT INSTRUCTIONS
Orders Placed This Encounter   Procedures    Wound cleansing and dressings     Wash your hands with soap and water  Remove old dressing, discard into plastic bag and place in trash  Cleanse the wound with mild soap and water prior to applying a clean dressing  Do not use tissue or cotton balls  Do not scrub the wound  Pat dry using gauze  Shower yes  Apply polymem to the right leg wound      Secure with ezekiel wrap and tape  Change dressing three times per week     Standing Status:   Future     Standing Expiration Date:   1/4/2024

## 2023-01-11 ENCOUNTER — OFFICE VISIT (OUTPATIENT)
Dept: WOUND CARE | Facility: HOSPITAL | Age: 78
End: 2023-01-11

## 2023-01-11 VITALS — RESPIRATION RATE: 16 BRPM | TEMPERATURE: 97.2 F | HEART RATE: 76 BPM

## 2023-01-11 DIAGNOSIS — S91.001D OPEN WOUND OF RIGHT ANKLE, SUBSEQUENT ENCOUNTER: Primary | ICD-10-CM

## 2023-01-11 RX ORDER — LIDOCAINE HYDROCHLORIDE 40 MG/ML
5 SOLUTION TOPICAL ONCE
Status: COMPLETED | OUTPATIENT
Start: 2023-01-11 | End: 2023-01-11

## 2023-01-11 RX ADMIN — LIDOCAINE HYDROCHLORIDE 5 ML: 40 SOLUTION TOPICAL at 16:02

## 2023-01-11 NOTE — PROGRESS NOTES
Patient ID: Ashley Posey  is a 66 y o  male Date of Birth 1945       Chief Complaint   Patient presents with   • Follow Up Wound Care Visit     Right ankle wound       Allergies:  Patient has no known allergies  Diagnosis:  1  Open wound of right ankle, subsequent encounter  -     Wound cleansing and dressings; Future  -     lidocaine (XYLOCAINE) 4 % topical solution 5 mL  -     Debridement       Diagnosis ICD-10-CM Associated Orders   1  Open wound of right ankle, subsequent encounter  S91 001D Wound cleansing and dressings     lidocaine (XYLOCAINE) 4 % topical solution 5 mL     Debridement           Assessment & Plan:  1   Open wound anterior right ankle, wound was debrided through selective tissues and dressed with polymem dry dressing, patient will do the same 3 x a week  2   Patient to wear a shoe or sneaker that does not put pressure over the wound, his current sneakers are appropriate  There is motion at the anterior ankle upon dorsiflexion and plantarflexion of the lesser digits, tendon is not exposed at the wound base although it is allowing motion through the area of the wound secondary to prior skin grafting and scar contracture  I advised patient if there is no improvement with change of product and then I will consider putting him in a cam boot although I approach this cautiously as he already uses a walker to ambulate and I do not want to make him more unstable and put him at a greater fall risk  3   We discussed etiology of wound, it is in the place where he had had an open fracture with previous skin grafting many many years ago, I explained to him because of the scar tissue and prior skin graft there will be a delay in healing and he might require cellular tissue product or skin grafting in the future    4   Patient understands and agrees with the plan, I have stressed frequent elevation, low-sodium diet, proper protein intake, strict pressure and friction reduction    Debridement Wound 12/07/22 Traumatic Ankle Anterior;Right    Universal Protocol:  Consent: Verbal consent obtained  Risks and benefits: risks, benefits and alternatives were discussed  Consent given by: patient  Time out: Immediately prior to procedure a "time out" was called to verify the correct patient, procedure, equipment, support staff and site/side marked as required  Patient understanding: patient states understanding of the procedure being performed  Patient identity confirmed: verbally with patient      Performed by: physician  Debridement type: selective  Pain control: lidocaine 4%  Pre-debridement measurements  Length (cm): 3 9  Width (cm): 0 9  Depth (cm): 0 2  Surface Area (cm^2): 3 51  Volume (cm^3): 0 7    Post-debridement measurements  Length (cm): 3 9  Width (cm): 0 9  Depth (cm): 0 2  Percent debrided: 50%  Surface Area (cm^2): 3 51  Area debrided (cm^2): 1 76  Volume (cm^3): 0 7  Devitalized tissue debrided: biofilm, callus and slough  Instrument(s) utilized: blade  Bleeding: small  Hemostasis obtained with: pressure  Procedural pain (0-10): insensate  Post-procedural pain: insensate   Response to treatment: procedure was tolerated well           Subjective:   Patient presents today for care of anterior right ankle open wounds, he has been changing dressings as directed, no new complaints  The following portions of the patient's history were reviewed and updated as appropriate:   Patient Active Problem List   Diagnosis   • Tubular adenoma of colon   • Carpal tunnel syndrome   • Dyslipidemia   • Gout   • Hypertension   • Impaired fasting glucose   • Insomnia   • Iron deficiency anemia due to chronic blood loss   • Osteoarthritis   • Prolonged QT interval   • Rhinitis   • Other disorder of calcium metabolism   • Nephrolithiasis   • Elevated PSA   • Obesity (BMI 30 0-34  9)   • Internal carotid artery stenosis, right   • Cerebrovascular accident (CVA) ([facility] [address] )   • Aortic valve stenosis   • S/P carotid endarterectomy   • Coronary artery disease involving native coronary artery   • Bilateral carotid artery stenosis   • Stage 3b chronic kidney disease (HCC)   • Rambo lesion, chronic   • Paraesophageal hernia   • Atrial flutter, unspecified type [facility])     Past Medical History:   Diagnosis Date   • Anemia     iron def   • Arthritis    • GERD (gastroesophageal reflux disease)    • Gout    • Hypertension    • Insomnia    • Stroke (cerebrum) [facility])      Past Surgical History:   Procedure Laterality Date   • ANKLE SURGERY Right    • CARDIAC CATHETERIZATION     • COLONOSCOPY  2013    polypectomy; complete - 3 yrs d/t polyp - benign submucosal lipoma- path c/w lipoma   • COLONOSCOPY  2017    complete - tubular adenoma - repeat 5yrs   • CYSTOSCOPY     • CYSTOTOMY      bladder  w/ basket extraction of calculus   • FEMUR FRACTURE SURGERY     • HERNIA REPAIR      inguinal ; sliding   • INGUINAL HERNIA REPAIR Right     unilateral   • KNEE ARTHROSCOPY Right     w/medial menisectomy   • LASIK      corneal   • LITHOTRIPSY      renal   • RI COLONOSCOPY FLX DX W/COLLJ SPEC WHEN PFRMD N/A 2017    Procedure: SCREENING COLONOSCOPY;  Surgeon: Lisa Bartlett MD;  Location:  MAIN OR;  Service: General   • RI TEAEC W/PATCH GRF CAROTID VERTB SUBCLAV NECK INC Right 1/10/2020    Procedure: ENDARTERECTOMY ARTERY CAROTID;  Surgeon: Oriana Jarquin MD;  Location:  MAIN OR;  Service: Vascular   • ROTATOR CUFF REPAIR Bilateral    • TONSILLECTOMY       Social History     Socioeconomic History   • Marital status:       Spouse name: Not on file   • Number of children: 1   • Years of education: Not on file   • Highest education level: Not on file   Occupational History   • Occupation: Retired     Comment: working full time   Tobacco Use   • Smoking status: Former     Packs/day: 1 00     Years: 22 00     Pack years: 22 00     Types: Cigarettes     Start date: 0     Quit date:      Years since quittin 0   • Smokeless tobacco: Never   Vaping Use   • Vaping Use: Never used   Substance and Sexual Activity   • Alcohol use: Not Currently     Comment: stopped drinking alcohol   • Drug use: No   • Sexual activity: Not Currently   Other Topics Concern   • Not on file   Social History Narrative    , lives alone, working full time     Social Determinants of Health     Financial Resource Strain: Low Risk    • Difficulty of Paying Living Expenses: Not hard at all   Food Insecurity: Not on file   Transportation Needs: No Transportation Needs   • Lack of Transportation (Medical): No   • Lack of Transportation (Non-Medical):  No   Physical Activity: Not on file   Stress: Not on file   Social Connections: Not on file   Intimate Partner Violence: Not on file   Housing Stability: Not on file        Current Outpatient Medications:   •  Ascorbic Acid (VITAMIN C IMMUNE HEALTH PO), Take by mouth daily, Disp: , Rfl:   •  aspirin (ECOTRIN LOW STRENGTH) 81 mg EC tablet, Take 81 mg by mouth every other day 1 every other day, Disp: , Rfl:   •  atorvastatin (LIPITOR) 40 mg tablet, Take 1 tablet (40 mg total) by mouth every evening, Disp: 90 tablet, Rfl: 3  •  calcium polycarbophil (FIBERCON) 625 mg tablet, Take 625 mg by mouth daily, Disp: , Rfl:   •  clotrimazole-betamethasone (LOTRISONE) 1-0 05 % cream, apply to affected area twice a day for 10 days MAX (Patient not taking: Reported on 12/2/2022), Disp: 45 g, Rfl: 0  •  ferrous sulfate 324 (65 Fe) mg, Take 1 tablet (324 mg total) by mouth daily before breakfast, Disp: 30 tablet, Rfl: 2  •  finasteride (PROSCAR) 5 mg tablet, Take 1 tablet (5 mg total) by mouth daily, Disp: 90 tablet, Rfl: 3  •  lisinopril (ZESTRIL) 10 mg tablet, take 1 tablet by mouth once daily, Disp: 90 tablet, Rfl: 1  •  metoprolol succinate (TOPROL-XL) 25 mg 24 hr tablet, take 1 tablet by mouth once daily, Disp: 90 tablet, Rfl: 1  •  Multiple Vitamin (MULTIVITAMIN) capsule, Take 1 capsule by mouth daily, Disp: , Rfl: •  neomycin-polymyxin-dexamethasone (MAXITROL) ophthalmic suspension, , Disp: , Rfl:   •  nitroglycerin (NITROSTAT) 0 4 mg SL tablet, Place 1 tablet (0 4 mg total) under the tongue every 5 (five) minutes as needed for chest pain, Disp: 15 tablet, Rfl: 5  •  omeprazole (PriLOSEC) 20 mg delayed release capsule, take 1 capsule by mouth once daily, Disp: 90 capsule, Rfl: 2  •  Potassium Citrate ER 15 MEQ (1620 MG) TBCR, Take 2 tablets by mouth 2 (two) times a day, Disp: 120 tablet, Rfl: 3  •  tamsulosin (FLOMAX) 0 4 mg, Take 1 capsule (0 4 mg total) by mouth daily with dinner, Disp: 90 capsule, Rfl: 3  •  triamterene-hydrochlorothiazide (MAXZIDE) 75-50 MG per tablet, take 1 tablet by mouth once daily, Disp: 90 tablet, Rfl: 1  No current facility-administered medications for this visit  Family History   Problem Relation Age of Onset   • Alzheimer's disease Mother    • Alzheimer's disease Father    • Heart disease Father         CAD   • Other Father         prediabetes   • Diabetes Father    • Breast cancer Other    • Arthritis Family    • Hypertension Family        Review of Systems   Constitutional: Negative for chills and fever  HENT: Negative for ear pain and sore throat  Eyes: Negative for pain and visual disturbance  Respiratory: Negative for cough and shortness of breath  Cardiovascular: Negative for chest pain and palpitations  Gastrointestinal: Negative for abdominal pain and vomiting  Genitourinary: Negative for dysuria and hematuria  Musculoskeletal: Positive for gait problem  Negative for arthralgias and back pain  Skin: Positive for color change and wound  Negative for rash  Neurological: Positive for numbness  Negative for seizures and syncope  Psychiatric/Behavioral: Negative  All other systems reviewed and are negative  Objective:  Pulse 76   Temp (!) 97 2 °F (36 2 °C)   Resp 16     Physical Exam  Constitutional:       Appearance: Normal appearance  He is normal weight  HENT:      Head: Normocephalic and atraumatic  Right Ear: External ear normal       Left Ear: External ear normal       Nose: Nose normal       Mouth/Throat:      Mouth: Mucous membranes are moist       Pharynx: Oropharynx is clear  Eyes:      Conjunctiva/sclera: Conjunctivae normal    Cardiovascular:      Pulses: Normal pulses  Dorsalis pedis pulses are 2+ on the right side and 2+ on the left side  Posterior tibial pulses are 2+ on the right side and 2+ on the left side  Pulmonary:      Effort: Pulmonary effort is normal    Musculoskeletal:      Cervical back: Normal range of motion  Right lower leg: No edema  Left lower leg: No edema  Skin:     General: Skin is warm and dry  Capillary Refill: Capillary refill takes less than 2 seconds  Comments: See detailed wound assessment   Neurological:      General: No focal deficit present  Mental Status: He is alert and oriented to person, place, and time  Mental status is at baseline  Sensory: Sensory deficit present  Coordination: Coordination abnormal       Gait: Gait abnormal       Deep Tendon Reflexes: Reflexes abnormal    Psychiatric:         Mood and Affect: Mood normal          Behavior: Behavior normal          Thought Content: Thought content normal          Judgment: Judgment normal            Wound 12/07/22 Traumatic Ankle Anterior;Right (Active)   Wound Image    01/11/23 1544   Wound Description Yellow;Pink;Epithelialization;Exposed tendon 01/11/23 1544   Kathrin-wound Assessment Maceration; White;Pink 01/11/23 1544   Wound Length (cm) 3 9 cm 01/11/23 1544   Wound Width (cm) 0 9 cm 01/11/23 1544   Wound Depth (cm) 0 2 cm 01/11/23 1544   Wound Surface Area (cm^2) 3 51 cm^2 01/11/23 1544   Wound Volume (cm^3) 0 702 cm^3 01/11/23 1544   Calculated Wound Volume (cm^3) 0 7 cm^3 01/11/23 1544   Change in Wound Size % -250 01/11/23 1544   Undermining 0 1 12/14/22 1401   Undermining is depth extending from 5-8oclock 12/14/22 1401   Drainage Amount Moderate 01/11/23 1544   Drainage Description Serous 01/11/23 1544   Non-staged Wound Description Full thickness 01/11/23 1544   Patient Tolerance Tolerated well 12/14/22 1401   Dressing Status Intact 01/11/23 1544                No results found  Wound Instructions:  Orders Placed This Encounter   Procedures   • Wound cleansing and dressings     Wash your hands with soap and water  Remove old dressing, discard into plastic bag and place in trash  Cleanse the wound with mild soap and water prior to applying a clean dressing  Do not use tissue or cotton balls  Do not scrub the wound  Pat dry using gauze  Shower yes  Apply polymem to the right leg wound  Secure with ezekiel wrap and tape  Change dressing every other day     Standing Status:   Future     Standing Expiration Date:   1/11/2024   • Debridement     This order was created via procedure documentation         Zelpha Minium, DPM, DPM, FACFAS    Portions of the record may have been created with voice recognition software  Occasional wrong word or "sound a like" substitutions may have occurred due to the inherent limitations of voice recognition software  Read the chart carefully and recognize, using context, where substitutions have occurred

## 2023-01-11 NOTE — PATIENT INSTRUCTIONS
Orders Placed This Encounter   Procedures    Wound cleansing and dressings     Wash your hands with soap and water  Remove old dressing, discard into plastic bag and place in trash  Cleanse the wound with mild soap and water prior to applying a clean dressing  Do not use tissue or cotton balls  Do not scrub the wound  Pat dry using gauze  Shower yes  Apply polymem to the right leg wound      Secure with ezekiel wrap and tape  Change dressing every other day     Standing Status:   Future     Standing Expiration Date:   1/11/2024

## 2023-01-18 ENCOUNTER — OFFICE VISIT (OUTPATIENT)
Dept: WOUND CARE | Facility: HOSPITAL | Age: 78
End: 2023-01-18

## 2023-01-18 ENCOUNTER — APPOINTMENT (OUTPATIENT)
Dept: LAB | Facility: HOSPITAL | Age: 78
End: 2023-01-18
Attending: INTERNAL MEDICINE

## 2023-01-18 VITALS
TEMPERATURE: 97.3 F | HEART RATE: 56 BPM | RESPIRATION RATE: 16 BRPM | DIASTOLIC BLOOD PRESSURE: 62 MMHG | SYSTOLIC BLOOD PRESSURE: 114 MMHG

## 2023-01-18 DIAGNOSIS — R97.20 ELEVATED PSA: ICD-10-CM

## 2023-01-18 DIAGNOSIS — I35.0 AORTIC VALVE STENOSIS, ETIOLOGY OF CARDIAC VALVE DISEASE UNSPECIFIED: ICD-10-CM

## 2023-01-18 DIAGNOSIS — I10 PRIMARY HYPERTENSION: ICD-10-CM

## 2023-01-18 DIAGNOSIS — D50.0 IRON DEFICIENCY ANEMIA DUE TO CHRONIC BLOOD LOSS: ICD-10-CM

## 2023-01-18 DIAGNOSIS — I25.10 CORONARY ARTERY DISEASE INVOLVING NATIVE CORONARY ARTERY OF NATIVE HEART WITHOUT ANGINA PECTORIS: ICD-10-CM

## 2023-01-18 DIAGNOSIS — S91.001D OPEN WOUND OF RIGHT ANKLE, SUBSEQUENT ENCOUNTER: Primary | ICD-10-CM

## 2023-01-18 DIAGNOSIS — N18.32 STAGE 3B CHRONIC KIDNEY DISEASE (HCC): ICD-10-CM

## 2023-01-18 LAB
ALBUMIN SERPL BCP-MCNC: 4.1 G/DL (ref 3.5–5)
ALP SERPL-CCNC: 55 U/L (ref 46–116)
ALT SERPL W P-5'-P-CCNC: 29 U/L (ref 12–78)
ANION GAP SERPL CALCULATED.3IONS-SCNC: 4 MMOL/L (ref 4–13)
AST SERPL W P-5'-P-CCNC: 25 U/L (ref 5–45)
BASOPHILS # BLD AUTO: 0.04 THOUSANDS/ÂΜL (ref 0–0.1)
BASOPHILS NFR BLD AUTO: 1 % (ref 0–1)
BILIRUB SERPL-MCNC: 0.76 MG/DL (ref 0.2–1)
BUN SERPL-MCNC: 21 MG/DL (ref 5–25)
CALCIUM SERPL-MCNC: 9.5 MG/DL (ref 8.3–10.1)
CHLORIDE SERPL-SCNC: 109 MMOL/L (ref 96–108)
CHOLEST SERPL-MCNC: 105 MG/DL
CO2 SERPL-SCNC: 27 MMOL/L (ref 21–32)
CREAT SERPL-MCNC: 1.44 MG/DL (ref 0.6–1.3)
EOSINOPHIL # BLD AUTO: 0.45 THOUSAND/ÂΜL (ref 0–0.61)
EOSINOPHIL NFR BLD AUTO: 6 % (ref 0–6)
ERYTHROCYTE [DISTWIDTH] IN BLOOD BY AUTOMATED COUNT: 12.2 % (ref 11.6–15.1)
FERRITIN SERPL-MCNC: 25 NG/ML (ref 8–388)
GFR SERPL CREATININE-BSD FRML MDRD: 46 ML/MIN/1.73SQ M
GLUCOSE P FAST SERPL-MCNC: 96 MG/DL (ref 65–99)
HCT VFR BLD AUTO: 41.9 % (ref 36.5–49.3)
HDLC SERPL-MCNC: 43 MG/DL
HGB BLD-MCNC: 13.8 G/DL (ref 12–17)
IMM GRANULOCYTES # BLD AUTO: 0.02 THOUSAND/UL (ref 0–0.2)
IMM GRANULOCYTES NFR BLD AUTO: 0 % (ref 0–2)
IRON SERPL-MCNC: 100 UG/DL (ref 65–175)
LDLC SERPL CALC-MCNC: 45 MG/DL (ref 0–100)
LYMPHOCYTES # BLD AUTO: 1.41 THOUSANDS/ÂΜL (ref 0.6–4.47)
LYMPHOCYTES NFR BLD AUTO: 20 % (ref 14–44)
MCH RBC QN AUTO: 31.4 PG (ref 26.8–34.3)
MCHC RBC AUTO-ENTMCNC: 32.9 G/DL (ref 31.4–37.4)
MCV RBC AUTO: 95 FL (ref 82–98)
MONOCYTES # BLD AUTO: 0.5 THOUSAND/ÂΜL (ref 0.17–1.22)
MONOCYTES NFR BLD AUTO: 7 % (ref 4–12)
NEUTROPHILS # BLD AUTO: 4.77 THOUSANDS/ÂΜL (ref 1.85–7.62)
NEUTS SEG NFR BLD AUTO: 66 % (ref 43–75)
NONHDLC SERPL-MCNC: 62 MG/DL
NRBC BLD AUTO-RTO: 0 /100 WBCS
PLATELET # BLD AUTO: 240 THOUSANDS/UL (ref 149–390)
PMV BLD AUTO: 9.2 FL (ref 8.9–12.7)
POTASSIUM SERPL-SCNC: 5 MMOL/L (ref 3.5–5.3)
PROT SERPL-MCNC: 7.3 G/DL (ref 6.4–8.4)
PSA SERPL-MCNC: 5.7 NG/ML (ref 0–4)
RBC # BLD AUTO: 4.4 MILLION/UL (ref 3.88–5.62)
SODIUM SERPL-SCNC: 140 MMOL/L (ref 135–147)
TRIGL SERPL-MCNC: 85 MG/DL
TSH SERPL DL<=0.05 MIU/L-ACNC: 1.25 UIU/ML (ref 0.45–4.5)
WBC # BLD AUTO: 7.19 THOUSAND/UL (ref 4.31–10.16)

## 2023-01-18 RX ORDER — LIDOCAINE HYDROCHLORIDE 40 MG/ML
5 SOLUTION TOPICAL ONCE
Status: COMPLETED | OUTPATIENT
Start: 2023-01-18 | End: 2023-01-18

## 2023-01-18 RX ADMIN — LIDOCAINE HYDROCHLORIDE 5 ML: 40 SOLUTION TOPICAL at 15:32

## 2023-01-18 NOTE — PATIENT INSTRUCTIONS
Orders Placed This Encounter   Procedures    Wound cleansing and dressings     Wash your hands with soap and water  Remove old dressing, discard into plastic bag and place in trash  Cleanse the wound with mild soap and water prior to applying a clean dressing  Do not use tissue or cotton balls  Do not scrub the wound  Pat dry using gauze  Shower yes  Apply polymem and gauze to the right leg wound  Secure with ezekiel wrap and tape  Change dressing every other day  Dressing above applied today at the [facility]  Next appointment in 1 week       Standing Status:   Future     Standing Expiration Date:   1/18/2024

## 2023-01-18 NOTE — PROGRESS NOTES
Patient ID: Francesca Neri  is a 66 y o  male Date of Birth 1945       Chief Complaint   Patient presents with   • Follow Up Wound Care Visit     Right ankle wound       Allergies:  Patient has no known allergies  Diagnosis:  1  Open wound of right ankle, subsequent encounter  -     lidocaine (XYLOCAINE) 4 % topical solution 5 mL  -     Wound cleansing and dressings; Future       Diagnosis ICD-10-CM Associated Orders   1  Open wound of right ankle, subsequent encounter  S91 001D lidocaine (XYLOCAINE) 4 % topical solution 5 mL     Wound cleansing and dressings           Assessment & Plan:  1  Open wound anterior right ankle, wound was debrided through subcuticular tissues with removal of tendon within the wound base, this was dressed with PolyMem dry dressing, he will do the same 3 times a week  2   Patient to wear shoe or sneaker that does not put pressure over the wound, his current sneakers are appropriate  I discussed use of Cam boot as he does have tendon involvement, since wound base is improved and there is increased granular tissue postdebridement, we have deferred use of Cam boot as patient already uses a walker to ambulate and is unsteady  It would put him at a greater fall risk and he at this time is deferring cam boot  3   Elevation, low-sodium diet, proper protein intake, weight-bear only for ADLs was reviewed with patient, he understands and agrees with the plan and will follow up in 1 week  Debridement   Wound 12/07/22 Traumatic Ankle Anterior;Right    Universal Protocol:  Consent: Verbal consent obtained  Risks and benefits: risks, benefits and alternatives were discussed  Consent given by: patient  Time out: Immediately prior to procedure a "time out" was called to verify the correct patient, procedure, equipment, support staff and site/side marked as required    Patient understanding: patient states understanding of the procedure being performed  Patient identity confirmed: verbally with patient      Performed by: physician  Debridement type: surgical  Level of debridement: muscle  Pain control: lidocaine 4%  Pre-debridement measurements  Length (cm): 3 3  Width (cm): 0 9  Depth (cm): 0 2  Surface Area (cm^2): 2 97  Volume (cm^3): 0 59    Post-debridement measurements  Length (cm): 3 5  Width (cm): 1  Depth (cm): 0 4  Percent debrided: 90%  Surface Area (cm^2): 3 5  Area debrided (cm^2): 3 15  Volume (cm^3): 1 4  Tissue and other material debrided: subcutaneous tissue and tendon  Devitalized tissue debrided: biofilm, fibrin, necrotic debris and slough  Instrument(s) utilized: blade and forceps  Bleeding: small  Hemostasis obtained with: pressure  Procedural pain (0-10): insensate  Post-procedural pain: insensate   Response to treatment: procedure was tolerated well                   Subjective:   Patient presents today for care of right lower leg open wound, he has been changing dressings as directed and has no new complaints  The following portions of the patient's history were reviewed and updated as appropriate:   Patient Active Problem List   Diagnosis   • Tubular adenoma of colon   • Carpal tunnel syndrome   • Dyslipidemia   • Gout   • Hypertension   • Impaired fasting glucose   • Insomnia   • Iron deficiency anemia due to chronic blood loss   • Osteoarthritis   • Prolonged QT interval   • Rhinitis   • Other disorder of calcium metabolism   • Nephrolithiasis   • Elevated PSA   • Obesity (BMI 30 0-34  9)   • Internal carotid artery stenosis, right   • Cerebrovascular accident (CVA) ([facility] [address] )   • Aortic valve stenosis   • S/P carotid endarterectomy   • Coronary artery disease involving native coronary artery   • Bilateral carotid artery stenosis   • Stage 3b chronic kidney disease ([facility] [address] )   • Rambo lesion, chronic   • Paraesophageal hernia   • Atrial flutter, unspecified type [facility])     Past Medical History:   Diagnosis Date   • Anemia     iron def   • Arthritis    • GERD (gastroesophageal reflux disease)    • Gout    • Hypertension    • Insomnia    • Stroke (cerebrum) [facility])      Past Surgical History:   Procedure Laterality Date   • ANKLE SURGERY Right    • CARDIAC CATHETERIZATION     • COLONOSCOPY  2013    polypectomy; complete - 3 yrs d/t polyp - benign submucosal lipoma- path c/w lipoma   • COLONOSCOPY  2017    complete - tubular adenoma - repeat 5yrs   • CYSTOSCOPY     • CYSTOTOMY      bladder  w/ basket extraction of calculus   • FEMUR FRACTURE SURGERY     • HERNIA REPAIR      inguinal ; sliding   • INGUINAL HERNIA REPAIR Right     unilateral   • KNEE ARTHROSCOPY Right     w/medial menisectomy   • LASIK      corneal   • LITHOTRIPSY      renal   • NY COLONOSCOPY FLX DX W/COLLJ SPEC WHEN PFRMD N/A 2017    Procedure: SCREENING COLONOSCOPY;  Surgeon: Dima Jiménez MD;  Location: [facility];  Service: General   • [facility]10[facility]    Procedure: ENDARTERECTOMY ARTERY CAROTID;  Surgeon: Lizy Melendez MD;  Location:  MAIN OR;  Service: Vascular   • ROTATOR CUFF REPAIR Bilateral    • TONSILLECTOMY       Social History     Socioeconomic History   • Marital status:       Spouse name: None   • Number of children: 1   • Years of education: None   • Highest education level: None   Occupational History   • Occupation: Retired     Comment: working full time   Tobacco Use   • Smoking status: Former     Packs/day: 1 00     Years: 22 00     Pack years: 22 00     Types: Cigarettes     Start date: 0     Quit date:      Years since quittin 0   • Smokeless tobacco: Never   Vaping Use   • Vaping Use: Never used   Substance and Sexual Activity   • Alcohol use: Not Currently     Comment: stopped drinking alcohol   • Drug use: No   • Sexual activity: Not Currently   Other Topics Concern   • None   Social History Narrative    , lives alone, working full time     Social Determinants of Health     Financial Resource Strain: Low Risk    • Difficulty of Paying Living Expenses: Not hard at all   Food Insecurity: Not on file   Transportation Needs: No Transportation Needs   • Lack of Transportation (Medical): No   • Lack of Transportation (Non-Medical):  No   Physical Activity: Not on file   Stress: Not on file   Social Connections: Not on file   Intimate Partner Violence: Not on file   Housing Stability: Not on file        Current Outpatient Medications:   •  Ascorbic Acid (VITAMIN C IMMUNE HEALTH PO), Take by mouth daily, Disp: , Rfl:   •  aspirin (ECOTRIN LOW STRENGTH) 81 mg EC tablet, Take 81 mg by mouth every other day 1 every other day, Disp: , Rfl:   •  atorvastatin (LIPITOR) 40 mg tablet, Take 1 tablet (40 mg total) by mouth every evening, Disp: 90 tablet, Rfl: 3  •  calcium polycarbophil (FIBERCON) 625 mg tablet, Take 625 mg by mouth daily, Disp: , Rfl:   •  clotrimazole-betamethasone (LOTRISONE) 1-0 05 % cream, apply to affected area twice a day for 10 days MAX (Patient not taking: Reported on 12/2/2022), Disp: 45 g, Rfl: 0  •  ferrous sulfate 324 (65 Fe) mg, Take 1 tablet (324 mg total) by mouth daily before breakfast, Disp: 30 tablet, Rfl: 2  •  finasteride (PROSCAR) 5 mg tablet, Take 1 tablet (5 mg total) by mouth daily, Disp: 90 tablet, Rfl: 3  •  lisinopril (ZESTRIL) 10 mg tablet, take 1 tablet by mouth once daily, Disp: 90 tablet, Rfl: 1  •  metoprolol succinate (TOPROL-XL) 25 mg 24 hr tablet, take 1 tablet by mouth once daily, Disp: 90 tablet, Rfl: 1  •  Multiple Vitamin (MULTIVITAMIN) capsule, Take 1 capsule by mouth daily, Disp: , Rfl:   •  neomycin-polymyxin-dexamethasone (MAXITROL) ophthalmic suspension, , Disp: , Rfl:   •  nitroglycerin (NITROSTAT) 0 4 mg SL tablet, Place 1 tablet (0 4 mg total) under the tongue every 5 (five) minutes as needed for chest pain, Disp: 15 tablet, Rfl: 5  •  omeprazole (PriLOSEC) 20 mg delayed release capsule, take 1 capsule by mouth once daily, Disp: 90 capsule, Rfl: 2  • Potassium Citrate ER 15 MEQ (1620 MG) TBCR, Take 2 tablets by mouth 2 (two) times a day, Disp: 120 tablet, Rfl: 3  •  tamsulosin (FLOMAX) 0 4 mg, Take 1 capsule (0 4 mg total) by mouth daily with dinner, Disp: 90 capsule, Rfl: 3  •  triamterene-hydrochlorothiazide (MAXZIDE) 75-50 MG per tablet, take 1 tablet by mouth once daily, Disp: 90 tablet, Rfl: 1  No current facility-administered medications for this visit  Family History   Problem Relation Age of Onset   • Alzheimer's disease Mother    • Alzheimer's disease Father    • Heart disease Father         CAD   • Other Father         prediabetes   • Diabetes Father    • Breast cancer Other    • Arthritis Family    • Hypertension Family        Review of Systems   Constitutional: Negative for chills and fever  HENT: Negative for ear pain and sore throat  Eyes: Negative for pain and visual disturbance  Respiratory: Negative for cough and shortness of breath  Cardiovascular: Negative for chest pain and palpitations  Gastrointestinal: Negative for abdominal pain and vomiting  Genitourinary: Negative for dysuria and hematuria  Musculoskeletal: Positive for gait problem  Negative for arthralgias and back pain  Skin: Positive for color change and wound  Negative for rash  Neurological: Positive for numbness  Negative for seizures and syncope  Psychiatric/Behavioral: Negative  All other systems reviewed and are negative  Objective:  /62   Pulse 56   Temp (!) 97 3 °F (36 3 °C)   Resp 16     Physical Exam  Constitutional:       Appearance: Normal appearance  He is normal weight  HENT:      Head: Normocephalic and atraumatic  Right Ear: External ear normal       Left Ear: External ear normal       Nose: Nose normal       Mouth/Throat:      Mouth: Mucous membranes are moist       Pharynx: Oropharynx is clear  Eyes:      Conjunctiva/sclera: Conjunctivae normal    Cardiovascular:      Pulses: Normal pulses             Dorsalis pedis pulses are 2+ on the right side and 2+ on the left side  Posterior tibial pulses are 2+ on the right side and 2+ on the left side  Pulmonary:      Effort: Pulmonary effort is normal    Musculoskeletal:      Cervical back: Normal range of motion  Right lower leg: No edema  Left lower leg: No edema  Skin:     General: Skin is warm and dry  Capillary Refill: Capillary refill takes less than 2 seconds  Comments: See detailed wound assessment   Neurological:      General: No focal deficit present  Mental Status: He is alert and oriented to person, place, and time  Mental status is at baseline  Sensory: Sensory deficit present  Coordination: Coordination abnormal       Gait: Gait abnormal       Deep Tendon Reflexes: Reflexes abnormal    Psychiatric:         Mood and Affect: Mood normal          Behavior: Behavior normal          Thought Content: Thought content normal          Judgment: Judgment normal            Wound 12/07/22 Traumatic Ankle Anterior;Right (Active)   Wound Image   01/18/23 1547   Wound Description Yellow;Pink;Epithelialization;Exposed tendon 01/18/23 1522   Kathrin-wound Assessment Maceration; White;Pink 01/18/23 1522   Wound Length (cm) 3 3 cm 01/18/23 1522   Wound Width (cm) 0 9 cm 01/18/23 1522   Wound Depth (cm) 0 2 cm 01/18/23 1522   Wound Surface Area (cm^2) 2 97 cm^2 01/18/23 1522   Wound Volume (cm^3) 0 594 cm^3 01/18/23 1522   Calculated Wound Volume (cm^3) 0 59 cm^3 01/18/23 1522   Change in Wound Size % -195 01/18/23 1522   Undermining 0 1 12/14/22 1401   Undermining is depth extending from 5-8oclock 12/14/22 1401   Drainage Amount Small 01/18/23 1522   Drainage Description Serous 01/18/23 1522   Non-staged Wound Description Full thickness 01/18/23 1522   Patient Tolerance Tolerated well 12/14/22 1401   Dressing Status Intact; Old drainage 01/18/23 1522                No results found      Wound Instructions:  Orders Placed This Encounter Procedures   • Wound cleansing and dressings     Wash your hands with soap and water  Remove old dressing, discard into plastic bag and place in trash  Cleanse the wound with mild soap and water prior to applying a clean dressing  Do not use tissue or cotton balls  Do not scrub the wound  Pat dry using gauze  Shower yes  Apply polymem and gauze to the right leg wound  Secure with ezekiel wrap and tape  Change dressing every other day  Dressing above applied today at the [facility]  Next appointment in 1 week  Standing Status:   Future     Standing Expiration Date:   1/18/2024         Ana Rosa Tapia DPM, DPM, FACFAS    Portions of the record may have been created with voice recognition software  Occasional wrong word or "sound a like" substitutions may have occurred due to the inherent limitations of voice recognition software  Read the chart carefully and recognize, using context, where substitutions have occurred

## 2023-01-19 LAB
EST. AVERAGE GLUCOSE BLD GHB EST-MCNC: 105 MG/DL
HBA1C MFR BLD: 5.3 %

## 2023-01-25 ENCOUNTER — OFFICE VISIT (OUTPATIENT)
Dept: NEPHROLOGY | Facility: HOSPITAL | Age: 78
End: 2023-01-25

## 2023-01-25 ENCOUNTER — OFFICE VISIT (OUTPATIENT)
Dept: WOUND CARE | Facility: HOSPITAL | Age: 78
End: 2023-01-25

## 2023-01-25 VITALS — DIASTOLIC BLOOD PRESSURE: 60 MMHG | WEIGHT: 203 LBS | BODY MASS INDEX: 29.13 KG/M2 | SYSTOLIC BLOOD PRESSURE: 120 MMHG

## 2023-01-25 VITALS
SYSTOLIC BLOOD PRESSURE: 118 MMHG | DIASTOLIC BLOOD PRESSURE: 70 MMHG | TEMPERATURE: 96.6 F | HEART RATE: 64 BPM | RESPIRATION RATE: 18 BRPM

## 2023-01-25 DIAGNOSIS — S91.001D OPEN WOUND OF RIGHT ANKLE, SUBSEQUENT ENCOUNTER: Primary | ICD-10-CM

## 2023-01-25 DIAGNOSIS — I10 PRIMARY HYPERTENSION: Chronic | ICD-10-CM

## 2023-01-25 DIAGNOSIS — D50.0 IRON DEFICIENCY ANEMIA DUE TO CHRONIC BLOOD LOSS: ICD-10-CM

## 2023-01-25 DIAGNOSIS — E11.65 TYPE 2 DIABETES MELLITUS WITH HYPERGLYCEMIA, WITHOUT LONG-TERM CURRENT USE OF INSULIN (HCC): ICD-10-CM

## 2023-01-25 DIAGNOSIS — N18.31 STAGE 3A CHRONIC KIDNEY DISEASE (HCC): Primary | ICD-10-CM

## 2023-01-25 DIAGNOSIS — M10.00 IDIOPATHIC GOUT, UNSPECIFIED CHRONICITY, UNSPECIFIED SITE: ICD-10-CM

## 2023-01-25 DIAGNOSIS — N20.0 NEPHROLITHIASIS: ICD-10-CM

## 2023-01-25 RX ORDER — LIDOCAINE 40 MG/G
CREAM TOPICAL ONCE
Status: COMPLETED | OUTPATIENT
Start: 2023-01-25 | End: 2023-01-25

## 2023-01-25 RX ADMIN — LIDOCAINE 1 APPLICATION: 40 CREAM TOPICAL at 14:06

## 2023-01-25 NOTE — PROGRESS NOTES
NEPHROLOGY OUTPATIENT PROGRESS NOTE   Isaak Chang  66 y o  male MRN: [id number]  DATE: 1/25/2023  Reason for visit:   Chief Complaint   Patient presents with   • Chronic Kidney Disease   • Follow-up        Patient Instructions   1  Chronic kidney disease stage 3a likely d/t multiple episodes of contrast induced nephropathy +/- age related nephron loss +/- hypertensive nephrosclerosis +/- diuretic use  -avoid nonsteroidals(ibuprofen, aleve, advil, motrin, celebrex, naproxen, toradol, indomethacin)  Can take tylenol arthritis instead  -b/l sCr appears to be at about 1 5-1 6 with last sCr 1 44 as of 1/18/23, better than baseline  -s/p IV dye on 12/7/20  -renal ultrasound shows left kidney 12 6cm, right kidney 9 7cm(foreshortened on imaging)  -repeat BMP every 6 months  -UA bland as of Oct  2022, UpCr 0 07g - negligible     2  HTN - BP well controlled on lisinopril 10mg daily, triamterene-hydrochlorothiazide 75-50mg daily, metoprolol 25mg daily     3  Mild anemia, iron deficiency - last Hgb 13 8 as of 1/18/23, resolved on oral iron, monitor CBC     4  History of gout - on colchicine 0 6mg as needed for acute gout, last uric acid 5 6 as of June 2019     5  CAD - on aspirin, lipitor     6  History of nephrolithiasis - with obstruction in August 2020, stone analysis shows 80% calcium oxalate stone    -5/17/21 litholink showed Urine volume 2 26L, citrate low at 262  Urine volume is adequate for stone prevention but low urine citrate will predispose to stone formation  Continue potassium citrate tablets 2 tabs twice daily  -stay hydrated  Drink enough to urinate 2-2 5L/day  -avoid a high salt/sodium diet     7  Elevated PSA level - follows with urology    8  Diet controlled DM2 - last A1C 5 3 as of this month     RTC in 6 months  Obtain blood and urine testing prior to next appointment  Carlos Chang was seen today for chronic kidney disease and follow-up      Diagnoses and all orders for this visit:    Stage 3a chronic kidney disease (HCC)  -     Basic metabolic panel; Future  -     Urinalysis with microscopic; Future  -     Protein / creatinine ratio, urine; Future    Primary hypertension    Nephrolithiasis    Idiopathic gout, unspecified chronicity, unspecified site    Iron deficiency anemia due to chronic blood loss    Type 2 diabetes mellitus with hyperglycemia, without long-term current use of insulin ([facility])        Assessment/Plan: 1  Chronic kidney disease stage 3a likely d/t multiple episodes of contrast induced nephropathy +/- age related nephron loss +/- hypertensive nephrosclerosis +/- diuretic use  -avoid nonsteroidals(ibuprofen, aleve, advil, motrin, celebrex, naproxen, toradol, indomethacin)  Can take tylenol arthritis instead  -b/l sCr appears to be at about 1 5-1 6 with last sCr 1 44 as of 1/18/23, better than baseline  -s/p IV dye on 12/7/20  -renal ultrasound shows left kidney 12 6cm, right kidney 9 7cm(foreshortened on imaging)  -repeat BMP every 6 months  -UA bland as of Oct  2022, UpCr 0 07g - negligible     2  HTN - BP well controlled on lisinopril 10mg daily, triamterene-hydrochlorothiazide 75-50mg daily, metoprolol 25mg daily     3  Mild anemia, iron deficiency - last Hgb 13 8 as of 1/18/23, resolved on oral iron, monitor CBC     4  History of gout - on colchicine 0 6mg as needed for acute gout, last uric acid 5 6 as of June 2019     5  CAD - on aspirin, lipitor     6  History of nephrolithiasis - with obstruction in August 2020, stone analysis shows 80% calcium oxalate stone    -5/17/21 litholink showed Urine volume 2 26L, citrate low at 262  Urine volume is adequate for stone prevention but low urine citrate will predispose to stone formation  Continue potassium citrate tablets 2 tabs twice daily  -stay hydrated  Drink enough to urinate 2-2 5L/day  -avoid a high salt/sodium diet     7  Elevated PSA level - follows with urology    8   Diet controlled DM2 - last A1C 5 3 as of this month     RTC in 6 months    Obtain blood and urine testing prior to next appointment  SUBJECTIVE / INTERVAL HISTORY:  66 y o  male presents in follow up of CKD  Wendy Franklin  denies any recent illness/hospitalizations/medication changes since last office visit  Denies NSAID use  HTN - does not check BP at home  BP well controlled at [address] [place] [address] office visits  Denies recent kidney stones  Denies recent gout flares  Review of Systems   Constitutional: Negative for chills and fever  HENT: Negative for sore throat  Eyes: Negative for visual disturbance  Respiratory: Negative for cough and shortness of breath  Cardiovascular: Negative for chest pain and leg swelling  Gastrointestinal: Negative for abdominal pain, constipation, diarrhea, nausea and vomiting  Endocrine: Negative for polyuria  Genitourinary: Negative for decreased urine volume, difficulty urinating, dysuria and hematuria  Musculoskeletal: Negative for back pain and myalgias  Skin: Negative for rash  Neurological: Negative for dizziness, light-headedness and numbness  Psychiatric/Behavioral: Negative for confusion  OBJECTIVE:  /60 (BP Location: Left arm, Patient Position: Sitting, Cuff Size: Large)   Wt 92 1 kg (203 lb)   BMI 29 13 kg/m²  Body mass index is 29 13 kg/m²  Physical exam:  Physical Exam  Vitals reviewed  Constitutional:       General: He is not in acute distress  Appearance: Normal appearance  He is well-developed  He is not diaphoretic  HENT:      Head: Normocephalic and atraumatic  Nose: Nose normal       Mouth/Throat:      Mouth: Mucous membranes are moist       Pharynx: No oropharyngeal exudate  Eyes:      General: No scleral icterus  Right eye: No discharge  Left eye: No discharge  Neck:      Thyroid: No thyromegaly  Cardiovascular:      Rate and Rhythm: Normal rate and regular rhythm  Heart sounds: Normal heart sounds     Pulmonary: Effort: Pulmonary effort is normal       Breath sounds: Normal breath sounds  No wheezing or rales  Abdominal:      General: Bowel sounds are normal  There is no distension  Palpations: Abdomen is soft  Tenderness: There is no abdominal tenderness  Musculoskeletal:         General: No swelling  Normal range of motion  Cervical back: Neck supple  Lymphadenopathy:      Cervical: No cervical adenopathy  Skin:     General: Skin is warm and dry  Findings: No rash  Neurological:      General: No focal deficit present  Mental Status: He is alert        Comments: awake   Psychiatric:         Mood and Affect: Mood normal          Behavior: Behavior normal          Medications:    Current Outpatient Medications:   •  Ascorbic Acid (VITAMIN C IMMUNE HEALTH PO), Take by mouth daily, Disp: , Rfl:   •  aspirin (ECOTRIN LOW STRENGTH) 81 mg EC tablet, Take 81 mg by mouth every other day 1 every other day, Disp: , Rfl:   •  atorvastatin (LIPITOR) 40 mg tablet, Take 1 tablet (40 mg total) by mouth every evening, Disp: 90 tablet, Rfl: 3  •  calcium polycarbophil (FIBERCON) 625 mg tablet, Take 625 mg by mouth daily, Disp: , Rfl:   •  ferrous sulfate 324 (65 Fe) mg, Take 1 tablet (324 mg total) by mouth daily before breakfast, Disp: 30 tablet, Rfl: 2  •  finasteride (PROSCAR) 5 mg tablet, Take 1 tablet (5 mg total) by mouth daily, Disp: 90 tablet, Rfl: 3  •  lisinopril (ZESTRIL) 10 mg tablet, take 1 tablet by mouth once daily, Disp: 90 tablet, Rfl: 1  •  metoprolol succinate (TOPROL-XL) 25 mg 24 hr tablet, take 1 tablet by mouth once daily, Disp: 90 tablet, Rfl: 1  •  Multiple Vitamin (MULTIVITAMIN) capsule, Take 1 capsule by mouth daily, Disp: , Rfl:   •  Potassium Citrate ER 15 MEQ (1620 MG) TBCR, Take 2 tablets by mouth 2 (two) times a day, Disp: 120 tablet, Rfl: 3  •  tamsulosin (FLOMAX) 0 4 mg, Take 1 capsule (0 4 mg total) by mouth daily with dinner, Disp: 90 capsule, Rfl: 3  • triamterene-hydrochlorothiazide (MAXZIDE) 75-50 MG per tablet, take 1 tablet by mouth once daily, Disp: 90 tablet, Rfl: 1  •  clotrimazole-betamethasone (LOTRISONE) 1-0 05 % cream, apply to affected area twice a day for 10 days MAX (Patient not taking: Reported on 12/2/2022), Disp: 45 g, Rfl: 0  •  neomycin-polymyxin-dexamethasone (MAXITROL) ophthalmic suspension, , Disp: , Rfl:   •  nitroglycerin (NITROSTAT) 0 4 mg SL tablet, Place 1 tablet (0 4 mg total) under the tongue every 5 (five) minutes as needed for chest pain (Patient not taking: Reported on 1/25/2023), Disp: 15 tablet, Rfl: 5  •  omeprazole (PriLOSEC) 20 mg delayed release capsule, take 1 capsule by mouth once daily, Disp: 90 capsule, Rfl: 2    Allergies: Allergies as of 01/25/2023   • (No Known Allergies)       The following portions of the patient's history were reviewed and updated as appropriate: past family history, past surgical history and problem list     Laboratory Results:  Lab Results   Component Value Date    SODIUM 140 01/18/2023    K 5 0 01/18/2023     (H) 01/18/2023    CO2 27 01/18/2023    BUN 21 01/18/2023    CREATININE 1 44 (H) 01/18/2023    GLUC 114 10/07/2022    CALCIUM 9 5 01/18/2023        Lab Results   Component Value Date    PTH 33 6 12/14/2020    CALCIUM 9 5 01/18/2023    PHOS 3 4 12/14/2020       Portions of the record may have been created with voice recognition software   Occasional wrong word or "sound a like" substitutions may have occurred due to the inherent limitations of voice recognition software   Read the chart carefully and recognize, using context, where substitutions have occurred

## 2023-01-25 NOTE — PATIENT INSTRUCTIONS
1 Chronic kidney disease stage 3a likely d/t multiple episodes of contrast induced nephropathy +/- age related nephron loss +/- hypertensive nephrosclerosis +/- diuretic use  -avoid nonsteroidals(ibuprofen, aleve, advil, motrin, celebrex, naproxen, toradol, indomethacin)  Can take tylenol arthritis instead  -b/l sCr appears to be at about 1 5-1 6 with last sCr 1 44 as of 1/18/23, better than baseline  -s/p IV dye on 12/7/20  -renal ultrasound shows left kidney 12 6cm, right kidney 9 7cm(foreshortened on imaging)  -repeat BMP every 6 months  -UA bland as of Oct  2022, UpCr 0 07g - negligible     2  HTN - BP well controlled on lisinopril 10mg daily, triamterene-hydrochlorothiazide 75-50mg daily, metoprolol 25mg daily     3  Mild anemia, iron deficiency - last Hgb 13 8 as of 1/18/23, resolved on oral iron, monitor CBC     4  History of gout - on colchicine 0 6mg as needed for acute gout, last uric acid 5 6 as of June 2019     5  CAD - on aspirin, lipitor     6  History of nephrolithiasis - with obstruction in August 2020, stone analysis shows 80% calcium oxalate stone    -5/17/21 litholink showed Urine volume 2 26L, citrate low at 262  Urine volume is adequate for stone prevention but low urine citrate will predispose to stone formation  Continue potassium citrate tablets 2 tabs twice daily  -stay hydrated  Drink enough to urinate 2-2 5L/day  -avoid a high salt/sodium diet     7  Elevated PSA level - follows with urology    8  Diet controlled DM2 - last A1C 5 3 as of this month     RTC in 6 months  Obtain blood and urine testing prior to next appointment

## 2023-01-25 NOTE — PROGRESS NOTES
Patient ID: Jax Rosario  is a 66 y o  male Date of Birth 1945       Chief Complaint   Patient presents with   • Follow Up Wound Care Visit     Right ankle wound       Allergies:  Patient has no known allergies  Diagnosis:  1  Open wound of right ankle, subsequent encounter  -     Wound cleansing and dressings; Future  -     lidocaine (LMX) 4 % cream       Diagnosis ICD-10-CM Associated Orders   1  Open wound of right ankle, subsequent encounter  S91 001D Wound cleansing and dressings     lidocaine (LMX) 4 % cream           Assessment & Plan:  1  Open wound anterior right ankle, wound was debrided through subcuticular tissues with removal of tendon within the base, this was dressed with PolyMem dry dressing, he will do the same 3 times a week  Distal portion of wound seems to be healed, he will continue to dress it for 1 more week  2   Patient to wear shoe or sneaker that does not put pressure over the wound, his current sneakers are appropriate  3   Today I discussed with patient possible use of Cam boot which he defers secondary to his already unsteady gait, at this time as his wound continues to improve I also defer  4   I explained to patient if his wound is stalled out or deteriorates today time I will refer him to plastic surgery for possible surgical wound debridement and split-thickness skin grafting  5   Elevation, low-sodium diet, proper protein intake, weight-bear only for ADLs was reviewed with patient, he understands and agrees with plan will follow up in 1 week  Debridement   Wound 12/07/22 Traumatic Ankle Anterior;Right    Universal Protocol:  Consent: Verbal consent obtained  Risks and benefits: risks, benefits and alternatives were discussed  Consent given by: patient  Time out: Immediately prior to procedure a "time out" was called to verify the correct patient, procedure, equipment, support staff and site/side marked as required    Patient understanding: patient states understanding of the procedure being performed  Patient identity confirmed: verbally with patient      Performed by: physician  Debridement type: surgical  Level of debridement: muscle  Pain control: lidocaine 4%  Pre-debridement measurements  Length (cm): 3 3  Width (cm): 0 9  Depth (cm): 0 2  Surface Area (cm^2): 2 97  Volume (cm^3): 0 59    Post-debridement measurements  Length (cm): 3 3  Width (cm): 1  Depth (cm): 0 4  Percent debrided: 50%  Surface Area (cm^2): 3 3  Area debrided (cm^2): 1 65  Volume (cm^3): 1 32  Tissue and other material debrided: subcutaneous tissue and tendon  Devitalized tissue debrided: biofilm, callus, fibrin, necrotic debris and slough  Instrument(s) utilized: blade and forceps  Bleeding: small  Hemostasis obtained with: pressure  Procedural pain (0-10): insensate  Post-procedural pain: insensate   Response to treatment: procedure was tolerated well           Subjective:   Patient presents today for care of right anterior ankle open wound, he has been changing dressings as directed, he does not complain of any pain, nausea, vomiting, fever, chills  The following portions of the patient's history were reviewed and updated as appropriate:   Patient Active Problem List   Diagnosis   • Tubular adenoma of colon   • Carpal tunnel syndrome   • Dyslipidemia   • Gout   • Hypertension   • Impaired fasting glucose   • Insomnia   • Iron deficiency anemia due to chronic blood loss   • Osteoarthritis   • Prolonged QT interval   • Rhinitis   • Other disorder of calcium metabolism   • Nephrolithiasis   • Elevated PSA   • Obesity (BMI 30 0-34  9)   • Internal carotid artery stenosis, right   • Cerebrovascular accident (CVA) [facility])   • Aortic valve stenosis   • S/P carotid endarterectomy   • Coronary artery disease involving native coronary artery   • Bilateral carotid artery stenosis   • Stage 3b chronic kidney disease ([facility] [address] )   • Rambo lesion, chronic   • Paraesophageal hernia   • Atrial flutter, unspecified type ([facility] )   • Type 2 diabetes mellitus with hyperglycemia, without long-term current use of insulin ([facility] )     Past Medical History:   Diagnosis Date   • Anemia     iron def   • Arthritis    • GERD (gastroesophageal reflux disease)    • Gout    • Hypertension    • Insomnia    • Stroke (cerebrum) [facility])      Past Surgical History:   Procedure Laterality Date   • ANKLE SURGERY Right    • CARDIAC CATHETERIZATION     • COLONOSCOPY  2013    polypectomy; complete - 3 yrs d/t polyp - benign submucosal lipoma- path c/w lipoma   • COLONOSCOPY  2017    complete - tubular adenoma - repeat 5yrs   • CYSTOSCOPY     • CYSTOTOMY      bladder  w/ basket extraction of calculus   • FEMUR FRACTURE SURGERY     • HERNIA REPAIR      inguinal ; sliding   • INGUINAL HERNIA REPAIR Right     unilateral   • KNEE ARTHROSCOPY Right     w/medial menisectomy   • LASIK      corneal   • LITHOTRIPSY      renal   • VT COLONOSCOPY FLX DX W/COLLJ SPEC WHEN PFRMD N/A 2017    Procedure: SCREENING COLONOSCOPY;  Surgeon: Chelsea Flores MD;  Location:  MAIN OR;  Service: General   • VT TEAEC W/PATCH GRF CAROTID VERTB SUBCLAV NECK INC Right 1/10/2020    Procedure: ENDARTERECTOMY ARTERY CAROTID;  Surgeon: Lashae Lanza MD;  Location:  MAIN OR;  Service: Vascular   • ROTATOR CUFF REPAIR Bilateral    • TONSILLECTOMY       Social History     Socioeconomic History   • Marital status:       Spouse name: None   • Number of children: 1   • Years of education: None   • Highest education level: None   Occupational History   • Occupation: Retired     Comment: working full time   Tobacco Use   • Smoking status: Former     Packs/day: 1 00     Years: 22 00     Pack years: 22 00     Types: Cigarettes     Start date: 0     Quit date:      Years since quittin 0   • Smokeless tobacco: Never   Vaping Use   • Vaping Use: Never used   Substance and Sexual Activity   • Alcohol use: Not Currently     Comment: stopped drinking alcohol   • Drug use: No   • Sexual activity: Not Currently   Other Topics Concern   • None   Social History Narrative    , lives alone, working full time     Social Determinants of Health     Financial Resource Strain: Low Risk    • Difficulty of Paying Living Expenses: Not hard at all   Food Insecurity: Not on file   Transportation Needs: No Transportation Needs   • Lack of Transportation (Medical): No   • Lack of Transportation (Non-Medical):  No   Physical Activity: Not on file   Stress: Not on file   Social Connections: Not on file   Intimate Partner Violence: Not on file   Housing Stability: Not on file        Current Outpatient Medications:   •  Ascorbic Acid (VITAMIN C IMMUNE HEALTH PO), Take by mouth daily, Disp: , Rfl:   •  aspirin (ECOTRIN LOW STRENGTH) 81 mg EC tablet, Take 81 mg by mouth every other day 1 every other day, Disp: , Rfl:   •  atorvastatin (LIPITOR) 40 mg tablet, Take 1 tablet (40 mg total) by mouth every evening, Disp: 90 tablet, Rfl: 3  •  calcium polycarbophil (FIBERCON) 625 mg tablet, Take 625 mg by mouth daily, Disp: , Rfl:   •  clotrimazole-betamethasone (LOTRISONE) 1-0 05 % cream, apply to affected area twice a day for 10 days MAX (Patient not taking: Reported on 12/2/2022), Disp: 45 g, Rfl: 0  •  ferrous sulfate 324 (65 Fe) mg, Take 1 tablet (324 mg total) by mouth daily before breakfast, Disp: 30 tablet, Rfl: 2  •  finasteride (PROSCAR) 5 mg tablet, Take 1 tablet (5 mg total) by mouth daily, Disp: 90 tablet, Rfl: 3  •  lisinopril (ZESTRIL) 10 mg tablet, take 1 tablet by mouth once daily, Disp: 90 tablet, Rfl: 1  •  metoprolol succinate (TOPROL-XL) 25 mg 24 hr tablet, take 1 tablet by mouth once daily, Disp: 90 tablet, Rfl: 1  •  Multiple Vitamin (MULTIVITAMIN) capsule, Take 1 capsule by mouth daily, Disp: , Rfl:   •  neomycin-polymyxin-dexamethasone (MAXITROL) ophthalmic suspension, , Disp: , Rfl:   •  nitroglycerin (NITROSTAT) 0 4 mg SL tablet, Place 1 tablet (0 4 mg total) under the tongue every 5 (five) minutes as needed for chest pain (Patient not taking: Reported on 1/25/2023), Disp: 15 tablet, Rfl: 5  •  omeprazole (PriLOSEC) 20 mg delayed release capsule, take 1 capsule by mouth once daily, Disp: 90 capsule, Rfl: 2  •  Potassium Citrate ER 15 MEQ (1620 MG) TBCR, Take 2 tablets by mouth 2 (two) times a day, Disp: 120 tablet, Rfl: 3  •  tamsulosin (FLOMAX) 0 4 mg, Take 1 capsule (0 4 mg total) by mouth daily with dinner, Disp: 90 capsule, Rfl: 3  •  triamterene-hydrochlorothiazide (MAXZIDE) 75-50 MG per tablet, take 1 tablet by mouth once daily, Disp: 90 tablet, Rfl: 1  No current facility-administered medications for this visit  Family History   Problem Relation Age of Onset   • Alzheimer's disease Mother    • Alzheimer's disease Father    • Heart disease Father         CAD   • Other Father         prediabetes   • Diabetes Father    • Breast cancer Other    • Arthritis Family    • Hypertension Family        Review of Systems   Constitutional: Negative for chills and fever  HENT: Negative for ear pain and sore throat  Eyes: Negative for pain and visual disturbance  Respiratory: Negative for cough and shortness of breath  Cardiovascular: Negative for chest pain and palpitations  Gastrointestinal: Negative for abdominal pain and vomiting  Genitourinary: Negative for dysuria and hematuria  Musculoskeletal: Positive for gait problem  Negative for arthralgias and back pain  Skin: Positive for color change and wound  Negative for rash  Neurological: Positive for numbness  Negative for seizures and syncope  Psychiatric/Behavioral: Negative  All other systems reviewed and are negative  Objective:  /70   Pulse 64   Temp (!) 96 6 °F (35 9 °C) (Temporal)   Resp 18     Physical Exam  Constitutional:       Appearance: Normal appearance  He is normal weight  HENT:      Head: Normocephalic and atraumatic        Right Ear: External ear normal       Left Ear: External ear normal       Nose: Nose normal       Mouth/Throat:      Mouth: Mucous membranes are moist       Pharynx: Oropharynx is clear  Eyes:      Conjunctiva/sclera: Conjunctivae normal    Cardiovascular:      Pulses: Normal pulses  Dorsalis pedis pulses are 2+ on the right side and 2+ on the left side  Posterior tibial pulses are 2+ on the right side and 2+ on the left side  Pulmonary:      Effort: Pulmonary effort is normal    Musculoskeletal:      Cervical back: Normal range of motion  Right lower leg: No edema  Left lower leg: No edema  Skin:     General: Skin is warm and dry  Capillary Refill: Capillary refill takes less than 2 seconds  Comments: See detailed wound assessment   Neurological:      General: No focal deficit present  Mental Status: He is alert and oriented to person, place, and time  Mental status is at baseline  Sensory: Sensory deficit present  Coordination: Coordination abnormal       Gait: Gait abnormal       Deep Tendon Reflexes: Reflexes abnormal    Psychiatric:         Mood and Affect: Mood normal          Behavior: Behavior normal          Thought Content:  Thought content normal          Judgment: Judgment normal            Wound 12/07/22 Traumatic Ankle Anterior;Right (Active)   Wound Image   01/25/23 1325   Wound Description Yellow;Pink;Epithelialization;Exposed tendon 01/25/23 1325   Kathrin-wound Assessment Erythema;Swelling 01/25/23 1325   Wound Length (cm) 3 3 cm 01/25/23 1325   Wound Width (cm) 0 9 cm 01/25/23 1325   Wound Depth (cm) 0 2 cm 01/25/23 1325   Wound Surface Area (cm^2) 2 97 cm^2 01/25/23 1325   Wound Volume (cm^3) 0 594 cm^3 01/25/23 1325   Calculated Wound Volume (cm^3) 0 59 cm^3 01/25/23 1325   Change in Wound Size % -195 01/25/23 1325   Undermining 0 1 12/14/22 1401   Undermining is depth extending from 5-8oclock 12/14/22 1401   Drainage Amount Small 01/25/23 1325   Drainage Description Serous 01/25/23 1325   Non-staged Wound Description Full thickness 01/25/23 1325   Patient Tolerance Tolerated well 12/14/22 1401   Dressing Status Intact; Old drainage 01/18/23 1522                No results found  Wound Instructions:  Orders Placed This Encounter   Procedures   • Wound cleansing and dressings     Wound cleansing and dressings      Wash your hands with soap and water  Remove old dressing, discard into plastic bag and place in trash  Cleanse the wound with mild soap and water prior to applying a clean dressing  Do not use tissue or cotton balls  Do not scrub the wound  Pat dry using gauze  Shower yes  Apply polymem and gauze to the right leg wound  Secure with medfix tape  Change dressing every other day      Dressing above applied today at the [facility]  Next appointment in 1 week  Standing Status:   Future     Standing Expiration Date:   1/25/2024         Lidia Godinez DPM, DPM, FACFAS    Portions of the record may have been created with voice recognition software  Occasional wrong word or "sound a like" substitutions may have occurred due to the inherent limitations of voice recognition software  Read the chart carefully and recognize, using context, where substitutions have occurred

## 2023-01-25 NOTE — PATIENT INSTRUCTIONS
No orders of the defined types were placed in this encounter  Orders Placed This Encounter   Procedures    Wound cleansing and dressings     Wound cleansing and dressings      Wash your hands with soap and water  Remove old dressing, discard into plastic bag and place in trash  Cleanse the wound with mild soap and water prior to applying a clean dressing  Do not use tissue or cotton balls  Do not scrub the wound  Pat dry using gauze  Shower yes  Apply polymem and gauze to the right leg wound  Secure with medfix tape  Change dressing every other day  Dressing above applied today at the [facility]  Next appointment in 1 week       Standing Status:   Future     Standing Expiration Date:   1/25/2024

## 2023-02-01 ENCOUNTER — OFFICE VISIT (OUTPATIENT)
Dept: WOUND CARE | Facility: HOSPITAL | Age: 78
End: 2023-02-01

## 2023-02-01 VITALS
SYSTOLIC BLOOD PRESSURE: 124 MMHG | RESPIRATION RATE: 18 BRPM | TEMPERATURE: 96.4 F | DIASTOLIC BLOOD PRESSURE: 68 MMHG | HEART RATE: 68 BPM

## 2023-02-01 DIAGNOSIS — S91.001D OPEN WOUND OF RIGHT ANKLE, SUBSEQUENT ENCOUNTER: Primary | ICD-10-CM

## 2023-02-01 RX ORDER — LIDOCAINE 40 MG/G
CREAM TOPICAL ONCE
Status: COMPLETED | OUTPATIENT
Start: 2023-02-01 | End: 2023-02-01

## 2023-02-01 RX ADMIN — LIDOCAINE: 40 CREAM TOPICAL at 14:42

## 2023-02-01 NOTE — PROGRESS NOTES
Patient ID: Geneva Santa  is a 66 y o  male Date of Birth 1945       Chief Complaint   Patient presents with   • Follow Up Wound Care Visit     Right ankle wound       Allergies:  Patient has no known allergies  Diagnosis:  1  Open wound of right ankle, subsequent encounter  -     Wound cleansing and dressings; Future  -     lidocaine (LMX) 4 % cream       Diagnosis ICD-10-CM Associated Orders   1  Open wound of right ankle, subsequent encounter  S91 001D Wound cleansing and dressings     lidocaine (LMX) 4 % cream           Assessment & Plan:  1  Open wound anterior right ankle, wound was debrided subcuticular tissues, wound was dressed with PolyMem dry dressing, he was prescribed Santyl, he will use this daily  Distal portion of wound is healed  2   Patient is to continue to wear shoes or sneakers that do not put pressure over the wound, we will defer use of Cam boot as he already has an unsteady gait and is very hesitant to try and walk in the cam boot as he fears falling  3   I explained his wound is gradually improving, if it anytime it stalls out or deteriorates we will take him to the OR or refer him to plastic surgery for wound debridement and possible split-thickness skin grafting  4   Patient to continue with frequent elevation, low-sodium diet, proper protein intake, weight-bear only for ADLs, he understands and agrees with the plan and will follow up in 1 week  Debridement   Wound 12/07/22 Traumatic Ankle Anterior;Right    Universal Protocol:  Consent: Verbal consent obtained  Risks and benefits: risks, benefits and alternatives were discussed  Consent given by: patient  Time out: Immediately prior to procedure a "time out" was called to verify the correct patient, procedure, equipment, support staff and site/side marked as required    Patient understanding: patient states understanding of the procedure being performed  Patient identity confirmed: verbally with patient      Performed by: physician  Debridement type: surgical  Level of debridement: subcutaneous tissue  Pain control: lidocaine 4%  Pre-debridement measurements  Length (cm): 1 2  Width (cm): 0 9  Depth (cm): 0 2  Surface Area (cm^2): 1 08  Volume (cm^3): 0 22    Post-debridement measurements  Length (cm): 1 2  Width (cm): 1 1  Depth (cm): 0 3  Percent debrided: 100%  Surface Area (cm^2): 1 32  Area debrided (cm^2): 1 32  Volume (cm^3): 0 4  Tissue and other material debrided: subcutaneous tissue  Devitalized tissue debrided: biofilm, fibrin, necrotic debris and slough  Instrument(s) utilized: blade and forceps  Bleeding: small  Hemostasis obtained with: pressure  Procedural pain (0-10): insensate  Post-procedural pain: insensate   Response to treatment: procedure was tolerated well                   Subjective:   Patient presents today for care of left anterior ankle wound, he has been changing dressings as directed and has no new complaints  The following portions of the patient's history were reviewed and updated as appropriate:   Patient Active Problem List   Diagnosis   • Tubular adenoma of colon   • Carpal tunnel syndrome   • Dyslipidemia   • Gout   • Hypertension   • Impaired fasting glucose   • Insomnia   • Iron deficiency anemia due to chronic blood loss   • Osteoarthritis   • Prolonged QT interval   • Rhinitis   • Other disorder of calcium metabolism   • Nephrolithiasis   • Elevated PSA   • Obesity (BMI 30 0-34  9)   • Internal carotid artery stenosis, right   • Cerebrovascular accident (CVA) ([facility] [address] )   • Aortic valve stenosis   • S/P carotid endarterectomy   • Coronary artery disease involving native coronary artery   • Bilateral carotid artery stenosis   • Stage 3b chronic kidney disease ([address] )   • Rambo lesion, chronic   • Paraesophageal hernia   • Atrial flutter, unspecified type ([address] )   • Type 2 diabetes mellitus with hyperglycemia, without long-term current use of insulin (HCC)     Past Medical History:   Diagnosis Date   • Anemia     iron def   • Arthritis    • GERD (gastroesophageal reflux disease)    • Gout    • Hypertension    • Insomnia    • Stroke (cerebrum) [facility])      Past Surgical History:   Procedure Laterality Date   • ANKLE SURGERY Right    • CARDIAC CATHETERIZATION     • COLONOSCOPY  2013    polypectomy; complete - 3 yrs d/t polyp - benign submucosal lipoma- path c/w lipoma   • COLONOSCOPY  2017    complete - tubular adenoma - repeat 5yrs   • CYSTOSCOPY     • CYSTOTOMY      bladder  w/ basket extraction of calculus   • FEMUR FRACTURE SURGERY     • HERNIA REPAIR      inguinal ; sliding   • INGUINAL HERNIA REPAIR Right     unilateral   • KNEE ARTHROSCOPY Right     w/medial menisectomy   • LASIK      corneal   • LITHOTRIPSY      renal   • LA COLONOSCOPY FLX DX W/COLLJ SPEC WHEN PFRMD N/A 2017    Procedure: SCREENING COLONOSCOPY;  Surgeon: Madeleine Joaquin MD;  Location:  MAIN OR;  Service: General   • LA TEAEC W/PATCH GRF CAROTID VERTB SUBCLAV NECK INC Right 1/10/2020    Procedure: ENDARTERECTOMY ARTERY CAROTID;  Surgeon: Callum Alexandre MD;  Location:  MAIN OR;  Service: Vascular   • ROTATOR CUFF REPAIR Bilateral    • TONSILLECTOMY       Social History     Socioeconomic History   • Marital status:       Spouse name: None   • Number of children: 1   • Years of education: None   • Highest education level: None   Occupational History   • Occupation: Retired     Comment: working full time   Tobacco Use   • Smoking status: Former     Packs/day: 1 00     Years: 22 00     Pack years: 22 00     Types: Cigarettes     Start date: 0     Quit date:      Years since quittin 1   • Smokeless tobacco: Never   Vaping Use   • Vaping Use: Never used   Substance and Sexual Activity   • Alcohol use: Not Currently     Comment: stopped drinking alcohol   • Drug use: No   • Sexual activity: Not Currently   Other Topics Concern   • None   Social History Narrative    , lives alone, working full time     Social Determinants of Health     Financial Resource Strain: Low Risk    • Difficulty of Paying Living Expenses: Not hard at all   Food Insecurity: Not on file   Transportation Needs: No Transportation Needs   • Lack of Transportation (Medical): No   • Lack of Transportation (Non-Medical):  No   Physical Activity: Not on file   Stress: Not on file   Social Connections: Not on file   Intimate Partner Violence: Not on file   Housing Stability: Not on file        Current Outpatient Medications:   •  Ascorbic Acid (VITAMIN C IMMUNE HEALTH PO), Take by mouth daily, Disp: , Rfl:   •  aspirin (ECOTRIN LOW STRENGTH) 81 mg EC tablet, Take 81 mg by mouth every other day 1 every other day, Disp: , Rfl:   •  atorvastatin (LIPITOR) 40 mg tablet, Take 1 tablet (40 mg total) by mouth every evening, Disp: 90 tablet, Rfl: 3  •  calcium polycarbophil (FIBERCON) 625 mg tablet, Take 625 mg by mouth daily, Disp: , Rfl:   •  clotrimazole-betamethasone (LOTRISONE) 1-0 05 % cream, apply to affected area twice a day for 10 days MAX (Patient not taking: Reported on 12/2/2022), Disp: 45 g, Rfl: 0  •  ferrous sulfate 324 (65 Fe) mg, Take 1 tablet (324 mg total) by mouth daily before breakfast, Disp: 30 tablet, Rfl: 2  •  finasteride (PROSCAR) 5 mg tablet, Take 1 tablet (5 mg total) by mouth daily, Disp: 90 tablet, Rfl: 3  •  lisinopril (ZESTRIL) 10 mg tablet, take 1 tablet by mouth once daily, Disp: 90 tablet, Rfl: 1  •  metoprolol succinate (TOPROL-XL) 25 mg 24 hr tablet, take 1 tablet by mouth once daily, Disp: 90 tablet, Rfl: 1  •  Multiple Vitamin (MULTIVITAMIN) capsule, Take 1 capsule by mouth daily, Disp: , Rfl:   •  neomycin-polymyxin-dexamethasone (MAXITROL) ophthalmic suspension, , Disp: , Rfl:   •  nitroglycerin (NITROSTAT) 0 4 mg SL tablet, Place 1 tablet (0 4 mg total) under the tongue every 5 (five) minutes as needed for chest pain (Patient not taking: Reported on 1/25/2023), Disp: 15 tablet, Rfl: 5  •  omeprazole (PriLOSEC) 20 mg delayed release capsule, take 1 capsule by mouth once daily, Disp: 90 capsule, Rfl: 2  •  Potassium Citrate ER 15 MEQ (1620 MG) TBCR, Take 2 tablets by mouth 2 (two) times a day, Disp: 120 tablet, Rfl: 3  •  tamsulosin (FLOMAX) 0 4 mg, Take 1 capsule (0 4 mg total) by mouth daily with dinner, Disp: 90 capsule, Rfl: 3  •  triamterene-hydrochlorothiazide (MAXZIDE) 75-50 MG per tablet, take 1 tablet by mouth once daily, Disp: 90 tablet, Rfl: 1    Current Facility-Administered Medications:   •  lidocaine (LMX) 4 % cream, , Topical, Once, Hattie Dates, DPM  Family History   Problem Relation Age of Onset   • Alzheimer's disease Mother    • Alzheimer's disease Father    • Heart disease Father         CAD   • Other Father         prediabetes   • Diabetes Father    • Breast cancer Other    • Arthritis Family    • Hypertension Family        Review of Systems   Constitutional: Negative for chills and fever  HENT: Negative for ear pain and sore throat  Eyes: Negative for pain and visual disturbance  Respiratory: Negative for cough and shortness of breath  Cardiovascular: Negative for chest pain and palpitations  Gastrointestinal: Negative for abdominal pain and vomiting  Genitourinary: Negative for dysuria and hematuria  Musculoskeletal: Positive for gait problem  Negative for arthralgias and back pain  Skin: Positive for color change and wound  Negative for rash  Neurological: Positive for numbness  Negative for seizures and syncope  Psychiatric/Behavioral: Negative  All other systems reviewed and are negative  Objective:  /68   Pulse 68   Temp (!) 96 4 °F (35 8 °C) (Temporal)   Resp 18     Physical Exam  Constitutional:       Appearance: Normal appearance  He is normal weight  HENT:      Head: Normocephalic and atraumatic        Right Ear: External ear normal       Left Ear: External ear normal       Nose: Nose normal       Mouth/Throat:      Mouth: Mucous membranes are moist       Pharynx: Oropharynx is clear  Eyes:      Conjunctiva/sclera: Conjunctivae normal    Cardiovascular:      Pulses: Normal pulses  Dorsalis pedis pulses are 2+ on the right side and 2+ on the left side  Posterior tibial pulses are 2+ on the right side and 2+ on the left side  Pulmonary:      Effort: Pulmonary effort is normal    Musculoskeletal:      Cervical back: Normal range of motion  Right lower leg: No edema  Left lower leg: No edema  Skin:     General: Skin is warm and dry  Capillary Refill: Capillary refill takes less than 2 seconds  Comments: See detailed wound assessment   Neurological:      General: No focal deficit present  Mental Status: He is alert and oriented to person, place, and time  Mental status is at baseline  Sensory: Sensory deficit present  Coordination: Coordination abnormal       Gait: Gait abnormal       Deep Tendon Reflexes: Reflexes abnormal    Psychiatric:         Mood and Affect: Mood normal          Behavior: Behavior normal          Thought Content:  Thought content normal          Judgment: Judgment normal            Wound 12/07/22 Traumatic Ankle Anterior;Right (Active)   Wound Image    02/01/23 1350   Wound Description Yellow;Pink;Epithelialization;Exposed tendon 02/01/23 1346   Kathrin-wound Assessment Erythema;Swelling 02/01/23 1346   Wound Length (cm) 1 2 cm 02/01/23 1346   Wound Width (cm) 0 9 cm 02/01/23 1346   Wound Depth (cm) 0 2 cm 02/01/23 1346   Wound Surface Area (cm^2) 1 08 cm^2 02/01/23 1346   Wound Volume (cm^3) 0 216 cm^3 02/01/23 1346   Calculated Wound Volume (cm^3) 0 22 cm^3 02/01/23 1346   Change in Wound Size % -10 02/01/23 1346   Undermining 0 1 12/14/22 1401   Undermining is depth extending from 5-8oclock 12/14/22 1401   Drainage Amount Small 02/01/23 1346   Drainage Description Serous 02/01/23 1346   Non-staged Wound Description Full thickness 02/01/23 1346   Patient Tolerance Tolerated well 12/14/22 1401   Dressing Status Intact; Old drainage 01/18/23 1522                No results found  Wound Instructions:  Orders Placed This Encounter   Procedures   • Wound cleansing and dressings     Wound cleansing and dressings                            Wash your hands with soap and water  Remove old dressing, discard into plastic bag and place in trash  Cleanse the wound with mild soap and water prior to applying a clean dressing  Do not use tissue or cotton balls  Do not scrub the wound  Pat dry using gauze  Shower yes  Apply santyl and gauze to the right leg wound  Secure with medfix tape  Change dressing every day      Dressing above applied today at the [facility]  Next appointment in 1 week  Standing Status:   Future     Standing Expiration Date:   2/1/2024         Sriram Faust DPM, KATHERINEM, FACFAS    Portions of the record may have been created with voice recognition software  Occasional wrong word or "sound a like" substitutions may have occurred due to the inherent limitations of voice recognition software  Read the chart carefully and recognize, using context, where substitutions have occurred

## 2023-02-01 NOTE — PATIENT INSTRUCTIONS
Orders Placed This Encounter   Procedures    Wound cleansing and dressings     Wound cleansing and dressings                            Wash your hands with soap and water  Remove old dressing, discard into plastic bag and place in trash  Cleanse the wound with mild soap and water prior to applying a clean dressing  Do not use tissue or cotton balls  Do not scrub the wound  Pat dry using gauze  Shower yes  Apply santyl and gauze to the right leg wound  Secure with medfix tape  Change dressing every day  Dressing above applied today at the [facility]  Next appointment in 1 week       Standing Status:   Future     Standing Expiration Date:   2/1/2024

## 2023-02-08 ENCOUNTER — OFFICE VISIT (OUTPATIENT)
Dept: WOUND CARE | Facility: HOSPITAL | Age: 78
End: 2023-02-08

## 2023-02-08 VITALS
HEART RATE: 60 BPM | RESPIRATION RATE: 16 BRPM | DIASTOLIC BLOOD PRESSURE: 58 MMHG | SYSTOLIC BLOOD PRESSURE: 112 MMHG | TEMPERATURE: 97 F

## 2023-02-08 DIAGNOSIS — S91.001D OPEN WOUND OF RIGHT ANKLE, SUBSEQUENT ENCOUNTER: Primary | ICD-10-CM

## 2023-02-08 RX ORDER — LIDOCAINE HYDROCHLORIDE 40 MG/ML
5 SOLUTION TOPICAL ONCE
Status: COMPLETED | OUTPATIENT
Start: 2023-02-08 | End: 2023-02-08

## 2023-02-08 RX ADMIN — LIDOCAINE HYDROCHLORIDE 5 ML: 40 SOLUTION TOPICAL at 09:34

## 2023-02-08 NOTE — PATIENT INSTRUCTIONS
Orders Placed This Encounter   Procedures    Wound cleansing and dressings     Right ankle wound    Wash your hands with soap and water  Remove old dressing, discard into plastic bag and place in trash  Cleanse the wound with prophase prior to applying a clean dressing  Do not use tissue or cotton balls  Do not scrub the wound  Pat dry using gauze  Shower yes   Apply vaseline to skin surrounding wound  Apply santly ointment to the right ankle wound    Cover with gauze, ezekiel and tape  Secure with tubifast blue  Change dressing daily  Done today     Standing Status:   Future     Standing Expiration Date:   2/8/2024

## 2023-02-08 NOTE — PROGRESS NOTES
Patient ID: Anayeli Charles  is a 66 y o  male Date of Birth 1945       Chief Complaint   Patient presents with   • Follow Up Wound Care Visit     Dressing intact on arrival, reports applying santyl ointment daily  Allergies:  Patient has no known allergies  Diagnosis:  1  Open wound of right ankle, subsequent encounter  -     lidocaine (XYLOCAINE) 4 % topical solution 5 mL  -     Wound cleansing and dressings; Future       Diagnosis ICD-10-CM Associated Orders   1  Open wound of right ankle, subsequent encounter  S91 001D lidocaine (XYLOCAINE) 4 % topical solution 5 mL     Wound cleansing and dressings           Assessment & Plan:  1  Open wound anterior right ankle, wound was debrided through selective tissues and dressed with Medihoney dry dressing, he will use Santyl daily to the wound base, petroleum jelly to the wound edges and cover with a dry dressing, avoid putting tape on skin  2   Patient with slight amount of green drainage, prophase was applied to wound base and let soak in, he will do the same daily prior to dressing changes  3   Patient to continue to wear shoes or sneakers that do not rub over the wound, we continue to defer use of cam boot as he already has an unsteady gait and he is fearful of falling  4   Patient to continue frequent elevation, low-sodium diet, proper protein intake, weight-bear only for ADLs, he understands and agrees with the plan and will follow-up in 1 week  Debridement   Wound 12/07/22 Traumatic Ankle Anterior;Right    Universal Protocol:  Consent: Verbal consent obtained  Risks and benefits: risks, benefits and alternatives were discussed  Consent given by: patient  Time out: Immediately prior to procedure a "time out" was called to verify the correct patient, procedure, equipment, support staff and site/side marked as required    Patient understanding: patient states understanding of the procedure being performed  Patient identity confirmed: verbally with patient      Performed by: physician  Debridement type: selective  Pain control: lidocaine 4%  Pre-debridement measurements  Length (cm): 1 6  Width (cm): 1  Depth (cm): 0 2  Surface Area (cm^2): 1 6  Volume (cm^3): 0 32    Post-debridement measurements  Length (cm): 1 6  Width (cm): 1  Depth (cm): 0 2  Percent debrided: 100%  Surface Area (cm^2): 1 6  Area debrided (cm^2): 1 6  Volume (cm^3): 0 32  Devitalized tissue debrided: biofilm and slough  Instrument(s) utilized: blade  Bleeding: small  Hemostasis obtained with: pressure  Procedural pain (0-10): insensate  Post-procedural pain: insensate   Response to treatment: procedure was tolerated well           Subjective:   Flakito presents today for care of anterior right ankle open wound, he has gotten Santyl and has been applying it with gauze and Medfix tape, no new complaints  The following portions of the patient's history were reviewed and updated as appropriate:   Patient Active Problem List   Diagnosis   • Tubular adenoma of colon   • Carpal tunnel syndrome   • Dyslipidemia   • Gout   • Hypertension   • Impaired fasting glucose   • Insomnia   • Iron deficiency anemia due to chronic blood loss   • Osteoarthritis   • Prolonged QT interval   • Rhinitis   • Other disorder of calcium metabolism   • Nephrolithiasis   • Elevated PSA   • Obesity (BMI 30 0-34  9)   • Internal carotid artery stenosis, right   • Cerebrovascular accident (CVA) ([facility] [address] )   • Aortic valve stenosis   • S/P carotid endarterectomy   • Coronary artery disease involving native coronary artery   • Bilateral carotid artery stenosis   • Stage 3b chronic kidney disease ([facility] [address] )   • Rambo lesion, chronic   • Paraesophageal hernia   • Atrial flutter, unspecified type ([facility] [address] )   • Type 2 diabetes mellitus with hyperglycemia, without long-term current use of insulin (HCC)     Past Medical History:   Diagnosis Date   • Anemia     iron def   • Arthritis    • GERD (gastroesophageal reflux disease)    • Gout    • Hypertension    • Insomnia    • Stroke (cerebrum) [facility])      Past Surgical History:   Procedure Laterality Date   • ANKLE SURGERY Right    • CARDIAC CATHETERIZATION     • COLONOSCOPY  2013    polypectomy; complete - 3 yrs d/t polyp - benign submucosal lipoma- path c/w lipoma   • COLONOSCOPY  2017    complete - tubular adenoma - repeat 5yrs   • CYSTOSCOPY     • CYSTOTOMY      bladder  w/ basket extraction of calculus   • FEMUR FRACTURE SURGERY     • HERNIA REPAIR      inguinal ; sliding   • INGUINAL HERNIA REPAIR Right     unilateral   • KNEE ARTHROSCOPY Right     w/medial menisectomy   • LASIK      corneal   • LITHOTRIPSY      renal   • IL COLONOSCOPY FLX DX W/COLLJ SPEC WHEN PFRMD N/A 2017    Procedure: SCREENING COLONOSCOPY;  Surgeon: Phu Barker MD;  Location: [facility];  Service: General   • IL TEAEC W/PATCH GRF CAROTID VERTB Kanwal Right 1/10/2020    Procedure: ENDARTERECTOMY ARTERY CAROTID;  Surgeon: Batool Stockton MD;  Location:  MAIN OR;  Service: Vascular   • ROTATOR CUFF REPAIR Bilateral    • TONSILLECTOMY       Social History     Socioeconomic History   • Marital status:       Spouse name: Not on file   • Number of children: 1   • Years of education: Not on file   • Highest education level: Not on file   Occupational History   • Occupation: Retired     Comment: working full time   Tobacco Use   • Smoking status: Former     Packs/day: 1 00     Years: 22 00     Pack years: 22 00     Types: Cigarettes     Start date: 0     Quit date:      Years since quittin 1   • Smokeless tobacco: Never   Vaping Use   • Vaping Use: Never used   Substance and Sexual Activity   • Alcohol use: Not Currently     Comment: stopped drinking alcohol   • Drug use: No   • Sexual activity: Not Currently   Other Topics Concern   • Not on file   Social History Narrative    , lives alone, working full time     Social Determinants of Health     Financial Resource Strain: Low Risk    • Difficulty of Paying Living Expenses: Not hard at all   Food Insecurity: Not on file   Transportation Needs: No Transportation Needs   • Lack of Transportation (Medical): No   • Lack of Transportation (Non-Medical):  No   Physical Activity: Not on file   Stress: Not on file   Social Connections: Not on file   Intimate Partner Violence: Not on file   Housing Stability: Not on file        Current Outpatient Medications:   •  Ascorbic Acid (VITAMIN C IMMUNE HEALTH PO), Take by mouth daily, Disp: , Rfl:   •  aspirin (ECOTRIN LOW STRENGTH) 81 mg EC tablet, Take 81 mg by mouth every other day 1 every other day, Disp: , Rfl:   •  atorvastatin (LIPITOR) 40 mg tablet, Take 1 tablet (40 mg total) by mouth every evening, Disp: 90 tablet, Rfl: 3  •  calcium polycarbophil (FIBERCON) 625 mg tablet, Take 625 mg by mouth daily, Disp: , Rfl:   •  clotrimazole-betamethasone (LOTRISONE) 1-0 05 % cream, apply to affected area twice a day for 10 days MAX (Patient not taking: Reported on 12/2/2022), Disp: 45 g, Rfl: 0  •  collagenase (SANTYL) ointment, Apply topically daily, Disp: 30 g, Rfl: 0  •  ferrous sulfate 324 (65 Fe) mg, Take 1 tablet (324 mg total) by mouth daily before breakfast, Disp: 30 tablet, Rfl: 2  •  finasteride (PROSCAR) 5 mg tablet, Take 1 tablet (5 mg total) by mouth daily, Disp: 90 tablet, Rfl: 3  •  lisinopril (ZESTRIL) 10 mg tablet, take 1 tablet by mouth once daily, Disp: 90 tablet, Rfl: 1  •  metoprolol succinate (TOPROL-XL) 25 mg 24 hr tablet, take 1 tablet by mouth once daily, Disp: 90 tablet, Rfl: 1  •  Multiple Vitamin (MULTIVITAMIN) capsule, Take 1 capsule by mouth daily, Disp: , Rfl:   •  neomycin-polymyxin-dexamethasone (MAXITROL) ophthalmic suspension, , Disp: , Rfl:   •  nitroglycerin (NITROSTAT) 0 4 mg SL tablet, Place 1 tablet (0 4 mg total) under the tongue every 5 (five) minutes as needed for chest pain (Patient not taking: Reported on 1/25/2023), Disp: 15 tablet, Rfl: 5  •  omeprazole (PriLOSEC) 20 mg delayed release capsule, take 1 capsule by mouth once daily, Disp: 90 capsule, Rfl: 2  •  Potassium Citrate ER 15 MEQ (1620 MG) TBCR, Take 2 tablets by mouth 2 (two) times a day, Disp: 120 tablet, Rfl: 3  •  tamsulosin (FLOMAX) 0 4 mg, Take 1 capsule (0 4 mg total) by mouth daily with dinner, Disp: 90 capsule, Rfl: 3  •  triamterene-hydrochlorothiazide (MAXZIDE) 75-50 MG per tablet, take 1 tablet by mouth once daily, Disp: 90 tablet, Rfl: 1  No current facility-administered medications for this visit  Family History   Problem Relation Age of Onset   • Alzheimer's disease Mother    • Alzheimer's disease Father    • Heart disease Father         CAD   • Other Father         prediabetes   • Diabetes Father    • Breast cancer Other    • Arthritis Family    • Hypertension Family        Review of Systems   Constitutional: Negative for chills and fever  HENT: Negative for ear pain and sore throat  Eyes: Negative for pain and visual disturbance  Respiratory: Negative for cough and shortness of breath  Cardiovascular: Negative for chest pain and palpitations  Gastrointestinal: Negative for abdominal pain and vomiting  Genitourinary: Negative for dysuria and hematuria  Musculoskeletal: Positive for gait problem  Negative for arthralgias and back pain  Skin: Positive for color change and wound  Negative for rash  Neurological: Positive for numbness  Negative for seizures and syncope  Psychiatric/Behavioral: Negative  All other systems reviewed and are negative  Objective:  /58   Pulse 60   Temp (!) 97 °F (36 1 °C)   Resp 16     Physical Exam  Constitutional:       Appearance: Normal appearance  He is normal weight  HENT:      Head: Normocephalic and atraumatic  Right Ear: External ear normal       Left Ear: External ear normal       Nose: Nose normal       Mouth/Throat:      Mouth: Mucous membranes are moist       Pharynx: Oropharynx is clear  Eyes:      Conjunctiva/sclera: Conjunctivae normal    Cardiovascular:      Pulses: Normal pulses  Dorsalis pedis pulses are 2+ on the right side and 2+ on the left side  Posterior tibial pulses are 2+ on the right side and 2+ on the left side  Pulmonary:      Effort: Pulmonary effort is normal    Musculoskeletal:      Cervical back: Normal range of motion  Right lower leg: No edema  Left lower leg: No edema  Skin:     Capillary Refill: Capillary refill takes less than 2 seconds  Comments: See detailed wound assessment   Neurological:      General: No focal deficit present  Mental Status: He is alert and oriented to person, place, and time  Mental status is at baseline  Sensory: Sensory deficit present  Coordination: Coordination abnormal       Gait: Gait abnormal       Deep Tendon Reflexes: Reflexes abnormal    Psychiatric:         Mood and Affect: Mood normal          Behavior: Behavior normal          Thought Content: Thought content normal          Judgment: Judgment normal            Wound 12/07/22 Traumatic Ankle Anterior;Right (Active)   Wound Image   02/08/23 0926   Wound Description Slough 02/08/23 0926   Kathrin-wound Assessment Erythema 02/08/23 0926   Wound Length (cm) 1 6 cm 02/08/23 0926   Wound Width (cm) 1 cm 02/08/23 0926   Wound Depth (cm) 0 2 cm 02/08/23 0926   Wound Surface Area (cm^2) 1 6 cm^2 02/08/23 0926   Wound Volume (cm^3) 0 32 cm^3 02/08/23 0926   Calculated Wound Volume (cm^3) 0 32 cm^3 02/08/23 0926   Change in Wound Size % -60 02/08/23 0926   Undermining 0 1 12/14/22 1401   Undermining is depth extending from 5-8oclock 12/14/22 1401   Drainage Amount Moderate 02/08/23 0926   Drainage Description Yellow;Green 02/08/23 0926   Non-staged Wound Description Full thickness 02/08/23 0926   Patient Tolerance Tolerated well 12/14/22 1401   Dressing Status Intact 02/08/23 0926                No results found      Wound Instructions:  Orders Placed This Encounter   Procedures   • Wound cleansing and dressings     Right ankle wound    Wash your hands with soap and water  Remove old dressing, discard into plastic bag and place in trash  Cleanse the wound with prophase prior to applying a clean dressing  Do not use tissue or cotton balls  Do not scrub the wound  Pat dry using gauze  Shower yes   Apply vaseline to skin surrounding wound  Apply santly ointment to the right ankle wound  Cover with gauze, ezekiel and tape  Secure with tubifast blue  Change dressing daily  Done today     Standing Status:   Future     Standing Expiration Date:   2/8/2024         Flor Kent DPM, KATHERINEM, FACFAS    Portions of the record may have been created with voice recognition software  Occasional wrong word or "sound a like" substitutions may have occurred due to the inherent limitations of voice recognition software  Read the chart carefully and recognize, using context, where substitutions have occurred

## 2023-02-09 DIAGNOSIS — R21 RASH: ICD-10-CM

## 2023-02-09 RX ORDER — CLOTRIMAZOLE AND BETAMETHASONE DIPROPIONATE 10; .64 MG/G; MG/G
CREAM TOPICAL
Qty: 45 G | Refills: 0 | Status: CANCELLED | OUTPATIENT
Start: 2023-02-09

## 2023-02-09 RX ORDER — CLOTRIMAZOLE AND BETAMETHASONE DIPROPIONATE 10; .64 MG/G; MG/G
CREAM TOPICAL
Qty: 45 G | Refills: 0 | Status: SHIPPED | OUTPATIENT
Start: 2023-02-09

## 2023-02-15 ENCOUNTER — OFFICE VISIT (OUTPATIENT)
Dept: WOUND CARE | Facility: HOSPITAL | Age: 78
End: 2023-02-15

## 2023-02-15 VITALS
HEART RATE: 72 BPM | DIASTOLIC BLOOD PRESSURE: 60 MMHG | TEMPERATURE: 96.9 F | SYSTOLIC BLOOD PRESSURE: 106 MMHG | RESPIRATION RATE: 16 BRPM

## 2023-02-15 DIAGNOSIS — S91.001D OPEN WOUND OF RIGHT ANKLE, SUBSEQUENT ENCOUNTER: Primary | ICD-10-CM

## 2023-02-15 RX ORDER — LIDOCAINE HYDROCHLORIDE 40 MG/ML
5 SOLUTION TOPICAL ONCE
Status: COMPLETED | OUTPATIENT
Start: 2023-02-15 | End: 2023-02-15

## 2023-02-15 RX ADMIN — LIDOCAINE HYDROCHLORIDE 5 ML: 40 SOLUTION TOPICAL at 13:32

## 2023-02-15 NOTE — PATIENT INSTRUCTIONS
Orders Placed This Encounter   Procedures    Wound cleansing and dressings     Right ankle wound     Wash your hands with soap and water  Remove old dressing, discard into plastic bag and place in trash  Cleanse the wound with prophase prior to applying a clean dressing  Do not use tissue or cotton balls  Do not scrub the wound  Pat dry using gauze  Shower yes   Apply vaseline to skin surrounding wound  Apply santly ointment to the right ankle wound  Cover with gauze, ezekiel and tape  Secure with tubifast blue  Change dressing daily  Medihoney applied today at the [facility]  Next appointment in 2 weeks       Standing Status:   Future     Standing Expiration Date:   2/15/2024

## 2023-02-15 NOTE — PROGRESS NOTES
Patient ID: Monae Hsieh  is a 66 y o  male Date of Birth 1945       Chief Complaint   Patient presents with   • Follow Up Wound Care Visit     Right ankle wound       Allergies:  Patient has no known allergies  Diagnosis:  1  Open wound of right ankle, subsequent encounter  -     lidocaine (XYLOCAINE) 4 % topical solution 5 mL  -     Wound cleansing and dressings; Future       Diagnosis ICD-10-CM Associated Orders   1  Open wound of right ankle, subsequent encounter  S91 001D lidocaine (XYLOCAINE) 4 % topical solution 5 mL     Wound cleansing and dressings           Assessment & Plan:  1  Open wound anterior right ankle, wound was debrided through subcuticular tissues and dressed with Medihoney, he will continue to use Santyl nickel thickness to the wound base and petroleum jelly to the wound edges cover with a dry dressing, avoid putting tape on the skin  He will also continue with prophase soak prior to dressing changes  2   Patient to avoid any pressure to the area, he is to wear shoes that only come dorsally to midfoot  3   Frequent elevation, low-sodium diet, proper protein intake, weight-bear only for ADLs, he understands and agrees with the plan and will follow-up in 2 weeks  Debridement   Wound 12/07/22 Traumatic Ankle Anterior;Right    Universal Protocol:  Consent: Verbal consent obtained  Risks and benefits: risks, benefits and alternatives were discussed  Consent given by: patient  Time out: Immediately prior to procedure a "time out" was called to verify the correct patient, procedure, equipment, support staff and site/side marked as required    Patient understanding: patient states understanding of the procedure being performed  Patient identity confirmed: verbally with patient      Performed by: physician  Debridement type: surgical  Level of debridement: subcutaneous tissue  Pain control: lidocaine 4%  Pre-debridement measurements  Length (cm): 1 6  Width (cm): 1  Depth (cm): 0 3  Surface Area (cm^2): 1 6  Volume (cm^3): 0 48    Post-debridement measurements  Length (cm): 1 6  Width (cm): 1  Depth (cm): 0 4  Percent debrided: 100%  Surface Area (cm^2): 1 6  Area debrided (cm^2): 1 6  Volume (cm^3): 0 64  Tissue and other material debrided: subcutaneous tissue  Devitalized tissue debrided: biofilm, fibrin, necrotic debris and slough  Instrument(s) utilized: blade  Bleeding: small  Hemostasis obtained with: pressure  Procedural pain (0-10): insensate  Post-procedural pain: insensate   Response to treatment: procedure was tolerated well           Subjective:   Patient presents today for care of of right anterior ankle open wound, he has been changing dressings with Santyl and doing prophase soak as directed and has no complaints  The following portions of the patient's history were reviewed and updated as appropriate:   Patient Active Problem List   Diagnosis   • Tubular adenoma of colon   • Carpal tunnel syndrome   • Dyslipidemia   • Gout   • Hypertension   • Impaired fasting glucose   • Insomnia   • Iron deficiency anemia due to chronic blood loss   • Osteoarthritis   • Prolonged QT interval   • Rhinitis   • Other disorder of calcium metabolism   • Nephrolithiasis   • Elevated PSA   • Obesity (BMI 30 0-34  9)   • Internal carotid artery stenosis, right   • Cerebrovascular accident (CVA) ([facility] [address] )   • Aortic valve stenosis   • S/P carotid endarterectomy   • Coronary artery disease involving native coronary artery   • Bilateral carotid artery stenosis   • Stage 3b chronic kidney disease ([facility] [address] )   • Rambo lesion, chronic   • Paraesophageal hernia   • Atrial flutter, unspecified type [facility] )   • Type 2 diabetes mellitus with hyperglycemia, without long-term current use of insulin ([facility])     Past Medical History:   Diagnosis Date   • Anemia     iron def   • Arthritis    • GERD (gastroesophageal reflux disease)    • Gout    • Hypertension    • Insomnia    • Stroke (cerebrum) ([facility])      Past Surgical History:   Procedure Laterality Date   • ANKLE SURGERY Right    • CARDIAC CATHETERIZATION     • COLONOSCOPY  2013    polypectomy; complete - 3 yrs d/t polyp - benign submucosal lipoma- path c/w lipoma   • COLONOSCOPY  2017    complete - tubular adenoma - repeat 5yrs   • CYSTOSCOPY     • CYSTOTOMY      bladder  w/ basket extraction of calculus   • FEMUR FRACTURE SURGERY     • HERNIA REPAIR      inguinal ; sliding   • INGUINAL HERNIA REPAIR Right     unilateral   • KNEE ARTHROSCOPY Right     w/medial menisectomy   • LASIK      corneal   • LITHOTRIPSY      renal   • DC COLONOSCOPY FLX DX W/COLLJ SPEC WHEN PFRMD N/A 2017    Procedure: SCREENING COLONOSCOPY;  Surgeon: Lydia Mireles MD;  Location:  MAIN OR;  Service: General   • DC TEAEC W/PATCH GRF CAROTID VERTB [place] Right 1/10/2020    Procedure: ENDARTERECTOMY ARTERY CAROTID;  Surgeon: Jazz Stearns MD;  Location:  MAIN OR;  Service: Vascular   • ROTATOR CUFF REPAIR Bilateral    • TONSILLECTOMY       Social History     Socioeconomic History   • Marital status:       Spouse name: None   • Number of children: 1   • Years of education: None   • Highest education level: None   Occupational History   • Occupation: Retired     Comment: working full time   Tobacco Use   • Smoking status: Former     Packs/day: 1 00     Years: 22 00     Pack years: 22 00     Types: Cigarettes     Start date: 0     Quit date:      Years since quittin 1   • Smokeless tobacco: Never   Vaping Use   • Vaping Use: Never used   Substance and Sexual Activity   • Alcohol use: Not Currently     Comment: stopped drinking alcohol   • Drug use: No   • Sexual activity: Not Currently   Other Topics Concern   • None   Social History Narrative    , lives alone, working full time     Social Determinants of Health     Financial Resource Strain: Low Risk    • Difficulty of Paying Living Expenses: Not hard at all   Food Insecurity: Not on file Transportation Needs: No Transportation Needs   • Lack of Transportation (Medical): No   • Lack of Transportation (Non-Medical):  No   Physical Activity: Not on file   Stress: Not on file   Social Connections: Not on file   Intimate Partner Violence: Not on file   Housing Stability: Not on file        Current Outpatient Medications:   •  clotrimazole-betamethasone (LOTRISONE) 1-0 05 % cream, apply to affected area twice a day for 10 days MAX, Disp: 45 g, Rfl: 0  •  Ascorbic Acid (VITAMIN C IMMUNE HEALTH PO), Take by mouth daily, Disp: , Rfl:   •  aspirin (ECOTRIN LOW STRENGTH) 81 mg EC tablet, Take 81 mg by mouth every other day 1 every other day, Disp: , Rfl:   •  atorvastatin (LIPITOR) 40 mg tablet, Take 1 tablet (40 mg total) by mouth every evening, Disp: 90 tablet, Rfl: 3  •  calcium polycarbophil (FIBERCON) 625 mg tablet, Take 625 mg by mouth daily, Disp: , Rfl:   •  collagenase (SANTYL) ointment, Apply topically daily, Disp: 30 g, Rfl: 0  •  ferrous sulfate 324 (65 Fe) mg, Take 1 tablet (324 mg total) by mouth daily before breakfast, Disp: 30 tablet, Rfl: 2  •  finasteride (PROSCAR) 5 mg tablet, Take 1 tablet (5 mg total) by mouth daily, Disp: 90 tablet, Rfl: 3  •  lisinopril (ZESTRIL) 10 mg tablet, take 1 tablet by mouth once daily, Disp: 90 tablet, Rfl: 1  •  metoprolol succinate (TOPROL-XL) 25 mg 24 hr tablet, take 1 tablet by mouth once daily, Disp: 90 tablet, Rfl: 1  •  Multiple Vitamin (MULTIVITAMIN) capsule, Take 1 capsule by mouth daily, Disp: , Rfl:   •  neomycin-polymyxin-dexamethasone (MAXITROL) ophthalmic suspension, , Disp: , Rfl:   •  nitroglycerin (NITROSTAT) 0 4 mg SL tablet, Place 1 tablet (0 4 mg total) under the tongue every 5 (five) minutes as needed for chest pain (Patient not taking: Reported on 1/25/2023), Disp: 15 tablet, Rfl: 5  •  omeprazole (PriLOSEC) 20 mg delayed release capsule, take 1 capsule by mouth once daily, Disp: 90 capsule, Rfl: 2  •  Potassium Citrate ER 15 MEQ (1620 MG) TBCR, Take 2 tablets by mouth 2 (two) times a day, Disp: 120 tablet, Rfl: 3  •  tamsulosin (FLOMAX) 0 4 mg, Take 1 capsule (0 4 mg total) by mouth daily with dinner, Disp: 90 capsule, Rfl: 3  •  triamterene-hydrochlorothiazide (MAXZIDE) 75-50 MG per tablet, take 1 tablet by mouth once daily, Disp: 90 tablet, Rfl: 1  No current facility-administered medications for this visit  Family History   Problem Relation Age of Onset   • Alzheimer's disease Mother    • Alzheimer's disease Father    • Heart disease Father         CAD   • Other Father         prediabetes   • Diabetes Father    • Breast cancer Other    • Arthritis Family    • Hypertension Family        Review of Systems   Constitutional: Negative for chills and fever  HENT: Negative for ear pain and sore throat  Eyes: Negative for pain and visual disturbance  Respiratory: Negative for cough and shortness of breath  Cardiovascular: Negative for chest pain and palpitations  Gastrointestinal: Negative for abdominal pain and vomiting  Genitourinary: Negative for dysuria and hematuria  Musculoskeletal: Positive for gait problem  Negative for arthralgias and back pain  Skin: Positive for color change and wound  Negative for rash  Neurological: Positive for numbness  Negative for seizures and syncope  Psychiatric/Behavioral: Negative  All other systems reviewed and are negative  Objective:  /60   Pulse 72   Temp (!) 96 9 °F (36 1 °C)   Resp 16     Physical Exam  Constitutional:       Appearance: Normal appearance  He is normal weight  HENT:      Head: Normocephalic and atraumatic  Right Ear: External ear normal       Left Ear: External ear normal       Nose: Nose normal       Mouth/Throat:      Mouth: Mucous membranes are moist       Pharynx: Oropharynx is clear  Eyes:      Conjunctiva/sclera: Conjunctivae normal    Cardiovascular:      Pulses: Normal pulses             Dorsalis pedis pulses are 2+ on the right side and 2+ on the left side  Posterior tibial pulses are 2+ on the right side and 2+ on the left side  Pulmonary:      Effort: Pulmonary effort is normal    Musculoskeletal:      Cervical back: Normal range of motion  Right lower leg: No edema  Left lower leg: No edema  Skin:     Capillary Refill: Capillary refill takes less than 2 seconds  Comments: See detailed wound assessment   Neurological:      General: No focal deficit present  Mental Status: He is alert and oriented to person, place, and time  Mental status is at baseline  Sensory: Sensory deficit present  Coordination: Coordination abnormal       Gait: Gait abnormal       Deep Tendon Reflexes: Reflexes abnormal    Psychiatric:         Mood and Affect: Mood normal          Behavior: Behavior normal          Thought Content: Thought content normal          Judgment: Judgment normal            Wound 12/07/22 Traumatic Ankle Anterior;Right (Active)   Wound Image   02/15/23 1326   Wound Description Slough; Yellow;Pink 02/15/23 1325   Kathrin-wound Assessment Pink 02/15/23 1325   Wound Length (cm) 1 6 cm 02/15/23 1325   Wound Width (cm) 1 cm 02/15/23 1325   Wound Depth (cm) 0 3 cm 02/15/23 1325   Wound Surface Area (cm^2) 1 6 cm^2 02/15/23 1325   Wound Volume (cm^3) 0 48 cm^3 02/15/23 1325   Calculated Wound Volume (cm^3) 0 48 cm^3 02/15/23 1325   Change in Wound Size % -140 02/15/23 1325   Undermining 0 1 12/14/22 1401   Undermining is depth extending from 5-8oclock 12/14/22 1401   Drainage Amount Moderate 02/15/23 1325   Drainage Description Serosanguineous 02/15/23 1325   Non-staged Wound Description Full thickness 02/15/23 1325   Patient Tolerance Tolerated well 12/14/22 1401   Dressing Status Intact 02/15/23 1325                No results found  Wound Instructions:  Orders Placed This Encounter   Procedures   • Wound cleansing and dressings     Right ankle wound     Wash your hands with soap and water    Remove old dressing, discard into plastic bag and place in trash  Cleanse the wound with prophase prior to applying a clean dressing  Do not use tissue or cotton balls  Do not scrub the wound  Pat dry using gauze  Shower yes   Apply vaseline to skin surrounding wound  Apply santly ointment to the right ankle wound  Cover with gauze, ezekiel and tape  Secure with tubifast blue  Change dressing daily  Medihoney applied today at the [facility]  Next appointment in 2 weeks  Standing Status:   Future     Standing Expiration Date:   2/15/2024         Uma Lubin DPM, DPM, FACFAS    Portions of the record may have been created with voice recognition software  Occasional wrong word or "sound a like" substitutions may have occurred due to the inherent limitations of voice recognition software  Read the chart carefully and recognize, using context, where substitutions have occurred

## 2023-02-19 DIAGNOSIS — I25.10 CORONARY ARTERY DISEASE INVOLVING NATIVE CORONARY ARTERY OF NATIVE HEART WITHOUT ANGINA PECTORIS: ICD-10-CM

## 2023-02-20 RX ORDER — METOPROLOL SUCCINATE 25 MG/1
TABLET, EXTENDED RELEASE ORAL
Qty: 90 TABLET | Refills: 2 | Status: SHIPPED | OUTPATIENT
Start: 2023-02-20

## 2023-02-24 ENCOUNTER — OFFICE VISIT (OUTPATIENT)
Dept: FAMILY MEDICINE CLINIC | Facility: HOSPITAL | Age: 78
End: 2023-02-24

## 2023-02-24 ENCOUNTER — TELEPHONE (OUTPATIENT)
Dept: FAMILY MEDICINE CLINIC | Facility: HOSPITAL | Age: 78
End: 2023-02-24

## 2023-02-24 VITALS
BODY MASS INDEX: 30.32 KG/M2 | HEART RATE: 88 BPM | WEIGHT: 211.8 LBS | TEMPERATURE: 96.8 F | SYSTOLIC BLOOD PRESSURE: 136 MMHG | DIASTOLIC BLOOD PRESSURE: 70 MMHG | HEIGHT: 70 IN

## 2023-02-24 DIAGNOSIS — L03.115 CELLULITIS OF RIGHT LOWER EXTREMITY: Primary | ICD-10-CM

## 2023-02-24 DIAGNOSIS — L97.519 ULCER OF RIGHT FOOT, UNSPECIFIED ULCER STAGE (HCC): ICD-10-CM

## 2023-02-24 DIAGNOSIS — E11.65 TYPE 2 DIABETES MELLITUS WITH HYPERGLYCEMIA, WITHOUT LONG-TERM CURRENT USE OF INSULIN (HCC): ICD-10-CM

## 2023-02-24 RX ORDER — CEPHALEXIN 500 MG/1
500 CAPSULE ORAL EVERY 8 HOURS SCHEDULED
Qty: 21 CAPSULE | Refills: 0 | Status: SHIPPED | OUTPATIENT
Start: 2023-02-24 | End: 2023-03-03

## 2023-02-24 RX ORDER — HYDROCODONE BITARTRATE AND ACETAMINOPHEN 5; 325 MG/1; MG/1
1 TABLET ORAL EVERY 6 HOURS PRN
Qty: 20 TABLET | Refills: 0 | Status: SHIPPED | OUTPATIENT
Start: 2023-02-24 | End: 2023-02-27

## 2023-02-24 NOTE — PROGRESS NOTES
Name: Matthew Zamora  : 1945      MRN: [id number]  Encounter Provider: Bobo Harris MD  Encounter Date: 2023   Encounter department: [facility]      1  Cellulitis of right lower extremity  -     cephalexin (KEFLEX) 500 mg capsule; Take 1 capsule (500 mg total) by mouth every 8 (eight) hours for 7 days    2  Ulcer of right foot, unspecified ulcer stage ([address] )    3  Type 2 diabetes mellitus with hyperglycemia, without long-term current use of insulin (HCC)     patient appears to have developing infection aroudn the ulcer of the right foot  Erythema extending proximally  Wound culture taken  Empiric treatment with Keflex  Fu with wound care as scheduled on Wednesday  To call for any issues  Subjective      Skip is here for concern about his would on the right foot  increae redness and pain , some swelling  No fever, no chills           Wound Check      Review of Systems    Current Outpatient Medications on File Prior to Visit   Medication Sig   • Ascorbic Acid (VITAMIN C IMMUNE HEALTH PO) Take by mouth daily   • aspirin (ECOTRIN LOW STRENGTH) 81 mg EC tablet Take 81 mg by mouth every other day 1 every other day   • atorvastatin (LIPITOR) 40 mg tablet Take 1 tablet (40 mg total) by mouth every evening   • calcium polycarbophil (FIBERCON) 625 mg tablet Take 625 mg by mouth daily   • clotrimazole-betamethasone (LOTRISONE) 1-0 05 % cream apply to affected area twice a day for 10 days MAX   • collagenase (SANTYL) ointment Apply topically daily   • ferrous sulfate 324 (65 Fe) mg Take 1 tablet (324 mg total) by mouth daily before breakfast   • finasteride (PROSCAR) 5 mg tablet Take 1 tablet (5 mg total) by mouth daily   • lisinopril (ZESTRIL) 10 mg tablet take 1 tablet by mouth once daily   • metoprolol succinate (TOPROL-XL) 25 mg 24 hr tablet take 1 tablet by mouth once daily   • Multiple Vitamin (MULTIVITAMIN) capsule Take 1 capsule by mouth daily   • neomycin-polymyxin-dexamethasone (MAXITROL) ophthalmic suspension    • nitroglycerin (NITROSTAT) 0 4 mg SL tablet Place 1 tablet (0 4 mg total) under the tongue every 5 (five) minutes as needed for chest pain   • omeprazole (PriLOSEC) 20 mg delayed release capsule take 1 capsule by mouth once daily   • Potassium Citrate ER 15 MEQ (1620 MG) TBCR Take 2 tablets by mouth 2 (two) times a day   • tamsulosin (FLOMAX) 0 4 mg Take 1 capsule (0 4 mg total) by mouth daily with dinner   • triamterene-hydrochlorothiazide (MAXZIDE) 75-50 MG per tablet take 1 tablet by mouth once daily       Objective     /70   Pulse 88   Temp (!) 96 8 °F (36 °C)   Ht 5' 10" (1 778 m)   Wt 96 1 kg (211 lb 12 8 oz)   BMI 30 39 kg/m²     Physical Exam  Vitals reviewed  Constitutional:       Appearance: Normal appearance  Skin:     Comments: Compared to recent picture from wound care  There is increase redness around the wound  There is tenderness over the tibial area  No increase purulence from the ulcer  wound  Neurological:      Mental Status: He is alert         Rosa Newell MD

## 2023-02-24 NOTE — TELEPHONE ENCOUNTER
PATIENT SEEN TODAY    ASKING FOR SOMETHING FOR THE PAIN IN ADDITION TO THE ABX PRESCRIBED    PLEASE ADVISE

## 2023-02-24 NOTE — TELEPHONE ENCOUNTER
Please call  Norco sent  Caution with se/ar  Drowsineess/sleepiness, constipation  Do not drive after taking until he knows how it affects him  Use for severe pain every 6 hours     Rx sent

## 2023-02-25 ENCOUNTER — APPOINTMENT (EMERGENCY)
Dept: RADIOLOGY | Facility: HOSPITAL | Age: 78
End: 2023-02-25

## 2023-02-25 ENCOUNTER — HOSPITAL ENCOUNTER (EMERGENCY)
Facility: HOSPITAL | Age: 78
Discharge: HOME | End: 2023-02-25
Attending: EMERGENCY MEDICINE

## 2023-02-25 VITALS
WEIGHT: 210 LBS | BODY MASS INDEX: 30.06 KG/M2 | OXYGEN SATURATION: 96 % | SYSTOLIC BLOOD PRESSURE: 115 MMHG | TEMPERATURE: 98.3 F | DIASTOLIC BLOOD PRESSURE: 51 MMHG | RESPIRATION RATE: 16 BRPM | HEIGHT: 70 IN | HEART RATE: 84 BPM

## 2023-02-25 DIAGNOSIS — L03.115 CELLULITIS OF RIGHT LEG: Primary | ICD-10-CM

## 2023-02-25 LAB
ALBUMIN SERPL BCP-MCNC: 4.2 G/DL (ref 3.5–5)
ALP SERPL-CCNC: 43 U/L (ref 34–104)
ALT SERPL W P-5'-P-CCNC: 16 U/L (ref 7–52)
ANION GAP SERPL CALCULATED.3IONS-SCNC: 8 MMOL/L (ref 4–13)
AST SERPL W P-5'-P-CCNC: 18 U/L (ref 13–39)
BASOPHILS # BLD AUTO: 0.02 THOUSANDS/ÂΜL (ref 0–0.1)
BASOPHILS NFR BLD AUTO: 0 % (ref 0–1)
BILIRUB SERPL-MCNC: 1 MG/DL (ref 0.2–1)
BUN SERPL-MCNC: 23 MG/DL (ref 5–25)
CALCIUM SERPL-MCNC: 8.8 MG/DL (ref 8.4–10.2)
CHLORIDE SERPL-SCNC: 105 MMOL/L (ref 96–108)
CO2 SERPL-SCNC: 24 MMOL/L (ref 21–32)
CREAT SERPL-MCNC: 1.44 MG/DL (ref 0.6–1.3)
EOSINOPHIL # BLD AUTO: 0.09 THOUSAND/ÂΜL (ref 0–0.61)
EOSINOPHIL NFR BLD AUTO: 1 % (ref 0–6)
ERYTHROCYTE [DISTWIDTH] IN BLOOD BY AUTOMATED COUNT: 12.1 % (ref 11.6–15.1)
GFR SERPL CREATININE-BSD FRML MDRD: 46 ML/MIN/1.73SQ M
GLUCOSE SERPL-MCNC: 122 MG/DL (ref 65–140)
HCT VFR BLD AUTO: 39.3 % (ref 36.5–49.3)
HGB BLD-MCNC: 13 G/DL (ref 12–17)
IMM GRANULOCYTES # BLD AUTO: 0.04 THOUSAND/UL (ref 0–0.2)
IMM GRANULOCYTES NFR BLD AUTO: 0 % (ref 0–2)
LYMPHOCYTES # BLD AUTO: 0.79 THOUSANDS/ÂΜL (ref 0.6–4.47)
LYMPHOCYTES NFR BLD AUTO: 7 % (ref 14–44)
MCH RBC QN AUTO: 31.5 PG (ref 26.8–34.3)
MCHC RBC AUTO-ENTMCNC: 33.1 G/DL (ref 31.4–37.4)
MCV RBC AUTO: 95 FL (ref 82–98)
MONOCYTES # BLD AUTO: 1.13 THOUSAND/ÂΜL (ref 0.17–1.22)
MONOCYTES NFR BLD AUTO: 10 % (ref 4–12)
NEUTROPHILS # BLD AUTO: 9.36 THOUSANDS/ÂΜL (ref 1.85–7.62)
NEUTS SEG NFR BLD AUTO: 82 % (ref 43–75)
NRBC BLD AUTO-RTO: 0 /100 WBCS
PLATELET # BLD AUTO: 201 THOUSANDS/UL (ref 149–390)
PMV BLD AUTO: 9.1 FL (ref 8.9–12.7)
POTASSIUM SERPL-SCNC: 4.5 MMOL/L (ref 3.5–5.3)
PROT SERPL-MCNC: 7.1 G/DL (ref 6.4–8.4)
RBC # BLD AUTO: 4.13 MILLION/UL (ref 3.88–5.62)
SODIUM SERPL-SCNC: 137 MMOL/L (ref 135–147)
WBC # BLD AUTO: 11.43 THOUSAND/UL (ref 4.31–10.16)

## 2023-02-25 RX ORDER — SULFAMETHOXAZOLE AND TRIMETHOPRIM 800; 160 MG/1; MG/1
1 TABLET ORAL 2 TIMES DAILY
Qty: 20 TABLET | Refills: 0 | Status: SHIPPED | OUTPATIENT
Start: 2023-02-25 | End: 2023-03-07

## 2023-02-25 NOTE — ED PROVIDER NOTES
History  Chief Complaint   Patient presents with   • Ankle Pain     Patient presents to the ED with c/o right ankle pain, states he sees wound care for preexisting wounds but a few days ago redness and pain started on ankle      Patient is a 65 y/o M with h/o HTN, CKD, CAD that presents to the ED with ulceration to right anterior ankle  He states he has had the wound for a few weeks  He is being treated by wound care, but states for the past week the wound has become painful and is now red  He denies fevers, chills  I reviewed patient's records, he was seen by PCP yesterday and started on keflex for cellulitis  History provided by:  Patient  Ankle Pain  Location:  Ankle  Injury: no    Ankle location:  R ankle  Pain details:     Quality:  Aching    Radiates to:  Does not radiate    Severity:  Moderate    Onset quality:  Gradual    Duration:  1 week    Timing:  Constant    Progression:  Unchanged  Chronicity:  New  Prior injury to area:  No  Relieved by:  Nothing  Exacerbated by: ambulation  Ineffective treatments: keflex  Associated symptoms: swelling    Associated symptoms: no decreased ROM, no fever and no numbness        Prior to Admission Medications   Prescriptions Last Dose Informant Patient Reported? Taking?    Ascorbic Acid (VITAMIN C IMMUNE HEALTH PO)   Yes No   Sig: Take by mouth daily   HYDROcodone-acetaminophen (Norco) 5-325 mg per tablet   No No   Sig: Take 1 tablet by mouth every 6 (six) hours as needed for pain Max Daily Amount: 4 tablets   Multiple Vitamin (MULTIVITAMIN) capsule   Yes No   Sig: Take 1 capsule by mouth daily   Potassium Citrate ER 15 MEQ (1620 MG) TBCR   No No   Sig: Take 2 tablets by mouth 2 (two) times a day   aspirin (ECOTRIN LOW STRENGTH) 81 mg EC tablet   Yes No   Sig: Take 81 mg by mouth every other day 1 every other day   atorvastatin (LIPITOR) 40 mg tablet   No No   Sig: Take 1 tablet (40 mg total) by mouth every evening   calcium polycarbophil (FIBERCON) 625 mg tablet   Yes No   Sig: Take 625 mg by mouth daily   cephalexin (KEFLEX) 500 mg capsule   No No   Sig: Take 1 capsule (500 mg total) by mouth every 8 (eight) hours for 7 days   clotrimazole-betamethasone (LOTRISONE) 1-0 05 % cream   No No   Sig: apply to affected area twice a day for 10 days MAX   collagenase (SANTYL) ointment   No No   Sig: Apply topically daily   ferrous sulfate 324 (65 Fe) mg   No No   Sig: Take 1 tablet (324 mg total) by mouth daily before breakfast   finasteride (PROSCAR) 5 mg tablet   No No   Sig: Take 1 tablet (5 mg total) by mouth daily   lisinopril (ZESTRIL) 10 mg tablet   No No   Sig: take 1 tablet by mouth once daily   metoprolol succinate (TOPROL-XL) 25 mg 24 hr tablet   No No   Sig: take 1 tablet by mouth once daily   neomycin-polymyxin-dexamethasone (MAXITROL) ophthalmic suspension   Yes No   nitroglycerin (NITROSTAT) 0 4 mg SL tablet   No No   Sig: Place 1 tablet (0 4 mg total) under the tongue every 5 (five) minutes as needed for chest pain   omeprazole (PriLOSEC) 20 mg delayed release capsule   No No   Sig: take 1 capsule by mouth once daily   tamsulosin (FLOMAX) 0 4 mg   No No   Sig: Take 1 capsule (0 4 mg total) by mouth daily with dinner   triamterene-hydrochlorothiazide (MAXZIDE) 75-50 MG per tablet   No No   Sig: take 1 tablet by mouth once daily      Facility-Administered Medications: None       Past Medical History:   Diagnosis Date   • Anemia     iron def   • Arthritis    • Chronic kidney disease    • Coronary artery disease    • GERD (gastroesophageal reflux disease)    • Gout    • Hypertension    • Insomnia    • Kidney stone    • Stroke (cerebrum) [facility])        Past Surgical History:   Procedure Laterality Date   • ANKLE SURGERY Right    • CARDIAC CATHETERIZATION     • COLONOSCOPY  01/25/2013    polypectomy; complete - 3 yrs d/t polyp - benign submucosal lipoma- path c/w lipoma   • COLONOSCOPY  04/25/2017    complete - tubular adenoma - repeat 5yrs   • CYSTOSCOPY     • CYSTOTOMY      bladder  w/ basket extraction of calculus   • FEMUR FRACTURE SURGERY     • HERNIA REPAIR      inguinal ; sliding   • INGUINAL HERNIA REPAIR Right     unilateral   • KNEE ARTHROSCOPY Right     w/medial menisectomy   • LASIK      corneal   • LITHOTRIPSY      renal   • MS COLONOSCOPY FLX DX W/COLLJ SPEC WHEN PFRMD N/A 2017    Procedure: SCREENING COLONOSCOPY;  Surgeon: Blue Bartlett MD;  Location:  MAIN OR;  Service: General   • MS TEAEC W/PATCH GRF CAROTID VERTB Kanwal Right 1/10/2020    Procedure: ENDARTERECTOMY ARTERY CAROTID;  Surgeon: Jerald Rogers MD;  Location:  MAIN OR;  Service: Vascular   • ROTATOR CUFF REPAIR Bilateral    • TONSILLECTOMY         Family History   Problem Relation Age of Onset   • Alzheimer's disease Mother    • Alzheimer's disease Father    • Heart disease Father         CAD   • Other Father         prediabetes   • Diabetes Father    • Breast cancer Other    • Arthritis Family    • Hypertension Family      I have reviewed and agree with the history as documented  E-Cigarette/Vaping   • E-Cigarette Use Never User      E-Cigarette/Vaping Substances   • Nicotine No    • THC No    • CBD No    • Flavoring No    • Other No    • Unknown No      Social History     Tobacco Use   • Smoking status: Former     Packs/day: 1 00     Years: 22 00     Pack years: 22 00     Types: Cigarettes     Start date: 0     Quit date: 1985     Years since quittin 1   • Smokeless tobacco: Never   Vaping Use   • Vaping Use: Never used   Substance Use Topics   • Alcohol use: Not Currently     Comment: stopped drinking alcohol   • Drug use: No       Review of Systems   Constitutional: Negative for chills and fever  Skin: Positive for color change and wound  Neurological: Negative for dizziness, weakness and numbness  Psychiatric/Behavioral: Negative for confusion  All other systems reviewed and are negative        Physical Exam  Physical Exam  Vitals and nursing note reviewed  Constitutional:       General: He is not in acute distress  Appearance: Normal appearance  He is well-developed, well-groomed and normal weight  He is not ill-appearing or diaphoretic  HENT:      Head: Normocephalic and atraumatic  Right Ear: External ear normal       Left Ear: External ear normal       Nose: Nose normal    Eyes:      Conjunctiva/sclera: Conjunctivae normal       Pupils: Pupils are equal    Cardiovascular:      Rate and Rhythm: Normal rate and regular rhythm  Pulses:           Dorsalis pedis pulses are 2+ on the right side  Heart sounds: Normal heart sounds  Pulmonary:      Effort: Pulmonary effort is normal       Breath sounds: Normal breath sounds  No wheezing, rhonchi or rales  Musculoskeletal:      Cervical back: Normal range of motion  Comments: Patient has ulceration to right anterior ankle with surrounding erythema, swelling and tenderness  Skin:     General: Skin is warm and dry  Findings: Erythema and wound present  Neurological:      Mental Status: He is alert and oriented to person, place, and time  Sensory: Sensation is intact  Motor: Motor function is intact  Gait: Gait is intact  Psychiatric:         Mood and Affect: Mood normal          Speech: Speech normal          Behavior: Behavior is cooperative               Vital Signs  ED Triage Vitals [02/25/23 0948]   Temperature Pulse Respirations Blood Pressure SpO2   98 3 °F (36 8 °C) 89 18 148/67 98 %      Temp Source Heart Rate Source Patient Position - Orthostatic VS BP Location FiO2 (%)   Temporal Monitor Sitting Right arm --      Pain Score       --           Vitals:    02/25/23 0948 02/25/23 1000   BP: 148/67 115/51   Pulse: 89 84   Patient Position - Orthostatic VS: Sitting          Visual Acuity      ED Medications  Medications - No data to display    Diagnostic Studies  Results Reviewed     Procedure Component Value Units Date/Time    Comprehensive metabolic panel [[id number]]  (Abnormal) Collected: 02/25/23 1012    Lab Status: Final result Specimen: Blood from Arm, Right Updated: 02/25/23 1032     Sodium 137 mmol/L      Potassium 4 5 mmol/L      Chloride 105 mmol/L      CO2 24 mmol/L      ANION GAP 8 mmol/L      BUN 23 mg/dL      Creatinine 1 44 mg/dL      Glucose 122 mg/dL      Calcium 8 8 mg/dL      AST 18 U/L      ALT 16 U/L      Alkaline Phosphatase 43 U/L      Total Protein 7 1 g/dL      Albumin 4 2 g/dL      Total Bilirubin 1 00 mg/dL      eGFR 46 ml/min/1 73sq m     Narrative:      [place] guidelines for Chronic Kidney Disease (CKD):   •  Stage 1 with normal or high GFR (GFR > 90 mL/min/1 73 square meters)  •  Stage 2 Mild CKD (GFR = 60-89 mL/min/1 73 square meters)  •  Stage 3A Moderate CKD (GFR = 45-59 mL/min/1 73 square meters)  •  Stage 3B Moderate CKD (GFR = 30-44 mL/min/1 73 square meters)  •  Stage 4 Severe CKD (GFR = 15-29 mL/min/1 73 square meters)  •  Stage 5 End Stage CKD (GFR <15 mL/min/1 73 square meters)  Note: GFR calculation is accurate only with a steady state creatinine    CBC and differential [[id number]]  (Abnormal) Collected: 02/25/23 1012    Lab Status: Final result Specimen: Blood from Arm, Right Updated: 02/25/23 1021     WBC 11 43 Thousand/uL      RBC 4 13 Million/uL      Hemoglobin 13 0 g/dL      Hematocrit 39 3 %      MCV 95 fL      MCH 31 5 pg      MCHC 33 1 g/dL      RDW 12 1 %      MPV 9 1 fL      Platelets 127 Thousands/uL      nRBC 0 /100 WBCs      Neutrophils Relative 82 %      Immat GRANS % 0 %      Lymphocytes Relative 7 %      Monocytes Relative 10 %      Eosinophils Relative 1 %      Basophils Relative 0 %      Neutrophils Absolute 9 36 Thousands/µL      Immature Grans Absolute 0 04 Thousand/uL      Lymphocytes Absolute 0 79 Thousands/µL      Monocytes Absolute 1 13 Thousand/µL      Eosinophils Absolute 0 09 Thousand/µL      Basophils Absolute 0 02 Thousands/µL                  XR ankle 3+ views RIGHT ED Interpretation by Dong Gayle PA-C (02/25 4241)   No acute fracture  No gas in soft tissue  Hardware appears to be in place  Procedures  Procedures         ED Course                                             Medical Decision Making  Patient with chronic wound to right ankle, no with pain and redness, appears to be infected, will check labs and xray  WBC slightly elevated, no concern for sepsis since heart rate normal and patient afebrile  No gas on xray  Patient just started keflex yesterday, no concern for outpatient failure since patient has not been on abx for 24 hours  Will add bactrim to cover for mrsa  Patient has f/u appt with wound care in 3 days  Return precautions given  Cellulitis of right leg: complicated acute illness or injury  Amount and/or Complexity of Data Reviewed  External Data Reviewed: notes  Labs: ordered  Radiology: ordered and independent interpretation performed  Risk  Prescription drug management  Disposition  Final diagnoses:   Cellulitis of right leg     Time reflects when diagnosis was documented in both MDM as applicable and the Disposition within this note     Time User Action Codes Description Comment    2/25/2023 10:46 AM Patsy Mello Add [L03 [id number]] Cellulitis of right leg       ED Disposition     ED Disposition   Discharge    Condition   Stable    Date/Time   Sat Feb 25, 2023 10:46 AM    Comment   Ryley Hathaway  discharge to home/self care                 Follow-up Information     Follow up With Specialties Details Why Contact Info    Sol Brewer DPM Podiatry, Wound Care Go in 3 days For recheck [address] [place] [address]  [phone]            Discharge Medication List as of 2/25/2023 10:50 AM      START taking these medications    Details   sulfamethoxazole-trimethoprim (BACTRIM DS) 800-160 mg per tablet Take 1 tablet by mouth 2 (two) times a day for 10 days smx-tmp DS (BACTRIM) 800-160 mg tabs (1tab q12 D10), Starting Sat 2/25/2023, Until Tue 3/7/2023, Normal         CONTINUE these medications which have NOT CHANGED    Details   Ascorbic Acid (VITAMIN C IMMUNE HEALTH PO) Take by mouth daily, Historical Med      aspirin (ECOTRIN LOW STRENGTH) 81 mg EC tablet Take 81 mg by mouth every other day 1 every other day, Historical Med      atorvastatin (LIPITOR) 40 mg tablet Take 1 tablet (40 mg total) by mouth every evening, Starting Thu 9/22/2022, Normal      calcium polycarbophil (FIBERCON) 625 mg tablet Take 625 mg by mouth daily, Historical Med      cephalexin (KEFLEX) 500 mg capsule Take 1 capsule (500 mg total) by mouth every 8 (eight) hours for 7 days, Starting Fri 2/24/2023, Until Fri 3/3/2023, Normal      clotrimazole-betamethasone (LOTRISONE) 1-0 05 % cream apply to affected area twice a day for 10 days MAX, Normal      collagenase (SANTYL) ointment Apply topically daily, Starting Wed 2/1/2023, Normal      ferrous sulfate 324 (65 Fe) mg Take 1 tablet (324 mg total) by mouth daily before breakfast, Starting Fri 11/13/2020, Normal      finasteride (PROSCAR) 5 mg tablet Take 1 tablet (5 mg total) by mouth daily, Starting Tue 10/11/2022, Normal      HYDROcodone-acetaminophen (Norco) 5-325 mg per tablet Take 1 tablet by mouth every 6 (six) hours as needed for pain Max Daily Amount: 4 tablets, Starting Fri 2/24/2023, Normal      lisinopril (ZESTRIL) 10 mg tablet take 1 tablet by mouth once daily, Normal      metoprolol succinate (TOPROL-XL) 25 mg 24 hr tablet take 1 tablet by mouth once daily, Normal      Multiple Vitamin (MULTIVITAMIN) capsule Take 1 capsule by mouth daily, Historical Med      neomycin-polymyxin-dexamethasone (MAXITROL) ophthalmic suspension Starting Thu 12/1/2022, Historical Med      nitroglycerin (NITROSTAT) 0 4 mg SL tablet Place 1 tablet (0 4 mg total) under the tongue every 5 (five) minutes as needed for chest pain, Starting Wed 10/14/2020, Normal omeprazole (PriLOSEC) 20 mg delayed release capsule take 1 capsule by mouth once daily, Normal      Potassium Citrate ER 15 MEQ (1620 MG) TBCR Take 2 tablets by mouth 2 (two) times a day, Starting Tue 6/8/2021, Normal      tamsulosin (FLOMAX) 0 4 mg Take 1 capsule (0 4 mg total) by mouth daily with dinner, Starting Tue 10/11/2022, Normal      triamterene-hydrochlorothiazide (MAXZIDE) 75-50 MG per tablet take 1 tablet by mouth once daily, Normal             No discharge procedures on file      PDMP Review       Value Time User    PDMP Reviewed  Yes 2/24/2023 12:29 PM Margarito Mcconnell MD          ED Provider  Electronically Signed by           Elsa Everett PA-C  02/25/23 9567

## 2023-02-25 NOTE — DISCHARGE INSTRUCTIONS
Rest, elevate leg  Continue cephalexin as prescribed  Take bactrim twice a day for next 10 days  Keep appointment with wound care on Wednesday  Return to ER if symptoms worsen, fevers, vomiting

## 2023-02-26 LAB
BACTERIA WND AEROBE CULT: ABNORMAL
BACTERIA WND AEROBE CULT: ABNORMAL
GRAM STN SPEC: ABNORMAL

## 2023-02-27 ENCOUNTER — OFFICE VISIT (OUTPATIENT)
Dept: FAMILY MEDICINE CLINIC | Facility: HOSPITAL | Age: 78
End: 2023-02-27

## 2023-02-27 VITALS
WEIGHT: 212.2 LBS | DIASTOLIC BLOOD PRESSURE: 62 MMHG | SYSTOLIC BLOOD PRESSURE: 120 MMHG | BODY MASS INDEX: 30.38 KG/M2 | TEMPERATURE: 97.3 F | HEART RATE: 72 BPM | HEIGHT: 70 IN

## 2023-02-27 DIAGNOSIS — L03.115 CELLULITIS OF RIGHT LOWER EXTREMITY: ICD-10-CM

## 2023-02-27 DIAGNOSIS — R73.01 IMPAIRED FASTING GLUCOSE: Primary | ICD-10-CM

## 2023-02-27 DIAGNOSIS — I35.0 AORTIC VALVE STENOSIS, ETIOLOGY OF CARDIAC VALVE DISEASE UNSPECIFIED: ICD-10-CM

## 2023-02-27 DIAGNOSIS — E78.5 DYSLIPIDEMIA: Chronic | ICD-10-CM

## 2023-02-27 DIAGNOSIS — I10 PRIMARY HYPERTENSION: Chronic | ICD-10-CM

## 2023-02-27 DIAGNOSIS — E66.09 CLASS 1 OBESITY DUE TO EXCESS CALORIES WITH SERIOUS COMORBIDITY AND BODY MASS INDEX (BMI) OF 30.0 TO 30.9 IN ADULT: ICD-10-CM

## 2023-02-27 DIAGNOSIS — N18.32 STAGE 3B CHRONIC KIDNEY DISEASE (HCC): ICD-10-CM

## 2023-02-27 DIAGNOSIS — R97.20 ELEVATED PSA: ICD-10-CM

## 2023-02-27 DIAGNOSIS — L97.519 ULCER OF RIGHT FOOT, UNSPECIFIED ULCER STAGE (HCC): ICD-10-CM

## 2023-02-27 DIAGNOSIS — I48.92 ATRIAL FLUTTER, UNSPECIFIED TYPE (HCC): ICD-10-CM

## 2023-02-27 PROBLEM — I63.9 CEREBROVASCULAR ACCIDENT (CVA) (HCC): Chronic | Status: RESOLVED | Noted: 2020-01-07 | Resolved: 2023-02-27

## 2023-02-27 LAB
BACTERIA WND AEROBE CULT: ABNORMAL
BACTERIA WND AEROBE CULT: ABNORMAL
GRAM STN SPEC: ABNORMAL

## 2023-02-27 RX ORDER — OXYCODONE HYDROCHLORIDE 10 MG/1
5 TABLET ORAL EVERY 6 HOURS PRN
Qty: 28 TABLET | Refills: 0 | Status: SHIPPED | OUTPATIENT
Start: 2023-02-27

## 2023-02-27 NOTE — ASSESSMENT & PLAN NOTE
Lab Results   Component Value Date    EGFR 46 02/25/2023    EGFR 46 01/18/2023    EGFR 34 10/07/2022    CREATININE 1 44 (H) 02/25/2023    CREATININE 1 44 (H) 01/18/2023    CREATININE 1 83 (H) 10/07/2022   CKD stage 3 stable - following with Nephro, on an ACE, encouraged to avoid NSAIDs as well as keep hydrated and importance of BP/BS control reviewed

## 2023-02-27 NOTE — ASSESSMENT & PLAN NOTE
LDL at goal, con't current statin, healthy diet and regular exercise encouraged, recheck FLP annually

## 2023-02-27 NOTE — ASSESSMENT & PLAN NOTE
Currently NSR and rate controlled on Toprol XL, on ASA for anticoagulation, needs to f/u with Cardio - referral placed and number provided

## 2023-02-27 NOTE — ASSESSMENT & PLAN NOTE
Had Echo 10/22 - stenosis mild, overdue for follow up with Cardio - referral placed, call with CV symptoms

## 2023-02-27 NOTE — ASSESSMENT & PLAN NOTE
Both FBS/A1C improved and wnl, con't to encourage healthy diet and regular exercise, recheck BW in 6 mos - BW order given

## 2023-02-27 NOTE — ASSESSMENT & PLAN NOTE
Stable if not slightly improved, has f/u with Uro, defer labs/tx and f/u to Uro, on Flomax and Proscar

## 2023-02-27 NOTE — PROGRESS NOTES
Name: Bailey Mccartney  : 1945      MRN: [id number]  Encounter Provider: Rosalind Traylor DO  Encounter Date: 2023   Encounter department: [address] [place] [address]  203     Assessment & Plan     1  Impaired fasting glucose  Assessment & Plan: Both FBS/A1C improved and wnl, con't to encourage healthy diet and regular exercise, recheck BW in 6 mos - BW order given    Orders:  -     Basic metabolic panel; Future  -     Glucose, fasting; Future; Expected date: 2023    2  Stage 3b chronic kidney disease [facility])  Assessment & Plan:  Lab Results   Component Value Date    EGFR 46 2023    EGFR 46 2023    EGFR 34 10/07/2022    CREATININE 1 44 (H) 2023    CREATININE 1 44 (H) 2023    CREATININE 1 83 (H) 10/07/2022   CKD stage 3 stable - following with Nephro, on an ACE, encouraged to avoid NSAIDs as well as keep hydrated and importance of BP/BS control reviewed      3  Dyslipidemia  Assessment & Plan:  LDL at goal, con't current statin, healthy diet and regular exercise encouraged, recheck FLP annually      4  Elevated PSA  Assessment & Plan:  Stable if not slightly improved, has f/u with Uro, defer labs/tx and f/u to Uro, on Flomax and Proscar      5  Atrial flutter, unspecified type ([address] )  Assessment & Plan:  Currently NSR and rate controlled on Toprol XL, on ASA for anticoagulation, needs to f/u with Cardio - referral placed and number provided    Orders:  -     Ambulatory Referral to Cardiology; Future    6  Primary hypertension  Assessment & Plan:  BP at goal, con't current meds, healthy diet and regular exercise encouraged      7  Aortic valve stenosis, etiology of cardiac valve disease unspecified  Assessment & Plan:  Had Echo 10/22 - stenosis mild, overdue for follow up with Cardio - referral placed, call with CV symptoms      8  Cellulitis of right lower extremity  Comments:   On Keflex and Bactrim - minimal improvement but less then 48 hrs on the Bactrim, has f/u with wound care on Wed, borders drawn and urged pt to call if con't to spread outside of borders on Wed or beyond, con't tx and f/u as per wound care, keep area clean and dry, short course of pain meds reviewed and SE and constipation reviewed, urged to take daily stool softener on the pain med, call with significant SE, PDMP Rx website reviewed and no red flag use noted  Orders:  -     oxyCODONE (ROXICODONE) 10 MG TABS; Take 0 5 tablets (5 mg total) by mouth every 6 (six) hours as needed for moderate pain Max Daily Amount: 20 mg    9  Ulcer of right foot, unspecified ulcer stage ([facility] [address] )  Comments: Following with wound care with slow improvement, con't tx and f/u as per specialists, 1 wk of oxycodone sent, SE including sedation/confusion/falls/addiction reviewed, PMDP rx reviewed and no red flag use noted, constipation as a SE reviewed -daily stool softener on the rx encouraged  Orders:  -     oxyCODONE (ROXICODONE) 10 MG TABS; Take 0 5 tablets (5 mg total) by mouth every 6 (six) hours as needed for moderate pain Max Daily Amount: 20 mg      10  Class 1 obesity due to excess calories with serious comorbidity and body mass index (BMI) of 30 0 to 30 9 in adult      Comments:   Diet/exercise/wgt loss encouraged      BMI Counseling: Body mass index is 30 45 kg/m²  The BMI is above normal  Nutrition recommendations include decreasing portion sizes, encouraging healthy choices of fruits and vegetables, consuming healthier snacks, moderation in carbohydrate intake, increasing intake of lean protein, reducing intake of saturated and trans fat and reducing intake of cholesterol  Exercise recommendations include exercising 3-5 times per week  No pharmacotherapy was ordered  Rationale for BMI follow-up plan is due to patient being overweight or obese        Colonoscopy 2/21 - 5 yrs    Echo for AS 10/22    CUS 12/22      Subjective      HPI Pt here for follow up appt and BW results    BW results were d/w pt in detail: FBS/A1C 96/5 3, BUN/Cr 21/1 44 (GFR 46), rest of CMP/CBC/TSH/FLP were wnl, PSA was again elevated at 5 7  Def of nml vs IFG vs DM was d/w pt in detail  Diet/exercise was reviewed - wgt up 3 lbs from March 22  CKD stage 3 reviewed  Importance of BP/BS control was reviewed  He does not use NSAIDs regularly  He drinks 3 bottles of Propel a day  He follows with Nephro and saw Dr eMndez Fu in Jan 23 - OV note reviewed  He was told to f/u in 6 mos  Goal FLP was d/w pt in detail  Diet/exercise reviewed as noted above  He is taking his Atorvastatin daily as directed w/o Se  He notes no stroke/TIA symptoms/CP  Elevated PSA reviewed  He is following with Uro and has an appt in April  He is on Flomax and Proscar  He notes no new/worse urinary symptoms - notes "slow" stream at times  Pt has had a R traumatic wound and saw Jarrod Ross in Dec 22 and was referred to wound care  He has followed with wound care throughout Dec and Jan and now through Feb 23  He saw Dr Sade Ch last week and was started on Keflex for developing cellulitis around ulcer of his RLE  He was seen in the ED 2/25/23 d/t worsening pain - ED notes and studies reviewed  Xray showed no fracture or gas in soft tissue and hardware was intact  Bactrim was added and he was encouraged to f/u with wound care as already scheduled for 3/1/23  He is taking both abx as directed and denies SE  He con't to have some pain at the site  He is taking Norco 1 tab every 6 hrs but does not feel it is helping at all  The pain is ant tibial region distally and worse if you push on the area  He states his pain is a 9/10  He notes no F/C  He notes no Cp/palp/LE edema/SOB  He has not seen Cardio since Oct 2021 for his A flutter and mild AS  He notes no CP/palp/SOB/LE edema  BP great today  He is taking his BP meds as directed w/o SE  He notes no frequent HA's/dizziness/double vision/CP           Review of Systems   Constitutional: Negative for chills and fever  HENT: Negative for congestion and trouble swallowing  Eyes: Negative for pain and visual disturbance  Respiratory: Negative for cough and shortness of breath  Cardiovascular: Negative for chest pain, palpitations and leg swelling  Gastrointestinal: Positive for constipation  Negative for abdominal pain, blood in stool, diarrhea, nausea and vomiting  Genitourinary: Negative for difficulty urinating and dysuria  Musculoskeletal: Positive for arthralgias  Negative for myalgias  Skin: Positive for wound  Negative for rash  Neurological: Negative for dizziness and headaches  Hematological: Does not bruise/bleed easily  Psychiatric/Behavioral: Negative for confusion and dysphoric mood         Current Outpatient Medications on File Prior to Visit   Medication Sig   • Ascorbic Acid (VITAMIN C IMMUNE HEALTH PO) Take by mouth daily   • aspirin (ECOTRIN LOW STRENGTH) 81 mg EC tablet Take 81 mg by mouth every other day 1 every other day   • atorvastatin (LIPITOR) 40 mg tablet Take 1 tablet (40 mg total) by mouth every evening   • calcium polycarbophil (FIBERCON) 625 mg tablet Take 625 mg by mouth daily   • cephalexin (KEFLEX) 500 mg capsule Take 1 capsule (500 mg total) by mouth every 8 (eight) hours for 7 days   • clotrimazole-betamethasone (LOTRISONE) 1-0 05 % cream apply to affected area twice a day for 10 days MAX   • collagenase (SANTYL) ointment Apply topically daily   • ferrous sulfate 324 (65 Fe) mg Take 1 tablet (324 mg total) by mouth daily before breakfast   • finasteride (PROSCAR) 5 mg tablet Take 1 tablet (5 mg total) by mouth daily   • lisinopril (ZESTRIL) 10 mg tablet take 1 tablet by mouth once daily   • metoprolol succinate (TOPROL-XL) 25 mg 24 hr tablet take 1 tablet by mouth once daily   • Multiple Vitamin (MULTIVITAMIN) capsule Take 1 capsule by mouth daily   • neomycin-polymyxin-dexamethasone (MAXITROL) ophthalmic suspension    • nitroglycerin (NITROSTAT) 0 4 mg SL tablet Place 1 tablet (0 4 mg total) under the tongue every 5 (five) minutes as needed for chest pain   • omeprazole (PriLOSEC) 20 mg delayed release capsule take 1 capsule by mouth once daily   • Potassium Citrate ER 15 MEQ (1620 MG) TBCR Take 2 tablets by mouth 2 (two) times a day   • sulfamethoxazole-trimethoprim (BACTRIM DS) 800-160 mg per tablet Take 1 tablet by mouth 2 (two) times a day for 10 days smx-tmp DS (BACTRIM) 800-160 mg tabs (1tab q12 D10)   • tamsulosin (FLOMAX) 0 4 mg Take 1 capsule (0 4 mg total) by mouth daily with dinner   • triamterene-hydrochlorothiazide (MAXZIDE) 75-50 MG per tablet take 1 tablet by mouth once daily   • [DISCONTINUED] HYDROcodone-acetaminophen (Norco) 5-325 mg per tablet Take 1 tablet by mouth every 6 (six) hours as needed for pain Max Daily Amount: 4 tablets       Objective     /62   Pulse 72   Temp (!) 97 3 °F (36 3 °C) (Tympanic)   Ht 5' 10" (1 778 m)   Wt 96 3 kg (212 lb 3 2 oz)   BMI 30 45 kg/m²     Physical Exam  Vitals and nursing note reviewed  Constitutional:       General: He is not in acute distress  Appearance: He is well-developed  He is not ill-appearing  HENT:      Head: Normocephalic and atraumatic  Eyes:      General:         Right eye: No discharge  Left eye: No discharge  Conjunctiva/sclera: Conjunctivae normal    Neck:      Trachea: No tracheal deviation  Cardiovascular:      Rate and Rhythm: Normal rate and regular rhythm  Heart sounds: Murmur heard  Comments: +1 DP pulses B/L LE  Pulmonary:      Effort: Pulmonary effort is normal  No respiratory distress  Breath sounds: Normal breath sounds  No wheezing, rhonchi or rales  Abdominal:      General: There is no distension  Palpations: Abdomen is soft  Tenderness: There is no abdominal tenderness  There is no guarding or rebound  Musculoskeletal:      Cervical back: Neck supple  Right lower leg: No edema  Left lower leg: No edema  Lymphadenopathy:      Cervical: No cervical adenopathy  Skin:     General: Skin is warm and dry  Coloration: Skin is not pale  Findings: Erythema present  Comments: R distal anterior tibial region wrapped with mild erythema and warmth extending from the bandage/ulcer region   Neurological:      General: No focal deficit present  Mental Status: He is alert  Mental status is at baseline  Motor: No abnormal muscle tone  Gait: Gait normal    Psychiatric:         Mood and Affect: Mood normal          Behavior: Behavior normal          Thought Content:  Thought content normal          Judgment: Judgment normal        Matthieu Davenport,

## 2023-03-01 ENCOUNTER — OFFICE VISIT (OUTPATIENT)
Dept: WOUND CARE | Facility: HOSPITAL | Age: 78
End: 2023-03-01

## 2023-03-01 VITALS
DIASTOLIC BLOOD PRESSURE: 60 MMHG | SYSTOLIC BLOOD PRESSURE: 132 MMHG | RESPIRATION RATE: 18 BRPM | HEART RATE: 72 BPM | TEMPERATURE: 97.6 F

## 2023-03-01 DIAGNOSIS — S91.001D OPEN WOUND OF RIGHT ANKLE, SUBSEQUENT ENCOUNTER: Primary | ICD-10-CM

## 2023-03-01 RX ORDER — LIDOCAINE 40 MG/G
CREAM TOPICAL ONCE
Status: COMPLETED | OUTPATIENT
Start: 2023-03-01 | End: 2023-03-01

## 2023-03-01 RX ADMIN — LIDOCAINE: 40 CREAM TOPICAL at 13:19

## 2023-03-01 NOTE — PROGRESS NOTES
Patient ID: Marcelino Rosario  is a 66 y o  male Date of Birth 1945       Chief Complaint   Patient presents with   • Follow Up Wound Care Visit     Right ankle wound       Allergies:  Patient has no known allergies  Diagnosis:  1  Open wound of right ankle, subsequent encounter  -     lidocaine (LMX) 4 % cream  -     Wound cleansing and dressings; Future       Diagnosis ICD-10-CM Associated Orders   1  Open wound of right ankle, subsequent encounter  S91 001D lidocaine (LMX) 4 % cream     Wound cleansing and dressings           Assessment & Plan:  1  Open wound anterior right ankle, wound was debrided through subcuticular tissues and dressed with Medihoney dry dressing, he will continue to [facility] cover with Xeroform covered by 4 x 4's, ABD pad along the anterior surface of the ankle, Naomi  He will change dressing daily  We have incorporated ABD pad as patient has irritation from wrapping his clean too tight around the anterior surface of his ankle which is what is causing redness and edema and tenderness to touch  Upon elevation of his right lower limb above the level of his heart, the erythema to anterior ankle and lower leg resolves  There is superficial blistering present, patient advised if this does open or becomes weepy he is to apply Xeroform over this area at time of dressing change  2   Patient being treated by PT and ER for possible cellulitis, culture and sensitivity from February 24 evaluated showing growth of 1+ Staph aureus and Pseudomonas, patient currently on Bactrim, Keflex, today we have introduced prophase wash to help with Pseudomonas  He is to do this daily prior to dressing changes  Complete all antibiotics as prescribed by PCP and ER  3   Patient's PCP has prescribed him Norco, he will continue to work with them for pain management    4   Patient wound continues to improve, more granular tissue is present, I have advised in the next week or 2 we will make arrangements for patient to see plastic surgery for possible skin grafting  5   Patient is quite active, I have asked him to reduce his activities by 50% for decreasing edema and allow wound to heal   He is amenable  6   Frequent elevation, low-sodium diet, proper protein intake was reviewed with patient  He understands and agrees with the plan and will follow-up in 1 week  Debridement   Wound 12/07/22 Traumatic Ankle Anterior;Right    Universal Protocol:  Consent: Verbal consent obtained  Risks and benefits: risks, benefits and alternatives were discussed  Consent given by: patient  Time out: Immediately prior to procedure a "time out" was called to verify the correct patient, procedure, equipment, support staff and site/side marked as required  Patient understanding: patient states understanding of the procedure being performed  Patient identity confirmed: verbally with patient      Performed by: physician  Debridement type: surgical  Level of debridement: subcutaneous tissue  Pain control: lidocaine 4%  Pre-debridement measurements  Length (cm): 1 6  Width (cm): 1  Depth (cm): 0 2  Surface Area (cm^2): 1 6  Volume (cm^3): 0 32    Post-debridement measurements  Length (cm): 1 7  Width (cm): 1 2  Depth (cm): 0 3  Percent debrided: 100%  Surface Area (cm^2): 2 04  Area debrided (cm^2): 2 04  Volume (cm^3): 0 61  Tissue and other material debrided: subcutaneous tissue  Devitalized tissue debrided: biofilm, fibrin, necrotic debris, slough and eschar  Instrument(s) utilized: blade  Bleeding: small  Hemostasis obtained with: pressure  Procedural pain (0-10): insensate  Post-procedural pain: insensate   Response to treatment: procedure was tolerated well                   Subjective:   Patient presents today for open wound anterior right ankle, he states this past week he has had increased pain and redness, was seen by PCP and ED and is now taking 2 different antibiotics    States he does not have any nausea, vomiting, fever, chills  The following portions of the patient's history were reviewed and updated as appropriate:   Patient Active Problem List   Diagnosis   • Tubular adenoma of colon   • Carpal tunnel syndrome   • Dyslipidemia   • Gout   • Hypertension   • Impaired fasting glucose   • Insomnia   • Iron deficiency anemia due to chronic blood loss   • Osteoarthritis   • Prolonged QT interval   • Rhinitis   • Other disorder of calcium metabolism   • Nephrolithiasis   • Elevated PSA   • Obesity (BMI 30 0-34  9)   • Aortic valve stenosis   • S/P carotid endarterectomy   • Coronary artery disease involving native coronary artery   • Bilateral carotid artery stenosis   • Stage 3b chronic kidney disease (HCC)   • Rambo lesion, chronic   • Paraesophageal hernia   • Atrial flutter, unspecified type [facility])     Past Medical History:   Diagnosis Date   • Anemia     iron def   • Arthritis    • Cerebrovascular accident (CVA) ([facility] [address] ) 1/7/2020   • GERD (gastroesophageal reflux disease)    • Gout    • Hypertension    • Insomnia      Past Surgical History:   Procedure Laterality Date   • ANKLE SURGERY Right    • CARDIAC CATHETERIZATION     • COLONOSCOPY  01/25/2013    polypectomy; complete - 3 yrs d/t polyp - benign submucosal lipoma- path c/w lipoma   • COLONOSCOPY  04/25/2017    complete - tubular adenoma - repeat 5yrs   • CYSTOSCOPY     • CYSTOTOMY      bladder  w/ basket extraction of calculus   • FEMUR FRACTURE SURGERY     • HERNIA REPAIR      inguinal ; sliding   • INGUINAL HERNIA REPAIR Right     unilateral   • KNEE ARTHROSCOPY Right     w/medial menisectomy   • LASIK      corneal   • LITHOTRIPSY      renal   • NM COLONOSCOPY FLX DX W/COLLJ SPEC WHEN PFRMD N/A 4/25/2017    Procedure: SCREENING COLONOSCOPY;  Surgeon: Johnathon Augustine MD;  Location:  MAIN OR;  Service: General   • NM TEAEC W/PATCH GRF CAROTID VERTB SUBCLAV NECK INC Right 1/10/2020    Procedure: ENDARTERECTOMY ARTERY CAROTID;  Surgeon: David Euceda MD;  Location:  MAIN OR;  Service: Vascular   • ROTATOR CUFF REPAIR Bilateral    • TONSILLECTOMY       Social History     Socioeconomic History   • Marital status:      Spouse name: None   • Number of children: 1   • Years of education: None   • Highest education level: None   Occupational History   • Occupation: Retired     Comment: working full time   Tobacco Use   • Smoking status: Former     Packs/day: 1 00     Years: 22 00     Pack years: 22 00     Types: Cigarettes     Start date: 0     Quit date: 1985     Years since quittin 1   • Smokeless tobacco: Never   Vaping Use   • Vaping Use: Never used   Substance and Sexual Activity   • Alcohol use: Not Currently     Comment: stopped drinking alcohol   • Drug use: No   • Sexual activity: Not Currently   Other Topics Concern   • None   Social History Narrative    , lives alone, working full time     Social Determinants of Health     Financial Resource Strain: Low Risk    • Difficulty of Paying Living Expenses: Not hard at all   Food Insecurity: Not on file   Transportation Needs: No Transportation Needs   • Lack of Transportation (Medical): No   • Lack of Transportation (Non-Medical):  No   Physical Activity: Not on file   Stress: Not on file   Social Connections: Not on file   Intimate Partner Violence: Not on file   Housing Stability: Not on file        Current Outpatient Medications:   •  Ascorbic Acid (VITAMIN C IMMUNE HEALTH PO), Take by mouth daily, Disp: , Rfl:   •  aspirin (ECOTRIN LOW STRENGTH) 81 mg EC tablet, Take 81 mg by mouth every other day 1 every other day, Disp: , Rfl:   •  atorvastatin (LIPITOR) 40 mg tablet, Take 1 tablet (40 mg total) by mouth every evening, Disp: 90 tablet, Rfl: 3  •  calcium polycarbophil (FIBERCON) 625 mg tablet, Take 625 mg by mouth daily, Disp: , Rfl:   •  cephalexin (KEFLEX) 500 mg capsule, Take 1 capsule (500 mg total) by mouth every 8 (eight) hours for 7 days, Disp: 21 capsule, Rfl: 0  •  clotrimazole-betamethasone (LOTRISONE) 1-0 05 % cream, apply to affected area twice a day for 10 days MAX, Disp: 45 g, Rfl: 0  •  collagenase (SANTYL) ointment, Apply topically daily, Disp: 30 g, Rfl: 0  •  ferrous sulfate 324 (65 Fe) mg, Take 1 tablet (324 mg total) by mouth daily before breakfast, Disp: 30 tablet, Rfl: 2  •  finasteride (PROSCAR) 5 mg tablet, Take 1 tablet (5 mg total) by mouth daily, Disp: 90 tablet, Rfl: 3  •  lisinopril (ZESTRIL) 10 mg tablet, take 1 tablet by mouth once daily, Disp: 90 tablet, Rfl: 1  •  metoprolol succinate (TOPROL-XL) 25 mg 24 hr tablet, take 1 tablet by mouth once daily, Disp: 90 tablet, Rfl: 2  •  Multiple Vitamin (MULTIVITAMIN) capsule, Take 1 capsule by mouth daily, Disp: , Rfl:   •  neomycin-polymyxin-dexamethasone (MAXITROL) ophthalmic suspension, , Disp: , Rfl:   •  nitroglycerin (NITROSTAT) 0 4 mg SL tablet, Place 1 tablet (0 4 mg total) under the tongue every 5 (five) minutes as needed for chest pain, Disp: 15 tablet, Rfl: 5  •  omeprazole (PriLOSEC) 20 mg delayed release capsule, take 1 capsule by mouth once daily, Disp: 90 capsule, Rfl: 2  •  oxyCODONE (ROXICODONE) 10 MG TABS, Take 0 5 tablets (5 mg total) by mouth every 6 (six) hours as needed for moderate pain Max Daily Amount: 20 mg, Disp: 28 tablet, Rfl: 0  •  Potassium Citrate ER 15 MEQ (1620 MG) TBCR, Take 2 tablets by mouth 2 (two) times a day, Disp: 120 tablet, Rfl: 3  •  sulfamethoxazole-trimethoprim (BACTRIM DS) 800-160 mg per tablet, Take 1 tablet by mouth 2 (two) times a day for 10 days smx-tmp DS (BACTRIM) 800-160 mg tabs (1tab q12 D10), Disp: 20 tablet, Rfl: 0  •  tamsulosin (FLOMAX) 0 4 mg, Take 1 capsule (0 4 mg total) by mouth daily with dinner, Disp: 90 capsule, Rfl: 3  •  triamterene-hydrochlorothiazide (MAXZIDE) 75-50 MG per tablet, take 1 tablet by mouth once daily, Disp: 90 tablet, Rfl: 1  No current facility-administered medications for this visit    Family History   Problem Relation Age of Onset   • Alzheimer's disease Mother    • Alzheimer's disease Father    • Heart disease Father         CAD   • Other Father         prediabetes   • Diabetes Father    • Breast cancer Other    • Arthritis Family    • Hypertension Family        Review of Systems   Constitutional: Negative for chills and fever  HENT: Negative for ear pain and sore throat  Eyes: Negative for pain and visual disturbance  Respiratory: Negative for cough and shortness of breath  Cardiovascular: Negative for chest pain and palpitations  Gastrointestinal: Negative for abdominal pain and vomiting  Genitourinary: Negative for dysuria and hematuria  Musculoskeletal: Positive for gait problem  Negative for arthralgias and back pain  Skin: Positive for color change and wound  Negative for rash  Neurological: Positive for numbness  Negative for seizures and syncope  Psychiatric/Behavioral: Negative  All other systems reviewed and are negative  Objective:  /60   Pulse 72   Temp 97 6 °F (36 4 °C)   Resp 18     Physical Exam  Constitutional:       Appearance: Normal appearance  He is normal weight  HENT:      Head: Normocephalic and atraumatic  Right Ear: External ear normal       Left Ear: External ear normal       Nose: Nose normal       Mouth/Throat:      Mouth: Mucous membranes are moist       Pharynx: Oropharynx is clear  Eyes:      Conjunctiva/sclera: Conjunctivae normal    Cardiovascular:      Pulses: Normal pulses  Dorsalis pedis pulses are 2+ on the right side and 2+ on the left side  Posterior tibial pulses are 2+ on the right side and 2+ on the left side  Pulmonary:      Effort: Pulmonary effort is normal    Musculoskeletal:      Cervical back: Normal range of motion  Right lower leg: No edema  Left lower leg: No edema  Skin:     General: Skin is warm and dry  Capillary Refill: Capillary refill takes less than 2 seconds        Comments: See detailed wound assessment Neurological:      General: No focal deficit present  Mental Status: He is alert and oriented to person, place, and time  Mental status is at baseline  Sensory: Sensory deficit present  Coordination: Coordination abnormal       Gait: Gait abnormal       Deep Tendon Reflexes: Reflexes abnormal    Psychiatric:         Mood and Affect: Mood normal          Behavior: Behavior normal          Thought Content: Thought content normal          Judgment: Judgment normal            Wound 12/07/22 Traumatic Ankle Anterior;Right (Active)   Wound Image   03/01/23 1401   Wound Description Slough;Pink;Yellow 03/01/23 1313   Kathrin-wound Assessment Erythema;Edema 03/01/23 1313   Wound Length (cm) 1 6 cm 03/01/23 1313   Wound Width (cm) 1 cm 03/01/23 1313   Wound Depth (cm) 0 2 cm 03/01/23 1313   Wound Surface Area (cm^2) 1 6 cm^2 03/01/23 1313   Wound Volume (cm^3) 0 32 cm^3 03/01/23 1313   Calculated Wound Volume (cm^3) 0 32 cm^3 03/01/23 1313   Change in Wound Size % -60 03/01/23 1313   Undermining 0 1 12/14/22 1401   Undermining is depth extending from 5-8oclock 12/14/22 1401   Drainage Amount Small 03/01/23 1313   Drainage Description Serosanguineous 03/01/23 1313   Non-staged Wound Description Full thickness 03/01/23 1313   Patient Tolerance Tolerated well 12/14/22 1401   Dressing Status Intact 03/01/23 1313          Results from last 6 Months   Lab Units 02/24/23  1243   WOUND CULTURE  1+ Growth of Staphylococcus aureus*  1+ Growth of Pseudomonas aeruginosa*       XR ankle 3+ views RIGHT    Result Date: 2/25/2023  Narrative: RIGHT ANKLE INDICATION:   ulceration  COMPARISON:  Right ankle July 10, 2006 and right foot May 17, 2018  VIEWS:  XR ANKLE 3+ VW RIGHT FINDINGS: No acute fracture  Chronic distal tibial and fibular fracture deformities and additional orthopedic screws traversing the medial malleolus unchanged since May 17, 2018   Relatively mild degenerative changes tibiotalar joint and tarsal spur formation unchanged  Tiny inferior calcaneal spur  No lytic or blastic osseous lesion  Skin defect anterior ankle  Otherwise no soft tissue gas  No radiopaque foreign body  Atherosclerotic vascular calcifications  Impression: No acute osseous abnormality  Anterior skin ulceration ankle with no other soft tissue gas and no foreign body or evidence of underlying osteomyelitis  Workstation performed: [id number]       Wound Instructions:  Orders Placed This Encounter   Procedures   • Wound cleansing and dressings     RIGHT ANKLE WOUND     Wash your hands with soap and water  Remove old dressing, discard into plastic bag and place in trash  Cleanse the wound with PROPHASE prior to applying a clean dressing  Do not use tissue or cotton balls  Do not scrub the wound  Pat dry using gauze  Shower yes   Apply vaseline to skin surrounding wound  Apply santly ointment to the right ankle wound  Cover with xerform, gauze, ABD pad, ezekiel and tape  Secure with tubifast blue  Change dressing daily  Wound'ress  applied [facility] at the [facility]  Next appointment in 1 week     Standing Status:   Future     Standing Expiration Date:   3/1/2024         Yana Fuller DPM, DPM, FACFAS    Portions of the record may have been created with voice recognition software  Occasional wrong word or "sound a like" substitutions may have occurred due to the inherent limitations of voice recognition software  Read the chart carefully and recognize, using context, where substitutions have occurred

## 2023-03-01 NOTE — PATIENT INSTRUCTIONS
Orders Placed This Encounter   Procedures    Wound cleansing and dressings     RIGHT ANKLE WOUND     Wash your hands with soap and water  Remove old dressing, discard into plastic bag and place in trash  Cleanse the wound with PROPHASE prior to applying a clean dressing  Do not use tissue or cotton balls  Do not scrub the wound  Pat dry using gauze  Shower yes   Apply vaseline to skin surrounding wound  Apply santly ointment to the right ankle wound  Cover with xerform, gauze, ABD pad, ezekiel and tape  Secure with tubifast blue  Change dressing daily  Wound'ress  applied today at the [facility]     Next appointment in 1 week     Standing Status:   Future     Standing Expiration Date:   3/1/2024

## 2023-03-08 ENCOUNTER — OFFICE VISIT (OUTPATIENT)
Dept: WOUND CARE | Facility: HOSPITAL | Age: 78
End: 2023-03-08

## 2023-03-08 VITALS
HEART RATE: 70 BPM | TEMPERATURE: 97.8 F | DIASTOLIC BLOOD PRESSURE: 60 MMHG | RESPIRATION RATE: 18 BRPM | SYSTOLIC BLOOD PRESSURE: 124 MMHG

## 2023-03-08 DIAGNOSIS — S91.001D OPEN WOUND OF RIGHT ANKLE, SUBSEQUENT ENCOUNTER: Primary | ICD-10-CM

## 2023-03-08 RX ORDER — LIDOCAINE 40 MG/G
CREAM TOPICAL ONCE
Status: COMPLETED | OUTPATIENT
Start: 2023-03-08 | End: 2023-03-08

## 2023-03-08 RX ADMIN — LIDOCAINE: 40 CREAM TOPICAL at 13:16

## 2023-03-08 NOTE — PROGRESS NOTES
Patient ID: Hiro Bateman  is a 66 y o  male Date of Birth 1945       Chief Complaint   Patient presents with   • Follow Up Wound Care Visit     Right ankle wound       Allergies:  Patient has no known allergies  Diagnosis:  1  Open wound of right ankle, subsequent encounter  -     lidocaine (LMX) 4 % cream  -     Wound cleansing and dressings; Future       Diagnosis ICD-10-CM Associated Orders   1  Open wound of right ankle, subsequent encounter  S91 001D lidocaine (LMX) 4 % cream     Wound cleansing and dressings           Assessment & Plan:  1  Open wound anterior right ankle, wound was debrided through subcuticular tissues and dressed with wound dressed Xeroform dry dressing, he will continue to use Santyl cover with Xeroform dry dressing and ABD pad along anterior surface of ankle and Kerlix, he is to change dressing daily  He is to maintain strict pressure and friction reduction from around the area of the wound  2   Continue frequent elevation, low-sodium diet, proper protein intake, minimal weight-bear only for ADLs  3   Patient is to continue to moisturize anterior ankle at site of superficial blistering at last visit  4   Patient understands and agrees with the plan and will follow-up in 1 week  Debridement   Wound 12/07/22 Traumatic Ankle Anterior;Right    Universal Protocol:  Consent: Verbal consent obtained  Risks and benefits: risks, benefits and alternatives were discussed  Consent given by: patient  Time out: Immediately prior to procedure a "time out" was called to verify the correct patient, procedure, equipment, support staff and site/side marked as required    Patient understanding: patient states understanding of the procedure being performed  Patient identity confirmed: verbally with patient      Performed by: physician  Debridement type: surgical  Level of debridement: subcutaneous tissue  Pain control: lidocaine 4%  Pre-debridement measurements  Length (cm): 1 4  Width (cm): 1  Depth (cm): 0 2  Surface Area (cm^2): 1 4  Volume (cm^3): 0 28    Post-debridement measurements  Length (cm): 1 5  Width (cm): 1 1  Depth (cm): 0 3  Percent debrided: 100%  Surface Area (cm^2): 1 65  Area debrided (cm^2): 1 65  Volume (cm^3): 0 5  Tissue and other material debrided: subcutaneous tissue  Devitalized tissue debrided: biofilm, callus, fibrin, necrotic debris and slough  Instrument(s) utilized: blade and forceps  Bleeding: small  Hemostasis obtained with: pressure  Procedural pain (0-10): insensate  Post-procedural pain: insensate   Response to treatment: procedure was tolerated well                   Subjective:   Patient presents today for care of open wound anterior right ankle, he has been changing dressing with just Xeroform but has incorporated the ABD pad, he was confused and did not apply Santyl  He states his pain is subsided, no longer taking pain medication, he has been elevating more and no new complaints  The following portions of the patient's history were reviewed and updated as appropriate:   Patient Active Problem List   Diagnosis   • Tubular adenoma of colon   • Carpal tunnel syndrome   • Dyslipidemia   • Gout   • Hypertension   • Impaired fasting glucose   • Insomnia   • Iron deficiency anemia due to chronic blood loss   • Osteoarthritis   • Prolonged QT interval   • Rhinitis   • Other disorder of calcium metabolism   • Nephrolithiasis   • Elevated PSA   • Obesity (BMI 30 0-34  9)   • Aortic valve stenosis   • S/P carotid endarterectomy   • Coronary artery disease involving native coronary artery   • Bilateral carotid artery stenosis   • Stage 3b chronic kidney disease (HCC)   • Rambo lesion, chronic   • Paraesophageal hernia   • Atrial flutter, unspecified type [facility])     Past Medical History:   Diagnosis Date   • Anemia     iron def   • Arthritis    • Cerebrovascular accident (CVA) ([facility] [address] ) 1/7/2020   • GERD (gastroesophageal reflux disease)    • Gout    • Hypertension • Insomnia      Past Surgical History:   Procedure Laterality Date   • ANKLE SURGERY Right    • CARDIAC CATHETERIZATION     • COLONOSCOPY  2013    polypectomy; complete - 3 yrs d/t polyp - benign submucosal lipoma- path c/w lipoma   • COLONOSCOPY  2017    complete - tubular adenoma - repeat 5yrs   • CYSTOSCOPY     • CYSTOTOMY      bladder  w/ basket extraction of calculus   • FEMUR FRACTURE SURGERY     • HERNIA REPAIR      inguinal ; sliding   • INGUINAL HERNIA REPAIR Right     unilateral   • KNEE ARTHROSCOPY Right     w/medial menisectomy   • LASIK      corneal   • LITHOTRIPSY      renal   • PA COLONOSCOPY FLX DX W/COLLJ SPEC WHEN PFRMD N/A 2017    Procedure: SCREENING COLONOSCOPY;  Surgeon: Noah Lin MD;  Location: [facility];  Service: General   • PA TEAEC W/PATCH GRF CAROTID VERTB [place] Right 1/10/2020    Procedure: ENDARTERECTOMY ARTERY CAROTID;  Surgeon: Nadine Rogers MD;  Location:  MAIN OR;  Service: Vascular   • ROTATOR CUFF REPAIR Bilateral    • TONSILLECTOMY       Social History     Socioeconomic History   • Marital status:       Spouse name: Not on file   • Number of children: 1   • Years of education: Not on file   • Highest education level: Not on file   Occupational History   • Occupation: Retired     Comment: working full time   Tobacco Use   • Smoking status: Former     Packs/day: 1 00     Years: 22 00     Pack years: 22 00     Types: Cigarettes     Start date: 0     Quit date: 1985     Years since quittin 2   • Smokeless tobacco: Never   Vaping Use   • Vaping Use: Never used   Substance and Sexual Activity   • Alcohol use: Not Currently     Comment: stopped drinking alcohol   • Drug use: No   • Sexual activity: Not Currently   Other Topics Concern   • Not on file   Social History Narrative    , lives alone, working full time     Social Determinants of Health     Financial Resource Strain: Low Risk    • Difficulty of Paying Living Expenses: Not hard at all   Food Insecurity: Not on file   Transportation Needs: No Transportation Needs   • Lack of Transportation (Medical): No   • Lack of Transportation (Non-Medical):  No   Physical Activity: Not on file   Stress: Not on file   Social Connections: Not on file   Intimate Partner Violence: Not on file   Housing Stability: Not on file        Current Outpatient Medications:   •  Ascorbic Acid (VITAMIN C IMMUNE HEALTH PO), Take by mouth daily, Disp: , Rfl:   •  aspirin (ECOTRIN LOW STRENGTH) 81 mg EC tablet, Take 81 mg by mouth every other day 1 every other day, Disp: , Rfl:   •  atorvastatin (LIPITOR) 40 mg tablet, Take 1 tablet (40 mg total) by mouth every evening, Disp: 90 tablet, Rfl: 3  •  calcium polycarbophil (FIBERCON) 625 mg tablet, Take 625 mg by mouth daily, Disp: , Rfl:   •  clotrimazole-betamethasone (LOTRISONE) 1-0 05 % cream, apply to affected area twice a day for 10 days MAX, Disp: 45 g, Rfl: 0  •  collagenase (SANTYL) ointment, Apply topically daily, Disp: 30 g, Rfl: 0  •  ferrous sulfate 324 (65 Fe) mg, Take 1 tablet (324 mg total) by mouth daily before breakfast, Disp: 30 tablet, Rfl: 2  •  finasteride (PROSCAR) 5 mg tablet, Take 1 tablet (5 mg total) by mouth daily, Disp: 90 tablet, Rfl: 3  •  lisinopril (ZESTRIL) 10 mg tablet, take 1 tablet by mouth once daily, Disp: 90 tablet, Rfl: 1  •  metoprolol succinate (TOPROL-XL) 25 mg 24 hr tablet, take 1 tablet by mouth once daily, Disp: 90 tablet, Rfl: 2  •  Multiple Vitamin (MULTIVITAMIN) capsule, Take 1 capsule by mouth daily, Disp: , Rfl:   •  neomycin-polymyxin-dexamethasone (MAXITROL) ophthalmic suspension, , Disp: , Rfl:   •  nitroglycerin (NITROSTAT) 0 4 mg SL tablet, Place 1 tablet (0 4 mg total) under the tongue every 5 (five) minutes as needed for chest pain, Disp: 15 tablet, Rfl: 5  •  omeprazole (PriLOSEC) 20 mg delayed release capsule, take 1 capsule by mouth once daily, Disp: 90 capsule, Rfl: 2  •  oxyCODONE (ROXICODONE) 10 MG TABS, Take 0 5 tablets (5 mg total) by mouth every 6 (six) hours as needed for moderate pain Max Daily Amount: 20 mg, Disp: 28 tablet, Rfl: 0  •  Potassium Citrate ER 15 MEQ (1620 MG) TBCR, Take 2 tablets by mouth 2 (two) times a day, Disp: 120 tablet, Rfl: 3  •  tamsulosin (FLOMAX) 0 4 mg, Take 1 capsule (0 4 mg total) by mouth daily with dinner, Disp: 90 capsule, Rfl: 3  •  triamterene-hydrochlorothiazide (MAXZIDE) 75-50 MG per tablet, take 1 tablet by mouth once daily, Disp: 90 tablet, Rfl: 1  No current facility-administered medications for this visit  Family History   Problem Relation Age of Onset   • Alzheimer's disease Mother    • Alzheimer's disease Father    • Heart disease Father         CAD   • Other Father         prediabetes   • Diabetes Father    • Breast cancer Other    • Arthritis Family    • Hypertension Family        Review of Systems   Constitutional: Negative for chills and fever  HENT: Negative for ear pain and sore throat  Eyes: Negative for pain and visual disturbance  Respiratory: Negative for cough and shortness of breath  Cardiovascular: Negative for chest pain and palpitations  Gastrointestinal: Negative for abdominal pain and vomiting  Genitourinary: Negative for dysuria and hematuria  Musculoskeletal: Positive for gait problem  Negative for arthralgias and back pain  Skin: Positive for color change and wound  Negative for rash  Neurological: Positive for numbness  Negative for seizures and syncope  Psychiatric/Behavioral: Negative  All other systems reviewed and are negative  Objective:  /60   Pulse 70   Temp 97 8 °F (36 6 °C)   Resp 18     Physical Exam  Constitutional:       Appearance: Normal appearance  He is normal weight  HENT:      Head: Normocephalic and atraumatic        Right Ear: External ear normal       Left Ear: External ear normal       Nose: Nose normal       Mouth/Throat:      Mouth: Mucous membranes are moist  Pharynx: Oropharynx is clear  Eyes:      Conjunctiva/sclera: Conjunctivae normal    Cardiovascular:      Pulses: Normal pulses  Dorsalis pedis pulses are 2+ on the right side and 2+ on the left side  Posterior tibial pulses are 2+ on the right side and 2+ on the left side  Pulmonary:      Effort: Pulmonary effort is normal    Musculoskeletal:      Cervical back: Normal range of motion  Right lower leg: No edema  Left lower leg: No edema  Skin:     General: Skin is dry  Capillary Refill: Capillary refill takes less than 2 seconds  Comments: See detailed wound assessment, significant decrease in periwound erythema from last visit  Neurological:      General: No focal deficit present  Mental Status: He is alert and oriented to person, place, and time  Mental status is at baseline  Sensory: Sensory deficit present  Coordination: Coordination abnormal       Gait: Gait abnormal       Deep Tendon Reflexes: Reflexes abnormal    Psychiatric:         Mood and Affect: Mood normal          Behavior: Behavior normal          Thought Content:  Thought content normal          Judgment: Judgment normal            Wound 12/07/22 Traumatic Ankle Anterior;Right (Active)   Wound Image   03/08/23 1338   Wound Description Slough;Pink;Yellow 03/08/23 1313   Kathrin-wound Assessment Erythema;Edema 03/08/23 1313   Wound Length (cm) 1 4 cm 03/08/23 1313   Wound Width (cm) 1 cm 03/08/23 1313   Wound Depth (cm) 0 2 cm 03/08/23 1313   Wound Surface Area (cm^2) 1 4 cm^2 03/08/23 1313   Wound Volume (cm^3) 0 28 cm^3 03/08/23 1313   Calculated Wound Volume (cm^3) 0 28 cm^3 03/08/23 1313   Change in Wound Size % -40 03/08/23 1313   Undermining 0 1 12/14/22 1401   Undermining is depth extending from 5-8oclock 12/14/22 1401   Drainage Amount Small 03/08/23 1313   Drainage Description Serosanguineous 03/08/23 1313   Non-staged Wound Description Full thickness 03/08/23 1313   Patient Tolerance Tolerated well 12/14/22 1401   Dressing Status Intact 03/08/23 1313          Results from last 6 Months   Lab Units 02/24/23  1243   WOUND CULTURE  1+ Growth of Staphylococcus aureus*  1+ Growth of Pseudomonas aeruginosa*       XR ankle 3+ views RIGHT    Result Date: 2/25/2023  Narrative: RIGHT ANKLE INDICATION:   ulceration  COMPARISON:  Right ankle July 10, 2006 and right foot May 17, 2018  VIEWS:  XR ANKLE 3+ VW RIGHT FINDINGS: No acute fracture  Chronic distal tibial and fibular fracture deformities and additional orthopedic screws traversing the medial malleolus unchanged since May 17, 2018  Relatively mild degenerative changes tibiotalar joint and tarsal spur formation unchanged  Tiny inferior calcaneal spur  No lytic or blastic osseous lesion  Skin defect anterior ankle  Otherwise no soft tissue gas  No radiopaque foreign body  Atherosclerotic vascular calcifications  Impression: No acute osseous abnormality  Anterior skin ulceration ankle with no other soft tissue gas and no foreign body or evidence of underlying osteomyelitis  Workstation performed: [id number]       Wound Instructions:  Orders Placed This Encounter   Procedures   • Wound cleansing and dressings     RIGHT ANKLE WOUND     Wash your hands with soap and water  Remove old dressing, discard into plastic bag and place in trash  Cleanse the wound with PROPHASE prior to applying a clean dressing  Do not use tissue or cotton balls  Do not scrub the wound  Pat dry using gauze  Shower yes   Apply vaseline to skin surrounding wound  Apply santly ointment to the right ankle wound  Cover with xerform, gauze, ABD pad, ezekiel and tape  Secure with tubifast blue  Change dressing daily  Wound'ress  applied [facility]  Next appointment in 1 week     Standing Status:   Future     Standing Expiration Date:   3/8/2024         Delia Haddad DPM, NICHOLAS, FACFAS    Portions of the record may have been created with voice recognition software  Occasional wrong word or "sound a like" substitutions may have occurred due to the inherent limitations of voice recognition software  Read the chart carefully and recognize, using context, where substitutions have occurred

## 2023-03-08 NOTE — PATIENT INSTRUCTIONS
Orders Placed This Encounter   Procedures    Wound cleansing and dressings     RIGHT ANKLE WOUND     Wash your hands with soap and water  Remove old dressing, discard into plastic bag and place in trash  Cleanse the wound with PROPHASE prior to applying a clean dressing  Do not use tissue or cotton balls  Do not scrub the wound  Pat dry using gauze  Shower yes   Apply vaseline to skin surrounding wound  Apply santly ointment to the right ankle wound  Cover with xerform, gauze, ABD pad, ezekiel and tape  Secure with tubifast blue  Change dressing daily  Wound'ress  applied today at the [facility]     Next appointment in 1 week     Standing Status:   Future     Standing Expiration Date:   3/8/2024

## 2023-03-15 ENCOUNTER — OFFICE VISIT (OUTPATIENT)
Dept: WOUND CARE | Facility: HOSPITAL | Age: 78
End: 2023-03-15

## 2023-03-15 VITALS
DIASTOLIC BLOOD PRESSURE: 56 MMHG | TEMPERATURE: 96.7 F | HEART RATE: 64 BPM | RESPIRATION RATE: 16 BRPM | SYSTOLIC BLOOD PRESSURE: 118 MMHG

## 2023-03-15 DIAGNOSIS — S91.001D OPEN WOUND OF RIGHT ANKLE, SUBSEQUENT ENCOUNTER: Primary | ICD-10-CM

## 2023-03-15 RX ORDER — LIDOCAINE 40 MG/G
CREAM TOPICAL ONCE
Status: COMPLETED | OUTPATIENT
Start: 2023-03-15 | End: 2023-03-15

## 2023-03-15 RX ADMIN — LIDOCAINE 1 APPLICATION.: 40 CREAM TOPICAL at 14:08

## 2023-03-15 NOTE — PROGRESS NOTES
Patient ID: Jax Rosario  is a 66 y o  male Date of Birth 1945       Chief Complaint   Patient presents with   • Follow Up Wound Care Visit       Allergies:  Patient has no known allergies  Diagnosis:  1  Open wound of right ankle, subsequent encounter       Diagnosis ICD-10-CM Associated Orders   1  Open wound of right ankle, subsequent encounter  [id number] [id number]            Assessment & Plan:  1  Open wound anterior left ankle, wound was debrided subcuticular tissues and dressed with endoform dry dressing he is to leave this dry clean and intact until Saturday when he will resume use of Santyl, Xeroform dry dressing  Continue to apply ABD for pressure and friction reduction from gauze wrap  2   Frequent elevation, low-sodium diet, proper protein intake was reviewed with patient, he understands and agrees with the plan and will follow-up in 1 week  Debridement   Wound 12/07/22 Traumatic Ankle Anterior;Right    Universal Protocol:  Consent: Verbal consent obtained  Risks and benefits: risks, benefits and alternatives were discussed  Consent given by: patient  Time out: Immediately prior to procedure a "time out" was called to verify the correct patient, procedure, equipment, support staff and site/side marked as required  Patient understanding: patient states understanding of the procedure being performed  Patient identity confirmed: verbally with patient      Performed by: physician  Debridement type: surgical  Level of debridement: subcutaneous tissue  Pain control: lidocaine 4%  Pre-debridement measurements  Length (cm): 1  Width (cm): 1  Depth (cm): 0 2  Surface Area (cm^2): 1  Volume (cm^3): 0 2    Post-debridement measurements  Length (cm): 1  Width (cm): 1  Depth (cm): 0 3  Percent debrided: 100%  Surface Area (cm^2): 1  Area debrided (cm^2):  1  Volume (cm^3): 0 3  Tissue and other material debrided: subcutaneous tissue  Devitalized tissue debrided: biofilm, fibrin, necrotic debris and shakiraugh  Instrument(s) utilized: blade  Bleeding: small  Hemostasis obtained with: pressure  Procedural pain (0-10): insensate  Post-procedural pain: insensate   Response to treatment: procedure was tolerated well                   Subjective:   Patient presents to day for care of anterior left ankle open wound, he has been changing dressings as directed, no pain, no new complaints  The following portions of the patient's history were reviewed and updated as appropriate:   Patient Active Problem List   Diagnosis   • Tubular adenoma of colon   • Carpal tunnel syndrome   • Dyslipidemia   • Gout   • Hypertension   • Impaired fasting glucose   • Insomnia   • Iron deficiency anemia due to chronic blood loss   • Osteoarthritis   • Prolonged QT interval   • Rhinitis   • Other disorder of calcium metabolism   • Nephrolithiasis   • Elevated PSA   • Obesity (BMI 30 0-34  9)   • Aortic valve stenosis   • S/P carotid endarterectomy   • Coronary artery disease involving native coronary artery   • Bilateral carotid artery stenosis   • Stage 3b chronic kidney disease (HCC)   • Rambo lesion, chronic   • Paraesophageal hernia   • Atrial flutter, unspecified type [facility])     Past Medical History:   Diagnosis Date   • Anemia     iron def   • Arthritis    • Cerebrovascular accident (CVA) ([facility] [address] ) 1/7/2020   • GERD (gastroesophageal reflux disease)    • Gout    • Hypertension    • Insomnia      Past Surgical History:   Procedure Laterality Date   • ANKLE SURGERY Right    • CARDIAC CATHETERIZATION     • COLONOSCOPY  01/25/2013    polypectomy; complete - 3 yrs d/t polyp - benign submucosal lipoma- path c/w lipoma   • COLONOSCOPY  04/25/2017    complete - tubular adenoma - repeat 5yrs   • CYSTOSCOPY     • CYSTOTOMY      bladder  w/ basket extraction of calculus   • FEMUR FRACTURE SURGERY     • HERNIA REPAIR      inguinal ; sliding   • INGUINAL HERNIA REPAIR Right     unilateral   • KNEE ARTHROSCOPY Right     w/medial menisectomy   • LASIK      corneal   • LITHOTRIPSY      renal   • AZ COLONOSCOPY FLX DX W/COLLJ SPEC WHEN PFRMD N/A 2017    Procedure: SCREENING COLONOSCOPY;  Surgeon: Allyson Keller MD;  Location: [facility];  Service: General   • AZ TEAEC W/PATCH GRF CAROTID VERTB Kanwal Right 1/10/2020    Procedure: ENDARTERECTOMY ARTERY CAROTID;  Surgeon: Jun Anaya MD;  Location: [facility];  Service: Vascular   • ROTATOR CUFF REPAIR Bilateral    • TONSILLECTOMY       Social History     Socioeconomic History   • Marital status:      Spouse name: Not on file   • Number of children: 1   • Years of education: Not on file   • Highest education level: Not on file   Occupational History   • Occupation: Retired     Comment: working full time   Tobacco Use   • Smoking status: Former     Packs/day:      Years:      Pack years:      Types: Cigarettes     Start date: 0     Quit date: 1985     Years since quittin 2   • Smokeless tobacco: Never   Vaping Use   • Vaping Use: Never used   Substance and Sexual Activity   • Alcohol use: Not Currently     Comment: stopped drinking alcohol   • Drug use: No   • Sexual activity: Not Currently   Other Topics Concern   • Not on file   Social History Narrative    , lives alone, working full time     Social Determinants of Health     Financial Resource Strain: Low Risk    • Difficulty of Paying Living Expenses: Not hard at all   Food Insecurity: Not on file   Transportation Needs: No Transportation Needs   • Lack of Transportation (Medical): No   • Lack of Transportation (Non-Medical):  No   Physical Activity: Not on file   Stress: Not on file   Social Connections: Not on file   Intimate Partner Violence: Not on file   Housing Stability: Not on file        Current Outpatient Medications:   •  Ascorbic Acid (VITAMIN C IMMUNE HEALTH PO), Take by mouth daily, Disp: , Rfl:   •  aspirin (ECOTRIN LOW STRENGTH) 81 mg EC tablet, Take 81 mg by mouth every other day 1 every other day, Disp: , Rfl:   •  atorvastatin (LIPITOR) 40 mg tablet, Take 1 tablet (40 mg total) by mouth every evening, Disp: 90 tablet, Rfl: 3  •  calcium polycarbophil (FIBERCON) 625 mg tablet, Take 625 mg by mouth daily, Disp: , Rfl:   •  clotrimazole-betamethasone (LOTRISONE) 1-0 05 % cream, apply to affected area twice a day for 10 days MAX, Disp: 45 g, Rfl: 0  •  collagenase (SANTYL) ointment, Apply topically daily, Disp: 30 g, Rfl: 0  •  ferrous sulfate 324 (65 Fe) mg, Take 1 tablet (324 mg total) by mouth daily before breakfast, Disp: 30 tablet, Rfl: 2  •  finasteride (PROSCAR) 5 mg tablet, Take 1 tablet (5 mg total) by mouth daily, Disp: 90 tablet, Rfl: 3  •  lisinopril (ZESTRIL) 10 mg tablet, take 1 tablet by mouth once daily, Disp: 90 tablet, Rfl: 1  •  metoprolol succinate (TOPROL-XL) 25 mg 24 hr tablet, take 1 tablet by mouth once daily, Disp: 90 tablet, Rfl: 2  •  Multiple Vitamin (MULTIVITAMIN) capsule, Take 1 capsule by mouth daily, Disp: , Rfl:   •  neomycin-polymyxin-dexamethasone (MAXITROL) ophthalmic suspension, , Disp: , Rfl:   •  nitroglycerin (NITROSTAT) 0 4 mg SL tablet, Place 1 tablet (0 4 mg total) under the tongue every 5 (five) minutes as needed for chest pain, Disp: 15 tablet, Rfl: 5  •  omeprazole (PriLOSEC) 20 mg delayed release capsule, take 1 capsule by mouth once daily, Disp: 90 capsule, Rfl: 2  •  oxyCODONE (ROXICODONE) 10 MG TABS, Take 0 5 tablets (5 mg total) by mouth every 6 (six) hours as needed for moderate pain Max Daily Amount: 20 mg, Disp: 28 tablet, Rfl: 0  •  Potassium Citrate ER 15 MEQ (1620 MG) TBCR, Take 2 tablets by mouth 2 (two) times a day, Disp: 120 tablet, Rfl: 3  •  tamsulosin (FLOMAX) 0 4 mg, Take 1 capsule (0 4 mg total) by mouth daily with dinner, Disp: 90 capsule, Rfl: 3  •  triamterene-hydrochlorothiazide (MAXZIDE) 75-50 MG per tablet, take 1 tablet by mouth once daily, Disp: 90 tablet, Rfl: 1  Family History   Problem Relation Age of Onset   • Alzheimer's disease Mother    • Alzheimer's disease Father    • Heart disease Father         CAD   • Other Father         prediabetes   • Diabetes Father    • Breast cancer Other    • Arthritis Family    • Hypertension Family        Review of Systems   Constitutional: Negative for chills and fever  HENT: Negative for ear pain and sore throat  Eyes: Negative for pain and visual disturbance  Respiratory: Negative for cough and shortness of breath  Cardiovascular: Negative for chest pain and palpitations  Gastrointestinal: Negative for abdominal pain and vomiting  Genitourinary: Negative for dysuria and hematuria  Musculoskeletal: Positive for gait problem  Negative for arthralgias and back pain  Skin: Positive for color change and wound  Negative for rash  Neurological: Positive for numbness  Negative for seizures and syncope  Psychiatric/Behavioral: Negative  All other systems reviewed and are negative  Objective:  /56   Pulse 64   Temp (!) 96 7 °F (35 9 °C)   Resp 16     Physical Exam  Constitutional:       Appearance: Normal appearance  He is normal weight  HENT:      Head: Normocephalic and atraumatic  Right Ear: External ear normal       Left Ear: External ear normal       Nose: Nose normal       Mouth/Throat:      Mouth: Mucous membranes are moist       Pharynx: Oropharynx is clear  Eyes:      Conjunctiva/sclera: Conjunctivae normal    Cardiovascular:      Pulses: Normal pulses  Dorsalis pedis pulses are 2+ on the right side and 2+ on the left side  Posterior tibial pulses are 2+ on the right side and 2+ on the left side  Pulmonary:      Effort: Pulmonary effort is normal    Musculoskeletal:      Cervical back: Normal range of motion  Right lower leg: No edema  Left lower leg: No edema  Skin:     General: Skin is warm and dry  Capillary Refill: Capillary refill takes less than 2 seconds        Comments: See detailed wound assessment   Neurological:      General: No focal deficit present  Mental Status: He is alert and oriented to person, place, and time  Mental status is at baseline  Sensory: Sensory deficit present  Coordination: Coordination abnormal       Gait: Gait abnormal       Deep Tendon Reflexes: Reflexes abnormal    Psychiatric:         Mood and Affect: Mood normal          Behavior: Behavior normal          Thought Content: Thought content normal          Judgment: Judgment normal            Wound 12/07/22 Traumatic Ankle Anterior;Right (Active)   Wound Image    03/15/23 1305   Wound Description Slough;Pink;Yellow 03/15/23 1314   Kathrin-wound Assessment Erythema;Edema 03/15/23 1314   Wound Length (cm) 1 cm 03/15/23 1314   Wound Width (cm) 1 cm 03/15/23 1314   Wound Depth (cm) 0 2 cm 03/15/23 1314   Wound Surface Area (cm^2) 1 cm^2 03/15/23 1314   Wound Volume (cm^3) 0 2 cm^3 03/15/23 1314   Calculated Wound Volume (cm^3) 0 2 cm^3 03/15/23 1314   Change in Wound Size % 0 03/15/23 1314   Undermining 0 1 12/14/22 1401   Undermining is depth extending from 5-8oclock 12/14/22 1401   Drainage Amount Small 03/15/23 1314   Drainage Description Serosanguineous 03/15/23 1314   Non-staged Wound Description Full thickness 03/15/23 1314   Treatments Irrigation with NSS 03/15/23 1314   Patient Tolerance Tolerated well 12/14/22 1401   Dressing Status Intact 03/08/23 1313          Results from last 6 Months   Lab Units 02/24/23  1243   WOUND CULTURE  1+ Growth of Staphylococcus aureus*  1+ Growth of Pseudomonas aeruginosa*       XR ankle 3+ views RIGHT    Result Date: 2/25/2023  Narrative: RIGHT ANKLE INDICATION:   ulceration  COMPARISON:  Right ankle July 10, 2006 and right foot May 17, 2018  VIEWS:  XR ANKLE 3+ VW RIGHT FINDINGS: No acute fracture  Chronic distal tibial and fibular fracture deformities and additional orthopedic screws traversing the medial malleolus unchanged since May 17, 2018   Relatively mild degenerative changes tibiotalar joint and tarsal spur formation unchanged  Tiny inferior calcaneal spur  No lytic or blastic osseous lesion  Skin defect anterior ankle  Otherwise no soft tissue gas  No radiopaque foreign body  Atherosclerotic vascular calcifications  Impression: No acute osseous abnormality  Anterior skin ulceration ankle with no other soft tissue gas and no foreign body or evidence of underlying osteomyelitis  Workstation performed: [id number]       Wound Instructions:  No orders of the defined types were placed in this encounter  Jeff Cortes DPM, DPAZEB, FACFAS    Portions of the record may have been created with voice recognition software  Occasional wrong word or "sound a like" substitutions may have occurred due to the inherent limitations of voice recognition software  Read the chart carefully and recognize, using context, where substitutions have occurred

## 2023-03-15 NOTE — PATIENT INSTRUCTIONS
Orders Placed This Encounter   Procedures    Wound cleansing and dressings     Wound cleansing and dressings   RIGHT ANKLE WOUND     Wash your hands with soap and water  Remove old dressing, discard into plastic bag and place in trash  Cleanse the wound with PROPHASE prior to applying a clean dressing  Do not use tissue or cotton balls  Do not scrub the wound  Pat dry using gauze  Shower yes   Apply vaseline to skin surrounding wound  Apply santly ointment to the right ankle wound  Cover with xerform, gauze, ABD pad, laura and tape  Secure with tubifast blue  Change dressing daily    For today's dressing endoform applied at Wound center with Xeroform, gazue, ABD, Laura and tape  **Leave dressing on till Saturday, then change dressing daily with above order  Do not get dressing wet      Next appointment in 1 week     Standing Status:   Future     Standing Expiration Date:   3/15/2024

## 2023-03-22 ENCOUNTER — OFFICE VISIT (OUTPATIENT)
Dept: WOUND CARE | Facility: HOSPITAL | Age: 78
End: 2023-03-22

## 2023-03-22 VITALS
DIASTOLIC BLOOD PRESSURE: 82 MMHG | SYSTOLIC BLOOD PRESSURE: 122 MMHG | HEART RATE: 68 BPM | TEMPERATURE: 97.6 F | RESPIRATION RATE: 16 BRPM

## 2023-03-22 DIAGNOSIS — S91.001D OPEN WOUND OF RIGHT ANKLE, SUBSEQUENT ENCOUNTER: ICD-10-CM

## 2023-03-22 NOTE — PATIENT INSTRUCTIONS
Orders Placed This Encounter   Procedures    Wound cleansing and dressings     Right ankle wound    Wash your hands with soap and water  Remove old dressing, discard into plastic bag and place in trash  Cleanse the wound with soap and water prior to applying a clean dressing  Do not use tissue or cotton balls  Do not scrub the wound  Pat dry using gauze  Shower yes     Apply endoform to the open wound    Cover with gauze  Secure with tape and tubifast blue  Change dressing three times before returning  Done today  Vascular test ordered     Standing Status:   Future     Standing Expiration Date:   3/22/2024

## 2023-03-22 NOTE — PROGRESS NOTES
Patient ID: Melina Babb  is a 66 y o  male Date of Birth 1945       Chief Complaint   Patient presents with   • Follow Up Wound Care Visit     Dressing intact on arrival, reports red around wound, reports applying first aid cream around the wound and up the ankle right where the red rash is  Allergies:  Patient has no known allergies  Diagnosis:  1  Open wound of right ankle, subsequent encounter  -     VAS lower limb arterial duplex, complete bilateral; Future; Expected date: 03/22/2023  -     Wound cleansing and dressings; Future       Diagnosis ICD-10-CM Associated Orders   1  Open wound of right ankle, subsequent encounter  S91 001D VAS lower limb arterial duplex, complete bilateral     Wound cleansing and dressings           Assessment & Plan:  1  Open wound anterior right ankle, wound was debrided through subcuticular tissues, at this time there is beefy red granular base present, we will hold the Santyl and apply endoform dry dressing, he will do the same 3 times a week  Patient may remove dressing to take a shower and reapply dressing over the weekend and prior to his visit next Wednesday  Betamethasone was applied to periwound dermatitis caused by first-aid cream, patient is to discontinue use of first-degree  2   Patient's dorsalis pedis pulse is monophasic on Doppler, I have ordered lower extremity arterial Dopplers, he does have lower extremity venous Dopplers already ordered from a prior provider and he is advised to have that study done as well  3   We discussed possible skin grafting, I explained to patient at this time we will defer any intervention until lower extremity arterial Doppler results have been obtained and he has adequate arterial blood flow to the area to receive a split-thickness skin graft    4   Patient to maintain frequent elevation, low-sodium diet, proper protein intake, strict pressure and friction reduction, he understood stands and agrees with the plan and will follow-up in 1 week  Debridement   Wound 12/07/22 Traumatic Ankle Anterior;Right    Universal Protocol:  Consent: Verbal consent obtained  Risks and benefits: risks, benefits and alternatives were discussed  Consent given by: patient  Time out: Immediately prior to procedure a "time out" was called to verify the correct patient, procedure, equipment, support staff and site/side marked as required  Patient understanding: patient states understanding of the procedure being performed  Patient identity confirmed: verbally with patient      Performed by: physician  Debridement type: surgical  Level of debridement: subcutaneous tissue  Pain control: lidocaine 4%  Pre-debridement measurements  Length (cm): 1 4  Width (cm): 1 2  Depth (cm): 0 2  Surface Area (cm^2): 1 68  Volume (cm^3): 0 34    Post-debridement measurements  Length (cm): 1 5  Width (cm): 1 3  Depth (cm): 0 3  Percent debrided: 100%  Surface Area (cm^2): 1 95  Area debrided (cm^2): 1 95  Volume (cm^3): 0 59  Tissue and other material debrided: subcutaneous tissue  Devitalized tissue debrided: biofilm, fibrin, necrotic debris and slough  Instrument(s) utilized: blade  Bleeding: small  Hemostasis obtained with: pressure  Procedural pain (0-10): insensate  Post-procedural pain: insensate   Response to treatment: procedure was tolerated well               Subjective:   Patient presents today for care of right ankle open wound, he has been applying a first-aid cream recommended to him by the pharmacist instead using petroleum jelly and now he states he has a rash around his wound          The following portions of the patient's history were reviewed and updated as appropriate:   Patient Active Problem List   Diagnosis   • Tubular adenoma of colon   • Carpal tunnel syndrome   • Dyslipidemia   • Gout   • Hypertension   • Impaired fasting glucose   • Insomnia   • Iron deficiency anemia due to chronic blood loss   • Osteoarthritis   • Prolonged QT interval   • Rhinitis   • Other disorder of calcium metabolism   • Nephrolithiasis   • Elevated PSA   • Obesity (BMI 30 0-34  9)   • Aortic valve stenosis   • S/P carotid endarterectomy   • Coronary artery disease involving native coronary artery   • Bilateral carotid artery stenosis   • Stage 3b chronic kidney disease (HCC)   • Rambo lesion, chronic   • Paraesophageal hernia   • Atrial flutter, unspecified type [facility])     Past Medical History:   Diagnosis Date   • Anemia     iron def   • Arthritis    • Cerebrovascular accident (CVA) ([facility] [address] ) 1/7/2020   • GERD (gastroesophageal reflux disease)    • Gout    • Hypertension    • Insomnia      Past Surgical History:   Procedure Laterality Date   • ANKLE SURGERY Right    • CARDIAC CATHETERIZATION     • COLONOSCOPY  01/25/2013    polypectomy; complete - 3 yrs d/t polyp - benign submucosal lipoma- path c/w lipoma   • COLONOSCOPY  04/25/2017    complete - tubular adenoma - repeat 5yrs   • CYSTOSCOPY     • CYSTOTOMY      bladder  w/ basket extraction of calculus   • FEMUR FRACTURE SURGERY     • HERNIA REPAIR      inguinal ; sliding   • INGUINAL HERNIA REPAIR Right     unilateral   • KNEE ARTHROSCOPY Right     w/medial menisectomy   • LASIK      corneal   • LITHOTRIPSY      renal   • NC COLONOSCOPY FLX DX W/COLLJ SPEC WHEN PFRMD N/A 4/25/2017    Procedure: SCREENING COLONOSCOPY;  Surgeon: Luz Urbina MD;  Location: [facility];  Service: General   • NC TEAEC W/PATCH GRF CAROTID VERTB SUBCLAV NECK INC Right 1/10/2020    Procedure: ENDARTERECTOMY ARTERY CAROTID;  Surgeon: Lexy Oneal MD;  Location:  MAIN OR;  Service: Vascular   • ROTATOR CUFF REPAIR Bilateral    • TONSILLECTOMY       Social History     Socioeconomic History   • Marital status:       Spouse name: Not on file   • Number of children: 1   • Years of education: Not on file   • Highest education level: Not on file   Occupational History   • Occupation: Retired     Comment: working full time   Tobacco Use • Smoking status: Former     Packs/day:  00     Years: 22 00     Pack years: 22 00     Types: Cigarettes     Start date: 0     Quit date: 1985     Years since quittin 2   • Smokeless tobacco: Never   Vaping Use   • Vaping Use: Never used   Substance and Sexual Activity   • Alcohol use: Not Currently     Comment: stopped drinking alcohol   • Drug use: No   • Sexual activity: Not Currently   Other Topics Concern   • Not on file   Social History Narrative    , lives alone, working full time     Social Determinants of Health     Financial Resource Strain: Low Risk    • Difficulty of Paying Living Expenses: Not hard at all   Food Insecurity: Not on file   Transportation Needs: No Transportation Needs   • Lack of Transportation (Medical): No   • Lack of Transportation (Non-Medical):  No   Physical Activity: Not on file   Stress: Not on file   Social Connections: Not on file   Intimate Partner Violence: Not on file   Housing Stability: Not on file        Current Outpatient Medications:   •  Ascorbic Acid (VITAMIN C IMMUNE HEALTH PO), Take by mouth daily, Disp: , Rfl:   •  aspirin (ECOTRIN LOW STRENGTH) 81 mg EC tablet, Take 81 mg by mouth every other day 1 every other day, Disp: , Rfl:   •  atorvastatin (LIPITOR) 40 mg tablet, Take 1 tablet (40 mg total) by mouth every evening, Disp: 90 tablet, Rfl: 3  •  calcium polycarbophil (FIBERCON) 625 mg tablet, Take 625 mg by mouth daily, Disp: , Rfl:   •  clotrimazole-betamethasone (LOTRISONE) 1-0 05 % cream, apply to affected area twice a day for 10 days MAX, Disp: 45 g, Rfl: 0  •  collagenase (SANTYL) ointment, Apply topically daily, Disp: 30 g, Rfl: 0  •  ferrous sulfate 324 (65 Fe) mg, Take 1 tablet (324 mg total) by mouth daily before breakfast, Disp: 30 tablet, Rfl: 2  •  finasteride (PROSCAR) 5 mg tablet, Take 1 tablet (5 mg total) by mouth daily, Disp: 90 tablet, Rfl: 3  •  lisinopril (ZESTRIL) 10 mg tablet, take 1 tablet by mouth once daily, Disp: 90 tablet, Rfl: 1  •  metoprolol succinate (TOPROL-XL) 25 mg 24 hr tablet, take 1 tablet by mouth once daily, Disp: 90 tablet, Rfl: 2  •  Multiple Vitamin (MULTIVITAMIN) capsule, Take 1 capsule by mouth daily, Disp: , Rfl:   •  neomycin-polymyxin-dexamethasone (MAXITROL) ophthalmic suspension, , Disp: , Rfl:   •  nitroglycerin (NITROSTAT) 0 4 mg SL tablet, Place 1 tablet (0 4 mg total) under the tongue every 5 (five) minutes as needed for chest pain, Disp: 15 tablet, Rfl: 5  •  omeprazole (PriLOSEC) 20 mg delayed release capsule, take 1 capsule by mouth once daily, Disp: 90 capsule, Rfl: 2  •  oxyCODONE (ROXICODONE) 10 MG TABS, Take 0 5 tablets (5 mg total) by mouth every 6 (six) hours as needed for moderate pain Max Daily Amount: 20 mg, Disp: 28 tablet, Rfl: 0  •  Potassium Citrate ER 15 MEQ (1620 MG) TBCR, Take 2 tablets by mouth 2 (two) times a day, Disp: 120 tablet, Rfl: 3  •  tamsulosin (FLOMAX) 0 4 mg, Take 1 capsule (0 4 mg total) by mouth daily with dinner, Disp: 90 capsule, Rfl: 3  •  triamterene-hydrochlorothiazide (MAXZIDE) 75-50 MG per tablet, take 1 tablet by mouth once daily, Disp: 90 tablet, Rfl: 1  Family History   Problem Relation Age of Onset   • Alzheimer's disease Mother    • Alzheimer's disease Father    • Heart disease Father         CAD   • Other Father         prediabetes   • Diabetes Father    • Breast cancer Other    • Arthritis Family    • Hypertension Family        Review of Systems   Constitutional: Negative for chills and fever  HENT: Negative for ear pain and sore throat  Eyes: Negative for pain and visual disturbance  Respiratory: Negative for cough and shortness of breath  Cardiovascular: Negative for chest pain and palpitations  Gastrointestinal: Negative for abdominal pain and vomiting  Genitourinary: Negative for dysuria and hematuria  Musculoskeletal: Positive for gait problem  Negative for arthralgias and back pain  Skin: Positive for color change and wound  Negative for rash  Neurological: Positive for numbness  Negative for seizures and syncope  Psychiatric/Behavioral: Negative  All other systems reviewed and are negative  Objective:  /82   Pulse 68   Temp 97 6 °F (36 4 °C)   Resp 16     Physical Exam  Constitutional:       Appearance: Normal appearance  He is normal weight  HENT:      Head: Normocephalic and atraumatic  Right Ear: External ear normal       Left Ear: External ear normal       Nose: Nose normal       Mouth/Throat:      Mouth: Mucous membranes are moist       Pharynx: Oropharynx is clear  Eyes:      Conjunctiva/sclera: Conjunctivae normal    Cardiovascular:      Pulses:           Dorsalis pedis pulses are 1+ on the right side and 1+ on the left side  Posterior tibial pulses are 1+ on the right side and 1+ on the left side  Pulmonary:      Effort: Pulmonary effort is normal    Musculoskeletal:      Cervical back: Normal range of motion  Right lower leg: No edema  Left lower leg: No edema  Skin:     General: Skin is warm and dry  Capillary Refill: Capillary refill takes less than 2 seconds  Comments: See detailed wound assessment   Neurological:      General: No focal deficit present  Mental Status: He is alert and oriented to person, place, and time  Mental status is at baseline  Sensory: Sensory deficit present  Coordination: Coordination abnormal       Gait: Gait abnormal       Deep Tendon Reflexes: Reflexes abnormal    Psychiatric:         Mood and Affect: Mood normal          Behavior: Behavior normal          Thought Content:  Thought content normal          Judgment: Judgment normal            Wound 12/07/22 Traumatic Ankle Anterior;Right (Active)   Wound Image   03/22/23 1449   Wound Description Granulation tissue;Slough 03/22/23 1413   Kathrin-wound Assessment Rash;Erythema 03/22/23 1413   Wound Length (cm) 1 4 cm 03/22/23 1413   Wound Width (cm) 1 2 cm 03/22/23 1413 Wound Depth (cm) 0 2 cm 03/22/23 1413   Wound Surface Area (cm^2) 1 68 cm^2 03/22/23 1413   Wound Volume (cm^3) 0 336 cm^3 03/22/23 1413   Calculated Wound Volume (cm^3) 0 34 cm^3 03/22/23 1413   Change in Wound Size % -70 03/22/23 1413   Undermining 0 1 12/14/22 1401   Undermining is depth extending from 5-8oclock 12/14/22 1401   Drainage Amount Moderate 03/22/23 1413   Drainage Description Serosanguineous 03/22/23 1413   Non-staged Wound Description Full thickness 03/22/23 1413   Treatments Irrigation with NSS 03/15/23 1314   Patient Tolerance Tolerated well 12/14/22 1401   Dressing Status Intact 03/22/23 1413          Results from last 6 Months   Lab Units 02/24/23  1243   WOUND CULTURE  1+ Growth of Staphylococcus aureus*  1+ Growth of Pseudomonas aeruginosa*       XR ankle 3+ views RIGHT    Result Date: 2/25/2023  Narrative: RIGHT ANKLE INDICATION:   ulceration  COMPARISON:  Right ankle July 10, 2006 and right foot May 17, 2018  VIEWS:  XR ANKLE 3+ VW RIGHT FINDINGS: No acute fracture  Chronic distal tibial and fibular fracture deformities and additional orthopedic screws traversing the medial malleolus unchanged since May 17, 2018  Relatively mild degenerative changes tibiotalar joint and tarsal spur formation unchanged  Tiny inferior calcaneal spur  No lytic or blastic osseous lesion  Skin defect anterior ankle  Otherwise no soft tissue gas  No radiopaque foreign body  Atherosclerotic vascular calcifications  Impression: No acute osseous abnormality  Anterior skin ulceration ankle with no other soft tissue gas and no foreign body or evidence of underlying osteomyelitis  Workstation performed: [id number]       Wound Instructions:  Orders Placed This Encounter   Procedures   • Wound cleansing and dressings     Right ankle wound    Wash your hands with soap and water  Remove old dressing, discard into plastic bag and place in trash    Cleanse the wound with soap and water prior to applying a clean dressing  Do not use tissue or cotton balls  Do not scrub the wound  Pat dry using gauze  Shower yes     Apply endoform to the open wound  Cover with gauze  Secure with tape and tubifast blue  Change dressing three times before returning  Done today  Vascular test ordered     Standing Status:   Future     Standing Expiration Date:   3/22/2024         Ana Goetz DPM, DPM, FACFAS    Portions of the record may have been created with voice recognition software  Occasional wrong word or "sound a like" substitutions may have occurred due to the inherent limitations of voice recognition software  Read the chart carefully and recognize, using context, where substitutions have occurred

## 2023-03-24 ENCOUNTER — HOSPITAL ENCOUNTER (EMERGENCY)
Facility: HOSPITAL | Age: 78
Discharge: HOME | End: 2023-03-24
Attending: EMERGENCY MEDICINE

## 2023-03-24 VITALS
OXYGEN SATURATION: 98 % | SYSTOLIC BLOOD PRESSURE: 165 MMHG | DIASTOLIC BLOOD PRESSURE: 70 MMHG | TEMPERATURE: 98.6 F | HEART RATE: 86 BPM | RESPIRATION RATE: 18 BRPM

## 2023-03-24 DIAGNOSIS — L03.115 CELLULITIS OF RIGHT LOWER EXTREMITY: Primary | ICD-10-CM

## 2023-03-24 RX ORDER — CEPHALEXIN 500 MG/1
500 CAPSULE ORAL EVERY 6 HOURS SCHEDULED
Qty: 28 CAPSULE | Refills: 0 | Status: SHIPPED | OUTPATIENT
Start: 2023-03-24 | End: 2023-03-31

## 2023-03-24 RX ORDER — SULFAMETHOXAZOLE AND TRIMETHOPRIM 800; 160 MG/1; MG/1
1 TABLET ORAL 2 TIMES DAILY
Qty: 14 TABLET | Refills: 0 | Status: SHIPPED | OUTPATIENT
Start: 2023-03-24 | End: 2023-03-31

## 2023-03-24 NOTE — DISCHARGE INSTRUCTIONS
Take 1000 mg of acetaminophen (Tylenol) with 400 mg of ibuprofen (Advil) every 8 hours as needed for pain

## 2023-03-24 NOTE — ED PROVIDER NOTES
"History  Chief Complaint   Patient presents with   • Wound Check     Pt to er with reports of right leg wound  Has been seeing wound care for the past 2 months  Was put on pain medications but he stopped taking them because they \"made him goofy\"  Came to the er for us to check his wound and to see if he can get anything for pain  Redness and drainage is the same as it has been  66year old male presents for evaluation of aching and redness spreading from a chronic wound on his right leg  Patient has been following with wound care and has been doing dressing changes as recommended  His last visit was 2 days ago and he is scheduled for follow up next week  Patient denies fever or chills  Prior to Admission Medications   Prescriptions Last Dose Informant Patient Reported? Taking?    Ascorbic Acid (VITAMIN C IMMUNE HEALTH PO)   Yes No   Sig: Take by mouth daily   Multiple Vitamin (MULTIVITAMIN) capsule   Yes No   Sig: Take 1 capsule by mouth daily   Potassium Citrate ER 15 MEQ (1620 MG) TBCR   No No   Sig: Take 2 tablets by mouth 2 (two) times a day   aspirin (ECOTRIN LOW STRENGTH) 81 mg EC tablet   Yes No   Sig: Take 81 mg by mouth every other day 1 every other day   atorvastatin (LIPITOR) 40 mg tablet   No No   Sig: Take 1 tablet (40 mg total) by mouth every evening   calcium polycarbophil (FIBERCON) 625 mg tablet   Yes No   Sig: Take 625 mg by mouth daily   clotrimazole-betamethasone (LOTRISONE) 1-0 05 % cream   No No   Sig: apply to affected area twice a day for 10 days MAX   collagenase (SANTYL) ointment   No No   Sig: Apply topically daily   ferrous sulfate 324 (65 Fe) mg   No No   Sig: Take 1 tablet (324 mg total) by mouth daily before breakfast   finasteride (PROSCAR) 5 mg tablet   No No   Sig: Take 1 tablet (5 mg total) by mouth daily   lisinopril (ZESTRIL) 10 mg tablet   No No   Sig: take 1 tablet by mouth once daily   metoprolol succinate (TOPROL-XL) 25 mg 24 hr tablet   No No   Sig: take 1 " tablet by mouth once daily   neomycin-polymyxin-dexamethasone (MAXITROL) ophthalmic suspension   Yes No   nitroglycerin (NITROSTAT) 0 4 mg SL tablet   No No   Sig: Place 1 tablet (0 4 mg total) under the tongue every 5 (five) minutes as needed for chest pain   omeprazole (PriLOSEC) 20 mg delayed release capsule   No No   Sig: take 1 capsule by mouth once daily   oxyCODONE (ROXICODONE) 10 MG TABS   No No   Sig: Take 0 5 tablets (5 mg total) by mouth every 6 (six) hours as needed for moderate pain Max Daily Amount: 20 mg   tamsulosin (FLOMAX) 0 4 mg   No No   Sig: Take 1 capsule (0 4 mg total) by mouth daily with dinner   triamterene-hydrochlorothiazide (MAXZIDE) 75-50 MG per tablet   No No   Sig: take 1 tablet by mouth once daily      Facility-Administered Medications: None       Past Medical History:   Diagnosis Date   • Anemia     iron def   • Arthritis    • Cerebrovascular accident (CVA) ([facility] [address] ) 1/7/2020   • GERD (gastroesophageal reflux disease)    • Gout    • Hypertension    • Insomnia        Past Surgical History:   Procedure Laterality Date   • ANKLE SURGERY Right    • CARDIAC CATHETERIZATION     • COLONOSCOPY  01/25/2013    polypectomy; complete - 3 yrs d/t polyp - benign submucosal lipoma- path c/w lipoma   • COLONOSCOPY  04/25/2017    complete - tubular adenoma - repeat 5yrs   • CYSTOSCOPY     • CYSTOTOMY      bladder  w/ basket extraction of calculus   • FEMUR FRACTURE SURGERY     • HERNIA REPAIR      inguinal ; sliding   • INGUINAL HERNIA REPAIR Right     unilateral   • KNEE ARTHROSCOPY Right     w/medial menisectomy   • LASIK      corneal   • LITHOTRIPSY      renal   • ME COLONOSCOPY FLX DX W/COLLJ SPEC WHEN PFRMD N/A 4/25/2017    Procedure: SCREENING COLONOSCOPY;  Surgeon: Bee Wilhelm MD;  Location: [facility];  Service: General   • ME TEAEC W/PATCH GRF CAROTID VERTB SUBCLAV NECK INC Right 1/10/2020    Procedure: ENDARTERECTOMY ARTERY CAROTID;  Surgeon: Daisy Ruiz MD;  Location:  MAIN OR; Service: Vascular   • ROTATOR CUFF REPAIR Bilateral    • TONSILLECTOMY         Family History   Problem Relation Age of Onset   • Alzheimer's disease Mother    • Alzheimer's disease Father    • Heart disease Father         CAD   • Other Father         prediabetes   • Diabetes Father    • Breast cancer Other    • Arthritis Family    • Hypertension Family      I have reviewed and agree with the history as documented  E-Cigarette/Vaping   • E-Cigarette Use Never User      E-Cigarette/Vaping Substances   • Nicotine No    • THC No    • CBD No    • Flavoring No    • Other No    • Unknown No      Social History     Tobacco Use   • Smoking status: Former     Packs/day:      Years:      Pack years:      Types: Cigarettes     Start date: 0     Quit date: 1985     Years since quittin 2   • Smokeless tobacco: Never   Vaping Use   • Vaping Use: Never used   Substance Use Topics   • Alcohol use: Not Currently     Comment: stopped drinking alcohol   • Drug use: No       Review of Systems    Physical Exam  Physical Exam  Constitutional:       Appearance: He is not toxic-appearing  HENT:      Mouth/Throat:      Mouth: Mucous membranes are moist    Eyes:      Conjunctiva/sclera: Conjunctivae normal    Pulmonary:      Effort: Pulmonary effort is normal    Skin:     General: Skin is warm and dry  Comments: Right lower leg wound with erythema extending to mid lower leg  Warm to the touch  No induration or palpable fluctuance  See attached image  Neurological:      Mental Status: He is alert               Vital Signs  ED Triage Vitals [23 1642]   Temperature Pulse Respirations Blood Pressure SpO2   98 6 °F (37 °C) 86 18 165/70 98 %      Temp Source Heart Rate Source Patient Position - Orthostatic VS BP Location FiO2 (%)   Temporal Monitor Sitting Left arm --      Pain Score       --           Vitals:    23 1642   BP: 165/70   Pulse: 86   Patient Position - Orthostatic VS: Sitting Visual Acuity      ED Medications  Medications - No data to display    Diagnostic Studies  Results Reviewed     None                 No orders to display              Procedures  Procedures         ED Course                                             Medical Decision Making  66year old male presents for evaluation of aching pain of the right lower leg associated with spreading redness  On review of patient's visit at wound care 2 days ago, erythema seems to be increased slightly from image taken on 3/22  Concern for cellulitis  No systemic symptoms  Keflex and bactrim prescribed  Patient states he was prescribed a pain medication, but did not like how he felt after taking it, so has been taking tylenol for the pain  Recommended adding NSAID  Patient to follow up with wound care  Discussed return precautions with patient  Cellulitis of right lower extremity: acute illness or injury  Risk  Prescription drug management  Disposition  Final diagnoses:   Cellulitis of right lower extremity     Time reflects when diagnosis was documented in both MDM as applicable and the Disposition within this note     Time User Action Codes Description Comment    3/24/2023  5:04 PM Shruthi Montesinos Add [L03 115] Cellulitis of right lower extremity       ED Disposition     ED Disposition   Discharge    Condition   Stable    Date/Time   Fri Mar 24, 2023  5:05 PM    Comment   Wilhemenia Dys  discharge to home/self care                 Follow-up Information     Follow up With Specialties Details Why Contact Info Additional Information    Serena Junior DPM Podiatry, Wound Care  please keep your scheduled appointment for follow up Pablo Ellison  Emergency Department Emergency Medicine Go to  If symptoms worsen, redness continues to spread up the leg despite antibiotics, fever >100 4F [place] [id number] [facility] Emergency Department, [address] [place] [address] Petra Sood, [address]          Discharge Medication List as of 3/24/2023  5:05 PM      START taking these medications    Details   cephalexin (KEFLEX) 500 mg capsule Take 1 capsule (500 mg total) by mouth every 6 (six) hours for 7 days, Starting Fri 3/24/2023, Until Fri 3/31/2023, Normal      sulfamethoxazole-trimethoprim (BACTRIM DS) 800-160 mg per tablet Take 1 tablet by mouth 2 (two) times a day for 7 days smx-tmp DS (BACTRIM) 800-160 mg tabs (1tab q12 D10), Starting Fri 3/24/2023, Until Fri 3/31/2023, Normal         CONTINUE these medications which have NOT CHANGED    Details   Ascorbic Acid (VITAMIN C IMMUNE HEALTH PO) Take by mouth daily, Historical Med      aspirin (ECOTRIN LOW STRENGTH) 81 mg EC tablet Take 81 mg by mouth every other day 1 every other day, Historical Med      atorvastatin (LIPITOR) 40 mg tablet Take 1 tablet (40 mg total) by mouth every evening, Starting Thu 9/22/2022, Normal      calcium polycarbophil (FIBERCON) 625 mg tablet Take 625 mg by mouth daily, Historical Med      clotrimazole-betamethasone (LOTRISONE) 1-0 05 % cream apply to affected area twice a day for 10 days MAX, Normal      collagenase (SANTYL) ointment Apply topically daily, Starting Wed 2/1/2023, Normal      ferrous sulfate 324 (65 Fe) mg Take 1 tablet (324 mg total) by mouth daily before breakfast, Starting Fri 11/13/2020, Normal      finasteride (PROSCAR) 5 mg tablet Take 1 tablet (5 mg total) by mouth daily, Starting Tue 10/11/2022, Normal      lisinopril (ZESTRIL) 10 mg tablet take 1 tablet by mouth once daily, Normal      metoprolol succinate (TOPROL-XL) 25 mg 24 hr tablet take 1 tablet by mouth once daily, Normal      Multiple Vitamin (MULTIVITAMIN) capsule Take 1 capsule by mouth daily, Historical Med      neomycin-polymyxin-dexamethasone (MAXITROL) ophthalmic suspension Starting Thu 12/1/2022, Historical Med      nitroglycerin (NITROSTAT) 0 4 mg SL tablet Place 1 tablet (0 4 mg total) under the tongue every 5 (five) minutes as needed for chest pain, Starting Wed 10/14/2020, Normal      omeprazole (PriLOSEC) 20 mg delayed release capsule take 1 capsule by mouth once daily, Normal      oxyCODONE (ROXICODONE) 10 MG TABS Take 0 5 tablets (5 mg total) by mouth every 6 (six) hours as needed for moderate pain Max Daily Amount: 20 mg, Starting Mon 2/27/2023, Normal      Potassium Citrate ER 15 MEQ (1620 MG) TBCR Take 2 tablets by mouth 2 (two) times a day, Starting Tue 6/8/2021, Normal      tamsulosin (FLOMAX) 0 4 mg Take 1 capsule (0 4 mg total) by mouth daily with dinner, Starting Tue 10/11/2022, Normal      triamterene-hydrochlorothiazide (MAXZIDE) 75-50 MG per tablet take 1 tablet by mouth once daily, Normal             No discharge procedures on file      PDMP Review       Value Time User    PDMP Reviewed  Yes 2/27/2023 10:16 AM Good Alanis DO          ED Provider  Electronically Signed by           Ruperto Gonzalez MD  03/24/23 [id number]

## 2023-03-27 ENCOUNTER — OFFICE VISIT (OUTPATIENT)
Dept: WOUND CARE | Facility: HOSPITAL | Age: 78
End: 2023-03-27

## 2023-03-27 VITALS — TEMPERATURE: 97.5 F

## 2023-03-27 DIAGNOSIS — I73.9 PERIPHERAL VASCULAR DISEASE, UNSPECIFIED (HCC): ICD-10-CM

## 2023-03-27 DIAGNOSIS — S91.001D OPEN WOUND OF RIGHT ANKLE, SUBSEQUENT ENCOUNTER: ICD-10-CM

## 2023-03-27 DIAGNOSIS — L02.415 ABSCESS OF RIGHT LEG EXCLUDING FOOT: Primary | ICD-10-CM

## 2023-03-27 RX ORDER — LIDOCAINE HYDROCHLORIDE 40 MG/ML
5 SOLUTION TOPICAL ONCE
Status: COMPLETED | OUTPATIENT
Start: 2023-03-27 | End: 2023-03-27

## 2023-03-27 RX ORDER — OXYCODONE HYDROCHLORIDE AND ACETAMINOPHEN 5; 325 MG/1; MG/1
1 TABLET ORAL EVERY 6 HOURS PRN
Qty: 28 TABLET | Refills: 0 | Status: SHIPPED | OUTPATIENT
Start: 2023-03-27 | End: 2023-03-31

## 2023-03-27 RX ADMIN — LIDOCAINE HYDROCHLORIDE 5 ML: 40 SOLUTION TOPICAL at 15:36

## 2023-03-27 NOTE — PATIENT INSTRUCTIONS
Orders Placed This Encounter   Procedures    Wound cleansing and dressings     Wash your hands with soap and water  Remove old dressing, discard into plastic bag and place in trash  Cleanse the wound with NSS prior to applying a clean dressing  Do not use tissue or cotton balls  Do not scrub the wound  Pat dry using gauze  Shower No      Right anterior leg wounds (incised)  Pack with 1/4 iodoform packing strip and cover with adaptic  Cover with ABD pads and secure with Kerlix  Change daily  Right anterior ankle wound:    Cleanse with NSS  Pat dry  Apply a nickel-thickness of Santyl and cover with ABD  Secure with Kerlix  Change daily  Return to clinic on Wednesday  Elevate legs using pillows at home       Standing Status:   Future     Standing Expiration Date:   3/27/2024    [place]     Partial weightbearing right leg     Order Specific Question:   Does This require Any Of The Following Items     Answer:   Wheels     Order Specific Question:   Size     Answer:   Regular    Wound culture and Gram stain     Abscess right leg, 10 cc of purulence expressed     Order Specific Question:   Release to patient through Mychart     Answer:   Immediate    Referral to [address] [place] [address]     Referral Priority:   Routine     Referral Type:   Home Health     Referral Reason:   Specialty Services Required     Requested Specialty:   [address]     Number of Visits Requested:   1     Expiration Date:   3/27/2024

## 2023-03-27 NOTE — PROGRESS NOTES
Patient ID: Naila Tesfaye  is a 66 y o  male Date of Birth 1945     Chief Complaint  Chief Complaint   Patient presents with   • Follow Up Wound Care Visit     Right ankle wound       Allergies  Patient has no known allergies  Assessment:    Abscess of right leg excluding foot  -     Wound culture and Gram stain  -     Referral to [facility] [facility], partial weightbearing on right leg  -     Cancel: Wound cleansing and dressings; Future  -     oxyCODONE-acetaminophen (Percocet) 5-325 mg per tablet; Take 1 tablet by mouth every 6 (six) hours as needed for moderate pain for up to 7 days Max Daily Amount: 4 tablets  -     Incision and Drainage right leg x2 locations    Open wound of right ankle, subsequent encounter  -     lidocaine (XYLOCAINE) 4 % topical solution 5 mL  -     collagenase (SANTYL) ointment; Apply topically daily  -     Cancel: Wound cleansing and dressings; Future    Peripheral vascular disease, unspecified ([facility] [address] )    Other orders  -     Debridement        Plan:  Distal ankle wound dry sterile dressing with Santyl applied, to be changed daily  Proximal I&D wounds packed with 1/4 inch iodoform Nu Gauze to be changed daily or every other day  Wounds was irrigated with copious amounts of normal saline  VNA requested to change dressing at home daily or every other day  Wound culture obtained from the incision and drainage purulence  Patient should follow-up with scheduled arterial duplex on the 31st   Current FARRAH 0 56 on the right here in the wound care center and 1 27 on the left  Follow-up in 2 days for wound check  Patient instructed to call if any signs of increasing infection noted  Incision and Drainage    Date/Time: 3/27/2023 3:45 PM  Performed by: Angel Hinton DPM  Authorized by: Angel Hintno DPM   Universal Protocol:  Consent: Verbal consent obtained    Risks and benefits: risks, benefits and alternatives were discussed  Consent given by: "patient  Time out: Immediately prior to procedure a \"time out\" was called to verify the correct patient, procedure, equipment, support staff and site/side marked as required  Patient understanding: patient states understanding of the procedure being performed  Patient identity confirmed: verbally with patient      Patient location:  Other (comment)  Location:     Type:  Abscess    Location:  Lower extremity    Lower extremity location:  R leg  Pre-procedure details:     Skin preparation:  Betadine  Anesthesia (see MAR for exact dosages): Anesthesia method:  Local infiltration    Local anesthetic:  Lidocaine 1% w/o epi (10 cc)  Procedure details:     Complexity:  Complex    Needle aspiration: no      Incision types:  Stab incision (X2)    Scalpel blade:  10    Approach:  Open    Incision depth:  Subcutaneous, fascia and deep    Wound management:  Probed and deloculated and irrigated with saline    Irrigation with saline:  100 cc    Hemostat:  Yes, used to probe abscess    Drainage:  Purulent    Drainage amount: Moderate (10 cc)    Wound treatment:  Packing placed    Packing materials:  1/4 in iodoform gauze    Amount 1/4\" iodoform:  8 inch, and 5 inch pieces  Post-procedure details:     Patient tolerance of procedure: Tolerated well, no immediate complications  Comments:      Wound culture obtained from purulent drainage from x2 abscesses located in the anterior leg above his current wound    Debridement   Wound 12/07/22 Traumatic Ankle Anterior;Right    Universal Protocol:  Consent: Verbal consent obtained  Risks and benefits: risks, benefits and alternatives were discussed  Consent given by: patient  Time out: Immediately prior to procedure a \"time out\" was called to verify the correct patient, procedure, equipment, support staff and site/side marked as required    Patient understanding: patient states understanding of the procedure being performed  Patient identity confirmed: verbally with " patient      Performed by: physician  Debridement type: surgical  Level of debridement: subcutaneous tissue  Pain control: lidocaine 4%  Post-debridement measurements  Length (cm): 1 5  Width (cm): 1 2  Depth (cm): 0 3  Percent debrided: 100%  Surface Area (cm^2): 1 8  Area debrided (cm^2): 1 8  Volume (cm^3): 0 54  Tissue and other material debrided: subcutaneous tissue  Devitalized tissue debrided: fibrin, necrotic debris and slough  Instrument(s) utilized: curette  Bleeding: medium  Hemostasis obtained with: pressure  Procedural pain (0-10): 3  Post-procedural pain: 3   Response to treatment: procedure was tolerated well           Wound 12/07/22 Traumatic Ankle Anterior;Right (Active)   Wound Image Images linked 03/27/23 1610   Wound Description Granulation tissue;Slough 03/27/23 1526   Kathrin-wound Assessment Rash;Erythema; Other (Comment) (hematoma) 03/27/23 1526   Wound Length (cm) 1 5 cm 03/27/23 1526   Wound Width (cm) 1 2 cm 03/27/23 1526   Wound Depth (cm) 0 1 cm 03/27/23 1526   Wound Surface Area (cm^2) 1 8 cm^2 03/27/23 1526   Wound Volume (cm^3) 0 18 cm^3 03/27/23 1526   Calculated Wound Volume (cm^3) 0 18 cm^3 03/27/23 1526   Change in Wound Size % 10 03/27/23 1526   Drainage Amount Moderate 03/27/23 1526   Drainage Description Serosanguineous 03/27/23 1526   Non-staged Wound Description Full thickness 03/27/23 1526   Dressing Status Intact; New drainage 03/27/23 1526       Wound 03/27/23 Surgical Pretibial Distal;Right (Active)   Wound Image Images linked 03/27/23 1612   Wound Description Bleeding 03/27/23 1611   Kathrin-wound Assessment Purple;Rash;Erythema (+ fluctuance, boggy) 03/27/23 1611   Wound Length (cm) 5 cm 03/27/23 1611   Wound Width (cm) 0 5 cm 03/27/23 1611   Wound Depth (cm) 1 cm 03/27/23 1611   Wound Surface Area (cm^2) 2 5 cm^2 03/27/23 1611   Wound Volume (cm^3) 2 5 cm^3 03/27/23 1611   Calculated Wound Volume (cm^3) 2 5 cm^3 03/27/23 1611   Drainage Amount Large 03/27/23 1611   Drainage Description Purulent;Bloody 03/27/23 1611   Non-staged Wound Description Full thickness 03/27/23 1611   Treatments Irrigation with NSS 03/27/23 1611   Dressing Packings; Other (Comment) (iodosorb) 03/27/23 1611   Dressing Changed New 03/27/23 1611   Patient Tolerance Tolerated well 03/27/23 1611   Dressing Status Leaking (Comment) 03/27/23 1611       Wound 12/07/22 Traumatic Ankle Anterior;Right (Active)   Date First Assessed/Time First Assessed: 12/07/22 1447   Primary Wound Type: Traumatic  Location: Ankle  Wound Location Orientation: Anterior;Right       Wound 03/27/23 Surgical Pretibial Distal;Right (Active)   Date First Assessed/Time First Assessed: 03/27/23 1611   Primary Wound Type: (c) Surgical  Location: Pretibial  Wound Location Orientation: Distal;Right       Wound 03/29/23 Surgical Pretibial Right;Proximal (Active)   Date First Assessed/Time First Assessed: 03/29/23 0959   Pre-Existing Wound: Yes  Primary Wound Type: Surgical  Location: Pretibial  Wound Location Orientation: Right;Proximal       [REMOVED] Wound 01/10/20 Neck Right (Removed)   Resolved Date/Resolved Time: 12/07/22 1446  Date First Assessed/Time First Assessed: 01/10/20 1215   Location: Neck  Wound Location Orientation: Right  Wound Description (Comments): [id number]  Wound Outcome: Other (Comment)       [REMOVED] Wound 01/10/20 Ankle Anterior;Right (Removed)   Resolved Date/Resolved Time: 12/07/22 1447  Date First Assessed/Time First Assessed: 01/10/20 1430   Pre-Existing Wound: Yes  Location: Ankle  Wound Location Orientation: Anterior;Right  Wound Description (Comments): non healing  Wound Outcome: Other     [REMOVED] Wound 11/09/20 Catheter entry/exit site Wrist Right (Removed)   Resolved Date/Resolved Time: 12/07/22 1446  Date First Assessed/Time First Assessed: 11/09/20 1238   Pre-Existing Wound: No  Traumatic Wound Type: Catheter entry/exit site  Location: Wrist  Wound Location Orientation: Right  Wound Outcome: Other (Comm    [REMOVED] Wound 12/07/20 Catheter entry/exit site Wrist Right (Removed)   Resolved Date/Resolved Time: 12/07/22 1446  Date First Assessed/Time First Assessed: 12/07/20 1207   Pre-Existing Wound: No  Traumatic Wound Type: Catheter entry/exit site  Location: Wrist  Wound Location Orientation: Right  Wound Description (Comment    Subjective:           3/27/2023: 15-year-old male presents with increasing pain/redness/swelling anterior aspect of right leg  Patient has been treating for a chronic wound on the anterior ankle within scar tissue from previous trauma  Reports going to ED on 3/24/2023 and getting antibiotics but is still having redness/swelling/pain/discomfort from the front of his leg  The following portions of the patient's history were reviewed and updated as appropriate: allergies, current medications, past family history, past medical history, past social history, past surgical history and problem list     Review of Systems   Constitutional: Negative for chills and fever  HENT: Negative for ear pain and sore throat  Eyes: Negative for pain and visual disturbance  Respiratory: Negative for cough and shortness of breath  Cardiovascular: Negative for chest pain and palpitations  Gastrointestinal: Negative for abdominal pain and vomiting  Genitourinary: Negative for dysuria and hematuria  Musculoskeletal: Negative for arthralgias and back pain  Skin: Positive for color change and wound (Anterior Right Ankle)  Negative for rash  Neurological: Negative for seizures and syncope  All other systems reviewed and are negative  Objective:       Wound 12/07/22 Traumatic Ankle Anterior;Right (Active)   Wound Image Images linked 03/27/23 1610   Wound Description Granulation tissue;Slough 03/27/23 1526   Kathrin-wound Assessment Rash;Erythema; Other (Comment) (hematoma) 03/27/23 1526   Wound Length (cm) 1 5 cm 03/27/23 1526   Wound Width (cm) 1 2 cm 03/27/23 1526   Wound Depth (cm) 0 1 cm 03/27/23 1526   Wound Surface Area (cm^2) 1 8 cm^2 03/27/23 1526   Wound Volume (cm^3) 0 18 cm^3 03/27/23 1526   Calculated Wound Volume (cm^3) 0 18 cm^3 03/27/23 1526   Change in Wound Size % 10 03/27/23 1526   Drainage Amount Moderate 03/27/23 1526   Drainage Description Serosanguineous 03/27/23 1526   Non-staged Wound Description Full thickness 03/27/23 1526   Dressing Status Intact; New drainage 03/27/23 1526       Wound 03/27/23 Surgical Pretibial Distal;Right (Active)   Wound Image Images linked 03/27/23 1612   Wound Description Bleeding 03/27/23 1611   Kathrin-wound Assessment Purple;Rash;Erythema (+ fluctuance, boggy) 03/27/23 1611   Wound Length (cm) 5 cm 03/27/23 1611   Wound Width (cm) 0 5 cm 03/27/23 1611   Wound Depth (cm) 1 cm 03/27/23 1611   Wound Surface Area (cm^2) 2 5 cm^2 03/27/23 1611   Wound Volume (cm^3) 2 5 cm^3 03/27/23 1611   Calculated Wound Volume (cm^3) 2 5 cm^3 03/27/23 1611   Drainage Amount Large 03/27/23 1611   Drainage Description Purulent;Bloody 03/27/23 1611   Non-staged Wound Description Full thickness 03/27/23 1611   Treatments Irrigation with NSS 03/27/23 1611   Dressing Packings; Other (Comment) (iodosorb) 03/27/23 1611   Dressing Changed New 03/27/23 1611   Patient Tolerance Tolerated well 03/27/23 1611   Dressing Status Leaking (Comment) 03/27/23 1611                 Temp 97 5 °F (36 4 °C)     Physical Exam  Constitutional:       Appearance: Normal appearance  HENT:      Head: Normocephalic  Right Ear: Tympanic membrane normal       Left Ear: Tympanic membrane normal       Nose: No congestion  Mouth/Throat:      Pharynx: No oropharyngeal exudate or posterior oropharyngeal erythema  Eyes:      Pupils: Pupils are equal, round, and reactive to light  Cardiovascular:      Rate and Rhythm: Normal rate and regular rhythm  Pulses:           Dorsalis pedis pulses are 0 on the right side and 1+ on the left side          Posterior tibial pulses are 0 on the right side and 0 on the left side  Pulmonary:      Effort: Pulmonary effort is normal    Skin:     General: Skin is warm and dry  Capillary Refill: Capillary refill takes 2 to 3 seconds  Coloration: Skin is not pale  Findings: Erythema and wound (Anterior heel right ankle, 1 5 x 1 2 x 0 3 cm, 50% red, 50% yellow slough/fibrous base minimal serosanguineous drainage) present  No bruising, lesion or rash  Comments: Anterior right leg: x2 erythematous raised fluctuant areas consistent with deep subcutaneous abscess formation, approximately 10 cc of purulence expressed with incision and drainage procedure  Moderate calor edema and with localized cellulitis of the anterior right lower leg above the previously noted wound  Exposed tibialis anterior tendon/muscle noted within the base of the abscess areas  Neurological:      General: No focal deficit present  Mental Status: He is alert  Cranial Nerves: No cranial nerve deficit  Sensory: No sensory deficit  Motor: No weakness        Gait: Gait normal       Deep Tendon Reflexes: Reflexes normal    Psychiatric:         Mood and Affect: Mood normal          Behavior: Behavior normal          Judgment: Judgment normal            Wound Instructions:  Orders Placed This Encounter   Procedures   • Incision and Drainage     This order was created via procedure documentation   • Debridement     This order was created via procedure documentation   • Walker     Partial weightbearing right leg     Order Specific Question:   Does This require Any Of The Following Items     Answer:   Wheels     Order Specific Question:   Size     Answer:   Regular   • Wound culture and Gram stain     Abscess right leg, 10 cc of purulence expressed     Order Specific Question:   Release to patient through Mychart     Answer:   Immediate   • Referral to [address] [place] [address]     Referral Priority:   Routine     Referral Type:   Home Health     Referral Reason:   Specialty Services Required     Requested Specialty:   [address]     Number of Visits Requested:   1     Expiration Date:   3/27/2024        Diagnosis ICD-10-CM Associated Orders   1  Abscess of right leg excluding foot  L02 415 Wound culture and Gram stain     Referral to [address] [place] [address]     oxyCODONE-acetaminophen (Percocet) 5-325 mg per tablet     Incision and Drainage      2  Open wound of right ankle, subsequent encounter  S91 001D lidocaine (XYLOCAINE) 4 % topical solution 5 mL     collagenase (SANTYL) ointment     Debridement      3   Peripheral vascular disease, unspecified ([facility] [address] )  [address]73 9

## 2023-03-28 ENCOUNTER — HOME HEALTH ADMISSION (OUTPATIENT)
Dept: HOME HEALTH SERVICES | Facility: HOME HEALTHCARE | Age: 78
End: 2023-03-28

## 2023-03-28 ENCOUNTER — TELEPHONE (OUTPATIENT)
Dept: PODIATRY | Facility: CLINIC | Age: 78
End: 2023-03-28

## 2023-03-28 NOTE — TELEPHONE ENCOUNTER
Caller: Barb Sanchez    Doctor/Office: Dr Caitlin Herman 23    #: [phone]    Escalation: Care/was told by Wound Care that [facility] would be coming to see him today but has heard nothing  He has appts to attend today so did not want to not miss them  Has LM at wound care but rec'd no return call  Please call back/advise   Thanks

## 2023-03-29 ENCOUNTER — OFFICE VISIT (OUTPATIENT)
Dept: WOUND CARE | Facility: HOSPITAL | Age: 78
End: 2023-03-29

## 2023-03-29 ENCOUNTER — HOSPITAL ENCOUNTER (INPATIENT)
Facility: HOSPITAL | Age: 78
LOS: 1 days | Discharge: HOME HEALTH | End: 2023-03-31
Attending: EMERGENCY MEDICINE | Admitting: INTERNAL MEDICINE

## 2023-03-29 VITALS
TEMPERATURE: 97.5 F | RESPIRATION RATE: 16 BRPM | SYSTOLIC BLOOD PRESSURE: 134 MMHG | HEART RATE: 84 BPM | DIASTOLIC BLOOD PRESSURE: 76 MMHG

## 2023-03-29 DIAGNOSIS — S91.001D OPEN WOUND OF RIGHT ANKLE, SUBSEQUENT ENCOUNTER: Primary | ICD-10-CM

## 2023-03-29 DIAGNOSIS — N17.9 ACUTE RENAL FAILURE SUPERIMPOSED ON CHRONIC KIDNEY DISEASE (HCC): ICD-10-CM

## 2023-03-29 DIAGNOSIS — L02.415 ABSCESS OF RIGHT LEG EXCLUDING FOOT: ICD-10-CM

## 2023-03-29 DIAGNOSIS — T50.905A ADVERSE EFFECT OF DRUG, INITIAL ENCOUNTER: Primary | ICD-10-CM

## 2023-03-29 DIAGNOSIS — I73.9 PERIPHERAL VASCULAR DISEASE, UNSPECIFIED (HCC): ICD-10-CM

## 2023-03-29 DIAGNOSIS — S91.001A OPEN WOUND OF RIGHT ANKLE: ICD-10-CM

## 2023-03-29 DIAGNOSIS — N18.9 ACUTE RENAL FAILURE SUPERIMPOSED ON CHRONIC KIDNEY DISEASE (HCC): ICD-10-CM

## 2023-03-29 DIAGNOSIS — N28.9 ACUTE RENAL INSUFFICIENCY: ICD-10-CM

## 2023-03-29 DIAGNOSIS — F32.A DEPRESSION: ICD-10-CM

## 2023-03-29 DIAGNOSIS — S91.001D OPEN WOUND OF RIGHT ANKLE, SUBSEQUENT ENCOUNTER: ICD-10-CM

## 2023-03-29 PROBLEM — T39.1X1A TYLENOL INGESTION: Status: ACTIVE | Noted: 2023-03-29

## 2023-03-29 LAB
ALBUMIN SERPL BCP-MCNC: 4 G/DL (ref 3.5–5)
ALP SERPL-CCNC: 41 U/L (ref 34–104)
ALT SERPL W P-5'-P-CCNC: 14 U/L (ref 7–52)
AMPHETAMINES SERPL QL SCN: NEGATIVE
ANION GAP SERPL CALCULATED.3IONS-SCNC: 10 MMOL/L (ref 4–13)
APAP SERPL-MCNC: 12 UG/ML (ref 10–20)
AST SERPL W P-5'-P-CCNC: 20 U/L (ref 13–39)
BARBITURATES UR QL: NEGATIVE
BASOPHILS # BLD AUTO: 0.03 THOUSANDS/ÂΜL (ref 0–0.1)
BASOPHILS NFR BLD AUTO: 0 % (ref 0–1)
BENZODIAZ UR QL: NEGATIVE
BILIRUB SERPL-MCNC: 0.35 MG/DL (ref 0.2–1)
BILIRUB UR QL STRIP: NEGATIVE
BUN SERPL-MCNC: 32 MG/DL (ref 5–25)
CALCIUM SERPL-MCNC: 9.1 MG/DL (ref 8.4–10.2)
CHLORIDE SERPL-SCNC: 106 MMOL/L (ref 96–108)
CLARITY UR: CLEAR
CO2 SERPL-SCNC: 22 MMOL/L (ref 21–32)
COCAINE UR QL: NEGATIVE
COLOR UR: YELLOW
CREAT SERPL-MCNC: 2.23 MG/DL (ref 0.6–1.3)
EOSINOPHIL # BLD AUTO: 0.23 THOUSAND/ÂΜL (ref 0–0.61)
EOSINOPHIL NFR BLD AUTO: 3 % (ref 0–6)
ERYTHROCYTE [DISTWIDTH] IN BLOOD BY AUTOMATED COUNT: 12.6 % (ref 11.6–15.1)
ETHANOL SERPL-MCNC: <10 MG/DL
GFR SERPL CREATININE-BSD FRML MDRD: 27 ML/MIN/1.73SQ M
GLUCOSE SERPL-MCNC: 95 MG/DL (ref 65–140)
GLUCOSE UR STRIP-MCNC: NEGATIVE MG/DL
HCT VFR BLD AUTO: 36.1 % (ref 36.5–49.3)
HGB BLD-MCNC: 11.5 G/DL (ref 12–17)
HGB UR QL STRIP.AUTO: NEGATIVE
IMM GRANULOCYTES # BLD AUTO: 0.02 THOUSAND/UL (ref 0–0.2)
IMM GRANULOCYTES NFR BLD AUTO: 0 % (ref 0–2)
KETONES UR STRIP-MCNC: NEGATIVE MG/DL
LEUKOCYTE ESTERASE UR QL STRIP: NEGATIVE
LYMPHOCYTES # BLD AUTO: 1.05 THOUSANDS/ÂΜL (ref 0.6–4.47)
LYMPHOCYTES NFR BLD AUTO: 15 % (ref 14–44)
MCH RBC QN AUTO: 31.1 PG (ref 26.8–34.3)
MCHC RBC AUTO-ENTMCNC: 31.9 G/DL (ref 31.4–37.4)
MCV RBC AUTO: 98 FL (ref 82–98)
METHADONE UR QL: NEGATIVE
MONOCYTES # BLD AUTO: 0.39 THOUSAND/ÂΜL (ref 0.17–1.22)
MONOCYTES NFR BLD AUTO: 6 % (ref 4–12)
NEUTROPHILS # BLD AUTO: 5.16 THOUSANDS/ÂΜL (ref 1.85–7.62)
NEUTS SEG NFR BLD AUTO: 76 % (ref 43–75)
NITRITE UR QL STRIP: NEGATIVE
NRBC BLD AUTO-RTO: 0 /100 WBCS
OPIATES UR QL SCN: NEGATIVE
OXYCODONE+OXYMORPHONE UR QL SCN: POSITIVE
PCP UR QL: NEGATIVE
PH UR STRIP.AUTO: 5.5 [PH]
PLATELET # BLD AUTO: 191 THOUSANDS/UL (ref 149–390)
PMV BLD AUTO: 8.9 FL (ref 8.9–12.7)
POTASSIUM SERPL-SCNC: 4.8 MMOL/L (ref 3.5–5.3)
PROT SERPL-MCNC: 6.9 G/DL (ref 6.4–8.4)
PROT UR STRIP-MCNC: NEGATIVE MG/DL
RBC # BLD AUTO: 3.7 MILLION/UL (ref 3.88–5.62)
SALICYLATES SERPL-MCNC: <5 MG/DL (ref 3–20)
SODIUM SERPL-SCNC: 138 MMOL/L (ref 135–147)
SP GR UR STRIP.AUTO: 1.02 (ref 1–1.03)
THC UR QL: NEGATIVE
TSH SERPL DL<=0.05 MIU/L-ACNC: 1.72 UIU/ML (ref 0.45–4.5)
UROBILINOGEN UR STRIP-ACNC: <2 MG/DL
WBC # BLD AUTO: 6.88 THOUSAND/UL (ref 4.31–10.16)

## 2023-03-29 RX ORDER — ASPIRIN 81 MG/1
81 TABLET ORAL EVERY OTHER DAY
Status: DISCONTINUED | OUTPATIENT
Start: 2023-03-29 | End: 2023-03-31 | Stop reason: HOSPADM

## 2023-03-29 RX ORDER — LABETALOL HYDROCHLORIDE 5 MG/ML
10 INJECTION, SOLUTION INTRAVENOUS EVERY 6 HOURS PRN
Status: DISCONTINUED | OUTPATIENT
Start: 2023-03-29 | End: 2023-03-31 | Stop reason: HOSPADM

## 2023-03-29 RX ORDER — SODIUM CHLORIDE 9 MG/ML
100 INJECTION, SOLUTION INTRAVENOUS CONTINUOUS
Status: DISPENSED | OUTPATIENT
Start: 2023-03-29 | End: 2023-03-30

## 2023-03-29 RX ORDER — ACETAMINOPHEN 325 MG/1
650 TABLET ORAL EVERY 6 HOURS PRN
Status: DISCONTINUED | OUTPATIENT
Start: 2023-03-29 | End: 2023-03-31 | Stop reason: HOSPADM

## 2023-03-29 RX ORDER — LABETALOL HYDROCHLORIDE 5 MG/ML
20 INJECTION, SOLUTION INTRAVENOUS EVERY 6 HOURS PRN
Status: DISCONTINUED | OUTPATIENT
Start: 2023-03-29 | End: 2023-03-31 | Stop reason: HOSPADM

## 2023-03-29 RX ORDER — FINASTERIDE 5 MG/1
5 TABLET, FILM COATED ORAL DAILY
Status: DISCONTINUED | OUTPATIENT
Start: 2023-03-30 | End: 2023-03-31 | Stop reason: HOSPADM

## 2023-03-29 RX ORDER — LIDOCAINE HYDROCHLORIDE 40 MG/ML
5 SOLUTION TOPICAL ONCE
Status: COMPLETED | OUTPATIENT
Start: 2023-03-29 | End: 2023-03-29

## 2023-03-29 RX ORDER — LANOLIN ALCOHOL/MO/W.PET/CERES
6 CREAM (GRAM) TOPICAL
Status: DISCONTINUED | OUTPATIENT
Start: 2023-03-29 | End: 2023-03-31 | Stop reason: HOSPADM

## 2023-03-29 RX ORDER — ATORVASTATIN CALCIUM 40 MG/1
40 TABLET, FILM COATED ORAL EVERY EVENING
Status: DISCONTINUED | OUTPATIENT
Start: 2023-03-29 | End: 2023-03-31 | Stop reason: HOSPADM

## 2023-03-29 RX ORDER — METOPROLOL SUCCINATE 25 MG/1
25 TABLET, EXTENDED RELEASE ORAL DAILY
Status: DISCONTINUED | OUTPATIENT
Start: 2023-03-30 | End: 2023-03-31 | Stop reason: HOSPADM

## 2023-03-29 RX ORDER — FERROUS SULFATE 325(65) MG
325 TABLET ORAL
Status: DISCONTINUED | OUTPATIENT
Start: 2023-03-30 | End: 2023-03-31 | Stop reason: HOSPADM

## 2023-03-29 RX ORDER — HEPARIN SODIUM 5000 [USP'U]/ML
5000 INJECTION, SOLUTION INTRAVENOUS; SUBCUTANEOUS EVERY 8 HOURS SCHEDULED
Status: DISCONTINUED | OUTPATIENT
Start: 2023-03-29 | End: 2023-03-31 | Stop reason: HOSPADM

## 2023-03-29 RX ORDER — DIPHENHYDRAMINE HCL 25 MG
25 TABLET ORAL ONCE
Status: COMPLETED | OUTPATIENT
Start: 2023-03-29 | End: 2023-03-29

## 2023-03-29 RX ORDER — ONDANSETRON 2 MG/ML
4 INJECTION INTRAMUSCULAR; INTRAVENOUS EVERY 6 HOURS PRN
Status: DISCONTINUED | OUTPATIENT
Start: 2023-03-29 | End: 2023-03-31 | Stop reason: HOSPADM

## 2023-03-29 RX ORDER — TAMSULOSIN HYDROCHLORIDE 0.4 MG/1
0.4 CAPSULE ORAL
Status: DISCONTINUED | OUTPATIENT
Start: 2023-03-30 | End: 2023-03-31 | Stop reason: HOSPADM

## 2023-03-29 RX ORDER — MAGNESIUM HYDROXIDE/ALUMINUM HYDROXICE/SIMETHICONE 120; 1200; 1200 MG/30ML; MG/30ML; MG/30ML
30 SUSPENSION ORAL EVERY 6 HOURS PRN
Status: DISCONTINUED | OUTPATIENT
Start: 2023-03-29 | End: 2023-03-31 | Stop reason: HOSPADM

## 2023-03-29 RX ORDER — DEXAMETHASONE 0.1 %
SUSPENSION, DROPS(FINAL DOSAGE FORM)(ML) OPHTHALMIC (EYE)
COMMUNITY
Start: 2023-03-11

## 2023-03-29 RX ORDER — CEPHALEXIN 250 MG/1
500 CAPSULE ORAL EVERY 8 HOURS SCHEDULED
Status: COMPLETED | OUTPATIENT
Start: 2023-03-29 | End: 2023-03-31

## 2023-03-29 RX ORDER — PANTOPRAZOLE SODIUM 20 MG/1
20 TABLET, DELAYED RELEASE ORAL
Status: DISCONTINUED | OUTPATIENT
Start: 2023-03-30 | End: 2023-03-31 | Stop reason: HOSPADM

## 2023-03-29 RX ADMIN — ATORVASTATIN CALCIUM 40 MG: 40 TABLET, FILM COATED ORAL at 20:57

## 2023-03-29 RX ADMIN — HEPARIN SODIUM 5000 UNITS: 5000 INJECTION INTRAVENOUS; SUBCUTANEOUS at 20:57

## 2023-03-29 RX ADMIN — SODIUM CHLORIDE 1000 ML: 0.9 INJECTION, SOLUTION INTRAVENOUS at 18:32

## 2023-03-29 RX ADMIN — SODIUM CHLORIDE 100 ML/HR: 0.9 INJECTION, SOLUTION INTRAVENOUS at 20:57

## 2023-03-29 RX ADMIN — CEPHALEXIN 500 MG: 250 CAPSULE ORAL at 20:56

## 2023-03-29 RX ADMIN — Medication 6 MG: at 23:20

## 2023-03-29 RX ADMIN — LIDOCAINE HYDROCHLORIDE 5 ML: 40 SOLUTION TOPICAL at 10:11

## 2023-03-29 RX ADMIN — ASPIRIN 81 MG: 81 TABLET, COATED ORAL at 20:57

## 2023-03-29 RX ADMIN — DIPHENHYDRAMINE HYDROCHLORIDE 25 MG: 25 TABLET ORAL at 16:57

## 2023-03-29 RX ADMIN — VANCOMYCIN HYDROCHLORIDE 1500 MG: 5 INJECTION, POWDER, LYOPHILIZED, FOR SOLUTION INTRAVENOUS at 20:56

## 2023-03-29 NOTE — ASSESSMENT & PLAN NOTE
On Maxzide, Toprol, and lisinopril  Hold lisinopril and Maxzide in setting of HELIO  IV labetalol PRN

## 2023-03-29 NOTE — ASSESSMENT & PLAN NOTE
Reports feeling depressed due to non-healing R foot wound causing mobility issues, now instructed to wear a boot to temporarily immobilize his foot as he recovers from debridement which further limits his mobility  · Denies active SI/HI  · Attempt to wheel patient around hallways qshift  · Consult psychiatry  Appreciate consult

## 2023-03-29 NOTE — ED PROVIDER NOTES
"History  Chief Complaint   Patient presents with   • Depression     Patient presents to the ED with c/o depression that began today, states he was told to wear a boot for his wounds on his foot and got upset, states he was told to come over by wound care because he is depressed today  When asked if he is suicidal, he states \"no I just do not know what to do\"  Hx from patient and records - Patient complains of feeling upset today after taking Percocet, 1 tablet at 2 pm   He was just prescribed this a couple days ago for his leg pain  He has taken Tylenol but didn't have enough relief  Ibuprofen has helped in the past   He was at Dr Justin Corbett office today  He feels depressed since he was told to wear a boot on his leg and now he won't be able to drive  He had 10 minute episode that he thought about taking a bunch of pills to kill himself  He currently denies SI/ HI and no hallucinations  He thinks it is the medicine making him feel this way  He also has itching of his hands and feels nervous  He wants to walk around but I recommended that he sit still in the ER here  Prior to Admission Medications   Prescriptions Last Dose Informant Patient Reported? Taking?    Ascorbic Acid (VITAMIN C IMMUNE HEALTH PO)   Yes No   Sig: Take by mouth daily   Maxidex 0 1 % ophthalmic suspension   Yes No   Sig: instill 1 drop into both eyes twice a day for 2 weeks then 1 drop into both eyes once daily   Multiple Vitamin (MULTIVITAMIN) capsule   Yes No   Sig: Take 1 capsule by mouth daily   Potassium Citrate ER 15 MEQ (1620 MG) TBCR   No No   Sig: Take 2 tablets by mouth 2 (two) times a day   aspirin (ECOTRIN LOW STRENGTH) 81 mg EC tablet   Yes No   Sig: Take 81 mg by mouth every other day 1 every other day   atorvastatin (LIPITOR) 40 mg tablet   No No   Sig: Take 1 tablet (40 mg total) by mouth every evening   calcium polycarbophil (FIBERCON) 625 mg tablet   Yes No   Sig: Take 625 mg by mouth daily   cephalexin " (KEFLEX) 500 mg capsule   No No   Sig: Take 1 capsule (500 mg total) by mouth every 6 (six) hours for 7 days   clotrimazole-betamethasone (LOTRISONE) 1-0 05 % cream   No No   Sig: apply to affected area twice a day for 10 days MAX   collagenase (SANTYL) ointment   No No   Sig: Apply topically daily   ferrous sulfate 324 (65 Fe) mg   No No   Sig: Take 1 tablet (324 mg total) by mouth daily before breakfast   finasteride (PROSCAR) 5 mg tablet   No No   Sig: Take 1 tablet (5 mg total) by mouth daily   lisinopril (ZESTRIL) 10 mg tablet   No No   Sig: take 1 tablet by mouth once daily   metoprolol succinate (TOPROL-XL) 25 mg 24 hr tablet   No No   Sig: take 1 tablet by mouth once daily   neomycin-polymyxin-dexamethasone (MAXITROL) ophthalmic suspension   Yes No   nitroglycerin (NITROSTAT) 0 4 mg SL tablet   No No   Sig: Place 1 tablet (0 4 mg total) under the tongue every 5 (five) minutes as needed for chest pain   omeprazole (PriLOSEC) 20 mg delayed release capsule   No No   Sig: take 1 capsule by mouth once daily   oxyCODONE (ROXICODONE) 10 MG TABS   No No   Sig: Take 0 5 tablets (5 mg total) by mouth every 6 (six) hours as needed for moderate pain Max Daily Amount: 20 mg   oxyCODONE-acetaminophen (Percocet) 5-325 mg per tablet   No No   Sig: Take 1 tablet by mouth every 6 (six) hours as needed for moderate pain for up to 7 days Max Daily Amount: 4 tablets   sulfamethoxazole-trimethoprim (BACTRIM DS) 800-160 mg per tablet   No No   Sig: Take 1 tablet by mouth 2 (two) times a day for 7 days smx-tmp DS (BACTRIM) 800-160 mg tabs (1tab q12 D10)   tamsulosin (FLOMAX) 0 4 mg   No No   Sig: Take 1 capsule (0 4 mg total) by mouth daily with dinner   triamterene-hydrochlorothiazide (MAXZIDE) 75-50 MG per tablet   No No   Sig: take 1 tablet by mouth once daily      Facility-Administered Medications Last Administration Doses Remaining   lidocaine (XYLOCAINE) 4 % topical solution 5 mL 3/29/2023 10:11 AM 0          Past Medical History:   Diagnosis Date   • Anemia     iron def   • Arthritis    • Cerebrovascular accident (CVA) ([facility] [address] ) 1/7/2020   • Depression 3/29/2023   • GERD (gastroesophageal reflux disease)    • Gout    • Hypertension    • Insomnia        Past Surgical History:   Procedure Laterality Date   • ANKLE SURGERY Right    • CARDIAC CATHETERIZATION     • COLONOSCOPY  01/25/2013    polypectomy; complete - 3 yrs d/t polyp - benign submucosal lipoma- path c/w lipoma   • COLONOSCOPY  04/25/2017    complete - tubular adenoma - repeat 5yrs   • CYSTOSCOPY     • CYSTOTOMY      bladder  w/ basket extraction of calculus   • FEMUR FRACTURE SURGERY     • HERNIA REPAIR      inguinal ; sliding   • INGUINAL HERNIA REPAIR Right     unilateral   • KNEE ARTHROSCOPY Right     w/medial menisectomy   • LASIK      corneal   • LITHOTRIPSY      renal   • LA COLONOSCOPY FLX DX W/COLLJ SPEC WHEN PFRMD N/A 4/25/2017    Procedure: SCREENING COLONOSCOPY;  Surgeon: Charlie John MD;  Location:  MAIN OR;  Service: General   • LA TEAEC W/PATCH GRF CAROTID VERTB SUBCLAV NECK INC Right 1/10/2020    Procedure: ENDARTERECTOMY ARTERY CAROTID;  Surgeon: Elle Lou MD;  Location:  MAIN OR;  Service: Vascular   • ROTATOR CUFF REPAIR Bilateral    • TONSILLECTOMY         Family History   Problem Relation Age of Onset   • Alzheimer's disease Mother    • Alzheimer's disease Father    • Heart disease Father         CAD   • Other Father         prediabetes   • Diabetes Father    • Breast cancer Other    • Arthritis Family    • Hypertension Family      I have reviewed and agree with the history as documented      E-Cigarette/Vaping   • E-Cigarette Use Never User      E-Cigarette/Vaping Substances   • Nicotine No    • THC No    • CBD No    • Flavoring No    • Other No    • Unknown No      Social History     Tobacco Use   • Smoking status: Former     Packs/day: 1 00     Years: 22 00     Pack years: 22 00     Types: Cigarettes     Start date: 0     Quit date: 1/1/1985 Years since quittin 2   • Smokeless tobacco: Never   Vaping Use   • Vaping Use: Never used   Substance Use Topics   • Alcohol use: Not Currently     Comment: stopped drinking alcohol   • Drug use: No       Review of Systems   Constitutional: Negative for chills and fever  HENT: Negative for rhinorrhea and sore throat  Respiratory: Negative for shortness of breath  Cardiovascular: Negative for chest pain  Gastrointestinal: Negative for constipation, diarrhea, nausea and vomiting  Genitourinary: Negative for dysuria and frequency  Skin: Positive for rash  Psychiatric/Behavioral: Negative for hallucinations and suicidal ideas  The patient is nervous/anxious  All other systems reviewed and are negative  Physical Exam  Physical Exam  Vitals and nursing note reviewed  Constitutional:       Appearance: He is well-developed  HENT:      Head: Normocephalic and atraumatic  Right Ear: External ear normal       Left Ear: External ear normal       Nose: Nose normal    Eyes:      Conjunctiva/sclera: Conjunctivae normal       Pupils: Pupils are equal, round, and reactive to light  Comments: pinpoint pupils bilateral reactive   Cardiovascular:      Rate and Rhythm: Normal rate and regular rhythm  Heart sounds: Normal heart sounds  Pulmonary:      Effort: Pulmonary effort is normal  No respiratory distress  Breath sounds: Normal breath sounds  No wheezing  Abdominal:      General: Bowel sounds are normal  There is no distension  Palpations: Abdomen is soft  Tenderness: There is no abdominal tenderness  Musculoskeletal:         General: No deformity  Normal range of motion  Cervical back: Normal range of motion and neck supple  No spinous process tenderness  Skin:     General: Skin is warm and dry  Findings: No rash  Neurological:      General: No focal deficit present  Mental Status: He is alert and oriented to person, place, and time  GCS: GCS eye subscore is 4  GCS verbal subscore is 5  GCS motor subscore is 6  Sensory: No sensory deficit  Psychiatric:         Attention and Perception: Attention normal          Mood and Affect: Mood is anxious  Speech: Speech normal          Behavior: Behavior normal  Behavior is cooperative  Thought Content: Thought content is not paranoid or delusional  Thought content does not include homicidal or suicidal ideation           Cognition and Memory: Cognition normal          Judgment: Judgment normal          Vital Signs  ED Triage Vitals   Temperature Pulse Respirations Blood Pressure SpO2   03/29/23 1607 03/29/23 1606 03/29/23 1606 03/29/23 1606 03/29/23 1606   98 5 °F (36 9 °C) 87 18 (!) 180/81 98 %      Temp Source Heart Rate Source Patient Position - Orthostatic VS BP Location FiO2 (%)   03/29/23 1607 03/29/23 1606 03/29/23 1606 03/29/23 1606 --   Oral Monitor Sitting Right arm       Pain Score       03/29/23 1606       1           Vitals:    03/29/23 1606 03/29/23 1804 03/29/23 1930 03/1945   BP: (!) 180/81 163/76  159/78   Pulse: 87 (!) 109 59 87   Patient Position - Orthostatic VS: Sitting            Visual Acuity      ED Medications  Medications   diphenhydrAMINE (BENADRYL) tablet 25 mg (25 mg Oral Given 3/29/23 1657)   sodium chloride 0 9 % bolus 1,000 mL (1,000 mL Intravenous New Bag 3/29/23 1832)       Diagnostic Studies  Results Reviewed     Procedure Component Value Units Date/Time    UA w Reflex to Microscopic w Reflex to Culture [[id number]] Collected: 03/29/23 1813    Lab Status: Final result Specimen: Urine, Clean Catch Updated: 03/29/23 1908     Color, UA Yellow     Clarity, UA Clear     Specific Gravity, UA 1 025     pH, UA 5 5     Leukocytes, UA Negative     Nitrite, UA Negative     Protein, UA Negative mg/dl      Glucose, UA Negative mg/dl      Ketones, UA Negative mg/dl      Urobilinogen, UA <2 0 mg/dl      Bilirubin, UA Negative     Occult Blood, UA Negative Salicylate level [[id number]]  (Normal) Collected: 03/29/23 1828    Lab Status: Final result Specimen: Blood from Arm, Left Updated: 23/30/48 5919     Salicylate Lvl <5 mg/dL     Rapid drug screen, urine [[id number]]  (Abnormal) Collected: 03/29/23 1812    Lab Status: Final result Specimen: Urine, Clean Catch Updated: 03/29/23 1906     Amph/Meth UR Negative     Barbiturate Ur Negative     Benzodiazepine Urine Negative     Cocaine Urine Negative     Methadone Urine Negative     Opiate Urine Negative     PCP Ur Negative     THC Urine Negative     Oxycodone Urine Positive    Narrative:      Presumptive report  If requested, specimen will be sent to reference lab for confirmation  FOR MEDICAL PURPOSES ONLY  IF CONFIRMATION NEEDED PLEASE CONTACT THE LAB WITHIN 5 DAYS  Drug Screen Cutoff Levels:  AMPHETAMINE/METHAMPHETAMINES  1000 ng/mL  BARBITURATES     200 ng/mL  BENZODIAZEPINES     200 ng/mL  COCAINE      300 ng/mL  METHADONE      300 ng/mL  OPIATES      300 ng/mL  PHENCYCLIDINE     25 ng/mL  THC       50 ng/mL  OXYCODONE      100 ng/mL    Acetaminophen level-If concentration is detectable, please discuss with medical  on call   [[id number]]  (Normal) Collected: 03/29/23 1700    Lab Status: Final result Specimen: Blood from Arm, Right Updated: 03/29/23 1831     Acetaminophen Level 12 ug/mL     TSH [[id number]]  (Normal) Collected: 03/29/23 1700    Lab Status: Final result Specimen: Blood from Arm, Right Updated: 03/29/23 1753     TSH 3RD GENERATON 1 721 uIU/mL     Comprehensive metabolic panel [[id number]]  (Abnormal) Collected: 03/29/23 1700    Lab Status: Final result Specimen: Blood from Arm, Right Updated: 03/29/23 1738     Sodium 138 mmol/L      Potassium 4 8 mmol/L      Chloride 106 mmol/L      CO2 22 mmol/L      ANION GAP 10 mmol/L      BUN 32 mg/dL      Creatinine 2 23 mg/dL      Glucose 95 mg/dL      Calcium 9 1 mg/dL      AST 20 U/L      ALT 14 U/L      Alkaline Phosphatase 41 U/L      Total Protein 6 9 g/dL      Albumin 4 0 g/dL      Total Bilirubin 0 35 mg/dL      eGFR 27 ml/min/1 73sq m     Narrative:      National [employer] guidelines for Chronic Kidney Disease (CKD):   •  Stage 1 with normal or high GFR (GFR > 90 mL/min/1 73 square meters)  •  Stage 2 Mild CKD (GFR = 60-89 mL/min/1 73 square meters)  •  Stage 3A Moderate CKD (GFR = 45-59 mL/min/1 73 square meters)  •  Stage 3B Moderate CKD (GFR = 30-44 mL/min/1 73 square meters)  •  Stage 4 Severe CKD (GFR = 15-29 mL/min/1 73 square meters)  •  Stage 5 End Stage CKD (GFR <15 mL/min/1 73 square meters)  Note: GFR calculation is accurate only with a steady state creatinine    Ethanol [[id number]]  (Normal) Collected: 03/29/23 1700    Lab Status: Final result Specimen: Blood from Arm, Right Updated: 03/29/23 1736     Ethanol Lvl <10 mg/dL     CBC and differential [[id number]]  (Abnormal) Collected: 03/29/23 1700    Lab Status: Final result Specimen: Blood from Arm, Right Updated: 03/29/23 1715     WBC 6 88 Thousand/uL      RBC 3 70 Million/uL      Hemoglobin 11 5 g/dL      Hematocrit 36 1 %      MCV 98 fL      MCH 31 1 pg      MCHC 31 9 g/dL      RDW 12 6 %      MPV 8 9 fL      Platelets 471 Thousands/uL      nRBC 0 /100 WBCs      Neutrophils Relative 76 %      Immat GRANS % 0 %      Lymphocytes Relative 15 %      Monocytes Relative 6 %      Eosinophils Relative 3 %      Basophils Relative 0 %      Neutrophils Absolute 5 16 Thousands/µL      Immature Grans Absolute 0 02 Thousand/uL      Lymphocytes Absolute 1 05 Thousands/µL      Monocytes Absolute 0 39 Thousand/µL      Eosinophils Absolute 0 23 Thousand/µL      Basophils Absolute 0 03 Thousands/µL                  No orders to display              Procedures  ECG 12 Lead Documentation Only    Date/Time: 3/29/2023 7:36 PM  Performed by: Zelma Gowers, DO  Authorized by: Zelma Gowers, DO     Indications / Diagnosis:  Depression nervous, adverse drug reaction  ECG reviewed by me, the ED Provider: yes    Patient location:  ED  Previous ECG:     Previous ECG:  Compared to current    Comparison ECG info:  2020    Similarity:  Changes noted  Interpretation:     Interpretation: normal    Rate:     ECG rate:  76    ECG rate assessment: normal    Rhythm:     Rhythm: sinus rhythm    Ectopy:     Ectopy: none    QRS:     QRS axis:  Normal    QRS intervals:  Normal  Conduction:     Conduction: normal    ST segments:     ST segments:  Normal  T waves:     T waves: normal               ED Course        Discussion with  via tiger text concerning acetaminophen level - patient admits to taking 2 extra strength tylenol twice daily and 2 percocet daily  Tox does not recommend NAC or repeat levels  Medical Decision Making  Differential includes suicide attempt - less likely as patient had fleeting moment of suicidal thought but is really concerned that symptoms are caused by a medication that he is taking  Other differential is depression/ anxiety, allergy or side effect of oxycodone, infection, hypothyroid    Amount and/or Complexity of Data Reviewed  Labs: ordered  Risk  OTC drugs  Decision regarding hospitalization  Disposition  Final diagnoses:    Adverse effect of drug, initial encounter - probable side effects of oxycodone including nervousness/ pruritis   Acute renal insufficiency     Time reflects when diagnosis was documented in both MDM as applicable and the Disposition within this note     Time User Action Codes Description Comment    3/29/2023  6:48 PM Juan [address] [facility] Adverse effect of drug, initial encounter     3/29/2023  7:18 PM Mazin Lawrence Add [N28 9] Acute renal insufficiency     3/29/2023  7:18 PM Juan, [address] [facility] Adverse effect of drug, initial encounter probable side effects of oxycodone including nervousness/ pruritis      ED Disposition     ED Disposition   Admit    Condition   Stable    Date/Time   Wed Mar 29, 2023  7:18 PM    Comment   Case was discussed with Shavonne Sandra* and the patient's admission status was agreed to be Admission Status: observation status to the service of Dr Michael Loving*              Follow-up Information    None         Current Discharge Medication List      CONTINUE these medications which have NOT CHANGED    Details   Ascorbic Acid (VITAMIN C IMMUNE HEALTH PO) Take by mouth daily      aspirin (ECOTRIN LOW STRENGTH) 81 mg EC tablet Take 81 mg by mouth every other day 1 every other day      atorvastatin (LIPITOR) 40 mg tablet Take 1 tablet (40 mg total) by mouth every evening  Qty: 90 tablet, Refills: 3    Associated Diagnoses: Dyslipidemia      calcium polycarbophil (FIBERCON) 625 mg tablet Take 625 mg by mouth daily      cephalexin (KEFLEX) 500 mg capsule Take 1 capsule (500 mg total) by mouth every 6 (six) hours for 7 days  Qty: 28 capsule, Refills: 0    Associated Diagnoses: Cellulitis of right lower extremity      clotrimazole-betamethasone (LOTRISONE) 1-0 05 % cream apply to affected area twice a day for 10 days MAX  Qty: 45 g, Refills: 0    Associated Diagnoses: Rash      collagenase (SANTYL) ointment Apply topically daily  Qty: 30 g, Refills: 0    Associated Diagnoses: Open wound of right ankle, subsequent encounter      ferrous sulfate 324 (65 Fe) mg Take 1 tablet (324 mg total) by mouth daily before breakfast  Qty: 30 tablet, Refills: 2    Associated Diagnoses: Iron deficiency anemia, unspecified iron deficiency anemia type      finasteride (PROSCAR) 5 mg tablet Take 1 tablet (5 mg total) by mouth daily  Qty: 90 tablet, Refills: 3    Associated Diagnoses: Elevated PSA; Urinary frequency      lisinopril (ZESTRIL) 10 mg tablet take 1 tablet by mouth once daily  Qty: 90 tablet, Refills: 1    Associated Diagnoses: Stenosis of right carotid artery      Maxidex 0 1 % ophthalmic suspension instill 1 drop into both eyes twice a day for 2 weeks then 1 drop into both eyes once daily metoprolol succinate (TOPROL-XL) 25 mg 24 hr tablet take 1 tablet by mouth once daily  Qty: 90 tablet, Refills: 2    Associated Diagnoses: Coronary artery disease involving native coronary artery of native heart without angina pectoris      Multiple Vitamin (MULTIVITAMIN) capsule Take 1 capsule by mouth daily      neomycin-polymyxin-dexamethasone (MAXITROL) ophthalmic suspension       nitroglycerin (NITROSTAT) 0 4 mg SL tablet Place 1 tablet (0 4 mg total) under the tongue every 5 (five) minutes as needed for chest pain  Qty: 15 tablet, Refills: 5    Associated Diagnoses: Coronary artery disease involving native coronary artery of native heart without angina pectoris      omeprazole (PriLOSEC) 20 mg delayed release capsule take 1 capsule by mouth once daily  Qty: 90 capsule, Refills: 2    Associated Diagnoses: Other acute gastritis without hemorrhage      oxyCODONE (ROXICODONE) 10 MG TABS Take 0 5 tablets (5 mg total) by mouth every 6 (six) hours as needed for moderate pain Max Daily Amount: 20 mg  Qty: 28 tablet, Refills: 0    Associated Diagnoses: Cellulitis of right lower extremity; Ulcer of right foot, unspecified ulcer stage (HCC)      oxyCODONE-acetaminophen (Percocet) 5-325 mg per tablet Take 1 tablet by mouth every 6 (six) hours as needed for moderate pain for up to 7 days Max Daily Amount: 4 tablets  Qty: 28 tablet, Refills: 0    Associated Diagnoses: Abscess of right leg excluding foot      Potassium Citrate ER 15 MEQ (1620 MG) TBCR Take 2 tablets by mouth 2 (two) times a day  Qty: 120 tablet, Refills: 3    Associated Diagnoses: Hypocitraturia      sulfamethoxazole-trimethoprim (BACTRIM DS) 800-160 mg per tablet Take 1 tablet by mouth 2 (two) times a day for 7 days smx-tmp DS (BACTRIM) 800-160 mg tabs (1tab q12 D10)  Qty: 14 tablet, Refills: 0    Associated Diagnoses: Cellulitis of right lower extremity      tamsulosin (FLOMAX) 0 4 mg Take 1 capsule (0 4 mg total) by mouth daily with dinner  Qty: 90 capsule, Refills: 3    Associated Diagnoses: Urinary frequency      triamterene-hydrochlorothiazide (MAXZIDE) 75-50 MG per tablet take 1 tablet by mouth once daily  Qty: 90 tablet, Refills: 1    Associated Diagnoses: Hypertension, unspecified type             No discharge procedures on file      PDMP Review       Value Time User    PDMP Reviewed  Yes 2/27/2023 10:16 AM Ivelisse Deluna DO          ED Provider  Electronically Signed by     Fabricio Love DO  03/29/23 2005

## 2023-03-29 NOTE — ASSESSMENT & PLAN NOTE
S/p I&D with podiatry  On bactrim + Keflex (D5)  Wound culture from 3/27 growing staph aureus  Given HELIO, will switch him from bactrim to IV vancomycin   Continue follow up with podiatry

## 2023-03-29 NOTE — PATIENT INSTRUCTIONS
Orders Placed This Encounter   Procedures    Wound cleansing and dressings     Wound cleansing and dressings      Wash your hands with soap and water  Remove old dressing, discard into plastic bag and place in trash  Cleanse the wounds  IRRIGATE WITH DILUTED BETADINE SOLUTION WITH BLUNT TIP NEEDLE 18 GAUGE  prior to applying a clean dressing  Do not use tissue or cotton balls  Do not scrub the wound  Pat dry using gauze  Shower No        Right anterior leg wounds (incised)  Pack with 1/4 iodoform packing strip and cover with adaptic  Cover with ABD pads and secure with Kerlix  Change every other day     Right anterior ankle wound:     Cleanse with NSS  Pat dry  Apply a nickel-thickness of Santyl and cover with ABD  Secure with Kerlix  Change every other day        Elevate legs using pillows at home      RETURN In 1 week     Standing Status:   Future     Standing Expiration Date:   3/29/2024

## 2023-03-29 NOTE — PROGRESS NOTES
"Patient ID: Ashley Hernandez  is a 66 y o  male Date of Birth 1945     Chief Complaint  Chief Complaint   Patient presents with   • Follow Up Wound Care Visit     Wound care       Allergies  Patient has no known allergies  Assessment:    Open wound of right ankle, subsequent encounter  -     lidocaine (XYLOCAINE) 4 % topical solution 5 mL  -     Cancel: Wound cleansing and dressings; Future  -     Cancel: Wound cleansing and dressings; Future  -     Wound cleansing and dressings; Future  -     Cam Boot  -     Debridement    Abscess of right leg excluding foot  -     lidocaine (XYLOCAINE) 4 % topical solution 5 mL  -     Cancel: Wound cleansing and dressings; Future  -     Cancel: Wound cleansing and dressings; Future  -     Wound cleansing and dressings; Future  -     Debridement  -     Debridement    Peripheral vascular disease, unspecified ([facility] [address] )  -     FARRAH 0 56 on the right indicative of significant vascular compromise and need for further details with an arterial duplex scheduled for 3/31/2023  Plan:  Wound culture too young and incomplete at this time  Cam boot to minimize ankle range of motion helping to facilitate wound coverage over top of tendon/muscle (right tibialis anterior)  MD irrigated with normal saline solution mixed with Betadine  Wound packed with quarter inch iodoform Nu Gauze  VNA to start tomorrow every other day  Follow-up in wound care 1 week  Debridement   Wound 12/07/22 Traumatic Ankle Anterior;Right    Universal Protocol:  Consent: Verbal consent obtained  Risks and benefits: risks, benefits and alternatives were discussed  Consent given by: patient  Time out: Immediately prior to procedure a \"time out\" was called to verify the correct patient, procedure, equipment, support staff and site/side marked as required    Patient understanding: patient states understanding of the procedure being performed  Patient identity confirmed: verbally with patient      Performed " "by: physician  Debridement type: surgical  Level of debridement: subcutaneous tissue  Pain control: lidocaine 4%  Post-debridement measurements  Length (cm): 1 4  Width (cm): 1  Depth (cm): 0 2  Percent debrided: 100%  Surface Area (cm^2): 1 4  Area debrided (cm^2): 1 4  Volume (cm^3): 0 28  Tissue and other material debrided: subcutaneous tissue  Devitalized tissue debrided: fibrin, necrotic debris and slough  Instrument(s) utilized: curette  Bleeding: medium  Hemostasis obtained with: pressure  Procedural pain (0-10): 3  Post-procedural pain: 3   Response to treatment: procedure was tolerated well    Debridement   Wound 03/27/23 Surgical Pretibial Distal;Right    Universal Protocol:  Consent: Verbal consent obtained  Risks and benefits: risks, benefits and alternatives were discussed  Consent given by: patient  Time out: Immediately prior to procedure a \"time out\" was called to verify the correct patient, procedure, equipment, support staff and site/side marked as required  Patient understanding: patient states understanding of the procedure being performed  Patient identity confirmed: verbally with patient      Performed by: physician  Debridement type: surgical  Level of debridement: subcutaneous tissue  Pain control: lidocaine 4%  Post-debridement measurements  Length (cm): 1 4  Width (cm): 0 9  Depth (cm): 0 4  Percent debrided: 100%  Surface Area (cm^2): 1 26  Area debrided (cm^2): 1 26  Volume (cm^3): 0 5  Tissue and other material debrided: adipose and subcutaneous tissue  Devitalized tissue debrided: necrotic debris and slough  Instrument(s) utilized: curette  Bleeding: small  Hemostasis obtained with: pressure  Procedural pain (0-10): 5  Post-procedural pain: 3   Response to treatment: procedure was tolerated well    Debridement   Wound 03/29/23 Surgical Pretibial Right;Proximal    Universal Protocol:  Consent: Verbal consent obtained    Risks and benefits: risks, benefits and alternatives were " "discussed  Consent given by: patient  Time out: Immediately prior to procedure a \"time out\" was called to verify the correct patient, procedure, equipment, support staff and site/side marked as required  Patient understanding: patient states understanding of the procedure being performed  Patient identity confirmed: verbally with patient      Performed by: physician  Debridement type: surgical  Level of debridement: subcutaneous tissue  Pain control: lidocaine 4%  Post-debridement measurements  Length (cm): 1 3  Width (cm): 0 6  Depth (cm): 0 7  Percent debrided: 100%  Surface Area (cm^2): 0 78  Area debrided (cm^2): 0 78  Volume (cm^3): 0 55  Tissue and other material debrided: adipose and subcutaneous tissue  Devitalized tissue debrided: necrotic debris and slough  Instrument(s) utilized: curette  Bleeding: medium  Hemostasis obtained with: pressure  Procedural pain (0-10): 5  Post-procedural pain: 3   Response to treatment: procedure was tolerated well           Wound 12/07/22 Traumatic Ankle Anterior;Right (Active)   Wound Image Images linked 03/29/23 1032   Wound Description Slough;Granulation tissue 03/29/23 1006   Kathrin-wound Assessment Erythema;Edema 03/29/23 1006   Wound Length (cm) 1 4 cm 03/29/23 1006   Wound Width (cm) 1 cm 03/29/23 1006   Wound Depth (cm) 0 1 cm 03/29/23 1006   Wound Surface Area (cm^2) 1 4 cm^2 03/29/23 1006   Wound Volume (cm^3) 0 14 cm^3 03/29/23 1006   Calculated Wound Volume (cm^3) 0 14 cm^3 03/29/23 1006   Change in Wound Size % 30 03/29/23 1006   Drainage Amount Small 03/29/23 1006   Drainage Description Serosanguineous 03/29/23 1006   Non-staged Wound Description Full thickness 03/29/23 1006   Treatments Cleansed 03/29/23 1006       Wound 03/27/23 Surgical Pretibial Distal;Right (Active)   Wound Image Images linked 03/29/23 1032   Wound Description Slough;Granulation tissue; White 03/29/23 1004   Kathrin-wound Assessment Erythema;Edema;Black 03/29/23 1004   Wound Length (cm) 1 4 cm " 03/29/23 1004   Wound Width (cm) 0 9 cm 03/29/23 1004   Wound Depth (cm) 0 6 cm 03/29/23 1004   Wound Surface Area (cm^2) 1 26 cm^2 03/29/23 1004   Wound Volume (cm^3) 0 756 cm^3 03/29/23 1004   Calculated Wound Volume (cm^3) 0 76 cm^3 03/29/23 1004   Change in Wound Size % 69 6 03/29/23 1004   Undermining 1 03/29/23 1004   Undermining is depth extending from 12 to 12 03/29/23 1004   Drainage Amount Moderate 03/29/23 1004   Drainage Description Serosanguineous 03/29/23 1004   Non-staged Wound Description Full thickness 03/29/23 1004   Treatments Cleansed 03/29/23 1004   Wound packed? Yes 03/29/23 1004   Dressing Changed Changed 03/29/23 1004   Patient Tolerance Tolerated well 03/29/23 1004       Wound 03/29/23 Surgical Pretibial Right;Proximal (Active)   Wound Image Images linked 03/29/23 1031   Wound Description [facility] 03/29/23 1001   Kathrin-wound Assessment Edema; Erythema 03/29/23 1001   Wound Length (cm) 1 3 cm 03/29/23 1001   Wound Width (cm) 0 5 cm 03/29/23 1001   Wound Depth (cm) 0 7 cm 03/29/23 1001   Wound Surface Area (cm^2) 0 65 cm^2 03/29/23 1001   Wound Volume (cm^3) 0 455 cm^3 03/29/23 1001   Calculated Wound Volume (cm^3) 0 46 cm^3 03/29/23 1001   Undermining 0 9 03/29/23 1001   Undermining is depth extending from 12 to 12 03/29/23 1001   Drainage Amount Moderate 03/29/23 1001   Drainage Description Serosanguineous 03/29/23 1001   Non-staged Wound Description Full thickness 03/29/23 1001   Treatments Cleansed 03/29/23 1001   Wound packed?  Yes 03/29/23 1001   Dressing Changed Changed 03/29/23 1001   Patient Tolerance Tolerated well 03/29/23 1001   Dressing Status Intact 03/29/23 1001       Wound 12/07/22 Traumatic Ankle Anterior;Right (Active)   Date First Assessed/Time First Assessed: 12/07/22 1447   Primary Wound Type: Traumatic  Location: Ankle  Wound Location Orientation: Anterior;Right       Wound 03/27/23 Surgical Pretibial Distal;Right (Active)   Date First Assessed/Time First Assessed: 03/27/23 1611   Primary Wound Type: (c) Surgical  Location: Pretibial  Wound Location Orientation: Distal;Right       Wound 03/29/23 Surgical Pretibial Right;Proximal (Active)   Date First Assessed/Time First Assessed: 03/29/23 0959   Pre-Existing Wound: Yes  Primary Wound Type: Surgical  Location: Pretibial  Wound Location Orientation: Right;Proximal       [REMOVED] Wound 01/10/20 Neck Right (Removed)   Resolved Date/Resolved Time: 12/07/22 1446  Date First Assessed/Time First Assessed: 01/10/20 1215   Location: Neck  Wound Location Orientation: Right  Wound Description (Comments): [id number]  Wound Outcome: Other (Comment)       [REMOVED] Wound 01/10/20 Ankle Anterior;Right (Removed)   Resolved Date/Resolved Time: 12/07/22 1447  Date First Assessed/Time First Assessed: 01/10/20 1430   Pre-Existing Wound: Yes  Location: Ankle  Wound Location Orientation: Anterior;Right  Wound Description (Comments): non healing  Wound Outcome: Other     [REMOVED] Wound 11/09/20 Catheter entry/exit site Wrist Right (Removed)   Resolved Date/Resolved Time: 12/07/22 1446  Date First Assessed/Time First Assessed: 11/09/20 1238   Pre-Existing Wound: No  Traumatic Wound Type: Catheter entry/exit site  Location: Wrist  Wound Location Orientation: Right  Wound Outcome: Other (Comm    [REMOVED] Wound 12/07/20 Catheter entry/exit site Wrist Right (Removed)   Resolved Date/Resolved Time: 12/07/22 1446  Date First Assessed/Time First Assessed: 12/07/20 1207   Pre-Existing Wound: No  Traumatic Wound Type: Catheter entry/exit site  Location: Wrist  Wound Location Orientation: Right  Wound Description (Comment    Subjective:         3/29/2023: 77-year-old male presents for follow-up status post incision and drainage deep abscess right leg and debridement of right leg wound 2 days ago  Reports less swelling and pain but is somewhat distraught about having this type of wound care for    VNA did not contact him yesterday which was very disconcerting to him  3/27/2023: 17-year-old male presents with increasing pain/redness/swelling anterior aspect of right leg  Patient has been treating for a chronic wound on the anterior ankle within scar tissue from previous trauma  Reports going to ED on 3/24/2023 and getting antibiotics but is still having redness/swelling/pain/discomfort from the front of his leg  The following portions of the patient's history were reviewed and updated as appropriate: allergies, current medications, past family history, past medical history, past social history, past surgical history and problem list   Review of Systems   Constitutional: Negative for chills and fever  HENT: Negative for ear pain and sore throat  Eyes: Negative for pain and visual disturbance  Respiratory: Negative for cough and shortness of breath  Cardiovascular: Negative for chest pain and palpitations  Gastrointestinal: Negative for abdominal pain and vomiting  Genitourinary: Negative for dysuria and hematuria  Musculoskeletal: Negative for arthralgias and back pain  Skin: Positive for color change and wound (Anterior Right Ankle)  Negative for rash  Neurological: Negative for seizures and syncope  All other systems reviewed and are negative          Objective:     Wound 12/07/22 Traumatic Ankle Anterior;Right (Active)   Wound Image Images linked 03/29/23 1032   Wound Description Slough;Granulation tissue 03/29/23 1006   Kathrin-wound Assessment Erythema;Edema 03/29/23 1006   Wound Length (cm) 1 4 cm 03/29/23 1006   Wound Width (cm) 1 cm 03/29/23 1006   Wound Depth (cm) 0 1 cm 03/29/23 1006   Wound Surface Area (cm^2) 1 4 cm^2 03/29/23 1006   Wound Volume (cm^3) 0 14 cm^3 03/29/23 1006   Calculated Wound Volume (cm^3) 0 14 cm^3 03/29/23 1006   Change in Wound Size % 30 03/29/23 1006   Drainage Amount Small 03/29/23 1006   Drainage Description Serosanguineous 03/29/23 1006   Non-staged Wound Description Full thickness 03/29/23 1006   Treatments Cleansed 03/29/23 1006       Wound 03/27/23 Surgical Pretibial Distal;Right (Active)   Wound Image Images linked 03/29/23 1032   Wound Description Slough;Granulation tissue; White 03/29/23 1004   Kathrin-wound Assessment Erythema;Edema;Black 03/29/23 1004   Wound Length (cm) 1 4 cm 03/29/23 1004   Wound Width (cm) 0 9 cm 03/29/23 1004   Wound Depth (cm) 0 6 cm 03/29/23 1004   Wound Surface Area (cm^2) 1 26 cm^2 03/29/23 1004   Wound Volume (cm^3) 0 756 cm^3 03/29/23 1004   Calculated Wound Volume (cm^3) 0 76 cm^3 03/29/23 1004   Change in Wound Size % 69 6 03/29/23 1004   Undermining 1 03/29/23 1004   Undermining is depth extending from 12 to 12 03/29/23 1004   Drainage Amount Moderate 03/29/23 1004   Drainage Description Serosanguineous 03/29/23 1004   Non-staged Wound Description Full thickness 03/29/23 1004   Treatments Cleansed 03/29/23 1004   Wound packed? Yes 03/29/23 1004   Dressing Changed Changed 03/29/23 1004   Patient Tolerance Tolerated well 03/29/23 1004       Wound 03/29/23 Surgical Pretibial Right;Proximal (Active)   Wound Image Images linked 03/29/23 1031   Wound Description [facility] 03/29/23 1001   Kathrin-wound Assessment Edema; Erythema 03/29/23 1001   Wound Length (cm) 1 3 cm 03/29/23 1001   Wound Width (cm) 0 5 cm 03/29/23 1001   Wound Depth (cm) 0 7 cm 03/29/23 1001   Wound Surface Area (cm^2) 0 65 cm^2 03/29/23 1001   Wound Volume (cm^3) 0 455 cm^3 03/29/23 1001   Calculated Wound Volume (cm^3) 0 46 cm^3 03/29/23 1001   Undermining 0 9 03/29/23 1001   Undermining is depth extending from 12 to 12 03/29/23 1001   Drainage Amount Moderate 03/29/23 1001   Drainage Description Serosanguineous 03/29/23 1001   Non-staged Wound Description Full thickness 03/29/23 1001   Treatments Cleansed 03/29/23 1001   Wound packed?  Yes 03/29/23 1001   Dressing Changed Changed 03/29/23 1001   Patient Tolerance Tolerated well 03/29/23 1001   Dressing Status Intact 03/29/23 1001       /76   Pulse 84 Temp 97 5 °F (36 4 °C)   Resp 16     Physical Exam  Constitutional:       Appearance: Normal appearance  HENT:      Head: Normocephalic  Right Ear: Tympanic membrane normal       Left Ear: Tympanic membrane normal       Nose: No congestion  Mouth/Throat:      Pharynx: No oropharyngeal exudate or posterior oropharyngeal erythema  Eyes:      Pupils: Pupils are equal, round, and reactive to light  Cardiovascular:      Rate and Rhythm: Normal rate and regular rhythm  Pulses:           Dorsalis pedis pulses are 0 on the right side and 1+ on the left side  Posterior tibial pulses are 0 on the right side and 0 on the left side  Pulmonary:      Effort: Pulmonary effort is normal    Skin:     General: Skin is warm and dry  Capillary Refill: Capillary refill takes 2 to 3 seconds  Coloration: Skin is not pale  Findings: Erythema and wound (Anterior heel right ankle, 1 5 x 1 2 x 0 3 cm, 50% red, 50% yellow slough/fibrous base minimal s/s drainage, overall unchanged) present  No bruising, lesion or rash  Comments: Anterior right leg: x2 surgical incisions with deep subcutaneous depth, 1-2 cc of purulence expressed with removal of packing  Calor edema and localized cellulitis of the anterior right lower leg has improved  Exposed tibialis anterior tendon/muscle noted within the base of the abscess areas any necrotic margins with slough  Neurological:      General: No focal deficit present  Mental Status: He is alert  Cranial Nerves: No cranial nerve deficit  Sensory: No sensory deficit  Motor: No weakness        Gait: Gait normal       Deep Tendon Reflexes: Reflexes normal    Psychiatric:         Mood and Affect: Mood normal          Behavior: Behavior normal          Judgment: Judgment normal            Wound Instructions:  Orders Placed This Encounter   Procedures   • Wound cleansing and dressings     Wound cleansing and dressings      Wash your hands with soap and water  Remove old dressing, discard into plastic bag and place in trash  Cleanse the wounds  IRRIGATE WITH DILUTED BETADINE SOLUTION WITH BLUNT TIP NEEDLE 18 GAUGE  prior to applying a clean dressing  Do not use tissue or cotton balls  Do not scrub the wound  Pat dry using gauze  Shower No        Right anterior leg wounds (incised)  Pack with 1/4 iodoform packing strip and cover with adaptic  Cover with ABD pads and secure with Kerlix  Change every other day     Right anterior ankle wound:     Cleanse with NSS  Pat dry  Apply a nickel-thickness of Santyl and cover with ABD  Secure with Kerlix  Change every other day        Elevate legs using pillows at home  RETURN In 1 week     Standing Status:   Future     Standing Expiration Date:   3/29/2024   • Debridement     This order was created via procedure documentation   • Debridement     This order was created via procedure documentation   • Debridement     This order was created via procedure documentation   • Cam Boot     Order Specific Question:   Size     Answer:   Large     Order Specific Question:   Type     Answer:   High Tide     Order Specific Question: This patient has an active home care or hospice episode  Should this order be COMPLETED by [facility]? Answer:   No        Diagnosis ICD-10-CM Associated Orders   1  Open wound of right ankle, subsequent encounter  S91 001D lidocaine (XYLOCAINE) 4 % topical solution 5 mL     Wound cleansing and dressings     Cam Boot     Debridement      2  Abscess of right leg excluding foot  L02 415 lidocaine (XYLOCAINE) 4 % topical solution 5 mL     Wound cleansing and dressings     Debridement     Debridement      3   Peripheral vascular disease, unspecified ([facility] [address] )  I73 [id number]

## 2023-03-29 NOTE — ASSESSMENT & PLAN NOTE
Lab Results   Component Value Date    CREATININE 2 23 (H) 03/29/2023    CREATININE 1 44 (H) 02/25/2023    CREATININE 1 44 (H) 01/18/2023     Creatinine on admission 2 2, baseline 1 4-1 6  Suspect prerenal in the setting of depression, poor p o  intake, Maxzide use, lisinopril use  · Also reports using ~400mg ibuprofen BID  Urinated 250cc's clear yellow urine  PVR for 79mLs  UA with spec grav 1 025  Hold Maxide, hold lisinopril  Stop bactrim  Avoid NSAIDs    Give IVF  Follow BMP

## 2023-03-29 NOTE — PROGRESS NOTES
After visit today 3/29/2023, patient stopped at [facility] four times  First time looking for a place to purchase cam walker, encouraged to go to [facility] in [facility]  Verbalized understanding, purchased cam walker  Second time stopped in to reports he does not feel he can wear it, will slow him down too much  Explained it is meant to immobilize the leg and promote healing over his exposed tendon  Verbalized he will try to wear it  Third time reports he will not be able to wear the brace because he likes to be on the go and will prevent him from doing so  Also reports he feels he is getting depressed from his leg and does not understand why his leg got so bad  Encouraged him to go see his family doctor for something for depression  Stopped down and reported the office is closed, told him to go right to urgent care or the ER at [facility]  Reports he will stop at the urgent care right away  Also reported these visits to Dr Eura Lefort via tiger text

## 2023-03-30 ENCOUNTER — APPOINTMENT (INPATIENT)
Dept: NON INVASIVE DIAGNOSTICS | Facility: HOSPITAL | Age: 78
End: 2023-03-30

## 2023-03-30 LAB
ANION GAP SERPL CALCULATED.3IONS-SCNC: 8 MMOL/L (ref 4–13)
ATRIAL RATE: 76 BPM
BACTERIA WND AEROBE CULT: ABNORMAL
BASOPHILS # BLD AUTO: 0.02 THOUSANDS/ÂΜL (ref 0–0.1)
BASOPHILS NFR BLD AUTO: 0 % (ref 0–1)
BUN SERPL-MCNC: 23 MG/DL (ref 5–25)
CALCIUM SERPL-MCNC: 9 MG/DL (ref 8.4–10.2)
CHLORIDE SERPL-SCNC: 109 MMOL/L (ref 96–108)
CO2 SERPL-SCNC: 21 MMOL/L (ref 21–32)
CREAT SERPL-MCNC: 1.71 MG/DL (ref 0.6–1.3)
EOSINOPHIL # BLD AUTO: 0.35 THOUSAND/ÂΜL (ref 0–0.61)
EOSINOPHIL NFR BLD AUTO: 7 % (ref 0–6)
ERYTHROCYTE [DISTWIDTH] IN BLOOD BY AUTOMATED COUNT: 12.5 % (ref 11.6–15.1)
GFR SERPL CREATININE-BSD FRML MDRD: 37 ML/MIN/1.73SQ M
GLUCOSE SERPL-MCNC: 94 MG/DL (ref 65–140)
GRAM STN SPEC: ABNORMAL
HCT VFR BLD AUTO: 32.3 % (ref 36.5–49.3)
HGB BLD-MCNC: 10.4 G/DL (ref 12–17)
IMM GRANULOCYTES # BLD AUTO: 0.01 THOUSAND/UL (ref 0–0.2)
IMM GRANULOCYTES NFR BLD AUTO: 0 % (ref 0–2)
LYMPHOCYTES # BLD AUTO: 1.38 THOUSANDS/ÂΜL (ref 0.6–4.47)
LYMPHOCYTES NFR BLD AUTO: 26 % (ref 14–44)
MAGNESIUM SERPL-MCNC: 1.5 MG/DL (ref 1.9–2.7)
MCH RBC QN AUTO: 30.7 PG (ref 26.8–34.3)
MCHC RBC AUTO-ENTMCNC: 32.2 G/DL (ref 31.4–37.4)
MCV RBC AUTO: 95 FL (ref 82–98)
MONOCYTES # BLD AUTO: 0.36 THOUSAND/ÂΜL (ref 0.17–1.22)
MONOCYTES NFR BLD AUTO: 7 % (ref 4–12)
NEUTROPHILS # BLD AUTO: 3.25 THOUSANDS/ÂΜL (ref 1.85–7.62)
NEUTS SEG NFR BLD AUTO: 60 % (ref 43–75)
NRBC BLD AUTO-RTO: 0 /100 WBCS
P AXIS: 63 DEGREES
PLATELET # BLD AUTO: 186 THOUSANDS/UL (ref 149–390)
PMV BLD AUTO: 8.9 FL (ref 8.9–12.7)
POTASSIUM SERPL-SCNC: 4.5 MMOL/L (ref 3.5–5.3)
PR INTERVAL: 200 MS
QRS AXIS: 46 DEGREES
QRSD INTERVAL: 86 MS
QT INTERVAL: 368 MS
QTC INTERVAL: 414 MS
RBC # BLD AUTO: 3.39 MILLION/UL (ref 3.88–5.62)
SODIUM SERPL-SCNC: 138 MMOL/L (ref 135–147)
T WAVE AXIS: 58 DEGREES
UUN 24H UR-MCNC: 552 MG/DL
VANCOMYCIN SERPL-MCNC: 10.7 UG/ML (ref 10–20)
VENTRICULAR RATE: 76 BPM
WBC # BLD AUTO: 5.37 THOUSAND/UL (ref 4.31–10.16)

## 2023-03-30 RX ORDER — HYDROXYZINE HYDROCHLORIDE 25 MG/1
25 TABLET, FILM COATED ORAL EVERY 6 HOURS PRN
Status: DISCONTINUED | OUTPATIENT
Start: 2023-03-30 | End: 2023-03-31 | Stop reason: HOSPADM

## 2023-03-30 RX ORDER — VANCOMYCIN HYDROCHLORIDE 1 G/200ML
1000 INJECTION, SOLUTION INTRAVENOUS EVERY 24 HOURS
Status: DISCONTINUED | OUTPATIENT
Start: 2023-03-30 | End: 2023-03-31 | Stop reason: HOSPADM

## 2023-03-30 RX ORDER — ECHINACEA PURPUREA EXTRACT 125 MG
1 TABLET ORAL
Status: DISCONTINUED | OUTPATIENT
Start: 2023-03-30 | End: 2023-03-31 | Stop reason: HOSPADM

## 2023-03-30 RX ORDER — TRAZODONE HYDROCHLORIDE 50 MG/1
50 TABLET ORAL
Status: DISCONTINUED | OUTPATIENT
Start: 2023-03-30 | End: 2023-03-31 | Stop reason: HOSPADM

## 2023-03-30 RX ORDER — ACETAMINOPHEN 325 MG/1
325 TABLET ORAL ONCE
Status: COMPLETED | OUTPATIENT
Start: 2023-03-30 | End: 2023-03-30

## 2023-03-30 RX ORDER — ESCITALOPRAM OXALATE 10 MG/1
10 TABLET ORAL DAILY
Status: DISCONTINUED | OUTPATIENT
Start: 2023-03-30 | End: 2023-03-31 | Stop reason: HOSPADM

## 2023-03-30 RX ADMIN — Medication 6 MG: at 21:37

## 2023-03-30 RX ADMIN — FERROUS SULFATE TAB 325 MG (65 MG ELEMENTAL FE) 325 MG: 325 (65 FE) TAB at 09:18

## 2023-03-30 RX ADMIN — ACETAMINOPHEN 650 MG: 325 TABLET, FILM COATED ORAL at 09:22

## 2023-03-30 RX ADMIN — HYDROXYZINE HYDROCHLORIDE 25 MG: 25 TABLET, FILM COATED ORAL at 10:15

## 2023-03-30 RX ADMIN — TAMSULOSIN HYDROCHLORIDE 0.4 MG: 0.4 CAPSULE ORAL at 16:18

## 2023-03-30 RX ADMIN — HEPARIN SODIUM 5000 UNITS: 5000 INJECTION INTRAVENOUS; SUBCUTANEOUS at 05:12

## 2023-03-30 RX ADMIN — ATORVASTATIN CALCIUM 40 MG: 40 TABLET, FILM COATED ORAL at 17:26

## 2023-03-30 RX ADMIN — CEPHALEXIN 500 MG: 250 CAPSULE ORAL at 05:12

## 2023-03-30 RX ADMIN — COLLAGENASE SANTYL: 250 OINTMENT TOPICAL at 13:11

## 2023-03-30 RX ADMIN — ESCITALOPRAM OXALATE 10 MG: 10 TABLET ORAL at 14:33

## 2023-03-30 RX ADMIN — SODIUM CHLORIDE 100 ML/HR: 0.9 INJECTION, SOLUTION INTRAVENOUS at 05:12

## 2023-03-30 RX ADMIN — ONDANSETRON 4 MG: 2 INJECTION INTRAMUSCULAR; INTRAVENOUS at 09:52

## 2023-03-30 RX ADMIN — HYDROXYZINE HYDROCHLORIDE 25 MG: 25 TABLET, FILM COATED ORAL at 16:18

## 2023-03-30 RX ADMIN — CEPHALEXIN 500 MG: 250 CAPSULE ORAL at 21:37

## 2023-03-30 RX ADMIN — HEPARIN SODIUM 5000 UNITS: 5000 INJECTION INTRAVENOUS; SUBCUTANEOUS at 21:37

## 2023-03-30 RX ADMIN — METOPROLOL SUCCINATE 25 MG: 25 TABLET, EXTENDED RELEASE ORAL at 09:18

## 2023-03-30 RX ADMIN — FINASTERIDE 5 MG: 5 TABLET, FILM COATED ORAL at 09:18

## 2023-03-30 RX ADMIN — TRAZODONE HYDROCHLORIDE 50 MG: 50 TABLET ORAL at 21:37

## 2023-03-30 RX ADMIN — ACETAMINOPHEN 325 MG: 325 TABLET, FILM COATED ORAL at 17:42

## 2023-03-30 RX ADMIN — B-COMPLEX W/ C & FOLIC ACID TAB 1 TABLET: TAB at 09:18

## 2023-03-30 RX ADMIN — CEPHALEXIN 500 MG: 250 CAPSULE ORAL at 13:15

## 2023-03-30 RX ADMIN — VANCOMYCIN HYDROCHLORIDE 1000 MG: 1 INJECTION, SOLUTION INTRAVENOUS at 09:30

## 2023-03-30 RX ADMIN — PANTOPRAZOLE SODIUM 20 MG: 20 TABLET, DELAYED RELEASE ORAL at 05:12

## 2023-03-30 RX ADMIN — ACETAMINOPHEN 650 MG: 325 TABLET, FILM COATED ORAL at 16:18

## 2023-03-30 NOTE — ASSESSMENT & PLAN NOTE
Lab Results   Component Value Date    CREATININE 1 71 (H) 03/30/2023    CREATININE 2 23 (H) 03/29/2023    CREATININE 1 44 (H) 02/25/2023     Creatinine on admission 2 2, baseline 1 4-1 6  Suspect prerenal in the setting of depression, poor p o  intake, Maxzide use, lisinopril use  · Also reports using ~400mg ibuprofen BID  Urinated 250cc's clear yellow urine  PVR for 79mLs  UA with spec grav 1 025  Hold Maxide, hold lisinopril  Stop bactrim  Avoid NSAIDs    Improved to 1 7, continue to follow BMP daily

## 2023-03-30 NOTE — PROGRESS NOTES
Angelica Deng  is a 66 y o  male who is currently ordered Vancomycin IV with management by the Pharmacy Consult service  Relevant clinical data and objective / subjective history reviewed  Vancomycin Assessment:  Indication and Goal AUC/Trough: Soft tissue (goal -600, trough >10), -600, trough >10  Clinical Status: stable  Micro:     Renal Function:  SCr: 1 71 mg/dL (baseline 1 4-1 6)   CrCl: 41 1 mL/min  Renal replacement: Not on dialysis  Days of Therapy: 2  Current Dose: 1250mg daily prn when random vancomycin level < 20  Vancomycin Plan:  Dose changed to scheduled dosing of 1000mg IV Q24h from pulse dosing as Scr improved from 2 23 mg/dL yesterday to 1 71 today vs baseline of 1 4-1 6 mg/dL  Will continue to monitor renal function daily  New Dosinmg IV Q24H  Estimated AUC: 450 mcg*hr/mL  Estimated Trough: 14 9 mcg/mL  Next Level: with AM labs at 0600 on 23  Renal Function Monitoring: Daily BMP and [place] will continue to follow closely for s/sx of nephrotoxicity, infusion reactions and appropriateness of therapy  BMP and CBC will be ordered per protocol  We will continue to follow the patient’s culture results and clinical progress daily      Amber Fregoso, Pharmacist

## 2023-03-30 NOTE — H&P
[facility]  [facility]  Name: Wendy Feliz  MRN: [id number]  Unit/Bed#: -01 I Date of Admission: 3/29/2023   Date of Service: 3/29/2023 I Hospital Day: 0      Assessment/Plan   * Acute on chronic renal insufficiency  Assessment & Plan  Lab Results   Component Value Date    CREATININE 2 23 (H) 03/29/2023    CREATININE 1 44 (H) 02/25/2023    CREATININE 1 44 (H) 01/18/2023     Creatinine on admission 2 2, baseline 1 4-1 6  Suspect prerenal in the setting of depression, poor p o  intake, Maxzide use, lisinopril use  · Also reports using ~400mg ibuprofen BID  Urinated 250cc's clear yellow urine  PVR for 79mLs  UA with spec grav 1 025  Hold Maxide, hold lisinopril  Stop bactrim  Avoid NSAIDs  Give IVF  Follow BMP    Tylenol ingestion  Assessment & Plan  Acetaminophen level 12  ER discussed with toxicology since >10  No further testing/treatment required    Depression  Assessment & Plan  Reports feeling depressed due to non-healing R foot wound causing mobility issues, now instructed to wear a boot to temporarily immobilize his foot as he recovers from debridement which further limits his mobility  · Denies active SI/HI  · Attempt to wheel patient around hallways qshift  · Consult psychiatry  Appreciate consult      Abscess of right leg excluding foot  Assessment & Plan  S/p I&D with podiatry  On bactrim + Keflex (D5)  Wound culture from 3/27 growing staph aureus  Given HELIO, will switch him from bactrim to IV vancomycin   Continue follow up with podiatry    Atrial flutter, unspecified type ([address] )  Assessment & Plan  Rate control: Toprol XL 25mg daily  Anticoagulation: None, on ASA 81mg daily    Coronary artery disease involving native coronary artery  Assessment & Plan  S/p SANDRINE to LAD  Continue Toprol-XL, ASA 81mg daily, and statin    Hypertension  Assessment & Plan  On Maxzide, Toprol, and lisinopril  Hold lisinopril and Maxzide in setting of HELIO  IV labetalol PRN       VTE Pharmacologic Prophylaxis: VTE Score: 5 Moderate Risk (Score 3-4) - Pharmacological DVT Prophylaxis Ordered: heparin  Code Status: Level 1 - Full Code level 1 full code  Discussion with family: Patient declined call to   Anticipated Length of Stay: Patient will be admitted on an observation basis with an anticipated length of stay of less than 2 midnights secondary to HELIO  Total Time Spent on Date of Encounter in care of patient: 55 minutes This time was spent on one or more of the following: performing physical exam; counseling and coordination of care; obtaining or reviewing history; documenting in the medical record; reviewing/ordering tests, medications or procedures; communicating with other healthcare professionals and discussing with patient's family/caregivers  Chief Complaint: Depression    History of Present Illness:  Shahrzad Mercedes  is a 66 y o  male with a PMH of iron deficiency anemia, CAD, CVA, gout, HTN, chronic right foot wound who presents due to depression/irritability  Partly due to his lower extremity wound that is nonhealing, does not understand why  Partly due to the way oxycodone made him feel  On admission, he was found to have an HELIO with creatinine 2  2 dores report some difficulty urinating and poor water intake today particularly  PVR with 79mLs  UA with spec grav 1 025  Receiving IVF in ED  Additionally, he is currently on abx for leg cellulitis from his R foot wound, he has taken 5 of 7 days of antibiotics so far  Acetaminophen level was detectable at 12, therefore toxicology was consulted  No need for NAC/ further testing  Review of Systems:  Review of Systems   Constitutional: Negative for appetite change, chills, fatigue and fever  HENT: Negative for ear pain, sore throat and trouble swallowing  Eyes: Negative for visual disturbance  Respiratory: Negative for cough, chest tightness, shortness of breath and wheezing      Cardiovascular: Negative for chest pain, palpitations and leg swelling  Gastrointestinal: Negative for abdominal distention, abdominal pain, diarrhea, nausea and vomiting  Endocrine: Negative  Genitourinary: Positive for difficulty urinating  Negative for dysuria, flank pain and hematuria  Musculoskeletal: Negative for arthralgias, gait problem and myalgias  Skin: Negative for pallor  Allergic/Immunologic: Negative for immunocompromised state  Neurological: Negative for dizziness, syncope, light-headedness, numbness and headaches  Psychiatric/Behavioral: Positive for dysphoric mood         Past Medical and Surgical History:   Past Medical History:   Diagnosis Date   • Anemia     iron def   • Arthritis    • Cerebrovascular accident (CVA) ([facility] [address] ) 1/7/2020   • Depression 3/29/2023   • GERD (gastroesophageal reflux disease)    • Gout    • Hypertension    • Insomnia        Past Surgical History:   Procedure Laterality Date   • ANKLE SURGERY Right    • CARDIAC CATHETERIZATION     • COLONOSCOPY  01/25/2013    polypectomy; complete - 3 yrs d/t polyp - benign submucosal lipoma- path c/w lipoma   • COLONOSCOPY  04/25/2017    complete - tubular adenoma - repeat 5yrs   • CYSTOSCOPY     • CYSTOTOMY      bladder  w/ basket extraction of calculus   • FEMUR FRACTURE SURGERY     • HERNIA REPAIR      inguinal ; sliding   • INGUINAL HERNIA REPAIR Right     unilateral   • KNEE ARTHROSCOPY Right     w/medial menisectomy   • LASIK      corneal   • LITHOTRIPSY      renal   • WA COLONOSCOPY FLX DX W/COLLJ SPEC WHEN PFRMD N/A 4/25/2017    Procedure: SCREENING COLONOSCOPY;  Surgeon: Mike Masters MD;  Location:  MAIN OR;  Service: General   • WA TEAEC W/PATCH GRF CAROTID VERTB SUBCLAV NECK INC Right 1/10/2020    Procedure: ENDARTERECTOMY ARTERY CAROTID;  Surgeon: Sharon Butcher MD;  Location:  MAIN OR;  Service: Vascular   • ROTATOR CUFF REPAIR Bilateral    • TONSILLECTOMY         Meds/Allergies:  Prior to Admission medications Medication Sig Start Date End Date Taking?  Authorizing Provider   Ascorbic Acid (VITAMIN C IMMUNE HEALTH PO) Take by mouth daily    Historical Provider, MD   aspirin (ECOTRIN LOW STRENGTH) 81 mg EC tablet Take 81 mg by mouth every other day 1 every other day    Historical Provider, MD   atorvastatin (LIPITOR) 40 mg tablet Take 1 tablet (40 mg total) by mouth every evening 9/22/22   Mary Jo Prakash MD   calcium polycarbophil (FIBERCON) 625 mg tablet Take 625 mg by mouth daily    Historical Provider, MD   cephalexin (KEFLEX) 500 mg capsule Take 1 capsule (500 mg total) by mouth every 6 (six) hours for 7 days 3/24/23 3/31/23  Hilary Bach MD   clotrimazole-betamethasone (LOTRISONE) 1-0 05 % cream apply to affected area twice a day for 10 days MAX 2/9/23   Yeyo Parker DO   collagenase (SANTYL) ointment Apply topically daily 3/27/23   Howie Andujar DPM   ferrous sulfate 324 (65 Fe) mg Take 1 tablet (324 mg total) by mouth daily before breakfast 11/13/20   Yeyo Parker DO   finasteride (PROSCAR) 5 mg tablet Take 1 tablet (5 mg total) by mouth daily 10/11/22   BLAS Walls   lisinopril (ZESTRIL) 10 mg tablet take 1 tablet by mouth once daily 10/20/22   Yeyo Parker DO   Maxidex 0 1 % ophthalmic suspension instill 1 drop into both eyes twice a day for 2 weeks then 1 drop into both eyes once daily 3/11/23   Historical Provider, MD   metoprolol succinate (TOPROL-XL) 25 mg 24 hr tablet take 1 tablet by mouth once daily 2/20/23   Mary Jo Prakash MD   Multiple Vitamin (MULTIVITAMIN) capsule Take 1 capsule by mouth daily    Historical Provider, MD   neomycin-polymyxin-dexamethasone (MAXITROL) ophthalmic suspension  12/1/22   Historical Provider, MD   nitroglycerin (NITROSTAT) 0 4 mg SL tablet Place 1 tablet (0 4 mg total) under the tongue every 5 (five) minutes as needed for chest pain 10/14/20   Mary Jo Prakash MD   omeprazole (PriLOSEC) 20 mg delayed release capsule take 1 capsule by mouth once daily 22   Alma Fatima MD   oxyCODONE (ROXICODONE) 10 MG TABS Take 0 5 tablets (5 mg total) by mouth every 6 (six) hours as needed for moderate pain Max Daily Amount: 20 mg 23   Edilia Harding DO   oxyCODONE-acetaminophen (Percocet) 5-325 mg per tablet Take 1 tablet by mouth every 6 (six) hours as needed for moderate pain for up to 7 days Max Daily Amount: 4 tablets 3/27/23 4/3/23  Ira April, DPM   Potassium Citrate ER 15 MEQ (1620 MG) TBCR Take 2 tablets by mouth 2 (two) times a day 21   Marj Schultz DO   sulfamethoxazole-trimethoprim (BACTRIM DS) 800-160 mg per tablet Take 1 tablet by mouth 2 (two) times a day for 7 days smx-tmp DS (BACTRIM) 800-160 mg tabs (1tab q12 D10) 3/24/23 3/31/23  Zulay Roy MD   tamsulosin (FLOMAX) 0 4 mg Take 1 capsule (0 4 mg total) by mouth daily with dinner 10/11/22   BLAS Caba   triamterene-hydrochlorothiazide (MAXZIDE) 75-50 MG per tablet take 1 tablet by mouth once daily 22   Edilia Harding DO     I have reviewed home medications with patient personally  Allergies: No Known Allergies    Social History:  Marital Status:     Occupation: Noncontributory  Patient Pre-hospital Living Situation: Home  Patient Pre-hospital Level of Mobility: walks  Patient Pre-hospital Diet Restrictions: None  Substance Use History:   Social History     Substance and Sexual Activity   Alcohol Use Not Currently    Comment: stopped drinking alcohol     Social History     Tobacco Use   Smoking Status Former   • Packs/day: 1 00   • Years:  00   • Pack years:    • Types: Cigarettes   • Start date: 0   • Quit date: 1985   • Years since quittin 2   Smokeless Tobacco Never     Social History     Substance and Sexual Activity   Drug Use No       Family History:  Family History   Problem Relation Age of Onset   • Alzheimer's disease Mother    • Alzheimer's disease Father    • Heart disease Father         CAD   • Other Father "       prediabetes   • Diabetes Father    • Breast cancer Other    • Arthritis Family    • Hypertension Family        Physical Exam:     Vitals:   Blood Pressure: 164/81 (03/29/23 2009)  Pulse: 77 (03/29/23 2009)  Temperature: 97 9 °F (36 6 °C) (03/29/23 2009)  Temp Source: Oral (03/29/23 1607)  Respirations: 16 (03/29/23 2009)  Height: 5' 10\" (177 8 cm) (03/29/23 1606)  Weight - Scale: 95 3 kg (210 lb) (03/29/23 1606)  SpO2: 98 % (03/29/23 2009)    Physical Exam  Vitals and nursing note reviewed  Constitutional:       Appearance: Normal appearance  HENT:      Head: Normocephalic and atraumatic  Mouth/Throat:      Mouth: Mucous membranes are moist       Pharynx: Oropharynx is clear  No oropharyngeal exudate  Eyes:      Extraocular Movements: Extraocular movements intact  Cardiovascular:      Rate and Rhythm: Normal rate and regular rhythm  Pulses: Normal pulses  Heart sounds: Normal heart sounds  No murmur heard  No friction rub  No gallop  Pulmonary:      Effort: Pulmonary effort is normal  No respiratory distress  Breath sounds: Normal breath sounds  No stridor  No wheezing or rales  Abdominal:      General: Abdomen is flat  Bowel sounds are normal  There is no distension  Palpations: Abdomen is soft  Tenderness: There is no abdominal tenderness  Musculoskeletal:      Right lower leg: No edema  Left lower leg: No edema  Skin:     General: Skin is warm and dry  Comments: R leg wound wrapped   Neurological:      General: No focal deficit present  Mental Status: He is alert and oriented to person, place, and time           Additional Data:     Lab Results:  Results from last 7 days   Lab Units 03/29/23  1700   WBC Thousand/uL 6 88   HEMOGLOBIN g/dL 11 5*   HEMATOCRIT % 36 1*   PLATELETS Thousands/uL 191   NEUTROS PCT % 76*   LYMPHS PCT % 15   MONOS PCT % 6   EOS PCT % 3     Results from last 7 days   Lab Units 03/29/23  1700   SODIUM mmol/L 138 " POTASSIUM mmol/L 4 8   CHLORIDE mmol/L 106   CO2 mmol/L 22   BUN mg/dL 32*   CREATININE mg/dL 2 23*   ANION GAP mmol/L 10   CALCIUM mg/dL 9 1   ALBUMIN g/dL 4 0   TOTAL BILIRUBIN mg/dL 0 35   ALK PHOS U/L 41   ALT U/L 14   AST U/L 20   GLUCOSE RANDOM mg/dL 95                       Lines/Drains:  Invasive Devices     Peripheral Intravenous Line  Duration           Peripheral IV 03/29/23 Left Antecubital <1 day                    Imaging: No pertinent imaging reviewed  No orders to display       EKG and Other Studies Reviewed on Admission:   · EKG: NSR  HR 76     ** Please Note: This note has been constructed using a voice recognition system   **

## 2023-03-30 NOTE — PLAN OF CARE
Problem: PAIN - ADULT  Goal: Verbalizes/displays adequate comfort level or baseline comfort level  Description: Interventions:  - Encourage patient to monitor pain and request assistance  - Assess pain using appropriate pain scale  - Administer analgesics based on type and severity of pain and evaluate response  - Implement non-pharmacological measures as appropriate and evaluate response  - Consider cultural and social influences on pain and pain management  - Notify physician/advanced practitioner if interventions unsuccessful or patient reports new pain  Outcome: Progressing     Problem: INFECTION - ADULT  Goal: Absence or prevention of progression during hospitalization  Description: INTERVENTIONS:  - Assess and monitor for signs and symptoms of infection  - Monitor lab/diagnostic results  - Monitor all insertion sites, i e  indwelling lines, tubes, and drains  - Monitor endotracheal if appropriate and nasal secretions for changes in amount and color  - [place] appropriate cooling/warming therapies per order  - Administer medications as ordered  - Instruct and encourage patient and family to use good hand hygiene technique  - Identify and instruct in appropriate isolation precautions for identified infection/condition  Outcome: Progressing     Problem: SAFETY ADULT  Goal: Patient will remain free of falls  Description: INTERVENTIONS:  - Educate patient/family on patient safety including physical limitations  - Instruct patient to call for assistance with activity   - Consult OT/PT to assist with strengthening/mobility   - Keep Call bell within reach  - Keep bed low and locked with side rails adjusted as appropriate  - Keep care items and personal belongings within reach  - Initiate and maintain comfort rounds  - Make Fall Risk Sign visible to staff  - Offer Toileting every 2 Hours, in advance of need  - Obtain necessary fall risk management equipment: yellow socks and bracelet  - Apply yellow socks and bracelet for high fall risk patients  - Consider moving patient to room near nurses station  Outcome: Progressing     Problem: DISCHARGE PLANNING  Goal: Discharge to home or other facility with appropriate resources  Description: INTERVENTIONS:  - Identify barriers to discharge w/patient and caregiver  - Arrange for needed discharge resources and transportation as appropriate  - Identify discharge learning needs (meds, wound care, etc )  - Arrange for interpretive services to assist at discharge as needed  - Refer to Case Management Department for coordinating discharge planning if the patient needs post-hospital services based on physician/advanced practitioner order or complex needs related to functional status, cognitive ability, or social support system  Outcome: Progressing     Problem: Knowledge Deficit  Goal: Patient/family/caregiver demonstrates understanding of disease process, treatment plan, medications, and discharge instructions  Description: Complete learning assessment and assess knowledge base    Interventions:  - Provide teaching at level of understanding  - Provide teaching via preferred learning methods  Outcome: Progressing     Problem: GENITOURINARY - ADULT  Goal: Maintains or returns to baseline urinary function  Description: INTERVENTIONS:  - Assess urinary function  - Encourage oral fluids to ensure adequate hydration if ordered  - Administer IV fluids as ordered to ensure adequate hydration  - Administer ordered medications as needed  - Offer frequent toileting  - Follow urinary retention protocol if ordered  Outcome: Progressing     Problem: METABOLIC, FLUID AND ELECTROLYTES - ADULT  Goal: Electrolytes maintained within normal limits  Description: INTERVENTIONS:  - Monitor labs and assess patient for signs and symptoms of electrolyte imbalances  - Administer electrolyte replacement as ordered  - Monitor response to electrolyte replacements, including repeat lab results as appropriate  - Instruct patient on fluid and nutrition as appropriate  Outcome: Progressing     Problem: HEMATOLOGIC - ADULT  Goal: Maintains hematologic stability  Description: INTERVENTIONS  - Assess for signs and symptoms of bleeding or hemorrhage  - Monitor labs  - Administer supportive blood products/factors as ordered and appropriate  Outcome: Progressing

## 2023-03-30 NOTE — TELEMEDICINE
TeleConsultation - [address]  66 y o  male MRN: [id number]  Unit/Bed#: -01 Encounter: [id number]        REQUIRED DOCUMENTATION:     1  This service was provided via Telemedicine  2  Provider located at [facility]   3  TeleMed provider: Sunita Mina MD   4  Identify all parties in room with patient during tele consult:  pt  5  Patient was then informed that this was a Telemedicine visit and that the exam was being conducted confidentially over secure lines  My office door was closed  No one else was in the room  Patient acknowledged consent and understanding of privacy and security of the Telemedicine visit, and gave us permission to have the assistant stay in the room in order to assist with the history and to conduct the exam   I informed the patient that I have reviewed their record in Epic and presented the opportunity for them to ask any questions regarding the visit today  The patient agreed to participate  Assessment/Plan     Present on Admission:  • Atrial flutter, unspecified type ([address] )  • Coronary artery disease involving native coronary artery  • Hypertension  • Abscess of right leg excluding foot    Assessment:    Unspecified mood disorder; rule out major depression; rule out adjustment disorder with depressed mood    Treatment Plan:    Upon medical clearance, recommend inpatient psychiatric treatment for further diagnostic evaluation and treatment stabilization  The patient is in agreement with this plan and is appropriate to sign 201 voluntary paperwork  If he were to change his mind about this, he should have reassessment by psychiatry to see if 302 is warranted  Currently, he does not appear to meet criteria for 302  In the meantime recommend Lexapro 10 mg p o  daily for depression and anxiety  Recommend discontinuation of melatonin as the patient reports that has not been effective    Replace with trazodone 50 mg p o  nightly for insomnia, anxiety and depression  The patient is in agreement with these medications and gives his informed consent  Suicide precautions are not indicated as the patient reports he has not had any suicidal plan or intent nor is he experiencing current suicidal ideation  Reconsult psychiatry as needed  Attempt to avoid Benadryl and other anticholinergic agents which may promote cognitive dysfunction      Recommend Delirium Precautions:  Maintain sleep-wake cycle, avoid nighttime interruptions  Provide adequate pain control  Avoid urinary retention and constipation  Provide frequent and early mobilization  Provide frequent redirection and reorientation as needed  Avoid medications that may worsen or precipitate delirium such as tramadol, benzodiazepines, anticholinergics, and Benadryl  Redirect unwanted behaviors as first-line therapy, avoid physical restraints as able to  Continue to monitor    Current Medications:     Current Facility-Administered Medications   Medication Dose Route Frequency Provider Last Rate   • acetaminophen  650 mg Oral Q6H PRN Elis Monet PA-C     • aluminum-magnesium hydroxide-simethicone  30 mL Oral Q6H PRN Elis Monet PA-C     • aspirin  81 mg Oral Every Other Day Elis Monet PA-C     • atorvastatin  40 mg Oral QPM Elis Monet PA-C     • cephalexin  500 mg Oral [facility] Radhames Bray     • collagenase   Topical Daily Toy Broad, DPM     • ferrous sulfate  325 mg Oral Daily With Breakfast Elis Monet PA-C     • finasteride  5 mg Oral Daily Elis Monet PA-C     • heparin (porcine)  5,000 Units Subcutaneous [address]     • hydrOXYzine HCL  25 mg Oral Q6H PRN Elis Monet PA-C     • labetalol  10 mg Intravenous Q6H PRN Elis Monet PA-C      Or   • labetalol  20 mg Intravenous Q6H PRN Elis Monet PA-C     • melatonin  6 mg Oral HS Jess Thomas PA-C     • metoprolol succinate  25 mg Oral Daily Elis Monet PA-C     • "multivitamin stress formula  1 tablet Oral Daily Suzette Almanza PA-C     • ondansetron  4 mg Intravenous Q6H PRN Suzette Almanza PA-C     • pantoprazole  20 mg Oral Early Morning Suzette Almanza PA-C     • sodium chloride  1 spray Each Nare Q1H PRN Mitar Hills PA-C     • tamsulosin  0 4 mg Oral Daily With [facility] DIEGO SILVEIRA PA-C     • vancomycin  1,000 mg Intravenous Q24H Nanci Harmon MD 1,000 mg (03/30/23 0930)       Risks / Benefits of Treatment:    Risks, benefits, and possible side effects of medications explained to patient and patient verbalizes understanding  Other treatment modalities recommended as indicated:    · Inpatient psychiatric treatment      Inpatient consult to Podiatry  Consult performed by: Addi Yepez MD  Consult ordered by: Suzette Almanza PA-C        Physician Requesting Consult: Nanci Harmon MD  Principal Problem:Acute on chronic renal insufficiency    Reason for Consult: depression      History of Present Illness      Patient is a 66 y o  male   Depression        Patient presents to the ED with c/o depression that began today, states he was told to wear a boot for his wounds on his foot and got upset, states he was told to come over by wound care because he is depressed today  When asked if he is suicidal, he states \"no I just do not know what to do\"        Hx from patient and records - Patient complains of feeling upset today after taking Percocet, 1 tablet at 2 pm   He was just prescribed this a couple days ago for his leg pain  He has taken Tylenol but didn't have enough relief  Ibuprofen has helped in the past   He was at Dr Isidoro Yanes office today  He feels depressed since he was told to wear a boot on his leg and now he won't be able to drive  He had 10 minute episode that he thought about taking a bunch of pills to kill himself  He currently denies SI/ HI and no hallucinations  He thinks it is the medicine making him feel this way    He " also has itching of his hands and feels nervous  He wants to walk around but I recommended that he sit still in the ER here               Prior to Admission Medications   Prescriptions Last Dose Informant Patient Reported? Taking?    Ascorbic Acid (VITAMIN C IMMUNE HEALTH PO)     Yes No   Sig: Take by mouth daily   Maxidex 0 1 % ophthalmic suspension     Yes No   Sig: instill 1 drop into both eyes twice a day for 2 weeks then 1 drop into both eyes once daily   Multiple Vitamin (MULTIVITAMIN) capsule     Yes No   Sig: Take 1 capsule by mouth daily   Potassium Citrate ER 15 MEQ (1620 MG) TBCR     No No   Sig: Take 2 tablets by mouth 2 (two) times a day   aspirin (ECOTRIN LOW STRENGTH) 81 mg EC tablet     Yes No   Sig: Take 81 mg by mouth every other day 1 every other day   atorvastatin (LIPITOR) 40 mg tablet     No No   Sig: Take 1 tablet (40 mg total) by mouth every evening   calcium polycarbophil (FIBERCON) 625 mg tablet     Yes No   Sig: Take 625 mg by mouth daily   cephalexin (KEFLEX) 500 mg capsule     No No   Sig: Take 1 capsule (500 mg total) by mouth every 6 (six) hours for 7 days   clotrimazole-betamethasone (LOTRISONE) 1-0 05 % cream     No No   Sig: apply to affected area twice a day for 10 days MAX   collagenase (SANTYL) ointment     No No   Sig: Apply topically daily   ferrous sulfate 324 (65 Fe) mg     No No   Sig: Take 1 tablet (324 mg total) by mouth daily before breakfast   finasteride (PROSCAR) 5 mg tablet     No No   Sig: Take 1 tablet (5 mg total) by mouth daily   lisinopril (ZESTRIL) 10 mg tablet     No No   Sig: take 1 tablet by mouth once daily   metoprolol succinate (TOPROL-XL) 25 mg 24 hr tablet     No No   Sig: take 1 tablet by mouth once daily   neomycin-polymyxin-dexamethasone (MAXITROL) ophthalmic suspension     Yes No   nitroglycerin (NITROSTAT) 0 4 mg SL tablet     No No   Sig: Place 1 tablet (0 4 mg total) under the tongue every 5 (five) minutes as needed for chest pain   omeprazole (PriLOSEC) 20 mg delayed release capsule     No No   Sig: take 1 capsule by mouth once daily   oxyCODONE (ROXICODONE) 10 MG TABS     No No   Sig: Take 0 5 tablets (5 mg total) by mouth every 6 (six) hours as needed for moderate pain Max Daily Amount: 20 mg   oxyCODONE-acetaminophen (Percocet) 5-325 mg per tablet     No No   Sig: Take 1 tablet by mouth every 6 (six) hours as needed for moderate pain for up to 7 days Max Daily Amount: 4 tablets   sulfamethoxazole-trimethoprim (BACTRIM DS) 800-160 mg per tablet     No No   Sig: Take 1 tablet by mouth 2 (two) times a day for 7 days smx-tmp DS (BACTRIM) 800-160 mg tabs (1tab q12 D10)   tamsulosin (FLOMAX) 0 4 mg     No No   Sig: Take 1 capsule (0 4 mg total) by mouth daily with dinner   triamterene-hydrochlorothiazide (MAXZIDE) 75-50 MG per tablet     No No   Sig: take 1 tablet by mouth once daily      Facility-Administered Medications Last Administration Doses Remaining   lidocaine (XYLOCAINE) 4 % topical solution 5 mL 3/29/2023 10:11 AM 0            Medical History[]Expand by Default        Past Medical History:   Diagnosis Date   • Anemia       iron def   • Arthritis     • Cerebrovascular accident (CVA) ([facility] [address] ) 1/7/2020   • Depression 3/29/2023   • GERD (gastroesophageal reflux disease)     • Gout     • Hypertension     • Insomnia              Surgical History[]Expand by Default         Past Surgical History:   Procedure Laterality Date   • ANKLE SURGERY Right     • CARDIAC CATHETERIZATION       • COLONOSCOPY   01/25/2013     polypectomy; complete - 3 yrs d/t polyp - benign submucosal lipoma- path c/w lipoma   • COLONOSCOPY   04/25/2017     complete - tubular adenoma - repeat 5yrs   • CYSTOSCOPY       • CYSTOTOMY         bladder  w/ basket extraction of calculus   • FEMUR FRACTURE SURGERY       • HERNIA REPAIR         inguinal ; sliding   • INGUINAL HERNIA REPAIR Right       unilateral   • KNEE ARTHROSCOPY Right       w/medial menisectomy   • LASIK         corneal   • LITHOTRIPSY         renal   • [place] COLONOSCOPY FLX DX W/COLLJ SPEC WHEN PFRMD N/A 2017     Procedure: SCREENING COLONOSCOPY;  Surgeon: Mirian Peñaloza MD;  Location:  MAIN OR;  Service: General   • WV TEAEC W/PATCH GRF CAROTID VERTB Kanwal Right 1/10/2020     Procedure: ENDARTERECTOMY ARTERY CAROTID;  Surgeon: Kristen Sahu MD;  Location:  MAIN OR;  Service: Vascular   • ROTATOR CUFF REPAIR Bilateral     • TONSILLECTOMY                Family History[]Expand by Default         Family History   Problem Relation Age of Onset   • Alzheimer's disease Mother     • Alzheimer's disease Father     • Heart disease Father           CAD   • Other Father           prediabetes   • Diabetes Father     • Breast cancer Other     • Arthritis Family     • Hypertension Family           I have reviewed and agree with the history as documented            E-Cigarette/Vaping   • E-Cigarette Use Never User              E-Cigarette/Vaping Substances   • Nicotine No     • THC No     • CBD No     • Flavoring No     • Other No     • Unknown No        Social History            Tobacco Use   • Smoking status: Former       Packs/day:        Years: 22        Pack years:        Types: Cigarettes       Start date: 0       Quit date: 1985       Years since quittin 2   • Smokeless tobacco: Never   Vaping Use   • Vaping Use: Never used   Substance Use Topics   • Alcohol use: Not Currently       Comment: stopped drinking alcohol   • Drug use: No         Review of Systems   Constitutional: Negative for chills and fever  HENT: Negative for rhinorrhea and sore throat  Respiratory: Negative for shortness of breath  Cardiovascular: Negative for chest pain  Gastrointestinal: Negative for constipation, diarrhea, nausea and vomiting  Genitourinary: Negative for dysuria and frequency  Skin: Positive for rash  Psychiatric/Behavioral: Negative for hallucinations and suicidal ideas   The patient is "nervous/anxious  All other systems reviewed and are negative  Nursing reports that during this hospital course the patient has been episodically stating that he wanted to take pills \"to end it all\"  Nursing reports that he has made that statement 3 times today  In meeting with the patient he reports he has been gradually becoming more depressed over the last 4 months since he has had to wear a boot due to his leg wounds that prevents him from being able to drive  He has experienced a loss of independence and a loss of many of his coping skills which has included regular attendance in [place] [id number] which he can no longer do since he is not able to drive  He states that for the first time ever, last week he experienced thoughts of wanting to take an overdose to \"end it all\"  He has been having these thoughts occasionally and finds that his very concerning as he has never had these thoughts before  Past psychiatric history: No prior psychiatric treatment per patient    Social history: The patient is   His wife passed away in 2020  He states he began to drink heavily back in the 1990s when his wife was diagnosed with breast cancer  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  Patient has 1 daughter  He reports no abuse  Family history: Unremarkable    Substance use history: As above    Mental status examination: Patient is alert and well oriented in all spheres  Affect is depressed  He is pleasant and cooperative with assessment  Made good eye contact  Speech is unremarkable  Thought process is logical and linear  Thought content is reality based  Associations are tight  Memory is intact in all spheres  He appears of average intelligence/use vocabulary, general fund of knowledge, sentence structure and syntax  Given history of episodic suicidal ideation with plan to overdose  He reports he feels safe in the hospital   He denies homicidal ideation    He denies " hallucinations or other psychotic features  Insight and judgment are intact and extensively recognizes the need for inpatient psychiatric treatment for which she is motivated  Past Medical History:   Diagnosis Date   • Anemia     iron def   • Arthritis    • Cerebrovascular accident (CVA) ([facility] [address] ) 1/7/2020   • Depression 3/29/2023   • GERD (gastroesophageal reflux disease)    • Gout    • Hypertension    • Insomnia        Medical Review Of Systems:    Review of Systems    Meds/Allergies     all current active meds have been reviewed  No Known Allergies    Objective     Vital signs in last 24 hours:  Temp:  [97 5 °F (36 4 °C)-98 5 °F (36 9 °C)] 98 °F (36 7 °C)  HR:  [] 75  Resp:  [14-18] 18  BP: (131-180)/(56-81) 137/56      Intake/Output Summary (Last 24 hours) at 3/30/2023 1349  Last data filed at 3/30/2023 1525  Gross per 24 hour   Intake --   Output 1350 ml   Net -1350 ml         Lab Results: I have personally reviewed all pertinent laboratory/tests results  Imaging Studies: No results found  EKG/Pathology/Other Studies:   Lab Results   Component Value Date    VENTRATE 76 03/29/2023    ATRIALRATE 76 03/29/2023    PRINT 200 03/29/2023    QRSDINT 86 03/29/2023    QTINT 368 03/29/2023    QTCINT 414 03/29/2023    PAXIS 63 03/29/2023    QRSAXIS 46 03/29/2023    TWAVEAXIS 58 03/29/2023        Code Status: Level 1 - Full Code  Advance Directive and Living Will:      Power of :    POLST:      Screenings:    1  Nutrition Screening  · Not available on chart    2  Pain Screening  Not available on chart    3  Suicide Screening  Not available on chart    Counseling / Coordination of Care: Total floor / unit time spent today 30 minutes  Greater than 50% of total time was spent with the patient and / or family counseling and / or coordination of care   A description of the counseling / coordination of care: Chart review, patient valuation, coordination communication with staff, nursing and provider

## 2023-03-30 NOTE — ASSESSMENT & PLAN NOTE
Acetaminophen level 12  ER discussed with toxicology since >10  No further testing/treatment required

## 2023-03-30 NOTE — PROGRESS NOTES
[place]  Progress Note  Name: Nancy Mcfadden  MRN: [id number]  Unit/Bed#: -01 I Date of Admission: 3/29/2023   Date of Service: 3/30/2023 I Hospital Day: 0    Assessment/Plan   * Acute on chronic renal insufficiency  Assessment & Plan  Lab Results   Component Value Date    CREATININE 1 71 (H) 03/30/2023    CREATININE 2 23 (H) 03/29/2023    CREATININE 1 44 (H) 02/25/2023     Creatinine on admission 2 2, baseline 1 4-1 6  Suspect prerenal in the setting of depression, poor p o  intake, Maxzide use, lisinopril use  · Also reports using ~400mg ibuprofen BID  Urinated 250cc's clear yellow urine  PVR for 79mLs  UA with spec grav 1 025  Hold Maxide, hold lisinopril  Stop bactrim  Avoid NSAIDs  Improved to 1 7, continue to follow BMP daily    Tylenol ingestion  Assessment & Plan  Acetaminophen level 12  ER discussed with toxicology since >10  No further testing/treatment required    Depression  Assessment & Plan  Reports feeling depressed due to non-healing R foot wound causing mobility issues, now instructed to wear a boot to temporarily immobilize his foot as he recovers from debridement which further limits his mobility  · Denies active SI/HI  · Attempt to wheel patient around hallways qshift  · Consult psychiatry  Appreciate consult    · Recommend starting him on Lexapro 10mg daily and trazodone 50mg qHS at night    Abscess of right leg excluding foot  Assessment & Plan  S/p I&D with podiatry  PTA, was on bactrim + Keflex (D5)  Wound culture from 3/27 growing staph aureus  Given HELIO, will switch him from bactrim to IV vancomycin (D1)  Continue follow up with podiatry  Plan for vascular arterial U/S tomorrow    Atrial flutter, unspecified type ([facility] [address] )  Assessment & Plan  Rate control: Toprol XL 25mg daily  Anticoagulation: None, on ASA 81mg daily    Coronary artery disease involving native coronary artery  Assessment & Plan  S/p SANDRINE to LAD  Continue Toprol-XL, ASA 81mg daily, and statin    Hypertension  Assessment & Plan  On Maxzide, Toprol, and lisinopril  Hold lisinopril and Maxzide in setting of HELIO  IV labetalol PRN             VTE Pharmacologic Prophylaxis: VTE Score: 5 High Risk (Score >/= 5) - Pharmacological DVT Prophylaxis Ordered: heparin  Sequential Compression Devices Ordered  Patient Centered Rounds: I performed bedside rounds with nursing staff today  Discussions with Specialists or Other Care Team Provider: Podiatry    Education and Discussions with Family / Patient: Updated  (daughter) via phone  Total Time Spent on Date of Encounter in care of patient: 45 minutes This time was spent on one or more of the following: performing physical exam; counseling and coordination of care; obtaining or reviewing history; documenting in the medical record; reviewing/ordering tests, medications or procedures; communicating with other healthcare professionals and discussing with patient's family/caregivers  Current Length of Stay: 0 day(s)  Current Patient Status: Inpatient   Certification Statement: The patient will continue to require additional inpatient hospital stay due to Monitoring renal function, vascular ultrasound tomorrow  Discharge Plan: Anticipate discharge tomorrow to home  Code Status: Level 1 - Full Code    Subjective:   No acute events overnight  Objective:     Vitals:   Temp (24hrs), Av 9 °F (36 6 °C), Min:97 5 °F (36 4 °C), Max:98 2 °F (36 8 °C)    Temp:  [97 5 °F (36 4 °C)-98 2 °F (36 8 °C)] 98 2 °F (36 8 °C)  HR:  [] 59  Resp:  [14-18] 18  BP: (129-167)/(56-81) 129/62  SpO2:  [95 %-98 %] 96 %  Body mass index is 30 13 kg/m²  Input and Output Summary (last 24 hours): Intake/Output Summary (Last 24 hours) at 3/30/2023 1609  Last data filed at 3/30/2023 6204  Gross per 24 hour   Intake --   Output 1350 ml   Net -1350 ml       Physical Exam:   Physical Exam  Vitals and nursing note reviewed     Constitutional: Appearance: Normal appearance  HENT:      Head: Normocephalic and atraumatic  Mouth/Throat:      Mouth: Mucous membranes are moist       Pharynx: Oropharynx is clear  No oropharyngeal exudate  Eyes:      Extraocular Movements: Extraocular movements intact  Cardiovascular:      Rate and Rhythm: Normal rate and regular rhythm  Pulses: Normal pulses  Heart sounds: Normal heart sounds  No murmur heard  No friction rub  No gallop  Pulmonary:      Effort: Pulmonary effort is normal  No respiratory distress  Breath sounds: Normal breath sounds  No stridor  No wheezing or rales  Abdominal:      General: Abdomen is flat  Bowel sounds are normal  There is no distension  Palpations: Abdomen is soft  Tenderness: There is no abdominal tenderness  Musculoskeletal:         General: Signs of injury (RLE wounds) present  Right lower leg: No edema  Left lower leg: No edema  Skin:     General: Skin is warm and dry  Neurological:      General: No focal deficit present  Mental Status: He is alert and oriented to person, place, and time           Additional Data:     Labs:  Results from last 7 days   Lab Units 03/30/23  0546   WBC Thousand/uL 5 37   HEMOGLOBIN g/dL 10 4*   HEMATOCRIT % 32 3*   PLATELETS Thousands/uL 186   NEUTROS PCT % 60   LYMPHS PCT % 26   MONOS PCT % 7   EOS PCT % 7*     Results from last 7 days   Lab Units 03/30/23  0546 03/29/23  1700   SODIUM mmol/L 138 138   POTASSIUM mmol/L 4 5 4 8   CHLORIDE mmol/L 109* 106   CO2 mmol/L 21 22   BUN mg/dL 23 32*   CREATININE mg/dL 1 71* 2 23*   ANION GAP mmol/L 8 10   CALCIUM mg/dL 9 0 9 1   ALBUMIN g/dL  --  4 0   TOTAL BILIRUBIN mg/dL  --  0 35   ALK PHOS U/L  --  41   ALT U/L  --  14   AST U/L  --  20   GLUCOSE RANDOM mg/dL 94 95                       Lines/Drains:  Invasive Devices     Peripheral Intravenous Line  Duration           Peripheral IV 03/30/23 Distal;Right;Ventral (anterior) Forearm <1 day Imaging: No pertinent imaging reviewed      Recent Cultures (last 7 days):   Results from last 7 days   Lab Units 03/27/23  1624   GRAM STAIN RESULT  No Polys or Bacteria seen   WOUND CULTURE  1+ Growth of Staphylococcus aureus*       Last 24 Hours Medication List:   Current Facility-Administered Medications   Medication Dose Route Frequency Provider Last Rate   • acetaminophen  650 mg Oral Q6H PRN Jose Plasencia PA-C     • aluminum-magnesium hydroxide-simethicone  30 mL Oral Q6H PRN Jose Plasencia PA-C     • aspirin  81 mg Oral Every Other Day Jose Plasencia PA-C     • atorvastatin  40 mg Oral QPM Jose Plasencia PA-C     • cephalexin  500 [facility] [place]     • collagenase   Topical Daily Jeremiah Lambert DPM     • escitalopram  10 mg Oral Daily Jose Plasencia PA-C     • ferrous sulfate  325 mg Oral Daily With [address]     • finasteride  5 mg Oral Daily [facility]     • heparin (porcine)  5,000 Units Subcutaneous [address]     • hydrOXYzine HCL  25 mg Oral Q6H PRN Jose Plasencia PA-C     • labetalol  10 mg Intravenous Q6H PRN Jose Plasencia PA-C      Or   • labetalol  20 mg Intravenous Q6H PRN Jose Plasencia PA-C     • melatonin  6 mg Oral HS Dwight Bartholomew PA-C     • metoprolol succinate  25 mg Oral Daily Jose Plasencia PA-C     • multivitamin stress formula  1 tablet Oral Daily Jose Plasencia PA-C     • ondansetron  4 mg Intravenous Q6H PRN Jose Plasencia PA-C     • pantoprazole  20 mg Oral Early Morning Jose Plasencia PA-C     • sodium chloride  1 spray Each Nare Q1H PRN Dwight Bartholomew PA-C     • tamsulosin  0 4 mg Oral Daily With [facility] DIEGO SILVEIRA PA-C     • traZODone  50 mg Oral HS Jose Plasencia PA-C     • vancomycin  1,000 mg Intravenous Q24H Maricarmen Holloway MD 1,000 mg (03/30/23 0930)        Today, Patient Was Seen By: Jose Plasencia PA-C    **Please Note: This note may have been constructed using a voice recognition system  **

## 2023-03-30 NOTE — ASSESSMENT & PLAN NOTE
Reports feeling depressed due to non-healing R foot wound causing mobility issues, now instructed to wear a boot to temporarily immobilize his foot as he recovers from debridement which further limits his mobility  · Denies active SI/HI  · Attempt to wheel patient around hallways qshift  · Consult psychiatry  Appreciate consult    · Recommend starting him on Lexapro 10mg daily and trazodone 50mg qHS at night

## 2023-03-30 NOTE — CONSULTS
PHONE [employer] 1980 Toxicology  Santos Carballo  66 y o  male MRN: [id number]  Unit/Bed#: -01 Encounter: [id number]      Reason for Consult / Principal Problem: Detectable acetaminophen concentration    Inpatient consult to Toxicology  Consult performed by: Clementine Lujan MD  Consult ordered by: Julián Harper DO        03/29/23      ASSESSMENT:  1) Acute kidney injury  2) Anemia  3) Detectable acetaminophen concentration    DISCUSSION/ RECOMMENDATIONS:  Presenting acetaminophen concentration 12 ug/mL and transaminases normal   No indication for NAC  No need to recheck acetaminophen concentration or transaminases from toxicology standpoint  Please  patient regarding proper acetaminophen use  Patient has HELIO which could be due to any number of causes  He does endorse NSAID use which is a possible cause, and he should be counseled regarding proper NSAID use, especially in the setting of underlying CKD  Ongoing medical management per primary team   Please reach out with any additional questions or concerns  For further questions, please call [facility]  Service or Patient Access Center to reach the medical  on call  Hx and PE limited by the dynamics of a phone consultation  I have not personally interviewed or evaluated the patient, but only advised based on the information provided to me  Primary provider is responsible for all clinical decisions  Pertinent history, physical exam and clinical findings and course discussed: Santos Carballo  is a 66y o  year old male who presented to the ED this evening and was found to have an HELIO  Has been dealing with pain and does endorse use of single tab of Percocet this afternoon  Also endorses acetaminophen and ibuprofen use, not exactly clear to me how much  Review of systems and physical exam not performed by me      Historical Information   Past Medical History:   Diagnosis Date   • Anemia iron def   • Arthritis    • Cerebrovascular accident (CVA) ([facility] [address] ) 2020   • Depression 3/29/2023   • GERD (gastroesophageal reflux disease)    • Gout    • Hypertension    • Insomnia      Past Surgical History:   Procedure Laterality Date   • ANKLE SURGERY Right    • CARDIAC CATHETERIZATION     • COLONOSCOPY  2013    polypectomy; complete - 3 yrs d/t polyp - benign submucosal lipoma- path c/w lipoma   • COLONOSCOPY  2017    complete - tubular adenoma - repeat 5yrs   • CYSTOSCOPY     • CYSTOTOMY      bladder  w/ basket extraction of calculus   • FEMUR FRACTURE SURGERY     • HERNIA REPAIR      inguinal ; sliding   • INGUINAL HERNIA REPAIR Right     unilateral   • KNEE ARTHROSCOPY Right     w/medial menisectomy   • LASIK      corneal   • LITHOTRIPSY      renal   • AZ COLONOSCOPY FLX DX W/COLLJ SPEC WHEN PFRMD N/A 2017    Procedure: SCREENING COLONOSCOPY;  Surgeon: Barbara Luevano MD;  Location:  MAIN OR;  Service: General   • AZ TEAEC W/PATCH GRF CAROTID VERTB SUBCLAV NECK INC Right 1/10/2020    Procedure: ENDARTERECTOMY ARTERY CAROTID;  Surgeon: Hadley Renteria MD;  Location:  MAIN OR;  Service: Vascular   • ROTATOR CUFF REPAIR Bilateral    • TONSILLECTOMY       Social History   Social History     Substance and Sexual Activity   Alcohol Use Not Currently    Comment: stopped drinking alcohol     Social History     Substance and Sexual Activity   Drug Use No     Social History     Tobacco Use   Smoking Status Former   • Packs/day: 1 00   • Years:  00   • Pack years:  00   • Types: Cigarettes   • Start date: 0   • Quit date: 1985   • Years since quittin 2   Smokeless Tobacco Never     Family History   Problem Relation Age of Onset   • Alzheimer's disease Mother    • Alzheimer's disease Father    • Heart disease Father         CAD   • Other Father         prediabetes   • Diabetes Father    • Breast cancer Other    • Arthritis Family    • Hypertension Family         Prior to Admission medications    Medication Sig Start Date End Date Taking?  Authorizing Provider   Ascorbic Acid (VITAMIN C IMMUNE HEALTH PO) Take by mouth daily    Historical Provider, MD   aspirin (ECOTRIN LOW STRENGTH) 81 mg EC tablet Take 81 mg by mouth every other day 1 every other day    Historical Provider, MD   atorvastatin (LIPITOR) 40 mg tablet Take 1 tablet (40 mg total) by mouth every evening 9/22/22   Fabiola Patino MD   calcium polycarbophil (FIBERCON) 625 mg tablet Take 625 mg by mouth daily    Historical Provider, MD   cephalexin (KEFLEX) 500 mg capsule Take 1 capsule (500 mg total) by mouth every 6 (six) hours for 7 days 3/24/23 3/31/23  Wilhemena MD Nika   clotrimazole-betamethasone (LOTRISONE) 1-0 05 % cream apply to affected area twice a day for 10 days MAX 2/9/23   Sandra Del Toro DO   collagenase (SANTYL) ointment Apply topically daily 3/27/23   Jeffrey Marshall DPM   ferrous sulfate 324 (65 Fe) mg Take 1 tablet (324 mg total) by mouth daily before breakfast 11/13/20   Sandra Del Toro DO   finasteride (PROSCAR) 5 mg tablet Take 1 tablet (5 mg total) by mouth daily 10/11/22   BLAS Hernandez   lisinopril (ZESTRIL) 10 mg tablet take 1 tablet by mouth once daily 10/20/22   Sandra Del Toro DO   Maxidex 0 1 % ophthalmic suspension instill 1 drop into both eyes twice a day for 2 weeks then 1 drop into both eyes once daily 3/11/23   Historical Provider, MD   metoprolol succinate (TOPROL-XL) 25 mg 24 hr tablet take 1 tablet by mouth once daily 2/20/23   Fabiola Patino MD   Multiple Vitamin (MULTIVITAMIN) capsule Take 1 capsule by mouth daily    Historical Provider, MD   neomycin-polymyxin-dexamethasone (MAXITROL) ophthalmic suspension  12/1/22   Historical Provider, MD   nitroglycerin (NITROSTAT) 0 4 mg SL tablet Place 1 tablet (0 4 mg total) under the tongue every 5 (five) minutes as needed for chest pain 10/14/20   Fabiola Patino MD   omeprazole (PriLOSEC) 20 mg delayed release capsule take 1 capsule by mouth once daily 12/28/22   Merriam Gowers, MD   oxyCODONE (ROXICODONE) 10 MG TABS Take 0 5 tablets (5 mg total) by mouth every 6 (six) hours as needed for moderate pain Max Daily Amount: 20 mg 2/27/23   Kathleen Shay DO   oxyCODONE-acetaminophen (Percocet) 5-325 mg per tablet Take 1 tablet by mouth every 6 (six) hours as needed for moderate pain for up to 7 days Max Daily Amount: 4 tablets 3/27/23 4/3/23  Elsy Garcia DPM   Potassium Citrate ER 15 MEQ (1620 MG) TBCR Take 2 tablets by mouth 2 (two) times a day 6/8/21   Marj Schultz DO   sulfamethoxazole-trimethoprim (BACTRIM DS) 800-160 mg per tablet Take 1 tablet by mouth 2 (two) times a day for 7 days smx-tmp DS (BACTRIM) 800-160 mg tabs (1tab q12 D10) 3/24/23 3/31/23  Ember Smith MD   tamsulosin (FLOMAX) 0 4 mg Take 1 capsule (0 4 mg total) by mouth daily with dinner 10/11/22   BLAS Dugan   triamterene-hydrochlorothiazide (MAXZIDE) 75-50 MG per tablet take 1 tablet by mouth once daily 12/21/22   Kathleen Shay DO       No current facility-administered medications for this encounter  No Known Allergies    Objective     No intake or output data in the 24 hours ending 03/29/23 2000    Invasive Devices:   Peripheral IV 03/29/23 Left Antecubital (Active)       Vitals   Vitals:    03/29/23 1607 03/29/23 1804 03/29/23 1930 03/1945   BP:  163/76  159/78   TempSrc: Oral      Pulse:  (!) 109 59 87   Resp:   14 18   Patient Position - Orthostatic VS:       Temp: 98 5 °F (36 9 °C)            EKG, Pathology, and/or Other Studies: I have personally reviewed pertinent reports  Lab Results: I have personally reviewed pertinent reports        Labs:  Results from last 7 days   Lab Units 03/29/23  1700   WBC Thousand/uL 6 88   HEMOGLOBIN g/dL 11 5*   HEMATOCRIT % 36 1*   PLATELETS Thousands/uL 191   NEUTROS PCT % 76*   LYMPHS PCT % 15   MONOS PCT % 6      Results from last 7 days   Lab Units 03/29/23  1700   POTASSIUM mmol/L 4 8 CHLORIDE mmol/L 106   CO2 mmol/L 22   BUN mg/dL 32*   CREATININE mg/dL 2 23*   CALCIUM mg/dL 9 1   ALK PHOS U/L 41   ALT U/L 14   AST U/L 20              0   Lab Value Date/Time    TROPONINI <0 02 01/03/2020 1531         Results from last 7 days   Lab Units 03/29/23  1828 03/29/23  1700   ACETAMINOPHEN LVL ug/mL  --  12   ETHANOL LVL mg/dL  --  <64   SALICYLATE LVL mg/dL <5  --      Invalid input(s): EXTPREGUR    Imaging Studies: I have personally reviewed pertinent reports  Counseling / Coordination of Care  Total time spent today 22 minutes   This was a phone consultation

## 2023-03-30 NOTE — PROGRESS NOTES
Jaynie Bence  is a 66 y o  male who is currently ordered Vancomycin IV with management by the Pharmacy Consult service  Relevant clinical data and objective / subjective history reviewed  Vancomycin Assessment:  Indication and Goal AUC/Trough: Soft tissue (goal -600, trough >10)  Clinical Status:  new   Micro:     Renal Function:  SCr: 2 23 mg/dL  CrCl: 31 6 mL/min  Renal replacement: Not on dialysis  Days of Therapy: 1  Current Dose: 1500 mg once   Vancomycin Plan:  New Dosing: vancomycin 1250 mg daily PRN vanco level < 20   Next Level: 0600 on 3/30/23  Renal Function Monitoring: Daily BMP and [place] will continue to follow closely for s/sx of nephrotoxicity, infusion reactions and appropriateness of therapy  BMP and CBC will be ordered per protocol  We will continue to follow the patient’s culture results and clinical progress daily      Good Ramsay, Pharmacist

## 2023-03-30 NOTE — UTILIZATION REVIEW
"Initial Clinical Review    Admission: Date/Time/Statement: 3/29/23 1919 observation AND CHANGE 3/30/23 1447 INPATIENT RE: PATIENT NEEDS > 2 MIDNIGHT STAY DUE TO HELIO  WITH CONTINUED NEED TO ADJUST MEDICATIONS AND TREND BMP,  HOME BACTRIM SWITCHED TO VANCOMYCIN AND FOR VASCULAR ARTERIAL US TOMORROW  DEPRESSED AND WILL NEED IP BHU  Admission Orders (From admission, onward)     Ordered        03/30/23 1447  Inpatient Admission  Once                      Orders Placed This Encounter   Procedures   • Inpatient Admission     Standing Status:   Standing     Number of Occurrences:   1     Order Specific Question:   Level of Care     Answer:   Med Surg [16]     Order Specific Question:   Estimated length of stay     Answer:   More than 2 Midnights     Order Specific Question:   Certification     Answer:   I certify that inpatient services are medically necessary for this patient for a duration of greater than two midnights  See H&P and MD Progress Notes for additional information about the patient's course of treatment  ED Arrival Information     Expected   -    Arrival   3/29/2023 15:56    Acuity   Emergent            Means of arrival   Walk-In    Escorted by   Self    Service   Hospitalist    Admission type   Emergency            Arrival complaint   depression           Chief Complaint   Patient presents with   • Depression     Patient presents to the ED with c/o depression that began today, states he was told to wear a boot for his wounds on his foot and got upset, states he was told to come over by wound care because he is depressed today  When asked if he is suicidal, he states \"no I just do not know what to do\"  Initial Presentation: 66 y o  male from home to ED, per wound care,  admitted to observation due to acute on chronic renal  Insufficiency/tylenol ingestion/Depression/Abscess of right leg    Presented due to being upset, depressed since being told he had to wear boot on leg and not drive per " Podiatry  On day of arrival thought of taking pills to kill self  On Percocet for leg pain, takes 2 ES Tylenol twice daily, 400 mg ibupropen BID  On exam: pinpoint pupils, reactive  Anxious  UDS + oxycodone  Bun 32,  Creatinine 2 23 with baseline of 1 4 - 1 6     H&H 11 5/36  1  Acetaminophen 12  I the ED complaints of itchiness  Given Benadryl, 1 liter IVF  Toxicology consulted  Hold Maxide, hold lisinopril  Stop bactrim  Avoid NSAIDs  Continue IVF and trend BMP  Consult Psyche  Switch home Bactrim (Day 5) to IV vancomycin, continue Keflex (day 5)    Per toxicology 3/29/23 - Patient with HELIO, Anemia and detectable acetaminophen concentration  Recommend to discuss prior use of acetaminophen use with patient  NSAID use could be root of HELIO  No need NAC  Per Podiatry 3/29- patient has open wound right ankle, abscess of right leg/PVD  Wound culture too young at this time  Recommend Tera rodriguez MD irrigated with normal saline solution mixed with Betadine  Wound packed with quarter inch iodoform Nu Gauze    3/30/23 CHANGED TO INPATIENT:  Renal function not yet at baseline  On exam RLE wounds  H&H 10 4/32  3  Bun 23, creatinine 1 71  Maxide and lisinopril on hold  Podiatry consulted  IV vancomycin continues  For vascular  arterial US tomorrow  Trend BMP        3/30/23 per Psyche - patient has unspecified mood disorder, rule out major depression, rule out adjustment disorder with depressed mood  Recommend IP BHU with medically stable  Patient agreeable - 201  If changes his mind and disagree would need to be reassessed as at this time is not a 302  Start Lexapro, dc melatonin, start trazodone  Date: 3/31/23   Day 2: pain right leg 3/10  Today mood improved  No suidical ideation  On exam:  RLE edema   1+ pulses of the right lower extremity including popliteal, dorsalis pedis and posterior tibial   Left leg with 2 + pulses  Bun 19  Creatinine 1 49  H&H 10 7/33  2  Keflex dc    IV vancomycin continues  Per psyche IP BHU no longer indicated  Recommend OP psychiatric follow up     3/31/23 Per vascular - patient with right ankle wound and abscess of right lower leg  Recommend cta abd runoff after CKD optimized  ED Triage Vitals   Temperature Pulse Respirations Blood Pressure SpO2   03/29/23 1607 03/29/23 1606 03/29/23 1606 03/29/23 1606 03/29/23 1606   98 5 °F (36 9 °C) 87 18 (!) 180/81 98 %      Temp Source Heart Rate Source Patient Position - Orthostatic VS BP Location FiO2 (%)   03/29/23 1607 03/29/23 1606 03/29/23 1606 03/29/23 1606 --   Oral Monitor Sitting Right arm       Pain Score       03/29/23 1606       1          Wt Readings from Last 1 Encounters:   03/29/23 95 3 kg (210 lb)     Additional Vital Signs:   03/31/23 08:06:33 98 2 °F (36 8 °C) 71 16 154/73 100 96 % None (Room air) Lying   03/30/23 22:32:51 98 2 °F (36 8 °C) 52 Abnormal  -- 111/45 Abnormal  67 94 % -- --   03/30/23 14:28:09 98 2 °F (36 8 °C) 59 -- 129/62 84 96 % -- --   03/30/23 08:23:47 98 °F (36 7 °C) 75 -- 137/56 83 96 %       03/30/23 07:44:47 97 5 °F (36 4 °C) 70 18 167/77 107 95 % None (Room air) Lying   03/29/23 22:40:42 97 8 °F (36 6 °C) 65 16 131/68 89 95 % -- --   03/29/23 20:09:51 97 9 °F (36 6 °C) 77 16 164/81 109 98 % -- --   03/29/23 20:08:31 97 9 °F (36 6 °C) 79 -- 164/81 109 97 % -- --   03/1945 -- 87 18 159/78 112 -- -- --   03/29/23 1930 -- 59 14 -- -- 98 % None (Room air) --   03/29/23 1804 -- 109 Abnormal  -- 163/76 109 --       Pertinent Labs/Diagnostic Test Results:   VAS lower limb arterial duplex, complete bilateral   Final Result by Priya Reynolds MD (03/30 2042)RIGHT LOWER LIMB:  There is evidence of a 50-75% stenosis in the common femoral artery  Diffuse  disease noted throughout the remaining femoral-popliteal and tibio-peroneal  arteries without significant focal stenosis    Ankle/Brachial index:  0 73  which is in the moderate disease category  PVR/ PPG tracings are dampened  Metatarsal pressure of  84 mm Hg  Great toe pressure of  44 mm Hg, within the healing range     LEFT LOWER LIMB:  There is evidence of a 50-75% stenosis in the common femoral artery  Diffuse  disease noted throughout the remaining femoral-popliteal and tibio-peroneal  arteries without significant focal stenosis  Ankle/Brachial index:   Unreliable due to non-compressible vessels  PVR/ PPG tracings dampened  Metatarsal pressure of 222  mm Hg  Great toe pressure of 62 mm Hg, within the healing range     There is no previous study for comparison          3/29/23 ecg Interpretation: normal     Rate:     ECG rate:  76     ECG rate assessment: normal     Rhythm:     Rhythm: sinus rhythm     Ectopy:     Ectopy: none     QRS:     QRS axis:  Normal     QRS intervals:  Normal   Conduction:     Conduction: normal     ST segments:     ST segments:  Normal   T waves:     T waves: normal  Results from last 7 days   Lab Units 03/31/23 0433 03/30/23  0546 03/29/23  1700   WBC Thousand/uL 4 96 5 37 6 88   HEMOGLOBIN g/dL 10 7* 10 4* 11 5*   HEMATOCRIT % 33 2* 32 3* 36 1*   PLATELETS Thousands/uL 194 186 191   NEUTROS ABS Thousands/µL  --  3 25 5 16     Results from last 7 days   Lab Units 03/31/23 0433 03/30/23  0546 03/29/23  1700   SODIUM mmol/L 137 138 138   POTASSIUM mmol/L 4 5 4 5 4 8   CHLORIDE mmol/L 107 109* 106   CO2 mmol/L 23 21 22   ANION GAP mmol/L 7 8 10   BUN mg/dL 19 23 32*   CREATININE mg/dL 1 49* 1 71* 2 23*   EGFR ml/min/1 73sq m 44 37 27   CALCIUM mg/dL 9 0 9 0 9 1   MAGNESIUM mg/dL  --  1 5*  --      Results from last 7 days   Lab Units 03/29/23  1700   AST U/L 20   ALT U/L 14   ALK PHOS U/L 41   TOTAL PROTEIN g/dL 6 9   ALBUMIN g/dL 4 0   TOTAL BILIRUBIN mg/dL 0 35     Results from last 7 days   Lab Units 03/31/23  0433 03/30/23  0546 03/29/23  1700   GLUCOSE RANDOM mg/dL 90 94 95     Results from last 7 days   Lab Units 03/29/23  1700   TSH 3RD GENERATON uIU/mL 1 721     Results from last 7 days Lab Units 03/29/23  1813   CLARITY UA  Clear   COLOR UA  Yellow   SPEC GRAV UA  1 025   PH UA  5 5   GLUCOSE UA mg/dl Negative   KETONES UA mg/dl Negative   BLOOD UA  Negative   PROTEIN UA mg/dl Negative   NITRITE UA  Negative   BILIRUBIN UA  Negative   UROBILINOGEN UA (BE) mg/dl <2 0   LEUKOCYTES UA  Negative     Results from last 7 days   Lab Units 03/29/23  1812   AMPH/METH  Negative   BARBITURATE UR  Negative   BENZODIAZEPINE UR  Negative   COCAINE UR  Negative   METHADONE URINE  Negative   OPIATE UR  Negative   PCP UR  Negative   THC UR  Negative     Results from last 7 days   Lab Units 03/29/23  1828 03/29/23  1700   ETHANOL LVL mg/dL  --  <10   ACETAMINOPHEN LVL ug/mL  --  12   SALICYLATE LVL mg/dL <5  --      Results from last 7 days   Lab Units 03/27/23  1624   GRAM STAIN RESULT  No Polys or Bacteria seen   WOUND CULTURE  1+ Growth of Staphylococcus aureus*       ED Treatment:   Medication Administration from 03/29/2023 1556 to 03/29/2023 1959       Date/Time Order Dose Route Action Comments     03/29/2023 1657 EDT diphenhydrAMINE (BENADRYL) tablet 25 mg 25 mg Oral Given --     03/29/2023 1832 EDT sodium chloride 0 9 % bolus 1,000 mL 1,000 mL Intravenous New Bag --        Past Medical History:   Diagnosis Date   • Anemia     iron def   • Arthritis    • Cerebrovascular accident (CVA) ([facility] [address] ) 1/7/2020   • Depression 3/29/2023   • GERD (gastroesophageal reflux disease)    • Gout    • Hypertension    • Insomnia      Present on Admission:  • Atrial flutter, unspecified type ([facility] )  • Coronary artery disease involving native coronary artery  • Hypertension  • Abscess of right leg excluding foot      Admitting Diagnosis: Depression [F32  A]  Acute renal insufficiency [N28 9]  Adverse effect of drug, initial encounter Yesi Santiago  Age/Sex: 66 y o  male  Admission Orders:  Scheduled Medications:  aspirin, 81 mg, Oral, Every Other Day  atorvastatin, 40 mg, Oral, QPM  cephalexin, 500 mg, Oral, Q8H [facility] - dc 3/31 3156 collagenase, , Topical, Daily  ferrous sulfate, 325 mg, Oral, Daily With Breakfast  finasteride, 5 mg, Oral, Daily  heparin (porcine), 5,000 Units, Subcutaneous, Q8H [address]  melatonin, 6 mg, Oral, HS  metoprolol succinate, 25 mg, Oral, Daily  multivitamin stress formula, 1 tablet, Oral, Daily  pantoprazole, 20 mg, Oral, Early Morning  tamsulosin, 0 4 mg, Oral, Daily With Dinner    vancomycin (VANCOCIN) IVPB (premix in dextrose) 1,000 mg 200 mL  Dose: 1,000 mg  Freq: Every 24 hours Route: IV  Last Dose: 1,000 mg (03/31/23 0836)  Start: 03/30/23 0800    Continuous IV Infusions:  sodium chloride 0 9 % infusion  Rate: 100 mL/hr Dose: 100 mL/hr  Freq: Continuous Route: IV  Last Dose: Stopped (03/30/23 0645)  Start: 03/29/23 2030 End: 03/30/23 0656     PRN Meds:  acetaminophen, 650 mg, Oral, Q6H PRN x 2 3/30  aluminum-magnesium hydroxide-simethicone, 30 mL, Oral, Q6H PRN  labetalol, 10 mg, Intravenous, Q6H PRN   Or  labetalol, 20 mg, Intravenous, Q6H PRN  ondansetron, 4 mg, Intravenous, Q6H PRN x 1 3/30  sodium chloride, 1 spray, Each Nare, Q1H PRN  vancomycin, 15 mg/kg (Adjusted), Intravenous, Daily PRN    hydrOXYzine HCL (ATARAX) tablet 25 mg x 2 3/30  Dose: 25 mg  Freq: Every 6 hours PRN Route: PO  PRN Reason: anxiety  Start: 03/30/23 0958    Wound care daily right leg- Irrigate wound thoroughly with 200 cc normal saline solution after removing packing, gently repacked with quarter inch iodoform Nu Gauze    IP CONSULT TO TOXICOLOGY  IP CONSULT TO PSYCHIATRY    Network Utilization Review Department  ATTENTION: Please call with any questions or concerns to [phone] and carefully listen to the prompts so that you are directed to the right person  All voicemails are confidential   Gradie Musty all requests for admission clinical reviews, approved or denied determinations and any other requests to dedicated fax number below belonging to the campus where the patient is receiving treatment   List of dedicated fax numbers for the Facilities:  FACILITY NAME UR FAX NUMBER   ADMISSION DENIALS (Administrative/Medical Necessity) [phone]   [address]  (Maternity/NICU/Pediatrics) [phone]   [address]  [phone]   [address] [address] [facility] [address] [facility] [address] [phone]

## 2023-03-30 NOTE — ASSESSMENT & PLAN NOTE
S/p I&D with podiatry  PTA, was on bactrim + Keflex (D5)  Wound culture from 3/27 growing staph aureus  Given HELIO, will switch him from bactrim to IV vancomycin (D1)  Continue follow up with podiatry  Plan for vascular arterial U/S tomorrow

## 2023-03-31 ENCOUNTER — HOME HEALTH ADMISSION (OUTPATIENT)
Dept: HOME HEALTH SERVICES | Facility: HOME HEALTHCARE | Age: 78
End: 2023-03-31

## 2023-03-31 ENCOUNTER — APPOINTMENT (INPATIENT)
Dept: CT IMAGING | Facility: HOSPITAL | Age: 78
End: 2023-03-31

## 2023-03-31 VITALS
WEIGHT: 210 LBS | BODY MASS INDEX: 30.06 KG/M2 | TEMPERATURE: 97.9 F | RESPIRATION RATE: 18 BRPM | HEIGHT: 70 IN | DIASTOLIC BLOOD PRESSURE: 81 MMHG | OXYGEN SATURATION: 93 % | HEART RATE: 73 BPM | SYSTOLIC BLOOD PRESSURE: 157 MMHG

## 2023-03-31 LAB
ANION GAP SERPL CALCULATED.3IONS-SCNC: 7 MMOL/L (ref 4–13)
BUN SERPL-MCNC: 19 MG/DL (ref 5–25)
CALCIUM SERPL-MCNC: 9 MG/DL (ref 8.4–10.2)
CHLORIDE SERPL-SCNC: 107 MMOL/L (ref 96–108)
CO2 SERPL-SCNC: 23 MMOL/L (ref 21–32)
CREAT SERPL-MCNC: 1.49 MG/DL (ref 0.6–1.3)
ERYTHROCYTE [DISTWIDTH] IN BLOOD BY AUTOMATED COUNT: 12.3 % (ref 11.6–15.1)
GFR SERPL CREATININE-BSD FRML MDRD: 44 ML/MIN/1.73SQ M
GLUCOSE SERPL-MCNC: 90 MG/DL (ref 65–140)
HCT VFR BLD AUTO: 33.2 % (ref 36.5–49.3)
HGB BLD-MCNC: 10.7 G/DL (ref 12–17)
MCH RBC QN AUTO: 30.7 PG (ref 26.8–34.3)
MCHC RBC AUTO-ENTMCNC: 32.2 G/DL (ref 31.4–37.4)
MCV RBC AUTO: 95 FL (ref 82–98)
PLATELET # BLD AUTO: 194 THOUSANDS/UL (ref 149–390)
PMV BLD AUTO: 8.6 FL (ref 8.9–12.7)
POTASSIUM SERPL-SCNC: 4.5 MMOL/L (ref 3.5–5.3)
RBC # BLD AUTO: 3.49 MILLION/UL (ref 3.88–5.62)
SODIUM SERPL-SCNC: 137 MMOL/L (ref 135–147)
WBC # BLD AUTO: 4.96 THOUSAND/UL (ref 4.31–10.16)

## 2023-03-31 RX ORDER — LANOLIN ALCOHOL/MO/W.PET/CERES
CREAM (GRAM) TOPICAL 3 TIMES DAILY PRN
Status: DISCONTINUED | OUTPATIENT
Start: 2023-03-31 | End: 2023-03-31 | Stop reason: HOSPADM

## 2023-03-31 RX ORDER — ESCITALOPRAM OXALATE 10 MG/1
10 TABLET ORAL DAILY
Qty: 30 TABLET | Refills: 0 | Status: SHIPPED | OUTPATIENT
Start: 2023-04-01 | End: 2023-05-01

## 2023-03-31 RX ORDER — SODIUM CHLORIDE 9 MG/ML
250 INJECTION, SOLUTION INTRAVENOUS CONTINUOUS
Status: DISCONTINUED | OUTPATIENT
Start: 2023-03-31 | End: 2023-03-31 | Stop reason: HOSPADM

## 2023-03-31 RX ORDER — SODIUM CHLORIDE 9 MG/ML
150 INJECTION, SOLUTION INTRAVENOUS CONTINUOUS
Status: DISCONTINUED | OUTPATIENT
Start: 2023-03-31 | End: 2023-03-31

## 2023-03-31 RX ORDER — LIDOCAINE HYDROCHLORIDE 40 MG/ML
5 SOLUTION TOPICAL ONCE
Status: SHIPPED | OUTPATIENT
Start: 2023-03-31

## 2023-03-31 RX ORDER — DOXYCYCLINE HYCLATE 100 MG/1
100 CAPSULE ORAL EVERY 12 HOURS SCHEDULED
Qty: 14 CAPSULE | Refills: 0 | Status: SHIPPED | OUTPATIENT
Start: 2023-03-31 | End: 2023-04-07

## 2023-03-31 RX ORDER — TRAZODONE HYDROCHLORIDE 50 MG/1
50 TABLET ORAL
Qty: 30 TABLET | Refills: 0 | Status: SHIPPED | OUTPATIENT
Start: 2023-03-31 | End: 2023-04-30

## 2023-03-31 RX ADMIN — CEPHALEXIN 500 MG: 250 CAPSULE ORAL at 05:32

## 2023-03-31 RX ADMIN — COLLAGENASE SANTYL: 250 OINTMENT TOPICAL at 14:47

## 2023-03-31 RX ADMIN — SODIUM CHLORIDE 150 ML/HR: 0.9 INJECTION, SOLUTION INTRAVENOUS at 15:32

## 2023-03-31 RX ADMIN — ATORVASTATIN CALCIUM 40 MG: 40 TABLET, FILM COATED ORAL at 17:51

## 2023-03-31 RX ADMIN — IOHEXOL 100 ML: 350 INJECTION, SOLUTION INTRAVENOUS at 17:31

## 2023-03-31 RX ADMIN — CEPHALEXIN 500 MG: 250 CAPSULE ORAL at 14:49

## 2023-03-31 RX ADMIN — HEPARIN SODIUM 5000 UNITS: 5000 INJECTION INTRAVENOUS; SUBCUTANEOUS at 05:32

## 2023-03-31 RX ADMIN — B-COMPLEX W/ C & FOLIC ACID TAB 1 TABLET: TAB at 08:45

## 2023-03-31 RX ADMIN — ESCITALOPRAM OXALATE 10 MG: 10 TABLET ORAL at 08:36

## 2023-03-31 RX ADMIN — TAMSULOSIN HYDROCHLORIDE 0.4 MG: 0.4 CAPSULE ORAL at 15:31

## 2023-03-31 RX ADMIN — VANCOMYCIN HYDROCHLORIDE 1000 MG: 1 INJECTION, SOLUTION INTRAVENOUS at 08:36

## 2023-03-31 RX ADMIN — ASPIRIN 81 MG: 81 TABLET, COATED ORAL at 08:37

## 2023-03-31 RX ADMIN — FERROUS SULFATE TAB 325 MG (65 MG ELEMENTAL FE) 325 MG: 325 (65 FE) TAB at 08:36

## 2023-03-31 RX ADMIN — SODIUM CHLORIDE 250 ML/HR: 0.9 INJECTION, SOLUTION INTRAVENOUS at 17:50

## 2023-03-31 RX ADMIN — METOPROLOL SUCCINATE 25 MG: 25 TABLET, EXTENDED RELEASE ORAL at 08:37

## 2023-03-31 RX ADMIN — PANTOPRAZOLE SODIUM 20 MG: 20 TABLET, DELAYED RELEASE ORAL at 05:32

## 2023-03-31 RX ADMIN — ACETAMINOPHEN 650 MG: 325 TABLET, FILM COATED ORAL at 15:25

## 2023-03-31 RX ADMIN — FINASTERIDE 5 MG: 5 TABLET, FILM COATED ORAL at 08:36

## 2023-03-31 NOTE — CASE MANAGEMENT
Case Management Discharge Planning Note    Patient name Celestine Romero /-01 MRN [id number]  : 1945 Date 3/31/2023       Current Admission Date: 3/29/2023  Current Admission Diagnosis:Acute on chronic renal insufficiency   Patient Active Problem List    Diagnosis Date Noted   • Open wound of right ankle 2023   • Depression 2023   • Tylenol ingestion 2023   • Abscess of right leg excluding foot 2023   • Atrial flutter, unspecified type ([facility] [address] ) 2021   • Mazin Ander lesion, chronic 2021   • Paraesophageal hernia 2021   • Acute on chronic renal insufficiency 12/15/2020   • Stage 3b chronic kidney disease ([facility] [address] ) 12/15/2020   • Bilateral carotid artery stenosis 2020   • Coronary artery disease involving native coronary artery 2020   • S/P carotid endarterectomy 01/10/2020   • Aortic valve stenosis 2020   • Elevated PSA 2018   • Obesity (BMI 30 0-34 9) 2018   • Nephrolithiasis 2018   • Prolonged QT interval 2017   • Iron deficiency anemia due to chronic blood loss 2017   • Osteoarthritis 2017   • Rhinitis 2016   • Impaired fasting glucose 10/08/2015   • Insomnia 2015   • Gout 2013   • Tubular adenoma of colon 2013   • Carpal tunnel syndrome 2012   • Other disorder of calcium metabolism 2012   • Dyslipidemia 2012   • Hypertension 2012      LOS (days): 1  Geometric Mean LOS (GMLOS) (days): 3 10  Days to GMLOS:2 2     OBJECTIVE:  Risk of Unplanned Readmission Score: 16 96         Current admission status: Inpatient   Preferred Pharmacy:   [address] [place] [address] [id number] DARINEL Sharp [address] [place] [id number]  Phone: [phone] Fax: [address] [address] [place] [address]  Phone: [phone] Fax: [phone]    Primary Care Provider: Dane Paul DO    Primary Insurance: [employer] RE[employer]  Secondary Insurance:     DISCHARGE DETAILS:        Spoke with patient and he stated that he prefers to go home  Patient states he has [place] set up through wound care center but not sure when it starts  Pt states it is with [facility] put referral in Jaki SHANKAR accepted patient                                                                                  IMM Given (Date):: 03/31/23 (11:48 am)  IMM Given to[de-identified] Patient

## 2023-03-31 NOTE — ASSESSMENT & PLAN NOTE
Reports feeling depressed due to non-healing R foot wound causing mobility issues, now instructed to wear a boot to temporarily immobilize his foot as he recovers from debridement which further limits his mobility  · Denies active SI/HI  · Attempt to wheel patient around hallways qshift  · Consult psychiatry  Appreciate consult    · Recommend starting him on Lexapro 10mg daily and trazodone 50mg qHS at night  · Does not meet 302 criteria

## 2023-03-31 NOTE — DISCHARGE SUMMARY
[place]  Discharge- Carlos Eduardo Potter  1945, 66 y o  male MRN: [id number]  Unit/Bed#: -01 Encounter: [id number]  Primary Care Provider: Mustapha Fernandez DO   Date and time admitted to hospital: 3/29/2023  4:10 PM    * Acute renal failure superimposed on chronic kidney disease [facility])  Assessment & Plan  Lab Results   Component Value Date    CREATININE 1 49 (H) 03/31/2023    CREATININE 1 71 (H) 03/30/2023    CREATININE 2 23 (H) 03/29/2023     Creatinine on admission 2 2, baseline 1 4-1 6  Suspect prerenal in the setting of depression, poor p o  intake, Maxzide use, lisinopril use  · Also reports using ~400mg ibuprofen BID  Urinated 250cc's clear yellow urine  PVR for 79mLs  UA with spec grav 1 025  Hold Maxide, hold lisinopril  Stop bactrim  Avoid NSAIDs  Resolved, 1 49  Pt is receiving CT with contrast prior to d/c  IVF given prior and after CT  Repeat BMP in 3 days    Tylenol ingestion  Assessment & Plan  Acetaminophen level 12  ER discussed with toxicology since >10  No further testing/treatment required    Depression  Assessment & Plan  Reports feeling depressed due to non-healing R foot wound causing mobility issues, now instructed to wear a boot to temporarily immobilize his foot as he recovers from debridement which further limits his mobility  · Denies active SI/HI  · Attempt to wheel patient around hallways qshift  · Consult psychiatry  Appreciate consult    · Recommend starting him on Lexapro 10mg daily and trazodone 50mg qHS at night  · Does not meet 302 criteria    Abscess of right leg excluding foot  Assessment & Plan  S/p I&D with podiatry  PTA, was on bactrim + Keflex (D5)  Wound culture from 3/27 growing staph aureus  Given HELIO, will switch him from bactrim to IV vancomycin (D1)  Continue follow up with podiatry  Arterial U/S obtained: 50-75% atherosclerosis b/l  CT LE w/ runoff ordered, follow up outpatient with vascular for results  Discussed with podiatry, will send home with 7 days of doxycycline  Atrial flutter, unspecified type (HCC)  Assessment & Plan  Rate control: Toprol XL 25mg daily  Anticoagulation: None, on ASA 81mg daily    Coronary artery disease involving native coronary artery  Assessment & Plan  S/p SANDRINE to LAD  Continue Toprol-XL, ASA 81mg daily, and statin    Hypertension  Assessment & Plan  On Maxzide, Toprol, and lisinopril  Hold lisinopril and Maxzide in setting of HELIO  Restart Maxzide and lisinopril tomorrow  BMP in 3 days      Medical Problems     Resolved Problems  Date Reviewed: 3/31/2023   None       Discharging Physician / Practitioner: Brianna Hayes PA-C  PCP: Bruno Kay DO  Admission Date:   Admission Orders (From admission, onward)     Ordered        03/30/23 1447  Inpatient Admission  Once            03/29/23 1919  Place in Observation  Once                      Discharge Date: 03/31/23    Consultations During Hospital Stay:  · Podiatry  · Vascular  · Tox    Procedures Performed:   · LEADS  · Right: 50-75% stenosis  FARRAH 0 73  · Left: 50-75% stenosis  FARRAH unreliable due to noncompressible vessels  · CTA abdominal w/ run off - pending on discharge    Significant Findings / Test Results:   · Admission creatinine 2 23  · Wound culture growing MSSA    Incidental Findings:   · None     Test Results Pending at Discharge (will require follow up):   · CTA abdomen w/ run-off  Follow with vascular surgery outpt     Outpatient Tests Requested:  · BMP in 3 days    Complications:  None    Reason for Admission: HELIO    Hospital Course:   Lacey Montes De Oca  is a 66 y o  male patient with PMH CAD, CVA, HTN, gout, iron deficiency anemia, chronic right foot wound who originally presented to the hospital on 3/29/2023 due to depression/irritability due to his chronic wounds limiting his mobility  On his admission lab work, he was noted to have an HELIO with creatinine 2 23  His Maxide and lisinopril were held and he received IVF  "Additionally, he reported taking NSAIDs likely contributed  His creatinine improved back to baseline at 1 5 on discharge  He did receive contrast for CT abdomen with runoff prior to discharge and I have asked him to repeat a BMP in 3 days  He may restart his lisinopril and Maxzide tomorrow  Additionally, he was evaluated by podiatry for his non healing wound  LEADs were performed  Vascular was then consulted to optimize flow, recommended CT abdomen with runoff prior to discharge and will follow up as outpatient  He was also provided with a referral to plastic surgery at podiatry's request for eventual skin grafting  He is to wear a CAM boot when ambulating  He will have VNA come out to help with wound dressing changes  Lastly, he was evaluated by our psychatrist due to his depression  He did not meet 302 criteria, but did meet 201, however patient does not wish to go inpatient facility  He has opted instead to follow outpatient with psychiatry  He was started on lexapro for depression and trazodone for sleep  Please see above list of diagnoses and related plan for additional information  Condition at Discharge: stable    Discharge Day Visit / Exam:   Subjective:  No acute events  Patient states he \"has to leave today, cannot stay\"  Vitals: Blood Pressure: 157/81 (03/31/23 1510)  Pulse: 73 (03/31/23 1510)  Temperature: 97 9 °F (36 6 °C) (03/31/23 1510)  Temp Source: Oral (03/31/23 1510)  Respirations: 18 (03/31/23 1510)  Height: 5' 10\" (177 8 cm) (03/29/23 1606)  Weight - Scale: 95 3 kg (210 lb) (03/29/23 1606)  SpO2: 93 % (03/31/23 1510)  Exam:   Physical Exam  Vitals and nursing note reviewed  Constitutional:       Appearance: Normal appearance  HENT:      Head: Normocephalic and atraumatic  Mouth/Throat:      Mouth: Mucous membranes are moist       Pharynx: Oropharynx is clear  No oropharyngeal exudate  Eyes:      Extraocular Movements: Extraocular movements intact     Cardiovascular:      " Rate and Rhythm: Normal rate and regular rhythm  Pulses: Normal pulses  Heart sounds: Normal heart sounds  No murmur heard  No friction rub  No gallop  Pulmonary:      Effort: Pulmonary effort is normal  No respiratory distress  Breath sounds: Normal breath sounds  No stridor  No wheezing or rales  Abdominal:      General: Abdomen is flat  Bowel sounds are normal  There is no distension  Palpations: Abdomen is soft  Tenderness: There is no abdominal tenderness  Musculoskeletal:      Right lower leg: No edema  Left lower leg: No edema  Skin:     General: Skin is warm and dry  Findings: Lesion (RLE wounds) present  Neurological:      General: No focal deficit present  Mental Status: He is alert and oriented to person, place, and time  Discussion with Family: Updated  (daughter) via phone  Discharge instructions/Information to patient and family:   See after visit summary for information provided to patient and family  Provisions for Follow-Up Care:  See after visit summary for information related to follow-up care and any pertinent home health orders  Disposition:   Home    Planned Readmission: None     Discharge Statement:  I spent 60 minutes discharging the patient  This time was spent on the day of discharge  I had direct contact with the patient on the day of discharge  Greater than 50% of the total time was spent examining patient, answering all patient questions, arranging and discussing plan of care with patient as well as directly providing post-discharge instructions  Additional time then spent on discharge activities  Discharge Medications:  See after visit summary for reconciled discharge medications provided to patient and/or family        **Please Note: This note may have been constructed using a voice recognition system**

## 2023-03-31 NOTE — PLAN OF CARE
Problem: PAIN - ADULT  Goal: Verbalizes/displays adequate comfort level or baseline comfort level  Description: Interventions:  - Encourage patient to monitor pain and request assistance  - Assess pain using appropriate pain scale  - Administer analgesics based on type and severity of pain and evaluate response  - Implement non-pharmacological measures as appropriate and evaluate response  - Consider cultural and social influences on pain and pain management  - Notify physician/advanced practitioner if interventions unsuccessful or patient reports new pain  3/31/2023 0832 by Ivonne Lane RN  Outcome: Progressing  3/31/2023 0832 by Ivonne Lane RN  Outcome: Progressing

## 2023-03-31 NOTE — DISCHARGE INSTR - OTHER ORDERS
Continue with local wound care of iodoform packing to ulcerated areas, dry dressing to cover this  Patient may weight-bear as tolerated with Cam boot in place for ADLs  This patient's right foot, he may remove cam boot to drive, he is to wear sneaker, but once he reaches the destination he is to reapply cam boot  When asked visiting with patient at bedside, he kept crossing his left foot over his anterior right ankle at the site of the wound, I instructed patient he needs to be very vigilant to make sure he is not doing this and increasing pressure to the area of the wound  Patient was dispensed a green foam wedge he is advised to take this home and elevate his foot on this to keep elevation which she states feels better and to maintain pressure of infection reduction

## 2023-03-31 NOTE — CONSULTS
Consultation - Myra Chamorro  66 y o  male MRN: [id number]    Unit/Bed#: -01 Encounter: [id number]      Assessment/Plan     Assessment/Plan:  Right ankle wound and abscess of right lower leg  -pt denies claudication, parasthesias, and is able to ambulate   -pulses palpable B/L LE  -LEADs done 3/30/23:   RIGHT LOWER LIMB:  There is evidence of a 50-75% stenosis in the common femoral artery  Diffuse  disease noted throughout the remaining femoral-popliteal and tibio-peroneal  arteries without significant focal stenosis  Ankle/Brachial index:  0 73  which is in the moderate disease category  PVR/ PPG tracings are dampened  Metatarsal pressure of  84 mm Hg  Great toe pressure of  44 mm Hg, within the healing range     LEFT LOWER LIMB:  There is evidence of a 50-75% stenosis in the common femoral artery  Diffuse  disease noted throughout the remaining femoral-popliteal and tibio-peroneal  arteries without significant focal stenosis  Ankle/Brachial index:   Unreliable due to non-compressible vessels  PVR/ PPG tracings dampened  Metatarsal pressure of 222  mm Hg  Great toe pressure of 62 mm Hg, within the healing range  -Pt needs CTA abd with run off after his CKD is optimized  -Plan discussed with Dr Santa Jimenez      History of Present Illness     HPI: Myra Chamorro  is a 66y o  year old male with a pmh for Aflutter, HTN, CKD and CAD who presents for depression  He has 3 RLE wounds  The 2 superior wounds on the right lower extremity where recently opened and drained for infection  The lower wound, is a chronic wound secondary to a previous skin graft done many years ago(30+)  He denies any symptoms of claudication, paresthesias, pain and is able to ambulate  He denies any numbness  He denies having any history for peripheral arterial disease  He denies history for smoking or diabetes  He reports that he does have hypercholesterolemia      Inpatient consult to Vascular Surgery  Consult performed by: Tia Hunter PA-C  Consult ordered by: Caroline Miller PA-C          Review of Systems   Constitutional: Negative for chills and fever  HENT: Negative for ear pain and sore throat  Eyes: Negative for pain and visual disturbance  Respiratory: Negative for cough and shortness of breath  Cardiovascular: Negative for chest pain and palpitations  Gastrointestinal: Negative for abdominal pain and vomiting  Genitourinary: Negative for dysuria and hematuria  Musculoskeletal: Negative for arthralgias and back pain  Skin: Negative for color change and rash  As per HPI  Neurological: Negative for seizures and syncope  All other systems reviewed and are negative        Historical Information   Past Medical History:   Diagnosis Date   • Anemia     iron def   • Arthritis    • Cerebrovascular accident (CVA) ([facility] [address] ) 1/7/2020   • Depression 3/29/2023   • GERD (gastroesophageal reflux disease)    • Gout    • Hypertension    • Insomnia      Past Surgical History:   Procedure Laterality Date   • ANKLE SURGERY Right    • CARDIAC CATHETERIZATION     • COLONOSCOPY  01/25/2013    polypectomy; complete - 3 yrs d/t polyp - benign submucosal lipoma- path c/w lipoma   • COLONOSCOPY  04/25/2017    complete - tubular adenoma - repeat 5yrs   • CYSTOSCOPY     • CYSTOTOMY      bladder  w/ basket extraction of calculus   • FEMUR FRACTURE SURGERY     • HERNIA REPAIR      inguinal ; sliding   • INGUINAL HERNIA REPAIR Right     unilateral   • KNEE ARTHROSCOPY Right     w/medial menisectomy   • LASIK      corneal   • LITHOTRIPSY      renal   • PA COLONOSCOPY FLX DX W/COLLJ SPEC WHEN PFRMD N/A 4/25/2017    Procedure: SCREENING COLONOSCOPY;  Surgeon: Adrianne Dumont MD;  Location: [facility];  Service: General   • PA TEAEC W/PATCH GRF CAROTID VERTB SUBCLAV NECK INC Right 1/10/2020    Procedure: ENDARTERECTOMY ARTERY CAROTID;  Surgeon: Tanesha Berry MD;  Location:  MAIN OR;  Service: Vascular   • ROTATOR CUFF REPAIR "Bilateral    • TONSILLECTOMY       Social History   Social History     Substance and Sexual Activity   Alcohol Use Not Currently    Comment: stopped drinking alcohol     Social History     Substance and Sexual Activity   Drug Use No     Social History     Tobacco Use   Smoking Status Former   • Packs/day: 1 00   • Years:    • Pack years:    • Types: Cigarettes   • Start date: 0   • Quit date: 1985   • Years since quittin 2   Smokeless Tobacco Never     E-Cigarette/Vaping   • E-Cigarette Use Never User      E-Cigarette/Vaping Substances   • Nicotine No    • THC No    • CBD No    • Flavoring No    • Other No    • Unknown No       Family History: non-contributory    Meds/Allergies   all current active meds have been reviewed  No Known Allergies    Objective   Vitals: Blood pressure 154/73, pulse 71, temperature 98 2 °F (36 8 °C), temperature source Oral, resp  rate 16, height 5' 10\" (1 778 m), weight 95 3 kg (210 lb), SpO2 96 %  Intake/Output Summary (Last 24 hours) at 3/31/2023 1353  Last data filed at 3/31/2023 0218  Gross per 24 hour   Intake 680 ml   Output 950 ml   Net -270 ml     Invasive Devices     Peripheral Intravenous Line  Duration           Peripheral IV 23 Right;Ventral (anterior) Forearm <1 day                Physical Exam  Vitals and nursing note reviewed  Constitutional:       General: He is not in acute distress  Appearance: He is well-developed  HENT:      Head: Normocephalic and atraumatic  Eyes:      Conjunctiva/sclera: Conjunctivae normal    Cardiovascular:      Rate and Rhythm: Normal rate and regular rhythm  Heart sounds: No murmur heard  Pulmonary:      Effort: Pulmonary effort is normal  No respiratory distress  Breath sounds: Normal breath sounds  Abdominal:      Palpations: Abdomen is soft  Tenderness: There is no abdominal tenderness  Musculoskeletal:         General: No swelling  Cervical back: Neck supple     Skin:     " General: Skin is warm and dry  Capillary Refill: Capillary refill takes less than 2 seconds  Comments: Right lower extremity wounds with dressing intact  Photos were taken of the wounds previously and are noted in the chart under media  He has 1+ pulses of the right lower extremity including popliteal, dorsalis pedis and posterior tibial   Left leg with 2 + pulses noted on exam    Neurological:      General: No focal deficit present  Mental Status: He is alert and oriented to person, place, and time  Psychiatric:         Mood and Affect: Mood normal          Behavior: Behavior normal          Lab Results: I have personally reviewed pertinent reports  Imaging Studies: I have personally reviewed pertinent reports  EKG, Pathology, and Other Studies: I have personally reviewed pertinent reports  VTE Prophylaxis: Heparin    Code Status: Level 1 - Full Code  Advance Directive and Living Will:      Power of :    POLST:      Counseling / Coordination of Care  Total floor / unit time spent today 30 minutes  Greater than 50% of total time was spent with the patient and / or family counseling and / or coordination of care

## 2023-03-31 NOTE — DISCHARGE INSTR - AVS FIRST PAGE
3 medications for you to :  - Doxycycline 100mg twice a day (antibiotic per podiatry)  - Lexapro 10mg (once daily) for depression  - Trazodone 50mg (once daily at night time) for sleep    Take antibiotic for 7 additional days (doxycycline once in the morning and once at night)  Restart your lisinopril and Maxzide tomorrow  Repeat blood work on Monday to make sure your kidney function is stable    Follow podiatry's instructions for wound care    Follow up with psychiatry    Follow up with vascular surgery for the results of your CT scan  Follow up with podiatry  Follow up with plastic surgery

## 2023-03-31 NOTE — ASSESSMENT & PLAN NOTE
Lab Results   Component Value Date    CREATININE 1 49 (H) 03/31/2023    CREATININE 1 71 (H) 03/30/2023    CREATININE 2 23 (H) 03/29/2023     Creatinine on admission 2 2, baseline 1 4-1 6  Suspect prerenal in the setting of depression, poor p o  intake, Maxzide use, lisinopril use  · Also reports using ~400mg ibuprofen BID  Urinated 250cc's clear yellow urine  PVR for 79mLs  UA with spec grav 1 025  Hold Maxide, hold lisinopril  Stop bactrim  Avoid NSAIDs  Resolved, 1 49  Pt is receiving CT with contrast prior to d/c  IVF given prior and after CT    Repeat BMP in 3 days

## 2023-03-31 NOTE — ASSESSMENT & PLAN NOTE
S/p I&D with podiatry  PTA, was on bactrim + Keflex (D5)  Wound culture from 3/27 growing staph aureus  Given HELIO, will switch him from bactrim to IV vancomycin (D1)  Continue follow up with podiatry  Arterial U/S obtained: 50-75% atherosclerosis b/l  CT LE w/ runoff ordered, follow up outpatient with vascular for results  Discussed with podiatry, will send home with 7 days of doxycycline

## 2023-03-31 NOTE — PLAN OF CARE
Problem: PAIN - ADULT  Goal: Verbalizes/displays adequate comfort level or baseline comfort level  Description: Interventions:  - Encourage patient to monitor pain and request assistance  - Assess pain using appropriate pain scale  - Administer analgesics based on type and severity of pain and evaluate response  - Implement non-pharmacological measures as appropriate and evaluate response  - Consider cultural and social influences on pain and pain management  - Notify physician/advanced practitioner if interventions unsuccessful or patient reports new pain  Outcome: Progressing     Problem: INFECTION - ADULT  Goal: Absence or prevention of progression during hospitalization  Description: INTERVENTIONS:  - Assess and monitor for signs and symptoms of infection  - Monitor lab/diagnostic results  - Monitor all insertion sites, i e  indwelling lines, tubes, and drains  - Monitor endotracheal if appropriate and nasal secretions for changes in amount and color  - [place] appropriate cooling/warming therapies per order  - Administer medications as ordered  - Instruct and encourage patient and family to use good hand hygiene technique  - Identify and instruct in appropriate isolation precautions for identified infection/condition  Outcome: Progressing     Problem: Knowledge Deficit  Goal: Patient/family/caregiver demonstrates understanding of disease process, treatment plan, medications, and discharge instructions  Description: Complete learning assessment and assess knowledge base    Interventions:  - Provide teaching at level of understanding  - Provide teaching via preferred learning methods  Outcome: Progressing     Problem: DISCHARGE PLANNING  Goal: Discharge to home or other facility with appropriate resources  Description: INTERVENTIONS:  - Identify barriers to discharge w/patient and caregiver  - Arrange for needed discharge resources and transportation as appropriate  - Identify discharge learning needs (meds, wound care, etc )  - Arrange for interpretive services to assist at discharge as needed  - Refer to Case Management Department for coordinating discharge planning if the patient needs post-hospital services based on physician/advanced practitioner order or complex needs related to functional status, cognitive ability, or social support system  Outcome: Progressing

## 2023-03-31 NOTE — TELEMEDICINE
TeleConsultation - [address]  66 y o  male MRN: [id number]  Unit/Bed#: -01 Encounter: [id number]        REQUIRED DOCUMENTATION:     1  This service was provided via Telemedicine  2  Provider located at [facility]   3  TeleMed provider: Priyanka Isabel MD   4  Identify all parties in room with patient during tele consult:  pt  5  Patient was then informed that this was a Telemedicine visit and that the exam was being conducted confidentially over secure lines  My office door was closed  No one else was in the room  Patient acknowledged consent and understanding of privacy and security of the Telemedicine visit, and gave us permission to have the assistant stay in the room in order to assist with the history and to conduct the exam   I informed the patient that I have reviewed their record in Epic and presented the opportunity for them to ask any questions regarding the visit today  The patient agreed to participate  Assessment/Plan     Present on Admission:  • Atrial flutter, unspecified type ([address] )  • Coronary artery disease involving native coronary artery  • Hypertension  • Abscess of right leg excluding foot    Assessment:    Unspecified mood disorder; rule out major depression; rule out adjustment disorder with depressed mood    Treatment Plan:    The patient presents with greatly improved, euthymic mood/affect today with absence of any suicidal ideation  Inpatient psychiatric treatment is not indicated  Recommend that upon discharge that he have outpatient psychiatric follow-up to include medication management with a psychotherapy/counseling component to assist him in further developing his coping skills  Recommend no changes in his psychiatric medication regimen at this time  Suicide precautions are not indicated  The patient is in agreement with this plan      Current Medications:     Current Facility-Administered Medications   Medication Dose Route Frequency Provider Last Rate   • acetaminophen  650 mg Oral Q6H PRN Waqar Falk PA-C     • aluminum-magnesium hydroxide-simethicone  30 mL Oral Q6H PRN Waqar Falk PA-C     • aspirin  81 mg Oral Every Other Day Waqar Falk PA-C     • atorvastatin  40 mg Oral QPM Waqar Falk PA-C     • collagenase   Topical Daily Olayinka Marrufo DPM     • escitalopram  10 mg Oral Daily Waqar Falk PA-C     • ferrous sulfate  325 mg Oral Daily With [address]     • finasteride  5 mg Oral Daily Israela PONCHO Falk     • heparin (porcine)  5,000 Units Subcutaneous [address]     • hydrOXYzine HCL  25 mg Oral Q6H PRN Waqar Falk PA-C     • labetalol  10 mg Intravenous Q6H PRN Waqar Falk PA-C      Or   • labetalol  20 mg Intravenous Q6H PRN Waqar Falk PA-C     • melatonin  6 mg Oral HS Edna Zhang PA-C     • metoprolol succinate  25 mg Oral Daily Waqar Falk PA-C     • multivitamin stress formula  1 tablet Oral Daily Waqar Falk PA-C     • ondansetron  4 mg Intravenous Q6H PRN Waqar Falk PA-C     • pantoprazole  20 mg Oral Early Morning Waqar Falk PA-C     • sodium chloride  1 spray Each Nare Q1H PRN Edna Zhang PA-C     • tamsulosin  0 4 mg Oral Daily With [facility] DIEGO SILVEIRA PA-C     • traZODone  50 mg Oral HS Waqar Falk PA-C     • vancomycin  1,000 mg Intravenous Q24H Rashi Dalal MD 1,000 mg (03/31/23 0639)   • white petrolatum-mineral oil   Topical TID PRN Nazanin Edward PA-C         Risks / Benefits of Treatment:    Risks, benefits, and possible side effects of medications explained to patient and patient verbalizes understanding        Other treatment modalities recommended as indicated:    · outpatient referral      Inpatient consult to Psychiatry  Consult performed by: Afshan Carballo MD  Consult ordered by: Waqar Falk PA-C        Physician Requesting Consult: Rashi Dalal "MD  Principal Problem:Acute on chronic renal insufficiency    Reason for Consult: Reassessment to determine most appropriate psychiatric disposition upon discharge      History of Present Illness      This is a follow-up psychiatry consult admitted yesterday  Please see that consult for full details  In summary from my consult:  Patient is a 66 y o  male   Depression         Patient presents to the ED with c/o depression that began today, states he was told to wear a boot for his wounds on his foot and got upset, states he was told to come over by wound care because he is depressed today  When asked if he is suicidal, he states \"no I just do not know what to do\"         Hx from patient and records - Patient complains of feeling upset today after taking Percocet, 1 tablet at 2 pm  [place] was just prescribed this a couple days ago for his leg pain   He has taken Tylenol but didn't have enough relief   Ibuprofen has helped in the past  [place] was at Dr Naresh Cherry office today  [place] feels depressed since he was told to wear a boot on his leg and now he won't be able to drive   He had 10 minute episode that he thought about taking a bunch of pills to kill himself  [place] currently denies SI/ HI and no hallucinations  [place] thinks it is the medicine making him feel this way   He also has itching of his hands and feels nervous  [place] wants to walk around but I recommended that he sit still in the ER here               Prior to Admission Medications   Prescriptions Last Dose Informant Patient Reported? Taking?    Ascorbic Acid (VITAMIN C IMMUNE HEALTH PO)     Yes No   Sig: Take by mouth daily   Maxidex 0 1 % ophthalmic suspension     Yes No   Sig: instill 1 drop into both eyes twice a day for 2 weeks then 1 drop into both eyes once daily   Multiple Vitamin (MULTIVITAMIN) capsule     Yes No   Sig: Take 1 capsule by mouth daily   Potassium Citrate ER 15 MEQ (1620 MG) TBCR     No No   Sig: Take 2 tablets by mouth 2 (two) times a day " aspirin (ECOTRIN LOW STRENGTH) 81 mg EC tablet     Yes No   Sig: Take 81 mg by mouth every other day 1 every other day   atorvastatin (LIPITOR) 40 mg tablet     No No   Sig: Take 1 tablet (40 mg total) by mouth every evening   calcium polycarbophil (FIBERCON) 625 mg tablet     Yes No   Sig: Take 625 mg by mouth daily   cephalexin (KEFLEX) 500 mg capsule     No No   Sig: Take 1 capsule (500 mg total) by mouth every 6 (six) hours for 7 days   clotrimazole-betamethasone (LOTRISONE) 1-0 05 % cream     No No   Sig: apply to affected area twice a day for 10 days MAX   collagenase (SANTYL) ointment     No No   Sig: Apply topically daily   ferrous sulfate 324 (65 Fe) mg     No No   Sig: Take 1 tablet (324 mg total) by mouth daily before breakfast   finasteride (PROSCAR) 5 mg tablet     No No   Sig: Take 1 tablet (5 mg total) by mouth daily   lisinopril (ZESTRIL) 10 mg tablet     No No   Sig: take 1 tablet by mouth once daily   metoprolol succinate (TOPROL-XL) 25 mg 24 hr tablet     No No   Sig: take 1 tablet by mouth once daily   neomycin-polymyxin-dexamethasone (MAXITROL) ophthalmic suspension     Yes No   nitroglycerin (NITROSTAT) 0 4 mg SL tablet     No No   Sig: Place 1 tablet (0 4 mg total) under the tongue every 5 (five) minutes as needed for chest pain   omeprazole (PriLOSEC) 20 mg delayed release capsule     No No   Sig: take 1 capsule by mouth once daily   oxyCODONE (ROXICODONE) 10 MG TABS     No No   Sig: Take 0 5 tablets (5 mg total) by mouth every 6 (six) hours as needed for moderate pain Max Daily Amount: 20 mg   oxyCODONE-acetaminophen (Percocet) 5-325 mg per tablet     No No   Sig: Take 1 tablet by mouth every 6 (six) hours as needed for moderate pain for up to 7 days Max Daily Amount: 4 tablets   sulfamethoxazole-trimethoprim (BACTRIM DS) 800-160 mg per tablet     No No   Sig: Take 1 tablet by mouth 2 (two) times a day for 7 days smx-tmp DS (BACTRIM) 800-160 mg tabs (1tab q12 D10)   tamsulosin (FLOMAX) 0 4 mg     No No   Sig: Take 1 capsule (0 4 mg total) by mouth daily with dinner   triamterene-hydrochlorothiazide (MAXZIDE) 75-50 MG per tablet     No No   Sig: take 1 tablet by mouth once daily      Facility-Administered Medications Last Administration Doses Remaining   lidocaine (XYLOCAINE) 4 % topical solution 5 mL 3/29/2023 10:11 AM 0            Medical History[]? Expand by Default           Past Medical History:   Diagnosis Date   • Anemia       iron def   • Arthritis     • Cerebrovascular accident (CVA) ([facility] [address] ) 1/7/2020   • Depression 3/29/2023   • GERD (gastroesophageal reflux disease)     • Gout     • Hypertension     • Insomnia              Surgical History[]? Expand by Default             Past Surgical History:   Procedure Laterality Date   • ANKLE SURGERY Right     • CARDIAC CATHETERIZATION       • COLONOSCOPY   01/25/2013     polypectomy; complete - 3 yrs d/t polyp - benign submucosal lipoma- path c/w lipoma   • COLONOSCOPY   04/25/2017     complete - tubular adenoma - repeat 5yrs   • CYSTOSCOPY       • CYSTOTOMY         bladder  w/ basket extraction of calculus   • FEMUR FRACTURE SURGERY       • HERNIA REPAIR         inguinal ; sliding   • INGUINAL HERNIA REPAIR Right       unilateral   • KNEE ARTHROSCOPY Right       w/medial menisectomy   • LASIK         corneal   • LITHOTRIPSY         renal   • [place] COLONOSCOPY FLX DX W/COLLJ SPEC WHEN PFRMD N/A 4/25/2017     Procedure: SCREENING COLONOSCOPY;  Surgeon: eBe Wilhelm MD;  Location: [facility] MAIN OR;  Service: General   • [place] TEAEC W/PATCH GRF CAROTID VERTB SUBCLAV NECK INC Right 1/10/2020     Procedure: ENDARTERECTOMY ARTERY CAROTID;  Surgeon: Daisy Ruiz MD;  Location: [facility];  Service: Vascular   • ROTATOR CUFF REPAIR Bilateral     • TONSILLECTOMY                Family History[]? Expand by Default             Family History   Problem Relation Age of Onset   • Alzheimer's disease Mother     • Alzheimer's disease Father     • Heart disease Father           CAD "  • Other Father           prediabetes   • Diabetes Father     • Breast cancer Other     • Arthritis Family     • Hypertension Family           I have reviewed and agree with the history as documented                E-Cigarette/Vaping   • E-Cigarette Use Never User                  E-Cigarette/Vaping Substances   • Nicotine No     • THC No     • CBD No     • Flavoring No     • Other No     • Unknown No        Social History                Tobacco Use   • Smoking status: Former       Packs/day:  00       Years: 22        Pack years:        Types: Cigarettes       Start date: 0       Quit date: 1985       Years since quittin 2   • Smokeless tobacco: Never   Vaping Use   • Vaping Use: Never used   Substance Use Topics   • Alcohol use: Not Currently       Comment: stopped drinking alcohol   • Drug use: No         Review of Systems   Constitutional: Negative for chills and fever  HENT: Negative for rhinorrhea and sore throat     Respiratory: Negative for shortness of breath     Cardiovascular: Negative for chest pain  Gastrointestinal: Negative for constipation, diarrhea, nausea and vomiting  Genitourinary: Negative for dysuria and frequency  Skin: Positive for rash  Psychiatric/Behavioral: Negative for hallucinations and suicidal ideas  The patient is nervous/anxious     All other systems reviewed and are negative      Nursing reports that during this hospital course the patient has been episodically stating that he wanted to take pills \"to end it all\"  Nursing reports that he has made that statement 3 times today      In meeting with the patient he reports he has been gradually becoming more depressed over the last 4 months since he has had to wear a boot due to his leg wounds that prevents him from being able to drive    He has experienced a loss of independence and a loss of many of his coping skills which has included regular attendance in [place] [id number] which he can no longer do since he is not " "able to drive  He states that for the first time ever, last week he experienced thoughts of wanting to take an overdose to \"end it all\"  He has been having these thoughts occasionally and finds that his very concerning as he has never had these thoughts before      Past psychiatric history: No prior psychiatric treatment per patient     Social history: The patient is   His wife passed away in 2020  He states he began to drink heavily back in the 1990s when his wife was diagnosed with breast cancer  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      Patient has 1 daughter  He reports no abuse      Family history: Unremarkable     Substance use history: As above    Interval course since last consult: Nursing and the provider reports the patient has been pleasant and cooperative with care without behavioral disturbance  He does not appear depressed nor has she made any comments of death wishes or suicidal ideation  Mental status examination: The patient was alert and well oriented all spheres  Affect was bright and euthymic  Making eye contact  Speech is unremarkable  Thought process was logical and linear  Thought content was reality based  Associations were tight  Memory was intact in all spheres  He continues to appear to be of average intelligence by his use of vocabulary, general fund of knowledge, some structure and syntax  He describes his mood as \"fantastic\" and greatly improved since here in the hospital   He is very future goal oriented  He denies any suicidal ideation or any death wishes  He denies homicidal ideation  He denies hallucinations and other psychotic features  Insight and judgment are intact  He is very motivated for outpatient psychiatric follow-up      Past Medical History:   Diagnosis Date   • Anemia     iron def   • Arthritis    • Cerebrovascular accident (CVA) ([facility] [address] ) 1/7/2020   • Depression 3/29/2023   • GERD (gastroesophageal reflux disease)    • " Gout    • Hypertension    • Insomnia        Medical Review Of Systems:    Review of Systems    Meds/Allergies     all current active meds have been reviewed  No Known Allergies    Objective     Vital signs in last 24 hours:  Temp:  [97 9 °F (36 6 °C)-98 2 °F (36 8 °C)] 97 9 °F (36 6 °C)  HR:  [52-73] 73  Resp:  [16-18] 18  BP: (111-157)/(45-81) 157/81      Intake/Output Summary (Last 24 hours) at 3/31/2023 1511  Last data filed at 3/31/2023 2584  Gross per 24 hour   Intake 680 ml   Output 950 ml   Net -270 ml       Lab Results: I have personally reviewed all pertinent laboratory/tests results  Imaging Studies: VAS lower limb arterial duplex, complete bilateral    Result Date: 3/30/2023  Narrative:  [facility] REPORT CLINICAL: Indications: Patient presents with a recurring ulcer on his right anterior ankle which reopened 3-4 months ago  Operative History: 2020-01-10 Right Standard (ICA, ECA and CCA) endarterectomy Risk Factors The patient has history of HTN, Hyperlipidemia, CKD, CAD and previous smoking (quit >10yrs ago)  Clinical Right Pressure:  139/ mm Hg, Left Pressure:  143/ mm Hg  FINDINGS:  Right                  Impression  Waveform    PSV (cm/s)  Post  Tibial                       Monophasic              Ant  Tibial                        Monophasic              Dist EIA                                              248  Common Femoral Artery  50-75%                         359  Prox Profunda                                          53  Prox SFA                                              213  Mid SFA                                                76  Dist SFA                                               48  Proximal Pop                                           55  Distal Pop                                             54  Tibioperoneal                                          41  Dist Post Tibial                                       49  Dist  Ant   Tibial 14  Dist Peroneal                                          49   Left                   Impression  Waveform   PSV (cm/s)  Post  Tibial                       Triphasic              Ant  Tibial                        Biphasic               Common Femoral Artery  50-75%                        287  Prox Profunda                                        122  Prox SFA                                             146  Mid SFA                                               94  Dist SFA                                              79  Proximal Pop                                          50  Distal Pop                                            45  Tibioperoneal                                         71  Dist Post Tibial                                      77  Dist Peroneal                                         30     CONCLUSION:  Impression: RIGHT LOWER LIMB: There is evidence of a 50-75% stenosis in the common femoral artery  Diffuse disease noted throughout the remaining femoral-popliteal and tibio-peroneal arteries without significant focal stenosis  Ankle/Brachial index:  0 73  which is in the moderate disease category PVR/ PPG tracings are dampened  Metatarsal pressure of  84 mm Hg Great toe pressure of  44 mm Hg, within the healing range  LEFT LOWER LIMB: There is evidence of a 50-75% stenosis in the common femoral artery  Diffuse disease noted throughout the remaining femoral-popliteal and tibio-peroneal arteries without significant focal stenosis  Ankle/Brachial index:   Unreliable due to non-compressible vessels  PVR/ PPG tracings dampened  Metatarsal pressure of 222  mm Hg Great toe pressure of 62 mm Hg, within the healing range  There is no previous study for comparison    SIGNATURE: Electronically Signed by: Grace Zamora MD on 2023-03-30 08:42:13 PM    EKG/Pathology/Other Studies:   Lab Results   Component Value Date    VENTRATE 76 03/29/2023    ATRIALRATE 76 03/29/2023    PRINT 200 03/29/2023    QRSDINT 86 03/29/2023    QTINT 368 03/29/2023    QTCINT 414 03/29/2023    PAXIS 63 03/29/2023    QRSAXIS 46 03/29/2023    TWAVEAXIS 58 03/29/2023        Code Status: Level 1 - Full Code  Advance Directive and Living Will:      Power of :    POLST:      Screenings:    1  Nutrition Screening  · Not available on chart    2  Pain Screening  Not available on chart    3  Suicide Screening  Not available on chart    Counseling / Coordination of Care: Total floor / unit time spent today 30 minutes  Greater than 50% of total time was spent with the patient and / or family counseling and / or coordination of care  A description of the counseling / coordination of care: Chart review, patient evaluation, coordination communication with staff, nursing and provider

## 2023-03-31 NOTE — ASSESSMENT & PLAN NOTE
On Maxzide, Toprol, and lisinopril  Hold lisinopril and Maxzide in setting of HELIO  Restart Maxzide and lisinopril tomorrow  BMP in 3 days

## 2023-03-31 NOTE — PROGRESS NOTES
Progress Note - Podiatry  Wilhemenia Dys  66 y o  male MRN: [id number]  Unit/Bed#: -01 Encounter: [id number]    Assessment:  1  Open wound right ankle complicated by superficial abscess  2  Cellulitis right ankle wound  3  Peripheral arterial disease    Plan:  1  Pedal examination, wound evaluation and management  2   I personally reviewed patient's lower extremity arterial Dopplers, vascular surgery consultation note, CTA is pending, potential vascular intervention per vascular surgery  3   Case was discussed with plastic surgery, Dr Gabriele Nova is currently out of town and we will gladly see patient outpatient upon discharge, follow-up instructions/contact information was placed in the discharge instructions  4   We will continue with local wound care of iodoform packing to ulcerated areas, dry dressing to cover this  5   Patient may weight-bear as tolerated with Cam boot in place for ADLs  6   This patient's right foot, he may remove cam boot to drive, he is to wear sneaker, but once he reaches the destination he is to reapply cam boot  7   When asked visiting with patient at bedside, he kept crossing his left foot over his anterior right ankle at the site of the wound, I instructed patient he needs to be very vigilant to make sure he is not doing this and increasing pressure to the area of the wound  Patient was dispensed a green foam wedge he is advised to take this home and elevate his foot on this to keep elevation which she states feels better and to maintain pressure of infection reduction  8   Patient remains stable for discharge from our perspective    9   Remainder of care per primary team       Subjective/Objective   Chief Complaint:   Chief Complaint   Patient presents with   • Depression     Patient presents to the ED with c/o depression that began today, states he was told to wear a boot for his wounds on his foot and got upset, states he was told to come over by wound care because he "is depressed today  When asked if he is suicidal, he states \"no I just do not know what to do\"  Subjective: Patient seen at bedside for right anterior ankle open wound, he states he does not have any pain, he does want to go home he has things to take care of  Blood pressure 157/81, pulse 73, temperature 97 9 °F (36 6 °C), temperature source Oral, resp  rate 18, height 5' 10\" (1 778 m), weight 95 3 kg (210 lb), SpO2 93 %  ,Body mass index is 30 13 kg/m²  Invasive Devices     Peripheral Intravenous Line  Duration           Peripheral IV 03/31/23 Right;Ventral (anterior) Forearm <1 day                Physical Exam:   General appearance: alert, cooperative and no distress  Extremity: Left foot is within normal limits  Lower Extremity Wound:   Wound 1 located anterior right ankle -3 wounds within this measurement, measures 4 0 cm x 0 9 cm x 0 7 cm  with sinus tracking or undermining  Wound bed appears fibrotic with slight Serous exudate  Deepest tissue noted Tendon  Malodor is not noticed  Wound edge appears non-attached  Kathrin-wound skin appears intact  Probe to bone is,  negative   Signs of infection are not present at this time               Lab, Imaging and other studies:   Admission on 03/29/2023   Component Date Value   • Amph/Meth UR 03/29/2023 Negative    • Barbiturate Ur 03/29/2023 Negative    • Benzodiazepine Urine 03/29/2023 Negative    • Cocaine Urine 03/29/2023 Negative    • Methadone Urine 03/29/2023 Negative    • Opiate Urine 03/29/2023 Negative    • PCP Ur 03/29/2023 Negative    • THC Urine 03/29/2023 Negative    • Oxycodone Urine 03/29/2023 Positive (A)    • WBC 03/29/2023 6 88    • RBC 03/29/2023 3 70 (L)    • Hemoglobin 03/29/2023 11 5 (L)    • Hematocrit 03/29/2023 36 1 (L)    • MCV 03/29/2023 98    • MCH 03/29/2023 31 1    • MCHC 03/29/2023 31 9    • RDW 03/29/2023 12 6    • MPV 03/29/2023 8 9    • Platelets 22/39/2711 191    • nRBC 03/29/2023 0    • Neutrophils Relative 03/29/2023 76 (H) " • Immat GRANS % 03/29/2023 0    • Lymphocytes Relative 03/29/2023 15    • Monocytes Relative 03/29/2023 6    • Eosinophils Relative 03/29/2023 3    • Basophils Relative 03/29/2023 0    • Neutrophils Absolute 03/29/2023 5 16    • Immature Grans Absolute 03/29/2023 0 02    • Lymphocytes Absolute 03/29/2023 1 05    • Monocytes Absolute 03/29/2023 0 39    • Eosinophils Absolute 03/29/2023 0 23    • Basophils Absolute 03/29/2023 0 03    • Sodium 03/29/2023 138    • Potassium 03/29/2023 4 8    • Chloride 03/29/2023 106    • CO2 03/29/2023 22    • ANION GAP 03/29/2023 10    • BUN 03/29/2023 32 (H)    • Creatinine 03/29/2023 2 23 (H)    • Glucose 03/29/2023 95    • Calcium 03/29/2023 9 1    • AST 03/29/2023 20    • ALT 03/29/2023 14    • Alkaline Phosphatase 03/29/2023 41    • Total Protein 03/29/2023 6 9    • Albumin 03/29/2023 4 0    • Total Bilirubin 03/29/2023 0 35    • eGFR 03/29/2023 27    • TSH 3RD GENERATON 03/29/2023 1 721    • Ethanol Lvl 03/29/2023 <10    • Color, UA 03/29/2023 Yellow    • Clarity, UA 03/29/2023 Clear    • Specific Gravity, UA 03/29/2023 1 025    • pH, UA 03/29/2023 5 5    • Leukocytes, UA 03/29/2023 Negative    • Nitrite, UA 03/29/2023 Negative    • Protein, UA 03/29/2023 Negative    • Glucose, UA 03/29/2023 Negative    • Ketones, UA 03/29/2023 Negative    • Urobilinogen, UA 03/29/2023 <2 0    • Bilirubin, UA 03/29/2023 Negative    • Occult Blood, UA 03/29/2023 Negative    • Ventricular Rate 03/29/2023 76    • Atrial Rate 03/29/2023 76    • DE Interval 03/29/2023 200    • QRSD Interval 03/29/2023 86    • QT Interval 03/29/2023 368    • QTC Interval 03/29/2023 414    • P Axis 03/29/2023 63    • QRS Axis 03/29/2023 46    • T Wave Axis 03/29/2023 58    • Acetaminophen Level 93/51/0664 12    • Salicylate Lvl 18/27/1168 <5    • Urea Nitrogen, Ur 03/29/2023 552    • Sodium 03/30/2023 138    • Potassium 03/30/2023 4 5    • Chloride 03/30/2023 109 (H)    • CO2 03/30/2023 21    • ANION GAP 03/30/2023 8    • BUN 03/30/2023 23    • Creatinine 03/30/2023 1 71 (H)    • Glucose 03/30/2023 94    • Calcium 03/30/2023 9 0    • eGFR 03/30/2023 37    • WBC 03/30/2023 5 37    • RBC 03/30/2023 3 39 (L)    • Hemoglobin 03/30/2023 10 4 (L)    • Hematocrit 03/30/2023 32 3 (L)    • MCV 03/30/2023 95    • MCH 03/30/2023 30 7    • MCHC 03/30/2023 32 2    • RDW 03/30/2023 12 5    • MPV 03/30/2023 8 9    • Platelets 19/36/7668 186    • nRBC 03/30/2023 0    • Neutrophils Relative 03/30/2023 60    • Immat GRANS % 03/30/2023 0    • Lymphocytes Relative 03/30/2023 26    • Monocytes Relative 03/30/2023 7    • Eosinophils Relative 03/30/2023 7 (H)    • Basophils Relative 03/30/2023 0    • Neutrophils Absolute 03/30/2023 3 25    • Immature Grans Absolute 03/30/2023 0 01    • Lymphocytes Absolute 03/30/2023 1 38    • Monocytes Absolute 03/30/2023 0 36    • Eosinophils Absolute 03/30/2023 0 35    • Basophils Absolute 03/30/2023 0 02    • Magnesium 03/30/2023 1 5 (L)    • Vancomycin Rm 03/30/2023 10 7    • Sodium 03/31/2023 137    • Potassium 03/31/2023 4 5    • Chloride 03/31/2023 107    • CO2 03/31/2023 23    • ANION GAP 03/31/2023 7    • BUN 03/31/2023 19    • Creatinine 03/31/2023 1 49 (H)    • Glucose 03/31/2023 90    • Calcium 03/31/2023 9 0    • eGFR 03/31/2023 44    • WBC 03/31/2023 4 96    • RBC 03/31/2023 3 49 (L)    • Hemoglobin 03/31/2023 10 7 (L)    • Hematocrit 03/31/2023 33 2 (L)    • MCV 03/31/2023 95    • MCH 03/31/2023 30 7    • MCHC 03/31/2023 32 2    • RDW 03/31/2023 12 3    • Platelets 82/95/8529 194    • MPV 03/31/2023 8 6 (L)      Imaging: I have personally reviewed pertinent films in PACS  EKG, Pathology, and Other Studies: I have personally reviewed pertinent reports    VTE Pharmacologic Prophylaxis: Heparin  VTE Mechanical Prophylaxis: sequential compression device

## 2023-04-02 ENCOUNTER — HOME CARE VISIT (OUTPATIENT)
Dept: HOME HEALTH SERVICES | Facility: HOME HEALTHCARE | Age: 78
End: 2023-04-02

## 2023-04-02 VITALS
HEART RATE: 82 BPM | RESPIRATION RATE: 18 BRPM | SYSTOLIC BLOOD PRESSURE: 116 MMHG | TEMPERATURE: 97.8 F | OXYGEN SATURATION: 97 % | DIASTOLIC BLOOD PRESSURE: 76 MMHG

## 2023-04-02 NOTE — CASE COMMUNICATION
[facility] [address] [place] [address] service with the following disciplines:      SN  This report is informational only, no responses is needed  Primary focus of home health care: RLE wounds with packing  Patient stated goals of care: for wounds to heal at home without complication at home  Anticipated visit pattern and next visit date: EOD, next visit 4/4/23    See medication list - meds in home differ from AVS:  Pt  needs to  Ferrous sulfate, multivitamin, potassium citrate from pharmacy  Pt reports taking Senna 8 6mg once daily for constipation and fexofenadine 180mg once daily for seasonal allergies  Pt has discontinued use of lotrisone and santyl to RLE per most recent wound care orders, medicated ointments removed from [facility]  Significant clinical findings: RLE wounds with slough present  Pt admits to recent exacerbation of depression  dt physical limitations of delayed healing wounds  Pt denies SI/HI and declines MSW consult at this time  Pt was seen by psychiatry during recent hospitalizations and medications were adjusted  Pt is in good spirits today, agreeable to call MD office or VNA should symptoms increase, hopeful for potential revascularization procedure via Vascular surgery  Potential barriers to goal achievement: pain and depression/depressive symptoms       Thank you for allowing us to participate in the care of your patient      Mic Olvera RN  [facility]

## 2023-04-02 NOTE — CASE COMMUNICATION
Ship to [employer]     Wound 1 RLE      Full ___3       Partial ___1      Gauze 4x4  [id number] ____3  Indio dolan  2in [id number] ____1  Remington Shultz Applicators____3

## 2023-04-02 NOTE — CASE COMMUNICATION
Ship to    Home   Insurance Medicare A/B    Wound 1 RLE      Full ___3       Partial ___1    Saline 100ml [id number]___3  Gauze 4x4 N/S 200 4x4s per unit  [id number] ____1   Gauze Laura stretch roll 4inch n/s 12 rolls per unit  [id number] ___1  Chavez Suárez white  2in [id number] ____1  Suture kit [id number] ____1  Mendy Leone Applicators____30

## 2023-04-03 ENCOUNTER — APPOINTMENT (OUTPATIENT)
Dept: LAB | Facility: HOSPITAL | Age: 78
End: 2023-04-03

## 2023-04-03 ENCOUNTER — TRANSITIONAL CARE MANAGEMENT (OUTPATIENT)
Dept: FAMILY MEDICINE CLINIC | Facility: HOSPITAL | Age: 78
End: 2023-04-03

## 2023-04-03 ENCOUNTER — TELEPHONE (OUTPATIENT)
Dept: PSYCHIATRY | Facility: CLINIC | Age: 78
End: 2023-04-03

## 2023-04-03 DIAGNOSIS — N17.9 ACUTE RENAL FAILURE SUPERIMPOSED ON CHRONIC KIDNEY DISEASE (HCC): ICD-10-CM

## 2023-04-03 DIAGNOSIS — Z71.89 COORDINATION OF COMPLEX CARE: Primary | ICD-10-CM

## 2023-04-03 DIAGNOSIS — N18.9 ACUTE RENAL FAILURE SUPERIMPOSED ON CHRONIC KIDNEY DISEASE (HCC): ICD-10-CM

## 2023-04-03 LAB
ANION GAP SERPL CALCULATED.3IONS-SCNC: 6 MMOL/L (ref 4–13)
BUN SERPL-MCNC: 32 MG/DL (ref 5–25)
CALCIUM SERPL-MCNC: 9.7 MG/DL (ref 8.3–10.1)
CHLORIDE SERPL-SCNC: 110 MMOL/L (ref 96–108)
CO2 SERPL-SCNC: 22 MMOL/L (ref 21–32)
CREAT SERPL-MCNC: 1.73 MG/DL (ref 0.6–1.3)
GFR SERPL CREATININE-BSD FRML MDRD: 36 ML/MIN/1.73SQ M
GLUCOSE SERPL-MCNC: 112 MG/DL (ref 65–140)
POTASSIUM SERPL-SCNC: 4.8 MMOL/L (ref 3.5–5.3)
SODIUM SERPL-SCNC: 138 MMOL/L (ref 135–147)

## 2023-04-03 NOTE — TELEPHONE ENCOUNTER
Called pt regarding referral pt was inerested in [facility] as location is closer for this pt   Referral was sent over to PF

## 2023-04-03 NOTE — UTILIZATION REVIEW
NOTIFICATION OF ADMISSION DISCHARGE   This is a Notification of Discharge from [address]  Please be advised that this patient has been discharge from our facility  Below you will find the admission and discharge date and time including the patient’s disposition  UTILIZATION REVIEW CONTACT:  Luís Franz  Utilization   Network Utilization Review Department  Phone: [phone] carefully listen to the prompts  All voicemails are confidential   Email: [email or web address]     ADMISSION INFORMATION  PRESENTATION DATE: 3/29/2023  4:10 PM  OBERVATION ADMISSION DATE:   INPATIENT ADMISSION DATE: 3/30/23  2:47 PM   DISCHARGE DATE: 3/31/2023  8:11 PM   DISPOSITION:Home with Home Health Care    IMPORTANT INFORMATION:  Send all requests for admission clinical reviews, approved or denied determinations and any other requests to dedicated fax number below belonging to the campus where the patient is receiving treatment   List of dedicated fax numbers:  [address] DENIALS (Administrative/Medical Necessity) [phone]   [address] (Maternity/NICU/Pediatrics) [phone]   [facility] [phone]   [address] [phone]   [address] [phone]   220 [facility] [phone]   90 [facility] [phone]   [address] [place] [address] 119 [phone]   [facility]  [phone]   4059 [facility] [phone]   412 [facility] 850 [facility] [phone]

## 2023-04-04 ENCOUNTER — TELEPHONE (OUTPATIENT)
Dept: VASCULAR SURGERY | Facility: CLINIC | Age: 78
End: 2023-04-04

## 2023-04-04 ENCOUNTER — HOME CARE VISIT (OUTPATIENT)
Dept: HOME HEALTH SERVICES | Facility: HOME HEALTHCARE | Age: 78
End: 2023-04-04

## 2023-04-04 ENCOUNTER — CONSULT (OUTPATIENT)
Dept: VASCULAR SURGERY | Facility: CLINIC | Age: 78
End: 2023-04-04

## 2023-04-04 VITALS
HEART RATE: 62 BPM | OXYGEN SATURATION: 97 % | SYSTOLIC BLOOD PRESSURE: 126 MMHG | WEIGHT: 203.4 LBS | HEIGHT: 70 IN | BODY MASS INDEX: 29.12 KG/M2 | DIASTOLIC BLOOD PRESSURE: 72 MMHG

## 2023-04-04 VITALS
DIASTOLIC BLOOD PRESSURE: 78 MMHG | RESPIRATION RATE: 16 BRPM | HEART RATE: 84 BPM | OXYGEN SATURATION: 98 % | SYSTOLIC BLOOD PRESSURE: 128 MMHG

## 2023-04-04 DIAGNOSIS — Z98.890 S/P CAROTID ENDARTERECTOMY: ICD-10-CM

## 2023-04-04 DIAGNOSIS — S91.001D OPEN WOUND OF RIGHT ANKLE, SUBSEQUENT ENCOUNTER: ICD-10-CM

## 2023-04-04 DIAGNOSIS — I73.9 PAD (PERIPHERAL ARTERY DISEASE) (HCC): Primary | ICD-10-CM

## 2023-04-04 PROBLEM — S91.001A OPEN WOUND OF RIGHT ANKLE: Status: RESOLVED | Noted: 2023-03-29 | Resolved: 2023-04-04

## 2023-04-04 RX ORDER — GABAPENTIN 100 MG/1
100 CAPSULE ORAL 3 TIMES DAILY
Qty: 90 CAPSULE | Refills: 0 | Status: SHIPPED | OUTPATIENT
Start: 2023-04-04 | End: 2023-05-04

## 2023-04-04 RX ORDER — CHLORHEXIDINE GLUCONATE 0.12 MG/ML
15 RINSE ORAL ONCE
OUTPATIENT
Start: 2023-04-04 | End: 2023-04-04

## 2023-04-04 RX ORDER — CEFAZOLIN SODIUM 2 G/50ML
2000 SOLUTION INTRAVENOUS ONCE
OUTPATIENT
Start: 2023-04-04 | End: 2023-04-04

## 2023-04-04 NOTE — H&P (VIEW-ONLY)
Assessment/Plan:    S/P carotid endarterectomy  -History of right CEA in 2020 by Dr Cesar Schwab for asymptomatic stenosis  -Remains asymptomatic, no pain or stroke-like symptoms  -Carotid duplex 12/12/2022 demonstrating widely patent right carotid endarterectomy site, less than 50% stenosis on the left  -Continue aspirin, Lipitor  -Continue annual surveillance duplex    PAD (peripheral artery disease) ([address] )  78M non-smoker with HTN, CAD, Aflutter, h/o carotid stenosis (s/p R CEA 2020 Dr Cesar Schwab), CKD 3 who was recently admitted for recurrent right ankle wounds  He has a history of remote STSG that reopened with underlying abscess requiring incision and drainage     -Lower extremity arterial duplex reviewed demonstrating on the right: 50 to 75% CFA stenosis, FARRAH 0 73 with GTP 44  On the left: 50 to 75% CFA stenosis, FARRAH unreliable GTP 62  -CTA with runoff reviewed demonstrating diffuse aortoiliac disease  On the right, he has calcified bulky segment in the mid external iliac artery causing stenosis  CFA with high-grade stenosis  On the left, he has diffuse iliac disease without significant stenosis  His left CFA also has high-grade stenosis  Bilateral internal iliac artery with significant calcified disease burden at the origin but patent  His bilateral SFA/pop without significant disease  He has some tibial calcifications but appears patent     -Patient continues to follow with podiatry for local wound care  He may need STSG in the future     -Discussed the pathophysiology of atherosclerotic occlusive disease, treatment options and indications for intervention  -Given he has persistent nonhealing right lower extremity wounds, would recommend proceeding with right common femoral endarterectomy with retrograde iliac intervention   -We will refer for cardiology restratification and optimization   TTE 10/26/2022 LVEF 65% without wall motion abnormalities   -Continue aspirin, statin  -We will schedule for surgery after cardiology clearance         Diagnoses and all orders for this visit:    PAD (peripheral artery disease) ([facility] [address] )  -     Ambulatory Referral to Cardiology; Future  -     Case request operating room: ENDARTERECTOMY ARTERIAL FEMORAL, RETROGRADE ILIAC INTERVENTION; Standing  -     Type and screen; Future  -     Basic metabolic panel; Future  -     CBC and Platelet; Future  -     Ambulatory referral to Nephrology; Future  -     Case request operating room: ENDARTERECTOMY ARTERIAL FEMORAL, RETROGRADE ILIAC INTERVENTION  -     gabapentin (Neurontin) 100 mg capsule; Take 1 capsule (100 mg total) by mouth 3 (three) times a day    S/P carotid endarterectomy    Open wound of right ankle, subsequent encounter  -     Ambulatory Referral to Vascular Surgery  -     Case request operating room: ENDARTERECTOMY ARTERIAL FEMORAL, RETROGRADE ILIAC INTERVENTION; Standing  -     Case request operating room: ENDARTERECTOMY ARTERIAL FEMORAL, RETROGRADE ILIAC INTERVENTION    Other orders  -     Diet NPO; Sips with meds; Standing  -     Void on call to OR; Standing  -     Insert peripheral IV; Standing  -     Nursing Communication CHG bath, have staff wash entire body (neck down) per pre op bathing protocol  Routine, evening prior to, and day of surgery ; Standing  -     Nursing Communication Swab both nares with Povidone-Iodine solution, EXCLUDE if patient has shellfish/Iodine allergy, and replace with nasal alcohol swabstick  Routine, day of surgery, on call to OR ; Standing  -     chlorhexidine (PERIDEX) 0 12 % oral rinse 15 mL  -     Place sequential compression device; Standing  -     Protime-INR;  Standing  -     APTT; Standing  -     ceFAZolin (ANCEF) IVPB (premix in dextrose) 2,000 mg 50 mL            Operative Scheduling Information:    Hospital[facility]  [facility]    Physician:  Alexandra Snyder    Surgery: Right femoral endarterectomy with retograde iliac intervention    Urgency:  Urgent: 2 weeks    Level:  Level 3: Outpatients to be scheduled for elective surgery than can be delayed up to 4 weeks without reasonable expectation of detriment to patient    Case Length:  Normal    Post-op Bed:  Stepdown    OR Table:  IR    Equipment Needs:  None    Medication Instructions:  Aspirin:   Continue (do not hold)    Hydration:  125 cc/hr for 4 hour prior and 4 hours after procedure    Contrast Allergy:  no        Subjective:      Patient ID: Vin Woodward  is a 66 y o  male  Patient today presents to office as a new patient  Patient was referred by Dr Pilar Wahl  Patient had a CTA Abdominal w/runoff w/wo contrast done on 03/31/2023  Patient also had VANIA done on 03/30/2023  Patient has a recurring ulcer anterior right ankle  Patient complains of little pain on the wound  Patient had a compound fracture of the right ankle which cost his to have a skin graft of the original wound  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 and he spent 4 months [facility]  About 5 months ago skin graft reopened up after dropping something on his foot  He had 2 incisions made above the skin graft to drain infection from the leg  Those subsequent incisions have not healed  Patient is taking Atorvastatin 40MG and Aspirin 81MG  Patient is a former smoker  HPI     76M former smoker with HTN, CAD, Aflutter, h/o carotid stenosis (s/p R CEA 2020 Dr Bc Peters), CKD 3 who was recently admitted for recurrent right ankle wounds  He has a history of remote STSG that reopened with underlying abscess requiring incision and drainage  He is here to discuss results of imaging and possible surgery  He complains of some sharp pain along his medial ankle  He continues to follow with wound care to take care of his wound with persistent nonhealing  No evidence of infection  Lower extremity arterial duplex reviewed demonstrating on the right: 50 to 75% CFA stenosis, FARRAH 0 73 with GTP 44    On the left: "50 to 75% CFA stenosis, FARRAH unreliable GTP 62  CTA with runoff reviewed demonstrating diffuse aortoiliac disease  On the right, he has calcified bulky segment in the mid external iliac artery causing stenosis  CFA with high-grade stenosis  On the left, he has diffuse iliac disease without significant stenosis  His left CFA also has high-grade stenosis  Bilateral internal iliac artery with significant calcified disease burden at the origin but patent  His bilateral SFA/pop without significant disease  He has some tibial calcifications but appears patent  The following portions of the patient's history were reviewed and updated as appropriate: allergies, current medications, past family history, past medical history, past social history, past surgical history and problem list     Review of Systems   Constitutional: Negative  HENT: Negative  Eyes: Negative  Respiratory: Negative  Cardiovascular: Negative  Gastrointestinal: Negative  Endocrine: Negative  Genitourinary: Negative  Musculoskeletal: Negative  Skin: Negative  Allergic/Immunologic: Positive for environmental allergies  Neurological: Negative  Hematological: Negative  Psychiatric/Behavioral: Negative  Objective:      /72 (BP Location: Left arm, Patient Position: Sitting, Cuff Size: Large)   Pulse 62   Ht 5' 10\" (1 778 m)   Wt 92 3 kg (203 lb 6 4 oz)   SpO2 97%   BMI 29 18 kg/m²          Physical Exam  HENT:      Head: Normocephalic  Nose: Nose normal    Eyes:      Extraocular Movements: Extraocular movements intact  Cardiovascular:      Rate and Rhythm: Normal rate  Comments: Nonpalpable right femoral pulse, palpable left femoral pulse  Bilateral DP and PT signal present  Pulmonary:      Effort: Pulmonary effort is normal    Abdominal:      Palpations: Abdomen is soft  Musculoskeletal:         General: No swelling  Cervical back: Neck supple     Skin:     General: Skin is " warm       Comments: Right distal leg along anterior shin wounds clean without evidence of cellulitis   Neurological:      Mental Status: He is alert and oriented to person, place, and time     Psychiatric:         Mood and Affect: Mood normal

## 2023-04-04 NOTE — LETTER
23    Re: Cardiology  Clearance    Patient Name: Jeremy Segura  Patient : 1945   Patient MRN: [id number]   Patient Phone: [phone]     Dr John Trimble MD is requesting clearance for above patient, prior to proceeding with Right femoral endarterectomy with retograde iliac intervention  Patient's procedure will be scheduled at [facility] once clearance has been obtained  Patient will be given general anesthesia  We spoke with your office today regarding clearance request   Amber Gutierrez informed us that patient was recently seen and may not require an appointment with you  They informed us that they will reach out to the patient to schedule if needed  Please fax your recommendations, including any medication recommendations, to [phone], attention nursing  Or route your recommendations to Epic pool [facility] Clearance Pool [[id number]]  Please reach out with any questions or concerns      Sincerely,    [facility]

## 2023-04-04 NOTE — ASSESSMENT & PLAN NOTE
-History of right CEA in 2020 by Dr Claudia Neff for asymptomatic stenosis  -Remains asymptomatic, no pain or stroke-like symptoms  -Carotid duplex 12/12/2022 demonstrating widely patent right carotid endarterectomy site, less than 50% stenosis on the left  -Continue aspirin, Lipitor  -Continue annual surveillance duplex

## 2023-04-04 NOTE — ASSESSMENT & PLAN NOTE
78M non-smoker with HTN, CAD, Aflutter, h/o carotid stenosis (s/p R CEA 2020 Dr Lester Martell), CKD 3 who was recently admitted for recurrent right ankle wounds  He has a history of remote STSG that reopened with underlying abscess requiring incision and drainage     -Lower extremity arterial duplex reviewed demonstrating on the right: 50 to 75% CFA stenosis, FARRAH 0 73 with GTP 44  On the left: 50 to 75% CFA stenosis, FARRAH unreliable GTP 62  -CTA with runoff reviewed demonstrating diffuse aortoiliac disease  On the right, he has calcified bulky segment in the mid external iliac artery causing stenosis  CFA with high-grade stenosis  On the left, he has diffuse iliac disease without significant stenosis  His left CFA also has high-grade stenosis  Bilateral internal iliac artery with significant calcified disease burden at the origin but patent  His bilateral SFA/pop without significant disease  He has some tibial calcifications but appears patent     -Patient continues to follow with podiatry for local wound care  He may need STSG in the future     -Discussed the pathophysiology of atherosclerotic occlusive disease, treatment options and indications for intervention  -Given he has persistent nonhealing right lower extremity wounds, would recommend proceeding with right common femoral endarterectomy with retrograde iliac intervention   -We will refer for cardiology restratification and optimization   TTE 10/26/2022 LVEF 65% without wall motion abnormalities   -Continue aspirin, statin  -We will schedule for surgery after cardiology clearance

## 2023-04-04 NOTE — LETTER
23    Re: Nephrology Clearance    Patient Name: Anali Urbano  Patient : 1945   Patient MRN: [id number]   Patient Phone: [phone]     Dr Josh Goldstein MD is requesting clearance for above patient, prior to proceeding with Right femoral endarterectomy with retograde iliac intervention  Patient's procedure will be scheduled at [place] [address] once clearance has been obtained  Patient will be given general anesthesia  We spoke with your office today regarding clearance request   Briana informed us that patient was recently seen and may not require an appointment with you  They informed us that they will reach out to the patient to schedule if needed  Please fax your recommendations, including any medication recommendations, to [phone], attention nursing  Or route your recommendations to Epic pool [facility] Clearance Pool [[id number]]  Please reach out with any questions or concerns      Sincerely,    [facility]

## 2023-04-04 NOTE — PROGRESS NOTES
Assessment/Plan:    S/P carotid endarterectomy  -History of right CEA in 2020 by Dr Crow Sanchez for asymptomatic stenosis  -Remains asymptomatic, no pain or stroke-like symptoms  -Carotid duplex 12/12/2022 demonstrating widely patent right carotid endarterectomy site, less than 50% stenosis on the left  -Continue aspirin, Lipitor  -Continue annual surveillance duplex    PAD (peripheral artery disease) ([address] )  78M non-smoker with HTN, CAD, Aflutter, h/o carotid stenosis (s/p R CEA 2020 Dr Crow Sanchez), CKD 3 who was recently admitted for recurrent right ankle wounds  He has a history of remote STSG that reopened with underlying abscess requiring incision and drainage     -Lower extremity arterial duplex reviewed demonstrating on the right: 50 to 75% CFA stenosis, FARRAH 0 73 with GTP 44  On the left: 50 to 75% CFA stenosis, FARRAH unreliable GTP 62  -CTA with runoff reviewed demonstrating diffuse aortoiliac disease  On the right, he has calcified bulky segment in the mid external iliac artery causing stenosis  CFA with high-grade stenosis  On the left, he has diffuse iliac disease without significant stenosis  His left CFA also has high-grade stenosis  Bilateral internal iliac artery with significant calcified disease burden at the origin but patent  His bilateral SFA/pop without significant disease  He has some tibial calcifications but appears patent     -Patient continues to follow with podiatry for local wound care  He may need STSG in the future     -Discussed the pathophysiology of atherosclerotic occlusive disease, treatment options and indications for intervention  -Given he has persistent nonhealing right lower extremity wounds, would recommend proceeding with right common femoral endarterectomy with retrograde iliac intervention   -We will refer for cardiology restratification and optimization   TTE 10/26/2022 LVEF 65% without wall motion abnormalities   -Continue aspirin, statin  -We will schedule for surgery after cardiology clearance         Diagnoses and all orders for this visit:    PAD (peripheral artery disease) ([facility] [address] )  -     Ambulatory Referral to Cardiology; Future  -     Case request operating room: ENDARTERECTOMY ARTERIAL FEMORAL, RETROGRADE ILIAC INTERVENTION; Standing  -     Type and screen; Future  -     Basic metabolic panel; Future  -     CBC and Platelet; Future  -     Ambulatory referral to Nephrology; Future  -     Case request operating room: ENDARTERECTOMY ARTERIAL FEMORAL, RETROGRADE ILIAC INTERVENTION  -     gabapentin (Neurontin) 100 mg capsule; Take 1 capsule (100 mg total) by mouth 3 (three) times a day    S/P carotid endarterectomy    Open wound of right ankle, subsequent encounter  -     Ambulatory Referral to Vascular Surgery  -     Case request operating room: ENDARTERECTOMY ARTERIAL FEMORAL, RETROGRADE ILIAC INTERVENTION; Standing  -     Case request operating room: ENDARTERECTOMY ARTERIAL FEMORAL, RETROGRADE ILIAC INTERVENTION    Other orders  -     Diet NPO; Sips with meds; Standing  -     Void on call to OR; Standing  -     Insert peripheral IV; Standing  -     Nursing Communication CHG bath, have staff wash entire body (neck down) per pre op bathing protocol  Routine, evening prior to, and day of surgery ; Standing  -     Nursing Communication Swab both nares with Povidone-Iodine solution, EXCLUDE if patient has shellfish/Iodine allergy, and replace with nasal alcohol swabstick  Routine, day of surgery, on call to OR ; Standing  -     chlorhexidine (PERIDEX) 0 12 % oral rinse 15 mL  -     Place sequential compression device; Standing  -     Protime-INR;  Standing  -     APTT; Standing  -     ceFAZolin (ANCEF) IVPB (premix in dextrose) 2,000 mg 50 mL            Operative Scheduling Information:    Hospital:  [facility]    Physician:  Live Reilly and Lolita Vines    Surgery: Right femoral endarterectomy with retograde iliac intervention    Urgency:  Urgent: 2 weeks    Level:  Level 3: Outpatients to be scheduled for elective surgery than can be delayed up to 4 weeks without reasonable expectation of detriment to patient    Case Length:  Normal    Post-op Bed:  Stepdown    OR Table:  IR    Equipment Needs:  None    Medication Instructions:  Aspirin:   Continue (do not hold)    Hydration:  125 cc/hr for 4 hour prior and 4 hours after procedure    Contrast Allergy:  no        Subjective:      Patient ID: Kay Ruano  is a 66 y o  male  Patient today presents to office as a new patient  Patient was referred by Dr Lacey Colorado  Patient had a CTA Abdominal w/runoff w/wo contrast done on 03/31/2023  Patient also had VANIA done on 03/30/2023  Patient has a recurring ulcer anterior right ankle  Patient complains of little pain on the wound  Patient had a compound fracture of the right ankle which cost his to have a skin graft of the original wound  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 and he spent [facility]  About 5 months ago skin graft reopened up after dropping something on his foot  He had 2 incisions made above the skin graft to drain infection from the leg  Those subsequent incisions have not healed  Patient is taking Atorvastatin 40MG and Aspirin 81MG  Patient is a former smoker  HPI     76M former smoker with HTN, CAD, Aflutter, h/o carotid stenosis (s/p R CEA 2020 Dr Alicia El), CKD 3 who was recently admitted for recurrent right ankle wounds  He has a history of remote STSG that reopened with underlying abscess requiring incision and drainage  He is here to discuss results of imaging and possible surgery  He complains of some sharp pain along his medial ankle  He continues to follow with wound care to take care of his wound with persistent nonhealing  No evidence of infection  Lower extremity arterial duplex reviewed demonstrating on the right: 50 to 75% CFA stenosis, FARRAH 0 73 with GTP 44    On the left: "50 to 75% CFA stenosis, FARRAH unreliable GTP 62  CTA with runoff reviewed demonstrating diffuse aortoiliac disease  On the right, he has calcified bulky segment in the mid external iliac artery causing stenosis  CFA with high-grade stenosis  On the left, he has diffuse iliac disease without significant stenosis  His left CFA also has high-grade stenosis  Bilateral internal iliac artery with significant calcified disease burden at the origin but patent  His bilateral SFA/pop without significant disease  He has some tibial calcifications but appears patent  The following portions of the patient's history were reviewed and updated as appropriate: allergies, current medications, past family history, past medical history, past social history, past surgical history and problem list     Review of Systems   Constitutional: Negative  HENT: Negative  Eyes: Negative  Respiratory: Negative  Cardiovascular: Negative  Gastrointestinal: Negative  Endocrine: Negative  Genitourinary: Negative  Musculoskeletal: Negative  Skin: Negative  Allergic/Immunologic: Positive for environmental allergies  Neurological: Negative  Hematological: Negative  Psychiatric/Behavioral: Negative  Objective:      /72 (BP Location: Left arm, Patient Position: Sitting, Cuff Size: Large)   Pulse 62   Ht 5' 10\" (1 778 m)   Wt 92 3 kg (203 lb 6 4 oz)   SpO2 97%   BMI 29 18 kg/m²          Physical Exam  HENT:      Head: Normocephalic  Nose: Nose normal    Eyes:      Extraocular Movements: Extraocular movements intact  Cardiovascular:      Rate and Rhythm: Normal rate  Comments: Nonpalpable right femoral pulse, palpable left femoral pulse  Bilateral DP and PT signal present  Pulmonary:      Effort: Pulmonary effort is normal    Abdominal:      Palpations: Abdomen is soft  Musculoskeletal:         General: No swelling  Cervical back: Neck supple     Skin:     General: Skin is " warm       Comments: Right distal leg along anterior shin wounds clean without evidence of cellulitis   Neurological:      Mental Status: He is alert and oriented to person, place, and time     Psychiatric:         Mood and Affect: Mood normal

## 2023-04-04 NOTE — TELEPHONE ENCOUNTER
REMINDER: Under Reason For Call, comments MUST be formatted as:   (Surgeon's Initials) / (Procedure)      Special Instructions / FYI:    Procedure: Right femoral endarterectomy with retograde iliac intervention    Level: 3 - Route clearance(s) to [facility] Clearance Pool     Allergies: Patient has no known allergies  Instructions Given: NO Bowel Prep General Instructions     Dialysis: Patient is not on dialysis  Return Visit Required Prior to Procedure: No     Consent: I certify that patient has signed, printed, timed, and dated their surgery consent  I certify that the patient's LEGAL NAME and DATE OF BIRTH are written in the upper left corner on BOTH sides of the consent  I certify that BOTH sides of the completed surgery consent have been scanned into the patient's Epic chart by myself on 4/4/2023  Yes, I have LABELED the consent in Epic as Consent for Vascular Procedure  For Surgical Clearances     Levels   1-3   ROUTE this encounter to [facility] Clearance Pool (AND)   [facility] Surgery Coordinator Pool     Level   4   ROUTE this encounter to [facility] Surgery Coordinator Pool       HYDRATION CLEARANCES   ONLY ROUTE TO  [facility] Clearance Pool     (2) TWO CLEARANCES NEEDED - Cardiology  Clearance // Clearing Provider's Name (Osei Keith): Adal Green //  Spoke with: [facility]  //  Office Contact Information P: [phone] - F: [phone]  (AND)   Nephrology Clearance // Clearing Provider's Name (Osei Keith): Araceli Hoffmann //  [facility]  //  Office Contact Information P: [phone] - F: [phone]    Yes, I have ROUTED this encounter to [facility] Surgery Coordinator and/or [facility] Clearance Pool

## 2023-04-05 ENCOUNTER — OFFICE VISIT (OUTPATIENT)
Dept: WOUND CARE | Facility: HOSPITAL | Age: 78
End: 2023-04-05

## 2023-04-05 ENCOUNTER — HOME CARE VISIT (OUTPATIENT)
Dept: HOME HEALTH SERVICES | Facility: HOME HEALTHCARE | Age: 78
End: 2023-04-05

## 2023-04-05 ENCOUNTER — PATIENT OUTREACH (OUTPATIENT)
Dept: FAMILY MEDICINE CLINIC | Facility: HOSPITAL | Age: 78
End: 2023-04-05

## 2023-04-05 VITALS — RESPIRATION RATE: 16 BRPM | HEART RATE: 74 BPM | TEMPERATURE: 97.6 F

## 2023-04-05 DIAGNOSIS — S81.801A UNSPECIFIED OPEN WOUND, RIGHT LOWER LEG, INITIAL ENCOUNTER: Primary | ICD-10-CM

## 2023-04-05 DIAGNOSIS — S91.001D OPEN WOUND OF RIGHT ANKLE, SUBSEQUENT ENCOUNTER: ICD-10-CM

## 2023-04-05 DIAGNOSIS — L02.415 ABSCESS OF RIGHT LEG EXCLUDING FOOT: ICD-10-CM

## 2023-04-05 RX ORDER — LIDOCAINE HYDROCHLORIDE 40 MG/ML
5 SOLUTION TOPICAL ONCE
Status: COMPLETED | OUTPATIENT
Start: 2023-04-05 | End: 2023-04-05

## 2023-04-05 RX ADMIN — LIDOCAINE HYDROCHLORIDE 5 ML: 40 SOLUTION TOPICAL at 08:29

## 2023-04-05 NOTE — PROGRESS NOTES
"Outpatient Care Management Note:    New HRR referral received  Voice mail message left for Flakito, with my contact information, introducing myself and requesting a call back  Voice mail message received from Flakito returning my call  BRIANNA called Flakito back  He was not home  He was at a \"shop\"  He states that he will be having vascular surgery for his leg  We reviewed that he still needs to schedule with plastic surgery after his recent hospitalization  Flakito was unaware of this needed appt  CM offered to give him the contact information to call, but he declined because he was not home  CM agreed to call back tomorrow when he has time to talk     "

## 2023-04-05 NOTE — TELEPHONE ENCOUNTER
Received nephrology recommendations:    Renal clearance  Received: Today  Veverly Lights, DO  P [facility] Clearance Pool  The patient's last sCr as of Jan 2023 was a bit higher than usual  Would recommend repeat BMP  Would also recommend the following:     From a renal standpoint the patient is renally optimized for surgery with the following recommendations:   Fluids to administer: Isolyte 500 cc total over 2 hours, to begin more IVF if BP remains below goal (goal > 130/80)     Medication Recommendations:   Minimize any IV contrast use (If IV contrast is used, please check BMP POD # 1)   Hold NSAIDs   Hold ACEi/ARB starting on the day of the surgery   Hold lisinopril 10mg daily and triamterene-hydrochlorothiazide 75-50mg daily day of surgery     Hemodynamic Recommendations:   Ideally, target MAPs greater than 65 mmHg in the perioperative period, and minimize operative time with MAPs < 65 mmHg  Avoid intraop hemodynamic instability to decrease risk for HELIO occurrence     Other Recommendations:   Please check a BMP POD day # 1 and call if any questions or if Creatinine is rising or electrolyte abnormalities present

## 2023-04-05 NOTE — CASE COMMUNICATION
Ship to [facility]  Home   Insurance [facility]     Wound 1 Right Leg      Full  XX        All items are ordered by the each unless otherwise noted    PLEASE DO NOT ORDER BY THE BOX  Request specific quantity needed  For Private Insurances order should be for a 2 week period    Dry Dressings   (Nonsterile gauze not covered by private insurance)  (Sterile gauze may be requested for insurance rather than nonsterile gauze)  Gauze Laura stretch roll 4in ch n/s 12 rolls per unit  [id number]     1  ABD [id number] [id number]       12    [address] [place] [address],# [phone]       4

## 2023-04-05 NOTE — PATIENT INSTRUCTIONS
Orders Placed This Encounter   Procedures    Wound cleansing and dressings     Wash your hands with soap and water  Remove old dressing, discard into plastic bag and place in trash  Cleanse the wounds NSS prior to applying a clean dressing  Do not use tissue or cotton balls  Do not scrub the wound  Pat dry using gauze  Shower No        Right anterior leg wound (Most proximal with exposed tendon)  Apply Dermagran to the wound bed to keep tendon moist  Cover with ABD pads and secure with Kerlix  Change every other day     Right anterior ankle wounds (Distal 2 wounds):     Cleanse with NSS  Pat dry  Apply a nickel-thickness of Santyl and cover with ABD  Secure with Kerlix  Change every other day        Elevate legs using pillows at home       RETURN In 2 weeks     Standing Status:   Future     Standing Expiration Date:   4/5/2024

## 2023-04-06 ENCOUNTER — PATIENT OUTREACH (OUTPATIENT)
Dept: FAMILY MEDICINE CLINIC | Facility: HOSPITAL | Age: 78
End: 2023-04-06

## 2023-04-06 NOTE — PROGRESS NOTES
Outpatient Care Management Note:    Care manager called Flakito  He is being seen by Jeancarlos RESENDIZ for wound care  He is aware to monitor his wounds for any increased redness, swelling or drainage  He will call his Dr Zenon Phan with any concerns  Flakito states that he is not using the CAM boot, because it was rubbing against his leg  He states that Dr Zenon Phan is aware and is looking into a different type of boot  CM reviewed that his hospital AVS noted that he is to follow up with plastics  I gave him the contact information for Dr He Felix  He will call to see when they want to see him  We discussed his referral to [facility]  Flakito states that he was told it is taking 1 year to get an appt  CM offered to have our  attempt to get him in sooner or find an alternate provider  Flakito declined stating that he does not feel he needs to be seen at this time  He thinks his foot issues were causing him to be depressed but states he is doing fine now  Flakito did complete his BMP labs  He is aware that he should avoid all NSAIDS  Common medications in this class reviewed  Flakito states that he is only taking tylenol  CM requested to complete med review but Flakito declined stating that he was not home currently  CM requested he call me back to complete it  CM gave Flakito my contact information  He knows to call his PCP office directly with any immediate questions       Inbasket message received from Dr Hermes Weber:   Reviewed, Cr has been fluctuating   Has seen renal  Will follow   NO changes in meds needed

## 2023-04-06 NOTE — PROGRESS NOTES
Outpatient Care Management Note:    Call received from Flakito  We completed a med review  He is not taking potassium citrate,neomycin-polymyxin-dexamethasone eye suspension, or lidocaine topical solution  Inbasket message received from Dr Tim Escalante: Please advise pt to bring complete med list that he is currently taking or just put all meds in a bag and bring them in so we can ensure our med list is UTD   TY     BRIANNA called Bhavana and reviewed above  He agreed to bring all medications with him to his upcoming PCP appt

## 2023-04-07 ENCOUNTER — HOME CARE VISIT (OUTPATIENT)
Dept: HOME HEALTH SERVICES | Facility: HOME HEALTHCARE | Age: 78
End: 2023-04-07

## 2023-04-07 VITALS
TEMPERATURE: 97 F | HEART RATE: 88 BPM | OXYGEN SATURATION: 97 % | RESPIRATION RATE: 14 BRPM | SYSTOLIC BLOOD PRESSURE: 138 MMHG | DIASTOLIC BLOOD PRESSURE: 74 MMHG

## 2023-04-07 NOTE — TELEPHONE ENCOUNTER
Flakito is not home at the moment to confirm SLA ; we agreed to put it on and he will call to confirm when he gets home later today  From: Kashif Nj <[email or web address]   Sent: 2023 2:40 PM  To: Claudean Artist <[email or web address]  Subject: RE: SCHEDULE    Can schedule for  thanks    From: Claudean Artist <[email or web address]   Sent: 2023 2:23 PM  To: Kashif Mauro <[email or web address]  Cc: Vascular Surgery Schedulers [email or web address] Melyssa Gallegos <[email or web address].  Subject: SCHEDULE    Dr Gannon Else, Can you please look at your calendar for  or  and advise if this patient can be scheduled on one of those days  You are not in [facility] w/in the next 2 weeks  SLA/Hybrid    Skip Jessie Casas  : 45    Right femoral endarterectomy with retograde iliac intervention    Thanks  Verified patient's insurance   CONFIRMED - Patient's insurance is [employer]  Is patient requesting a call when authorization has been obtained? Patient did not request a call  Surgery Date: 23  Primary Surgeon: Sierra Wilder // Bri Rojas (NPI: [id number])  Assisting Surgeon: Carol Chauhan (NPI: )  Facility: [facility] (Tax: [id number] / NPI: [id number])  Inpatient / Outpatient: Inpatient  Level: 2     Clearance Received: Yes, cardiology and nephrology  Consent Received: Yes, scanned into Epic on 23  Medication Hold / Last Dose: Aspirin: Continue (do not hold)  IR Notified: emailed noemí 23  Rep   Notified: Not Applicable (N/A)  Equipment Needs: Not Applicable (N/A)  Vas Lab Requested: Not Applicable (N/A)  Patient Contacted: 23     Diagnosis: I73 9  Procedure/ CPT Code(s): Common Femoral Endartectomy // CPT: 77012 agram [id number]    For varicose vein related procedures:   Last LEVDR: Not Applicable (N/A)  CEAP Classification: Not Applicable (N/A)  VCSS: Not Applicable (N/A)    Post Operative Date/ Time: 23 , 11:30AM  Bee Rojas (NPI: [id number])     *Please review medication hold(s), PATs, and check H&P with patient  *  PATIENT WAS MAILED SURGERY/SHOWERING/DISCHARGE/COVID INSTRUCTIONS AFTER REVIEWING WITH THEM VIA PHONE CALL

## 2023-04-07 NOTE — TELEPHONE ENCOUNTER
Flakito will need a call on Monday Morning advising him his procedure is moved to 4/24 due to not enough time in the block

## 2023-04-07 NOTE — TELEPHONE ENCOUNTER
Please advise if pt can be cleared from cardiac standpoint for femoral endarterectomy or if he will need an appointment first  Thank you

## 2023-04-08 ENCOUNTER — HOME CARE VISIT (OUTPATIENT)
Dept: HOME HEALTH SERVICES | Facility: HOME HEALTHCARE | Age: 78
End: 2023-04-08

## 2023-04-09 ENCOUNTER — HOME CARE VISIT (OUTPATIENT)
Dept: HOME HEALTH SERVICES | Facility: HOME HEALTHCARE | Age: 78
End: 2023-04-09

## 2023-04-10 NOTE — TELEPHONE ENCOUNTER
Lm for pt to call me back to confirm new date of 4/24/23 in SLA/Hybrid with Dr Rubi Galvan for his surgery and to go over surgery information

## 2023-04-12 ENCOUNTER — ANESTHESIA EVENT (OUTPATIENT)
Dept: PERIOP | Facility: HOSPITAL | Age: 78
End: 2023-04-12

## 2023-04-14 NOTE — TELEPHONE ENCOUNTER
Authorization requirements reviewed  Please refer to Jake Lorenzo / Beth Lavaca number [id number] for case updates

## 2023-04-14 NOTE — TELEPHONE ENCOUNTER
Good morning,    Dr Gi Ward gave the following hydration orders for Michelle Mace for his right femoral endarterectomy with possible iliac intervention with Dr Dada Serrano that is scheduled for 4/24/23 at [address] [facility]  Can you please clarify if it is 2 hours prior to procedure or 2 hours post procedure or how it should be administered?      Fluids to administer: Isolyte 500 cc total over 2 hours, to begin more IVF if BP remains below goal (goal > 130/80)

## 2023-04-14 NOTE — TELEPHONE ENCOUNTER
Spoke to patient about change of date from 4-19-23 to 4-24-23 patient was okay with date change and went of pre-op instructions LLF

## 2023-04-14 NOTE — TELEPHONE ENCOUNTER
Marj Toussaint DO  to [facility]      11:51 AM   1 hour pre and 1 hour post, thanks

## 2023-04-17 ENCOUNTER — APPOINTMENT (OUTPATIENT)
Dept: LAB | Facility: HOSPITAL | Age: 78
End: 2023-04-17

## 2023-04-17 DIAGNOSIS — Z00.00 EXAMINATION: ICD-10-CM

## 2023-04-17 LAB
ANION GAP SERPL CALCULATED.3IONS-SCNC: 5 MMOL/L (ref 4–13)
BASOPHILS # BLD AUTO: 0.02 THOUSANDS/ΜL (ref 0–0.1)
BASOPHILS NFR BLD AUTO: 0 % (ref 0–1)
BUN SERPL-MCNC: 19 MG/DL (ref 5–25)
CALCIUM SERPL-MCNC: 9.1 MG/DL (ref 8.3–10.1)
CHLORIDE SERPL-SCNC: 110 MMOL/L (ref 96–108)
CO2 SERPL-SCNC: 24 MMOL/L (ref 21–32)
CREAT SERPL-MCNC: 1.35 MG/DL (ref 0.6–1.3)
EOSINOPHIL # BLD AUTO: 0.32 THOUSAND/ΜL (ref 0–0.61)
EOSINOPHIL NFR BLD AUTO: 5 % (ref 0–6)
ERYTHROCYTE [DISTWIDTH] IN BLOOD BY AUTOMATED COUNT: 13.3 % (ref 11.6–15.1)
GFR SERPL CREATININE-BSD FRML MDRD: 49 ML/MIN/1.73SQ M
GLUCOSE SERPL-MCNC: 101 MG/DL (ref 65–140)
HCT VFR BLD AUTO: 38.8 % (ref 36.5–49.3)
HGB BLD-MCNC: 12.3 G/DL (ref 12–17)
IMM GRANULOCYTES # BLD AUTO: 0.01 THOUSAND/UL (ref 0–0.2)
IMM GRANULOCYTES NFR BLD AUTO: 0 % (ref 0–2)
LYMPHOCYTES # BLD AUTO: 1.1 THOUSANDS/ΜL (ref 0.6–4.47)
LYMPHOCYTES NFR BLD AUTO: 17 % (ref 14–44)
MCH RBC QN AUTO: 30.6 PG (ref 26.8–34.3)
MCHC RBC AUTO-ENTMCNC: 31.7 G/DL (ref 31.4–37.4)
MCV RBC AUTO: 97 FL (ref 82–98)
MONOCYTES # BLD AUTO: 0.37 THOUSAND/ΜL (ref 0.17–1.22)
MONOCYTES NFR BLD AUTO: 6 % (ref 4–12)
NEUTROPHILS # BLD AUTO: 4.74 THOUSANDS/ΜL (ref 1.85–7.62)
NEUTS SEG NFR BLD AUTO: 72 % (ref 43–75)
NRBC BLD AUTO-RTO: 0 /100 WBCS
PLATELET # BLD AUTO: 309 THOUSANDS/UL (ref 149–390)
PMV BLD AUTO: 9.2 FL (ref 8.9–12.7)
POTASSIUM SERPL-SCNC: 4.7 MMOL/L (ref 3.5–5.3)
RBC # BLD AUTO: 4.02 MILLION/UL (ref 3.88–5.62)
SODIUM SERPL-SCNC: 139 MMOL/L (ref 135–147)
WBC # BLD AUTO: 6.56 THOUSAND/UL (ref 4.31–10.16)

## 2023-04-17 NOTE — PRE-PROCEDURE INSTRUCTIONS
Pre-Surgery Instructions:   Medication Instructions   • Ascorbic Acid (VITAMIN C IMMUNE HEALTH PO) Stop taking 7 days prior to surgery  • aspirin (ECOTRIN LOW STRENGTH) 81 mg EC tablet Instructions provided by MD   • atorvastatin (LIPITOR) 40 mg tablet Take night before surgery   • calcium polycarbophil (FIBERCON) 625 mg tablet Stop taking 7 days prior to surgery  • escitalopram (LEXAPRO) 10 mg tablet Take day of surgery  • ferrous sulfate 324 (65 Fe) mg Stop taking 7 days prior to surgery  • finasteride (PROSCAR) 5 mg tablet Take night before surgery   • gabapentin (Neurontin) 100 mg capsule Take day of surgery  • lisinopril (ZESTRIL) 10 mg tablet Hold day of surgery  • Maxidex 0 1 % ophthalmic suspension Take day of surgery  • metoprolol succinate (TOPROL-XL) 25 mg 24 hr tablet Take day of surgery  • Multiple Vitamin (MULTIVITAMIN) capsule Stop taking 7 days prior to surgery  • omeprazole (PriLOSEC) 20 mg delayed release capsule Take day of surgery  • Santyl ointment Instructions provided by MD   • tamsulosin (FLOMAX) 0 4 mg Take night before surgery   • traZODone (DESYREL) 50 mg tablet Take night before surgery   • triamterene-hydrochlorothiazide (MAXZIDE) 75-50 MG per tablet Hold day of surgery  Pt verbalizes understanding of the following:    - Bathing instructions, has chg, neck down, no genitals  - No lotions, powders, sprays, deodorant, jewelry  - No shaving within 24hrs    - DO NOT EAT OR DRINK ANYTHING after midnight on the evening before your procedure including coffee, tea, candy & gum   - ONLY SIPS OF WATER with your medications are allowed on the morning of your procedure    - Avoid OTC non-directed Vit/ Suppl/ Herbals 7 days prior to surgery to ensure no drug interactions with perioperative surgical/ anesthetic meds  - Avoid NSAIDs 3 days prior  - Continue statin medication up through and including the day of surgery    - Bring a list of meds you take at home with your last dose "noted    - Arrange for someone to drive you home after the procedure & stay with you until the next morning    - Bring insurance cards & photo id    - Leave all valuables such as credit cards, money & jewelry at home    - Notify surgeon if you develop any cold symptoms, change in your health history or develop open wounds     - Did the surgeon's office give you any other special instructions? No  - Did you require any clearances? Neph & Cardio      See Geriatric Assessment below       • Cognitive Assessment:   • CAM:   • TUG <15 sec:  • Falls (last 6 months):   • Hand  score:  -Attempt 1:  -Attempt 2:  -Attempt 3:  • Capo Total Score: 22  • PHQ- 9 Depression Scale: 1  • Nutrition Assessment Score: 12  • METS: 9 25  • Health goals: to continue doing \"normal\" activities  -What are your overall health goals? (quit smoking, wt  loss, rest, decrease stress)  \"stay normal\"    -What brings you strength? (family, friends, [employer], health)  AA (sober x23yrs)    -What activities are important to you? (exercise, reading, travel, work)  Reinaldo Snyder                "

## 2023-04-19 ENCOUNTER — OFFICE VISIT (OUTPATIENT)
Dept: WOUND CARE | Facility: HOSPITAL | Age: 78
End: 2023-04-19

## 2023-04-19 VITALS
TEMPERATURE: 97.7 F | RESPIRATION RATE: 16 BRPM | SYSTOLIC BLOOD PRESSURE: 112 MMHG | HEART RATE: 80 BPM | DIASTOLIC BLOOD PRESSURE: 72 MMHG

## 2023-04-19 DIAGNOSIS — I73.9 PERIPHERAL VASCULAR DISEASE, UNSPECIFIED (HCC): ICD-10-CM

## 2023-04-19 DIAGNOSIS — S91.001D OPEN WOUND OF RIGHT ANKLE, SUBSEQUENT ENCOUNTER: ICD-10-CM

## 2023-04-19 DIAGNOSIS — S81.801S UNSPECIFIED OPEN WOUND, RIGHT LOWER LEG, SEQUELA: Primary | ICD-10-CM

## 2023-04-19 RX ORDER — LIDOCAINE 40 MG/G
CREAM TOPICAL ONCE
Status: COMPLETED | OUTPATIENT
Start: 2023-04-19 | End: 2023-04-19

## 2023-04-19 RX ADMIN — LIDOCAINE: 40 CREAM TOPICAL at 11:41

## 2023-04-19 NOTE — PATIENT INSTRUCTIONS
Orders Placed This Encounter   Procedures    Wound cleansing and dressings     Wash your hands with soap and water  Remove old dressing, discard into plastic bag and place in trash  Cleanse the wounds NSS prior to applying a clean dressing  Do not use tissue or cotton balls  Do not scrub the wound  Pat dry using gauze  Shower No        Right anterior leg wound & Right anterior ankle wounds      Cleanse with NSS  Pat dry  Apply a nickel-thickness of Santyl and cover with ABD  Secure with Kerlix  Change every other day        Elevate legs using pillows at home       RETURN In 2 weeks     Standing Status:   Future     Standing Expiration Date:   4/19/2024

## 2023-04-19 NOTE — PROGRESS NOTES
"Patient ID: Jeremy Segura  is a 66 y o  male Date of Birth 1945     Chief Complaint  Chief Complaint   Patient presents with   • Follow Up Wound Care Visit     Follow up appt for wound to right leg  Pt has vascular procedure on Monday  Allergies  Patient has no known allergies  Assessment:    Unspecified open wound, right lower leg, sequela  -     Wound cleansing and dressings; Future  -     lidocaine (LMX) 4 % cream    Open wound of right ankle, subsequent encounter  -     Wound cleansing and dressings; Future  -     lidocaine (LMX) 4 % cream    Peripheral vascular disease, unspecified (HCC)  -     Wound cleansing and dressings; Future  -     lidocaine (LMX) 4 % cream    Other orders  -     Debridement  -     Debridement  -     Debridement    Plan:  Continue with Santyl daily or QOD x3 wounds  Follow-up with vascular surgery as scheduled as his underlying PVD is a major factor with his delayed wound healing  Follow-up in 2 weeks  Call if any signs of infection are noted  Debridement   Wound 12/07/22 Traumatic Ankle Anterior;Right    Universal Protocol:  Consent: Verbal consent obtained  Risks and benefits: risks, benefits and alternatives were discussed  Consent given by: patient  Time out: Immediately prior to procedure a \"time out\" was called to verify the correct patient, procedure, equipment, support staff and site/side marked as required    Patient understanding: patient states understanding of the procedure being performed  Patient identity confirmed: verbally with patient      Performed by: physician  Debridement type: surgical  Level of debridement: subcutaneous tissue  Pain control: lidocaine 4%  Post-debridement measurements  Length (cm): 1 2  Width (cm): 0 6  Depth (cm): 0 2  Percent debrided: 100%  Surface Area (cm^2): 0 72  Area debrided (cm^2): 0 72  Volume (cm^3): 0 14  Tissue and other material debrided: subcutaneous tissue  Devitalized tissue debrided: necrotic " "debris and slough  Instrument(s) utilized: curette  Bleeding: small  Hemostasis obtained with: pressure  Procedural pain (0-10): 3  Post-procedural pain: 3   Response to treatment: procedure was tolerated well    Debridement   Wound 03/27/23 Surgical Pretibial Distal;Right    Universal Protocol:  Consent: Verbal consent obtained  Risks and benefits: risks, benefits and alternatives were discussed  Consent given by: patient  Time out: Immediately prior to procedure a \"time out\" was called to verify the correct patient, procedure, equipment, support staff and site/side marked as required  Patient understanding: patient states understanding of the procedure being performed  Patient identity confirmed: verbally with patient      Performed by: physician  Debridement type: surgical  Level of debridement: subcutaneous tissue  Pain control: lidocaine 4%  Post-debridement measurements  Length (cm): 1 2  Width (cm): 1  Depth (cm): 0 2  Percent debrided: 100%  Surface Area (cm^2): 1 2  Area debrided (cm^2): 1 2  Volume (cm^3): 0 24  Tissue and other material debrided: subcutaneous tissue  Devitalized tissue debrided: necrotic debris and slough  Instrument(s) utilized: curette  Bleeding: small  Hemostasis obtained with: pressure  Procedural pain (0-10): 3  Post-procedural pain: 3   Response to treatment: procedure was tolerated well    Debridement   Wound 03/29/23 Surgical Pretibial Right;Proximal    Universal Protocol:  Consent: Verbal consent obtained  Risks and benefits: risks, benefits and alternatives were discussed  Consent given by: patient  Time out: Immediately prior to procedure a \"time out\" was called to verify the correct patient, procedure, equipment, support staff and site/side marked as required    Patient understanding: patient states understanding of the procedure being performed  Patient identity confirmed: verbally with patient      Performed by: physician  Debridement type: surgical  Level of debridement: " subcutaneous tissue  Pain control: lidocaine 4%  Post-debridement measurements  Length (cm): 1 5  Width (cm): 0 9  Depth (cm): 0 7  Percent debrided: 100%  Surface Area (cm^2): 1 35  Area debrided (cm^2): 1 35  Volume (cm^3): 0 95  Tissue and other material debrided: subcutaneous tissue  Devitalized tissue debrided: necrotic debris and slough  Instrument(s) utilized: nippers and curette  Bleeding: small  Hemostasis obtained with: pressure  Procedural pain (0-10): 3  Post-procedural pain: 3   Response to treatment: procedure was tolerated well             Wound 12/07/22 Traumatic Ankle Anterior;Right (Active)   Wound Image Images linked 04/19/23 0934   Wound Length (cm) 1 2 cm 04/19/23 0912   Wound Width (cm) 0 6 cm 04/19/23 0912   Wound Depth (cm) 0 1 cm 04/19/23 0912   Wound Surface Area (cm^2) 0 72 cm^2 04/19/23 0912   Wound Volume (cm^3) 0 072 cm^3 04/19/23 0912   Calculated Wound Volume (cm^3) 0 07 cm^3 04/19/23 0912   Change in Wound Size % 65 04/19/23 0912   Patient Tolerance Tolerated well 04/19/23 0912   Dressing Status Removed 04/19/23 0912       Wound 03/27/23 Surgical Pretibial Distal;Right (Active)   Wound Image Images linked 04/19/23 0934   Wound Length (cm) 1 2 cm 04/19/23 0911   Wound Width (cm) 1 cm 04/19/23 0911   Wound Depth (cm) 0 1 cm 04/19/23 0911   Wound Surface Area (cm^2) 1 2 cm^2 04/19/23 0911   Wound Volume (cm^3) 0 12 cm^3 04/19/23 0911   Calculated Wound Volume (cm^3) 0 12 cm^3 04/19/23 0911   Change in Wound Size % 95 2 04/19/23 0911   Drainage Amount Moderate 04/19/23 0911   Drainage Description Flaherty;Yellow 04/19/23 0911   Patient Tolerance Tolerated well 04/19/23 0911   Dressing Status Removed 04/19/23 0911       Wound 03/29/23 Surgical Pretibial Right;Proximal (Active)   Wound Image Images linked 04/19/23 0933   Wound Length (cm) 1 5 cm 04/19/23 0908   Wound Width (cm) 0 9 cm 04/19/23 0908   Wound Depth (cm) 0 7 cm 04/19/23 0908   Wound Surface Area (cm^2) 1 35 cm^2 04/19/23 0908 Wound Volume (cm^3) 0 945 cm^3 04/19/23 0908   Calculated Wound Volume (cm^3) 0 95 cm^3 04/19/23 0908   Change in Wound Size % -106 52 04/19/23 0908   Undermining 0 4 04/19/23 0908   Undermining is depth extending from 12 to 12 04/19/23 0908   Drainage Amount Moderate 04/19/23 0908   Drainage Description Flaherty;Yellow 04/19/23 0908   Patient Tolerance Tolerated well 04/19/23 0908   Dressing Status Removed 04/19/23 0908       Wound 04/02/23 Foot Anterior;Right (Active)   Wound Image Images linked 04/19/23 0934   Wound Description Epithelialization 04/19/23 0913   Kathrin-wound Assessment Dry;Scar Tissue 04/19/23 0913   Wound Length (cm) 0 1 cm 04/19/23 0913   Wound Width (cm) 0 1 cm 04/19/23 0913   Wound Depth (cm) 0 cm 04/19/23 0913   Wound Surface Area (cm^2) 0 01 cm^2 04/19/23 0913   Wound Volume (cm^3) 0 cm^3 04/19/23 0913   Calculated Wound Volume (cm^3) 0 cm^3 04/19/23 0913   Change in Wound Size % 100 04/19/23 0913   Drainage Amount None 04/19/23 0913   Patient Tolerance Tolerated well 04/19/23 0913   Dressing Status Removed 04/19/23 0913       Wound 12/07/22 Traumatic Ankle Anterior;Right (Active)   Date First Assessed/Time First Assessed: 12/07/22 1447   Primary Wound Type: Traumatic  Location: Ankle  Wound Location Orientation: Anterior;Right       Wound 03/27/23 Surgical Pretibial Distal;Right (Active)   Date First Assessed/Time First Assessed: 03/27/23 1611   Primary Wound Type: (c) Surgical  Location: Pretibial  Wound Location Orientation: Distal;Right       Wound 03/29/23 Surgical Pretibial Right;Proximal (Active)   Date First Assessed/Time First Assessed: 03/29/23 0959   Pre-Existing Wound: Yes  Primary Wound Type: Surgical  Location: Pretibial  Wound Location Orientation: Right;Proximal       Wound 04/02/23 Foot Anterior;Right (Active)   Date First Assessed/Time First Assessed: 04/02/23 1248   Location: Foot  Wound Location Orientation: Anterior;Right       Wound 04/24/23 Groin Right (Active)   Date First Assessed/Time First Assessed: 04/24/23 1435   Location: Groin  Wound Location Orientation: Right  Wound Description (Comments): exofin and mepilex       [REMOVED] Wound 01/10/20 Neck Right (Removed)   Resolved Date/Resolved Time: 12/07/22 1446  Date First Assessed/Time First Assessed: 01/10/20 1215   Location: Neck  Wound Location Orientation: Right  Wound Description (Comments): [id number]  Wound Outcome: Other (Comment)       [REMOVED] Wound 01/10/20 Ankle Anterior;Right (Removed)   Resolved Date/Resolved Time: 12/07/22 1447  Date First Assessed/Time First Assessed: 01/10/20 1430   Pre-Existing Wound: Yes  Location: Ankle  Wound Location Orientation: Anterior;Right  Wound Description (Comments): non healing  Wound Outcome: Other     [REMOVED] Wound 11/09/20 Catheter entry/exit site Wrist Right (Removed)   Resolved Date/Resolved Time: 12/07/22 1446  Date First Assessed/Time First Assessed: 11/09/20 1238   Pre-Existing Wound: No  Traumatic Wound Type: Catheter entry/exit site  Location: Wrist  Wound Location Orientation: Right  Wound Outcome: Other (Comm    [REMOVED] Wound 12/07/20 Catheter entry/exit site Wrist Right (Removed)   Resolved Date/Resolved Time: 12/07/22 1446  Date First Assessed/Time First Assessed: 12/07/20 1207   Pre-Existing Wound: No  Traumatic Wound Type: Catheter entry/exit site  Location: Wrist  Wound Location Orientation: Right  Wound Description (Comment    Subjective:           4/19/2023:  77-year-old male presents for follow-up evaluation of chronic wounds right leg  Reports some good progress being made and they seem to be healing  Patient states that he is scheduled for vascular surgery on 4/24/2023  Denies any new pain/discomfort    35/2023: 77-year-old male seen today for follow-up evaluation and treatment of multiple wounds right leg    Initial anterior ankle wound which is traumatic and 2 other additional surgical wound secondary to incision and drainage for abscess formation of his distal lower right leg  Patient recently was admitted to [facility] for cellulitis and nonhealing wounds and discharged this past week  During this admission was discovered to have severe PVD of his right lower extremity and will be following up with vascular surgery next week for an endarterectomy  Patient reports he saw vascular surgery yesterday and the surgical plan is in process    3/29/2023: 42-year-old male presents for follow-up status post incision and drainage deep abscess right leg and debridement of right leg wound 2 days ago  Reports less swelling and pain but is somewhat distraught about having this type of wound care for  [facility] did not contact him yesterday which was very disconcerting to him  3/27/2023: 42-year-old male presents with increasing pain/redness/swelling anterior aspect of right leg  Patient has been treating for a chronic wound on the anterior ankle within scar tissue from previous trauma  Reports going to ED on 3/24/2023 and getting antibiotics but is still having redness/swelling/pain/discomfort from the front of his leg  The following portions of the patient's history were reviewed and updated as appropriate: allergies, current medications, past family history, past medical history, past social history, past surgical history and problem list     Review of Systems   Constitutional: Negative for chills and fever  HENT: Negative for ear pain and sore throat  Eyes: Negative for pain and visual disturbance  Respiratory: Negative for cough and shortness of breath  Cardiovascular: Negative for chest pain and palpitations  Gastrointestinal: Negative for abdominal pain and vomiting  Genitourinary: Negative for dysuria and hematuria  Musculoskeletal: Negative for arthralgias and back pain  Skin: Positive for color change and wound (Anterior Right Ankle)  Negative for rash  Neurological: Negative for seizures and syncope  All other systems reviewed and are negative          Objective:       Wound 12/07/22 Traumatic Ankle Anterior;Right (Active)   Wound Image Images linked 04/19/23 0934   Wound Length (cm) 1 2 cm 04/19/23 0912   Wound Width (cm) 0 6 cm 04/19/23 0912   Wound Depth (cm) 0 1 cm 04/19/23 0912   Wound Surface Area (cm^2) 0 72 cm^2 04/19/23 0912   Wound Volume (cm^3) 0 072 cm^3 04/19/23 0912   Calculated Wound Volume (cm^3) 0 07 cm^3 04/19/23 0912   Change in Wound Size % 65 04/19/23 0912   Patient Tolerance Tolerated well 04/19/23 0912   Dressing Status Removed 04/19/23 0912       Wound 03/27/23 Surgical Pretibial Distal;Right (Active)   Wound Image Images linked 04/19/23 0934   Wound Length (cm) 1 2 cm 04/19/23 0911   Wound Width (cm) 1 cm 04/19/23 0911   Wound Depth (cm) 0 1 cm 04/19/23 0911   Wound Surface Area (cm^2) 1 2 cm^2 04/19/23 0911   Wound Volume (cm^3) 0 12 cm^3 04/19/23 0911   Calculated Wound Volume (cm^3) 0 12 cm^3 04/19/23 0911   Change in Wound Size % 95 2 04/19/23 0911   Drainage Amount Moderate 04/19/23 0911   Drainage Description Flaherty;Yellow 04/19/23 0911   Patient Tolerance Tolerated well 04/19/23 0911   Dressing Status Removed 04/19/23 0911       Wound 03/29/23 Surgical Pretibial Right;Proximal (Active)   Wound Image Images linked 04/19/23 0933   Wound Length (cm) 1 5 cm 04/19/23 0908   Wound Width (cm) 0 9 cm 04/19/23 0908   Wound Depth (cm) 0 7 cm 04/19/23 0908   Wound Surface Area (cm^2) 1 35 cm^2 04/19/23 0908   Wound Volume (cm^3) 0 945 cm^3 04/19/23 0908   Calculated Wound Volume (cm^3) 0 95 cm^3 04/19/23 0908   Change in Wound Size % -106 52 04/19/23 0908   Undermining 0 4 04/19/23 0908   Undermining is depth extending from 12 to 12 04/19/23 0908   Drainage Amount Moderate 04/19/23 0908   Drainage Description Flaherty;Yellow 04/19/23 0908   Patient Tolerance Tolerated well 04/19/23 0908   Dressing Status Removed 04/19/23 0908       Wound 04/02/23 Foot Anterior;Right (Active)   Wound Image Images linked 04/19/23 0934   Wound Description Epithelialization 04/19/23 0913   Kathrin-wound Assessment Dry;Scar Tissue 04/19/23 0913   Wound Length (cm) 0 1 cm 04/19/23 0913   Wound Width (cm) 0 1 cm 04/19/23 0913   Wound Depth (cm) 0 cm 04/19/23 0913   Wound Surface Area (cm^2) 0 01 cm^2 04/19/23 0913   Wound Volume (cm^3) 0 cm^3 04/19/23 0913   Calculated Wound Volume (cm^3) 0 cm^3 04/19/23 0913   Change in Wound Size % 100 04/19/23 0913   Drainage Amount None 04/19/23 0913   Patient Tolerance Tolerated well 04/19/23 0913   Dressing Status Removed 04/19/23 0913       /72   Pulse 80   Temp 97 7 °F (36 5 °C) (Tympanic)   Resp 16     Physical Exam  Constitutional:       Appearance: Normal appearance  HENT:      Head: Normocephalic  Right Ear: Tympanic membrane normal       Left Ear: Tympanic membrane normal       Nose: No congestion  Mouth/Throat:      Pharynx: No oropharyngeal exudate or posterior oropharyngeal erythema  Eyes:      Pupils: Pupils are equal, round, and reactive to light  Cardiovascular:      Rate and Rhythm: Normal rate and regular rhythm  Pulses:           Dorsalis pedis pulses are 0 on the right side and 1+ on the left side  Posterior tibial pulses are 0 on the right side and 0 on the left side  Pulmonary:      Effort: Pulmonary effort is normal    Feet:      Right foot:      Skin integrity: Ulcer present  Comments: Anterior right ankle: SQ breakdown within scar tissue from prior trauma, 50% red, 50% yellow slough/fibrous base, minimal s/s drainage, overall unchanged, overall wound improving  Skin:     General: Skin is warm and dry  Capillary Refill: Capillary refill takes 2 to 3 seconds  Coloration: Skin is not pale  Findings: Wound (X2 full-thickness wounds anterior aspect of right leg as a result of abscess incision and drainage  Granulating and improving overall  Exposed tendon is starting to cover     See detailed wound descriptions) present  No bruising, lesion or rash  Neurological:      General: No focal deficit present  Mental Status: He is alert  Cranial Nerves: No cranial nerve deficit  Sensory: No sensory deficit  Motor: No weakness  Gait: Gait normal       Deep Tendon Reflexes: Reflexes normal    Psychiatric:         Mood and Affect: Mood normal          Behavior: Behavior normal          Judgment: Judgment normal            Wound Instructions:  Orders Placed This Encounter   Procedures   • Wound cleansing and dressings     Wash your hands with soap and water  Remove old dressing, discard into plastic bag and place in trash  Cleanse the wounds NSS prior to applying a clean dressing  Do not use tissue or cotton balls  Do not scrub the wound  Pat dry using gauze  Shower No        Right anterior leg wound & Right anterior ankle wounds      Cleanse with NSS  Pat dry  Apply a nickel-thickness of Santyl and cover with ABD  Secure with Kerlix  Change every other day        Elevate legs using pillows at home      RETURN In 2 weeks     Standing Status:   Future     Standing Expiration Date:   4/19/2024   • Debridement     This order was created via procedure documentation   • Debridement     This order was created via procedure documentation   • Debridement     This order was created via procedure documentation        Diagnosis ICD-10-CM Associated Orders   1  Unspecified open wound, right lower leg, sequela  S81 801S Wound cleansing and dressings     lidocaine (LMX) 4 % cream     Debridement     Debridement      2  Open wound of right ankle, subsequent encounter  S91 001D Wound cleansing and dressings     lidocaine (LMX) 4 % cream     Debridement      3   Peripheral vascular disease, unspecified (HCC)  I73 9 Wound cleansing and dressings     lidocaine (LMX) 4 % cream

## 2023-04-20 DIAGNOSIS — F32.A DEPRESSION: ICD-10-CM

## 2023-04-20 RX ORDER — ESCITALOPRAM OXALATE 10 MG/1
10 TABLET ORAL DAILY
Qty: 30 TABLET | Refills: 5 | Status: SHIPPED | OUTPATIENT
Start: 2023-04-20 | End: 2023-05-15 | Stop reason: SDUPTHER

## 2023-04-23 ENCOUNTER — HOME CARE VISIT (OUTPATIENT)
Dept: HOME HEALTH SERVICES | Facility: HOME HEALTHCARE | Age: 78
End: 2023-04-23

## 2023-04-23 VITALS
RESPIRATION RATE: 18 BRPM | OXYGEN SATURATION: 93 % | DIASTOLIC BLOOD PRESSURE: 62 MMHG | HEART RATE: 83 BPM | TEMPERATURE: 97.3 F | SYSTOLIC BLOOD PRESSURE: 130 MMHG

## 2023-04-24 ENCOUNTER — APPOINTMENT (OUTPATIENT)
Dept: RADIOLOGY | Facility: HOSPITAL | Age: 78
End: 2023-04-24

## 2023-04-24 ENCOUNTER — HOSPITAL ENCOUNTER (INPATIENT)
Facility: HOSPITAL | Age: 78
LOS: 2 days | Discharge: HOME HEALTH | End: 2023-04-26
Attending: SURGERY | Admitting: SURGERY

## 2023-04-24 ENCOUNTER — HOME CARE VISIT (OUTPATIENT)
Dept: HOME HEALTH SERVICES | Facility: HOME HEALTHCARE | Age: 78
End: 2023-04-24

## 2023-04-24 ENCOUNTER — ANESTHESIA (OUTPATIENT)
Dept: PERIOP | Facility: HOSPITAL | Age: 78
End: 2023-04-24

## 2023-04-24 DIAGNOSIS — I70.209 ATHEROSCLEROSIS OF ARTERIES OF EXTREMITIES (HCC): ICD-10-CM

## 2023-04-24 DIAGNOSIS — I73.9 PERIPHERAL ARTERY DISEASE (HCC): ICD-10-CM

## 2023-04-24 DIAGNOSIS — N18.32 STAGE 3B CHRONIC KIDNEY DISEASE (HCC): Primary | ICD-10-CM

## 2023-04-24 DIAGNOSIS — Z01.89 ENCOUNTER FOR GERIATRIC ASSESSMENT: ICD-10-CM

## 2023-04-24 LAB
APTT PPP: 27 SECONDS (ref 23–37)
INR PPP: 1.19 (ref 0.84–1.19)
KCT BLD-ACNC: 226 SEC (ref 89–137)
KCT BLD-ACNC: 292 SEC (ref 89–137)
PLATELET # BLD AUTO: 138 THOUSANDS/UL (ref 149–390)
PMV BLD AUTO: 8.4 FL (ref 8.9–12.7)
PROTHROMBIN TIME: 15.1 SECONDS (ref 11.6–14.5)
SPECIMEN SOURCE: ABNORMAL
SPECIMEN SOURCE: ABNORMAL

## 2023-04-24 PROCEDURE — 047H3DZ DILATION OF RIGHT EXTERNAL ILIAC ARTERY WITH INTRALUMINAL DEVICE, PERCUTANEOUS APPROACH: ICD-10-PCS | Performed by: SURGERY

## 2023-04-24 PROCEDURE — B41C1ZZ FLUOROSCOPY OF PELVIC ARTERIES USING LOW OSMOLAR CONTRAST: ICD-10-PCS | Performed by: SURGERY

## 2023-04-24 PROCEDURE — B41F1ZZ FLUOROSCOPY OF RIGHT LOWER EXTREMITY ARTERIES USING LOW OSMOLAR CONTRAST: ICD-10-PCS | Performed by: SURGERY

## 2023-04-24 PROCEDURE — 04CK0ZZ EXTIRPATION OF MATTER FROM RIGHT FEMORAL ARTERY, OPEN APPROACH: ICD-10-PCS | Performed by: SURGERY

## 2023-04-24 PROCEDURE — 04UK0JZ SUPPLEMENT RIGHT FEMORAL ARTERY WITH SYNTHETIC SUBSTITUTE, OPEN APPROACH: ICD-10-PCS | Performed by: SURGERY

## 2023-04-24 PROCEDURE — B4101ZZ FLUOROSCOPY OF ABDOMINAL AORTA USING LOW OSMOLAR CONTRAST: ICD-10-PCS | Performed by: SURGERY

## 2023-04-24 DEVICE — XENOSURE BIOLOGIC PATCH, 0.8CM X 8CM, EIFU
Type: IMPLANTABLE DEVICE | Site: GROIN | Status: FUNCTIONAL
Brand: XENOSURE BIOLOGIC PATCH

## 2023-04-24 DEVICE — SELF-EXPANDING STENT SYSTEM
Type: IMPLANTABLE DEVICE | Site: AORTA | Status: FUNCTIONAL
Brand: INNOVA™ VASCULAR

## 2023-04-24 RX ORDER — FENTANYL CITRATE/PF 50 MCG/ML
25 SYRINGE (ML) INJECTION
Status: COMPLETED | OUTPATIENT
Start: 2023-04-24 | End: 2023-04-24

## 2023-04-24 RX ORDER — PROPOFOL 10 MG/ML
INJECTION, EMULSION INTRAVENOUS AS NEEDED
Status: DISCONTINUED | OUTPATIENT
Start: 2023-04-24 | End: 2023-04-24

## 2023-04-24 RX ORDER — ATORVASTATIN CALCIUM 40 MG/1
40 TABLET, FILM COATED ORAL EVERY EVENING
Status: DISCONTINUED | OUTPATIENT
Start: 2023-04-24 | End: 2023-04-26 | Stop reason: HOSPADM

## 2023-04-24 RX ORDER — ASCORBIC ACID 500 MG
500 TABLET ORAL DAILY
Status: DISCONTINUED | OUTPATIENT
Start: 2023-04-24 | End: 2023-04-26 | Stop reason: HOSPADM

## 2023-04-24 RX ORDER — HEPARIN SODIUM 1000 [USP'U]/ML
INJECTION, SOLUTION INTRAVENOUS; SUBCUTANEOUS AS NEEDED
Status: DISCONTINUED | OUTPATIENT
Start: 2023-04-24 | End: 2023-04-24

## 2023-04-24 RX ORDER — ROCURONIUM BROMIDE 10 MG/ML
INJECTION, SOLUTION INTRAVENOUS AS NEEDED
Status: DISCONTINUED | OUTPATIENT
Start: 2023-04-24 | End: 2023-04-24

## 2023-04-24 RX ORDER — HEPARIN SODIUM 5000 [USP'U]/ML
5000 INJECTION, SOLUTION INTRAVENOUS; SUBCUTANEOUS EVERY 8 HOURS SCHEDULED
Status: DISCONTINUED | OUTPATIENT
Start: 2023-04-24 | End: 2023-04-26 | Stop reason: HOSPADM

## 2023-04-24 RX ORDER — TAMSULOSIN HYDROCHLORIDE 0.4 MG/1
0.4 CAPSULE ORAL
Status: DISCONTINUED | OUTPATIENT
Start: 2023-04-24 | End: 2023-04-26 | Stop reason: HOSPADM

## 2023-04-24 RX ORDER — SODIUM CHLORIDE 9 MG/ML
100 INJECTION, SOLUTION INTRAVENOUS CONTINUOUS
Status: DISCONTINUED | OUTPATIENT
Start: 2023-04-24 | End: 2023-04-24

## 2023-04-24 RX ORDER — CLOPIDOGREL BISULFATE 75 MG/1
75 TABLET ORAL DAILY
Status: DISCONTINUED | OUTPATIENT
Start: 2023-04-25 | End: 2023-04-26 | Stop reason: HOSPADM

## 2023-04-24 RX ORDER — FERROUS SULFATE 325(65) MG
325 TABLET ORAL
Status: DISCONTINUED | OUTPATIENT
Start: 2023-04-25 | End: 2023-04-26 | Stop reason: HOSPADM

## 2023-04-24 RX ORDER — GABAPENTIN 100 MG/1
100 CAPSULE ORAL 3 TIMES DAILY
Status: DISCONTINUED | OUTPATIENT
Start: 2023-04-24 | End: 2023-04-26 | Stop reason: HOSPADM

## 2023-04-24 RX ORDER — TRAZODONE HYDROCHLORIDE 50 MG/1
50 TABLET ORAL
Status: DISCONTINUED | OUTPATIENT
Start: 2023-04-24 | End: 2023-04-26 | Stop reason: HOSPADM

## 2023-04-24 RX ORDER — ONDANSETRON 2 MG/ML
4 INJECTION INTRAMUSCULAR; INTRAVENOUS ONCE AS NEEDED
Status: DISCONTINUED | OUTPATIENT
Start: 2023-04-24 | End: 2023-04-24 | Stop reason: HOSPADM

## 2023-04-24 RX ORDER — OXYCODONE HYDROCHLORIDE 5 MG/1
5 TABLET ORAL EVERY 4 HOURS PRN
Status: DISCONTINUED | OUTPATIENT
Start: 2023-04-24 | End: 2023-04-26 | Stop reason: HOSPADM

## 2023-04-24 RX ORDER — CHLORHEXIDINE GLUCONATE 0.12 MG/ML
15 RINSE ORAL ONCE
Status: COMPLETED | OUTPATIENT
Start: 2023-04-24 | End: 2023-04-24

## 2023-04-24 RX ORDER — CLOPIDOGREL BISULFATE 75 MG/1
300 TABLET ORAL ONCE
Status: COMPLETED | OUTPATIENT
Start: 2023-04-24 | End: 2023-04-24

## 2023-04-24 RX ORDER — FENTANYL CITRATE 50 UG/ML
INJECTION, SOLUTION INTRAMUSCULAR; INTRAVENOUS AS NEEDED
Status: DISCONTINUED | OUTPATIENT
Start: 2023-04-24 | End: 2023-04-24

## 2023-04-24 RX ORDER — LIDOCAINE HYDROCHLORIDE 20 MG/ML
INJECTION, SOLUTION EPIDURAL; INFILTRATION; INTRACAUDAL; PERINEURAL AS NEEDED
Status: DISCONTINUED | OUTPATIENT
Start: 2023-04-24 | End: 2023-04-24

## 2023-04-24 RX ORDER — CEFAZOLIN SODIUM 2 G/50ML
2000 SOLUTION INTRAVENOUS ONCE
Status: COMPLETED | OUTPATIENT
Start: 2023-04-24 | End: 2023-04-24

## 2023-04-24 RX ORDER — METOPROLOL SUCCINATE 25 MG/1
25 TABLET, EXTENDED RELEASE ORAL DAILY
Status: DISCONTINUED | OUTPATIENT
Start: 2023-04-24 | End: 2023-04-26 | Stop reason: HOSPADM

## 2023-04-24 RX ORDER — GLYCOPYRROLATE 0.2 MG/ML
INJECTION INTRAMUSCULAR; INTRAVENOUS AS NEEDED
Status: DISCONTINUED | OUTPATIENT
Start: 2023-04-24 | End: 2023-04-24

## 2023-04-24 RX ORDER — PANTOPRAZOLE SODIUM 40 MG/1
40 TABLET, DELAYED RELEASE ORAL
Status: DISCONTINUED | OUTPATIENT
Start: 2023-04-25 | End: 2023-04-26 | Stop reason: HOSPADM

## 2023-04-24 RX ORDER — HEPARIN SODIUM 200 [USP'U]/100ML
INJECTION, SOLUTION INTRAVENOUS
Status: COMPLETED | OUTPATIENT
Start: 2023-04-24 | End: 2023-04-24

## 2023-04-24 RX ORDER — ONDANSETRON 2 MG/ML
INJECTION INTRAMUSCULAR; INTRAVENOUS AS NEEDED
Status: DISCONTINUED | OUTPATIENT
Start: 2023-04-24 | End: 2023-04-24

## 2023-04-24 RX ORDER — MAGNESIUM HYDROXIDE 1200 MG/15ML
LIQUID ORAL AS NEEDED
Status: DISCONTINUED | OUTPATIENT
Start: 2023-04-24 | End: 2023-04-24 | Stop reason: HOSPADM

## 2023-04-24 RX ORDER — FINASTERIDE 5 MG/1
5 TABLET, FILM COATED ORAL DAILY
Status: DISCONTINUED | OUTPATIENT
Start: 2023-04-24 | End: 2023-04-26 | Stop reason: HOSPADM

## 2023-04-24 RX ORDER — SODIUM CHLORIDE, SODIUM LACTATE, POTASSIUM CHLORIDE, CALCIUM CHLORIDE 600; 310; 30; 20 MG/100ML; MG/100ML; MG/100ML; MG/100ML
75 INJECTION, SOLUTION INTRAVENOUS CONTINUOUS
Status: DISCONTINUED | OUTPATIENT
Start: 2023-04-24 | End: 2023-04-25

## 2023-04-24 RX ORDER — ACETAMINOPHEN 325 MG/1
975 TABLET ORAL EVERY 6 HOURS SCHEDULED
Status: DISCONTINUED | OUTPATIENT
Start: 2023-04-24 | End: 2023-04-26 | Stop reason: HOSPADM

## 2023-04-24 RX ORDER — OXYCODONE HYDROCHLORIDE 5 MG/1
10 TABLET ORAL EVERY 4 HOURS PRN
Status: DISCONTINUED | OUTPATIENT
Start: 2023-04-24 | End: 2023-04-26 | Stop reason: HOSPADM

## 2023-04-24 RX ORDER — ASPIRIN 81 MG/1
81 TABLET ORAL EVERY OTHER DAY
Status: DISCONTINUED | OUTPATIENT
Start: 2023-04-25 | End: 2023-04-26 | Stop reason: HOSPADM

## 2023-04-24 RX ORDER — IODIXANOL 320 MG/ML
INJECTION, SOLUTION INTRAVASCULAR AS NEEDED
Status: DISCONTINUED | OUTPATIENT
Start: 2023-04-24 | End: 2023-04-24 | Stop reason: HOSPADM

## 2023-04-24 RX ORDER — SODIUM CHLORIDE 9 MG/ML
125 INJECTION, SOLUTION INTRAVENOUS CONTINUOUS
Status: DISCONTINUED | OUTPATIENT
Start: 2023-04-24 | End: 2023-04-24

## 2023-04-24 RX ORDER — PROTAMINE SULFATE 10 MG/ML
INJECTION, SOLUTION INTRAVENOUS AS NEEDED
Status: DISCONTINUED | OUTPATIENT
Start: 2023-04-24 | End: 2023-04-24

## 2023-04-24 RX ORDER — ESCITALOPRAM OXALATE 10 MG/1
10 TABLET ORAL DAILY
Status: DISCONTINUED | OUTPATIENT
Start: 2023-04-24 | End: 2023-04-26 | Stop reason: HOSPADM

## 2023-04-24 RX ORDER — SODIUM CHLORIDE, SODIUM LACTATE, POTASSIUM CHLORIDE, CALCIUM CHLORIDE 600; 310; 30; 20 MG/100ML; MG/100ML; MG/100ML; MG/100ML
75 INJECTION, SOLUTION INTRAVENOUS CONTINUOUS
Status: DISCONTINUED | OUTPATIENT
Start: 2023-04-24 | End: 2023-04-24

## 2023-04-24 RX ADMIN — PHENYLEPHRINE HYDROCHLORIDE 50 MCG/MIN: 10 INJECTION INTRAVENOUS at 10:57

## 2023-04-24 RX ADMIN — ESCITALOPRAM OXALATE 10 MG: 10 TABLET ORAL at 16:51

## 2023-04-24 RX ADMIN — SUGAMMADEX 200 MG: 100 INJECTION, SOLUTION INTRAVENOUS at 14:40

## 2023-04-24 RX ADMIN — ROCURONIUM 50 MG: 50 INJECTION, SOLUTION INTRAVENOUS at 10:57

## 2023-04-24 RX ADMIN — PROPOFOL 50 MG: 10 INJECTION, EMULSION INTRAVENOUS at 14:21

## 2023-04-24 RX ADMIN — FENTANYL CITRATE 50 MCG: 50 INJECTION, SOLUTION INTRAMUSCULAR; INTRAVENOUS at 10:52

## 2023-04-24 RX ADMIN — FENTANYL CITRATE 50 MCG: 50 INJECTION, SOLUTION INTRAMUSCULAR; INTRAVENOUS at 11:40

## 2023-04-24 RX ADMIN — TRAZODONE HYDROCHLORIDE 50 MG: 50 TABLET ORAL at 21:16

## 2023-04-24 RX ADMIN — GABAPENTIN 100 MG: 100 CAPSULE ORAL at 16:51

## 2023-04-24 RX ADMIN — OXYCODONE 10 MG: 5 TABLET ORAL at 16:51

## 2023-04-24 RX ADMIN — ROCURONIUM 20 MG: 50 INJECTION, SOLUTION INTRAVENOUS at 11:40

## 2023-04-24 RX ADMIN — FINASTERIDE 5 MG: 5 TABLET, FILM COATED ORAL at 16:51

## 2023-04-24 RX ADMIN — OXYCODONE HYDROCHLORIDE AND ACETAMINOPHEN 500 MG: 500 TABLET ORAL at 16:52

## 2023-04-24 RX ADMIN — HEPARIN SODIUM 5000 UNITS: 5000 INJECTION INTRAVENOUS; SUBCUTANEOUS at 17:54

## 2023-04-24 RX ADMIN — CLOPIDOGREL BISULFATE 300 MG: 75 TABLET ORAL at 17:53

## 2023-04-24 RX ADMIN — ATORVASTATIN CALCIUM 40 MG: 40 TABLET, FILM COATED ORAL at 17:54

## 2023-04-24 RX ADMIN — CEFAZOLIN SODIUM 2000 MG: 2 SOLUTION INTRAVENOUS at 11:02

## 2023-04-24 RX ADMIN — SODIUM CHLORIDE: 0.9 INJECTION, SOLUTION INTRAVENOUS at 11:00

## 2023-04-24 RX ADMIN — SODIUM CHLORIDE 500 ML: 0.9 INJECTION, SOLUTION INTRAVENOUS at 09:02

## 2023-04-24 RX ADMIN — ONDANSETRON HYDROCHLORIDE 4 MG: 2 SOLUTION INTRAMUSCULAR; INTRAVENOUS at 14:23

## 2023-04-24 RX ADMIN — GLYCOPYRROLATE 0.2 MG: 0.2 INJECTION, SOLUTION INTRAMUSCULAR; INTRAVENOUS at 11:25

## 2023-04-24 RX ADMIN — HEPARIN SODIUM 2000 UNITS: 1000 INJECTION INTRAVENOUS; SUBCUTANEOUS at 13:02

## 2023-04-24 RX ADMIN — ACETAMINOPHEN 325MG 975 MG: 325 TABLET ORAL at 17:53

## 2023-04-24 RX ADMIN — GABAPENTIN 100 MG: 100 CAPSULE ORAL at 21:16

## 2023-04-24 RX ADMIN — FENTANYL CITRATE 25 MCG: 50 INJECTION, SOLUTION INTRAMUSCULAR; INTRAVENOUS at 15:20

## 2023-04-24 RX ADMIN — TAMSULOSIN HYDROCHLORIDE 0.4 MG: 0.4 CAPSULE ORAL at 16:51

## 2023-04-24 RX ADMIN — ROCURONIUM 20 MG: 50 INJECTION, SOLUTION INTRAVENOUS at 13:17

## 2023-04-24 RX ADMIN — OXYCODONE 5 MG: 5 TABLET ORAL at 19:06

## 2023-04-24 RX ADMIN — FENTANYL CITRATE 25 MCG: 50 INJECTION, SOLUTION INTRAMUSCULAR; INTRAVENOUS at 15:46

## 2023-04-24 RX ADMIN — METOPROLOL SUCCINATE 25 MG: 25 TABLET, EXTENDED RELEASE ORAL at 16:51

## 2023-04-24 RX ADMIN — CHLORHEXIDINE GLUCONATE 0.12% ORAL RINSE 15 ML: 1.2 LIQUID ORAL at 09:04

## 2023-04-24 RX ADMIN — FENTANYL CITRATE 25 MCG: 50 INJECTION, SOLUTION INTRAMUSCULAR; INTRAVENOUS at 15:41

## 2023-04-24 RX ADMIN — SODIUM CHLORIDE, SODIUM LACTATE, POTASSIUM CHLORIDE, AND CALCIUM CHLORIDE 75 ML/HR: .6; .31; .03; .02 INJECTION, SOLUTION INTRAVENOUS at 17:25

## 2023-04-24 RX ADMIN — FENTANYL CITRATE 25 MCG: 50 INJECTION, SOLUTION INTRAMUSCULAR; INTRAVENOUS at 15:31

## 2023-04-24 RX ADMIN — HEPARIN SODIUM 9000 UNITS: 1000 INJECTION INTRAVENOUS; SUBCUTANEOUS at 12:03

## 2023-04-24 RX ADMIN — B-COMPLEX W/ C & FOLIC ACID TAB 1 TABLET: TAB at 17:54

## 2023-04-24 RX ADMIN — PROPOFOL 150 MG: 10 INJECTION, EMULSION INTRAVENOUS at 10:57

## 2023-04-24 RX ADMIN — LIDOCAINE HYDROCHLORIDE 100 MG: 20 INJECTION, SOLUTION EPIDURAL; INFILTRATION; INTRACAUDAL at 10:57

## 2023-04-24 RX ADMIN — PROTAMINE SULFATE 40 MG: 10 INJECTION, SOLUTION INTRAVENOUS at 13:52

## 2023-04-24 NOTE — CONSULTS
[facility] Podiatry - Consultation      Patient Information:   Chad Jacobsen  66 y o  male MRN: [id number]  Unit/Bed#: ICU 04 Encounter: [id number]  PCP: Terri Escobar DO  Date of Admission:  4/24/2023  Date of Consultation: 04/24/23  Requesting Physician: Harolyn Cabot, MD      ASSESSMENT:    Chad Jacobsen  is a 66 y o  male with:    1  Ulcerations right lower extremity anterior tibia  2  Peripheral arterial disease  3  Status post right femoral endarterectomy DOS 4/24/2023    PLAN:    · Right lower extremity ulcerations no signs of infection  · Continue local wound care with Dermagran DSD  · Outpatient follow-up with podiatry/wound care  · F/U x-ray rule out osteo  · Previous arterial studies reviewed, previous x-ray reviewed with significant deformity  · Rest of care per primary team       SUBJECTIVE    History of Present Illness:    Chad Jacobsen  is a 66 y o  male with past medical history significant for peripheral arterial disease, renal failure, abscess right lower extremity, is admitted for postop care after right lower extremity angiogram   Patient had previously approximately 2 to 3 months ago had a wound to the right anterior leg by dropping something on it  After that there was an abscess that is created to more ulcerations a little bit more proximal   These were drained by Dr Carina Chang and the patient has recovered to some extent  Ulcerations are not healing and was sent for vascular studies  He came into the hospital today for a endarterectomy of the right lower extremity to improve blood flow and healing potential of the wounds  Patient denies any fever, nausea, vomiting, chills  He does state that he feels like he has greater sensation to the right leg since his operation this morning         Review of Systems:    Constitutional: Negative  HENT: Negative  Eyes: Negative  Respiratory: Negative  Cardiovascular: Negative  Gastrointestinal: Negative  Musculoskeletal: Anterior bowing of tibia right lower extremity  Skin: Ulcerations anterior right lower third of leg  Neurological: Negative  Psych: Negative       Past Medical and Surgical History:     Past Medical History:   Diagnosis Date   • Anemia     iron def   • Arthritis    • Cerebrovascular accident (CVA) ([address] ) 01/07/2020    memory issues   • Depression 03/29/2023   • GERD (gastroesophageal reflux disease)    • Gout    • Hypertension    • Insomnia        Past Surgical History:   Procedure Laterality Date   • ANKLE SURGERY Right    • CARDIAC CATHETERIZATION      no findings   • COLONOSCOPY  01/25/2013    polypectomy; complete - 3 yrs d/t polyp - benign submucosal lipoma- path c/w lipoma   • COLONOSCOPY  04/25/2017    complete - tubular adenoma - repeat 5yrs   • CYSTOSCOPY     • CYSTOTOMY      bladder  w/ basket extraction of calculus   • FEMUR FRACTURE SURGERY     • HERNIA REPAIR      inguinal ; sliding   • INGUINAL HERNIA REPAIR Right     unilateral   • KNEE ARTHROSCOPY Right     w/medial menisectomy   • LASIK      corneal   • LITHOTRIPSY      renal   • WA COLONOSCOPY FLX DX W/COLLJ SPEC WHEN PFRMD N/A 04/25/2017    Procedure: SCREENING COLONOSCOPY;  Surgeon: Gary Munson MD;  [facility];  Service: General   • WA TEAEC W/PATCH GRF CAROTID VERTB SUBCLAV NECK INC Right 01/10/2020    Procedure: ENDARTERECTOMY ARTERY CAROTID;  Surgeon: Sis Conley MD;  Location:  MAIN OR;  Service: Vascular   • ROTATOR CUFF REPAIR Bilateral    • TONSILLECTOMY         Meds/Allergies:    Medications Prior to Admission   Medication   • Ascorbic Acid (VITAMIN C IMMUNE HEALTH PO)   • aspirin (ECOTRIN LOW STRENGTH) 81 mg EC tablet   • atorvastatin (LIPITOR) 40 mg tablet   • calcium polycarbophil (FIBERCON) 625 mg tablet   • escitalopram (LEXAPRO) 10 mg tablet   • ferrous sulfate 324 (65 Fe) mg   • finasteride (PROSCAR) 5 mg tablet   • gabapentin (Neurontin) 100 mg capsule   • lisinopril (ZESTRIL) 10 mg tablet   • "Maxidex 0 1 % ophthalmic suspension   • metoprolol succinate (TOPROL-XL) 25 mg 24 hr tablet   • Multiple Vitamin (MULTIVITAMIN) capsule   • omeprazole (PriLOSEC) 20 mg delayed release capsule   • Santyl ointment   • tamsulosin (FLOMAX) 0 4 mg   • traZODone (DESYREL) 50 mg tablet   • triamterene-hydrochlorothiazide (MAXZIDE) 75-50 MG per tablet       No Known Allergies    Social History:     Marital Status:     Substance Use History:   Social History     Substance and Sexual Activity   Alcohol Use Not Currently    Comment: stopped drinking alcohol; AA x23yrs sober     Social History     Tobacco Use   Smoking Status Former   • Packs/day: 1 00   • Years:    • Pack years:    • Types: Cigarettes   • Start date: 0   • Quit date: 1985   • Years since quittin 3   Smokeless Tobacco Never     Social History     Substance and Sexual Activity   Drug Use No       Family History:    Family History   Problem Relation Age of Onset   • Alzheimer's disease Mother    • Alzheimer's disease Father    • Heart disease Father         CAD   • Other Father         prediabetes   • Diabetes Father    • Breast cancer Other    • Arthritis Family    • Hypertension Family          OBJECTIVE:    Vitals:   Blood Pressure: 139/76 (23 1625)  Pulse: 62 (23 1641)  Temperature: 98 1 °F (36 7 °C) (23)  Temp Source: Oral (23 162)  Respirations: (!) 10 (23 162)  Height: 5' 10\" (177 8 cm) (23 0921)  Weight - Scale: 90 2 kg (198 lb 13 7 oz) (23 162)  SpO2: 99 % (23 162)    Physical Exam:     General Appearance: Alert, cooperative, no distress  HEENT: Head normocephalic, atraumatic, without obvious abnormality  Heart: Normal rate and rhythm  Lungs: Non-labored breathing  No respiratory distress  Abdomen: Without distension    Psychiatric: AAOx3  Lower Extremity:  Vascular:   DP pulses are lightly palpable bilateral  PT pulses are nonpalpable  Feet are cool and consistent " "bilateral  Capillary refill is within normal limits  Negative for edema bilateral  Musculoskeletal:  Adequate ankle and subtalar joint range of motion  Posterior bowing of the right tibia evident  Negative for pain on palpation with exception of wounds  Neurological:  Light touch is intact  Monofilament is normal  Sharp dull discrimination is normal      Dermatological:  Skin is smooth, supple with good texture and turgor  Wound wounds (x3) located to the right lower extremity proximal to distal measuring 0 8 x 0 8 x 0 1 cm, 1 0 x 0 8 x 0 1 cm, and 1 5 x 0 2 x 0 1 cm  Ulcerations have a fibrogranular base with negative probe to bone and negative for surrounding erythema  Hypergranulation in the middle wound with more fibrotic presentation to the proximal wound  Negative for drainage, malodor, purulence, crepitus, heat, swelling, edema  Is painful with probing and application of Betadine  Clinical Images 04/24/23: Additional Data:     Lab Results: I have personally reviewed pertinent labs including:    Results from last 7 days   Lab Units 04/24/23  1538   PLATELETS Thousands/uL 138*           Invalid input(s): LABALBU  Results from last 7 days   Lab Units 04/24/23  0842   INR  1 19       Cultures: I have personally reviewed pertinent cultures including:              Imaging: I have personally reviewed pertinent films in PACS  EKG, Pathology, and Other Studies: I have personally reviewed pertinent reports  ** Please Note: Portions of the record may have been created with voice recognition software  Occasional wrong word or \"sound a like\" substitutions may have occurred due to the inherent limitations of voice recognition software  Read the chart carefully and recognize, using context, where substitutions have occurred   **    "

## 2023-04-24 NOTE — ASSESSMENT & PLAN NOTE
· Nonhealing right leg wounds, remote hx STSG, now s/p right leg abscess drainage with eventual need for further debridement and redo grafting     · Follows with podiatry  · Podiatry consulted  · Covered by clean dry dressing

## 2023-04-24 NOTE — CONSULTS
[address] [place] [address]  Consult  Name: Yolanda Madrid  66 y o  male I MRN: [id number]  Unit/Bed#: ICU 04 I Date of Admission: 4/24/2023   Date of Service: 4/24/2023 I Hospital Day: 0    Consults    Assessment/Plan   Peripheral artery disease [facility])  Assessment & Plan  · Status post right common femoral, superficial femoral, and profunda femoris endarterectomy with patch angioplasty   · Admit to the intensive care unit  · Every 2 hours neurovascular checks right lower extremity  · 4 hours of strict bedrest and the patient may sit up and eat  · Status post Plavix load  · Continue Plavix and aspirin    Abscess of right leg excluding foot  Assessment & Plan  · Nonhealing right leg wounds, remote hx STSG, now s/p right leg abscess drainage with eventual need for further debridement and redo grafting  · Follows with podiatry  · Podiatry consulted  · Covered by clean dry dressing    Acute renal failure superimposed on chronic kidney disease [facility])  Assessment & Plan  Lab Results   Component Value Date    EGFR 49 04/17/2023    EGFR 36 04/03/2023    EGFR 44 03/31/2023    CREATININE 1 35 (H) 04/17/2023    CREATININE 1 73 (H) 04/03/2023    CREATININE 1 49 (H) 03/31/2023   · Monitor kidney indices  · Avoid nephrotoxic drugs  · Neurology consulted           History of Present Illness     HPI:    Yolanda Madrid  is a 66 y o  male who presents with complaints of pain associated with a chronic wound on his right ankle  Medical history significant for coronary artery disease, hypertension, hyperlipidemia, atrial flutter, carotid stenosis with a known past surgical history of right CEA done 2020 by Dr Hortencia Alvarez  Additionally the patient carries a history of chronic kidney disease stage III  Patient presented to [address] [place] [address] for endarterectomy arterial femoral, retrograde iliac intervention    He was transferred to the intensive care unit for frequent neurovascular checks, pain management, incision management  At this time the patient denies complaints of chest pain, shortness of breath, nausea, vomiting, diarrhea, visual changes, lower extremity edema  He does complain of minor pain at the right ankle chronic wound and some discomfort at the right femoral incision  Review of Systems:    Full 12 point review of systems was performed  Aside from what was mentioned in the HPI, it is otherwise negative        Historical Information   Past Medical History:   Diagnosis Date   • Anemia     iron def   • Arthritis    • Cerebrovascular accident (CVA) ([address] ) 01/07/2020    memory issues   • Depression 03/29/2023   • GERD (gastroesophageal reflux disease)    • Gout    • Hypertension    • Insomnia      Past Surgical History:   Procedure Laterality Date   • ANKLE SURGERY Right    • CARDIAC CATHETERIZATION      no findings   • COLONOSCOPY  01/25/2013    polypectomy; complete - 3 yrs d/t polyp - benign submucosal lipoma- path c/w lipoma   • COLONOSCOPY  04/25/2017    complete - tubular adenoma - repeat 5yrs   • CYSTOSCOPY     • CYSTOTOMY      bladder  w/ basket extraction of calculus   • FEMUR FRACTURE SURGERY     • HERNIA REPAIR      inguinal ; sliding   • INGUINAL HERNIA REPAIR Right     unilateral   • KNEE ARTHROSCOPY Right     w/medial menisectomy   • LASIK      corneal   • LITHOTRIPSY      renal   • IN COLONOSCOPY FLX DX W/COLLJ SPEC WHEN PFRMD N/A 04/25/2017    Procedure: SCREENING COLONOSCOPY;  Surgeon: Jazzy Harrison MD;  [facility];  Service: General   • IN TEAEC W/PATCH GRF CAROTID VERTB [facility] Right 01/10/2020    Procedure: ENDARTERECTOMY ARTERY CAROTID;  Surgeon: Remington Mae MD;  Location:  MAIN OR;  Service: Vascular   • ROTATOR CUFF REPAIR Bilateral    • TONSILLECTOMY       Social History   Social History     Substance and Sexual Activity   Alcohol Use Not Currently    Comment: stopped drinking alcohol; AA x23yrs sober     Social History     Substance and Sexual Activity   Drug Use No     Social History     Tobacco Use   Smoking Status Former   • Packs/day: 1 00   • Years:    • Pack years:    • Types: Cigarettes   • Start date: 0   • Quit date: 1985   • Years since quittin 3   Smokeless Tobacco Never       Family History:   Family History   Problem Relation Age of Onset   • Alzheimer's disease Mother    • Alzheimer's disease Father    • Heart disease Father         CAD   • Other Father         prediabetes   • Diabetes Father    • Breast cancer Other    • Arthritis Family    • Hypertension Family        Medications:  Pertinent medications were reviewed  Current Facility-Administered Medications   Medication Dose Route Frequency Provider Last Rate   • acetaminophen  975 mg Oral Q6H [address]  [address], DO     • ascorbic acid  500 mg Oral Daily Polo Archuleta DO     • [START ON 2023] aspirin  81 mg Oral Every Other Day Polo Archuleta DO     • atorvastatin  40 mg Oral QPM Polo Archuleta DO     • clopidogrel  300 mg Oral Once Polo Archuleta DO     • [START ON 2023] clopidogrel  75 mg Oral Daily Polo Archuleta DO     • escitalopram  10 mg Oral Daily Polo Archuleta DO     • [START ON 2023] ferrous sulfate  325 mg Oral Daily With Breakfast Polo Archuleta DO     • finasteride  5 mg Oral Daily Polo Archuleta DO     • gabapentin  100 mg Oral TID Polo Archuleta DO     • heparin (porcine)  5,000 Units Subcutaneous [facility] Polo Archuleta DO     • lactated ringers  75 mL/hr Intravenous Continuous Polo Archuleta DO     • metoprolol succinate  25 mg Oral Daily Polo Archuleta DO     • multivitamin stress formula  1 tablet Oral Daily Polo Archuleta DO     • oxyCODONE  10 mg Oral Q4H PRN Polo Archuleta DO     • oxyCODONE  5 mg Oral Q4H PRN Polo Archuleta DO     • [START ON 2023] pantoprazole  40 mg Oral Early Morning Polo Archuleta DO     • sodium "chloride  100 mL/hr Intravenous Continuous Von Best MD Stopped (04/24/23 1421)   • sodium chloride  125 mL/hr Intravenous Continuous Darby Ndiaye  mL/hr (04/24/23 1519)   • tamsulosin  0 4 mg Oral Daily With Goran Nur DO     • traZODone  50 mg Oral HS Anay Tamez DO           No Known Allergies      Vitals:   /76 (BP Location: Right arm)   Pulse 62   Temp 98 1 °F (36 7 °C) (Oral)   Resp (!) 10   Ht 5' 10\" (1 778 m)   Wt 90 2 kg (198 lb 13 7 oz)   SpO2 99%   BMI 28 53 kg/m²   Body mass index is 28 53 kg/m²  SpO2: 99 %,   SpO2 Activity: At Rest,   O2 Device: Nasal cannula      Intake/Output Summary (Last 24 hours) at 4/24/2023 1659  Last data filed at 4/24/2023 1545  Gross per 24 hour   Intake 2154 17 ml   Output 850 ml   Net 1304 17 ml     Invasive Devices     Peripheral Intravenous Line  Duration           Peripheral IV 04/24/23 Dorsal (posterior); Left Forearm <1 day    Peripheral IV 04/24/23 Right Forearm <1 day          Arterial Line  Duration           Arterial Line 04/24/23 <1 day          Drain  Duration           Urethral Catheter 16 Fr  <1 day                Physical Exam:  Gen: Pleasant and cooperative  HEENT: Atraumatic normocephalic pupils equal round reactive to light extraocular movements intact sclera anicteric oral mucosa is pink and moist  Neck: Supple no JVD no lymphadenopathy trachea midline  Chest: Clear to auscultation bilaterally respirations even and unlabored on oxygen 2 L by nasal cannula  Cor: Regular rate and rhythm no murmurs rubs or gallops appreciated  Abd: Soft nontender with positive bowel sounds  Ext: Right lower extremity has a clean dry dressing at the ankle there is a surgical dressing at the right groin, that area is soft and nontender without ecchymosis  Neuro: Alert and oriented x3 moves all extremities  Skin: Warm and dry intact, no rash      Diagnostic Data:  Lab: I have personally reviewed pertinent lab results  , " "  CBC:  Results from last 7 days   Lab Units 04/24/23  1538   PLATELETS Thousands/uL 138*      CMP: No results found for: SODIUM, K, CL, CO2, ANIONGAP, BUN, CREATININE, GLUCOSE, CALCIUM, AST, ALT, ALKPHOS, PROT, BILITOT, EGFR,   PT/INR:   Lab Results   Component Value Date    INR 1 19 04/24/2023   ,   Magnesium: No components found for: MAG,  Phosphorous: No results found for: PHOS    ABG: No results found for: PHART, QDW3HEG, PO2ART, YGG0SLP, C8PTYIJD, BEART, SOURCE,     Microbiology: No pending microbiology        Imaging: I have personally reviewed the pertinent imaging studies on the PACS system  Right ankle x-ray pending    Cardiac/EKG/telemetry/Echo:       Sinus bradycardia      VTE Prophylaxis: Heparin    Code Status: Prior    Aundra FloBLAS santana    Portions of the record may have been created with voice recognition software  Occasional wrong word or \"sound a like\" substitutions may have occurred due to the inherent limitations of voice recognition software  Read the chart carefully and recognize, using context, where substitutions have occurred        "

## 2023-04-24 NOTE — DISCHARGE INSTR - AVS FIRST PAGE
Discharge Instructions - Podiatry    Weight Bearing Status: Weight bearing as tolerated RLE                   Pain: Continue analgesics as directed    Follow-up appointment instructions: Please make an appointment within one week of discharge with Dr Eli Hickman  Contact sooner if any increase in pain, or signs of infection occur    Wound Care: Leave dressings clean, dry, and intact between professional dressing changes  If dressing get wet, soiled or removed please call the office immediately for instruction  Nursing Instructions: Please apply Flores Ailyn  Then cover with Gauze and secure with Kerlix and tape  Please change dressing every Monday, Wednesday, and Friday   DISCHARGE INSTRUCTIONS  LEG/BYPASS SURGERY    ACTIVITY:   Limit your physical activity to walking for the first week and then increase your activity as tolerated  If you become short of breath or tired, stop and rest   You may require help with walking or feel more secure with something to lean on  Walking up steps and normal activities may be resumed as you feel ready  Most people tire easily for the first few weeks following leg surgery  This improves as conditioning returns  Avoid strenuous activity such as vigorous exercise  Avoid heavy lifting (do not lift more than 15 pounds) for the first four weeks after surgery  You should not drive a car for at least two weeks following discharge from the hospital and you are off all narcotic pain medication  You may ride in a car  DIET:  Resume your normal diet  Good nutrition is important for healing of your incision  If you are discharged on narcotics for pain control, continue taking your stool softeners until you are having regular bowel movements  INCISION:  You should shower daily  Wash incision daily with soap and water, but do not rub or scrub the incision; rinse thoroughly and pat dry    You may have stitches or staples to close your incision and it is okay for these to get wet   Do not bathe in a tub or swim for the first 4 week following surgery or if you have any open wounds  It is normal to have swelling or discoloration around the incision  If increasing redness or pain develops, call our office immediately  Numbness in the region of the incision may occur following the surgery  This normally improves over six to twelve months  You may have surgical glue over your incisions  There are stitches present under the skin which will absorb on their own  The glue is used to cover the access, assist in closure, and prevent contamination  This adhesive will darken and peel away on its own within one to two weeks  Do not pick at it  If you have a groin wound/incision, place a clean dry piece of gauze to cover your groin incision to keep incision clean and dry and prevent your skin from sticking together  Change gauze daily  You may have staples or stitches at your incisions  These will be removed at your follow-up appointment or when they are ready to come out  If you have a dressing over your surgical site, remove this on the second day after surgery  If you have foot or leg wounds, please follow your podiatrist/wound care doctor's instructions for care  If any of your incisions are open and require dressing changes, you will be given instructions for your daily incision care  If you are not able to change the dressings, a visiting nurse will be arranged  DO NOT put any powders, creams, ointments, or lotions on your incision  LEG SWELLING: Most patients have noticeable leg swelling after leg surgery  This usually improves within a few weeks  If swelling is present, elevate the leg whenever possible  Avoid sitting with the leg hanging down for prolonged periods of time  Walking is beneficial   An ACE bandage or support stocking may be helpful, but this should be discussed with your physician prior to use if you have a bypass      FOLLOW UP STUDIES:  Doppler ultrasound studies are very important for long-term management  Your surgeon will arrange this at your first postoperative visit  Repeat studies are then scheduled every three months for the first year and periodically after this  FOLLOW UP APPOINTMENTS:  Making and keeping follow up appointments and ultrasound tests are important to your recovery  If you have difficulty making it to or keeping your follow up appointments, call the office  If you have increased pain, fever >101 5, increased drainage, redness or a bad smell at your surgery site, new coldness/numbness of your arm or leg, please call us immediately and GO directly to the ER  PLEASE CALL THE OFFICE IF YOU HAVE ANY QUESTIONS  [phone]  [phone]  [address] [place] [address] , [address], [facility], [address]  [address], [address] [facility], [address] [facility]  [address] [facility], [facility], [address] [place] [address]  [address] [place] [address], [facility], [facility], [address] [facility]  [address], [address]st [address], Brennon Gold [address]  [facility], [address] [facility], [address], [address] [facility], [facility] [place], [address] [facility]  [address] [facility], [address] [facility], [facility],[address], Avril Jones 6  [address] [facility], Nini Leyva, [address]  [address] [place], [place] [address]

## 2023-04-24 NOTE — OP NOTE
OPERATIVE REPORT  PATIENT NAME: Aracelis Hallman  :  1945  MRN: [id number]  Pt Location: [facility]    SURGERY DATE: 2023    Surgeon(s) and Role:     * Sharon Garcia MD - Primary     * Magdi Downs DO - Assisting    Preop Diagnosis:  PAD (peripheral artery disease) ([facility] [address] ) [I73 9]  Open wound of right ankle, subsequent encounter [S91 001D]    Post-Op Diagnosis Codes:     * PAD (peripheral artery disease) ([facility] [address] ) [I73 9]     * Open wound of right ankle, subsequent encounter [S91 001D]    Procedure(s):  1  Right common femoral, superficial femoral, and profunda femoris endarterectomy with patch angioplasty  2  Aortoiliac angiogram  3  Retrograde right external iliac artery angioplasty with 6x80mm balloon  4  Placement of 8x80mm Innova self expanding stent in the right external iliac artery, post-dilated with 8x80mm balloon  5  Completion right lower extremity angiogram      Specimen(s):  * No specimens in log *    Estimated Blood Loss:   250 mL    Drains:  Urethral Catheter 16 Fr  (Active)   Number of days: 0       Anesthesia Type:   General    Operative Indications:  PAD (peripheral artery disease) ([facility] [address] ) [I73 9]  Open wound of right ankle, subsequent encounter [S91 001D]    Mr Vessie Apley is a 70yo male with PAD and right leg tissue loss with need for additional debridement and intervention  Plan for right femoral endarterectomy with retrograde iliac intervention and right lower extremity angiogram  All risks and benefits of the procedure were discussed with the patient and informed consent was obtained  Operative Findings:  Heavily calcified coral reef plaque in the common femoral artery extending to the profunda and SFA origins (near occlusive at the PFA/SFA origins)    RADIOGRAPHIC SUPERVISION AND INTERPRETATION OF TEST:    1  Abdominal aortogram findings -   Abdominal aorta - patent    2   Pelvic runoff findings -   Left common iliac artery-moderate calcified stenosis throughout the left ONDINA  Left internal iliac artery-patent with moderate to high grade stenosis in the origin/proximal segment  Left external iliac artery-not visualized    Right common iliac artery-Two focal areas of <50% calcified plaque in the right ONDINA  Right internal iliac artery-patent with moderate stenosis at the origin  Right external iliac artery- >75% focal bulky calcified plaque in the mid-distal EIA and diffuse areas of 50-75% stenosis in the remainder of the EIA starting just distal to the right hypogastric artery    3  Right lower extremity angiogram findings-    common femoral artery- widely patent patch angioplasty  Profunda femorals artery-widely patent   Superficial femoral artery-patent with mild mid-SFA stenosis  Popliteal artery-patent  Anterior tibial artery-patent with mild diffuse stenosis filling DP, single vessel runoff to the foot  Tibioperoneal trunk-patent  Posterior tibial artery-patent proximally with subsequent occlusion and minimal reconstitution  Peroneal artery-patent proximally with subsequent occlusion and minimal reconstitution  Plantar arch-incomplete    Successful angioplasty/stent placement of right EIA with minimal residual stenosis        There was a palpable DP pulse/triphasic DP signal on exam      Complications:   None    Procedure and Technique:  The patient was brought to the operating room, and placed supine on the table  After general anesthesia was induced, appropriate antibiotics were given and a time-out was called  The patient's bilateral groin regions were prepped and draped  An Lorene Cesar was then placed  The skin was incised in a vertical fashion over the CFA extending from just above the inferior border of the inguinal ligament down to below the bifurcation of the SFA and Profunda  The subcutaneous tissues were divided with electrocautery  Venous branches were ligated with titanium clips, and ligated with 2-0 and 4-0 silk    The artery was exposed sharply from the external iliac to the SFA and profunda below  Individual control of these vessels was obtained with right angle and vessel loops  Heparin was administered IV to an ACT of >250  The right femoral artery was cannulated with a 21G micropuncture needle  After obtaining pulsatile flow, a micro guidewire was inserted through the needle  A micropuncture sheath was placed and angiogram performed to confirm adequate access and patent right iliac artery  The wire was then exchanged for a bentson wire  The microsheath was removed  A correa clamp was placed for proximal control and a profunda vascular clamp was placed distally to control the profunda femoris artery  The vessel was opened with an 11 blade  Cotter scissors were used to continue the arteriotomy from external iliac to the SFA  This exposed an irregular, occlusive plaque  The plaque was removed with the freer elevator  The proximal end tapered nicely and there was no need for tacking sutures  The inferior extent of the endarterectomy in the profunda femoris artery did require tacking with 6-0 prolene sutures  A bovine pericardial patch was prepared  A 19 gauge needle was used to puncture the patch and the wire was threaded through the patch  The patch was sutured from the proximal common femoral artery just below the inguinal ligament, to the proximal SFA with running two 6-0 prolene sutures  The angioplasty was forward and back flushed, then flushed out with heparinized saline  We then completed the anastomosis  Profunda femoral, Common Femoral and then superficial femoral clamps were removed  There was a palpable pulse easily present distal to the patch  Attention was then turned to treatment of the right external iliac artery  A 6Fr sheath was advanced over the wire and positioned in the distal external iliac artery  A formal aortoiliac angiogram was performed utilizing the omni flush catheter   The right external iliac artery was angioplastied with a 6x80mm balloon and an 8x80mm Innova stent was deployed just distal to the hypogastric artery and above the femoral head  The stent was post-dilated with an 8x80mm balloon  Retrograde right iliac angiogram demonstrated an excellent result with minimal residual stenosis in the right external iliac artery  A completion right lower extremity angiogram was then performed with findings described above  No further intervention was performed  The right femoral sheath was removed and the access was repaired with a 5-0 prolene suture  Any additional bleeding points were repaired with a 6-0 prolene along the patch  The wound was irrigated well and we attained meticulous hemostasis  Gelfoam thrombin was used for additional hemostasis and removed prior to closure  The wound was closed with 2-0 monocryl at the femoral sheath, 3-0 monocryl at ric's fascia in several layers and 4-0 monocryl at the skin  There was a palpable DP pulse/triphasic DP signal on exam  The patient was awakened, noted to be alert, and taken to the PACU in stable condition  I was present for the entire procedure  Patient Disposition:  PACU  and hemodynamically stable        SIGNATURE: Neida Farmer MD  DATE: April 24, 2023  TIME: 2:50 PM        Vascular Quality Initiative - Peripheral Vascular Intervention     Urgency: Urgent    Functional Status:  Fully active; able to carry on all predisease activities without restriction     Ambulation: Amb = independently ambulatory    Leg Symptoms    Right: Ulcer/necrosis (gangrene): de beny tissue loss due to peripheral arterial disease, not due to non-healing prior amputation       Tissue Loss Severity: Grade 2, Deep = deeper full thickness ulcer or necrosis (gangrene) on distal leg or foot with exposed bone, joint, or tendon, or shallow heel ulcer without involvement of the calcaneus (ie, major tissue loss: salvageable with 3 digital amputations or standard transmetatarsal amputation [TMA] plus skin coverage)  Infection: Grade 2, Moderate = Local infection is present as defined for Grade 1, but extends >2 cm around ulcer, or involves structures deeper than the skin and subcutaneous tissues (eg, abscess, osteomyelitis, septic arthritis, fasciitis)  No clinical signs of systemic inflammatory response  Left: Asymptomatic:  documented peripheral arterial disease without symptoms of claudication or ischemic pain      Treatment of Native Artery to Maintain Bypass Patency?:  No    COVID Information  COVID Symptoms Pre-Procedure: Asymptomatic    Treatment Delayed by Pandemic: None    Access   Number of Sites: 1     Access Site 1:     Side 1: Right    Site 1: Femoral Retrograde    Access Guidance 1:Concomitant Endarterectomy  Yes, Patch Closure    Largest Sheath Size 1: 6 Fr  Closure Device 1: None   None    Procedure  Fluoro Time: 6 5 minutes  Contrast Volume: Visipaque 28 ml  DAP: 47 16 Gy cm2  CO2: no  Anticoagulant: Heparin  Protamine: Yes  If Creatinine is > 1 2 or missing, AUGUSTIN Prophylaxis IV saline     Treatment Details  Indication: Occlusive Disease,    Completion Assessment  Artery 1 treated:  External Iliac  Right               Outflow: SFA, PROF, POP: 3                  Was this Site previously treated?: No          TASC Grade: C          Total Treated Length: 8 cm          Total Occluded Length: 0 cm          Calcification: Severe (calcification on both sides of artery > half length of lesion)          Number of Treatment types (Devices):    2           Device 1          Treatment Type: Plain Balloon         Device 2          Treatment Type: Stent,  Bare Metal Stent                Diameter: 8 mm          Length: 80 mm            Concomitant: None          Technical result: Successful (stenosis <=30%)      None     Post Procedure  Patient currently taking: Statin, Yes      Antiplatelet Medication, Yes    Procedure Complications: No

## 2023-04-24 NOTE — ASSESSMENT & PLAN NOTE
Lab Results   Component Value Date    EGFR 49 04/17/2023    EGFR 36 04/03/2023    EGFR 44 03/31/2023    CREATININE 1 35 (H) 04/17/2023    CREATININE 1 73 (H) 04/03/2023    CREATININE 1 49 (H) 03/31/2023   · Monitor kidney indices  · Avoid nephrotoxic drugs  · Neurology consulted

## 2023-04-24 NOTE — ANESTHESIA POSTPROCEDURE EVALUATION
"Post-Op Assessment Note    CV Status:  Stable    Pain management: adequate     Mental Status:  Alert and awake   Hydration Status:  Euvolemic   PONV Controlled:  Controlled   Airway Patency:  Patent   Two or more mitigation strategies used for obstructive sleep apnea   Post Op Vitals Reviewed: Yes      Staff: Anesthesiologist         No notable events documented      BP      Temp      Pulse     Resp      SpO2      /82   Pulse 58   Temp 97 8 °F (36 6 °C)   Resp 12   Ht 5' 10\" (1 778 m)   Wt 93 3 kg (205 lb 11 oz)   SpO2 96%   BMI 29 51 kg/m²     " 40 [FreeTextEntry3] : Euflexxa (Large Joint) with Ultrasound Guidance\par Viscosupplementation Injection: X-ray evidence of Osteoarthritis on this or prior visit and Patient has tried OTC's including aspirin, Ibuprofen, Aleve etc or prescription NSAIDS, and/or exercises at home and/ or physical therapy without satisfactory response. \par An injection of Euflexxa 2ml _#_2__ was injected into the bilateral knee(s). after verbal consent using sterile technique. The risks, benefits, and alternatives to Viscosupplementation injection were explained in full to the patient. Risks outlined include but are not limited to infection, sepsis, bleeding, scarring, skin discoloration, temporary increase in pain, syncopal episode, failure to resolve symptoms, allergic reaction, and symptom recurrence. Signs and symptoms of infection reviewed and patient advised to call immediately for redness, fevers, and/or chills. Patient understood the risks. All questions were answered. After discussion of options, patient requested Viscosupplementation. Oral informed consent was obtained and sterile prep was done of the injection site. Sterile technique was used without complications. The patient tolerated the procedure well. Ice tonight to the injection site. \par \par Ultrasound Guidance was used for the following reasons: altered anatomic landmarks because of erosive arthritis. \par \par Ultrasound guided injection was performed of the knee, visualization of the needle and placement of injection was performed without complication.

## 2023-04-24 NOTE — PLAN OF CARE
Problem: MOBILITY - ADULT  Goal: Maintain or return to baseline ADL function  Description: INTERVENTIONS:  -  Assess patient's ability to carry out ADLs; assess patient's baseline for ADL function and identify physical deficits which impact ability to perform ADLs (bathing, care of mouth/teeth, toileting, grooming, dressing, etc )  - Assess/evaluate cause of self-care deficits   - Assess range of motion  - Assess patient's mobility; develop plan if impaired  - Assess patient's need for assistive devices and provide as appropriate  - Encourage maximum independence but intervene and supervise when necessary  - Involve family in performance of ADLs  - Assess for home care needs following discharge   - Consider OT consult to assist with ADL evaluation and planning for discharge  - Provide patient education as appropriate  Outcome: Progressing  Goal: Maintains/Returns to pre admission functional level  Description: INTERVENTIONS:  - Perform BMAT or MOVE assessment daily    - Set and communicate daily mobility goal to care team and patient/family/caregiver  - Collaborate with rehabilitation services on mobility goals if consulted  - Perform Range of Motion 4 times a day  - Reposition patient every 2 hours    - Dangle patient 3  - Stand patient 3 times a day  - Ambulate patient 3 times a day  - Out of bed to chair 3 times a day   - Out of bed for meals 3 times a day  - Out of bed for toileting  - Record patient progress and toleration of activity level   Outcome: Progressing     Problem: PAIN - ADULT  Goal: Verbalizes/displays adequate comfort level or baseline comfort level  Description: Interventions:  - Encourage patient to monitor pain and request assistance  - Assess pain using appropriate pain scale  - Administer analgesics based on type and severity of pain and evaluate response  - Implement non-pharmacological measures as appropriate and evaluate response  - Consider cultural and social influences on pain and pain management  - Notify physician/advanced practitioner if interventions unsuccessful or patient reports new pain  Outcome: Progressing     Problem: INFECTION - ADULT  Goal: Absence or prevention of progression during hospitalization  Description: INTERVENTIONS:  - Assess and monitor for signs and symptoms of infection  - Monitor lab/diagnostic results  - Monitor all insertion sites, i e  indwelling lines, tubes, and drains  - Monitor endotracheal if appropriate and nasal secretions for changes in amount and color  - [place] appropriate cooling/warming therapies per order  - Administer medications as ordered  - Instruct and encourage patient and family to use good hand hygiene technique  - Identify and instruct in appropriate isolation precautions for identified infection/condition  Outcome: Progressing  Goal: Absence of fever/infection during neutropenic period  Description: INTERVENTIONS:  - Monitor WBC    Outcome: Progressing     Problem: SAFETY ADULT  Goal: Maintain or return to baseline ADL function  Description: INTERVENTIONS:  -  Assess patient's ability to carry out ADLs; assess patient's baseline for ADL function and identify physical deficits which impact ability to perform ADLs (bathing, care of mouth/teeth, toileting, grooming, dressing, etc )  - Assess/evaluate cause of self-care deficits   - Assess range of motion  - Assess patient's mobility; develop plan if impaired  - Assess patient's need for assistive devices and provide as appropriate  - Encourage maximum independence but intervene and supervise when necessary  - Involve family in performance of ADLs  - Assess for home care needs following discharge   - Consider OT consult to assist with ADL evaluation and planning for discharge  - Provide patient education as appropriate  Outcome: Progressing  Goal: Maintains/Returns to pre admission functional level  Description: INTERVENTIONS:  - Perform BMAT or MOVE assessment daily    - Set and communicate daily mobility goal to care team and patient/family/caregiver  - Collaborate with rehabilitation services on mobility goals if consulted  - Perform Range of Motion 4 times a day  - Reposition patient every 2 hours    - Dangle patient 3  - Stand patient 3 times a day  - Ambulate patient 3 times a day  - Out of bed to chair 3 times a day   - Out of bed for meals 3 times a day  - Out of bed for toileting  - Record patient progress and toleration of activity level   Outcome: Progressing  Goal: Patient will remain free of falls  Description: INTERVENTIONS:  - Educate patient/family on patient safety including physical limitations  - Instruct patient to call for assistance with activity   - Consult OT/PT to assist with strengthening/mobility   - Keep Call bell within reach  - Keep bed low and locked with side rails adjusted as appropriate  - Keep care items and personal belongings within reach  - Initiate and maintain comfort rounds  - Make Fall Risk Sign visible to staff  - Offer Toileting every 2 Hours, in advance of need  - Initiate/Maintain bed alarm  - Obtain necessary fall risk management equipment  - Apply yellow socks and bracelet for high fall risk patients  - Consider moving patient to room near nurses station  Outcome: Progressing     Problem: DISCHARGE PLANNING  Goal: Discharge to home or other facility with appropriate resources  Description: INTERVENTIONS:  - Identify barriers to discharge w/patient and caregiver  - Arrange for needed discharge resources and transportation as appropriate  - Identify discharge learning needs (meds, wound care, etc )  - Arrange for interpretive services to assist at discharge as needed  - Refer to Case Management Department for coordinating discharge planning if the patient needs post-hospital services based on physician/advanced practitioner order or complex needs related to functional status, cognitive ability, or social support system  Outcome: Progressing Problem: Knowledge Deficit  Goal: Patient/family/caregiver demonstrates understanding of disease process, treatment plan, medications, and discharge instructions  Description: Complete learning assessment and assess knowledge base    Interventions:  - Provide teaching at level of understanding  - Provide teaching via preferred learning methods  Outcome: Progressing

## 2023-04-24 NOTE — DISCHARGE INSTR - OTHER ORDERS
Wound care instructions for right lower extremity: Please cleanse with sterile saline  Pat dry apply Dermagran, gauze, Kerlix and secure with tape and Ace bandage if desired

## 2023-04-24 NOTE — ANESTHESIA PREPROCEDURE EVALUATION
Procedure:  ENDARTERECTOMY ARTERIAL FEMORAL, RETROGRADE ILIAC INTERVENTION (Right: Leg Lower)  ARTERIOGRAM (Right: Groin)    Relevant Problems   CARDIO   (+) Aortic valve stenosis   (+) Atrial flutter, unspecified type (HCC)   (+) Bilateral carotid artery stenosis   (+) Coronary artery disease involving native coronary artery   (+) Hypertension      GI/HEPATIC   (+) Rambo lesion, chronic   (+) Paraesophageal hernia      /RENAL   (+) Acute renal failure superimposed on chronic kidney disease (HCC)   (+) Nephrolithiasis   (+) Stage 3b chronic kidney disease (HCC)      HEMATOLOGY   (+) Iron deficiency anemia due to chronic blood loss      MUSCULOSKELETAL   (+) Gout   (+) Osteoarthritis   (+) Paraesophageal hernia      NEURO/PSYCH   (+) Depression      Other   (+) Abscess of right leg excluding foot   (+) Dyslipidemia   (+) S/P carotid endarterectomy        Physical Exam    Airway    Mallampati score: II  TM Distance: >3 FB  Neck ROM: full     Dental   No notable dental hx     Cardiovascular  Rhythm: regular, Rate: normal, Cardiovascular exam normal    Pulmonary  Pulmonary exam normal Breath sounds clear to auscultation,     Other Findings        Anesthesia Plan  ASA Score- 3     Anesthesia Type- general with ASA Monitors  Additional Monitors:   Airway Plan:           Plan Factors-    Chart reviewed  EKG reviewed  Existing labs reviewed  Patient summary reviewed  Patient is not a current smoker  Patient not instructed to abstain from smoking on day of procedure  Patient did not smoke on day of surgery  There is medical exclusion for perioperative obstructive sleep apnea risk education  Induction- intravenous  Postoperative Plan-     Informed Consent- Anesthetic plan and risks discussed with patient and daughter

## 2023-04-24 NOTE — ANESTHESIA PROCEDURE NOTES
Arterial Line Insertion  Performed by: Deanne Stack DO  Authorized by: Deanne Stack DO   Consent: Verbal consent obtained  Written consent obtained  Risks and benefits: risks, benefits and alternatives were discussed  Patient understanding: patient states understanding of the procedure being performed  Patient consent: the patient's understanding of the procedure matches consent given  Procedure consent: procedure consent matches procedure scheduled  Relevant documents: relevant documents present and verified  Test results: test results available and properly labeled  Patient identity confirmed: verbally with patient, arm band, provided demographic data and hospital-assigned identification number  Preparation: Patient was prepped and draped in the usual sterile fashion    Indications: hemodynamic monitoring    Procedure Details:  Needle gauge: 20  Seldinger technique: Seldinger technique used  Number of attempts: 1    Post-procedure:  Post-procedure: dressing applied  Waveform: good waveform and waveform confirmed  Post-procedure CNS: normal  Patient tolerance: Patient tolerated the procedure well with no immediate complications

## 2023-04-24 NOTE — ASSESSMENT & PLAN NOTE
· Status post right common femoral, superficial femoral, and profunda femoris endarterectomy with patch angioplasty   · Admit to the intensive care unit  · Every 2 hours neurovascular checks right lower extremity  · 4 hours of strict bedrest and the patient may sit up and eat  · Status post Plavix load  · Continue Plavix and aspirin

## 2023-04-24 NOTE — INTERVAL H&P NOTE
H&P reviewed  After examining the patient I find no changes in the patients condition since the H&P had been written  Vitals:    04/24/23 0913   BP: 129/60   Pulse: (!) 51   Resp: 16   Temp: (!) 97 2 °F (36 2 °C)   SpO2: 98%     PAD with nonhealing right leg wounds  Plan for right femoral endarterectomy with retrograde iliac stenting, right lower extremity runoff  All questions answered  Informed consent was obtained in the office

## 2023-04-24 NOTE — ASSESSMENT & PLAN NOTE
Lab Results   Component Value Date    EGFR 49 04/17/2023    EGFR 36 04/03/2023    EGFR 44 03/31/2023    CREATININE 1 35 (H) 04/17/2023    CREATININE 1 73 (H) 04/03/2023    CREATININE 1 49 (H) 03/31/2023

## 2023-04-25 ENCOUNTER — HOME CARE VISIT (OUTPATIENT)
Dept: HOME HEALTH SERVICES | Facility: HOME HEALTHCARE | Age: 78
End: 2023-04-25

## 2023-04-25 ENCOUNTER — DOCUMENTATION (OUTPATIENT)
Dept: VASCULAR SURGERY | Facility: CLINIC | Age: 78
End: 2023-04-25

## 2023-04-25 LAB
ANION GAP SERPL CALCULATED.3IONS-SCNC: 5 MMOL/L (ref 4–13)
BUN SERPL-MCNC: 19 MG/DL (ref 5–25)
CALCIUM SERPL-MCNC: 8.1 MG/DL (ref 8.4–10.2)
CHLORIDE SERPL-SCNC: 106 MMOL/L (ref 96–108)
CO2 SERPL-SCNC: 28 MMOL/L (ref 21–32)
CREAT SERPL-MCNC: 1.23 MG/DL (ref 0.6–1.3)
ERYTHROCYTE [DISTWIDTH] IN BLOOD BY AUTOMATED COUNT: 13.8 % (ref 11.6–15.1)
GFR SERPL CREATININE-BSD FRML MDRD: 55 ML/MIN/1.73SQ M
GLUCOSE SERPL-MCNC: 94 MG/DL (ref 65–140)
HCT VFR BLD AUTO: 32.9 % (ref 36.5–49.3)
HGB BLD-MCNC: 10.7 G/DL (ref 12–17)
MCH RBC QN AUTO: 31.5 PG (ref 26.8–34.3)
MCHC RBC AUTO-ENTMCNC: 32.5 G/DL (ref 31.4–37.4)
MCV RBC AUTO: 97 FL (ref 82–98)
PLATELET # BLD AUTO: 135 THOUSANDS/UL (ref 149–390)
PMV BLD AUTO: 8.6 FL (ref 8.9–12.7)
POTASSIUM SERPL-SCNC: 4.2 MMOL/L (ref 3.5–5.3)
RBC # BLD AUTO: 3.4 MILLION/UL (ref 3.88–5.62)
SODIUM SERPL-SCNC: 139 MMOL/L (ref 135–147)
WBC # BLD AUTO: 7.69 THOUSAND/UL (ref 4.31–10.16)

## 2023-04-25 RX ORDER — LANOLIN ALCOHOL/MO/W.PET/CERES
6 CREAM (GRAM) TOPICAL
Status: DISCONTINUED | OUTPATIENT
Start: 2023-04-25 | End: 2023-04-26 | Stop reason: HOSPADM

## 2023-04-25 RX ADMIN — HEPARIN SODIUM 5000 UNITS: 5000 INJECTION INTRAVENOUS; SUBCUTANEOUS at 16:17

## 2023-04-25 RX ADMIN — ACETAMINOPHEN 325MG 975 MG: 325 TABLET ORAL at 06:02

## 2023-04-25 RX ADMIN — ESCITALOPRAM OXALATE 10 MG: 10 TABLET ORAL at 08:17

## 2023-04-25 RX ADMIN — Medication 6 MG: at 20:34

## 2023-04-25 RX ADMIN — METOPROLOL SUCCINATE 25 MG: 25 TABLET, EXTENDED RELEASE ORAL at 08:18

## 2023-04-25 RX ADMIN — GABAPENTIN 100 MG: 100 CAPSULE ORAL at 08:18

## 2023-04-25 RX ADMIN — OXYCODONE HYDROCHLORIDE AND ACETAMINOPHEN 500 MG: 500 TABLET ORAL at 08:17

## 2023-04-25 RX ADMIN — HEPARIN SODIUM 5000 UNITS: 5000 INJECTION INTRAVENOUS; SUBCUTANEOUS at 06:02

## 2023-04-25 RX ADMIN — B-COMPLEX W/ C & FOLIC ACID TAB 1 TABLET: TAB at 08:19

## 2023-04-25 RX ADMIN — ACETAMINOPHEN 325MG 975 MG: 325 TABLET ORAL at 00:39

## 2023-04-25 RX ADMIN — Medication 1 PACKET: at 12:15

## 2023-04-25 RX ADMIN — FERROUS SULFATE TAB 325 MG (65 MG ELEMENTAL FE) 325 MG: 325 (65 FE) TAB at 08:17

## 2023-04-25 RX ADMIN — TAMSULOSIN HYDROCHLORIDE 0.4 MG: 0.4 CAPSULE ORAL at 16:17

## 2023-04-25 RX ADMIN — CLOPIDOGREL BISULFATE 75 MG: 75 TABLET ORAL at 08:18

## 2023-04-25 RX ADMIN — ASPIRIN 81 MG: 81 TABLET, COATED ORAL at 08:18

## 2023-04-25 RX ADMIN — FINASTERIDE 5 MG: 5 TABLET, FILM COATED ORAL at 08:17

## 2023-04-25 RX ADMIN — ATORVASTATIN CALCIUM 40 MG: 40 TABLET, FILM COATED ORAL at 18:22

## 2023-04-25 RX ADMIN — SODIUM CHLORIDE, SODIUM LACTATE, POTASSIUM CHLORIDE, AND CALCIUM CHLORIDE 75 ML/HR: .6; .31; .03; .02 INJECTION, SOLUTION INTRAVENOUS at 06:09

## 2023-04-25 RX ADMIN — GABAPENTIN 100 MG: 100 CAPSULE ORAL at 20:34

## 2023-04-25 RX ADMIN — GABAPENTIN 100 MG: 100 CAPSULE ORAL at 16:17

## 2023-04-25 RX ADMIN — HEPARIN SODIUM 5000 UNITS: 5000 INJECTION INTRAVENOUS; SUBCUTANEOUS at 20:34

## 2023-04-25 RX ADMIN — PANTOPRAZOLE SODIUM 40 MG: 40 TABLET, DELAYED RELEASE ORAL at 06:02

## 2023-04-25 RX ADMIN — ACETAMINOPHEN 325MG 975 MG: 325 TABLET ORAL at 12:15

## 2023-04-25 RX ADMIN — TRAZODONE HYDROCHLORIDE 50 MG: 50 TABLET ORAL at 20:34

## 2023-04-25 RX ADMIN — ACETAMINOPHEN 325MG 975 MG: 325 TABLET ORAL at 18:22

## 2023-04-25 NOTE — ASSESSMENT & PLAN NOTE
Lab Results   Component Value Date    EGFR 55 04/25/2023    EGFR 49 04/17/2023    EGFR 36 04/03/2023    CREATININE 1 23 04/25/2023    CREATININE 1 35 (H) 04/17/2023    CREATININE 1 73 (H) 04/03/2023

## 2023-04-25 NOTE — PROGRESS NOTES
Progress Note - Vascular Surgery   Renetta [place]  66 y o  male [id number]  Unit/Bed#:ICU 04 Encounter: [id number]      Assessment:    66 y o  with PMH Right CEA (2020), HTN, CAD, Aflutter (No AC), CKD, RLE ORIF, RLE STSG presenting with RLE CLTI, non-healing STSG harvest site abscess, AIOD for an elective revascularization     4/24: RLE Fem Endart c Patch Angioplasty, EIA PTA c SES    Plan:  - Cont NV chks  - DC Reba/Allen  - OOB as tolerated  - Diet as tolerated  - VTEppx: SQH  - Statin, Aspirin, Plavix  - DC IVF  - Monitor Cr (1 2)  - [facility] Floor for downgrade to vascular surgery service      Subjective:    Pt s/e  No acute events overnight  Pt awake, alert  Pt complains of mild pain at incision site  Overall, states his pain is controlled  Tolerating diet  Not OOB  Voiding  No new complaints  Denies n/v, sob, chest pain, f/c  No sensory change, numbness, or weakness of lower extremities    I/Os:  I/O last 3 completed shifts: In: 2559 2 [P O :360; I V :2199 2]  Out: 1000 [Urine:750; Blood:250]  I/O this shift:  In: -   Out: 250 [Urine:250]    Review of Systems   All other systems reviewed and are negative  Vitals:    04/25/23 0610   BP: 119/57   Pulse: (!) 52   Resp: 14   Temp:    SpO2: 94%        Physical Exam  Vitals and nursing note reviewed  Constitutional:       General: He is not in acute distress  Appearance: He is well-developed  He is not ill-appearing or diaphoretic  HENT:      Head: Normocephalic and atraumatic  Eyes:      Conjunctiva/sclera: Conjunctivae normal    Cardiovascular:      Rate and Rhythm: Normal rate and regular rhythm  Heart sounds: No murmur heard  Comments: B/L Palp fem   RLE Dop signal in DP/PT  Pulmonary:      Effort: Pulmonary effort is normal  No respiratory distress  Breath sounds: Normal breath sounds  Abdominal:      General: There is no distension  Palpations: Abdomen is soft  Tenderness:  There is no abdominal tenderness  There is no right CVA tenderness, left CVA tenderness, guarding or rebound  Hernia: No hernia is present  Musculoskeletal:         General: Signs of injury present  No swelling  Cervical back: Neck supple  Right lower leg: Edema present  Comments: RLE dressing c/d/i    Right groin dressing c/d/i, soft, no hematoma, no ecchymosis    Skin:     General: Skin is warm and dry  Capillary Refill: Capillary refill takes less than 2 seconds  Neurological:      General: No focal deficit present  Mental Status: He is alert and oriented to person, place, and time  Mental status is at baseline  Psychiatric:         Mood and Affect: Mood normal          Imaging:  I have personally reviewed pertinent reports  , CBC with diff:   Lab Results   Component Value Date    WBC 7 69 04/25/2023    HGB 10 7 (L) 04/25/2023    HCT 32 9 (L) 04/25/2023    MCV 97 04/25/2023     (L) 04/25/2023    MCH 31 5 04/25/2023    MCHC 32 5 04/25/2023    RDW 13 8 04/25/2023    MPV 8 6 (L) 04/25/2023   , BMP/CMP:   Lab Results   Component Value Date    SODIUM 139 04/25/2023    K 4 2 04/25/2023     04/25/2023    CO2 28 04/25/2023    BUN 19 04/25/2023    CREATININE 1 23 04/25/2023    CALCIUM 8 1 (L) 04/25/2023    EGFR 55 04/25/2023           Results Reviewed     None           Patient Active Problem List   Diagnosis   • Tubular adenoma of colon   • Carpal tunnel syndrome   • Dyslipidemia   • Gout   • Hypertension   • Impaired fasting glucose   • Insomnia   • Iron deficiency anemia due to chronic blood loss   • Osteoarthritis   • Prolonged QT interval   • Rhinitis   • Other disorder of calcium metabolism   • Nephrolithiasis   • Elevated PSA   • Obesity (BMI 30 0-34  9)   • Aortic valve stenosis   • S/P carotid endarterectomy   • Coronary artery disease involving native coronary artery   • Bilateral carotid artery stenosis   • Acute renal failure superimposed on chronic kidney disease (HCC)   • Stage 3b chronic kidney disease ([facility] )   • Rambo lesion, chronic   • Paraesophageal hernia   • Atrial flutter, unspecified type ([facility] )   • Abscess of right leg excluding foot   • Depression   • Tylenol ingestion   • Peripheral artery disease ([facility] )       Past Surgical History:   Procedure Laterality Date   • ANKLE SURGERY Right    • CARDIAC CATHETERIZATION      no findings   • COLONOSCOPY  01/25/2013    polypectomy; complete - 3 yrs d/t polyp - benign submucosal lipoma- path c/w lipoma   • COLONOSCOPY  04/25/2017    complete - tubular adenoma - repeat 5yrs   • CYSTOSCOPY     • CYSTOTOMY      bladder  w/ basket extraction of calculus   • FEMUR FRACTURE SURGERY     • HERNIA REPAIR      inguinal ; sliding   • INGUINAL HERNIA REPAIR Right     unilateral   • KNEE ARTHROSCOPY Right     w/medial menisectomy   • LASIK      corneal   • LITHOTRIPSY      renal   • AL COLONOSCOPY FLX DX W/COLLJ SPEC WHEN PFRMD N/A 04/25/2017    Procedure: SCREENING COLONOSCOPY;  Surgeon: Jazzy Harrison MD;  Location:  MAIN OR;  Service: General   • AL TEAEC W/PATCH GRF CAROTID VERTB SUBCLAV NECK INC Right 01/10/2020    Procedure: ENDARTERECTOMY ARTERY CAROTID;  Surgeon: Remington Mae MD;  Location:  MAIN OR;  Service: Vascular   • ROTATOR CUFF REPAIR Bilateral    • TONSILLECTOMY         Family History   Problem Relation Age of Onset   • Alzheimer's disease Mother    • Alzheimer's disease Father    • Heart disease Father         CAD   • Other Father         prediabetes   • Diabetes Father    • Breast cancer Other    • Arthritis Family    • Hypertension Family        Social History     Socioeconomic History   • Marital status:       Spouse name: Not on file   • Number of children: 1   • Years of education: Not on file   • Highest education level: Not on file   Occupational History   • Occupation: Retired     Comment: working full time   Tobacco Use   • Smoking status: Former     Packs/day: 1 00     Years: 22 00     Pack years: 22 00     Types: Cigarettes     Start date: 0     Quit date: 1985     Years since quittin 3   • Smokeless tobacco: Never   Vaping Use   • Vaping Use: Never used   Substance and Sexual Activity   • Alcohol use: Not Currently     Comment: stopped drinking alcohol; AA x23yrs sober   • Drug use: No   • Sexual activity: Not Currently     Partners: Female   Other Topics Concern   • Not on file   Social History Narrative    , lives alone, working full time     Social Determinants of Health     Financial Resource Strain: Low Risk    • Difficulty of Paying Living Expenses: Not hard at all   Food Insecurity: Not on file   Transportation Needs: No Transportation Needs   • Lack of Transportation (Medical): No   • Lack of Transportation (Non-Medical):  No   Physical Activity: Not on file   Stress: Not on file   Social Connections: Not on file   Intimate Partner Violence: Not on file   Housing Stability: Not on file       No Known Allergies

## 2023-04-25 NOTE — UTILIZATION REVIEW
"Initial Clinical Review    Elective Inpatient surgical procedure  Age/Sex: 66 y o  male  Surgery Date: 04/24/2023  Procedure: 1  Right common femoral, superficial femoral, and profunda femoris endarterectomy with patch angioplasty  2  Aortoiliac angiogram  3  Retrograde right external iliac artery angioplasty with 6x80mm balloon  4  Placement of 8x80mm Innova self expanding stent in the right external iliac artery, post-dilated with 8x80mm balloon  5  Completion right lower extremity angiogram  Anesthesia: General  Operative Findings:   Heavily calcified coral reef plaque in the common femoral artery extending to the profunda and SFA origins (near occlusive at the PFA/SFA origins)    POD#1 Progress Note (04/25/2023):   RLE Fem Endart c Patch Angioplasty, EIA PTA c SES  Continue neurovascular checks  DC A  Line  DC tejada  OOB as tolerated  Diet as tolerated  Continue ASA/Plavix & Statin  DC IV flds  Monitor & trend Cr (1 2)        Admission Orders: Date/Time/Statement:   Admission Orders (From admission, onward)     Ordered        04/24/23 1459  Inpatient Admission  Once                      Orders Placed This Encounter   Procedures   • Inpatient Admission     Standing Status:   Standing     Number of Occurrences:   1     Order Specific Question:   Level of Care     Answer:   Level 1 Stepdown [13]     Order Specific Question:   Estimated length of stay     Answer:   Inpatient Only Surgery     Vital Signs: /54   Pulse 60   Temp 98 2 °F (36 8 °C) (Oral)   Resp 15   Ht 5' 10\" (1 778 m)   Wt 90 2 kg (198 lb 13 7 oz)   SpO2 92%   BMI 28 53 kg/m²     Pertinent Labs/Diagnostic Test Results:   IR lower extremity angiogram    (Results Pending)   XR ankle 3+ vw right    (Results Pending)     Results from last 7 days   Lab Units 04/25/23  0534 04/24/23  1538   WBC Thousand/uL 7 69  --    HEMOGLOBIN g/dL 10 7*  --    HEMATOCRIT % 32 9*  --    PLATELETS Thousands/uL 135* 138*     Results from last 7 days   Lab " Units 04/25/23  0534   SODIUM mmol/L 139   POTASSIUM mmol/L 4 2   CHLORIDE mmol/L 106   CO2 mmol/L 28   ANION GAP mmol/L 5   BUN mg/dL 19   CREATININE mg/dL 1 23   EGFR ml/min/1 73sq m 55   CALCIUM mg/dL 8 1*     Results from last 7 days   Lab Units 04/25/23  0534   GLUCOSE RANDOM mg/dL 94     Results from last 7 days   Lab Units 04/24/23  0842   PROTIME seconds 15 1*   INR  1 19   PTT seconds 27     Diet: Regular  Mobility: OOB as tolerated  DVT Prophylaxis: Heparin / Magnus SCDs    Medications/Pain Control:   Scheduled Medications:  acetaminophen, 975 mg, Oral, Q6H [address]  ascorbic acid, 500 mg, Oral, Daily  aspirin, 81 mg, Oral, Every Other Day  atorvastatin, 40 mg, Oral, QPM  clopidogrel, 75 mg, Oral, Daily  escitalopram, 10 mg, Oral, Daily  ferrous sulfate, 325 mg, Oral, Daily With Breakfast  finasteride, 5 mg, Oral, Daily  gabapentin, 100 mg, Oral, TID  heparin 2000 units and papaverine 60 mg in isolyte equivalent 500 mL irrigation bag, , Irrigation, Once  heparin (porcine), 5,000 Units, Subcutaneous, Q8H [address]  metoprolol succinate, 25 mg, Oral, Daily  multivitamin stress formula, 1 tablet, Oral, Daily  pantoprazole, 40 mg, Oral, Early Morning  tamsulosin, 0 4 mg, Oral, Daily With Dinner  traZODone, 50 mg, Oral, HS      Continuous IV Infusions:  lactated ringers infusion  Rate: 75 mL/hr Dose: 75 mL/hr  Freq: Continuous Route: IV  Indications of Use: IV Hydration  Last Dose: Stopped (04/25/23 0700)  Start: 04/24/23 1730 End: 04/25/23 0629  sodium chloride 0 9 % infusion  Rate: 125 mL/hr Dose: 125 mL/hr  Freq: Continuous Route: IV  Indications of Use: IV Hydration  Last Dose: Stopped (04/25/23 0820)  Start: 04/24/23 0830 End: 04/24/23 1716  sodium chloride 0 9 % infusion  Rate: 100 mL/hr Dose: 100 mL/hr  Freq: Continuous Route: IV  Indications of Use: IV Hydration  Last Dose: Stopped (04/24/23 1421)  Start: 04/24/23 0830 End: 04/24/23 1716    PRN Meds:  oxyCODONE, 10 mg, Oral, Q4H PRN  X 1 dose 4/24  oxyCODONE, 5 mg, Oral, Q4H PRN x 1 dose 4/24        Network Utilization Review Department  ATTENTION: Please call with any questions or concerns to [phone] and carefully listen to the prompts so that you are directed to the right person  All voicemails are confidential   Barry Perry all requests for admission clinical reviews, approved or denied determinations and any other requests to dedicated fax number below belonging to the campus where the patient is receiving treatment   List of dedicated fax numbers for the Facilities:  [address] DENIALS (Administrative/Medical Necessity) [phone]   [address] (Maternity/NICU/Pediatrics) [phone]   [address] [phone]   [place] [address] [phone]   [address] [address] Bertin Garrick [facility] [phone]   1554 [facility] 815 [facility] [phone]

## 2023-04-25 NOTE — PHYSICAL THERAPY NOTE
PHYSICAL THERAPY EVALUATION          Patient Name: Ernesto Nunez    Today's Date: 4/25/2023  PT EVALUATION    66 y o     [id number]    PAD (peripheral artery disease) ([facility]) [I73 9]  Open wound of right ankle, subsequent encounter [S91 001D]    Past Medical History:   Diagnosis Date    Anemia     iron def    Arthritis     Cerebrovascular accident (CVA) ([facility] ) 01/07/2020    memory issues    Depression 03/29/2023    GERD (gastroesophageal reflux disease)     Gout     Hypertension     Insomnia          Past Surgical History:   Procedure Laterality Date    ANKLE SURGERY Right     CARDIAC CATHETERIZATION      no findings    COLONOSCOPY  01/25/2013    polypectomy; complete - 3 yrs d/t polyp - benign submucosal lipoma- path c/w lipoma    COLONOSCOPY  04/25/2017    complete - tubular adenoma - repeat 5yrs    CYSTOSCOPY      CYSTOTOMY      bladder  w/ basket extraction of calculus    FEMUR FRACTURE SURGERY      HERNIA REPAIR      inguinal ; sliding    INGUINAL HERNIA REPAIR Right     unilateral    KNEE ARTHROSCOPY Right     w/medial menisectomy    LASIK      corneal    LITHOTRIPSY      renal    GA COLONOSCOPY FLX DX W/COLLJ SPEC WHEN PFRMD N/A 04/25/2017    Procedure: SCREENING COLONOSCOPY;  Surgeon: Armando Duncan MD;  Location: [facility] MAIN OR;  Service: General    GA Bienvenido Arnav 3RD+ ORD SLCTV ABDL PEL/LXTR [facility] Right 4/24/2023    Procedure: ARTERIOGRAM;  Surgeon: Yokasta Lemus MD;  Location: [facility] Main OR;  Service: Vascular    GA TEAEC W/PATCH GRF CAROTID VERTB SUBCLAV NECK INC Right 01/10/2020    Procedure: ENDARTERECTOMY ARTERY CAROTID;  Surgeon: Rachelle Martini MD;  Location: [facility] MAIN OR;  Service: Vascular    GA TEAEC W/WO PATCH GRAFT COMMON FEMORAL Right 4/24/2023    Procedure: ENDARTERECTOMY ARTERIAL FEMORAL, RETROGRADE ILIAC INTERVENTION;  Surgeon: Yokasta Lemus MD;  Location: [facility] OR;  Service: Vascular    ROTATOR CUFF REPAIR Bilateral     TONSILLECTOMY          04/25/23 0923   PT Last Visit   PT Visit Date "04/25/23   Note Type   Note type Evaluation   Pain Assessment   Pain Assessment Tool 0-10   Pain Score No Pain   Restrictions/Precautions   Other Precautions Fall Risk   Home Living   Type of [address] One level;Stairs to enter without rails   Bathroom Shower/Tub Tub/shower unit   [employer] Standard   Additional Comments 2 ROHAN   Prior Function   Level of [place] Independent with ADLs; Independent with functional mobility; Independent with IADLS   Lives With Alone   Receives Help From Friend(s)   IADLs Independent with driving; Independent with meal prep; Independent with medication management   Falls in the last 6 months 0   Vocational Retired   Comments indep at baseline without an AD  General   Additional Pertinent History pt admitted 4/24, now POD#1 RLE Fem Endart c Patch Angioplasty, EIA PTA c SES  \"oob as tolerated\" per vasc progress note  PMHx significant for arthritis, CVA, depression, gout   Cognition   Overall Cognitive Status WFL   Arousal/Participation Cooperative   Orientation Level Oriented X4   Memory Within functional limits   Following Commands Follows all commands and directions without difficulty   RLE Assessment   RLE Assessment WFL   LLE Assessment   LLE Assessment WFL   Coordination   Sensation WFL  (acutely numb around proximal incision)   Transfers   Sit to Stand 6  Modified independent   Additional items Armrests   Stand to Sit 6  Modified independent   Additional items Armrests; Increased time required   Ambulation/Elevation   Gait pattern WNL; Wide LATIA   Gait Assistance 5  Supervision   Additional items Assist x 1   Assistive Device None   Distance 180'   Balance   Static Standing Fair   Dynamic Standing Fair -   Ambulatory Fair -   Endurance Deficit   Endurance Deficit No   Activity Tolerance   Activity Tolerance Patient tolerated treatment well   Medical Staff Made Aware Yesika OT   Nurse Made Aware Carole RN   Assessment   Prognosis Good   Assessment Shelva  " Gypsy Kay  is a 66 y o  male admitted to [address] on 4/24/2023 for <principal problem not specified>  POD#1 RLE Fem Endart c Patch Angioplasty, EIA PTA c SES  PT was consulted and pt was seen on 4/25/2023 for mobility assessment and d/c planning  Pt presents w readmission, high fall risk  At baseline is indep for ADLs, IADLs and ambulation without an AD  Pt is currently functioning at a modified independent assistance level for transfers, supervision for ambulation  Pt demonstrated no significant deficits of BLE strength, balance or endurance  Currently ambulating limited community distances without difficulty  Denies concerns w d/c home at current [facility]  States strong support from [address]  At this time PT recommendations for d/c are home when medically stable  Barriers to Discharge None   Plan   PT Frequency   (d/c PT)   Recommendation   PT Discharge Recommendation No rehabilitation needs   AM-PAC Basic Mobility Inpatient   Turning in Flat Bed Without Bedrails 4   Lying on Back to Sitting on Edge of Flat Bed Without Bedrails 4   Moving Bed to Chair 4   Standing Up From Chair Using Arms 4   Walk in Room 3   Climb 3-5 Stairs With Railing 3   Basic Mobility Inpatient Raw Score 22   Basic Mobility Standardized Score 47 4   Highest Level Of Mobility   JH-HLM Goal 7: Walk 25 feet or more   JH-HLM Achieved 7: Walk 25 feet or more   End of Consult   Patient Position at End of Consult Bedside chair; All needs within reach     History: co - morbidities, fall risk  Exam: impairments in systems including musculoskeletal (strength), neuromuscular (balance, gait, transfers, motor function and sensation), am-pac, cognition  Clinical: stable/unpredictable  Complexity: moderate      Drew Nicholson, PT

## 2023-04-25 NOTE — PROGRESS NOTES
Pastoral Care Progress Note    2023  Patient: Edwina Degroot  : 1945  Admission Date & Time: 2023 0810  MRN: [id number] [facility]: [id number]    Patient in [address] by his room provided pastoral care presence, patient doing well will be transfer to regular room

## 2023-04-25 NOTE — PLAN OF CARE
Problem: MOBILITY - ADULT  Goal: Maintain or return to baseline ADL function  Description: INTERVENTIONS:  -  Assess patient's ability to carry out ADLs; assess patient's baseline for ADL function and identify physical deficits which impact ability to perform ADLs (bathing, care of mouth/teeth, toileting, grooming, dressing, etc )  - Assess/evaluate cause of self-care deficits   - Assess range of motion  - Assess patient's mobility; develop plan if impaired  - Assess patient's need for assistive devices and provide as appropriate  - Encourage maximum independence but intervene and supervise when necessary  - Involve family in performance of ADLs  - Assess for home care needs following discharge   - Consider OT consult to assist with ADL evaluation and planning for discharge  - Provide patient education as appropriate  Outcome: Progressing  Goal: Maintains/Returns to pre admission functional level  Description: INTERVENTIONS:  - Perform BMAT or MOVE assessment daily    - Set and communicate daily mobility goal to care team and patient/family/caregiver  - Collaborate with rehabilitation services on mobility goals if consulted  - Perform Range of Motion 4 times a day  - Reposition patient every 2 hours    - Dangle patient 3  - Stand patient 3 times a day  - Ambulate patient 3 times a day  - Out of bed to chair 3 times a day   - Out of bed for meals 3 times a day  - Out of bed for toileting  - Record patient progress and toleration of activity level   Outcome: Progressing     Problem: INFECTION - ADULT  Goal: Absence of fever/infection during neutropenic period  Description: INTERVENTIONS:  - Monitor WBC    Outcome: Progressing     Problem: SAFETY ADULT  Goal: Maintain or return to baseline ADL function  Description: INTERVENTIONS:  -  Assess patient's ability to carry out ADLs; assess patient's baseline for ADL function and identify physical deficits which impact ability to perform ADLs (bathing, care of mouth/teeth, toileting, grooming, dressing, etc )  - Assess/evaluate cause of self-care deficits   - Assess range of motion  - Assess patient's mobility; develop plan if impaired  - Assess patient's need for assistive devices and provide as appropriate  - Encourage maximum independence but intervene and supervise when necessary  - Involve family in performance of ADLs  - Assess for home care needs following discharge   - Consider OT consult to assist with ADL evaluation and planning for discharge  - Provide patient education as appropriate  Outcome: Progressing  Goal: Maintains/Returns to pre admission functional level  Description: INTERVENTIONS:  - Perform BMAT or MOVE assessment daily    - Set and communicate daily mobility goal to care team and patient/family/caregiver  - Collaborate with rehabilitation services on mobility goals if consulted  - Perform Range of Motion 4 times a day  - Reposition patient every 2 hours    - Dangle patient 3  - Stand patient 3 times a day  - Ambulate patient 3 times a day  - Out of bed to chair 3 times a day   - Out of bed for meals 3 times a day  - Out of bed for toileting  - Record patient progress and toleration of activity level   Outcome: Progressing

## 2023-04-25 NOTE — ASSESSMENT & PLAN NOTE
Lab Results   Component Value Date    EGFR 55 04/25/2023    EGFR 49 04/17/2023    EGFR 36 04/03/2023    CREATININE 1 23 04/25/2023    CREATININE 1 35 (H) 04/17/2023    CREATININE 1 73 (H) 04/03/2023   · Monitor kidney indices  · Avoid nephrotoxic drugs  · Neurology consulted

## 2023-04-25 NOTE — CONSULTS
Consultation - Nephrology   Yolanda Madrid  66 y o  male MRN: [id number]  Unit/Bed#: ICU 04 Encounter: [id number]    ASSESSMENT AND PLAN:  Patient is 66-year-old male with significant medical issues of CKD stage III, hypertension, PAD, history of nephrolithiasis, CAD, history of gout, presented for right lower extremity vascular procedure  We are consulted for postop CKD management  CKD stage III, baseline creatinine 1 4-1 6, follows with Dr Bertram Peace  -Creatinine 1 2 today below baseline  -Status post IV contrast with angiogram on 4/24/2023  Monitor for AUGUSTIN  -If patient gets discharged, would recommend repeat BMP on 4/27/2023   -Status post IV fluid yesterday now remains off  -Continue to hold lisinopril now and on discharge given low normal BP readings  Currently holding triamterene/HCTZ today, this can be restarted upon discharge from tomorrow as long as BP remains greater than 140/90   -CKD suspect in the setting of underlying hypertension, age-related nephron loss, prior episode of HELIO  -Continue follow-up as outpatient with nephrology    Hypertension  -Outpatient regimen includes lisinopril 10 mg daily, Toprol-XL 25 mg daily, triamterene/HCTZ 75/50 mg daily  -Blood pressure overall has much improved and occasional low normal readings noted  -Continue to hold lisinopril, triamterene/HCTZ  -Currently on Toprol-XL  -Given currently low normal BP readings, continue to hold lisinopril on discharge  May restart triamterene/HCTZ from tomorrow upon discharge as long as BP remains greater than 140/90    PVD, status post right common femoral, superficial femoral, profunda femoris endarterectomy with patch angioplasty, aortoiliac angiogram, status post angioplasty with completion right lower extremity angiogram on 4/24/2023   -Vascular surgery follow-up    Discussed above plan in detail with primary team and they agree with above recommendations        HISTORY OF PRESENT ILLNESS:  Requesting Physician: Linda Jovel Chandrakant Haider MD  Reason for Consult: CKD    Mathew Vizcaino  is a 66y o  year old male who was admitted to [place] [address] after presenting with elective right lower extremity procedure  A renal consultation is requested today for assistance in the management of CKD  Old medical records including prior levels were reviewed from [place] [address] records  Patient's baseline serum creatinine seems to be 1 4-1 6  Patient presented for right lower extremity angiogram, angioplasty and endarterectomy  Status post vascular procedure as mentioned in detail above yesterday  During my encounter, patient denies any chest pain, shortness of breath  Denies any nausea, vomiting or urinary complaint  His outpatient regimen includes lisinopril, triamterene/HCTZ which are currently being held  More recent BP readings are low normal noted  He denies any lightheadedness or dizziness      PAST MEDICAL HISTORY:  Past Medical History:   Diagnosis Date   • Anemia     iron def   • Arthritis    • Cerebrovascular accident (CVA) ([facility] ) 01/07/2020    memory issues   • Depression 03/29/2023   • GERD (gastroesophageal reflux disease)    • Gout    • Hypertension    • Insomnia        PAST SURGICAL HISTORY:  Past Surgical History:   Procedure Laterality Date   • ANKLE SURGERY Right    • CARDIAC CATHETERIZATION      no findings   • COLONOSCOPY  01/25/2013    polypectomy; complete - 3 yrs d/t polyp - benign submucosal lipoma- path c/w lipoma   • COLONOSCOPY  04/25/2017    complete - tubular adenoma - repeat 5yrs   • CYSTOSCOPY     • CYSTOTOMY      bladder  w/ basket extraction of calculus   • FEMUR FRACTURE SURGERY     • HERNIA REPAIR      inguinal ; sliding   • INGUINAL HERNIA REPAIR Right     unilateral   • KNEE ARTHROSCOPY Right     w/medial menisectomy   • LASIK      corneal   • LITHOTRIPSY      renal   • NM COLONOSCOPY FLX DX W/COLLJ SPEC WHEN PFRMD N/A 04/25/2017    Procedure: SCREENING COLONOSCOPY;  Surgeon: Adrianne Dumont MD;  Location: [facility] OR;  Service: General   • HI TEAEC W/PATCH GRF CAROTID VERTB SUBCLAV NECK INC Right 01/10/2020    Procedure: ENDARTERECTOMY ARTERY CAROTID;  Surgeon: Marcy Mccormack MD;  Location:  MAIN OR;  Service: Vascular   • ROTATOR CUFF REPAIR Bilateral    • TONSILLECTOMY         ALLERGIES:  No Known Allergies    SOCIAL HISTORY:  Social History     Substance and Sexual Activity   Alcohol Use Not Currently    Comment: stopped drinking alcohol; AA x23yrs sober     Social History     Substance and Sexual Activity   Drug Use No     Social History     Tobacco Use   Smoking Status Former   • Packs/day:    • Years:    • Pack years:    • Types: Cigarettes   • Start date: 0   • Quit date: 1985   • Years since quittin 3   Smokeless Tobacco Never       FAMILY HISTORY:  Family History   Problem Relation Age of Onset   • Alzheimer's disease Mother    • Alzheimer's disease Father    • Heart disease Father         CAD   • Other Father         prediabetes   • Diabetes Father    • Breast cancer Other    • Arthritis Family    • Hypertension Family        MEDICATIONS:    Current Facility-Administered Medications:   •  acetaminophen (TYLENOL) tablet 975 mg, 975 mg, Oral, Q6H [address], Polo Archuleta DO, 975 mg at 23 0602  •  ascorbic acid (VITAMIN C) tablet 500 mg, 500 mg, Oral, Daily, Polo Archuleta DO, 500 mg at 23 0426  •  aspirin (ECOTRIN LOW STRENGTH) EC tablet 81 mg, 81 mg, Oral, Every Other Day, Polo Archuleta DO, 81 mg at 23 0818  •  atorvastatin (LIPITOR) tablet 40 mg, 40 mg, Oral, QPM, Romero Bass DO, 40 mg at 23 1754  •  clopidogrel (PLAVIX) tablet 75 mg, 75 mg, Oral, Daily, Polo Archuleta DO, 75 mg at 23 0818  •  escitalopram (LEXAPRO) tablet 10 mg, 10 mg, Oral, Daily, Polo Archuleta DO, 10 mg at 23 7769  •  ferrous sulfate tablet 325 mg, 325 mg, Oral, Daily With Breakfast, Polo Archuleta DO, 325 mg at 23 8771  •  finasteride (PROSCAR) tablet 5 mg, 5 mg, Oral, Daily, Mackenzie Givens, DO, 5 mg at 04/25/23 9287  •  gabapentin (NEURONTIN) capsule 100 mg, 100 mg, Oral, TID, Mackenzie Givens, DO, 100 mg at 04/25/23 0818  •  heparin (porcine) 2,000 Units, papaverine 60 mg in multi-electrolyte (PLASMALYTE-A/ISOLYTE-S PH 7 4) 500 mL irrigation, , Irrigation, Once, Kenrick Sena MD  •  heparin (porcine) subcutaneous injection 5,000 Units, 5,000 Units, Subcutaneous, Q8H [facility], 5,000 Units at 04/25/23 0602 **AND** [COMPLETED] Platelet count, , , Once, Mackenzie Givens, DO  •  metoprolol succinate (TOPROL-XL) 24 hr tablet 25 mg, 25 mg, Oral, Daily, Mackenzie Givens, DO, 25 mg at 04/25/23 3919  •  multivitamin stress formula tablet 1 tablet, 1 tablet, Oral, Daily, Mackenzie Givens, DO, 1 tablet at 04/25/23 9753  •  oxyCODONE (ROXICODONE) IR tablet 10 mg, 10 mg, Oral, Q4H PRN, Mackenzie Givens, DO, 10 mg at 04/24/23 1651  •  oxyCODONE (ROXICODONE) IR tablet 5 mg, 5 mg, Oral, Q4H PRN, Mackenzie Givens, DO, 5 mg at 04/24/23 1906  •  pantoprazole (PROTONIX) EC tablet 40 mg, 40 mg, Oral, Early Morning, Mackenzie Givens, DO, 40 mg at 04/25/23 0602  •  tamsulosin (FLOMAX) capsule 0 4 mg, 0 4 mg, Oral, Daily With Babita Bass, DO, 0 4 mg at 04/24/23 1651  •  traZODone (DESYREL) tablet 50 mg, 50 mg, Oral, HS, Mackenzie Givens, DO, 50 mg at 04/24/23 2116    REVIEW OF SYSTEMS:  Complete 10 point review of systems were obtained and discussed in length with the patient  Complete review of systems were negative / unremarkable except mentioned in the HPI section       PHYSICAL EXAM:  Current Weight: Weight - Scale: 90 2 kg (198 lb 13 7 oz)  First Weight: Weight - Scale: 93 4 kg (206 lb)  Vitals:    04/25/23 0800   BP:    Pulse: 60   Resp: 15   Temp:    SpO2: 92%       Intake/Output Summary (Last 24 hours) at 4/25/2023 0906  Last data filed at 4/25/2023 0800  Gross per 24 hour   Intake 3532 92 ml   Output 1750 ml   Net 1782 92 "ml     Wt Readings from Last 3 Encounters:   04/24/23 90 2 kg (198 lb 13 7 oz)   04/19/23 93 9 kg (207 lb)   04/10/23 93 8 kg (206 lb 12 8 oz)     Temp Readings from Last 3 Encounters:   04/25/23 98 2 °F (36 8 °C) (Oral)   04/23/23 (!) 97 3 °F (36 3 °C) (Temporal)   04/21/23 98 3 °F (36 8 °C)     BP Readings from Last 3 Encounters:   04/25/23 113/54   04/23/23 130/62   04/21/23 130/70     Pulse Readings from Last 3 Encounters:   04/25/23 60   04/23/23 83   04/21/23 70        Physical Examination:  Eyes: No conjunctival pallor present  ENT: External examination of ear and nose unremarkable  Neck: No obvious lymphadenopathy appreciated  Respiratory: Bilateral air entry present  CVS: No significant edema in legs  GI: Soft, nondistended  CNS: Active, alert, oriented x3  Psych: Conscious,  oriented  Skin: No new rash, right lower extremity dressing present  Musculoskeletal: No obvious new gross deformity noted    Invasive Devices:      Lab Results:   Results from last 7 days   Lab Units 04/25/23  0534 04/24/23  1538   WBC Thousand/uL 7 69  --    HEMOGLOBIN g/dL 10 7*  --    HEMATOCRIT % 32 9*  --    PLATELETS Thousands/uL 135* 138*   POTASSIUM mmol/L 4 2  --    CHLORIDE mmol/L 106  --    CO2 mmol/L 28  --    BUN mg/dL 19  --    CREATININE mg/dL 1 23  --    CALCIUM mg/dL 8 1*  --        Other Studies:   IR lower extremity angiogram    (Results Pending)   XR ankle 3+ vw right    (Results Pending)   Recent CT angiogram shows mild right and moderate left renal artery origin atherosclerotic changes with patent vessels  Portions of the record may have been created with voice recognition software  Occasional wrong word or \"sound a like\" substitutions may have occurred due to the inherent limitations of voice recognition software  Read the chart carefully and recognize, using context, where substitutions have occurred      "

## 2023-04-25 NOTE — CASE MANAGEMENT
Case Management Discharge Planning Note    Patient name Nathalia Stark    Location ICU 04/ICU 04 MRN [id number]  : 1945 Date 2023       Current Admission Date: 2023  Current Admission Diagnosis:Abscess of right leg excluding foot   Patient Active Problem List    Diagnosis Date Noted   • Peripheral artery disease ([place] [address] ) 2023   • Depression 2023   • Tylenol ingestion 2023   • Abscess of right leg excluding foot 2023   • Atrial flutter, unspecified type ([place] [address] ) 2021   • Rambo lesion, chronic 2021   • Paraesophageal hernia 2021   • Acute renal failure superimposed on chronic kidney disease ([place] [address] ) 12/15/2020   • Stage 3b chronic kidney disease ([place] [address] ) 12/15/2020   • Bilateral carotid artery stenosis 2020   • Coronary artery disease involving native coronary artery 2020   • S/P carotid endarterectomy 01/10/2020   • Aortic valve stenosis 2020   • Elevated PSA 2018   • Obesity (BMI 30 0-34 9) 2018   • Nephrolithiasis 2018   • Prolonged QT interval 2017   • Iron deficiency anemia due to chronic blood loss 2017   • Osteoarthritis 2017   • Rhinitis 2016   • Impaired fasting glucose 10/08/2015   • Insomnia 2015   • Gout 2013   • Tubular adenoma of colon 2013   • Carpal tunnel syndrome 2012   • Other disorder of calcium metabolism 2012   • Dyslipidemia 2012   • Hypertension 2012      LOS (days): 1  Geometric Mean LOS (GMLOS) (days): 3 90  Days to GMLOS:3 1     OBJECTIVE:  Risk of Unplanned Readmission Score: 17 95         Current admission status: Inpatient   Preferred Pharmacy:   [address] [place] [address] [id number]  [address] [facility] [address]  Phone: [phone] Fax: [phone] [facility], [address]  [address] [place] [address]  Phone: [phone] Fax: [phone]    [facility] Provider: Eufemia Rendon DO    Primary [facility]: [facility]  Secondary Insurance:     DISCHARGE DETAILS:                                          Other Referral/Resources/Interventions Provided:  Interventions: HHC                                                      Additional Comments: [facility] sent to [facility] for [facility] woundcare  [facility] reserved  No rehab needs per PT/OT

## 2023-04-25 NOTE — ASSESSMENT & PLAN NOTE
· Status post right common femoral, superficial femoral, and profunda femoris endarterectomy with patch angioplasty and stenting to R   EIA  · Downgrade to MS under vascular surgery  · NV checks to q4, b/l LE warm w/ dopplar signals   · Pt ambulating and tolerating diet this AM without issues  · Continue Plavix and aspirin

## 2023-04-25 NOTE — PROGRESS NOTES
Vascular Nurse Navigator Post Op Education    Met with patient at bedside to introduce myself as Vascular Nurse Navigator and explained my role  Patient is appropriate and accepting to education  Patient was educated with Review of written materials provided, Teachback, Explanation, Demonstration and Question & Answer on expectations of post op care and recovery on RLE Fem Endart c Patch Angioplasty, EIA PTA c SES  Patient is a former smoker, quit 38 years ago, as such Smoking effects on the lungs, tobacco triggers and Smoking cessation was reviewed  Education provided to patient on infection prevention, activity limitations, when to call the office, importance of follow up, and incisional care  Discharge instruction handout provided to patient to review  Provided patient with a pack of disposable washcloths, a pack of guaze, and a bottle of chlorhexidine for incision care upon discharge

## 2023-04-25 NOTE — QUICK NOTE
Post op check: Vascular surgery    Assessment   Patient is a 66 y o  male status post right CFA, SFA, and profunda endarterectomy with retrograde external iliac balloon angioplasty and stenting  Plan:  - Regular diet  - Frequent neurovascular checks  - Pain and nausea control as needed  - Aspirin/Plavix/statin  - Rest of care per ICU    Subjective: Pt feeling well  Pain is well controlled  Denies fevers/chills, chest pain, sob  Tolerating diet  Making adequate urine output via [place]    Objective:  @VITALS    No intake/output data recorded      Scheduled Meds:  Current Facility-Administered Medications   Medication Dose Route Frequency Provider Last Rate   • acetaminophen  975 mg Oral Q6H [facility] Jose Raul Valenzuela, DO     • ascorbic acid  500 mg Oral Daily Jose Raul Valenzuela, DO     • aspirin  81 mg Oral Every Other Day Jose Raul Valenzuela, DO     • atorvastatin  40 mg Oral QPM Jose Raul Valenzuela, DO     • clopidogrel  75 mg Oral Daily Jose Raul Valenzuela, DO     • escitalopram  10 mg Oral Daily Jose Raul Valenzuela, DO     • ferrous sulfate  325 mg Oral Daily With Breakfast Jose Raul Valenzuela, DO     • finasteride  5 mg Oral Daily Jose Raul Valenzuela, DO     • gabapentin  100 mg Oral TID Jose Raul Valenzuela, DO     • heparin 2000 units and papaverine 60 mg in isolyte equivalent 500 mL irrigation bag   Irrigation Once Jennifer Back MD     • heparin (porcine)  5,000 Units Subcutaneous [facility] Jose Raul Valenzuela, DO     • lactated ringers  75 mL/hr Intravenous Continuous Charliene Laos, CRNP 75 mL/hr (04/24/23 1725)   • metoprolol succinate  25 mg Oral Daily Jose Raul Valenzuela, DO     • multivitamin stress formula  1 tablet Oral Daily Jose Raul Valenzuela DO     • oxyCODONE  10 mg Oral Q4H PRN Jose Raul Valenzuela DO     • oxyCODONE  5 mg Oral Q4H PRN Jose Raul Valenzuela DO     • pantoprazole  40 mg Oral Early Morning Jose Raul Valenzuela, DO     • tamsulosin  0 4 mg Oral Daily With [address], DO     • traZODone  50 mg Oral HS Yonis Ordonez DO       Continuous Infusions:lactated ringers, 75 mL/hr, Last Rate: 75 mL/hr (04/24/23 7065)      PRN Meds: •  oxyCODONE  •  oxyCODONE      Physical Exam   Gen:  Well-developed, male in NAD  HEENT: EOMI  CV: RRR,   Lungs: Normal respiratory effort  Abd: soft, nontender, nondistended  EXT: Right groin incision cleanly dressed with Mepilex, no underlying pulsatile mass, right lower extremity grossly motor or sensory intact, palpable PT, normal capillary refill  Neuro:  AxO x3      Leisa Fenton MD

## 2023-04-25 NOTE — PROGRESS NOTES
04/25/23 1300   Clinical Encounter Type   Visited With Patient; Health care provider   Routine Visit Introduction

## 2023-04-25 NOTE — CONSULTS
Consultation - Geriatrics   Shasta Law  66 y o  male MRN: [id number]  Unit/Bed#: ICU 04 Encounter: [id number]      Assessment/Plan    Cognitive Screening   · Patient notes that he has some difficulty remembering names but notes no other cognitive issues at baseline   · Most recent TSH on 3/29/2023 noted to be 1 721  · No vitamin B 12 level noted in epic  · Would recommend checking on routine labs   · MRI of the brain on 1/3/2020 revealed mild microangiopathic changes   · No MoCA noted in epic  · Maintain delirium precautions as discussed below   · Redirect and reorient as needed  · Keep physically, mentally, and socially active     Delirium   • Baseline mentation: alert and oriented x 4  • Current mentation: alert and oriented x 4  • Patient is at high risk secondary to age, anesthesia, acute pain, and hospitalization   • Maintain delirium precautions   · Provide redirection, reorientation, and distraction techniques  · Maintain fall and safety precautions   · Assist with ADLs/IADLs  · Avoid deliriogenic medications such as tramadol, benzodiazepines, anticholinergics, benadryl  · Treat pain using geriatric pain protocol   · Encourage oral hydration and nutrition   · Monitor for constipation and urinary retention   · Implement sleep hygiene and limit night time interuptions   · Maintain sleep-wake cycle   · Encourage early and frequent mobilization   · Encourage participation in group activities  • Most recent EKG on 3/29/2023 revealed a QTc interval of 414  · If all other interventions are unsuccessful for acute agitation and behaviors, would recommend Zyprexa 2 5 mg IM Q 8 hours prn   • Would avoid benzodiazepines such as Ativan as these can worsen delirium     Deconditioning   • Baseline function: independent with ADLs and IADLs  • Patient is at increased risk for deconditioning secondary to status post right common femoral, superficial femoral, and profunda femoris endarterectomy with patch angioplasty and hospitalization  • Continue to optimize diet, hydration, and mobility for healing   · GFR 55 on labs today  · Avoid nephrotoxic medication and renal dose medication as needed  · Keep hydrated  • Monitor for signs and symptoms of infection, dehydration, DVT, and skin breakdown    Frailty   Clinical Frail Scale: 4- Vulnerable  · Not dependent for daily help, symptoms limit activity  · Feeling slowed up, tired during the day  • Most recent albumin on 3/29/2023 noted to be 4 0  • Consider nutrition consult  • Encourage protein supplementation     Ambulatory Dysfunction/Falls  • Patient denies any recent falls at home  • Does not ambulate with any assistive device at baseline  • Assess patient frequently for physical needs, encourage use of assistant devices as needed and directed by PT/OT  • Identify cognitive and physical deficits and behaviors that affect risk of falls  • Consider moving patient closer to nursing station to monitor more closely for impulsive behavior which may increase risk of falls  • [place] fall and safety precautions   • Educate patient/family on patient safety including physical limitations and importance of using call bell for assistance   • Modify environment to reduce risk of injury including disconnecting from pole when not in use, ensuring adequate lighting in room and restroom, ensuring that path to restroom is clear and free of trip hazards  • PT/OT consulted to assist with strengthening/mobility and assist with discharge planning to appropriate level of care  • Out of bed as tolerated    Impaired Vision   • Patient denies vision impairment  • Encourage adequate lighting and encourage use of assistance with ambulation  • Keep personal belongings close to avoid reaching  • Encourage appropriate footwear at all times  • Recommend large font for printed materials provided to patient    Impaired Hearing   • Patient denies hearing impairment  • Hearing impairment strongly correlated with depression, cognitive impairment, delirium and falls in the older adult  • Use sound amplifier as needed  • Speak face to face  • Use clear dictation and enunciation of words    Dentition/Appetite   • Patient does not have dentures  • Patient notes he has a good appetite at baseline  • Ensure meal consistency is appropriate for all abilities   • Consider nutrition consult   • Continue aspiration precautions     Elimination   • Patient is continent of bowel and bladder at baseline  • He denies any voiding difficulties   • He notes that he takes metamucil at baseline for constipation   • No bowel movement documented since admission   · Patient notes he did have a bowel movement yesterday morning   • Patient is not currently on a bowel regimen   · Will order Metamucil daily   • Monitor for constipation and urinary retention     Insomnia   • Patient notes that he does have some trouble sleeping at baseline  • He states that he takes melatonin and Tylenol at bedtime  • He is also ordered trazodone 50 mg daily at bedtime  • Will order melatonin 6 mg daily as needed at bedtime  • First line is behavioral therapy   • Avoid sedative hypnotics including benzodiazepines and benadryl  • Encourage staying awake during the day   • Encourage daytime activities and morning exercise   • Decrease or eliminate daytime naps   • Avoid caffeine especially during late afternoon and evening hours  • Establish a nighttime routine  • Implement sleep hygiene and limit nighttime interruptions  • Can consider melatonin 3 mg daily at bedtime for sleep if needed     Anxiety/Depression  • Patient notes that he does have a history of anxiety and depression  • He states that he is taking medication for this but he is unsure what medication he is taking  • Current medication regimen includes  · Trazodone 50 mg daily at bedtime  · Lexapro 10 mg daily   · Patient notes that he is doing well on his current regimen  · Mood appears stable on exam today  • Continue supportive care     Peripheral Arterial Disease  · Initially seen in consult with vascular surgery on 4/4/2023 for peripheral arterial disease  · He was noted to have recurrent right ankle wounds  · A lower extremity arterial duplex revealed the following  · Right: 50 to 75% CFA stenosis, FARRAH 0 73 with GTP 44  · Left: 50 to 75% CFA stenosis, FARRAH unreliable GTP 62  · A CTA abdominal with runoff on 3/31/2023 revealed diffuse aortoiliac disease  · He was noted to have calcified bulky segment in the mid external iliac artery causing stenosis on the right and CFA with high-grade stenosis  · Given his nonhealing right lower extremity wounds it was recommended that he undergo a right common femoral endarterectomy with retrograde iliac intervention  · Patient is now postop day 1 for a right common femoral, superficial femoral, and profunda femoris endarterectomy with patch angioplasty  · Patient is currently denying pain on exam today  · Right groin dressing is clean dry and intact  · Has been out of bed ambulating without issue  · Remains on SQ heparin for DVT prophylaxis  · He also remains on a statin, aspirin, and Plavix  · Management per vascular surgery    Home Safety  · Patient lives at home alone  · He states that he manages cooking primarily using the microwave  · He notes that he does have someone who comes to the house to help with cleaning  · He states that he manages his finances and medication  · He notes that he does still drive    Advanced Care Planning  · Prior   · Discussed CODE STATUS with patient and patient initially states he would like CPR with no intubation but then notes that he has a living will that states no CPR or intubation   · Clarified patients wishes and he notes he would not like CPR or intubation   · Notified Roanne Harada with vascular surgery via tiger text and recommended that they also have a CODE STATUS discussion to ensure he wants to be a level 3 DNR     Home Medication Review   Charter Eric Smith ([phone])  · Escitalopram 10 mg daily at bedtime  · Lisinopril 10 mg daily   · Santyl ointment daily   · Gabapentin 100 mg TID   · Trazodone 50 mg daily at bedtime  · Maxidex 0 1% eye drops (1 drop both eyes daily)  · Omeprazole 20 mg daily   · Tamsulosin 0 4 mg daily   · Triamterene-HCTZ 75-50 mg daily   · Finasteride 5 mg daily   · Atorvastatin 40 mg daily   · Metoprolol ER 25 mg daily     I have personally reviewed this medication list with the patients pharmacy listed above  History of Present Illness   Physician Requesting Consult: Ana Chiang MD  Reason for Consult / Principal Problem: Geriatric Assessment   Hx and PE limited by: N/A    HPI: Carlos Eduardo Potter  is a 66y o  year old male who has CKD, PAD, hypertension, dyslipidemia, gout, depression, and insomnia who presented to the hospital for an elective endarterectomy  The patient was initially evaluated by vascular surgery on 4/4/2023  A lower extremity arterial duplex revealed 50 to 75% CFA stenosis, FARRAH 0 73 with GTP of 44 on the right and 50 to 75% CFA reliable GTP 62 on the left  A CTA with runoff revealed diffuse aortoiliac disease  On the right, he had calcified bulky segment in the mid external iliac artery causing stenosis  CFA was noted to have high-grade stenosis  Bilateral internal iliac arteries were also noted to have significant calcified disease burden at the origin but patent  Given his persistent nonhealing right lower extremity wounds it was recommended that he undergo a right common femoral endarterectomy with retrograde iliac intervention  The patient is now postop day 1 from a right common femoral, superficial femoral, and profunda femoris endarterectomy with patch angioplasty  There were no intraoperative or postoperative complications noted  He currently remains on frequent vascular checks    He had been ambulating with physical therapy today and was doing well  He has not been complaining of any pain  Geriatrics is being consulted for geriatric assessment  The patient lives at home alone independently  He states that he manages the cooking and he has someone who comes into the home to manage cleaning  He notes that he manages his finances and medication  He states that he does still drive  He notes that he does have some difficulty at times remembering names but notes no other difficulties with his memory  He notes no recent falls at home and he does not ambulate with any other assistive devices  He denies any vision or hearing impairment  He notes that he does not wear dentures and that he has a good appetite at baseline  He states that he is continent of bowel and bladder and he takes a daily metamucil for constipation  He notes daily bowel movements when taking metamucil daily  He notes that he did not sleep well last night  He states that he takes melatonin and tylenol at bedtime  He notes that he does have a history of anxiety/depression which he is being treated for  He notes his current regimen is working  The patient was seen and evaluated today at the bedside for geriatric consult  He is noted to be sitting up in the recliner chair in no acute distress  He is alert and oriented x 4 and offers no acute complaints on exam today  He is currently denying pain  He denies dizziness, headaches, and acute vision changes  He denies chest pain and shortness of breath  He denies nausea, vomiting, constipation, and diarrhea  He notes that his last bowel movement was yesterday morning  He states that he ate a good breakfast this morning  He notes that he did not sleep well last night  Care was coordinated with patients nurse Ree Moran and Marissa Bowden with vascular surgery  Ree Moran notes no acute issues or events overnight       Inpatient consult to Gerontology  Consult performed by: BLAS Castle  Consult ordered by: BLAS Soto        Review of Systems   Constitutional: Negative for activity change, appetite change, chills, fatigue and fever  Respiratory: Negative for cough and shortness of breath  Cardiovascular: Negative for chest pain and palpitations  Gastrointestinal: Negative for abdominal distention, abdominal pain, constipation, diarrhea, nausea and vomiting  Genitourinary: Negative for difficulty urinating and dysuria  Musculoskeletal: Negative for arthralgias, gait problem and myalgias  Skin: Negative for color change and pallor  Neurological: Negative for dizziness, weakness, numbness and headaches  Psychiatric/Behavioral: Negative for behavioral problems, confusion and sleep disturbance  The patient is not nervous/anxious          Historical Information   Past Medical History:   Diagnosis Date   • Anemia     iron def   • Arthritis    • Cerebrovascular accident (CVA) ([facility] [address] ) 01/07/2020    memory issues   • Depression 03/29/2023   • GERD (gastroesophageal reflux disease)    • Gout    • Hypertension    • Insomnia      Past Surgical History:   Procedure Laterality Date   • ANKLE SURGERY Right    • CARDIAC CATHETERIZATION      no findings   • COLONOSCOPY  01/25/2013    polypectomy; complete - 3 yrs d/t polyp - benign submucosal lipoma- path c/w lipoma   • COLONOSCOPY  04/25/2017    complete - tubular adenoma - repeat 5yrs   • CYSTOSCOPY     • CYSTOTOMY      bladder  w/ basket extraction of calculus   • FEMUR FRACTURE SURGERY     • HERNIA REPAIR      inguinal ; sliding   • INGUINAL HERNIA REPAIR Right     unilateral   • KNEE ARTHROSCOPY Right     w/medial menisectomy   • LASIK      corneal   • LITHOTRIPSY      renal   • ID COLONOSCOPY FLX DX W/COLLJ SPEC WHEN PFRMD N/A 04/25/2017    Procedure: SCREENING COLONOSCOPY;  Surgeon: Joycelyn Mortimer, MD;  Location: [facility];  Service: General   • ID Janessa Mantel 3RD+ ORD SLCTV ABDL PEL/LXTR [facility] Right 4/24/2023    Procedure: ARTERIOGRAM;  Surgeon: Ata Brown MD;  Location: [facility]; Service: Vascular   • DC TEAEC W/PATCH GRF CAROTID VERTB SUBCLAV NECK INC Right 01/10/2020    Procedure: ENDARTERECTOMY ARTERY CAROTID;  Surgeon: Joyce Taylor MD;  Location: [facility];  Service: Vascular   • DC TEAEC W/WO PATCH GRAFT COMMON FEMORAL Right 2023    Procedure: ENDARTERECTOMY ARTERIAL FEMORAL, RETROGRADE ILIAC INTERVENTION;  Surgeon: Cyndy Ramirez MD;  Location: [facility];  Service: Vascular   • ROTATOR CUFF REPAIR Bilateral    • TONSILLECTOMY       Social History   Social History     Substance and Sexual Activity   Alcohol Use Not Currently    Comment: stopped drinking alcohol; AA x23yrs sober     Social History     Substance and Sexual Activity   Drug Use No     Social History     Tobacco Use   Smoking Status Former   • Packs/day: 1 00   • Years:    • Pack years:    • Types: Cigarettes   • Start date: 0   • Quit date: 1985   • Years since quittin 3   Smokeless Tobacco Never     Family History:   Family History   Problem Relation Age of Onset   • Alzheimer's disease Mother    • Alzheimer's disease Father    • Heart disease Father         CAD   • Other Father         prediabetes   • Diabetes Father    • Breast cancer Other    • Arthritis Family    • Hypertension Family        Meds/Allergies   Current meds:   Current Facility-Administered Medications   Medication Dose Route Frequency   • acetaminophen (TYLENOL) tablet 975 mg  975 mg Oral Q6H [address]   • ascorbic acid (VITAMIN C) tablet 500 mg  500 mg Oral Daily   • aspirin (ECOTRIN LOW STRENGTH) EC tablet 81 mg  81 mg Oral Every Other Day   • atorvastatin (LIPITOR) tablet 40 mg  40 mg Oral QPM   • clopidogrel (PLAVIX) tablet 75 mg  75 mg Oral Daily   • escitalopram (LEXAPRO) tablet 10 mg  10 mg Oral Daily   • ferrous sulfate tablet 325 mg  325 mg Oral Daily With Breakfast   • finasteride (PROSCAR) tablet 5 mg  5 mg Oral Daily   • gabapentin (NEURONTIN) capsule 100 mg  100 mg Oral TID   • heparin (porcine) 2,000 Units, papaverine 60 "mg in multi-electrolyte (PLASMALYTE-A/ISOLYTE-S PH 7 4) 500 mL irrigation   Irrigation Once   • heparin (porcine) subcutaneous injection 5,000 Units  5,000 Units Subcutaneous Q8H [address]   • metoprolol succinate (TOPROL-XL) 24 hr tablet 25 mg  25 mg Oral Daily   • multivitamin stress formula tablet 1 tablet  1 tablet Oral Daily   • oxyCODONE (ROXICODONE) IR tablet 10 mg  10 mg Oral Q4H PRN   • oxyCODONE (ROXICODONE) IR tablet 5 mg  5 mg Oral Q4H PRN   • pantoprazole (PROTONIX) EC tablet 40 mg  40 mg Oral Early Morning   • tamsulosin (FLOMAX) capsule 0 4 mg  0 4 mg Oral Daily With Dinner   • traZODone (DESYREL) tablet 50 mg  50 mg Oral HS       No Known Allergies    Objective   Vitals: Blood pressure 113/54, pulse 60, temperature 98 2 °F (36 8 °C), temperature source Oral, resp  rate 15, height 5' 10\" (1 778 m), weight 90 2 kg (198 lb 13 7 oz), SpO2 92 %  ,Body mass index is 28 53 kg/m²  Physical Exam  Vitals and nursing note reviewed  Constitutional:       General: He is not in acute distress  Appearance: Normal appearance  He is not ill-appearing  HENT:      Head: Normocephalic  Mouth/Throat:      Mouth: Mucous membranes are moist    Eyes:      General: No scleral icterus  Conjunctiva/sclera: Conjunctivae normal    Cardiovascular:      Rate and Rhythm: Normal rate and regular rhythm  Pulmonary:      Effort: Pulmonary effort is normal  No respiratory distress  Abdominal:      General: Bowel sounds are normal  There is no distension  Palpations: Abdomen is soft  Tenderness: There is no abdominal tenderness  Musculoskeletal:         General: No swelling or tenderness  Skin:     General: Skin is warm and dry  Comments: R groin dressing clean, dry, and intact   Neurological:      General: No focal deficit present  Mental Status: He is alert and oriented to person, place, and time  Mental status is at baseline  Motor: No weakness        Gait: Gait normal          Lab " "Results:   Results from last 7 days   Lab Units 04/25/23  0534   WBC Thousand/uL 7 69   HEMOGLOBIN g/dL 10 7*   HEMATOCRIT % 32 9*   PLATELETS Thousands/uL 135*        Results from last 7 days   Lab Units 04/25/23  0534   POTASSIUM mmol/L 4 2   CHLORIDE mmol/L 106   CO2 mmol/L 28   BUN mg/dL 19   CREATININE mg/dL 1 23   CALCIUM mg/dL 8 1*       Imaging Studies: I have personally reviewed pertinent reports  EKG, Pathology, and Other Studies: I have personally reviewed pertinent reports  VTE Prophylaxis: Heparin    Code Status: Prior      Please note:  Voice-recognition software may have been used in the preparation of this document  Occasional wrong word or \"sound-alike\" substitutions may have occurred due to the inherent limitations of voice recognition software  Interpretation should be guided by context      "

## 2023-04-25 NOTE — OCCUPATIONAL THERAPY NOTE
Occupational Therapy Evaluation     Patient Name: Chad Jacobsen    Today's Date: 4/25/2023  Problem List  Active Problems:    Acute renal failure superimposed on chronic kidney disease ([facility] [address] )    Abscess of right leg excluding foot    Peripheral artery disease [facility])    Past Medical History  Past Medical History:   Diagnosis Date    Anemia     iron def    Arthritis     Cerebrovascular accident (CVA) ([facility] [address] ) 01/07/2020    memory issues    Depression 03/29/2023    GERD (gastroesophageal reflux disease)     Gout     Hypertension     Insomnia      Past Surgical History  Past Surgical History:   Procedure Laterality Date    ANKLE SURGERY Right     CARDIAC CATHETERIZATION      no findings    COLONOSCOPY  01/25/2013    polypectomy; complete - 3 yrs d/t polyp - benign submucosal lipoma- path c/w lipoma    COLONOSCOPY  04/25/2017    complete - tubular adenoma - repeat 5yrs    CYSTOSCOPY      CYSTOTOMY      bladder  w/ basket extraction of calculus    FEMUR FRACTURE SURGERY      HERNIA REPAIR      inguinal ; sliding    INGUINAL HERNIA REPAIR Right     unilateral    KNEE ARTHROSCOPY Right     w/medial menisectomy    LASIK      corneal    LITHOTRIPSY      renal    DC COLONOSCOPY FLX DX W/COLLJ SPEC WHEN PFRMD N/A 04/25/2017    Procedure: SCREENING COLONOSCOPY;  Surgeon: Maylin Del Angel MD;  Location: [facility] MAIN OR;  Service: General    DC Daryel Cedrick 3RD+ ORD SLCTV ABDL PEL/LXTR [facility] Right 4/24/2023    Procedure: ARTERIOGRAM;  Surgeon: Harolyn Cabot, MD;  Location: [facility] Main [facility];  Service: Vascular    DC TEAEC W/PATCH GRF CAROTID VERTB SUBCLAV NECK INC Right 01/10/2020    Procedure: ENDARTERECTOMY ARTERY CAROTID;  Surgeon: Edward Lyons MD;  Location: [facility];  Service: Vascular    DC TEAEC W/WO PATCH GRAFT COMMON FEMORAL Right 4/24/2023    Procedure: ENDARTERECTOMY ARTERIAL FEMORAL, RETROGRADE ILIAC INTERVENTION;  Surgeon: Harolyn Cabot, MD;  Location: [facility];  Service: Vascular    ROTATOR CUFF REPAIR Bilateral "TONSILLECTOMY           04/25/23 0924   OT Last Visit   OT Visit Date 04/25/23   Note Type   Note type Evaluation   Pain Assessment   Pain Assessment Tool 0-10   Restrictions/Precautions   Other Precautions Fall Risk;Multiple lines   Home Living   Type of [address] [place] [address] One level;Stairs to enter without rails  (2 step from garage)   Bathroom Shower/Tub Tub/shower unit   [employer] Grab bars in shower   Bathroom Accessibility Accessible   Additional Comments reports is going to be having a walk in shower installed   Prior Function   Level of [place] Independent with ADLs; Independent with functional mobility; Independent with IADLS   Lives With Alone   Receives Help From Friend(s)   IADLs Independent with driving; Independent with meal prep; Independent with medication management   Falls in the last 6 months 0   Vocational Retired   Comments reports active prior   Lifestyle   Autonomy per pt independent w/ ADLs, independent w/ functional transfers and mobility w/ no AD, independent w/ IADLs, driving   Reciprocal Relationships friends   Service to Others retired margie   Intrinsic Gratification helping others w/ AA   Subjective   Subjective \"I am doing good\"   ADL   Where Assessed Chair   Eating Assistance 6  Modified independent   Grooming Assistance 6  Modified Independent   UB Bathing Assistance 6  Modified Independent   LB Bathing Assistance 5  Supervision/Setup   [address] 6  Modified independent   [address] 5  Supervision/Setup   Toileting Assistance  6  Modified independent   Functional Assistance 6  Modified independent   Transfers   Sit to Stand 6  Modified independent   Additional items Armrests; Increased time required   Stand to Sit 6  Modified independent   Additional items Increased time required;Verbal cues;Armrests   Functional Mobility   Functional Mobility 6  Modified independent   Additional Comments w/ increased time and no " AD   Balance   Static Sitting Good   Dynamic Sitting Fair +   Static Standing Fair   Dynamic Standing Fair -   Ambulatory Fair -   Activity Tolerance   Activity Tolerance Patient tolerated treatment well;Patient limited by pain   Medical Staff Made Aware PT [address]   Nurse Made Aware appropriate to see per Carole HUYNH   RUGALEN Assessment   RUE Assessment WFL  (4/5)   LUE Assessment   LUE Assessment WFL  (4/5)   Hand Function   Gross Motor Coordination Functional   Fine Motor Coordination Functional   Sensation   Light Touch No apparent deficits   Proprioception   Proprioception No apparent deficits   Vision - Complex Assessment   Ocular Range of Motion Intact   Acuity Able to read clock/calendar on wall without difficulty   Cognition   Overall Cognitive Status [facility]   Arousal/Participation Alert; Cooperative;Responsive   Attention Within functional limits   Orientation Level Oriented X4   Memory Within functional limits   Following Commands Follows one step commands without difficulty   Comments pleasant and cooperative   Assessment   Assessment Pt is a 66 y o  male seen for OT evaluation s/p admit to [facility] on 4/24/2023 w/ right CFA, SFA, and profunda endarterectomy with retrograde external iliac balloon angioplasty and stenting  Comorbidities affecting pt's functional performance at time of assessment include: Right lower extremity ulcerations no signs of infection, PAD, HELIO superimposed on CKD,HTN, CAD, carpal tunnel syndrome  Personal factors affecting pt at time of IE include:limited home support  Prior to admission, pt was per pt independent w/ ADLs, independent w/ functional transfers and mobility w/ no AD, independent w/ IADLs, driving  Upon evaluation: Pt requires MOD I sit<>Stand, supervision functional mobility w/ no AD, MOD I [facility] ADLs, setup LB ADLs 2* the following deficits impacting occupational performance:  Decreased endurance, impaired balance activity tolerance, multiple lines   Pt receptive to easing back "into activities and pacing self  Pt appears to be at baseline w/ no inpt OT needs at this time, will d/c OT  From OT standpoint, recommendation at time of d/c would be home w/ family support/assist  The patient's raw score on the AM-PAC Daily Activity Inpatient Short Form is 24  A raw score of greater than or equal to 19 suggests the patient may benefit from discharge to home  Please refer to the recommendation of the Occupational Therapist for safe discharge planning  Goals   Patient Goals \"go home\"   Plan   OT Frequency Eval only   Recommendation   OT Discharge Recommendation No rehabilitation needs   AM-PAC Daily Activity Inpatient   Lower Body Dressing 4   Bathing 4   Toileting 4   Upper Body Dressing 4   Grooming 4   Eating 4   Daily Activity Raw Score 24   Daily Activity Standardized Score (Calc for Raw Score >=11) 57 54   AM-PAC Applied Cognition Inpatient   Following a Speech/Presentation 4   Understanding Ordinary Conversation 4   Taking Medications 4   Remembering Where Things Are Placed or Put Away 4   Remembering List of 4-5 Errands 4   Taking Care of Complicated Tasks 4   Applied Cognition Raw Score 24   Applied Cognition Standardized Score 62 21   End of Consult   Patient Position at End of Consult Bedside chair; All needs within reach     Documentation completed by: Bria Sr, MS, OTR/L    "

## 2023-04-25 NOTE — NURSING NOTE
"Patient having apenic periods, refusing oxygen  States \" it clogs up my nose and I cant breathe\" offered to humidify O2, patient stated \" I dont stop breathing Im fine\" informed of O2 into the 70s for brief episode, patient still declines O2    "

## 2023-04-25 NOTE — CASE MANAGEMENT
Case Management Discharge Planning Note    Patient name Keena Peguero    Location ICU 04/ICU 04 MRN [id number]  : 1945 Date 2023       Current Admission Date: 2023  Current Admission Diagnosis:Abscess of right leg excluding foot   Patient Active Problem List    Diagnosis Date Noted   • Peripheral artery disease ([place] [address] ) 2023   • Depression 2023   • Tylenol ingestion 2023   • Abscess of right leg excluding foot 2023   • Atrial flutter, unspecified type ([place] [address] ) 2021   • Rambo lesion, chronic 2021   • Paraesophageal hernia 2021   • Acute renal failure superimposed on chronic kidney disease ([place] [address] ) 12/15/2020   • Stage 3b chronic kidney disease ([place] [address] ) 12/15/2020   • Bilateral carotid artery stenosis 2020   • Coronary artery disease involving native coronary artery 2020   • S/P carotid endarterectomy 01/10/2020   • Aortic valve stenosis 2020   • Elevated PSA 2018   • Obesity (BMI 30 0-34 9) 2018   • Nephrolithiasis 2018   • Prolonged QT interval 2017   • Iron deficiency anemia due to chronic blood loss 2017   • Osteoarthritis 2017   • Rhinitis 2016   • Impaired fasting glucose 10/08/2015   • Insomnia 2015   • Gout 2013   • Tubular adenoma of colon 2013   • Carpal tunnel syndrome 2012   • Other disorder of calcium metabolism 2012   • Dyslipidemia 2012   • Hypertension 2012      LOS (days): 1  Geometric Mean LOS (GMLOS) (days): 3 90  Days to GMLOS:3     OBJECTIVE:  Risk of Unplanned Readmission Score: 18 37         Current admission status: Inpatient   Preferred Pharmacy:   [address] [place] [address] [id number] DARINEL Fitzgerald [address] [place] [id number]  Phone: [phone] Fax: [address] [place] [address] Emani Nievese  [address] [place] [address]  Phone: [phone] Fax: [phone]    [facility] Care Provider: Ashley Lu DO    Primary Insurance: Mindi Rangel [facility] REP  Secondary Insurance:     DISCHARGE DETAILS:                                [address]         Is the patient interested in [facility] at discharge?: Yes  Via Bhanu Gomez requested[de-identified] [address] Name[de-identified] [facility] Provider[de-identified] PCP  Home Health Services Needed[de-identified] Wound/Ostomy Care  Homebound Criteria Met[de-identified] Immunosuppressed  Supporting Clincal Findings[de-identified] Limited Endurance

## 2023-04-25 NOTE — PROGRESS NOTES
"Patient ID: Renetta Reyes  is a 66 y o  male Date of Birth 1945     Chief Complaint  Chief Complaint   Patient presents with   • Follow Up Wound Care Visit     Right leg wounds       Allergies  Patient has no known allergies  Assessment:    Unspecified open wound, right lower leg, initial encounter  Abscess of right leg excluding foot  -     S/P I&D x2 anterior leg abscesses  - lidocaine (XYLOCAINE) 4 % topical solution 5 mL  -     Cancel: Wound cleansing and dressings; Future    Open wound of right ankle, subsequent encounter  -     lidocaine (XYLOCAINE) 4 % topical solution 5 mL  -     Cancel: Wound cleansing and dressings; Future    Other orders  -     Debridement  -     Debridement  -     Debridement    Plan:  Apply Santyl daily to the to lower wounds  Apply Dermagran every other day to the upper wound to keep the tendon exposed moist and facilitate better coverage  Follow-up in 2-3weeks after vascular surgery intervention     If any signs of recurrent infection are noted  Debridement   Wound 12/07/22 Traumatic Ankle Anterior;Right    Universal Protocol:  Consent: Verbal consent obtained  Risks and benefits: risks, benefits and alternatives were discussed  Consent given by: patient  Time out: Immediately prior to procedure a \"time out\" was called to verify the correct patient, procedure, equipment, support staff and site/side marked as required    Patient understanding: patient states understanding of the procedure being performed  Patient identity confirmed: verbally with patient      Performed by: physician  Debridement type: surgical  Level of debridement: subcutaneous tissue  Pain control: lidocaine 4%  Post-debridement measurements  Length (cm): 1 4  Width (cm): 0 9  Depth (cm): 0 2  Percent debrided: 100%  Surface Area (cm^2): 1 26  Area debrided (cm^2): 1 26  Volume (cm^3): 0 25  Tissue and other material debrided: subcutaneous tissue  Devitalized tissue debrided: necrotic debris and " "slough  Instrument(s) utilized: curette  Bleeding: medium  Hemostasis obtained with: pressure  Procedural pain (0-10): 3  Post-procedural pain: 3   Response to treatment: procedure was tolerated well    Debridement   Wound 03/27/23 Surgical Pretibial Distal;Right    Universal Protocol:  Consent: Verbal consent obtained  Risks and benefits: risks, benefits and alternatives were discussed  Consent given by: patient  Time out: Immediately prior to procedure a \"time out\" was called to verify the correct patient, procedure, equipment, support staff and site/side marked as required  Patient understanding: patient states understanding of the procedure being performed  Patient identity confirmed: verbally with patient      Performed by: physician  Debridement type: surgical  Level of debridement: subcutaneous tissue  Pain control: lidocaine 4%  Post-debridement measurements  Length (cm): 1 3  Width (cm): 0 9  Depth (cm): 0 2  Percent debrided: 100%  Surface Area (cm^2): 1 17  Area debrided (cm^2): 1 17  Volume (cm^3): 0 23  Tissue and other material debrided: subcutaneous tissue  Devitalized tissue debrided: necrotic debris and slough  Instrument(s) utilized: nippers, curette and blade  Bleeding: small  Hemostasis obtained with: pressure  Procedural pain (0-10): 3  Post-procedural pain: 3   Response to treatment: procedure was tolerated well    Debridement   Wound 03/29/23 Surgical Pretibial Right;Proximal    Universal Protocol:  Consent: Verbal consent obtained  Risks and benefits: risks, benefits and alternatives were discussed  Consent given by: patient  Time out: Immediately prior to procedure a \"time out\" was called to verify the correct patient, procedure, equipment, support staff and site/side marked as required    Patient understanding: patient states understanding of the procedure being performed  Patient identity confirmed: verbally with patient      Performed by: physician  Debridement type: surgical  Level of " debridement: subcutaneous tissue  Pain control: lidocaine 4%  Post-debridement measurements  Length (cm): 1 8  Width (cm): 1 3  Depth (cm): 0 5  Percent debrided: 100%  Surface Area (cm^2): 2 34  Area debrided (cm^2): 2 34  Volume (cm^3): 1 17  Tissue and other material debrided: subcutaneous tissue  Instrument(s) utilized: nippers, curette and blade  Bleeding: medium  Hemostasis obtained with: pressure  Procedural pain (0-10): 3  Post-procedural pain: 3   Response to treatment: procedure was tolerated well             Wound 12/07/22 Traumatic Ankle Anterior;Right (Active)   Wound Image Images linked 04/05/23 0817   Wound Description Slough;Granulation tissue 04/05/23 0817   Kathrin-wound Assessment Erythema;Edema 04/05/23 0817   Wound Length (cm) 1 4 cm 04/05/23 0817   Wound Width (cm) 0 9 cm 04/05/23 0817   Wound Depth (cm) 0 1 cm 04/05/23 0817   Wound Surface Area (cm^2) 1 26 cm^2 04/05/23 0817   Wound Volume (cm^3) 0 126 cm^3 04/05/23 0817   Calculated Wound Volume (cm^3) 0 13 cm^3 04/05/23 0817   Change in Wound Size % 35 04/05/23 0817   Drainage Amount Small 04/05/23 0817   Drainage Description Serous 04/05/23 0817   Non-staged Wound Description Full thickness 04/05/23 0817   Dressing Status Intact 04/05/23 0817       Wound 03/27/23 Surgical Pretibial Distal;Right (Active)   Wound Image Images linked 04/05/23 0820   Wound Description Slough;Granulation tissue 04/05/23 0820   Kathrin-wound Assessment Erythema;Edema 04/05/23 0820   Wound Length (cm) 1 3 cm 04/05/23 0820   Wound Width (cm) 1 9 cm 04/05/23 0820   Wound Depth (cm) 0 1 cm 04/05/23 0820   Wound Surface Area (cm^2) 2 47 cm^2 04/05/23 0820   Wound Volume (cm^3) 0 247 cm^3 04/05/23 0820   Calculated Wound Volume (cm^3) 0 25 cm^3 04/05/23 0820   Change in Wound Size % 90 04/05/23 0820   Drainage Amount Small 04/05/23 0820   Drainage Description Serous 04/05/23 0820   Dressing Status Intact 04/05/23 0820       Wound 03/29/23 Surgical Pretibial Right;Proximal (Active)   Wound Image Images linked 04/05/23 0822   Wound Description Exposed tendon; White;Granulation tissue 04/05/23 0822   Kathrin-wound Assessment Edema; Erythema 04/05/23 0822   Wound Length (cm) 1 8 cm 04/05/23 0822   Wound Width (cm) 1 3 cm 04/05/23 0822   Wound Depth (cm) 1 cm 04/05/23 0822   Wound Surface Area (cm^2) 2 34 cm^2 04/05/23 0822   Wound Volume (cm^3) 2 34 cm^3 04/05/23 0822   Calculated Wound Volume (cm^3) 2 34 cm^3 04/05/23 0822   Change in Wound Size % -408 7 04/05/23 0822   Tunneling in depth located at 12-12 oc deepest at 12 oc 04/05/23 0822   Undermining 1 4 04/05/23 0822   Drainage Amount Moderate 04/05/23 0822   Drainage Description Serosanguineous 04/05/23 0822   Non-staged Wound Description Full thickness 04/05/23 0822   Dressing Status Intact; Old drainage 04/05/23 0822       Wound 04/02/23 Foot Anterior;Right (Active)   Wound Image Images linked 04/05/23 0819   Wound Description Brown 04/05/23 0818   Kathrin-wound Assessment Dry;Erythema 04/05/23 0818   Wound Length (cm) 0 2 cm 04/05/23 0818   Wound Width (cm) 0 1 cm 04/05/23 0818   Wound Depth (cm) 0 cm 04/05/23 0818   Wound Surface Area (cm^2) 0 02 cm^2 04/05/23 0818   Wound Volume (cm^3) 0 cm^3 04/05/23 0818   Calculated Wound Volume (cm^3) 0 cm^3 04/05/23 0818   Change in Wound Size % 100 04/05/23 0818   Drainage Amount None 04/05/23 0818   Non-staged Wound Description Full thickness 04/05/23 0818   Dressing Status Intact 04/05/23 0818       Wound 12/07/22 Traumatic Ankle Anterior;Right (Active)   Date First Assessed/Time First Assessed: 12/07/22 1447   Primary Wound Type: Traumatic  Location: Ankle  Wound Location Orientation: Anterior;Right       Wound 03/27/23 Surgical Pretibial Distal;Right (Active)   Date First Assessed/Time First Assessed: 03/27/23 1611   Primary Wound Type: (c) Surgical  Location: Pretibial  Wound Location Orientation: Distal;Right       Wound 03/29/23 Surgical Pretibial Right;Proximal (Active)   Date First Assessed/Time First Assessed: 03/29/23 0959   Pre-Existing Wound: Yes  Primary Wound Type: Surgical  Location: Pretibial  Wound Location Orientation: Right;Proximal       Wound 04/02/23 Foot Anterior;Right (Active)   Date First Assessed/Time First Assessed: 04/02/23 1248   Location: Foot  Wound Location Orientation: Anterior;Right       Wound 04/24/23 Groin Right (Active)   Date First Assessed/Time First Assessed: 04/24/23 1435   Location: Groin  Wound Location Orientation: Right  Wound Description (Comments): exofin and mepilex       [REMOVED] Wound 01/10/20 Neck Right (Removed)   Resolved Date/Resolved Time: 12/07/22 1446  Date First Assessed/Time First Assessed: 01/10/20 1215   Location: Neck  Wound Location Orientation: Right  Wound Description (Comments): [id number]  Wound Outcome: Other (Comment)       [REMOVED] Wound 01/10/20 Ankle Anterior;Right (Removed)   Resolved Date/Resolved Time: 12/07/22 1447  Date First Assessed/Time First Assessed: 01/10/20 1430   Pre-Existing Wound: Yes  Location: Ankle  Wound Location Orientation: Anterior;Right  Wound Description (Comments): non healing  Wound Outcome: Other     [REMOVED] Wound 11/09/20 Catheter entry/exit site Wrist Right (Removed)   Resolved Date/Resolved Time: 12/07/22 1446  Date First Assessed/Time First Assessed: 11/09/20 1238   Pre-Existing Wound: No  Traumatic Wound Type: Catheter entry/exit site  Location: Wrist  Wound Location Orientation: Right  Wound Outcome: Other (Comm    [REMOVED] Wound 12/07/20 Catheter entry/exit site Wrist Right (Removed)   Resolved Date/Resolved Time: 12/07/22 1446  Date First Assessed/Time First Assessed: 12/07/20 1207   Pre-Existing Wound: No  Traumatic Wound Type: Catheter entry/exit site  Location: Wrist  Wound Location Orientation: Right  Wound Description (Comment    Subjective:           35/2023: 75-year-old male seen today for follow-up evaluation and treatment of multiple wounds right leg    Initial anterior ankle wound which is traumatic and 2 other additional surgical wound secondary to incision and drainage for abscess formation of his distal lower right leg  Patient recently was admitted to [address] [place] [address] for cellulitis and nonhealing wounds and discharged this past week  During this admission was discovered to have severe PVD of his right lower extremity and will be following up with vascular surgery next week for an endarterectomy  Patient reports he saw vascular surgery yesterday and the surgical plan is in process  3/29/2023: 59-year-old male presents for follow-up status post incision and drainage deep abscess right leg and debridement of right leg wound 2 days ago  Reports less swelling and pain but is somewhat distraught about having this type of wound care for  [facility] did not contact him yesterday which was very disconcerting to him  3/27/2023: 59-year-old male presents with increasing pain/redness/swelling anterior aspect of right leg  Patient has been treating for a chronic wound on the anterior ankle within scar tissue from previous trauma  Reports going to ED on 3/24/2023 and getting antibiotics but is still having redness/swelling/pain/discomfort from the front of his leg  The following portions of the patient's history were reviewed and updated as appropriate: allergies, current medications, past family history, past medical history, past social history, past surgical history and problem list     Review of Systems   Constitutional: Negative for chills and fever  HENT: Negative for ear pain and sore throat  Eyes: Negative for pain and visual disturbance  Respiratory: Negative for cough and shortness of breath  Cardiovascular: Negative for chest pain and palpitations  Gastrointestinal: Negative for abdominal pain and vomiting  Genitourinary: Negative for dysuria and hematuria  Musculoskeletal: Negative for arthralgias and back pain     Skin: Positive for wound (Anterior Right Ankle, and 2 surgical wounds anterior right leg)  Negative for color change and rash  Neurological: Negative for seizures and syncope  All other systems reviewed and are negative  Objective:       Wound 12/07/22 Traumatic Ankle Anterior;Right (Active)   Wound Image Images linked 04/05/23 0817   Wound Description Slough;Granulation tissue 04/05/23 0817   Kathrin-wound Assessment Erythema;Edema 04/05/23 0817   Wound Length (cm) 1 4 cm 04/05/23 0817   Wound Width (cm) 0 9 cm 04/05/23 0817   Wound Depth (cm) 0 1 cm 04/05/23 0817   Wound Surface Area (cm^2) 1 26 cm^2 04/05/23 0817   Wound Volume (cm^3) 0 126 cm^3 04/05/23 0817   Calculated Wound Volume (cm^3) 0 13 cm^3 04/05/23 0817   Change in Wound Size % 35 04/05/23 0817   Drainage Amount Small 04/05/23 0817   Drainage Description Serous 04/05/23 0817   Non-staged Wound Description Full thickness 04/05/23 0817   Dressing Status Intact 04/05/23 0817       Wound 03/27/23 Surgical Pretibial Distal;Right (Active)   Wound Image Images linked 04/05/23 0820   Wound Description Slough;Granulation tissue 04/05/23 0820   Kathrin-wound Assessment Erythema;Edema 04/05/23 0820   Wound Length (cm) 1 3 cm 04/05/23 0820   Wound Width (cm) 1 9 cm 04/05/23 0820   Wound Depth (cm) 0 1 cm 04/05/23 0820   Wound Surface Area (cm^2) 2 47 cm^2 04/05/23 0820   Wound Volume (cm^3) 0 247 cm^3 04/05/23 0820   Calculated Wound Volume (cm^3) 0 25 cm^3 04/05/23 0820   Change in Wound Size % 90 04/05/23 0820   Drainage Amount Small 04/05/23 0820   Drainage Description Serous 04/05/23 0820   Dressing Status Intact 04/05/23 0820       Wound 03/29/23 Surgical Pretibial Right;Proximal (Active)   Wound Image Images linked 04/05/23 0822   Wound Description Exposed tendon; White;Granulation tissue 04/05/23 0822   Kathrin-wound Assessment Edema; Erythema 04/05/23 0822   Wound Length (cm) 1 8 cm 04/05/23 0822   Wound Width (cm) 1 3 cm 04/05/23 0822   Wound Depth (cm) 1 cm 04/05/23 5264 Wound Surface Area (cm^2) 2 34 cm^2 04/05/23 0822   Wound Volume (cm^3) 2 34 cm^3 04/05/23 0822   Calculated Wound Volume (cm^3) 2 34 cm^3 04/05/23 0822   Change in Wound Size % -408 7 04/05/23 0822   Tunneling in depth located at 12-12 oc deepest at 12 oc 04/05/23 0822   Undermining 1 4 04/05/23 0822   Drainage Amount Moderate 04/05/23 0822   Drainage Description Serosanguineous 04/05/23 0822   Non-staged Wound Description Full thickness 04/05/23 0822   Dressing Status Intact; Old drainage 04/05/23 0822       Wound 04/02/23 Foot Anterior;Right (Active)   Wound Image Images linked 04/05/23 0819   Wound Description Brown 04/05/23 0818   Kathrin-wound Assessment Dry;Erythema 04/05/23 0818   Wound Length (cm) 0 2 cm 04/05/23 0818   Wound Width (cm) 0 1 cm 04/05/23 0818   Wound Depth (cm) 0 cm 04/05/23 0818   Wound Surface Area (cm^2) 0 02 cm^2 04/05/23 0818   Wound Volume (cm^3) 0 cm^3 04/05/23 0818   Calculated Wound Volume (cm^3) 0 cm^3 04/05/23 0818   Change in Wound Size % 100 04/05/23 0818   Drainage Amount None 04/05/23 0818   Non-staged Wound Description Full thickness 04/05/23 0818   Dressing Status Intact 04/05/23 0818       Pulse 74   Temp 97 6 °F (36 4 °C)   Resp 16     Physical Exam  Constitutional:       Appearance: Normal appearance  HENT:      Head: Normocephalic  Right Ear: Tympanic membrane normal       Left Ear: Tympanic membrane normal       Nose: No congestion  Mouth/Throat:      Pharynx: No oropharyngeal exudate or posterior oropharyngeal erythema  Eyes:      Pupils: Pupils are equal, round, and reactive to light  Cardiovascular:      Rate and Rhythm: Normal rate and regular rhythm  Pulses:           Dorsalis pedis pulses are 0 on the right side and 1+ on the left side  Posterior tibial pulses are 0 on the right side and 0 on the left side  Pulmonary:      Effort: Pulmonary effort is normal    Skin:     General: Skin is warm and dry        Capillary Refill: Capillary refill takes 2 to 3 seconds  Coloration: Skin is not pale  Findings: Erythema and wound (Anterior Right ankle, 1 4 x 0 9 x 0 2 cm, 50% red, 50% yellow slough/fibrous base, minimal s/s drainage, improving) present  No bruising, lesion or rash  Comments: Anterior right leg: x2 surgical incisions with deep SQ depth  Granulating with tendon coverage slowly occurring  Further signs of infection  See detailed wound patient  Neurological:      General: No focal deficit present  Mental Status: He is alert  Cranial Nerves: No cranial nerve deficit  Sensory: No sensory deficit  Motor: No weakness  Gait: Gait normal       Deep Tendon Reflexes: Reflexes normal    Psychiatric:         Mood and Affect: Mood normal          Behavior: Behavior normal          Judgment: Judgment normal            Wound Instructions:  Orders Placed This Encounter   Procedures   • Debridement     This order was created via procedure documentation   • Debridement     This order was created via procedure documentation   • Debridement     This order was created via procedure documentation        Diagnosis ICD-10-CM Associated Orders   1  Unspecified open wound, right lower leg, initial encounter  S81 801A lidocaine (XYLOCAINE) 4 % topical solution 5 mL     Debridement     Debridement      2  Open wound of right ankle, subsequent encounter  S91 001D lidocaine (XYLOCAINE) 4 % topical solution 5 mL     Debridement      3   Abscess of right leg excluding foot  [id number] [id number]

## 2023-04-26 ENCOUNTER — TRANSITIONAL CARE MANAGEMENT (OUTPATIENT)
Dept: FAMILY MEDICINE CLINIC | Facility: HOSPITAL | Age: 78
End: 2023-04-26

## 2023-04-26 VITALS
RESPIRATION RATE: 18 BRPM | WEIGHT: 198.85 LBS | OXYGEN SATURATION: 95 % | HEART RATE: 83 BPM | HEIGHT: 70 IN | TEMPERATURE: 98.9 F | SYSTOLIC BLOOD PRESSURE: 108 MMHG | BODY MASS INDEX: 28.47 KG/M2 | DIASTOLIC BLOOD PRESSURE: 54 MMHG

## 2023-04-26 LAB
ANION GAP SERPL CALCULATED.3IONS-SCNC: 7 MMOL/L (ref 4–13)
BUN SERPL-MCNC: 19 MG/DL (ref 5–25)
CALCIUM SERPL-MCNC: 8.3 MG/DL (ref 8.4–10.2)
CHLORIDE SERPL-SCNC: 108 MMOL/L (ref 96–108)
CO2 SERPL-SCNC: 26 MMOL/L (ref 21–32)
CREAT SERPL-MCNC: 1.21 MG/DL (ref 0.6–1.3)
ERYTHROCYTE [DISTWIDTH] IN BLOOD BY AUTOMATED COUNT: 13.6 % (ref 11.6–15.1)
GFR SERPL CREATININE-BSD FRML MDRD: 56 ML/MIN/1.73SQ M
GLUCOSE SERPL-MCNC: 105 MG/DL (ref 65–140)
HCT VFR BLD AUTO: 35 % (ref 36.5–49.3)
HGB BLD-MCNC: 11 G/DL (ref 12–17)
MAGNESIUM SERPL-MCNC: 1.7 MG/DL (ref 1.9–2.7)
MCH RBC QN AUTO: 30.7 PG (ref 26.8–34.3)
MCHC RBC AUTO-ENTMCNC: 31.4 G/DL (ref 31.4–37.4)
MCV RBC AUTO: 98 FL (ref 82–98)
PLATELET # BLD AUTO: 123 THOUSANDS/UL (ref 149–390)
PMV BLD AUTO: 8.8 FL (ref 8.9–12.7)
POTASSIUM SERPL-SCNC: 4.2 MMOL/L (ref 3.5–5.3)
RBC # BLD AUTO: 3.58 MILLION/UL (ref 3.88–5.62)
SODIUM SERPL-SCNC: 141 MMOL/L (ref 135–147)
WBC # BLD AUTO: 6.86 THOUSAND/UL (ref 4.31–10.16)

## 2023-04-26 RX ORDER — OXYCODONE HYDROCHLORIDE 5 MG/1
5 TABLET ORAL EVERY 6 HOURS PRN
Qty: 10 TABLET | Refills: 0 | Status: SHIPPED | OUTPATIENT
Start: 2023-04-26 | End: 2023-05-06

## 2023-04-26 RX ORDER — LANOLIN ALCOHOL/MO/W.PET/CERES
400 CREAM (GRAM) TOPICAL 2 TIMES DAILY
Status: DISCONTINUED | OUTPATIENT
Start: 2023-04-26 | End: 2023-04-26 | Stop reason: HOSPADM

## 2023-04-26 RX ORDER — CLOPIDOGREL BISULFATE 75 MG/1
75 TABLET ORAL DAILY
Qty: 60 TABLET | Refills: 0 | Status: SHIPPED | OUTPATIENT
Start: 2023-04-27

## 2023-04-26 RX ADMIN — Medication 1 PACKET: at 08:06

## 2023-04-26 RX ADMIN — OXYCODONE 5 MG: 5 TABLET ORAL at 08:01

## 2023-04-26 RX ADMIN — METOPROLOL SUCCINATE 25 MG: 25 TABLET, EXTENDED RELEASE ORAL at 08:01

## 2023-04-26 RX ADMIN — CLOPIDOGREL BISULFATE 75 MG: 75 TABLET ORAL at 08:01

## 2023-04-26 RX ADMIN — OXYCODONE HYDROCHLORIDE AND ACETAMINOPHEN 500 MG: 500 TABLET ORAL at 08:01

## 2023-04-26 RX ADMIN — GABAPENTIN 100 MG: 100 CAPSULE ORAL at 08:01

## 2023-04-26 RX ADMIN — HEPARIN SODIUM 5000 UNITS: 5000 INJECTION INTRAVENOUS; SUBCUTANEOUS at 06:33

## 2023-04-26 RX ADMIN — B-COMPLEX W/ C & FOLIC ACID TAB 1 TABLET: TAB at 08:03

## 2023-04-26 RX ADMIN — FINASTERIDE 5 MG: 5 TABLET, FILM COATED ORAL at 08:01

## 2023-04-26 RX ADMIN — ACETAMINOPHEN 325MG 975 MG: 325 TABLET ORAL at 06:33

## 2023-04-26 RX ADMIN — FERROUS SULFATE TAB 325 MG (65 MG ELEMENTAL FE) 325 MG: 325 (65 FE) TAB at 08:00

## 2023-04-26 RX ADMIN — MAGNESIUM OXIDE TAB 400 MG (241.3 MG ELEMENTAL MG) 400 MG: 400 (241.3 MG) TAB at 10:34

## 2023-04-26 RX ADMIN — ESCITALOPRAM OXALATE 10 MG: 10 TABLET ORAL at 08:01

## 2023-04-26 RX ADMIN — ACETAMINOPHEN 325MG 975 MG: 325 TABLET ORAL at 01:13

## 2023-04-26 RX ADMIN — PANTOPRAZOLE SODIUM 40 MG: 40 TABLET, DELAYED RELEASE ORAL at 06:33

## 2023-04-26 NOTE — PROGRESS NOTES
Progress Note - Geriatric Medicine   Santos Carballo  66 y o  male MRN: [id number]  Unit/Bed#: ICU 04 Encounter: [id number]      Assessment/Plan:    Cognitive Screening   · Patient notes that he has some difficulty remembering names but no other cognitive issues at baseline  · Most recent TSH on 3/29/2023 noted to be 1 721  · No vitamin B12 level noted in epic  · Would recommend checking on routine labs  · MRI of the brain on 1/3/2020 revealed mild microangiopathic changes  · No MoCA noted in epic  · Patient is alert and oriented x4 on exam today  · Maintain delirium precautions as discussed below  · Redirect and reorient as needed  · Keep physically, mentally, and socially active    Delirium   • Current mentation: alert and oriented x 4  • Patient is at high risk secondary to age, anesthesia, acute pain, and hospitalization  • Maintain delirium precautions   · Provide redirection, reorientation, and distraction techniques  · Maintain fall and safety precautions   · Assist with ADLs/IADLs  · Avoid deliriogenic medications such as tramadol, benzodiazepines, anticholinergics, benadryl  · Treat pain using geriatric pain protocol   · Encourage oral hydration and nutrition   · Monitor for constipation and urinary retention   · Implement sleep hygiene and limit night time interuptions   · Maintain sleep-wake cycle   · Encourage early and frequent mobilization   · Encourage participation in group activities  • Most recent EKG on 3/29/2023 revealed a QTc interval of 414  · If all other interventions are unsuccessful for acute agitation and behaviors, would recommend Zyprexa 2 5 mg IM Q 8 hour prn   • Would avoid benzodiazepines such as Ativan as these can worsen delirium     Deconditioning   • Patient is at increased risk for deconditioning secondary to status post right common femoral, superficial femoral, and profunda femoris endarterectomy with patch angioplasty and hospitalization  • Continue to optimize diet, hydration, and mobility for healing   · GFR 56 on labs today  · Avoid nephrotoxic medication renal dose medication  · Keep hydrated  • Monitor for signs and symptoms of infection, dehydration, DVT, and skin breakdown    Frailty   Clinical Frail Scale: 4- Vulnerable  · Not dependent for daily help, symptoms limit activity  · Feeling slowed up, tired during the day  • Most recent albumin on 3/29/2023 noted to be 4 0  • Consider nutrition consult  • Encourage protein supplementation     Ambulatory Dysfunction/Falls  • Patient denies any recent falls at home  • Does not ambulate with any assistive devices at baseline  • Assess patient frequently for physical needs, encourage use of assistant devices as needed and directed by PT/OT  • Identify cognitive and physical deficits and behaviors that affect risk of falls  • Consider moving patient closer to nursing station to monitor more closely for impulsive behavior which may increase risk of falls  • [place] fall precautions   • Educate patient/family on patient safety including physical limitations and importance of using call bell for assistance   • Modify environment to reduce risk of injury including disconnecting from pole when not in use, ensuring adequate lighting in room and restroom, ensuring that path to restroom is clear and free of trip hazards  • PT/OT consulted to assist with strengthening/mobility and assist with discharge planning to appropriate level of care  • Out of bed as tolerated     Dentition/Appetite   • Patient does not have dentures  • He states he has a good appetite at baseline  • Ensure meal consistency is appropriate for all abilities   • Consider nutrition consult   • Continue aspiration precautions     Elimination   • Patient is continent of bowel and bladder at baseline  • He denies any voiding difficulties  • He notes that he takes Metamucil at baseline for constipation  · This was ordered yesterday  • Patient still has not had a bowel movement since admission  · He states his last bowel movement was on 4/24  • Monitor for constipation and urinary retention     Insomnia   • Patient notes that he does have some trouble sleeping at baseline  • He states that he takes melatonin and Tylenol at bedtime  • He has also ordered trazodone 50 mg daily at bedtime  • Melatonin 6 mg daily as needed at bedtime ordered yesterday  • Melatonin was administered last night  • Patient notes that he did sleep better  • Will continue current as needed dosing  • First line is behavioral therapy   • Avoid sedative hypnotics including benzodiazepines and benadryl  • Encourage staying awake during the day   • Encourage daytime activities and morning exercise   • Decrease or eliminate daytime naps   • Avoid caffeine especially during late afternoon and evening hours  • Establish a nighttime routine  • Implement sleep hygiene and limit nighttime interruptions    Anxiety/Depression  • Patient notes that he does have a history of anxiety and depression  • He states he is taking medication for this but he is unsure what medication he is taking  • Current medication regimen includes  · Trazodone 50 mg daily at bedtime  · Lexapro 10 mg daily  · Patient notes he is doing well on his current regimen  · Mood appears stable on exam today  · Continue supportive care    Peripheral Arterial Disease  · Patient was initially seen in consult with vascular surgery on 4/4/2023 for peripheral arterial disease  · He was noted to have recurrent right ankle wounds  · A lower extremity arterial duplex revealed the following  · Right: 50 to 75% CFA stenosis, FARRAH 0 73 with GTP 44  · Left: 50 to 75% CFA stenosis, FARRAH unreliable GTP 62  · A CTA abdominal runoff on 3/31/2023 revealed diffuse aortoiliac disease  · He was noted to have calcified bulky segment in the mid external iliac artery causing stenosis on the right and CFA with high-grade stenosis  · Given his nonhealing right lower extremity wounds it was recommended that he undergo a right common femoral endarterectomy with retrograde iliac intervention  · Patient is now postop day 2 from a right common femoral, superficial femoral, and profunda femoris endarterectomy with patch angioplasty  · Patient is currently denying pain on exam today  · Right groin dressing is clean, dry, and intact on exam today   · He has been out of bed and ambulating without issue  · He remains on SQ heparin for DVT prophylaxis  · He also remains on a statin, aspirin, and Plavix  · Patient is tentatively discharging home today  · Management per vascular surgery      Subjective: The patient is being seen and evaluated today at the bedside for geriatric follow-up  He is noted to be sitting up in his chair no acute distress  He is dressed as if he is ready to go home  He states that he is feeling well this morning  He is alert and oriented x4 on exam today  He is currently denying pain  He denies dizziness, headaches, and acute vision changes  He denies chest pain and shortness of breath  He denies nausea, vomiting, constipation, and diarrhea  He states that he is eating and he slept better last night  Review of Systems   Constitutional: Positive for activity change  Negative for appetite change, chills, fatigue and fever  Respiratory: Negative for cough, chest tightness and shortness of breath  Cardiovascular: Negative for chest pain and palpitations  Gastrointestinal: Negative for abdominal distention, abdominal pain, constipation, diarrhea, nausea and vomiting  Genitourinary: Negative for difficulty urinating and dysuria  Musculoskeletal: Negative for arthralgias, gait problem and myalgias  Skin: Negative for color change and pallor  Neurological: Negative for dizziness, weakness, numbness and headaches  Psychiatric/Behavioral: Negative for behavioral problems, confusion and sleep disturbance  The patient is not nervous/anxious  "      Objective:     Vitals: Blood pressure 108/54, pulse 83, temperature 98 9 °F (37 2 °C), temperature source Oral, resp  rate 18, height 5' 10\" (1 778 m), weight 90 2 kg (198 lb 13 7 oz), SpO2 95 %  ,Body mass index is 28 53 kg/m²  Intake/Output Summary (Last 24 hours) at 4/26/2023 1100  Last data filed at 4/26/2023 0901  Gross per 24 hour   Intake 480 ml   Output --   Net 480 ml       Current Medications: Reviewed    Physical Exam:   Physical Exam  Vitals and nursing note reviewed  Constitutional:       General: He is not in acute distress  Appearance: Normal appearance  He is not ill-appearing  HENT:      Head: Normocephalic  Mouth/Throat:      Mouth: Mucous membranes are dry  Eyes:      General: No scleral icterus  Conjunctiva/sclera: Conjunctivae normal    Cardiovascular:      Rate and Rhythm: Normal rate and regular rhythm  Pulmonary:      Effort: Pulmonary effort is normal  No respiratory distress  Abdominal:      General: Bowel sounds are normal  There is no distension  Palpations: Abdomen is soft  Tenderness: There is no abdominal tenderness  Musculoskeletal:         General: No swelling or tenderness  Skin:     General: Skin is warm and dry  Comments: R groin dressing clean, dry, and intact   Neurological:      General: No focal deficit present  Mental Status: He is alert and oriented to person, place, and time  Mental status is at baseline  Invasive Devices     None                 Lab, Imaging and other studies: I have personally reviewed pertinent reports  Please note:  Voice-recognition software may have been used in the preparation of this document  Occasional wrong word or \"sound-alike\" substitutions may have occurred due to the inherent limitations of voice recognition software  Interpretation should be guided by context      "

## 2023-04-26 NOTE — PROGRESS NOTES
Progress Note - Vascular Surgery   Kay Older  66 y o  male [id number]  Unit/Bed#:ICU 04 Encounter: [id number]      Assessment:    66 y o  with PMH Right CEA (2020), HTN, CAD, Aflutter (No AC), CKD, RLE ORIF, RLE STSG presenting with RLE CLTI, non-healing STSG harvest site abscess, AIOD for an elective revascularization     4/24: RLE Fem Endart c Patch Angioplasty, EIA PTA c SES    Plan:  - Cont NV chks  - OOB as tolerated  - Diet as tolerated  - VTEppx: SQH  - Statin, Aspirin, Plavix  - Monitor Cr (1 2)  - DC Home  - PT Dispo: Home w/o no needs      Subjective:    Pt s/e  No acute events overnight  Pt awake, alert  Pt complains of mild pain at incision site  Overall, states his pain is controlled  Tolerating diet  OOB  Voiding w/o tejaad  No new complaints  Denies n/v, sob, chest pain, f/c  No sensory change, numbness, or weakness of lower extremities    I/Os:  I/O last 3 completed shifts: In: 3532 9 [P O :360; I V :3172 9]  Out: 1750 [Urine:1500; Blood:250]  No intake/output data recorded  Review of Systems   All other systems reviewed and are negative  Vitals:    04/25/23 2058   BP: 107/76   Pulse:    Resp:    Temp: 98 5 °F (36 9 °C)   SpO2:         Physical Exam  Vitals and nursing note reviewed  Constitutional:       General: He is not in acute distress  Appearance: He is well-developed  He is not ill-appearing or diaphoretic  HENT:      Head: Normocephalic and atraumatic  Eyes:      Conjunctiva/sclera: Conjunctivae normal    Cardiovascular:      Rate and Rhythm: Normal rate and regular rhythm  Heart sounds: No murmur heard  Comments: B/L Palp fem   RLE Dop signal in DP/PT  Pulmonary:      Effort: Pulmonary effort is normal  No respiratory distress  Breath sounds: Normal breath sounds  Abdominal:      General: There is no distension  Palpations: Abdomen is soft  Tenderness: There is no abdominal tenderness   There is no right CVA tenderness, left CVA tenderness, guarding or rebound  Hernia: No hernia is present  Musculoskeletal:         General: Signs of injury present  No swelling  Cervical back: Neck supple  Right lower leg: Edema present  Comments: RLE dressing c/d/i    Right groin dressing c/d/i, soft, no hematoma, no ecchymosis    Skin:     General: Skin is warm and dry  Capillary Refill: Capillary refill takes less than 2 seconds  Neurological:      General: No focal deficit present  Mental Status: He is alert and oriented to person, place, and time  Mental status is at baseline  Psychiatric:         Mood and Affect: Mood normal          Imaging:  I have personally reviewed pertinent reports  , CBC with diff:   Lab Results   Component Value Date    WBC 6 86 04/26/2023    HGB 11 0 (L) 04/26/2023    HCT 35 0 (L) 04/26/2023    MCV 98 04/26/2023     (L) 04/26/2023    MCH 30 7 04/26/2023    MCHC 31 4 04/26/2023    RDW 13 6 04/26/2023    MPV 8 8 (L) 04/26/2023   , BMP/CMP:   Lab Results   Component Value Date    SODIUM 141 04/26/2023    K 4 2 04/26/2023     04/26/2023    CO2 26 04/26/2023    BUN 19 04/26/2023    CREATININE 1 21 04/26/2023    CALCIUM 8 3 (L) 04/26/2023    EGFR 56 04/26/2023           Results Reviewed     None           Patient Active Problem List   Diagnosis   • Tubular adenoma of colon   • Carpal tunnel syndrome   • Dyslipidemia   • Gout   • Hypertension   • Impaired fasting glucose   • Insomnia   • Iron deficiency anemia due to chronic blood loss   • Osteoarthritis   • Prolonged QT interval   • Rhinitis   • Other disorder of calcium metabolism   • Nephrolithiasis   • Elevated PSA   • Obesity (BMI 30 0-34  9)   • Aortic valve stenosis   • S/P carotid endarterectomy   • Coronary artery disease involving native coronary artery   • Bilateral carotid artery stenosis   • Acute renal failure superimposed on chronic kidney disease (HCC)   • Stage 3b chronic kidney disease ([facility] [address] )   • Rambo lesion, chronic   • Paraesophageal hernia   • Atrial flutter, unspecified type (HCC)   • Abscess of right leg excluding foot   • Depression   • Tylenol ingestion   • Peripheral artery disease [facility])       Past Surgical History:   Procedure Laterality Date   • ANKLE SURGERY Right    • CARDIAC CATHETERIZATION      no findings   • COLONOSCOPY  01/25/2013    polypectomy; complete - 3 yrs d/t polyp - benign submucosal lipoma- path c/w lipoma   • COLONOSCOPY  04/25/2017    complete - tubular adenoma - repeat 5yrs   • CYSTOSCOPY     • CYSTOTOMY      bladder  w/ basket extraction of calculus   • FEMUR FRACTURE SURGERY     • HERNIA REPAIR      inguinal ; sliding   • INGUINAL HERNIA REPAIR Right     unilateral   • KNEE ARTHROSCOPY Right     w/medial menisectomy   • LASIK      corneal   • LITHOTRIPSY      renal   • OR COLONOSCOPY FLX DX W/COLLJ SPEC WHEN PFRMD N/A 04/25/2017    Procedure: SCREENING COLONOSCOPY;  Surgeon: Brody Hopkins MD;  Location: [facility];  Service: General   • OR [place] 3RD+ ORD SLCTV ABDL PEL/LXTR [facility] Right 4/24/2023    Procedure: ARTERIOGRAM;  Surgeon: Neida Farmer MD;  Location: [facility];  Service: Vascular   • OR TEAEC W/PATCH GRF CAROTID VERTB SUBCLAV NECK INC Right 01/10/2020    Procedure: ENDARTERECTOMY ARTERY CAROTID;  Surgeon: Desi Cho MD;  Location: [facility];  Service: Vascular   • OR TEAEC W/WO PATCH GRAFT COMMON FEMORAL Right 4/24/2023    Procedure: ENDARTERECTOMY ARTERIAL FEMORAL, RETROGRADE ILIAC INTERVENTION;  Surgeon: Neida Farmer MD;  Location: [facility];  Service: Vascular   • ROTATOR CUFF REPAIR Bilateral    • TONSILLECTOMY         Family History   Problem Relation Age of Onset   • Alzheimer's disease Mother    • Alzheimer's disease Father    • Heart disease Father         CAD   • Other Father         prediabetes   • Diabetes Father    • Breast cancer Other    • Arthritis Family    • Hypertension Family        Social History     Socioeconomic History   • Marital status:   Spouse name: Not on file   • Number of children: 1   • Years of education: Not on file   • Highest education level: Not on file   Occupational History   • Occupation: Retired     Comment: working full time   Tobacco Use   • Smoking status: Former     Packs/day:      Years:  00     Pack years:      Types: Cigarettes     Start date: 0     Quit date: 1985     Years since quittin 3   • Smokeless tobacco: Never   Vaping Use   • Vaping Use: Never used   Substance and Sexual Activity   • Alcohol use: Not Currently     Comment: stopped drinking alcohol; AA x23yrs sober   • Drug use: No   • Sexual activity: Not Currently     Partners: Female   Other Topics Concern   • Not on file   Social History Narrative    , lives alone, working full time     Social Determinants of Health     Financial Resource Strain: Low Risk    • Difficulty of Paying Living Expenses: Not hard at all   Food Insecurity: Not on file   Transportation Needs: No Transportation Needs   • Lack of Transportation (Medical): No   • Lack of Transportation (Non-Medical):  No   Physical Activity: Not on file   Stress: Not on file   Social Connections: Not on file   Intimate Partner Violence: Not on file   Housing Stability: Not on file       No Known Allergies

## 2023-04-26 NOTE — PROGRESS NOTES
NEPHROLOGY PROGRESS NOTE   Anali Urbano  66 y o  male MRN: [id number]  Unit/Bed#: ICU 04 Encounter: [id number]  Reason for Consult: CKD    ASSESSMENT AND PLAN:  Patient is 77-year-old male with significant medical issues of CKD stage III, hypertension, PAD, history of nephrolithiasis, CAD, history of gout, presented for right lower extremity vascular procedure  We are consulted for postop CKD management      CKD stage III, baseline creatinine 1 4-1 6, follows with Dr Jemma Mahajan  -Creatinine 1 2 today below baseline  -Status post IV contrast with angiogram on 4/24/2023  No evidence of AUGUSTIN so far  -Remains off IV fluid   -Continue to hold lisinopril now and on discharge given low normal BP readings  Continue holding triamterene/HCTZ on discharge which can be restarted as outpatient if BP remains greater than 140/90    -CKD suspect in the setting of underlying hypertension, age-related nephron loss, prior episode of HELIO  -Continue follow-up as outpatient with nephrology     Hypertension  -Outpatient regimen includes lisinopril 10 mg daily, Toprol-XL 25 mg daily, triamterene/HCTZ 75/50 mg daily  -Blood pressure remains low normal currently  Patient is asymptomatic    -Continue to hold lisinopril, triamterene/HCTZ  -Currently on Toprol-XL  -Patient will need close monitoring of his blood pressure as outpatient either at home or with PCP to determine mine if he needs to be restarted on lisinopril or triamterene/HCTZ depending on his BP readings      PVD, status post right common femoral, superficial femoral, profunda femoris endarterectomy with patch angioplasty, aortoiliac angiogram, status post angioplasty with completion right lower extremity angiogram on 4/24/2023   -Vascular surgery follow-up    Borderline hypomagnesemia, serum magnesium 1 7, will give magnesium oxide 400 mg p o  twice daily for total 3 days  Discussed above plan in detail with primary team and they agree with above recommendations  SUBJECTIVE:  Patient seen and examined at bedside    He denies chest pain, shortness of breath, nausea or vomiting    OBJECTIVE:  Current Weight: Weight - Scale: 90 2 kg (198 lb 13 7 oz)  Vitals:    04/26/23 0752   BP: 108/54   Pulse: 83   Resp: 18   Temp: 98 9 °F (37 2 °C)   SpO2: 95%       Intake/Output Summary (Last 24 hours) at 4/26/2023 0923  Last data filed at 4/26/2023 0901  Gross per 24 hour   Intake 480 ml   Output --   Net 480 ml     Wt Readings from Last 3 Encounters:   04/24/23 90 2 kg (198 lb 13 7 oz)   04/19/23 93 9 kg (207 lb)   04/10/23 93 8 kg (206 lb 12 8 oz)     Temp Readings from Last 3 Encounters:   04/26/23 98 9 °F (37 2 °C) (Oral)   04/23/23 (!) 97 3 °F (36 3 °C) (Temporal)   04/21/23 98 3 °F (36 8 °C)     BP Readings from Last 3 Encounters:   04/26/23 108/54   04/23/23 130/62   04/21/23 130/70     Pulse Readings from Last 3 Encounters:   04/26/23 83   04/23/23 83   04/21/23 70        Physical Examination:  Eyes: Mild conjunctival pallor present  Neck: No obvious lymphadenopathy appreciated  Respiratory: Bilateral air entry present  CVS: No significant edema in legs  GI: Soft, nondistended  CNS: Active alert oriented x3  Skin: No new rash, right lower extremity dressing present  Musculoskeletal: No obvious new gross deformity noted    Medications:    Current Facility-Administered Medications:   •  acetaminophen (TYLENOL) tablet 975 mg, 975 mg, Oral, Q6H [address], Kymberly Bass DO, 975 mg at 04/26/23 4542  •  ascorbic acid (VITAMIN C) tablet 500 mg, 500 mg, Oral, Daily, Kymberly Bass DO, 500 mg at 04/26/23 7759  •  aspirin (ECOTRIN LOW STRENGTH) EC tablet 81 mg, 81 mg, Oral, Every Other Day, Sakina Broom, DO, 81 mg at 04/25/23 0818  •  atorvastatin (LIPITOR) tablet 40 mg, 40 mg, Oral, QPM, Sakina Broom, DO, 40 mg at 04/25/23 2864  •  clopidogrel (PLAVIX) tablet 75 mg, 75 mg, Oral, Daily, Sakina Yates DO, 75 mg at 04/26/23 0801  •  escitalopram (LEXAPRO) tablet 10 mg, 10 mg, Oral, Daily, Yovany Deras, DO, 10 mg at 04/26/23 8470  •  ferrous sulfate tablet 325 mg, 325 mg, Oral, Daily With Breakfast, Yovany Deras DO, 325 mg at 04/26/23 0800  •  finasteride (PROSCAR) tablet 5 mg, 5 mg, Oral, Daily, Yovany Deras, DO, 5 mg at 04/26/23 7834  •  gabapentin (NEURONTIN) capsule 100 mg, 100 mg, Oral, TID, Yovany Deras, DO, 100 mg at 04/26/23 0801  •  heparin (porcine) 2,000 Units, papaverine 60 mg in multi-electrolyte (PLASMALYTE-A/ISOLYTE-S PH 7 4) 500 mL irrigation, , Irrigation, Once, Cyndy Ramirez MD  •  heparin (porcine) subcutaneous injection 5,000 Units, 5,000 Units, Subcutaneous, Q8H [address], 5,000 Units at 04/26/23 8754 **AND** [COMPLETED] Platelet count, , , Once, Yovany Deras DO  •  melatonin tablet 6 mg, 6 mg, Oral, HS PRN, Nia Dine, CRNP, 6 mg at 04/25/23 2034  •  metoprolol succinate (TOPROL-XL) 24 hr tablet 25 mg, 25 mg, Oral, Daily, Yovany Deras, DO, 25 mg at 04/26/23 6649  •  multivitamin stress formula tablet 1 tablet, 1 tablet, Oral, Daily, Yovany Deras DO, 1 tablet at 04/26/23 4618  •  oxyCODONE (ROXICODONE) IR tablet 10 mg, 10 mg, Oral, Q4H PRN, Yovany Priestato, DO, 10 mg at 04/24/23 1651  •  oxyCODONE (ROXICODONE) IR tablet 5 mg, 5 mg, Oral, Q4H PRN, Yovany Priestato, DO, 5 mg at 04/26/23 0801  •  pantoprazole (PROTONIX) EC tablet 40 mg, 40 mg, Oral, Early Morning, Yovany Deras DO, 40 mg at 04/26/23 7311  •  psyllium (METAMUCIL) 1 packet, 1 packet, Oral, Daily, Nia Dine, CRNP, 1 packet at 04/26/23 8007  •  tamsulosin (FLOMAX) capsule 0 4 mg, 0 4 mg, Oral, Daily With [place] Bass DO, 0 4 mg at 04/25/23 1617  •  traZODone (DESYREL) tablet 50 mg, 50 mg, Oral, HS, Yovany Deras DO, 50 mg at 04/25/23 2034    Laboratory Results:  Results from last 7 days   Lab Units 04/26/23  0357 04/25/23  0534 04/24/23  1538   WBC Thousand/uL 6 86 7 69  --    HEMOGLOBIN g/dL 11 0* 10 7*  -- "   HEMATOCRIT % 35 0* 32 9*  --    PLATELETS Thousands/uL 123* 135* 138*   SODIUM mmol/L 141 139  --    POTASSIUM mmol/L 4 2 4 2  --    CHLORIDE mmol/L 108 106  --    CO2 mmol/L 26 28  --    BUN mg/dL 19 19  --    CREATININE mg/dL 1 21 1 23  --    CALCIUM mg/dL 8 3* 8 1*  --    MAGNESIUM mg/dL 1 7*  --   --        XR ankle 3+ vw right   Final Result by Suzy Potter MD (04/25 0930)      No acute osseous abnormality  Workstation performed: [id number]         IR lower extremity angiogram    (Results Pending)       Portions of the record may have been created with voice recognition software  Occasional wrong word or \"sound a like\" substitutions may have occurred due to the inherent limitations of voice recognition software  Read the chart carefully and recognize, using context, where substitutions have occurred      "

## 2023-04-26 NOTE — PLAN OF CARE
Problem: MOBILITY - ADULT  Goal: Maintain or return to baseline ADL function  Description: INTERVENTIONS:  -  Assess patient's ability to carry out ADLs; assess patient's baseline for ADL function and identify physical deficits which impact ability to perform ADLs (bathing, care of mouth/teeth, toileting, grooming, dressing, etc )  - Assess/evaluate cause of self-care deficits   - Assess range of motion  - Assess patient's mobility; develop plan if impaired  - Assess patient's need for assistive devices and provide as appropriate  - Encourage maximum independence but intervene and supervise when necessary  - Involve family in performance of ADLs  - Assess for home care needs following discharge   - Consider OT consult to assist with ADL evaluation and planning for discharge  - Provide patient education as appropriate  Outcome: Progressing  Goal: Maintains/Returns to pre admission functional level  Description: INTERVENTIONS:  - Perform BMAT or MOVE assessment daily    - Set and communicate daily mobility goal to care team and patient/family/caregiver  - Collaborate with rehabilitation services on mobility goals if consulted  - Perform Range of Motion 4 times a day  - Reposition patient every 2 hours    - Dangle patient 3  - Stand patient 3 times a day  - Ambulate patient 3 times a day  - Out of bed to chair 3 times a day   - Out of bed for meals 3 times a day  - Out of bed for toileting  - Record patient progress and toleration of activity level   Outcome: Progressing     Problem: PAIN - ADULT  Goal: Verbalizes/displays adequate comfort level or baseline comfort level  Description: Interventions:  - Encourage patient to monitor pain and request assistance  - Assess pain using appropriate pain scale  - Administer analgesics based on type and severity of pain and evaluate response  - Implement non-pharmacological measures as appropriate and evaluate response  - Consider cultural and social influences on pain and pain management  - Notify physician/advanced practitioner if interventions unsuccessful or patient reports new pain  Outcome: Progressing     Problem: INFECTION - ADULT  Goal: Absence or prevention of progression during hospitalization  Description: INTERVENTIONS:  - Assess and monitor for signs and symptoms of infection  - Monitor lab/diagnostic results  - Monitor all insertion sites, i e  indwelling lines, tubes, and drains  - Monitor endotracheal if appropriate and nasal secretions for changes in amount and color  - [place] appropriate cooling/warming therapies per order  - Administer medications as ordered  - Instruct and encourage patient and family to use good hand hygiene technique  - Identify and instruct in appropriate isolation precautions for identified infection/condition  Outcome: Progressing  Goal: Absence of fever/infection during neutropenic period  Description: INTERVENTIONS:  - Monitor WBC    Outcome: Progressing     Problem: SAFETY ADULT  Goal: Maintain or return to baseline ADL function  Description: INTERVENTIONS:  -  Assess patient's ability to carry out ADLs; assess patient's baseline for ADL function and identify physical deficits which impact ability to perform ADLs (bathing, care of mouth/teeth, toileting, grooming, dressing, etc )  - Assess/evaluate cause of self-care deficits   - Assess range of motion  - Assess patient's mobility; develop plan if impaired  - Assess patient's need for assistive devices and provide as appropriate  - Encourage maximum independence but intervene and supervise when necessary  - Involve family in performance of ADLs  - Assess for home care needs following discharge   - Consider OT consult to assist with ADL evaluation and planning for discharge  - Provide patient education as appropriate  Outcome: Progressing  Goal: Maintains/Returns to pre admission functional level  Description: INTERVENTIONS:  - Perform BMAT or MOVE assessment daily    - Set and communicate daily mobility goal to care team and patient/family/caregiver  - Collaborate with rehabilitation services on mobility goals if consulted  - Perform Range of Motion 4 times a day  - Reposition patient every 2 hours    - Dangle patient 3  - Stand patient 3 times a day  - Ambulate patient 3 times a day  - Out of bed to chair 3 times a day   - Out of bed for meals 3 times a day  - Out of bed for toileting  - Record patient progress and toleration of activity level   Outcome: Progressing  Goal: Patient will remain free of falls  Description: INTERVENTIONS:  - Educate patient/family on patient safety including physical limitations  - Instruct patient to call for assistance with activity   - Consult OT/PT to assist with strengthening/mobility   - Keep Call bell within reach  - Keep bed low and locked with side rails adjusted as appropriate  - Keep care items and personal belongings within reach  - Initiate and maintain comfort rounds  - Make Fall Risk Sign visible to staff  - Offer Toileting every 2 Hours, in advance of need  - Initiate/Maintain bed alarm  - Obtain necessary fall risk management equipment  - Apply yellow socks and bracelet for high fall risk patients  - Consider moving patient to room near nurses station  Outcome: Progressing     Problem: DISCHARGE PLANNING  Goal: Discharge to home or other facility with appropriate resources  Description: INTERVENTIONS:  - Identify barriers to discharge w/patient and caregiver  - Arrange for needed discharge resources and transportation as appropriate  - Identify discharge learning needs (meds, wound care, etc )  - Arrange for interpretive services to assist at discharge as needed  - Refer to Case Management Department for coordinating discharge planning if the patient needs post-hospital services based on physician/advanced practitioner order or complex needs related to functional status, cognitive ability, or social support system  Outcome: Progressing Problem: Knowledge Deficit  Goal: Patient/family/caregiver demonstrates understanding of disease process, treatment plan, medications, and discharge instructions  Description: Complete learning assessment and assess knowledge base    Interventions:  - Provide teaching at level of understanding  - Provide teaching via preferred learning methods  Outcome: Progressing     Problem: Prexisting or High Potential for Compromised Skin Integrity  Goal: Skin integrity is maintained or improved  Description: INTERVENTIONS:  - Identify patients at risk for skin breakdown  - Assess and monitor skin integrity  - Assess and monitor nutrition and hydration status  - Monitor labs   - Assess for incontinence   - Turn and reposition patient  - Assist with mobility/ambulation  - Relieve pressure over bony prominences  - Avoid friction and shearing  - Provide appropriate hygiene as needed including keeping skin clean and dry  - Evaluate need for skin moisturizer/barrier cream  - Collaborate with interdisciplinary team   - Patient/family teaching  - Consider wound care consult   Outcome: Progressing     Problem: NEUROSENSORY - ADULT  Goal: Achieves stable or improved neurological status  Description: INTERVENTIONS  - Monitor and report changes in neurological status  - Monitor vital signs such as temperature, blood pressure, glucose, and any other labs ordered   - Initiate measures to prevent increased intracranial pressure  - Monitor for seizure activity and implement precautions if appropriate      Outcome: Progressing  Goal: Remains free of injury related to seizures activity  Description: INTERVENTIONS  - Maintain airway, patient safety  and administer oxygen as ordered  - Monitor patient for seizure activity, document and report duration and description of seizure to physician/advanced practitioner  - If seizure occurs,  ensure patient safety during seizure  - Reorient patient post seizure  - Seizure pads on all 4 side rails  - Instruct patient/family to notify RN of any seizure activity including if an aura is experienced  - Instruct patient/family to call for assistance with activity based on nursing assessment  - Administer anti-seizure medications if ordered    Outcome: Progressing  Goal: Achieves maximal functionality and self care  Description: INTERVENTIONS  - Monitor swallowing and airway patency with patient fatigue and changes in neurological status  - Encourage and assist patient to increase activity and self care     - Encourage visually impaired, hearing impaired and aphasic patients to use assistive/communication devices  Outcome: Progressing     Problem: CARDIOVASCULAR - ADULT  Goal: Maintains optimal cardiac output and hemodynamic stability  Description: INTERVENTIONS:  - Monitor I/O, vital signs and rhythm  - Monitor for S/S and trends of decreased cardiac output  - Administer and titrate ordered vasoactive medications to optimize hemodynamic stability  - Assess quality of pulses, skin color and temperature  - Assess for signs of decreased coronary artery perfusion  - Instruct patient to report change in severity of symptoms  Outcome: Progressing  Goal: Absence of cardiac dysrhythmias or at baseline rhythm  Description: INTERVENTIONS:  - Continuous cardiac monitoring, vital signs, obtain 12 lead EKG if ordered  - Administer antiarrhythmic and heart rate control medications as ordered  - Monitor electrolytes and administer replacement therapy as ordered  Outcome: Progressing     Problem: RESPIRATORY - ADULT  Goal: Achieves optimal ventilation and oxygenation  Description: INTERVENTIONS:  - Assess for changes in respiratory status  - Assess for changes in mentation and behavior  - Position to facilitate oxygenation and minimize respiratory effort  - Oxygen administered by appropriate delivery if ordered  - Initiate smoking cessation education as indicated  - Encourage broncho-pulmonary hygiene including cough, deep breathe, Incentive Spirometry  - Assess the need for suctioning and aspirate as needed  - Assess and instruct to report SOB or any respiratory difficulty  - Respiratory Therapy support as indicated  Outcome: Progressing     Problem: SKIN/TISSUE INTEGRITY - ADULT  Goal: Skin Integrity remains intact(Skin Breakdown Prevention)  Description: Assess:  -Perform Capo assessment every 12 hours   -Clean and moisturize skin every daily  -Inspect skin when repositioning, toileting, and assisting with ADLS  -Assess extremities for adequate circulation and sensation     Bed Management:  -Have minimal linens on bed & keep smooth, unwrinkled  -Change linens as needed when moist or perspiring  -Avoid sitting or lying in one position for more than 2 hours while in bed  -Keep HOB at 30 degrees     Toileting:  -Offer bedside commode  -Assess for incontinence every 2 hours  -Use incontinent care products after each incontinent episode such as barrier cream      Activity:  -Mobilize patient 2 times a day  -Encourage activity and walks on unit  -Encourage or provide ROM exercises   -Turn and reposition patient every 2 Hours  -Use appropriate equipment to lift or move patient in bed  -Instruct/ Assist with weight shifting every 2 hours when out of bed in chair  -Consider limitation of chair time 2 hour intervals    Skin Care:  -Avoid use of baby powder, tape, friction and shearing, hot water or constrictive clothing  -Relieve pressure over bony prominences using pillows and wedges    -Do not massage red bony areas    Next Steps:  -Teach patient strategies to minimize risks such as repositioning    -Consider consults to  interdisciplinary teams such as PT/OT     Outcome: Progressing     Problem: MUSCULOSKELETAL - ADULT  Goal: Maintain or return mobility to safest level of function  Description: INTERVENTIONS:  - Assess patient's ability to carry out ADLs; assess patient's baseline for ADL function and identify physical deficits which impact ability to perform ADLs (bathing, care of mouth/teeth, toileting, grooming, dressing, etc )  - Assess/evaluate cause of self-care deficits   - Assess range of motion  - Assess patient's mobility  - Assess patient's need for assistive devices and provide as appropriate  - Encourage maximum independence but intervene and supervise when necessary  - Involve family in performance of ADLs  - Assess for home care needs following discharge   - Consider OT consult to assist with ADL evaluation and planning for discharge  - Provide patient education as appropriate  Outcome: Progressing

## 2023-04-26 NOTE — PLAN OF CARE
Problem: MOBILITY - ADULT  Goal: Maintain or return to baseline ADL function  Description: INTERVENTIONS:  -  Assess patient's ability to carry out ADLs; assess patient's baseline for ADL function and identify physical deficits which impact ability to perform ADLs (bathing, care of mouth/teeth, toileting, grooming, dressing, etc )  - Assess/evaluate cause of self-care deficits   - Assess range of motion  - Assess patient's mobility; develop plan if impaired  - Assess patient's need for assistive devices and provide as appropriate  - Encourage maximum independence but intervene and supervise when necessary  - Involve family in performance of ADLs  - Assess for home care needs following discharge   - Consider OT consult to assist with ADL evaluation and planning for discharge  - Provide patient education as appropriate  4/26/2023 1035 by Landon Pacheco RN  Outcome: Adequate for Discharge  4/26/2023 0910 by Landon Pacheco RN  Outcome: Progressing  Goal: Maintains/Returns to pre admission functional level  Description: INTERVENTIONS:  - Perform BMAT or MOVE assessment daily    - Set and communicate daily mobility goal to care team and patient/family/caregiver  - Collaborate with rehabilitation services on mobility goals if consulted  - Perform Range of Motion 4 times a day  - Reposition patient every 2 hours    - Dangle patient 3  - Stand patient 3 times a day  - Ambulate patient 3 times a day  - Out of bed to chair 3 times a day   - Out of bed for meals 3 times a day  - Out of bed for toileting  - Record patient progress and toleration of activity level   4/26/2023 1035 by Landon Pacheco RN  Outcome: Adequate for Discharge  4/26/2023 0910 by Landon Pacheco RN  Outcome: Progressing     Problem: PAIN - ADULT  Goal: Verbalizes/displays adequate comfort level or baseline comfort level  Description: Interventions:  - Encourage patient to monitor pain and request assistance  - Assess pain using appropriate pain scale  - Administer analgesics based on type and severity of pain and evaluate response  - Implement non-pharmacological measures as appropriate and evaluate response  - Consider cultural and social influences on pain and pain management  - Notify physician/advanced practitioner if interventions unsuccessful or patient reports new pain  4/26/2023 1035 by Mando Ashford RN  Outcome: Adequate for Discharge  4/26/2023 0910 by Mando Ashford RN  Outcome: Progressing     Problem: INFECTION - ADULT  Goal: Absence or prevention of progression during hospitalization  Description: INTERVENTIONS:  - Assess and monitor for signs and symptoms of infection  - Monitor lab/diagnostic results  - Monitor all insertion sites, i e  indwelling lines, tubes, and drains  - Monitor endotracheal if appropriate and nasal secretions for changes in amount and color  - [place] appropriate cooling/warming therapies per order  - Administer medications as ordered  - Instruct and encourage patient and family to use good hand hygiene technique  - Identify and instruct in appropriate isolation precautions for identified infection/condition  4/26/2023 1035 by Mando Ashford RN  Outcome: Adequate for Discharge  4/26/2023 0910 by Mando Ashford RN  Outcome: Progressing  Goal: Absence of fever/infection during neutropenic period  Description: INTERVENTIONS:  - Monitor WBC    4/26/2023 1035 by Mando Ashford RN  Outcome: Adequate for Discharge  4/26/2023 0910 by Mando Ashford RN  Outcome: Progressing     Problem: SAFETY ADULT  Goal: Maintain or return to baseline ADL function  Description: INTERVENTIONS:  -  Assess patient's ability to carry out ADLs; assess patient's baseline for ADL function and identify physical deficits which impact ability to perform ADLs (bathing, care of mouth/teeth, toileting, grooming, dressing, etc )  - Assess/evaluate cause of self-care deficits   - Assess range of motion  - Assess patient's mobility; develop plan if impaired  - Assess patient's need for assistive devices and provide as appropriate  - Encourage maximum independence but intervene and supervise when necessary  - Involve family in performance of ADLs  - Assess for home care needs following discharge   - Consider OT consult to assist with ADL evaluation and planning for discharge  - Provide patient education as appropriate  4/26/2023 1035 by Courtney Ruano RN  Outcome: Adequate for Discharge  4/26/2023 0910 by Courtney Ruano RN  Outcome: Progressing  Goal: Maintains/Returns to pre admission functional level  Description: INTERVENTIONS:  - Perform BMAT or MOVE assessment daily    - Set and communicate daily mobility goal to care team and patient/family/caregiver  - Collaborate with rehabilitation services on mobility goals if consulted  - Perform Range of Motion 4 times a day  - Reposition patient every 2 hours    - Dangle patient 3  - Stand patient 3 times a day  - Ambulate patient 3 times a day  - Out of bed to chair 3 times a day   - Out of bed for meals 3 times a day  - Out of bed for toileting  - Record patient progress and toleration of activity level   4/26/2023 1035 by Courtney Ruano RN  Outcome: Adequate for Discharge  4/26/2023 0910 by Courtney Ruano RN  Outcome: Progressing  Goal: Patient will remain free of falls  Description: INTERVENTIONS:  - Educate patient/family on patient safety including physical limitations  - Instruct patient to call for assistance with activity   - Consult OT/PT to assist with strengthening/mobility   - Keep Call bell within reach  - Keep bed low and locked with side rails adjusted as appropriate  - Keep care items and personal belongings within reach  - Initiate and maintain comfort rounds  - Make Fall Risk Sign visible to staff  - Offer Toileting every 2 Hours, in advance of need  - Initiate/Maintain bed alarm  - Obtain necessary fall risk management equipment  - Apply yellow socks and bracelet for high fall risk patients  - Consider moving patient to room near nurses station  4/26/2023 1035 by Ronit Gordon RN  Outcome: Adequate for Discharge  4/26/2023 0910 by Ronit Gordon RN  Outcome: Progressing     Problem: DISCHARGE PLANNING  Goal: Discharge to home or other facility with appropriate resources  Description: INTERVENTIONS:  - Identify barriers to discharge w/patient and caregiver  - Arrange for needed discharge resources and transportation as appropriate  - Identify discharge learning needs (meds, wound care, etc )  - Arrange for interpretive services to assist at discharge as needed  - Refer to Case Management Department for coordinating discharge planning if the patient needs post-hospital services based on physician/advanced practitioner order or complex needs related to functional status, cognitive ability, or social support system  4/26/2023 1035 by Ronit Gordon RN  Outcome: Adequate for Discharge  4/26/2023 0910 by Ronit Gordon RN  Outcome: Progressing     Problem: Knowledge Deficit  Goal: Patient/family/caregiver demonstrates understanding of disease process, treatment plan, medications, and discharge instructions  Description: Complete learning assessment and assess knowledge base    Interventions:  - Provide teaching at level of understanding  - Provide teaching via preferred learning methods  4/26/2023 1035 by Ronit Gordon RN  Outcome: Adequate for Discharge  4/26/2023 0910 by Ronit Gordon RN  Outcome: Progressing     Problem: Prexisting or High Potential for Compromised Skin Integrity  Goal: Skin integrity is maintained or improved  Description: INTERVENTIONS:  - Identify patients at risk for skin breakdown  - Assess and monitor skin integrity  - Assess and monitor nutrition and hydration status  - Monitor labs   - Assess for incontinence   - Turn and reposition patient  - Assist with mobility/ambulation  - Relieve pressure over bony prominences  - Avoid friction and shearing  - Provide appropriate hygiene as needed including keeping skin clean and dry  - Evaluate need for skin moisturizer/barrier cream  - Collaborate with interdisciplinary team   - Patient/family teaching  - Consider wound care consult   4/26/2023 1035 by Edel Mckay RN  Outcome: Adequate for Discharge  4/26/2023 0910 by Edel Mckay RN  Outcome: Progressing     Problem: NEUROSENSORY - ADULT  Goal: Achieves stable or improved neurological status  Description: INTERVENTIONS  - Monitor and report changes in neurological status  - Monitor vital signs such as temperature, blood pressure, glucose, and any other labs ordered   - Initiate measures to prevent increased intracranial pressure  - Monitor for seizure activity and implement precautions if appropriate      4/26/2023 1035 by Edel Mckay RN  Outcome: Adequate for Discharge  4/26/2023 0910 by Edel Mckay RN  Outcome: Progressing  Goal: Remains free of injury related to seizures activity  Description: INTERVENTIONS  - Maintain airway, patient safety  and administer oxygen as ordered  - Monitor patient for seizure activity, document and report duration and description of seizure to physician/advanced practitioner  - If seizure occurs,  ensure patient safety during seizure  - Reorient patient post seizure  - Seizure pads on all 4 side rails  - Instruct patient/family to notify RN of any seizure activity including if an aura is experienced  - Instruct patient/family to call for assistance with activity based on nursing assessment  - Administer anti-seizure medications if ordered    4/26/2023 1035 by Edel Mckay RN  Outcome: Adequate for Discharge  4/26/2023 0910 by Edel Mckay RN  Outcome: Progressing  Goal: Achieves maximal functionality and self care  Description: INTERVENTIONS  - Monitor swallowing and airway patency with patient fatigue and changes in neurological status  - Encourage and assist patient to increase activity and self care     - Encourage visually impaired, hearing impaired and aphasic patients to use assistive/communication devices  4/26/2023 1035 by Judy Camarillo RN  Outcome: Adequate for Discharge  4/26/2023 0910 by Judy Camarillo RN  Outcome: Progressing     Problem: CARDIOVASCULAR - ADULT  Goal: Maintains optimal cardiac output and hemodynamic stability  Description: INTERVENTIONS:  - Monitor I/O, vital signs and rhythm  - Monitor for S/S and trends of decreased cardiac output  - Administer and titrate ordered vasoactive medications to optimize hemodynamic stability  - Assess quality of pulses, skin color and temperature  - Assess for signs of decreased coronary artery perfusion  - Instruct patient to report change in severity of symptoms  4/26/2023 1035 by Judy Camarillo RN  Outcome: Adequate for Discharge  4/26/2023 0910 by Judy Camarillo RN  Outcome: Progressing  Goal: Absence of cardiac dysrhythmias or at baseline rhythm  Description: INTERVENTIONS:  - Continuous cardiac monitoring, vital signs, obtain 12 lead EKG if ordered  - Administer antiarrhythmic and heart rate control medications as ordered  - Monitor electrolytes and administer replacement therapy as ordered  4/26/2023 1035 by Judy Camarillo RN  Outcome: Adequate for Discharge  4/26/2023 0910 by Judy Camarillo RN  Outcome: Progressing     Problem: RESPIRATORY - ADULT  Goal: Achieves optimal ventilation and oxygenation  Description: INTERVENTIONS:  - Assess for changes in respiratory status  - Assess for changes in mentation and behavior  - Position to facilitate oxygenation and minimize respiratory effort  - Oxygen administered by appropriate delivery if ordered  - Initiate smoking cessation education as indicated  - Encourage broncho-pulmonary hygiene including cough, deep breathe, Incentive Spirometry  - Assess the need for suctioning and aspirate as needed  - Assess and instruct to report SOB or any respiratory difficulty  - Respiratory Therapy support as indicated  4/26/2023 1035 by Jesus Roe RN  Outcome: Adequate for Discharge  4/26/2023 0910 by Jesus Roe RN  Outcome: Progressing     Problem: SKIN/TISSUE INTEGRITY - ADULT  Goal: Skin Integrity remains intact(Skin Breakdown Prevention)  Description: Assess:  -Perform [place] assessment every 12 hours   -Clean and moisturize skin every day  -Inspect skin when repositioning, toileting, and assisting with ADLS  -Assess under medical devices such as n/a   -Assess extremities for adequate circulation and sensation     Bed Management:  -Have minimal linens on bed & keep smooth, unwrinkled  -Change linens as needed when moist or perspiring  -Avoid sitting or lying in one position for more than 2 hours while in bed  -Keep HOB at 30 degrees     Toileting:  -Offer bedside commode  -Assess for incontinence every 2 hours  -Use incontinent care products after each incontinent episode such as barrier cream     Activity:  -Mobilize patient 2 times a day  -Encourage activity and walks on unit  -Encourage or provide ROM exercises   -Turn and reposition patient every 2 Hours  -Use appropriate equipment to lift or move patient in bed  -Instruct/ Assist with weight shifting every 2 when out of bed in chair  -Consider limitation of chair time 2 hour intervals    Skin Care:  -Avoid use of baby powder, tape, friction and shearing, hot water or constrictive clothing  -Relieve pressure over bony prominences using pillows    -Do not massage red bony areas    Next Steps:  -Teach patient strategies to minimize risks such as weight shifting    -Consider consults to  interdisciplinary teams such as pt/ot    4/26/2023 1035 by Jesus Roe RN  Outcome: Adequate for Discharge  4/26/2023 [id number] by Jesus Roe RN  Outcome: Progressing     Problem: MUSCULOSKELETAL - ADULT  Goal: Maintain or return mobility to safest level of function  Description: INTERVENTIONS:  - Assess patient's ability to carry out ADLs; assess patient's baseline for ADL function and identify physical deficits which impact ability to perform ADLs (bathing, care of mouth/teeth, toileting, grooming, dressing, etc )  - Assess/evaluate cause of self-care deficits   - Assess range of motion  - Assess patient's mobility  - Assess patient's need for assistive devices and provide as appropriate  - Encourage maximum independence but intervene and supervise when necessary  - Involve family in performance of ADLs  - Assess for home care needs following discharge   - Consider OT consult to assist with ADL evaluation and planning for discharge  - Provide patient education as appropriate  4/26/2023 1035 by Shelby Mackenzie RN  Outcome: Adequate for Discharge  4/26/2023 0910 by Shelby Mackenzie RN  Outcome: Progressing

## 2023-04-26 NOTE — NURSING NOTE
Discharged completed  AVS reviewed with patient and daughter  Script for blood work given  IV's removed  All questions answered at this time  Patient left with daughter

## 2023-04-27 ENCOUNTER — PATIENT OUTREACH (OUTPATIENT)
Dept: FAMILY MEDICINE CLINIC | Facility: HOSPITAL | Age: 78
End: 2023-04-27

## 2023-04-27 ENCOUNTER — TELEPHONE (OUTPATIENT)
Dept: NEPHROLOGY | Facility: CLINIC | Age: 78
End: 2023-04-27

## 2023-04-27 NOTE — TELEPHONE ENCOUNTER
Called and spoke with patient  Patient stated theres a nurse that goes to his house every other day to get his BP he will write it down

## 2023-04-27 NOTE — TELEPHONE ENCOUNTER
Called and spoke with patient  Patient aware  BP readings at home by early next week  He can continue his routine next office appointment  Patient stated he doesn't know how to do that

## 2023-04-27 NOTE — PROGRESS NOTES
Outpatient Care Management Note:    Care manager called Flakito following up after his recent hospitalization and surgery  He states that he is doing well  He states he has an incision in his groin  [facility] nurses are scheduled to see him tomorrow  He denies any leg swelling and only notes slight pain with movement  He will monitor his groin for any bleeding, swelling, or increased pain  He will call the surgeon with any questions  CM reviewed that Flakito needs to complete a BMP today  He is aware and will be getting it done tomorrow  We reviewed all AVS instructions  Flakito declined completing a med review  He seemed to be in a rush  We did review that lisinopril and maxide were stopped  Flakito does not have a BP cuff to check his pressures  He will be sure [facility] nurse checks his pressure tomorrow and calls nephrology with any concerns  He is to start plavix and oxycodone  He still needs to pick one of them up from the pharmacy today  He was unsure which one  He states his daughter corrected his med boxes with all changes  Follow up appts reviewed  Flakito has my contact information and will call with any questions  Call received from Flakito stating that he picked up the clopidogrel and wanted to be sure he was to take it  CM reviewed that this was ordered on hospital discharge  It is an antiplatlet medication used to help keep blood vessels open

## 2023-04-27 NOTE — TELEPHONE ENCOUNTER
----- Message from Yoni Julian MD sent at 4/27/2023  8:28 AM EDT -----  Hi team,   He got discharged from [facility] yesterday  Please follow-up with him regarding his BP readings at home by early next week  He can continue his routine next office appointment  Dr Netta Mueller,   He had lower extremity angiogram, angioplasty and endarterectomy  We held lisinopril, triamterene/HCTZ  Postprocedure his blood pressure was lower so I recommended not to restart lisinopril, triamterene/HCTZ on discharge but likely he will need to restart them as outpatient  Will need to follow-up with him with his daily BP readings  Creatinine stable at baseline

## 2023-04-28 ENCOUNTER — HOME CARE VISIT (OUTPATIENT)
Dept: HOME HEALTH SERVICES | Facility: HOME HEALTHCARE | Age: 78
End: 2023-04-28

## 2023-04-28 ENCOUNTER — TELEPHONE (OUTPATIENT)
Dept: FAMILY MEDICINE CLINIC | Facility: HOSPITAL | Age: 78
End: 2023-04-28

## 2023-04-28 ENCOUNTER — TELEPHONE (OUTPATIENT)
Dept: VASCULAR SURGERY | Facility: CLINIC | Age: 78
End: 2023-04-28

## 2023-04-28 ENCOUNTER — OFFICE VISIT (OUTPATIENT)
Dept: FAMILY MEDICINE CLINIC | Facility: HOSPITAL | Age: 78
End: 2023-04-28

## 2023-04-28 ENCOUNTER — APPOINTMENT (OUTPATIENT)
Dept: LAB | Facility: HOSPITAL | Age: 78
End: 2023-04-28

## 2023-04-28 VITALS
WEIGHT: 208.8 LBS | DIASTOLIC BLOOD PRESSURE: 62 MMHG | HEIGHT: 70 IN | HEART RATE: 76 BPM | SYSTOLIC BLOOD PRESSURE: 130 MMHG | BODY MASS INDEX: 29.89 KG/M2 | TEMPERATURE: 97.1 F

## 2023-04-28 VITALS
OXYGEN SATURATION: 98 % | DIASTOLIC BLOOD PRESSURE: 86 MMHG | TEMPERATURE: 96.7 F | SYSTOLIC BLOOD PRESSURE: 158 MMHG | RESPIRATION RATE: 16 BRPM | HEART RATE: 85 BPM

## 2023-04-28 DIAGNOSIS — Z09 HOSPITAL DISCHARGE FOLLOW-UP: ICD-10-CM

## 2023-04-28 DIAGNOSIS — I10 PRIMARY HYPERTENSION: Chronic | ICD-10-CM

## 2023-04-28 DIAGNOSIS — R97.20 ELEVATED PSA: ICD-10-CM

## 2023-04-28 DIAGNOSIS — N18.32 STAGE 3B CHRONIC KIDNEY DISEASE (HCC): ICD-10-CM

## 2023-04-28 DIAGNOSIS — Z09 HOSPITAL DISCHARGE FOLLOW-UP: Primary | ICD-10-CM

## 2023-04-28 DIAGNOSIS — I73.9 PERIPHERAL ARTERY DISEASE (HCC): ICD-10-CM

## 2023-04-28 DIAGNOSIS — L02.415 ABSCESS OF RIGHT LEG EXCLUDING FOOT: ICD-10-CM

## 2023-04-28 DIAGNOSIS — D69.6 PLATELETS DECREASED (HCC): ICD-10-CM

## 2023-04-28 DIAGNOSIS — F32.A DEPRESSION: ICD-10-CM

## 2023-04-28 PROBLEM — N17.9 ACUTE RENAL FAILURE SUPERIMPOSED ON CHRONIC KIDNEY DISEASE (HCC): Status: RESOLVED | Noted: 2020-12-15 | Resolved: 2023-04-28

## 2023-04-28 PROBLEM — N18.9 ACUTE RENAL FAILURE SUPERIMPOSED ON CHRONIC KIDNEY DISEASE (HCC): Status: RESOLVED | Noted: 2020-12-15 | Resolved: 2023-04-28

## 2023-04-28 RX ORDER — TRAZODONE HYDROCHLORIDE 50 MG/1
50 TABLET ORAL
Qty: 30 TABLET | Refills: 2 | Status: SHIPPED | OUTPATIENT
Start: 2023-04-28 | End: 2023-06-21 | Stop reason: SDUPTHER

## 2023-04-28 NOTE — ASSESSMENT & PLAN NOTE
Has been stable w/o clinical symptoms, now on both ASA and Plavix, will check CBC with labs today - order given, will follow

## 2023-04-28 NOTE — ASSESSMENT & PLAN NOTE
[facility] coming out for wound care - was out today and wound dressed and wrapped and therefore not examined today, has f/u with Dr Lin Prom next week, call with any F/C/worsening apperance

## 2023-04-28 NOTE — PROGRESS NOTES
Assessment & Plan     1  Hospital discharge follow-up  -     Basic metabolic panel  -     Magnesium; Future  -     Vitamin B12    2  Peripheral artery disease (HCC)  Assessment & Plan:  S/p R common femoral, superficial femoral and profunda femoris endarterectomy with patch angioplasty and stenting - on ASA, Plavix and statin, does not smoke, has f/u with vascular in 2 wks, call with any issues in interm    Orders:  -     Basic metabolic panel  -     Magnesium; Future  -     Vitamin B12    3  Abscess of right leg excluding foot  Assessment & Plan:  [facility] coming out for wound care - was out today and wound dressed and wrapped and therefore not examined today, has f/u with Dr Grace How next week, call with any F/C/worsening apperance    Orders:  -     Basic metabolic panel  -     Magnesium; Future  -     Vitamin B12    4  Stage 3b chronic kidney disease [facility])  Assessment & Plan:  Lab Results   Component Value Date    EGFR 56 04/26/2023    EGFR 55 04/25/2023    EGFR 49 04/17/2023    CREATININE 1 21 04/26/2023    CREATININE 1 23 04/25/2023    CREATININE 1 35 (H) 04/17/2023   Stayed stable post-op, lisinopril and Maxzide held d/t contrast/procedure and low BP, Bp acceptable today off both those  meds and will con't to remain off both, will check BMP - order given, has f/u with Nephro in July    Orders:  -     Basic metabolic panel  -     Magnesium; Future  -     Vitamin B12    5  Primary hypertension  Assessment & Plan:  BP OK today off both lisinopril and Maxzide, doing well on just Toprol - XL, has mult specialists f/u and advised to call in interm if BP > 140/90, re-eval in 3 mos    Orders:  -     Basic metabolic panel  -     Magnesium; Future  -     Vitamin B12    6   Platelets decreased ([address] )  Assessment & Plan:  Has been stable w/o clinical symptoms, now on both ASA and Plavix, will check CBC with labs today - order given, will follow    Orders:  -     CBC and differential      Colonoscopy 2/21 - 5 yrs     BW 1/23     Echo 10/22     CUS 12/22      Subjective     Transitional Care Management Review:   Aracelis Hallman  is a 66 y o  male here for TCM follow up  Pt was admitted to [facility] from 4/24/23 to 4/26/23  Records were reviewed by myself in detail and events are summarized below  During the TCM phone call patient stated:  TCM Call     Date and time call was made  4/26/2023  2:50 PM    Hospital care reviewed  Records reviewed    Patient was hospitialized at  [address] [place] [address]; [address]    Date of Admission  04/24/23    Date of discharge  04/26/23    Diagnosis  right   leg    Disposition  Home    Were the patients medications reviewed and updated  No    Current Symptoms  None      TCM Call     Post hospital issues  None    Should patient be enrolled in anticoag monitoring? No    Scheduled for follow up? Yes    Not clinically warranted  Patient being readmitted for surgery 1/10/20    Patients specialists  Other (comment)    Other specialists names  vascular    Did you obtain your prescribed medications  Yes    Do you need help managing your prescriptions or medications  No    Is transportation to your appointment needed  No    Specify why  Imani Jc MA    I have advised the patient to call PCP with any new or worsening symptoms  mat young ma    Living Arrangements  Alone    Are you recieving any outpatient services  No    Are you recieving home care services  Yes    Types of home care services  Nurse visit    Are you using any community resources  No    Current waiver services  No    Have you fallen in the last 12 months  No    Interperter language line needed  No    Counseling  Patient        HPI Pt was admitted to [facility] on 4/24/23 for a scheduled RLE femoral endarterectomy  He had the procedure 1/20/04 w/o complications  Post operatively his Lisinopril and Maxzide were held d/t contrast given during procedure and CKD  [facility]  GFR remained stable    The 2 BP meds were not "restarted as pts BP was a bit low during hospital stay  He was started on Plavix and his ASA and statin were continued  He was seen by Podiatry and wound care orders were written for his abscess RLE  He did have a geriatric eval and a Vit B12 level was recommended as OP  Did have some drop in O2 sat but but had no symptoms and pt refused supplemental O2  He did well and was clinically stable for discharge on 4/26/23  Med list reviewed  Pt has been doing well since discharge  His surgical site was closed with glue and he notes no drainage/discharge/redness/warmth/F/C  He notes minimal pain \"every now and then\" depending on how he moves  He notes he had a nml BM today and is urinating well  He ntoes no issues bleeding/bruising with ASA/Plavix  He is sleeping well but he is not sure if he is using Trazodone - is using OTC Melatonin  He is having VNA come out for wound care and he had dressings changed today  He feels the wound is looking better  He has appt with Dr Micah Perez next week  He has f/u with Dr Valentino Hubbard on 5/8/23  BP elevated today  He has not been checking his BP outside the office  He notes no HA's/dizziness/double vision/CP  He is eating and drinking normally  He has appt with renal in July and an appt with me in July as well  Review of Systems   Constitutional: Negative for chills and fever  HENT: Negative for congestion and trouble swallowing  Eyes: Negative for pain and visual disturbance  Respiratory: Negative for cough and shortness of breath  Cardiovascular: Negative for chest pain and palpitations  Gastrointestinal: Negative for abdominal pain, blood in stool, constipation, diarrhea, nausea and vomiting  Genitourinary: Positive for frequency  Negative for difficulty urinating and dysuria  Musculoskeletal: Negative for arthralgias and myalgias  Skin: Positive for wound  Negative for rash  Neurological: Negative for dizziness and headaches     Hematological: " "Does not bruise/bleed easily  Psychiatric/Behavioral: Positive for sleep disturbance  Negative for confusion and dysphoric mood  Objective     /62   Pulse 76   Temp (!) 97 1 °F (36 2 °C) (Tympanic)   Ht 5' 10\" (1 778 m)   Wt 94 7 kg (208 lb 12 8 oz)   BMI 29 96 kg/m²      Physical Exam  Vitals and nursing note reviewed  Constitutional:       General: He is not in acute distress  Appearance: He is well-developed  He is obese  He is not ill-appearing  HENT:      Head: Normocephalic and atraumatic  Right Ear: External ear normal       Left Ear: External ear normal    Eyes:      General:         Right eye: No discharge  Left eye: No discharge  Conjunctiva/sclera: Conjunctivae normal    Neck:      Trachea: No tracheal deviation  Cardiovascular:      Rate and Rhythm: Normal rate and regular rhythm  Heart sounds: Murmur heard  Pulmonary:      Effort: Pulmonary effort is normal  No respiratory distress  Breath sounds: Normal breath sounds  No wheezing, rhonchi or rales  Abdominal:      General: There is no distension  Palpations: Abdomen is soft  Tenderness: There is no abdominal tenderness  There is no guarding or rebound  Musculoskeletal:      Cervical back: Neck supple  Left lower leg: No edema  Comments: Trace RLE edema   Lymphadenopathy:      Cervical: No cervical adenopathy  Skin:     General: Skin is warm and dry  Findings: No bruising or rash  Comments:  Distal LE wrapped   Neurological:      General: No focal deficit present  Mental Status: He is alert  Motor: No abnormal muscle tone  Psychiatric:         Mood and Affect: Mood normal          Behavior: Behavior normal          Thought Content:  Thought content normal          Judgment: Judgment normal        Medications have been reviewed by provider in current encounter    Miroslava Chaudhary DO       "

## 2023-04-28 NOTE — CASE COMMUNICATION
Justyn 73 KARISHMAA has resumed care of your patient to [address] [place] [address] service with the following disciplines:      SN  This report is informational only, no responses is needed  Primary focus of home health care: integumentary  Patient stated goals of care: wound healed  Anticipated visit pattern:  1w1 2w1 3w3 2w1 and next visit date: 5/1/23 WC 3 x per week RLE  See medication list - meds in home differ from AVS: Patient is not taking trazodon e  Significant clinical findings: Educated to buy BP cuff and keep a BID BP log  5/10 stinging making ambulation difficult  SOB with 200 ambulation  Scant edema LLE ankle  Patient is noncompliant with BID BP or daily weight  Other pertinent information: TC to [facility] office closed  [employer] connect to Dr Grace How to clarify wound care  TC to nephrology left message with Amor Garcia to report [place] line HTN /86 HR 85  Th ank you for allowing us to participate in the care of your patient        [employer] Mis Lowe RN

## 2023-04-28 NOTE — ASSESSMENT & PLAN NOTE
BP OK today off both lisinopril and Maxzide, doing well on just Toprol - XL, has mult specialists f/u and advised to call in interm if BP > 140/90, re-eval in 3 mos

## 2023-04-28 NOTE — TELEPHONE ENCOUNTER
Vascular Nurse Navigator Post Op Call    Procedure: 1  Right common femoral, superficial femoral, and profunda femoris endarterectomy with patch angioplasty  2  Aortoiliac angiogram  3  Retrograde right external iliac artery angioplasty with 6x80mm balloon  4  Placement of 8x80mm Innova self expanding stent in the right external iliac artery, post-dilated with 8x80mm balloon  5  Completion right lower extremity angiogram    Date of Procedure: 4/24/23    Surgeon:    Ana Greenfield MD - Primary     * Teodora Cortes DO - Assisting    Discharge Date: 4/26/23    Discharge Disposition: Home with [facility]    Leg Weakness?: No    Leg Swelling?: No    Leg Numbness?: No    Chest Pain?: No    Shortness of Breath?: No    Orthopnea?: No    Anticoagulation pt was discharged on post op?: Aspirin and Clopidogrel (Plavix)    Statin pt was discharged on post op?:  Lipitor (atorvastatin)    Bleeding?: No    Uncontrolled Pain?: No    Incision Concerns?: No    Fever or Chills?: No      Reviewed discharge instructions and incision care with patient  NEXT OFFICE VISIT SCHEDULED:  5/8/23 at 11:30 am with Dr Bryon [facility]oncalves [facility] Confirmed?: Yes      Any further questions/concerns? Patient stated that he is doing good since discharge  He stated that the nurse form [facility] just left and said everything looks good  Reviewed incision care with him - wash daily with soap and water  Reviewed discharge medications - Aspirin and Plavix  All questions answered  No concerns expressed at this time

## 2023-04-28 NOTE — ASSESSMENT & PLAN NOTE
Lab Results   Component Value Date    EGFR 56 04/26/2023    EGFR 55 04/25/2023    EGFR 49 04/17/2023    CREATININE 1 21 04/26/2023    CREATININE 1 23 04/25/2023    CREATININE 1 35 (H) 04/17/2023   Stayed stable post-op, lisinopril and Maxzide held d/t contrast/procedure and low BP, Bp acceptable today off both those  meds and will con't to remain off both, will check BMP - order given, has f/u with Nephro in July

## 2023-04-28 NOTE — ASSESSMENT & PLAN NOTE
S/p R common femoral, superficial femoral and profunda femoris endarterectomy with patch angioplasty and stenting - on ASA, Plavix and statin, does not smoke, has f/u with vascular in 2 wks, call with any issues in interm

## 2023-04-29 LAB
BASOPHILS # BLD AUTO: 0.04 THOUSANDS/ΜL (ref 0–0.1)
BASOPHILS NFR BLD AUTO: 1 % (ref 0–1)
EOSINOPHIL # BLD AUTO: 0.21 THOUSAND/ΜL (ref 0–0.61)
EOSINOPHIL NFR BLD AUTO: 3 % (ref 0–6)
ERYTHROCYTE [DISTWIDTH] IN BLOOD BY AUTOMATED COUNT: 14 % (ref 11.6–15.1)
HCT VFR BLD AUTO: 37.2 % (ref 36.5–49.3)
HGB BLD-MCNC: 12.1 G/DL (ref 12–17)
IMM GRANULOCYTES # BLD AUTO: 0.02 THOUSAND/UL (ref 0–0.2)
IMM GRANULOCYTES NFR BLD AUTO: 0 % (ref 0–2)
LYMPHOCYTES # BLD AUTO: 1.28 THOUSANDS/ΜL (ref 0.6–4.47)
LYMPHOCYTES NFR BLD AUTO: 17 % (ref 14–44)
MCH RBC QN AUTO: 31.3 PG (ref 26.8–34.3)
MCHC RBC AUTO-ENTMCNC: 32.5 G/DL (ref 31.4–37.4)
MCV RBC AUTO: 96 FL (ref 82–98)
MONOCYTES # BLD AUTO: 0.48 THOUSAND/ΜL (ref 0.17–1.22)
MONOCYTES NFR BLD AUTO: 7 % (ref 4–12)
NEUTROPHILS # BLD AUTO: 5.31 THOUSANDS/ΜL (ref 1.85–7.62)
NEUTS SEG NFR BLD AUTO: 72 % (ref 43–75)
NRBC BLD AUTO-RTO: 0 /100 WBCS
PLATELET # BLD AUTO: 196 THOUSANDS/UL (ref 149–390)
PMV BLD AUTO: 9 FL (ref 8.9–12.7)
PSA SERPL-MCNC: 4.1 NG/ML (ref 0–4)
RBC # BLD AUTO: 3.86 MILLION/UL (ref 3.88–5.62)
WBC # BLD AUTO: 7.34 THOUSAND/UL (ref 4.31–10.16)

## 2023-04-30 ENCOUNTER — HOME CARE VISIT (OUTPATIENT)
Dept: HOME HEALTH SERVICES | Facility: HOME HEALTHCARE | Age: 78
End: 2023-04-30

## 2023-04-30 LAB
ANION GAP SERPL CALCULATED.3IONS-SCNC: 3 MMOL/L (ref 4–13)
BUN SERPL-MCNC: 13 MG/DL (ref 5–25)
CALCIUM SERPL-MCNC: 9.1 MG/DL (ref 8.3–10.1)
CHLORIDE SERPL-SCNC: 106 MMOL/L (ref 96–108)
CO2 SERPL-SCNC: 29 MMOL/L (ref 21–32)
CREAT SERPL-MCNC: 1.29 MG/DL (ref 0.6–1.3)
GFR SERPL CREATININE-BSD FRML MDRD: 52 ML/MIN/1.73SQ M
GLUCOSE SERPL-MCNC: 185 MG/DL (ref 65–140)
MAGNESIUM SERPL-MCNC: 1.7 MG/DL (ref 1.6–2.6)
POTASSIUM SERPL-SCNC: 3.7 MMOL/L (ref 3.5–5.3)
SODIUM SERPL-SCNC: 138 MMOL/L (ref 135–147)
VIT B12 SERPL-MCNC: 756 PG/ML (ref 100–900)

## 2023-04-30 NOTE — CASE COMMUNICATION
1015  Aaron Singh Patient called into office regarding scheduling  Wanted to know when next visit was scheduled  Notified he is on the schedule for tomorrow  He verbalized understanding

## 2023-04-30 NOTE — CASE COMMUNICATION
1030  Nir Escobar Patient called into office regarding scheduling  He wanted to know when next SN visit was scheduled  Notified he is on schedule for tomorrow  He verbalized understanding

## 2023-05-01 ENCOUNTER — HOME CARE VISIT (OUTPATIENT)
Dept: HOME HEALTH SERVICES | Facility: HOME HEALTHCARE | Age: 78
End: 2023-05-01

## 2023-05-01 VITALS
DIASTOLIC BLOOD PRESSURE: 75 MMHG | HEART RATE: 94 BPM | RESPIRATION RATE: 16 BRPM | OXYGEN SATURATION: 97 % | SYSTOLIC BLOOD PRESSURE: 128 MMHG

## 2023-05-01 NOTE — UTILIZATION REVIEW
NOTIFICATION OF ADMISSION DISCHARGE   This is a Notification of Discharge from [address] [place] [address]  Please be advised that this patient has been discharge from our facility  Below you will find the admission and discharge date and time including the patients disposition  UTILIZATION REVIEW CONTACT:  Elsy Corona  Utilization   Network Utilization Review Department  Phone: [phone] x carefully listen to the prompts  All voicemails are confidential   Email: [email or web address]     ADMISSION INFORMATION  PRESENTATION DATE: 4/24/2023  8:10 AM  OBERVATION ADMISSION DATE: N/A  INPATIENT ADMISSION DATE: 4/24/23  2:59 PM   DISCHARGE DATE: 4/26/2023 11:10 AM   DISPOSITION:Home with Home Health Care    IMPORTANT INFORMATION:  Send all requests for admission clinical reviews, approved or denied determinations and any other requests to dedicated fax number below belonging to the campus where the patient is receiving treatment   List of dedicated fax numbers:  [address] DENIALS (Administrative/Medical Necessity) [phone]   [address] (Maternity/NICU/Pediatrics) [phone]   Dylan Gee [phone]   [address] [place] [address] [phone]   [address] [place] [address] [phone]   2000 [facility] [address] [place] [address],[address] [place] [address] [phone]   [facility]  [phone]   [address] [facility]  [facility] [phone]

## 2023-05-03 ENCOUNTER — TELEPHONE (OUTPATIENT)
Dept: FAMILY MEDICINE CLINIC | Facility: HOSPITAL | Age: 78
End: 2023-05-03

## 2023-05-03 ENCOUNTER — TELEPHONE (OUTPATIENT)
Dept: UROLOGY | Facility: AMBULATORY SURGERY CENTER | Age: 78
End: 2023-05-03

## 2023-05-03 ENCOUNTER — OFFICE VISIT (OUTPATIENT)
Dept: WOUND CARE | Facility: HOSPITAL | Age: 78
End: 2023-05-03

## 2023-05-03 VITALS
RESPIRATION RATE: 16 BRPM | SYSTOLIC BLOOD PRESSURE: 130 MMHG | DIASTOLIC BLOOD PRESSURE: 58 MMHG | TEMPERATURE: 97.2 F | HEART RATE: 60 BPM

## 2023-05-03 DIAGNOSIS — S81.801S UNSPECIFIED OPEN WOUND, RIGHT LOWER LEG, SEQUELA: Primary | ICD-10-CM

## 2023-05-03 DIAGNOSIS — S91.001D OPEN WOUND OF RIGHT ANKLE, SUBSEQUENT ENCOUNTER: ICD-10-CM

## 2023-05-03 RX ORDER — LIDOCAINE HYDROCHLORIDE 40 MG/ML
5 SOLUTION TOPICAL ONCE
Status: COMPLETED | OUTPATIENT
Start: 2023-05-03 | End: 2023-05-03

## 2023-05-03 RX ADMIN — LIDOCAINE HYDROCHLORIDE 5 ML: 40 SOLUTION TOPICAL at 09:05

## 2023-05-03 NOTE — TELEPHONE ENCOUNTER
Called and left voicemail for patient   I would like to obtain blood pressure readings from the patient from his home visiting nurse for at least a week prior to making any further decisions on medications   We would not make a decision based on 1 reading alone

## 2023-05-03 NOTE — TELEPHONE ENCOUNTER
----- Message from [id number] Cici Sarabia sent at 5/3/2023  8:33 AM EDT -----  Recent PSA 4 1, previously 5 7  PSA remains stable at this time

## 2023-05-03 NOTE — LETTER
[address] [facility]  [address]  [address]  Phone#  [phone]  Fax#  [phone]    Patient:  Nola Hodgson  YOB: 1945  Phone:  [phone]  Date of Visit:  5/3/2023    Orders Placed This Encounter   Procedures   • Wound cleansing and dressings     Wash your hands with soap and water  Remove old dressing, discard into plastic bag and place in trash  Cleanse the wounds NSS prior to applying a clean dressing  Do not use tissue or cotton balls  Do not scrub the wound  Pat dry using gauze  Shower No        Right anterior leg wounds:  Cleanse with NSS  Pat dry  Apply Dermagran dressing to wounds  Cover with ABD and wrap with ezekiel  Change 3x/week  Right anterior ankle wound:     Cleanse with NSS  Pat dry  Apply a nickel-thickness of Santyl and cover with ABD and wrap with ezekiel   Change 3x/week       Elevate legs using pillows at home      RETURN In 2 weeks     Standing Status:   Future     Standing Expiration Date:   5/3/2024         Electronically signed by Angela Hernandez DPM

## 2023-05-03 NOTE — PATIENT INSTRUCTIONS
Orders Placed This Encounter   Procedures    Wound cleansing and dressings     Wash your hands with soap and water  Remove old dressing, discard into plastic bag and place in trash  Cleanse the wounds NSS prior to applying a clean dressing  Do not use tissue or cotton balls  Do not scrub the wound  Pat dry using gauze  Shower No        Right anterior leg wounds:  Cleanse with NSS  Pat dry  Apply Dermagran dressing to wounds  Cover with ABD and wrap with ezekiel  Change 3x/week  Right anterior ankle wound:     Cleanse with NSS  Pat dry  Apply a nickel-thickness of Santyl and cover with ABD and wrap with ezekiel  Change 3x/week  Elevate legs using pillows at home       RETURN In 2 weeks     Standing Status:   Future     Standing Expiration Date:   5/3/2024

## 2023-05-04 DIAGNOSIS — I73.9 PAD (PERIPHERAL ARTERY DISEASE) (HCC): ICD-10-CM

## 2023-05-05 ENCOUNTER — HOME CARE VISIT (OUTPATIENT)
Dept: HOME HEALTH SERVICES | Facility: HOME HEALTHCARE | Age: 78
End: 2023-05-05

## 2023-05-08 ENCOUNTER — TELEPHONE (OUTPATIENT)
Dept: VASCULAR SURGERY | Facility: CLINIC | Age: 78
End: 2023-05-08

## 2023-05-08 ENCOUNTER — OFFICE VISIT (OUTPATIENT)
Dept: VASCULAR SURGERY | Facility: CLINIC | Age: 78
End: 2023-05-08

## 2023-05-08 ENCOUNTER — HOME CARE VISIT (OUTPATIENT)
Dept: HOME HEALTH SERVICES | Facility: HOME HEALTHCARE | Age: 78
End: 2023-05-08

## 2023-05-08 ENCOUNTER — TELEPHONE (OUTPATIENT)
Dept: FAMILY MEDICINE CLINIC | Facility: HOSPITAL | Age: 78
End: 2023-05-08

## 2023-05-08 VITALS
SYSTOLIC BLOOD PRESSURE: 135 MMHG | OXYGEN SATURATION: 94 % | RESPIRATION RATE: 20 BRPM | HEART RATE: 84 BPM | DIASTOLIC BLOOD PRESSURE: 70 MMHG

## 2023-05-08 VITALS
SYSTOLIC BLOOD PRESSURE: 116 MMHG | OXYGEN SATURATION: 97 % | BODY MASS INDEX: 29.92 KG/M2 | HEIGHT: 70 IN | HEART RATE: 84 BPM | DIASTOLIC BLOOD PRESSURE: 70 MMHG | WEIGHT: 209 LBS

## 2023-05-08 DIAGNOSIS — I73.9 PAD (PERIPHERAL ARTERY DISEASE) (HCC): ICD-10-CM

## 2023-05-08 DIAGNOSIS — G60.9 IDIOPATHIC PERIPHERAL NEUROPATHY: ICD-10-CM

## 2023-05-08 DIAGNOSIS — I74.09 AORTOILIAC OCCLUSIVE DISEASE (HCC): Primary | ICD-10-CM

## 2023-05-08 RX ORDER — GABAPENTIN 100 MG/1
200 CAPSULE ORAL 3 TIMES DAILY
Qty: 540 CAPSULE | Refills: 0 | Status: SHIPPED | OUTPATIENT
Start: 2023-05-08 | End: 2023-08-06

## 2023-05-08 NOTE — PATIENT INSTRUCTIONS
Idiopathic peripheral neuropathy  Right leg pain and neuropathy  Improved on gabapentin  Refill prescribed  Aortoiliac occlusive disease (HCC)  PAD and AOID with right leg tissue loss now s/p right femoral endarterectomy and retrograde iliac stenting 4/24/23  Doing well  Reports significant improvement in claudication symptoms  Right leg wound has been improving  Very superficial dehiscence at the top of the right groin surgical site  Reviewed intra-op imaging of right iliac stenting and post-femoral endarterectomy     -We discussed the pathophysiology of AOID and peripheral arterial occlusive disease, continued surveillance after treatment  -Recommend new baseline abdominal aorta/iliac duplex and lower extremity arterial duplex in 3 months with follow-up  -Continue medical optimization  Currently on ASA, plavix and statin  -Recommend walking program, ambulating at least 30 minutes for 3-5 times weekly  -Recommend moisturization of the lower extremities, avoidance of injury and close observation of bilateral feet for any new wounds  Continue wound care and follow-up with Dr Blanca Handley   -Right groin wound cleansed with betadine, steristrips applied and 4x4 gauze placed and secured with tape  Advised to continue cleansing with water and soap, pat dry  Apply 4x4 gauze to right groin to prevent constant skin on skin contact  Steristrips should fall off naturally, peel off if still remaining at 1 week  Groin check in 2 weeks to ensure adequate healing   -Follow-up in 3 months to review duplex studies

## 2023-05-08 NOTE — ASSESSMENT & PLAN NOTE
PAD and AOID with right leg tissue loss now s/p right femoral endarterectomy and retrograde iliac stenting 4/24/23  Doing well  Reports significant improvement in claudication symptoms  Right leg wound has been improving  Very superficial dehiscence at the top of the right groin surgical site  Reviewed intra-op imaging of right iliac stenting and post-femoral endarterectomy    -We discussed the pathophysiology of AOID and peripheral arterial occlusive disease, continued surveillance after treatment  -Recommend new baseline abdominal aorta/iliac duplex and lower extremity arterial duplex in 3 months with follow-up  -Continue medical optimization  Currently on ASA, plavix and statin  -Recommend walking program, ambulating at least 30 minutes for 3-5 times weekly  -Recommend moisturization of the lower extremities, avoidance of injury and close observation of bilateral feet for any new wounds  Continue wound care and follow-up with Dr Michelle Dominguez   -Right groin wound cleansed with betadine, steristrips applied and 4x4 gauze placed and secured with tape  Advised to continue cleansing with water and soap, pat dry  Apply 4x4 gauze to right groin to prevent constant skin on skin contact  Steristrips should fall off naturally, peel off if still remaining at 1 week  Groin check in 2 weeks to ensure adequate healing   -Follow-up in 3 months to review duplex studies

## 2023-05-08 NOTE — LETTER
May 8, 2023     Arelis Ortiz, [address], [address]  [address] [facility]  [address] [address]    Patient: Samira Myers  YOB: 1945   Date of Visit: 5/8/2023       Dear Dr Sydney Rossi:    Below are the relevant portions of my assessment and plan of care  Diagnoses and all orders for this visit:    Idiopathic peripheral neuropathy  Right leg pain and neuropathy  Improved on gabapentin  Refill prescribed      Aortoiliac occlusive disease ([address] )  PAD and AOID with right leg tissue loss now s/p right femoral endarterectomy and retrograde iliac stenting 4/24/23  Doing well  Reports significant improvement in claudication symptoms  Right leg wound has been improving  Very superficial dehiscence at the top of the right groin surgical site       Reviewed intra-op imaging of right iliac stenting and post-femoral endarterectomy     -We discussed the pathophysiology of AOID and peripheral arterial occlusive disease, continued surveillance after treatment  -Recommend new baseline abdominal aorta/iliac duplex and lower extremity arterial duplex in 3 months with follow-up  -Continue medical optimization  Currently on ASA, plavix and statin  -Recommend walking program, ambulating at least 30 minutes for 3-5 times weekly  -Recommend moisturization of the lower extremities, avoidance of injury and close observation of bilateral feet for any new wounds  Continue wound care and follow-up with Dr Tiffany Nguyễn   -Right groin wound cleansed with betadine, steristrips applied and 4x4 gauze placed and secured with tape  Advised to continue cleansing with water and soap, pat dry  Apply 4x4 gauze to right groin to prevent constant skin on skin contact  Steristrips should fall off naturally, peel off if still remaining at 1 week  Groin check in 2 weeks to ensure adequate healing   -Follow-up in 3 months to review duplex studies  If you have questions, please do not hesitate to call me   I look forward to following Charli Valero along with you           Sincerely,        Cherrie Roy MD        CC: Eusebio Noland DPM

## 2023-05-08 NOTE — TELEPHONE ENCOUNTER
Pt states he does not take his lexapro regularly but would like to try something a little stronger  Should he have an appt to discuss this with you?

## 2023-05-08 NOTE — PROGRESS NOTES
Assessment/Plan:    Idiopathic peripheral neuropathy  Right leg pain and neuropathy  Improved on gabapentin  Refill prescribed  Aortoiliac occlusive disease (HCC)  PAD and AOID with right leg tissue loss now s/p right femoral endarterectomy and retrograde iliac stenting 4/24/23  Doing well  Reports significant improvement in claudication symptoms  Right leg wound has been improving  Very superficial dehiscence at the top of the right groin surgical site  Reviewed intra-op imaging of right iliac stenting and post-femoral endarterectomy    -We discussed the pathophysiology of AOID and peripheral arterial occlusive disease, continued surveillance after treatment  -Recommend new baseline abdominal aorta/iliac duplex and lower extremity arterial duplex in 3 months with follow-up  -Continue medical optimization  Currently on ASA, plavix and statin  -Recommend walking program, ambulating at least 30 minutes for 3-5 times weekly  -Recommend moisturization of the lower extremities, avoidance of injury and close observation of bilateral feet for any new wounds  Continue wound care and follow-up with Dr Nile Voss   -Right groin wound cleansed with betadine, steristrips applied and 4x4 gauze placed and secured with tape  Advised to continue cleansing with water and soap, pat dry  Apply 4x4 gauze to right groin to prevent constant skin on skin contact  Steristrips should fall off naturally, peel off if still remaining at 1 week  Groin check in 2 weeks to ensure adequate healing   -Follow-up in 3 months to review duplex studies  Diagnoses and all orders for this visit:    Aortoiliac occlusive disease ([address] )  -     VAS abdominal aorta/iliacs; complete study; Future    Idiopathic peripheral neuropathy  -     gabapentin (Neurontin) 100 mg capsule;  Take 2 capsules (200 mg total) by mouth 3 (three) times a day    PAD (peripheral artery disease) ([facility])  -     VAS lower limb arterial duplex, limited/unilateral; "Future        I have spent 25 minutes with Patient  today in which greater than 50% of this time was spent in counseling/coordination of care regarding Intructions for management, Importance of tx compliance and Impressions  Subjective:      Patient ID: Kareem Juan  is a 66 y o  male  Patient is s/p a right endarterectomy arterial femoral retrograde iliac intervention done 4/24/2023  Patient's incision is moist with some drainage  He admits to cleaning the area daily  Patient denies any pain in his right leg when walking  He has 3 wound on the RLE that are healing  Patient is taking ASA 81 mg, Plavix and Atorvastatin  He is a former smoker  HPI    The following portions of the patient's history were reviewed and updated as appropriate: allergies, current medications, past family history, past medical history, past social history, past surgical history and problem list     Review of Systems   Constitutional: Negative  HENT: Negative  Eyes: Negative  Respiratory: Negative  Cardiovascular: Negative  Gastrointestinal: Negative  Endocrine: Negative  Genitourinary: Negative  Musculoskeletal: Negative  Skin: Positive for wound  Allergic/Immunologic: Negative  Neurological: Negative  Hematological: Negative  Psychiatric/Behavioral: Negative  I have personally reviewed the ROS entered by MA and agree as documented  Objective:      /70 (BP Location: Left arm, Patient Position: Sitting, Cuff Size: Standard)   Pulse 84   Ht 5' 10\" (1 778 m)   Wt 94 8 kg (209 lb)   SpO2 97%   BMI 29 99 kg/m²          Physical Exam  Constitutional:       Appearance: Normal appearance  HENT:      Head: Normocephalic and atraumatic  Cardiovascular:      Pulses:           Dorsalis pedis pulses are 1+ on the right side  Pulmonary:      Effort: Pulmonary effort is normal    Abdominal:      Palpations: Abdomen is soft     Musculoskeletal:         General: Normal range of " motion  Cervical back: Normal range of motion and neck supple  Skin:     General: Skin is warm and dry  Capillary Refill: Capillary refill takes less than 2 seconds  Neurological:      General: No focal deficit present  Mental Status: He is alert and oriented to person, place, and time  Psychiatric:         Mood and Affect: Mood normal          Behavior: Behavior normal          Thought Content:  Thought content normal          Judgment: Judgment normal

## 2023-05-10 ENCOUNTER — PATIENT OUTREACH (OUTPATIENT)
Dept: FAMILY MEDICINE CLINIC | Facility: HOSPITAL | Age: 78
End: 2023-05-10

## 2023-05-10 ENCOUNTER — HOME CARE VISIT (OUTPATIENT)
Dept: HOME HEALTH SERVICES | Facility: HOME HEALTHCARE | Age: 78
End: 2023-05-10

## 2023-05-10 VITALS
RESPIRATION RATE: 20 BRPM | OXYGEN SATURATION: 94 % | SYSTOLIC BLOOD PRESSURE: 128 MMHG | HEART RATE: 68 BPM | DIASTOLIC BLOOD PRESSURE: 60 MMHG

## 2023-05-10 NOTE — PROGRESS NOTES
Outpatient Care Management Note:    Care manager called Flakito  He is still being seen by Rosa RESENDIZ and wound care  He states that they are checking his groin site regularly  He denies any bleeding, swelling or increased pain at the site  He is complaining of frequent bruising  He is on plavix and aspirin  He has an appt with DR Josefina Canales on Monday and will discuss it with her  He denies any continued bleeding  He knows to seek emergency care if he could not stop a bleeding issue  Flakito has my contact information  Cm will follow up in 2 weeks

## 2023-05-12 ENCOUNTER — HOME CARE VISIT (OUTPATIENT)
Dept: HOME HEALTH SERVICES | Facility: HOME HEALTHCARE | Age: 78
End: 2023-05-12

## 2023-05-12 VITALS
RESPIRATION RATE: 16 BRPM | WEIGHT: 204 LBS | TEMPERATURE: 98.2 F | DIASTOLIC BLOOD PRESSURE: 66 MMHG | SYSTOLIC BLOOD PRESSURE: 124 MMHG | BODY MASS INDEX: 29.27 KG/M2 | HEART RATE: 72 BPM

## 2023-05-15 ENCOUNTER — HOME CARE VISIT (OUTPATIENT)
Dept: HOME HEALTH SERVICES | Facility: HOME HEALTHCARE | Age: 78
End: 2023-05-15

## 2023-05-15 ENCOUNTER — APPOINTMENT (OUTPATIENT)
Dept: LAB | Facility: HOSPITAL | Age: 78
End: 2023-05-15

## 2023-05-15 ENCOUNTER — OFFICE VISIT (OUTPATIENT)
Dept: FAMILY MEDICINE CLINIC | Facility: HOSPITAL | Age: 78
End: 2023-05-15

## 2023-05-15 VITALS
WEIGHT: 209 LBS | BODY MASS INDEX: 29.92 KG/M2 | HEIGHT: 70 IN | HEART RATE: 72 BPM | DIASTOLIC BLOOD PRESSURE: 70 MMHG | SYSTOLIC BLOOD PRESSURE: 122 MMHG | TEMPERATURE: 97.8 F

## 2023-05-15 VITALS
DIASTOLIC BLOOD PRESSURE: 60 MMHG | RESPIRATION RATE: 18 BRPM | OXYGEN SATURATION: 98 % | SYSTOLIC BLOOD PRESSURE: 132 MMHG | HEART RATE: 58 BPM

## 2023-05-15 DIAGNOSIS — R41.89 COGNITIVE IMPAIRMENT: ICD-10-CM

## 2023-05-15 DIAGNOSIS — F32.1 CURRENT MODERATE EPISODE OF MAJOR DEPRESSIVE DISORDER WITHOUT PRIOR EPISODE (HCC): Primary | ICD-10-CM

## 2023-05-15 DIAGNOSIS — I10 PRIMARY HYPERTENSION: Chronic | ICD-10-CM

## 2023-05-15 DIAGNOSIS — G47.00 INSOMNIA, UNSPECIFIED TYPE: ICD-10-CM

## 2023-05-15 RX ORDER — MULTIVIT WITH MINERALS/LUTEIN
1000 TABLET ORAL DAILY
COMMUNITY

## 2023-05-15 RX ORDER — CALCIUM POLYCARBOPHIL 625 MG 625 MG/1
625 TABLET ORAL DAILY
COMMUNITY

## 2023-05-15 RX ORDER — ESCITALOPRAM OXALATE 20 MG/1
20 TABLET ORAL DAILY
Qty: 30 TABLET | Refills: 2 | Status: SHIPPED | OUTPATIENT
Start: 2023-05-15 | End: 2023-06-14

## 2023-05-15 NOTE — PROGRESS NOTES
Name: Alicia Martel  : 1945      MRN: [id number]  Encounter Provider: Davian Coleman DO  Encounter Date: 5/15/2023   Encounter department: [address] [place] [address]  203     Assessment & Plan     1  Current moderate episode of major depressive disorder without prior episode [facility])  Assessment & Plan:  Mood not at goal, has some SI but more passive suicidal thoughts, has a good support system and has a plan when the thoughts occur, I do not believe pt is a risk to himself or others, will increase Lexapro from 10 mg to 20 mg,  d/w pt that it takes 4-6 wks to get maximum benefit of med and that med has to be taken every day and to not miss doses of med, call with SE/new/worse mood/worsening SI/plan for harming self, re-eval in 6 wks      Orders:  -     escitalopram (LEXAPRO) 20 mg tablet; Take 1 tablet (20 mg total) by mouth daily    2  Cognitive impairment  Assessment & Plan:  MMSE 25/30 today, will check BW and MRI brain, 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, urged mental activity/mental exercise and RF reduction for further vascular events, also discussed how his concurrent mood issues can make memory appear worse, will follow closely    Orders:  -     TSH, 3rd generation with Free T4 reflex  -     RPR-Syphilis Screening (Total Syphilis IGG/IGM); Future  -     MRI brain wo contrast; Future; Expected date: 05/15/2023    3  Insomnia, unspecified type  Assessment & Plan:  Meds reviewed in detail today - all pill bottles brought in and reviewed with pt one by one - he was taking Unisom, Benadryl and Trazodone all for sleep - will STOP Benadryl and con't Unisom prn, will con't current Trazodone for now but if mood/sleep still not good at next appt may increase the Trazodone, will follow      4   Primary hypertension  Assessment & Plan:  BP up a bit today, home readings fluctuating, urged pt to check them later am/early afternoon after taking meds/at rest and no caffeine within an hour, "bring readings to next appt and will increase BP regimen if needed, con't current regimen for now        Colonoscopy 2/21 - 5 yrs     BW 1/23     Echo 10/22     CUS 12/22 - <50% stenosis L ICA, R ICA widely patent    Subjective      HPI Pt here for a follow up appt    Pt called (5/8/23) after our last visit (4/28/23) requesting an increase in his Lexapro  He was recommended to come back in for re-evaluation  He was encouraged to bring all meds with him as he seemed confused about his meds at last visit  He notes feeling down/sad despite the medication  He was asked about suicidal thoughts and he states \"not really\"  He states he has thought about jumping off a bridge but does not feel he would ever do that  He thinks about his family and would not want to do that to his daughter  He tries to keep busy when he has the thoughts and he states it goes away  He denies any previous suicide attempts  He feels lonely and wants a partner to travel with  He feels his memory is worse then what it has been  He has issues remembering names  He has no problems missing bills/appts/birthdays/anniversaries  He has a good support system with friends and his AA group and his dgtr  Review of Systems   Constitutional: Negative for chills, fatigue and fever  HENT: Negative for congestion and sore throat  Eyes: Negative for pain and visual disturbance  Respiratory: Negative for cough and shortness of breath  Cardiovascular: Negative for chest pain, palpitations and leg swelling  Gastrointestinal: Negative for abdominal pain, constipation, diarrhea, nausea and vomiting  Genitourinary: Negative for difficulty urinating and dysuria  Musculoskeletal: Positive for arthralgias  Negative for myalgias  Skin: Negative for rash and wound  Neurological: Negative for dizziness and headaches  Hematological: Bruises/bleeds easily     Psychiatric/Behavioral: Positive for confusion, dysphoric mood and sleep " disturbance  Current Outpatient Medications on File Prior to Visit   Medication Sig   • Ascorbic Acid (vitamin C) 1000 MG tablet Take 1,000 mg by mouth daily   • calcium polycarbophil (FIBERCON) 625 mg tablet Take 625 mg by mouth daily   • Doxylamine Succinate, Sleep, (UNISOM PO) Take 1 tablet by mouth daily at bedtime   • Misc Natural Products (GLUCOSAMINE CHOND CMP DOUBLE PO) Take 1 tablet by mouth in the morning   • aspirin (ECOTRIN LOW STRENGTH) 81 mg EC tablet Take 81 mg by mouth every other day 1 every other day   • atorvastatin (LIPITOR) 40 mg tablet Take 1 tablet (40 mg total) by mouth every evening   • calcium polycarbophil (FIBERCON) 625 mg tablet Take 625 mg by mouth daily   • clopidogrel (PLAVIX) 75 mg tablet Take 1 tablet (75 mg total) by mouth daily Do not start before April 27, 2023  • ferrous sulfate 324 (65 Fe) mg Take 1 tablet (324 mg total) by mouth daily before breakfast   • finasteride (PROSCAR) 5 mg tablet Take 1 tablet (5 mg total) by mouth daily   • gabapentin (Neurontin) 100 mg capsule Take 1 capsule (100 mg total) by mouth 3 (three) times a day   • gabapentin (Neurontin) 100 mg capsule Take 2 capsules (200 mg total) by mouth 3 (three) times a day   • Maxidex 0 1 % ophthalmic suspension instill 1 drop into both eyes twice a day for 2 weeks then 1 drop into both eyes once daily   • metoprolol succinate (TOPROL-XL) 25 mg 24 hr tablet take 1 tablet by mouth once daily   • Multiple Vitamin (MULTIVITAMIN) capsule Take 1 capsule by mouth daily   • omeprazole (PriLOSEC) 20 mg delayed release capsule take 1 capsule by mouth once daily   • Santyl ointment 1 application   in the morning Apply thin layer to affected area   • tamsulosin (FLOMAX) 0 4 mg Take 1 capsule (0 4 mg total) by mouth daily with dinner   • traZODone (DESYREL) 50 mg tablet Take 1 tablet (50 mg total) by mouth daily at bedtime   • [DISCONTINUED] Ascorbic Acid (VITAMIN C IMMUNE HEALTH PO) Take by mouth daily   • [DISCONTINUED] "escitalopram (LEXAPRO) 10 mg tablet Take 1 tablet (10 mg total) by mouth daily       Objective     /70   Pulse 72   Temp 97 8 °F (36 6 °C) (Tympanic)   Ht 5' 10\" (1 778 m)   Wt 94 8 kg (209 lb)   BMI 29 99 kg/m²     Physical Exam  Vitals and nursing note reviewed  Constitutional:       General: He is not in acute distress  Appearance: He is well-developed  HENT:      Head: Normocephalic and atraumatic  Right Ear: Tympanic membrane and external ear normal  There is no impacted cerumen  Left Ear: Tympanic membrane and external ear normal  There is no impacted cerumen  Mouth/Throat:      Mouth: Mucous membranes are moist       Pharynx: Oropharynx is clear  No oropharyngeal exudate  Eyes:      General:         Right eye: No discharge  Left eye: No discharge  Extraocular Movements: Extraocular movements intact  Conjunctiva/sclera: Conjunctivae normal       Pupils: Pupils are equal, round, and reactive to light  Neck:      Trachea: No tracheal deviation  Cardiovascular:      Rate and Rhythm: Normal rate and regular rhythm  Heart sounds: Murmur heard  Pulmonary:      Effort: Pulmonary effort is normal  No respiratory distress  Breath sounds: Normal breath sounds  No wheezing, rhonchi or rales  Musculoskeletal:         General: No deformity or signs of injury  Cervical back: Neck supple  Lymphadenopathy:      Cervical: No cervical adenopathy  Skin:     General: Skin is warm and dry  Coloration: Skin is not pale  Findings: Bruising present  No rash  Neurological:      General: No focal deficit present  Mental Status: He is alert  Mental status is at baseline  Cranial Nerves: No cranial nerve deficit  Sensory: No sensory deficit  Motor: No weakness or abnormal muscle tone        Coordination: Coordination normal       Gait: Gait normal       Deep Tendon Reflexes: Reflexes normal       Comments: CN II-XII intact, " sensation intact to light touch globally, 5/5 global muscle strength, 2/4 patellar DTR B/L LE, nml FN and HS, neg Rhomberg   Psychiatric:         Mood and Affect: Mood normal          Behavior: Behavior normal          Thought Content:  Thought content normal          Judgment: Judgment normal       MMSE David,

## 2023-05-15 NOTE — ASSESSMENT & PLAN NOTE
Mood not at goal, has some SI but more passive suicidal thoughts, has a good support system and has a plan when the thoughts occur, I do not believe pt is a risk to himself or others, will increase Lexapro from 10 mg to 20 mg,  d/w pt that it takes 4-6 wks to get maximum benefit of med and that med has to be taken every day and to not miss doses of med, call with SE/new/worse mood/worsening SI/plan for harming self, re-eval in 6 wks

## 2023-05-15 NOTE — ASSESSMENT & PLAN NOTE
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

## 2023-05-15 NOTE — ASSESSMENT & PLAN NOTE
Meds reviewed in detail today - all pill bottles brought in and reviewed with pt one by one - he was taking Unisom, Benadryl and Trazodone all for sleep - will STOP Benadryl and con't Unisom prn, will con't current Trazodone for now but if mood/sleep still not good at next appt may increase the Trazodone, will follow

## 2023-05-15 NOTE — ASSESSMENT & PLAN NOTE
BP up a bit today, home readings fluctuating, urged pt to check them later am/early afternoon after taking meds/at rest and no caffeine within an hour, bring readings to next appt and will increase BP regimen if needed, con't current regimen for now

## 2023-05-16 LAB
TREPONEMA PALLIDUM IGG+IGM AB [PRESENCE] IN SERUM OR PLASMA BY IMMUNOASSAY: NORMAL
TSH SERPL DL<=0.05 MIU/L-ACNC: 0.81 UIU/ML (ref 0.45–4.5)

## 2023-05-17 ENCOUNTER — OFFICE VISIT (OUTPATIENT)
Dept: WOUND CARE | Facility: HOSPITAL | Age: 78
End: 2023-05-17

## 2023-05-17 ENCOUNTER — HOME CARE VISIT (OUTPATIENT)
Dept: HOME HEALTH SERVICES | Facility: HOME HEALTHCARE | Age: 78
End: 2023-05-17

## 2023-05-17 ENCOUNTER — CONSULT (OUTPATIENT)
Dept: PLASTIC SURGERY | Facility: CLINIC | Age: 78
End: 2023-05-17

## 2023-05-17 VITALS
BODY MASS INDEX: 29.63 KG/M2 | TEMPERATURE: 97 F | HEIGHT: 70 IN | HEART RATE: 70 BPM | SYSTOLIC BLOOD PRESSURE: 171 MMHG | WEIGHT: 207 LBS | DIASTOLIC BLOOD PRESSURE: 88 MMHG

## 2023-05-17 DIAGNOSIS — S91.001D OPEN WOUND OF RIGHT ANKLE, SUBSEQUENT ENCOUNTER: Primary | ICD-10-CM

## 2023-05-17 DIAGNOSIS — S81.801S UNSPECIFIED OPEN WOUND, RIGHT LOWER LEG, SEQUELA: ICD-10-CM

## 2023-05-17 DIAGNOSIS — S91.001D OPEN WOUND OF RIGHT ANKLE, SUBSEQUENT ENCOUNTER: ICD-10-CM

## 2023-05-17 RX ORDER — LIDOCAINE HYDROCHLORIDE 40 MG/ML
5 SOLUTION TOPICAL ONCE
Status: COMPLETED | OUTPATIENT
Start: 2023-05-17 | End: 2023-05-17

## 2023-05-17 RX ADMIN — LIDOCAINE HYDROCHLORIDE 5 ML: 40 SOLUTION TOPICAL at 09:30

## 2023-05-17 NOTE — PROGRESS NOTES
"Patient ID: Ralph Kline  is a 66 y o  male Date of Birth 1945     Chief Complaint  Chief Complaint   Patient presents with   • Follow Up Wound Care Visit     Right leg wounds       Allergies  Patient has no known allergies  Assessment:    Unspecified open wound, right lower leg, sequela  -     lidocaine (XYLOCAINE) 4 % topical solution 5 mL  -     Wound cleansing and dressings; Future  - X2 wounds     Open wound of right ankle, subsequent encounter  -     Wound cleansing and dressings; Future    Other orders  -     Debridement  -     Debridement  -     Debridement      Plan:  Dermagran gauze dressing on both superior wounds  Santyl daily to the inferior ankle wound  Follow-up 2 weeks  Call if any signs of infection are noted  Debridement   Wound 12/07/22 Traumatic Ankle Anterior;Right    Universal Protocol:  Consent: Verbal consent obtained  Risks and benefits: risks, benefits and alternatives were discussed  Consent given by: patient  Time out: Immediately prior to procedure a \"time out\" was called to verify the correct patient, procedure, equipment, support staff and site/side marked as required    Patient understanding: patient states understanding of the procedure being performed  Patient identity confirmed: verbally with patient      Performed by: physician  Debridement type: surgical  Level of debridement: subcutaneous tissue  Pain control: lidocaine 4%  Post-debridement measurements  Length (cm): 1 2  Width (cm): 1  Depth (cm): 0 2  Percent debrided: 100%  Surface Area (cm^2): 1 2  Area debrided (cm^2): 1 2  Volume (cm^3): 0 24  Tissue and other material debrided: subcutaneous tissue  Devitalized tissue debrided: necrotic debris and slough  Instrument(s) utilized: curette  Bleeding: small  Hemostasis obtained with: pressure  Procedural pain (0-10): 3  Post-procedural pain: 3   Response to treatment: procedure was tolerated well    Debridement   Wound 03/27/23 Surgical Pretibial " "Distal;Right    Universal Protocol:  Consent: Verbal consent obtained  Risks and benefits: risks, benefits and alternatives were discussed  Consent given by: patient  Time out: Immediately prior to procedure a \"time out\" was called to verify the correct patient, procedure, equipment, support staff and site/side marked as required  Patient understanding: patient states understanding of the procedure being performed  Patient identity confirmed: verbally with patient      Performed by: physician  Debridement type: surgical  Level of debridement: subcutaneous tissue  Pain control: lidocaine 4%  Post-debridement measurements  Length (cm): 1  Width (cm): 0 5  Depth (cm): 0 2  Percent debrided: 100%  Surface Area (cm^2): 0 5  Area debrided (cm^2): 0 5  Volume (cm^3): 0 1  Tissue and other material debrided: subcutaneous tissue  Devitalized tissue debrided: necrotic debris and slough  Instrument(s) utilized: curette and nippers  Bleeding: small  Hemostasis obtained with: pressure and silver nitrate  Procedural pain (0-10): 3  Post-procedural pain: 3   Response to treatment: procedure was tolerated well  Debridement Comments: X1 silver nitrate stick used in conjunction with sharp mechanical debridement  Debridement   Wound 03/29/23 Surgical Pretibial Right;Proximal    Universal Protocol:  Consent: Verbal consent obtained  Risks and benefits: risks, benefits and alternatives were discussed  Consent given by: patient  Time out: Immediately prior to procedure a \"time out\" was called to verify the correct patient, procedure, equipment, support staff and site/side marked as required    Patient understanding: patient states understanding of the procedure being performed  Patient identity confirmed: verbally with patient      Performed by: physician  Debridement type: surgical  Level of debridement: subcutaneous tissue  Pain control: lidocaine 4%  Post-debridement measurements  Length (cm): 0 9  Width (cm): 1  Depth (cm): " 0 2  Percent debrided: 100%  Surface Area (cm^2): 0 9  Area debrided (cm^2): 0 9  Volume (cm^3): 0 18  Tissue and other material debrided: subcutaneous tissue  Devitalized tissue debrided: necrotic debris and slough  Instrument(s) utilized: curette  Bleeding: small  Hemostasis obtained with: pressure  Procedural pain (0-10): 3  Post-procedural pain: 3   Response to treatment: procedure was tolerated well         Wound 12/07/22 Traumatic Ankle Anterior;Right (Active)   Wound Image Images linked 05/03/23 0926   Wound Description Eschar; Flaherty 05/03/23 0855   Kathrin-wound Assessment Erythema 05/03/23 0855   Wound Length (cm) 1 2 cm 05/03/23 0855   Wound Width (cm) 1 cm 05/03/23 0855   Wound Depth (cm) 0 1 cm 05/03/23 0855   Wound Surface Area (cm^2) 1 2 cm^2 05/03/23 0855   Wound Volume (cm^3) 0 12 cm^3 05/03/23 0855   Calculated Wound Volume (cm^3) 0 12 cm^3 05/03/23 0855   Change in Wound Size % 40 05/03/23 0855   Drainage Amount None 05/03/23 0855   Non-staged Wound Description Full thickness 05/03/23 0855   Dressing Status Intact 05/03/23 0855       Wound 03/27/23 Surgical Pretibial Distal;Right (Active)   Wound Image Images linked 05/03/23 0859   Wound Description Granulation tissue; Hypergranulation 05/03/23 0859   Kathrin-wound Assessment Erythema;Edema 05/03/23 0859   Wound Length (cm) 1 cm 05/03/23 0859   Wound Width (cm) 0 5 cm 05/03/23 0859   Wound Depth (cm) 0 1 cm 05/03/23 0859   Wound Surface Area (cm^2) 0 5 cm^2 05/03/23 0859   Wound Volume (cm^3) 0 05 cm^3 05/03/23 0859   Calculated Wound Volume (cm^3) 0 05 cm^3 05/03/23 0859   Change in Wound Size % 98 05/03/23 0859   Drainage Amount Small 05/03/23 0859   Drainage Description Serosanguineous 05/03/23 0859   Non-staged Wound Description Full thickness 05/03/23 0859   Dressing Status Intact 05/03/23 0859       Wound 03/29/23 Surgical Pretibial Right;Proximal (Active)   Wound Image Images linked 05/03/23 0928   Wound Description Granulation tissue 05/03/23 0900 Kathrin-wound Assessment Edema; Erythema 05/03/23 0900   Wound Length (cm) 0 9 cm 05/03/23 0900   Wound Width (cm) 1 cm 05/03/23 0900   Wound Depth (cm) 0 1 cm 05/03/23 0900   Wound Surface Area (cm^2) 0 9 cm^2 05/03/23 0900   Wound Volume (cm^3) 0 09 cm^3 05/03/23 0900   Calculated Wound Volume (cm^3) 0 09 cm^3 05/03/23 0900   Change in Wound Size % 80 43 05/03/23 0900   Drainage Amount Small 05/03/23 0900   Drainage Description Serosanguineous 05/03/23 0900   Non-staged Wound Description Full thickness 05/03/23 0900   Dressing Status Intact 05/03/23 0900       Wound 04/02/23 Foot Anterior;Right (Active)   Wound Image Images linked 05/03/23 0902   Wound Description Epithelialization 05/03/23 0901   Kathrin-wound Assessment Dry;Scar Tissue 05/03/23 0901   Wound Length (cm) 0 cm 05/03/23 0901   Wound Width (cm) 0 cm 05/03/23 0901   Wound Depth (cm) 0 cm 05/03/23 0901   Wound Surface Area (cm^2) 0 cm^2 05/03/23 0901   Wound Volume (cm^3) 0 cm^3 05/03/23 0901   Calculated Wound Volume (cm^3) 0 cm^3 05/03/23 0901   Change in Wound Size % 100 05/03/23 0901   Drainage Amount None 05/03/23 0901   Non-staged Wound Description Full thickness 05/03/23 0901   Dressing Status Intact 05/03/23 0901       Wound 12/07/22 Traumatic Ankle Anterior;Right (Active)   Date First Assessed/Time First Assessed: 12/07/22 1447   Primary Wound Type: Traumatic  Location: Ankle  Wound Location Orientation: Anterior;Right       Wound 03/27/23 Surgical Pretibial Distal;Right (Active)   Date First Assessed/Time First Assessed: 03/27/23 1611   Primary Wound Type: (c) Surgical  Location: Pretibial  Wound Location Orientation: Distal;Right       Wound 03/29/23 Surgical Pretibial Right;Proximal (Active)   Date First Assessed/Time First Assessed: 03/29/23 0959   Pre-Existing Wound: Yes  Primary Wound Type: Surgical  Location: Pretibial  Wound Location Orientation: Right;Proximal       Wound 04/02/23 Foot Anterior;Right (Active)   Date First Assessed/Time First Assessed: 04/02/23 1248   Location: Foot  Wound Location Orientation: Anterior;Right       Wound 04/24/23 Groin Right (Active)   Date First Assessed/Time First Assessed: 04/24/23 1435   Location: Groin  Wound Location Orientation: Right  Wound Description (Comments): exofin and mepilex       [REMOVED] Wound 01/10/20 Neck Right (Removed)   Resolved Date/Resolved Time: 12/07/22 1446  Date First Assessed/Time First Assessed: 01/10/20 1215   Location: Neck  Wound Location Orientation: Right  Wound Description (Comments): [id number]  Wound Outcome: Other (Comment)       [REMOVED] Wound 01/10/20 Ankle Anterior;Right (Removed)   Resolved Date/Resolved Time: 12/07/22 1447  Date First Assessed/Time First Assessed: 01/10/20 1430   Pre-Existing Wound: Yes  Location: Ankle  Wound Location Orientation: Anterior;Right  Wound Description (Comments): non healing  Wound Outcome: Other     [REMOVED] Wound 11/09/20 Catheter entry/exit site Wrist Right (Removed)   Resolved Date/Resolved Time: 12/07/22 1446  Date First Assessed/Time First Assessed: 11/09/20 1238   Pre-Existing Wound: No  Traumatic Wound Type: Catheter entry/exit site  Location: Wrist  Wound Location Orientation: Right  Wound Outcome: Other (Comm    [REMOVED] Wound 12/07/20 Catheter entry/exit site Wrist Right (Removed)   Resolved Date/Resolved Time: 12/07/22 1446  Date First Assessed/Time First Assessed: 12/07/20 1207   Pre-Existing Wound: No  Traumatic Wound Type: Catheter entry/exit site  Location: Wrist  Wound Location Orientation: Right  Wound Description (Comment    Subjective:           113/2023: 60-year-old male presents for follow-up evaluation of chronic wounds of his right leg  Patient reports good progress with diminishing size  States that he had vascular surgery endarterectomy on 4/24/2023 at [facility] with Dr Pablo Enriquez     4/19/2023:  60-year-old male presents for follow-up evaluation of chronic wounds right leg    Reports some good progress being made and they seem to be healing  Patient states that he is scheduled for vascular surgery on 4/24/2023  Denies any new pain/discomfort    35/2023: 79-year-old male seen today for follow-up evaluation and treatment of multiple wounds right leg  Initial anterior ankle wound which is traumatic and 2 other additional surgical wound secondary to incision and drainage for abscess formation of his distal lower right leg  Patient recently was admitted to [address] [place] [address] for cellulitis and nonhealing wounds and discharged this past week  During this admission was discovered to have severe PVD of his right lower extremity and will be following up with vascular surgery next week for an endarterectomy  Patient reports he saw vascular surgery yesterday and the surgical plan is in process    3/29/2023: 79-year-old male presents for follow-up status post incision and drainage deep abscess right leg and debridement of right leg wound 2 days ago  Reports less swelling and pain but is somewhat distraught about having this type of wound care for  [facility] did not contact him yesterday which was very disconcerting to him  3/27/2023: 79-year-old male presents with increasing pain/redness/swelling anterior aspect of right leg  Patient has been treating for a chronic wound on the anterior ankle within scar tissue from previous trauma  Reports going to ED on 3/24/2023 and getting antibiotics but is still having redness/swelling/pain/discomfort from the front of his leg  The following portions of the patient's history were reviewed and updated as appropriate: allergies, current medications, past family history, past medical history, past social history, past surgical history and problem list     Review of Systems   Constitutional: Negative for chills and fever  HENT: Negative for ear pain and sore throat  Eyes: Negative for pain and visual disturbance     Respiratory: Negative for cough and shortness of breath  Cardiovascular: Negative for chest pain and palpitations  Gastrointestinal: Negative for abdominal pain and vomiting  Genitourinary: Negative for dysuria and hematuria  Musculoskeletal: Negative for arthralgias and back pain  Skin: Positive for color change and wound (Anterior Right Ankle)  Negative for rash  Neurological: Negative for seizures and syncope  All other systems reviewed and are negative  Objective:       Wound 12/07/22 Traumatic Ankle Anterior;Right (Active)   Wound Image Images linked 05/03/23 0926   Wound Description Eschar; Flaherty 05/03/23 0855   Kathrin-wound Assessment Erythema 05/03/23 0855   Wound Length (cm) 1 2 cm 05/03/23 0855   Wound Width (cm) 1 cm 05/03/23 0855   Wound Depth (cm) 0 1 cm 05/03/23 0855   Wound Surface Area (cm^2) 1 2 cm^2 05/03/23 0855   Wound Volume (cm^3) 0 12 cm^3 05/03/23 0855   Calculated Wound Volume (cm^3) 0 12 cm^3 05/03/23 0855   Change in Wound Size % 40 05/03/23 0855   Drainage Amount None 05/03/23 0855   Non-staged Wound Description Full thickness 05/03/23 0855   Dressing Status Intact 05/03/23 0855       Wound 03/27/23 Surgical Pretibial Distal;Right (Active)   Wound Image Images linked 05/03/23 0859   Wound Description Granulation tissue; Hypergranulation 05/03/23 0859   Kathrin-wound Assessment Erythema;Edema 05/03/23 0859   Wound Length (cm) 1 cm 05/03/23 0859   Wound Width (cm) 0 5 cm 05/03/23 0859   Wound Depth (cm) 0 1 cm 05/03/23 0859   Wound Surface Area (cm^2) 0 5 cm^2 05/03/23 0859   Wound Volume (cm^3) 0 05 cm^3 05/03/23 0859   Calculated Wound Volume (cm^3) 0 05 cm^3 05/03/23 0859   Change in Wound Size % 98 05/03/23 0859   Drainage Amount Small 05/03/23 0859   Drainage Description Serosanguineous 05/03/23 0859   Non-staged Wound Description Full thickness 05/03/23 0859   Dressing Status Intact 05/03/23 0859       Wound 03/29/23 Surgical Pretibial Right;Proximal (Active)   Wound Image Images linked 05/03/23 4517 Wound Description Granulation tissue 05/03/23 0900   Kathrin-wound Assessment Edema; Erythema 05/03/23 0900   Wound Length (cm) 0 9 cm 05/03/23 0900   Wound Width (cm) 1 cm 05/03/23 0900   Wound Depth (cm) 0 1 cm 05/03/23 0900   Wound Surface Area (cm^2) 0 9 cm^2 05/03/23 0900   Wound Volume (cm^3) 0 09 cm^3 05/03/23 0900   Calculated Wound Volume (cm^3) 0 09 cm^3 05/03/23 0900   Change in Wound Size % 80 43 05/03/23 0900   Drainage Amount Small 05/03/23 0900   Drainage Description Serosanguineous 05/03/23 0900   Non-staged Wound Description Full thickness 05/03/23 0900   Dressing Status Intact 05/03/23 0900       Wound 04/02/23 Foot Anterior;Right (Active)   Wound Image Images linked 05/03/23 0902   Wound Description Epithelialization 05/03/23 0901   Kathrin-wound Assessment Dry;Scar Tissue 05/03/23 0901   Wound Length (cm) 0 cm 05/03/23 0901   Wound Width (cm) 0 cm 05/03/23 0901   Wound Depth (cm) 0 cm 05/03/23 0901   Wound Surface Area (cm^2) 0 cm^2 05/03/23 0901   Wound Volume (cm^3) 0 cm^3 05/03/23 0901   Calculated Wound Volume (cm^3) 0 cm^3 05/03/23 0901   Change in Wound Size % 100 05/03/23 0901   Drainage Amount None 05/03/23 0901   Non-staged Wound Description Full thickness 05/03/23 0901   Dressing Status Intact 05/03/23 0901       /58   Pulse 60   Temp (!) 97 2 °F (36 2 °C)   Resp 16     Physical Exam  Constitutional:       Appearance: Normal appearance  HENT:      Head: Normocephalic  Right Ear: Tympanic membrane normal       Left Ear: Tympanic membrane normal       Nose: No congestion  Mouth/Throat:      Pharynx: No oropharyngeal exudate or posterior oropharyngeal erythema  Eyes:      Pupils: Pupils are equal, round, and reactive to light  Cardiovascular:      Rate and Rhythm: Normal rate and regular rhythm  Pulses:           Dorsalis pedis pulses are 0 on the right side and 1+ on the left side  Posterior tibial pulses are 0 on the right side and 0 on the left side  Pulmonary:      Effort: Pulmonary effort is normal    Feet:      Right foot:      Skin integrity: Ulcer (See comments) present  Comments: Anterior right ankle: SQ breakdown within scar tissue from prior trauma, diminished size, minimal s/s drainage, overall unchanged, improving  Skin:     General: Skin is warm and dry  Capillary Refill: Capillary refill takes 2 to 3 seconds  Coloration: Skin is not pale  Findings: Wound (X2 full-thickness wounds anterior aspect of right leg S/P incision and drainage  Granulating and improving overall  Previously exposed tendon has covered  See detailed wound descriptions) present  No bruising, lesion or rash  Neurological:      General: No focal deficit present  Mental Status: He is alert  Cranial Nerves: No cranial nerve deficit  Sensory: No sensory deficit  Motor: No weakness  Gait: Gait normal       Deep Tendon Reflexes: Reflexes normal    Psychiatric:         Mood and Affect: Mood normal          Behavior: Behavior normal          Judgment: Judgment normal            Wound Instructions:  Orders Placed This Encounter   Procedures   • Wound cleansing and dressings     Wash your hands with soap and water  Remove old dressing, discard into plastic bag and place in trash  Cleanse the wounds NSS prior to applying a clean dressing  Do not use tissue or cotton balls  Do not scrub the wound  Pat dry using gauze  Shower No        Right anterior leg wounds:  Cleanse with NSS  Pat dry  Apply Dermagran dressing to wounds  Cover with ABD and wrap with ezekiel  Change 3x/week  Right anterior ankle wound:     Cleanse with NSS  Pat dry  Apply a nickel-thickness of Santyl and cover with ABD and wrap with ezekiel   Change 3x/week       Elevate legs using pillows at home      RETURN In 2 weeks     Standing Status:   Future     Standing Expiration Date:   5/3/2024   • Debridement     This order was created via procedure documentation   • Debridement     This order was created via procedure documentation   • Debridement     This order was created via procedure documentation        Diagnosis ICD-10-CM Associated Orders   1  Unspecified open wound, right lower leg, sequela  S81 801S lidocaine (XYLOCAINE) 4 % topical solution 5 mL     Wound cleansing and dressings     Debridement     Debridement      2   Open wound of right ankle, subsequent encounter  S91 001D Wound cleansing and dressings     Debridement

## 2023-05-17 NOTE — PATIENT INSTRUCTIONS
Orders Placed This Encounter   Procedures    Wound cleansing and dressings     Wash your hands with soap and water  Remove old dressing, discard into plastic bag and place in trash  Cleanse the wounds NSS prior to applying a clean dressing  Do not use tissue or cotton balls  Do not scrub the wound  Pat dry using gauze  Shower No        Right anterior leg and right anterior ankle wounds:  Cleanse with NSS  Pat dry  Apply Dermagran dressing to wounds  Cover with ABD and wrap with ezekiel  Change 3x/week  Elevate legs using pillows at home       return in 3 weeks     Standing Status:   Future     Standing Expiration Date:   5/17/2024

## 2023-05-17 NOTE — PROGRESS NOTES
Plastic Surgery Consult    Reason for visit: right lower extremity open wound    HPI:  Patient is a 67 y/o male who presents with chronic anterior open shin wound  He has been followed by wound care and has peripheral artery disease  He recently underwent Right CFA, SFA, and profunda angioplasty with placement of stent and the wound has considerably improved after vascular procedure  Patient presents for further management and recommendations      ROS: 12 pt ROS negative, except as otherwise noted in HPI    Past Medical History:   Diagnosis Date   • Anemia     iron def   • Arthritis    • Cerebrovascular accident (CVA) ([facility] [address] ) 01/07/2020    memory issues   • Depression 03/29/2023   • GERD (gastroesophageal reflux disease)    • Gout    • Hypertension    • Insomnia      Family History   Problem Relation Age of Onset   • Alzheimer's disease Mother    • Alzheimer's disease Father    • Heart disease Father         CAD   • Other Father         prediabetes   • Diabetes Father    • Breast cancer Other    • Arthritis Family    • Hypertension Family      Past Surgical History:   Procedure Laterality Date   • ANKLE SURGERY Right    • CARDIAC CATHETERIZATION      no findings   • COLONOSCOPY  01/25/2013    polypectomy; complete - 3 yrs d/t polyp - benign submucosal lipoma- path c/w lipoma   • COLONOSCOPY  04/25/2017    complete - tubular adenoma - repeat 5yrs   • CYSTOSCOPY     • CYSTOTOMY      bladder  w/ basket extraction of calculus   • FEMUR FRACTURE SURGERY     • HERNIA REPAIR      inguinal ; sliding   • INGUINAL HERNIA REPAIR Right     unilateral   • IR LOWER EXTREMITY ANGIOGRAM  4/24/2023   • KNEE ARTHROSCOPY Right     w/medial menisectomy   • LASIK      corneal   • LITHOTRIPSY      renal   • LA COLONOSCOPY FLX DX W/COLLJ SPEC WHEN PFRMD N/A 04/25/2017    Procedure: SCREENING COLONOSCOPY;  Surgeon: Anam Starkey MD;  Location:  MAIN OR;  Service: General   • LA SLCTV CATHJ 3RD+ ORD SLCTV ABDL PEL/LXTR [facility] 2023    Procedure: ARTERIOGRAM;  Surgeon: Lindsay Rausch MD;  Location: [facility];  Service: Vascular   • DE TEAEC W/PATCH GRF CAROTID VERTB Kanwal Right 01/10/2020    Procedure: ENDARTERECTOMY ARTERY CAROTID;  Surgeon: Ede Justin MD;  Location: [facility] MAIN OR;  Service: Vascular   • DE TEAEC W/WO PATCH GRAFT COMMON FEMORAL Right 2023    Procedure: ENDARTERECTOMY ARTERIAL FEMORAL, RETROGRADE ILIAC INTERVENTION;  Surgeon: Lindsay Rausch MD;  Location: [facility];  Service: Vascular   • ROTATOR CUFF REPAIR Bilateral    • TONSILLECTOMY         Social History     Socioeconomic History   • Marital status:      Spouse name: Not on file   • Number of children: 1   • Years of education: Not on file   • Highest education level: Not on file   Occupational History   • Occupation: Retired     Comment: working full time   Tobacco Use   • Smoking status: Former     Packs/day: 1 00     Years: 22 00     Pack years: 22 00     Types: Cigarettes     Start date: 0     Quit date: 1985     Years since quittin 3   • Smokeless tobacco: Never   Vaping Use   • Vaping Use: Never used   Substance and Sexual Activity   • Alcohol use: Not Currently     Comment: stopped drinking alcohol; AA x23yrs sober   • Drug use: No   • Sexual activity: Not Currently     Partners: Female   Other Topics Concern   • Not on file   Social History Narrative    , lives alone, working full time     Social Determinants of Health     Financial Resource Strain: Low Risk    • Difficulty of Paying Living Expenses: Not hard at all   Food Insecurity: Not on file   Transportation Needs: No Transportation Needs   • Lack of Transportation (Medical): No   • Lack of Transportation (Non-Medical):  No   Physical Activity: Not on file   Stress: Not on file   Social Connections: Not on file   Intimate Partner Violence: Not on file   Housing Stability: Not on file       Current Outpatient Medications on File Prior to Visit   Medication Sig Dispense Refill   • Ascorbic Acid (vitamin C) 1000 MG tablet Take 1,000 mg by mouth daily     • aspirin (ECOTRIN LOW STRENGTH) 81 mg EC tablet Take 81 mg by mouth every other day 1 every other day     • atorvastatin (LIPITOR) 40 mg tablet Take 1 tablet (40 mg total) by mouth every evening 90 tablet 3   • calcium polycarbophil (FIBERCON) 625 mg tablet Take 625 mg by mouth daily     • calcium polycarbophil (FIBERCON) 625 mg tablet Take 625 mg by mouth daily     • clopidogrel (PLAVIX) 75 mg tablet Take 1 tablet (75 mg total) by mouth daily Do not start before April 27, 2023  60 tablet 0   • Doxylamine Succinate, Sleep, (UNISOM PO) Take 1 tablet by mouth daily at bedtime     • escitalopram (LEXAPRO) 20 mg tablet Take 1 tablet (20 mg total) by mouth daily 30 tablet 2   • finasteride (PROSCAR) 5 mg tablet Take 1 tablet (5 mg total) by mouth daily 90 tablet 3   • gabapentin (Neurontin) 100 mg capsule Take 2 capsules (200 mg total) by mouth 3 (three) times a day 540 capsule 0   • Maxidex 0 1 % ophthalmic suspension instill 1 drop into both eyes twice a day for 2 weeks then 1 drop into both eyes once daily     • metoprolol succinate (TOPROL-XL) 25 mg 24 hr tablet take 1 tablet by mouth once daily 90 tablet 2   • Misc Natural Products (GLUCOSAMINE CHOND CMP DOUBLE PO) Take 1 tablet by mouth in the morning     • Multiple Vitamin (MULTIVITAMIN) capsule Take 1 capsule by mouth daily     • omeprazole (PriLOSEC) 20 mg delayed release capsule take 1 capsule by mouth once daily 90 capsule 2   • Santyl ointment 1 application   in the morning Apply thin layer to affected area     • tamsulosin (FLOMAX) 0 4 mg Take 1 capsule (0 4 mg total) by mouth daily with dinner 90 capsule 3   • traZODone (DESYREL) 50 mg tablet Take 1 tablet (50 mg total) by mouth daily at bedtime 30 tablet 2   • ferrous sulfate 324 (65 Fe) mg Take 1 tablet (324 mg total) by mouth daily before breakfast 30 tablet 2   • gabapentin (Neurontin) 100 mg capsule Take 1 capsule (100 mg total) by mouth 3 (three) times a day 90 capsule 0     No current facility-administered medications on file prior to visit  No Known Allergies      PE:  Vitals:    05/17/23 1057   BP: (!) 171/88   Pulse: 70   Temp: (!) 97 °F (36 1 °C)       General: NC/AT, breathing comfortably on RA  Neuro: CN II-XII grossly intact, symmetric reflexes  HEENT: PERRLA, EOMI, external ears normal, no lesions or deformities, neck supple, trachea midline  Respiratory: CTAB, normal respiratory effort  Cardio: RRR, normal S1, S2, no murmur, rubs, gallops  GI: soft, non-tender, non-distended  Extremities/MSK: normal alignment, mobility, gait, no edema    Right lower extremity, anterior shin and dorsal proximal ankle, small superficial wounds x3, largest of which is approximately 5x5 mm  No underlying tendon or bone  No cellulitis or signs of infection  Palpable DP pulse    A/P: 65 y/o male with chronic right anterior shin and proximal ankle open wounds that have considerably improved with vascular intervention (angioplasty and stent)  -I discussed with the patient that there is no need for surgical intervention at this time  With adequate local wound care, the wound should close with conservative management  -Recommend continue local wound care, keep clean  Elevation and compression  -Appreciate referral, would be happy to reassess if condition worsens  -Spent 35 minutes in consultation with patient   Greater than 50% of the total time was spent obtaining history, evaluation, performing exam, discussion of management options including post-operative care, answering patient's questions and concerns, chart reviewing, and documentation    Delaney Paul MD   [address] [place] [address] and Reconstructive Surgery   [address] [facility] [address], [address] [facility]   Office: [phone]

## 2023-05-19 ENCOUNTER — HOME CARE VISIT (OUTPATIENT)
Dept: HOME HEALTH SERVICES | Facility: HOME HEALTHCARE | Age: 78
End: 2023-05-19

## 2023-05-19 VITALS
SYSTOLIC BLOOD PRESSURE: 148 MMHG | HEART RATE: 90 BPM | OXYGEN SATURATION: 97 % | TEMPERATURE: 96.6 F | RESPIRATION RATE: 14 BRPM | DIASTOLIC BLOOD PRESSURE: 68 MMHG

## 2023-05-20 ENCOUNTER — NURSE TRIAGE (OUTPATIENT)
Dept: FAMILY MEDICINE CLINIC | Facility: HOSPITAL | Age: 78
End: 2023-05-20

## 2023-05-20 DIAGNOSIS — I10 PRIMARY HYPERTENSION: Primary | Chronic | ICD-10-CM

## 2023-05-20 RX ORDER — LISINOPRIL 10 MG/1
10 TABLET ORAL DAILY
Qty: 30 TABLET | Refills: 0 | Status: SHIPPED | OUTPATIENT
Start: 2023-05-20

## 2023-05-20 NOTE — TELEPHONE ENCOUNTER
Daughter of patient calling today to verify medications  He had surgery- ENDARTERECTOMY ARTERIAL FEMORAL, RETROGRADE ILIAC INTERVENTION (Right: Leg Lower) ARTERIOGRAM (Right: Groin) with Dr John Rodriguez 4/24  On discharge he was instructed to stop his Lisinopril 10mg and Triamterene-Hydrochlorothiazide 75-50mg, his daughter wanted to see if these medications are stopped for good or if he needs to started back on them at some point  On call provider contacted, stated he should restart taking the Lisinopril 10mg daily-new prescription sent into patients pharmacy, stated patient should continue to hold his Triamterene-Hydrochlorothiazide  Recommends patient call office on Monday to follow up  Patients daughter made aware of provider recommendation  Instructed her to call back with any further questions, concerns or worsening symptoms  Patient's daughter verbalized understanding

## 2023-05-20 NOTE — TELEPHONE ENCOUNTER
"Regarding: medication concerns  ----- Message from Kel Urena sent at 5/20/2023 10:06 AM EDT -----  \" I have some medication concerns for my father, I just need to know what medications he should be taking and what he should not  \"    "

## 2023-05-20 NOTE — PROGRESS NOTES
"Patient ID: Lisa Suero  is a 66 y o  male Date of Birth 1945     Chief Complaint  Chief Complaint   Patient presents with   • Follow Up Wound Care Visit     Right leg wounds       Allergies  Patient has no known allergies  Assessment:    Open wound of right ankle, subsequent encounter  -     lidocaine (XYLOCAINE) 4 % topical solution 5 mL  -     Wound cleansing and dressings; Future    Unspecified open wound, right lower leg, sequela  -     lidocaine (XYLOCAINE) 4 % topical solution 5 mL  -     Wound cleansing and dressings; Future    Other orders  -     Debridement  -     Debridement      Plan:  Discontinue Santyl to all wounds  Initiate Dermagran gauze dressing every other day to the remaining open wounds x2  Follow-up with vascular surgery as scheduled  Follow-up in 3 weeks  Debridement   Wound 03/29/23 Surgical Pretibial Right;Proximal    Universal Protocol:  Consent: Verbal consent obtained  Risks and benefits: risks, benefits and alternatives were discussed  Consent given by: patient  Time out: Immediately prior to procedure a \"time out\" was called to verify the correct patient, procedure, equipment, support staff and site/side marked as required    Patient understanding: patient states understanding of the procedure being performed  Patient identity confirmed: verbally with patient      Performed by: physician  Debridement type: surgical  Level of debridement: subcutaneous tissue  Pain control: lidocaine 4%  Post-debridement measurements  Length (cm): 0 2  Width (cm): 0 2  Depth (cm): 0 2  Percent debrided: 100%  Surface Area (cm^2): 0 04  Area debrided (cm^2): 0 04  Volume (cm^3): 0 01  Tissue and other material debrided: subcutaneous tissue  Devitalized tissue debrided: necrotic debris, slough and eschar  Instrument(s) utilized: curette and blade  Bleeding: medium  Hemostasis obtained with: pressure  Procedural pain (0-10): 3  Post-procedural pain: 3   Response to treatment: " "procedure was tolerated well    Debridement   Wound 12/07/22 Traumatic Ankle Anterior;Right    Universal Protocol:  Consent: Verbal consent obtained  Risks and benefits: risks, benefits and alternatives were discussed  Consent given by: patient  Time out: Immediately prior to procedure a \"time out\" was called to verify the correct patient, procedure, equipment, support staff and site/side marked as required  Patient understanding: patient states understanding of the procedure being performed  Patient identity confirmed: verbally with patient      Performed by: physician  Debridement type: surgical  Level of debridement: subcutaneous tissue  Pain control: lidocaine 4%  Post-debridement measurements  Length (cm): 1  Width (cm): 0 2  Depth (cm): 0 2  Percent debrided: 100%  Surface Area (cm^2): 0 2  Area debrided (cm^2): 0 2  Volume (cm^3): 0 04  Tissue and other material debrided: subcutaneous tissue  Devitalized tissue debrided: necrotic debris, slough and eschar  Instrument(s) utilized: nippers, curette and blade  Bleeding: small  Hemostasis obtained with: pressure  Procedural pain (0-10): 3  Post-procedural pain: 3   Response to treatment: procedure was tolerated well        Wound 12/07/22 Traumatic Ankle Anterior;Right (Active)   Wound Image Images linked 05/17/23 0958   Wound Description Eschar; Flaherty 05/17/23 0927   Kathrin-wound Assessment Erythema 05/17/23 0927   Wound Length (cm) 1 cm 05/17/23 0927   Wound Width (cm) 0 2 cm 05/17/23 0927   Wound Depth (cm) 0 cm 05/17/23 0927   Wound Surface Area (cm^2) 0 2 cm^2 05/17/23 0927   Wound Volume (cm^3) 0 cm^3 05/17/23 0927   Calculated Wound Volume (cm^3) 0 cm^3 05/17/23 0927   Change in Wound Size % 100 05/17/23 0927   Drainage Amount None 05/17/23 0927   Non-staged Wound Description Full thickness 05/17/23 0927   Dressing Status Intact 05/17/23 0927       Wound 03/27/23 Surgical Pretibial Distal;Right (Active)   Wound Image Images linked 05/17/23 0926   Wound " Description Flaherty;Eschar 05/17/23 0926   Kathrin-wound Assessment Erythema;Edema 05/17/23 0926   Wound Length (cm) 0 5 cm 05/17/23 0926   Wound Width (cm) 0 2 cm 05/17/23 0926   Wound Depth (cm) 0 cm 05/17/23 0926   Wound Surface Area (cm^2) 0 1 cm^2 05/17/23 0926   Wound Volume (cm^3) 0 cm^3 05/17/23 0926   Calculated Wound Volume (cm^3) 0 cm^3 05/17/23 0926   Change in Wound Size % 100 05/17/23 0926   Drainage Amount None 05/17/23 0926   Non-staged Wound Description Full thickness 05/17/23 0926   Dressing Status Intact 05/17/23 0926       Wound 03/29/23 Surgical Pretibial Right;Proximal (Active)   Wound Image Images linked 05/17/23 0957   Wound Description Tan;Brown;Eschar;Beefy red 05/17/23 0925   Kathrin-wound Assessment Edema 05/17/23 0925   Wound Length (cm) 0 2 cm 05/17/23 0925   Wound Width (cm) 0 2 cm 05/17/23 0925   Wound Depth (cm) 0 cm 05/17/23 0925   Wound Surface Area (cm^2) 0 04 cm^2 05/17/23 0925   Wound Volume (cm^3) 0 cm^3 05/17/23 0925   Calculated Wound Volume (cm^3) 0 cm^3 05/17/23 0925   Change in Wound Size % 100 05/17/23 0925   Drainage Amount Scant 05/17/23 0925   Drainage Description Serous 05/17/23 0925   Non-staged Wound Description Full thickness 05/17/23 0925   Dressing Status Intact 05/17/23 0925       Wound 12/07/22 Traumatic Ankle Anterior;Right (Active)   Date First Assessed/Time First Assessed: 12/07/22 1447   Primary Wound Type: Traumatic  Location: Ankle  Wound Location Orientation: Anterior;Right       Wound 03/27/23 Surgical Pretibial Distal;Right (Active)   Date First Assessed/Time First Assessed: 03/27/23 1611   Primary Wound Type: (c) Surgical  Location: Pretibial  Wound Location Orientation: Distal;Right       Wound 03/29/23 Surgical Pretibial Right;Proximal (Active)   Date First Assessed/Time First Assessed: 03/29/23 0959   Pre-Existing Wound: Yes  Primary Wound Type: Surgical  Location: Pretibial  Wound Location Orientation: Right;Proximal       [REMOVED] Wound 01/10/20 Neck Right (Removed)   Resolved Date/Resolved Time: 12/07/22 1446  Date First Assessed/Time First Assessed: 01/10/20 1215   Location: Neck  Wound Location Orientation: Right  Wound Description (Comments): [id number]  Wound Outcome: Other (Comment)       [REMOVED] Wound 01/10/20 Ankle Anterior;Right (Removed)   Resolved Date/Resolved Time: 12/07/22 1447  Date First Assessed/Time First Assessed: 01/10/20 1430   Pre-Existing Wound: Yes  Location: Ankle  Wound Location Orientation: Anterior;Right  Wound Description (Comments): non healing  Wound Outcome: Other     [REMOVED] Wound 11/09/20 Catheter entry/exit site Wrist Right (Removed)   Resolved Date/Resolved Time: 12/07/22 1446  Date First Assessed/Time First Assessed: 11/09/20 1238   Pre-Existing Wound: No  Traumatic Wound Type: Catheter entry/exit site  Location: Wrist  Wound Location Orientation: Right  Wound Outcome: Other (Comm    [REMOVED] Wound 12/07/20 Catheter entry/exit site Wrist Right (Removed)   Resolved Date/Resolved Time: 12/07/22 1446  Date First Assessed/Time First Assessed: 12/07/20 1207   Pre-Existing Wound: No  Traumatic Wound Type: Catheter entry/exit site  Location: Wrist  Wound Location Orientation: Right  Wound Description (Comment    [REMOVED] Wound 04/02/23 Foot Anterior;Right (Removed)   Resolved Date/Resolved Time: 05/19/23 0920  Date First Assessed/Time First Assessed: 04/02/23 1248   Location: Foot  Wound Location Orientation: Anterior;Right  Wound Outcome: Unknown (No longer present)       [REMOVED] Wound 04/24/23 Groin Right (Removed)   Resolved Date/Resolved Time: 05/19/23 0960  Date First Assessed/Time First Assessed: 04/24/23 1435   Location: Groin  Wound Location Orientation: Right  Wound Description (Comments): exofin and mepilex  Wound Outcome: Unknown (No longer present)       Subjective:           5/17/2023: 66-year-old male presents for follow-up multiple wounds right leg    Reports excellent progress over the past 2 weeks  Has noted much less drainage and is having little to no pain/discomfort from his leg  Reports his surgical incision is healing satisfactorily  113/2023: 77-year-old male presents for follow-up evaluation of chronic wounds of his right leg  Patient reports good progress with diminishing size  States that he had vascular surgery endarterectomy on 4/24/2023 at [facility] with Dr Pete Contreras     4/19/2023:  77-year-old male presents for follow-up evaluation of chronic wounds right leg  Reports some good progress being made and they seem to be healing  Patient states that he is scheduled for vascular surgery on 4/24/2023  Denies any new pain/discomfort    35/2023: 77-year-old male seen today for follow-up evaluation and treatment of multiple wounds right leg  Initial anterior ankle wound which is traumatic and 2 other additional surgical wound secondary to incision and drainage for abscess formation of his distal lower right leg  Patient recently was admitted to [facility] for cellulitis and nonhealing wounds and discharged this past week  During this admission was discovered to have severe PVD of his right lower extremity and will be following up with vascular surgery next week for an endarterectomy  Patient reports he saw vascular surgery yesterday and the surgical plan is in process    3/29/2023: 77-year-old male presents for follow-up status post incision and drainage deep abscess right leg and debridement of right leg wound 2 days ago  Reports less swelling and pain but is somewhat distraught about having this type of wound care for  [facility] did not contact him yesterday which was very disconcerting to him  3/27/2023: 77-year-old male presents with increasing pain/redness/swelling anterior aspect of right leg  Patient has been treating for a chronic wound on the anterior ankle within scar tissue from previous trauma    Reports going to ED on 3/24/2023 and getting antibiotics but is still having redness/swelling/pain/discomfort from the front of his leg  The following portions of the patient's history were reviewed and updated as appropriate: allergies, current medications, past family history, past medical history, past social history, past surgical history and problem list     Review of Systems   Constitutional: Negative for chills and fever  HENT: Negative for ear pain and sore throat  Eyes: Negative for pain and visual disturbance  Respiratory: Negative for cough and shortness of breath  Cardiovascular: Negative for chest pain and palpitations  Gastrointestinal: Negative for abdominal pain and vomiting  Genitourinary: Negative for dysuria and hematuria  Musculoskeletal: Negative for arthralgias and back pain  Skin: Positive for color change and wound (Anterior Right Ankle and leg)  Negative for rash  Neurological: Negative for seizures and syncope  All other systems reviewed and are negative  Objective:       Wound 12/07/22 Traumatic Ankle Anterior;Right (Active)   Wound Image Images linked 05/17/23 0958   Wound Description Eschar; Flaherty 05/17/23 0927   Kathrin-wound Assessment Erythema 05/17/23 0927   Wound Length (cm) 1 cm 05/17/23 0927   Wound Width (cm) 0 2 cm 05/17/23 0927   Wound Depth (cm) 0 cm 05/17/23 0927   Wound Surface Area (cm^2) 0 2 cm^2 05/17/23 0927   Wound Volume (cm^3) 0 cm^3 05/17/23 0927   Calculated Wound Volume (cm^3) 0 cm^3 05/17/23 0927   Change in Wound Size % 100 05/17/23 0927   Drainage Amount None 05/17/23 0927   Non-staged Wound Description Full thickness 05/17/23 0927   Dressing Status Intact 05/17/23 0927       Wound 03/27/23 Surgical Pretibial Distal;Right (Active)   Wound Image Images linked 05/17/23 0926   Wound Description Flaherty;Eschar 05/17/23 0926   Kathrin-wound Assessment Erythema;Edema 05/17/23 0926   Wound Length (cm) 0 5 cm 05/17/23 0926   Wound Width (cm) 0 2 cm 05/17/23 0926   Wound Depth (cm) 0 cm 05/17/23 0926 Wound Surface Area (cm^2) 0 1 cm^2 05/17/23 0926   Wound Volume (cm^3) 0 cm^3 05/17/23 0926   Calculated Wound Volume (cm^3) 0 cm^3 05/17/23 0926   Change in Wound Size % 100 05/17/23 0926   Drainage Amount None 05/17/23 0926   Non-staged Wound Description Full thickness 05/17/23 0926   Dressing Status Intact 05/17/23 0926       Wound 03/29/23 Surgical Pretibial Right;Proximal (Active)   Wound Image Images linked 05/17/23 0957   Wound Description Tan;Brown;Eschar;Beefy red 05/17/23 0925   Kathrin-wound Assessment Edema 05/17/23 0925   Wound Length (cm) 0 2 cm 05/17/23 0925   Wound Width (cm) 0 2 cm 05/17/23 0925   Wound Depth (cm) 0 cm 05/17/23 0925   Wound Surface Area (cm^2) 0 04 cm^2 05/17/23 0925   Wound Volume (cm^3) 0 cm^3 05/17/23 0925   Calculated Wound Volume (cm^3) 0 cm^3 05/17/23 0925   Change in Wound Size % 100 05/17/23 0925   Drainage Amount Scant 05/17/23 0925   Drainage Description Serous 05/17/23 0925   Non-staged Wound Description Full thickness 05/17/23 0925   Dressing Status Intact 05/17/23 0925       There were no vitals taken for this visit  Physical Exam  Constitutional:       Appearance: Normal appearance  HENT:      Head: Normocephalic  Right Ear: Tympanic membrane normal       Left Ear: Tympanic membrane normal       Nose: No congestion  Mouth/Throat:      Pharynx: No oropharyngeal exudate or posterior oropharyngeal erythema  Eyes:      Pupils: Pupils are equal, round, and reactive to light  Cardiovascular:      Rate and Rhythm: Normal rate and regular rhythm  Pulses:           Dorsalis pedis pulses are 0 on the right side and 1+ on the left side  Posterior tibial pulses are 0 on the right side and 0 on the left side  Pulmonary:      Effort: Pulmonary effort is normal    Feet:      Right foot:      Skin integrity: Ulcer (See comments) present        Comments: Anterior right ankle: SQ breakdown within scar tissue from prior trauma, diminished size, minimal s/s drainage, overall unchanged, improving  Skin:     General: Skin is warm and dry  Capillary Refill: Capillary refill takes 2 to 3 seconds  Coloration: Skin is not pale  Findings: Wound (X2 full-thickness wounds anterior aspect of right leg S/P incision and drainage, inferior wound has closed  Superior wound still open with subcu depth, improving overall  See detailed wound descriptions) present  No bruising, lesion or rash  Neurological:      General: No focal deficit present  Mental Status: He is alert  Cranial Nerves: No cranial nerve deficit  Sensory: No sensory deficit  Motor: No weakness  Gait: Gait normal       Deep Tendon Reflexes: Reflexes normal    Psychiatric:         Mood and Affect: Mood normal          Behavior: Behavior normal          Judgment: Judgment normal            Wound Instructions:  Orders Placed This Encounter   Procedures   • Wound cleansing and dressings     Wash your hands with soap and water  Remove old dressing, discard into plastic bag and place in trash  Cleanse the wounds NSS prior to applying a clean dressing  Do not use tissue or cotton balls  Do not scrub the wound  Pat dry using gauze  Shower No        Right anterior leg and right anterior ankle wounds:  Cleanse with NSS  Pat dry  Apply Dermagran dressing to wounds  Cover with ABD and wrap with ezekiel  Change 3x/week         Elevate legs using pillows at home      return in 3 weeks     Standing Status:   Future     Standing Expiration Date:   5/17/2024   • Debridement     This order was created via procedure documentation   • Debridement     This order was created via procedure documentation        Diagnosis ICD-10-CM Associated Orders   1  Open wound of right ankle, subsequent encounter  S91 001D lidocaine (XYLOCAINE) 4 % topical solution 5 mL     Wound cleansing and dressings     Debridement      2   Unspecified open wound, right lower leg, sequela  S81 801S lidocaine (XYLOCAINE) 4 % topical solution 5 mL     Wound cleansing and dressings     Debridement

## 2023-05-20 NOTE — TELEPHONE ENCOUNTER
"    Reason for Disposition  • [1] Caller has URGENT medicine question about med that PCP or specialist prescribed AND [2] triager unable to answer question    Answer Assessment - Initial Assessment Questions  1  NAME of MEDICATION: \"What medicine are you calling about? \"      Lisinopril and Triamterene-Hydrochlorothiazide 75-50mg     2  QUESTION: Edi Blanca is your question? \" (e g , medication refill, side effect)      He was instructed to stop medication after vascular surgery and daughter wants to know if he needs to be restarted on the medication     3  PRESCRIBING HCP: \"Who prescribed it? \" Reason: if prescribed by specialist, call should be referred to that group  PCP     4  SYMPTOMS: \"Do you have any symptoms? \"      N/a    Protocols used: MEDICATION QUESTION CALL-ADULT-      "

## 2023-05-21 ENCOUNTER — HOSPITAL ENCOUNTER (OUTPATIENT)
Dept: MRI IMAGING | Facility: HOSPITAL | Age: 78
Discharge: HOME | End: 2023-05-21

## 2023-05-21 DIAGNOSIS — R41.89 COGNITIVE IMPAIRMENT: ICD-10-CM

## 2023-05-22 ENCOUNTER — TELEPHONE (OUTPATIENT)
Dept: FAMILY MEDICINE CLINIC | Facility: HOSPITAL | Age: 78
End: 2023-05-22

## 2023-05-22 ENCOUNTER — HOME CARE VISIT (OUTPATIENT)
Dept: HOME HEALTH SERVICES | Facility: HOME HEALTHCARE | Age: 78
End: 2023-05-22

## 2023-05-22 VITALS
TEMPERATURE: 97.9 F | SYSTOLIC BLOOD PRESSURE: 120 MMHG | OXYGEN SATURATION: 98 % | DIASTOLIC BLOOD PRESSURE: 70 MMHG | HEART RATE: 62 BPM | RESPIRATION RATE: 16 BRPM

## 2023-05-22 NOTE — TELEPHONE ENCOUNTER
Flakito thinks he was  told to discontinue 2 of his BP meds  Plastic surgeon sent a script for   lisinopril (ZESTRIL) 10 mg tablet  Because his BP was high  Janessa's concern is that someone other than his PCP prescribed BP meds for him  She wants to verify what he should and should not be taking       Please call Janessa to advise  [phone]

## 2023-05-22 NOTE — TELEPHONE ENCOUNTER
PATIENT STOPPED BY AND HAS MULTIPLE BRUISES ON ARMS    WAS WONDERING IF IT WOULD BE BETTER TO BE ON ELIQUIS RATHER THAN PLAXIX?     PCB

## 2023-05-22 NOTE — TELEPHONE ENCOUNTER
Pt's daughter left VM asking fo a call clarifying pt's medications and what he is/is not supposed to be taking   PCB

## 2023-05-23 ENCOUNTER — PATIENT OUTREACH (OUTPATIENT)
Dept: FAMILY MEDICINE CLINIC | Facility: HOSPITAL | Age: 78
End: 2023-05-23

## 2023-05-23 NOTE — PROGRESS NOTES
Outpatient Care Management Note:    Care manager called Flakito  He states that he is doing well  Gia Kim He is still being seen by [facility] for his right foot wound  He also follows with wound care  Flakito states his foot is healing since his endarterectomy  Bev checks his blood pressure at home  He did not have his list handy to review, but noted that the [facility] nurse has been checking it, and states they are all good  Initial assessment completed and care plan updated

## 2023-05-24 ENCOUNTER — HOME CARE VISIT (OUTPATIENT)
Dept: HOME HEALTH SERVICES | Facility: HOME HEALTHCARE | Age: 78
End: 2023-05-24

## 2023-05-24 VITALS
DIASTOLIC BLOOD PRESSURE: 80 MMHG | OXYGEN SATURATION: 98 % | HEART RATE: 55 BPM | SYSTOLIC BLOOD PRESSURE: 138 MMHG | RESPIRATION RATE: 18 BRPM

## 2023-05-26 ENCOUNTER — HOME CARE VISIT (OUTPATIENT)
Dept: HOME HEALTH SERVICES | Facility: HOME HEALTHCARE | Age: 78
End: 2023-05-26

## 2023-05-26 ENCOUNTER — OFFICE VISIT (OUTPATIENT)
Dept: VASCULAR SURGERY | Facility: CLINIC | Age: 78
End: 2023-05-26

## 2023-05-26 VITALS
OXYGEN SATURATION: 96 % | DIASTOLIC BLOOD PRESSURE: 68 MMHG | SYSTOLIC BLOOD PRESSURE: 148 MMHG | HEART RATE: 65 BPM | TEMPERATURE: 97.3 F | RESPIRATION RATE: 14 BRPM

## 2023-05-26 VITALS
WEIGHT: 210 LBS | DIASTOLIC BLOOD PRESSURE: 80 MMHG | SYSTOLIC BLOOD PRESSURE: 122 MMHG | HEIGHT: 70 IN | HEART RATE: 47 BPM | BODY MASS INDEX: 30.06 KG/M2 | OXYGEN SATURATION: 96 %

## 2023-05-26 DIAGNOSIS — I74.09 AORTOILIAC OCCLUSIVE DISEASE (HCC): Primary | ICD-10-CM

## 2023-05-26 NOTE — PATIENT INSTRUCTIONS
Aortoiliac occlusive disease (HCC)  AOID/PAD with right leg tissue loss now s/p right femoral endarterectomy and retrograde iliac stenting 4/24/23  Returns for right groin wound check due to superficial dehiscence  Denies claudication or rest pain  Right groin with very slight superficial dehiscence in the superior aspect, approximately 1cm x [id number]  No deep tracking  Clean without erythema or drainage  Cleansed with betadine paint, applied purachol and island dressing  Remainder of incision is fully healed  Right leg wounds are healing  Right foot warm and well perfused      -Counseled on cleansing of right groin wound with warm soap and water, pat dry  Apply small piece of purachol every other day and apply island dressing until fully healed  As wound is nearly healed, will not bring back for another wound check  Please call the office if any concerns of worsening wound, nonhealing, erythema or drainage   -Scheduled for aortoiliac and lower extremity arterial duplex at 3 months since intervention  Will schedule for office follow-up to review duplex studies and reassess right leg wound healing   -Continue ASA, plavix, lipitor for medical management

## 2023-05-26 NOTE — CASE COMMUNICATION
Pt stated he does not need a snv for monday 5/29/23 will completed wc independently  Sn reviewed wc steps with pt and he provided teach back  All items needed for wound care where gathered and placed in a small bag for pt to have quick access  Please cancel snv for monday 5/29/23 next snv is 5/31/23   Thanks

## 2023-05-26 NOTE — ASSESSMENT & PLAN NOTE
AOID/PAD with right leg tissue loss now s/p right femoral endarterectomy and retrograde iliac stenting 4/24/23  Returns for right groin wound check due to superficial dehiscence  Denies claudication or rest pain  Right groin with very slight superficial dehiscence in the superior aspect, approximately 1cm x [id number]  No deep tracking  Clean without erythema or drainage  Cleansed with betadine paint, applied purachol and island dressing  Remainder of incision is fully healed  Right leg wounds are healing  Right foot warm and well perfused     -Counseled on cleansing of right groin wound with warm soap and water, pat dry  Apply small piece of purachol every other day and apply island dressing until fully healed  As wound is nearly healed, will not bring back for another wound check  Please call the office if any concerns of worsening wound, nonhealing, erythema or drainage   -Scheduled for aortoiliac and lower extremity arterial duplex at 3 months since intervention  Will schedule for office follow-up to review duplex studies and reassess right leg wound healing   -Continue ASA, plavix, lipitor for medical management

## 2023-05-26 NOTE — PROGRESS NOTES
Assessment/Plan: Aortoiliac occlusive disease (HCC)  AOID/PAD with right leg tissue loss now s/p right femoral endarterectomy and retrograde iliac stenting 4/24/23  Returns for right groin wound check due to superficial dehiscence  Denies claudication or rest pain  Right groin with very slight superficial dehiscence in the superior aspect, approximately 1cm x [id number]  No deep tracking  Clean without erythema or drainage  Cleansed with betadine paint, applied purachol and island dressing  Remainder of incision is fully healed  Right leg wounds are healing  Right foot warm and well perfused     -Counseled on cleansing of right groin wound with warm soap and water, pat dry  Apply small piece of purachol every other day and apply island dressing until fully healed  As wound is nearly healed, will not bring back for another wound check  Please call the office if any concerns of worsening wound, nonhealing, erythema or drainage   -Scheduled for aortoiliac and lower extremity arterial duplex at 3 months since intervention  Will schedule for office follow-up to review duplex studies and reassess right leg wound healing   -Continue ASA, plavix, lipitor for medical management  Diagnoses and all orders for this visit:    [facility] disease ([facility] )        I have spent 15 minutes with Patient  today in which greater than 50% of this time was spent in counseling/coordination of care regarding Intructions for management, Importance of tx compliance and Impressions  Subjective:      Patient ID: Chyna Feliz  is a 66 y o  male  Patient is s/p R endarterectomy arterial femoral retrograde iliac intervention done 04/24/2023  He presents today for a wound check of RLE  Incision site is clean, but open  He states he has a sensitivity to touch on R thigh area  He denies claudication symptoms, coldness, tingling, or numbness of BLE     He is taking ASA 81 mg, Atorvastatin 40 mg and Plavix 75 "mg     HPI    The following portions of the patient's history were reviewed and updated as appropriate: allergies, current medications, past family history, past medical history, past social history, past surgical history and problem list     Review of Systems   Constitutional: Negative  HENT: Negative  Eyes: Negative  Respiratory: Negative  Cardiovascular: Negative  Gastrointestinal: Negative  Endocrine: Negative  Genitourinary: Negative  Musculoskeletal: Negative  Skin: Positive for wound  Allergic/Immunologic: Negative  Neurological: Negative  Hematological: Negative  Psychiatric/Behavioral: Negative  Objective:      /80 (BP Location: Left arm, Patient Position: Sitting, Cuff Size: Standard)   Pulse (!) 47   Ht 5' 10\" (1 778 m)   Wt 95 3 kg (210 lb)   SpO2 96%   BMI 30 13 kg/m²          Physical Exam  Constitutional:       Appearance: Normal appearance  HENT:      Head: Normocephalic and atraumatic  Cardiovascular:      Rate and Rhythm: Normal rate  Pulses:           Dorsalis pedis pulses are 1+ on the right side  Pulmonary:      Effort: Pulmonary effort is normal    Abdominal:      Palpations: Abdomen is soft  Musculoskeletal:         General: Normal range of motion  Skin:     General: Skin is warm and dry  Capillary Refill: Capillary refill takes less than 2 seconds  Comments: 1cm (length) [id number] right groin superficial skin dehiscence  No deeper tracking, erythema or drainage  Right leg wounds decreased in size   Neurological:      General: No focal deficit present  Mental Status: He is alert and oriented to person, place, and time  Psychiatric:         Mood and Affect: Mood normal          Behavior: Behavior normal          Thought Content:  Thought content normal          Judgment: Judgment normal          "

## 2023-05-29 ENCOUNTER — HOME CARE VISIT (OUTPATIENT)
Dept: HOME HEALTH SERVICES | Facility: HOME HEALTHCARE | Age: 78
End: 2023-05-29

## 2023-05-31 ENCOUNTER — HOME CARE VISIT (OUTPATIENT)
Dept: HOME HEALTH SERVICES | Facility: HOME HEALTHCARE | Age: 78
End: 2023-05-31

## 2023-05-31 VITALS
OXYGEN SATURATION: 98 % | SYSTOLIC BLOOD PRESSURE: 142 MMHG | RESPIRATION RATE: 18 BRPM | DIASTOLIC BLOOD PRESSURE: 80 MMHG | HEART RATE: 50 BPM

## 2023-06-06 ENCOUNTER — OFFICE VISIT (OUTPATIENT)
Dept: URGENT CARE | Facility: CLINIC | Age: 78
End: 2023-06-06
Payer: COMMERCIAL

## 2023-06-06 ENCOUNTER — PATIENT OUTREACH (OUTPATIENT)
Dept: FAMILY MEDICINE CLINIC | Facility: HOSPITAL | Age: 78
End: 2023-06-06

## 2023-06-06 VITALS
TEMPERATURE: 96.3 F | SYSTOLIC BLOOD PRESSURE: 137 MMHG | OXYGEN SATURATION: 96 % | HEART RATE: 50 BPM | DIASTOLIC BLOOD PRESSURE: 62 MMHG | RESPIRATION RATE: 16 BRPM

## 2023-06-06 DIAGNOSIS — S60.512A ABRASION OF LEFT HAND, INITIAL ENCOUNTER: Primary | ICD-10-CM

## 2023-06-06 PROCEDURE — 99213 OFFICE O/P EST LOW 20 MIN: CPT | Performed by: FAMILY MEDICINE

## 2023-06-06 NOTE — PROGRESS NOTES
Outpatient Care Management Note:    Care manager called Flakito  He states that [facility] discharged him  He is managing his own dressing changes, and states his wound looks good  He is scheduled to see wound care tomorrow and is hoping they will discharge him  Flakito states that his only concern is that his hands fall asleep when he is holding the steering wheel while driving  He puts his hand down and it resolves  He states it has been going on for 1-2 months  CM will forward my note to Dr Jean Marx  Flakito denies any current needs  He is driving and independent  He did mention that if he bumps himself, he bruises easily  He is on plavix  CM encouraged him to be more careful  He wants to talk with Dr Jean Marx to see if he could change to a different antiplatelet  He mentioned that he cut his finger and is planning on having wound care look at the site  Flakito has my contact information and will call with any questions  CM will follow up in 2 weeks    If he remains stable, CM will close the referral

## 2023-06-06 NOTE — PROGRESS NOTES
[facility]        NAME: Tiff Earl  is a 66 y o  male  : 1945    MRN: [id number]  DATE: 2023  TIME: 3:01 PM    Assessment and Plan   Abrasion of left hand, initial encounter [S60 512A]  1  Abrasion of left hand, initial encounter              Patient Instructions       Follow up with PCP in 3-5 days  Proceed to  ER if symptoms worsen  Chief Complaint     Chief Complaint   Patient presents with   • Hand Laceration     Pt reports LUE laceration yesterday from a sharp piece on a gas can system  History of Present Illness       6year-old male presenting with cut to his left hand  He reports yesterday while twisting open a metal container gasoline, cutting the edge of his hand on the metal   Bleeding has since stopped  He states his tetanus is up-to-date at this time  Review of Systems   Review of Systems   Constitutional: Negative  HENT: Negative  Eyes: Negative  Respiratory: Negative  Cardiovascular: Negative  Gastrointestinal: Negative  Genitourinary: Negative  Skin: Positive for wound  Allergic/Immunologic: Negative  Neurological: Negative  Hematological: Negative  Psychiatric/Behavioral: Negative            Current Medications       Current Outpatient Medications:   •  Ascorbic Acid (vitamin C) 1000 MG tablet, Take 1,000 mg by mouth daily, Disp: , Rfl:   •  aspirin (ECOTRIN LOW STRENGTH) 81 mg EC tablet, Take 81 mg by mouth every other day 1 every other day, Disp: , Rfl:   •  atorvastatin (LIPITOR) 40 mg tablet, Take 1 tablet (40 mg total) by mouth every evening, Disp: 90 tablet, Rfl: 3  •  calcium polycarbophil (FIBERCON) 625 mg tablet, Take 625 mg by mouth daily, Disp: , Rfl:   •  calcium polycarbophil (FIBERCON) 625 mg tablet, Take 625 mg by mouth daily, Disp: , Rfl:   •  clopidogrel (PLAVIX) 75 mg tablet, Take 1 tablet (75 mg total) by mouth daily Do not start before 2023 , Disp: 60 tablet, Rfl: 0  •  Doxylamine Succinate, Sleep, (UNISOM PO), Take 1 tablet by mouth daily at bedtime, Disp: , Rfl:   •  escitalopram (LEXAPRO) 20 mg tablet, Take 1 tablet (20 mg total) by mouth daily, Disp: 30 tablet, Rfl: 2  •  ferrous sulfate 324 (65 Fe) mg, Take 1 tablet (324 mg total) by mouth daily before breakfast, Disp: 30 tablet, Rfl: 2  •  finasteride (PROSCAR) 5 mg tablet, Take 1 tablet (5 mg total) by mouth daily, Disp: 90 tablet, Rfl: 3  •  gabapentin (Neurontin) 100 mg capsule, Take 1 capsule (100 mg total) by mouth 3 (three) times a day, Disp: 90 capsule, Rfl: 0  •  gabapentin (Neurontin) 100 mg capsule, Take 2 capsules (200 mg total) by mouth 3 (three) times a day, Disp: 540 capsule, Rfl: 0  •  lisinopril (ZESTRIL) 10 mg tablet, Take 1 tablet (10 mg total) by mouth daily, Disp: 30 tablet, Rfl: 0  •  Maxidex 0 1 % ophthalmic suspension, instill 1 drop into both eyes twice a day for 2 weeks then 1 drop into both eyes once daily, Disp: , Rfl:   •  metoprolol succinate (TOPROL-XL) 25 mg 24 hr tablet, take 1 tablet by mouth once daily, Disp: 90 tablet, Rfl: 2  •  Misc Natural Products (GLUCOSAMINE CHOND CMP DOUBLE PO), Take 1 tablet by mouth in the morning, Disp: , Rfl:   •  Multiple Vitamin (MULTIVITAMIN) capsule, Take 1 capsule by mouth daily, Disp: , Rfl:   •  omeprazole (PriLOSEC) 20 mg delayed release capsule, take 1 capsule by mouth once daily, Disp: 90 capsule, Rfl: 2  •  Santyl ointment, 1 application   in the morning Apply thin layer to affected area, Disp: , Rfl:   •  tamsulosin (FLOMAX) 0 4 mg, Take 1 capsule (0 4 mg total) by mouth daily with dinner, Disp: 90 capsule, Rfl: 3  •  traZODone (DESYREL) 50 mg tablet, Take 1 tablet (50 mg total) by mouth daily at bedtime, Disp: 30 tablet, Rfl: 2    Current Allergies     Allergies as of 06/06/2023   • (No Known Allergies)            The following portions of the patient's history were reviewed and updated as appropriate: allergies, current medications, past family history, past medical history, past social history, past surgical history and problem list      Past Medical History:   Diagnosis Date   • Anemia     iron def   • Arthritis    • Cerebrovascular accident (CVA) ([facility] ) 01/07/2020    memory issues   • Depression 03/29/2023   • GERD (gastroesophageal reflux disease)    • Gout    • Hypertension    • Insomnia        Past Surgical History:   Procedure Laterality Date   • ANKLE SURGERY Right    • CARDIAC CATHETERIZATION      no findings   • COLONOSCOPY  01/25/2013    polypectomy; complete - 3 yrs d/t polyp - benign submucosal lipoma- path c/w lipoma   • COLONOSCOPY  04/25/2017    complete - tubular adenoma - repeat 5yrs   • CYSTOSCOPY     • CYSTOTOMY      bladder  w/ basket extraction of calculus   • FEMUR FRACTURE SURGERY     • HERNIA REPAIR      inguinal ; sliding   • INGUINAL HERNIA REPAIR Right     unilateral   • IR LOWER EXTREMITY ANGIOGRAM  4/24/2023   • KNEE ARTHROSCOPY Right     w/medial menisectomy   • LASIK      corneal   • LITHOTRIPSY      renal   • VT COLONOSCOPY FLX DX W/COLLJ SPEC WHEN PFRMD N/A 04/25/2017    Procedure: SCREENING COLONOSCOPY;  Surgeon: Cindi Rodriguez MD;  Location: [facility]IN OR;  Service: General   • VT Wandra Dakins 3RD+ ORD SLCTV ABDL PEL/LXTR [facility] Right 4/24/2023    Procedure: ARTERIOGRAM;  Surgeon: Karrin Fothergill, MD;  Location: [facility];  Service: Vascular   • VT TEAEC W/PATCH GRF CAROTID VERTB SUBCLAV NECK INC Right 01/10/2020    Procedure: ENDARTERECTOMY ARTERY CAROTID;  Surgeon: Lucía Cordoba MD;  Location: [facility];  Service: Vascular   • VT TEAEC W/WO PATCH GRAFT COMMON FEMORAL Right 4/24/2023    Procedure: ENDARTERECTOMY ARTERIAL FEMORAL, RETROGRADE ILIAC INTERVENTION;  Surgeon: Karrin Fothergill, MD;  Location: [facility];  Service: Vascular   • ROTATOR CUFF REPAIR Bilateral    • TONSILLECTOMY         Family History   Problem Relation Age of Onset   • Alzheimer's disease Mother    • Alzheimer's disease Father    • Heart disease Father         CAD   • Other Father         prediabetes   • Diabetes Father    • Breast cancer Other    • Arthritis Family    • Hypertension Family          Medications have been verified  Objective   /62   Pulse (!) 50   Temp (!) 96 3 °F (35 7 °C)   Resp 16   SpO2 96%   No LMP for male patient  Physical Exam     Physical Exam  Constitutional:       Appearance: He is well-developed  HENT:      Head: Normocephalic  Eyes:      Pupils: Pupils are equal, round, and reactive to light  Cardiovascular:      Rate and Rhythm: Normal rate and regular rhythm  Pulmonary:      Effort: Pulmonary effort is normal    Musculoskeletal:         General: Normal range of motion  Cervical back: Normal range of motion  Skin:     General: Skin is warm  Findings: Abrasion present  Neurological:      Mental Status: He is alert and oriented to person, place, and time

## 2023-06-07 ENCOUNTER — OFFICE VISIT (OUTPATIENT)
Dept: WOUND CARE | Facility: HOSPITAL | Age: 78
End: 2023-06-07
Payer: COMMERCIAL

## 2023-06-07 VITALS
RESPIRATION RATE: 16 BRPM | TEMPERATURE: 97.4 F | HEART RATE: 58 BPM | SYSTOLIC BLOOD PRESSURE: 130 MMHG | DIASTOLIC BLOOD PRESSURE: 62 MMHG

## 2023-06-07 DIAGNOSIS — S91.001D OPEN WOUND OF RIGHT ANKLE, SUBSEQUENT ENCOUNTER: Primary | ICD-10-CM

## 2023-06-07 DIAGNOSIS — S81.801S UNSPECIFIED OPEN WOUND, RIGHT LOWER LEG, SEQUELA: ICD-10-CM

## 2023-06-07 DIAGNOSIS — I73.9 PERIPHERAL VASCULAR DISEASE, UNSPECIFIED (HCC): ICD-10-CM

## 2023-06-07 PROCEDURE — 99213 OFFICE O/P EST LOW 20 MIN: CPT | Performed by: PODIATRIST

## 2023-06-07 PROCEDURE — 99212 OFFICE O/P EST SF 10 MIN: CPT | Performed by: PODIATRIST

## 2023-06-07 NOTE — PATIENT INSTRUCTIONS
Orders Placed This Encounter   Procedures    Wound cleansing and dressings     Right ankle wound    Wounds are healed, moisturize daily  Discharged today       Standing Status:   Future     Standing Expiration Date:   6/7/2024

## 2023-06-12 NOTE — PROGRESS NOTES
Patient ID: Sven Elizalde  is a 66 y o  male Date of Birth 1945     Chief Complaint  Chief Complaint   Patient presents with   • Follow Up Wound Care Visit     Dressing intact on arrival, reports [facility] is no longer changing his dressing, reports changing his dressing every other day  Denies any problems or drainage  Allergies  Patient has no known allergies  Assessment:    Open wound of right ankle, subsequent encounter  -     Wound cleansing and dressings; Future    Unspecified open wound, right lower leg, sequela    Peripheral vascular disease, unspecified ([address] )        Procedures    Plan:  Moisturize all areas right leg daily and avoid any trauma of that area  Watch for signs of recurring infection call immediately if such is noted  Follow-up with vascular surgery as scheduled  Patient is closed 100% and discharged to home in stable condition  Follow-up in [facility] and the [facility] office in approximately 6 weeks       Wound 12/07/22 Traumatic Ankle Anterior;Right (Active)   Wound Image Images linked 06/07/23 0922   Wound Description Eschar 06/07/23 0922   Kathrin-wound Assessment Intact 06/07/23 0922   Wound Length (cm) 2 cm 06/07/23 0922   Wound Width (cm) 1 3 cm 06/07/23 0922   Wound Depth (cm) 0 cm 06/07/23 0922   Wound Surface Area (cm^2) 2 6 cm^2 06/07/23 0922   Wound Volume (cm^3) 0 cm^3 06/07/23 0922   Calculated Wound Volume (cm^3) 0 cm^3 06/07/23 0922   Change in Wound Size % 100 06/07/23 0922   Drainage Amount None 06/07/23 0922   Non-staged Wound Description Not applicable 67/56/06 4783   Dressing Status Intact 06/07/23 0922       [REMOVED] Wound 03/29/23 Surgical Pretibial Right;Proximal (Removed)   Wound Image Images linked 06/07/23 0921   Wound Description Epithelialization 06/07/23 0921   Kathrin-wound Assessment Intact 06/07/23 0921   Wound Length (cm) 0 cm 06/07/23 0921   Wound Width (cm) 0 cm 06/07/23 0921   Wound Depth (cm) 0 cm 06/07/23 0921   Wound Surface Area (cm^2) 0 cm^2 06/07/23 0534 Wound Volume (cm^3) 0 cm^3 06/07/23 0921   Calculated Wound Volume (cm^3) 0 cm^3 06/07/23 0921   Change in Wound Size % 100 06/07/23 0921   Drainage Amount None 06/07/23 0921   Non-staged Wound Description Not applicable 90/85/53 9687   Dressing Status Intact 06/07/23 0921       Wound 12/07/22 Traumatic Ankle Anterior;Right (Active)   Date First Assessed/Time First Assessed: 12/07/22 1447   Primary Wound Type: Traumatic  Location: Ankle  Wound Location Orientation: Anterior;Right       [REMOVED] Wound 01/10/20 Neck Right (Removed)   Resolved Date/Resolved Time: 12/07/22 1446  Date First Assessed/Time First Assessed: 01/10/20 1215   Location: Neck  Wound Location Orientation: Right  Wound Description (Comments): [id number]  Wound Outcome: Other (Comment)       [REMOVED] Wound 01/10/20 Ankle Anterior;Right (Removed)   Resolved Date/Resolved Time: 12/07/22 1447  Date First Assessed/Time First Assessed: 01/10/20 1430   Pre-Existing Wound: Yes  Location: Ankle  Wound Location Orientation: Anterior;Right  Wound Description (Comments): non healing  Wound Outcome: Other     [REMOVED] Wound 11/09/20 Catheter entry/exit site Wrist Right (Removed)   Resolved Date/Resolved Time: 12/07/22 1446  Date First Assessed/Time First Assessed: 11/09/20 1238   Pre-Existing Wound: No  Traumatic Wound Type: Catheter entry/exit site  Location: Wrist  Wound Location Orientation: Right  Wound Outcome: Other (Comm    [REMOVED] Wound 12/07/20 Catheter entry/exit site Wrist Right (Removed)   Resolved Date/Resolved Time: 12/07/22 1446  Date First Assessed/Time First Assessed: 12/07/20 1207   Pre-Existing Wound: No  Traumatic Wound Type: Catheter entry/exit site  Location: Wrist  Wound Location Orientation: Right  Wound Description (Comment           [REMOVED] Wound 03/27/23 Surgical Pretibial Distal;Right (Removed)   Resolved Date: 05/26/23  Date First Assessed/Time First Assessed: 03/27/23 1611   Primary Wound Type: (c) Surgical Location: Pretibial  Wound Location Orientation: Distal;Right  Dressing Status: Changed  Wound Outcome: Unknown (No longer present)       [REMOVED] Wound 03/29/23 Surgical Pretibial Right;Proximal (Removed)   Resolved Date/Resolved Time: 06/07/23 0953  Date First Assessed/Time First Assessed: 03/29/23 0959   Primary Wound Type: Surgical  Location: Pretibial  Wound Location Orientation: Right;Proximal  Dressing Status: Intact  Wound Outcome: Healed       [REMOVED] Wound 04/02/23 Foot Anterior;Right (Removed)   Resolved Date/Resolved Time: 05/19/23 0920  Date First Assessed/Time First Assessed: 04/02/23 1248   Location: Foot  Wound Location Orientation: Anterior;Right  Wound Outcome: Unknown (No longer present)       [REMOVED] Wound 04/24/23 Groin Right (Removed)   Resolved Date/Resolved Time: 05/19/23 5109  Date First Assessed/Time First Assessed: 04/24/23 1435   Location: Groin  Wound Location Orientation: Right  Wound Description (Comments): exofin and mepilex  Wound Outcome: Unknown (No longer present)       Subjective:           107/2023: 68-year-old male presents for follow-up evaluation of multiple wounds right leg  Reports excellent progress since last visit and feels all of his wounds have closed completely  The following portions of the patient's history were reviewed and updated as appropriate: allergies, current medications, past family history, past medical history, past social history, past surgical history and problem list     Review of Systems   Constitutional: Negative  HENT: Negative  Eyes: Negative  Respiratory: Negative  Cardiovascular: Negative  Gastrointestinal: Negative  Endocrine: Negative  Genitourinary: Negative  Musculoskeletal: Negative  Skin: Positive for wound (Left leg multiple wounds)  Allergic/Immunologic: Negative  Hematological: Negative  Psychiatric/Behavioral: Negative            Objective:       Wound 12/07/22 Traumatic Ankle Anterior;Right (Active)   Wound Image Images linked 06/07/23 0922   Wound Description Eschar 06/07/23 0922   Kathrin-wound Assessment Intact 06/07/23 0922   Wound Length (cm) 2 cm 06/07/23 0922   Wound Width (cm) 1 3 cm 06/07/23 0922   Wound Depth (cm) 0 cm 06/07/23 0922   Wound Surface Area (cm^2) 2 6 cm^2 06/07/23 0922   Wound Volume (cm^3) 0 cm^3 06/07/23 0922   Calculated Wound Volume (cm^3) 0 cm^3 06/07/23 0922   Change in Wound Size % 100 06/07/23 0922   Drainage Amount None 06/07/23 0922   Non-staged Wound Description Not applicable 21/88/41 6741   Dressing Status Intact 06/07/23 0922       [REMOVED] Wound 03/29/23 Surgical Pretibial Right;Proximal (Removed)   Wound Image Images linked 06/07/23 0921   Wound Description Epithelialization 06/07/23 0921   Kathrin-wound Assessment Intact 06/07/23 0921   Wound Length (cm) 0 cm 06/07/23 0921   Wound Width (cm) 0 cm 06/07/23 0921   Wound Depth (cm) 0 cm 06/07/23 0921   Wound Surface Area (cm^2) 0 cm^2 06/07/23 0921   Wound Volume (cm^3) 0 cm^3 06/07/23 0921   Calculated Wound Volume (cm^3) 0 cm^3 06/07/23 0921   Change in Wound Size % 100 06/07/23 0921   Drainage Amount None 06/07/23 0921   Non-staged Wound Description Not applicable 07/24/73 1315   Dressing Status Intact 06/07/23 0921       /62   Pulse 58   Temp (!) 97 4 °F (36 3 °C)   Resp 16     Physical Exam  Constitutional:       Appearance: Normal appearance  HENT:      Head: Normocephalic  Right Ear: Tympanic membrane normal       Left Ear: Tympanic membrane normal       Nose: No congestion  Mouth/Throat:      Pharynx: No oropharyngeal exudate or posterior oropharyngeal erythema  Eyes:      Conjunctiva/sclera: Conjunctivae normal       Pupils: Pupils are equal, round, and reactive to light  Cardiovascular:      Rate and Rhythm: Normal rate and regular rhythm  Pulses:           Dorsalis pedis pulses are 0 on the right side and 0 on the left side          Posterior tibial pulses are 0 on the right side and 0 on the left side  Pulmonary:      Effort: Pulmonary effort is normal    Feet:      Right foot:      Protective Sensation: 10 sites tested  Left foot:      Protective Sensation: 10 sites tested  Skin:     General: Skin is warm and dry  Capillary Refill: Capillary refill takes 2 to 3 seconds  Coloration: Skin is not pale  Findings: No bruising, erythema, lesion or rash  Comments:   X3 wounds on the left leg right leg have closed completely  Good new skin and stable eschar noted  Texture tone and turgor are diminished with moderate atrophic changes noted bilaterally  No digital hair noted  Neurological:      General: No focal deficit present  Mental Status: He is alert  Cranial Nerves: No cranial nerve deficit  Sensory: No sensory deficit  Motor: No weakness  Gait: Gait normal       Deep Tendon Reflexes: Reflexes normal    Psychiatric:         Mood and Affect: Mood normal          Behavior: Behavior normal          Judgment: Judgment normal            Wound Instructions:  Orders Placed This Encounter   Procedures   • Wound cleansing and dressings     Right ankle wound    Wounds are healed, moisturize daily  Discharged today  Standing Status:   Future     Standing Expiration Date:   6/7/2024        Diagnosis ICD-10-CM Associated Orders   1  Open wound of right ankle, subsequent encounter  S91 001D Wound cleansing and dressings      2  Unspecified open wound, right lower leg, sequela  S81 801S       3   Peripheral vascular disease, unspecified ([facility] [address] )  I93 9

## 2023-06-13 NOTE — PROGRESS NOTES
Assessment and Plan:  Problem List Items Addressed This Visit     Dyslipidemia    Relevant Orders    CBC and differential    Comprehensive metabolic panel    Hemoglobin A1C    TSH, 3rd generation with Free T4 reflex    Hypertension    Relevant Orders    CBC and differential    Comprehensive metabolic panel    Hemoglobin A1C    TSH, 3rd generation with Free T4 reflex    Impaired fasting glucose - Primary    Relevant Orders    CBC and differential    Comprehensive metabolic panel    Hemoglobin A1C    TSH, 3rd generation with Free T4 reflex      Other Visit Diagnoses     Medicare annual wellness visit, subsequent        Screening for prostate cancer        Relevant Orders    PSA, Total Screen        Health Maintenance Due   Topic Date Due    Hepatitis C Screening  1945    Depression Screening PHQ-9  1945    Fall Risk  01/05/2010    ABDOMINAL AORTIC ANEURYSM (AAA) SCREEN  01/05/2010    DTaP,Tdap,and Td Vaccines (1 - Tdap) 02/16/2011    GLAUCOMA SCREENING 67+ YR  01/05/2012    Annual [facility] Visit ([facility])  05/30/2018         HPI:  Gabby Fink  is a 68 y o  male here for his Subsequent Wellness Visit      Patient Active Problem List   Diagnosis    Benign colon polyp    Carpal tunnel syndrome    Dyslipidemia    Gout    Hypertension    Impaired fasting glucose    Insomnia    Iron deficiency anemia    Osteoarthritis    Prolonged QT interval    Rhinitis    Other disorder of calcium metabolism     Past Medical History:   Diagnosis Date    Anemia     iron def    GERD (gastroesophageal reflux disease)     Gout     Hypertension     Insomnia     Kidney stone      Past Surgical History:   Procedure Laterality Date    ANKLE SURGERY Right     COLONOSCOPY  01/25/2013    polypectomy; complete - 3 yrs d/t polyp - benign submucosal lipoma- path c/w lipoma    COLONOSCOPY  04/25/2017    complete - tubular adenoma - repeat 5yrs    CYSTOSCOPY      CYSTOTOMY      bladder  w/ basket extraction of Spoke with patient and informed him of appt in Oct to f/u and schedule for next stent exchange    Patient verbalized understanding and was thankful for phone call calculus    FEMUR FRACTURE SURGERY      HERNIA REPAIR      inguinal ; sliding    INGUINAL HERNIA REPAIR Right     unilateral    KNEE ARTHROSCOPY Right     w/medial menisectomy    LASIK      corneal    LITHOTRIPSY      renal    NC COLONOSCOPY FLX DX W/COLLJ SPEC WHEN PFRMD N/A 4/25/2017    Procedure: SCREENING COLONOSCOPY;  Surgeon: Lady Negin MD;  Location: [facility];  Service: General    ROTATOR CUFF REPAIR Bilateral     TONSILLECTOMY       Family History   Problem Relation Age of Onset    Alzheimer's disease Mother     Alzheimer's disease Father     Heart disease Father         CAD    Other Father         prediabetes    Breast cancer Other      History   Smoking Status    Former Smoker    Packs/day: 0 50    Years: 22 00    Quit date: [address] Never Used     History   Alcohol Use No     Comment: stopped drinking alcohol      History   Drug Use No         Current Outpatient Prescriptions   Medication Sig Dispense Refill    aspirin (ECOTRIN LOW STRENGTH) 81 mg EC tablet Take 81 mg by mouth daily      calcium polycarbophil (FIBERCON) 625 mg tablet Take 625 mg by mouth daily      calcium polycarbophil (FIBERCON) 625 mg tablet Take by mouth      clotrimazole-betamethasone (LOTRISONE) 1-0 05 % cream Apply topically 2 (two) times a day      Colchicine (MITIGARE) 0 6 MG CAPS Take 1 capsule (0 6 mg total) by mouth 4 (four) times a day as needed (gout) 100 capsule 3    ferrous sulfate 325 (65 Fe) mg tablet Take 325 mg by mouth daily with breakfast      meloxicam (MOBIC) 15 mg tablet Take 15 mg by mouth daily      mometasone (NASONEX) 50 mcg/act nasal spray 2 sprays into each nostril daily      Multiple Vitamin (MULTIVITAMIN) capsule Take 1 capsule by mouth daily      pravastatin (PRAVACHOL) 20 mg tablet take 1 tablet by mouth once daily at bedtime 30 tablet 6    SANTYL ointment       triamterene-hydrochlorothiazide (MAXZIDE) 75-50 MG per tablet take 1 tablet by mouth once daily 30 tablet 3    zolpidem (AMBIEN) 5 mg tablet Take 5 mg by mouth daily at bedtime as needed for sleep       No current facility-administered medications for this visit  No Known Allergies  Immunization History   Administered Date(s) Administered    Influenza 09/13/2014, 08/10/2015, 08/29/2016, 09/02/2017    Influenza Quadrivalent Preservative Free 3 years and older IM 08/10/2015    Influenza Split High Dose Preservative Free IM 08/29/2016, 09/02/2017    Influenza TIV (IM) 01/16/2014, 09/13/2014    Pneumococcal Conjugate 13-Valent 07/17/2015, 10/26/2015    Pneumococcal Polysaccharide PPV23 02/28/2017, 04/14/2017    TD (adult) Preservative Free 02/15/2011    Tetanus, adsorbed 02/15/2011       Patient Care Team:  Virgen Mckeon DO as PCP - Paul Wallace MD    Medicare Screening Tests and Risk Assessments:  AWV Clinical     ISAR:   Previous hospitalizations?:  No       Once in a Lifetime Medicare Screening:   EKG performed?:  Yes Results:  no acute ischemic changes   AAA screening performed? (if performed, please add date to Health Maintenance):  Yes   Date performed:  7/22/15 Results:  neg       Medicare Screening Tests and Risk Assessment:   AAA Risk Assessment     Tobacco use (males only):   Yes   Age over 72 (males only):  Yes Family history of AAA:  No   Osteoporosis Risk Assessment     Female:  No   Age over 48:  Yes Low body weight (<127lbs):  No   Tobacco use:  No     PMHX of fractures:  No   FHX of fractures:  No    HIV Risk Assessment    None indicated:  Yes        Drug and Alcohol Use:   Tobacco use    Cigarettes:  former smoker    Tobacco use duration    Tobacco Cessation Readiness    Alcohol use    Alcohol use:  never    Alcohol Treatment Readiness   Illicit Drug Use        Diet & Exercise:   Diet   What is your diet?:  Regular, Limited junk food   How many servings a day of the following:   Fruits and Vegetables:  1-2     Soda:  0   Coffee:  1 Tea:  0   Exercise    Do you currently exercise?:  currently not exercising       Cognitive Impairment Screening:   Depression screening preformed:  Yes     PHQ-9 Depression scale score:  1   Cognitive Impairment Screening    Do you have difficulty learning or retaining new information?:  No    Do you have difficulty with reasoning?:  No    Do you have difficulty with language?:  No Do you have difficulty with behavior?:  No       Functional Ability/Level of Safety:   Hearing    Hearing difficulties:  No    Hearing aid:  No    Hearing Impairment Assessment    Current Activities    Status:  unlimited driving, unlimited ADL's, unlimited IADL's, unlimited social activities   Help needed with the folllowing:    Using the phone:  No Transportation:  No   Shopping:  No Preparing Meals:  No   Doing Housework:  No Doing Laundry:  No   Managing Medications:  No Managing Money:  No   ADL    Fall Risk   Have you fallen in the last 12 months?:  No Are you unsteady on your feet?:  No   Injury History   Previous Fall:  No    Cogitive Impairment:  No Mmobility Impairment:  No    Urinary Incontinence:  No       Home Safety:   Home Safety Risk Factors   Unfamilar with surroundings:  No Uneven floors:  No    Loose rugs:  Yes       Advanced Directives:   Advanced Directives    Living Will:  Yes Durable POA for healthcare:  Yes   Patient's End of Life Decisions        Urinary Incontinence:   Do you have urinary incontinence?:  No     Do you have dysuria (painful and/or difficult urination)?:  No   Do you have nocturia (waking up to urinate)?:  No        Glaucoma:            Provider Screening     Preventative Screening/Counseling:   Cardiovascular Screening/Counseling:   (Labs Q5 years, EKG optional one-time)   General:  Screening Current Counseling:  Healthy Diet, Healthy Weight, Improve Blood Pressure          Diabetes Screening/Counseling:   (2 tests/year if Pre-Diabetes or 1 test/year if no Diabetes)   General:  Screening Current           Colorectal Cancer Screening/Counseling:   (FOBT Q1 yr; Flex Sig Q4 yrs or Q10 yrs after Screening Colonoscopy; Screening Colonoscpy Q2 yrs High Risk or Q10 yrs Low Risk; Barium Enema Q2 yrs High Risk or Q4 yrs Low Risk)   General:  Screening Current           Prostate Cancer Screening/Counseling:   (Annual)    General:  Screening Current          Breast Cancer Screening/Counseling:   (Baseline Age 28 - 43; Annual Age 36+)         Cervical Cancer Screening/Counseling:   (Annual for High Risk or Childbearing Age with Abnormal Pap in Last 3 yrs; Every 2 all others)         Osteoporosis Screening/Counseling:   (Every 2 Yrs if at risk or more if medically necessary)   General:  Screening Not Indicated           AAA Screening/Counseling:   (Once per Lifetime with risk factors)    Family History of AAA:  No Age over 72 (males only):  Yes Tobacco use (males only):  Yes         Glaucoma Screening/Counseling:   (Annual)   General:  Screening Current          HIV Screening/Counseling:   (Voluntary; Once annually for high risk OR 3 times for Pregnancy at diagnosis of IUP; 3rd trimester; and at Labor   General:  Screening Not Indicated           Hepatitis C Screening:   Hepatitis C Counseling Provided: Yes               Immunizations:   Influenza (annual): Influenza UTD This Year, Influenza Recommended Annually   Pneumococcal (Once in a Lifetime):  Lifetime Vaccine Completed   Hepatitis B Series (medium to high risk patients scheduled series):  Vaccine Status Unknown   Zostavax (Medicare D Coverage, Pt >66 yo):  Zostavax Vaccine UTD   TD (Non-Medicare Wellness  Visit required):   Td Vaccine UTD       Other Preventative Couseling (Non-Medicare Wellness Visit Required):   car/seat belt/driving safety reviewed, sunscreen education provided, Increased physical activity counseling given       Referrals (Non-Medicare Wellness Visit Required):       Medical Equipment/Suppliers:         Review of Systems   Constitutional: Negative for chills, fatigue and fever  HENT: Negative for congestion and sinus pain  Eyes: Negative for pain and redness  Respiratory: Negative for cough, chest tightness and shortness of breath  Cardiovascular: Negative for chest pain, palpitations and leg swelling  Gastrointestinal: Negative for abdominal pain, blood in stool, constipation, diarrhea, nausea and vomiting  Endocrine: Negative for polydipsia and polyuria  Genitourinary: Negative for difficulty urinating and dysuria  Musculoskeletal: Negative for arthralgias and myalgias  Skin: Negative for rash and wound  Neurological: Negative for dizziness and headaches  Hematological: Does not bruise/bleed easily  Psychiatric/Behavioral: Negative for behavioral problems and confusion  Visual Acuity Screening    Right eye Left eye Both eyes   Without correction: 20/40 20/40 20/30   With correction:          Physical Exam   Constitutional: He appears well-developed and well-nourished  No distress  HENT:   Head: Atraumatic  Right Ear: External ear normal    Left Ear: External ear normal    Mouth/Throat: Oropharynx is clear and moist  No oropharyngeal exudate  Eyes: Conjunctivae are normal  Right eye exhibits no discharge  Left eye exhibits no discharge  Neck: Neck supple  No tracheal deviation present  Cardiovascular: Normal rate, regular rhythm and normal heart sounds  No murmur heard  Neg carotid bruits B/L   Pulmonary/Chest: Effort normal and breath sounds normal  No respiratory distress  He has no wheezes  Abdominal: Soft  Bowel sounds are normal  There is no tenderness  Musculoskeletal: He exhibits no edema or deformity  Lymphadenopathy:     He has no cervical adenopathy  Neurological: He is alert  He exhibits normal muscle tone  Skin: Skin is warm and dry  No rash noted  Psychiatric: He has a normal mood and affect  His behavior is normal  Judgment normal    Nursing note and vitals reviewed

## 2023-06-16 DIAGNOSIS — I10 PRIMARY HYPERTENSION: Chronic | ICD-10-CM

## 2023-06-17 RX ORDER — LISINOPRIL 10 MG/1
TABLET ORAL
Qty: 30 TABLET | Refills: 0 | Status: SHIPPED | OUTPATIENT
Start: 2023-06-17 | End: 2023-06-21 | Stop reason: SDUPTHER

## 2023-06-20 ENCOUNTER — PATIENT OUTREACH (OUTPATIENT)
Dept: FAMILY MEDICINE CLINIC | Facility: HOSPITAL | Age: 78
End: 2023-06-20

## 2023-06-20 NOTE — PROGRESS NOTES
Outpatient Care Management Note:    Care manager called Flakito  He states that wound care discharged him  He is monitoring his legs and feet  He will be following up with podiatry and vascular  We reviewed upcoming appts  Flakito is still concerned about his bruising related to his aspirin and plavix  He will call vascular to discuss as he would like to change medications  Flakito denies any current needs  CM will close the referral  He will keep my contact information and will call with any questions

## 2023-06-21 DIAGNOSIS — I10 PRIMARY HYPERTENSION: Chronic | ICD-10-CM

## 2023-06-21 DIAGNOSIS — F32.A DEPRESSION: ICD-10-CM

## 2023-06-21 RX ORDER — TRAZODONE HYDROCHLORIDE 50 MG/1
50 TABLET ORAL
Qty: 90 TABLET | Refills: 0 | Status: SHIPPED | OUTPATIENT
Start: 2023-06-21 | End: 2023-07-21

## 2023-06-21 RX ORDER — LISINOPRIL 10 MG/1
10 TABLET ORAL DAILY
Qty: 90 TABLET | Refills: 0 | Status: SHIPPED | OUTPATIENT
Start: 2023-06-21

## 2023-06-29 ENCOUNTER — OFFICE VISIT (OUTPATIENT)
Dept: FAMILY MEDICINE CLINIC | Facility: HOSPITAL | Age: 78
End: 2023-06-29
Payer: COMMERCIAL

## 2023-06-29 VITALS
DIASTOLIC BLOOD PRESSURE: 72 MMHG | HEART RATE: 44 BPM | TEMPERATURE: 96.6 F | SYSTOLIC BLOOD PRESSURE: 124 MMHG | WEIGHT: 210.8 LBS | HEIGHT: 70 IN | BODY MASS INDEX: 30.18 KG/M2

## 2023-06-29 DIAGNOSIS — R41.89 COGNITIVE IMPAIRMENT: ICD-10-CM

## 2023-06-29 DIAGNOSIS — L02.415 ABSCESS OF RIGHT LEG EXCLUDING FOOT: ICD-10-CM

## 2023-06-29 DIAGNOSIS — I73.9 PERIPHERAL ARTERY DISEASE (HCC): ICD-10-CM

## 2023-06-29 DIAGNOSIS — I73.9 PAD (PERIPHERAL ARTERY DISEASE) (HCC): ICD-10-CM

## 2023-06-29 DIAGNOSIS — F32.1 CURRENT MODERATE EPISODE OF MAJOR DEPRESSIVE DISORDER WITHOUT PRIOR EPISODE (HCC): Primary | ICD-10-CM

## 2023-06-29 PROCEDURE — 99214 OFFICE O/P EST MOD 30 MIN: CPT | Performed by: INTERNAL MEDICINE

## 2023-06-29 RX ORDER — CLOPIDOGREL BISULFATE 75 MG/1
75 TABLET ORAL DAILY
Qty: 90 TABLET | Refills: 2 | Status: SHIPPED | OUTPATIENT
Start: 2023-06-29

## 2023-06-29 RX ORDER — ESCITALOPRAM OXALATE 20 MG/1
20 TABLET ORAL DAILY
Qty: 90 TABLET | Refills: 2 | Status: SHIPPED | OUTPATIENT
Start: 2023-06-29 | End: 2023-07-29

## 2023-06-29 RX ORDER — GABAPENTIN 300 MG/1
300 CAPSULE ORAL 3 TIMES DAILY
Qty: 270 CAPSULE | Refills: 0 | Status: SHIPPED | OUTPATIENT
Start: 2023-06-29

## 2023-06-29 NOTE — PROGRESS NOTES
Name: Kristie Alamo  : 1945      MRN: [id number]  Encounter Provider: Melinda Tabor DO  Encounter Date: 2023   Encounter department: [address] [place] [address]  203     Assessment & Plan     1  Current moderate episode of major depressive disorder without prior episode [facility])  Assessment & Plan:  Mood much better with increase in Lexapro, con't current dose, call with new/worse mood, re-eval in 3 mos    Orders:  -     escitalopram (LEXAPRO) 20 mg tablet; Take 1 tablet (20 mg total) by mouth daily    2  Cognitive impairment  Assessment & Plan:  BW nml and MRI w/o acute pathology, chronic microangiopathy and chronic R lacunar cerebellar infarct noted, on ASA/Plavix and statin      3  Abscess of right leg excluding foot  Assessment & Plan:  Healing well, follows with podiatry and vascular, was also seen by plastics but no surgical intervention needed, call with relapse/new/worse symptoms, urged again not to pick at scab      4  Peripheral artery disease (HCC)  -     clopidogrel (PLAVIX) 75 mg tablet; Take 1 tablet (75 mg total) by mouth daily    5  PAD (peripheral artery disease) ([facility] [address] )  Assessment & Plan:  Has seen vascular, on ASA/Plavix statin, con't meds/imaging and f/u as per specialist        Colonoscopy  - 5 yrs     BW      Echo 10/22     CUS  - <50% stenosis L ICA, R ICA widely patent      Subjective      HPI Pt here for follow up appt    Last visit pt was noting mood was not at goal with his depression  We subsequently increased his Lexapro from 10 mg to 20 mg daily  He is here today for a mood/med check  He has been taking the higher dose of Lexapro daily w/o significant SE  He feels the increase in the mood  He notes no down/sad mood  He states he is sleeping well with Trazodone  He denies any SI  Last visit pt was given a MMSE d/t some confusion  He scored a 25/30  Memory labs and MRI brain were ordered  Results reviewed with pt today    Labs showed a nml B12/TSH/RPR  MRI of brain showed chronic microangiopathy and chronic lacunar R cerebellar infarct but no acute intracranial pathology  He con't to note poor STM but feels it is not new/worse  Pt has seen both podiatry and plastics for his chronic nonhealing RLE wound  No surgical intervention was felt to be necessary as per plastics  He states the regions are scabbed up and seem to be doing well  He saw vascular 5/26/23 and they were happy with progress of the wound healing as well  He was told to con't ASA/Plavix and Lipitor for medical management  Review of Systems   Constitutional: Negative for chills and fever  HENT: Positive for congestion and postnasal drip  Negative for sore throat  Eyes: Negative for pain and visual disturbance  Respiratory: Negative for cough and shortness of breath  Cardiovascular: Negative for chest pain, palpitations and leg swelling  Gastrointestinal: Negative for abdominal pain, diarrhea, nausea and vomiting  Genitourinary: Negative for difficulty urinating and dysuria  Musculoskeletal: Positive for arthralgias  Negative for joint swelling  Skin: Positive for wound  Negative for rash  Neurological: Negative for dizziness and headaches  Hematological: Bruises/bleeds easily  Psychiatric/Behavioral: Negative for confusion, dysphoric mood, sleep disturbance and suicidal ideas         Current Outpatient Medications on File Prior to Visit   Medication Sig   • gabapentin (Neurontin) 300 mg capsule Take 1 capsule (300 mg total) by mouth 3 (three) times a day   • Ascorbic Acid (vitamin C) 1000 MG tablet Take 1,000 mg by mouth daily   • aspirin (ECOTRIN LOW STRENGTH) 81 mg EC tablet Take 81 mg by mouth every other day 1 every other day   • atorvastatin (LIPITOR) 40 mg tablet Take 1 tablet (40 mg total) by mouth every evening   • calcium polycarbophil (FIBERCON) 625 mg tablet Take 625 mg by mouth daily   • calcium polycarbophil (FIBERCON) "625 mg tablet Take 625 mg by mouth daily   • Doxylamine Succinate, Sleep, (UNISOM PO) Take 1 tablet by mouth daily at bedtime   • ferrous sulfate 324 (65 Fe) mg Take 1 tablet (324 mg total) by mouth daily before breakfast   • finasteride (PROSCAR) 5 mg tablet Take 1 tablet (5 mg total) by mouth daily   • lisinopril (ZESTRIL) 10 mg tablet Take 1 tablet (10 mg total) by mouth daily   • Maxidex 0 1 % ophthalmic suspension instill 1 drop into both eyes twice a day for 2 weeks then 1 drop into both eyes once daily   • metoprolol succinate (TOPROL-XL) 25 mg 24 hr tablet take 1 tablet by mouth once daily   • Misc Natural Products (GLUCOSAMINE CHOND CMP DOUBLE PO) Take 1 tablet by mouth in the morning   • Multiple Vitamin (MULTIVITAMIN) capsule Take 1 capsule by mouth daily   • omeprazole (PriLOSEC) 20 mg delayed release capsule take 1 capsule by mouth once daily   • Santyl ointment 1 application  in the morning Apply thin layer to affected area   • tamsulosin (FLOMAX) 0 4 mg Take 1 capsule (0 4 mg total) by mouth daily with dinner   • traZODone (DESYREL) 50 mg tablet Take 1 tablet (50 mg total) by mouth daily at bedtime   • [DISCONTINUED] clopidogrel (PLAVIX) 75 mg tablet take 1 tablet by mouth once daily   • [DISCONTINUED] escitalopram (LEXAPRO) 20 mg tablet Take 1 tablet (20 mg total) by mouth daily   • [DISCONTINUED] gabapentin (Neurontin) 100 mg capsule Take 1 capsule (100 mg total) by mouth 3 (three) times a day   • [DISCONTINUED] gabapentin (Neurontin) 100 mg capsule Take 2 capsules (200 mg total) by mouth 3 (three) times a day       Objective     /72   Pulse (!) 44   Temp (!) 96 6 °F (35 9 °C) (Tympanic)   Ht 5' 10\" (1 778 m)   Wt 95 6 kg (210 lb 12 8 oz)   BMI 30 25 kg/m²     Physical Exam  Vitals and nursing note reviewed  Constitutional:       General: He is not in acute distress  Appearance: He is well-developed  He is not ill-appearing  HENT:      Head: Normocephalic and atraumatic     Eyes: " General:         Right eye: No discharge  Left eye: No discharge  Conjunctiva/sclera: Conjunctivae normal    Neck:      Trachea: No tracheal deviation  Cardiovascular:      Rate and Rhythm: Normal rate and regular rhythm  Heart sounds: Murmur heard  Pulmonary:      Effort: Pulmonary effort is normal  No respiratory distress  Breath sounds: Normal breath sounds  No wheezing, rhonchi or rales  Musculoskeletal:         General: No deformity or signs of injury  Cervical back: Neck supple  Lymphadenopathy:      Cervical: No cervical adenopathy  Skin:     General: Skin is warm and dry  Coloration: Skin is not pale  Findings: No rash  Neurological:      General: No focal deficit present  Mental Status: He is alert  Mental status is at baseline  Motor: No abnormal muscle tone  Gait: Gait normal    Psychiatric:         Mood and Affect: Mood normal          Behavior: Behavior normal          Thought Content:  Thought content normal          Judgment: Judgment normal        Gabi Bender DO

## 2023-06-29 NOTE — ASSESSMENT & PLAN NOTE
BW nml and MRI w/o acute pathology, chronic microangiopathy and chronic R lacunar cerebellar infarct noted, on ASA/Plavix and statin

## 2023-06-29 NOTE — ASSESSMENT & PLAN NOTE
Healing well, follows with podiatry and vascular, was also seen by plastics but no surgical intervention needed, call with relapse/new/worse symptoms, urged again not to pick at scab

## 2023-06-29 NOTE — ASSESSMENT & PLAN NOTE
Mood much better with increase in Lexapro, con't current dose, call with new/worse mood, re-eval in 3 mos

## 2023-07-07 ENCOUNTER — OFFICE VISIT (OUTPATIENT)
Dept: PODIATRY | Facility: CLINIC | Age: 78
End: 2023-07-07
Payer: COMMERCIAL

## 2023-07-07 VITALS
HEART RATE: 45 BPM | WEIGHT: 210 LBS | DIASTOLIC BLOOD PRESSURE: 74 MMHG | HEIGHT: 70 IN | SYSTOLIC BLOOD PRESSURE: 161 MMHG | BODY MASS INDEX: 30.06 KG/M2

## 2023-07-07 DIAGNOSIS — I73.9 PERIPHERAL VASCULAR DISEASE, UNSPECIFIED (HCC): ICD-10-CM

## 2023-07-07 DIAGNOSIS — S91.001D OPEN WOUND OF RIGHT ANKLE, SUBSEQUENT ENCOUNTER: Primary | ICD-10-CM

## 2023-07-07 PROCEDURE — 99213 OFFICE O/P EST LOW 20 MIN: CPT | Performed by: PODIATRIST

## 2023-07-09 NOTE — PROGRESS NOTES
Assessment/Plan:      Diagnoses and all orders for this visit:    Open wound of right ankle, subsequent encounter  Open wound anterior leg and abscess sites remains closed, no signs of recurrence or recurrent breakdown noted. Moisturize scar/wound area at right anterior ankle on a daily basis. Watch for recurrent breakdown within scar tissue as he has experienced on previously. Follow-up in 2 months. Peripheral vascular disease, unspecified ([address])  Continues to take Plavix as prescribed  Follow-up with vascular surgery as scheduled. Discussed with patient the need for close vascular follow-up in the future. Subjective:     Patient ID: Lois Kline. is a 66 y.o. male. HPI    Review of Systems   Constitutional: Negative for activity change, appetite change, chills and fatigue. HENT: Negative for congestion, ear pain, hearing loss and nosebleeds. Eyes: Negative for pain, discharge, redness and visual disturbance. Respiratory: Negative for cough, chest tightness and shortness of breath. Cardiovascular: Negative for chest pain, palpitations and leg swelling. Gastrointestinal: Negative for abdominal pain, constipation, nausea and vomiting. Endocrine: Negative for cold intolerance, heat intolerance and polydipsia. Genitourinary: Negative for difficulty urinating. Musculoskeletal: Negative for arthralgias, back pain, gait problem and joint swelling. Skin: Positive for wound (Ankle wound remains closed. ). Negative for color change, pallor and rash. Allergic/Immunologic: Negative for environmental allergies, food allergies and immunocompromised state. Neurological: Negative for dizziness, weakness, numbness and headaches. Hematological: Negative for adenopathy. Does not bruise/bleed easily. Psychiatric/Behavioral: Negative for agitation, behavioral problems, confusion, decreased concentration, hallucinations and suicidal ideas.          Objective:     Physical Exam  Constitutional:       Appearance: Normal appearance. HENT:      Head: Normocephalic. Right Ear: Tympanic membrane normal.      Left Ear: Tympanic membrane normal.      Nose: No congestion. Mouth/Throat:      Pharynx: No oropharyngeal exudate or posterior oropharyngeal erythema. Eyes:      Conjunctiva/sclera: Conjunctivae normal.      Pupils: Pupils are equal, round, and reactive to light. Cardiovascular:      Rate and Rhythm: Normal rate and regular rhythm. Pulses:           Dorsalis pedis pulses are 0 on the right side and 0 on the left side. Posterior tibial pulses are 0 on the right side and 0 on the left side. Pulmonary:      Effort: Pulmonary effort is normal.   Musculoskeletal:         General: Normal range of motion. Feet:      Right foot:      Protective Sensation: 10 sites tested. Skin integrity: Dry skin present. Left foot:      Protective Sensation: 10 sites tested. Skin integrity: Dry skin present. Skin:     General: Skin is warm and dry. Capillary Refill: Capillary refill takes 2 to 3 seconds. Coloration: Skin is not pale. Findings: No bruising, erythema, lesion or rash. Comments: Anterior right ankle wound closed with stable eschar. Keratotic nature. No erythema edema or drainage noted. No signs of infection. Neurological:      General: No focal deficit present. Mental Status: He is alert. Cranial Nerves: No cranial nerve deficit. Sensory: No sensory deficit. Motor: No weakness.       Gait: Gait normal.      Deep Tendon Reflexes: Reflexes normal.   Psychiatric:         Mood and Affect: Mood normal.         Behavior: Behavior normal.         Judgment: Judgment normal.

## 2023-07-17 ENCOUNTER — APPOINTMENT (OUTPATIENT)
Dept: LAB | Facility: HOSPITAL | Age: 78
End: 2023-07-17
Payer: COMMERCIAL

## 2023-07-17 DIAGNOSIS — N18.31 STAGE 3A CHRONIC KIDNEY DISEASE (HCC): ICD-10-CM

## 2023-07-17 DIAGNOSIS — R73.01 IMPAIRED FASTING GLUCOSE: ICD-10-CM

## 2023-07-17 LAB
ANION GAP SERPL CALCULATED.3IONS-SCNC: 1 MMOL/L
BACTERIA UR QL AUTO: NORMAL /HPF
BILIRUB UR QL STRIP: NEGATIVE
BUN SERPL-MCNC: 19 MG/DL (ref 5–25)
CALCIUM SERPL-MCNC: 9.4 MG/DL (ref 8.3–10.1)
CHLORIDE SERPL-SCNC: 112 MMOL/L (ref 96–108)
CLARITY UR: CLEAR
CO2 SERPL-SCNC: 27 MMOL/L (ref 21–32)
COLOR UR: YELLOW
CREAT SERPL-MCNC: 1.28 MG/DL (ref 0.6–1.3)
CREAT UR-MCNC: 121 MG/DL
GFR SERPL CREATININE-BSD FRML MDRD: 53 ML/MIN/1.73SQ M
GLUCOSE P FAST SERPL-MCNC: 104 MG/DL (ref 65–99)
GLUCOSE UR STRIP-MCNC: NEGATIVE MG/DL
HGB UR QL STRIP.AUTO: NEGATIVE
KETONES UR STRIP-MCNC: NEGATIVE MG/DL
LEUKOCYTE ESTERASE UR QL STRIP: NEGATIVE
NITRITE UR QL STRIP: NEGATIVE
NON-SQ EPI CELLS URNS QL MICRO: NORMAL /HPF
PH UR STRIP.AUTO: 5.5 [PH]
POTASSIUM SERPL-SCNC: 4.6 MMOL/L (ref 3.5–5.3)
PROT UR STRIP-MCNC: NEGATIVE MG/DL
PROT UR-MCNC: 12 MG/DL
PROT/CREAT UR: 0.1 MG/G{CREAT} (ref 0–0.1)
RBC #/AREA URNS AUTO: NORMAL /HPF
SODIUM SERPL-SCNC: 140 MMOL/L (ref 135–147)
SP GR UR STRIP.AUTO: 1.02 (ref 1–1.03)
UROBILINOGEN UR STRIP-ACNC: <2 MG/DL
WBC #/AREA URNS AUTO: NORMAL /HPF

## 2023-07-17 PROCEDURE — 82570 ASSAY OF URINE CREATININE: CPT

## 2023-07-17 PROCEDURE — 36415 COLL VENOUS BLD VENIPUNCTURE: CPT

## 2023-07-17 PROCEDURE — 80048 BASIC METABOLIC PNL TOTAL CA: CPT

## 2023-07-17 PROCEDURE — 84156 ASSAY OF PROTEIN URINE: CPT

## 2023-07-17 PROCEDURE — 81001 URINALYSIS AUTO W/SCOPE: CPT

## 2023-07-18 ENCOUNTER — OFFICE VISIT (OUTPATIENT)
Dept: NEPHROLOGY | Facility: HOSPITAL | Age: 78
End: 2023-07-18
Payer: COMMERCIAL

## 2023-07-18 VITALS
WEIGHT: 203 LBS | BODY MASS INDEX: 29.06 KG/M2 | SYSTOLIC BLOOD PRESSURE: 112 MMHG | HEIGHT: 70 IN | DIASTOLIC BLOOD PRESSURE: 66 MMHG

## 2023-07-18 DIAGNOSIS — I73.9 PAD (PERIPHERAL ARTERY DISEASE) (HCC): ICD-10-CM

## 2023-07-18 DIAGNOSIS — D50.0 IRON DEFICIENCY ANEMIA DUE TO CHRONIC BLOOD LOSS: ICD-10-CM

## 2023-07-18 DIAGNOSIS — I10 PRIMARY HYPERTENSION: Chronic | ICD-10-CM

## 2023-07-18 DIAGNOSIS — M10.00 IDIOPATHIC GOUT, UNSPECIFIED CHRONICITY, UNSPECIFIED SITE: ICD-10-CM

## 2023-07-18 DIAGNOSIS — N20.0 NEPHROLITHIASIS: ICD-10-CM

## 2023-07-18 DIAGNOSIS — N18.32 STAGE 3B CHRONIC KIDNEY DISEASE (HCC): Primary | ICD-10-CM

## 2023-07-18 DIAGNOSIS — N20.0 NEPHROLITHIASIS: Primary | ICD-10-CM

## 2023-07-18 PROCEDURE — 99214 OFFICE O/P EST MOD 30 MIN: CPT | Performed by: INTERNAL MEDICINE

## 2023-07-18 RX ORDER — POTASSIUM CITRATE 15 MEQ/1
2 TABLET, EXTENDED RELEASE ORAL 2 TIMES DAILY
Qty: 360 TABLET | Refills: 1 | Status: SHIPPED | OUTPATIENT
Start: 2023-07-18

## 2023-07-18 NOTE — PATIENT INSTRUCTIONS
1.Chronic kidney disease stage 3a likely d/t multiple episodes of contrast induced nephropathy +/- age related nephron loss +/- hypertensive nephrosclerosis +/- diuretic use  -avoid nonsteroidals(ibuprofen, aleve, advil, motrin, celebrex, naproxen, toradol, indomethacin). Can take tylenol arthritis instead. -b/l sCr appears to be at about 1.5-1.6 with last sCr 1.28 as of 7/17/23, better than baseline.  -new normal sCr range likely in the low 1s  -s/p IV dye on 12/7/20  -renal ultrasound shows left kidney 12.6cm, right kidney 9.7cm(foreshortened on imaging)  -repeat BMP every 6 months  -UA bland as of 7/17/23 with UpCr 0.1-negligible     2. HTN - BP well controlled on lisinopril 10mg daily, metoprolol 25mg daily      3. Mild anemia, iron deficiency - last Hgb 12.1 as of 4/28/23, resolved on daily oral iron, monitor CBC     4. History of gout - no recent flares, last uric acid 5.6 as of June 2019     5. CAD - on aspirin, lipitor     6. History of nephrolithiasis - with obstruction in August 2020, stone analysis shows 80% calcium oxalate stone    -5/17/21 litholink showed Urine volume 2.26L, citrate low at 262. Urine volume is adequate for stone prevention but low urine citrate will predispose to stone formation. Continue potassium citrate tablets 2 tabs twice daily. Verify you are taking potassium citrate tablets. -stay hydrated. Drink enough to urinate 2-2.5L/day  -avoid a high salt/sodium diet     7. Elevated PSA level - follows with urology, last PSA level 4.1     8. Diet controlled DM2 - last A1C 5.3 as of Jan. 2023     RTC in 6 months. Obtain blood and urine testing prior to next appointment. Verify you are taking potassium citrate tablets.

## 2023-07-24 ENCOUNTER — OFFICE VISIT (OUTPATIENT)
Dept: FAMILY MEDICINE CLINIC | Facility: HOSPITAL | Age: 78
End: 2023-07-24
Payer: COMMERCIAL

## 2023-07-24 VITALS
DIASTOLIC BLOOD PRESSURE: 78 MMHG | SYSTOLIC BLOOD PRESSURE: 130 MMHG | HEART RATE: 76 BPM | TEMPERATURE: 98.2 F | WEIGHT: 204.4 LBS | HEIGHT: 70 IN | BODY MASS INDEX: 29.26 KG/M2

## 2023-07-24 DIAGNOSIS — F33.41 RECURRENT MAJOR DEPRESSIVE DISORDER, IN PARTIAL REMISSION (HCC): ICD-10-CM

## 2023-07-24 DIAGNOSIS — R20.0 ARM NUMBNESS: ICD-10-CM

## 2023-07-24 DIAGNOSIS — S91.001S OPEN WOUND OF RIGHT ANKLE, SEQUELA: Primary | ICD-10-CM

## 2023-07-24 DIAGNOSIS — I10 PRIMARY HYPERTENSION: Chronic | ICD-10-CM

## 2023-07-24 DIAGNOSIS — N18.32 STAGE 3B CHRONIC KIDNEY DISEASE (HCC): ICD-10-CM

## 2023-07-24 DIAGNOSIS — I73.9 PAD (PERIPHERAL ARTERY DISEASE) (HCC): ICD-10-CM

## 2023-07-24 PROCEDURE — 99214 OFFICE O/P EST MOD 30 MIN: CPT | Performed by: INTERNAL MEDICINE

## 2023-07-24 NOTE — ASSESSMENT & PLAN NOTE
Lab Results   Component Value Date    EGFR 53 07/17/2023    EGFR 52 04/28/2023    EGFR 56 04/26/2023    CREATININE 1.28 07/17/2023    CREATININE 1.29 04/28/2023    CREATININE 1.21 04/26/2023   Just saw Nephro, has BW ordered, again advised to avoid NSAIDs and keep hydrated, will follow

## 2023-07-24 NOTE — PROGRESS NOTES
Name: Jose Baugh. : 1945      MRN: [id number]  Encounter Provider: Karlyajit PrinceDO  Encounter Date: 2023   Encounter department: [address] [place], [place] [address]s [place] [id number]     Assessment & Plan     1. Open wound of right ankle, sequela  Assessment & Plan:  Well healed, saw wound care this month - has 2 mo follow up - call with any reoccurrence       2. PAD (peripheral artery disease) (HCC)  Assessment & Plan:  Has LEAD Wed, on ASA/Plavix and statin, con't imaging and f/u as per vascular      3. Stage 3b chronic kidney disease [facility])  Assessment & Plan:  Lab Results   Component Value Date    EGFR 53 2023    EGFR 52 2023    EGFR 56 2023    CREATININE 1.28 2023    CREATININE 1.29 2023    CREATININE 1.21 2023   Just saw Nephro, has BW ordered, again advised to avoid NSAIDs and keep hydrated, will follow      4. Recurrent major depressive disorder, in partial remission ([address])    5. Arm numbness  Comments:  D/w pt that likely positional as short lived and resolves with change in position, no radicular sign and neuro exam nml, pt concerned vascular issue - LEAD UE ordered upon request, on ASA/Plavix/statin, will follow  Orders:  -     VAS upper limb arterial duplex, complete bilateral, graft; Future; Expected date: 2023    6. Primary hypertension  Assessment & Plan:  BP at goal, con't current meds        Colonoscopy  - 5 yrs     BW  (fasting labs),      Echo 10/22     CUS  - <50% stenosis L ICA, R ICA widely patent      Subjective      HPI  Pt here for follow up appt    Pt saw Podiatry (Dr. Sandrine Jeter) early July for f/u wound check. He was told to watch for recurrent skin breakdown within scar but to f/u in 2 mos. Was encouraged to con't his Plavix and f/u with vascular for his PVD. Pt saw Nephro (Dr. Orlin Abarca) in mid July for f/u CKD stage 3a - OV note reviewed. Was encouraged to avoid NSAIDs and con't current BP regimen.  He had no med changes made and was told to verify he was taking his potassium citrate for h/o nephrolithiasis. Pt con't to take his Lexapro daily as directed for depression. He notes no SE with the regimen. He notes "I get a little depressed every now and then" when he sits "thinking about stuff". He has more good then bad days. He is sleeping well with Trazodone qhs and notes no SE with the medication. He notes B/L hand numbness when he has his arms up and is driving. He denies numbness any other time and denies weakness/dropping objects. He has neck pain intermittently but cannot tell if it radiates down his arms or not. BP at goal today and meds were reviewed and no changes have occurred. He denies missing doses of meds or SE with the meds. He does check his BP outside the office and sates he is around 120's/70's. He notes no frequent HA's/double vision/CP. He has some dizziness when he stands up. Review of Systems   Constitutional: Negative for chills and fever. HENT: Negative for congestion and trouble swallowing. Eyes: Negative for pain and visual disturbance. Respiratory: Negative for cough and shortness of breath. Cardiovascular: Negative for chest pain, palpitations and leg swelling. Gastrointestinal: Negative for abdominal pain, constipation, diarrhea, nausea and vomiting. Genitourinary: Positive for frequency. Negative for difficulty urinating and dysuria. Musculoskeletal: Positive for neck pain. Negative for gait problem and joint swelling. Skin: Negative for rash and wound. Neurological: Positive for dizziness and numbness. Negative for headaches. Hematological: Bruises/bleeds easily. Psychiatric/Behavioral: Positive for dysphoric mood. Negative for confusion and sleep disturbance.        Current Outpatient Medications on File Prior to Visit   Medication Sig   • Ascorbic Acid (vitamin C) 1000 MG tablet Take 1,000 mg by mouth daily   • aspirin (ECOTRIN LOW STRENGTH) 81 mg EC tablet Take 81 mg by mouth every other day 1 every other day   • atorvastatin (LIPITOR) 40 mg tablet Take 1 tablet (40 mg total) by mouth every evening   • calcium polycarbophil (FIBERCON) 625 mg tablet Take 625 mg by mouth daily (Patient not taking: Reported on 7/18/2023)   • calcium polycarbophil (FIBERCON) 625 mg tablet Take 625 mg by mouth daily   • clopidogrel (PLAVIX) 75 mg tablet Take 1 tablet (75 mg total) by mouth daily   • Doxylamine Succinate, Sleep, (UNISOM PO) Take 1 tablet by mouth daily at bedtime   • escitalopram (LEXAPRO) 20 mg tablet Take 1 tablet (20 mg total) by mouth daily   • ferrous sulfate 324 (65 Fe) mg Take 1 tablet (324 mg total) by mouth daily before breakfast   • finasteride (PROSCAR) 5 mg tablet Take 1 tablet (5 mg total) by mouth daily   • gabapentin (Neurontin) 300 mg capsule Take 1 capsule (300 mg total) by mouth 3 (three) times a day   • lisinopril (ZESTRIL) 10 mg tablet Take 1 tablet (10 mg total) by mouth daily   • Maxidex 0.1 % ophthalmic suspension instill 1 drop into both eyes twice a day for 2 weeks then 1 drop into both eyes once daily   • metoprolol succinate (TOPROL-XL) 25 mg 24 hr tablet take 1 tablet by mouth once daily   • Misc Natural Products (GLUCOSAMINE CHOND CMP DOUBLE PO) Take 1 tablet by mouth in the morning   • Multiple Vitamin (MULTIVITAMIN) capsule Take 1 capsule by mouth daily   • omeprazole (PriLOSEC) 20 mg delayed release capsule take 1 capsule by mouth once daily   • Potassium Citrate ER 15 MEQ (1620 MG) TBCR Take 2 tablets by mouth 2 (two) times a day   • Santyl ointment 1 application.  in the morning Apply thin layer to affected area   • tamsulosin (FLOMAX) 0.4 mg Take 1 capsule (0.4 mg total) by mouth daily with dinner   • traZODone (DESYREL) 50 mg tablet Take 1 tablet (50 mg total) by mouth daily at bedtime       Objective     /78   Pulse 76   Temp 98.2 °F (36.8 °C)   Ht 5' 10" (1.778 m)   Wt 92.7 kg (204 lb 6.4 oz)   BMI 29.33 kg/m²     Physical Exam  Vitals and nursing note reviewed. Constitutional:       General: He is not in acute distress. Appearance: He is well-developed. He is not ill-appearing. HENT:      Head: Normocephalic and atraumatic. Eyes:      General:         Right eye: No discharge. Left eye: No discharge. Conjunctiva/sclera: Conjunctivae normal.   Neck:      Trachea: No tracheal deviation. Cardiovascular:      Rate and Rhythm: Normal rate and regular rhythm. Heart sounds: No murmur heard. Pulmonary:      Effort: Pulmonary effort is normal. No respiratory distress. Breath sounds: Normal breath sounds. No wheezing, rhonchi or rales. Abdominal:      General: There is no distension. Palpations: Abdomen is soft. Tenderness: There is no abdominal tenderness. There is no guarding or rebound. Musculoskeletal:         General: No deformity or signs of injury. Cervical back: Neck supple. Comments: Cervical spine: no pain with palp of spinous processes of cervical spine, decrease AROM in extension but nml flexion and rotation B/L,  5/5 B/L UE muscle strength   Lymphadenopathy:      Cervical: No cervical adenopathy. Skin:     General: Skin is warm and dry. Coloration: Skin is not pale. Findings: Bruising present. No rash. Neurological:      General: No focal deficit present. Mental Status: He is alert. Mental status is at baseline. Sensory: No sensory deficit. Motor: No weakness or abnormal muscle tone. Gait: Gait normal.      Deep Tendon Reflexes: Reflexes normal.      Comments: 5/5 B/L UE muscle strength, 2/4 bicep DTR, sensation intact to light touch B/LUE   Psychiatric:         Behavior: Behavior normal.         Thought Content:  Thought content normal.         Judgment: Judgment normal.       Al Castro DO

## 2023-07-26 ENCOUNTER — HOSPITAL ENCOUNTER (OUTPATIENT)
Dept: NON INVASIVE DIAGNOSTICS | Age: 78
Discharge: HOME | End: 2023-07-26
Payer: COMMERCIAL

## 2023-07-26 DIAGNOSIS — I74.09 AORTOILIAC OCCLUSIVE DISEASE (HCC): ICD-10-CM

## 2023-07-26 DIAGNOSIS — I73.9 PAD (PERIPHERAL ARTERY DISEASE) (HCC): ICD-10-CM

## 2023-07-26 PROCEDURE — 93926 LOWER EXTREMITY STUDY: CPT | Performed by: SURGERY

## 2023-07-26 PROCEDURE — 93978 VASCULAR STUDY: CPT

## 2023-07-26 PROCEDURE — 93978 VASCULAR STUDY: CPT | Performed by: SURGERY

## 2023-07-26 PROCEDURE — 93922 UPR/L XTREMITY ART 2 LEVELS: CPT | Performed by: SURGERY

## 2023-07-26 PROCEDURE — 93926 LOWER EXTREMITY STUDY: CPT

## 2023-08-03 ENCOUNTER — HOSPITAL ENCOUNTER (OUTPATIENT)
Dept: NON INVASIVE DIAGNOSTICS | Facility: HOSPITAL | Age: 78
Discharge: HOME | End: 2023-08-03
Payer: COMMERCIAL

## 2023-08-03 DIAGNOSIS — R20.0 ARM NUMBNESS: ICD-10-CM

## 2023-08-03 PROCEDURE — 93930 UPPER EXTREMITY STUDY: CPT

## 2023-08-04 PROCEDURE — 93930 UPPER EXTREMITY STUDY: CPT | Performed by: SURGERY

## 2023-08-07 ENCOUNTER — OFFICE VISIT (OUTPATIENT)
Dept: VASCULAR SURGERY | Facility: CLINIC | Age: 78
End: 2023-08-07
Payer: COMMERCIAL

## 2023-08-07 VITALS
SYSTOLIC BLOOD PRESSURE: 118 MMHG | HEART RATE: 46 BPM | OXYGEN SATURATION: 97 % | BODY MASS INDEX: 29.49 KG/M2 | WEIGHT: 206 LBS | DIASTOLIC BLOOD PRESSURE: 70 MMHG | HEIGHT: 70 IN

## 2023-08-07 DIAGNOSIS — I77.1 CELIAC ARTERY STENOSIS (HCC): ICD-10-CM

## 2023-08-07 DIAGNOSIS — I73.9 PAD (PERIPHERAL ARTERY DISEASE) (HCC): ICD-10-CM

## 2023-08-07 DIAGNOSIS — I74.09 AORTOILIAC OCCLUSIVE DISEASE (HCC): ICD-10-CM

## 2023-08-07 DIAGNOSIS — R41.3 MEMORY LOSS: ICD-10-CM

## 2023-08-07 DIAGNOSIS — G60.9 IDIOPATHIC PERIPHERAL NEUROPATHY: ICD-10-CM

## 2023-08-07 DIAGNOSIS — Z98.890 S/P CAROTID ENDARTERECTOMY: ICD-10-CM

## 2023-08-07 DIAGNOSIS — I65.23 CAROTID STENOSIS, ASYMPTOMATIC, BILATERAL: Primary | ICD-10-CM

## 2023-08-07 PROBLEM — I77.4 CELIAC ARTERY STENOSIS (HCC): Status: ACTIVE | Noted: 2023-08-07

## 2023-08-07 PROCEDURE — 99214 OFFICE O/P EST MOD 30 MIN: CPT | Performed by: SURGERY

## 2023-08-07 NOTE — LETTER
August 7, 2023     Joyce [place], [address]  [address] [place] [address]  [address] [place] [id number]    Patient: Adolph Uribe. YOB: 1945   Date of Visit: 8/7/2023       Dear Dr. Heber Menchaca:    Below are the relevant portions of my assessment and plan of care. Diagnoses and all orders for this visit:    Aortoiliac occlusive disease ([address])  AOID/PAD with right leg tissue loss now s/p right femoral endarterectomy and retrograde iliac stenting 4/24/23. Right leg wounds have all healed. Feels well without complaints. AOID - right iliac stents patent  LEAD - no significant arterial disease b/l, right FARRAH 1.09/MRT894/GTP71; left FARRAH noncompressible/[id number]/[id number]     -We discussed the pathophysiology of arterial occlusive disease, available treatment options and indications for treatment.  -Wounds healed after RLE intervention. No claudication, rest pain or tissue loss. -Continue medical optimization. Currently on ASA, plavix and statin. Does report some bruising. Advised dual antiplatelet therapy for 6 months post-intervention (October) with transition to plavix only afterwards.  -Recommend walking program, ambulating at least 30 minutes for 3-5 times weekly  -Recommend moisturization of the lower extremities, avoidance of injury and close observation of bilateral feet for any new wounds  -Follow-up in 6 months with repeat LEAD/AOID to re-evaluate symptoms. PAD (peripheral artery disease) ([address])  See section on AOID     S/P carotid endarterectomy  Bilateral carotid artery stenosis s/p right CEA. Previous hx stroke. Denies TIA/CVA symptoms. Family reports significant short term memory loss that seems to be progressively worsening. Carotid duplex 12/2022 - widely patent right CEA, <50% left ICA stenosis.     -We discussed the pathophysiology of carotid disease, available treatment options and indications for treatment.  -Symptoms are not consistent with TIA/CVA.  Will obtain repeat carotid duplex for continued surveillance.  -Continue optimization of medical management. Currently on DAPT and statin.  -Will obtain repeat carotid duplex and follow-up in 6 months. Advised to return sooner or go to the ED if he develops any TIA/CVA symptoms.  -Will place neurology referral per family request for memory loss. 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 Denies postprandial pain, weight loss, loss of appetite, food fear.      -We discussed the pathophysiology of mesenteric arterial occlusive disease, available treatment options and indications for treatment.  -Suspect there may be some component of median arcuate ligament compression due to decreased velocities with inspiration and post-dilation. He is asymptomatic.  -Continue medical optimization. Currently on ASA and statin. Goal hgb A1c<7.0, LDL<70, BP <130/80  -Recommend continued surveillance with AOID in 6months. Idiopathic peripheral neuropathy  Bilateral positional hand numbness with holding steering wheel which resolves when he puts his hands down for a few seconds. He has been driving long hours for quite some time. Palpable radial pulses bilaterally  Upper extremity arterial duplex - unremarkable     Suspect some component of nerve compression - carpal tunnel syndrome? Could consider further testing with EMG. Defer to PCP. If you have questions, please do not hesitate to call me. I look forward to following Erica Yanes along with you.          Sincerely,        Eric Abrams MD        CC: No Recipients

## 2023-08-07 NOTE — PATIENT INSTRUCTIONS
Aortoiliac occlusive disease (HCC)  AOID/PAD with right leg tissue loss now s/p right femoral endarterectomy and retrograde iliac stenting 4/24/23. Right leg wounds have all healed. Feels well without complaints. AOID - right iliac stents patent  LEAD - no significant arterial disease b/l, right FARRAH 1.09/[id number]/GTP71; left FARRAH noncompressible/[id number]/[id number]     -We discussed the pathophysiology of arterial occlusive disease, available treatment options and indications for treatment.  -Wounds healed after RLE intervention. No claudication, rest pain or tissue loss. -Continue medical optimization. Currently on ASA, plavix and statin. Does report some bruising. Advised dual antiplatelet therapy for 6 months post-intervention (October) with transition to plavix only afterwards.  -Recommend walking program, ambulating at least 30 minutes for 3-5 times weekly  -Recommend moisturization of the lower extremities, avoidance of injury and close observation of bilateral feet for any new wounds  -Follow-up in 6 months with repeat LEAD/AOID to re-evaluate symptoms. PAD (peripheral artery disease) ([address])  See section on AOID     S/P carotid endarterectomy  Bilateral carotid artery stenosis s/p right CEA. Previous hx stroke. Denies TIA/CVA symptoms. Family reports significant short term memory loss that seems to be progressively worsening. Carotid duplex 12/2022 - widely patent right CEA, <50% left ICA stenosis.     -We discussed the pathophysiology of carotid disease, available treatment options and indications for treatment.  -Symptoms are not consistent with TIA/CVA. Will obtain repeat carotid duplex for continued surveillance.  -Continue optimization of medical management. Currently on DAPT and statin.  -Will obtain repeat carotid duplex and follow-up in 6 months. Advised to return sooner or go to the ED if he develops any TIA/CVA symptoms.  -Will place neurology referral per family request for memory loss. 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 Denies postprandial pain, weight loss, loss of appetite, food fear.      -We discussed the pathophysiology of mesenteric arterial occlusive disease, available treatment options and indications for treatment.  -Suspect there may be some component of median arcuate ligament compression due to decreased velocities with inspiration and post-dilation. He is asymptomatic.  -Continue medical optimization. Currently on ASA and statin. Goal hgb A1c<7.0, LDL<70, BP <130/80  -Recommend continued surveillance with AOID in 6months. Idiopathic peripheral neuropathy  Bilateral positional hand numbness with holding steering wheel which resolves when he puts his hands down for a few seconds. He has been driving long hours for quite some time. Palpable radial pulses bilaterally  Upper extremity arterial duplex - unremarkable     Suspect some component of nerve compression - carpal tunnel syndrome? Could consider further testing with EMG. Defer to PCP.

## 2023-08-07 NOTE — ASSESSMENT & PLAN NOTE
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 Denies postprandial pain, weight loss, loss of appetite, food fear.     -We discussed the pathophysiology of mesenteric arterial occlusive disease, available treatment options and indications for treatment.  -Suspect there may be some component of median arcuate ligament compression due to decreased velocities with inspiration and post-dilation. He is asymptomatic.  -Continue medical optimization. Currently on ASA and statin. Goal hgb A1c<7.0, LDL<70, BP <130/80  -Recommend continued surveillance with AOID in 6months.

## 2023-08-07 NOTE — ASSESSMENT & PLAN NOTE
AOID/PAD with right leg tissue loss now s/p right femoral endarterectomy and retrograde iliac stenting 4/24/23. Right leg wounds have all healed. Feels well without complaints. AOID - right iliac stents patent  LEAD - no significant arterial disease b/l, right FARRAH 1.09/OXY920/GTP71; left FARRAH noncompressible/[id number]/GTP47    -We discussed the pathophysiology of arterial occlusive disease, available treatment options and indications for treatment.  -Wounds healed after RLE intervention. No claudication, rest pain or tissue loss. -Continue medical optimization. Currently on ASA, plavix and statin. Does report some bruising. Advised dual antiplatelet therapy for 6 months post-intervention (October) with transition to plavix only afterwards.  -Recommend walking program, ambulating at least 30 minutes for 3-5 times weekly  -Recommend moisturization of the lower extremities, avoidance of injury and close observation of bilateral feet for any new wounds  -Follow-up in 6 months with repeat LEAD/AOID to re-evaluate symptoms.

## 2023-08-07 NOTE — ASSESSMENT & PLAN NOTE
Bilateral carotid artery stenosis s/p right CEA. Previous hx stroke. Denies TIA/CVA symptoms. Family reports significant short term memory loss that seems to be progressively worsening. Carotid duplex 12/2022 - widely patent right CEA, <50% left ICA stenosis.    -We discussed the pathophysiology of carotid disease, available treatment options and indications for treatment.  -Symptoms are not consistent with TIA/CVA. Will obtain repeat carotid duplex for continued surveillance.  -Continue optimization of medical management. Currently on DAPT and statin.  -Will obtain repeat carotid duplex and follow-up in 6 months. Advised to return sooner or go to the ED if he develops any TIA/CVA symptoms.  -Will place neurology referral per family request for memory loss. Discharge Summary:    Pt continues to demand his discharge today. He is not willing to consider staying despite appearing to have ongoing withdrawal symptoms. He is quite disheveled and has difficulty participating in conversation. In addition pt is disinterested in conversation and purpose of same. [facility] attempted to discuss with pt and aftercare options and community supports and pt tells he is not interested at this time. He was not willing to discuss safety plan and would not identify any of the warning signs or coping strategies. [facility] completed the remainder of the safety plan for pt and he was provided with a copy. Pt was given information on mobile crisis and AA meetings. Pt reports he is returning to stay at the hotel and tells this is the [facility]. Pt tells he plans to walk there today. [facility] encouraged pt to reconsider this and call some of his family. He is not willing to contact family at this time. [facility] informed pt that family is not going to like to hear that he is discharging the hospital as they wanted him to get help. He nods his head but does not comment. He identifies family as supportive and tells that he will spend time with them or call them if needed. He would like to have his belongings and leave ASAP. Pt aware that he is discharging against medical advice. During his hospitalization he was isolative to his room secondary to his detox symptoms.

## 2023-08-07 NOTE — ASSESSMENT & PLAN NOTE
Bilateral positional hand numbness with holding steering wheel which resolves when he puts his hands down for a few seconds. He has been driving long hours for quite some time. Palpable radial pulses bilaterally  Upper extremity arterial duplex - unremarkable    Suspect some component of nerve compression - carpal tunnel syndrome? Could consider further testing with EMG. Defer to PCP.

## 2023-08-18 ENCOUNTER — TELEPHONE (OUTPATIENT)
Dept: NEUROLOGY | Facility: CLINIC | Age: 78
End: 2023-08-18

## 2023-08-22 ENCOUNTER — TELEPHONE (OUTPATIENT)
Dept: FAMILY MEDICINE CLINIC | Facility: HOSPITAL | Age: 78
End: 2023-08-22

## 2023-08-22 DIAGNOSIS — I10 PRIMARY HYPERTENSION: Chronic | ICD-10-CM

## 2023-08-22 RX ORDER — LISINOPRIL 10 MG/1
10 TABLET ORAL DAILY
Qty: 90 TABLET | Refills: 1 | Status: SHIPPED | OUTPATIENT
Start: 2023-08-22

## 2023-08-22 NOTE — TELEPHONE ENCOUNTER
As per our chart 90 tabs WERE sent - poss pharmacy issue?  Any how-  I resent rx with 90 tabs and refills to local pharmacy

## 2023-08-31 ENCOUNTER — OFFICE VISIT (OUTPATIENT)
Dept: PODIATRY | Facility: CLINIC | Age: 78
End: 2023-08-31
Payer: COMMERCIAL

## 2023-08-31 VITALS
BODY MASS INDEX: 29.49 KG/M2 | DIASTOLIC BLOOD PRESSURE: 72 MMHG | HEART RATE: 47 BPM | SYSTOLIC BLOOD PRESSURE: 144 MMHG | WEIGHT: 206 LBS | HEIGHT: 70 IN

## 2023-08-31 DIAGNOSIS — L90.5 SCAR OF FOOT: ICD-10-CM

## 2023-08-31 DIAGNOSIS — M25.671 DECREASED RANGE OF MOTION OF RIGHT ANKLE: ICD-10-CM

## 2023-08-31 DIAGNOSIS — I73.9 PERIPHERAL VASCULAR DISEASE, UNSPECIFIED (HCC): Primary | ICD-10-CM

## 2023-08-31 PROCEDURE — 99213 OFFICE O/P EST LOW 20 MIN: CPT | Performed by: PODIATRIST

## 2023-09-01 NOTE — PROGRESS NOTES
Assessment/Plan:   Continue with daily moisturization of scar tissue on the anterior aspect of right ankle joint. Continue range of motion stretching exercises for right ankle  Follow-up immediately if any areas of his right leg reopen. Follow-up as needed. Diagnoses and all orders for this visit:    Peripheral vascular disease, unspecified ([address])    Scar of foot    Decreased range of motion of right ankle          Subjective:     Patient ID: Rick Bates is a 66 y.o. male. 8/31/2023:  66-year-old male presents for follow-up having healed multiple wounds right leg earlier this year. Reports no issues since last visit and feels all of his wounds have resolved. Review of Systems   Constitutional: Negative. HENT: Negative. Eyes: Negative. Respiratory: Negative. Cardiovascular: Negative. Gastrointestinal: Negative. Endocrine: Negative. Genitourinary: Negative. Musculoskeletal:        Limited motion right ankle   Allergic/Immunologic: Negative. Neurological: Negative. Hematological: Negative. Psychiatric/Behavioral: Negative. Objective:     Physical Exam  Constitutional:       Appearance: Normal appearance. HENT:      Head: Normocephalic. Right Ear: Tympanic membrane normal.      Left Ear: Tympanic membrane normal.      Nose: No congestion. Mouth/Throat:      Pharynx: No oropharyngeal exudate or posterior oropharyngeal erythema. Eyes:      Conjunctiva/sclera: Conjunctivae normal.      Pupils: Pupils are equal, round, and reactive to light. Cardiovascular:      Rate and Rhythm: Normal rate and regular rhythm. Pulses:           Dorsalis pedis pulses are 0 on the right side and 0 on the left side. Posterior tibial pulses are 0 on the right side and 0 on the left side.    Pulmonary:      Effort: Pulmonary effort is normal.   Musculoskeletal:      Comments: Diminished range of motion right ankle with scarring of the anterior ankle joint overlying the tibialis anterior medially. Feet:      Right foot:      Protective Sensation: 10 sites tested. 8 sites sensed. Skin integrity: Dry skin present. Left foot:      Protective Sensation: 10 sites tested. 8 sites sensed. Skin integrity: Dry skin present. Skin:     General: Skin is warm and dry. Capillary Refill: Capillary refill takes 2 to 3 seconds. Coloration: Skin is not pale. Findings: No bruising, erythema, lesion or rash. Comments: Texture tone and turgor are diminished moderately bilateral, no digital hair noted. Neurological:      General: No focal deficit present. Mental Status: He is alert. Cranial Nerves: No cranial nerve deficit. Sensory: No sensory deficit. Motor: No weakness.       Gait: Gait normal.      Deep Tendon Reflexes: Reflexes normal.   Psychiatric:         Mood and Affect: Mood normal.         Behavior: Behavior normal.         Judgment: Judgment normal.

## 2023-09-10 DIAGNOSIS — E78.5 DYSLIPIDEMIA: ICD-10-CM

## 2023-09-11 RX ORDER — ATORVASTATIN CALCIUM 40 MG/1
40 TABLET, FILM COATED ORAL EVERY EVENING
Qty: 90 TABLET | Refills: 3 | Status: SHIPPED | OUTPATIENT
Start: 2023-09-11

## 2023-09-12 ENCOUNTER — OFFICE VISIT (OUTPATIENT)
Dept: FAMILY MEDICINE CLINIC | Facility: HOSPITAL | Age: 78
End: 2023-09-12
Payer: COMMERCIAL

## 2023-09-12 VITALS
WEIGHT: 208.4 LBS | SYSTOLIC BLOOD PRESSURE: 142 MMHG | BODY MASS INDEX: 29.84 KG/M2 | TEMPERATURE: 97.4 F | HEART RATE: 62 BPM | DIASTOLIC BLOOD PRESSURE: 70 MMHG | HEIGHT: 70 IN

## 2023-09-12 DIAGNOSIS — F32.A DEPRESSION: ICD-10-CM

## 2023-09-12 DIAGNOSIS — I73.9 PAD (PERIPHERAL ARTERY DISEASE) (HCC): ICD-10-CM

## 2023-09-12 DIAGNOSIS — F33.41 RECURRENT MAJOR DEPRESSIVE DISORDER, IN PARTIAL REMISSION (HCC): Primary | ICD-10-CM

## 2023-09-12 DIAGNOSIS — R20.0 NUMBNESS OF FINGERS OF BOTH HANDS: ICD-10-CM

## 2023-09-12 DIAGNOSIS — M54.2 NECK PAIN: ICD-10-CM

## 2023-09-12 PROBLEM — F33.9 RECURRENT MAJOR DEPRESSIVE DISORDER (HCC): Status: ACTIVE | Noted: 2023-09-12

## 2023-09-12 PROCEDURE — 99214 OFFICE O/P EST MOD 30 MIN: CPT | Performed by: INTERNAL MEDICINE

## 2023-09-12 RX ORDER — TRAZODONE HYDROCHLORIDE 100 MG/1
100 TABLET ORAL
Qty: 90 TABLET | Refills: 0 | Status: SHIPPED | OUTPATIENT
Start: 2023-09-12 | End: 2023-12-11

## 2023-09-12 RX ORDER — GABAPENTIN 400 MG/1
400 CAPSULE ORAL 3 TIMES DAILY
Qty: 270 CAPSULE | Refills: 1 | Status: SHIPPED | OUTPATIENT
Start: 2023-09-12

## 2023-09-12 NOTE — ASSESSMENT & PLAN NOTE
Mood not at goal, on max Lexapro, can increase Trazodone from 50 mg to 100 mg - pt is hesitant to do so and wishes to try 50 mg 1.5 tab (75 mg) first and then if able to tolerate will increase to 100 mg - rx sent, SE/sedation reviewed, call with new/worse mood, d/w pt that going back to work next week will likely help as well

## 2023-09-12 NOTE — PROGRESS NOTES
Name: Marva Maddox. : 1945      MRN: [id number]  Encounter Provider: Melanie Galdamez DO  Encounter Date: 2023   Encounter department: [address] [address]     Assessment & Plan     1. Recurrent major depressive disorder, in partial remission (formerly [facility])  Assessment & Plan:  Mood not at goal, on max Lexapro, can increase Trazodone from 50 mg to 100 mg - pt is hesitant to do so and wishes to try 50 mg 1.5 tab (75 mg) first and then if able to tolerate will increase to 100 mg - rx sent, SE/sedation reviewed, call with new/worse mood, d/w pt that going back to work next week will likely help as well  Orders:  -     traZODone (DESYREL) 100 mg tablet; Take 1 tablet (100 mg total) by mouth daily at bedtime    3. Numbness of fingers of both hands  Comments:  Discussed likely peripheral cause as B/L but also has persistent neck pain, UE neuro exam nml but symptoms persisting so will check MRI C-spine and increase Gabapentin from 300 mg tid to 400 mg tid, SE reviewed, importance of not making too many med changes at once reviewed - will hold on increase from 300 mg tid to 400 mg tid until after Trazodone adjusted as noted above - call with SE  Orders:  -     MRI cervical spine wo contrast; Future; Expected date: 2023    4. Neck pain  Comments:  D/t persistent symptoms and UE paresthesias that are persisting will check MRI and follow - if neg will need EMG to eval CTS/ulnar neuropathy, call with new/worse symptoms  Orders:  -     MRI cervical spine wo contrast; Future; Expected date: 2023    5. PAD (peripheral artery disease) (formerly [facility])  Comments: Following with vascular has vascular studies ordered, will follow  Orders:  -     gabapentin (Neurontin) 400 mg capsule;  Take 1 capsule (400 mg total) by mouth 3 (three) times a day      Colonoscopy  - 5 yrs    BW   FLP     Echo 10/22    CUS  - has ordered by vascular for       Subjective      HPI Pt here for follow up appt    Pt noting con't down/sad mood. He is going back to work 2 days a week as he is so bored and he thinks this will help. He notes no anxious/irritable mood. He is taking his Lexapro and Trazodone qhs. He is sleeping well. He con't to c/o B/L wrists and first two digits and thumb being numb. Symptoms are intermittent and occur when his arms are elevated (like with driving) or if doing things with his UE. He notes no weakness/dropping objects. He has some neck pain. The pain does not radiate. He has Gabapentin 300 mg and is taking it 1 tab tid. Review of Systems   Constitutional: Negative for chills and fever. HENT: Negative for congestion and sore throat. Eyes: Negative for pain and visual disturbance. Respiratory: Negative for cough and shortness of breath. Cardiovascular: Negative for chest pain and palpitations. Gastrointestinal: Negative for abdominal pain, diarrhea, nausea and vomiting. Genitourinary: Negative for difficulty urinating and dysuria. Musculoskeletal: Positive for arthralgias and neck pain. Skin: Negative for rash and wound. Neurological: Positive for numbness. Negative for dizziness, weakness and headaches. Hematological: Does not bruise/bleed easily. Psychiatric/Behavioral: Positive for dysphoric mood. Negative for confusion and sleep disturbance. The patient is not nervous/anxious.         Current Outpatient Medications on File Prior to Visit   Medication Sig   • Ascorbic Acid (vitamin C) 1000 MG tablet Take 1,000 mg by mouth daily   • aspirin (ECOTRIN LOW STRENGTH) 81 mg EC tablet Take 81 mg by mouth every other day 1 every other day   • atorvastatin (LIPITOR) 40 mg tablet take 1 tablet by mouth every evening   • calcium polycarbophil (FIBERCON) 625 mg tablet Take 625 mg by mouth daily (Patient not taking: Reported on 7/18/2023)   • calcium polycarbophil (FIBERCON) 625 mg tablet Take 625 mg by mouth daily   • clopidogrel (PLAVIX) 75 mg tablet Take 1 tablet (75 mg total) by mouth daily   • Doxylamine Succinate, Sleep, (UNISOM PO) Take 1 tablet by mouth daily at bedtime   • escitalopram (LEXAPRO) 20 mg tablet Take 1 tablet (20 mg total) by mouth daily   • ferrous sulfate 324 (65 Fe) mg Take 1 tablet (324 mg total) by mouth daily before breakfast   • finasteride (PROSCAR) 5 mg tablet Take 1 tablet (5 mg total) by mouth daily   • lisinopril (ZESTRIL) 10 mg tablet Take 1 tablet (10 mg total) by mouth daily   • Maxidex 0.1 % ophthalmic suspension instill 1 drop into both eyes twice a day for 2 weeks then 1 drop into both eyes once daily   • metoprolol succinate (TOPROL-XL) 25 mg 24 hr tablet take 1 tablet by mouth once daily   • Misc Natural Products (GLUCOSAMINE CHOND CMP DOUBLE PO) Take 1 tablet by mouth in the morning   • Multiple Vitamin (MULTIVITAMIN) capsule Take 1 capsule by mouth daily   • omeprazole (PriLOSEC) 20 mg delayed release capsule take 1 capsule by mouth once daily   • Potassium Citrate ER 15 MEQ (1620 MG) TBCR Take 2 tablets by mouth 2 (two) times a day   • Santyl ointment 1 application. in the morning Apply thin layer to affected area (Patient not taking: Reported on 8/7/2023)   • tamsulosin (FLOMAX) 0.4 mg Take 1 capsule (0.4 mg total) by mouth daily with dinner   • [DISCONTINUED] gabapentin (Neurontin) 300 mg capsule Take 1 capsule (300 mg total) by mouth 3 (three) times a day   • [DISCONTINUED] traZODone (DESYREL) 50 mg tablet Take 1 tablet (50 mg total) by mouth daily at bedtime       Objective     /70   Pulse 62   Temp (!) 97.4 °F (36.3 °C) (Tympanic)   Ht 5' 10" (1.778 m)   Wt 94.5 kg (208 lb 6.4 oz)   BMI 29.90 kg/m²     Physical Exam  Vitals and nursing note reviewed. Constitutional:       General: He is not in acute distress. Appearance: He is well-developed. He is not ill-appearing. HENT:      Head: Normocephalic and atraumatic. Eyes:      General:         Right eye: No discharge.          Left eye: No discharge. Conjunctiva/sclera: Conjunctivae normal.   Neck:      Trachea: No tracheal deviation. Cardiovascular:      Rate and Rhythm: Normal rate and regular rhythm. Heart sounds: Murmur heard. Pulmonary:      Effort: Pulmonary effort is normal. No respiratory distress. Breath sounds: Normal breath sounds. No wheezing, rhonchi or rales. Abdominal:      General: There is no distension. Palpations: Abdomen is soft. Tenderness: There is no abdominal tenderness. There is no guarding or rebound. Musculoskeletal:         General: No tenderness, deformity or signs of injury. Cervical back: Neck supple. Comments: Cervical spine: no tenderness with palp of spinous processes, 5/5 B/L UE muscle strength    Neg phalen's and Tinels but phalens was difficult to d/t lack of mobility and pain at R wrist   Lymphadenopathy:      Cervical: No cervical adenopathy. Skin:     General: Skin is warm and dry. Coloration: Skin is not pale. Findings: No rash. Neurological:      General: No focal deficit present. Mental Status: He is alert. Mental status is at baseline. Sensory: No sensory deficit. Motor: No weakness or abnormal muscle tone. Gait: Gait normal.      Deep Tendon Reflexes: Reflexes normal.   Psychiatric:         Mood and Affect: Mood normal.         Behavior: Behavior normal.         Thought Content:  Thought content normal.         Judgment: Judgment normal.       Jennifer Mylar, DO

## 2023-09-13 ENCOUNTER — TELEPHONE (OUTPATIENT)
Dept: UROLOGY | Facility: HOSPITAL | Age: 78
End: 2023-09-13

## 2023-09-13 NOTE — TELEPHONE ENCOUNTER
A reschedule letter was sent back to the office "unable to forward". I had to [facility] to ask pt to give us a call back with his correct mailing address . Phone number was given.

## 2023-09-13 NOTE — TELEPHONE ENCOUNTER
Patient called stating she received a message in regards to changing his appointment. Verified Address with patient. His home address in [facility] is correct. He did state he having some issues with his mail not coming through. He is not sure why. He stated he cannot come to the office on Wednesdays or Fridays. He still working. He is available on Mondays, Tuesdays or Thursdays. Please review and call patient with new date. He stated to leave a message if he does not answer with date and time.     Patient can be reached at [phone]

## 2023-09-14 DIAGNOSIS — K29.00 OTHER ACUTE GASTRITIS WITHOUT HEMORRHAGE: ICD-10-CM

## 2023-09-14 RX ORDER — OMEPRAZOLE 20 MG/1
CAPSULE, DELAYED RELEASE ORAL
Qty: 90 CAPSULE | Refills: 2 | Status: SHIPPED | OUTPATIENT
Start: 2023-09-14

## 2023-09-15 DIAGNOSIS — R97.20 ELEVATED PSA: ICD-10-CM

## 2023-09-15 DIAGNOSIS — R35.0 URINARY FREQUENCY: ICD-10-CM

## 2023-09-15 RX ORDER — FINASTERIDE 5 MG/1
5 TABLET, FILM COATED ORAL DAILY
Qty: 90 TABLET | Refills: 3 | Status: SHIPPED | OUTPATIENT
Start: 2023-09-15

## 2023-09-15 RX ORDER — TAMSULOSIN HYDROCHLORIDE 0.4 MG/1
0.4 CAPSULE ORAL
Qty: 90 CAPSULE | Refills: 3 | Status: SHIPPED | OUTPATIENT
Start: 2023-09-15

## 2023-09-15 NOTE — TELEPHONE ENCOUNTER
Spoke to Skip and I will need to find another location. He can not come on Wednesdays or Fridays here at [place]. I told him I would look and give him a call back to schedule that for him.

## 2023-09-17 DIAGNOSIS — F32.A DEPRESSION: ICD-10-CM

## 2023-09-17 RX ORDER — TRAZODONE HYDROCHLORIDE 50 MG/1
50 TABLET ORAL
Qty: 90 TABLET | Refills: 0 | OUTPATIENT
Start: 2023-09-17

## 2023-10-30 ENCOUNTER — TELEPHONE (OUTPATIENT)
Dept: FAMILY MEDICINE CLINIC | Facility: HOSPITAL | Age: 78
End: 2023-10-30

## 2023-10-30 NOTE — TELEPHONE ENCOUNTER
He has a order for a MRI cervical spine and wanted to know if he could get a MRI of his spine at the same? For years he's been having mid to low back pain. Do you need to see him?

## 2023-10-31 NOTE — TELEPHONE ENCOUNTER
Pt made aware. Seems like this may be something pt may talk about at his next appointment as to next step.

## 2023-10-31 NOTE — TELEPHONE ENCOUNTER
Cannot just order MRI's - have to have previous less expensive imaging and meet qualifications/prior-authorization for MRI.   Start with cervical spine and if he wishes he can make appt to eval low back pain

## 2023-11-02 ENCOUNTER — TELEPHONE (OUTPATIENT)
Age: 78
End: 2023-11-02

## 2023-11-02 DIAGNOSIS — R97.20 ELEVATED PSA: Primary | ICD-10-CM

## 2023-11-02 DIAGNOSIS — I25.10 CORONARY ARTERY DISEASE INVOLVING NATIVE CORONARY ARTERY OF NATIVE HEART WITHOUT ANGINA PECTORIS: ICD-10-CM

## 2023-11-02 DIAGNOSIS — F32.A DEPRESSION: ICD-10-CM

## 2023-11-02 RX ORDER — TRAZODONE HYDROCHLORIDE 100 MG/1
100 TABLET ORAL
Qty: 90 TABLET | Refills: 0 | Status: SHIPPED | OUTPATIENT
Start: 2023-11-02

## 2023-11-02 RX ORDER — METOPROLOL SUCCINATE 25 MG/1
25 TABLET, EXTENDED RELEASE ORAL DAILY
Qty: 90 TABLET | Refills: 2 | Status: SHIPPED | OUTPATIENT
Start: 2023-11-02

## 2023-11-02 NOTE — TELEPHONE ENCOUNTER
Called and spoke with patient. Informed that script was placed and according to the last OV note, he is to go for PSA around 10/19 so he can go now for it.

## 2023-11-02 NOTE — TELEPHONE ENCOUNTER
Patient will need an updated PSA placed in his chart for him to have done. Patient is scheduled for next available which isn't until January. He is questioning if he can get this done sooner. Please place order and call patient.      CB: [phone]

## 2023-11-13 ENCOUNTER — TELEPHONE (OUTPATIENT)
Age: 78
End: 2023-11-13

## 2023-11-13 NOTE — TELEPHONE ENCOUNTER
Caller: Flakito Giles. Doctor/Office: Dr. Foreign Snell    #: [phone]    Escalation: Care Patient is requesting his records with Dr. Adeline Velazquez be sent to the [facility]. He would like a return call.  Thank you

## 2023-11-22 ENCOUNTER — HOSPITAL ENCOUNTER (OUTPATIENT)
Dept: MRI IMAGING | Facility: HOSPITAL | Age: 78
Discharge: HOME | End: 2023-11-22
Payer: COMMERCIAL

## 2023-11-22 ENCOUNTER — OFFICE VISIT (OUTPATIENT)
Dept: FAMILY MEDICINE CLINIC | Facility: HOSPITAL | Age: 78
End: 2023-11-22
Payer: COMMERCIAL

## 2023-11-22 ENCOUNTER — TELEPHONE (OUTPATIENT)
Dept: FAMILY MEDICINE CLINIC | Facility: HOSPITAL | Age: 78
End: 2023-11-22

## 2023-11-22 VITALS
HEART RATE: 54 BPM | TEMPERATURE: 97.8 F | WEIGHT: 214 LBS | OXYGEN SATURATION: 95 % | DIASTOLIC BLOOD PRESSURE: 68 MMHG | HEIGHT: 70 IN | BODY MASS INDEX: 30.64 KG/M2 | RESPIRATION RATE: 16 BRPM | SYSTOLIC BLOOD PRESSURE: 120 MMHG

## 2023-11-22 DIAGNOSIS — R20.0 NUMBNESS OF FINGERS OF BOTH HANDS: ICD-10-CM

## 2023-11-22 DIAGNOSIS — M54.50 CHRONIC BILATERAL LOW BACK PAIN WITHOUT SCIATICA: Primary | ICD-10-CM

## 2023-11-22 DIAGNOSIS — G89.29 CHRONIC BILATERAL LOW BACK PAIN WITHOUT SCIATICA: Primary | ICD-10-CM

## 2023-11-22 DIAGNOSIS — M54.2 NECK PAIN: ICD-10-CM

## 2023-11-22 DIAGNOSIS — N18.32 STAGE 3B CHRONIC KIDNEY DISEASE (HCC): ICD-10-CM

## 2023-11-22 PROCEDURE — G1004 CDSM NDSC: HCPCS

## 2023-11-22 PROCEDURE — 72141 MRI NECK SPINE W/O DYE: CPT

## 2023-11-22 PROCEDURE — 99214 OFFICE O/P EST MOD 30 MIN: CPT | Performed by: INTERNAL MEDICINE

## 2023-11-22 RX ORDER — METHYLPREDNISOLONE 4 MG/1
TABLET ORAL
Qty: 21 EACH | Refills: 0 | Status: SHIPPED | OUTPATIENT
Start: 2023-11-22

## 2023-11-22 NOTE — ASSESSMENT & PLAN NOTE
Lab Results   Component Value Date    EGFR 53 07/17/2023    EGFR 52 04/28/2023    EGFR 56 04/26/2023    CREATININE 1.28 07/17/2023    CREATININE 1.29 04/28/2023    CREATININE 1.21 04/26/2023       Patient has stage IIIa CKD. Denies urinary retention. I will avoid NSAIDs because of his CKD.

## 2023-11-22 NOTE — TELEPHONE ENCOUNTER
Pt would like to know if there are any labs that Dr. Jayshree Gomes wants him to get prior to going to [facility].  Please advise, aware Dr. Jayshree Gomes not in until Friday

## 2023-11-22 NOTE — ASSESSMENT & PLAN NOTE
Patient presents for chief complaint of chronic low back pain that has been getting worse over the last several months. He would like to get an MRI to take to the [facility] in [address] [place] [address]. His low back pain is located on both sides of the lumbar spine with radiation across the back. Sometimes the pain shoots up and sometimes down the legs. It is worse when he sits up, sits down and sometimes it happens when he walks. He has no discomfort when he sits or lays down. Denies pain on coughing or straining. Denies fevers, chills, unintentional weight loss, bladder or bowel incontinence. He denies lower extremity numbness or paresthesias. He feels that he his legs are weak when standing up. He has been using various over-the-counter creams that he applies to his low back without help. He had no tenderness of the spinous processes, he had no scoliosis. He had no tenderness of the paralumbar area. He had no tenderness of the hips or SI joints. His spine flexion was decreased due to pain in the lumbar spine  His hip flexion was decreased. Straight leg raising was negative on both sides  His lower extremity muscle bulk, muscle strength and muscle tone was normal.  Knee jerks were 2/4 equal, I could not elicit ankle jerks. Sensation to light touch was normal.  Gait was normal    Suspect he has spondylosis and degenerative disease. We have not tried spinal physical therapy yet. I found no focal neurological signs to warrant urgent MRI. Start with spinal physical therapy, x-ray of the spine. I prescribed a Medrol Dosepak to treat inflammatory component of his back pain.     He will continue taking gabapentin  Follow-up with Dr. Mirta Jones in 4 weeks

## 2023-11-22 NOTE — PROGRESS NOTES
Assessment/Plan:    Chronic bilateral low back pain without sciatica  Patient presents for chief complaint of chronic low back pain that has been getting worse over the last several months. He would like to get an MRI to take to the [place] in [place]. His low back pain is located on both sides of the lumbar spine with radiation across the back. Sometimes the pain shoots up and sometimes down the legs. It is worse when he sits up, sits down and sometimes it happens when he walks. He has no discomfort when he sits or lays down. Denies pain on coughing or straining. Denies fevers, chills, unintentional weight loss, bladder or bowel incontinence. He denies lower extremity numbness or paresthesias. He feels that he his legs are weak when standing up. He has been using various over-the-counter creams that he applies to his low back without help. He had no tenderness of the spinous processes, he had no scoliosis. He had no tenderness of the paralumbar area. He had no tenderness of the hips or SI joints. His spine flexion was decreased due to pain in the lumbar spine  His hip flexion was decreased. Straight leg raising was negative on both sides  His lower extremity muscle bulk, muscle strength and muscle tone was normal.  Knee jerks were 2/4 equal, I could not elicit ankle jerks. Sensation to light touch was normal.  Gait was normal    Suspect he has spondylosis and degenerative disease. We have not tried spinal physical therapy yet. I found no focal neurological signs to warrant urgent MRI. Start with spinal physical therapy, x-ray of the spine. I prescribed a Medrol Dosepak to treat inflammatory component of his back pain.     He will continue taking gabapentin  Follow-up with Dr. Mavis Celeste in 4 weeks    Stage 3b chronic kidney disease [facility])  Lab Results   Component Value Date    EGFR 53 07/17/2023    EGFR 52 04/28/2023    EGFR 56 04/26/2023    CREATININE 1.28 07/17/2023    CREATININE 1.29 04/28/2023    CREATININE 1.21 04/26/2023       Patient has stage IIIa CKD. Denies urinary retention. I will avoid NSAIDs because of his CKD. Diagnoses and all orders for this visit:    Chronic bilateral low back pain without sciatica  -     XR spine lumbar minimum 4 views non injury; Future  -     Ambulatory Referral to [facility]; Future  -     methylPREDNISolone 4 MG tablet therapy pack; Use as directed on package    Stage 3b chronic kidney disease (HCC)          Subjective:      Patient ID: Beck Elizalde is a 66 y.o. male. HPI    Patient presents for follow-up of chronic conditions as detailed in the assessment and plan. The following portions of the patient's history were reviewed and updated as appropriate: current medications, past family history, past medical history, past social history, past surgical history and problem list.    Review of Systems      Objective:    /68   Pulse (!) 54   Temp 97.8 °F (36.6 °C) (Tympanic)   Resp 16   Ht 5' 10" (1.778 m)   Wt 97.1 kg (214 lb)   SpO2 95%   BMI 30.71 kg/m²      Physical Exam  Constitutional:       General: He is not in acute distress. Appearance: He is not toxic-appearing. Skin:     General: Skin is warm and dry. Findings: No bruising, erythema or rash. Neurological:      Mental Status: He is alert. Sensory: No sensory deficit. Motor: No weakness.       Gait: Gait normal.      Deep Tendon Reflexes: Reflexes abnormal.             Babar Cespedes MD

## 2023-11-24 NOTE — TELEPHONE ENCOUNTER
That is the reason we sent him to PT/[facility]. NO further recommendations at this point

## 2023-11-24 NOTE — TELEPHONE ENCOUNTER
Pt made aware. Reports still having pain in back/neck, gets numbness in bilateral hands, someone told him PT can't do anything for his hands because it's carpal tunnel. Looking for recommendations. Please advise. States he hasn't started PT yet.

## 2023-11-27 ENCOUNTER — TELEPHONE (OUTPATIENT)
Dept: NEPHROLOGY | Facility: CLINIC | Age: 78
End: 2023-11-27

## 2023-11-27 NOTE — TELEPHONE ENCOUNTER
Pt called to reschedule 2/5/24 appt with Dr. Chloe Garrett. Pt said he works that day. Unfortunately, there is no availability until the March schedule opens up.  Pt on cancellation list and March recall list.

## 2023-11-28 DIAGNOSIS — R20.0 NUMBNESS OF FINGERS OF BOTH HANDS: Primary | ICD-10-CM

## 2023-11-28 DIAGNOSIS — M48.02 CERVICAL STENOSIS OF SPINE: ICD-10-CM

## 2023-11-29 DIAGNOSIS — I10 PRIMARY HYPERTENSION: Chronic | ICD-10-CM

## 2023-11-29 RX ORDER — LISINOPRIL 10 MG/1
10 TABLET ORAL DAILY
Qty: 90 TABLET | Refills: 1 | Status: SHIPPED | OUTPATIENT
Start: 2023-11-29

## 2023-11-30 ENCOUNTER — TELEPHONE (OUTPATIENT)
Dept: NEUROLOGY | Facility: CLINIC | Age: 78
End: 2023-11-30

## 2023-11-30 ENCOUNTER — TELEPHONE (OUTPATIENT)
Dept: NEPHROLOGY | Facility: CLINIC | Age: 78
End: 2023-11-30

## 2023-11-30 NOTE — TELEPHONE ENCOUNTER
Pt called office regarding appointment, I advised pt that he called in and cxl'd his appointment already and there was no available appointments to reschedule that he has been placed on the recall for March and wait list if any appointment becomes available, pt is available Mon or Thurs for appointments

## 2023-11-30 NOTE — TELEPHONE ENCOUNTER
Patient called to schedule new patient appointment for short term memory issues. Testing done. Triage intake sent.

## 2023-12-04 ENCOUNTER — TELEPHONE (OUTPATIENT)
Dept: FAMILY MEDICINE CLINIC | Facility: HOSPITAL | Age: 78
End: 2023-12-04

## 2023-12-04 NOTE — TELEPHONE ENCOUNTER
Would like a refill of   Methylpred for his leg and back pain. Wants this sent to [facility] in [address] [facility]. Says it works very well. Please let him know when it is called in. Would like to know why you ordered this medication as he does not remember seeing you.

## 2023-12-07 ENCOUNTER — TELEPHONE (OUTPATIENT)
Dept: NEUROLOGY | Facility: CLINIC | Age: 78
End: 2023-12-07

## 2023-12-07 NOTE — TELEPHONE ENCOUNTER
1st attempt to schedule from triage. No answer. Left message on machine. Pharmacist Admission Medication Reconciliation Pending Note    Prior to Admission Medications were reviewed by the pharmacist and pended for provider review during admission medication reconciliation.    Medications were pended by the pharmacist at this time as follows:    Pended Admission Order Reconciliation Actions - Harsh Covington [facility] 9/4/2019  8:08 AM     Order Name Action Reordered As    nitroGLYcerin (NITROSTAT) 0.4 MG SL tablet Order for Admission nitroGLYcerin (NITROSTAT) sublingual tablet 0.4 mg    acetaminophen (TYLENOL 8 HOUR ARTHRITIS PAIN) 650 MG CR tablet Order for Admission acetaminophen (TYLENOL) tablet 325 mg    cholecalciferol (VITAMIN D3) 1000 UNITS tablet Order for Admission cholecalciferol (VITAMIN D3) tablet 1,000 Units    simvastatin (ZOCOR) 10 MG tablet Order for Admission pravastatin (PRAVACHOL) tablet 20 mg    amLODIPine (NORVASC) 10 MG tablet Order for Admission amLODIPine (NORVASC) tablet 10 mg    metoPROLOL succinate (TOPROL-XL) 50 MG 24 hr tablet Order for Admission metoPROLOL succinate (TOPROL-XL) ER tablet 50 mg    LORazepam (ATIVAN) 2 MG tablet Order for Admission LORazepam (ATIVAN) tablet 1 mg            Orders Pended To Continue For Hospital Stay     ID Description Pended By When Reason    [id number] acetaminophen (TYLENOL) tablet 325 mg-EVERY 4 HOURS Harsh Covington, [facility] 09/04/19 [facility]     [id number] amLODIPine (NORVASC) tablet [facility]DAILY [facility]     [id number] cholecalciferol (VITAMIN D3) tablet 1,000 Units-DAILY [facility]     [id number] LORazepam (ATIVAN) tablet 1 mg-AT BEDTIME [facility]19 08     [id number] metoPROLOL succinate (TOPROL-XL) ER tablet 50 mg-DAILY [facility]     [id number] nitroGLYcerin (NITROSTAT) sublingual tablet 0.4 mg-EVERY 5 MIN PRN Harsh Covington [facility] 09/04/19 08     [id number] pravastatin (PRAVACHOL) tablet 20 mg-NIGHTLY [facility] 0808             Pharmacist Notations:      The following medications were left unaddressed:   1.  Clopidogrel -- pt fell.  Any s/sx of bleeding?      Last edited by Harsh Covington RPH on 09/04/19 at 0809          Orders that are ultimately reconciled and signed during admission medication reconciliation may differ from the pended actions above.    Please contact the pharmacist for questions.    Harsh Covington RPH  9/4/2019 8:09 AM

## 2023-12-14 ENCOUNTER — TELEPHONE (OUTPATIENT)
Dept: FAMILY MEDICINE CLINIC | Facility: HOSPITAL | Age: 78
End: 2023-12-14

## 2023-12-17 DIAGNOSIS — N20.0 NEPHROLITHIASIS: ICD-10-CM

## 2023-12-18 RX ORDER — POTASSIUM CITRATE 15 MEQ/1
2 TABLET, EXTENDED RELEASE ORAL 2 TIMES DAILY
Qty: 88 TABLET | Refills: 1 | Status: SHIPPED | OUTPATIENT
Start: 2023-12-18 | End: 2023-12-21 | Stop reason: SDUPTHER

## 2023-12-21 ENCOUNTER — TELEPHONE (OUTPATIENT)
Dept: FAMILY MEDICINE CLINIC | Facility: HOSPITAL | Age: 78
End: 2023-12-21

## 2023-12-21 DIAGNOSIS — R35.0 URINARY FREQUENCY: ICD-10-CM

## 2023-12-21 DIAGNOSIS — N20.0 NEPHROLITHIASIS: ICD-10-CM

## 2023-12-21 RX ORDER — TAMSULOSIN HYDROCHLORIDE 0.4 MG/1
0.4 CAPSULE ORAL
Qty: 90 CAPSULE | Refills: 1 | Status: SHIPPED | OUTPATIENT
Start: 2023-12-21

## 2023-12-21 NOTE — TELEPHONE ENCOUNTER
Reason for call:   [x] Refill   [] Prior Auth  [] Other:     Office:   [] PCP/Provider -   [x] Specialty/Provider - urology    Medication: tamsulosin 0.4mg    Dose/Frequency: one daily at dinner    Quantity: 90    Pharmacy: [facility] [address] [place] [address] LENORE GOMES [phone]     Does the patient have enough for 3 days?   [] Yes   [x] No - Send as HP to POD

## 2023-12-21 NOTE — TELEPHONE ENCOUNTER
Patients pharmacy did not transfer the patients prescription to his new pharmacy which is [facility] in [place]. Patient would also like to know if he should still continue this medication

## 2023-12-21 NOTE — TELEPHONE ENCOUNTER
I've told him no changes via phone/message - will d/w pt in detail in 2 wks at [facility]

## 2023-12-21 NOTE — TELEPHONE ENCOUNTER
Patient wanted to know if he could have his escitalopram (LEXAPRO) 20 mg tablet dose changed, patient thinks he needs a high dose.. patient does have appt 1/8

## 2023-12-22 RX ORDER — POTASSIUM CITRATE 15 MEQ/1
2 TABLET, EXTENDED RELEASE ORAL 2 TIMES DAILY
Qty: 360 TABLET | Refills: 3 | Status: SHIPPED | OUTPATIENT
Start: 2023-12-22

## 2023-12-22 NOTE — TELEPHONE ENCOUNTER
Left message with patient advising: Dr. Schultz would like him to continue his potassium citrate and she did send in a refill

## 2023-12-28 ENCOUNTER — TELEPHONE (OUTPATIENT)
Dept: PSYCHIATRY | Facility: CLINIC | Age: 78
End: 2023-12-28

## 2023-12-28 NOTE — TELEPHONE ENCOUNTER
Spoke to client who stated he was not even aware of the referral for psychiatry and is not in need of being seen at this time.  Can be removed from wait list.

## 2024-01-08 ENCOUNTER — APPOINTMENT (OUTPATIENT)
Dept: LAB | Facility: HOSPITAL | Age: 79
End: 2024-01-08
Payer: COMMERCIAL

## 2024-01-08 ENCOUNTER — OFFICE VISIT (OUTPATIENT)
Dept: FAMILY MEDICINE CLINIC | Facility: HOSPITAL | Age: 79
End: 2024-01-08
Payer: COMMERCIAL

## 2024-01-08 ENCOUNTER — HOSPITAL ENCOUNTER (OUTPATIENT)
Dept: RADIOLOGY | Facility: HOSPITAL | Age: 79
Discharge: HOME | End: 2024-01-08
Payer: COMMERCIAL

## 2024-01-08 VITALS
TEMPERATURE: 96.3 F | SYSTOLIC BLOOD PRESSURE: 108 MMHG | BODY MASS INDEX: 31.58 KG/M2 | HEIGHT: 70 IN | HEART RATE: 50 BPM | WEIGHT: 220.6 LBS | DIASTOLIC BLOOD PRESSURE: 70 MMHG

## 2024-01-08 DIAGNOSIS — R20.0 NUMBNESS OF FINGERS OF BOTH HANDS: ICD-10-CM

## 2024-01-08 DIAGNOSIS — D69.6 PLATELETS DECREASED (HCC): ICD-10-CM

## 2024-01-08 DIAGNOSIS — N18.32 STAGE 3B CHRONIC KIDNEY DISEASE (HCC): ICD-10-CM

## 2024-01-08 DIAGNOSIS — M54.50 CHRONIC BILATERAL LOW BACK PAIN WITHOUT SCIATICA: ICD-10-CM

## 2024-01-08 DIAGNOSIS — I77.1 CELIAC ARTERY STENOSIS (HCC): ICD-10-CM

## 2024-01-08 DIAGNOSIS — E66.9 OBESITY (BMI 30.0-34.9): ICD-10-CM

## 2024-01-08 DIAGNOSIS — I74.09 AORTOILIAC OCCLUSIVE DISEASE (HCC): ICD-10-CM

## 2024-01-08 DIAGNOSIS — Z00.00 MEDICARE ANNUAL WELLNESS VISIT, SUBSEQUENT: ICD-10-CM

## 2024-01-08 DIAGNOSIS — R79.89 AZOTEMIA: Primary | ICD-10-CM

## 2024-01-08 DIAGNOSIS — M48.02 CERVICAL SPINAL STENOSIS: ICD-10-CM

## 2024-01-08 DIAGNOSIS — G89.29 CHRONIC BILATERAL LOW BACK PAIN WITHOUT SCIATICA: ICD-10-CM

## 2024-01-08 DIAGNOSIS — E78.5 DYSLIPIDEMIA: Chronic | ICD-10-CM

## 2024-01-08 DIAGNOSIS — I73.9 PAD (PERIPHERAL ARTERY DISEASE) (HCC): ICD-10-CM

## 2024-01-08 DIAGNOSIS — F33.41 RECURRENT MAJOR DEPRESSIVE DISORDER, IN PARTIAL REMISSION (HCC): Primary | ICD-10-CM

## 2024-01-08 DIAGNOSIS — R41.89 COGNITIVE IMPAIRMENT: ICD-10-CM

## 2024-01-08 DIAGNOSIS — I48.92 ATRIAL FLUTTER, UNSPECIFIED TYPE (HCC): ICD-10-CM

## 2024-01-08 PROBLEM — F32.A DEPRESSION: Status: ACTIVE | Noted: 2024-01-08

## 2024-01-08 PROBLEM — F33.9 RECURRENT MAJOR DEPRESSIVE DISORDER (HCC): Status: RESOLVED | Noted: 2023-09-12 | Resolved: 2024-01-08

## 2024-01-08 LAB
ALBUMIN SERPL BCP-MCNC: 4.2 G/DL (ref 3.5–5)
ALP SERPL-CCNC: 47 U/L (ref 34–104)
ALT SERPL W P-5'-P-CCNC: 14 U/L (ref 7–52)
ANION GAP SERPL CALCULATED.3IONS-SCNC: 6 MMOL/L
AST SERPL W P-5'-P-CCNC: 21 U/L (ref 13–39)
BASOPHILS # BLD AUTO: 0.03 THOUSANDS/ÂΜL (ref 0–0.1)
BASOPHILS NFR BLD AUTO: 0 % (ref 0–1)
BILIRUB SERPL-MCNC: 0.56 MG/DL (ref 0.2–1)
BUN SERPL-MCNC: 24 MG/DL (ref 5–25)
CALCIUM SERPL-MCNC: 9.3 MG/DL (ref 8.4–10.2)
CHLORIDE SERPL-SCNC: 105 MMOL/L (ref 96–108)
CHOLEST SERPL-MCNC: 112 MG/DL
CO2 SERPL-SCNC: 30 MMOL/L (ref 21–32)
CREAT SERPL-MCNC: 1.52 MG/DL (ref 0.6–1.3)
EOSINOPHIL # BLD AUTO: 0.35 THOUSAND/ÂΜL (ref 0–0.61)
EOSINOPHIL NFR BLD AUTO: 5 % (ref 0–6)
ERYTHROCYTE [DISTWIDTH] IN BLOOD BY AUTOMATED COUNT: 11.9 % (ref 11.6–15.1)
GFR SERPL CREATININE-BSD FRML MDRD: 42 ML/MIN/1.73SQ M
GLUCOSE P FAST SERPL-MCNC: 96 MG/DL (ref 65–99)
HCT VFR BLD AUTO: 41.1 % (ref 36.5–49.3)
HDLC SERPL-MCNC: 37 MG/DL
HGB BLD-MCNC: 13.5 G/DL (ref 12–17)
IMM GRANULOCYTES # BLD AUTO: 0.02 THOUSAND/UL (ref 0–0.2)
IMM GRANULOCYTES NFR BLD AUTO: 0 % (ref 0–2)
LDLC SERPL CALC-MCNC: 57 MG/DL (ref 0–100)
LYMPHOCYTES # BLD AUTO: 1.79 THOUSANDS/ÂΜL (ref 0.6–4.47)
LYMPHOCYTES NFR BLD AUTO: 23 % (ref 14–44)
MCH RBC QN AUTO: 31.8 PG (ref 26.8–34.3)
MCHC RBC AUTO-ENTMCNC: 32.8 G/DL (ref 31.4–37.4)
MCV RBC AUTO: 97 FL (ref 82–98)
MONOCYTES # BLD AUTO: 0.55 THOUSAND/ÂΜL (ref 0.17–1.22)
MONOCYTES NFR BLD AUTO: 7 % (ref 4–12)
NEUTROPHILS # BLD AUTO: 5.03 THOUSANDS/ÂΜL (ref 1.85–7.62)
NEUTS SEG NFR BLD AUTO: 65 % (ref 43–75)
NONHDLC SERPL-MCNC: 75 MG/DL
NRBC BLD AUTO-RTO: 0 /100 WBCS
PLATELET # BLD AUTO: 239 THOUSANDS/UL (ref 149–390)
PMV BLD AUTO: 9.1 FL (ref 8.9–12.7)
POTASSIUM SERPL-SCNC: 5.1 MMOL/L (ref 3.5–5.3)
PROT SERPL-MCNC: 6.8 G/DL (ref 6.4–8.4)
RBC # BLD AUTO: 4.24 MILLION/UL (ref 3.88–5.62)
SODIUM SERPL-SCNC: 141 MMOL/L (ref 135–147)
TRIGL SERPL-MCNC: 90 MG/DL
WBC # BLD AUTO: 7.77 THOUSAND/UL (ref 4.31–10.16)

## 2024-01-08 PROCEDURE — 99215 OFFICE O/P EST HI 40 MIN: CPT | Performed by: INTERNAL MEDICINE

## 2024-01-08 PROCEDURE — G0439 PPPS, SUBSEQ VISIT: HCPCS | Performed by: INTERNAL MEDICINE

## 2024-01-08 PROCEDURE — 80061 LIPID PANEL: CPT | Performed by: INTERNAL MEDICINE

## 2024-01-08 PROCEDURE — 72110 X-RAY EXAM L-2 SPINE 4/>VWS: CPT

## 2024-01-08 PROCEDURE — 80053 COMPREHEN METABOLIC PANEL: CPT | Performed by: INTERNAL MEDICINE

## 2024-01-08 PROCEDURE — 85025 COMPLETE CBC W/AUTO DIFF WBC: CPT | Performed by: INTERNAL MEDICINE

## 2024-01-08 PROCEDURE — 36415 COLL VENOUS BLD VENIPUNCTURE: CPT | Performed by: INTERNAL MEDICINE

## 2024-01-08 NOTE — ASSESSMENT & PLAN NOTE
Not noting great benefit with current regimen, willing to increase Trazodone to 100 mg (thinks he was taking 75 mg) and will check if taking his Lexapro - meds written down for pt, will follow

## 2024-01-08 NOTE — PATIENT INSTRUCTIONS
Medicare Preventive Visit Patient Instructions  Thank you for completing your Welcome to Medicare Visit or Medicare Annual Wellness Visit today. Your next wellness visit will be due in one year (1/8/2025).  The screening/preventive services that you may require over the next 5-10 years are detailed below. Some tests may not apply to you based off risk factors and/or age. Screening tests ordered at today's visit but not completed yet may show as past due. Also, please note that scanned in results may not display below.  Preventive Screenings:  Service Recommendations Previous Testing/Comments   Colorectal Cancer Screening  Colonoscopy    Fecal Occult Blood Test (FOBT)/Fecal Immunochemical Test (FIT)  Fecal DNA/Cologuard Test  Flexible Sigmoidoscopy Age: 45-75 years old   Colonoscopy: every 10 years (May be performed more frequently if at higher risk)  OR  FOBT/FIT: every 1 year  OR  Cologuard: every 3 years  OR  Sigmoidoscopy: every 5 years  Screening may be recommended earlier than age 45 if at higher risk for colorectal cancer. Also, an individualized decision between you and your healthcare provider will decide whether screening between the ages of 76-85 would be appropriate. Colonoscopy: 02/17/2021  FOBT/FIT: Not on file  Cologuard: Not on file  Sigmoidoscopy: Not on file    Screening Current     Prostate Cancer Screening Individualized decision between patient and health care provider in men between ages of 55-69   Medicare will cover every 12 months beginning on the day after your 50th birthday PSA: 4.1 ng/mL     Risks and Benefits Discussed  Screening Current     Hepatitis C Screening Once for adults born between 1945 and 1965  More frequently in patients at high risk for Hepatitis C Hep C Antibody: 11/12/2020    Screening Current  Risks and Benefits Discussed   Diabetes Screening 1-2 times per year if you're at risk for diabetes or have pre-diabetes Fasting glucose: 104 mg/dL (7/17/2023)  A1C: 5.3 %  (1/18/2023)  Screening Not Indicated  Risks and Benefits Discussed   Cholesterol Screening Once every 5 years if you don't have a lipid disorder. May order more often based on risk factors. Lipid panel: 01/18/2023  Risks and Benefits Discussed  Due for Lipid Panel      Other Preventive Screenings Covered by Medicare:  Abdominal Aortic Aneurysm (AAA) Screening: covered once if your at risk. You're considered to be at risk if you have a family history of AAA or a male between the age of 65-75 who smoking at least 100 cigarettes in your lifetime.  Lung Cancer Screening: covers low dose CT scan once per year if you meet all of the following conditions: (1) Age 55-77; (2) No signs or symptoms of lung cancer; (3) Current smoker or have quit smoking within the last 15 years; (4) You have a tobacco smoking history of at least 20 pack years (packs per day x number of years you smoked); (5) You get a written order from a healthcare provider.  Glaucoma Screening: covered annually if you're considered high risk: (1) You have diabetes OR (2) Family history of glaucoma OR (3)  aged 50 and older OR (4)  American aged 65 and older  Osteoporosis Screening: covered every 2 years if you meet one of the following conditions: (1) Have a vertebral abnormality; (2) On glucocorticoid therapy for more than 3 months; (3) Have primary hyperparathyroidism; (4) On osteoporosis medications and need to assess response to drug therapy.  HIV Screening: covered annually if you're between the age of 15-65. Also covered annually if you are younger than 15 and older than 65 with risk factors for HIV infection. For pregnant patients, it is covered up to 3 times per pregnancy.    Immunizations:  Immunization Recommendations   Influenza Vaccine Annual influenza vaccination during flu season is recommended for all persons aged >= 6 months who do not have contraindications   Pneumococcal Vaccine   * Pneumococcal conjugate vaccine =  PCV13 (Prevnar 13), PCV15 (Vaxneuvance), PCV20 (Prevnar 20)  * Pneumococcal polysaccharide vaccine = PPSV23 (Pneumovax) Adults 19-63 yo with certain risk factors or if 65+ yo  If never received any pneumonia vaccine: recommend Prevnar 20 (PCV20)  Give PCV20 if previously received 1 dose of PCV13 or PPSV23   Hepatitis B Vaccine 3 dose series if at intermediate or high risk (ex: diabetes, end stage renal disease, liver disease)   Respiratory syncytial virus (RSV) Vaccine - COVERED BY MEDICARE PART D  * RSVPreF3 (Arexvy) CDC recommends that adults 60 years of age and older may receive a single dose of RSV vaccine using shared clinical decision-making (SCDM)   Tetanus (Td) Vaccine - COST NOT COVERED BY MEDICARE PART B Following completion of primary series, a booster dose should be given every 10 years to maintain immunity against tetanus. Td may also be given as tetanus wound prophylaxis.   Tdap Vaccine - COST NOT COVERED BY MEDICARE PART B Recommended at least once for all adults. For pregnant patients, recommended with each pregnancy.   Shingles Vaccine (Shingrix) - COST NOT COVERED BY MEDICARE PART B  2 shot series recommended in those 19 years and older who have or will have weakened immune systems or those 50 years and older     Health Maintenance Due:      Topic Date Due   • Colorectal Cancer Screening  02/17/2026   • Hepatitis C Screening  Completed     Immunizations Due:      Topic Date Due   • COVID-19 Vaccine (9 - 2023-24 season) 11/22/2023     Advance Directives   What are advance directives?  Advance directives are legal documents that state your wishes and plans for medical care. These plans are made ahead of time in case you lose your ability to make decisions for yourself. Advance directives can apply to any medical decision, such as the treatments you want, and if you want to donate organs.   What are the types of advance directives?  There are many types of advance directives, and each state has rules  about how to use them. You may choose a combination of any of the following:  Living will:  This is a written record of the treatment you want. You can also choose which treatments you do not want, which to limit, and which to stop at a certain time. This includes surgery, medicine, IV fluid, and tube feedings.   Durable power of  for healthcare (DPAHC):  This is a written record that states who you want to make healthcare choices for you when you are unable to make them for yourself. This person, called a proxy, is usually a family member or a friend. You may choose more than 1 proxy.  Do not resuscitate (DNR) order:  A DNR order is used in case your heart stops beating or you stop breathing. It is a request not to have certain forms of treatment, such as CPR. A DNR order may be included in other types of advance directives.  Medical directive:  This covers the care that you want if you are in a coma, near death, or unable to make decisions for yourself. You can list the treatments you want for each condition. Treatment may include pain medicine, surgery, blood transfusions, dialysis, IV or tube feedings, and a ventilator (breathing machine).  Values history:  This document has questions about your views, beliefs, and how you feel and think about life. This information can help others choose the care that you would choose.  Why are advance directives important?  An advance directive helps you control your care. Although spoken wishes may be used, it is better to have your wishes written down. Spoken wishes can be misunderstood, or not followed. Treatments may be given even if you do not want them. An advance directive may make it easier for your family to make difficult choices about your care.   Weight Management   Why it is important to manage your weight:  Being overweight increases your risk of health conditions such as heart disease, high blood pressure, type 2 diabetes, and certain types of cancer. It  can also increase your risk for osteoarthritis, sleep apnea, and other respiratory problems. Aim for a slow, steady weight loss. Even a small amount of weight loss can lower your risk of health problems.  How to lose weight safely:  A safe and healthy way to lose weight is to eat fewer calories and get regular exercise. You can lose up about 1 pound a week by decreasing the number of calories you eat by 500 calories each day.   Healthy meal plan for weight management:  A healthy meal plan includes a variety of foods, contains fewer calories, and helps you stay healthy. A healthy meal plan includes the following:  Eat whole-grain foods more often.  A healthy meal plan should contain fiber. Fiber is the part of grains, fruits, and vegetables that is not broken down by your body. Whole-grain foods are healthy and provide extra fiber in your diet. Some examples of whole-grain foods are whole-wheat breads and pastas, oatmeal, brown rice, and bulgur.  Eat a variety of vegetables every day.  Include dark, leafy greens such as spinach, kale, joss greens, and mustard greens. Eat yellow and orange vegetables such as carrots, sweet potatoes, and winter squash.   Eat a variety of fruits every day.  Choose fresh or canned fruit (canned in its own juice or light syrup) instead of juice. Fruit juice has very little or no fiber.  Eat low-fat dairy foods.  Drink fat-free (skim) milk or 1% milk. Eat fat-free yogurt and low-fat cottage cheese. Try low-fat cheeses such as mozzarella and other reduced-fat cheeses.  Choose meat and other protein foods that are low in fat.  Choose beans or other legumes such as split peas or lentils. Choose fish, skinless poultry (chicken or turkey), or lean cuts of red meat (beef or pork). Before you cook meat or poultry, cut off any visible fat.   Use less fat and oil.  Try baking foods instead of frying them. Add less fat, such as margarine, sour cream, regular salad dressing and mayonnaise to  foods. Eat fewer high-fat foods. Some examples of high-fat foods include french fries, doughnuts, ice cream, and cakes.  Eat fewer sweets.  Limit foods and drinks that are high in sugar. This includes candy, cookies, regular soda, and sweetened drinks.  Exercise:  Exercise at least 30 minutes per day on most days of the week. Some examples of exercise include walking, biking, dancing, and swimming. You can also fit in more physical activity by taking the stairs instead of the elevator or parking farther away from stores. Ask your healthcare provider about the best exercise plan for you.   Narcotic (Opioid) Safety    Use narcotics safely:  Take prescribed narcotics exactly as directed  Do not give narcotics to others or take narcotics that belong to someone else  Do not mix narcotics without medicines or alcohol  Do not drive or operate heavy machinery after you take the narcotic  Monitor for side effects and notify your healthcare provider if you experienced side effects such as nausea, sleepiness, itching, or trouble thinking clearly.    Manage constipation:    Constipation is the most common side effect of narcotic medicine. Constipation is when you have hard, dry bowel movements, or you go longer than usual between bowel movements. Tell your healthcare provider about all changes in your bowel movements while you are taking narcotics. He or she may recommend laxative medicine to help you have a bowel movement. He or she may also change the kind of narcotic you are taking, or change when you take it. The following are more ways you can prevent or relieve constipation:    Drink liquids as directed.  You may need to drink extra liquids to help soften and move your bowels. Ask how much liquid to drink each day and which liquids are best for you.  Eat high-fiber foods.  This may help decrease constipation by adding bulk to your bowel movements. High-fiber foods include fruits, vegetables, whole-grain breads and  cereals, and beans. Your healthcare provider or dietitian can help you create a high-fiber meal plan. Your provider may also recommend a fiber supplement if you cannot get enough fiber from food.  Exercise regularly.  Regular physical activity can help stimulate your intestines. Walking is a good exercise to prevent or relieve constipation. Ask which exercises are best for you.  Schedule a time each day to have a bowel movement.  This may help train your body to have regular bowel movements. Bend forward while you are on the toilet to help move the bowel movement out. Sit on the toilet for at least 10 minutes, even if you do not have a bowel movement.    Store narcotics safely:   Store narcotics where others cannot easily get them.  Keep them in a locked cabinet or secure area. Do not  keep them in a purse or other bag you carry with you. A person may be looking for something else and find the narcotics.  Make sure narcotics are stored out of the reach of children.  A child can easily overdose on narcotics. Narcotics may look like candy to a small child.    The best way to dispose of narcotics:      The laws vary by country and area. In the [place], the best way is to return the narcotics through a take-back program. This program is offered by the [place] Drug Enforcement Agency (FISH). The following are options for using the program:  Take the narcotics to a FISH collection site.  The site is often a law enforcement center. Call your local law enforcement center for scheduled take-back days in your area. You will be given information on where to go if the collection site is in a different location.  Take the narcotics to an approved pharmacy or hospital.  A pharmacy or hospital may be set up as a collection site. You will need to ask if it is a FISH collection site if you were not directed there. A pharmacy or doctor's office may not be able to take back narcotics unless it is a FISH site.  Use a mail-back system.   This means you are given containers to put the narcotics into. You will then mail them in the containers.  Use a take-back drop box.  This is a place to leave the narcotics at any time. People and animals will not be able to get into the box. Your local law enforcement agency can tell you where to find a drop box in your area.    Other ways to manage pain:   Ask your healthcare provider about non-narcotic medicines to control pain.  Nonprescription medicines include NSAIDs (such as ibuprofen) and acetaminophen. Prescription medicines include muscle relaxers, antidepressants, and steroids.  Pain may be managed without any medicines.  Some ways to relieve pain include massage, aromatherapy, or meditation. Physical or occupational therapy may also help.    For more information:   Drug Enforcement Administration  [address] [place] [address]  Phone: 3- 271 - 851-0340  Web Address: https://www.deadiversion.[employer].gov/drug_disposal/    [place] Food and Drug Administration  [address] [place] [address]  Phone: 5- 370 - 404-1624  Web Address: http://www.fda.gov     © Copyright [employer] 2018 Information is for End User's use only and may not be sold, redistributed or otherwise used for commercial purposes. All illustrations and images included in CareNotes® are the copyrighted property of [employer] or [employer]    Medicare Preventive Visit Patient Instructions  Thank you for completing your Welcome to Medicare Visit or Medicare Annual Wellness Visit today. Your next wellness visit will be due in one year (1/8/2025).  The screening/preventive services that you may require over the next 5-10 years are detailed below. Some tests may not apply to you based off risk factors and/or age. Screening tests ordered at today's visit but not completed yet may show as past due. Also, please note that scanned in results may not display below.  Preventive Screenings:  Service  Recommendations Previous Testing/Comments   Colorectal Cancer Screening  Colonoscopy    Fecal Occult Blood Test (FOBT)/Fecal Immunochemical Test (FIT)  Fecal DNA/Cologuard Test  Flexible Sigmoidoscopy Age: 45-75 years old   Colonoscopy: every 10 years (May be performed more frequently if at higher risk)  OR  FOBT/FIT: every 1 year  OR  Cologuard: every 3 years  OR  Sigmoidoscopy: every 5 years  Screening may be recommended earlier than age 45 if at higher risk for colorectal cancer. Also, an individualized decision between you and your healthcare provider will decide whether screening between the ages of 76-85 would be appropriate. Colonoscopy: 02/17/2021  FOBT/FIT: Not on file  Cologuard: Not on file  Sigmoidoscopy: Not on file    Screening Current     Prostate Cancer Screening Individualized decision between patient and health care provider in men between ages of 55-69   Medicare will cover every 12 months beginning on the day after your 50th birthday PSA: 4.1 ng/mL     Risks and Benefits Discussed  Screening Current     Hepatitis C Screening Once for adults born between 1945 and 1965  More frequently in patients at high risk for Hepatitis C Hep C Antibody: 11/12/2020    Screening Current  Risks and Benefits Discussed   Diabetes Screening 1-2 times per year if you're at risk for diabetes or have pre-diabetes Fasting glucose: 104 mg/dL (7/17/2023)  A1C: 5.3 % (1/18/2023)  Screening Not Indicated  Risks and Benefits Discussed   Cholesterol Screening Once every 5 years if you don't have a lipid disorder. May order more often based on risk factors. Lipid panel: 01/18/2023  Risks and Benefits Discussed  Due for Lipid Panel      Other Preventive Screenings Covered by Medicare:  Abdominal Aortic Aneurysm (AAA) Screening: covered once if your at risk. You're considered to be at risk if you have a family history of AAA or a male between the age of 65-75 who smoking at least 100 cigarettes in your lifetime.  Lung Cancer  Screening: covers low dose CT scan once per year if you meet all of the following conditions: (1) Age 55-77; (2) No signs or symptoms of lung cancer; (3) Current smoker or have quit smoking within the last 15 years; (4) You have a tobacco smoking history of at least 20 pack years (packs per day x number of years you smoked); (5) You get a written order from a healthcare provider.  Glaucoma Screening: covered annually if you're considered high risk: (1) You have diabetes OR (2) Family history of glaucoma OR (3)  aged 50 and older OR (4)  American aged 65 and older  Osteoporosis Screening: covered every 2 years if you meet one of the following conditions: (1) Have a vertebral abnormality; (2) On glucocorticoid therapy for more than 3 months; (3) Have primary hyperparathyroidism; (4) On osteoporosis medications and need to assess response to drug therapy.  HIV Screening: covered annually if you're between the age of 15-65. Also covered annually if you are younger than 15 and older than 65 with risk factors for HIV infection. For pregnant patients, it is covered up to 3 times per pregnancy.    Immunizations:  Immunization Recommendations   Influenza Vaccine Annual influenza vaccination during flu season is recommended for all persons aged >= 6 months who do not have contraindications   Pneumococcal Vaccine   * Pneumococcal conjugate vaccine = PCV13 (Prevnar 13), PCV15 (Vaxneuvance), PCV20 (Prevnar 20)  * Pneumococcal polysaccharide vaccine = PPSV23 (Pneumovax) Adults 19-65 yo with certain risk factors or if 65+ yo  If never received any pneumonia vaccine: recommend Prevnar 20 (PCV20)  Give PCV20 if previously received 1 dose of PCV13 or PPSV23   Hepatitis B Vaccine 3 dose series if at intermediate or high risk (ex: diabetes, end stage renal disease, liver disease)   Respiratory syncytial virus (RSV) Vaccine - COVERED BY MEDICARE PART D  * RSVPreF3 (Arexvy) CDC recommends that adults 60 years of  age and older may receive a single dose of RSV vaccine using shared clinical decision-making (SCDM)   Tetanus (Td) Vaccine - COST NOT COVERED BY MEDICARE PART B Following completion of primary series, a booster dose should be given every 10 years to maintain immunity against tetanus. Td may also be given as tetanus wound prophylaxis.   Tdap Vaccine - COST NOT COVERED BY MEDICARE PART B Recommended at least once for all adults. For pregnant patients, recommended with each pregnancy.   Shingles Vaccine (Shingrix) - COST NOT COVERED BY MEDICARE PART B  2 shot series recommended in those 19 years and older who have or will have weakened immune systems or those 50 years and older     Health Maintenance Due:      Topic Date Due   • Colorectal Cancer Screening  02/17/2026   • Hepatitis C Screening  Completed     Immunizations Due:      Topic Date Due   • COVID-19 Vaccine (9 - 2023-24 season) 11/22/2023     Advance Directives   What are advance directives?  Advance directives are legal documents that state your wishes and plans for medical care. These plans are made ahead of time in case you lose your ability to make decisions for yourself. Advance directives can apply to any medical decision, such as the treatments you want, and if you want to donate organs.   What are the types of advance directives?  There are many types of advance directives, and each state has rules about how to use them. You may choose a combination of any of the following:  Living will:  This is a written record of the treatment you want. You can also choose which treatments you do not want, which to limit, and which to stop at a certain time. This includes surgery, medicine, IV fluid, and tube feedings.   Durable power of  for healthcare (DPAHC):  This is a written record that states who you want to make healthcare choices for you when you are unable to make them for yourself. This person, called a proxy, is usually a family member or a  friend. You may choose more than 1 proxy.  Do not resuscitate (DNR) order:  A DNR order is used in case your heart stops beating or you stop breathing. It is a request not to have certain forms of treatment, such as CPR. A DNR order may be included in other types of advance directives.  Medical directive:  This covers the care that you want if you are in a coma, near death, or unable to make decisions for yourself. You can list the treatments you want for each condition. Treatment may include pain medicine, surgery, blood transfusions, dialysis, IV or tube feedings, and a ventilator (breathing machine).  Values history:  This document has questions about your views, beliefs, and how you feel and think about life. This information can help others choose the care that you would choose.  Why are advance directives important?  An advance directive helps you control your care. Although spoken wishes may be used, it is better to have your wishes written down. Spoken wishes can be misunderstood, or not followed. Treatments may be given even if you do not want them. An advance directive may make it easier for your family to make difficult choices about your care.   Weight Management   Why it is important to manage your weight:  Being overweight increases your risk of health conditions such as heart disease, high blood pressure, type 2 diabetes, and certain types of cancer. It can also increase your risk for osteoarthritis, sleep apnea, and other respiratory problems. Aim for a slow, steady weight loss. Even a small amount of weight loss can lower your risk of health problems.  How to lose weight safely:  A safe and healthy way to lose weight is to eat fewer calories and get regular exercise. You can lose up about 1 pound a week by decreasing the number of calories you eat by 500 calories each day.   Healthy meal plan for weight management:  A healthy meal plan includes a variety of foods, contains fewer calories, and helps  you stay healthy. A healthy meal plan includes the following:  Eat whole-grain foods more often.  A healthy meal plan should contain fiber. Fiber is the part of grains, fruits, and vegetables that is not broken down by your body. Whole-grain foods are healthy and provide extra fiber in your diet. Some examples of whole-grain foods are whole-wheat breads and pastas, oatmeal, brown rice, and bulgur.  Eat a variety of vegetables every day.  Include dark, leafy greens such as spinach, kale, joss greens, and mustard greens. Eat yellow and orange vegetables such as carrots, sweet potatoes, and winter squash.   Eat a variety of fruits every day.  Choose fresh or canned fruit (canned in its own juice or light syrup) instead of juice. Fruit juice has very little or no fiber.  Eat low-fat dairy foods.  Drink fat-free (skim) milk or 1% milk. Eat fat-free yogurt and low-fat cottage cheese. Try low-fat cheeses such as mozzarella and other reduced-fat cheeses.  Choose meat and other protein foods that are low in fat.  Choose beans or other legumes such as split peas or lentils. Choose fish, skinless poultry (chicken or turkey), or lean cuts of red meat (beef or pork). Before you cook meat or poultry, cut off any visible fat.   Use less fat and oil.  Try baking foods instead of frying them. Add less fat, such as margarine, sour cream, regular salad dressing and mayonnaise to foods. Eat fewer high-fat foods. Some examples of high-fat foods include french fries, doughnuts, ice cream, and cakes.  Eat fewer sweets.  Limit foods and drinks that are high in sugar. This includes candy, cookies, regular soda, and sweetened drinks.  Exercise:  Exercise at least 30 minutes per day on most days of the week. Some examples of exercise include walking, biking, dancing, and swimming. You can also fit in more physical activity by taking the stairs instead of the elevator or parking farther away from stores. Ask your healthcare provider about  the best exercise plan for you.   Narcotic (Opioid) Safety    Use narcotics safely:  Take prescribed narcotics exactly as directed  Do not give narcotics to others or take narcotics that belong to someone else  Do not mix narcotics without medicines or alcohol  Do not drive or operate heavy machinery after you take the narcotic  Monitor for side effects and notify your healthcare provider if you experienced side effects such as nausea, sleepiness, itching, or trouble thinking clearly.    Manage constipation:    Constipation is the most common side effect of narcotic medicine. Constipation is when you have hard, dry bowel movements, or you go longer than usual between bowel movements. Tell your healthcare provider about all changes in your bowel movements while you are taking narcotics. He or she may recommend laxative medicine to help you have a bowel movement. He or she may also change the kind of narcotic you are taking, or change when you take it. The following are more ways you can prevent or relieve constipation:    Drink liquids as directed.  You may need to drink extra liquids to help soften and move your bowels. Ask how much liquid to drink each day and which liquids are best for you.  Eat high-fiber foods.  This may help decrease constipation by adding bulk to your bowel movements. High-fiber foods include fruits, vegetables, whole-grain breads and cereals, and beans. Your healthcare provider or dietitian can help you create a high-fiber meal plan. Your provider may also recommend a fiber supplement if you cannot get enough fiber from food.  Exercise regularly.  Regular physical activity can help stimulate your intestines. Walking is a good exercise to prevent or relieve constipation. Ask which exercises are best for you.  Schedule a time each day to have a bowel movement.  This may help train your body to have regular bowel movements. Bend forward while you are on the toilet to help move the bowel movement  out. Sit on the toilet for at least 10 minutes, even if you do not have a bowel movement.    Store narcotics safely:   Store narcotics where others cannot easily get them.  Keep them in a locked cabinet or secure area. Do not  keep them in a purse or other bag you carry with you. A person may be looking for something else and find the narcotics.  Make sure narcotics are stored out of the reach of children.  A child can easily overdose on narcotics. Narcotics may look like candy to a small child.    The best way to dispose of narcotics:      The laws vary by country and area. In the [place], the best way is to return the narcotics through a take-back program. This program is offered by the [place] Drug Enforcement Agency (FISH). The following are options for using the program:  Take the narcotics to a FISH collection site.  The site is often a law enforcement center. Call your local law enforcement center for scheduled take-back days in your area. You will be given information on where to go if the collection site is in a different location.  Take the narcotics to an approved pharmacy or hospital.  A pharmacy or hospital may be set up as a collection site. You will need to ask if it is a FISH collection site if you were not directed there. A pharmacy or doctor's office may not be able to take back narcotics unless it is a FISH site.  Use a mail-back system.  This means you are given containers to put the narcotics into. You will then mail them in the containers.  Use a take-back drop box.  This is a place to leave the narcotics at any time. People and animals will not be able to get into the box. Your local law enforcement agency can tell you where to find a drop box in your area.    Other ways to manage pain:   Ask your healthcare provider about non-narcotic medicines to control pain.  Nonprescription medicines include NSAIDs (such as ibuprofen) and acetaminophen. Prescription medicines include muscle relaxers,  antidepressants, and steroids.  Pain may be managed without any medicines.  Some ways to relieve pain include massage, aromatherapy, or meditation. Physical or occupational therapy may also help.    For more information:   Drug Enforcement Administration  [address] [place] [address]  Phone: 3- 808 - 933-5381  Web Address: https://www.deadiversion.[employer].gov/drug_disposal/     Food and Drug Administration  [address] [place] [address]  Phone: 3- 450 - 048-0874  Web Address: http://www.fda.gov     © Copyright [employer] 2018 Information is for End User's use only and may not be sold, redistributed or otherwise used for commercial purposes. All illustrations and images included in CareNotes® are the copyrighted property of [employer] or [employer]

## 2024-01-08 NOTE — ASSESSMENT & PLAN NOTE
Has vascular studies pending, on ASA and statin, unsure if taking Plavix - will call and let us know

## 2024-01-08 NOTE — PROGRESS NOTES
referra Assessment and Plan:     Problem List Items Addressed This Visit          Cardiovascular and Mediastinum    Atrial flutter, unspecified type (HCC)     Currently NSR and rate controlled with metoprolol, on ASA and following with Cardio         Relevant Orders    CBC and differential    Comprehensive metabolic panel    Lipid panel    PAD (peripheral artery disease) (HCC)     Has vascular studies ordered and pending, on ASA and statin, pt unsure if taking Plavix - wrote down and pt will let us know         Relevant Orders    CBC and differential    Comprehensive metabolic panel    Lipid panel    Aortoiliac occlusive disease (HCC)     Has vascular studies pending, on ASA and statin, unsure if taking Plavix - will call and let us know         Relevant Orders    CBC and differential    Comprehensive metabolic panel    Lipid panel    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HCC)     No current abd symptoms, on ASA/statin, has vascular studies ordered, will follow            Genitourinary    Stage 3b chronic kidney disease (HCC)     Lab Results   Component Value Date    EGFR 53 07/17/2023    EGFR 52 04/28/2023    EGFR 56 04/26/2023    CREATININE 1.28 07/17/2023    CREATININE 1.29 04/28/2023    CREATININE 1.21 04/26/2023   Stable, importance of BP/BS control as well as avoiding NSAIDs reviewed, will follow         Relevant Orders    CBC and differential    Comprehensive metabolic panel    Lipid panel       Other    Dyslipidemia (Chronic)     FLP due - BW order given, con't current statin for now         Relevant Orders    Lipid panel    Obesity (BMI 30.0-34.9)    Recurrent major depressive disorder, in partial remission (HCC) - Primary     Not noting great benefit with current regimen, willing to increase Trazodone to 100 mg (thinks he was taking 75 mg) and will check if taking his Lexapro - meds written down for pt, will follow         Relevant Orders    CBC and differential    Comprehensive metabolic panel    Lipid panel     Platelets decreased (HCC)     Check CBC with labs - BW order given, will follow         Cognitive impairment     Poor memory recall when discussing meds/appts he missed, MMSE 26/30, MRI brain and memory labs done, will follow         Chronic bilateral low back pain without sciatica     Unsure if Medrol Dose pack helped, never did PT or saw Pain mgt or did Xrays - plan again discussed and orders reprinted         Relevant Orders    Ambulatory Referral to Physical Therapy    Ambulatory referral to Spine & Pain Management    Cervical spinal stenosis     MRI C-spine reviewed, will check EMG as he has some symptoms c/w CTS as well, referral to pain mgt reviewed         Relevant Orders    EMG 2 Limb Upper Extremity    Ambulatory referral to Spine & Pain Management     Other Visit Diagnoses       Numbness of fingers of both hands        MRI cervical spine reviewed, also in distribution of CTS - will check EMG - order given    Relevant Orders    EMG 2 Limb Upper Extremity    Medicare annual wellness visit, subsequent        BMI 31.0-31.9,adult        diet/exercise/wgt loss encouraged, pt requesting Wegovy - SE and current back order reviewed, will readdress at next appt             Preventive health issues were discussed with patient, and age appropriate screening tests were ordered as noted in patient's After Visit Summary.    Colonoscopy 2/21 - 5 yrs  Echo 10/22  CUS 12/22 - scheduled for 2023  BW 5/23  FLP 1/23    Personalized health advice and appropriate referrals for health education or preventive services given if needed, as noted in patient's After Visit Summary.     History of Present Illness:     Patient presents for a Medicare Wellness Visit    HPI  Pt here for follow up appt and AWV    Last visit in Sept pt was noting issues with continued depression.  He was on max dose of Lexapro so we discussed increasing his Trazodone.  He wished to go from 50 mg to 75 mg.    Last visit pt ws noting persistent numbness in  "B/L UE and a MRI of C-spine was ordered.  We also increased his Gabapentin from 300 mg tid to 400 mg tid.  MRI results reviewed with pt in detail today: Advanced spondylotic degenerative changes are seen throughout the cervical spine with multilevel pam and retrolisthesis as described above. There is moderate multilevel central stenosis at nearly every cervical level with moderate multilevel foraminal   narrowing. No cord compression or cord signal abnormality.  Pt was notified of results and referral to pain mgt was placed.  He never called pain mgt.  Chart reviewed and it states pt cancelled Pain mgt appt - he does not recall doing this at all.  Pt has been taking the Gabapentin and states his neck pain right now is \"not that bad\".  He con't to note numbness and tingling in B/L in the first 3 digits.  He notes weakness in the L hand and has dropped objects.  He is R handed.  He notes no SE with the higher dose of the Gabapentin.     Pt saw Dr. Giraldo in Nov for chronic B/L LBP - OV note reviewed.   Xray of L-spine ordered but was never done. He took the Medrol dose pack and was referred to PT. He does not recall if the steroid pack helped. He notes \"a little bit but not much\" with numbness in tingling in LE. He notes no loss of bowel or bladder control.  He notes no new/worse saddle anesthesia - has some R inner thigh since a previous surgery.  He has not done PT or the Xray - does not recall these ever being ordered.  Chart reviewed and it states he cancelled 3 PT appt.     Pt asking about Wegovy d/t his wgt gain.  He admits his diet is healthy but he only eats once a day.  He does walk a mile (15-20 min) once a day.           Patient Care Team:  Dona Camp DO as PCP - General (Internal Medicine)  BLAS Mcintosh (Hematology and Oncology)  Ion De Anda MD (Otolaryngology)  Nitin Finley MD (Cardiology)  BLAS Mckeon as Nurse Practitioner (Urology)  Marj Schultz DO " (Nephrology)     Review of Systems:     Review of Systems   Constitutional:  Positive for unexpected weight change. Negative for chills and fever.   HENT:  Negative for congestion and trouble swallowing.    Eyes:  Negative for pain and visual disturbance.   Respiratory:  Negative for cough, shortness of breath and wheezing.    Cardiovascular:  Negative for chest pain and palpitations.   Gastrointestinal:  Negative for abdominal pain, blood in stool, constipation, diarrhea, nausea and vomiting.   Genitourinary:  Positive for frequency. Negative for difficulty urinating and dysuria.   Musculoskeletal:  Positive for back pain and neck pain.   Skin:  Negative for rash and wound.   Neurological:  Positive for dizziness, weakness and numbness. Negative for headaches.   Hematological:  Does not bruise/bleed easily.   Psychiatric/Behavioral:  Positive for confusion and dysphoric mood.         Problem List:     Patient Active Problem List   Diagnosis    Tubular adenoma of colon    Carpal tunnel syndrome    Dyslipidemia    Gout    Hypertension    Impaired fasting glucose    Insomnia    Iron deficiency anemia due to chronic blood loss    Osteoarthritis    Prolonged QT interval    Rhinitis    Other disorder of calcium metabolism    Nephrolithiasis    Elevated PSA    Obesity (BMI 30.0-34.9)    Aortic valve stenosis    S/P carotid endarterectomy    Coronary artery disease involving native coronary artery    Carotid stenosis, asymptomatic, bilateral    Stage 3b chronic kidney disease (HCC)    Rambo lesion, chronic    Paraesophageal hernia    Atrial flutter, unspecified type (HCC)    Recurrent major depressive disorder, in partial remission (HCC)    Tylenol ingestion    PAD (peripheral artery disease) (HCC)    Platelets decreased (HCC)    Aortoiliac occlusive disease (HCC)    Idiopathic peripheral neuropathy    Cognitive impairment    Memory loss    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HCC)    Chronic bilateral low back pain without sciatica     Cervical spinal stenosis      Past Medical and Surgical History:     Past Medical History:   Diagnosis Date    Anemia     iron def    Arthritis     Cerebrovascular accident (CVA) (HCC) 01/07/2020    memory issues    GERD (gastroesophageal reflux disease)     Gout     Hypertension     Insomnia      Past Surgical History:   Procedure Laterality Date    ANKLE SURGERY Right     CARDIAC CATHETERIZATION      no findings    COLONOSCOPY  01/25/2013    polypectomy; complete - 3 yrs d/t polyp - benign submucosal lipoma- path c/w lipoma    COLONOSCOPY  04/25/2017    complete - tubular adenoma - repeat 5yrs    CYSTOSCOPY      CYSTOTOMY      bladder  w/ basket extraction of calculus    FEMUR FRACTURE SURGERY      HERNIA REPAIR      inguinal ; sliding    INGUINAL HERNIA REPAIR Right     unilateral    IR LOWER EXTREMITY ANGIOGRAM  4/24/2023    KNEE ARTHROSCOPY Right     w/medial menisectomy    LASIK      corneal    LITHOTRIPSY      renal    NC COLONOSCOPY FLX DX W/COLLJ SPEC WHEN PFRMD N/A 04/25/2017    Procedure: SCREENING COLONOSCOPY;  Surgeon: Paul Jacome MD;  Location: [facility];  Service: General    NC SLCTV CATHJ 3RD+ ORD SLCTV ABDL PEL/LXTR BRNCH Right 4/24/2023    Procedure: ARTERIOGRAM;  Surgeon: Alivia Blum MD;  Location: [facility];  Service: Vascular    NC TEAEC W/PATCH GRF CAROTID VERTB SUBCLAV NECK INC Right 01/10/2020    Procedure: ENDARTERECTOMY ARTERY CAROTID;  Surgeon: Aaron Smith MD;  Location: [facility];  Service: Vascular    NC TEAEC W/WO PATCH GRAFT COMMON FEMORAL Right 4/24/2023    Procedure: ENDARTERECTOMY ARTERIAL FEMORAL, RETROGRADE ILIAC INTERVENTION;  Surgeon: Alivia Blum MD;  Location: [facility];  Service: Vascular    ROTATOR CUFF REPAIR Bilateral     TONSILLECTOMY        Family History:     Family History   Problem Relation Age of Onset    Alzheimer's disease Mother     Alzheimer's disease Father     Heart disease Father         CAD    Other Father         prediabetes    Diabetes Father      Breast cancer Other     Arthritis Family     Hypertension Family       Social History:     Social History     Socioeconomic History    Marital status:      Spouse name: None    Number of children: 1    Years of education: None    Highest education level: None   Occupational History    Occupation: Retired     Comment: working full time   Tobacco Use    Smoking status: Former     Current packs/day: 0.00     Average packs/day: 1 pack/day for 22.0 years (22.0 ttl pk-yrs)     Types: Cigarettes     Start date:      Quit date: 1985     Years since quittin.0    Smokeless tobacco: Never   Vaping Use    Vaping status: Never Used   Substance and Sexual Activity    Alcohol use: Not Currently     Comment: stopped drinking alcohol; AA x23yrs sober    Drug use: No    Sexual activity: Not Currently     Partners: Female   Other Topics Concern    None   Social History Narrative    , lives alone, working full time     Social Determinants of Health     Financial Resource Strain: Low Risk  (2024)    Overall Financial Resource Strain (CARDIA)     Difficulty of Paying Living Expenses: Not very hard   Food Insecurity: Not on file   Transportation Needs: No Transportation Needs (2024)    PRAPARE - Transportation     Lack of Transportation (Medical): No     Lack of Transportation (Non-Medical): No   Physical Activity: Not on file   Stress: Not on file   Social Connections: Not on file   Intimate Partner Violence: Not on file   Housing Stability: Not on file      Medications and Allergies:     Current Outpatient Medications   Medication Sig Dispense Refill    Ascorbic Acid (vitamin C) 1000 MG tablet Take 1,000 mg by mouth daily      aspirin (ECOTRIN LOW STRENGTH) 81 mg EC tablet Take 81 mg by mouth every other day 1 every other day      atorvastatin (LIPITOR) 40 mg tablet take 1 tablet by mouth every evening 90 tablet 3    calcium polycarbophil (FIBERCON) 625 mg tablet Take 625 mg by mouth daily (Patient  not taking: Reported on 7/18/2023)      calcium polycarbophil (FIBERCON) 625 mg tablet Take 625 mg by mouth daily      clopidogrel (PLAVIX) 75 mg tablet Take 1 tablet (75 mg total) by mouth daily 90 tablet 2    Doxylamine Succinate, Sleep, (UNISOM PO) Take 1 tablet by mouth daily at bedtime      escitalopram (LEXAPRO) 20 mg tablet Take 1 tablet (20 mg total) by mouth daily 90 tablet 2    ferrous sulfate 324 (65 Fe) mg Take 1 tablet (324 mg total) by mouth daily before breakfast 30 tablet 2    finasteride (PROSCAR) 5 mg tablet Take 1 tablet (5 mg total) by mouth daily 90 tablet 3    gabapentin (Neurontin) 400 mg capsule Take 1 capsule (400 mg total) by mouth 3 (three) times a day 270 capsule 1    lisinopril (ZESTRIL) 10 mg tablet Take 1 tablet (10 mg total) by mouth daily 90 tablet 1    Maxidex 0.1 % ophthalmic suspension instill 1 drop into both eyes twice a day for 2 weeks then 1 drop into both eyes once daily (Patient not taking: Reported on 11/22/2023)      metoprolol succinate (TOPROL-XL) 25 mg 24 hr tablet Take 1 tablet (25 mg total) by mouth daily 90 tablet 2    Misc Natural Products (GLUCOSAMINE CHOND CMP DOUBLE PO) Take 1 tablet by mouth in the morning      Multiple Vitamin (MULTIVITAMIN) capsule Take 1 capsule by mouth daily      omeprazole (PriLOSEC) 20 mg delayed release capsule take 1 capsule by mouth once daily 90 capsule 2    Potassium Citrate ER 15 MEQ (1620 MG) TBCR Take 2 tablets by mouth 2 (two) times a day 360 tablet 3    Santyl ointment 1 application. in the morning Apply thin layer to affected area (Patient not taking: Reported on 8/7/2023)      tamsulosin (FLOMAX) 0.4 mg Take 1 capsule (0.4 mg total) by mouth daily with dinner 90 capsule 1    traZODone (DESYREL) 100 mg tablet TAKE 1 TABLET BY MOUTH AT BEDTIME 90 tablet 0     No current facility-administered medications for this visit.     No Known Allergies   Immunizations:     Immunization History   Administered Date(s) Administered     COVID-19 PFIZER VACCINE 0.3 ML IM 01/16/2021, 02/04/2021, 09/28/2021    COVID-19 Pfizer Vac BIVALENT Nghia-sucrose 12 Yr+ IM 09/09/2022, 05/04/2023    COVID-19 Pfizer mRNA vacc PF nghia-sucrose 12 yr and older (Comirnaty) 09/09/2022, 09/27/2023    COVID-19 Pfizer vac (Nghia-sucrose, gray cap) 12 yr+ IM 09/28/2021    INFLUENZA 09/13/2014, 08/10/2015, 08/29/2016, 09/02/2017, 08/27/2018, 08/17/2019, 09/23/2021, 09/23/2021, 08/29/2022, 09/09/2023    Influenza Quadrivalent Preservative Free 3 years and older IM 08/10/2015    Influenza Split High Dose Preservative Free IM 08/29/2016, 09/02/2017, 08/29/2022    Influenza, high dose seasonal 0.7 mL 08/24/2020    Influenza, seasonal, injectable 01/16/2014, 09/13/2014    Pneumococcal Conjugate 13-Valent 07/17/2015, 10/26/2015    Pneumococcal Conjugate Vaccine 20-valent (Pcv20), Polysace 04/19/2023    Pneumococcal Polysaccharide PPV23 02/28/2017, 04/14/2017    Respiratory Syncytial Virus Vaccine (Recombinant, Adjuvanted) 09/09/2023    TD (adult) Preservative Free 02/15/2011    Td (adult), Not Adsorbed 02/15/2011    Tdap 09/28/2021    Tetanus, adsorbed 02/15/2011    Zoster 02/15/2020    Zoster Vaccine Recombinant 12/10/2019, 02/15/2020    influenza, trivalent, adjuvanted 08/17/2019      Health Maintenance:         Topic Date Due    Colorectal Cancer Screening  02/17/2026    Hepatitis C Screening  Completed         Topic Date Due    COVID-19 Vaccine (9 - 2023-24 season) 11/22/2023      Medicare Screening Tests and Risk Assessments:     Nj is here for his Subsequent Wellness visit. Last Medicare Wellness visit information reviewed, patient interviewed and updates made to the record today.      Health Risk Assessment:   Patient rates overall health as good. Patient feels that their physical health rating is same. Patient is satisfied with their life. Eyesight was rated as same. Hearing was rated as same. Patient feels that their emotional and mental health rating is same. Patients  states they are never, rarely angry. Patient states they are often unusually tired/fatigued. Pain experienced in the last 7 days has been a lot. Patient's pain rating has been 7/10. Patient states that he has experienced weight loss or gain in last 6 months.     Fall Risk Screening:   In the past year, patient has experienced: no history of falling in past year      Home Safety:  Patient has trouble with stairs inside or outside of their home. Patient has working smoke alarms and has working carbon monoxide detector. Home safety hazards include: none.     Nutrition:   Current diet is Regular.     Medications:   Patient is currently taking over-the-counter supplements. OTC medications include: see medication list. Patient is able to manage medications.     Activities of Daily Living (ADLs)/Instrumental Activities of Daily Living (IADLs):   Walk and transfer into and out of bed and chair?: Yes  Dress and groom yourself?: Yes    Bathe or shower yourself?: Yes    Feed yourself? Yes  Do your laundry/housekeeping?: Yes  Manage your money, pay your bills and track your expenses?: Yes  Make your own meals?: Yes    Do your own shopping?: Yes    Previous Hospitalizations:   Any hospitalizations or ED visits within the last 12 months?: Yes    How many hospitalizations have you had in the last year?: 1-2    Advance Care Planning:   Living will: Yes    Durable POA for healthcare: Yes    Advanced directive: Yes      Cognitive Screening:   Provider or family/friend/caregiver concerned regarding cognition?: Yes  Mini-Mental Status Exam (MMSE) Score: 26  Interpretation: MMSE Score 24-30: Normal Cognition     PREVENTIVE SCREENINGS      Cardiovascular Screening:    General: Risks and Benefits Discussed    Due for: Lipid Panel      Diabetes Screening:     General: Screening Not Indicated and Risks and Benefits Discussed      Colorectal Cancer Screening:     General: Screening Current      Prostate Cancer Screening:    General: Risks  "and Benefits Discussed and Screening Current      Osteoporosis Screening:    General: Risks and Benefits Discussed and Screening Not Indicated      Abdominal Aortic Aneurysm (AAA) Screening:    Risk factors include: tobacco use        General: Risks and Benefits Discussed and Screening Current      Lung Cancer Screening:     General: Screening Not Indicated and Risks and Benefits Discussed      Hepatitis C Screening:    General: Screening Current and Risks and Benefits Discussed    Screening, Brief Intervention, and Referral to Treatment (SBIRT)    Screening  Typical number of drinks in a day: 0  Typical number of drinks in a week: 0  Interpretation: Low risk drinking behavior.    AUDIT-C Screenin) How often did you have a drink containing alcohol in the past year? never  2) How many drinks did you have on a typical day when you were drinking in the past year? 0  3) How often did you have 6 or more drinks on one occasion in the past year? never    AUDIT-C Score: 0  Interpretation: Score 0-3 (male): Negative screen for alcohol misuse    Single Item Drug Screening:  How often have you used an illegal drug (including marijuana) or a prescription medication for non-medical reasons in the past year? never    Single Item Drug Screen Score: 0  Interpretation: Negative screen for possible drug use disorder    Review of Current Opioid Use    Opioid Risk Tool (ORT) Interpretation: Complete Opioid Risk Tool (ORT)    Other Counseling Topics:   Regular weightbearing exercise.     Vision Screening    Right eye Left eye Both eyes   Without correction 20/30 20/50 20/25   With correction           Physical Exam:     /70   Pulse (!) 50   Temp (!) 96.3 °F (35.7 °C) (Tympanic)   Ht 5' 10\" (1.778 m)   Wt 100 kg (220 lb 9.6 oz)   BMI 31.65 kg/m²     Physical Exam  Vitals and nursing note reviewed.   Constitutional:       General: He is not in acute distress.     Appearance: He is well-developed. He is obese. He is not " ill-appearing.   HENT:      Head: Normocephalic and atraumatic.      Right Ear: Tympanic membrane and external ear normal. There is no impacted cerumen.      Left Ear: Tympanic membrane and external ear normal. There is no impacted cerumen.   Eyes:      General:         Right eye: No discharge.         Left eye: No discharge.      Conjunctiva/sclera: Conjunctivae normal.   Neck:      Vascular: No carotid bruit.      Trachea: No tracheal deviation.   Cardiovascular:      Rate and Rhythm: Normal rate and regular rhythm.      Heart sounds: Murmur heard.   Pulmonary:      Effort: Pulmonary effort is normal. No respiratory distress.      Breath sounds: Normal breath sounds. No wheezing, rhonchi or rales.   Abdominal:      General: There is no distension.      Palpations: Abdomen is soft.      Tenderness: There is no abdominal tenderness. There is no guarding or rebound.   Musculoskeletal:      Cervical back: Neck supple.      Right lower leg: No edema.      Left lower leg: No edema.   Lymphadenopathy:      Cervical: No cervical adenopathy.   Skin:     General: Skin is warm and dry.      Coloration: Skin is not pale.      Findings: No rash.   Neurological:      General: No focal deficit present.      Mental Status: He is alert. Mental status is at baseline.      Motor: No abnormal muscle tone.      Gait: Gait normal.   Psychiatric:         Behavior: Behavior normal.         Thought Content: Thought content normal.         Judgment: Judgment normal.          Dona Camp DO

## 2024-01-08 NOTE — ASSESSMENT & PLAN NOTE
Lab Results   Component Value Date    EGFR 53 07/17/2023    EGFR 52 04/28/2023    EGFR 56 04/26/2023    CREATININE 1.28 07/17/2023    CREATININE 1.29 04/28/2023    CREATININE 1.21 04/26/2023   Stable, importance of BP/BS control as well as avoiding NSAIDs reviewed, will follow

## 2024-01-08 NOTE — ASSESSMENT & PLAN NOTE
Has vascular studies ordered and pending, on ASA and statin, pt unsure if taking Plavix - wrote down and pt will let us know

## 2024-01-08 NOTE — ASSESSMENT & PLAN NOTE
MRI C-spine reviewed, will check EMG as he has some symptoms c/w CTS as well, referral to pain mgt reviewed

## 2024-01-08 NOTE — ASSESSMENT & PLAN NOTE
Unsure if Medrol Dose pack helped, never did PT or saw Pain mgt or did Xrays - plan again discussed and orders reprinted

## 2024-01-08 NOTE — ASSESSMENT & PLAN NOTE
Poor memory recall when discussing meds/appts he missed, MMSE 26/30, MRI brain and memory labs done, will follow

## 2024-01-10 ENCOUNTER — TELEPHONE (OUTPATIENT)
Dept: FAMILY MEDICINE CLINIC | Facility: HOSPITAL | Age: 79
End: 2024-01-10

## 2024-01-11 ENCOUNTER — OFFICE VISIT (OUTPATIENT)
Dept: UROLOGY | Facility: CLINIC | Age: 79
End: 2024-01-11
Payer: COMMERCIAL

## 2024-01-11 VITALS
DIASTOLIC BLOOD PRESSURE: 68 MMHG | SYSTOLIC BLOOD PRESSURE: 124 MMHG | HEIGHT: 70 IN | WEIGHT: 223.6 LBS | HEART RATE: 64 BPM | OXYGEN SATURATION: 98 % | BODY MASS INDEX: 32.01 KG/M2

## 2024-01-11 DIAGNOSIS — R97.20 ELEVATED PSA: Primary | ICD-10-CM

## 2024-01-11 PROCEDURE — 99213 OFFICE O/P EST LOW 20 MIN: CPT | Performed by: PHYSICIAN ASSISTANT

## 2024-01-11 NOTE — PROGRESS NOTES
1/11/2024      Chief Complaint   Patient presents with    Follow-up     6 month f/u    Elevated PSA         Assessment and Plan    79 y.o. male managed by AP team previous patient of Dr Bowles    1. Elevated PSA    PSA has come down to reasonable range for his age and 38g size prostate. no concern at present. MIHIR today smooth no nodule or asymmetry. he voids beautifully with flomax once a day. assess psa again in april 2024 and f/u next year    Lab Results   Component Value Date    PSA 4.1 (H) 04/28/2023    PSA 5.7 (H) 01/18/2023    PSA 5.9 (H) 10/07/2022           History of Present Illness  Nj Pimentel Jr. is a 79 y.o. male here for evaluation of followup elevated psa    elevated psa's few years. MRI last year reassuring 38g no nodule pirads 2 low risk. no biopsies. due for MIHIR. no voiding complaints. PSA has stablized to 4.1 and is due in april for next labs.    No urinary complaints      Review of Systems   Constitutional: Negative.    Respiratory: Negative.     Cardiovascular: Negative.    Genitourinary:  Negative for decreased urine volume, difficulty urinating, dysuria, flank pain, frequency, hematuria and urgency.   Musculoskeletal: Negative.            AUA SYMPTOM SCORE      Flowsheet Row Most Recent Value   AUA SYMPTOM SCORE    How often have you had a sensation of not emptying your bladder completely after you finished urinating? 0   How often have you had to urinate again less than two hours after you finished urinating? 1   How often have you found you stopped and started again several times when you urinate? 2   How often have you found it difficult to postpone urination? 1   How often have you had a weak urinary stream? 0   How often have you had to push or strain to begin urination? 0   How many times did you most typically get up to urinate from the time you went to bed at night until the time you got up in the morning? 1   Quality of Life: If you were to spend the rest of your life with your  "urinary condition just the way it is now, how would you feel about that? 1   AUA SYMPTOM SCORE 5             Vitals  Vitals:    01/11/24 0940   BP: 124/68   BP Location: Left arm   Patient Position: Sitting   Cuff Size: Large   Pulse: 64   SpO2: 98%   Weight: 101 kg (223 lb 9.6 oz)   Height: 5' 10\" (1.778 m)       Physical Exam  Vitals and nursing note reviewed.   Constitutional:       General: He is not in acute distress.     Appearance: Normal appearance. He is well-developed. He is not diaphoretic.   HENT:      Head: Normocephalic and atraumatic.   Pulmonary:      Effort: Pulmonary effort is normal.      Comments: No cough or audible wheeze  Abdominal:      General: There is no distension.      Tenderness: There is no abdominal tenderness. There is no right CVA tenderness or left CVA tenderness.   Genitourinary:     Comments: Circumcised penis, normal phallus, orthotopic patent meatus.  Testes smooth descended bilaterally into the scrotum nontender with no palpable mass.  Digital rectal exam smooth prostate, without appreciable nodule, induration or asymmetry  Musculoskeletal:      Right lower leg: No edema.      Left lower leg: No edema.   Skin:     General: Skin is warm and dry.   Neurological:      Mental Status: He is alert and oriented to person, place, and time.      Gait: Gait normal.   Psychiatric:         Speech: Speech normal.         Behavior: Behavior normal.           Past History  Past Medical History:   Diagnosis Date    Anemia     iron def    Arthritis     Cerebrovascular accident (CVA) ([facility]) 01/07/2020    memory issues    GERD (gastroesophageal reflux disease)     Gout     Hypertension     Insomnia      Social History     Socioeconomic History    Marital status:      Spouse name: None    Number of children: 1    Years of education: None    Highest education level: None   Occupational History    Occupation: Retired     Comment: working full time   Tobacco Use    Smoking status: Former     " Current packs/day: 0.00     Average packs/day: 1 pack/day for 22.0 years (22.0 ttl pk-yrs)     Types: Cigarettes     Start date:      Quit date: 1985     Years since quittin.0    Smokeless tobacco: Never   Vaping Use    Vaping status: Never Used   Substance and Sexual Activity    Alcohol use: Not Currently     Comment: stopped drinking alcohol; AA x23yrs sober    Drug use: Never    Sexual activity: Not Currently     Partners: Female   Other Topics Concern    None   Social History Narrative    , lives alone, working full time     Social Determinants of Health     Financial Resource Strain: Low Risk  (2024)    Overall Financial Resource Strain (CARDIA)     Difficulty of Paying Living Expenses: Not very hard   Food Insecurity: Not on file   Transportation Needs: No Transportation Needs (2024)    PRAPARE - Transportation     Lack of Transportation (Medical): No     Lack of Transportation (Non-Medical): No   Physical Activity: Not on file   Stress: Not on file   Social Connections: Not on file   Intimate Partner Violence: Not on file   Housing Stability: Not on file     Social History     Tobacco Use   Smoking Status Former    Current packs/day: 0.00    Average packs/day: 1 pack/day for 22.0 years (22.0 ttl pk-yrs)    Types: Cigarettes    Start date:     Quit date: 1985    Years since quittin.0   Smokeless Tobacco Never     Family History   Problem Relation Age of Onset    Alzheimer's disease Mother     Alzheimer's disease Father     Heart disease Father         CAD    Other Father         prediabetes    Diabetes Father     Breast cancer Other     Arthritis Family     Hypertension Family        The following portions of the patient's history were reviewed and updated as appropriate: allergies, current medications, past medical history, past social history, past surgical history and problem list.    Results  No results found for this or any previous visit (from the past 1  hour(s)).]  Lab Results   Component Value Date    PSA 4.1 (H) 04/28/2023    PSA 5.7 (H) 01/18/2023    PSA 5.9 (H) 10/07/2022    PSA 5.0 (H) 05/05/2022     Lab Results   Component Value Date    GLUCOSE 112 05/14/2015    CALCIUM 9.3 01/08/2024     05/14/2015    K 5.1 01/08/2024    CO2 30 01/08/2024     01/08/2024    BUN 24 01/08/2024    CREATININE 1.52 (H) 01/08/2024     Lab Results   Component Value Date    WBC 7.77 01/08/2024    HGB 13.5 01/08/2024    HCT 41.1 01/08/2024    MCV 97 01/08/2024     01/08/2024

## 2024-01-15 DIAGNOSIS — I10 PRIMARY HYPERTENSION: Chronic | ICD-10-CM

## 2024-01-15 DIAGNOSIS — F32.1 CURRENT MODERATE EPISODE OF MAJOR DEPRESSIVE DISORDER WITHOUT PRIOR EPISODE (HCC): ICD-10-CM

## 2024-01-15 DIAGNOSIS — I25.10 CORONARY ARTERY DISEASE INVOLVING NATIVE CORONARY ARTERY OF NATIVE HEART WITHOUT ANGINA PECTORIS: ICD-10-CM

## 2024-01-15 DIAGNOSIS — I73.9 PERIPHERAL ARTERY DISEASE (HCC): ICD-10-CM

## 2024-01-15 DIAGNOSIS — I73.9 PAD (PERIPHERAL ARTERY DISEASE) (HCC): ICD-10-CM

## 2024-01-15 DIAGNOSIS — F32.A DEPRESSION: ICD-10-CM

## 2024-01-15 RX ORDER — ESCITALOPRAM OXALATE 20 MG/1
20 TABLET ORAL DAILY
Qty: 90 TABLET | Refills: 2 | Status: SHIPPED | OUTPATIENT
Start: 2024-01-15 | End: 2024-02-14

## 2024-01-15 RX ORDER — GABAPENTIN 400 MG/1
400 CAPSULE ORAL 3 TIMES DAILY
Qty: 270 CAPSULE | Refills: 1 | Status: SHIPPED | OUTPATIENT
Start: 2024-01-15

## 2024-01-15 RX ORDER — TRAZODONE HYDROCHLORIDE 100 MG/1
100 TABLET ORAL
Qty: 90 TABLET | Refills: 0 | Status: SHIPPED | OUTPATIENT
Start: 2024-01-15

## 2024-01-15 RX ORDER — CLOPIDOGREL BISULFATE 75 MG/1
75 TABLET ORAL DAILY
Qty: 90 TABLET | Refills: 2 | Status: SHIPPED | OUTPATIENT
Start: 2024-01-15

## 2024-01-15 RX ORDER — LISINOPRIL 10 MG/1
10 TABLET ORAL DAILY
Qty: 90 TABLET | Refills: 1 | Status: SHIPPED | OUTPATIENT
Start: 2024-01-15

## 2024-01-15 RX ORDER — METOPROLOL SUCCINATE 25 MG/1
25 TABLET, EXTENDED RELEASE ORAL DAILY
Qty: 90 TABLET | Refills: 2 | Status: SHIPPED | OUTPATIENT
Start: 2024-01-15

## 2024-01-15 NOTE — TELEPHONE ENCOUNTER
Please transfer all medications to [facility].    Needs refilled now:      traZODone (DESYREL) 100 mg tablet   metoprolol succinate (TOPROL-XL) 25 mg 24 hr tablet     lisinopril (ZESTRIL) 10 mg tablet       clopidogrel (PLAVIX) 75 mg tablet   escitalopram (LEXAPRO) 20 mg tablet ()     gabapentin (Neurontin) 400 mg capsule

## 2024-01-18 ENCOUNTER — TELEPHONE (OUTPATIENT)
Dept: FAMILY MEDICINE CLINIC | Facility: HOSPITAL | Age: 79
End: 2024-01-18

## 2024-01-18 DIAGNOSIS — R97.20 ELEVATED PSA: ICD-10-CM

## 2024-01-18 DIAGNOSIS — R35.0 URINARY FREQUENCY: ICD-10-CM

## 2024-01-18 RX ORDER — FINASTERIDE 5 MG/1
5 TABLET, FILM COATED ORAL DAILY
Qty: 90 TABLET | Refills: 1 | Status: SHIPPED | OUTPATIENT
Start: 2024-01-18

## 2024-01-18 NOTE — TELEPHONE ENCOUNTER
Reason for call:   [x] Refill   [] Prior Auth  [] Other:     Office:   [] PCP/Provider -   [x] Specialty/Provider - Sr     Medication: Finasteride     Dose/Frequency: 5mg     Quantity: 90    Pharmacy: [facility]     Does the patient have enough for 3 days?   [x] Yes   [] No - Send as HP to POD

## 2024-01-18 NOTE — TELEPHONE ENCOUNTER
Patient called the RX Refill Line. Message is being forwarded to the office.     Patient is requesting an appointment to come in and go over his active medications to make sure he is taking everything right and as prescribed.     Please contact patient at [phone]

## 2024-01-25 ENCOUNTER — TELEPHONE (OUTPATIENT)
Dept: PAIN MEDICINE | Facility: CLINIC | Age: 79
End: 2024-01-25

## 2024-01-25 ENCOUNTER — APPOINTMENT (OUTPATIENT)
Dept: RADIOLOGY | Facility: CLINIC | Age: 79
End: 2024-01-25
Payer: COMMERCIAL

## 2024-01-25 ENCOUNTER — CONSULT (OUTPATIENT)
Dept: PAIN MEDICINE | Facility: CLINIC | Age: 79
End: 2024-01-25
Payer: COMMERCIAL

## 2024-01-25 VITALS
SYSTOLIC BLOOD PRESSURE: 106 MMHG | HEART RATE: 57 BPM | HEIGHT: 70 IN | WEIGHT: 224 LBS | DIASTOLIC BLOOD PRESSURE: 60 MMHG | BODY MASS INDEX: 32.07 KG/M2 | TEMPERATURE: 97.6 F

## 2024-01-25 DIAGNOSIS — M25.552 LEFT HIP PAIN: ICD-10-CM

## 2024-01-25 DIAGNOSIS — M54.16 LUMBAR RADICULOPATHY: ICD-10-CM

## 2024-01-25 DIAGNOSIS — Z79.01 CHRONIC ANTICOAGULATION: Primary | ICD-10-CM

## 2024-01-25 PROCEDURE — 99204 OFFICE O/P NEW MOD 45 MIN: CPT | Performed by: ANESTHESIOLOGY

## 2024-01-25 PROCEDURE — 73502 X-RAY EXAM HIP UNI 2-3 VIEWS: CPT

## 2024-01-25 RX ORDER — PREDNISONE 10 MG/1
TABLET ORAL
Qty: 36 TABLET | Refills: 0 | Status: SHIPPED | OUTPATIENT
Start: 2024-01-25

## 2024-01-25 NOTE — TELEPHONE ENCOUNTER
----- Message from Solis Zamora DO sent at 1/25/2024 10:14 AM EST -----  Minimum degenerative of hip, move forward with MRI as discussed-most likely need to call daughter as well

## 2024-01-25 NOTE — PROGRESS NOTES
Assessment  1. Chronic anticoagulation    2. Left hip pain - Left    3. Lumbar radiculopathy - Left        Plan      At this point the patient's pain persists despite time, relative rest, activity modification, and nonsteroidal anti-inflammatories. His pain is significantly interfering with his daily living activities. It is appropriate to order MRI of the lumbar spine and left hip x-ray to rule out any significant etiology of his symptoms.    I will start him on a titrating dose of oral prednisone to address any inflammatory component of the patient's pain. He understands he should not take nonsteroidal anti-inflammatories until he is finished with this steroid. He understands there is a chance of decreased immunity secondary to steroids. If he has any problems or questions he will give us a call.    Initial home start a course of physical therapy for stabilization and strengthening as well as Apolonia approach, prescription was provided.    Once we obtain MRI and radiographic results, I will follow-up with the patient, review the results and current symptoms, and discuss the next steps of the treatment plan.    My impressions and treatment recommendations were discussed in detail with the patient who verbalized understanding and had no further questions.  Discharge instructions were provided. I personally saw and examined the patient and I agree with the above discussed plan of care.  This note is created using dictation transcription.  It may contain typographical errors, grammatical errors, improperly dictated words, background noise and other errors.    Orders Placed This Encounter   Procedures    XR hip/pelv 2-3 vws left if performed     Standing Status:   Future     Number of Occurrences:   1     Standing Expiration Date:   1/25/2028     Scheduling Instructions:      Bring along any outside films relating to this procedure.          MRI lumbar spine without contrast     Standing Status:   Future     Standing  Expiration Date:   1/25/2028     Scheduling Instructions:      There is no preparation for this test. Please leave your jewelry and valuables at home, wedding rings are the exception. All patients will be required to change into a hospital gown and pants.  Street clothes are not permitted in the MRI.  Magnetic nail polish must be removed prior to arrival for your test. Please bring your insurance cards, a form of photo ID and a list of your medications with you. Arrive 15 minutes prior to your appointment time in order to register. Please bring any prior CT or MRI studies of this area that were not performed at a [facility].            To schedule this appointment, please contact Central Scheduling at [phone].            Prior to your appointment, please make sure you complete the MRI Screening Form when you e-Check in for your appointment. This will be available starting 7 days before your appointment in [employer]. You may receive an e-mail with an activation code if you do not have a [employer] account. If you do not have access to a device, we will complete your screening at your appointment.     Order Specific Question:   What is the patient's sedation requirement? If Medication for Claustrophobia is selected, order medication at this point.     Answer:   No Sedation     Order Specific Question:   Does this procedure require the 3T MRI at [place] or [place]?     Answer:   No     Order Specific Question:   Release to patient through [facility]     Answer:   Immediate     Order Specific Question:   Is order priority selected as STAT?     Answer:   No     Order Specific Question:   Reason for Exam (FREE TEXT)     Answer:   left leg pain    Ambulatory referral to Physical Therapy     Standing Status:   Future     Standing Expiration Date:   1/25/2025     Referral Priority:   Routine     Referral Type:   Physical Therapy     Referral Reason:   Specialty Services Required     Requested Specialty:   Physical Therapy      Number of Visits Requested:   1     Expiration Date:   1/25/2025     New Medications Ordered This Visit   Medications    predniSONE 10 mg tablet     Sig: Take according to titration schedule     Dispense:  36 tablet     Refill:  0     Referred By: Dona Camp DO  History of Present Illness    Nj Pimentel Jr. is a 79 y.o. male with 5-month history of severe left-sided low back and leg pain.  He is unaware of any clear precipitant event denies any trauma or injury.  His pain is moderate to severe he rates it a 6 out of 10 on the visual analog scale significant impairing with his daily living activities.  His pain is constant described as shooting and sharp with subjective weakness of his lower limbs.  He reports that standing bending and walking all aggravate his symptoms while lying down reduces his pain.    I have personally reviewed and/or updated the patient's past medical history, past surgical history, family history, social history, current medications, allergies, and vital signs today.     Review of Systems   Constitutional:  Positive for unexpected weight change. Negative for fever.   HENT:  Negative for trouble swallowing.    Eyes:  Negative for visual disturbance.   Respiratory:  Negative for shortness of breath and wheezing.    Cardiovascular:  Negative for chest pain and palpitations.   Gastrointestinal:  Negative for constipation, diarrhea, nausea and vomiting.   Endocrine: Negative for cold intolerance, heat intolerance and polydipsia.   Genitourinary:  Negative for difficulty urinating and frequency.   Musculoskeletal:  Positive for myalgias. Negative for arthralgias, gait problem and joint swelling.   Skin:  Negative for rash.   Neurological:  Negative for dizziness, seizures, syncope, weakness and headaches.   Hematological:  Does not bruise/bleed easily.   Psychiatric/Behavioral:  Positive for dysphoric mood.    All other systems reviewed and are negative.      Patient Active Problem  List   Diagnosis    Tubular adenoma of colon    Carpal tunnel syndrome    Dyslipidemia    Gout    Hypertension    Impaired fasting glucose    Insomnia    Iron deficiency anemia due to chronic blood loss    Osteoarthritis    Prolonged QT interval    Rhinitis    Other disorder of calcium metabolism    Nephrolithiasis    Elevated PSA    Obesity (BMI 30.0-34.9)    Aortic valve stenosis    S/P carotid endarterectomy    Coronary artery disease involving native coronary artery    Carotid stenosis, asymptomatic, bilateral    Stage 3b chronic kidney disease (HCC)    Rambo lesion, chronic    Paraesophageal hernia    Atrial flutter, unspecified type (HCC)    Recurrent major depressive disorder, in partial remission (HCC)    Tylenol ingestion    PAD (peripheral artery disease) (HCC)    Platelets decreased (HCC)    Aortoiliac occlusive disease (HCC)    Idiopathic peripheral neuropathy    Cognitive impairment    Memory loss    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HCC)    Chronic bilateral low back pain without sciatica    Cervical spinal stenosis       Past Medical History:   Diagnosis Date    Alcoholism (HCC)     Anemia     iron def    Anxiety     Arthritis     Cerebrovascular accident (CVA) (HCC) 01/07/2020    memory issues    GERD (gastroesophageal reflux disease)     Gout     High cholesterol     Hypertension     Insomnia     Stroke (HCC)        Past Surgical History:   Procedure Laterality Date    ANKLE SURGERY Right     CARDIAC CATHETERIZATION      no findings    COLONOSCOPY  01/25/2013    polypectomy; complete - 3 yrs d/t polyp - benign submucosal lipoma- path c/w lipoma    COLONOSCOPY  04/25/2017    complete - tubular adenoma - repeat 5yrs    CYSTOSCOPY      CYSTOTOMY      bladder  w/ basket extraction of calculus    FEMUR FRACTURE SURGERY      HERNIA REPAIR      inguinal ; sliding    INGUINAL HERNIA REPAIR Right     unilateral    IR LOWER EXTREMITY ANGIOGRAM  4/24/2023    KNEE ARTHROSCOPY Right     w/medial menisectomy    LASIK       corneal    LITHOTRIPSY      renal    AK COLONOSCOPY FLX DX W/COLLJ SPEC WHEN PFRMD N/A 2017    Procedure: SCREENING COLONOSCOPY;  Surgeon: Paul Jacome MD;  Location: [facility];  Service: General    AK SLCTV CATHJ 3RD+ ORD SLCTV ABDL PEL/LXTR BRNCH Right 2023    Procedure: ARTERIOGRAM;  Surgeon: Alivia Blum MD;  Location: [facility];  Service: Vascular    AK TEAEC W/PATCH GRF CAROTID VERTB SUBCLAV NECK INC Right 01/10/2020    Procedure: ENDARTERECTOMY ARTERY CAROTID;  Surgeon: Aaron Smith MD;  Location: [facility];  Service: Vascular    AK TEAEC W/WO PATCH GRAFT COMMON FEMORAL Right 2023    Procedure: ENDARTERECTOMY ARTERIAL FEMORAL, RETROGRADE ILIAC INTERVENTION;  Surgeon: Alivia Blum MD;  Location: [facility];  Service: Vascular    ROTATOR CUFF REPAIR Bilateral     TONSILLECTOMY         Family History   Problem Relation Age of Onset    Alzheimer's disease Mother     Alzheimer's disease Father     Heart disease Father         CAD    Other Father         prediabetes    Diabetes Father     Breast cancer Other     Arthritis Family     Hypertension Family        Social History     Occupational History    Occupation: Retired     Comment: working full time   Tobacco Use    Smoking status: Former     Current packs/day: 0.00     Average packs/day: 1 pack/day for 22.0 years (22.0 ttl pk-yrs)     Types: Cigarettes     Start date:      Quit date: 1985     Years since quittin.0    Smokeless tobacco: Never   Vaping Use    Vaping status: Never Used   Substance and Sexual Activity    Alcohol use: Not Currently     Comment: stopped drinking alcohol; AA x23yrs sober    Drug use: Never    Sexual activity: Not Currently     Partners: Female       Current Outpatient Medications on File Prior to Visit   Medication Sig    Ascorbic Acid (vitamin C) 1000 MG tablet Take 1,000 mg by mouth daily    aspirin (ECOTRIN LOW STRENGTH) 81 mg EC tablet Take 81 mg by mouth every other day 1 every other day     atorvastatin (LIPITOR) 40 mg tablet take 1 tablet by mouth every evening    calcium polycarbophil (FIBERCON) 625 mg tablet Take 625 mg by mouth daily    clopidogrel (PLAVIX) 75 mg tablet Take 1 tablet (75 mg total) by mouth daily    Doxylamine Succinate, Sleep, (UNISOM PO) Take 1 tablet by mouth daily at bedtime    escitalopram (LEXAPRO) 20 mg tablet Take 1 tablet (20 mg total) by mouth daily    ferrous sulfate 324 (65 Fe) mg Take 1 tablet (324 mg total) by mouth daily before breakfast    finasteride (PROSCAR) 5 mg tablet Take 1 tablet (5 mg total) by mouth daily    gabapentin (Neurontin) 400 mg capsule Take 1 capsule (400 mg total) by mouth 3 (three) times a day    lisinopril (ZESTRIL) 10 mg tablet Take 1 tablet (10 mg total) by mouth daily    metoprolol succinate (TOPROL-XL) 25 mg 24 hr tablet Take 1 tablet (25 mg total) by mouth daily    Misc Natural Products (GLUCOSAMINE CHOND CMP DOUBLE PO) Take 1 tablet by mouth in the morning    Multiple Vitamin (MULTIVITAMIN) capsule Take 1 capsule by mouth daily    omeprazole (PriLOSEC) 20 mg delayed release capsule take 1 capsule by mouth once daily    Potassium Citrate ER 15 MEQ (1620 MG) TBCR Take 2 tablets by mouth 2 (two) times a day    tamsulosin (FLOMAX) 0.4 mg Take 1 capsule (0.4 mg total) by mouth daily with dinner    traZODone (DESYREL) 100 mg tablet Take 1 tablet (100 mg total) by mouth daily at bedtime    calcium polycarbophil (FIBERCON) 625 mg tablet Take 625 mg by mouth daily (Patient not taking: Reported on 7/18/2023)    Maxidex 0.1 % ophthalmic suspension instill 1 drop into both eyes twice a day for 2 weeks then 1 drop into both eyes once daily (Patient not taking: Reported on 11/22/2023)    Santyl ointment 1 application. in the morning Apply thin layer to affected area (Patient not taking: Reported on 8/7/2023)     No current facility-administered medications on file prior to visit.       No Known Allergies    Physical Exam    /60 (BP Location: Left  "arm, Patient Position: Sitting, Cuff Size: Standard)   Pulse 57   Temp 97.6 °F (36.4 °C)   Ht 5' 10\" (1.778 m)   Wt 102 kg (224 lb)   BMI 32.14 kg/m²     Constitutional: normal, well developed, well nourished, alert, in no distress and non-toxic and no overt pain behavior. and obese  Eyes: anicteric  HEENT: grossly intact  Neck: supple, symmetric, trachea midline and no masses   Pulmonary:even and unlabored  Cardiovascular:No edema or pitting edema present  Skin:Normal without rashes or lesions and well hydrated  Psychiatric:Mood and affect appropriate  Neurologic:Cranial Nerves II-XII grossly intact  Musculoskeletal:normal, difficulty going from sitting to standing sitting position; no obvious skin lesions or erythema lumbar sacral spine; mild tenderness in lumbar paravertebrals, no sacroiliac or greater trochanteric tenderness bilateral; deep tendon reflexes are diminished but symmetrical bilateral patellar and achilles; no focal motor deficit appreciated lower limbs; negative bilateral straight leg raising.  Hip range of motion on internal and external rotation without pain.    Imaging  LUMBAR SPINE @ 1-8-24     INDICATION:   Low back pain, unspecified. Other chronic pain.      COMPARISON: None available     VIEWS:  XR SPINE LUMBAR MINIMUM 4 VIEWS NON INJURY  Images: 5     FINDINGS:     Transitional lumbosacral vertebra is present. There are 4 non rib bearing lumbar vertebrae with sacralization of the L5 vertebra bilaterally.      There is no evidence of acute fracture or destructive osseous lesion.     Grade I spondylolithesis L4 on L5 and L5 on S1.      Multilevel advanced disc space narrowing and facet arthropathy most severe at L4-5 and L5-S1.     The pedicles appear intact.     There are atherosclerotic calcifications. Soft tissues are otherwise unremarkable.     IMPRESSION:        No acute osseous abnormality.       Degenerative changes as described.      I have personally reviewed pertinent films in " PACS and my interpretation is lumbar spondylosis.

## 2024-01-25 NOTE — TELEPHONE ENCOUNTER
S/w pt, advised of above. Pt verbalized understanding and appreciation. Offered to contact the pt's daughter as well. Per pt, yes - please call his daughter.     S/w Indigo, advised of above. Indigo verbalized understanding and appreciation. Will proceed as discussed.

## 2024-02-05 ENCOUNTER — EVALUATION (OUTPATIENT)
Dept: PHYSICAL THERAPY | Facility: CLINIC | Age: 79
End: 2024-02-05
Payer: COMMERCIAL

## 2024-02-05 DIAGNOSIS — M54.16 LUMBAR RADICULOPATHY: ICD-10-CM

## 2024-02-05 DIAGNOSIS — M25.552 LEFT HIP PAIN: Primary | ICD-10-CM

## 2024-02-05 PROCEDURE — 97162 PT EVAL MOD COMPLEX 30 MIN: CPT | Performed by: PHYSICAL THERAPIST

## 2024-02-05 NOTE — PROGRESS NOTES
PT Evaluation     Today's date: 2024  Patient name: Nj Pimentel Jr.  : 1945  MRN: [id number]  Referring provider: Solis Zamora DO  Dx:   Encounter Diagnosis     ICD-10-CM    1. Left hip pain - Left  M25.552 Ambulatory referral to Physical Therapy      2. Lumbar radiculopathy - Left  M54.16 Ambulatory referral to Physical Therapy                     Assessment  Assessment details: Pt is a 79 y.o. year old male coming to outpatient PT with a diagnosis of L lumbar radiculopathy and L hip pain onset 4 months ago. Pt presents with increased pain and TTP, decreased ROM, decreased L LE strength, and overall decreased functional mobility. Pt would benefit from skilled PT services in order to address these deficits and reach maximum level of function. Thank you kindly for the referral!    Pt was issued a written HEP to be performed on a daily basis.   Impairments: abnormal gait, abnormal or restricted ROM, activity intolerance, impaired physical strength, lacks appropriate home exercise program, pain with function and weight-bearing intolerance  Understanding of Dx/Px/POC: good   Prognosis: fair    Goals  STG's ( 3-4 weeks)  1. Pt will be independent in HEP  2. Pt will have improved lumbar ROM to [facility]'s  LTG's ( 6- 8 weeks)  1. Improve FOTO score by 6-8 points  2. Pt will have decreased pain to 3/10 at worst  3. Pt will have improved L LE strength in hip flexion, abduction and extension by 1/2 grade  4. Pt will have less pain when taking a few steps after standing up  5. Pt will be able to go up and down the steps with greater ease    Plan  Patient would benefit from: skilled physical therapy  Planned modality interventions: TENS  Planned therapy interventions: joint mobilization, manual therapy, neuromuscular re-education, strengthening, stretching, therapeutic activities, therapeutic exercise, functional ROM exercises, flexibility and home exercise program  Frequency: 2x week  Duration in weeks: 6  Plan of  Care beginning date: 2024  Plan of Care expiration date: 3/18/2024  Treatment plan discussed with: PTA and patient        Subjective Evaluation    History of Present Illness  Mechanism of injury: Pt reports low back pain and L hip pain onset about 4 months ago 2* unknown etiology. Pt has fallen 2 times 2* high pain levels in his hip/groin region. X-ray shows mild L hip OA and multi level degenerative changes in lumbar spine.  Pt has increased pain and difficulty getting up off the floor. Pt is able to walk and stand with minimal increase in symptoms. Pt has increased L LE pain when taking the first several steps after sitting. Pt denies pain with sitting and sleeping. Pt has increased pain going up a flight of steps.    Work; part time  at [employer]- 3 days per week  Hobbies: AA   Gait; slow speed, mild antalgic   Patient Goals  Patient goals for therapy: decreased pain and independence with ADLs/IADLs  Patient goal: to be able to go up and down the steps better  Pain  At best pain ratin  At worst pain ratin  Location: lumbar  Quality: sharp    Social Support  Steps to enter house: yes (2)  Stairs in house: yes (12)     Employment status: working    Diagnostic Tests  X-ray: abnormal  Treatments  No previous or current treatments        Objective     Neurological Testing     Sensation     Lumbar   Left   Intact: light touch    Right   Intact: light touch    Reflexes   Left   Patellar (L4): normal (2+)  Achilles (S1): normal (2+)    Right   Patellar (L4): normal (2+)  Achilles (S1): normal (2+)    Active Range of Motion     Lumbar   Flexion: 85 degrees  with pain  Extension: 25 degrees   Left lateral flexion: 15 degrees     Right lateral flexion: 25 degrees   Left rotation:  with pain Restriction level: minimal  Right rotation:  with pain Restriction level: moderate    Additional Active Range of Motion Details  In standing: symmetrical iliac crest and PSIS level  (+) TTP L lumbar psp with  increased muscle tone  (+) hypomobility with PAIVM testing  (-) SKTC  HS flexibilty; R: 85*  L: 75* with opposite knee flexed  (+) piriformis tightness L LE  (-) slump test    Strength/Myotome Testing     Left Hip   Planes of Motion   Flexion: 4-  Extension: 3  Abduction: 3  External rotation: 4+  Internal rotation: 4+    Right Hip   Planes of Motion   Flexion: 4  Extension: 3+  Abduction: 5  External rotation: 4+  Internal rotation: 4+    Left Knee   Flexion: 5  Extension: 5    Right Knee   Flexion: 5  Extension: 5    Left Ankle/Foot   Dorsiflexion: 5    Right Ankle/Foot   Dorsiflexion: 5        Dx: L lumbar radiculopathy; L hip pain  EPOC: 3/18/24  CO-MORBIDITIES:   PERSONAL FACTORS:   Precautions: none      Manuals 2/5            Lumbar MFR             Lumbar PA/UPA             L hip PROM/MFR                          Neuro Re-Ed             DLS: abd bracing instruct            claluisana             Bird dog- stand at table             bridges                                                    Ther Ex             Bike for LE ROM             HS stretch             Piriformis stretch             LTR instruct            SKTC instruct            Prone on elbows             Standing trunk extension                                                    Ther Activity             Step up FW L LE            Step up lat L LE            Gait Training                                       Modalities

## 2024-02-08 ENCOUNTER — OFFICE VISIT (OUTPATIENT)
Dept: PHYSICAL THERAPY | Facility: CLINIC | Age: 79
End: 2024-02-08
Payer: COMMERCIAL

## 2024-02-08 DIAGNOSIS — M54.16 LUMBAR RADICULOPATHY: ICD-10-CM

## 2024-02-08 DIAGNOSIS — M25.552 LEFT HIP PAIN: Primary | ICD-10-CM

## 2024-02-08 PROCEDURE — 97530 THERAPEUTIC ACTIVITIES: CPT

## 2024-02-08 PROCEDURE — 97110 THERAPEUTIC EXERCISES: CPT

## 2024-02-08 PROCEDURE — 97140 MANUAL THERAPY 1/> REGIONS: CPT

## 2024-02-08 NOTE — PROGRESS NOTES
"Daily Note     Today's date: 2024  Patient name: Nj Pimentel Jr.  : 1945  MRN: [id number]  Referring provider: Solis Zamora DO  Dx:   Encounter Diagnosis     ICD-10-CM    1. Left hip pain - Left  M25.552       2. Lumbar radiculopathy - Left  M54.16           Start Time: 0830  Stop Time: 915  Total time in clinic (min): 45 minutes    Subjective: First session post IE. Flakito denies any low back or L hip pain upon arrival to PT. Reports noncompliance with HEP since IE.       Objective: See treatment diary below      Assessment: Flakito tolerated treatment well today. Demonstrated LE and lumbar muscle tightness and weakness. Required vc's t/o session for proper positioning with ex's. Flakito exhibited good technique with therapeutic exercises and would benefit from continued PT and compliance with HEP      Plan: Continue per plan of care.        Manuals            Lumbar MFR  AAW           Lumbar PA/UPA             L hip PROM/MFR  AAW                        Neuro Re-Ed             DLS: abd bracing instruct            lianne Gates dog- stand at table             bridges                                                    Ther Ex             Bike for LE ROM  X6 min            HS stretch  20\"x3           Piriformis stretch  20\"x3 ea           LTR instruct 10\" x 5 ea            SKTC instruct 20x3 ea            Prone on elbows  10\"x 10           Standing trunk extension  X 10                                                   Ther Activity             Step up FW L LE 2x10            Step up lat L LE 2x10            Gait Training                                       Modalities                                              "

## 2024-02-12 ENCOUNTER — OFFICE VISIT (OUTPATIENT)
Dept: PHYSICAL THERAPY | Facility: CLINIC | Age: 79
End: 2024-02-12
Payer: COMMERCIAL

## 2024-02-12 DIAGNOSIS — M25.552 LEFT HIP PAIN: Primary | ICD-10-CM

## 2024-02-12 DIAGNOSIS — M54.16 LUMBAR RADICULOPATHY: ICD-10-CM

## 2024-02-12 PROCEDURE — 97140 MANUAL THERAPY 1/> REGIONS: CPT

## 2024-02-12 PROCEDURE — 97110 THERAPEUTIC EXERCISES: CPT

## 2024-02-12 NOTE — PROGRESS NOTES
"Daily Note     Today's date: 2024  Patient name: Nj Pimentel Jr.  : 1945  MRN: [id number]  Referring provider: Solis Zamora DO  Dx:   Encounter Diagnosis     ICD-10-CM    1. Left hip pain - Left  M25.552       2. Lumbar radiculopathy - Left  M54.16                      Subjective: Pt reports having LBP from the L down into lat thigh and groin.  Pt reports if he turns to quick turns he loses his balance.  Pt states going up steps is difficult.      Objective: See treatment diary below      Assessment: Tolerated treatment fair due to increased antalgic gait. Patient given trial of SI MET for pain relief.  Pt had difficulty w/ step up w/ c/o's weakness add pain.  PT would benefit from cont'd PT to work on pain relief , postural and LE strengthening.      Plan: Continue per plan of care.  Progress treatment as tolerated.       Manuals           Lumbar MFR  AAW JK          Lumbar PA/UPA   TH          L hip PROM/MFR  AAW JK          SI MET w/ shotgun   JK             20'          Neuro Re-Ed             DLS: abd bracing instruct            lianne             Bird dog- stand at table             bridges   10x5\"                                                 Ther Ex             Bike for LE ROM  X6 min  X6 min           HS stretch  20\"x3 manual          Piriformis stretch  20\"x3 ea manual          LTR instruct 10\" x 5 ea  10\" x 5 ea           SKTC instruct 20x3 ea  10\" x 5 ea           Prone on elbows  10\"x 10 W/ manuals          Standing trunk extension  X 10  10x5\"                                                 Ther Activity             Step up FW L LE 2x10  2 p!          Step up lat L LE 2x10            Gait Training                                       Modalities                                                "

## 2024-02-15 ENCOUNTER — HOSPITAL ENCOUNTER (OUTPATIENT)
Dept: NON INVASIVE DIAGNOSTICS | Age: 79
Discharge: HOME | End: 2024-02-15
Payer: COMMERCIAL

## 2024-02-15 ENCOUNTER — HOSPITAL ENCOUNTER (OUTPATIENT)
Dept: MRI IMAGING | Facility: HOSPITAL | Age: 79
End: 2024-02-15
Attending: ANESTHESIOLOGY
Payer: COMMERCIAL

## 2024-02-15 DIAGNOSIS — I73.9 PAD (PERIPHERAL ARTERY DISEASE) (HCC): ICD-10-CM

## 2024-02-15 DIAGNOSIS — M54.16 LUMBAR RADICULOPATHY: ICD-10-CM

## 2024-02-15 DIAGNOSIS — I65.23 CAROTID STENOSIS, ASYMPTOMATIC, BILATERAL: ICD-10-CM

## 2024-02-15 DIAGNOSIS — R41.3 MEMORY LOSS: ICD-10-CM

## 2024-02-15 PROCEDURE — 93880 EXTRACRANIAL BILAT STUDY: CPT | Performed by: SURGERY

## 2024-02-15 PROCEDURE — 93978 VASCULAR STUDY: CPT | Performed by: SURGERY

## 2024-02-15 PROCEDURE — 93880 EXTRACRANIAL BILAT STUDY: CPT

## 2024-02-15 PROCEDURE — 93923 UPR/LXTR ART STDY 3+ LVLS: CPT

## 2024-02-15 PROCEDURE — 93922 UPR/L XTREMITY ART 2 LEVELS: CPT | Performed by: SURGERY

## 2024-02-15 PROCEDURE — 72148 MRI LUMBAR SPINE W/O DYE: CPT

## 2024-02-15 PROCEDURE — 93925 LOWER EXTREMITY STUDY: CPT | Performed by: SURGERY

## 2024-02-15 PROCEDURE — 93925 LOWER EXTREMITY STUDY: CPT

## 2024-02-15 PROCEDURE — 93978 VASCULAR STUDY: CPT

## 2024-02-19 ENCOUNTER — OFFICE VISIT (OUTPATIENT)
Dept: PHYSICAL THERAPY | Facility: CLINIC | Age: 79
End: 2024-02-19
Payer: COMMERCIAL

## 2024-02-19 DIAGNOSIS — M54.16 LUMBAR RADICULOPATHY: ICD-10-CM

## 2024-02-19 DIAGNOSIS — M25.552 LEFT HIP PAIN: Primary | ICD-10-CM

## 2024-02-19 PROCEDURE — 97112 NEUROMUSCULAR REEDUCATION: CPT | Performed by: PHYSICAL THERAPIST

## 2024-02-19 PROCEDURE — 97014 ELECTRIC STIMULATION THERAPY: CPT | Performed by: PHYSICAL THERAPIST

## 2024-02-19 PROCEDURE — 97140 MANUAL THERAPY 1/> REGIONS: CPT | Performed by: PHYSICAL THERAPIST

## 2024-02-19 PROCEDURE — 97110 THERAPEUTIC EXERCISES: CPT | Performed by: PHYSICAL THERAPIST

## 2024-02-19 NOTE — PROGRESS NOTES
"Daily Note     Today's date: 2024  Patient name: Nj Pimentel Jr.  : 1945  MRN: [id number]  Referring provider: Solis Zamora DO  Dx:   Encounter Diagnosis     ICD-10-CM    1. Left hip pain - Left  M25.552       2. Lumbar radiculopathy - Left  M54.16                      Subjective: Pt reports increased L LE pain when transferring sit to stand. Pain rated 8/10. Pt had an MRI last week, but is not sure of the results.      Objective: See treatment diary below      Assessment: Tolerated treatment well. Patient exhibited good technique with therapeutic exercises. Added supine nerve sliders and TENS tx for pain relief.      Plan: Continue per plan of care. Focus on decreasing pain.          Dx: L lumbar radiculopathy; L hip pain  EPOC: 3/18/24  CO-MORBIDITIES:   PERSONAL FACTORS:   Precautions: none     Manuals 2/         Lumbar MFR  AAW JK 10'         Lumbar PA/UPA   TH 5'         L hip PROM/MFR  AAW JK          SI MET w/ shotgun   JK             20' 15'         Neuro Re-Ed             DLS: abd bracing instruct   T/o         clamshells    10 x3\"         Bird dog- stand at table    10         bridges   10x5\" 10x5\"                                                Ther Ex             Bike for LE ROM  X6 min  X6 min  hold         HS stretch  20\"x3 manual 20\"x2         Piriformis stretch  20\"x3 ea manual 20\"x3         LTR instruct 10\" x 5 ea  10\" x 5 ea  10\"x5         SKTC instruct 20x3 ea  10\" x 5 ea           Prone on elbows  10\"x 10 W/ manuals          Standing trunk extension  X 10  10x5\" x10         Supine sciatic n slider w/ ankle pump    10 pumps x3                                   Ther Activity             Step up FW L LE 2x10  2 p!          Step up lat L LE 2x10            Gait Training                                       Modalities             TENs in s/l    10'                                 "

## 2024-02-22 ENCOUNTER — OFFICE VISIT (OUTPATIENT)
Dept: PHYSICAL THERAPY | Facility: CLINIC | Age: 79
End: 2024-02-22
Payer: COMMERCIAL

## 2024-02-22 DIAGNOSIS — M54.16 LUMBAR RADICULOPATHY: ICD-10-CM

## 2024-02-22 DIAGNOSIS — M25.552 LEFT HIP PAIN: Primary | ICD-10-CM

## 2024-02-22 PROCEDURE — 97014 ELECTRIC STIMULATION THERAPY: CPT

## 2024-02-22 PROCEDURE — 97140 MANUAL THERAPY 1/> REGIONS: CPT

## 2024-02-22 PROCEDURE — 97110 THERAPEUTIC EXERCISES: CPT

## 2024-02-22 NOTE — PROGRESS NOTES
"Daily Note     Today's date: 2024  Patient name: Nj Pimentel Jr.  : 1945  MRN: [id number]  Referring provider: Solis Zamora DO  Dx:   Encounter Diagnosis     ICD-10-CM    1. Left hip pain - Left  M25.552       2. Lumbar radiculopathy - Left  M54.16                      Subjective: Pt reports he feels fine post TX however, next day and later in the day pain comes back.       Objective: See treatment diary below      Assessment: Tolerated treatment well. Patient exhibited good technique with therapeutic exercises. Pt continues to respond well to manuals, introduced MHP in a seated position with TENs unit.       Plan: Continue per plan of care. Focus on decreasing pain.          Dx: L lumbar radiculopathy; L hip pain  EPOC: 3/18/24  CO-MORBIDITIES:   PERSONAL FACTORS:   Precautions: none     Manuals 2/        Lumbar MFR  AAW JK 10' wdc 15'        Lumbar PA/UPA   TH 5' TH 5;        L hip PROM/MFR  AAW JK          SI MET w/ shotgun   JK                       Total Time:   20' 15' 20'                     Neuro Re-Ed             DLS: abd bracing instruct   T/o T/o        clamshells    10 x3\" 10x3\"        Bird dog- stand at table    10 10        bridges   10x5\" 10x5\" 10x5                                               Ther Ex             Bike for LE ROM  X6 min  X6 min  hold         HS stretch  20\"x3 manual 20\"x2         Piriformis stretch  20\"x3 ea manual 20\"x3         LTR instruct 10\" x 5 ea  10\" x 5 ea  10\"x5 10\" 5        SKTC instruct 20x3 ea  10\" x 5 ea           Prone on elbows  10\"x 10 W/ manuals          Standing trunk extension  X 10  10x5\" x10 x10        Supine sciatic n slider w/ ankle pump    10 pumps x3 10 pump x3                                  Ther Activity             Step up FW L LE 2x10  2 p!          Step up lat L LE 2x10            Gait Training                                       Modalities             TENs in s/l    10' 10' +MHP seated                              "

## 2024-02-23 ENCOUNTER — TELEPHONE (OUTPATIENT)
Dept: PAIN MEDICINE | Facility: CLINIC | Age: 79
End: 2024-02-23

## 2024-02-23 NOTE — TELEPHONE ENCOUNTER
Please schedule left L4 and left L5 TFESI x 2 then follow-up with NP, patient is on Plavix, diagnosis lumbar foraminal stenosis, lumbar radiculitis

## 2024-02-23 NOTE — TELEPHONE ENCOUNTER
This patient is being considered for a neuraxial injection, such as an epidural steroid injection, nerve root block,etc, for the management of their pain. However, the patient is currently on an anticoagulant (Plavix) per your orders. The [employer] Society [employer] Guideline on neuraxial procedures and anti-coagulation indicates that medication should be held as follows: Plavix to be held 7 days prior to epidural steroid injection.    Dr. Camp do you give permission for our mutual patient to hold Plavix for 7 days prior to epidural steroid injection?    Thank you,  LINO Nathan

## 2024-02-23 NOTE — TELEPHONE ENCOUNTER
S/W pt and advised pt LD of Plavix to be 2/28, hold 2/29 until after procedure on 3/7.  Nurse reviewed pre procedure instructions with pt and cx'd 2/29 OVS with MMG as it was to review MRI findings.  Pt verbalized understanding and appreciative of call.

## 2024-02-23 NOTE — TELEPHONE ENCOUNTER
PRE OP INSTRUCTIONS:           Hold Plavix x 7 full days prior, last dose on 2/28/24 for procedure on 3/7/24         Patient advised to hold medication from Date till their appointment at that time instructions to restart will be given.          Patient stated verbal understanding. Aware that nursing will call to review hold dates as well.      -If you are on prescription blood thinners, you may have to hold the medication for several days before the procedure.    Please call the office to discuss medication holds at [phone].    -Do not eat or drink ONE HOUR prior to your procedure. If you are diabetic, may follow regular breakfast/lunch schedule and take usual    diabetic medications.  -Lumbar( low back) procedure, please wear comfortable slacks/pants.  -Cervical (neck) procedure, please wear a shirt/blouse that is easy to remove.  -A  is required to take you home form your procedure.  -Continue all to take prescribed medication the day of your procedure, including blood pressure medications.  -If you are prescribe antibiotics, have an active infection or have an open wound, please contact the office at [phone].  -Please refrain from any vaccinations two weeks before and two weeks after injection.  -Insurance authorization received in not a guarantee of payment per your insurance company's authorization disclaimer and it is    your responsibility to verify your benefits.          -If you have any questions about the instructions, please call me at [phone]          -PATIENT INSTRUCTED TO STOP ALL NSAID'S 4 DAYS PRIOR TO PROCEDURE            EXCEPT FOR IBUPROFEN IT CAN BE TAKEN UP TO 24 HOURS PRIOR TO PROCEDURE.

## 2024-02-23 NOTE — TELEPHONE ENCOUNTER
S/W pt, pt reports his pain is only on his left side. Pain in his left groin, down the back of his left leg down to his left foot.  Pt is on Plavix and made aware he would need to hold it prior to procedure.  Pt's PCP is ordering doctor.  Pt made aware nurse to reach out to PCP to get permission to hold plavix, once SL places order and plavix hold approved SPA procedure  to call pt to schedule.  Pt verbalized understanding and appreciative of call.

## 2024-02-23 NOTE — TELEPHONE ENCOUNTER
Caller: Nj    Doctor: Noah    Reason for call: Pt returning RN call     Call back#: [phone]

## 2024-02-23 NOTE — TELEPHONE ENCOUNTER
----- Message from Solis Zamora DO sent at 2/22/2024  3:37 PM EST -----  MRI of the lumbar spine reveal Moderate to severe left foraminal narrowing is present at L4-L5 with impingement of the exiting nerve root.    Is their pain unilateral versus bilateral?  If so right versus left?  Pain isolated to low back verses leg.    I would consider TFESI.

## 2024-02-24 ENCOUNTER — NURSE TRIAGE (OUTPATIENT)
Dept: OTHER | Facility: OTHER | Age: 79
End: 2024-02-24

## 2024-02-24 NOTE — TELEPHONE ENCOUNTER
"Reason for Disposition  • Health Information question, no triage required and triager able to answer question    Answer Assessment - Initial Assessment Questions  1. REASON FOR CALL or QUESTION: \"What is your reason for calling today?\" or \"How can I best help you?\" or \"What question do you have that I can help answer?\"      Pt calling asking when to take last dose of Plavix. Rn advised to stop taking Plavix on 2/28 as ordered by physician. Last dose of Plavix will be 2/27. Pt verbalized understanding.    Protocols used: Information Only Call - No Triage-ADULT-    "

## 2024-02-24 NOTE — TELEPHONE ENCOUNTER
"Regarding: medication questions  ----- Message from Sabine Hale sent at 2/24/2024  9:18 AM EST -----  Pt stated \" I have an appt with spine and pain on 3/7 and was told to stop taking the plavix on the 28th because I'm suppose to get a serious of shots. I wanted to know if I should still do that\"    "

## 2024-02-26 ENCOUNTER — OFFICE VISIT (OUTPATIENT)
Dept: PHYSICAL THERAPY | Facility: CLINIC | Age: 79
End: 2024-02-26
Payer: COMMERCIAL

## 2024-02-26 DIAGNOSIS — M25.552 LEFT HIP PAIN: Primary | ICD-10-CM

## 2024-02-26 DIAGNOSIS — M54.16 LUMBAR RADICULOPATHY: ICD-10-CM

## 2024-02-26 PROCEDURE — 97110 THERAPEUTIC EXERCISES: CPT

## 2024-02-26 PROCEDURE — 97140 MANUAL THERAPY 1/> REGIONS: CPT | Performed by: PHYSICAL THERAPIST

## 2024-02-26 NOTE — PROGRESS NOTES
"Daily Note     Today's date: 2024  Patient name: Nj Pimentel Jr.  : 1945  MRN: [id number]  Referring provider: Solis Zamora DO  Dx:   Encounter Diagnosis     ICD-10-CM    1. Left hip pain - Left  M25.552       2. Lumbar radiculopathy - Left  M54.16                      Subjective: Pt reports only getitng a couple hours of relief.     Objective: See treatment diary below      Assessment: Tolerated treatment well. Patient exhibited good technique with therapeutic exercises. Pt continues to respond well to manuals, and MHP in a seated position with TENs unit; however, only temporarily relief.       Plan: Hold PT, until pt gets his injection, fu PT  for a re-eval          Dx: L lumbar radiculopathy; L hip pain  EPOC: 3/18/24  CO-MORBIDITIES:   PERSONAL FACTORS:   Precautions: none     Manuals        Lumbar MFR  AAW JK 10' wdc 15'        Lumbar PA/UPA   TH  5; PF 10'       L hip PROM/MFR  AAW JK          SI MET w/ shotgun   JK                       Total Time:   20' 15' 20' 10'                    Neuro Re-Ed             DLS: abd bracing instruct   T/o T/o T/;o       clamshells    10 x3\" 10x3\" 10x3\"        Bird dog- stand at table    10 10 10       bridges   10x5\" 10x5\" 10x5 15x5                                              Ther Ex             Bike for LE ROM  X6 min  X6 min  hold         HS stretch  20\"x3 manual 20\"x2         Piriformis stretch  20\"x3 ea manual 20\"x3         LTR instruct 10\" x 5 ea  10\" x 5 ea  10\"x5 10\" 5 10\" 5       SKTC instruct 20x3 ea  10\" x 5 ea           Prone on elbows  10\"x 10 W/ manuals          Standing trunk extension  X 10  10x5\" x10 x10 x10       Supine sciatic n slider w/ ankle pump    10 pumps x3 10 pump x3 10 pumps x3                                 Ther Activity             Step up FW L LE 2x10  2 p!          Step up lat L LE 2x10            Gait Training                                       Modalities             TENs in s/l    10' 10' " +MHP seated 10' +MHP seated

## 2024-02-26 NOTE — TELEPHONE ENCOUNTER
Caller: Flakito BRADFORD    Doctor: Dr Lopes     Reason for call: Returning the nurse call     Call back#: [phone]

## 2024-02-26 NOTE — TELEPHONE ENCOUNTER
[facility] Provided with [facility]# and OH.    LD Plavix 2/28/24. Pt to hold 2/29-3/7.

## 2024-02-29 ENCOUNTER — APPOINTMENT (OUTPATIENT)
Dept: PHYSICAL THERAPY | Facility: CLINIC | Age: 79
End: 2024-02-29
Payer: COMMERCIAL

## 2024-03-04 ENCOUNTER — TELEPHONE (OUTPATIENT)
Dept: PAIN MEDICINE | Facility: CLINIC | Age: 79
End: 2024-03-04

## 2024-03-04 ENCOUNTER — APPOINTMENT (OUTPATIENT)
Dept: PHYSICAL THERAPY | Facility: CLINIC | Age: 79
End: 2024-03-04
Payer: COMMERCIAL

## 2024-03-04 NOTE — TELEPHONE ENCOUNTER
S/w pt, questioning prednisone rx of 1/25 prior to procedure on 3/7. Advised pt, if he would like to proceed with the injection as scheduled he should not take the prednisone. Pt verbalized understanding.     Questioned anticoagulants. Pt stated that he takes Plavix but he does not recall the last time he took it - possibly weeks ago. Pt stated that he will confirm his anticoagulant and look into how long it has been since he took it. Pt stated that he cannot provide this info now - he is in a meeting. Advised pt to cb as this information is important in r/t the safety of the procedure. Pt verbalized understanding and appreciation.     Fu with pt.

## 2024-03-04 NOTE — TELEPHONE ENCOUNTER
Attempted to contact pt, left a detailed message on machine per medical communication consent on file advising - the writer would not recommend starting the 1/25/24 prescription for prednisone in light of the procedure scheduled this week on 3/7/24. Requested pt cb to discuss further. Provided cb number and office hours.

## 2024-03-04 NOTE — TELEPHONE ENCOUNTER
Caller: Flakito BRADFORD    Doctor/Office: Dr Zamora    Call regarding :  Medication      Call was transferred to: Nurse

## 2024-03-04 NOTE — TELEPHONE ENCOUNTER
Patient walked in with his prednisone and is requesting a call back regarding instructions on taking this prior to his procedure. Please advise.

## 2024-03-04 NOTE — TELEPHONE ENCOUNTER
Caller: patient    Doctor: shanika    Reason for call: patient stopped taking plavis on 2/23/24    Call back#:

## 2024-03-05 ENCOUNTER — TELEPHONE (OUTPATIENT)
Dept: FAMILY MEDICINE CLINIC | Facility: HOSPITAL | Age: 79
End: 2024-03-05

## 2024-03-05 NOTE — TELEPHONE ENCOUNTER
Responded to this already but YES he can con't to hold Plavix and have the procedure 3/7 as previously scheduled

## 2024-03-05 NOTE — TELEPHONE ENCOUNTER
Attempted to contact Dr. Lomeli's office. Left a detailed message on machine advising pt was instructed to hold plavix 2/29/24 - 3/7/24 post procedure however, the pt called the office yesterday and advised that he has been holding plavix since 2/23. Please advise: ok to continue the hold until 3/7 post procedure or resume the plavix now and reschedule. Provided cb number and office hours.

## 2024-03-05 NOTE — TELEPHONE ENCOUNTER
S/w pt, advised of above. Per pt, he believes he stopped the plavix earlier. Possibly on the 15th. Questioned the pt re: the message of yesterday when he stated that he stopped plavix on the 23rd. Pt stated that he is not sure - he has it written down on his calendar at home. He believes we should be good because he stopped the medication before he was told. Advised pt, instructions were provided on 2/23 to hold plavix from 2/29 - 3/7 post procedure. Holding plavix longer than that puts the pt at an incresed risk for developing a blood clot which could lead to very dangerous complications. Pt questioned if 1 day matters. Advised pt, if he is not sure - and he started his plavix hold on 3/1 - that puts the pt at risk of uncontrolled bleeding into his spine which could also lead to very dangerous complications. Pt stated that he believes he held the plavix starting on 2/28. Per pt, he has it written on his calendar at home. He will go home and double check and cb today to advise.

## 2024-03-07 ENCOUNTER — TELEPHONE (OUTPATIENT)
Dept: RADIOLOGY | Facility: CLINIC | Age: 79
End: 2024-03-07

## 2024-03-07 ENCOUNTER — APPOINTMENT (OUTPATIENT)
Dept: LAB | Facility: HOSPITAL | Age: 79
End: 2024-03-07

## 2024-03-07 ENCOUNTER — HOSPITAL ENCOUNTER (OUTPATIENT)
Dept: RADIOLOGY | Facility: CLINIC | Age: 79
Discharge: HOME | End: 2024-03-07
Payer: COMMERCIAL

## 2024-03-07 ENCOUNTER — APPOINTMENT (OUTPATIENT)
Dept: PHYSICAL THERAPY | Facility: CLINIC | Age: 79
End: 2024-03-07
Payer: COMMERCIAL

## 2024-03-07 ENCOUNTER — TELEPHONE (OUTPATIENT)
Dept: PAIN MEDICINE | Facility: CLINIC | Age: 79
End: 2024-03-07

## 2024-03-07 ENCOUNTER — OFFICE VISIT (OUTPATIENT)
Dept: FAMILY MEDICINE CLINIC | Facility: HOSPITAL | Age: 79
End: 2024-03-07
Payer: COMMERCIAL

## 2024-03-07 VITALS
DIASTOLIC BLOOD PRESSURE: 71 MMHG | HEART RATE: 50 BPM | SYSTOLIC BLOOD PRESSURE: 187 MMHG | OXYGEN SATURATION: 95 % | TEMPERATURE: 97.5 F | RESPIRATION RATE: 18 BRPM

## 2024-03-07 VITALS
HEIGHT: 70 IN | DIASTOLIC BLOOD PRESSURE: 78 MMHG | WEIGHT: 228.8 LBS | TEMPERATURE: 97.7 F | BODY MASS INDEX: 32.75 KG/M2 | SYSTOLIC BLOOD PRESSURE: 126 MMHG | HEART RATE: 72 BPM

## 2024-03-07 DIAGNOSIS — R41.3 MEMORY LOSS: ICD-10-CM

## 2024-03-07 DIAGNOSIS — R79.89 ELEVATED SERUM CREATININE: ICD-10-CM

## 2024-03-07 DIAGNOSIS — F33.41 RECURRENT MAJOR DEPRESSIVE DISORDER, IN PARTIAL REMISSION (HCC): Primary | ICD-10-CM

## 2024-03-07 DIAGNOSIS — G89.29 CHRONIC BILATERAL LOW BACK PAIN WITHOUT SCIATICA: ICD-10-CM

## 2024-03-07 DIAGNOSIS — M54.16 LUMBAR RADICULITIS: ICD-10-CM

## 2024-03-07 DIAGNOSIS — R79.89 AZOTEMIA: ICD-10-CM

## 2024-03-07 DIAGNOSIS — M48.061 LUMBAR FORAMINAL STENOSIS: ICD-10-CM

## 2024-03-07 DIAGNOSIS — I65.23 CAROTID STENOSIS, ASYMPTOMATIC, BILATERAL: ICD-10-CM

## 2024-03-07 DIAGNOSIS — F32.A DEPRESSION: ICD-10-CM

## 2024-03-07 DIAGNOSIS — I73.9 PAD (PERIPHERAL ARTERY DISEASE) (HCC): ICD-10-CM

## 2024-03-07 DIAGNOSIS — M54.50 CHRONIC BILATERAL LOW BACK PAIN WITHOUT SCIATICA: ICD-10-CM

## 2024-03-07 LAB
ANION GAP SERPL CALCULATED.3IONS-SCNC: 5 MMOL/L
BUN SERPL-MCNC: 25 MG/DL (ref 5–25)
CALCIUM SERPL-MCNC: 9.1 MG/DL (ref 8.4–10.2)
CHLORIDE SERPL-SCNC: 106 MMOL/L (ref 96–108)
CO2 SERPL-SCNC: 32 MMOL/L (ref 21–32)
CREAT SERPL-MCNC: 1.47 MG/DL (ref 0.6–1.3)
GFR SERPL CREATININE-BSD FRML MDRD: 44 ML/MIN/1.73SQ M
GLUCOSE P FAST SERPL-MCNC: 98 MG/DL (ref 65–99)
POTASSIUM SERPL-SCNC: 5.4 MMOL/L (ref 3.5–5.3)
SODIUM SERPL-SCNC: 143 MMOL/L (ref 135–147)

## 2024-03-07 PROCEDURE — 99214 OFFICE O/P EST MOD 30 MIN: CPT | Performed by: INTERNAL MEDICINE

## 2024-03-07 PROCEDURE — 64483 NJX AA&/STRD TFRM EPI L/S 1: CPT | Performed by: ANESTHESIOLOGY

## 2024-03-07 PROCEDURE — 80048 BASIC METABOLIC PNL TOTAL CA: CPT

## 2024-03-07 PROCEDURE — 64484 NJX AA&/STRD TFRM EPI L/S EA: CPT | Performed by: ANESTHESIOLOGY

## 2024-03-07 PROCEDURE — G2211 COMPLEX E/M VISIT ADD ON: HCPCS | Performed by: INTERNAL MEDICINE

## 2024-03-07 PROCEDURE — 36415 COLL VENOUS BLD VENIPUNCTURE: CPT

## 2024-03-07 RX ORDER — PAPAVERINE HCL 150 MG
15 CAPSULE, EXTENDED RELEASE ORAL ONCE
Status: COMPLETED | OUTPATIENT
Start: 2024-03-07 | End: 2024-03-07

## 2024-03-07 RX ORDER — TRAZODONE HYDROCHLORIDE 150 MG/1
150 TABLET ORAL
Qty: 30 TABLET | Refills: 2 | Status: SHIPPED | OUTPATIENT
Start: 2024-03-07

## 2024-03-07 RX ORDER — TRAZODONE HYDROCHLORIDE 150 MG/1
150 TABLET ORAL
Qty: 30 TABLET | Refills: 2 | Status: SHIPPED | OUTPATIENT
Start: 2024-03-07 | End: 2024-03-07 | Stop reason: SDUPTHER

## 2024-03-07 RX ADMIN — DEXAMETHASONE SODIUM PHOSPHATE 15 MG: 10 INJECTION, SOLUTION INTRAMUSCULAR; INTRAVENOUS at 13:42

## 2024-03-07 RX ADMIN — IOHEXOL 2 ML: 300 INJECTION, SOLUTION INTRAVENOUS at 13:42

## 2024-03-07 NOTE — ASSESSMENT & PLAN NOTE
Mood still up and down, pt again did not bring meds to appt but thinks  he is taking both the Lexapro and Trazodone, on max Lexapro will will increase Trazodone from 100 mg to 150 mg qhs,  d/w pt that it takes 4-6 wks to get maximum benefit of med and that med has to be taken every day and to not miss doses of med, call with SE/new/worse mood/SI, re-eval in 6- 8 wks or so or sooner if SE/new/worse mood occurs

## 2024-03-07 NOTE — ASSESSMENT & PLAN NOTE
Stable carotid stenosis noted, on ASA/Plavix and statin, to ED with stroke/TIA symptoms - reviewed with pt in detail

## 2024-03-07 NOTE — TELEPHONE ENCOUNTER
Called and LMOM for patient to see if he had any flexibility to come in earlier around 1:40?    Left number to call back to let us know

## 2024-03-07 NOTE — TELEPHONE ENCOUNTER
Aware and agree - pt verbalized understanding, no plavix 3/4 - 3/11 post procedure. Cb if questions / issues arise.

## 2024-03-07 NOTE — H&P
History of Present Illness: The patient is a 79 y.o. male who presents with complaints of low back and leg pain.    Past Medical History:   Diagnosis Date    Alcoholism (HCC)     Anemia     iron def    Anxiety     Arthritis     Cerebrovascular accident (CVA) (HCC) 01/07/2020    memory issues    Chronic kidney disease     Coronary artery disease     GERD (gastroesophageal reflux disease)     Gout     High cholesterol     Hypertension     Insomnia     Kidney stone     Stroke (HCC)        Past Surgical History:   Procedure Laterality Date    ANKLE SURGERY Right     CARDIAC CATHETERIZATION      no findings    COLONOSCOPY  01/25/2013    polypectomy; complete - 3 yrs d/t polyp - benign submucosal lipoma- path c/w lipoma    COLONOSCOPY  04/25/2017    complete - tubular adenoma - repeat 5yrs    CYSTOSCOPY      CYSTOTOMY      bladder  w/ basket extraction of calculus    FEMUR FRACTURE SURGERY      HERNIA REPAIR      inguinal ; sliding    INGUINAL HERNIA REPAIR Right     unilateral    IR LOWER EXTREMITY ANGIOGRAM  4/24/2023    KNEE ARTHROSCOPY Right     w/medial menisectomy    LASIK      corneal    LITHOTRIPSY      renal    AL COLONOSCOPY FLX DX W/COLLJ SPEC WHEN PFRMD N/A 04/25/2017    Procedure: SCREENING COLONOSCOPY;  Surgeon: Paul Jacome MD;  Location: [facility];  Service: General    AL SLCTV CATHJ 3RD+ ORD SLCTV ABDL PEL/LXTR BRNCH Right 4/24/2023    Procedure: ARTERIOGRAM;  Surgeon: Alivia Blum MD;  Location: [facility];  Service: Vascular    AL TEAEC W/PATCH GRF CAROTID VERTB SUBCLAV NECK INC Right 01/10/2020    Procedure: ENDARTERECTOMY ARTERY CAROTID;  Surgeon: Aaron Smith MD;  Location: [facility];  Service: Vascular    AL TEAEC W/WO PATCH GRAFT COMMON FEMORAL Right 4/24/2023    Procedure: ENDARTERECTOMY ARTERIAL FEMORAL, RETROGRADE ILIAC INTERVENTION;  Surgeon: Alivia Blum MD;  Location[facility];  Service: Vascular    ROTATOR CUFF REPAIR Bilateral     TONSILLECTOMY           Current Outpatient  Medications:     Ascorbic Acid (vitamin C) 1000 MG tablet, Take 1,000 mg by mouth daily, Disp: , Rfl:     aspirin (ECOTRIN LOW STRENGTH) 81 mg EC tablet, Take 81 mg by mouth every other day 1 every other day, Disp: , Rfl:     atorvastatin (LIPITOR) 40 mg tablet, take 1 tablet by mouth every evening, Disp: 90 tablet, Rfl: 3    calcium polycarbophil (FIBERCON) 625 mg tablet, Take 625 mg by mouth daily, Disp: , Rfl:     clopidogrel (PLAVIX) 75 mg tablet, Take 1 tablet (75 mg total) by mouth daily, Disp: 90 tablet, Rfl: 2    Doxylamine Succinate, Sleep, (UNISOM PO), Take 1 tablet by mouth daily at bedtime, Disp: , Rfl:     escitalopram (LEXAPRO) 20 mg tablet, Take 1 tablet (20 mg total) by mouth daily, Disp: 90 tablet, Rfl: 2    ferrous sulfate 324 (65 Fe) mg, Take 1 tablet (324 mg total) by mouth daily before breakfast, Disp: 30 tablet, Rfl: 2    finasteride (PROSCAR) 5 mg tablet, Take 1 tablet (5 mg total) by mouth daily, Disp: 90 tablet, Rfl: 1    gabapentin (Neurontin) 400 mg capsule, Take 1 capsule (400 mg total) by mouth 3 (three) times a day, Disp: 270 capsule, Rfl: 1    lisinopril (ZESTRIL) 10 mg tablet, Take 1 tablet (10 mg total) by mouth daily, Disp: 90 tablet, Rfl: 1    Maxidex 0.1 % ophthalmic suspension, instill 1 drop into both eyes twice a day for 2 weeks then 1 drop into both eyes once daily (Patient not taking: Reported on 11/22/2023), Disp: , Rfl:     metoprolol succinate (TOPROL-XL) 25 mg 24 hr tablet, Take 1 tablet (25 mg total) by mouth daily, Disp: 90 tablet, Rfl: 2    Misc Natural Products (GLUCOSAMINE CHOND CMP DOUBLE PO), Take 1 tablet by mouth in the morning, Disp: , Rfl:     Multiple Vitamin (MULTIVITAMIN) capsule, Take 1 capsule by mouth daily, Disp: , Rfl:     omeprazole (PriLOSEC) 20 mg delayed release capsule, take 1 capsule by mouth once daily, Disp: 90 capsule, Rfl: 2    Potassium Citrate ER 15 MEQ (1620 MG) TBCR, Take 2 tablets by mouth 2 (two) times a day, Disp: 360 tablet, Rfl: 3     Santyl ointment, 1 application. in the morning Apply thin layer to affected area (Patient not taking: Reported on 8/7/2023), Disp: , Rfl:     tamsulosin (FLOMAX) 0.4 mg, Take 1 capsule (0.4 mg total) by mouth daily with dinner, Disp: 90 capsule, Rfl: 1    traZODone (DESYREL) 150 mg tablet, Take 1 tablet (150 mg total) by mouth daily at bedtime, Disp: 30 tablet, Rfl: 2    Current Facility-Administered Medications:     dexamethasone (PF) (DECADRON) injection 15 mg, 15 mg, Epidural, Once, Solis Zamora DO    iohexol (OMNIPAQUE) 300 mg/mL injection 2 mL, 2 mL, Epidural, Once, Solis Zamora DO    No Known Allergies    Physical Exam:   Vitals:    03/07/24 1326   BP: 155/76   Pulse: (!) 47   Resp: 18   Temp: 97.5 °F (36.4 °C)   SpO2: 95%     General: Awake, Alert, Oriented x 3, Mood and affect appropriate  Respiratory: Respirations even and unlabored  Cardiovascular: Peripheral pulses intact; no edema  Musculoskeletal Exam: Decreased range of motion lumbar spine    ASA Score: III    Patient/Chart Verification  Patient ID Verified: Verbal  ID Band Applied: No  Consents Confirmed: Procedural, To be obtained in the Pre-Procedure area  Interval H&P(within 24 hr) Complete (required for Outpatients and Surgery Admit only): To be obtained in the Pre-Procedure area  Allergies Reviewed: Yes  Anticoag/NSAID held?: Yes (LD plavix 2/28/24, SL aware)  Currently on antibiotics?: No    Assessment:   1. Lumbar foraminal stenosis    2. Lumbar radiculitis        Plan: left L4 and left L5 TFESI [id number]

## 2024-03-07 NOTE — ASSESSMENT & PLAN NOTE
LEAD and abd/aorta US reviewed - stenosis stable, on ASA/Plavix and statin, con't to monitor as per vascular

## 2024-03-07 NOTE — ASSESSMENT & PLAN NOTE
Still with poor STM, MMSE done at last appt, memory labs and MRI of brain down - likely d/t vascular dz, urged keeping mentally active and controlling RF for further vascular events, will follow

## 2024-03-07 NOTE — ASSESSMENT & PLAN NOTE
Xray L hip/pelvis and MRI lumbar spine reviewed, pt had course of steroid after eval with Pain Mgt, going for TFESI today, has started seeing chiropractor as well, on Gabapentin, will follow, no changes in Gabapentin today

## 2024-03-07 NOTE — DISCHARGE INSTRUCTIONS
Epidural Steroid Injection   WHAT YOU NEED TO KNOW:   An epidural steroid injection (KALLIE) is a procedure to inject steroid medicine into the epidural space. The epidural space is between your spinal cord and vertebrae. Steroids reduce inflammation and fluid buildup in your spine that may be causing pain. You may be given pain medicine along with the steroids.          ACTIVITY  Do not drive or operate machinery today.  No strenuous activity today - bending, lifting, etc.  You may resume normal activites starting tomorrow - start slowly and as tolerated.  You may shower today, but no tub baths or hot tubs.  You may have numbness for several hours from the local anesthetic. Please use caution and common sense, especially with weight-bearing activities.    CARE OF THE INJECTION SITE  If you have soreness or pain, apply ice to the area today (20 minutes on/20 minutes off).  Starting tomorrow, you may use warm, moist heat or ice if needed.  You may have an increase or change in your discomfort for 36-48 hours after your treatment.  Apply ice and continue with any pain medication you have been prescribed.  Notify the [facility] if you have any of the following: redness, drainage, swelling, headache, stiff neck or fever above 100°F.    SPECIAL INSTRUCTIONS  Our office will contact you in approximately 7 days for a progress report.    MEDICATIONS  Continue to take all routine medications. Resume Plavix 3/8/24  Our office may have instructed you to hold some medications.    As no general anesthesia was used in today's procedure, you should not experience any side effects related to anesthesia.     If you are diabetic, the steroids used in today's injection may temporarily increase your blood sugar levels after the first few days after your injection. Please keep a close eye on your sugars and alert the doctor who manages your diabetes if your sugars are significantly high from your baseline or you are symptomatic.      If you have a problem specifically related to your procedure, please call our office at [phone].  Problems not related to your procedure should be directed to your primary care physician.

## 2024-03-07 NOTE — TELEPHONE ENCOUNTER
PRE OP INSTRUCTIONS:           Hold Plavix x 7 full days prior, last dose on 4/3/24 for procedure on 4/11 nurse in room when instructions given          Patient advised to hold medication from Date till their appointment at that time instructions to restart will be given.          Patient stated verbal understanding.  -If you are on prescription blood thinners, you may have to hold the medication for several days before the procedure.    Please call the office to discuss medication holds at [phone].    -Do not eat or drink ONE HOUR prior to your procedure. If you are diabetic, may follow regular breakfast/lunch schedule and take usual    diabetic medications.  -Lumbar( low back) procedure, please wear comfortable slacks/pants.  -Cervical (neck) procedure, please wear a shirt/blouse that is easy to remove.  -A  is required to take you home form your procedure.  -Continue all to take prescribed medication the day of your procedure, including blood pressure medications.  -If you are prescribe antibiotics, have an active infection or have an open wound, please contact the office at [phone].  -Please refrain from any vaccinations two weeks before and two weeks after injection.  -Insurance authorization received in not a guarantee of payment per your insurance company's authorization disclaimer and it is    your responsibility to verify your benefits.          -If you have any questions about the instructions, please call me at [phone]          -PATIENT INSTRUCTED TO STOP ALL NSAID'S 4 DAYS PRIOR TO PROCEDURE            EXCEPT FOR IBUPROFEN IT CAN BE TAKEN UP TO 24 HOURS PRIOR TO PROCEDURE.

## 2024-03-07 NOTE — PROGRESS NOTES
Name: Nj Pimentel Jr.      : 1945      MRN: [id number]  Encounter Provider: Dona Camp DO  Encounter Date: 3/7/2024   Encounter department: [facility] [address]     Assessment & Plan     1. Recurrent major depressive disorder, in partial remission (HCC)  Assessment & Plan:  Mood still up and down, pt again did not bring meds to appt but thinks  he is taking both the Lexapro and Trazodone, on max Lexapro will will increase Trazodone from 100 mg to 150 mg qhs,  d/w pt that it takes 4-6 wks to get maximum benefit of med and that med has to be taken every day and to not miss doses of med, call with SE/new/worse mood/SI, re-eval in 6- 8 wks or so or sooner if SE/new/worse mood occurs      Orders:  -     traZODone (DESYREL) 150 mg tablet; Take 1 tablet (150 mg total) by mouth daily at bedtime    2. Chronic bilateral low back pain without sciatica  Assessment & Plan:  Xray L hip/pelvis and MRI lumbar spine reviewed, pt had course of steroid after eval with Pain Mgt, going for TFESI today, has started seeing chiropractor as well, on Gabapentin, will follow, no changes in Gabapentin today      3. PAD (peripheral artery disease) (HCC)  Assessment & Plan:  LEAD and abd/aorta US reviewed - stenosis stable, on ASA/Plavix and statin, con't to monitor as per vascular      4. Carotid stenosis, asymptomatic, bilateral  Assessment & Plan:  Stable carotid stenosis noted, on ASA/Plavix and statin, to ED with stroke/TIA symptoms - reviewed with pt in detail      5. Memory loss  Assessment & Plan:  Still with poor STM, MMSE done at last appt, memory labs and MRI of brain down - likely d/t vascular dz, urged keeping mentally active and controlling RF for further vascular events, will follow      6. Depression    7. Elevated serum creatinine  Comments:  Never did repeat BMP - order given again today and urged to do, will follow, urged to keep hydrated        Depression Screening and Follow-up  "Plan: Patient was screened for depression during today's encounter. They screened negative with a PHQ-9 score of 4.    Falls Plan of Care: balance, strength, and gait training instructions were provided. Home safety education provided.     Colonoscopy 2/21 - 5 yrs    Echo 10/22    LEAD 2/24    CUS 2/24    BW 1/24        Subjective      HPI Pt here for follow up appt    Last visit in Jan pts depression was still not at goal.  We subsequently increased his Trazodone from 75 mg to 100 mg and encouraged him to con't his Lexapro.  Pt is her for mood/med check.  He thinks he is taking both meds as directed.  He con't to feel down and sad. He states his mind gets \"goofy\" at times.  He agrees his memory is poor.  He notes no stroke/TIA symptoms.      Pt saw Uro PA (Anisa Quinn) in Jan 24 for f/u elevated PSA - OV note reviewed.  PSA has come down and most recent PSA was 4.1 on 4/28/23.  Exam documented as normal. He was told to repeat PSA in April 24 and f/u in office in 1 yr.  He is on Proscar and Flomax. He notes continued urinary frequency and only notes rare incontinence related to urgency.      Pt saw Pain Mgt (Dr. Sánchez) in Jan 24 for eval of L hip and L radicular pain - OV note reviewed.  He had a MRI ordered and Xray of L hip to further eval symptoms.  Pt was given a steroid taper for symptoms.  He had imaging done and MRI showed degenerative changes of lumbar spine with moderate to severe L foraminal narrowing with impingement of the exiting nerve root at L4-5 as well as moderate multilevel foraminal narrowing (L2-3 and L4-5).  Xray of L hip pelvis showed very mild degenerative narrowing at the L hip. Pt started PT for his L hip and back pain in Feb.  He is scheduled for TFESI on 3/7/24. He has seen a chiropractor since our last visit and pt notes no great benefit.      Pt had his CUS and LEAD in Feb for f/u of vascular dz.  CUS stable with <50% stenosis L ICA and LEAD with diffuse diz of RLE and 50-75% stenosis " of CFA and <50% of SFA - no changes from previous study.  He is on Plavix and ASA and Atorvastatin.        Review of Systems   Constitutional:  Negative for chills and fever.   HENT:  Negative for congestion and sore throat.    Eyes:  Negative for pain and visual disturbance.   Respiratory:  Negative for cough and shortness of breath.    Cardiovascular:  Negative for chest pain, palpitations and leg swelling.   Gastrointestinal:  Negative for abdominal pain, constipation, diarrhea, nausea and vomiting.   Genitourinary:  Positive for frequency and urgency. Negative for dysuria.   Musculoskeletal:  Positive for arthralgias and back pain.   Skin:  Negative for rash and wound.   Neurological:  Negative for dizziness and headaches.   Hematological:  Does not bruise/bleed easily.   Psychiatric/Behavioral:  Positive for confusion and dysphoric mood.        Current Outpatient Medications on File Prior to Visit   Medication Sig    Ascorbic Acid (vitamin C) 1000 MG tablet Take 1,000 mg by mouth daily    aspirin (ECOTRIN LOW STRENGTH) 81 mg EC tablet Take 81 mg by mouth every other day 1 every other day    atorvastatin (LIPITOR) 40 mg tablet take 1 tablet by mouth every evening    calcium polycarbophil (FIBERCON) 625 mg tablet Take 625 mg by mouth daily    clopidogrel (PLAVIX) 75 mg tablet Take 1 tablet (75 mg total) by mouth daily    Doxylamine Succinate, Sleep, (UNISOM PO) Take 1 tablet by mouth daily at bedtime    escitalopram (LEXAPRO) 20 mg tablet Take 1 tablet (20 mg total) by mouth daily    ferrous sulfate 324 (65 Fe) mg Take 1 tablet (324 mg total) by mouth daily before breakfast    finasteride (PROSCAR) 5 mg tablet Take 1 tablet (5 mg total) by mouth daily    gabapentin (Neurontin) 400 mg capsule Take 1 capsule (400 mg total) by mouth 3 (three) times a day    lisinopril (ZESTRIL) 10 mg tablet Take 1 tablet (10 mg total) by mouth daily    Maxidex 0.1 % ophthalmic suspension instill 1 drop into both eyes twice a day for  "2 weeks then 1 drop into both eyes once daily (Patient not taking: Reported on 11/22/2023)    metoprolol succinate (TOPROL-XL) 25 mg 24 hr tablet Take 1 tablet (25 mg total) by mouth daily    Misc Natural Products (GLUCOSAMINE CHOND CMP DOUBLE PO) Take 1 tablet by mouth in the morning    Multiple Vitamin (MULTIVITAMIN) capsule Take 1 capsule by mouth daily    omeprazole (PriLOSEC) 20 mg delayed release capsule take 1 capsule by mouth once daily    Potassium Citrate ER 15 MEQ (1620 MG) TBCR Take 2 tablets by mouth 2 (two) times a day    Santyl ointment 1 application. in the morning Apply thin layer to affected area (Patient not taking: Reported on 8/7/2023)    tamsulosin (FLOMAX) 0.4 mg Take 1 capsule (0.4 mg total) by mouth daily with dinner    [DISCONTINUED] calcium polycarbophil (FIBERCON) 625 mg tablet Take 625 mg by mouth daily (Patient not taking: Reported on 7/18/2023)    [DISCONTINUED] predniSONE 10 mg tablet Take according to titration schedule    [DISCONTINUED] traZODone (DESYREL) 100 mg tablet Take 1 tablet (100 mg total) by mouth daily at bedtime       Objective     /78   Pulse 72   Temp 97.7 °F (36.5 °C)   Ht 5' 10\" (1.778 m)   Wt 104 kg (228 lb 12.8 oz)   BMI 32.83 kg/m²     Physical Exam  Vitals and nursing note reviewed.   Constitutional:       General: He is not in acute distress.     Appearance: He is well-developed. He is obese. He is not ill-appearing.   HENT:      Head: Normocephalic and atraumatic.   Eyes:      General:         Right eye: No discharge.         Left eye: No discharge.      Conjunctiva/sclera: Conjunctivae normal.   Neck:      Trachea: No tracheal deviation.   Cardiovascular:      Rate and Rhythm: Normal rate and regular rhythm.      Heart sounds: No murmur heard.  Pulmonary:      Effort: Pulmonary effort is normal. No respiratory distress.      Breath sounds: Normal breath sounds. No wheezing, rhonchi or rales.   Abdominal:      General: There is no distension.      " Palpations: Abdomen is soft.      Tenderness: There is no abdominal tenderness. There is no guarding or rebound.   Musculoskeletal:         General: No deformity or signs of injury.      Cervical back: Neck supple.   Skin:     General: Skin is warm and dry.      Coloration: Skin is not pale.      Findings: No rash.   Neurological:      General: No focal deficit present.      Mental Status: He is alert. Mental status is at baseline.      Motor: No abnormal muscle tone.      Gait: Gait normal.   Psychiatric:         Mood and Affect: Mood normal.         Behavior: Behavior normal.         Judgment: Judgment normal.      Comments: Poor STM noted       Dona Camp DO

## 2024-03-08 DIAGNOSIS — E87.5 HYPERKALEMIA: Primary | ICD-10-CM

## 2024-03-13 ENCOUNTER — TELEPHONE (OUTPATIENT)
Dept: FAMILY MEDICINE CLINIC | Facility: HOSPITAL | Age: 79
End: 2024-03-13

## 2024-03-13 NOTE — TELEPHONE ENCOUNTER
Informed patient of what the doctor said, read verbatim:  Please notify pt that his kidney tests were still elevated and now his potassium is high - decrease higher potassium foods (bananas/avocado/spinach/kale/tomatoes/sweet potatoes) and keep hydrated.  He needs to repeat BMP in 1 week - if still elevated will need to adjust medications as a high potassium level can cause heart problems.  Increase water intake and avoid all OTC NSAIDs such as Ibuprofen/Advil/Aleve/Motrin/Excedrin - ONLY TYLENOL IS OK TO USE.       Patient requesting call back as he says he does not eat any of those foods and only takes tylenol so he is confused.

## 2024-03-14 ENCOUNTER — APPOINTMENT (OUTPATIENT)
Dept: PHYSICAL THERAPY | Facility: CLINIC | Age: 79
End: 2024-03-14
Payer: COMMERCIAL

## 2024-03-14 ENCOUNTER — TELEPHONE (OUTPATIENT)
Dept: PAIN MEDICINE | Facility: CLINIC | Age: 79
End: 2024-03-14

## 2024-03-14 NOTE — TELEPHONE ENCOUNTER
Pt reports no improvement post inj   Pt aware I will call next week for an update    LT L4 and L5 TFESI 3/7, 4/11 TFESI

## 2024-03-15 ENCOUNTER — APPOINTMENT (OUTPATIENT)
Dept: PHYSICAL THERAPY | Facility: CLINIC | Age: 79
End: 2024-03-15
Payer: COMMERCIAL

## 2024-03-15 ENCOUNTER — OFFICE VISIT (OUTPATIENT)
Dept: URGENT CARE | Facility: CLINIC | Age: 79
End: 2024-03-15
Payer: COMMERCIAL

## 2024-03-15 ENCOUNTER — APPOINTMENT (OUTPATIENT)
Dept: LAB | Facility: HOSPITAL | Age: 79
End: 2024-03-15
Payer: COMMERCIAL

## 2024-03-15 VITALS
RESPIRATION RATE: 18 BRPM | SYSTOLIC BLOOD PRESSURE: 126 MMHG | BODY MASS INDEX: 31.62 KG/M2 | TEMPERATURE: 97.2 F | HEART RATE: 51 BPM | OXYGEN SATURATION: 96 % | DIASTOLIC BLOOD PRESSURE: 60 MMHG | WEIGHT: 220.4 LBS

## 2024-03-15 DIAGNOSIS — L73.9 FOLLICULITIS: Primary | ICD-10-CM

## 2024-03-15 DIAGNOSIS — E87.5 HYPERKALEMIA: ICD-10-CM

## 2024-03-15 LAB
ANION GAP SERPL CALCULATED.3IONS-SCNC: 9 MMOL/L (ref 4–13)
BUN SERPL-MCNC: 26 MG/DL (ref 5–25)
CALCIUM SERPL-MCNC: 9.1 MG/DL (ref 8.4–10.2)
CHLORIDE SERPL-SCNC: 107 MMOL/L (ref 96–108)
CO2 SERPL-SCNC: 27 MMOL/L (ref 21–32)
CREAT SERPL-MCNC: 1.43 MG/DL (ref 0.6–1.3)
GFR SERPL CREATININE-BSD FRML MDRD: 46 ML/MIN/1.73SQ M
GLUCOSE P FAST SERPL-MCNC: 116 MG/DL (ref 65–99)
POTASSIUM SERPL-SCNC: 5.1 MMOL/L (ref 3.5–5.3)
SODIUM SERPL-SCNC: 143 MMOL/L (ref 135–147)

## 2024-03-15 PROCEDURE — 99213 OFFICE O/P EST LOW 20 MIN: CPT | Performed by: FAMILY MEDICINE

## 2024-03-15 PROCEDURE — 80048 BASIC METABOLIC PNL TOTAL CA: CPT

## 2024-03-15 PROCEDURE — 36415 COLL VENOUS BLD VENIPUNCTURE: CPT

## 2024-03-15 NOTE — PROGRESS NOTES
[facility]        NAME: Nj Pimentel Jr. is a 79 y.o. male  : 1945    MRN: [id number]  DATE: March 15, 2024  TIME: 1:54 PM    Assessment and Plan   Folliculitis [L73.9]  1. Folliculitis              Patient Instructions       Follow up with PCP in 3-5 days.  Proceed to  ER if symptoms worsen.    If tests have been performed at [facility], our office will contact you with results if changes need to be made to the care plan discussed with you at the visit.  You can review your full results on [facility] MyChart.    Chief Complaint     Chief Complaint   Patient presents with    Nose Problem     Patient presents with complaints of feeling like there is something in the left side of his nose, states that this has been ongoing for about 3 weeks.         History of Present Illness       79-year-old male presenting with tenderness on the inside of his left nostril.  He reports noting redness and discharge from the inside of his nose.  Denies any fevers or chills.        Review of Systems   Review of Systems   Constitutional: Negative.    HENT: Negative.     Eyes: Negative.    Respiratory: Negative.     Cardiovascular: Negative.    Gastrointestinal: Negative.    Genitourinary: Negative.    Skin: Negative.    Allergic/Immunologic: Negative.    Neurological: Negative.    Hematological: Negative.    Psychiatric/Behavioral: Negative.           Current Medications       Current Outpatient Medications:     Ascorbic Acid (vitamin C) 1000 MG tablet, Take 1,000 mg by mouth daily, Disp: , Rfl:     aspirin (ECOTRIN LOW STRENGTH) 81 mg EC tablet, Take 81 mg by mouth every other day 1 every other day, Disp: , Rfl:     atorvastatin (LIPITOR) 40 mg tablet, take 1 tablet by mouth every evening, Disp: 90 tablet, Rfl: 3    calcium polycarbophil (FIBERCON) 625 mg tablet, Take 625 mg by mouth daily, Disp: , Rfl:     clopidogrel (PLAVIX) 75 mg tablet, Take 1 tablet (75 mg total) by mouth daily, Disp: 90 tablet, Rfl: 2     Doxylamine Succinate, Sleep, (UNISOM PO), Take 1 tablet by mouth daily at bedtime, Disp: , Rfl:     escitalopram (LEXAPRO) 20 mg tablet, Take 1 tablet (20 mg total) by mouth daily, Disp: 90 tablet, Rfl: 2    ferrous sulfate 324 (65 Fe) mg, Take 1 tablet (324 mg total) by mouth daily before breakfast, Disp: 30 tablet, Rfl: 2    finasteride (PROSCAR) 5 mg tablet, Take 1 tablet (5 mg total) by mouth daily, Disp: 90 tablet, Rfl: 1    gabapentin (Neurontin) 400 mg capsule, Take 1 capsule (400 mg total) by mouth 3 (three) times a day, Disp: 270 capsule, Rfl: 1    lisinopril (ZESTRIL) 10 mg tablet, Take 1 tablet (10 mg total) by mouth daily, Disp: 90 tablet, Rfl: 1    Maxidex 0.1 % ophthalmic suspension, instill 1 drop into both eyes twice a day for 2 weeks then 1 drop into both eyes once daily (Patient not taking: Reported on 11/22/2023), Disp: , Rfl:     metoprolol succinate (TOPROL-XL) 25 mg 24 hr tablet, Take 1 tablet (25 mg total) by mouth daily, Disp: 90 tablet, Rfl: 2    Misc Natural Products (GLUCOSAMINE CHOND CMP DOUBLE PO), Take 1 tablet by mouth in the morning, Disp: , Rfl:     Multiple Vitamin (MULTIVITAMIN) capsule, Take 1 capsule by mouth daily, Disp: , Rfl:     omeprazole (PriLOSEC) 20 mg delayed release capsule, take 1 capsule by mouth once daily, Disp: 90 capsule, Rfl: 2    Potassium Citrate ER 15 MEQ (1620 MG) TBCR, Take 2 tablets by mouth 2 (two) times a day, Disp: 360 tablet, Rfl: 3    Santyl ointment, 1 application. in the morning Apply thin layer to affected area (Patient not taking: Reported on 8/7/2023), Disp: , Rfl:     tamsulosin (FLOMAX) 0.4 mg, Take 1 capsule (0.4 mg total) by mouth daily with dinner, Disp: 90 capsule, Rfl: 1    traZODone (DESYREL) 150 mg tablet, Take 1 tablet (150 mg total) by mouth daily at bedtime, Disp: 30 tablet, Rfl: 2    Current Allergies     Allergies as of 03/15/2024    (No Known Allergies)            The following portions of the patient's history were reviewed and  updated as appropriate: allergies, current medications, past family history, past medical history, past social history, past surgical history and problem list.     Past Medical History:   Diagnosis Date    Alcoholism (HCC)     Anemia     iron def    Anxiety     Arthritis     Cerebrovascular accident (CVA) (HCC) 01/07/2020    memory issues    Chronic kidney disease     Coronary artery disease     GERD (gastroesophageal reflux disease)     Gout     High cholesterol     Hypertension     Insomnia     Kidney stone     Stroke (HCC)        Past Surgical History:   Procedure Laterality Date    ANKLE SURGERY Right     CARDIAC CATHETERIZATION      no findings    COLONOSCOPY  01/25/2013    polypectomy; complete - 3 yrs d/t polyp - benign submucosal lipoma- path c/w lipoma    COLONOSCOPY  04/25/2017    complete - tubular adenoma - repeat 5yrs    CYSTOSCOPY      CYSTOTOMY      bladder  w/ basket extraction of calculus    FEMUR FRACTURE SURGERY      HERNIA REPAIR      inguinal ; sliding    INGUINAL HERNIA REPAIR Right     unilateral    IR LOWER EXTREMITY ANGIOGRAM  4/24/2023    KNEE ARTHROSCOPY Right     w/medial menisectomy    LASIK      corneal    LITHOTRIPSY      renal    NH COLONOSCOPY FLX DX W/COLLJ SPEC WHEN PFRMD N/A 04/25/2017    Procedure: SCREENING COLONOSCOPY;  Surgeon: Paul Jacome MD;  Location: [facility];  Service: General    NH SLCTV CATHJ 3RD+ ORD SLCTV ABDL PEL/LXTR BRNCH Right 4/24/2023    Procedure: ARTERIOGRAM;  Surgeon: Alivia Blum MD;  [facility];  Service: Vascular    NH TEAEC W/PATCH GRF CAROTID VERTB SUBCLAV NECK INC Right 01/10/2020    Procedure: ENDARTERECTOMY ARTERY CAROTID;  Surgeon: Aaron Smith MD;  Location: [facility];  Service: Vascular    NH TEAEC W/WO PATCH GRAFT COMMON FEMORAL Right 4/24/2023    Procedure: ENDARTERECTOMY ARTERIAL FEMORAL, RETROGRADE ILIAC INTERVENTION;  Surgeon: Alivia Blum MD;  [facility];  Service: Vascular    ROTATOR CUFF REPAIR Bilateral      TONSILLECTOMY         Family History   Problem Relation Age of Onset    Alzheimer's disease Mother     Alzheimer's disease Father     Heart disease Father         CAD    Other Father         prediabetes    Diabetes Father     Breast cancer Other     Arthritis Family     Hypertension Family          Medications have been verified.        Objective   /60   Pulse (!) 51   Temp (!) 97.2 °F (36.2 °C) (Tympanic)   Resp 18   Wt 100 kg (220 lb 6.4 oz)   SpO2 96%   BMI 31.62 kg/m²   No LMP for male patient.       Physical Exam     Physical Exam  Constitutional:       Appearance: He is well-developed.   HENT:      Nose: Mucosal edema present.        Comments: Erythema/papule of left nostril   Eyes:      Pupils: Pupils are equal, round, and reactive to light.   Pulmonary:      Effort: Pulmonary effort is normal.   Musculoskeletal:         General: Normal range of motion.      Cervical back: Normal range of motion.   Skin:     General: Skin is warm.   Neurological:      Mental Status: He is alert.

## 2024-03-18 ENCOUNTER — APPOINTMENT (OUTPATIENT)
Dept: PHYSICAL THERAPY | Facility: CLINIC | Age: 79
End: 2024-03-18
Payer: COMMERCIAL

## 2024-03-20 ENCOUNTER — EVALUATION (OUTPATIENT)
Dept: PHYSICAL THERAPY | Facility: CLINIC | Age: 79
End: 2024-03-20
Payer: COMMERCIAL

## 2024-03-20 DIAGNOSIS — M25.552 LEFT HIP PAIN: Primary | ICD-10-CM

## 2024-03-20 DIAGNOSIS — M54.16 LUMBAR RADICULOPATHY: ICD-10-CM

## 2024-03-20 PROCEDURE — 97110 THERAPEUTIC EXERCISES: CPT | Performed by: PHYSICAL THERAPIST

## 2024-03-20 PROCEDURE — 97140 MANUAL THERAPY 1/> REGIONS: CPT | Performed by: PHYSICAL THERAPIST

## 2024-03-20 NOTE — PROGRESS NOTES
PT Re-Evaluation     Today's date: 3/20/2024  Patient name: Nj Pimentel Jr.  : 1945  MRN: [id number]  Referring provider: Solis Zamora DO  Dx:   Encounter Diagnosis     ICD-10-CM    1. Left hip pain - Left  M25.552       2. Lumbar radiculopathy - Left  M54.16                      Assessment  Assessment details: Since starting skilled PT, pain levels are decreased, lumbar ROM improved slightly, with gradual functional progress.   Pt will have another injection next week. Pt is ready for d/c to an independent HEP. HEP was reviewed. Please advise regarding continuing skilled PT.  Impairments: abnormal or restricted ROM, impaired physical strength and pain with function  Understanding of Dx/Px/POC: good   Prognosis: fair    Goals  STG's ( 3-4 weeks)  1. Pt will be independent in HEP- met  2. Pt will have improved lumbar ROM to WFL's- partial met  LTG's ( 6- 8 weeks)  1. Improve FOTO score by 6-8 points-met  2. Pt will have decreased pain to 3/10 at worst- not met  3. Pt will have improved L LE strength in hip flexion, abduction and extension by 1/2 grade- NT  4. Pt will have less pain when taking a few steps after standing up- met  5. Pt will be able to go up and down the steps with greater ease-met    Plan  Patient would benefit from: skilled physical therapy  Planned modality interventions: TENS  Planned therapy interventions: joint mobilization, manual therapy, neuromuscular re-education, strengthening, stretching, therapeutic activities, therapeutic exercise, functional ROM exercises, flexibility and home exercise program  Frequency: 2x week  Duration in weeks: 6  Plan of Care beginning date: 3/20/2024  Plan of Care expiration date: 2024  Treatment plan discussed with: PTA and patient      Subjective Evaluation    History of Present Illness  Mechanism of injury: I.E: Pt reports low back pain and L hip pain onset about 4 months ago 2* unknown etiology. Pt has fallen 2 times 2* high pain levels in his  hip/groin region. X-ray shows mild L hip OA and multi level degenerative changes in lumbar spine.  Pt has increased pain and difficulty getting up off the floor. Pt is able to walk and stand with minimal increase in symptoms. Pt has increased L LE pain when taking the first several steps after sitting. Pt denies pain with sitting and sleeping. Pt has increased pain going up a flight of steps.    3/20/24; Pt reports no significant relief from his first KALLIE, and will be going for a second injection next week. Pt gets relief from chiropractic treatment. Pt has less pain when going up and down the steps. Pt has less pain when taking the first several steps after sitting activities.     Work; part time  at [employer]- 3 days per week  Hobbies: AA   Gait; slow speed, mild antalgic   Patient Goals  Patient goals for therapy: decreased pain and independence with ADLs/IADLs  Patient goal: to be able to go up and down the steps better  Pain  At best pain ratin  At worst pain ratin  Location: lumbar  Quality: sharp    Social Support  Steps to enter house: yes (2)  Stairs in house: yes (12)     Employment status: working    Diagnostic Tests  X-ray: abnormal  Treatments  No previous or current treatments      Objective     Neurological Testing     Sensation     Lumbar   Left   Intact: light touch    Right   Intact: light touch    Reflexes   Left   Patellar (L4): normal (2+)  Achilles (S1): normal (2+)    Right   Patellar (L4): normal (2+)  Achilles (S1): normal (2+)    Active Range of Motion     Lumbar   Flexion: 80 degrees   Extension: 30 degrees   Left lateral flexion: 25 degrees     Right lateral flexion: 32 degrees   Left rotation:  with pain Restriction level: minimal  Right rotation:  with pain Restriction level: moderate    Additional Active Range of Motion Details  I.E; In standing: symmetrical iliac crest and PSIS level  (+) TTP L lumbar psp with increased muscle tone  (+) hypomobility with  "PAIVM testing  (-) SKTC  HS flexibilty; R: 85*  L: 75* with opposite knee flexed  (+) piriformis tightness L LE  (-) slump test    Strength/Myotome Testing     Left Hip   Planes of Motion   Flexion: 4-  Extension: 3  Abduction: 3  External rotation: 4+  Internal rotation: 4+    Right Hip   Planes of Motion   Flexion: 4  Extension: 3+  Abduction: 5  External rotation: 4+  Internal rotation: 4+    Left Knee   Flexion: 5  Extension: 5    Right Knee   Flexion: 5  Extension: 5    Left Ankle/Foot   Dorsiflexion: 5    Right Ankle/Foot   Dorsiflexion: 5        Dx: L lumbar radiculopathy; L hip pain  EPOC: 3/18/24  CO-MORBIDITIES:   PERSONAL FACTORS:   Precautions: none     Manuals 2/5 2/8 2/12 2/19 2/22 2/26 3/20      Lumbar MFR  AAW JK 10' wdc 15'        Lumbar PA/UPA   TH 5' TH 5; PF 10'       L hip PROM/MFR  AAW JK          Progress note   JK    20'                   Total Time:   20' 15' 20' 10'                    Neuro Re-Ed             DLS: abd bracing instruct   T/o T/o T/;o       clamshells    10 x3\" 10x3\" 10x3\"        Bird dog- stand at table    10 10 10       bridges   10x5\" 10x5\" 10x5 15x5                                              Ther Ex             Bike for LE ROM  X6 min  X6 min  hold         HS stretch  20\"x3 manual 20\"x2         Piriformis stretch  20\"x3 ea manual 20\"x3         LTR instruct 10\" x 5 ea  10\" x 5 ea  10\"x5 10\" 5 10\" 5       SKTC instruct 20x3 ea  10\" x 5 ea           Prone on elbows  10\"x 10 W/ manuals          Standing trunk extension  X 10  10x5\" x10 x10 x10       Supine sciatic n slider w/ ankle pump    10 pumps x3 10 pump x3 10 pumps x3       HEP review/ instruct       5'      Body mechanics educ       5'      Ther Activity             Step up FW L LE 2x10  2 p!          Step up lat L LE 2x10            Gait Training                                       Modalities                          TENs in s/l    10' 10' +MHP seated 10' +MHP seated                               "

## 2024-04-03 ENCOUNTER — APPOINTMENT (OUTPATIENT)
Dept: LAB | Facility: HOSPITAL | Age: 79
End: 2024-04-03
Payer: COMMERCIAL

## 2024-04-03 DIAGNOSIS — N18.32 STAGE 3B CHRONIC KIDNEY DISEASE (HCC): ICD-10-CM

## 2024-04-03 DIAGNOSIS — I73.9 PAD (PERIPHERAL ARTERY DISEASE) (HCC): ICD-10-CM

## 2024-04-03 DIAGNOSIS — R97.20 ELEVATED PSA: ICD-10-CM

## 2024-04-03 PROBLEM — N18.9 CHRONIC KIDNEY DISEASE-MINERAL AND BONE DISORDER (CKD-MBD): Status: ACTIVE | Noted: 2024-04-03

## 2024-04-03 PROBLEM — M89.9 CHRONIC KIDNEY DISEASE-MINERAL AND BONE DISORDER (CKD-MBD): Status: ACTIVE | Noted: 2024-04-03

## 2024-04-03 PROBLEM — N18.31 STAGE 3A CHRONIC KIDNEY DISEASE (HCC): Status: ACTIVE | Noted: 2024-04-03

## 2024-04-03 PROBLEM — E83.9 CHRONIC KIDNEY DISEASE-MINERAL AND BONE DISORDER (CKD-MBD): Status: ACTIVE | Noted: 2024-04-03

## 2024-04-03 LAB
ANION GAP SERPL CALCULATED.3IONS-SCNC: 10 MMOL/L (ref 4–13)
BACTERIA UR QL AUTO: ABNORMAL /HPF
BILIRUB UR QL STRIP: NEGATIVE
BUN SERPL-MCNC: 22 MG/DL (ref 5–25)
CALCIUM SERPL-MCNC: 9.4 MG/DL (ref 8.4–10.2)
CHLORIDE SERPL-SCNC: 103 MMOL/L (ref 96–108)
CLARITY UR: CLEAR
CO2 SERPL-SCNC: 29 MMOL/L (ref 21–32)
COLOR UR: YELLOW
CREAT SERPL-MCNC: 1.37 MG/DL (ref 0.6–1.3)
CREAT UR-MCNC: 146.8 MG/DL
CREAT UR-MCNC: 146.8 MG/DL
ERYTHROCYTE [DISTWIDTH] IN BLOOD BY AUTOMATED COUNT: 12.5 % (ref 11.6–15.1)
GFR SERPL CREATININE-BSD FRML MDRD: 48 ML/MIN/1.73SQ M
GLUCOSE P FAST SERPL-MCNC: 121 MG/DL (ref 65–99)
GLUCOSE UR STRIP-MCNC: NEGATIVE MG/DL
HCT VFR BLD AUTO: 46 % (ref 36.5–49.3)
HGB BLD-MCNC: 15.2 G/DL (ref 12–17)
HGB UR QL STRIP.AUTO: NEGATIVE
KETONES UR STRIP-MCNC: NEGATIVE MG/DL
LEUKOCYTE ESTERASE UR QL STRIP: NEGATIVE
MCH RBC QN AUTO: 32.3 PG (ref 26.8–34.3)
MCHC RBC AUTO-ENTMCNC: 33 G/DL (ref 31.4–37.4)
MCV RBC AUTO: 98 FL (ref 82–98)
MICROALBUMIN UR-MCNC: 12.6 MG/L
MICROALBUMIN/CREAT 24H UR: 9 MG/G CREATININE (ref 0–30)
NITRITE UR QL STRIP: NEGATIVE
NON-SQ EPI CELLS URNS QL MICRO: ABNORMAL /HPF
PH UR STRIP.AUTO: 7 [PH]
PLATELET # BLD AUTO: 211 THOUSANDS/UL (ref 149–390)
PMV BLD AUTO: 9.5 FL (ref 8.9–12.7)
POTASSIUM SERPL-SCNC: 5.1 MMOL/L (ref 3.5–5.3)
PROT UR STRIP-MCNC: ABNORMAL MG/DL
PROT UR-MCNC: 14 MG/DL
PROT/CREAT UR: 0.1 MG/G{CREAT} (ref 0–0.1)
PSA SERPL-MCNC: 3.12 NG/ML (ref 0–4)
RBC # BLD AUTO: 4.71 MILLION/UL (ref 3.88–5.62)
RBC #/AREA URNS AUTO: ABNORMAL /HPF
SODIUM SERPL-SCNC: 142 MMOL/L (ref 135–147)
SP GR UR STRIP.AUTO: 1.02 (ref 1–1.03)
UROBILINOGEN UR STRIP-ACNC: 2 MG/DL
WBC # BLD AUTO: 7.47 THOUSAND/UL (ref 4.31–10.16)
WBC #/AREA URNS AUTO: ABNORMAL /HPF

## 2024-04-03 PROCEDURE — 81001 URINALYSIS AUTO W/SCOPE: CPT

## 2024-04-03 PROCEDURE — 80048 BASIC METABOLIC PNL TOTAL CA: CPT

## 2024-04-03 PROCEDURE — 85027 COMPLETE CBC AUTOMATED: CPT

## 2024-04-03 PROCEDURE — 82043 UR ALBUMIN QUANTITATIVE: CPT

## 2024-04-03 PROCEDURE — 84153 ASSAY OF PSA TOTAL: CPT

## 2024-04-03 PROCEDURE — 36415 COLL VENOUS BLD VENIPUNCTURE: CPT

## 2024-04-03 PROCEDURE — 84156 ASSAY OF PROTEIN URINE: CPT

## 2024-04-03 PROCEDURE — 82570 ASSAY OF URINE CREATININE: CPT

## 2024-04-03 NOTE — PROGRESS NOTES
Assessment and Plan:    Diagnoses and all orders for this visit:    Stage 3a chronic kidney disease (HCC)  -     Renal function panel; Future    Nephrolithiasis    Primary hypertension    Chronic kidney disease-mineral and bone disorder (CKD-MBD)    History of hyperkalemia      CKD IIIa  -Baseline creatinine: 1.3-1.6, had HELIO with sCr of 1/71 in 3/30/23  -Most recent creatinine: 1.37 from 4/3/24  -Etiology: Recurrent HELIO/contrast nephropathy, age associated nephron loss, hypertensive nephrosclerosis  -Managing Nephrologist: Dr. Schultz last seen in 7/2023  -Avoid nephrotoxins, avoid NSAIDS (including naproxen, advil, motrin, toradol)  -UA shows 1+ protein, 1-2 RBCs, 1-2 WBCs  -UPCR 0.1 mg, UACR is 9mg-> trivial proteinuria  -Renal function is stable and at baseline, recheck labs prior to next appointment.  Will recheck a RFP in 1 month to recheck a potassium level below    Blood Pressure/hypertension  -Office visit BP: 126/68  -Current antihypertensive medications: Lisinopril 10 mg daily, Toprol-XL 25 mg daily  -Stable, no changes    History of hyperkalemia  -Prior K has been elevated at 5.4, most recently 5.1  -He is notably on lisinopril for HTN and potassium citrate for stone prevention  -Given need education about low potassium diet, remaining elevated will discontinue potassium citrate  -Recheck in RFP in 1 month, patient was educated about low potassium diet    History of nephrolithiasis  -Had a kidney stone with obstruction in 2020, stone analysis showed 80% calcium oxalate stone  -Had Litholink 5/2021 showing urine volume 2.2 L, citrate low at 262  -Was initiated on potassium citrate 2 tablets twice daily and encouraged to hydrate and avoid high sodium diet to prevent stone recurrence.  Since potassium is 5.1, we will have to monitor, if potassium level remains above goal we will have to discontinue    CKD-MBD  -Trend Phos, PTH, calcium levels    Addition medical problems: CAD, gout, DM2    PATIENT  INSTRUCTIONS  YOUR RENAL FUNCTION IS STABLE AND AT YOUR BASELINE    NO CHANGES TO MEDICATIONS FOR NOW    WE WILL REPEAT LABS IN 1 MONTH TO RECHECK A POTASSIUM LEVEL, IF IT IS ELEVATED WE WILL STOP YOUR POTASSIUM CITRATE     WE WILL SEE YOU BACK IN THE OFFICE IN 6 MONTHS  AVOID NSAIDS INCLUDING MOTRIN, IBUPROFEN, ADVIL, ALEVE, NAPROXEN      Reason for Visit: No chief complaint on file.    HPI: Nj Pimentel Jr. is a 79 y.o. male who is here for follow up of CKD 3 AA, hypertension, history of nephrolithiasis, last seen by Dr. Schultz in July 2023.  Since then he reports he has been well.  He dizziness/lightheadedness/shortness of breath/lower extremity edema.  His biggest complaint is weight gain over the past year of approximately 20 to 30 pounds.  He denies any issues with any kidney stones recently reports he has been taking his potassium citrate and attempting to stay hydrated.  He reports he feels his depression is worsening and is thinking about starting a new medication and discussing this with his PCP.  He is following up with his PCP after today's appointment.    ROS:   Review of Systems   Constitutional:  Positive for unexpected weight change (Weight gain over the past year).   Respiratory: Negative.     Cardiovascular: Negative.    Gastrointestinal: Negative.    Genitourinary: Negative.    All other systems reviewed and are negative.       A complete 10 point review of systems was performed and found to be negative unless otherwise noted above or in the HPI.    Allergies:   Patient has no known allergies.    Medications:     Current Outpatient Medications:     Ascorbic Acid (vitamin C) 1000 MG tablet, Take 1,000 mg by mouth daily, Disp: , Rfl:     aspirin (ECOTRIN LOW STRENGTH) 81 mg EC tablet, Take 81 mg by mouth every other day 1 every other day, Disp: , Rfl:     atorvastatin (LIPITOR) 40 mg tablet, take 1 tablet by mouth every evening, Disp: 90 tablet, Rfl: 3    calcium polycarbophil (FIBERCON) 625 mg  tablet, Take 625 mg by mouth daily, Disp: , Rfl:     clopidogrel (PLAVIX) 75 mg tablet, Take 1 tablet (75 mg total) by mouth daily, Disp: 90 tablet, Rfl: 2    Doxylamine Succinate, Sleep, (UNISOM PO), Take 1 tablet by mouth daily at bedtime, Disp: , Rfl:     ferrous sulfate 324 (65 Fe) mg, Take 1 tablet (324 mg total) by mouth daily before breakfast, Disp: 30 tablet, Rfl: 2    finasteride (PROSCAR) 5 mg tablet, Take 1 tablet (5 mg total) by mouth daily, Disp: 90 tablet, Rfl: 1    gabapentin (Neurontin) 400 mg capsule, Take 1 capsule (400 mg total) by mouth 3 (three) times a day, Disp: 270 capsule, Rfl: 1    lisinopril (ZESTRIL) 10 mg tablet, Take 1 tablet (10 mg total) by mouth daily, Disp: 90 tablet, Rfl: 1    metoprolol succinate (TOPROL-XL) 25 mg 24 hr tablet, Take 1 tablet (25 mg total) by mouth daily, Disp: 90 tablet, Rfl: 2    Misc Natural Products (GLUCOSAMINE CHOND CMP DOUBLE PO), Take 1 tablet by mouth in the morning, Disp: , Rfl:     Multiple Vitamin (MULTIVITAMIN) capsule, Take 1 capsule by mouth daily, Disp: , Rfl:     omeprazole (PriLOSEC) 20 mg delayed release capsule, take 1 capsule by mouth once daily, Disp: 90 capsule, Rfl: 2    Potassium Citrate ER 15 MEQ (1620 MG) TBCR, Take 2 tablets by mouth 2 (two) times a day, Disp: 360 tablet, Rfl: 3    traZODone (DESYREL) 150 mg tablet, Take 1 tablet (150 mg total) by mouth daily at bedtime, Disp: 30 tablet, Rfl: 2    escitalopram (LEXAPRO) 20 mg tablet, Take 1 tablet (20 mg total) by mouth daily, Disp: 90 tablet, Rfl: 2    Maxidex 0.1 % ophthalmic suspension, instill 1 drop into both eyes twice a day for 2 weeks then 1 drop into both eyes once daily (Patient not taking: Reported on 11/22/2023), Disp: , Rfl:     Santyl ointment, 1 application. in the morning Apply thin layer to affected area (Patient not taking: Reported on 8/7/2023), Disp: , Rfl:     tamsulosin (FLOMAX) 0.4 mg, Take 1 capsule (0.4 mg total) by mouth daily with dinner (Patient not taking:  Reported on 4/8/2024), Disp: 90 capsule, Rfl: 1    Past Medical History:   Diagnosis Date    Alcoholism (HCC)     Anemia     iron def    Anxiety     Arthritis     Cerebrovascular accident (CVA) (HCC) 01/07/2020    memory issues    Coronary artery disease     GERD (gastroesophageal reflux disease)     Gout     High cholesterol     Hypertension     Insomnia     Kidney stone     Stroke (HCC)      Past Surgical History:   Procedure Laterality Date    ANKLE SURGERY Right     CARDIAC CATHETERIZATION      no findings    COLONOSCOPY  01/25/2013    polypectomy; complete - 3 yrs d/t polyp - benign submucosal lipoma- path c/w lipoma    COLONOSCOPY  04/25/2017    complete - tubular adenoma - repeat 5yrs    CYSTOSCOPY      CYSTOTOMY      bladder  w/ basket extraction of calculus    FEMUR FRACTURE SURGERY      HERNIA REPAIR      inguinal ; sliding    INGUINAL HERNIA REPAIR Right     unilateral    IR LOWER EXTREMITY ANGIOGRAM  4/24/2023    KNEE ARTHROSCOPY Right     w/medial menisectomy    LASIK      corneal    LITHOTRIPSY      renal    FL COLONOSCOPY FLX DX W/COLLJ SPEC WHEN PFRMD N/A 04/25/2017    Procedure: SCREENING COLONOSCOPY;  Surgeon: Paul Jacome MD;  Location: [facility];  Service: General    FL SLCTV CATHJ 3RD+ ORD SLCTV ABDL PEL/LXTR BRNCH Right 4/24/2023    Procedure: ARTERIOGRAM;  Surgeon: Alivia Blum MD;  [facility];  Service: Vascular    FL TEAEC W/PATCH GRF CAROTID VERTB SUBCLAV NECK INC Right 01/10/2020    Procedure: ENDARTERECTOMY ARTERY CAROTID;  Surgeon: Aaron Smith MD;  [facility];  Service: Vascular    FL TEAEC W/WO PATCH GRAFT COMMON FEMORAL Right 4/24/2023    Procedure: ENDARTERECTOMY ARTERIAL FEMORAL, RETROGRADE ILIAC INTERVENTION;  Surgeon: Alivia Blum MD;  [facility];  Service: Vascular    ROTATOR CUFF REPAIR Bilateral     TONSILLECTOMY       Family History   Problem Relation Age of Onset    Alzheimer's disease Mother     Alzheimer's disease Father     Heart disease  "Father         CAD    Other Father         prediabetes    Diabetes Father     Breast cancer Other     Arthritis Family     Hypertension Family       reports that he quit smoking about 39 years ago. His smoking use included cigarettes. He started smoking about 52 years ago. He has a 22 pack-year smoking history. He has never used smokeless tobacco. He reports that he does not currently use alcohol. He reports that he does not use drugs.    Physical Exam:   Vitals:    04/08/24 1424   BP: 126/68   BP Location: Right arm   Patient Position: Sitting   Cuff Size: Adult   Weight: 102 kg (225 lb 9.6 oz)   Height: 5' 10\" (1.778 m)     Body mass index is 32.37 kg/m².    Physical Exam  Vitals reviewed.   Constitutional:       General: He is not in acute distress.     Appearance: He is not toxic-appearing or diaphoretic.   HENT:      Head: Normocephalic and atraumatic.      Nose: Nose normal.      Mouth/Throat:      Mouth: Mucous membranes are moist.   Eyes:      General: No scleral icterus.  Cardiovascular:      Rate and Rhythm: Normal rate.      Pulses: Normal pulses.   Pulmonary:      Effort: Pulmonary effort is normal. No respiratory distress.      Breath sounds: No wheezing or rales.   Abdominal:      General: Abdomen is flat.   Musculoskeletal:      Cervical back: Neck supple.      Right lower leg: No edema.      Left lower leg: No edema.   Skin:     General: Skin is warm and dry.      Capillary Refill: Capillary refill takes less than 2 seconds.   Neurological:      Mental Status: He is alert and oriented to person, place, and time.          Procedure:  No results found for this or any previous visit.    Lab Results   Component Value Date    GLUCOSE 112 05/14/2015    CALCIUM 9.4 04/03/2024     05/14/2015    K 5.1 04/03/2024    CO2 29 04/03/2024     04/03/2024    BUN 22 04/03/2024    CREATININE 1.37 (H) 04/03/2024       Results from last 7 days   Lab Units 04/03/24  1134   WBC Thousand/uL 7.47   HEMOGLOBIN " g/dL 15.2   HEMATOCRIT % 46.0   PLATELETS Thousands/uL 211   POTASSIUM mmol/L 5.1   CHLORIDE mmol/L 103   CO2 mmol/L 29   BUN mg/dL 22   CREATININE mg/dL 1.37*   CALCIUM mg/dL 9.4       I have personally reviewed the blood work as stated above and in my note.  I have personally reviewed CT AP from 2020 imaging studies.  I have personally reviewed internal medicine, prior nephrology note.

## 2024-04-04 ENCOUNTER — TELEPHONE (OUTPATIENT)
Dept: FAMILY MEDICINE CLINIC | Facility: HOSPITAL | Age: 79
End: 2024-04-04

## 2024-04-04 ENCOUNTER — TELEPHONE (OUTPATIENT)
Dept: OTHER | Facility: HOSPITAL | Age: 79
End: 2024-04-04

## 2024-04-04 NOTE — TELEPHONE ENCOUNTER
can let Skip know his PSA looks great this year 3.12. doesn't need labs til next january visit    Lab Results   Component Value Date    PSA 3.12 04/03/2024    PSA 4.1 (H) 04/28/2023    PSA 5.7 (H) 01/18/2023

## 2024-04-04 NOTE — TELEPHONE ENCOUNTER
Would like to know if you can increase the dosage of his antidepressant.  Please call.  Is aware he may not get a call until tomorrow.

## 2024-04-05 NOTE — TELEPHONE ENCOUNTER
Left detailed message per communication consent. Per Anisa: PSA looks great this year 3.12. doesn't need labs til next january visit . If any questions to contact the office, office number provided on message.

## 2024-04-08 ENCOUNTER — TELEPHONE (OUTPATIENT)
Dept: NEPHROLOGY | Facility: CLINIC | Age: 79
End: 2024-04-08

## 2024-04-08 ENCOUNTER — OFFICE VISIT (OUTPATIENT)
Dept: FAMILY MEDICINE CLINIC | Facility: HOSPITAL | Age: 79
End: 2024-04-08
Payer: COMMERCIAL

## 2024-04-08 ENCOUNTER — OFFICE VISIT (OUTPATIENT)
Dept: NEPHROLOGY | Facility: HOSPITAL | Age: 79
End: 2024-04-08

## 2024-04-08 ENCOUNTER — OFFICE VISIT (OUTPATIENT)
Dept: VASCULAR SURGERY | Facility: CLINIC | Age: 79
End: 2024-04-08
Payer: COMMERCIAL

## 2024-04-08 VITALS
TEMPERATURE: 97.3 F | BODY MASS INDEX: 32.35 KG/M2 | OXYGEN SATURATION: 95 % | HEIGHT: 70 IN | DIASTOLIC BLOOD PRESSURE: 54 MMHG | WEIGHT: 226 LBS | HEART RATE: 49 BPM | SYSTOLIC BLOOD PRESSURE: 130 MMHG

## 2024-04-08 VITALS
OXYGEN SATURATION: 92 % | DIASTOLIC BLOOD PRESSURE: 80 MMHG | BODY MASS INDEX: 32.07 KG/M2 | HEART RATE: 60 BPM | WEIGHT: 224 LBS | HEIGHT: 70 IN | SYSTOLIC BLOOD PRESSURE: 130 MMHG

## 2024-04-08 VITALS
HEIGHT: 70 IN | BODY MASS INDEX: 32.3 KG/M2 | DIASTOLIC BLOOD PRESSURE: 68 MMHG | WEIGHT: 225.6 LBS | SYSTOLIC BLOOD PRESSURE: 126 MMHG

## 2024-04-08 DIAGNOSIS — I65.23 CAROTID STENOSIS, ASYMPTOMATIC, BILATERAL: Primary | ICD-10-CM

## 2024-04-08 DIAGNOSIS — N20.0 NEPHROLITHIASIS: ICD-10-CM

## 2024-04-08 DIAGNOSIS — M89.9 CHRONIC KIDNEY DISEASE-MINERAL AND BONE DISORDER (CKD-MBD): ICD-10-CM

## 2024-04-08 DIAGNOSIS — Z13.31 POSITIVE DEPRESSION SCREENING: ICD-10-CM

## 2024-04-08 DIAGNOSIS — I73.9 PAD (PERIPHERAL ARTERY DISEASE) (HCC): ICD-10-CM

## 2024-04-08 DIAGNOSIS — I74.09 AORTOILIAC OCCLUSIVE DISEASE (HCC): ICD-10-CM

## 2024-04-08 DIAGNOSIS — F33.41 RECURRENT MAJOR DEPRESSIVE DISORDER, IN PARTIAL REMISSION (HCC): Primary | ICD-10-CM

## 2024-04-08 DIAGNOSIS — Z86.39 HISTORY OF HYPERKALEMIA: ICD-10-CM

## 2024-04-08 DIAGNOSIS — I10 PRIMARY HYPERTENSION: ICD-10-CM

## 2024-04-08 DIAGNOSIS — E83.9 CHRONIC KIDNEY DISEASE-MINERAL AND BONE DISORDER (CKD-MBD): ICD-10-CM

## 2024-04-08 DIAGNOSIS — N18.9 CHRONIC KIDNEY DISEASE-MINERAL AND BONE DISORDER (CKD-MBD): ICD-10-CM

## 2024-04-08 DIAGNOSIS — N18.31 STAGE 3A CHRONIC KIDNEY DISEASE (HCC): Primary | ICD-10-CM

## 2024-04-08 PROBLEM — T39.1X1A TYLENOL INGESTION: Status: RESOLVED | Noted: 2023-03-29 | Resolved: 2024-04-08

## 2024-04-08 PROCEDURE — G2211 COMPLEX E/M VISIT ADD ON: HCPCS | Performed by: INTERNAL MEDICINE

## 2024-04-08 PROCEDURE — 99214 OFFICE O/P EST MOD 30 MIN: CPT | Performed by: NURSE PRACTITIONER

## 2024-04-08 PROCEDURE — 99214 OFFICE O/P EST MOD 30 MIN: CPT | Performed by: INTERNAL MEDICINE

## 2024-04-08 RX ORDER — TRAZODONE HYDROCHLORIDE 300 MG/1
300 TABLET ORAL
Qty: 30 TABLET | Refills: 1 | Status: SHIPPED | OUTPATIENT
Start: 2024-04-08

## 2024-04-08 NOTE — PATIENT INSTRUCTIONS
Depression   AMBULATORY CARE:   Depression  is a mood disorder that causes feelings of sadness or hopelessness that do not go away. Depression may cause you to lose interest in things you used to enjoy. These feelings may interfere with your daily life.  Common signs and symptoms:   Appetite changes, or weight gain or loss    Trouble falling or staying asleep, or sleeping too much    Fatigue (being mentally and physically tired) or lack of energy    Feeling restless, irritable, or withdrawn    Feeling worthless, hopeless, discouraged, or guilty    Trouble concentrating, remembering things, doing daily tasks, or making decisions    Thoughts about hurting or killing yourself    Call your local emergency number (911 in the [place]) if:   You think about hurting yourself or someone else.    You have done something on purpose to hurt yourself.    Call your therapist or doctor if:   Your symptoms get worse or do not get better with treatment.    Your depression keeps you from doing your regular daily activities.    You have new symptoms since your last visit.    You have questions or concerns about your condition or care.    The following resources are available at any time to help you, if needed:   Contact a suicide prevention organization:        For the [employer] Suicide and Crisis Lifeline:     Call or text [employer]     Send a chat on https://In[employer]ic.org/chat     Call 3-595-190-5829 (1-800-273-TALK)    For the Suicide Hotline, call 5-022-250-2472 (5-468-MDKVJTS)    For a list of international numbers: https://save.org/find-help/international-resources/  Treatment for depression  depends on how severe your symptoms are. You may need any of the following:  Cognitive behavioral therapy (CBT)  teaches you how to identify and change negative thought patterns.    Antidepressant medicine  may be given to decrease or manage symptoms. You may need to take this medicine for several weeks before they start working.    Self-care:   Talk to  someone about your depression.  Your healthcare provider may suggest counseling. You might feel more comfortable talking with a friend or family member about your depression. Choose someone you know will be supportive and encouraging.    Get regular physical activity.  Physical activity can lower your stress, improve your mood, and help you sleep better. Work with your healthcare provider to develop a plan that you enjoy.         Create a regular sleep schedule.  A routine can help you relax before bed. Listen to music, read, or do yoga. Try to go to bed and wake up at the same time every day. Sleep is important for emotional health.    Eat a variety of healthy foods.  Healthy foods include fruits, vegetables, whole-grain breads, low-fat dairy products, lean meats, fish, and cooked beans. A healthy meal plan is low in fat, salt, and added sugar.         Do not use alcohol, drugs, or nicotine products.  These can worsen depression or make it hard to manage. Talk to your therapist or healthcare provider if you use any of these products and need help to quit.    Follow up with your therapist or doctor as directed:  Your healthcare provider will monitor your progress at follow-up visits. Your provider will also monitor your medicine if you take antidepressants and ask if the medicine is helping. Tell your provider about any side effects or problems you have with your medicine. The type or amount of medicine may need to be changed. Write down your questions so you remember to ask them during your visits.  For more information or support:   [employer]  [address]  [place] [address]  Phone: 0- [phone] - [phone]  Phone: 7- 607 - [phone]  Web Address: http://www.giselle.org  [facility]  [address]  [place] [address]  Phone: 5- 313 - 733  Web Address: http://www.suicidepreventionlifeline.org OR https://[employer].org/chat/    © Copyright [employer] 2023  Information is for End User's use only and may not be sold, redistributed or otherwise used for commercial purposes.  The above information is an  only. It is not intended as medical advice for individual conditions or treatments. Talk to your doctor, nurse or pharmacist before following any medical regimen to see if it is safe and effective for you.

## 2024-04-08 NOTE — ASSESSMENT & PLAN NOTE
AOIL 2/15/24 demonstrates over 75% stenosis in the SMA and celiac arteries. 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  -Study is unchanged from last year.  -Pt denies any mesenteric ischemic symptoms such as post prandial abdominal pain, food fear or unintentional weight loss.   -Continue with yearly surveillance.   -Call the office with any new or worsening symptoms

## 2024-04-08 NOTE — PROGRESS NOTES
79 year-old male with pmhx HTN, AVS, CAD, A-flutter, carotid stenosis with hx of R CEA 1/10/20 , PAD, aortoiliac occlusive disease w/ hx of R aortoiliac, CFA, SFA and PFA angio and EIA stent placement 23, OA, lumbar foraminal stenosis, nephrolithiasis, CKD stage 3, anemia, gout, HLD and obesity returns to the office for review of his non-invasive vascular studies.     Assessment/Plan:    Carotid stenosis, asymptomatic, bilateral  Reviewed carotid ultrasound from 2/15/2024 which demonstrates right ICA widely patent and left ICA less than 50% stenosis with velocities 111/23 and a ratio of 2.13.    -Denies symptoms of numbness/ tingling/ weakness on one side of the body, facial droop, slurred speech or blindness in one eye.   -Reviewed most recent carotid ultrasound results in detail with pt and family. Discussed pathophysiology of arterial disease and indication for vascular intervention. No vascular intervention planned at this time. Discussed that we will continue with medical management and non-invasive imaging at this time.     Recommendations  -Complete carotid ultrasound in one year and return to the office for review.   -Continue Aspirin 81mg and plavix daily.  -Continue Atrovastatin daily as prescribed by PCP.  -Call 911/ Go to the ER with any S/S of TIA/ CVA.      PAD (peripheral artery disease) (HCC)  Reviewed LEAD from 2/15/2024 which demonstrates right leg: Diffuse disease without focal stenosis, tibioperoneal disease.  FARRAH: 1.13/139/64  Left le to 75% stenosis in CFA and less than 50% stenosis in mid SFA.  FARRAH: NC/139/67    -Denies true claudication, denies true rest pain, new wounds/ tissue loss.   -Reviewed lower extremity ultrasound in detail with pt and answered all questions. Educated pt on peripheral arterial disease and indication for vascular intervention. No indications for vascular intervention at this time. Recommending continued surveillance with non-invasive imagining yearly with  medical management at this time. Pt verbalized understanding.    Recommendations:  -Complete LEAD in 6 months and return to the office for review.  -Call the office with any new or worsening leg pain, discoloration, swelling, new wounds/ tissue loss.  -Continue to take aspirin 81 mg and plavix daily.  -Continue to take atorvastatin daily as per PCP.  -Do daily foot checks, food protection, wear well fitted shoes.  -Increase daily walking for 20-30 min everyday.     Aortoiliac occlusive disease (HCC)  AOIL from 2/15/24 demonstrates Right EIA stent is patent.  -Pt is currently asymptomatic and reports walking up to 10 miles several days a week.   -Continue with yearly surveillance.  -Continue ASA and statin    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HCC)  AOIL 2/15/24 demonstrates over 75% stenosis in the SMA and celiac arteries. 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  -Study is unchanged from last year.  -Pt denies any mesenteric ischemic symptoms such as post prandial abdominal pain, food fear or unintentional weight loss.   -Continue with yearly surveillance.   -Call the office with any new or worsening symptoms     Diagnoses and all orders for this visit:    Carotid stenosis, asymptomatic, bilateral  -     VAS carotid complete study; Future    Aortoiliac occlusive disease (HCC)  -     VAS abdominal aorta/iliac duplex; Future    PAD (peripheral artery disease) (HCC)  -     VAS ARTERIAL DUPLEX- LOWER LIMB BILATERAL; Future          Subjective:      Patient ID: Nj Pimentel Jr. is a 79 y.o. male.    Patient presents for review of AOIL, CV, and VANIA done 2/15/24. He denies TIA/CVA symptoms. He denies pain with walking or wounds of BLE. He denies abdominal pain or pain after eating. He reports pain from his left side of his back to groin down his leg. He states it started from a fall a few months ago. He is taking ASA 81 mg, Plavix, and Atorvastatin. He is a former smoker.     79 year-old male with pmhx HTN, AVS, CAD, A-flutter,  "carotid stenosis with hx of R CEA 1/10/20 , PAD, aortoiliac occlusive disease w/ hx of R aortoiliac, CFA, SFA and PFA angio and EIA stent placement 4/24/23, OA, lumbar foraminal stenosis, nephrolithiasis, CKD stage 3, anemia, gout, HLD and obesity returns to the office for review of his non-invasive vascular studies.     Reports he has some L hip pain that goes down his leg after long periods of sitting. He reports that this is due to a fall some time ago.   Denies symptoms of numbness/ tingling/ weakness on one side of the body, facial droop, slurred speech or blindness in one eye.   Denies claudication, denies rest pain, denies any wounds. Reports that he had a graft placed on his right foot from an event in [place]. The area is closed and he does see a podiatrist yearly.  He is compliant with all he medications.   He does daily walking and states he walks 10 miles every other day.             The following portions of the patient's history were reviewed and updated as appropriate: allergies, current medications, past family history, past medical history, past social history, past surgical history, and problem list.    Review of Systems   Constitutional: Negative.    HENT: Negative.     Eyes: Negative.  Negative for visual disturbance.   Respiratory: Negative.  Negative for shortness of breath.    Cardiovascular: Negative.  Negative for chest pain.   Gastrointestinal: Negative.    Endocrine: Negative.    Genitourinary: Negative.    Musculoskeletal:  Positive for back pain.   Skin: Negative.    Allergic/Immunologic: Negative.    Neurological: Negative.  Negative for dizziness, facial asymmetry, speech difficulty, weakness, light-headedness and headaches.   Hematological: Negative.    Psychiatric/Behavioral: Negative.           Objective:      /80 (BP Location: Left arm, Patient Position: Sitting, Cuff Size: Standard)   Pulse 60   Ht 5' 10\" (1.778 m)   Wt 102 kg (224 lb)   SpO2 92%   BMI 32.14 kg/m² " "         Physical Exam  Vitals and nursing note reviewed.   Constitutional:       General: He is not in acute distress.     Appearance: Normal appearance. He is obese.   HENT:      Head: Normocephalic and atraumatic.   Neck:      Vascular: Carotid bruit: lound cardiac murur.   Cardiovascular:      Rate and Rhythm: Normal rate and regular rhythm.      Pulses:           Radial pulses are 1+ on the right side and 1+ on the left side.        Popliteal pulses are 0 on the right side and 0 on the left side.        Dorsalis pedis pulses are 1+ on the right side and detected w/ Doppler on the left side.        Posterior tibial pulses are detected w/ Doppler on the right side and detected w/ Doppler on the left side.      Heart sounds: Murmur heard.   Pulmonary:      Effort: Pulmonary effort is normal. No respiratory distress.      Breath sounds: Normal breath sounds.   Musculoskeletal:      Right lower leg: No edema.      Left lower leg: No edema.   Skin:     General: Skin is warm and dry.      Capillary Refill: Capillary refill takes less than 2 seconds.      Findings: No erythema or rash.   Neurological:      General: No focal deficit present.      Mental Status: He is alert and oriented to person, place, and time.      Sensory: No sensory deficit.      Motor: No weakness.      Coordination: Coordination normal.      Gait: Gait normal.   Psychiatric:         Mood and Affect: Mood normal.         Behavior: Behavior normal.         I have reviewed and made appropriate changes to the review of systems input by the medical assistant.    Vitals:    04/08/24 0826   BP: 130/80   BP Location: Left arm   Patient Position: Sitting   Cuff Size: Standard   Pulse: 60   SpO2: 92%   Weight: 102 kg (224 lb)   Height: 5' 10\" (1.778 m)       Patient Active Problem List   Diagnosis    Tubular adenoma of colon    Carpal tunnel syndrome    Dyslipidemia    Gout    Hypertension    Impaired fasting glucose    Insomnia    Iron deficiency anemia " due to chronic blood loss    Osteoarthritis    Prolonged QT interval    Rhinitis    Other disorder of calcium metabolism    Nephrolithiasis    Elevated PSA    Obesity (BMI 30.0-34.9)    Aortic valve stenosis    S/P carotid endarterectomy    Coronary artery disease involving native coronary artery    Carotid stenosis, asymptomatic, bilateral    Stage 3b chronic kidney disease (HCC)    Rambo lesion, chronic    Paraesophageal hernia    Atrial flutter, unspecified type (HCC)    Recurrent major depressive disorder, in partial remission (HCC)    Tylenol ingestion    PAD (peripheral artery disease) (HCC)    Platelets decreased (HCC)    Aortoiliac occlusive disease (HCC)    Idiopathic peripheral neuropathy    Cognitive impairment    Memory loss    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HCC)    Chronic bilateral low back pain without sciatica    Cervical spinal stenosis    Lumbar foraminal stenosis    Lumbar radiculitis    Folliculitis    Stage 3a chronic kidney disease (HCC)    Chronic kidney disease-mineral and bone disorder (CKD-MBD)       Past Surgical History:   Procedure Laterality Date    ANKLE SURGERY Right     CARDIAC CATHETERIZATION      no findings    COLONOSCOPY  01/25/2013    polypectomy; complete - 3 yrs d/t polyp - benign submucosal lipoma- path c/w lipoma    COLONOSCOPY  04/25/2017    complete - tubular adenoma - repeat 5yrs    CYSTOSCOPY      CYSTOTOMY      bladder  w/ basket extraction of calculus    FEMUR FRACTURE SURGERY      HERNIA REPAIR      inguinal ; sliding    INGUINAL HERNIA REPAIR Right     unilateral    IR LOWER EXTREMITY ANGIOGRAM  4/24/2023    KNEE ARTHROSCOPY Right     w/medial menisectomy    LASIK      corneal    LITHOTRIPSY      renal    NY COLONOSCOPY FLX DX W/COLLJ SPEC WHEN PFRMD N/A 04/25/2017    Procedure: SCREENING COLONOSCOPY;  Surgeon: Paul Jacome MD;  Location: [facility];  Service: General    NY SLCTV CATHJ 3RD+ ORD SLCTV ABDL PEL/LXTR BRNCH Right 4/24/2023    Procedure: ARTERIOGRAM;   Surgeon: Alivia Blum MD;  Location: [facility];  Service: Vascular    IL TEAEC W/PATCH GRF CAROTID VERTB SUBCLAV NECK INC Right 01/10/2020    Procedure: ENDARTERECTOMY ARTERY CAROTID;  Surgeon: Aaron Smith MD;  Location: [facility];  Service: Vascular    IL TEAEC W/WO PATCH GRAFT COMMON FEMORAL Right 2023    Procedure: ENDARTERECTOMY ARTERIAL FEMORAL, RETROGRADE ILIAC INTERVENTION;  Surgeon: Alivia Blum MD;  Location[facility];  Service: Vascular    ROTATOR CUFF REPAIR Bilateral     TONSILLECTOMY         Family History   Problem Relation Age of Onset    Alzheimer's disease Mother     Alzheimer's disease Father     Heart disease Father         CAD    Other Father         prediabetes    Diabetes Father     Breast cancer Other     Arthritis Family     Hypertension Family        Social History     Socioeconomic History    Marital status:      Spouse name: Not on file    Number of children: 1    Years of education: Not on file    Highest education level: Not on file   Occupational History    Occupation: Retired     Comment: working full time   Tobacco Use    Smoking status: Former     Current packs/day: 0.00     Average packs/day: 1 pack/day for 22.0 years (22.0 ttl pk-yrs)     Types: Cigarettes     Start date:      Quit date: 1985     Years since quittin.2    Smokeless tobacco: Never   Vaping Use    Vaping status: Never Used   Substance and Sexual Activity    Alcohol use: Not Currently     Comment: stopped drinking alcohol; AA x23yrs sober    Drug use: No    Sexual activity: Not Currently     Partners: Female   Other Topics Concern    Not on file   Social History Narrative    , lives alone, working full time     Social Determinants of Health     Financial Resource Strain: Low Risk  (2024)    Overall Financial Resource Strain (CARDIA)     Difficulty of Paying Living Expenses: Not very hard   Food Insecurity: Not on file   Transportation Needs: No Transportation Needs (2024)     PRAPARE - Transportation     Lack of Transportation (Medical): No     Lack of Transportation (Non-Medical): No   Physical Activity: Not on file   Stress: Not on file   Social Connections: Not on file   Intimate Partner Violence: Not on file   Housing Stability: Not on file       No Known Allergies      Current Outpatient Medications:     Ascorbic Acid (vitamin C) 1000 MG tablet, Take 1,000 mg by mouth daily, Disp: , Rfl:     aspirin (ECOTRIN LOW STRENGTH) 81 mg EC tablet, Take 81 mg by mouth every other day 1 every other day, Disp: , Rfl:     atorvastatin (LIPITOR) 40 mg tablet, take 1 tablet by mouth every evening, Disp: 90 tablet, Rfl: 3    calcium polycarbophil (FIBERCON) 625 mg tablet, Take 625 mg by mouth daily, Disp: , Rfl:     clopidogrel (PLAVIX) 75 mg tablet, Take 1 tablet (75 mg total) by mouth daily, Disp: 90 tablet, Rfl: 2    Doxylamine Succinate, Sleep, (UNISOM PO), Take 1 tablet by mouth daily at bedtime, Disp: , Rfl:     ferrous sulfate 324 (65 Fe) mg, Take 1 tablet (324 mg total) by mouth daily before breakfast, Disp: 30 tablet, Rfl: 2    finasteride (PROSCAR) 5 mg tablet, Take 1 tablet (5 mg total) by mouth daily, Disp: 90 tablet, Rfl: 1    gabapentin (Neurontin) 400 mg capsule, Take 1 capsule (400 mg total) by mouth 3 (three) times a day, Disp: 270 capsule, Rfl: 1    lisinopril (ZESTRIL) 10 mg tablet, Take 1 tablet (10 mg total) by mouth daily, Disp: 90 tablet, Rfl: 1    metoprolol succinate (TOPROL-XL) 25 mg 24 hr tablet, Take 1 tablet (25 mg total) by mouth daily, Disp: 90 tablet, Rfl: 2    Misc Natural Products (GLUCOSAMINE CHOND CMP DOUBLE PO), Take 1 tablet by mouth in the morning, Disp: , Rfl:     Multiple Vitamin (MULTIVITAMIN) capsule, Take 1 capsule by mouth daily, Disp: , Rfl:     omeprazole (PriLOSEC) 20 mg delayed release capsule, take 1 capsule by mouth once daily, Disp: 90 capsule, Rfl: 2    Potassium Citrate ER 15 MEQ (1620 MG) TBCR, Take 2 tablets by mouth 2 (two) times a day, Disp:  360 tablet, Rfl: 3    traZODone (DESYREL) 150 mg tablet, Take 1 tablet (150 mg total) by mouth daily at bedtime, Disp: 30 tablet, Rfl: 2    escitalopram (LEXAPRO) 20 mg tablet, Take 1 tablet (20 mg total) by mouth daily, Disp: 90 tablet, Rfl: 2    Maxidex 0.1 % ophthalmic suspension, instill 1 drop into both eyes twice a day for 2 weeks then 1 drop into both eyes once daily (Patient not taking: Reported on 11/22/2023), Disp: , Rfl:     Santyl ointment, 1 application. in the morning Apply thin layer to affected area (Patient not taking: Reported on 8/7/2023), Disp: , Rfl:     tamsulosin (FLOMAX) 0.4 mg, Take 1 capsule (0.4 mg total) by mouth daily with dinner (Patient not taking: Reported on 4/8/2024), Disp: 90 capsule, Rfl: 1  I have spent a total time of 30 minutes on 04/08/24 in caring for this patient including Diagnostic results, Risks and benefits of tx options, Instructions for management, Patient and family education, Importance of tx compliance, Risk factor reductions, Documenting in the medical record, Reviewing / ordering tests, medicine, procedures  , and Obtaining or reviewing history  .

## 2024-04-08 NOTE — ASSESSMENT & PLAN NOTE
AOIL from 2/15/24 demonstrates Right EIA stent is patent.  -Pt is currently asymptomatic and reports walking up to 10 miles several days a week.   -Continue with yearly surveillance.  -Continue ASA and statin

## 2024-04-08 NOTE — TELEPHONE ENCOUNTER
CHAPIN for patient informing him that we will give him a call to schedule his 6 month appointment once our schedule is available. I also advised patient to call the office if he would like his paperwork from today's visit. Pt was seen in our [place] office.

## 2024-04-08 NOTE — PATIENT INSTRUCTIONS
-Complete abdominal, lower extremity, and carotid studies at indicated times and return to the office for review.     -Return sooner/ call the office if you experience any changes to your legs or feet such as new pain, redness,swelling, leg discoloration.    - Go to the ED/ Call 911:  - If you have any signs or symptoms of stroke such as: numbness/   tingling/weakness on one side, slurred speech or blindness in one eye.     -If you experience any of the following:  Sudden pain in your abdomen, groin, back, legs, or buttocks    Nausea and vomiting    A lump or swelling in your abdomen    Stiff abdominal muscles    Numbness or tingling in your legs    Pale, sweaty, or clammy skin    Dizziness, fainting or loss of consciousness    - Stay active. Exercise everyday. Walking is the recommended exercise, keep a log if possible.     -Continue to take your Atorvastatin, plavix and Aspirin 81 mg daily as prescribed by your PCP.

## 2024-04-08 NOTE — ASSESSMENT & PLAN NOTE
Reviewed carotid ultrasound from 2/15/2024 which demonstrates right ICA widely patent and left ICA less than 50% stenosis with velocities 111/23 and a ratio of 2.13.    -Denies symptoms of numbness/ tingling/ weakness on one side of the body, facial droop, slurred speech or blindness in one eye.   -Reviewed most recent carotid ultrasound results in detail with pt and family. Discussed pathophysiology of arterial disease and indication for vascular intervention. No vascular intervention planned at this time. Discussed that we will continue with medical management and non-invasive imaging at this time.     Recommendations  -Complete carotid ultrasound in one year and return to the office for review.   -Continue Aspirin 81mg and plavix daily.  -Continue Atrovastatin daily as prescribed by PCP.  -Call 911/ Go to the ER with any S/S of TIA/ CVA.

## 2024-04-08 NOTE — PATIENT INSTRUCTIONS
YOUR RENAL FUNCTION IS STABLE AND AT YOUR BASELINE    NO CHANGES TO MEDICATIONS FOR NOW    WE WILL REPEAT LABS IN 1 MONTH TO RECHECK A POTASSIUM LEVEL, IF ELEVATED WE WILL STOP YOUR POTASSIUM CITRATE     WE WILL SEE YOU BACK IN THE OFFICE IN 6 MONTHS    I ATTACHED INFORMATION ABOUT POTASSIUM CONTENT IN FOOD, PLEASE FOLLOW LOW POTASSIUM DIET

## 2024-04-08 NOTE — PROGRESS NOTES
"Name: Nj Pimentel Jr.      : 1945      MRN: [id number]  Encounter Provider: Dona Camp DO  Encounter Date: 2024   Encounter department: [facility] [address]     Assessment & Plan     1. Recurrent major depressive disorder, in partial remission (HCC)  Assessment & Plan:  Mood not controlled, unfortunately patient again did not bring med list - again stressed importance of bring meds to next appt to ensure we are adjusting meds correctly/have correct med list, on max Lexapro, can increase Trazodone from 150 mg to 300 mg - directions written down for pt, has some SI but not active thoughts and I do not believe pt is a harm to himself or anyone else, he has a good support system and a plan in place when SI occurs, urged to call asap with new/worse mood or persistent SI, follow very closely, referral to psych was placed, con't f/u with therapist as well    Orders:  -     traZODone (DESYREL) 300 MG tablet; Take 1 tablet (300 mg total) by mouth daily at bedtime    2. Positive depression screening  -     Ambulatory referral to Psych Services; Future        Depression Screening and Follow-up Plan: Patient's depression screening was positive with a PHQ-9 score of 13. Patient assessed for underlying major depression. Brief counseling provided and recommend additional follow-up/re-evaluation next office visit.         Subjective      HPI Pt here for an acute visit    He notes feeling down and sad. He was asked if he is taking his Lexapro and Trazodone and he states \"I think so\".  Pt again did not bring his meds with him to appt.  He states he has had some thoughts of harming himself but he calls them \"stupid thoughts\". He has had a plan \"a little bit\".  He states \"I think about seeing my wife\". His wife passed away in  of breast cancer.  He has seen psych in the past but states \"it didn't trip my trigger\".  He states thoughts are not frequent.  When he gets them he calls a " friend or keeps busy.  He follows with a therapist      Review of Systems   Constitutional:  Negative for chills and fever.   Respiratory:  Negative for cough and shortness of breath.    Skin:  Negative for rash and wound.   Psychiatric/Behavioral:  Positive for dysphoric mood and suicidal ideas. Negative for sleep disturbance. The patient is nervous/anxious.        Current Outpatient Medications on File Prior to Visit   Medication Sig    Ascorbic Acid (vitamin C) 1000 MG tablet Take 1,000 mg by mouth daily    aspirin (ECOTRIN LOW STRENGTH) 81 mg EC tablet Take 81 mg by mouth every other day 1 every other day    atorvastatin (LIPITOR) 40 mg tablet take 1 tablet by mouth every evening    calcium polycarbophil (FIBERCON) 625 mg tablet Take 625 mg by mouth daily    clopidogrel (PLAVIX) 75 mg tablet Take 1 tablet (75 mg total) by mouth daily    Doxylamine Succinate, Sleep, (UNISOM PO) Take 1 tablet by mouth daily at bedtime    escitalopram (LEXAPRO) 20 mg tablet Take 1 tablet (20 mg total) by mouth daily    ferrous sulfate 324 (65 Fe) mg Take 1 tablet (324 mg total) by mouth daily before breakfast    finasteride (PROSCAR) 5 mg tablet Take 1 tablet (5 mg total) by mouth daily    gabapentin (Neurontin) 400 mg capsule Take 1 capsule (400 mg total) by mouth 3 (three) times a day    lisinopril (ZESTRIL) 10 mg tablet Take 1 tablet (10 mg total) by mouth daily    Maxidex 0.1 % ophthalmic suspension instill 1 drop into both eyes twice a day for 2 weeks then 1 drop into both eyes once daily (Patient not taking: Reported on 11/22/2023)    metoprolol succinate (TOPROL-XL) 25 mg 24 hr tablet Take 1 tablet (25 mg total) by mouth daily    Misc Natural Products (GLUCOSAMINE CHOND CMP DOUBLE PO) Take 1 tablet by mouth in the morning    Multiple Vitamin (MULTIVITAMIN) capsule Take 1 capsule by mouth daily    omeprazole (PriLOSEC) 20 mg delayed release capsule take 1 capsule by mouth once daily    Potassium Citrate ER 15 MEQ (1620 MG)  "TBCR Take 2 tablets by mouth 2 (two) times a day    Santyl ointment 1 application. in the morning Apply thin layer to affected area (Patient not taking: Reported on 8/7/2023)    tamsulosin (FLOMAX) 0.4 mg Take 1 capsule (0.4 mg total) by mouth daily with dinner (Patient not taking: Reported on 4/8/2024)    [DISCONTINUED] traZODone (DESYREL) 150 mg tablet Take 1 tablet (150 mg total) by mouth daily at bedtime       Objective     /54   Pulse (!) 49   Temp (!) 97.3 °F (36.3 °C)   Ht 5' 10\" (1.778 m)   Wt 103 kg (226 lb)   SpO2 95%   BMI 32.43 kg/m²     Physical Exam  Vitals and nursing note reviewed.   Constitutional:       General: He is not in acute distress.     Appearance: He is not ill-appearing.   HENT:      Head: Normocephalic and atraumatic.   Eyes:      General:         Right eye: No discharge.         Left eye: No discharge.      Conjunctiva/sclera: Conjunctivae normal.   Pulmonary:      Effort: Pulmonary effort is normal. No respiratory distress.   Skin:     Coloration: Skin is not pale.      Findings: No rash.   Psychiatric:         Behavior: Behavior normal.         Thought Content: Thought content normal.         Judgment: Judgment normal.       Dona Camp DO  Depression Screening Follow-up Plan: Patient's depression screening was positive with a PHQ-2 score of . Their PHQ-9 score was 13. Patient assessed for underlying major depression. They have no active suicidal ideations. Brief counseling provided and recommend additional follow-up/re-evaluation next office visit.  "

## 2024-04-08 NOTE — ASSESSMENT & PLAN NOTE
Mood not controlled, unfortunately patient again did not bring med list - again stressed importance of bring meds to next appt to ensure we are adjusting meds correctly/have correct med list, on max Lexapro, can increase Trazodone from 150 mg to 300 mg - directions written down for pt, has some SI but not active thoughts and I do not believe pt is a harm to himself or anyone else, he has a good support system and a plan in place when SI occurs, urged to call asap with new/worse mood or persistent SI, follow very closely, referral to psych was placed, con't f/u with therapist as well

## 2024-04-08 NOTE — ASSESSMENT & PLAN NOTE
Reviewed LEAD from 2/15/2024 which demonstrates right leg: Diffuse disease without focal stenosis, tibioperoneal disease.  FARRAH: 1.13/139/64  Left le to 75% stenosis in CFA and less than 50% stenosis in mid SFA.  FARRAH: NC/139/67    -Denies true claudication, denies true rest pain, new wounds/ tissue loss.   -Reviewed lower extremity ultrasound in detail with pt and answered all questions. Educated pt on peripheral arterial disease and indication for vascular intervention. No indications for vascular intervention at this time. Recommending continued surveillance with non-invasive imagining yearly with medical management at this time. Pt verbalized understanding.    Recommendations:  -Complete LEAD in 6 months and return to the office for review.  -Call the office with any new or worsening leg pain, discoloration, swelling, new wounds/ tissue loss.  -Continue to take aspirin 81 mg and plavix daily.  -Continue to take atorvastatin daily as per PCP.  -Do daily foot checks, food protection, wear well fitted shoes.  -Increase daily walking for 20-30 min everyday.

## 2024-04-09 NOTE — TELEPHONE ENCOUNTER
I didn't increase the Gabapentin.  I wrote directions down for him - I increased Trazodone from 150 mg to 300 mg.

## 2024-04-09 NOTE — TELEPHONE ENCOUNTER
Patient called to say that he does not want the gabapentin raised wants it at 150.  States this was discussed at the appt yesterday.

## 2024-04-11 ENCOUNTER — HOSPITAL ENCOUNTER (OUTPATIENT)
Dept: RADIOLOGY | Facility: CLINIC | Age: 79
Discharge: HOME | End: 2024-04-11
Payer: COMMERCIAL

## 2024-04-11 VITALS
DIASTOLIC BLOOD PRESSURE: 71 MMHG | HEART RATE: 50 BPM | OXYGEN SATURATION: 92 % | TEMPERATURE: 98.7 F | SYSTOLIC BLOOD PRESSURE: 167 MMHG | RESPIRATION RATE: 20 BRPM

## 2024-04-11 DIAGNOSIS — M54.16 LUMBAR RADICULITIS: ICD-10-CM

## 2024-04-11 DIAGNOSIS — M48.061 LUMBAR FORAMINAL STENOSIS: ICD-10-CM

## 2024-04-11 PROCEDURE — 64484 NJX AA&/STRD TFRM EPI L/S EA: CPT | Performed by: ANESTHESIOLOGY

## 2024-04-11 PROCEDURE — 64483 NJX AA&/STRD TFRM EPI L/S 1: CPT | Performed by: ANESTHESIOLOGY

## 2024-04-11 RX ORDER — PAPAVERINE HCL 150 MG
15 CAPSULE, EXTENDED RELEASE ORAL ONCE
Status: COMPLETED | OUTPATIENT
Start: 2024-04-11 | End: 2024-04-11

## 2024-04-11 RX ADMIN — DEXAMETHASONE SODIUM PHOSPHATE 15 MG: 10 INJECTION, SOLUTION INTRAMUSCULAR; INTRAVENOUS at 08:33

## 2024-04-11 RX ADMIN — IOHEXOL 2 ML: 300 INJECTION, SOLUTION INTRAVENOUS at 08:33

## 2024-04-11 NOTE — H&P
History of Present Illness: The patient is a 79 y.o. male who presents with complaints of low back and leg pain.    Past Medical History:   Diagnosis Date    Alcoholism (HCC)     Anemia     iron def    Anxiety     Arthritis     Cerebrovascular accident (CVA) (HCC) 01/07/2020    memory issues    Coronary artery disease     GERD (gastroesophageal reflux disease)     Gout     High cholesterol     Hypertension     Insomnia     Kidney stone     Stroke (HCC)        Past Surgical History:   Procedure Laterality Date    ANKLE SURGERY Right     CARDIAC CATHETERIZATION      no findings    COLONOSCOPY  01/25/2013    polypectomy; complete - 3 yrs d/t polyp - benign submucosal lipoma- path c/w lipoma    COLONOSCOPY  04/25/2017    complete - tubular adenoma - repeat 5yrs    CYSTOSCOPY      CYSTOTOMY      bladder  w/ basket extraction of calculus    FEMUR FRACTURE SURGERY      HERNIA REPAIR      inguinal ; sliding    INGUINAL HERNIA REPAIR Right     unilateral    IR LOWER EXTREMITY ANGIOGRAM  4/24/2023    KNEE ARTHROSCOPY Right     w/medial menisectomy    LASIK      corneal    LITHOTRIPSY      renal    WA COLONOSCOPY FLX DX W/COLLJ SPEC WHEN PFRMD N/A 04/25/2017    Procedure: SCREENING COLONOSCOPY;  Surgeon: Paul Jacome MD;  Location: [facility];  Service: General    WA SLCTV CATHJ 3RD+ ORD SLCTV ABDL PEL/LXTR BRNCH Right 4/24/2023    Procedure: ARTERIOGRAM;  Surgeon: Alivia Blum MD;  Location: [facility];  Service: Vascular    WA TEAEC W/PATCH GRF CAROTID VERTB SUBCLAV NECK INC Right 01/10/2020    Procedure: ENDARTERECTOMY ARTERY CAROTID;  Surgeon: Aaron Smith MD;  Location: [facility];  Service: Vascular    WA TEAEC W/WO PATCH GRAFT COMMON FEMORAL Right 4/24/2023    Procedure: ENDARTERECTOMY ARTERIAL FEMORAL, RETROGRADE ILIAC INTERVENTION;  Surgeon: Alivia Blum MD;  Location: [facility];  Service: Vascular    ROTATOR CUFF REPAIR Bilateral     TONSILLECTOMY           Current Outpatient Medications:     Ascorbic Acid  (vitamin C) 1000 MG tablet, Take 1,000 mg by mouth daily, Disp: , Rfl:     aspirin (ECOTRIN LOW STRENGTH) 81 mg EC tablet, Take 81 mg by mouth every other day 1 every other day, Disp: , Rfl:     atorvastatin (LIPITOR) 40 mg tablet, take 1 tablet by mouth every evening, Disp: 90 tablet, Rfl: 3    calcium polycarbophil (FIBERCON) 625 mg tablet, Take 625 mg by mouth daily, Disp: , Rfl:     clopidogrel (PLAVIX) 75 mg tablet, Take 1 tablet (75 mg total) by mouth daily, Disp: 90 tablet, Rfl: 2    Doxylamine Succinate, Sleep, (UNISOM PO), Take 1 tablet by mouth daily at bedtime, Disp: , Rfl:     escitalopram (LEXAPRO) 20 mg tablet, Take 1 tablet (20 mg total) by mouth daily, Disp: 90 tablet, Rfl: 2    ferrous sulfate 324 (65 Fe) mg, Take 1 tablet (324 mg total) by mouth daily before breakfast, Disp: 30 tablet, Rfl: 2    finasteride (PROSCAR) 5 mg tablet, Take 1 tablet (5 mg total) by mouth daily, Disp: 90 tablet, Rfl: 1    gabapentin (Neurontin) 400 mg capsule, Take 1 capsule (400 mg total) by mouth 3 (three) times a day, Disp: 270 capsule, Rfl: 1    lisinopril (ZESTRIL) 10 mg tablet, Take 1 tablet (10 mg total) by mouth daily, Disp: 90 tablet, Rfl: 1    Maxidex 0.1 % ophthalmic suspension, instill 1 drop into both eyes twice a day for 2 weeks then 1 drop into both eyes once daily (Patient not taking: Reported on 11/22/2023), Disp: , Rfl:     metoprolol succinate (TOPROL-XL) 25 mg 24 hr tablet, Take 1 tablet (25 mg total) by mouth daily, Disp: 90 tablet, Rfl: 2    Misc Natural Products (GLUCOSAMINE CHOND CMP DOUBLE PO), Take 1 tablet by mouth in the morning, Disp: , Rfl:     Multiple Vitamin (MULTIVITAMIN) capsule, Take 1 capsule by mouth daily, Disp: , Rfl:     omeprazole (PriLOSEC) 20 mg delayed release capsule, take 1 capsule by mouth once daily, Disp: 90 capsule, Rfl: 2    Potassium Citrate ER 15 MEQ (1620 MG) TBCR, Take 2 tablets by mouth 2 (two) times a day, Disp: 360 tablet, Rfl: 3    Santyl ointment, 1 application.  in the morning Apply thin layer to affected area (Patient not taking: Reported on 8/7/2023), Disp: , Rfl:     tamsulosin (FLOMAX) 0.4 mg, Take 1 capsule (0.4 mg total) by mouth daily with dinner (Patient not taking: Reported on 4/8/2024), Disp: 90 capsule, Rfl: 1    traZODone (DESYREL) 300 MG tablet, Take 1 tablet (300 mg total) by mouth daily at bedtime, Disp: 30 tablet, Rfl: 1    Current Facility-Administered Medications:     dexamethasone (PF) (DECADRON) injection 15 mg, 15 mg, Epidural, Once, Solis Zamora DO    iohexol (OMNIPAQUE) 300 mg/mL injection 2 mL, 2 mL, Epidural, Once, Solis Zamora DO    No Known Allergies    Physical Exam:   Vitals:    04/11/24 0802   BP: 136/73   Pulse: 77   Resp: 20   Temp: 98.7 °F (37.1 °C)   SpO2: 94%     General: Awake, Alert, Oriented x 3, Mood and affect appropriate  Respiratory: Respirations even and unlabored  Cardiovascular: Peripheral pulses intact; no edema  Musculoskeletal Exam: Decreased range of motion lumbar spine    ASA Score: III    Patient/Chart Verification  Patient ID Verified: Verbal  ID Band Applied: No  Consents Confirmed: Procedural  H&P( within 30 days) Verified: To be obtained in the Pre-Procedure area  Interval H&P(within 24 hr) Complete (required for Outpatients and Surgery Admit only): To be obtained in the Pre-Procedure area  Allergies Reviewed: Yes  Anticoag/NSAID held?: Yes (stopped Plavix on 4/3/24)  Currently on antibiotics?: No  Pre-op Lab/Test Results Available: In chart    Assessment:   1. Lumbar foraminal stenosis    2. Lumbar radiculitis        Plan: left L4 and left L5 TFESI [id number]

## 2024-04-11 NOTE — DISCHARGE INSTRUCTIONS
Epidural Steroid Injection   WHAT YOU NEED TO KNOW:   An epidural steroid injection (KALLIE) is a procedure to inject steroid medicine into the epidural space. The epidural space is between your spinal cord and vertebrae. Steroids reduce inflammation and fluid buildup in your spine that may be causing pain. You may be given pain medicine along with the steroids.          ACTIVITY  Do not drive or operate machinery today.  No strenuous activity today - bending, lifting, etc.  You may resume normal activites starting tomorrow - start slowly and as tolerated.  You may shower today, but no tub baths or hot tubs.  You may have numbness for several hours from the local anesthetic. Please use caution and common sense, especially with weight-bearing activities.    CARE OF THE INJECTION SITE  If you have soreness or pain, apply ice to the area today (20 minutes on/20 minutes off).  Starting tomorrow, you may use warm, moist heat or ice if needed.  You may have an increase or change in your discomfort for 36-48 hours after your treatment.  Apply ice and continue with any pain medication you have been prescribed.  Notify the [facility] if you have any of the following: redness, drainage, swelling, headache, stiff neck or fever above 100°F.    SPECIAL INSTRUCTIONS  Our office will contact you in approximately 7 days for a progress report.    MEDICATIONS  Continue to take all routine medications.  Our office may have instructed you to hold some medications.    As no general anesthesia was used in today's procedure, you should not experience any side effects related to anesthesia.     If you are diabetic, the steroids used in today's injection may temporarily increase your blood sugar levels after the first few days after your injection. Please keep a close eye on your sugars and alert the doctor who manages your diabetes if your sugars are significantly high from your baseline or you are symptomatic.     If you have a  problem specifically related to your procedure, please call our office at [phone].  Problems not related to your procedure should be directed to your primary care physician.

## 2024-04-12 DIAGNOSIS — R97.20 ELEVATED PSA: ICD-10-CM

## 2024-04-12 DIAGNOSIS — R35.0 URINARY FREQUENCY: ICD-10-CM

## 2024-04-15 RX ORDER — FINASTERIDE 5 MG/1
5 TABLET, FILM COATED ORAL DAILY
Qty: 90 TABLET | Refills: 1 | Status: SHIPPED | OUTPATIENT
Start: 2024-04-15

## 2024-04-18 ENCOUNTER — TELEPHONE (OUTPATIENT)
Dept: PAIN MEDICINE | Facility: CLINIC | Age: 79
End: 2024-04-18

## 2024-04-23 ENCOUNTER — TELEPHONE (OUTPATIENT)
Dept: PSYCHIATRY | Facility: CLINIC | Age: 79
End: 2024-04-23

## 2024-04-23 NOTE — TELEPHONE ENCOUNTER
Contacted pt in regards to Routine Referral, LVM to contact intake to discuss services needed at this time in order to add them to the proper Wait List.

## 2024-04-23 NOTE — TELEPHONE ENCOUNTER
Patient left VM returning call from . Writer returned call, no answer, left VM to contact our office for services.

## 2024-04-25 ENCOUNTER — TELEPHONE (OUTPATIENT)
Dept: FAMILY MEDICINE CLINIC | Facility: HOSPITAL | Age: 79
End: 2024-04-25

## 2024-04-25 ENCOUNTER — OFFICE VISIT (OUTPATIENT)
Dept: FAMILY MEDICINE CLINIC | Facility: HOSPITAL | Age: 79
End: 2024-04-25
Payer: COMMERCIAL

## 2024-04-25 VITALS
DIASTOLIC BLOOD PRESSURE: 70 MMHG | WEIGHT: 223.8 LBS | BODY MASS INDEX: 32.04 KG/M2 | SYSTOLIC BLOOD PRESSURE: 138 MMHG | TEMPERATURE: 98.8 F | HEART RATE: 88 BPM | HEIGHT: 70 IN

## 2024-04-25 DIAGNOSIS — D50.0 IRON DEFICIENCY ANEMIA DUE TO CHRONIC BLOOD LOSS: ICD-10-CM

## 2024-04-25 DIAGNOSIS — M54.50 CHRONIC BILATERAL LOW BACK PAIN WITHOUT SCIATICA: ICD-10-CM

## 2024-04-25 DIAGNOSIS — R41.89 COGNITIVE IMPAIRMENT: ICD-10-CM

## 2024-04-25 DIAGNOSIS — E78.5 DYSLIPIDEMIA: Chronic | ICD-10-CM

## 2024-04-25 DIAGNOSIS — F33.41 RECURRENT MAJOR DEPRESSIVE DISORDER, IN PARTIAL REMISSION (HCC): Primary | ICD-10-CM

## 2024-04-25 DIAGNOSIS — R73.01 IMPAIRED FASTING GLUCOSE: ICD-10-CM

## 2024-04-25 DIAGNOSIS — G89.29 CHRONIC BILATERAL LOW BACK PAIN WITHOUT SCIATICA: ICD-10-CM

## 2024-04-25 PROCEDURE — G2211 COMPLEX E/M VISIT ADD ON: HCPCS | Performed by: INTERNAL MEDICINE

## 2024-04-25 PROCEDURE — 99214 OFFICE O/P EST MOD 30 MIN: CPT | Performed by: INTERNAL MEDICINE

## 2024-04-25 RX ORDER — TRAZODONE HYDROCHLORIDE 150 MG/1
300 TABLET ORAL
Start: 2024-04-25 | End: 2024-05-03

## 2024-04-25 NOTE — ASSESSMENT & PLAN NOTE
Dgtr here today d/t memory concerns as well, did review MMSE Jan 24 as well as MRI brain 5/23 and memory labs, dgtr and I both agree obvious cognitive impairment is present with nml daily conversation, urged diet/exercise and keeping mentally active, CHECK MMSE AT NEXT APPT, T/C [facility] eval - discussed depression can present with memory impairment in elderly as well, will follow closely, call with sudden new/worse symptoms

## 2024-04-25 NOTE — TELEPHONE ENCOUNTER
Lungs clear and vitals nml today.  Can con't Mucinex and try Coricidin HBP for cough.  If not better T/C imaging of chest but I con't say anything otherwise as I didn't discuss with him today

## 2024-04-25 NOTE — ASSESSMENT & PLAN NOTE
No great benefit with recent TFESI, has f/u with Pain Mgt in early May, on Gabapentin, will follow

## 2024-04-25 NOTE — ASSESSMENT & PLAN NOTE
Did not ever increase Trazodone as he was afraid he would be too sleepy - notes no issues with daytime fatigue but also admits he has slept till 10 am the past few days (had surgery on Tue), urged to STOP Unisom and increase Trazodone from 150 mg to 300 mg, if he has SE then call and would wean off and trial WBXK or Buspar or Seroquel, on max Lexapro, will follow

## 2024-04-25 NOTE — TELEPHONE ENCOUNTER
Pt has had a wheeze and cough for about 3-4 months. States when he starts wheezing he coughs up a yellowish mucus. States this happens on a daily basis and has been taking Mucinex as recommended by the pharmacist. Asking what Dr. Camp recommends, forgot to ask at today's appt. Please advise

## 2024-04-25 NOTE — ASSESSMENT & PLAN NOTE
FLP in July - order given, con't current statin for now, healthy diet and regular exercise encouraged

## 2024-04-25 NOTE — PROGRESS NOTES
Name: Nj Pimentel Jr.      : 1945      MRN: [id number]  Encounter Provider: Dona Camp DO  Encounter Date: 2024   Encounter department: [facility] PRIMARY CARE [id number]     Assessment & Plan     1. Recurrent major depressive disorder, in partial remission (HCC)  Assessment & Plan:  Did not ever increase Trazodone as he was afraid he would be too sleepy - notes no issues with daytime fatigue but also admits he has slept till 10 am the past few days (had surgery on Tue), urged to STOP Unisom and increase Trazodone from 150 mg to 300 mg, if he has SE then call and would wean off and trial WBXK or Buspar or Seroquel, on max Lexapro, will follow    Orders:  -     traZODone (DESYREL) 150 mg tablet; Take 2 tablets (300 mg total) by mouth daily at bedtime    2. Cognitive impairment  Assessment & Plan:  Dgtr here today d/t memory concerns as well, did review MMSE  as well as MRI brain  and memory labs, dgtr and I both agree obvious cognitive impairment is present with nml daily conversation, urged diet/exercise and keeping mentally active, CHECK MMSE AT NEXT APPT, T/[facility] eval - discussed depression can present with memory impairment in elderly as well, will follow closely, call with sudden new/worse symptoms      3. Chronic bilateral low back pain without sciatica  Assessment & Plan:  No great benefit with recent TFESI, has f/u with Pain Mgt in early May, on Gabapentin, will follow      4. Dyslipidemia  Assessment & Plan:  FLP in July - order given, con't current statin for now, healthy diet and regular exercise encouraged    Orders:  -     Lipid panel  -     Hemoglobin A1C  -     Comprehensive metabolic panel  -     Iron Panel (Includes Ferritin, Iron Sat%, Iron, and TIBC); Future  -     CBC and differential    5. Impaired fasting glucose  Assessment & Plan:  Fasting labs due in July - order given, urged healthy diet and regular exercise, will  follow    Orders:  -     Lipid panel  -     Hemoglobin A1C  -     Comprehensive metabolic panel  -     Iron Panel (Includes Ferritin, Iron Sat%, Iron, and TIBC); Future  -     CBC and differential    6. Iron deficiency anemia due to chronic blood loss  Assessment & Plan:  Con't current iron supplement for now, check CBC and iron studies with labs in July - order given    Orders:  -     Lipid panel  -     Hemoglobin A1C  -     Comprehensive metabolic panel  -     Iron Panel (Includes Ferritin, Iron Sat%, Iron, and TIBC); Future  -     CBC and differential       Colonoscopy 2/21 - 5 yrs    Echo 10/22     LEAD 2/24     CUS 2/24     BW 1/24      Subjective      HPI Pt here with his dgtr for follow up appt    Last visit pt was noting depression and we discussed how difficult it was to make med changes w/o an updated med list.  We did advise pt he could increase Trazodone from 150 mg to 200 mg daily as he was pretty sure he was taking that rx.  He called a few days later and didn't want to increase the Trazodone.  We are back to Trazodone 150 mg daily.      Pt had his 2nd lumbar injection of L L4 and L L5 TFESI with Dr. Sánchez on 4/11/24.  He had benefit for 1-2 days but then pain has reoccurred.  He is on Gabapentin. He has f/u in May.     Wgt up 3 lbs from Jan 24.  He is due for fasting labs in July.  Diet/exercise/reviewed.      Dgtr asked about stopping supplements - will cont iron and potassium citrate but can stop all other meds      Review of Systems   Constitutional:  Negative for chills and fever.   Respiratory:  Negative for cough and shortness of breath.    Cardiovascular:  Negative for chest pain and palpitations.   Gastrointestinal:  Negative for abdominal pain, diarrhea, nausea and vomiting.   Musculoskeletal:  Positive for arthralgias and back pain.   Skin:  Negative for rash and wound.   Neurological:  Negative for dizziness and headaches.   Psychiatric/Behavioral:  Positive for confusion and dysphoric mood.         Current Outpatient Medications on File Prior to Visit   Medication Sig    tamsulosin (FLOMAX) 0.4 mg Take 1 capsule (0.4 mg total) by mouth daily with dinner    aspirin (ECOTRIN LOW STRENGTH) 81 mg EC tablet Take 81 mg by mouth every other day 1 every other day    atorvastatin (LIPITOR) 40 mg tablet take 1 tablet by mouth every evening    clopidogrel (PLAVIX) 75 mg tablet Take 1 tablet (75 mg total) by mouth daily    escitalopram (LEXAPRO) 20 mg tablet Take 1 tablet (20 mg total) by mouth daily    ferrous sulfate 324 (65 Fe) mg Take 1 tablet (324 mg total) by mouth daily before breakfast    finasteride (PROSCAR) 5 mg tablet Take 1 tablet by mouth once daily    gabapentin (Neurontin) 400 mg capsule Take 1 capsule (400 mg total) by mouth 3 (three) times a day    lisinopril (ZESTRIL) 10 mg tablet Take 1 tablet (10 mg total) by mouth daily    metoprolol succinate (TOPROL-XL) 25 mg 24 hr tablet Take 1 tablet (25 mg total) by mouth daily    Multiple Vitamin (MULTIVITAMIN) capsule Take 1 capsule by mouth daily    omeprazole (PriLOSEC) 20 mg delayed release capsule take 1 capsule by mouth once daily    Potassium Citrate ER 15 MEQ (1620 MG) TBCR Take 2 tablets by mouth 2 (two) times a day    [DISCONTINUED] Ascorbic Acid (vitamin C) 1000 MG tablet Take 1,000 mg by mouth daily    [DISCONTINUED] calcium polycarbophil (FIBERCON) 625 mg tablet Take 625 mg by mouth daily    [DISCONTINUED] Doxylamine Succinate, Sleep, (UNISOM PO) Take 1 tablet by mouth daily at bedtime    [DISCONTINUED] Maxidex 0.1 % ophthalmic suspension instill 1 drop into both eyes twice a day for 2 weeks then 1 drop into both eyes once daily (Patient not taking: Reported on 11/22/2023)    [DISCONTINUED] Misc Natural Products (GLUCOSAMINE CHOND CMP DOUBLE PO) Take 1 tablet by mouth in the morning    [DISCONTINUED] Santyl ointment 1 application. in the morning Apply thin layer to affected area (Patient not taking: Reported on 8/7/2023)    [DISCONTINUED]  "traZODone (DESYREL) 300 MG tablet Take 1 tablet (300 mg total) by mouth daily at bedtime       Objective     /70 (BP Location: Left arm, Patient Position: Sitting, Cuff Size: Standard)   Pulse 88   Temp 98.8 °F (37.1 °C) (Tympanic)   Ht 5' 10\" (1.778 m)   Wt 102 kg (223 lb 12.8 oz)   BMI 32.11 kg/m²     Physical Exam  Vitals and nursing note reviewed.   Constitutional:       General: He is not in acute distress.     Appearance: He is not ill-appearing.   HENT:      Head: Normocephalic and atraumatic.   Eyes:      General:         Right eye: No discharge.         Left eye: No discharge.      Conjunctiva/sclera: Conjunctivae normal.   Pulmonary:      Effort: Pulmonary effort is normal. No respiratory distress.   Skin:     Coloration: Skin is not pale.      Findings: No rash.   Psychiatric:         Behavior: Behavior normal.         Thought Content: Thought content normal.         Judgment: Judgment normal.      Comments: Poor historian       Dona Camp DO    "

## 2024-04-29 ENCOUNTER — TELEPHONE (OUTPATIENT)
Age: 79
End: 2024-04-29

## 2024-04-29 NOTE — TELEPHONE ENCOUNTER
Please notify pt/dgtr that no NSAIDs can be used d/t his cardiac history and kidney issues.  He can use Tylenol, ice and OTC Voltaren gel as long as it is not on a surgical scar/wound.  Otherwise needs f/u with surgeon.

## 2024-04-29 NOTE — TELEPHONE ENCOUNTER
Patient's daughter Indigo called stating that patient is having pain in L hand from recent carpal tunnel surgery and the surgeons are unavailable to ask for medication.  Patient would like pain medication called into pharmacy if possible.  Last seen on 4/25.  Please notify daughter if called in.

## 2024-04-30 NOTE — TELEPHONE ENCOUNTER
Problem: Adult Inpatient Plan of Care  Goal: Plan of Care Review  Outcome: Ongoing, Progressing     Problem: Adult Inpatient Plan of Care  Goal: Patient-Specific Goal (Individualized)  Outcome: Ongoing, Progressing     Problem: Adult Inpatient Plan of Care  Goal: Absence of Hospital-Acquired Illness or Injury  Outcome: Ongoing, Progressing     POC reviewed with pt and spouse at bedside. Answered all questions. A&Ox4. Denies headaches. Complains of nausea/ abd cramps/ diarrhea. PRN medications given with mild relief. Bed in lowest position, call light within reach, non skid socks on, bed alarm refused,  at bedside, instructed to call staff for needs with call bell - pt verbalized understanding.    CHAPIN for pt's daughter to call our office.

## 2024-05-02 ENCOUNTER — TELEPHONE (OUTPATIENT)
Age: 79
End: 2024-05-02

## 2024-05-02 NOTE — TELEPHONE ENCOUNTER
Syeda from [facility] in [place], Dr. Garcia's office,  called in regards to patient having carpal tunnel release last week. Pt was in more pain then expected and would like to know if it would be ok to prescribe a Medrol dose pack if pain persists.      Syeda can be reach directly at  [phone]    Thank you

## 2024-05-03 ENCOUNTER — OFFICE VISIT (OUTPATIENT)
Dept: FAMILY MEDICINE CLINIC | Facility: HOSPITAL | Age: 79
End: 2024-05-03
Payer: COMMERCIAL

## 2024-05-03 ENCOUNTER — TELEPHONE (OUTPATIENT)
Dept: FAMILY MEDICINE CLINIC | Facility: HOSPITAL | Age: 79
End: 2024-05-03

## 2024-05-03 ENCOUNTER — APPOINTMENT (OUTPATIENT)
Dept: LAB | Facility: HOSPITAL | Age: 79
End: 2024-05-03
Payer: COMMERCIAL

## 2024-05-03 VITALS
OXYGEN SATURATION: 96 % | BODY MASS INDEX: 31.14 KG/M2 | SYSTOLIC BLOOD PRESSURE: 136 MMHG | DIASTOLIC BLOOD PRESSURE: 58 MMHG | HEART RATE: 46 BPM | WEIGHT: 217 LBS

## 2024-05-03 DIAGNOSIS — N18.31 STAGE 3A CHRONIC KIDNEY DISEASE (HCC): ICD-10-CM
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
## 2024-05-03 DIAGNOSIS — R41.82 ALTERED MENTAL STATUS, UNSPECIFIED ALTERED MENTAL STATUS TYPE: ICD-10-CM

## 2024-05-03 DIAGNOSIS — S40.812A ABRASION OF LEFT ARM, INITIAL ENCOUNTER: ICD-10-CM

## 2024-05-03 DIAGNOSIS — E78.5 DYSLIPIDEMIA: ICD-10-CM

## 2024-05-03 DIAGNOSIS — G47.00 INSOMNIA, UNSPECIFIED TYPE: ICD-10-CM

## 2024-05-03 DIAGNOSIS — R41.82 ALTERED MENTAL STATUS, UNSPECIFIED ALTERED MENTAL STATUS TYPE: Primary | ICD-10-CM

## 2024-05-03 DIAGNOSIS — R73.01 IMPAIRED FASTING GLUCOSE: ICD-10-CM

## 2024-05-03 DIAGNOSIS — D50.0 IRON DEFICIENCY ANEMIA DUE TO CHRONIC BLOOD LOSS: ICD-10-CM

## 2024-05-03 LAB
ALBUMIN SERPL BCP-MCNC: 4.4 G/DL (ref 3.5–5)
ALP SERPL-CCNC: 36 U/L (ref 34–104)
ALT SERPL W P-5'-P-CCNC: 13 U/L (ref 7–52)
ANION GAP SERPL CALCULATED.3IONS-SCNC: 9 MMOL/L (ref 4–13)
AST SERPL W P-5'-P-CCNC: 19 U/L (ref 13–39)
BASOPHILS # BLD AUTO: 0.03 THOUSANDS/ÂΜL (ref 0–0.1)
BASOPHILS NFR BLD AUTO: 0 % (ref 0–1)
BILIRUB SERPL-MCNC: 0.57 MG/DL (ref 0.2–1)
BUN SERPL-MCNC: 31 MG/DL (ref 5–25)
CALCIUM SERPL-MCNC: 9.4 MG/DL (ref 8.4–10.2)
CHLORIDE SERPL-SCNC: 105 MMOL/L (ref 96–108)
CHOLEST SERPL-MCNC: 130 MG/DL
CO2 SERPL-SCNC: 30 MMOL/L (ref 21–32)
CREAT SERPL-MCNC: 1.85 MG/DL (ref 0.6–1.3)
EOSINOPHIL # BLD AUTO: 0.35 THOUSAND/ÂΜL (ref 0–0.61)
EOSINOPHIL NFR BLD AUTO: 5 % (ref 0–6)
ERYTHROCYTE [DISTWIDTH] IN BLOOD BY AUTOMATED COUNT: 12 % (ref 11.6–15.1)
FERRITIN SERPL-MCNC: 119 NG/ML (ref 24–336)
GFR SERPL CREATININE-BSD FRML MDRD: 33 ML/MIN/1.73SQ M
GLUCOSE SERPL-MCNC: 91 MG/DL (ref 65–140)
HCT VFR BLD AUTO: 43.5 % (ref 36.5–49.3)
HDLC SERPL-MCNC: 34 MG/DL
HGB BLD-MCNC: 13.9 G/DL (ref 12–17)
IMM GRANULOCYTES # BLD AUTO: 0.02 THOUSAND/UL (ref 0–0.2)
IMM GRANULOCYTES NFR BLD AUTO: 0 % (ref 0–2)
IRON SATN MFR SERPL: 24 % (ref 15–50)
IRON SERPL-MCNC: 74 UG/DL (ref 50–212)
LDLC SERPL CALC-MCNC: 60 MG/DL (ref 0–100)
LYMPHOCYTES # BLD AUTO: 1.4 THOUSANDS/ÂΜL (ref 0.6–4.47)
LYMPHOCYTES NFR BLD AUTO: 20 % (ref 14–44)
MAGNESIUM SERPL-MCNC: 1.8 MG/DL (ref 1.9–2.7)
MCH RBC QN AUTO: 32 PG (ref 26.8–34.3)
MCHC RBC AUTO-ENTMCNC: 32 G/DL (ref 31.4–37.4)
MCV RBC AUTO: 100 FL (ref 82–98)
MONOCYTES # BLD AUTO: 0.41 THOUSAND/ÂΜL (ref 0.17–1.22)
MONOCYTES NFR BLD AUTO: 6 % (ref 4–12)
NEUTROPHILS # BLD AUTO: 4.93 THOUSANDS/ÂΜL (ref 1.85–7.62)
NEUTS SEG NFR BLD AUTO: 69 % (ref 43–75)
NONHDLC SERPL-MCNC: 96 MG/DL
NRBC BLD AUTO-RTO: 0 /100 WBCS
PHOSPHATE SERPL-MCNC: 3.6 MG/DL (ref 2.3–4.1)
PLATELET # BLD AUTO: 249 THOUSANDS/UL (ref 149–390)
PMV BLD AUTO: 9.2 FL (ref 8.9–12.7)
POTASSIUM SERPL-SCNC: 5.5 MMOL/L (ref 3.5–5.3)
PROT SERPL-MCNC: 6.8 G/DL (ref 6.4–8.4)
RBC # BLD AUTO: 4.35 MILLION/UL (ref 3.88–5.62)
SL AMB  POCT GLUCOSE, UA: NORMAL
SL AMB LEUKOCYTE ESTERASE,UA: NORMAL
SL AMB POCT BILIRUBIN,UA: NORMAL
SL AMB POCT BLOOD,UA: NORMAL
SL AMB POCT CLARITY,UA: CLEAR
SL AMB POCT COLOR,UA: NORMAL
SL AMB POCT KETONES,UA: NORMAL
SL AMB POCT NITRITE,UA: NORMAL
SL AMB POCT PH,UA: 7.5
SL AMB POCT SPECIFIC GRAVITY,UA: 1.01
SL AMB POCT UROBILINOGEN: NORMAL
SODIUM SERPL-SCNC: 144 MMOL/L (ref 135–147)
TIBC SERPL-MCNC: 311 UG/DL (ref 250–450)
TRIGL SERPL-MCNC: 179 MG/DL
UIBC SERPL-MCNC: 237 UG/DL (ref 155–355)
WBC # BLD AUTO: 7.14 THOUSAND/UL (ref 4.31–10.16)

## 2024-05-03 PROCEDURE — 80053 COMPREHEN METABOLIC PANEL: CPT | Performed by: INTERNAL MEDICINE

## 2024-05-03 PROCEDURE — 81003 URINALYSIS AUTO W/O SCOPE: CPT | Performed by: STUDENT IN AN ORGANIZED HEALTH CARE EDUCATION/TRAINING PROGRAM

## 2024-05-03 PROCEDURE — 85025 COMPLETE CBC W/AUTO DIFF WBC: CPT | Performed by: INTERNAL MEDICINE

## 2024-05-03 PROCEDURE — 83036 HEMOGLOBIN GLYCOSYLATED A1C: CPT | Performed by: INTERNAL MEDICINE

## 2024-05-03 PROCEDURE — 83540 ASSAY OF IRON: CPT

## 2024-05-03 PROCEDURE — 80061 LIPID PANEL: CPT | Performed by: INTERNAL MEDICINE

## 2024-05-03 PROCEDURE — 36415 COLL VENOUS BLD VENIPUNCTURE: CPT | Performed by: INTERNAL MEDICINE

## 2024-05-03 PROCEDURE — 83735 ASSAY OF MAGNESIUM: CPT

## 2024-05-03 PROCEDURE — G2211 COMPLEX E/M VISIT ADD ON: HCPCS | Performed by: STUDENT IN AN ORGANIZED HEALTH CARE EDUCATION/TRAINING PROGRAM

## 2024-05-03 PROCEDURE — 84100 ASSAY OF PHOSPHORUS: CPT

## 2024-05-03 PROCEDURE — 99215 OFFICE O/P EST HI 40 MIN: CPT | Performed by: STUDENT IN AN ORGANIZED HEALTH CARE EDUCATION/TRAINING PROGRAM

## 2024-05-03 PROCEDURE — 82728 ASSAY OF FERRITIN: CPT

## 2024-05-03 PROCEDURE — 83550 IRON BINDING TEST: CPT

## 2024-05-03 RX ORDER — TRAZODONE HYDROCHLORIDE 50 MG/1
50 TABLET ORAL
Qty: 30 TABLET | Refills: 1 | Status: SHIPPED | OUTPATIENT
Start: 2024-05-03

## 2024-05-03 NOTE — PATIENT INSTRUCTIONS
Purchase BP cuff for upper arm. Start checking 2-4x a week.     No med changes at this time for his BP, but will dec trazodone to 50 mg only at bedtime.     Continue with tylenol for pain, max dose 3000 mg per day for L wrist surgery pain.

## 2024-05-03 NOTE — TELEPHONE ENCOUNTER
Patient came in very early for his appointment stating he was dizzy.    Dr Meade called daughter to discuss the matter with her.     Janessa returned the call and stated that he had fallen this morning and possibly injured his arm but there was no worry that he had hit his head or had any real concern that he was injured more than that.    She stated he is fine to drive himself home and come back later at his apt time.

## 2024-05-03 NOTE — PROGRESS NOTES
"[facility]   Brooklyn Meade DO    Assessment/Plan:      Diagnosis ICD-10-CM Associated Orders   1. Altered mental status, unspecified altered mental status type  R41.82 Basic metabolic panel     Magnesium     POCT urine dip auto non-scope      2. Insomnia, unspecified type  G47.00 traZODone (DESYREL) 50 mg tablet      3. Fall, initial encounter  W19.XXXA       4. Abrasion of left arm, initial encounter  S40.812A         UA normal in office.   BW done today, will wait for results.   Recheck BP.   Purchase BP cuff for upper arm. Start checking 2-4x a week.   No med changes at this time for his BP, but will dec ttrazodone to 50 mg only at bedtime.   Continue with tylenol for pain, max dose 3000 mg per day for L wrist surgery pain.   Return in about 4 weeks (around 5/31/2024) for F/U Chronic Conditions, BP, sleep issues, dizzy spells - PCP .  Patient may call or return to office with any questions or concerns.   ______________________________________________________________________  Subjective:     Patient ID: Nj Pimentel Jr. is a 79 y.o. male.  Nj Pimentel Jr.  Chief Complaint   Patient presents with   • Dizziness     One episode of dizziness. Thinks it was from Trazodone, so he decreased the dose.  Also has skin tag under right arm.      Seen by PCP last week.   \"Did not ever increase Trazodone as he was afraid he would be too sleepy - notes no issues with daytime fatigue but also admits he has slept till 10 am the past few days (had surgery on Tue), urged to STOP Unisom and increase Trazodone from 150 mg to 300 mg, if he has SE then call and would wean off and trial WBXK or Buspar or Seroquel, on max Lexapro, will follow\"     States he stopped & went back to old tablets of 50 mg that he had & slept fine last night.     Per pt & his daughter over the phone today he fell and landed on L hand, has scratch.   Fell walking out of diner this morning, on black top. Mechanical trip on curb. "   Friends came out & helped him.   Does get L arm pain.     Dizziness brief episodes yesterday and minimal this am.     Down 6 lbs in one week, 9 lbs since one month.   Watching what he eats, eating fruit & veggies.   Diner today - soft boiled eggs & [place].     Of note called daughter earlier & discussed for 20 mins about his health & coming today 4 hours early for appt. She never knew he had scheduled.     Wt Readings from Last 3 Encounters:   05/09/24 98.4 kg (217 lb)   05/03/24 98.4 kg (217 lb)   04/25/24 102 kg (223 lb 12.8 oz)     Temp Readings from Last 3 Encounters:   05/09/24 97.6 °F (36.4 °C)   05/04/24 98 °F (36.7 °C) (Oral)   04/25/24 98.8 °F (37.1 °C) (Tympanic)     BP Readings from Last 3 Encounters:   05/09/24 130/78   05/04/24 122/65   05/03/24 136/58     Pulse Readings from Last 3 Encounters:   05/09/24 (!) 49   05/04/24 60   05/03/24 (!) 46      The following portions of the patient's history were reviewed and updated as appropriate: allergies, current medications, past medical history, and problem list.    Review of Systems   Constitutional:  Negative for chills and fever.   Respiratory:  Positive for cough (wet, bringing up yellow phlegm). Negative for shortness of breath.    Cardiovascular:  Negative for chest pain, palpitations and leg swelling.   Skin:  Positive for rash and wound (L elbow).        Surgical incision L wrist volar   Neurological:  Positive for dizziness. Negative for light-headedness and headaches.       Objective:      Vitals:    05/03/24 1657   BP: 136/58   Pulse:    SpO2:      Rpt /58     Physical Exam  Vitals and nursing note reviewed.   Constitutional:       General: He is not in acute distress.     Appearance: Normal appearance. He is not ill-appearing.      Comments: Overweight   HENT:      Head: Normocephalic and atraumatic.   Eyes:      General: No scleral icterus.        Right eye: No discharge.         Left eye: No discharge.   Cardiovascular:      Rate and  "Rhythm: Normal rate and regular rhythm.      Pulses: Normal pulses.      Heart sounds: Murmur heard.   Pulmonary:      Effort: Pulmonary effort is normal. No respiratory distress.      Breath sounds: Normal breath sounds. No stridor. No wheezing.      Comments: Rare coughing, slightly wet sounding, upper airways only  Musculoskeletal:      Cervical back: Normal range of motion and neck supple. No rigidity or tenderness.      Right lower leg: No edema.      Left lower leg: No edema.   Lymphadenopathy:      Cervical: No cervical adenopathy.   Skin:     Findings: Erythema and rash (L elbow, scrape & L knee mild.) present.   Neurological:      Mental Status: He is alert and oriented to person, place, and time.      Gait: Gait normal.   Psychiatric:         Mood and Affect: Mood normal.         Behavior: Behavior normal.         Thought Content: Thought content normal.         Judgment: Judgment normal.         Portions of the record may have been created with voice recognition software. Occasional wrong word or \"sound alike\" substitutions may have occurred due to the inherent limitations of voice recognition software. Please review the chart carefully and recognize, using context, where substitutions/typographical errors may have occurred.     "

## 2024-05-04 ENCOUNTER — HOSPITAL ENCOUNTER (EMERGENCY)
Facility: HOSPITAL | Age: 79
Discharge: HOME | End: 2024-05-04
Attending: EMERGENCY MEDICINE
Payer: COMMERCIAL

## 2024-05-04 VITALS
TEMPERATURE: 98 F | OXYGEN SATURATION: 98 % | SYSTOLIC BLOOD PRESSURE: 122 MMHG | HEART RATE: 60 BPM | DIASTOLIC BLOOD PRESSURE: 65 MMHG | RESPIRATION RATE: 20 BRPM

## 2024-05-04 DIAGNOSIS — L91.8 INFLAMED ACROCHORDON: Primary | ICD-10-CM

## 2024-05-04 LAB
EST. AVERAGE GLUCOSE BLD GHB EST-MCNC: 126 MG/DL
HBA1C MFR BLD: 6 %

## 2024-05-04 PROCEDURE — 99284 EMERGENCY DEPT VISIT MOD MDM: CPT | Performed by: PHYSICIAN ASSISTANT

## 2024-05-04 PROCEDURE — 99283 EMERGENCY DEPT VISIT LOW MDM: CPT

## 2024-05-04 PROCEDURE — 11200 RMVL SKIN TAGS UP TO&INC 15: CPT | Performed by: PHYSICIAN ASSISTANT

## 2024-05-04 RX ORDER — LIDOCAINE HYDROCHLORIDE 10 MG/ML
5 INJECTION, SOLUTION EPIDURAL; INFILTRATION; INTRACAUDAL; PERINEURAL ONCE
Status: COMPLETED | OUTPATIENT
Start: 2024-05-04 | End: 2024-05-04

## 2024-05-04 RX ADMIN — LIDOCAINE HYDROCHLORIDE 5 ML: 10 INJECTION, SOLUTION EPIDURAL; INFILTRATION; INTRACAUDAL; PERINEURAL at 14:18

## 2024-05-04 NOTE — DISCHARGE INSTRUCTIONS
Apply Vaseline to wound once a day until fully healed.  Clean wound with soap and water once a day.  Return to the emergency department immediately for signs of infection.

## 2024-05-04 NOTE — ED PROVIDER NOTES
History  Chief Complaint   Patient presents with    Medical Problem     Pt reports a growth on his right arm that he noticed two days ago. Denies any fevers. No redness or swelling noted.      Patient is a 79-year-old male presenting to emergency department for evaluation of tender, raised lesion under right axilla.  Patient notes that the lesion has been present for many years without any bother or changes, however, he recently caught it on his shirt while dressing which caused it to be irritated.  Patient has no other complaints at this time.  Denies fever, chest pain, dyspnea, and rash.      History provided by:  Patient   used: No    Medical Problem  Associated symptoms: no abdominal pain, no chest pain, no cough, no ear pain, no fever, no nausea, no rash, no shortness of breath, no sore throat and no vomiting        Prior to Admission Medications   Prescriptions Last Dose Informant Patient Reported? Taking?   Potassium Citrate ER 15 MEQ (1620 MG) TBCR  Self No No   Sig: Take 2 tablets by mouth 2 (two) times a day   aspirin (ECOTRIN LOW STRENGTH) 81 mg EC tablet  Self Yes No   Sig: Take 81 mg by mouth every other day 1 every other day   atorvastatin (LIPITOR) 40 mg tablet  Self No No   Sig: take 1 tablet by mouth every evening   clopidogrel (PLAVIX) 75 mg tablet  Self No No   Sig: Take 1 tablet (75 mg total) by mouth daily   escitalopram (LEXAPRO) 20 mg tablet  Self No No   Sig: Take 1 tablet (20 mg total) by mouth daily   ferrous sulfate 324 (65 Fe) mg  Self No No   Sig: Take 1 tablet (324 mg total) by mouth daily before breakfast   finasteride (PROSCAR) 5 mg tablet   No No   Sig: Take 1 tablet by mouth once daily   gabapentin (Neurontin) 400 mg capsule  Self No No   Sig: Take 1 capsule (400 mg total) by mouth 3 (three) times a day   lisinopril (ZESTRIL) 10 mg tablet  Self No No   Sig: Take 1 tablet (10 mg total) by mouth daily   metoprolol succinate (TOPROL-XL) 25 mg 24 hr tablet  Self No  No   Sig: Take 1 tablet (25 mg total) by mouth daily   omeprazole (PriLOSEC) 20 mg delayed release capsule  Self No No   Sig: take 1 capsule by mouth once daily   tamsulosin (FLOMAX) 0.4 mg  Self No No   Sig: Take 1 capsule (0.4 mg total) by mouth daily with dinner   traZODone (DESYREL) 50 mg tablet   No No   Sig: Take 1 tablet (50 mg total) by mouth daily at bedtime      Facility-Administered Medications: None       Past Medical History:   Diagnosis Date    Alcoholism (HCC)     Anemia     iron def    Anxiety     Arthritis     Cerebrovascular accident (CVA) (HCC) 01/07/2020    memory issues    Coronary artery disease     GERD (gastroesophageal reflux disease)     Gout     High cholesterol     Hypertension     Insomnia     Kidney stone     Stroke (HCC)        Past Surgical History:   Procedure Laterality Date    ANKLE SURGERY Right     CARDIAC CATHETERIZATION      no findings    COLONOSCOPY  01/25/2013    polypectomy; complete - 3 yrs d/t polyp - benign submucosal lipoma- path c/w lipoma    COLONOSCOPY  04/25/2017    complete - tubular adenoma - repeat 5yrs    CYSTOSCOPY      CYSTOTOMY      bladder  w/ basket extraction of calculus    FEMUR FRACTURE SURGERY      HERNIA REPAIR      inguinal ; sliding    INGUINAL HERNIA REPAIR Right     unilateral    IR LOWER EXTREMITY ANGIOGRAM  4/24/2023    KNEE ARTHROSCOPY Right     w/medial menisectomy    LASIK      corneal    LITHOTRIPSY      renal    CT COLONOSCOPY FLX DX W/COLLJ SPEC WHEN PFRMD N/A 04/25/2017    Procedure: SCREENING COLONOSCOPY;  Surgeon: Paul Jacome MD;  Location: [facility];  Service: General    CT SLCTV CATHJ 3RD+ ORD SLCTV ABDL PEL/LXTR BRNCH Right 4/24/2023    Procedure: ARTERIOGRAM;  Surgeon: Alivia Blum MD;  [facility];  Service: Vascular    CT TEAEC W/PATCH GRF CAROTID VERTB SUBCLAV NECK INC Right 01/10/2020    Procedure: ENDARTERECTOMY ARTERY CAROTID;  Surgeon: Aaron Smith MD;  Location: [facility];  Service: Vascular    CT TEAEC W/WO  PATCH GRAFT COMMON FEMORAL Right 2023    Procedure: ENDARTERECTOMY ARTERIAL FEMORAL, RETROGRADE ILIAC INTERVENTION;  Surgeon: Alivia Blum MD;  [facility];  Service: Vascular    ROTATOR CUFF REPAIR Bilateral     TONSILLECTOMY         Family History   Problem Relation Age of Onset    Alzheimer's disease Mother     Alzheimer's disease Father     Heart disease Father         CAD    Other Father         prediabetes    Diabetes Father     Breast cancer Other     Arthritis Family     Hypertension Family      I have reviewed and agree with the history as documented.    E-Cigarette/Vaping    E-Cigarette Use Never User      E-Cigarette/Vaping Substances    Nicotine No     THC No     CBD No     Flavoring No     Other No     Unknown No      Social History     Tobacco Use    Smoking status: Former     Current packs/day: 0.00     Average packs/day: 1 pack/day for 22.0 years (22.0 ttl pk-yrs)     Types: Cigarettes     Start date:      Quit date: 1985     Years since quittin.3    Smokeless tobacco: Never   Vaping Use    Vaping status: Never Used   Substance Use Topics    Alcohol use: Not Currently     Comment: stopped drinking alcohol; AA x23yrs sober    Drug use: No       Review of Systems   Constitutional:  Negative for chills and fever.   HENT:  Negative for ear pain and sore throat.    Eyes:  Negative for pain and visual disturbance.   Respiratory:  Negative for cough and shortness of breath.    Cardiovascular:  Negative for chest pain and palpitations.   Gastrointestinal:  Negative for abdominal pain, nausea and vomiting.   Genitourinary:  Negative for dysuria and frequency.   Musculoskeletal:  Negative for back pain and neck pain.   Skin:  Negative for color change and rash.   Neurological:  Negative for weakness and numbness.   All other systems reviewed and are negative.      Physical Exam  Physical Exam  Vitals and nursing note reviewed.   Constitutional:       General: He is not in acute  distress.     Appearance: He is well-developed.   HENT:      Head: Normocephalic and atraumatic.   Eyes:      Conjunctiva/sclera: Conjunctivae normal.   Cardiovascular:      Rate and Rhythm: Normal rate and regular rhythm.      Heart sounds: No murmur heard.  Pulmonary:      Effort: Pulmonary effort is normal. No respiratory distress.      Breath sounds: Normal breath sounds.   Abdominal:      Palpations: Abdomen is soft.      Tenderness: There is no abdominal tenderness.   Musculoskeletal:         General: No swelling.      Cervical back: Neck supple.   Skin:     General: Skin is warm and dry.      Capillary Refill: Capillary refill takes less than 2 seconds.          Neurological:      Mental Status: He is alert.   Psychiatric:         Mood and Affect: Mood normal.         Vital Signs  ED Triage Vitals [05/04/24 1355]   Temperature Pulse Respirations Blood Pressure SpO2   98 °F (36.7 °C) 60 20 122/65 98 %      Temp Source Heart Rate Source Patient Position - Orthostatic VS BP Location FiO2 (%)   Oral Monitor Sitting Left arm --      Pain Score       No Pain           Vitals:    05/04/24 1355   BP: 122/65   Pulse: 60   Patient Position - Orthostatic VS: Sitting         Visual Acuity      ED Medications  Medications   lidocaine (PF) (XYLOCAINE-MPF) 1 % injection 5 mL (5 mL Infiltration Given by Other 5/4/24 1926)       Diagnostic Studies  Results Reviewed       None                   No orders to display              Procedures  General Procedure    Date/Time: 5/4/2024 2:39 PM    Performed by: Stevie Serrato PA-C  Authorized by: Stevie Serrato PA-C    Patient location:  ED  Assisting Provider(s): No    Consent:     Consent obtained:  Verbal    Consent given by:  Patient    Risks discussed:  Bleeding, infection, pain, poor cosmetic result and nerve damage    Alternatives discussed:  No treatment  Universal protocol:     Procedure explained and questions answered to patient or proxy's satisfaction: yes     Pre-procedure details:     Skin preparation:  ChloraPrep  Anesthesia (see MAR for exact dosages):     Anesthesia method:  Local infiltration    Local anesthetic:  Lidocaine 1% w/o epi  Procedure Detail:     Procedure note (site, laterality, method, findings):  Area cleaned with chloraprep. After local anesthetic was injected the inflamed acrochordon located in the right axilla was removed with forceps and scissors. Cautery pen was then used to stop the mild bleeding that was present. Then dressing and bandaid was placed.   Post-procedure details:     Patient tolerance of procedure:  Tolerated well, no immediate complications           ED Course                                             Medical Decision Making  Patient was evaluated by Dr. Wilkinson, ED attending, who agrees with dx and tx plan.    Risk  Prescription drug management.             Disposition  Final diagnoses:   Inflamed acrochordon     Time reflects when diagnosis was documented in both MDM as applicable and the Disposition within this note       Time User Action Codes Description Comment    5/4/2024  2:25 PM Stevie Serrato Add [L91.8] Inflamed acrochordon           ED Disposition       ED Disposition   Discharge    Condition   Stable    Date/Time   Sat May 4, 2024  2:25 PM    Comment   Nj Pimentel Jr. discharge to home/self care.                   Follow-up Information       Follow up With Specialties Details Why Contact Info    Dona Camp,  Internal Medicine, Family Medicine   [address] [place] [id number] [phone]              Patient's Medications   Discharge Prescriptions    No medications on file       No discharge procedures on file.    PDMP Review         Value Time User    PDMP Reviewed  Yes 4/26/2023  9:46 AM Isamar Martin PA-C            ED Provider  Electronically Signed by             Stevie Serrato PA-C  05/04/24 [id number]

## 2024-05-05 DIAGNOSIS — E83.42 HYPOMAGNESEMIA: Primary | ICD-10-CM

## 2024-05-05 DIAGNOSIS — E87.5 HYPERKALEMIA: ICD-10-CM

## 2024-05-05 RX ORDER — VITS A,C,E/LUTEIN/MINERALS 300MCG-200
1 TABLET ORAL DAILY
Qty: 30 TABLET | Refills: 2 | Status: SHIPPED | OUTPATIENT
Start: 2024-05-05

## 2024-05-06 ENCOUNTER — TELEPHONE (OUTPATIENT)
Dept: NEPHROLOGY | Facility: CLINIC | Age: 79
End: 2024-05-06

## 2024-05-06 DIAGNOSIS — N18.31 STAGE 3A CHRONIC KIDNEY DISEASE (HCC): Primary | ICD-10-CM

## 2024-05-06 NOTE — TELEPHONE ENCOUNTER
----- Message from BLAS Hdez sent at 5/6/2024  8:19 AM EDT -----  Please call patient let him know I reviewed his most recent lab work.  His potassium level remains mildly elevated at 5.5.  I would like him to discontinue his potassium citrate pills for now, and repeat a renal function panel in 10 days to reassess his potassium value

## 2024-05-07 ENCOUNTER — PATIENT OUTREACH (OUTPATIENT)
Dept: CASE MANAGEMENT | Facility: OTHER | Age: 79
End: 2024-05-07

## 2024-05-07 ENCOUNTER — TELEPHONE (OUTPATIENT)
Dept: FAMILY MEDICINE CLINIC | Facility: HOSPITAL | Age: 79
End: 2024-05-07

## 2024-05-07 ENCOUNTER — VBI (OUTPATIENT)
Dept: ADMINISTRATIVE | Facility: OTHER | Age: 79
End: 2024-05-07

## 2024-05-07 DIAGNOSIS — Z71.89 COMPLEX CARE COORDINATION: Primary | ICD-10-CM

## 2024-05-07 NOTE — TELEPHONE ENCOUNTER
Indigo, daughter is aware of changes and potassium citrate has been stopped.   Daughter is requesting a call back from Abelardo after labs are final in the next 10 days with any recommendations.

## 2024-05-07 NOTE — PROGRESS NOTES
Outpatient Care Management Note:  In basket referral received after recent ER visit.  Chart review completed, outreach call scheduled.

## 2024-05-07 NOTE — TELEPHONE ENCOUNTER
Lm for Skip x2 to stop his potassium citrate due to elevated potassium of 5.5. RFP was mailed yesterday.  I advised pt to call the office to verify he understood message.

## 2024-05-07 NOTE — TELEPHONE ENCOUNTER
05/07/24 12:36 PM    Patient contacted post ED visit, [facility] department spoke with patient/caregiver and outreach was successful.    Thank you.  Marquita Huynh  [facility]

## 2024-05-07 NOTE — TELEPHONE ENCOUNTER
----- Message from Dona Camp DO sent at 5/5/2024  7:49 PM EDT -----  Please notify pt that his labs were not supposed to be done until July but results were reviewed - blood count/iron were wnl, potassium was up and kidney tests were again up as well, cholesterol was mildly elevated and his sugar was again in the prediabetes range.  His magnesium was again low.  I have sent a magnesium supplement to his pharmacy.  We need to repeat kidney tests, mag levels, and potassium in 1 wk (NONFASTING) orders in Epic.

## 2024-05-08 ENCOUNTER — PATIENT OUTREACH (OUTPATIENT)
Dept: CASE MANAGEMENT | Facility: OTHER | Age: 79
End: 2024-05-08

## 2024-05-08 NOTE — PROGRESS NOTES
Outpatient Care Management Note.  Outreach call placed to Mr. Pimentel.  Number is for daughter,  Indigo. Introduced myself and my role.        Current Symptoms:  Indigo states her father has not voiced any concerns about the area where the skin tag was removed.     Medications Obtained:  No medications were prescribed in the ER.     Follow up Appointments:  None scheduled at this time.  Does have an appointment with PCP on 6/3/24 that was scheduled prior to the ER visit.     Transportation Issues:  Mr. Pimentel drives himself to appointments.     Any Assistance Required:  Per Indigo, her father is having some cognitive changes affecting his short term memory.  He has a friend who lives with him most of the time.  He uses only a microwave for cooking.  Discussed a referral to  CM to explore possible assistance such as MOW's or county assistance.  She does not feel that is needed at this time.  Made her aware that a referral for a SW CM can be requested from his PCP when she feels it is needed.  Indigo was appreciative of the information.

## 2024-05-09 ENCOUNTER — OFFICE VISIT (OUTPATIENT)
Dept: PAIN MEDICINE | Facility: CLINIC | Age: 79
End: 2024-05-09
Payer: COMMERCIAL

## 2024-05-09 VITALS
HEART RATE: 49 BPM | DIASTOLIC BLOOD PRESSURE: 78 MMHG | SYSTOLIC BLOOD PRESSURE: 130 MMHG | HEIGHT: 70 IN | TEMPERATURE: 97.6 F | WEIGHT: 217 LBS | BODY MASS INDEX: 31.07 KG/M2

## 2024-05-09 DIAGNOSIS — M25.552 LEFT HIP PAIN: ICD-10-CM

## 2024-05-09 DIAGNOSIS — M47.816 LUMBAR SPONDYLOSIS: Primary | ICD-10-CM

## 2024-05-09 PROCEDURE — G2211 COMPLEX E/M VISIT ADD ON: HCPCS | Performed by: PHYSICIAN ASSISTANT

## 2024-05-09 PROCEDURE — 99214 OFFICE O/P EST MOD 30 MIN: CPT | Performed by: PHYSICIAN ASSISTANT

## 2024-05-09 NOTE — PROGRESS NOTES
Assessment:  1. Lumbar spondylosis    2. Left hip pain        Plan:  While the patient was in the office today, I did have a thorough conversation regarding their chronic pain syndrome, medication management, and treatment plan options.    After discussing options, I have recommended the patient proceed with a left intra-articular hip injection to address the hip and groin pain he is experiencing.  X-ray of the left hip showed mild osteoarthritis.    The patient's low back pain persists despite time, relative rest, activity modification and therapy. Based on the patient's symptoms examination, I suspect that the pain is being generated by the lumbar facet joints. The facet joints are only one of many possible low-back pain generators. Unfortunately, studies have demonstrated that history and examination alone are unreliable. We will schedule the patient for diagnostic left L3-5 lumbar medial branch blockade using the double block paradigm.  If the patient demonstrates appropriate response to medial branch blockade we will schedule for radiofrequency ablation to provide long-term relief. Complete risks and benefits including bleeding, infection, tissue reaction, nerve injury and allergic reaction were discussed. The approach was demonstrated using models and literature was provided. Verbal and written consent was obtained.    The patient has been experiencing moderate to severe axial spine pain that is causing functional deficit.  The pain has been present for at least 3 months and is not improving with conservative care.  Currently the patient is not experiencing any radicular features nor neurogenic claudication.  Non-facet pathology has been ruled out on clinical evaluation.    The patient was advised to contact the office should their symptoms worsen in the interim. The patient was agreeable and verbalized an understanding.        History of Present Illness:    The patient is a 79 y.o. male last seen on 4/11/2024  who presents for a follow up office visit in regards to chronic low back pain and left hip pain.  The patient currently reports persistent pain in the left low back as well as the left hip, left groin and medial thigh.  The pain does not go below the level of the knee.  He rates his current overall pain a 6 out of 10 and describes it as an intermittent dull and aching pain.  Pain is made significantly worse with standing, walking and prolonged sitting.  He reports difficulty bearing weight on the left leg at times.  Patient underwent 2 left L4 and L5 transforaminal epidural steroid injections and reported no improvement.  He is attending chiropractic therapy regularly which gives some partial and temporary relief has previously attended physical therapy with no reported benefit.  He presents today to discuss treatment options.    I have personally reviewed and/or updated the patient's past medical history, past surgical history, family history, social history, current medications, allergies, and vital signs today.       Review of Systems:    Review of Systems   Respiratory:  Negative for shortness of breath.    Cardiovascular:  Negative for chest pain.   Gastrointestinal:  Negative for constipation, diarrhea, nausea and vomiting.   Musculoskeletal:  Negative for arthralgias, gait problem, joint swelling and myalgias.   Skin:  Negative for rash.   Neurological:  Positive for dizziness. Negative for seizures and weakness.   All other systems reviewed and are negative.        Past Medical History:   Diagnosis Date   • Alcoholism ([facility])    • Anemia     iron def   • Anxiety    • Arthritis    • Cerebrovascular accident (CVA) ([facility]) 01/07/2020    memory issues   • Coronary artery disease    • GERD (gastroesophageal reflux disease)    • Gout    • High cholesterol    • Hypertension    • Insomnia    • Kidney stone    • Stroke ([facility])        Past Surgical History:   Procedure Laterality Date   • ANKLE SURGERY Right    • CARDIAC  CATHETERIZATION      no findings   • COLONOSCOPY  01/25/2013    polypectomy; complete - 3 yrs d/t polyp - benign submucosal lipoma- path c/w lipoma   • COLONOSCOPY  04/25/2017    complete - tubular adenoma - repeat 5yrs   • CYSTOSCOPY     • CYSTOTOMY      bladder  w/ basket extraction of calculus   • FEMUR FRACTURE SURGERY     • HERNIA REPAIR      inguinal ; sliding   • INGUINAL HERNIA REPAIR Right     unilateral   • IR LOWER EXTREMITY ANGIOGRAM  4/24/2023   • KNEE ARTHROSCOPY Right     w/medial menisectomy   • LASIK      corneal   • LITHOTRIPSY      renal   • DC COLONOSCOPY FLX DX W/COLLJ SPEC WHEN PFRMD N/A 04/25/2017    Procedure: SCREENING COLONOSCOPY;  Surgeon: Paul Jacome MD;  [facility];  Service: General   • DC SLCTV CATHJ 3RD+ ORD SLCTV ABDL PEL/LXTR BRNCH Right 4/24/2023    Procedure: ARTERIOGRAM;  Surgeon: Alivia Blum MD;  [facility];  Service: Vascular   • DC TEAEC W/PATCH GRF CAROTID VERTB SUBCLAV NECK INC Right 01/10/2020    Procedure: ENDARTERECTOMY ARTERY CAROTID;  Surgeon: Aaron Smith MD;  Location[facility];  Service: Vascular   • DC TEAEC W/WO PATCH GRAFT COMMON FEMORAL Right 4/24/2023    Procedure: ENDARTERECTOMY ARTERIAL FEMORAL, RETROGRADE ILIAC INTERVENTION;  Surgeon: Alivia Blum MD;  [facility];  Service: Vascular   • ROTATOR CUFF REPAIR Bilateral    • TONSILLECTOMY         Family History   Problem Relation Age of Onset   • Alzheimer's disease Mother    • Alzheimer's disease Father    • Heart disease Father         CAD   • Other Father         prediabetes   • Diabetes Father    • Breast cancer Other    • Arthritis Family    • Hypertension Family        Social History     Occupational History   • Occupation: Retired     Comment: working full time   Tobacco Use   • Smoking status: Former     Current packs/day: 0.00     Average packs/day: 1 pack/day for 22.0 years (22.0 ttl pk-yrs)     Types: Cigarettes     Start date: 1972     Quit date: 1/1/1985     Years  since quittin.3   • Smokeless tobacco: Never   Vaping Use   • Vaping status: Never Used   Substance and Sexual Activity   • Alcohol use: Not Currently     Comment: stopped drinking alcohol; AA x23yrs sober   • Drug use: No   • Sexual activity: Not Currently     Partners: Female         Current Outpatient Medications:   •  aspirin (ECOTRIN LOW STRENGTH) 81 mg EC tablet, Take 81 mg by mouth every other day 1 every other day, Disp: , Rfl:   •  atorvastatin (LIPITOR) 40 mg tablet, take 1 tablet by mouth every evening, Disp: 90 tablet, Rfl: 3  •  clopidogrel (PLAVIX) 75 mg tablet, Take 1 tablet (75 mg total) by mouth daily, Disp: 90 tablet, Rfl: 2  •  escitalopram (LEXAPRO) 20 mg tablet, Take 1 tablet (20 mg total) by mouth daily, Disp: 90 tablet, Rfl: 2  •  ferrous sulfate 324 (65 Fe) mg, Take 1 tablet (324 mg total) by mouth daily before breakfast, Disp: 30 tablet, Rfl: 2  •  finasteride (PROSCAR) 5 mg tablet, Take 1 tablet by mouth once daily, Disp: 90 tablet, Rfl: 1  •  gabapentin (Neurontin) 400 mg capsule, Take 1 capsule (400 mg total) by mouth 3 (three) times a day, Disp: 270 capsule, Rfl: 1  •  lisinopril (ZESTRIL) 10 mg tablet, Take 1 tablet (10 mg total) by mouth daily, Disp: 90 tablet, Rfl: 1  •  Magnesium Oxide -Mg Supplement (Mag-Oxide) 200 MG TABS, Take 1 tablet (200 mg total) by mouth in the morning, Disp: 30 tablet, Rfl: 2  •  metoprolol succinate (TOPROL-XL) 25 mg 24 hr tablet, Take 1 tablet (25 mg total) by mouth daily, Disp: 90 tablet, Rfl: 2  •  omeprazole (PriLOSEC) 20 mg delayed release capsule, take 1 capsule by mouth once daily, Disp: 90 capsule, Rfl: 2  •  tamsulosin (FLOMAX) 0.4 mg, Take 1 capsule (0.4 mg total) by mouth daily with dinner, Disp: 90 capsule, Rfl: 1  •  traZODone (DESYREL) 50 mg tablet, Take 1 tablet (50 mg total) by mouth daily at bedtime, Disp: 30 tablet, Rfl: 1  •  Potassium Citrate ER 15 MEQ (1620 MG) TBCR, Take 2 tablets by mouth 2 (two) times a day (Patient not taking:  "Reported on 5/9/2024), Disp: 360 tablet, Rfl: 3    No Known Allergies    Physical Exam:    /78 (BP Location: Left arm, Patient Position: Sitting, Cuff Size: Standard)   Pulse (!) 49   Temp 97.6 °F (36.4 °C)   Ht 5' 10\" (1.778 m)   Wt 98.4 kg (217 lb)   BMI 31.14 kg/m²     Constitutional:normal, well developed, well nourished, alert, in no distress and non-toxic and no overt pain behavior.  Eyes:anicteric  HEENT:grossly intact  Neck:supple, symmetric, trachea midline and no masses   Pulmonary:even and unlabored  Cardiovascular:No edema or pitting edema present  Skin:Normal without rashes or lesions and well hydrated  Psychiatric:Mood and affect appropriate  Neurologic:Cranial Nerves II-XII grossly intact  Musculoskeletal: tender left lumbar facet joints, positive facet loading test, decreased and painful range of motion of the left hip joint.      Imaging  FL spine and pain procedure    (Results Pending)   FL spine and pain procedure    (Results Pending)         Orders Placed This Encounter   Procedures   • FL spine and pain procedure   • FL spine and pain procedure       "

## 2024-05-10 ENCOUNTER — TELEPHONE (OUTPATIENT)
Age: 79
End: 2024-05-10

## 2024-05-10 NOTE — TELEPHONE ENCOUNTER
Upon reviewing Dr. Meade's note it appears Trazodone was decreased d/t concern over confusion.  Pt has had chronic confusion and this is not new.  If pts daughter does not believe he was more confused on the 150 mg vs the 50 mg he can go back to the 150 mg dose.

## 2024-05-10 NOTE — TELEPHONE ENCOUNTER
Pt called really confused as to why his medication was changed for the Trazdone. Pt states he's been taking 300 mg every night and now his order is changed to 50 mg before bed.     Can Dr. Meade please verify if this is correct and f/u with pt regarding the issue

## 2024-05-10 NOTE — TELEPHONE ENCOUNTER
Pt's daughter Janessa would like a call back regarding the prescription for Trazodone 50mg. She needs clarification as to why the patient was prescribed this dosage as he is already taking the same medication but for 300mg. Please call and advise.

## 2024-05-10 NOTE — TELEPHONE ENCOUNTER
Please advise.  Dr. Meade is out of the office.  Daughter wants to know about his Trazdone.  They did increase it to 300mg only for about 3 days.  It made him to tired.  They went back to 150mg at bedtime and he was doing fine on that.  Now Dr. Meade changed it to 50mg last week.  She wants to know should he only take the 50mg or continue with the 150mg at bedtime?

## 2024-05-16 ENCOUNTER — APPOINTMENT (OUTPATIENT)
Dept: LAB | Facility: HOSPITAL | Age: 79
End: 2024-05-16
Payer: COMMERCIAL

## 2024-05-16 DIAGNOSIS — N18.31 STAGE 3A CHRONIC KIDNEY DISEASE (HCC): ICD-10-CM

## 2024-05-16 DIAGNOSIS — E87.5 HYPERKALEMIA: ICD-10-CM

## 2024-05-16 DIAGNOSIS — E83.42 HYPOMAGNESEMIA: ICD-10-CM

## 2024-05-16 LAB
ALBUMIN SERPL BCP-MCNC: 4.3 G/DL (ref 3.5–5)
ANION GAP SERPL CALCULATED.3IONS-SCNC: 9 MMOL/L (ref 4–13)
BUN SERPL-MCNC: 21 MG/DL (ref 5–25)
CALCIUM SERPL-MCNC: 9.1 MG/DL (ref 8.4–10.2)
CHLORIDE SERPL-SCNC: 105 MMOL/L (ref 96–108)
CO2 SERPL-SCNC: 29 MMOL/L (ref 21–32)
CREAT SERPL-MCNC: 1.34 MG/DL (ref 0.6–1.3)
GFR SERPL CREATININE-BSD FRML MDRD: 50 ML/MIN/1.73SQ M
GLUCOSE P FAST SERPL-MCNC: 134 MG/DL (ref 65–99)
MAGNESIUM SERPL-MCNC: 1.6 MG/DL (ref 1.9–2.7)
PHOSPHATE SERPL-MCNC: 3.4 MG/DL (ref 2.3–4.1)
POTASSIUM SERPL-SCNC: 4.4 MMOL/L (ref 3.5–5.3)
SODIUM SERPL-SCNC: 143 MMOL/L (ref 135–147)

## 2024-05-16 PROCEDURE — 83735 ASSAY OF MAGNESIUM: CPT

## 2024-05-16 PROCEDURE — 80069 RENAL FUNCTION PANEL: CPT

## 2024-05-16 PROCEDURE — 36415 COLL VENOUS BLD VENIPUNCTURE: CPT

## 2024-05-17 DIAGNOSIS — E83.42 HYPOMAGNESEMIA: Primary | ICD-10-CM

## 2024-06-04 ENCOUNTER — HOSPITAL ENCOUNTER (OUTPATIENT)
Dept: NEUROLOGY | Facility: CLINIC | Age: 79
Discharge: HOME | End: 2024-06-04
Payer: COMMERCIAL

## 2024-06-04 DIAGNOSIS — R20.0 NUMBNESS OF FINGERS OF BOTH HANDS: ICD-10-CM

## 2024-06-04 DIAGNOSIS — M48.02 CERVICAL SPINAL STENOSIS: ICD-10-CM

## 2024-06-04 PROBLEM — G56.12: Status: ACTIVE | Noted: 2024-06-04

## 2024-06-04 PROCEDURE — 95886 MUSC TEST DONE W/N TEST COMP: CPT | Performed by: PSYCHIATRY & NEUROLOGY

## 2024-06-04 PROCEDURE — 95912 NRV CNDJ TEST 11-12 STUDIES: CPT | Performed by: PSYCHIATRY & NEUROLOGY

## 2024-06-05 ENCOUNTER — TELEPHONE (OUTPATIENT)
Dept: FAMILY MEDICINE CLINIC | Facility: HOSPITAL | Age: 79
End: 2024-06-05

## 2024-06-05 DIAGNOSIS — G60.8 POLYNEUROPATHY, PERIPHERAL SENSORIMOTOR AXONAL: Primary | ICD-10-CM

## 2024-06-05 NOTE — TELEPHONE ENCOUNTER
----- Message from Dona Camp DO sent at 6/5/2024  2:02 PM EDT -----  Please notify pt that her EMG showed a sensorimotor polyneuropathy on his EMG.  I recommend a Neuro eval to evaluate further.  NO sign of any cervical radiculopathy was noted/this is not coming from the cervical spine. Referral to Neuro placed.

## 2024-06-10 ENCOUNTER — HOSPITAL ENCOUNTER (OUTPATIENT)
Dept: RADIOLOGY | Facility: CLINIC | Age: 79
Discharge: HOME | End: 2024-06-10
Payer: COMMERCIAL

## 2024-06-10 VITALS
RESPIRATION RATE: 16 BRPM | HEART RATE: 55 BPM | DIASTOLIC BLOOD PRESSURE: 70 MMHG | OXYGEN SATURATION: 92 % | SYSTOLIC BLOOD PRESSURE: 164 MMHG | TEMPERATURE: 98 F

## 2024-06-10 DIAGNOSIS — M25.552 LEFT HIP PAIN: ICD-10-CM

## 2024-06-10 PROCEDURE — 20610 DRAIN/INJ JOINT/BURSA W/O US: CPT | Performed by: ANESTHESIOLOGY

## 2024-06-10 PROCEDURE — 77002 NEEDLE LOCALIZATION BY XRAY: CPT | Performed by: ANESTHESIOLOGY

## 2024-06-10 RX ORDER — METHYLPREDNISOLONE ACETATE 80 MG/ML
80 INJECTION, SUSPENSION INTRA-ARTICULAR; INTRALESIONAL; INTRAMUSCULAR; PARENTERAL; SOFT TISSUE ONCE
Status: COMPLETED | OUTPATIENT
Start: 2024-06-10 | End: 2024-06-10

## 2024-06-10 RX ORDER — ROPIVACAINE HYDROCHLORIDE 2 MG/ML
2 INJECTION, SOLUTION EPIDURAL; INFILTRATION; PERINEURAL ONCE
Status: COMPLETED | OUTPATIENT
Start: 2024-06-10 | End: 2024-06-10

## 2024-06-10 RX ADMIN — ROPIVACAINE HYDROCHLORIDE 2 ML: 2 INJECTION EPIDURAL; INFILTRATION; PERINEURAL at 08:33

## 2024-06-10 RX ADMIN — METHYLPREDNISOLONE ACETATE 80 MG: 80 INJECTION, SUSPENSION INTRA-ARTICULAR; INTRALESIONAL; INTRAMUSCULAR; SOFT TISSUE at 08:33

## 2024-06-10 RX ADMIN — IOHEXOL 1 ML: 300 INJECTION, SOLUTION INTRAVENOUS at 08:33

## 2024-06-10 NOTE — DISCHARGE INSTRUCTIONS

## 2024-06-10 NOTE — H&P
History of Present Illness: The patient is a 79 y.o. male who presents with complaints of hip pain.    Past Medical History:   Diagnosis Date    Alcoholism (HCC)     Anemia     iron def    Anxiety     Arthritis     Cerebrovascular accident (CVA) (HCC) 01/07/2020    memory issues    Coronary artery disease     GERD (gastroesophageal reflux disease)     Gout     High cholesterol     Hypertension     Insomnia     Kidney stone     Stroke (HCC)        Past Surgical History:   Procedure Laterality Date    ANKLE SURGERY Right     CARDIAC CATHETERIZATION      no findings    COLONOSCOPY  01/25/2013    polypectomy; complete - 3 yrs d/t polyp - benign submucosal lipoma- path c/w lipoma    COLONOSCOPY  04/25/2017    complete - tubular adenoma - repeat 5yrs    CYSTOSCOPY      CYSTOTOMY      bladder  w/ basket extraction of calculus    FEMUR FRACTURE SURGERY      HERNIA REPAIR      inguinal ; sliding    INGUINAL HERNIA REPAIR Right     unilateral    IR LOWER EXTREMITY ANGIOGRAM  4/24/2023    KNEE ARTHROSCOPY Right     w/medial menisectomy    LASIK      corneal    LITHOTRIPSY      renal    DE COLONOSCOPY FLX DX W/COLLJ SPEC WHEN PFRMD N/A 04/25/2017    Procedure: SCREENING COLONOSCOPY;  Surgeon: Paul Jacome MD;  Location: [facility];  Service: General    DE SLCTV CATHJ 3RD+ ORD SLCTV ABDL PEL/LXTR BRNCH Right 4/24/2023    Procedure: ARTERIOGRAM;  Surgeon: Alivia Blum MD;  [facility];  Service: Vascular    DE TEAEC W/PATCH GRF CAROTID VERTB SUBCLAV NECK INC Right 01/10/2020    Procedure: ENDARTERECTOMY ARTERY CAROTID;  Surgeon: Aaron Smith MD;  Location: [facility];  Service: Vascular    DE TEAEC W/WO PATCH GRAFT COMMON FEMORAL Right 4/24/2023    Procedure: ENDARTERECTOMY ARTERIAL FEMORAL, RETROGRADE ILIAC INTERVENTION;  Surgeon: Alivia Blum MD;  [facility];  Service: Vascular    ROTATOR CUFF REPAIR Bilateral     TONSILLECTOMY           Current Outpatient Medications:     aspirin (ECOTRIN LOW STRENGTH) 81  mg EC tablet, Take 81 mg by mouth every other day 1 every other day, Disp: , Rfl:     atorvastatin (LIPITOR) 40 mg tablet, take 1 tablet by mouth every evening, Disp: 90 tablet, Rfl: 3    clopidogrel (PLAVIX) 75 mg tablet, Take 1 tablet (75 mg total) by mouth daily, Disp: 90 tablet, Rfl: 2    escitalopram (LEXAPRO) 20 mg tablet, Take 1 tablet (20 mg total) by mouth daily, Disp: 90 tablet, Rfl: 2    ferrous sulfate 324 (65 Fe) mg, Take 1 tablet (324 mg total) by mouth daily before breakfast, Disp: 30 tablet, Rfl: 2    finasteride (PROSCAR) 5 mg tablet, Take 1 tablet by mouth once daily, Disp: 90 tablet, Rfl: 1    gabapentin (Neurontin) 400 mg capsule, Take 1 capsule (400 mg total) by mouth 3 (three) times a day, Disp: 270 capsule, Rfl: 1    lisinopril (ZESTRIL) 10 mg tablet, Take 1 tablet (10 mg total) by mouth daily, Disp: 90 tablet, Rfl: 1    Magnesium Oxide -Mg Supplement (Mag-Oxide) 200 MG TABS, Take 1 tablet (200 mg total) by mouth in the morning, Disp: 30 tablet, Rfl: 2    metoprolol succinate (TOPROL-XL) 25 mg 24 hr tablet, Take 1 tablet (25 mg total) by mouth daily, Disp: 90 tablet, Rfl: 2    omeprazole (PriLOSEC) 20 mg delayed release capsule, take 1 capsule by mouth once daily, Disp: 90 capsule, Rfl: 2    Potassium Citrate ER 15 MEQ (1620 MG) TBCR, Take 2 tablets by mouth 2 (two) times a day (Patient not taking: Reported on 5/9/2024), Disp: 360 tablet, Rfl: 3    tamsulosin (FLOMAX) 0.4 mg, Take 1 capsule (0.4 mg total) by mouth daily with dinner, Disp: 90 capsule, Rfl: 1    traZODone (DESYREL) 50 mg tablet, Take 1 tablet (50 mg total) by mouth daily at bedtime, Disp: 30 tablet, Rfl: 1    Current Facility-Administered Medications:     iohexol (OMNIPAQUE) 300 mg/mL injection 1 mL, 1 mL, Intra-articular, Once, Solis Zamora DO    methylPREDNISolone acetate (DEPO-MEDROL) injection 80 mg, 80 mg, Intra-articular, Once, Solis Zamora DO    ropivacaine (NAROPIN) injection 2 mL, 2 mL, Intra-articular, Once, Solis Zamora  DO    No Known Allergies    Physical Exam: There were no vitals filed for this visit.  General: Awake, Alert, Oriented x 3, Mood and affect appropriate  Respiratory: Respirations even and unlabored  Cardiovascular: Peripheral pulses intact; no edema  Musculoskeletal Exam: Decreased range of motion left hip    ASA Score: II    Patient/Chart Verification  Patient ID Verified: Verbal  ID Band Applied: No  Consents Confirmed: Procedural, To be obtained in the Pre-Procedure area  Interval H&P(within 24 hr) Complete (required for Outpatients and Surgery Admit only): To be obtained in the Pre-Procedure area  Allergies Reviewed: Yes  Anticoag/NSAID held?: NA  Currently on antibiotics?: No    Assessment:   1. Left hip pain        Plan: LT intra-articular hip inj

## 2024-06-19 DIAGNOSIS — K29.00 OTHER ACUTE GASTRITIS WITHOUT HEMORRHAGE: ICD-10-CM

## 2024-06-19 RX ORDER — OMEPRAZOLE 20 MG/1
CAPSULE, DELAYED RELEASE ORAL
Qty: 90 CAPSULE | Refills: 0 | Status: SHIPPED | OUTPATIENT
Start: 2024-06-19

## 2024-06-24 ENCOUNTER — TELEPHONE (OUTPATIENT)
Dept: PAIN MEDICINE | Facility: CLINIC | Age: 79
End: 2024-06-24

## 2024-06-24 ENCOUNTER — TELEPHONE (OUTPATIENT)
Dept: RADIOLOGY | Facility: CLINIC | Age: 79
End: 2024-06-24

## 2024-06-24 NOTE — TELEPHONE ENCOUNTER
Patient reports 60% improvement post inj  Pain level 2/10    S/P Left intra-articular hip inj 6/10, F/U 6/24

## 2024-06-24 NOTE — TELEPHONE ENCOUNTER
Called and spoke to patients daughter Indigo to check ETA on her father's appt. Indigo stated they called and LMOM over the weekend stating they were canceling due to her father's pain wasn't over 5.      No telephone calls were notated by call center.

## 2024-06-26 ENCOUNTER — TELEPHONE (OUTPATIENT)
Age: 79
End: 2024-06-26

## 2024-06-26 NOTE — TELEPHONE ENCOUNTER
Patient called to request prescription - states he has been taking his friend's Celebrex 200 mg BID, one in the morning and one at night, for the past 4 days. Patient states this has significantly helped his pain in his legs and back. Asking if provider would prescribe this medication to him and they could discuss at upcoming OV on 7/30/24. Please review and advise patient.

## 2024-06-26 NOTE — TELEPHONE ENCOUNTER
Patient called unsure if he missed a call. Please call patient if prescription is approved or denied.

## 2024-06-26 NOTE — TELEPHONE ENCOUNTER
He should NOT be taking Celebrex - that is an NSAID/anti-inflammatory and this is not recommended d/t his heart disease and kidney disease and high blood pressure.  He is seeing Pain Mgt for his back pain.

## 2024-06-27 NOTE — TELEPHONE ENCOUNTER
Spoke w/pt's daughter Shayla and advised her he should not be taking medication due to heart disease, kidney issues and bp. Advised to address it with pain management.

## 2024-07-15 DIAGNOSIS — G47.00 INSOMNIA, UNSPECIFIED TYPE: ICD-10-CM

## 2024-07-15 RX ORDER — TRAZODONE HYDROCHLORIDE 100 MG/1
100 TABLET ORAL
Qty: 30 TABLET | Refills: 2 | Status: SHIPPED | OUTPATIENT
Start: 2024-07-15

## 2024-07-17 ENCOUNTER — TELEPHONE (OUTPATIENT)
Age: 79
End: 2024-07-17

## 2024-07-17 NOTE — TELEPHONE ENCOUNTER
Patient has two referrals to neuro- I gave daughter the phone number to call and set up an appt. I did let her know they attempted to call patient back in December and he never called them back.

## 2024-07-17 NOTE — TELEPHONE ENCOUNTER
Indigo called in for Nj and wanted a cognitive test order for the up coming appointment on 7/30/24 with Dr. Camp.

## 2024-07-20 ENCOUNTER — LAB (OUTPATIENT)
Dept: LAB | Facility: HOSPITAL | Age: 79
End: 2024-07-20
Payer: COMMERCIAL

## 2024-07-20 DIAGNOSIS — E83.42 HYPOMAGNESEMIA: ICD-10-CM

## 2024-07-20 LAB — MAGNESIUM SERPL-MCNC: 1.6 MG/DL (ref 1.9–2.7)

## 2024-07-20 PROCEDURE — 36415 COLL VENOUS BLD VENIPUNCTURE: CPT

## 2024-07-20 PROCEDURE — 83735 ASSAY OF MAGNESIUM: CPT

## 2024-07-25 DIAGNOSIS — E83.42 HYPOMAGNESEMIA: ICD-10-CM

## 2024-07-25 RX ORDER — VITS A,C,E/LUTEIN/MINERALS 300MCG-200
1 TABLET ORAL 2 TIMES DAILY
Qty: 30 TABLET | Refills: 2 | Status: SHIPPED | OUTPATIENT
Start: 2024-07-25 | End: 2024-07-26

## 2024-07-25 NOTE — RESULT ENCOUNTER NOTE
Please inform patient that his magnesium remains low. He is on magnesium 1 tablet daily. Please increase to to 1 tablet two times per day and repeat blood test in one month. Thank you

## 2024-07-26 RX ORDER — MULTIVITAMIN WITH IRON
250 TABLET ORAL 2 TIMES DAILY
Qty: 60 TABLET | Refills: 5 | Status: SHIPPED | OUTPATIENT
Start: 2024-07-26

## 2024-07-26 NOTE — RESULT ENCOUNTER NOTE
Left detailed message for pt about his lab results       BLAS Nascimento   Please inform patient that his magnesium remains low. He is on magnesium 1 tablet daily. Please increase to to 1 tablet two times per day and repeat blood test in one month. Thank you     - labs in [facility].

## 2024-07-29 ENCOUNTER — OFFICE VISIT (OUTPATIENT)
Dept: NEUROLOGY | Facility: CLINIC | Age: 79
End: 2024-07-29
Payer: COMMERCIAL

## 2024-07-29 VITALS
BODY MASS INDEX: 31.14 KG/M2 | SYSTOLIC BLOOD PRESSURE: 143 MMHG | WEIGHT: 217 LBS | DIASTOLIC BLOOD PRESSURE: 65 MMHG | OXYGEN SATURATION: 99 % | HEART RATE: 52 BPM | TEMPERATURE: 97.3 F

## 2024-07-29 DIAGNOSIS — E53.8 VITAMIN B12 DEFICIENCY: ICD-10-CM

## 2024-07-29 DIAGNOSIS — E55.9 VITAMIN D DEFICIENCY: ICD-10-CM

## 2024-07-29 DIAGNOSIS — R26.89 BALANCE PROBLEM: ICD-10-CM

## 2024-07-29 DIAGNOSIS — F03.90 DEMENTIA (HCC): Primary | ICD-10-CM

## 2024-07-29 DIAGNOSIS — G62.9 POLYNEUROPATHY: ICD-10-CM

## 2024-07-29 PROCEDURE — 99205 OFFICE O/P NEW HI 60 MIN: CPT | Performed by: STUDENT IN AN ORGANIZED HEALTH CARE EDUCATION/TRAINING PROGRAM

## 2024-07-29 PROCEDURE — G2211 COMPLEX E/M VISIT ADD ON: HCPCS | Performed by: STUDENT IN AN ORGANIZED HEALTH CARE EDUCATION/TRAINING PROGRAM

## 2024-07-29 RX ORDER — DONEPEZIL HYDROCHLORIDE 10 MG/1
10 TABLET, FILM COATED ORAL
Qty: 30 TABLET | Refills: 4 | Status: SHIPPED | OUTPATIENT
Start: 2024-08-26 | End: 2025-01-23

## 2024-07-29 RX ORDER — DONEPEZIL HYDROCHLORIDE 5 MG/1
5 TABLET, FILM COATED ORAL
Qty: 30 TABLET | Refills: 0 | Status: SHIPPED | OUTPATIENT
Start: 2024-07-29 | End: 2024-09-13

## 2024-07-29 NOTE — PROGRESS NOTES
[facility]  Neurological Services Consult Note    Patient Name: Nj Pimentel Jr.  : 1945    MRN: [id number]    CONSULTING PROVIDER:  Alivia Blum MD  [address] [facility]  [address] [place] [address]        Assessment/Plan:    1. Dementia (HCC)  Ambulatory Referral to Neurology    Ambulatory Referral to Physical Therapy    Vitamin B12    Vitamin D 25 hydroxy    Protein electrophoresis, serum    donepezil (ARICEPT) 5 mg tablet    donepezil (Aricept) 10 mg tablet    Ambulatory Referral to Senior Care    Ambulatory Referral to Occupational Therapy    Ambulatory referral to Neuropsychology      2. Polyneuropathy  Ambulatory Referral to Physical Therapy    Vitamin B12    Vitamin D 25 hydroxy    Protein electrophoresis, serum      3. Balance problem  Ambulatory Referral to Physical Therapy    Vitamin B12    Vitamin D 25 hydroxy    Protein electrophoresis, serum      4. Vitamin D deficiency  Vitamin D 25 hydroxy      5. Vitamin B12 deficiency  Vitamin B12        Skip is a pleasant 79-year-old gentleman with PMH stroke, history of alcohol abuse (in AA), HTN, PAD, CAD, atrial flutter, idiopathic peripheral neuropathy, 3A CKD, obesity, dyslipidemia presenting for memory concerns.  He is accompanied by his daughter today.  Given the pattern and severity of his memory deficits (outlined in the HPI), supplemented by a MoCA score of 16/30 today, I do feel that his presentation is most consistent with a mild to moderate level of dementia.    We had a long conversation today outlining the above diagnosis and its implications.  To help better discern which dementia category he fits into and help with future planning, I have placed an order for formal neuropsychometric testing.  I do recommend a fit to drive evaluation; discussed with him that this is primarily to protect him in the event of an accident if he is able to drive, however of course if he is unable to drive due to his cognitive deficits,  "this will also be apparent in this evaluation.  We did discussion about potential pharmacologic interventions today; we had a brief conversation about Lazaro, however I do not feel that he would be an appropriate candidate for this, and they agreed.  Will initiate Aricept at 5 mg daily for 1 month, then increase to 10 mg daily thereafter.  Referral placed to Senior care for their aid in monitoring and future planning.  Advised him to continue to stay active physically, mentally, and socially.  He does also have a peripheral neuropathy on examination and per his reports of some balance difficulties; I have thus ordered some blood work to look for reversible causes and placed a referral to physical therapy for balance therapy.   Due to his history of stroke, recommended he continue Plavix and Lipitor for secondary stroke prevention  Reviewed important lifestyle factors in primary stroke prevention.     He will Return in about 6 months (around 1/29/2025).                                                                   Thank you for allowing me to participate in the care of your patient.  If there are any questions regarding evaluation please feel free to reach out.       ________________________________________________________________________________________________    Subjective:  Chief Complaint   Patient presents with    Memory Loss       79 y.o. male with PMH stroke, history of alcohol abuse (in AA), HTN, PAD, CAD, atrial flutter, idiopathic peripheral neuropathy, 3A CKD, obesity, dyslipidemia presenting for memory concerns.    He says \"my memory sucks.\" Says he can't remember things that happened yesterday. Forgets to call people, forgets that someone is coming to the house; he was supposed to meet his daughter at work to go to this appointment, and he drive to her house instead of her work.     Forgetfulness is more frequent than in the past, and seems to be progressing. Did have a stroke in 1/2020, and " "daughter believes that that was \"the start of it.\"     Lives by himself, however a friend comes up from [place] and stays at the house for ~1 week.   Cooking: Not really anymore; easier to throw things in the microwave. Denies any cooking issues (leaving something on the stove/burning something, etc.)  Driving: One instance of getting lost while driving to a hand appointment, a place where he should know how to get to (~1.5 months ago). Daughter says that he is \"good at directions, and knows the area very well.\"   Bills: Daughter pays bills (he had asked her to in the past, because \"he didn't like it\").  -Is spending quite a bit on a credit card. Daughter states he is a compulsive , thinks \"it gives him something to do.\" Has always been like this (\"when my mother was alive, she kept him in check\"), however has gotten somewhat worse.   -Mail now goes to daughter's house. She screens emails. Spam calls can be an issue; almost spent $750 on a spam call until his friend stopped him.   -They do deny any severe impulsive behaviors.     He says his sleep is good. Says bed is ~2200/2230, then up around 0600.     Does say his balance isn't great. Says it is worse on uneven surfaces, but dela cruz snot feel it is any worse in the dark. No \"furniture-surfing.\" Has fallen \"a few times.\" Most recent was going up the steps, toe got caught.     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 Patient does this work with him usually M-F, patient will drive him to a meeting, etc at times.         Current Outpatient Medications   Medication Sig Dispense Refill    aspirin (ECOTRIN LOW STRENGTH) 81 mg EC tablet Take 81 mg by mouth every other day 1 every other day      atorvastatin (LIPITOR) 40 mg tablet take 1 tablet by mouth every evening 90 " tablet 3    clopidogrel (PLAVIX) 75 mg tablet Take 1 tablet (75 mg total) by mouth daily 90 tablet 2    escitalopram (LEXAPRO) 20 mg tablet Take 1 tablet (20 mg total) by mouth daily 90 tablet 2    ferrous sulfate 324 (65 Fe) mg Take 1 tablet (324 mg total) by mouth daily before breakfast 30 tablet 2    finasteride (PROSCAR) 5 mg tablet Take 1 tablet by mouth once daily 90 tablet 1    gabapentin (Neurontin) 400 mg capsule Take 1 capsule (400 mg total) by mouth 3 (three) times a day 270 capsule 1    lisinopril (ZESTRIL) 10 mg tablet Take 1 tablet (10 mg total) by mouth daily 90 tablet 1    Magnesium 250 MG TABS Take 1 tablet (250 mg total) by mouth 2 (two) times a day 60 tablet 5    metoprolol succinate (TOPROL-XL) 25 mg 24 hr tablet Take 1 tablet (25 mg total) by mouth daily 90 tablet 2    omeprazole (PriLOSEC) 20 mg delayed release capsule Take 1 capsule by mouth once daily 90 capsule 0    Potassium Citrate ER 15 MEQ (1620 MG) TBCR Take 2 tablets by mouth 2 (two) times a day (Patient not taking: Reported on 5/9/2024) 360 tablet 3    tamsulosin (FLOMAX) 0.4 mg Take 1 capsule (0.4 mg total) by mouth daily with dinner 90 capsule 1    traZODone (DESYREL) 100 mg tablet Take 1 tablet (100 mg total) by mouth daily at bedtime 30 tablet 2     No current facility-administered medications for this visit.       No Known Allergies    Past Medical History:   Diagnosis Date    Alcoholism (HCC)     Anemia     iron def    Anxiety     Arthritis     Cerebrovascular accident (CVA) (HCC) 01/07/2020    memory issues    Coronary artery disease     GERD (gastroesophageal reflux disease)     Gout     High cholesterol     Hypertension     Insomnia     Kidney stone     Stroke (HCC)     :   Past Surgical History:   Procedure Laterality Date    ANKLE SURGERY Right     CARDIAC CATHETERIZATION      no findings    COLONOSCOPY  01/25/2013    polypectomy; complete - 3 yrs d/t polyp - benign submucosal lipoma- path c/w lipoma    COLONOSCOPY   2017    complete - tubular adenoma - repeat 5yrs    CYSTOSCOPY      CYSTOTOMY      bladder  w/ basket extraction of calculus    FEMUR FRACTURE SURGERY      HERNIA REPAIR      inguinal ; sliding    INGUINAL HERNIA REPAIR Right     unilateral    IR LOWER EXTREMITY ANGIOGRAM  2023    KNEE ARTHROSCOPY Right     w/medial menisectomy    LASIK      corneal    LITHOTRIPSY      renal    IA COLONOSCOPY FLX DX W/COLLJ SPEC WHEN PFRMD N/A 2017    Procedure: SCREENING COLONOSCOPY;  Surgeon: Paul Jacome MD;  [facility]: [facility];  Service: General    IA SLCTV CATHJ 3RD+ ORD SLCTV ABDL PEL/LXTR BRNCH Right 2023    Procedure: ARTERIOGRAM;  Surgeon: Alivia Blum MD;  [facility];  Service: Vascular    IA TEAEC W/PATCH GRF CAROTID VERTB SUBCLAV NECK INC Right 01/10/2020    Procedure: ENDARTERECTOMY ARTERY CAROTID;  Surgeon: Aaron Smith MD;  [facility];  Service: Vascular    IA TEAEC W/WO PATCH GRAFT COMMON FEMORAL Right 2023    Procedure: ENDARTERECTOMY ARTERIAL FEMORAL, RETROGRADE ILIAC INTERVENTION;  Surgeon: Alivia Blum MD;  [facility];  Service: Vascular    ROTATOR CUFF REPAIR Bilateral     TONSILLECTOMY       Social History     Socioeconomic History    Marital status:      Spouse name: Not on file    Number of children: 1    Years of education: Not on file    Highest education level: Not on file   Occupational History    Occupation: Retired     Comment: working full time   Tobacco Use    Smoking status: Former     Current packs/day: 0.00     Average packs/day: 1 pack/day for 22.0 years (22.0 ttl pk-yrs)     Types: Cigarettes     Start date:      Quit date: 1985     Years since quittin.6    Smokeless tobacco: Never   Vaping Use    Vaping status: Never Used   Substance and Sexual Activity    Alcohol use: Not Currently     Comment: stopped drinking alcohol; AA x23yrs sober    Drug use: No    Sexual activity: Not Currently     Partners: Female   Other  Topics Concern    Not on file   Social History Narrative    , lives alone, working full time     Social Determinants of Health     Financial Resource Strain: Low Risk  (1/5/2024)    Overall Financial Resource Strain (CARDIA)     Difficulty of Paying Living Expenses: Not very hard   Food Insecurity: Not on file   Transportation Needs: No Transportation Needs (1/5/2024)    PRAPARE - Transportation     Lack of Transportation (Medical): No     Lack of Transportation (Non-Medical): No   Physical Activity: Not on file   Stress: Not on file   Social Connections: Not on file   Intimate Partner Violence: Not on file   Housing Stability: Not on file      Family History   Problem Relation Age of Onset    Alzheimer's disease Mother     Alzheimer's disease Father     Heart disease Father         CAD    Other Father         prediabetes    Diabetes Father     Breast cancer Other     Arthritis Family     Hypertension Family        Review of Symptoms:      10-point system review completed, all of which are negative except as mentioned above.        Imaging/Procedures:   MRI lumbar spine without contrast 2/15/2024:  IMPRESSION:     Degenerative changes of the lumbar spine, as described above.     Modic type I endplate degenerative changes present at L2-L3.     Moderate to severe left foraminal narrowing is present at L4-L5 with impingement of the exiting nerve root. Multilevel moderate foraminal narrowing is also present from L2-L3 through L4-L5.      MRI cervical spine without contrast 11/22/2023:  IMPRESSION:     Advanced spondylotic degenerative changes are seen throughout the cervical spine with multilevel pam and retrolisthesis as described above. There is moderate multilevel central stenosis at nearly every cervical level with moderate multilevel foraminal   narrowing. No cord compression or cord signal abnormality.      MRI brain without contrast 5/21/2023:  IMPRESSION:     White matter changes suggestive of chronic  microangiopathy.  No acute intracranial pathology. Chronic lacunar infarct right cerebellum.      Objective:  Physical Exam:      /65 (BP Location: Left arm, Patient Position: Sitting, Cuff Size: Large)   Pulse (!) 52   Temp (!) 97.3 °F (36.3 °C) (Temporal)   Wt 98.4 kg (217 lb)   SpO2 99%   BMI 31.14 kg/m²      Gen: A&O x 4, NAD, cooperative  HEENT: NC/AT, EOMI, PERRL, mmm, no meningeal signs  Chest: No evidence of respiratory distress, no accessory muscle use; no evidence of peripheral cyanosis  Psych: no depression or anxiety apparent   Skin: no rashes or lesions    Neurologic Exam:  Mental Status: A&O x 4 (stated year was 1924 initially, some difficulty with president), NAD, speech clear, language fluent, comprehension intact, repetition and naming intact; MoCA 16/30 (scanned into media).    Cranial Nerve Exam:   CN II-XII intact:  PERRL, VFF, no nystagmus, no gaze paresis, sensation V1-V3 intact b/l, muscles of facial expression symmetric; hearing intact to conversational tone, palate elevates symmetrically, shoulder elevation symmetric and tongue protrudes midline with movement side to side.    Motor Exam:       Strength 5/5 UE's/LE's b/l  Tone and bulk normal   No pronator drift    Deep Tendon Reflexes: 2/4 biceps, triceps, brachioradialis, patellar, and achilles b/l    Sensation: Intact light touch UE's/LE's b/l; vibratory and temperature sensation absent in a stocking distribution bilateral lower extremities, present bilateral upper extremities.    Coordination/Cerebellum:       Tremors--none          Gait and stance:      Gait: Normal casual gait.          I have spent a total time of 70-75 minutes on 07/29/24 in caring for this patient including Diagnostic results, Prognosis, Risks and benefits of tx options, Instructions for management, Patient and family education, Importance of tx compliance, Risk factor reductions, Documenting in the medical record, Reviewing / ordering tests, medicine,  procedures  , and Obtaining or reviewing history  .      Electronically signed by:    Brayan Mast DO  Board-Certified Neurology and Sleep Medicine  [facility]  07/29/24

## 2024-07-29 NOTE — PROGRESS NOTES
Review of Systems   Constitutional:  Negative for appetite change, fatigue and fever.   HENT: Negative.  Negative for hearing loss, tinnitus, trouble swallowing and voice change.    Eyes: Negative.  Negative for photophobia, pain and visual disturbance.   Respiratory: Negative.  Negative for shortness of breath.    Cardiovascular: Negative.  Negative for palpitations.   Gastrointestinal: Negative.  Negative for nausea and vomiting.   Endocrine: Negative.  Negative for cold intolerance.   Genitourinary: Negative.  Negative for dysuria, frequency and urgency.   Musculoskeletal:  Positive for back pain and myalgias. Negative for gait problem, neck pain and neck stiffness.   Skin: Negative.  Negative for rash.   Allergic/Immunologic: Negative.    Neurological: Negative.  Negative for dizziness, tremors, seizures, syncope, facial asymmetry, speech difficulty, weakness, light-headedness, numbness and headaches.   Hematological: Negative.  Does not bruise/bleed easily.   Psychiatric/Behavioral:  Positive for confusion. Negative for hallucinations and sleep disturbance.    All other systems reviewed and are negative.

## 2024-07-29 NOTE — PATIENT INSTRUCTIONS
"-I do feel that your presentation is consistent with a dementia.  -I would recommend formal neuropsychometric testing, to help better discern exactly which category you fit more into.   -I would recommend a formal Fit to Drive Evaluation, as we discussed MOSTLY to ensure that if you CAN DRIVE, you have documentation of that fact!  -We will start a medication called Aricept; this is generally very well-tolerated, although can sometimes cause GI issues or vivid/disturbing dreams. If you have significant side effects, simply stop it.   -We will start at 5mg daily for 1 month, then increase to 10mg daily thereafter if tolerated well.   -I am also placing a referral to [facility], as we discussed, to help with overall monitoring and future planning.  -Continue to stay active physically, mentally, and socially.  -I am also placing a referral to physical therapy for balance therapy, to avoid falls with the peripheral neuro[nadeem.  -I am also looking at bloodwork to look for other reversible causes of neuropathy.   -Continue Plavix (clopidogrel) and atorvastatin for secondary stroke prevention  -Avoid or quit smoking, limit/moderate alcohol use, and keep your blood pressure, blood sugars/diabetes, and cholesterol under control  -Recommend regular exercise to help with cardiovascular and cerebrovascular health  -Recommend maintaining a healthy weight  -Continue to obtain adequate sleep (7-9 hours nightly), and practice good sleep hygiene.         Peripheral Neuropathy:  Peripheral neuropathy is a disorder in which the nerves are the only most regions of your body (most commonly toes and feet and sometimes fingers and hands, and what is termed a \"stocking glove distribution\") are damaged, resulting in sensory related symptoms.    --The most common symptom is difficulty with balance, as often the 1st nerves that are affected are those that can for a vibratory sensation as well as proprioception (knowing where your limbs are " "in space).  -Proprioception is one of the 3 essential contributors to maintaining balance, along with our inner ear and our site/review of the horizon.  When 1 of these 3 he is impacted,  our balance can then suffer.  -This is why balance difficulties in patients with peripheral neuropathy are commonly much worse in the dark, with eyes closed, or on uneven surfaces or steps.  --Additional symptoms can include numbness/tingling (will return paresthesia) or sometimes even a painful burning sensation in the regions most affected.    -There are numerous possible causes of peripheral neuropathy.  The most common identifiable causes diabetes mellitus (type I or II), although the second most common \"cause\" is what we call idiopathic.  This means we are either not sure what causes it, l or would like the testing to effectively determine what is causing it; it is also suspected that it likely can occur with advancing age.  -Other potential causes include vitamin/mineral deficiencies/abnormalities, including vitamin B12 and vitamin D.  -Work-up often includes blood work to evaluate for reversible causes  -Work-up may also include something called an EMG/NCS, which is a study used to evaluate the overall health and function of the peripheral nerves and muscles.    -While in of itself it is fairly benign, the biggest risk associated with peripheral neuropathy is FALLS, which could then result in a broken bone, head trauma, or other injury that will severely impact function and quality of life.  -As there is as yet no therapy to directly target the peripheral neuropathy itself; therapy is thus geared towards symptomatic management and fall prevention.   -This will commonly include a referral to physical therapy for balance therapy, both for strengthening as well as to aid with gait stability and fall avoidance; they will also advise strategies on how to fall safely to avoid injury should a fall occur   -If you have symptoms of a " burning/painful sensation related to the peripheral neuropathy, there are medications that can be utilized to reduce the severity.  These may include gabapentin, pregabalin, or sometimes topical creams/lotions.

## 2024-07-30 ENCOUNTER — OFFICE VISIT (OUTPATIENT)
Dept: FAMILY MEDICINE CLINIC | Facility: HOSPITAL | Age: 79
End: 2024-07-30
Payer: COMMERCIAL

## 2024-07-30 VITALS
WEIGHT: 219.6 LBS | BODY MASS INDEX: 31.44 KG/M2 | DIASTOLIC BLOOD PRESSURE: 64 MMHG | TEMPERATURE: 98.4 F | SYSTOLIC BLOOD PRESSURE: 128 MMHG | HEART RATE: 60 BPM | HEIGHT: 70 IN

## 2024-07-30 DIAGNOSIS — I35.0 AORTIC VALVE STENOSIS, ETIOLOGY OF CARDIAC VALVE DISEASE UNSPECIFIED: ICD-10-CM

## 2024-07-30 DIAGNOSIS — F03.90 DEMENTIA, UNSPECIFIED DEMENTIA SEVERITY, UNSPECIFIED DEMENTIA TYPE, UNSPECIFIED WHETHER BEHAVIORAL, PSYCHOTIC, OR MOOD DISTURBANCE OR ANXIETY (HCC): Primary | ICD-10-CM

## 2024-07-30 DIAGNOSIS — R40.0 DAYTIME SOMNOLENCE: ICD-10-CM

## 2024-07-30 DIAGNOSIS — M25.552 LEFT HIP PAIN: ICD-10-CM

## 2024-07-30 DIAGNOSIS — M54.50 CHRONIC BILATERAL LOW BACK PAIN WITHOUT SCIATICA: ICD-10-CM

## 2024-07-30 DIAGNOSIS — G89.29 CHRONIC BILATERAL LOW BACK PAIN WITHOUT SCIATICA: ICD-10-CM

## 2024-07-30 DIAGNOSIS — G47.00 INSOMNIA, UNSPECIFIED TYPE: ICD-10-CM

## 2024-07-30 PROCEDURE — G2211 COMPLEX E/M VISIT ADD ON: HCPCS | Performed by: INTERNAL MEDICINE

## 2024-07-30 PROCEDURE — 1159F MED LIST DOCD IN RCRD: CPT | Performed by: INTERNAL MEDICINE

## 2024-07-30 PROCEDURE — 99214 OFFICE O/P EST MOD 30 MIN: CPT | Performed by: INTERNAL MEDICINE

## 2024-07-30 PROCEDURE — 1160F RVW MEDS BY RX/DR IN RCRD: CPT | Performed by: INTERNAL MEDICINE

## 2024-07-30 RX ORDER — MULTIVITAMIN
1 CAPSULE ORAL DAILY
COMMUNITY

## 2024-07-30 RX ORDER — MULTIVIT WITH MINERALS/LUTEIN
250 TABLET ORAL DAILY
COMMUNITY

## 2024-07-30 NOTE — PROGRESS NOTES
Ambulatory Visit  Name: Nj Pimentel Jr.      : 1945      MRN: [id number]  Encounter Provider: Dona Camp DO  Encounter Date: 2024   Encounter department: [facility] CARE [address]     Assessment & Plan   1. Dementia, unspecified dementia severity, unspecified dementia type, unspecified whether behavioral, psychotic, or mood disturbance or anxiety (HCC)  Assessment & Plan:  Just saw Neuro yesterday - note not finished so unsure of exact plan but memory labs were ordered and pts dgtr states he was referred to [facility] and [facility] for driving test, He was given a rx for Aricept but has not yet started it, will follow, call with sudden new/worse symptoms  2. Insomnia, unspecified type  Assessment & Plan:  Dgtr noting falling asleep sitting in chair at waiting room, discussed poss sleep apnea and affect on memory impairment, will cont Trazodone for now and check sleep study, will follow  3. Daytime somnolence  Comments:  sleep apnea and associated memory impairment reviewed - will check sleep study and treat accordingly  Orders:  -     Ambulatory Referral to Sleep Medicine; Future  4. Chronic bilateral low back pain without sciatica  Assessment & Plan:  Still bothersome upon standing, encouraged regular exercise, healthy wgt, again advised to avoid NSAIDs and to stick with Tylenol, has been following with Pain Mgt as well  Orders:  -     Ambulatory referral to Spine & Pain Management; Future  5. Left hip pain  Assessment & Plan:  Noting short lived benefit with hip injection, urged to f/u with Pain Mgt, again encouraged to avoid NSAIDs, may use Tylenol sparingly, call with new/worse symptoms  Orders:  -     Ambulatory referral to Spine & Pain Management; Future  6. Aortic valve stenosis, etiology of cardiac valve disease unspecified  Assessment & Plan:  Echo due - order given for Oct, will follow  Orders:  -     Echo complete w/ contrast if indicated; Future; Expected  date: 07/30/2024       Colonoscopy 2/21 - 5 yrs    Echo 10/22 - mild AS     LEAD 2/24     CUS 2/24     BW 1/24      History of Present Illness     HPI Pt here for follow up appt    Pt saw Neuro yesterday for eval of memory impairment - note incomplete so I am unsure what was discussed.  He does have labs ordered in Epic from yesterday.  A new rx for Aricept was given - told to start at 5 mg for a month and then increase to 10 mg.  He had a MMSE with our office 1/2024 and scored a 26/30.  Dgtr today reports he had memory testing with Neuro yesterday but again no results in chart. Despite this he has had obvious deficits/memory impairment and we did a MRI brain (5/23) and memory labs (4/23 and 5/23) - no acute abnormality noted. He has not started the Aricept next.  He has to make appt with [facility] and was told he should have driving test eval.  He has a f/u appt in Feb 25 with Neuro.     Dgtr notes he is nodding off while sitting in office waiting for me to come in.  Pt doesn't think he snores but his dgtr states in the past he has.  He does not recall every having a sleep study done.  He is using Trazodone qhs for sleep.      Pt had a L hip injection with Dr. Sánchez in June.  He was going to go back for a repeat injection but pain had improved. It is now back up around a 5/10.  Pain is B/L low back and radiates to B/L LE. He notes pain is better once he gets up and walks around.  He notes no numbness/tingling/loss of bowel control but has some urge incontinence that is not new or worse. He had a few falls recently.          Review of Systems   Constitutional:  Positive for fatigue. Negative for chills and fever.   HENT:  Negative for congestion and sore throat.    Eyes:  Negative for pain and visual disturbance.   Respiratory:  Negative for cough and shortness of breath.    Cardiovascular:  Negative for chest pain and palpitations.   Gastrointestinal:  Negative for abdominal pain, constipation,  "diarrhea, nausea and vomiting.   Genitourinary:  Negative for difficulty urinating and dysuria.   Musculoskeletal:  Positive for arthralgias, back pain and gait problem. Negative for joint swelling.   Skin:  Negative for rash and wound.   Neurological:  Negative for dizziness, weakness, numbness and headaches.   Hematological:  Bruises/bleeds easily.   Psychiatric/Behavioral:  Positive for confusion and sleep disturbance.        Objective     /64   Pulse 60   Temp 98.4 °F (36.9 °C)   Ht 5' 10\" (1.778 m)   Wt 99.6 kg (219 lb 9.6 oz)   BMI 31.51 kg/m²     Physical Exam  Vitals and nursing note reviewed.   Constitutional:       General: He is not in acute distress.     Appearance: He is well-developed. He is not ill-appearing.   HENT:      Head: Normocephalic and atraumatic.      Right Ear: External ear normal.      Left Ear: External ear normal.   Eyes:      General:         Right eye: No discharge.         Left eye: No discharge.      Conjunctiva/sclera: Conjunctivae normal.   Neck:      Trachea: No tracheal deviation.   Cardiovascular:      Rate and Rhythm: Normal rate and regular rhythm.      Heart sounds: Murmur heard.   Pulmonary:      Effort: Pulmonary effort is normal. No respiratory distress.      Breath sounds: Normal breath sounds. No wheezing, rhonchi or rales.   Abdominal:      General: There is no distension.      Palpations: Abdomen is soft.      Tenderness: There is no abdominal tenderness. There is no guarding or rebound.   Musculoskeletal:         General: No deformity.      Cervical back: Neck supple.   Skin:     General: Skin is warm and dry.      Coloration: Skin is not pale.      Findings: Bruising present. No rash.   Neurological:      General: No focal deficit present.      Mental Status: He is alert. Mental status is at baseline.      Motor: No abnormal muscle tone.      Gait: Gait normal.   Psychiatric:         Behavior: Behavior normal.      Comments: Poor historian but answers " questions appropriately       Administrative Statements

## 2024-07-30 NOTE — ASSESSMENT & PLAN NOTE
Just saw Neuro yesterday - note not finished so unsure of exact plan but memory labs were ordered and pts dgtr states he was referred to [facility] and [facility] for driving test, He was given a rx for Aricept but has not yet started it, will follow, call with sudden new/worse symptoms

## 2024-07-30 NOTE — ASSESSMENT & PLAN NOTE
Still bothersome upon standing, encouraged regular exercise, healthy wgt, again advised to avoid NSAIDs and to stick with Tylenol, has been following with Pain Mgt as well

## 2024-07-30 NOTE — ASSESSMENT & PLAN NOTE
Dgtr noting falling asleep sitting in chair at waiting room, discussed poss sleep apnea and affect on memory impairment, will cont Trazodone for now and check sleep study, will follow

## 2024-07-30 NOTE — ASSESSMENT & PLAN NOTE
Noting short lived benefit with hip injection, urged to f/u with Pain Mgt, again encouraged to avoid NSAIDs, may use Tylenol sparingly, call with new/worse symptoms

## 2024-08-09 ENCOUNTER — TELEPHONE (OUTPATIENT)
Dept: FAMILY MEDICINE CLINIC | Facility: HOSPITAL | Age: 79
End: 2024-08-09

## 2024-08-09 DIAGNOSIS — R40.0 DAYTIME SOMNOLENCE: ICD-10-CM

## 2024-08-09 DIAGNOSIS — G47.8 OTHER SLEEP DISORDERS: Primary | ICD-10-CM

## 2024-08-09 NOTE — TELEPHONE ENCOUNTER
Jamar- I spoke with daughter. Daughter is unsure why he needs to come in for an appt when he was just here. I did let her know that he wasn't in for a preop clearance and would need to make that appt in order for the paper work to be filled out. She said she wants you to call her personally. I did let her know I would send you a message but you might not be able to call due to having appts with patients.   Daughter ended up not making appt. Since she is unsure why he needed it.. I did explain appts need to be within 30 days of surgery and have the appropriate appt scheduled.

## 2024-08-09 NOTE — TELEPHONE ENCOUNTER
----- Message from Dona Camp DO sent at 8/9/2024  8:23 AM EDT -----  Forms received from Ortho for preoperative clearance for carpal tunnel surgery.  His last visit was the end of July and was not a preop clearance.  His surgery is 8/20/24.  Can he be put in for a preop appt prior to his surgery.

## 2024-08-15 ENCOUNTER — OFFICE VISIT (OUTPATIENT)
Dept: FAMILY MEDICINE CLINIC | Facility: HOSPITAL | Age: 79
End: 2024-08-15
Payer: COMMERCIAL

## 2024-08-15 ENCOUNTER — TELEPHONE (OUTPATIENT)
Dept: FAMILY MEDICINE CLINIC | Facility: HOSPITAL | Age: 79
End: 2024-08-15

## 2024-08-15 VITALS
SYSTOLIC BLOOD PRESSURE: 124 MMHG | BODY MASS INDEX: 31.07 KG/M2 | OXYGEN SATURATION: 92 % | WEIGHT: 217 LBS | HEIGHT: 70 IN | HEART RATE: 65 BPM | DIASTOLIC BLOOD PRESSURE: 60 MMHG | TEMPERATURE: 97.5 F

## 2024-08-15 DIAGNOSIS — Z01.818 PRE-OP EXAMINATION: ICD-10-CM

## 2024-08-15 DIAGNOSIS — G56.02 CARPAL TUNNEL SYNDROME OF LEFT WRIST: Primary | ICD-10-CM

## 2024-08-15 PROCEDURE — PREOP

## 2024-08-15 PROCEDURE — 93000 ELECTROCARDIOGRAM COMPLETE: CPT

## 2024-08-15 PROCEDURE — 99214 OFFICE O/P EST MOD 30 MIN: CPT

## 2024-08-15 NOTE — PROGRESS NOTES
Ambulatory Visit  Name: Nj Pimentel Jr.      : 1945      MRN: [id number]  Encounter Provider: Nydia Maya DO  Encounter Date: 8/15/2024   Encounter department: [facility] CARE [address]    Assessment & Plan   1. Carpal tunnel syndrome of left wrist  Assessment & Plan:  - Pt will undergo an elective Low Risk surgery on . EKG performed in office, scanned. She is RCRI class II risk  with 10% 30-day risk of death, MI, or cardiac arrest.   - Pt is holding Plavix and Aspirin as of 7 days prior to surgery as instructed by ortho  - Medically optimized for procedure 24 with orthopedic surgery  - F/u for next visit with PCP    2. Pre-op examination  Assessment & Plan:  - Surgeon requests ECG, performed today  Orders:  -     POCT ECG       History of Present Illness     78yo male PMH stroke presents with daughter for pre-op visit prior to L carpal tunnel release scheduled for 24 with [facility] Dr Nick Jaffe. H/o prior L carpal tunnel release 2024, but now requires 2nd attempt. Reports no trouble with anesthesia. No PMH T2DM on insulin. Per last renal function panel 2024, renal function stable. No history of MI, but h/o stroke. PMH PAD on Plavix, but stopped taking Plavix 7 days before procedure. He is also holding his aspirin, avoiding other NSAIDS, only using Tylenol if needed.        Review of Systems   Musculoskeletal:  Positive for arthralgias.   All other systems reviewed and are negative.    Current Outpatient Medications on File Prior to Visit   Medication Sig Dispense Refill    ascorbic acid (VITAMIN C) 250 mg tablet Take 250 mg by mouth daily      aspirin (ECOTRIN LOW STRENGTH) 81 mg EC tablet Take 81 mg by mouth every other day 1 every other day      atorvastatin (LIPITOR) 40 mg tablet take 1 tablet by mouth every evening 90 tablet 3    [START ON 2024] donepezil (Aricept) 10 mg tablet Take 1 tablet (10 mg total) by mouth daily at bedtime Starting after  the 1 month at 5mg. Do not start before 2024. 30 tablet 4    donepezil (ARICEPT) 5 mg tablet Take 1 tablet (5 mg total) by mouth daily at bedtime For 1 month 30 tablet 0    escitalopram (LEXAPRO) 20 mg tablet Take 1 tablet (20 mg total) by mouth daily 90 tablet 2    ferrous sulfate 324 (65 Fe) mg Take 1 tablet (324 mg total) by mouth daily before breakfast 30 tablet 2    finasteride (PROSCAR) 5 mg tablet Take 1 tablet by mouth once daily 90 tablet 1    gabapentin (Neurontin) 400 mg capsule Take 1 capsule (400 mg total) by mouth 3 (three) times a day 270 capsule 1    lisinopril (ZESTRIL) 10 mg tablet Take 1 tablet (10 mg total) by mouth daily 90 tablet 1    Magnesium 250 MG TABS Take 1 tablet (250 mg total) by mouth 2 (two) times a day 60 tablet 5    metoprolol succinate (TOPROL-XL) 25 mg 24 hr tablet Take 1 tablet (25 mg total) by mouth daily 90 tablet 2    Multiple Vitamin (multivitamin) capsule Take 1 capsule by mouth daily      omeprazole (PriLOSEC) 20 mg delayed release capsule Take 1 capsule by mouth once daily 90 capsule 0    tamsulosin (FLOMAX) 0.4 mg Take 1 capsule (0.4 mg total) by mouth daily with dinner 90 capsule 1    traZODone (DESYREL) 100 mg tablet Take 1 tablet (100 mg total) by mouth daily at bedtime 30 tablet 2    clopidogrel (PLAVIX) 75 mg tablet Take 1 tablet (75 mg total) by mouth daily (Patient not taking: Reported on 8/15/2024) 90 tablet 2     No current facility-administered medications on file prior to visit.      Social History     Tobacco Use    Smoking status: Former     Current packs/day: 0.00     Average packs/day: 1 pack/day for 22.0 years (22.0 ttl pk-yrs)     Types: Cigarettes     Start date:      Quit date: 1985     Years since quittin.6    Smokeless tobacco: Never   Vaping Use    Vaping status: Never Used   Substance and Sexual Activity    Alcohol use: Not Currently     Comment: stopped drinking alcohol; AA x23yrs sober    Drug use: No    Sexual activity:  "Not Currently     Partners: Female     Objective     /60   Pulse 65   Temp 97.5 °F (36.4 °C)   Ht 5' 10\" (1.778 m)   Wt 98.4 kg (217 lb)   SpO2 92%   BMI 31.14 kg/m²     Physical Exam  Vitals reviewed. Exam conducted with a chaperone present.   Constitutional:       General: He is not in acute distress.     Appearance: Normal appearance. He is obese. He is not ill-appearing.   HENT:      Head: Normocephalic and atraumatic.   Cardiovascular:      Rate and Rhythm: Normal rate and regular rhythm.      Heart sounds: Murmur (systolic) heard.   Pulmonary:      Breath sounds: Normal breath sounds.   Abdominal:      General: Bowel sounds are normal.      Palpations: Abdomen is soft.   Musculoskeletal:      Comments: Ambulates fairly well without assistive device   Skin:     General: Skin is warm and dry.   Neurological:      Mental Status: He is alert.   Psychiatric:         Mood and Affect: Mood normal.         Behavior: Behavior normal.       Administrative Statements     Nydia Maya, DO  Family Medicine          "

## 2024-08-15 NOTE — ASSESSMENT & PLAN NOTE
- Pt will undergo an elective Low Risk surgery on 8/20. EKG performed in office, scanned. She is RCRI class II risk  with 10% 30-day risk of death, MI, or cardiac arrest.   - Pt is holding Plavix and Aspirin as of 7 days prior to surgery as instructed by ortho  - Medically optimized for procedure 8/20/24 with orthopedic surgery  - F/u for next visit with PCP

## 2024-08-15 NOTE — TELEPHONE ENCOUNTER
Renetta VALLE calling requesting pre operative form. Advised patient in need of pre op appt prior to completing form and he is scheduled to come in today.

## 2024-08-16 NOTE — PROGRESS NOTES
PT Evaluation     Today's date: 2024  Patient name: Nj Pimentel Jr.  : 1945  MRN: [id number]  Referring provider: Brayan Mast DO  Dx:   Encounter Diagnosis     ICD-10-CM    1. Dementia (HCC)  F03.90 Ambulatory Referral to Physical Therapy      2. Polyneuropathy  G62.9 Ambulatory Referral to Physical Therapy      3. Balance problem  R26.89 Ambulatory Referral to Physical Therapy          Start Time: 0800  Stop Time: 0845  Total time in clinic (min): 45 minutes    Assessment  Impairments: impaired balance, impaired physical strength, pain with function, participation limitations, activity limitations and endurance    Assessment details: Flakito is a 79 year old patient presenting to OPPT for evaluation regarding decreased balance and increased fall risk. Upon evaluation they show impairments in the following areas: largely impaired vestibular system as per last condition of MCTSIB only able to perform 5 seconds, as well as FGA 13/30 scoring (inc fall risk) with increased difficulty with vestibularly challenging conditions. 5x STS and TUG indicative of decreased power generation. TUG-C increased difficulty with impaired dual tasking cost. Some impulsivity evident with general movement patterns. History of back pain during transitional movements will potentially impair prognosis going forward. Hx of dementia may impact some carry over - but patient has good ability to follow directions and good motivation to participate throughout session. No further referral appears necessary at this time. Monitor Spo2 as it was creeping towards 90% during rest today. These impairments limit their ability to perform daily activities as well as their previous level of function causing decreased quality of life. HEP delivered with emphasis on generalized BLE strengthening - demo to patient with written instructions. Patient verbalized good understanding of trying to reduce workload on lumbar spine. Also encouraged to  perform all exercises at counter, as well as having chair behind patient to maximize safety.     Patient requires continued skilled PT services in order to improve aforementioned impairments so that they are able to return to highest level of function possible. Without initiation of skilled PT services, patient will continue to have increased fall risk leading to increased risk of re-hospitalization and healthcare cost and burden.      Understanding of Dx/Px/POC: good     Prognosis: good    Goals  STG (4 weeks)   Improve (reduce) TUG score by 4 seconds indicating meaningful improvement in fall risk   Improve FGA score by 4 indicating meaningful improvement in fall risk   Improve/Reduce 5x STS score by 5 seconds indicating meaningful improvement in fall risk and power generation   Increase 6MWT by 34.4 meters indicating meaningful change in aerobic endurance (MCID per Junior Quinteros Rang 2012).     LTG (8 weeks)   Improve (reduce) TUG score to 10.5 seconds indicating meaningful improvement in fall risk   Improve FGA score to > 22/30 indicating meaningful improvement in fall risk   Improve/Reduce 5x STS score to < 10 seconds seconds indicating meaningful improvement in fall risk and power generation   Improve gait speed to > 1.2 m/s   Improve 6MWT score to align with age related norms demonstrating improvement in aerobic capacity and endurance      Plan  Patient would benefit from: skilled physical therapy  Planned modality interventions: cryotherapy and thermotherapy: hydrocollator packs    Planned therapy interventions: manual therapy, neuromuscular re-education, strengthening, therapeutic exercise, therapeutic activities, home exercise program, gait training and balance    Frequency: 2x week  Plan of Care beginning date: 8/18/2024  Plan of Care expiration date: 10/18/2024  Treatment plan discussed with: patient        Subjective Evaluation    History of Present Illness  Mechanism of injury: Flakito is a 79 year old  "presenting to OPPT for evaluation regarding impaired balance, also has dementia limiting functional mobility. His balance is sorta normal. When things are moving, and he is moving. His balance isn't the greatest. Notes that he has to catch himself sometimes. Was at the neurologist a couple of weeks ago. Has hx of neuropathy in the feet and it was recommended that he try physical therapy.     \"Every now and then\" regarding dizziness. If he gets up too fast, he might get a little dizzy. Describes it as a little lightheaded. Denies any feeling like he is he is on a boat. No room spinning.     He fell when he was outside of a restaurant. Per patient was able to get up without having to hold onto something. Has more trouble when he is walking around in the grass. Has back pain and hip pain that is limiting him.     Per EMR: \"Does say his balance isn't great. Says it is worse on uneven surfaces, but dela cruz snot feel it is any worse in the dark. No \"furniture-surfing.\" Has fallen \"a few times.\" Most recent was going up the steps, toe got caught. \"   Patient Goals  Patient goals for therapy: increased strength, return to sport/leisure activities and improved balance    Pain  Current pain ratin  At best pain ratin  At worst pain ratin  Pain location: Down the back and into the groin.  Quality: dull ache  Aggravating factors: standing and walking    Social Support  Steps to enter house: yes  1  Interior stairs assessed: Basement..   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 Does a lot of walkng during the day. \"walks from store to store\".      Diagnostic Tests    FCE comments: MRI LUMBAR SPINE WITHOUT CONTRAST     INDICATION: M54.16: Radiculopathy, lumbar region.     COMPARISON:  None.     TECHNIQUE:  Multiplanar, multisequence imaging of the lumbar spine was performed. .        IMAGE QUALITY:  Diagnostic   "   FINDINGS:     VERTEBRAL BODIES:  There are 5 lumbar type vertebral bodies. Grade 1 anterolisthesis L5-S1 secondary to bilateral pars interarticularis defects. Modic type I endplate degenerative change L2-L3.     SACRUM:  Normal signal within the sacrum. No evidence of insufficiency or stress fracture.     DISTAL CORD AND CONUS:  Normal size and signal within the distal cord and conus.     PARASPINAL SOFT TISSUES:  Paraspinal soft tissues are unremarkable.     LOWER THORACIC DISC SPACES:  Normal disc height and signal.  No disc herniation, canal stenosis or foraminal narrowing.     LUMBAR DISC SPACES:     L1-L2:  Normal.     L2-L3: Bulging annulus. Facet arthrosis. Mild mass effect on the thecal sac. Mild right and moderate left foraminal narrowing.     L3-L4: Bulging annulus. Facet arthrosis with ligament flavum thickening. Mild mass effect on the thecal sac. Moderate bilateral foraminal narrowing with contact of the exiting nerve roots.     L4-L5: Bulging annulus. Facet arthrosis. Significant canal stenosis. Moderate to severe left and moderate right foraminal narrowing with contact of the exiting nerve roots.     L5-S1: Uncovering the disc space. Facet arthrosis. No significant canal stenosis. Mild left and mild to moderate right foraminal narrowing.     OTHER FINDINGS:  None.     IMPRESSION:     Degenerative changes of the lumbar spine, as described above.     Modic type I endplate degenerative changes present at L2-L3.     Moderate to severe left foraminal narrowing is present at L4-L5 with impingement of the exiting nerve root. Multilevel moderate foraminal narrowing is also present from L2-L3 through L4-L5.        Objective      Balance Test    6 Minute Walk Test (ft):    2 Minute Walk Test (ft):    Gait Speed (ft/s): 0.74 m/s    5x Sit To Stand (s): 16.36 seconds    FGA:     ABC:  73.13%    4 Square Step Test     Gait Disorientation Test:     Tug-C  17.13 seconds   TU.64 seconds           MCTSIB Number of Seconds   Feet Together, Eyes Open 30   Feet Together, Eyes Closed 30   Feet Together, Eyes Open Foam 30   Feet Together, Eyes Closed Foam 5.2 seconds     Flowsheet Rows      Flowsheet Row Most Recent Value   Functional Gait Assessment    Gait Level Surface  1   Change in GaiT Speed 2   Gait with horizontal head turns 3   Gait with vertical head turns 1   Gait and pivot tuen  2   Step over obstacle 1   Gait with narrow base of supprt 1   Gait with eyes closed 1   Ambulating backwards 0   Steps 1   Total score  13          Coordination Left Right   Heel To Ponce Wnl  Wnl    Finger To Nose Wnl  Wnl    Rapid Alternating Movement Wnl  Slight impaired   UE     LE         Sensation Left Right   Kinesthesia Wnl  Wnl    Light Touch Wnl  Wnl    Sharp/Dull     Graphesthesia      2 Point Discrimination         Muscle Tone Left Right   Modified Michelle Scale 0 0   Hamstring     Gastroc     Quad       Deep Tendon Reflexes  Left Right   Patellar (L3/4)  1+ 1+    Achilles (S1/2) 1+  1+    Brachioradialis (C5/6)     Biceps (C5/6)     Triceps (C7/8)       Pathological Reflexes  Left Right   Horner      Babinski     Clonus        Manual Muscle Testing - Hip Left Right   Flexion 3+ 3+   Extension 4 3+   Abduction 3- 3-   Adduction (pain in the groin)  3+ 3+     Manual Muscle Testing - Knee Left Right   Flexion 4 4   Extension 4 4     Manual Muscle Testing - Ankle Left Right   Doriflexion 3+ 3+   Plantarflexion 3+ 3+     BP LUE auto 129/80  92% Spo2 seated rest   63 bpm      Precautions: Carotid stenosis, HTN, Depression, Dementia, Polyneuropathy, CVA (2020), Anxiety     Fall risk   EPOC 10/18/24      Manuals                                                                 Neuro Re-Ed             Static Balance              Dynamic Balance              Compliant Surface              Hurdles              Multidirectional Stepping                                                                  Education  POC,  balance systems             Ther Ex             Sit <> Stand              Hip abd              Hip ext              HEP  Demo             Pball rollout  10x try and reduce back sx                                                    Ther Activity                                       Gait Training                                       Modalities

## 2024-08-19 ENCOUNTER — EVALUATION (OUTPATIENT)
Facility: CLINIC | Age: 79
End: 2024-08-19
Payer: COMMERCIAL

## 2024-08-19 DIAGNOSIS — F03.90 DEMENTIA (HCC): ICD-10-CM

## 2024-08-19 DIAGNOSIS — R26.89 BALANCE PROBLEM: ICD-10-CM

## 2024-08-19 DIAGNOSIS — G62.9 POLYNEUROPATHY: ICD-10-CM

## 2024-08-19 DIAGNOSIS — F03.90 DEMENTIA, UNSPECIFIED DEMENTIA SEVERITY, UNSPECIFIED DEMENTIA TYPE, UNSPECIFIED WHETHER BEHAVIORAL, PSYCHOTIC, OR MOOD DISTURBANCE OR ANXIETY (HCC): Primary | ICD-10-CM

## 2024-08-19 PROCEDURE — 97110 THERAPEUTIC EXERCISES: CPT

## 2024-08-19 PROCEDURE — 97162 PT EVAL MOD COMPLEX 30 MIN: CPT

## 2024-09-04 ENCOUNTER — OFFICE VISIT (OUTPATIENT)
Facility: CLINIC | Age: 79
End: 2024-09-04
Payer: COMMERCIAL

## 2024-09-04 DIAGNOSIS — F03.90 DEMENTIA, UNSPECIFIED DEMENTIA SEVERITY, UNSPECIFIED DEMENTIA TYPE, UNSPECIFIED WHETHER BEHAVIORAL, PSYCHOTIC, OR MOOD DISTURBANCE OR ANXIETY (HCC): Primary | ICD-10-CM

## 2024-09-04 DIAGNOSIS — R26.89 BALANCE PROBLEM: ICD-10-CM

## 2024-09-04 DIAGNOSIS — G62.9 POLYNEUROPATHY: ICD-10-CM

## 2024-09-04 PROCEDURE — 97112 NEUROMUSCULAR REEDUCATION: CPT

## 2024-09-04 PROCEDURE — 97110 THERAPEUTIC EXERCISES: CPT

## 2024-09-04 NOTE — PROGRESS NOTES
"Daily Note     Today's date: 2024  Patient name: Nj Pimentel Jr.  : 1945  MRN: [id number]  Referring provider: Brayan Mast DO  Dx:   Encounter Diagnosis     ICD-10-CM    1. Dementia, unspecified dementia severity, unspecified dementia type, unspecified whether behavioral, psychotic, or mood disturbance or anxiety (HCC)  F03.90       2. Polyneuropathy  G62.9       3. Balance problem  R26.89                      Subjective: pt states that he is not sure why he is here. He notes that he does not think his balance isn't too much of an issue. He does not report any falls since he was last here that he knows of.       Objective: See treatment diary below      Assessment: Pt participated in a skilled PT session focused on balance, gait, and strengthening. Pt exhibited impulsivity throughout session with quick lunging into walls and rapid turns. Pt repeatedly stated that he didn't think he needed to be here due to his balance being good since he was jumping off planes, so reviewed pt's neurology note from . STS performed without UE support. No pain reported throughout session. Pt will continue to benefit from skilled PT so that he can navigate his environment with increased safety and stability.       Plan: Continue per plan of care.      Precautions: Carotid stenosis, HTN, Depression, Dementia, Polyneuropathy, CVA (), Anxiety     Fall risk   EPOC 10/18/24      Manuals                                                                 Neuro Re-Ed             Static Balance              Dynamic Balance              Compliant Surface   Foam square 3x30\"     Foam beam side stepping 4x laterally, Amaris to maintain balance.            Hurdles   6 hurdles fwd and sideways natasha with 4# AQ natasha 6 rounds each.            Multidirectional Stepping              Gait + HT  Horizontal and vertical 4x40ft .                                                   Education  POC, balance systems             Ther Ex "             Sit <> Stand   3x10 with foam square.            Hip abd   3x10 3#           Hip ext              HEP  Demo             Pball rollout  10x try and reduce back sx  30x                                                  Ther Activity                                       Gait Training                                       Modalities

## 2024-09-05 ENCOUNTER — OFFICE VISIT (OUTPATIENT)
Dept: PAIN MEDICINE | Facility: CLINIC | Age: 79
End: 2024-09-05
Payer: COMMERCIAL

## 2024-09-05 VITALS
HEIGHT: 70 IN | BODY MASS INDEX: 31.5 KG/M2 | WEIGHT: 220 LBS | TEMPERATURE: 98.6 F | DIASTOLIC BLOOD PRESSURE: 78 MMHG | SYSTOLIC BLOOD PRESSURE: 152 MMHG | HEART RATE: 58 BPM

## 2024-09-05 DIAGNOSIS — M46.1 SACROILIITIS (HCC): Primary | ICD-10-CM

## 2024-09-05 DIAGNOSIS — M25.551 BILATERAL HIP PAIN: ICD-10-CM

## 2024-09-05 DIAGNOSIS — E66.9 CLASS 1 OBESITY WITH SERIOUS COMORBIDITY AND BODY MASS INDEX (BMI) OF 31.0 TO 31.9 IN ADULT, UNSPECIFIED OBESITY TYPE: ICD-10-CM

## 2024-09-05 DIAGNOSIS — M47.816 LUMBAR SPONDYLOSIS: ICD-10-CM

## 2024-09-05 DIAGNOSIS — M25.552 BILATERAL HIP PAIN: ICD-10-CM

## 2024-09-05 PROCEDURE — 99214 OFFICE O/P EST MOD 30 MIN: CPT | Performed by: PHYSICIAN ASSISTANT

## 2024-09-05 PROCEDURE — G2211 COMPLEX E/M VISIT ADD ON: HCPCS | Performed by: PHYSICIAN ASSISTANT

## 2024-09-05 PROCEDURE — 1160F RVW MEDS BY RX/DR IN RCRD: CPT | Performed by: PHYSICIAN ASSISTANT

## 2024-09-05 NOTE — PROGRESS NOTES
Assessment:  1. Sacroiliitis (HCC)    2. Lumbar spondylosis    3. Bilateral hip pain    4. Class 1 obesity with serious comorbidity and body mass index (BMI) of 31.0 to 31.9 in adult, unspecified obesity type        Plan:  While the patient was in the office today, I did have a thorough conversation regarding their chronic pain syndrome, medication management, and treatment plan options.    Based on patient report and physical exam, the patient's symptomatology does seem to be consistent with sacroiliac mediated pain from sacroiliitis. We will schedule the patient for a bilateral SIJ injection to decrease any inflammatory component of the patient's pain symptoms.    Patient is requesting a referral to weight management therefore I have placed a referral.    The patient was advised to contact the office should their symptoms worsen in the interim. The patient was agreeable and verbalized an understanding.        History of Present Illness:    The patient is a 79 y.o. male last seen on 6/10/24 who presents for a follow up office visit in regards to chronic low back pain secondary to lumbar spondylosis and degenerative disc disease as well as bilateral hip pain secondary to osteoarthritis..  The patient currently reports increased low back pain with referred pain patterns into bilateral hips and bilateral groin areas.  Patient denies any lower extremity radicular pain, paresthesias or weakness.  He rates his current pain score of 5 out of 10 and describes it as an intermittent dull, aching and sharp pain.  He has increased pain with walking and bending and also rising from a seated position.  Despite the pain, he tries to walk on a regular basis for exercise.  Patient asks if he can have a referral to a bariatric team to discuss weight loss options.    I have personally reviewed and/or updated the patient's past medical history, past surgical history, family history, social history, current medications, allergies, and  vital signs today.       Review of Systems:    Review of Systems   Musculoskeletal:  Positive for joint swelling.        Decreased range of motion   Pain in legs    Neurological:  Positive for dizziness (little).         Past Medical History:   Diagnosis Date   • Alcoholism (HCC)    • Anemia     iron def   • Anxiety    • Arthritis    • Cerebrovascular accident (CVA) (HCC) 01/07/2020    memory issues   • Coronary artery disease    • GERD (gastroesophageal reflux disease)    • Gout    • High cholesterol    • Hypertension    • Insomnia    • Kidney stone    • Stroke (HCC)        Past Surgical History:   Procedure Laterality Date   • ANKLE SURGERY Right    • CARDIAC CATHETERIZATION      no findings   • COLONOSCOPY  01/25/2013    polypectomy; complete - 3 yrs d/t polyp - benign submucosal lipoma- path c/w lipoma   • COLONOSCOPY  04/25/2017    complete - tubular adenoma - repeat 5yrs   • CYSTOSCOPY     • CYSTOTOMY      bladder  w/ basket extraction of calculus   • FEMUR FRACTURE SURGERY     • HERNIA REPAIR      inguinal ; sliding   • INGUINAL HERNIA REPAIR Right     unilateral   • IR LOWER EXTREMITY ANGIOGRAM  4/24/2023   • KNEE ARTHROSCOPY Right     w/medial menisectomy   • LASIK      corneal   • LITHOTRIPSY      renal   • WI COLONOSCOPY FLX DX W/COLLJ SPEC WHEN PFRMD N/A 04/25/2017    Procedure: SCREENING COLONOSCOPY;  Surgeon: Paul Jacome MD;  Location: [facility];  Service: General   • WI SLCTV CATHJ 3RD+ ORD SLCTV ABDL PEL/LXTR BRNCH Right 4/24/2023    Procedure: ARTERIOGRAM;  Surgeon: Alivia Blum MD;  Location: [facility];  Service: Vascular   • WI TEAEC W/PATCH GRF CAROTID VERTB SUBCLAV NECK INC Right 01/10/2020    Procedure: ENDARTERECTOMY ARTERY CAROTID;  Surgeon: Aaron Smith MD;  Location: [facility];  Service: Vascular   • WI TEAEC W/WO PATCH GRAFT COMMON FEMORAL Right 4/24/2023    Procedure: ENDARTERECTOMY ARTERIAL FEMORAL, RETROGRADE ILIAC INTERVENTION;  Surgeon: Alivia Blum MD;  Location: [facility];   Service: Vascular   • ROTATOR CUFF REPAIR Bilateral    • TONSILLECTOMY         Family History   Problem Relation Age of Onset   • Alzheimer's disease Mother    • Alzheimer's disease Father    • Heart disease Father         CAD   • Other Father         prediabetes   • Diabetes Father    • Breast cancer Other    • Arthritis Family    • Hypertension Family        Social History     Occupational History   • Occupation: Retired     Comment: working full time   Tobacco Use   • Smoking status: Former     Current packs/day: 0.00     Average packs/day: 1 pack/day for 22.0 years (22.0 ttl pk-yrs)     Types: Cigarettes     Start date: 1972     Quit date: 1985     Years since quittin.7     Passive exposure: Past   • Smokeless tobacco: Never   Vaping Use   • Vaping status: Never Used   Substance and Sexual Activity   • Alcohol use: Not Currently     Comment: stopped drinking alcohol; AA x23yrs sober   • Drug use: No   • Sexual activity: Not Currently     Partners: Female         Current Outpatient Medications:   •  ascorbic acid (VITAMIN C) 250 mg tablet, Take 250 mg by mouth daily, Disp: , Rfl:   •  aspirin (ECOTRIN LOW STRENGTH) 81 mg EC tablet, Take 81 mg by mouth every other day 1 every other day, Disp: , Rfl:   •  atorvastatin (LIPITOR) 40 mg tablet, take 1 tablet by mouth every evening, Disp: 90 tablet, Rfl: 3  •  donepezil (Aricept) 10 mg tablet, Take 1 tablet (10 mg total) by mouth daily at bedtime Starting after the 1 month at 5mg. Do not start before 2024., Disp: 30 tablet, Rfl: 4  •  donepezil (ARICEPT) 5 mg tablet, Take 1 tablet (5 mg total) by mouth daily at bedtime For 1 month, Disp: 30 tablet, Rfl: 0  •  escitalopram (LEXAPRO) 20 mg tablet, Take 1 tablet (20 mg total) by mouth daily, Disp: 90 tablet, Rfl: 2  •  ferrous sulfate 324 (65 Fe) mg, Take 1 tablet (324 mg total) by mouth daily before breakfast, Disp: 30 tablet, Rfl: 2  •  finasteride (PROSCAR) 5 mg tablet, Take 1 tablet by mouth  "once daily, Disp: 90 tablet, Rfl: 1  •  gabapentin (Neurontin) 400 mg capsule, Take 1 capsule (400 mg total) by mouth 3 (three) times a day, Disp: 270 capsule, Rfl: 1  •  lisinopril (ZESTRIL) 10 mg tablet, Take 1 tablet (10 mg total) by mouth daily, Disp: 90 tablet, Rfl: 1  •  Magnesium 250 MG TABS, Take 1 tablet (250 mg total) by mouth 2 (two) times a day, Disp: 60 tablet, Rfl: 5  •  metoprolol succinate (TOPROL-XL) 25 mg 24 hr tablet, Take 1 tablet (25 mg total) by mouth daily, Disp: 90 tablet, Rfl: 2  •  Multiple Vitamin (multivitamin) capsule, Take 1 capsule by mouth daily, Disp: , Rfl:   •  omeprazole (PriLOSEC) 20 mg delayed release capsule, Take 1 capsule by mouth once daily, Disp: 90 capsule, Rfl: 0  •  tamsulosin (FLOMAX) 0.4 mg, Take 1 capsule (0.4 mg total) by mouth daily with dinner, Disp: 90 capsule, Rfl: 1  •  traZODone (DESYREL) 100 mg tablet, Take 1 tablet (100 mg total) by mouth daily at bedtime, Disp: 30 tablet, Rfl: 2  •  clopidogrel (PLAVIX) 75 mg tablet, Take 1 tablet (75 mg total) by mouth daily (Patient not taking: Reported on 8/15/2024), Disp: 90 tablet, Rfl: 2    No Known Allergies    Physical Exam:    /78 (BP Location: Left arm, Patient Position: Sitting, Cuff Size: Adult)   Pulse 58   Temp 98.6 °F (37 °C)   Ht 5' 10\" (1.778 m)   Wt 99.8 kg (220 lb)   BMI 31.57 kg/m²     Constitutional:normal, well developed, well nourished, alert, in no distress and non-toxic and no overt pain behavior.  Eyes:anicteric  HEENT:grossly intact  Neck:supple, symmetric, trachea midline and no masses   Pulmonary:even and unlabored  Cardiovascular:No edema or pitting edema present  Skin:Normal without rashes or lesions and well hydrated  Psychiatric:Mood and affect appropriate  Neurologic:Cranial Nerves II-XII grossly intact  Musculoskeletal: Bilateral sacroiliac joint tenderness, bilateral + Cherelle finger sign + Patricks test +Gaenslen's test+ Posterior pelvic pain provocation      Imaging  FL spine " and pain procedure    (Results Pending)         Orders Placed This Encounter   Procedures   • FL spine and pain procedure   • Ambulatory Referral to Weight Management

## 2024-09-06 ENCOUNTER — OFFICE VISIT (OUTPATIENT)
Facility: CLINIC | Age: 79
End: 2024-09-06
Payer: COMMERCIAL

## 2024-09-06 DIAGNOSIS — G62.9 POLYNEUROPATHY: ICD-10-CM

## 2024-09-06 DIAGNOSIS — F03.90 DEMENTIA, UNSPECIFIED DEMENTIA SEVERITY, UNSPECIFIED DEMENTIA TYPE, UNSPECIFIED WHETHER BEHAVIORAL, PSYCHOTIC, OR MOOD DISTURBANCE OR ANXIETY (HCC): Primary | ICD-10-CM

## 2024-09-06 DIAGNOSIS — R26.89 BALANCE PROBLEM: ICD-10-CM

## 2024-09-06 PROCEDURE — 97112 NEUROMUSCULAR REEDUCATION: CPT

## 2024-09-06 PROCEDURE — 97110 THERAPEUTIC EXERCISES: CPT

## 2024-09-06 NOTE — PROGRESS NOTES
"Daily Note     Today's date: 2024  Patient name: Nj Pimentel Jr.  : 1945  MRN: [id number]  Referring provider: Brayan Mast DO  Dx:   No diagnosis found.                 Subjective: pt states that he is not sure why he is here. He notes that he does not think his balance isn't too much of an issue. He does not report any falls since he was last here that he knows of.       Objective: See treatment diary below      Assessment: Pt participated in a skilled PT session focused on balance, gait, and strengthening. Pt exhibited impulsivity throughout session with quick lunging into walls and rapid turns. Added seated ankle eversion strengthening with increased challenge on R LE > L LE, which is consistent with his history of ankle injuries. Occasional R lateral LOB likely due to poor ankle eversion strength. Pt noted increased groin p! With STS with KB, so reduced to no additional weight.  Pt will continue to benefit from skilled PT so that he can navigate his environment with increased safety and stability.       Plan: Continue per plan of care.      Precautions: Carotid stenosis, HTN, Depression, Dementia, Polyneuropathy, CVA (), Anxiety     Fall risk   EPOC 10/18/24      Manuals                                                                Neuro Re-Ed             Static Balance              Dynamic Balance    Tandem gait x4 down and back           Compliant Surface   Foam square 3x30\"     Foam beam side stepping 4x laterally, Amaris to maintain balance.  Foam square 3x30\"     Foam beam side stepping 4x laterally, Amaris to maintain balance          Hurdles   6 hurdles fwd and sideways natasha with 4# AQ natasha 6 rounds each.  6 hurdles fwd and sideways natasha with 4# AW 6 rounds each          Multidirectional Stepping              Gait + HT  Horizontal and vertical 4x40ft .  Horizontal and vertical 4x40ft                                                 Education  POC, balance systems             Ther " Ex             Sit <> Stand   3x10 with foam square.  3x10 with foam square     Trialed with KB          Hip abd   3x10 3#           Seated anlke eversion   3x10 blue t-band           Hip ext              HEP  Demo             Pball rollout  10x try and reduce back sx  30x           LAQ    3x10 10# weights                                     Ther Activity                                       Gait Training                                       Modalities

## 2024-09-09 ENCOUNTER — OFFICE VISIT (OUTPATIENT)
Facility: CLINIC | Age: 79
End: 2024-09-09
Payer: COMMERCIAL

## 2024-09-09 DIAGNOSIS — G62.9 POLYNEUROPATHY: ICD-10-CM

## 2024-09-09 DIAGNOSIS — F03.90 DEMENTIA, UNSPECIFIED DEMENTIA SEVERITY, UNSPECIFIED DEMENTIA TYPE, UNSPECIFIED WHETHER BEHAVIORAL, PSYCHOTIC, OR MOOD DISTURBANCE OR ANXIETY (HCC): Primary | ICD-10-CM

## 2024-09-09 DIAGNOSIS — R26.89 BALANCE PROBLEM: ICD-10-CM

## 2024-09-09 PROCEDURE — 97112 NEUROMUSCULAR REEDUCATION: CPT

## 2024-09-09 PROCEDURE — 97110 THERAPEUTIC EXERCISES: CPT

## 2024-09-09 NOTE — PROGRESS NOTES
"Daily Note     Today's date: 2024  Patient name: Nj Pimentel Jr.  : 1945  MRN: [id number]  Referring provider: Brayan Mast DO  Dx:   Encounter Diagnosis     ICD-10-CM    1. Dementia, unspecified dementia severity, unspecified dementia type, unspecified whether behavioral, psychotic, or mood disturbance or anxiety ([facility])  F03.90       2. Polyneuropathy  G62.9       3. Balance problem  R26.89                        Subjective: pt states that he had a little bit of soreness in his groin. Every now and then going up the steps it aches. He notes he still feels unsteady when he gets up out of bed, he staggers. It happens first thing in the morning and every now and then when he gets up fast.       Objective: See treatment diary below      Assessment: Pt participated in a skilled PT session focused on balance, gait, and strengthening. Discussed being slow in the morning with transitional movement into and out of bed or when rising from a chair. Discussed use of sticky notes on mirrors, alarm clocks, and fridge to remind throughout the day. Pt expressed understanding and stated he would try this. Pt exhibited impulsivity with jumping into the wall on the foam beams, jumping forwards off of it, and grabbing the therapists hair and playing with it while his ankle weights were removed. Pt will continue to benefit from skilled PT so that he can navigate his environment with increased safety and stability.       Plan: Continue per plan of care.      Precautions: Carotid stenosis, HTN, Depression, Dementia, Polyneuropathy, CVA (), Anxiety     Fall risk   EPOC 10/18/24      Manuals                                                               Neuro Re-Ed             Static Balance              Dynamic Balance    Tandem gait x4 down and back  Tandem gait x4 down and back         Compliant Surface   Foam square 3x30\"     Foam beam side stepping 4x laterally, Amaris to maintain balance.  Foam square " "3x30\"     Foam beam side stepping 4x laterally, Amaris to maintain balance Foam square 3x30\"  FT     Foam beam side stepping 4x laterally Amaris to maintain balance         Hurdles   6 hurdles fwd and sideways natasha with 4# AQ natasha 6 rounds each.  6 hurdles fwd and sideways natasha with 4# AW 6 rounds each 6 hurdles fwd and sideways natasha with 4# AW 6 rounds each         Multidirectional Stepping              Gait + HT  Horizontal and vertical 4x40ft .  Horizontal and vertical 4x40ft Horizontal and vertical 4x40ft                                                Education  POC, balance systems             Ther Ex             Sit <> Stand   3x10 with foam square.  3x10 with foam square     Trialed with KB 3x10 with foam square    Trialed with KB         Hip abd   3x10 3#           Seated anlke eversion   3x10 blue t-band  3x10 blue t-band         Hip ext              HEP  Demo             Pball rollout  10x try and reduce back sx  30x           LAQ    3x10 10# weights                                     Ther Activity                                       Gait Training                                       Modalities                                            "

## 2024-09-11 ENCOUNTER — OFFICE VISIT (OUTPATIENT)
Facility: CLINIC | Age: 79
End: 2024-09-11
Payer: COMMERCIAL

## 2024-09-11 DIAGNOSIS — R26.89 BALANCE PROBLEM: ICD-10-CM

## 2024-09-11 DIAGNOSIS — G62.9 POLYNEUROPATHY: ICD-10-CM

## 2024-09-11 DIAGNOSIS — F03.90 DEMENTIA, UNSPECIFIED DEMENTIA SEVERITY, UNSPECIFIED DEMENTIA TYPE, UNSPECIFIED WHETHER BEHAVIORAL, PSYCHOTIC, OR MOOD DISTURBANCE OR ANXIETY (HCC): Primary | ICD-10-CM

## 2024-09-11 PROCEDURE — 97112 NEUROMUSCULAR REEDUCATION: CPT

## 2024-09-11 PROCEDURE — 97110 THERAPEUTIC EXERCISES: CPT

## 2024-09-11 NOTE — PROGRESS NOTES
"Daily Note     Today's date: 2024  Patient name: Nj Pimentel Jr.  : 1945  MRN: [id number]  Referring provider: Brayan Mast DO  Dx:   Encounter Diagnosis     ICD-10-CM    1. Dementia, unspecified dementia severity, unspecified dementia type, unspecified whether behavioral, psychotic, or mood disturbance or anxiety ([facility])  F03.90       2. Polyneuropathy  G62.9       3. Balance problem  R26.89                          Subjective: Pt states he hit himself in the head with a cabinet since he was last here and he has a small scab. He is doing okay otherwise. He presents with candy in his shirt      Objective: See treatment diary below      Assessment: Pt participated in a skilled PT session focused on balance, gait, and strengthening. Exhibited impulsivity with sudden lunging to the sides during hurdles again today. Pt also exhibited impulsivity during gait + HT, in which he attempted to mimic hitting his head into the cabinets. Despite the impulsivity, he was much more steady with gait + HT today as compared to previous visits. Pt will continue to benefit from skilled PT so that he can navigate his environment with increased safety and stability.       Plan: Continue per plan of care.      Precautions: Carotid stenosis, HTN, Depression, Dementia, Polyneuropathy, CVA (), Anxiety     Fall risk   EPOC 10/18/24      Manuals                                                              Neuro Re-Ed             Static Balance              Dynamic Balance    Tandem gait x4 down and back  Tandem gait x4 down and back Tandem gait x4 down and back         Compliant Surface   Foam square 3x30\"     Foam beam side stepping 4x laterally, Amaris to maintain balance.  Foam square 3x30\"     Foam beam side stepping 4x laterally, Amaris to maintain balance Foam square 3x30\"  FT     Foam beam side stepping 4x laterally Amaris to maintain balance Foam suare 3x30\" FT    Foam beam side stepping 4x laterally " Amaris to maintain balance        Hurdles   6 hurdles fwd and sideways natasha with 4# AQ natasha 6 rounds each.  6 hurdles fwd and sideways natasha with 4# AW 6 rounds each 6 hurdles fwd and sideways natasha with 4# AW 6 rounds each 6 hurdles fwd and sdidways natasha 4# AW 6 rounds each        Multidirectional Stepping              Gait + HT  Horizontal and vertical 4x40ft .  Horizontal and vertical 4x40ft Horizontal and vertical 4x40ft Horizontal and vertical 4x40ft                                               Education  POC, balance systems             Ther Ex             Sit <> Stand   3x10 with foam square.  3x10 with foam square     Trialed with KB 3x10 with foam square    Trialed with KB 3x10 from chair with KB, no groin p! Reported today. 7.5lb KB        Hip abd   3x10 3#           Seated anlke eversion   3x10 blue t-band  3x10 blue t-band 3x10 blue t-band        Hip ext              HEP  Demo             Pball rollout  10x try and reduce back sx  30x           LAQ    3x10 10# weights   3x10 10# weights                                  Ther Activity                                       Gait Training                                       Modalities

## 2024-09-12 DIAGNOSIS — K29.00 OTHER ACUTE GASTRITIS WITHOUT HEMORRHAGE: ICD-10-CM

## 2024-09-12 DIAGNOSIS — I10 PRIMARY HYPERTENSION: Chronic | ICD-10-CM

## 2024-09-12 RX ORDER — LISINOPRIL 10 MG/1
10 TABLET ORAL DAILY
Qty: 90 TABLET | Refills: 2 | Status: SHIPPED | OUTPATIENT
Start: 2024-09-12

## 2024-09-13 ENCOUNTER — OFFICE VISIT (OUTPATIENT)
Dept: FAMILY MEDICINE CLINIC | Facility: HOSPITAL | Age: 79
End: 2024-09-13
Payer: COMMERCIAL

## 2024-09-13 ENCOUNTER — TELEPHONE (OUTPATIENT)
Age: 79
End: 2024-09-13

## 2024-09-13 VITALS
OXYGEN SATURATION: 96 % | BODY MASS INDEX: 31.5 KG/M2 | TEMPERATURE: 98.3 F | HEART RATE: 52 BPM | HEIGHT: 70 IN | DIASTOLIC BLOOD PRESSURE: 60 MMHG | SYSTOLIC BLOOD PRESSURE: 118 MMHG | WEIGHT: 220 LBS

## 2024-09-13 DIAGNOSIS — L98.9 LESION OF SKIN OF SCALP: Primary | ICD-10-CM

## 2024-09-13 PROCEDURE — 99213 OFFICE O/P EST LOW 20 MIN: CPT

## 2024-09-13 PROCEDURE — G2211 COMPLEX E/M VISIT ADD ON: HCPCS

## 2024-09-13 NOTE — TELEPHONE ENCOUNTER
"Pt called. Requests referral to \"the place in [place] that freezes the stuff on your head.\" Pt unsure of the name. Place he typically goes to is closed today. Pt reports that he has a couple lesions to the top of his head that he would like to have frozen/removed. Offered appointment for Provider to assess and scheduled same day in office. Pt agreeable with plan. Informed Pt that he will need to go to office at [address] . Pt verbalized understanding.     Office informed.   "

## 2024-09-13 NOTE — PROGRESS NOTES
Ambulatory Visit  Name: Nj Pimentel Jr.      : 1945      MRN: [id number]  Encounter Provider: Nydia Maya DO  Encounter Date: 2024   Encounter department: [facility] [address]    Assessment & Plan  Lesion of skin of scalp  - Suspect actinic keratosis 2/2 sun damage    Plan:  - Referral to dermatology for removal  - F/u PRN    Orders:    Ambulatory Referral to Dermatology; Future       History of Present Illness     78yo male presents requesting removal of scalp lesions. 3 are present, have been there for a while. Reports having some removed in the past. No other complaints today.        History obtained from : patient  Review of Systems   Skin:         +lesion     Current Outpatient Medications on File Prior to Visit   Medication Sig Dispense Refill    ascorbic acid (VITAMIN C) 250 mg tablet Take 250 mg by mouth daily      aspirin (ECOTRIN LOW STRENGTH) 81 mg EC tablet Take 81 mg by mouth every other day 1 every other day      atorvastatin (LIPITOR) 40 mg tablet take 1 tablet by mouth every evening 90 tablet 3    donepezil (Aricept) 10 mg tablet Take 1 tablet (10 mg total) by mouth daily at bedtime Starting after the 1 month at 5mg. Do not start before 2024. 30 tablet 4    donepezil (ARICEPT) 5 mg tablet Take 1 tablet (5 mg total) by mouth daily at bedtime For 1 month 30 tablet 0    ferrous sulfate 324 (65 Fe) mg Take 1 tablet (324 mg total) by mouth daily before breakfast 30 tablet 2    finasteride (PROSCAR) 5 mg tablet Take 1 tablet by mouth once daily 90 tablet 1    gabapentin (Neurontin) 400 mg capsule Take 1 capsule (400 mg total) by mouth 3 (three) times a day 270 capsule 1    lisinopril (ZESTRIL) 10 mg tablet Take 1 tablet by mouth once daily 90 tablet 2    Magnesium 250 MG TABS Take 1 tablet (250 mg total) by mouth 2 (two) times a day 60 tablet 5    metoprolol succinate (TOPROL-XL) 25 mg 24 hr tablet Take 1 tablet (25 mg total) by mouth daily 90  "tablet 2    Multiple Vitamin (multivitamin) capsule Take 1 capsule by mouth daily      omeprazole (PriLOSEC) 20 mg delayed release capsule Take 1 capsule by mouth once daily 90 capsule 1    tamsulosin (FLOMAX) 0.4 mg Take 1 capsule (0.4 mg total) by mouth daily with dinner 90 capsule 1    traZODone (DESYREL) 100 mg tablet Take 1 tablet (100 mg total) by mouth daily at bedtime 30 tablet 2    clopidogrel (PLAVIX) 75 mg tablet Take 1 tablet (75 mg total) by mouth daily (Patient not taking: Reported on 8/15/2024) 90 tablet 2    escitalopram (LEXAPRO) 20 mg tablet Take 1 tablet (20 mg total) by mouth daily 90 tablet 2     No current facility-administered medications on file prior to visit.      Social History     Tobacco Use    Smoking status: Former     Current packs/day: 0.00     Average packs/day: 1 pack/day for 22.0 years (22.0 ttl pk-yrs)     Types: Cigarettes     Start date: 1972     Quit date: 1985     Years since quittin.7     Passive exposure: Past    Smokeless tobacco: Never   Vaping Use    Vaping status: Never Used   Substance and Sexual Activity    Alcohol use: Not Currently     Comment: stopped drinking alcohol; AA x23yrs sober    Drug use: No    Sexual activity: Not Currently     Partners: Female         Objective     /60   Pulse (!) 52   Temp 98.3 °F (36.8 °C) (Tympanic)   Ht 5' 10\" (1.778 m)   Wt 99.8 kg (220 lb)   SpO2 96%   BMI 31.57 kg/m²     Physical Exam  Constitutional:       Appearance: Normal appearance.   HENT:      Head: Normocephalic and atraumatic.   Skin:     Findings: Lesion present. No erythema.      Comments: Flesh-toned, raised, scaly patch. Hyperkeratotic. 3 lesions on scalp, approx 1cm in diameter   Neurological:      Mental Status: He is alert.   Psychiatric:         Mood and Affect: Mood normal.         Behavior: Behavior normal.       Nydia Maya, DO  Family Medicine    "

## 2024-09-16 ENCOUNTER — OFFICE VISIT (OUTPATIENT)
Facility: CLINIC | Age: 79
End: 2024-09-16
Payer: COMMERCIAL

## 2024-09-16 DIAGNOSIS — G62.9 POLYNEUROPATHY: ICD-10-CM

## 2024-09-16 DIAGNOSIS — R26.89 BALANCE PROBLEM: ICD-10-CM

## 2024-09-16 DIAGNOSIS — F03.90 DEMENTIA, UNSPECIFIED DEMENTIA SEVERITY, UNSPECIFIED DEMENTIA TYPE, UNSPECIFIED WHETHER BEHAVIORAL, PSYCHOTIC, OR MOOD DISTURBANCE OR ANXIETY (HCC): Primary | ICD-10-CM

## 2024-09-16 PROCEDURE — 97112 NEUROMUSCULAR REEDUCATION: CPT

## 2024-09-16 PROCEDURE — 97110 THERAPEUTIC EXERCISES: CPT

## 2024-09-16 NOTE — PROGRESS NOTES
"Daily Note     Today's date: 2024  Patient name: Nj Pimentel Jr.  : 1945  MRN: [id number]  Referring provider: Brayan Mast DO  Dx:   Encounter Diagnosis     ICD-10-CM    1. Dementia, unspecified dementia severity, unspecified dementia type, unspecified whether behavioral, psychotic, or mood disturbance or anxiety ([facility])  F03.90       2. Polyneuropathy  G62.9       3. Balance problem  R26.89                            Subjective: Pt states he hit himself in the head with a cabinet since he was last here and he has a small scab. He is doing okay otherwise. He presents with candy in his shirt      Objective: See treatment diary below      Assessment: Pt participated in a skilled PT session focused on balance, gait, and strengthening. Pt tried to run into the wall during foam square exercise today. Improved stability with deb navigation. Ongoing difficulty with ankle eversion.  Pt will continue to benefit from skilled PT so that he can navigate his environment with increased safety and stability.       Plan: Continue per plan of care.      Precautions: Carotid stenosis, HTN, Depression, Dementia, Polyneuropathy, CVA (), Anxiety     Fall risk   EPOC 10/18/24      Manuals                                                             Neuro Re-Ed             Static Balance              Dynamic Balance    Tandem gait x4 down and back  Tandem gait x4 down and back Tandem gait x4 down and back  Tandem gait x4 down and back        Compliant Surface   Foam square 3x30\"     Foam beam side stepping 4x laterally, Amaris to maintain balance.  Foam square 3x30\"     Foam beam side stepping 4x laterally, Amaris to maintain balance Foam square 3x30\"  FT     Foam beam side stepping 4x laterally Amaris to maintain balance Foam suare 3x30\" FT    Foam beam side stepping 4x laterally Amaris to maintain balance Foam square 3x30\" FT    Foam beam side stepping 4x laterally Amaris to maintain balance,      "   Hurdles   6 hurdles fwd and sideways natasha with 4# AQ natasha 6 rounds each.  6 hurdles fwd and sideways natasha with 4# AW 6 rounds each 6 hurdles fwd and sideways natasha with 4# AW 6 rounds each 6 hurdles fwd and sdidways natasha 4# AW 6 rounds each 6 hurdles fwd and sideways 5# AW 6 rounds each.        Multidirectional Stepping              Gait + HT  Horizontal and vertical 4x40ft .  Horizontal and vertical 4x40ft Horizontal and vertical 4x40ft Horizontal and vertical 4x40ft Horizontal and vertical 4x40ft                                               Education  POC, balance systems             Ther Ex             Sit <> Stand   3x10 with foam square.  3x10 with foam square     Trialed with KB 3x10 with foam square    Trialed with KB 3x10 from chair with KB, no groin p! Reported today. 7.5lb KB 3x10 form chair with KB no groin p! Reported today       Hip abd   3x10 3#           Seated anlke eversion   3x10 blue t-band  3x10 blue t-band 3x10 blue t-band 3x10 blue-tband        Hip ext              HEP  Demo             Pball rollout  10x try and reduce back sx  30x           LAQ    3x10 10# weights   3x10 10# weights 3x10 10# weights.        Ankle DF      3x10 blue t-band                     Ther Activity                                       Gait Training                                       Modalities

## 2024-09-18 NOTE — PROGRESS NOTES
"Daily Note     Today's date: 2024  Patient name: Nj Pimentel Jr.  : 1945  MRN: [id number]  Referring provider: Brayan Mast DO  Dx:   Encounter Diagnosis     ICD-10-CM    1. Dementia, unspecified dementia severity, unspecified dementia type, unspecified whether behavioral, psychotic, or mood disturbance or anxiety ([facility])  F03.90       2. Polyneuropathy  G62.9       3. Balance problem  R26.89                              Subjective: Pt states that he has not had any falls. He is doing okay today.       Objective: See treatment diary below      Assessment: Pt participated in a skilled PT session focused on balance, gait, and strengthening. Resistance progressed for STS intervention with moderate difficulty. Ongoing impulsivity noted with decent from foam beam. Greatest limitation on pt's balance at this time is his impulsivity. Will be re-assessing pt at this time and potentially discharging pt from PT at this time. Pt will continue to benefit from skilled PT so that he can navigate his environment with increased safety and stability.       Plan: Continue per plan of care. Reassess next visit and potentially discharge from PT.      Precautions: Carotid stenosis, HTN, Depression, Dementia, Polyneuropathy, CVA (), Anxiety     Fall risk   EPOC 10/18/24      Manuals                                                            Neuro Re-Ed             Static Balance              Dynamic Balance    Tandem gait x4 down and back  Tandem gait x4 down and back Tandem gait x4 down and back  Tandem gait x4 down and back  Tandem gait x4 down and back      Compliant Surface   Foam square 3x30\"     Foam beam side stepping 4x laterally, Amaris to maintain balance.  Foam square 3x30\"     Foam beam side stepping 4x laterally, Amaris to maintain balance Foam square 3x30\"  FT     Foam beam side stepping 4x laterally Amaris to maintain balance Foam suare 3x30\" FT    Foam beam side stepping 4x " "laterally Amaris to maintain balance Foam square 3x30\" FT    Foam beam side stepping 4x laterally Amaris to maintain balance,  Foam square 3x30\" FT    Foam beam side stepping 4x laterally Amaris to maintain balance      Hurdles   6 hurdles fwd and sideways natasha with 4# AQ natasha 6 rounds each.  6 hurdles fwd and sideways natasha with 4# AW 6 rounds each 6 hurdles fwd and sideways natasha with 4# AW 6 rounds each 6 hurdles fwd and sdidways natasha 4# AW 6 rounds each 6 hurdles fwd and sideways 5# AW 6 rounds each.  6 hurdles fwd & sideways 5# AW 6 rounds each      Multidirectional Stepping              Gait + HT  Horizontal and vertical 4x40ft .  Horizontal and vertical 4x40ft Horizontal and vertical 4x40ft Horizontal and vertical 4x40ft Horizontal and vertical 4x40ft  Horizontal and vertical 4x40ft                                             Education  POC, balance systems             Ther Ex             Sit <> Stand   3x10 with foam square.  3x10 with foam square     Trialed with KB 3x10 with foam square    Trialed with KB 3x10 from chair with KB, no groin p! Reported today. 7.5lb KB 3x10 form chair with KB no groin p! Reported today 3x10 from chair with 20lb KB        Hip abd   3x10 3#           Seated anlke eversion   3x10 blue t-band  3x10 blue t-band 3x10 blue t-band 3x10 blue-tband  3x10 blue t-band      Hip ext              HEP  Demo             Pball rollout  10x try and reduce back sx  30x           LAQ    3x10 10# weights   3x10 10# weights 3x10 10# weights.  3x10 15# weights      Ankle DF      3x10 blue t-band  3x10 blue t-band                   Ther Activity                                       Gait Training                                       Modalities                                            "

## 2024-09-20 ENCOUNTER — OFFICE VISIT (OUTPATIENT)
Facility: CLINIC | Age: 79
End: 2024-09-20
Payer: COMMERCIAL

## 2024-09-20 DIAGNOSIS — F03.90 DEMENTIA, UNSPECIFIED DEMENTIA SEVERITY, UNSPECIFIED DEMENTIA TYPE, UNSPECIFIED WHETHER BEHAVIORAL, PSYCHOTIC, OR MOOD DISTURBANCE OR ANXIETY (HCC): Primary | ICD-10-CM

## 2024-09-20 DIAGNOSIS — R26.89 BALANCE PROBLEM: ICD-10-CM

## 2024-09-20 DIAGNOSIS — G62.9 POLYNEUROPATHY: ICD-10-CM

## 2024-09-20 PROCEDURE — 97110 THERAPEUTIC EXERCISES: CPT

## 2024-09-20 PROCEDURE — 97112 NEUROMUSCULAR REEDUCATION: CPT

## 2024-09-22 DIAGNOSIS — E78.5 DYSLIPIDEMIA: ICD-10-CM

## 2024-09-22 DIAGNOSIS — I73.9 PAD (PERIPHERAL ARTERY DISEASE) (HCC): ICD-10-CM

## 2024-09-23 ENCOUNTER — TELEPHONE (OUTPATIENT)
Dept: OTHER | Facility: OTHER | Age: 79
End: 2024-09-23

## 2024-09-23 RX ORDER — GABAPENTIN 400 MG/1
400 CAPSULE ORAL 3 TIMES DAILY
Qty: 270 CAPSULE | Refills: 1 | Status: SHIPPED | OUTPATIENT
Start: 2024-09-23

## 2024-09-23 NOTE — TELEPHONE ENCOUNTER
[place] called [place] [facility] at 3:45, office open until 6:30. Patient is about 3 minutes away, so he is going to stop and see if he can still be seen today.

## 2024-09-24 RX ORDER — ATORVASTATIN CALCIUM 40 MG/1
40 TABLET, FILM COATED ORAL EVERY EVENING
Qty: 90 TABLET | Refills: 0 | Status: SHIPPED | OUTPATIENT
Start: 2024-09-24

## 2024-09-25 ENCOUNTER — OFFICE VISIT (OUTPATIENT)
Facility: CLINIC | Age: 79
End: 2024-09-25
Payer: COMMERCIAL

## 2024-09-25 DIAGNOSIS — F03.90 DEMENTIA, UNSPECIFIED DEMENTIA SEVERITY, UNSPECIFIED DEMENTIA TYPE, UNSPECIFIED WHETHER BEHAVIORAL, PSYCHOTIC, OR MOOD DISTURBANCE OR ANXIETY (HCC): Primary | ICD-10-CM

## 2024-09-25 DIAGNOSIS — G62.9 POLYNEUROPATHY: ICD-10-CM

## 2024-09-25 DIAGNOSIS — R26.89 BALANCE PROBLEM: ICD-10-CM

## 2024-09-25 PROCEDURE — 97112 NEUROMUSCULAR REEDUCATION: CPT

## 2024-09-25 NOTE — PROGRESS NOTES
PT Discharge    Today's date: 2024  Patient name: Nj Pimentel Jr.  : 1945  MRN: [id number]  Referring provider: Brayan Mast DO  Dx:   Encounter Diagnosis     ICD-10-CM    1. Dementia, unspecified dementia severity, unspecified dementia type, unspecified whether behavioral, psychotic, or mood disturbance or anxiety (HCC)  F03.90       2. Polyneuropathy  G62.9       3. Balance problem  R26.89             Start Time: 1500          Assessment  Impairments: impaired balance, impaired physical strength, pain with function, participation limitations, activity limitations and endurance    Assessment details: 24: Pt is a 80 y/o male who has received 8 visits of skilled physical therapy to address balance & gait deficits. Pt's balance today exhibited significant improvement from IE, with his ABC score scoring a 99%, his FGA scoring a 28/30, and 5xSTS of 8.39s. His gait speed now classifies him as an unlimited community ambulator.  Advised pt to speak with his pain management doctor about pursuing OPPT for his groin pain. Pt was given an updated HEP. He is ready for discharge from skilled PT intervention at this time.     At IE: Flakito is a 79 year old patient presenting to OPPT for evaluation regarding decreased balance and increased fall risk. Upon evaluation they show impairments in the following areas: largely impaired vestibular system as per last condition of MCTSIB only able to perform 5 seconds, as well as FGA 13/30 scoring (inc fall risk) with increased difficulty with vestibularly challenging conditions. 5x STS and TUG indicative of decreased power generation. TUG-C increased difficulty with impaired dual tasking cost. Some impulsivity evident with general movement patterns. History of back pain during transitional movements will potentially impair prognosis going forward. Hx of dementia may impact some carry over - but patient has good ability to follow directions and good motivation to  participate throughout session. No further referral appears necessary at this time. Monitor Spo2 as it was creeping towards 90% during rest today. These impairments limit their ability to perform daily activities as well as their previous level of function causing decreased quality of life. HEP delivered with emphasis on generalized BLE strengthening - demo to patient with written instructions. Patient verbalized good understanding of trying to reduce workload on lumbar spine. Also encouraged to perform all exercises at counter, as well as having chair behind patient to maximize safety.     Patient requires continued skilled PT services in order to improve aforementioned impairments so that they are able to return to highest level of function possible. Without initiation of skilled PT services, patient will continue to have increased fall risk leading to increased risk of re-hospitalization and healthcare cost and burden.      Understanding of Dx/Px/POC: good     Prognosis: good    Goals  STG (4 weeks)   Improve (reduce) TUG score by 4 seconds indicating meaningful improvement in fall risk MET  Improve FGA score by 4 indicating meaningful improvement in fall risk MET  Improve/Reduce 5x STS score by 5 seconds indicating meaningful improvement in fall risk and power generation MET  Increase 6MWT by 34.4 meters indicating meaningful change in aerobic endurance (MCID per Junior Quinteros, Elpidio 2012). Never tested. Goal discontinued    LTG (8 weeks)   Improve (reduce) TUG score to 10.5 seconds indicating meaningful improvement in fall risk MET  Improve FGA score to > 22/30 indicating meaningful improvement in fall risk MET  Improve/Reduce 5x STS score to < 10 seconds seconds indicating meaningful improvement in fall risk and power generation MET  Improve gait speed to > 1.2 m/s NOT MET  Improve 6MWT score to align with age related norms demonstrating improvement in aerobic capacity and endurance Never tested, goal  "discontinued      Plan  Patient would benefit from: skilled physical therapy  Planned modality interventions: cryotherapy and thermotherapy: hydrocollator packs    Planned therapy interventions: manual therapy, neuromuscular re-education, strengthening, therapeutic exercise, therapeutic activities, home exercise program, gait training and balance    Frequency: 2x week  Plan of Care beginning date: 8/18/2024  Plan of Care expiration date: 10/18/2024  Treatment plan discussed with: patient  Plan details: Discharge from skilled PT services with instructions to return should pt experience a decline in his balance.         Subjective Evaluation    History of Present Illness  Mechanism of injury: 9/23/2024: Flakito feels his balance is completely fine. He notes that he gets some unsteadiness if he gets up too quickly after sitting for a while. He notes that he still has some pain in his groin.     At IE: Flakito is a 79 year old presenting to OPPT for evaluation regarding impaired balance, also has dementia limiting functional mobility. His balance is sorta normal. When things are moving, and he is moving. His balance isn't the greatest. Notes that he has to catch himself sometimes. Was at the neurologist a couple of weeks ago. Has hx of neuropathy in the feet and it was recommended that he try physical therapy.     \"Every now and then\" regarding dizziness. If he gets up too fast, he might get a little dizzy. Describes it as a little lightheaded. Denies any feeling like he is he is on a boat. No room spinning.     He fell when he was outside of a restaurant. Per patient was able to get up without having to hold onto something. Has more trouble when he is walking around in the grass. Has back pain and hip pain that is limiting him.     Per EMR: \"Does say his balance isn't great. Says it is worse on uneven surfaces, but dela cruz snot feel it is any worse in the dark. No \"furniture-surfing.\" Has fallen \"a few times.\" Most recent was " "going up the steps, toe got caught. \"   Patient Goals  Patient goals for therapy: increased strength, return to sport/leisure activities and improved balance    Pain  Current pain ratin  At best pain ratin  At worst pain ratin  Pain location: Down the back and into the groin.  Quality: dull ache  Aggravating factors: standing and walking    Social Support  Steps to enter house: yes  1  Interior stairs assessed: Basement..   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 Does a lot of walkng during the day. \"walks from store to store\".      Diagnostic Tests    FCE comments: MRI LUMBAR SPINE WITHOUT CONTRAST     INDICATION: M54.16: Radiculopathy, lumbar region.     COMPARISON:  None.     TECHNIQUE:  Multiplanar, multisequence imaging of the lumbar spine was performed. .        IMAGE QUALITY:  Diagnostic     FINDINGS:     VERTEBRAL BODIES:  There are 5 lumbar type vertebral bodies. Grade 1 anterolisthesis L5-S1 secondary to bilateral pars interarticularis defects. Modic type I endplate degenerative change L2-L3.     SACRUM:  Normal signal within the sacrum. No evidence of insufficiency or stress fracture.     DISTAL CORD AND CONUS:  Normal size and signal within the distal cord and conus.     PARASPINAL SOFT TISSUES:  Paraspinal soft tissues are unremarkable.     LOWER THORACIC DISC SPACES:  Normal disc height and signal.  No disc herniation, canal stenosis or foraminal narrowing.     LUMBAR DISC SPACES:     L1-L2:  Normal.     L2-L3: Bulging annulus. Facet arthrosis. Mild mass effect on the thecal sac. Mild right and moderate left foraminal narrowing.     L3-L4: Bulging annulus. Facet arthrosis with ligament flavum thickening. Mild mass effect on the thecal sac. Moderate bilateral foraminal narrowing with contact of the exiting nerve roots.     L4-L5: Bulging annulus. Facet arthrosis. Significant canal stenosis. " Moderate to severe left and moderate right foraminal narrowing with contact of the exiting nerve roots.     L5-S1: Uncovering the disc space. Facet arthrosis. No significant canal stenosis. Mild left and mild to moderate right foraminal narrowing.     OTHER FINDINGS:  None.     IMPRESSION:     Degenerative changes of the lumbar spine, as described above.     Modic type I endplate degenerative changes present at L2-L3.     Moderate to severe left foraminal narrowing is present at L4-L5 with impingement of the exiting nerve root. Multilevel moderate foraminal narrowing is also present from L2-L3 through L4-L5.        Objective      Balance Test    6 Minute Walk Test (ft):     2 Minute Walk Test (ft):     Gait Speed (ft/s): 0.74 m/s  1.04m/s   5x Sit To Stand (s): 16.36 seconds  8.39s     FGA:    ABC:  73.13%  99.13%   4 Square Step Test      Gait Disorientation Test:      Tug-C  17.13 seconds 10.03s   TU.64 seconds  7.03s         MCTSIB Number of Seconds   Feet Together, Eyes Open 30   Feet Together, Eyes Closed 30   Feet Together, Eyes Open Foam 30   Feet Together, Eyes Closed Foam 5.2 seconds     Flowsheet Rows      Flowsheet Row Most Recent Value   Functional Gait Assessment    Gait Level Surface  3   Change in GaiT Speed 3   Gait with horizontal head turns 3   Gait with vertical head turns 3   Gait and pivot tuen  3   Step over obstacle 2   Gait with narrow base of supprt 3   Gait with eyes closed 3   Ambulating backwards 3   Steps 2   Total score  28            Coordination Left Right   Heel To Ponce Wnl  Wnl    Finger To Nose Wnl  Wnl    Rapid Alternating Movement Wnl  Slight impaired   UE     LE         Sensation Left Right   Kinesthesia Wnl  Wnl    Light Touch Wnl  Wnl    Sharp/Dull     Graphesthesia      2 Point Discrimination         Muscle Tone Left Right   Modified Michelle Scale 0 0   Hamstring     Gastroc     Quad       Deep Tendon Reflexes  Left Right   Patellar (L3/4)  1+ 1+   "  Achilles (S1/2) 1+  1+    Brachioradialis (C5/6)     Biceps (C5/6)     Triceps (C7/8)       Pathological Reflexes  Left Right   Horner      Babinski     Clonus        Manual Muscle Testing - Hip Left Right   Flexion 3+ 3+   Extension 4 3+   Abduction 3- 3-   Adduction (pain in the groin)  3+ 3+     Manual Muscle Testing - Knee Left Right   Flexion 4 4   Extension 4 4     Manual Muscle Testing - Ankle Left Right   Doriflexion 3+ 3+   Plantarflexion 3+ 3+     BP LUE auto 129/80  92% Spo2 seated rest   63 bpm      Precautions: Carotid stenosis, HTN, Depression, Dementia, Polyneuropathy, CVA (2020), Anxiety     Fall risk   EPOC 10/18/24      Manuals  9/4 9/6 9/9 9/11 9/16 9/20 9/25                                                         Neuro Re-Ed             Static Balance              Dynamic Balance    Tandem gait x4 down and back  Tandem gait x4 down and back Tandem gait x4 down and back  Tandem gait x4 down and back  Tandem gait x4 down and back      Compliant Surface   Foam square 3x30\"     Foam beam side stepping 4x laterally, Amaris to maintain balance.  Foam square 3x30\"     Foam beam side stepping 4x laterally, Amaris to maintain balance Foam square 3x30\"  FT     Foam beam side stepping 4x laterally Amaris to maintain balance Foam suare 3x30\" FT    Foam beam side stepping 4x laterally Amaris to maintain balance Foam square 3x30\" FT    Foam beam side stepping 4x laterally Amaris to maintain balance,  Foam square 3x30\" FT    Foam beam side stepping 4x laterally Amaris to maintain balance      Hurdles   6 hurdles fwd and sideways natasha with 4# AQ natasha 6 rounds each.  6 hurdles fwd and sideways natasha with 4# AW 6 rounds each 6 hurdles fwd and sideways natasha with 4# AW 6 rounds each 6 hurdles fwd and sdidways natasha 4# AW 6 rounds each 6 hurdles fwd and sideways 5# AW 6 rounds each.  6 hurdles fwd & sideways 5# AW 6 rounds each      Multidirectional Stepping              Gait + HT  Horizontal and vertical 4x40ft .  Horizontal and " vertical 4x40ft Horizontal and vertical 4x40ft Horizontal and vertical 4x40ft Horizontal and vertical 4x40ft  Horizontal and vertical 4x40ft      Tests and measures        FGA, FGA, TUG, TUG C, ABC, 10mWT 5xSTS                               Education  POC, balance systems        Returning to PT if his symptoms return, seeking intervention for groin pain.      Ther Ex             Sit <> Stand   3x10 with foam square.  3x10 with foam square     Trialed with KB 3x10 with foam square    Trialed with KB 3x10 from chair with KB, no groin p! Reported today. 7.5lb KB 3x10 form chair with KB no groin p! Reported today 3x10 from chair with 20lb KB        Hip abd   3x10 3#           Seated anlke eversion   3x10 blue t-band  3x10 blue t-band 3x10 blue t-band 3x10 blue-tband  3x10 blue t-band      Hip ext              HEP  Demo             Pball rollout  10x try and reduce back sx  30x           LAQ    3x10 10# weights   3x10 10# weights 3x10 10# weights.  3x10 15# weights      Ankle DF      3x10 blue t-band  3x10 blue t-band                   Ther Activity                                       Gait Training                                       Modalities

## 2024-09-26 ENCOUNTER — HOSPITAL ENCOUNTER (OUTPATIENT)
Dept: RADIOLOGY | Facility: CLINIC | Age: 79
Discharge: HOME | End: 2024-09-26

## 2024-09-26 PROBLEM — M46.1 SACROILIITIS (HCC): Status: ACTIVE | Noted: 2024-09-26

## 2024-09-26 RX ORDER — ROPIVACAINE HYDROCHLORIDE 2 MG/ML
2 INJECTION, SOLUTION EPIDURAL; INFILTRATION; PERINEURAL ONCE
Status: DISCONTINUED | OUTPATIENT
Start: 2024-09-26 | End: 2024-09-26

## 2024-09-26 RX ORDER — METHYLPREDNISOLONE ACETATE 80 MG/ML
80 INJECTION, SUSPENSION INTRA-ARTICULAR; INTRALESIONAL; INTRAMUSCULAR; PARENTERAL; SOFT TISSUE ONCE
Status: DISCONTINUED | OUTPATIENT
Start: 2024-09-26 | End: 2024-09-26

## 2024-09-27 DIAGNOSIS — F32.1 CURRENT MODERATE EPISODE OF MAJOR DEPRESSIVE DISORDER WITHOUT PRIOR EPISODE (HCC): ICD-10-CM

## 2024-09-27 RX ORDER — ESCITALOPRAM OXALATE 20 MG/1
20 TABLET ORAL DAILY
Qty: 90 TABLET | Refills: 1 | Status: SHIPPED | OUTPATIENT
Start: 2024-09-27 | End: 2025-03-26

## 2024-09-27 NOTE — TELEPHONE ENCOUNTER
Patient stated he is out of medication and needs this called in right away.    Reason for call:   [x] Refill   [] Prior Auth  [] Other:     Office:   [x] PCP/Provider - [facility] / Dona Camp DO   [] Specialty/Provider -     Medication: escitalopram (LEXAPRO) 20 mg tablet     Dose/Frequency: Take 1 tablet (20 mg total) by mouth daily     Quantity: 90 + 1 refill    Pharmacy: [facility] [id number]     Does the patient have enough for 3 days?   [] Yes   [x] No - Send as HP to POD

## 2024-10-03 ENCOUNTER — TELEPHONE (OUTPATIENT)
Dept: FAMILY MEDICINE CLINIC | Facility: HOSPITAL | Age: 79
End: 2024-10-03

## 2024-10-03 ENCOUNTER — APPOINTMENT (OUTPATIENT)
Dept: LAB | Facility: HOSPITAL | Age: 79
End: 2024-10-03
Payer: COMMERCIAL

## 2024-10-03 DIAGNOSIS — R31.29 MICROSCOPIC HEMATURIA: Primary | ICD-10-CM

## 2024-10-03 DIAGNOSIS — N48.89 PENILE PAIN: Primary | ICD-10-CM

## 2024-10-03 LAB

## 2024-10-03 PROCEDURE — 87086 URINE CULTURE/COLONY COUNT: CPT | Performed by: INTERNAL MEDICINE

## 2024-10-03 PROCEDURE — 87147 CULTURE TYPE IMMUNOLOGIC: CPT | Performed by: INTERNAL MEDICINE

## 2024-10-03 PROCEDURE — 81001 URINALYSIS AUTO W/SCOPE: CPT | Performed by: INTERNAL MEDICINE

## 2024-10-03 RX ORDER — SULFAMETHOXAZOLE/TRIMETHOPRIM 800-160 MG
1 TABLET ORAL 2 TIMES DAILY
Qty: 14 TABLET | Refills: 0 | Status: SHIPPED | OUTPATIENT
Start: 2024-10-03 | End: 2024-10-04

## 2024-10-03 NOTE — TELEPHONE ENCOUNTER
Pt stopped by asking for a referral urology   he reports     when urinating he has pain at the tip of his penis         please advise  thanks

## 2024-10-03 NOTE — TELEPHONE ENCOUNTER
Would recommend he at least do a urine test while he is awaiting Uro eval. Order for UA entered in Epic.  Referral placed

## 2024-10-04 ENCOUNTER — HOSPITAL ENCOUNTER (OUTPATIENT)
Dept: NON INVASIVE DIAGNOSTICS | Facility: HOSPITAL | Age: 79
Discharge: HOME | End: 2024-10-04
Payer: COMMERCIAL

## 2024-10-04 ENCOUNTER — TELEPHONE (OUTPATIENT)
Age: 79
End: 2024-10-04

## 2024-10-04 VITALS
DIASTOLIC BLOOD PRESSURE: 60 MMHG | BODY MASS INDEX: 31.5 KG/M2 | HEART RATE: 53 BPM | SYSTOLIC BLOOD PRESSURE: 134 MMHG | WEIGHT: 220 LBS | HEIGHT: 70 IN

## 2024-10-04 DIAGNOSIS — R31.29 MICROSCOPIC HEMATURIA: Primary | ICD-10-CM

## 2024-10-04 DIAGNOSIS — I35.0 AORTIC VALVE STENOSIS, ETIOLOGY OF CARDIAC VALVE DISEASE UNSPECIFIED: ICD-10-CM

## 2024-10-04 LAB
AORTIC ROOT: 3.4 CM
AORTIC VALVE MEAN VELOCITY: 15 M/S
APICAL FOUR CHAMBER EJECTION FRACTION: 56 %
ASCENDING AORTA: 4.1 CM
AV MEAN GRADIENT: 10 MMHG
AV PEAK GRADIENT: 18 MMHG
BACTERIA UR CULT: ABNORMAL
BSA FOR ECHO PROCEDURE: 2.17 M2
DOP CALC AO PEAK VEL: 2.14 M/S
DOP CALC AO VTI: 51.82 CM
DOP CALC LVOT AREA: 4.15 CM2
DOP CALC LVOT DIAMETER: 2.3 CM
E WAVE DECELERATION TIME: 261 MS
E/A RATIO: 0.89
FRACTIONAL SHORTENING: 51 (ref 28–44)
INTERVENTRICULAR SEPTUM IN DIASTOLE (PARASTERNAL SHORT AXIS VIEW): 1.3 CM
INTERVENTRICULAR SEPTUM: 1.3 CM (ref 0.6–1.1)
LAAS-AP2: 26.8 CM2
LAAS-AP4: 15.2 CM2
LEFT ATRIUM AREA SYSTOLE SINGLE PLANE A4C: 14.9 CM2
LEFT ATRIUM SIZE: 3.7 CM
LEFT ATRIUM VOLUME (MOD BIPLANE): 64 ML
LEFT ATRIUM VOLUME INDEX (MOD BIPLANE): 29.5 ML/M2
LEFT INTERNAL DIMENSION IN SYSTOLE: 2.3 CM (ref 2.1–4)
LEFT VENTRICLE DIASTOLIC VOLUME (MOD BIPLANE): 60 ML
LEFT VENTRICLE DIASTOLIC VOLUME INDEX (MOD BIPLANE): 27.6 ML/M2
LEFT VENTRICLE SYSTOLIC VOLUME (MOD BIPLANE): 19 ML
LEFT VENTRICLE SYSTOLIC VOLUME INDEX (MOD BIPLANE): 8.8 ML/M2
LEFT VENTRICULAR INTERNAL DIMENSION IN DIASTOLE: 4.7 CM (ref 3.5–6)
LEFT VENTRICULAR POSTERIOR WALL IN END DIASTOLE: 1 CM
LEFT VENTRICULAR STROKE VOLUME: 86 ML
LV EF: 69 %
LVSV (TEICH): 86 ML
MV E'TISSUE VEL-SEP: 6 CM/S
MV PEAK A VEL: 0.98 M/S
MV PEAK E VEL: 87 CM/S
MV STENOSIS PRESSURE HALF TIME: 76 MS
MV VALVE AREA P 1/2 METHOD: 2.89
RIGHT ATRIUM AREA SYSTOLE A4C: 20.9 CM2
RIGHT VENTRICLE ID DIMENSION: 3.8 CM
SINOTUBULAR JUNCTION: 3.6 CM
SL CV LEFT ATRIUM LENGTH A2C: 6.6 CM
SL CV LV EF: 65
SL CV PED ECHO LEFT VENTRICLE DIASTOLIC VOLUME (MOD BIPLANE) 2D: 104 ML
SL CV PED ECHO LEFT VENTRICLE SYSTOLIC VOLUME (MOD BIPLANE) 2D: 18 ML
SL CV TR MAX PG ANTEGRADE: 28 MMHG
STJ: 3.6 CM
TR MAX PG: 28 MMHG
TR PEAK VELOCITY: 2.7 M/S
TRICUSPID ANNULAR PLANE SYSTOLIC EXCURSION: 2.4 CM
TRICUSPID VALVE PEAK REGURGITATION VELOCITY: 2.65 M/S
TV MEAN GRADIENT: 21 MMHG
TV MV D: 2.24 M/S
TV VTI: 108.92 CM

## 2024-10-04 PROCEDURE — 93306 TTE W/DOPPLER COMPLETE: CPT

## 2024-10-04 PROCEDURE — 93306 TTE W/DOPPLER COMPLETE: CPT | Performed by: INTERNAL MEDICINE

## 2024-10-04 RX ORDER — AMOXICILLIN 500 MG/1
500 CAPSULE ORAL EVERY 12 HOURS SCHEDULED
Qty: 14 CAPSULE | Refills: 0 | Status: SHIPPED | OUTPATIENT
Start: 2024-10-04 | End: 2024-10-11

## 2024-10-04 NOTE — TELEPHONE ENCOUNTER
Daughter of the patient was calling to schedule a referral back to Urology. Her father's PCP referred the patient back for blood in the urine and penile pain.     Blood in the urine was found in the urinalysis from 10/3. Urine Culture is still pending. But the PCP put him on Bactrim DS for 7 days    Daughter is not sure why his last appt was scheduled in [place]. She would like him to ONLY be scheduled for [place]. It is easier to get him there.     Patient has been scheduled for a follow up in November.     Daughter wanted to let the office know of the urine testing so the provider can view the results

## 2024-10-07 ENCOUNTER — CLINICAL SUPPORT (OUTPATIENT)
Dept: BARIATRICS | Facility: CLINIC | Age: 79
End: 2024-10-07

## 2024-10-07 VITALS — HEIGHT: 70 IN | WEIGHT: 220 LBS | BODY MASS INDEX: 31.5 KG/M2

## 2024-10-07 DIAGNOSIS — R63.5 ABNORMAL WEIGHT GAIN: Primary | ICD-10-CM

## 2024-10-07 PROCEDURE — RECHECK

## 2024-10-07 PROCEDURE — WMDI30

## 2024-10-07 NOTE — PROGRESS NOTES
Weight Management Medical Nutrition Assessment      Flakito arrived for menu planning session. Based on his current dietary intake, he consumes ~1600 calories per day with not a sufficient amount of protein. Unfortunately as men age, sarcopenia is common - which is low protein intake creating muscle wasting. Having enough protein is important for Flakito to consume daily. He is doing awesome with walking almost every day at a brisk pace for 30 min. We also discussed a provider appointment to help with his appetite in the future.     At this time, he could follow 0892-6494 calorie diet per day with ~80g protein at least.     Wake up: banana and Boost Fiber Protein drink  Diner: 2 eggs with 2 breakfast sausage + Rye Toast and pat of butter; coffee  Snack: Grilled chicken (6oz) with salad and low fat dressing   Snack 2: Hot chocolate (4oz) + 4 oz decaf coffee + 4 oz no sugar creamer  Snack 3: Apple + Activia Yogurt     Patient seen by Medical Provider in past 6 months:  no  Requested to schedule appointment with Medical Provider: No      Anthropometric Measurements  Start Weight (#): 220  Current Weight (#): 220  TBW % Change from start weight:-  Ideal Body Weight (#):166/75kg  Goal Weight (#):195-200#  Highest: -  Lowest: 190    Weight Loss History  Previous weight loss attempts: Exercise  Fasting  Self Created Diets (Portion Control, Healthy Food Choices, etc.)    Food and Nutrition Related History  Wake up: 6/7AM    Bed Time:11PM    Food Recall  Breakfast:9/930AM - Low Fat Yogurt in a tub, made at the diner, granola (1 cup yogurt/1 cup fruit/ 1/2 cup granola); coffee sugar with cream     Snack:pretzels (hard/crunchy) just a few will through a 2oz bag in a day   Lunch: maybe an apple   Snack:  Dinner:no appetite   Snack: Activia       Beverages: water and coffee/tea  Volume of beverage intake: -    Weekends: Same  Cravings: none  Trouble area of day:none    Frequency of Eating out: daily - at the diner   Food restrictions:  none  Cooking: self   Food Shopping: self    Physical Activity Intake  Activity:Walking - 30 min outside daily; will do PT for hand   Frequency:daily, normal pace (not slow)   Physical limitations/barriers to exercise: none    Estimated Needs  Energy  Olimpia Gan Energy Needs: BMR : 1719   1-2# loss weekly sedentary:  1447-2821             1-2# loss weekly lightly active:9328-5107  Maintenance calories for sedentary activity level: 2063  Protein:90-113g      (1.2-1.5g/kg IBW)  Fluid: 88     (35mL/kg IBW)    Nutrition Diagnosis  Yes;    Excessive energy intake  related to Food and nutrition related knowledge deficit concerning energy intake as evidenced by  BMI >25 for adults       Nutrition Intervention    Nutrition Prescription  Calories:5628-0056  Protein:80g  Fluid:88    Meal Plan (Tesfaye/Pro/Carb)  Breakfast: 375cal/20g pro  Diner: 375 tesfaye/20g pro  Snack: 300 tesfaye/40g pro  Snack: 120 tesfaye/5g pro  Snack: 200 tesfaye/5g pro    Nutrition Education:    Healthy Core Manual  Calorie controlled menu  Lean protein food choices  Healthy snack options  Food journaling tips      Nutrition Counseling:  Strategies: meal planning, portion sizes, healthy snack choices, hydration, fiber intake, protein intake, exercise, food journal      Monitoring and Evaluation:  Evaluation criteria:  Energy Intake  Meet protein needs  Maintain adequate hydration  Monitor weekly weight  Meal planning/preparation  Food journal   Decreased portions at mealtimes and snacks  Physical activity     Barriers to learning:none  Readiness to change: Preparation:  (Getting ready to change)   Comprehension: very good  Expected Compliance: very good

## 2024-10-12 ENCOUNTER — TELEPHONE (OUTPATIENT)
Dept: OTHER | Facility: OTHER | Age: 79
End: 2024-10-12

## 2024-10-12 NOTE — TELEPHONE ENCOUNTER
Florence called requesting some diagnosis be submitted for patient. Patient is a  and needs certain diagnosis to be covered under his [employer] benefits. Attached is the specifications. Patient can be reached at [phone] for further questions.

## 2024-10-15 ENCOUNTER — TELEPHONE (OUTPATIENT)
Dept: NEPHROLOGY | Facility: CLINIC | Age: 79
End: 2024-10-15

## 2024-10-21 ENCOUNTER — OFFICE VISIT (OUTPATIENT)
Dept: FAMILY MEDICINE CLINIC | Facility: HOSPITAL | Age: 79
End: 2024-10-21
Payer: COMMERCIAL

## 2024-10-21 ENCOUNTER — HOSPITAL ENCOUNTER (OUTPATIENT)
Dept: RADIOLOGY | Facility: HOSPITAL | Age: 79
Discharge: HOME | End: 2024-10-21
Payer: COMMERCIAL

## 2024-10-21 VITALS
SYSTOLIC BLOOD PRESSURE: 139 MMHG | HEART RATE: 50 BPM | WEIGHT: 223.4 LBS | HEIGHT: 70 IN | TEMPERATURE: 97.3 F | OXYGEN SATURATION: 95 % | BODY MASS INDEX: 31.98 KG/M2 | DIASTOLIC BLOOD PRESSURE: 69 MMHG

## 2024-10-21 DIAGNOSIS — M54.50 CHRONIC BILATERAL LOW BACK PAIN WITHOUT SCIATICA: ICD-10-CM

## 2024-10-21 DIAGNOSIS — R73.01 IMPAIRED FASTING GLUCOSE: ICD-10-CM

## 2024-10-21 DIAGNOSIS — M25.511 CHRONIC PAIN OF BOTH SHOULDERS: ICD-10-CM

## 2024-10-21 DIAGNOSIS — G89.29 CHRONIC BILATERAL LOW BACK PAIN WITHOUT SCIATICA: ICD-10-CM

## 2024-10-21 DIAGNOSIS — G89.29 CHRONIC PAIN OF BOTH SHOULDERS: ICD-10-CM

## 2024-10-21 DIAGNOSIS — R30.0 DYSURIA: ICD-10-CM

## 2024-10-21 DIAGNOSIS — M25.512 CHRONIC PAIN OF BOTH SHOULDERS: ICD-10-CM

## 2024-10-21 DIAGNOSIS — M48.061 LUMBAR FORAMINAL STENOSIS: Primary | ICD-10-CM

## 2024-10-21 PROBLEM — L73.9 FOLLICULITIS: Status: RESOLVED | Noted: 2024-03-15 | Resolved: 2024-10-21

## 2024-10-21 PROCEDURE — G2211 COMPLEX E/M VISIT ADD ON: HCPCS | Performed by: INTERNAL MEDICINE

## 2024-10-21 PROCEDURE — 73030 X-RAY EXAM OF SHOULDER: CPT

## 2024-10-21 PROCEDURE — 99214 OFFICE O/P EST MOD 30 MIN: CPT | Performed by: INTERNAL MEDICINE
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
## 2024-10-21 NOTE — ASSESSMENT & PLAN NOTE
Poss d/t his previous service as he reports during one jump his R shoulder hit a branch/tree, he feels he also has L shoulder pain d/t compensating for the R shoulder, he has not had Xrays in some time - orders given, T/C Ortho if persists   Orders:    XR shoulder 2+ vw right; Future    XR shoulder 2+ vw left; Future

## 2024-10-21 NOTE — ASSESSMENT & PLAN NOTE
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

## 2024-10-21 NOTE — ASSESSMENT & PLAN NOTE
BW due in Nov/Dec - order given, con't healthy diet and regular exercise was encouraged, will follow  Orders:    Comprehensive metabolic panel    Hemoglobin A1C

## 2024-10-23 ENCOUNTER — OFFICE VISIT (OUTPATIENT)
Dept: BARIATRICS | Facility: CLINIC | Age: 79
End: 2024-10-23

## 2024-10-23 VITALS
HEIGHT: 66 IN | WEIGHT: 220 LBS | RESPIRATION RATE: 17 BRPM | HEART RATE: 55 BPM | DIASTOLIC BLOOD PRESSURE: 60 MMHG | SYSTOLIC BLOOD PRESSURE: 130 MMHG | BODY MASS INDEX: 35.36 KG/M2 | TEMPERATURE: 97.5 F

## 2024-10-23 DIAGNOSIS — I63.9 CEREBROVASCULAR ACCIDENT (CVA), UNSPECIFIED MECHANISM (HCC): ICD-10-CM

## 2024-10-23 DIAGNOSIS — I77.4 CELIAC ARTERY STENOSIS (HCC): ICD-10-CM

## 2024-10-23 DIAGNOSIS — I25.10 CORONARY ARTERY DISEASE INVOLVING NATIVE CORONARY ARTERY OF NATIVE HEART WITHOUT ANGINA PECTORIS: ICD-10-CM

## 2024-10-23 DIAGNOSIS — R73.01 IMPAIRED FASTING GLUCOSE: ICD-10-CM

## 2024-10-23 DIAGNOSIS — E66.811 OBESITY (BMI 30.0-34.9): Primary | ICD-10-CM
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
## 2024-10-23 NOTE — ASSESSMENT & PLAN NOTE
Discussed importance of lifestyle modification and weight loss to prevent development of Type 2 Diabetes.

## 2024-10-24 ENCOUNTER — TELEPHONE (OUTPATIENT)
Dept: BARIATRICS | Facility: CLINIC | Age: 79
End: 2024-10-24

## 2024-10-24 ENCOUNTER — HOSPITAL ENCOUNTER (OUTPATIENT)
Dept: RADIOLOGY | Facility: CLINIC | Age: 79
Discharge: HOME | End: 2024-10-24
Payer: COMMERCIAL

## 2024-10-24 VITALS
RESPIRATION RATE: 18 BRPM | SYSTOLIC BLOOD PRESSURE: 172 MMHG | OXYGEN SATURATION: 93 % | DIASTOLIC BLOOD PRESSURE: 83 MMHG | TEMPERATURE: 98.3 F | HEART RATE: 56 BPM

## 2024-10-24 DIAGNOSIS — M46.1 SACROILIITIS (HCC): ICD-10-CM

## 2024-10-24 PROCEDURE — 27096 INJECT SACROILIAC JOINT: CPT | Performed by: ANESTHESIOLOGY

## 2024-10-24 RX ORDER — ROPIVACAINE HYDROCHLORIDE 2 MG/ML
2 INJECTION, SOLUTION EPIDURAL; INFILTRATION; PERINEURAL ONCE
Status: COMPLETED | OUTPATIENT
Start: 2024-10-24 | End: 2024-10-24

## 2024-10-24 RX ORDER — METHYLPREDNISOLONE ACETATE 80 MG/ML
80 INJECTION, SUSPENSION INTRA-ARTICULAR; INTRALESIONAL; INTRAMUSCULAR; PARENTERAL; SOFT TISSUE ONCE
Status: COMPLETED | OUTPATIENT
Start: 2024-10-24 | End: 2024-10-24

## 2024-10-24 RX ADMIN — METHYLPREDNISOLONE ACETATE 80 MG: 80 INJECTION, SUSPENSION INTRA-ARTICULAR; INTRALESIONAL; INTRAMUSCULAR; SOFT TISSUE at 09:36

## 2024-10-24 RX ADMIN — ROPIVACAINE HYDROCHLORIDE 2 ML: 2 INJECTION EPIDURAL; INFILTRATION; PERINEURAL at 09:36

## 2024-10-24 RX ADMIN — IOHEXOL 0.4 ML: 300 INJECTION, SOLUTION INTRAVENOUS at 09:36

## 2024-10-24 NOTE — TELEPHONE ENCOUNTER
Called patient at both mobile and home phone #'s  left messages on both to call the office back to reschedule his 10/30/24 RD appointment and to put him on Gunjan Rodriguez RD schedule for Menu Planning per Karen Orozco RD.

## 2024-10-24 NOTE — H&P
History of Present Illness: The patient is a 79 y.o. male who presents with complaints of low back pain.    Past Medical History:   Diagnosis Date    Alcoholism (HCC)     Anxiety     Cerebrovascular accident (CVA) (HCC) 01/07/2020    memory issues    Coronary artery disease     Folliculitis 03/15/2024    GERD (gastroesophageal reflux disease)     Gout     High cholesterol     Hypertension     Insomnia     Kidney stone     Stroke (HCC)        Past Surgical History:   Procedure Laterality Date    ANKLE SURGERY Right     CARDIAC CATHETERIZATION      no findings    COLONOSCOPY  01/25/2013    polypectomy; complete - 3 yrs d/t polyp - benign submucosal lipoma- path c/w lipoma    COLONOSCOPY  04/25/2017    complete - tubular adenoma - repeat 5yrs    CYSTOSCOPY      CYSTOTOMY      bladder  w/ basket extraction of calculus    FEMUR FRACTURE SURGERY      HERNIA REPAIR      inguinal ; sliding    INGUINAL HERNIA REPAIR Right     unilateral    IR LOWER EXTREMITY ANGIOGRAM  4/24/2023    KNEE ARTHROSCOPY Right     w/medial menisectomy    LASIK      corneal    LITHOTRIPSY      renal    NE COLONOSCOPY FLX DX W/COLLJ SPEC WHEN PFRMD N/A 04/25/2017    Procedure: SCREENING COLONOSCOPY;  Surgeon: Paul Jacome MD;  Location: [facility];  Service: General    NE SLCTV CATHJ 3RD+ ORD SLCTV ABDL PEL/LXTR BRNCH Right 4/24/2023    Procedure: ARTERIOGRAM;  Surgeon: Alivia Blum MD;  [facility];  Service: Vascular    NE TEAEC W/PATCH GRF CAROTID VERTB SUBCLAV NECK INC Right 01/10/2020    Procedure: ENDARTERECTOMY ARTERY CAROTID;  Surgeon: Aaron Smith MD;  Location: [facility];  Service: Vascular    NE TEAEC W/WO PATCH GRAFT COMMON FEMORAL Right 4/24/2023    Procedure: ENDARTERECTOMY ARTERIAL FEMORAL, RETROGRADE ILIAC INTERVENTION;  Surgeon: Alivia Blum MD;  [facility];  Service: Vascular    ROTATOR CUFF REPAIR Bilateral     TONSILLECTOMY           Current Outpatient Medications:     ascorbic acid (VITAMIN C) 250 mg  tablet, Take 250 mg by mouth daily, Disp: , Rfl:     aspirin (ECOTRIN LOW STRENGTH) 81 mg EC tablet, Take 81 mg by mouth every other day 1 every other day, Disp: , Rfl:     atorvastatin (LIPITOR) 40 mg tablet, TAKE 1 TABLET BY MOUTH ONCE DAILY IN THE EVENING, Disp: 90 tablet, Rfl: 0    clopidogrel (PLAVIX) 75 mg tablet, Take 1 tablet (75 mg total) by mouth daily, Disp: 90 tablet, Rfl: 2    escitalopram (LEXAPRO) 20 mg tablet, Take 1 tablet (20 mg total) by mouth daily, Disp: 90 tablet, Rfl: 1    ferrous sulfate 324 (65 Fe) mg, Take 1 tablet (324 mg total) by mouth daily before breakfast, Disp: 30 tablet, Rfl: 2    finasteride (PROSCAR) 5 mg tablet, Take 1 tablet by mouth once daily, Disp: 90 tablet, Rfl: 1    gabapentin (NEURONTIN) 400 mg capsule, TAKE 1 CAPSULE BY MOUTH THREE TIMES DAILY, Disp: 270 capsule, Rfl: 1    lisinopril (ZESTRIL) 10 mg tablet, Take 1 tablet by mouth once daily, Disp: 90 tablet, Rfl: 2    Magnesium 250 MG TABS, Take 1 tablet (250 mg total) by mouth 2 (two) times a day, Disp: 60 tablet, Rfl: 5    metoprolol succinate (TOPROL-XL) 25 mg 24 hr tablet, Take 1 tablet (25 mg total) by mouth daily, Disp: 90 tablet, Rfl: 2    Multiple Vitamin (multivitamin) capsule, Take 1 capsule by mouth daily, Disp: , Rfl:     omeprazole (PriLOSEC) 20 mg delayed release capsule, Take 1 capsule by mouth once daily, Disp: 90 capsule, Rfl: 1    Semaglutide-Weight Management (WEGOVY) 0.25 MG/0.5ML, Inject 0.5 mL (0.25 mg total) under the skin once a week, Disp: 2 mL, Rfl: 0    tamsulosin (FLOMAX) 0.4 mg, Take 1 capsule (0.4 mg total) by mouth daily with dinner, Disp: 90 capsule, Rfl: 1    traZODone (DESYREL) 100 mg tablet, Take 1 tablet (100 mg total) by mouth daily at bedtime, Disp: 30 tablet, Rfl: 2    Current Facility-Administered Medications:     iohexol (OMNIPAQUE) 300 mg/mL injection 0.4 mL, 0.4 mL, Intra-articular, Once, Solis Zamora DO    methylPREDNISolone acetate (DEPO-MEDROL) injection 80 mg, 80 mg,  Intra-articular, Once, Solis Zamora DO    ropivacaine (NAROPIN) injection 2 mL, 2 mL, Intra-articular, Once, Solis Zamora DO    No Known Allergies    Physical Exam:   Vitals:    10/24/24 0912   BP: 159/73   Pulse: 56   Resp: 18   Temp: 98.3 °F (36.8 °C)   SpO2: 93%     General: Awake, Alert, Oriented x 3, Mood and affect appropriate  Respiratory: Respirations even and unlabored  Cardiovascular: Peripheral pulses intact; no edema  Musculoskeletal Exam: Normal gait    ASA Score: II    Patient/Chart Verification  Patient ID Verified: Verbal  ID Band Applied: No  Consents Confirmed: Procedural  H&P( within 30 days) Verified: To be obtained in the Procedural area  Interval H&P(within 24 hr) Complete (required for Outpatients and Surgery Admit only): To be obtained in the Procedural area  Allergies Reviewed: Yes  Anticoag/NSAID held?: No  Currently on antibiotics?: No  Pre-op Lab/Test Results Available: N/A    Assessment:   1. Sacroiliitis (HCC)        Plan: B/L SIJ

## 2024-10-24 NOTE — TELEPHONE ENCOUNTER
PA for Wegovy 0.25mg SUBMITTED     via    [x]CMM-KEY: [id number]  []Surescripts-Case ID #    []Availity-Auth ID #  NDC #    []Faxed to plan   []Other website    []Phone call Case ID #      Office notes sent, clinical questions answered. Awaiting determination    Turnaround time for your insurance to make a decision on your Prior Authorization can take 7-21 business days.

## 2024-10-24 NOTE — DISCHARGE INSTRUCTIONS

## 2024-10-25 DIAGNOSIS — G89.29 CHRONIC PAIN OF BOTH SHOULDERS: Primary | ICD-10-CM

## 2024-10-25 DIAGNOSIS — M25.511 CHRONIC PAIN OF BOTH SHOULDERS: Primary | ICD-10-CM

## 2024-10-25 DIAGNOSIS — M25.512 CHRONIC PAIN OF BOTH SHOULDERS: Primary | ICD-10-CM

## 2024-10-29 DIAGNOSIS — G47.00 INSOMNIA, UNSPECIFIED TYPE: ICD-10-CM

## 2024-10-29 RX ORDER — TRAZODONE HYDROCHLORIDE 100 MG/1
100 TABLET ORAL
Qty: 30 TABLET | Refills: 2 | Status: SHIPPED | OUTPATIENT
Start: 2024-10-29

## 2024-10-29 NOTE — TELEPHONE ENCOUNTER
Patient's daughter Indigo is requesting a call from Luiz to discuss her father's medication. Please call Indigo at  #[phone].

## 2024-10-29 NOTE — TELEPHONE ENCOUNTER
Spoke with patients daugther by phone  Explained wegovy risks, benefits  She thinks it would be best to discontinue Wegovy order as he lives alone and his problem with memory will make it difficult to take injection weekly  She would like to keep the 11/4 RD visit but cancel the 12/4 visit with me.      Clerical team- please cancel 12/4 visit.   FRANKY Licona

## 2024-11-01 ENCOUNTER — TELEPHONE (OUTPATIENT)
Age: 79
End: 2024-11-01

## 2024-11-01 NOTE — TELEPHONE ENCOUNTER
Pt daughter would like a call back from Dr. Camp regarding dads depression medication. Indigo stated pt seems more depress please follow up [phone]

## 2024-11-04 ENCOUNTER — OFFICE VISIT (OUTPATIENT)
Dept: FAMILY MEDICINE CLINIC | Facility: HOSPITAL | Age: 79
End: 2024-11-04
Payer: COMMERCIAL

## 2024-11-04 ENCOUNTER — CLINICAL SUPPORT (OUTPATIENT)
Dept: BARIATRICS | Facility: CLINIC | Age: 79
End: 2024-11-04

## 2024-11-04 VITALS
SYSTOLIC BLOOD PRESSURE: 129 MMHG | HEIGHT: 66 IN | OXYGEN SATURATION: 96 % | DIASTOLIC BLOOD PRESSURE: 65 MMHG | TEMPERATURE: 97.5 F | HEART RATE: 52 BPM | WEIGHT: 219.8 LBS | BODY MASS INDEX: 35.32 KG/M2

## 2024-11-04 VITALS — HEIGHT: 66 IN | WEIGHT: 220 LBS | BODY MASS INDEX: 35.36 KG/M2

## 2024-11-04 DIAGNOSIS — F33.41 RECURRENT MAJOR DEPRESSIVE DISORDER, IN PARTIAL REMISSION (HCC): Primary | ICD-10-CM

## 2024-11-04 DIAGNOSIS — R63.5 ABNORMAL WEIGHT GAIN: Primary | ICD-10-CM

## 2024-11-04 DIAGNOSIS — F03.90 DEMENTIA, UNSPECIFIED DEMENTIA SEVERITY, UNSPECIFIED DEMENTIA TYPE, UNSPECIFIED WHETHER BEHAVIORAL, PSYCHOTIC, OR MOOD DISTURBANCE OR ANXIETY (HCC): ICD-10-CM

## 2024-11-04 PROCEDURE — RECHECK

## 2024-11-04 PROCEDURE — 99214 OFFICE O/P EST MOD 30 MIN: CPT | Performed by: INTERNAL MEDICINE

## 2024-11-04 PROCEDURE — WMDI30

## 2024-11-04 PROCEDURE — G2211 COMPLEX E/M VISIT ADD ON: HCPCS | Performed by: INTERNAL MEDICINE

## 2024-11-04 RX ORDER — DONEPEZIL HYDROCHLORIDE 10 MG/1
10 TABLET, FILM COATED ORAL DAILY
COMMUNITY
Start: 2024-10-30

## 2024-11-04 RX ORDER — BUPROPION HYDROCHLORIDE 150 MG/1
150 TABLET ORAL DAILY
Qty: 30 TABLET | Refills: 2 | Status: SHIPPED | OUTPATIENT
Start: 2024-11-04

## 2024-11-04 NOTE — ASSESSMENT & PLAN NOTE
D/w pt that depression can make dementia symptoms worse and vice versa, will add WBXL to his Lexapro, stressed importance of keeping f/u appt with [facility] that is scheduled for later this week, call with sudden new/worse memory symptoms

## 2024-11-04 NOTE — PROGRESS NOTES
Weight Management Medical Nutrition Assessment    Flakito arrived for menu planning session. He is doing awesome with walking almost every day at a brisk pace for 30 min.   At this time, he will follow 3832-7064 calorie diet per day with ~80g protein at least.     Wake up: banana and Atkins/Boost Fiber Protein drink  Diner: 2 eggs with 2 breakfast sausage + Rye Toast and pat of butter; coffee  Snack: Grilled chicken (6oz) with salad and low fat dressing   Snack 2: Hot chocolate (4oz) + 4 oz decaf coffee + 4 oz no sugar creamer  Snack 3: Apple + Activia Yogurt     Flakito is eating more protein from salad, Atkins shake, Boost, eggs at the diner   Stopped eating pretzels everyday. Will follow up in 1 month.      Patient seen by Medical Provider in past 6 months:  no  Requested to schedule appointment with Medical Provider: No      Anthropometric Measurements  Start Weight (#): 220  Current Weight (#): 220  TBW % Change from start weight:-  Ideal Body Weight (#):166/75kg  Goal Weight (#):195-200#  Highest: -  Lowest: 190    Weight Loss History  Previous weight loss attempts: Exercise  Fasting  Self Created Diets (Portion Control, Healthy Food Choices, etc.)    Food and Nutrition Related History  Wake up: 6/7AM    Bed Time:11PM    Food Recall  Breakfast:9/930AM - Low Fat Yogurt in a tub, made at the diner, granola (1 cup yogurt/1 cup fruit/ 1/2 cup granola); coffee sugar with cream; tea and one scrambled egg and toast (wheat toast with a pat of butter)    Snack:pretzels (hard/crunchy) just a few will through a 2oz bag in a day   Lunch: maybe an apple   Snack:  Dinner:no appetite   Snack: Activia       Beverages: water and coffee/tea  Volume of beverage intake: -    Weekends: Same  Cravings: none  Trouble area of day:none    Frequency of Eating out: daily - at the diner   Food restrictions: none  Cooking: self   Food Shopping: self    Physical Activity Intake  Activity:Walking - 30 min outside daily; will do PT for hand    Frequency:daily, normal pace (not slow)   Physical limitations/barriers to exercise: none    Estimated Needs  Energy  Olimpia Gan Energy Needs: BMR : 1719   1-2# loss weekly sedentary:  4837-3809             1-2# loss weekly lightly active:2272-6721  Maintenance calories for sedentary activity level: 2063  Protein:90-113g      (1.2-1.5g/kg IBW)  Fluid: 88     (35mL/kg IBW)    Nutrition Diagnosis  Yes;    Excessive energy intake  related to Food and nutrition related knowledge deficit concerning energy intake as evidenced by  BMI >25 for adults       Nutrition Intervention    Nutrition Prescription  Calories:6065-9644  Protein:80g  Fluid:88    Meal Plan (Tesfaye/Pro/Carb)  Breakfast: 375cal/20g pro  Diner: 375 tesfaye/20g pro  Snack: 300 tesfaye/40g pro  Snack: 120 tesfaye/5g pro  Snack: 200 tesfaye/5g pro    Nutrition Education:    Healthy Core Manual  Calorie controlled menu  Lean protein food choices  Healthy snack options  Food journaling tips      Nutrition Counseling:  Strategies: meal planning, portion sizes, healthy snack choices, hydration, fiber intake, protein intake, exercise, food journal      Monitoring and Evaluation:  Evaluation criteria:  Energy Intake  Meet protein needs  Maintain adequate hydration  Monitor weekly weight  Meal planning/preparation  Food journal   Decreased portions at mealtimes and snacks  Physical activity     Barriers to learning:none  Readiness to change: Preparation:  (Getting ready to change)   Comprehension: very good  Expected Compliance: very good

## 2024-11-04 NOTE — ASSESSMENT & PLAN NOTE
Pt noting worsening depression, has some passive SI but no active thoughts or plan, I do not feel pt is a threat to himself, on max Lexapro, will add WBXL 150 mg 1 tab PO q day, SE reviewed, pt denies seizure history,  d/w pt that it takes 4-6 wks to get maximum benefit of med and that med has to be taken every day and to not miss doses of med, call with SE/new/worse mood/SI, re-eval in 6-8 wks or sooner if needed  Orders:    buPROPion (Wellbutrin XL) 150 mg 24 hr tablet; Take 1 tablet (150 mg total) by mouth daily

## 2024-11-04 NOTE — PROGRESS NOTES
"Ambulatory Visit  Name: Nj Pimentel Jr.      : 1945      MRN: [id number]  Encounter Provider: Dona Camp DO  Encounter Date: 2024   Encounter department: [facility] [address]     Assessment & Plan  Recurrent major depressive disorder, in partial remission (HCC)  Pt noting worsening depression, has some passive SI but no active thoughts or plan, I do not feel pt is a threat to himself, on max Lexapro, will add WBXL 150 mg 1 tab PO q day, SE reviewed, pt denies seizure history,  d/w pt that it takes 4-6 wks to get maximum benefit of med and that med has to be taken every day and to not miss doses of med, call with SE/new/worse mood/SI, re-eval in 6-8 wks or sooner if needed  Orders:    buPROPion (Wellbutrin XL) 150 mg 24 hr tablet; Take 1 tablet (150 mg total) by mouth daily    Dementia, unspecified dementia severity, unspecified dementia type, unspecified whether behavioral, psychotic, or mood disturbance or anxiety (HCC)  D/w pt that depression can make dementia symptoms worse and vice versa, will add WBXL to his Lexapro, stressed importance of keeping f/u appt with [facility] that is scheduled for later this week, call with sudden new/worse memory symptoms        All of pts prescription and OTC meds were brought in today in a large bin and were reviewed with pt in detail, supplements updated        History of Present Illness     HPI pt here for an acute visit    Pt noting a lot off issues with feeling depressed in the afternoon after lunch.  He notes symptoms are persistent and are just about every day.  He notes sleep and his appetite are good.  He notes a \"couple of times\" but denies a plan or knowing how he would do it.  He notes when he has those thoughts he tries to go for a walk or keep busy. He reports a good support system with his dgtr and a close friend.  He is currently taking Lexapro 20 mg daily.  He notes no Se with the medication,  He denies " "missing doses or forgetting to take the Lexapro.  He has appt with geriatrics this week.      All of pts prescription and OTC meds were brought in today in a large bin and were reviewed with pt in detail, supplements updated      Review of Systems   Constitutional:  Negative for chills and fever.   HENT:  Negative for congestion and sore throat.    Eyes:  Negative for pain and visual disturbance.   Respiratory:  Negative for cough and shortness of breath.    Cardiovascular:  Negative for chest pain and palpitations.   Gastrointestinal:  Negative for abdominal pain, diarrhea, nausea and vomiting.   Musculoskeletal:  Positive for arthralgias. Negative for gait problem.   Skin:  Negative for rash and wound.   Neurological:  Negative for dizziness and headaches.   Hematological:  Does not bruise/bleed easily.   Psychiatric/Behavioral:  Positive for dysphoric mood and suicidal ideas. Negative for sleep disturbance.            Objective     /65 (BP Location: Left arm, Patient Position: Sitting, Cuff Size: Large)   Pulse (!) 52   Temp 97.5 °F (36.4 °C) (Tympanic)   Ht 5' 6\" (1.676 m)   Wt 99.7 kg (219 lb 12.8 oz)   SpO2 96%   BMI 35.48 kg/m²     Physical Exam  Vitals and nursing note reviewed.   Constitutional:       General: He is not in acute distress.     Appearance: He is not ill-appearing.   HENT:      Head: Normocephalic and atraumatic.   Eyes:      General:         Right eye: No discharge.         Left eye: No discharge.      Conjunctiva/sclera: Conjunctivae normal.   Pulmonary:      Effort: Pulmonary effort is normal. No respiratory distress.   Skin:     Coloration: Skin is not pale.      Findings: No rash.   Neurological:      Gait: Gait normal.   Psychiatric:         Mood and Affect: Mood normal.         Behavior: Behavior normal.         Judgment: Judgment normal.      Comments: Poor STM evident         "

## 2024-11-04 NOTE — TELEPHONE ENCOUNTER
Pt did not c/o depression or show sign of depression at last visit.  I do not adjust meds over phone - anisa with Skip as he has had SE with higher doses.  He has appt in Jan - if she does not feel it can wait till then  then an appt will need to be made sooner - BUT he is a patient  who needs 40 min appt.  TY

## 2024-11-05 NOTE — PROGRESS NOTES
Ambulatory Visit  Name: Nj Pimentel Jr.      : 1945      MRN: [id number]  Encounter Provider: Kirti Mcdonald PA-C  Encounter Date: 2024   Encounter department: [facility]OLOG[facility] [place]  Assessment & Plan  Gross hematuria  Onset approximately 6 months ago, 1 episode  Denies prior episodes  No trauma reported to the area  No new onset urinary symptoms, denies history of UTIs  Prior tobacco history--20 years, less than 1 pack/day, quit in   Denies family history of  cancers  No known chemical exposure to report  Urine dip in office is negative for infection and blood  Will be sent out for urine cytology  CT renal protocol plus BMP ordered for further investigation  If there is anything suspicious seen on urine studies and/or imaging patient knows he will return to the office for cystoscopy with MD    Orders:    CT renal protocol; Future    Basic metabolic panel; Future    Cytology, urine    Cerebrovascular accident (CVA), unspecified mechanism (HCC)  On daily Plavix and Aspirin   Both area a benign cause of gross hematuria          History of Present Illness     Nj Pimentel Jr. is a 79 y.o. male who presents to the office as a new patient to discuss one episode of gross hematuria he experienced approximately 6 months ago.  He denies any trauma to the genital area, abdomen, or groin area.  He denies previous episodes of gross hematuria or history of kidney stones.  The patient does have history of stroke and is on daily Plavix and aspirin, which is a common benign cause for gross hematuria.  The patient was also educated that an enlarged prostate can also cause bleeding at times.  PVR 31 mL today in office, no signs of urinary retention.  He feels as if he urinates well, mild urinary frequency, but other than that he is content with his voiding pattern.  When he had his episode of bleeding, he denied any symptoms of UTI and/or infection.  He denies family history of  genitourinary cancers.  He reports he did smoke tobacco for approximately 20 years but quit in 1968.  At that time, he smoked less than 1 pack/day.    Today in office, urine dip is entirely clean, no signs of microscopic bleeding or infection.  Urine will be sent out for cytology as a precaution.  Recommend proceeding with CT renal protocol as well as BMP for further investigation.  If imaging and cytology returned negative, no further investigation is needed.  Patient knows to reach out with any new onset symptoms or further episodes of bleeding.  If imaging and/or urine studies are concerning or show abnormalities, the patient knows he will need to return to the office for cystoscopy with MD.  All the patient's questions were answered in office, he was agreeable with plan.    History obtained from : patient  Review of Systems   Constitutional:  Negative for activity change, chills, fatigue and fever.   Respiratory:  Negative for apnea, cough and shortness of breath.    Cardiovascular:  Negative for chest pain and leg swelling.   Gastrointestinal:  Negative for abdominal distention, abdominal pain, constipation, diarrhea, nausea and vomiting.   Genitourinary:  Positive for frequency. Negative for decreased urine volume, difficulty urinating, dysuria, flank pain, hematuria, penile pain, testicular pain and urgency.   Neurological:  Negative for dizziness and headaches.   Psychiatric/Behavioral: Negative.       Medical History Reviewed by provider this encounter:  Tobacco  Allergies  Meds  Problems  Med Hx  Surg Hx  Fam Hx       Current Outpatient Medications on File Prior to Visit   Medication Sig Dispense Refill    Ascorbic Acid (vitamin C) 1000 MG tablet Take 1,000 mg by mouth daily      aspirin (ECOTRIN LOW STRENGTH) 81 mg EC tablet Take 81 mg by mouth every other day 1 every other day      atorvastatin (LIPITOR) 40 mg tablet TAKE 1 TABLET BY MOUTH ONCE DAILY IN THE EVENING 90 tablet 0    B Complex-C  (SUPER B COMPLEX PO) Take 1 capsule by mouth in the morning      buPROPion (Wellbutrin XL) 150 mg 24 hr tablet Take 1 tablet (150 mg total) by mouth daily 30 tablet 2    clopidogrel (PLAVIX) 75 mg tablet Take 1 tablet (75 mg total) by mouth daily 90 tablet 2    donepezil (ARICEPT) 10 mg tablet Take 10 mg by mouth in the morning      escitalopram (LEXAPRO) 20 mg tablet Take 1 tablet (20 mg total) by mouth daily 90 tablet 1    ferrous sulfate 324 (65 Fe) mg Take 1 tablet (324 mg total) by mouth daily before breakfast 30 tablet 2    finasteride (PROSCAR) 5 mg tablet Take 1 tablet by mouth once daily 90 tablet 1    gabapentin (NEURONTIN) 400 mg capsule TAKE 1 CAPSULE BY MOUTH THREE TIMES DAILY 270 capsule 1    Ginkgo Biloba 120 MG CAPS Take 1 capsule by mouth in the morning      lisinopril (ZESTRIL) 10 mg tablet Take 1 tablet by mouth once daily 90 tablet 2    Magnesium 250 MG TABS Take 1 tablet (250 mg total) by mouth 2 (two) times a day 60 tablet 5    metoprolol succinate (TOPROL-XL) 25 mg 24 hr tablet Take 1 tablet (25 mg total) by mouth daily 90 tablet 2    Multiple Vitamin (multivitamin) capsule Take 1 capsule by mouth daily      omeprazole (PriLOSEC) 20 mg delayed release capsule Take 1 capsule by mouth once daily 90 capsule 1    tamsulosin (FLOMAX) 0.4 mg Take 1 capsule (0.4 mg total) by mouth daily with dinner 90 capsule 1    traZODone (DESYREL) 100 mg tablet TAKE 1 TABLET BY MOUTH ONCE DAILY AT BEDTIME 30 tablet 2    VITAMIN D, CHOLECALCIFEROL, PO Take 2,000 Units by mouth in the morning       No current facility-administered medications on file prior to visit.      Social History     Tobacco Use    Smoking status: Former     Current packs/day: 0.00     Average packs/day: 1 pack/day for 22.0 years (22.0 ttl pk-yrs)     Types: Cigarettes     Start date: 1972     Quit date: 1985     Years since quittin.8     Passive exposure: Past    Smokeless tobacco: Never   Vaping Use    Vaping status: Never Used    Substance and Sexual Activity    Alcohol use: Not Currently     Comment: stopped drinking alcohol; AA x23yrs sober    Drug use: No    Sexual activity: Not Currently     Partners: Female           Objective     There were no vitals taken for this visit.  Physical Exam  Vitals and nursing note reviewed.   Constitutional:       General: He is not in acute distress.     Appearance: Normal appearance. He is well-developed. He is obese. He is not ill-appearing.   HENT:      Head: Normocephalic and atraumatic.   Eyes:      Conjunctiva/sclera: Conjunctivae normal.   Cardiovascular:      Rate and Rhythm: Normal rate and regular rhythm.      Heart sounds: No murmur heard.  Pulmonary:      Effort: Pulmonary effort is normal. No respiratory distress.      Breath sounds: Normal breath sounds.   Abdominal:      General: Abdomen is flat. There is no distension.      Palpations: Abdomen is soft.      Tenderness: There is no abdominal tenderness.   Musculoskeletal:         General: No swelling.      Cervical back: Neck supple.   Skin:     General: Skin is warm and dry.      Capillary Refill: Capillary refill takes less than 2 seconds.   Neurological:      Mental Status: He is alert.   Psychiatric:         Mood and Affect: Mood normal.       Results  Lab Results   Component Value Date    PSA 3.12 04/03/2024    PSA 4.1 (H) 04/28/2023    PSA 5.7 (H) 01/18/2023     Lab Results   Component Value Date    GLUCOSE 112 05/14/2015    CALCIUM 9.1 05/16/2024     05/14/2015    K 4.4 05/16/2024    CO2 29 05/16/2024     05/16/2024    BUN 21 05/16/2024    CREATININE 1.34 (H) 05/16/2024     Lab Results   Component Value Date    WBC 7.14 05/03/2024    HGB 13.9 05/03/2024    HCT 43.5 05/03/2024     (H) 05/03/2024     05/03/2024       Office Urine Dip  No results found for this or any previous visit (from the past 1 hour(s)).]    Administrative Statements   I have spent a total time of 38 minutes in caring for this patient  on the day of the visit/encounter including Diagnostic results, Prognosis, Risks and benefits of tx options, Instructions for management, Patient and family education, Risk factor reductions, Impressions, Counseling / Coordination of care, Documenting in the medical record, Reviewing / ordering tests, medicine, procedures  , and Obtaining or reviewing history  .

## 2024-11-06 ENCOUNTER — OFFICE VISIT (OUTPATIENT)
Dept: UROLOGY | Facility: HOSPITAL | Age: 79
End: 2024-11-06
Payer: COMMERCIAL

## 2024-11-06 ENCOUNTER — TELEPHONE (OUTPATIENT)
Age: 79
End: 2024-11-06

## 2024-11-06 ENCOUNTER — APPOINTMENT (OUTPATIENT)
Dept: LAB | Facility: HOSPITAL | Age: 79
End: 2024-11-06
Payer: COMMERCIAL

## 2024-11-06 VITALS
DIASTOLIC BLOOD PRESSURE: 78 MMHG | HEIGHT: 66 IN | SYSTOLIC BLOOD PRESSURE: 132 MMHG | BODY MASS INDEX: 35.17 KG/M2 | HEART RATE: 50 BPM | WEIGHT: 218.8 LBS | OXYGEN SATURATION: 98 %

## 2024-11-06 DIAGNOSIS — N48.89 PENILE PAIN: ICD-10-CM

## 2024-11-06 DIAGNOSIS — R26.89 BALANCE PROBLEM: ICD-10-CM

## 2024-11-06 DIAGNOSIS — I63.9 CEREBROVASCULAR ACCIDENT (CVA), UNSPECIFIED MECHANISM (HCC): ICD-10-CM

## 2024-11-06 DIAGNOSIS — E53.8 VITAMIN B12 DEFICIENCY: ICD-10-CM

## 2024-11-06 DIAGNOSIS — R31.0 GROSS HEMATURIA: ICD-10-CM

## 2024-11-06 DIAGNOSIS — E83.42 HYPOMAGNESEMIA: ICD-10-CM

## 2024-11-06 DIAGNOSIS — R31.0 GROSS HEMATURIA: Primary | ICD-10-CM

## 2024-11-06 DIAGNOSIS — G62.9 POLYNEUROPATHY: ICD-10-CM

## 2024-11-06 DIAGNOSIS — F03.90 DEMENTIA (HCC): ICD-10-CM

## 2024-11-06 DIAGNOSIS — E55.9 VITAMIN D DEFICIENCY: ICD-10-CM

## 2024-11-06 LAB
25(OH)D3 SERPL-MCNC: 46.3 NG/ML (ref 30–100)
ALBUMIN SERPL BCG-MCNC: 4.5 G/DL (ref 3.5–5)
ALP SERPL-CCNC: 36 U/L (ref 34–104)
ALT SERPL W P-5'-P-CCNC: 24 U/L (ref 7–52)
ANION GAP SERPL CALCULATED.3IONS-SCNC: 11 MMOL/L (ref 4–13)
AST SERPL W P-5'-P-CCNC: 27 U/L (ref 13–39)
BILIRUB SERPL-MCNC: 0.57 MG/DL (ref 0.2–1)
BUN SERPL-MCNC: 26 MG/DL (ref 5–25)
CALCIUM SERPL-MCNC: 9.6 MG/DL (ref 8.4–10.2)
CHLORIDE SERPL-SCNC: 103 MMOL/L (ref 96–108)
CO2 SERPL-SCNC: 30 MMOL/L (ref 21–32)
CREAT SERPL-MCNC: 1.41 MG/DL (ref 0.6–1.3)
EST. AVERAGE GLUCOSE BLD GHB EST-MCNC: 117 MG/DL
GFR SERPL CREATININE-BSD FRML MDRD: 47 ML/MIN/1.73SQ M
GLUCOSE P FAST SERPL-MCNC: 115 MG/DL (ref 65–99)
HBA1C MFR BLD: 5.7 %
MAGNESIUM SERPL-MCNC: 1.7 MG/DL (ref 1.9–2.7)
POST-VOID RESIDUAL VOLUME, ML POC: 31 ML
POTASSIUM SERPL-SCNC: 4.3 MMOL/L (ref 3.5–5.3)
PROT SERPL-MCNC: 6.6 G/DL (ref 6.4–8.4)
SODIUM SERPL-SCNC: 144 MMOL/L (ref 135–147)
VIT B12 SERPL-MCNC: 530 PG/ML (ref 180–914)

## 2024-11-06 PROCEDURE — 36415 COLL VENOUS BLD VENIPUNCTURE: CPT

## 2024-11-06 PROCEDURE — 51798 US URINE CAPACITY MEASURE: CPT

## 2024-11-06 PROCEDURE — 82607 VITAMIN B-12: CPT

## 2024-11-06 PROCEDURE — 88112 CYTOPATH CELL ENHANCE TECH: CPT | Performed by: STUDENT IN AN ORGANIZED HEALTH CARE EDUCATION/TRAINING PROGRAM

## 2024-11-06 PROCEDURE — 82306 VITAMIN D 25 HYDROXY: CPT

## 2024-11-06 PROCEDURE — 84165 PROTEIN E-PHORESIS SERUM: CPT

## 2024-11-06 PROCEDURE — 99203 OFFICE O/P NEW LOW 30 MIN: CPT

## 2024-11-06 PROCEDURE — 83735 ASSAY OF MAGNESIUM: CPT

## 2024-11-06 NOTE — TELEPHONE ENCOUNTER
[facility]  [address], [address]  [place] [address]  [phone]    Social Work Intake    MSW spoke with patient's daughter, Indigo, to complete social work intake via phone, for patient's upcoming geriatric assessment on 11/8/24 with BLAS Meek.     Social/Family History   Marital status:     Does patient have children?  Daughter, Indigo, who lives about 20 minutes away from patient  (If yes, where do the children live?)   Family members assisting patient at home: No, but occasionally a friend will come to stay with him from [place], but most often is alone   Who is available to provide care in case of illness or crisis (please specify relation to patient / level of care able to provide)? Indigo/LI        Employment and Education   Education: Highest Level of Education: High School Graduate    Currently Employed: No  - but he does keep trying to, which Indigo has been trying to redirect.  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  Retired:  Around 56 years old    Type of employment: [employer]: Yes      [employer]    Currently receiving benefits? Daughter is unsure, but knows patient has worked with the [facility]    [place] Benefits (if applicable): Thinks he may be but is unsure and would like to check     Lifestyle/Community Services   Clubs and Organizations: No   Major life events in past two years (ex: snf, death in family, major illness etc.): No    Have you ever used any community-based services (ex. homecare, waiver services, meal delivery service, or respite care?): Just home health nurses after a hospitalization        Financial   Total Monthly income: Unknown     Source of income: Social Security and Pension   Meet current needs? Yes     Funds available for home care? Yes     Funds available for nursing home?  Placement of some kind for a time period      Do you rent or own your home? Own       ACP REVIEW:  Does  "patient have POA: Yes - Indigo (on file)  Does patient have a Living will: Yes  Any legal assistance needed for healthcare planning?: No  For all patients, we encourage seeing a  to establish a Financial Power of , a Health Care Power of , and an Advanced Directive (living will). Particularly for patients experiencing memory concerns, we strongly recommend that this is completed as soon as possible.     Safety Assessment  Is the patient still driving? Yes  Any concerns with patients ability to drive and recent driving accidents or citations in the last year? No accidents/citations.  However, Neurology scheduled patient to meet with OT for driving evaluation (11/8). Daughter will usually drive when she is with patient.  Daughter just feels he is a slow drive.    If the patient is not driving, do they have access to transportation?  Daughter supports with transportation    Is the patient taking medications as prescribed? Yes     Is there a system in place for medication management? Daughter puts pills in a weekly pill box for patient and patient will take them independently without reminders.   Has the patient been involved in any scams? Yes  - recently, Police had to get involved because patient sent over $8,000 to a company stating they would provide him a car and taxes. Daughter has had to change his phone number, deleted information on his phone and blocked numbers  Are firearms or weapons in the home? No  Recommendations per [employer]: removing them from the home, keep ammunition stored separately, encourage patient to consider \"gifting\" them, if necessary, ask local law enforcement for assistance in removing the firearms from the home. The family may receive compensation from a gun buy-back program.    Does the patient use an assistive device? No Any difficulty with gait or balance?  Daughter feels he is doing well over all.  Do you need any medical equipment at this time?  No.  " Patient does have a cane, but he does not use it  Does the patient have difficulty with hearing or vision?  No concerns with hearing - no hearing aides.  Does have readers.     Any falls in the last year?  Daughter thinks he had a fall in Spring 2024, but no injury/ED visit.       Any history of wandering? No    Does the patient live alone? Yes  What is the layout of the home, do they have difficulty with stairs? Ranch home with basement.  2 ROHAN and full flight to washer/dryer in the basement.  No concerns with stair management.  Do you feel comfortable leaving the patient home alone? Yes    Have you noticed any burned pans or other signs of issues with the stove or other appliances?  No, he rarely uses these items.  Mostly the microwave    Do you have any concerns about the patient's cooking or eating habits?  No, patient is being seen by Weight Management and same has been well   Have any of the patient's utilities been shut off in the last 12 months? No  Are there any concerns with pests, mold, lead, heat, water access? No  Do you have working smoke detectors and fire extinguishers in the home? Yes    To the best of your knowledge has the patient experienced any violence or abuse in the last 12 months? No  Have you thought about when it will no longer be safe for the patient to be left alone or any future planning if their memory were to decline and they have an increase in care needs? Interested in placement and patient is open to same.   Open to any of the following [facility]: [facility], [facility] and [facility].  Daughter is hoping to see how this visit goes to determine what their next steps will be and any resources that come from same.     Caregiver Review  Do you feel like you have a basic understanding of dementia/memory loss?  She believes so.  Patient's mother also had Alzheimer's      Do you have any needs in caring for a person living with dementia?  No     Do you have social supports? Yes       Education/resources to be provided to patient/family in person or via mail:   MSW will provide the following resources during visit:   -Home care, Waiver, AAA  -SNF list  -[facility] SNF list  -snf/PC list  -[facility] JOCELYNE/PC list  -[facility] brochure  -[facility] brochure  -Alzheimer's Association: Scams, Crime, Fraud  -[facility] Phone  -[facility]ior Center list  -[facility] [place] list  -[facility]:  [facility]

## 2024-11-07 ENCOUNTER — TELEPHONE (OUTPATIENT)
Age: 79
End: 2024-11-07

## 2024-11-07 ENCOUNTER — TELEPHONE (OUTPATIENT)
Dept: PAIN MEDICINE | Facility: MEDICAL CENTER | Age: 79
End: 2024-11-07

## 2024-11-07 NOTE — TELEPHONE ENCOUNTER
"Pt called stating he missed a call from \"Dr. Camp's\" office at 803 this morning. I am unable to locate an encounter. I attempted a warm transfer to clinical; however, clinical was not available at time of call.     Please call patient back at [phone]   "

## 2024-11-08 ENCOUNTER — TELEPHONE (OUTPATIENT)
Age: 79
End: 2024-11-08

## 2024-11-08 ENCOUNTER — OFFICE VISIT (OUTPATIENT)
Age: 79
End: 2024-11-08
Payer: COMMERCIAL

## 2024-11-08 VITALS
OXYGEN SATURATION: 94 % | WEIGHT: 217.2 LBS | SYSTOLIC BLOOD PRESSURE: 136 MMHG | HEART RATE: 43 BPM | HEIGHT: 66 IN | BODY MASS INDEX: 34.91 KG/M2 | DIASTOLIC BLOOD PRESSURE: 68 MMHG | TEMPERATURE: 97.7 F

## 2024-11-08 DIAGNOSIS — G47.00 INSOMNIA, UNSPECIFIED TYPE: ICD-10-CM

## 2024-11-08 DIAGNOSIS — F33.41 RECURRENT MAJOR DEPRESSIVE DISORDER, IN PARTIAL REMISSION (HCC): ICD-10-CM

## 2024-11-08 DIAGNOSIS — M19.90 OSTEOARTHRITIS, UNSPECIFIED OSTEOARTHRITIS TYPE, UNSPECIFIED SITE: ICD-10-CM

## 2024-11-08 DIAGNOSIS — F03.90 DEMENTIA (HCC): Primary | ICD-10-CM

## 2024-11-08 LAB
ALBUMIN SERPL ELPH-MCNC: 4.1 G/DL (ref 3.2–5.1)
ALBUMIN SERPL ELPH-MCNC: 65 % (ref 48–70)
ALPHA1 GLOB SERPL ELPH-MCNC: 0.23 G/DL (ref 0.15–0.47)
ALPHA1 GLOB SERPL ELPH-MCNC: 3.6 % (ref 1.8–7)
ALPHA2 GLOB SERPL ELPH-MCNC: 0.78 G/DL (ref 0.42–1.04)
ALPHA2 GLOB SERPL ELPH-MCNC: 12.4 % (ref 5.9–14.9)
BETA GLOB ABNORMAL SERPL ELPH-MCNC: 0.38 G/DL (ref 0.31–0.57)
BETA1 GLOB SERPL ELPH-MCNC: 6.1 % (ref 4.7–7.7)
BETA2 GLOB SERPL ELPH-MCNC: 5.4 % (ref 3.1–7.9)
BETA2+GAMMA GLOB SERPL ELPH-MCNC: 0.34 G/DL (ref 0.2–0.58)
GAMMA GLOB ABNORMAL SERPL ELPH-MCNC: 0.47 G/DL (ref 0.4–1.66)
GAMMA GLOB SERPL ELPH-MCNC: 7.5 % (ref 6.9–22.3)
IGG/ALB SER: 1.86 {RATIO} (ref 1.1–1.8)
PROT PATTERN SERPL ELPH-IMP: ABNORMAL
PROT SERPL-MCNC: 6.3 G/DL (ref 6.4–8.2)

## 2024-11-08 PROCEDURE — 84165 PROTEIN E-PHORESIS SERUM: CPT | Performed by: STUDENT IN AN ORGANIZED HEALTH CARE EDUCATION/TRAINING PROGRAM

## 2024-11-08 PROCEDURE — 99205 OFFICE O/P NEW HI 60 MIN: CPT | Performed by: NURSE PRACTITIONER

## 2024-11-08 PROCEDURE — 88112 CYTOPATH CELL ENHANCE TECH: CPT | Performed by: STUDENT IN AN ORGANIZED HEALTH CARE EDUCATION/TRAINING PROGRAM

## 2024-11-08 NOTE — PROGRESS NOTES
Assessment & Plan:   Geriatric Evaluation  Memory  Pt independent with adl, supported for iadls.  Memory loss:  STM loss starting to affect function  MOCA 18/30.  Deficits in all domains but naming   MRI brain, 5/2023:  White matter changes suggestive of chronic microangiopathy. No acute intracranial pathology. Chronic lacunar infarct right cerebellum.   [facility]:  11/2024 B12 530.  5/2023:  TSH 0.813  History, physical, and cognitive screening are most consistent with:  mild cognitive impairment w/poss transition to mild dementia  Contributing factors:  none  Further eval warrants the following:  none  Memory meds:  pt w/history of bradycardia: was trialed on aricept - did not continue  Cont supportive cares lives alone, but looking at [facility]'s  Caregiver stress concerns:  future planning  SW needs this visit:  see intake note  Discussed safe environment  Wandering:  No concerns.  Some items to consider would be door video surveillance, ID bracelet, Tile GPS   Home:  no concerns.  Ensure pt has phone and reinforce to not use stove or shower while gone.  Level of care:  Independent - looking at more structured living arrangement for future  Fall preventions:  no recent fall  Medication management:  does good with set up by dgt  Sca safety:  Do not answer suspicious mail, phone calls, email, or text message without having second person review d/t safety risk.  Do not give out personal info to questionable sources- read company safety policies for how they might contact you.  Continue to engage in physical, cognitive, social activity.  Cont doing games, puzzles, trivia, current event discussions  Cont daily walks or exercise video  Cont outings with friends and family.  Avoid social isolation.  Referral to cognitive therapy:  outpt ST  Optimize chronic and acute conditions  Cont to f/u with providers and ensure medication compliance  Vascular risk factors:  Aflutter, CAD, hld, htn, IFG, PAD, h/o stroke  Chronic  "condition concerns:  stable  Ensure good diet (ex Mediterranean diet) and adequate hydration  Monitor for changes in behavior/mood- if noted, notify provider for eval  Encourage mindfulness and positive socialization for general wellness.  Do not overwhelm pt with info.    Do one task at a time. Use slower pace.  Keep to one conversation at a time.  Do not multitask.  Decision making:  At this point, recommend making \"bigger\" decisions with POA/care partner as needed.  In future, if capacity is of concern, would refer to neuropsychology for full eval.  Encourage independence as able.  Recommended next follow up: care conference    Mobility  Baseline ambulation: no AD.  Fall type: none recent  Pt takes plavix - need prompt eval for any fall with head strike  PT OT:  no needs identified  Fall precautions    Mood  Baseline mood:  stable  GDS 4  Continues on wellbutrin, lexapro, gabapentin, trazodone  Encourage relaxation techniques, emotional support     Medication Review  Medications seem appropriate for current conditions  Patient is at risk for increased side effects due to polypharmacy  Monitor for increased side effects with multiple mood medications  Patient is taking the following medications that meet Beer's Criteria to monitor/avoid:  none  Avoid medications that worsen cognition - check with provider or pharmacist before starting new or OTC meds    5.  Other Geriatric Syndromes:  Insomnia:  doing ok with trazodone.  Cont good sleep hygiene techniques  Chronic OA:  chronic back pain, cont with gabapentin.  Avoid NSaIds, cont topical analgesics, non pharm methods of pain control..  Notify pcp if increased or worsening pain.      HPI:  We had the pleasure of evaluating Nj Pimentel  who is a 79 y.o. male in Geriatric Consultation today.  Previous MOCA:  16/30.  Comorbidities include memory changes, insomnia, anxiety/depression.  [facility]  Mr. Pimentel is in the office with his daughter, Shayla.  He " "lives on his own.     Memory report per daughter (d/t memory issues): His memory/judgement is declining.  He gets caught in scams.  Looking at [facility].  He's currently living by himself.  He does grill on occasion, but never cooked.  He doesn't want to get things dirty.  Microwave and grill.  Independent with self cares.  Dgt does planner, he is doing good remembering on his own.  Dgt is now on account.  He goes to AA.  He goes to dinner.  He's having some judgement issues.  He is generous but gives too much. He repeatedly does this.  He is doing ok with driving local, dgt drives further away.  He is slower.  He's doing ok eating.  He does go to [facility] to watch weight.  Learning new info if he's interested works.  He moves well, did have a few falls (not paying attention, did balance therapy).  He does ok when jogging memory most of the time.  He keeps calendar for appts that dgt monitors.  Has injections for back - not helping.  He takes scheduled tylenol.  He goes shopping ever day.  He overbuys things he doesn't need.  He overbuys dgt things.  He overbuys shoes and clothes.  He doesn't like the Y. He doesn't have thobbies.  He won't volunteer because he doesn't get paid.      Memory report per patient:  Memory \"sucks\"  He writes it down.  Not losing things more often.  COnvo's. No missing appts.  Forgetting store lists.  Not forgetting meds.  NOt paying things late -dgt is doing.  Not forgetting meals.  Drving is ok local.  He is still looking for work.  Still tinkers, but harder to figure things out.      He has difficulty finding the right word while speaking: No  Patient requires repeat information or ask the same question repeatedly: Yes - forgets convo  He has few  problems operating household appliance such as TV remote, kitchen appliances, computer.    Functional concerns:  Bathing independent  Dressing indep  Feeding yourself indep  Tolieting indep  Continence    Transferring indep  Uses : no AD, no " "recent falls    Can you make your own light meals Yes   Do light cleaning/chores Yes  Do you take care of your own medications Yes - daughter sets up  Do you do your own shopping Yes  Do you handle your own financial affairs such as balancing your checkbook, paying bills, investments: No  Do have any difficulties with handling your financial affairs: Yes (scams)  Do you use a cell phone Yes  Do you drive: Yes       Have you had any recent accidents, citations or getting lost in familiar places :No - is scheduling FTD from neuro eval    Psychosocial concerns:  Have you or your family noted any change in your mood or personality:No  Are you currently or have you been treated in the past for depression or anxiety: Yes  Any hallucination or delusion: No  Sleep Issues: No  Hearing and vision issue: No  ADL/IADL:  independent/supported  Appetite/:  good  Pain:  chronic back pain    Family Review of Behavior [facility]:    pacing. No    agressive/combative behavior. No    agitated. Yes - gets obsessed with scam things  wandering. No   resistance to care. No   hoarding/hiding objects.No    suspicious  No  withdrawn No  rummaging/pillaging.    misplacing/losing objects Yes  personal hygiene problems.No  forgetfulness of actions Yes     Past Medical, surgical, social, medication and allergy history and patients previous records reviewed.    Family member with dementia and what type?yes - mom, not dad, no siblings, no aunts/uncle  Does patient have previous diagnosis of dementia?  Doesn't know   Does patient take any \"memory medications\"? Unsre what symptoms stop aricept  Stroke history Yes- memory did get worse after strok  Have you had any head trauma No  Does patient have history of alcohol abuse Yes - when wife passed.    ROS:  Review of Systems   Constitutional:  Negative for activity change, appetite change, chills and fatigue.   HENT:  Negative for congestion and hearing loss.    Eyes:  Negative for visual disturbance. "   Respiratory:  Negative for cough and shortness of breath.    Cardiovascular:  Negative for chest pain.   Gastrointestinal:  Negative for abdominal pain, constipation, diarrhea, nausea and vomiting.   Genitourinary:  Negative for difficulty urinating.   Musculoskeletal:  Positive for arthralgias and back pain. Negative for gait problem.   Neurological:  Positive for light-headedness (occ in the morning). Negative for dizziness.   Psychiatric/Behavioral:  Positive for decreased concentration (forgetful).        Allergies:   No Known Allergies    Medications:      Current Outpatient Medications:     Ascorbic Acid (vitamin C) 1000 MG tablet, Take 1,000 mg by mouth daily, Disp: , Rfl:     aspirin (ECOTRIN LOW STRENGTH) 81 mg EC tablet, Take 81 mg by mouth every other day 1 every other day, Disp: , Rfl:     atorvastatin (LIPITOR) 40 mg tablet, TAKE 1 TABLET BY MOUTH ONCE DAILY IN THE EVENING, Disp: 90 tablet, Rfl: 0    B Complex-C (SUPER B COMPLEX PO), Take 1 capsule by mouth in the morning, Disp: , Rfl:     buPROPion (Wellbutrin XL) 150 mg 24 hr tablet, Take 1 tablet (150 mg total) by mouth daily, Disp: 30 tablet, Rfl: 2    clopidogrel (PLAVIX) 75 mg tablet, Take 1 tablet (75 mg total) by mouth daily, Disp: 90 tablet, Rfl: 2    escitalopram (LEXAPRO) 20 mg tablet, Take 1 tablet (20 mg total) by mouth daily, Disp: 90 tablet, Rfl: 1    ferrous sulfate 324 (65 Fe) mg, Take 1 tablet (324 mg total) by mouth daily before breakfast, Disp: 30 tablet, Rfl: 2    finasteride (PROSCAR) 5 mg tablet, Take 1 tablet by mouth once daily, Disp: 90 tablet, Rfl: 1    gabapentin (NEURONTIN) 400 mg capsule, TAKE 1 CAPSULE BY MOUTH THREE TIMES DAILY, Disp: 270 capsule, Rfl: 1    Ginkgo Biloba 120 MG CAPS, Take 1 capsule by mouth in the morning, Disp: , Rfl:     lisinopril (ZESTRIL) 10 mg tablet, Take 1 tablet by mouth once daily, Disp: 90 tablet, Rfl: 2    Magnesium 250 MG TABS, Take 1 tablet (250 mg total) by mouth 2 (two) times a day,  Disp: 60 tablet, Rfl: 5    metoprolol succinate (TOPROL-XL) 25 mg 24 hr tablet, Take 1 tablet (25 mg total) by mouth daily, Disp: 90 tablet, Rfl: 2    Multiple Vitamin (multivitamin) capsule, Take 1 capsule by mouth daily, Disp: , Rfl:     omeprazole (PriLOSEC) 20 mg delayed release capsule, Take 1 capsule by mouth once daily, Disp: 90 capsule, Rfl: 1    tamsulosin (FLOMAX) 0.4 mg, Take 1 capsule (0.4 mg total) by mouth daily with dinner, Disp: 90 capsule, Rfl: 1    traZODone (DESYREL) 100 mg tablet, TAKE 1 TABLET BY MOUTH ONCE DAILY AT BEDTIME, Disp: 30 tablet, Rfl: 2    VITAMIN D, CHOLECALCIFEROL, PO, Take 2,000 Units by mouth in the morning, Disp: , Rfl:     donepezil (ARICEPT) 10 mg tablet, Take 10 mg by mouth in the morning (Patient not taking: Reported on 11/8/2024), Disp: , Rfl:     Vitals:  Vitals:    11/08/24 0937   BP: 136/68   Pulse: (!) 43   Temp: 97.7 °F (36.5 °C)   SpO2: 94%       History:  Past Medical History:   Diagnosis Date    Alcoholism (HCC)     Anxiety     Folliculitis 03/15/2024    GERD (gastroesophageal reflux disease)     Gout     High cholesterol     Hypertension     Insomnia     Kidney stone     Stroke (HCC)      Past Surgical History:   Procedure Laterality Date    ANKLE SURGERY Right     CARDIAC CATHETERIZATION      no findings    COLONOSCOPY  01/25/2013    polypectomy; complete - 3 yrs d/t polyp - benign submucosal lipoma- path c/w lipoma    COLONOSCOPY  04/25/2017    complete - tubular adenoma - repeat 5yrs    CYSTOSCOPY      CYSTOTOMY      bladder  w/ basket extraction of calculus    FEMUR FRACTURE SURGERY      HERNIA REPAIR      inguinal ; sliding    INGUINAL HERNIA REPAIR Right     unilateral    IR LOWER EXTREMITY ANGIOGRAM  4/24/2023    KNEE ARTHROSCOPY Right     w/medial menisectomy    LASIK      corneal    LITHOTRIPSY      renal    VT COLONOSCOPY FLX DX W/COLLJ SPEC WHEN PFRMD N/A 04/25/2017    Procedure: SCREENING COLONOSCOPY;  Surgeon: Paul Jacome MD;  Location: [facility]  OR;  Service: General    CA SLCTV CATHJ 3RD+ ORD SLCTV ABDL PEL/LXTR BRNCH Right 2023    Procedure: ARTERIOGRAM;  Surgeon: Alivia Blum MD;  [facility];  Service: Vascular    CA TEAEC W/PATCH GRF CAROTID VERTB SUBCLAV NECK INC Right 01/10/2020    Procedure: ENDARTERECTOMY ARTERY CAROTID;  Surgeon: Aaron Smith MD;  Location: [facility] OR;  Service: Vascular    CA TEAEC W/WO PATCH GRAFT COMMON FEMORAL Right 2023    Procedure: ENDARTERECTOMY ARTERIAL FEMORAL, RETROGRADE ILIAC INTERVENTION;  Surgeon: Alivia Blum MD;  [facility];  Service: Vascular    ROTATOR CUFF REPAIR Bilateral     TONSILLECTOMY       Family History   Problem Relation Age of Onset    Alzheimer's disease Mother     Alzheimer's disease Father     Heart disease Father         CAD    Other Father         prediabetes    Diabetes Father     Breast cancer Other     Arthritis Family     Hypertension Family      Social History     Socioeconomic History    Marital status:      Spouse name: Not on file    Number of children: 1    Years of education: Not on file    Highest education level: Not on file   Occupational History    Occupation: Retired     Comment: working full time   Tobacco Use    Smoking status: Former     Current packs/day: 0.00     Average packs/day: 1 pack/day for 22.0 years (22.0 ttl pk-yrs)     Types: Cigarettes     Start date: 1972     Quit date: 1985     Years since quittin.8     Passive exposure: Past    Smokeless tobacco: Never   Vaping Use    Vaping status: Never Used   Substance and Sexual Activity    Alcohol use: Not Currently     Comment: stopped drinking alcohol; AA x23yrs sober    Drug use: No    Sexual activity: Not Currently     Partners: Female   Other Topics Concern    Not on file   Social History Narrative    , lives alone, working full time     Social Determinants of Health     Financial Resource Strain: Low Risk  (2024)    Overall Financial Resource Strain (CARDIA)      Difficulty of Paying Living Expenses: Not very hard   Food Insecurity: Not on file   Transportation Needs: No Transportation Needs (1/5/2024)    PRAPARE - Transportation     Lack of Transportation (Medical): No     Lack of Transportation (Non-Medical): No   Physical Activity: Not on file   Stress: Not on file   Social Connections: Not on file   Intimate Partner Violence: Not on file   Housing Stability: Not on file       Past Surgical History:   Procedure Laterality Date    ANKLE SURGERY Right     CARDIAC CATHETERIZATION      no findings    COLONOSCOPY  01/25/2013    polypectomy; complete - 3 yrs d/t polyp - benign submucosal lipoma- path c/w lipoma    COLONOSCOPY  04/25/2017    complete - tubular adenoma - repeat 5yrs    CYSTOSCOPY      CYSTOTOMY      bladder  w/ basket extraction of calculus    FEMUR FRACTURE SURGERY      HERNIA REPAIR      inguinal ; sliding    INGUINAL HERNIA REPAIR Right     unilateral    IR LOWER EXTREMITY ANGIOGRAM  4/24/2023    KNEE ARTHROSCOPY Right     w/medial menisectomy    LASIK      corneal    LITHOTRIPSY      renal    SC COLONOSCOPY FLX DX W/COLLJ SPEC WHEN PFRMD N/A 04/25/2017    Procedure: SCREENING COLONOSCOPY;  Surgeon: Paul Jacome MD;  Location: [facility];  Service: General    SC SLCTV CATHJ 3RD+ ORD SLCTV ABDL PEL/LXTR BRNCH Right 4/24/2023    Procedure: ARTERIOGRAM;  Surgeon: Alivia Blum MD;  [facility];  Service: Vascular    SC TEAEC W/PATCH GRF CAROTID VERTB SUBCLAV NECK INC Right 01/10/2020    Procedure: ENDARTERECTOMY ARTERY CAROTID;  Surgeon: Aaron Smith MD;  Location: [facility];  Service: Vascular    SC TEAEC W/WO PATCH GRAFT COMMON FEMORAL Right 4/24/2023    Procedure: ENDARTERECTOMY ARTERIAL FEMORAL, RETROGRADE ILIAC INTERVENTION;  Surgeon: Alivia Blum MD;  [facility];  Service: Vascular    ROTATOR CUFF REPAIR Bilateral     TONSILLECTOMY         Physical Exam  Observed Ambulation:  no AD/fairly steady  Physical Exam  Vitals and nursing note  reviewed.   Constitutional:       General: He is not in acute distress.     Appearance: Normal appearance. He is well-developed. He is not diaphoretic.   HENT:      Head: Normocephalic.   Cardiovascular:      Rate and Rhythm: Normal rate.      Heart sounds: No murmur heard.     No friction rub. No gallop.      Comments: HR 54  Pulmonary:      Effort: Pulmonary effort is normal. No respiratory distress.      Breath sounds: Normal breath sounds. No wheezing or rales.   Abdominal:      General: Bowel sounds are normal. There is no distension.      Palpations: Abdomen is soft.      Tenderness: There is no abdominal tenderness.   Musculoskeletal:         General: Normal range of motion.      Cervical back: Normal range of motion.   Skin:     General: Skin is warm and dry.   Neurological:      General: No focal deficit present.      Mental Status: He is alert. Mental status is at baseline.      Comments: Oriented to person, partial tps  Pleasant, cooperative, engages in convo   Psychiatric:         Mood and Affect: Mood normal.         Behavior: Behavior normal.       I have spent a total time of 63 minutes in caring for this patient on the day of the visit/encounter including Diagnostic results, Prognosis, Risks and benefits of tx options, Instructions for management, Patient and family education, Importance of tx compliance, Risk factor reductions, Impressions, Counseling / Coordination of care, Documenting in the medical record, Reviewing / ordering tests, medicine, procedures  , Obtaining or reviewing history  , and Communicating with other healthcare professionals .

## 2024-11-08 NOTE — TELEPHONE ENCOUNTER
Daughter called for phone numbers to schedule Speech Therapy for patient. Disconnected call before I could return. Phone numbers as follows for different locations:      [place] - [phone]  [place] - [phone]  [facility] [phone]

## 2024-11-12 ENCOUNTER — HOSPITAL ENCOUNTER (OUTPATIENT)
Dept: CT IMAGING | Facility: HOSPITAL | Age: 79
Discharge: HOME | End: 2024-11-12
Payer: COMMERCIAL

## 2024-11-12 DIAGNOSIS — R31.0 GROSS HEMATURIA: ICD-10-CM

## 2024-11-12 PROCEDURE — 74178 CT ABD&PLV WO CNTR FLWD CNTR: CPT

## 2024-11-12 RX ADMIN — IOHEXOL 100 ML: 350 INJECTION, SOLUTION INTRAVENOUS at 14:01

## 2024-11-16 DIAGNOSIS — I73.9 PERIPHERAL ARTERY DISEASE (HCC): ICD-10-CM

## 2024-11-18 RX ORDER — CLOPIDOGREL BISULFATE 75 MG/1
75 TABLET ORAL DAILY
Qty: 90 TABLET | Refills: 1 | Status: SHIPPED | OUTPATIENT
Start: 2024-11-18

## 2024-11-19 ENCOUNTER — OFFICE VISIT (OUTPATIENT)
Dept: FAMILY MEDICINE CLINIC | Facility: HOSPITAL | Age: 79
End: 2024-11-19
Payer: COMMERCIAL

## 2024-11-19 VITALS
WEIGHT: 214 LBS | TEMPERATURE: 98.2 F | OXYGEN SATURATION: 98 % | BODY MASS INDEX: 34.39 KG/M2 | HEART RATE: 60 BPM | HEIGHT: 66 IN | DIASTOLIC BLOOD PRESSURE: 72 MMHG | SYSTOLIC BLOOD PRESSURE: 130 MMHG

## 2024-11-19 DIAGNOSIS — I73.9 PAD (PERIPHERAL ARTERY DISEASE) (HCC): ICD-10-CM

## 2024-11-19 DIAGNOSIS — M54.42 CHRONIC BILATERAL LOW BACK PAIN WITH BILATERAL SCIATICA: Primary | ICD-10-CM

## 2024-11-19 DIAGNOSIS — M54.41 CHRONIC BILATERAL LOW BACK PAIN WITH BILATERAL SCIATICA: Primary | ICD-10-CM

## 2024-11-19 DIAGNOSIS — G89.29 CHRONIC BILATERAL LOW BACK PAIN WITH BILATERAL SCIATICA: Primary | ICD-10-CM

## 2024-11-19 PROCEDURE — G2211 COMPLEX E/M VISIT ADD ON: HCPCS | Performed by: NURSE PRACTITIONER

## 2024-11-19 PROCEDURE — 99214 OFFICE O/P EST MOD 30 MIN: CPT | Performed by: NURSE PRACTITIONER

## 2024-11-19 RX ORDER — GABAPENTIN 400 MG/1
CAPSULE ORAL
Qty: 270 CAPSULE | Refills: 1 | Status: SHIPPED | OUTPATIENT
Start: 2024-11-19

## 2024-11-19 NOTE — ASSESSMENT & PLAN NOTE
Chronic back pain that has worsened since injection a few weeks ago.   I advised him to discuss this with pain management as they have been treating this pain.   I did increase his gabapentin by 400 mg. Instructed to take 800 mg in AM, 400 mg in afternoon and 400 mg at bedtime.

## 2024-11-19 NOTE — ASSESSMENT & PLAN NOTE
Orders:    gabapentin (NEURONTIN) 400 mg capsule; Take 2 capsules in the morning, 1 capsule in the afternoon and 1 capsule at HS

## 2024-11-19 NOTE — PROGRESS NOTES
"Name: Nj Pimentel Jr.      : 1945      MRN: [id number]  Encounter Provider: BLAS Cunningham  Encounter Date: 2024   Encounter department: [facility] CARE [address]   :  Assessment & Plan  Chronic bilateral low back pain with bilateral sciatica  Chronic back pain that has worsened since injection a few weeks ago.   I advised him to discuss this with pain management as they have been treating this pain.   I did increase his gabapentin by 400 mg. Instructed to take 800 mg in AM, 400 mg in afternoon and 400 mg at bedtime.        PAD (peripheral artery disease) ([facility])    Orders:    gabapentin (NEURONTIN) 400 mg capsule; Take 2 capsules in the morning, 1 capsule in the afternoon and 1 capsule at HS           History of Present Illness     B/L low back pain that started 3 weeks ago. Pain radiates into both legs and feet. Located back and side of legs. No N/T. Legs feel weak but not giving out. No loss of bowel or bladder. Has h/o chronic back pain. Has been to pain manageent. Had injection about 3 weeks ago. States it did not work. He states he did not let them know this. On gabapentin. Asking for dose increase.       Review of Systems   Constitutional:  Negative for chills and fever.   Genitourinary:  Negative for enuresis.   Musculoskeletal:  Positive for back pain.   Neurological:  Positive for weakness and numbness.          Objective   /72 (Patient Position: Sitting, Cuff Size: Standard)   Pulse 60   Temp 98.2 °F (36.8 °C) (Tympanic)   Ht 5' 6\" (1.676 m)   Wt 97.1 kg (214 lb)   SpO2 98%   BMI 34.54 kg/m²      Physical Exam  Vitals reviewed.   Constitutional:       Appearance: Normal appearance.   Cardiovascular:      Rate and Rhythm: Normal rate and regular rhythm.      Heart sounds: Murmur heard.   Pulmonary:      Effort: Pulmonary effort is normal.      Breath sounds: Normal breath sounds.   Musculoskeletal:      Lumbar back: No tenderness or bony tenderness. " Decreased range of motion (with forwrad flexiion. Forward flexion causes pain in legs.).   Skin:     General: Skin is warm and dry.   Neurological:      Mental Status: He is alert and oriented to person, place, and time.   Psychiatric:         Mood and Affect: Mood normal.         Behavior: Behavior normal.         Thought Content: Thought content normal.         Judgment: Judgment normal.

## 2024-11-22 DIAGNOSIS — R77.8 ABNORMAL SERUM PROTEIN ELECTROPHORESIS: Primary | ICD-10-CM

## 2024-11-27 ENCOUNTER — DOCUMENTATION (OUTPATIENT)
Dept: HEMATOLOGY ONCOLOGY | Facility: CLINIC | Age: 79
End: 2024-11-27

## 2024-11-29 ENCOUNTER — APPOINTMENT (OUTPATIENT)
Dept: LAB | Facility: HOSPITAL | Age: 79
End: 2024-11-29
Payer: COMMERCIAL

## 2024-11-29 DIAGNOSIS — R77.8 ABNORMAL SPEP: Primary | ICD-10-CM

## 2024-11-29 DIAGNOSIS — R77.8 ABNORMAL SPEP: ICD-10-CM

## 2024-11-29 LAB — IGG SERPL-MCNC: 436 MG/DL (ref 635–1741)

## 2024-11-29 PROCEDURE — 83521 IG LIGHT CHAINS FREE EACH: CPT

## 2024-11-29 PROCEDURE — 36415 COLL VENOUS BLD VENIPUNCTURE: CPT

## 2024-11-29 PROCEDURE — 82784 ASSAY IGA/IGD/IGG/IGM EACH: CPT

## 2024-11-29 PROCEDURE — 84166 PROTEIN E-PHORESIS/URINE/CSF: CPT

## 2024-11-29 NOTE — PROGRESS NOTES
Please notify pt that Heme/Onc did not feel a consult was needed at this time. They did recommend further blood and urine tests to eval the low protein level - blood and urine tests ordered and in Epic. He can do at his earliest convenience.  NONFASTING

## 2024-11-30 ENCOUNTER — APPOINTMENT (OUTPATIENT)
Dept: LAB | Facility: HOSPITAL | Age: 79
End: 2024-11-30

## 2024-11-30 LAB
KAPPA LC FREE SER-MCNC: 24.3 MG/L (ref 3.3–19.4)
KAPPA LC FREE/LAMBDA FREE SER: 1.9 {RATIO} (ref 0.26–1.65)
LAMBDA LC FREE SERPL-MCNC: 12.8 MG/L (ref 5.7–26.3)

## 2024-12-01 ENCOUNTER — RESULTS FOLLOW-UP (OUTPATIENT)
Dept: FAMILY MEDICINE CLINIC | Facility: HOSPITAL | Age: 79
End: 2024-12-01

## 2024-12-02 ENCOUNTER — TELEPHONE (OUTPATIENT)
Age: 79
End: 2024-12-02

## 2024-12-02 LAB
ALBUMIN UR ELPH-MCNC: 100 %
ALPHA1 GLOB MFR UR ELPH: 0 %
ALPHA2 GLOB MFR UR ELPH: 0 %
B-GLOBULIN MFR UR ELPH: 0 %
GAMMA GLOB MFR UR ELPH: 0 %
PROT PATTERN UR ELPH-IMP: NORMAL
PROT UR-MCNC: 12 MG/DL

## 2024-12-02 PROCEDURE — 84166 PROTEIN E-PHORESIS/URINE/CSF: CPT | Performed by: PATHOLOGY

## 2024-12-02 NOTE — TELEPHONE ENCOUNTER
Pt called. States has sterile urine sample in fridge since yesterday that he would like to bring to the office for testing, but has not been able to do so due to work/traffic. Would like to know if it will still be acceptable to bring the sample to the office tomorrow?    Please advise via return phone call. Thank you

## 2024-12-02 NOTE — TELEPHONE ENCOUNTER
Attempted to call patient. Left msg, if he feels his urine should be tested, he should sched appt for UTI with one of the providers

## 2024-12-03 ENCOUNTER — APPOINTMENT (OUTPATIENT)
Dept: LAB | Facility: HOSPITAL | Age: 79
End: 2024-12-03
Payer: COMMERCIAL

## 2024-12-03 DIAGNOSIS — D80.1 HYPOGAMMAGLOBULINEMIA (HCC): Primary | ICD-10-CM

## 2024-12-03 LAB
MISCELLANEOUS LAB TEST RESULT: NORMAL
MISCELLANEOUS LAB TEST RESULT: NORMAL

## 2024-12-03 PROCEDURE — 83521 IG LIGHT CHAINS FREE EACH: CPT

## 2024-12-03 NOTE — TELEPHONE ENCOUNTER
Left detailed message that pt needs to be seen for UTI, advised to call back if this is something they wish to schedule.

## 2024-12-04 ENCOUNTER — E-CONSULT (OUTPATIENT)
Age: 79
End: 2024-12-04
Payer: COMMERCIAL

## 2024-12-04 DIAGNOSIS — N18.31 STAGE 3A CHRONIC KIDNEY DISEASE (HCC): Primary | ICD-10-CM

## 2024-12-04 DIAGNOSIS — D80.1 HYPOGAMMAGLOBULINEMIA (HCC): ICD-10-CM

## 2024-12-04 DIAGNOSIS — G60.9 IDIOPATHIC PERIPHERAL NEUROPATHY: ICD-10-CM

## 2024-12-04 PROBLEM — D50.0 IRON DEFICIENCY ANEMIA DUE TO CHRONIC BLOOD LOSS: Status: RESOLVED | Noted: 2017-02-28 | Resolved: 2024-12-04

## 2024-12-04 PROCEDURE — 99447 NTRPROF PH1/NTRNET/EHR 11-20: CPT | Performed by: NURSE PRACTITIONER

## 2024-12-04 NOTE — PROGRESS NOTES
E-Consult  Nj Pimentel Jr. 79 y.o. male MRN: [id number]  Encounter Date: 12/04/24      Reason for Consult / Principal Problem:  Abnormal Lab values    hypogammaglobulinemia, low IgG and elevated IgG kappa light chains in blood       Consulting Provider: BLAS Mcintosh    Requesting Provider: Dona Camp,*       ASSESSMENT:  Patient is a 79-year-old gentleman with a history of CVA, alcohol abuse, idiopathic peripheral neuropathy, CKD, hypertension, dyslipidemia, CAD, aortic stenosis, coronary stenting, anemia.  He previously followed with this provider for iron deficiency anemia and was treated with a course of parenteral iron replacement.  Patient last seen by hematology in 5/2022 and discharged back to the care of his PCP as his counts had remained in normal range for over a year.    More recently, he has been following with neurology, geriatric medicine for memory loss, dementia    Neurology ordered workup for his polyneuropathy which included SPEP which resulted with findings of hypogammaglobinemia, no definitive monoclonal bands.  Based on the pathology comment to correlate with UPEP, serum immunoglobulin heavy chains, serum and urine free light chains for further characterization, these labs were ordered by PCP and have resulted below              Component  Ref Range & Units (hover) 11/6/24  8:20 AM 11/6/24  8:20 AM 5/3/24  1:28 PM 1/8/24 11:44 AM 3/29/23  5:00 PM 2/25/23 10:12 AM 1/18/23 11:57 AM   A/G Ratio 1.86 High          Albumin Electrophoresis 65.0         Albumin CONC 4.10         Alpha 1 3.6         ALPHA 1 CONC 0.23         Alpha 2 12.4         ALPHA 2 CONC 0.78         Beta-1 6.1         BETA 1 CONC 0.38         Beta-2 5.4         BETA 2 CONC 0.34         Gamma Globulin 7.5         GAMMA CONC 0.47         Total Protein 6.3 Low  6.6 R 6.8 R 6.8 R 6.9 R 7.1 R 7.3 R   SPEP Interpretation See Comment         Comment: The SPEP shows hypogammaglobulinemia with no definitive  monoclonal bands. Correlate with UPEP, serum immunoglobulin heavy chains (IgA, IgG, IgM, IgD, IgE) and serum and urine free light chains (kappa, lambda) for further characterization, as clinically indicated. Reviewed by: Randy Glez MD **Electronic Signature**        UPEP: No monoclonal bands noted     Component  Ref Range & Units (hover) 11/29/24 11:47 AM   Ig [place] Free Light Chain 24.3 High    Ig Lambda Free Light Chain 12.8   Kappa/Lambda FluidC Ratio 1.90 High       Low         IgA heavy light chains are normal.    IgM heavy light chains resulted with elevation in ratio    CMP demonstrates normal serum chemistries.  Serum calcium 9.6.  Serum creatinine stable 1.41    Most recent CBC-D from 5/3/2024 showed mild macrocytosis without anemia.  Hemoglobin 13.9, .  Remainder of CBC-D normal.  Iron panel normal.    RECOMMENDATIONS:    Patient has no evidence of monoclonal gammopathy which rules out concern for MGUS, myeloma.  Additionally, he has no evidence of recurrent anemia, hypercalcemia which would be associated with a myeloma process.  Hypogammaglobinemia can be associated with recurrent infections.  Typically we see this when IgG levels are below 400.  Etiology for elevation in IgM heavy light chain ratio unclear.  This is not a test typically ordered by hematology.  I did review with Dr. Montes who also is unclear for meaning of this elevation.  ?  Related to CKD    Could refer to immunology given low IgG level.    Would not recommend repeating SPEP/UPEP unless patient had significantly worsening renal function, acute anemia or evidence of hypercalcemia      Total time spent 11-20 minutes, >50% of the total time devoted to medical consultative verbal/EMR discussion between providers. Written report will be generated in the EMR..

## 2024-12-04 NOTE — PROGRESS NOTES
Please notify pt that I had hematology eval his labs - they do not see sign of any concerning blood disorder.  His  immunoglobulins/antibody was low which increases his risk for infections.  We can discuss this at next appt and decide if he should see an immunologist

## 2024-12-05 LAB
KAPPA LC UR-MCNC: 7.28 MG/L (ref 1.17–86.46)
KAPPA LC/LAMBDA UR: 8.47 {RATIO} (ref 1.83–14.26)
LAMBDA LC UR-MCNC: 0.86 MG/L (ref 0.27–15.21)

## 2024-12-07 ENCOUNTER — TELEPHONE (OUTPATIENT)
Dept: OTHER | Facility: OTHER | Age: 79
End: 2024-12-07

## 2024-12-07 NOTE — TELEPHONE ENCOUNTER
"Pt stated, \"I would like the Dermatology office to give me a call when they open to set up a new patient appointment.\"    Please call pt when office reopens.   "

## 2024-12-09 ENCOUNTER — EVALUATION (OUTPATIENT)
Dept: SPEECH THERAPY | Facility: CLINIC | Age: 79
End: 2024-12-09
Payer: COMMERCIAL

## 2024-12-09 DIAGNOSIS — R41.841 COGNITIVE COMMUNICATION DEFICIT: ICD-10-CM

## 2024-12-09 DIAGNOSIS — F03.90 DEMENTIA, UNSPECIFIED DEMENTIA SEVERITY, UNSPECIFIED DEMENTIA TYPE, UNSPECIFIED WHETHER BEHAVIORAL, PSYCHOTIC, OR MOOD DISTURBANCE OR ANXIETY (HCC): Primary | ICD-10-CM

## 2024-12-09 PROCEDURE — 96125 COGNITIVE TEST BY HC PRO: CPT

## 2024-12-09 NOTE — TELEPHONE ENCOUNTER
Called patient and left message advising that we are currently scheduling out until next summer for NP appts and to call the office if he would like to make an appt. Also called his daughter and left the same message.

## 2024-12-09 NOTE — PROGRESS NOTES
Speech-Language Pathology Initial Evaluation    Today's date: 2024   Patient’s name: Nj Pimentel Jr.  : 1945  MRN: [id number]  Safety measures:   Referring provider: Marielena Paz CRNP    Encounter Diagnosis     ICD-10-CM    1. Dementia, unspecified dementia severity, unspecified dementia type, unspecified whether behavioral, psychotic, or mood disturbance or anxiety ([facility])  F03.90       2. Cognitive communication deficit  R41.841           Assessment:  Patient presents with Mild Dementia, noted short term memory deficits.  Recommend: Cognitive Therapy services focused on education and therapeutic trials.        Short Term    Patient and family will be educated on dementia and related topics for improved prognosis of care.    Patient will participate in continued therapeutic trials in 90% of opportunities for continued assessment and recommendations for carryover and functional tasks at home.    Patient will improve long term memory retrieval and recall of information during reminiscing tasks with 80% accuracy.    Patient and family will participate in creation and use of memory book with carry over in 90% of opportunities to facilitate improved overall function and quality of life.     Long Term    Patient will improve functional cognitive-linguistic skills with the implementation of cues and external aids, by discharge.      Patient will demonstrate adequate memory/reasoning/judgment to perform desired activities in a supervised environment by discharge.      Plan:  Patient would benefit from outpatient skilled Speech Therapy services: Cognitive-linguistic therapy    Frequency: 1-2x weekly  Duration: 6-8 weeks    Intervention certification from: 2024  Intervention certification to: 2/3/2025      Subjective:  History of present illness: Patient is a 79 y.o. male who was referred to outpatient skilled Speech Therapy services for a cognitive-linguistic evaluation.  Patient diagnosed with Mild  "Cognitive Impairment transitioned to Mild Dementia.     Patient's goal(s):  \"To die a millionaire.\"    Pain: Right now, 0 pain. But, notes the pain is at a 9 when he gets up from sitting or bed.     Hearing: WFL  Vision: WFL    Home environment/lifestyle: Lives at home alone, drives  Vocational status: Retired trained       Objective:  DEMENTIA ASSESSMENT:    1. Intake Questionnaire   -Difficulties with ADLs? If so, please list. - no  -History of depression or anxiety? - no  -Difficulties with IADLs? If so, please list. - yes  -Currently driving? - yes  -Changes in sleep patterns? - yes  -Mood or personality changes? - yes  -Currently taking medication for disease? - no  -Keeping physically/mentally active? - yes  -Currently living alone? - yes  -Onset of cognitive-linguistic changes? -  2-3 years     2. Mini-Mental State Examination (MMSE)  The MMSE is a brief, quantitative measure of cognitive status in adults. It can be used to screen for cognitive impairment, to estimate the severity of cognitive impairment at a given point in time, to follow the course of cognitive changes in an individual over time, and to document an individual's response to treatment. The MMSE includes tests of orientation, attention, memory, language, and visuospatial skills.    *Score: 25/30         3. Functional Assessment Staging Test (FAST) The FAST is the most well-validated measure of the course of Alzheimer's dementia (AD) in published, scientific literature. The FAST tool is used to determine if changes in a patient's condition are due to AD or another condition. If the change if due to AD progression, then any changes on the FAST scale with be in sequence (i.e., AD-related changes do not skip FAST stages). Staging systems provide useful frames of reference for understanding how the disease may unfold and for making future plans. It is important to note that not everyone will experience the same symptoms or progress " at the same rate.     The framework is a system that outlines key symptoms characterizing seven stages ranging from unimpaired function to very severe cognitive decline. This framework is based on a system developed by Bertram Álvarez M.D., Clinical Director of the [facility] [employer] [employer]'s [facility]. Within this framework, stages correspond to the widely-used concepts of mild, moderate, moderately severe and severe Alzheimer's disease. The authors also noted which stages fall within the more general divisions of early-stage, mid-stage, and late-stage categories.    *Stage 4: Mild Dementia (Decreased ability to perform complex tasks, e.g. planning dinner for guests, handling personal finances (such as forgetting to pay bills), difficulty marketing, etc.)    Stage 4: Moderate cognitive decline (Mild or early-stage Alzheimer's disease)  -At this stage, a careful medical interview detects clear-cut deficiencies in the following areas:  *Decreased knowledge of recent occasions or current events   *Impaired ability to perform challenging mental arithmetic-for example, to count backward from 75 by 7s   *Decreased capacity to perform complex tasks, such as planning dinner for guests, paying bills and managing finances   *Reduced memory of personal history   *The affected individual may seem subdued and withdrawn, especially in socially or mentally challenging situations          Treatment:  Completed education to patient, will call daughter regarding plan of care      Visit Tracking:  POC   Expires Auth Expiration Date ST Visit Limit   2/3/25  TBD          Visit/Unit Tracking:  Auth Status Date 12/9    Used 1    Remaining        Intervention Comments:  Education, therapeutic trials

## 2024-12-09 NOTE — TELEPHONE ENCOUNTER
Rec'd call from patient he was returning the call as I explain to him again where we were with NP appointment he then declined to schedule.    He did mentioned he was here before I agreed but also explained that after 3 years he is considered a NEW patient again

## 2024-12-11 ENCOUNTER — TELEPHONE (OUTPATIENT)
Dept: FAMILY MEDICINE CLINIC | Facility: HOSPITAL | Age: 79
End: 2024-12-11

## 2024-12-11 ENCOUNTER — OFFICE VISIT (OUTPATIENT)
Dept: SPEECH THERAPY | Facility: CLINIC | Age: 79
End: 2024-12-11
Payer: COMMERCIAL

## 2024-12-11 DIAGNOSIS — F03.90 DEMENTIA (HCC): ICD-10-CM

## 2024-12-11 DIAGNOSIS — R41.841 COGNITIVE COMMUNICATION DEFICIT: Primary | ICD-10-CM

## 2024-12-11 DIAGNOSIS — F03.90 DEMENTIA, UNSPECIFIED DEMENTIA SEVERITY, UNSPECIFIED DEMENTIA TYPE, UNSPECIFIED WHETHER BEHAVIORAL, PSYCHOTIC, OR MOOD DISTURBANCE OR ANXIETY (HCC): ICD-10-CM

## 2024-12-11 PROCEDURE — 92507 TX SP LANG VOICE COMM INDIV: CPT

## 2024-12-11 NOTE — PROGRESS NOTES
Daily Speech Treatment Note    Today's date: 2024   Patient’s name: Nj Pimentel Jr.  : 1945  MRN: [id number]  Safety measures:   Referring provider: Marielena Paz CRNP    Encounter Diagnosis     ICD-10-CM    1. Cognitive communication deficit  R41.841       2. Dementia (HCC)  F03.90 Ambulatory Referral to Speech Therapy          Visit Trackin    Visit Tracking:  POC   Expires Auth Expiration Date ST Visit Limit   2/3/25   TBD              Visit/Unit Tracking:  Auth Status Date      Used 1 2     Remaining                   Subjective/Behavioral:  -Pleasant/Cooperative    Objective/Assessment:  Completed structured activities with patient to target goals below.  Results as stated below.         Short Term     Patient and family will be educated on dementia and related topics for improved prognosis of care.  : Provided education to patient and daughter- provided extensive education on Dementia and ways for optimal cognition. Answered any questions. Will go over strategies next session.     Patient will participate in continued therapeutic trials in 90% of opportunities for continued assessment and recommendations for carryover and functional tasks at home.     Patient will improve long term memory retrieval and recall of information during reminiscing tasks with 80% accuracy.     Patient and family will participate in creation and use of memory book with carry over in 90% of opportunities to facilitate improved overall function and quality of life.      Long Term     Patient will improve functional cognitive-linguistic skills with the implementation of cues and external aids, by discharge.                 Patient will demonstrate adequate memory/reasoning/judgment to perform desired activities in a supervised environment by discharge.                       Plan:  -Continue with current plan of care.

## 2024-12-18 ENCOUNTER — OFFICE VISIT (OUTPATIENT)
Dept: OCCUPATIONAL THERAPY | Facility: REHABILITATION | Age: 79
End: 2024-12-18
Payer: COMMERCIAL

## 2024-12-18 DIAGNOSIS — F03.90 DEMENTIA, UNSPECIFIED DEMENTIA SEVERITY, UNSPECIFIED DEMENTIA TYPE, UNSPECIFIED WHETHER BEHAVIORAL, PSYCHOTIC, OR MOOD DISTURBANCE OR ANXIETY (HCC): Primary | ICD-10-CM

## 2024-12-18 PROCEDURE — 97166 OT EVAL MOD COMPLEX 45 MIN: CPT | Performed by: OCCUPATIONAL THERAPIST

## 2024-12-18 PROCEDURE — 96125 COGNITIVE TEST BY HC PRO: CPT | Performed by: OCCUPATIONAL THERAPIST
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
## 2024-12-19 DIAGNOSIS — E78.5 DYSLIPIDEMIA: ICD-10-CM

## 2024-12-19 DIAGNOSIS — I25.10 CORONARY ARTERY DISEASE INVOLVING NATIVE CORONARY ARTERY OF NATIVE HEART WITHOUT ANGINA PECTORIS: ICD-10-CM

## 2024-12-19 RX ORDER — METOPROLOL SUCCINATE 25 MG/1
25 TABLET, EXTENDED RELEASE ORAL DAILY
Qty: 90 TABLET | Refills: 1 | Status: SHIPPED | OUTPATIENT
Start: 2024-12-19

## 2024-12-20 RX ORDER — ATORVASTATIN CALCIUM 40 MG/1
40 TABLET, FILM COATED ORAL EVERY EVENING
Qty: 90 TABLET | Refills: 0 | Status: SHIPPED | OUTPATIENT
Start: 2024-12-20

## 2024-12-26 ENCOUNTER — TELEPHONE (OUTPATIENT)
Dept: UROLOGY | Facility: HOSPITAL | Age: 79
End: 2024-12-26

## 2024-12-26 NOTE — TELEPHONE ENCOUNTER
Kathy Rendon MA39 minutes ago (11:57 AM)     HS  Summary: [facility] - [place], [place] - [address]   Fax [phone]        tamsulosin (FLOMAX) 0.4 mg   Take 1 capsule (0.4 mg total) by mouth daily with dinner

## 2024-12-26 NOTE — TELEPHONE ENCOUNTER
[facility] [address] [place] [address] - [address]   Fax [phone]      tamsulosin (FLOMAX) 0.4 mg   Take 1 capsule (0.4 mg total) by mouth daily with dinner

## 2024-12-29 DIAGNOSIS — R35.0 URINARY FREQUENCY: ICD-10-CM

## 2024-12-29 DIAGNOSIS — R97.20 ELEVATED PSA: ICD-10-CM

## 2024-12-31 RX ORDER — FINASTERIDE 5 MG/1
5 TABLET, FILM COATED ORAL DAILY
Qty: 90 TABLET | Refills: 1 | Status: SHIPPED | OUTPATIENT
Start: 2024-12-31

## 2025-01-02 ENCOUNTER — HOSPITAL ENCOUNTER (OUTPATIENT)
Dept: NON INVASIVE DIAGNOSTICS | Age: 80
Discharge: HOME | End: 2025-01-02
Payer: COMMERCIAL

## 2025-01-02 DIAGNOSIS — I73.9 PAD (PERIPHERAL ARTERY DISEASE) (HCC): ICD-10-CM

## 2025-01-02 PROCEDURE — 93923 UPR/LXTR ART STDY 3+ LVLS: CPT

## 2025-01-02 PROCEDURE — 93925 LOWER EXTREMITY STUDY: CPT

## 2025-01-02 PROCEDURE — 93925 LOWER EXTREMITY STUDY: CPT | Performed by: SURGERY

## 2025-01-02 PROCEDURE — 93922 UPR/L XTREMITY ART 2 LEVELS: CPT | Performed by: SURGERY

## 2025-01-03 ENCOUNTER — RESULTS FOLLOW-UP (OUTPATIENT)
Dept: OTHER | Facility: HOSPITAL | Age: 80
End: 2025-01-03

## 2025-01-03 DIAGNOSIS — R35.0 URINARY FREQUENCY: ICD-10-CM

## 2025-01-03 RX ORDER — TAMSULOSIN HYDROCHLORIDE 0.4 MG/1
0.4 CAPSULE ORAL
Qty: 90 CAPSULE | Refills: 1 | Status: SHIPPED | OUTPATIENT
Start: 2025-01-03

## 2025-01-09 NOTE — PROGRESS NOTES
[facility]  [address], [address]  [place] [address]  [phone]    Care Conference    NAME: Nj Pimentel Jr.  AGE: 80 y.o. SEX: male  YOB: 1945  DATE OF ASSESSMENT: 11/08/2024  DATE OF CONFERENCE: 01/16/2025    Family Present: patient and his daughter, Indigo  Staff Present: BLAS Meek    Patient / Family Goals of Care: Review cognitive decline and associated symptoms, discuss treatment options and care planning.     Medical Concerns (Current/Historical): Mood, Mobility, Insomnia, Chronic OA    Geriatric Syndromes/Age Related Syndromes: Mild dementia, mixed AD/vascular    Neuropsychological  Mild dementia, likely mixed AD/vascular  Gaines Cognitive Assessment: 18/30, deficits in all domains but naming   Geriatric Depression Screen: 4/15    History, physical, and cognitive screening are most consistent with:  Mild dementia  Contributing factors:  none  Further evaluations warrants the following:  none  Memory medications:  patient with history of bradycardia: was trialed on aricept - did not continue  Continue supportive cares - lives alone, but looking at [facility]'s  Caregiver stress concerns:  future planning  Discussed safe environment  Wandering:  No concerns.  Some items to consider would be door video surveillance, ID bracelet, Tile GPS   Home:  no concerns.  Ensure patient has phone and reinforce to not use stove or shower while gone.  Level of care:  Independent - looking at more structured living arrangement for future  Fall preventions:  no recent fall  Medication management:  does good with set up by ricardo Colin safety:  Do not answer suspicious mail, phone calls, email, or text message without having second person review due to safety risk.  Do not give out personal info to questionable sources - read company safety policies for how they might contact you.  Continue to engage in physical, cognitive, social activity.  Continue doing  "games, puzzles, trivia, current event discussions  Continue daily walks or exercise video  Continue outings with friends and family.  Avoid social isolation.  Referral to cognitive therapy:  outpatient ST  Optimize chronic and acute conditions  Continue to follow up with providers and ensure medication compliance  Vascular risk factors:  Atrial flutter, coronary artery disease, hyperlipidemia, hypertension, impaired fasting glucose, peripheral artery disease, history of stroke  Chronic condition concerns:  stable  Ensure good diet (ex Mediterranean diet) and adequate hydration  Monitor for changes in behavior/mood - if noted, notify provider for eval  Encourage mindfulness and positive socialization for general wellness.  Do not overwhelm patient with information.    Do one task at a time. Use slower pace.  Keep to one conversation at a time.  Do not multitask.  Encourage independence as able.  Recommended next follow up: care conference    Decision-making capacity: At this point, recommend making \"bigger\" decisions with POA/care partner as needed.  In future, if capacity is of concern, would refer to neuropsychology for full evaluation.  Staging: FAST stage 4  Driving safety: reviewed FTD with pt and dgt, was recommended [employer] or OTR eval.  Dgt will plan for OTR eval and follow up with Dr. Mast who ordered the eval.  Give OTR providers as well as alternative transportation list.    Remain active physically, mentally and socially  Pharmaceutical and non-pharmaceutical interventions discussed  Engage in cognitively challenging exercises such as crosswords and puzzles  Maintain chronic conditions under control  Repeat cognitive assessment in 6 months    Diagnostic Studies  Review of blood work  Vitamin B12 (11/6/24): 530  Review of imaging  MRI brain without contrast (5/21/23)  IMPRESSION  White matter changes suggestive of chronic microangiopathy. No acute intracranial pathology. Chronic lacunar infarct right " cerebellum.     Physical Finding Impacting Function   Observed Ambulation:  no AD/fairly steady    Fall Risk: moderate - recent fall d/t trip over the rug, declines PT at this time.   Activities of Daily Living: Independent   Instrumental Activities of Daily Living: Supported    Encourage appropriate footwear at all times  Review fall risk prevention tips and adjust within the home environment as needed    Medications Reviewed   Medications seem appropriate for present conditions  Patient is at risk for increased side effects due to polypharmacy.  Monitor for increased side effects with multiple mood medications  Check with PCP before using over the counter medications  Avoid over the counter medications that can affect cognition (e.g., Benadryl, Tylenol PM)   Avoid NSAIDs due to risk of GI bleed and renal impairment    Other Findings   Overall health  BMI: 34.54 kg/m2  Maintain well-balanced diet  Continue following with primary care physician regularly  Mobility  Baseline ambulation: no AD.  Fall type: none recent  Patient takes plavix - need prompt evaluation for any fall with head strike  PT OT:  no needs identified  Fall precautions  Mood  Baseline mood:  stable  GDS 4  Continues on wellbutrin, lexapro, gabapentin, trazodone  Encourage relaxation techniques, emotional support    Insomnia  Doing ok with trazodone. Continue good sleep hygiene techniques   Chronic OA  Chronic back pain, cont with gabapentin. Avoid NSaIds, cont topical analgesics, non pharm methods of pain control.. Notify PCP if increased or worsening pain.     Recommended Health Maintenance   Immunizations, if not contraindicated:   Influenza vaccine yearly  Pneumo vaccine every 5 years (65 years and over)  Shingles vaccine  COVID-19 vaccine    Social / Safety Concerns  Consider an Adult Day Program or [facility] for positive socialization, physical exercise, cognitive stimulation and family respite  Consider a home care aide to  assist with daily care needs  Stay in touch with family and friends  Recommend review of fall risk prevention tips  Recommend use of fall precautions including fall alert device  For wandering prevention: consider use of fall alert with GPS, SafeReturn bracelet, Project LifeSaver bracelet, video surveillance, or alarm systems  Consider assistance for medication administration such as blister packaging or use of an automated pill dispenser  Recommend contacting your local [facility] Agency on Aging for possible eligible programs such as OPTIONS, Caregiver Support Program, or WAIVER    Long Term Care Issues/ACP  Maintain updated advance directives and provide a copy to your primary care provider  Consider caregiver support groups and educational resources through the Alzheimer's Association; access Alzheimer's Association 24/7 Helpline at 1-703.774.4169  [facility] does offer a monthly caregiver support group. If interested, please speak with a .  Utilize reorientation and redirection as needed (dependent on situation)  Educational information provided    Patient and family verbalized understanding of above care plan.    For care coordination purposes, this care plan will be shared with your primary care provider. With any questions, please contact our office at [phone].

## 2025-01-16 ENCOUNTER — OFFICE VISIT (OUTPATIENT)
Age: 80
End: 2025-01-16
Payer: COMMERCIAL

## 2025-01-16 VITALS
HEART RATE: 55 BPM | RESPIRATION RATE: 16 BRPM | DIASTOLIC BLOOD PRESSURE: 64 MMHG | SYSTOLIC BLOOD PRESSURE: 120 MMHG | WEIGHT: 218 LBS | OXYGEN SATURATION: 91 % | HEIGHT: 66 IN | BODY MASS INDEX: 35.03 KG/M2 | TEMPERATURE: 97 F

## 2025-01-16 DIAGNOSIS — Z91.89 DRIVING SAFETY ISSUE: ICD-10-CM

## 2025-01-16 DIAGNOSIS — Z71.89 ACP (ADVANCE CARE PLANNING): ICD-10-CM

## 2025-01-16 DIAGNOSIS — F03.A0 MILD DEMENTIA WITHOUT BEHAVIORAL DISTURBANCE, PSYCHOTIC DISTURBANCE, MOOD DISTURBANCE, OR ANXIETY, UNSPECIFIED DEMENTIA TYPE (HCC): Primary | ICD-10-CM

## 2025-01-16 PROCEDURE — 99483 ASSMT & CARE PLN PT COG IMP: CPT | Performed by: NURSE PRACTITIONER

## 2025-01-16 NOTE — PROGRESS NOTES
Name: Nj Pimentel Jr.      : 1945      MRN: [id number]  Encounter Provider: Kirti Mcdonald PA-C  Encounter Date: 2025   Encounter department: [facility]  :  Assessment & Plan  Cerebrovascular accident (CVA), unspecified mechanism (HCC)  On ASA 81 mg and Plavix 75 mg daily -- benign cause for gross hematuria   Continue to follow with neurology      Atrial flutter, unspecified type (HCC)  On ASA 81 mg and Plavix 75 mg daily -- benign cause for gross hematuria   Managed by PCP        Stage 3a chronic kidney disease (HCC)  Lab Results   Component Value Date    EGFR 47 2024    EGFR 50 2024    EGFR 33 2024    CREATININE 1.41 (H) 2024    CREATININE 1.34 (H) 2024    CREATININE 1.85 (H) 2024   Creatinine remains stable  Continues to follow with nephrology and monitor level annually     History of gross hematuria  1 episode of bleeding reported 2024 - resolved spontaneously   Past office urine dip was negative for blood   Urine cytology 24 -- negative for high grade urothelial carcinoma   CT renal 24 - no suspicious masses in the kidneys or bladder  Patient declined proceeding with cystoscopy   Denies further episodes of bleeding  Urine dip -- not able to provide sample today in office   Plan for repeat urine studies annually -- if repeat episode of gross hematuria, reconsider cystoscopy for further evaluation      Benign prostatic hyperplasia with urinary frequency  Overall content with urinary pattern, frequency and mild nocturia ongoing  Continue taking flomax 0.4 mg and finasteride 5 mg daily as prescribed   Refill as needed   Encouraged increased hydration and avoidance of bladder irritants / constipation   Continue to monitor on an annual basis     Elevated PSA  mpMRI of the prostate 22 -- PIRADs 2, no lesions, prostate ~40g  Most recent PSA from 4/3/24 was 3.1  PSA 6.2 corrected for finasteride   Continue to  monitor PSA on an annual basis   Component  Ref Range & Units (hover) 4/3/24 11:34 AM 4/28/23  1:16 PM 1/18/23 11:57 AM 10/7/22 10:24 AM 5/5/22  3:16 PM 3/7/22 10:37 AM 1/17/22 12:20 PM   PSA, Diagnostic 3.12 4.1 High  R, CM 5.7 High  R, CM 5.9 High  R, CM 5.0 High  R, CM 4.4 High  R, CM 5.0 High  R, CM         History of Present Illness   Nj Pimentel Jr. is a 80 y.o. male with past medical history of BPH, elevated PSA, stage III CKD, CVA, and a flutter (on Plavix)--who presents to the office for follow-up to discuss past episode of gross hematuria.  Patient reported 1 episode of gross hematuria noted in June 2024 which resolved spontaneously.  No symptoms of UTI at that time.  Urine  dip in office was negative for blood and urine cytology was also negative for high-grade urothelial carcinoma.  He underwent CT renal protocol on 11/12/2024 which revealed no suspicious masses in the kidney and/or bladder.  Patient declined proceeding with cystoscopy.  He denies further episodes of bleeding since his last office visit.  He is not able to provide urine sample today in office.  We will plan for repeat urine studies annually, he is educated that if repeat episodes of gross hematuria are noted we strongly recommend reconsidering cystoscopy for further evaluation, he vocalizes understanding.  When it comes to his urinary pattern, he denies any drastic changes.  He reports mild urinary frequency and nocturia.  He continues taking Flomax 0.4 mg daily as well as finasteride 5 mg daily.    Patient has had a known longstanding history of elevated PSA.  Most recent PSA from 4/3/2024 was 3.1, 6.2 corrected for finasteride.  He underwent multiparametric MRI of the prostate in April 2022 which revealed PI-RADS 2, no suspicious lesions visualized, prostate approximately 40 g.  Will continue to monitor his PSA on an annual basis, he does not wish to move forward with repeat imaging at this time.      Review of Systems    Constitutional:  Negative for activity change, chills, fatigue and fever.   Respiratory:  Negative for apnea, cough and shortness of breath.    Cardiovascular:  Negative for chest pain and leg swelling.   Gastrointestinal:  Negative for abdominal distention, constipation and diarrhea.   Genitourinary:  Positive for frequency. Negative for decreased urine volume, difficulty urinating, dysuria, flank pain, hematuria, penile pain, testicular pain and urgency.   Neurological:  Negative for dizziness and headaches.   Psychiatric/Behavioral: Negative.       Medical History Reviewed by provider this encounter:  Tobacco  Allergies  Meds  Problems  Med Hx  Surg Hx  Fam Hx     .  Current Outpatient Medications on File Prior to Visit   Medication Sig Dispense Refill    Ascorbic Acid (vitamin C) 1000 MG tablet Take 1,000 mg by mouth daily      aspirin (ECOTRIN LOW STRENGTH) 81 mg EC tablet Take 81 mg by mouth every other day 1 every other day      atorvastatin (LIPITOR) 40 mg tablet TAKE 1 TABLET BY MOUTH ONCE DAILY IN THE EVENING 90 tablet 0    B Complex-C (SUPER B COMPLEX PO) Take 1 capsule by mouth in the morning      buPROPion (Wellbutrin XL) 150 mg 24 hr tablet Take 1 tablet (150 mg total) by mouth daily 30 tablet 2    clopidogrel (PLAVIX) 75 mg tablet Take 1 tablet by mouth once daily 90 tablet 1    escitalopram (LEXAPRO) 20 mg tablet Take 1 tablet (20 mg total) by mouth daily 90 tablet 1    ferrous sulfate 324 (65 Fe) mg Take 1 tablet (324 mg total) by mouth daily before breakfast 30 tablet 2    gabapentin (NEURONTIN) 400 mg capsule Take 2 capsules in the morning, 1 capsule in the afternoon and 1 capsule at  capsule 1    Ginkgo Biloba 120 MG CAPS Take 1 capsule by mouth in the morning      lisinopril (ZESTRIL) 10 mg tablet Take 1 tablet by mouth once daily 90 tablet 2    Magnesium 250 MG TABS Take 1 tablet (250 mg total) by mouth 2 (two) times a day 60 tablet 5    metoprolol succinate (TOPROL-XL) 25 mg 24  "hr tablet Take 1 tablet by mouth once daily 90 tablet 1    Multiple Vitamin (multivitamin) capsule Take 1 capsule by mouth daily      omeprazole (PriLOSEC) 20 mg delayed release capsule Take 1 capsule by mouth once daily 90 capsule 1    tamsulosin (FLOMAX) 0.4 mg Take 1 capsule (0.4 mg total) by mouth daily with dinner 90 capsule 1    traZODone (DESYREL) 100 mg tablet TAKE 1 TABLET BY MOUTH ONCE DAILY AT BEDTIME 30 tablet 2    VITAMIN D, CHOLECALCIFEROL, PO Take 2,000 Units by mouth in the morning      [DISCONTINUED] finasteride (PROSCAR) 5 mg tablet Take 1 tablet by mouth once daily 90 tablet 1    donepezil (ARICEPT) 10 mg tablet Take 10 mg by mouth in the morning (Patient not taking: Reported on 2024)       No current facility-administered medications on file prior to visit.      Social History     Tobacco Use    Smoking status: Former     Current packs/day: 0.00     Average packs/day: 1 pack/day for 22.0 years (22.0 ttl pk-yrs)     Types: Cigarettes     Start date: 1972     Quit date: 1985     Years since quittin.0     Passive exposure: Past    Smokeless tobacco: Never   Vaping Use    Vaping status: Never Used   Substance and Sexual Activity    Alcohol use: Not Currently     Comment: stopped drinking alcohol; AA x23yrs sober    Drug use: No    Sexual activity: Not Currently     Partners: Female        Objective   /82 (BP Location: Left arm, Patient Position: Sitting, Cuff Size: Adult)   Pulse 57   Ht 5' 6\" (1.676 m)   Wt 98.5 kg (217 lb 3.2 oz)   SpO2 98%   BMI 35.06 kg/m²     Physical Exam  Vitals and nursing note reviewed.   Constitutional:       General: He is not in acute distress.     Appearance: Normal appearance. He is well-developed. He is obese. He is not ill-appearing.   HENT:      Head: Normocephalic and atraumatic.   Eyes:      Conjunctiva/sclera: Conjunctivae normal.   Cardiovascular:      Rate and Rhythm: Normal rate and regular rhythm.      Heart sounds: No murmur " heard.  Pulmonary:      Effort: Pulmonary effort is normal. No respiratory distress.      Breath sounds: Normal breath sounds.   Abdominal:      General: Abdomen is flat. There is no distension.      Palpations: Abdomen is soft.      Tenderness: There is no abdominal tenderness. There is no right CVA tenderness or left CVA tenderness.   Musculoskeletal:         General: No swelling.      Cervical back: Neck supple.   Skin:     General: Skin is warm and dry.      Capillary Refill: Capillary refill takes less than 2 seconds.   Neurological:      Mental Status: He is alert.   Psychiatric:         Mood and Affect: Mood normal.          Results  Lab Results   Component Value Date    PSA 3.12 04/03/2024    PSA 4.1 (H) 04/28/2023    PSA 5.7 (H) 01/18/2023     Lab Results   Component Value Date    GLUCOSE 112 05/14/2015    CALCIUM 9.6 11/06/2024     05/14/2015    K 4.3 11/06/2024    CO2 30 11/06/2024     11/06/2024    BUN 26 (H) 11/06/2024    CREATININE 1.41 (H) 11/06/2024     Lab Results   Component Value Date    WBC 7.14 05/03/2024    HGB 13.9 05/03/2024    HCT 43.5 05/03/2024     (H) 05/03/2024     05/03/2024       Office Urine Dip  No results found for this or any previous visit (from the past hour).]

## 2025-01-16 NOTE — ASSESSMENT & PLAN NOTE
Reviewed FTD results with pt and dgt  Pt scored 93% probability of failing OTR eval.  It was recommended that he complete OT on the road evaluation or [facility]. Atypical score noted in memory section.  Pt/dgt would like OTR evaluation.  Dgt to set up and will make appt prior to visit with Dr. Mast in February  Alternative transportation list provided in case needed.

## 2025-01-16 NOTE — ASSESSMENT & PLAN NOTE
MOCA:  18/30.  independent adls/more dependent iadls  Most consistent with mild dementia, likely  mixed AD/vascular  Engage in regular social, physical, cognitive activity.  Optimize acute and chronic conditions  Frequent reminders and close monitoring for safety  Monitor for acute changes in behavior/personality (agitation or lethargy).  Notify pcp/ED for reversible causes eval.

## 2025-01-16 NOTE — PROGRESS NOTES
"Name: Nj Pimentel Jr.      : 1945      MRN: [id number]  Encounter Provider: BLAS Ibanze  Encounter Date: 2025   Encounter department: [facility]  :  Assessment & Plan  Mild dementia without behavioral disturbance, psychotic disturbance, mood disturbance, or anxiety, unspecified dementia type (HCC)  MOCA:  18/30.  independent adls/more dependent iadls  Most consistent with mild dementia, likely  mixed AD/vascular  Engage in regular social, physical, cognitive activity.  Optimize acute and chronic conditions  Frequent reminders and close monitoring for safety  Monitor for acute changes in behavior/personality (agitation or lethargy).  Notify pcp/ED for reversible causes eval.         Driving safety issue  Reviewed FTD results with pt and dgt  Pt scored 93% probability of failing OTR eval.  It was recommended that he complete OT on the road evaluation or [place]. Atypical score noted in memory section.  Pt/dgt would like OTR evaluation.  Dgt to set up and will make appt prior to visit with Dr. Mast in February  Alternative transportation list provided in case needed.       ACP (advance care planning)  Pt has ACP docs on file per epic check  Cont to discuss and update goc as needed.             History of Present Illness     Nj Pimentel Jr. is a 80 y.o. male who presents with his daughter, Indigo, for memory care conference  Did have a trip and fell and hurt knee.  Declines PT at this time.  Knee is getting better and hurting less  Pt will be transitioning to [facility] in [place].  Time spent today reviewing memory loss ladder, definition of dementia, types of dementia, contributing factors to cognitive changes, interventions to help delay progression of memory loss, safety concerns to be aware of, and acute changes to notify pcp of.  We also touched on some medications that can contribute to worsening cognition as well as available \"memory " "medications.\"       History obtained from: patient and daughter  Of note:  dgt reviewed pmh/psh - pt and dgt report no history of pt having femur fracture.  Unsure why that is on chart.  Removed from sx history section of epic per request.    Review of Systems   Constitutional:  Negative for activity change and appetite change.   HENT:  Negative for hearing loss.    Eyes:  Negative for visual disturbance.   Respiratory:  Negative for cough and shortness of breath.    Cardiovascular:  Negative for chest pain.   Gastrointestinal:  Negative for abdominal pain.   Musculoskeletal:  Positive for arthralgias. Negative for back pain.   Psychiatric/Behavioral:  Positive for decreased concentration (forgetful). Negative for dysphoric mood and sleep disturbance. The patient is not nervous/anxious.      Pertinent Medical History   Vascular risk factors:  Aflutter, CAD, hld, htn, IFG, h/o etoh, increased BMI, PAD, h/o stroke  Geriatric syndromes:  dementia, anxiety, chronic pain, driving safety, OA      Medical History Reviewed by provider this encounter:  Tobacco  Allergies  Meds  Problems  Med Hx  Surg Hx  Fam Hx     .  Past Medical History   Past Medical History:   Diagnosis Date    Alcoholism (HCC)     Anxiety     Folliculitis 03/15/2024    GERD (gastroesophageal reflux disease)     Gout     High cholesterol     Hypertension     Insomnia     Kidney stone     Stroke (HCC)      Past Surgical History:   Procedure Laterality Date    ANKLE SURGERY Right     CARDIAC CATHETERIZATION      no findings    COLONOSCOPY  01/25/2013    polypectomy; complete - 3 yrs d/t polyp - benign submucosal lipoma- path c/w lipoma    COLONOSCOPY  04/25/2017    complete - tubular adenoma - repeat 5yrs    CYSTOSCOPY      CYSTOTOMY      bladder  w/ basket extraction of calculus    HERNIA REPAIR      inguinal ; sliding    INGUINAL HERNIA REPAIR Right     unilateral    IR LOWER EXTREMITY ANGIOGRAM  04/24/2023    KNEE ARTHROSCOPY Right     w/medial " menisectomy    LASIK      corneal    LITHOTRIPSY      renal    IN COLONOSCOPY FLX DX W/COLLJ SPEC WHEN PFRMD N/A 04/25/2017    Procedure: SCREENING COLONOSCOPY;  Surgeon: Paul Jacome MD;  Location: [facility];  Service: General    IN SLCTV CATHJ 3RD+ ORD SLCTV ABDL PEL/LXTR BRNCH Right 04/24/2023    Procedure: ARTERIOGRAM;  Surgeon: Alivia Blum MD;  Location: [facility];  Service: Vascular    IN TEAEC W/PATCH GRF CAROTID VERTB SUBCLAV NECK INC Right 01/10/2020    Procedure: ENDARTERECTOMY ARTERY CAROTID;  Surgeon: Aaron Smith MD;  Location: [facility];  Service: Vascular    IN TEAEC W/WO PATCH GRAFT COMMON FEMORAL Right 04/24/2023    Procedure: ENDARTERECTOMY ARTERIAL FEMORAL, RETROGRADE ILIAC INTERVENTION;  Surgeon: Alivia Blum MD;  Location[facility];  Service: Vascular    ROTATOR CUFF REPAIR Bilateral     TONSILLECTOMY       Family History   Problem Relation Age of Onset    Alzheimer's disease Mother     Alzheimer's disease Father     Heart disease Father         CAD    Other Father         prediabetes    Diabetes Father     Breast cancer Other     Arthritis Family     Hypertension Family       reports that he quit smoking about 40 years ago. His smoking use included cigarettes. He started smoking about 53 years ago. He has a 22 pack-year smoking history. He has been exposed to tobacco smoke. He has never used smokeless tobacco. He reports that he does not currently use alcohol. He reports that he does not use drugs.  Current Outpatient Medications on File Prior to Visit   Medication Sig Dispense Refill    Ascorbic Acid (vitamin C) 1000 MG tablet Take 1,000 mg by mouth daily      aspirin (ECOTRIN LOW STRENGTH) 81 mg EC tablet Take 81 mg by mouth every other day 1 every other day      atorvastatin (LIPITOR) 40 mg tablet TAKE 1 TABLET BY MOUTH ONCE DAILY IN THE EVENING 90 tablet 0    B Complex-C (SUPER B COMPLEX PO) Take 1 capsule by mouth in the morning      buPROPion (Wellbutrin XL) 150 mg 24 hr tablet  Take 1 tablet (150 mg total) by mouth daily 30 tablet 2    clopidogrel (PLAVIX) 75 mg tablet Take 1 tablet by mouth once daily 90 tablet 1    escitalopram (LEXAPRO) 20 mg tablet Take 1 tablet (20 mg total) by mouth daily 90 tablet 1    ferrous sulfate 324 (65 Fe) mg Take 1 tablet (324 mg total) by mouth daily before breakfast 30 tablet 2    finasteride (PROSCAR) 5 mg tablet Take 1 tablet by mouth once daily 90 tablet 1    gabapentin (NEURONTIN) 400 mg capsule Take 2 capsules in the morning, 1 capsule in the afternoon and 1 capsule at  capsule 1    Ginkgo Biloba 120 MG CAPS Take 1 capsule by mouth in the morning      lisinopril (ZESTRIL) 10 mg tablet Take 1 tablet by mouth once daily 90 tablet 2    Magnesium 250 MG TABS Take 1 tablet (250 mg total) by mouth 2 (two) times a day 60 tablet 5    metoprolol succinate (TOPROL-XL) 25 mg 24 hr tablet Take 1 tablet by mouth once daily 90 tablet 1    Multiple Vitamin (multivitamin) capsule Take 1 capsule by mouth daily      omeprazole (PriLOSEC) 20 mg delayed release capsule Take 1 capsule by mouth once daily 90 capsule 1    tamsulosin (FLOMAX) 0.4 mg Take 1 capsule (0.4 mg total) by mouth daily with dinner 90 capsule 1    traZODone (DESYREL) 100 mg tablet TAKE 1 TABLET BY MOUTH ONCE DAILY AT BEDTIME 30 tablet 2    VITAMIN D, CHOLECALCIFEROL, PO Take 2,000 Units by mouth in the morning      donepezil (ARICEPT) 10 mg tablet Take 10 mg by mouth in the morning (Patient not taking: Reported on 11/8/2024)       No current facility-administered medications on file prior to visit.   No Known Allergies   Current Outpatient Medications on File Prior to Visit   Medication Sig Dispense Refill    Ascorbic Acid (vitamin C) 1000 MG tablet Take 1,000 mg by mouth daily      aspirin (ECOTRIN LOW STRENGTH) 81 mg EC tablet Take 81 mg by mouth every other day 1 every other day      atorvastatin (LIPITOR) 40 mg tablet TAKE 1 TABLET BY MOUTH ONCE DAILY IN THE EVENING 90 tablet 0    B  Complex-C (SUPER B COMPLEX PO) Take 1 capsule by mouth in the morning      buPROPion (Wellbutrin XL) 150 mg 24 hr tablet Take 1 tablet (150 mg total) by mouth daily 30 tablet 2    clopidogrel (PLAVIX) 75 mg tablet Take 1 tablet by mouth once daily 90 tablet 1    escitalopram (LEXAPRO) 20 mg tablet Take 1 tablet (20 mg total) by mouth daily 90 tablet 1    ferrous sulfate 324 (65 Fe) mg Take 1 tablet (324 mg total) by mouth daily before breakfast 30 tablet 2    finasteride (PROSCAR) 5 mg tablet Take 1 tablet by mouth once daily 90 tablet 1    gabapentin (NEURONTIN) 400 mg capsule Take 2 capsules in the morning, 1 capsule in the afternoon and 1 capsule at  capsule 1    Ginkgo Biloba 120 MG CAPS Take 1 capsule by mouth in the morning      lisinopril (ZESTRIL) 10 mg tablet Take 1 tablet by mouth once daily 90 tablet 2    Magnesium 250 MG TABS Take 1 tablet (250 mg total) by mouth 2 (two) times a day 60 tablet 5    metoprolol succinate (TOPROL-XL) 25 mg 24 hr tablet Take 1 tablet by mouth once daily 90 tablet 1    Multiple Vitamin (multivitamin) capsule Take 1 capsule by mouth daily      omeprazole (PriLOSEC) 20 mg delayed release capsule Take 1 capsule by mouth once daily 90 capsule 1    tamsulosin (FLOMAX) 0.4 mg Take 1 capsule (0.4 mg total) by mouth daily with dinner 90 capsule 1    traZODone (DESYREL) 100 mg tablet TAKE 1 TABLET BY MOUTH ONCE DAILY AT BEDTIME 30 tablet 2    VITAMIN D, CHOLECALCIFEROL, PO Take 2,000 Units by mouth in the morning      donepezil (ARICEPT) 10 mg tablet Take 10 mg by mouth in the morning (Patient not taking: Reported on 2024)       No current facility-administered medications on file prior to visit.      Social History     Tobacco Use    Smoking status: Former     Current packs/day: 0.00     Average packs/day: 1 pack/day for 22.0 years (22.0 ttl pk-yrs)     Types: Cigarettes     Start date: 1972     Quit date: 1985     Years since quittin.0     Passive exposure:  "Past    Smokeless tobacco: Never   Vaping Use    Vaping status: Never Used   Substance and Sexual Activity    Alcohol use: Not Currently     Comment: stopped drinking alcohol; AA x23yrs sober    Drug use: No    Sexual activity: Not Currently     Partners: Female        Objective   /64 (BP Location: Right arm, Patient Position: Sitting, Cuff Size: Adult)   Pulse 55   Temp (!) 97 °F (36.1 °C) (Temporal)   Resp 16   Ht 5' 6\" (1.676 m)   Wt 98.9 kg (218 lb)   SpO2 91%   BMI 35.19 kg/m²      Physical Exam  Vitals and nursing note reviewed.   Constitutional:       General: He is not in acute distress.     Appearance: Normal appearance. He is not ill-appearing.   HENT:      Head: Normocephalic.   Pulmonary:      Effort: Pulmonary effort is normal. No respiratory distress.      Breath sounds: No wheezing (none audible).   Musculoskeletal:         General: Normal range of motion.   Skin:     General: Skin is warm and dry.   Neurological:      General: No focal deficit present.      Mental Status: He is alert. Mental status is at baseline.      Comments: Oriented to person, mostly tps  Pleasant, engages in convo, forgetful   Psychiatric:         Mood and Affect: Mood normal.         Behavior: Behavior normal.       "

## 2025-01-17 ENCOUNTER — TELEPHONE (OUTPATIENT)
Dept: BARIATRICS | Facility: CLINIC | Age: 80
End: 2025-01-17

## 2025-01-22 ENCOUNTER — OFFICE VISIT (OUTPATIENT)
Dept: UROLOGY | Facility: HOSPITAL | Age: 80
End: 2025-01-22
Payer: MEDICARE

## 2025-01-22 VITALS
DIASTOLIC BLOOD PRESSURE: 82 MMHG | WEIGHT: 217.2 LBS | HEIGHT: 66 IN | BODY MASS INDEX: 34.91 KG/M2 | SYSTOLIC BLOOD PRESSURE: 124 MMHG | HEART RATE: 57 BPM | OXYGEN SATURATION: 98 %

## 2025-01-22 DIAGNOSIS — Z87.898 HISTORY OF GROSS HEMATURIA: ICD-10-CM

## 2025-01-22 DIAGNOSIS — I48.92 ATRIAL FLUTTER, UNSPECIFIED TYPE (HCC): ICD-10-CM

## 2025-01-22 DIAGNOSIS — I63.9 CEREBROVASCULAR ACCIDENT (CVA), UNSPECIFIED MECHANISM (HCC): Primary | ICD-10-CM

## 2025-01-22 DIAGNOSIS — R97.20 ELEVATED PSA: ICD-10-CM

## 2025-01-22 DIAGNOSIS — N40.1 BENIGN PROSTATIC HYPERPLASIA WITH URINARY FREQUENCY: ICD-10-CM

## 2025-01-22 DIAGNOSIS — N18.31 STAGE 3A CHRONIC KIDNEY DISEASE (HCC): ICD-10-CM

## 2025-01-22 DIAGNOSIS — R35.0 BENIGN PROSTATIC HYPERPLASIA WITH URINARY FREQUENCY: ICD-10-CM

## 2025-01-22 DIAGNOSIS — R35.0 URINARY FREQUENCY: ICD-10-CM

## 2025-01-22 PROBLEM — N40.0 BPH (BENIGN PROSTATIC HYPERPLASIA): Status: ACTIVE | Noted: 2025-01-22

## 2025-01-22 PROCEDURE — 99214 OFFICE O/P EST MOD 30 MIN: CPT

## 2025-01-22 RX ORDER — FINASTERIDE 5 MG/1
5 TABLET, FILM COATED ORAL DAILY
Qty: 90 TABLET | Refills: 1 | Status: SHIPPED | OUTPATIENT
Start: 2025-01-22 | End: 2025-01-22

## 2025-01-22 RX ORDER — FINASTERIDE 5 MG/1
5 TABLET, FILM COATED ORAL DAILY
Qty: 90 TABLET | Refills: 1 | Status: SHIPPED | OUTPATIENT
Start: 2025-01-22

## 2025-01-22 NOTE — ASSESSMENT & PLAN NOTE
On ASA 81 mg and Plavix 75 mg daily -- benign cause for gross hematuria   Continue to follow with neurology

## 2025-01-22 NOTE — ASSESSMENT & PLAN NOTE
Lab Results   Component Value Date    EGFR 47 11/06/2024    EGFR 50 05/16/2024    EGFR 33 05/03/2024    CREATININE 1.41 (H) 11/06/2024    CREATININE 1.34 (H) 05/16/2024    CREATININE 1.85 (H) 05/03/2024   Creatinine remains stable  Continues to follow with nephrology and monitor level annually

## 2025-01-22 NOTE — ASSESSMENT & PLAN NOTE
1 episode of bleeding reported June 2024 - resolved spontaneously   Past office urine dip was negative for blood   Urine cytology 11/6/24 -- negative for high grade urothelial carcinoma   CT renal 11/12/24 - no suspicious masses in the kidneys or bladder  Patient declined proceeding with cystoscopy   Denies further episodes of bleeding  Urine dip -- not able to provide sample today in office   Plan for repeat urine studies annually -- if repeat episode of gross hematuria, reconsider cystoscopy for further evaluation

## 2025-01-22 NOTE — ASSESSMENT & PLAN NOTE
Overall content with urinary pattern, frequency and mild nocturia ongoing  Continue taking flomax 0.4 mg and finasteride 5 mg daily as prescribed   Refill as needed   Encouraged increased hydration and avoidance of bladder irritants / constipation   Continue to monitor on an annual basis

## 2025-01-22 NOTE — ASSESSMENT & PLAN NOTE
mpMRI of the prostate 4/1/22 -- PIRADs 2, no lesions, prostate ~40g  Most recent PSA from 4/3/24 was 3.1  PSA 6.2 corrected for finasteride   Continue to monitor PSA on an annual basis   Component  Ref Range & Units (hover) 4/3/24 11:34 AM 4/28/23  1:16 PM 1/18/23 11:57 AM 10/7/22 10:24 AM 5/5/22  3:16 PM 3/7/22 10:37 AM 1/17/22 12:20 PM   PSA, Diagnostic 3.12 4.1 High  R, CM 5.7 High  R, CM 5.9 High  R, CM 5.0 High  R, CM 4.4 High  R, CM 5.0 High  R, CM

## 2025-01-24 DIAGNOSIS — F33.41 RECURRENT MAJOR DEPRESSIVE DISORDER, IN PARTIAL REMISSION (HCC): ICD-10-CM

## 2025-01-24 DIAGNOSIS — G47.00 INSOMNIA, UNSPECIFIED TYPE: ICD-10-CM

## 2025-01-24 RX ORDER — BUPROPION HYDROCHLORIDE 150 MG/1
150 TABLET ORAL DAILY
Qty: 90 TABLET | Refills: 1 | OUTPATIENT
Start: 2025-01-24

## 2025-01-24 RX ORDER — TRAZODONE HYDROCHLORIDE 100 MG/1
100 TABLET ORAL
Qty: 30 TABLET | Refills: 0 | Status: SHIPPED | OUTPATIENT
Start: 2025-01-24

## 2025-01-24 RX ORDER — BUPROPION HYDROCHLORIDE 150 MG/1
150 TABLET ORAL DAILY
Qty: 30 TABLET | Refills: 0 | Status: SHIPPED | OUTPATIENT
Start: 2025-01-24

## 2025-01-24 NOTE — TELEPHONE ENCOUNTER
Patient would like a 90 day supply    Reason for call:   [x] Refill   [] Prior Auth  [] Other:     Office:   [x] PCP/Provider -  Dona Camp DO   [] Specialty/Provider -     Medication: traZODone (DESYREL) 100 mg tablet    Dose/Frequency: TAKE 1 TABLET BY MOUTH ONCE DAILY AT BEDTIME     Quantity: 90    Pharmacy: [facility] [address] [place] [address] [facility]     Does the patient have enough for 3 days?   [x] Yes   [] No - Send as HP to POD

## 2025-01-24 NOTE — TELEPHONE ENCOUNTER
Patient requesting a 90 day supply     Reason for call:   [x] Refill   [] Prior Auth  [] Other:     Office:   [x] PCP/Provider - : Dona Camp DO   [] Specialty/Provider -     Medication:  buPROPion (Wellbutrin XL) 150 mg 24 hr tablet    Dose/Frequency: Take 1 tablet (150 mg total) by mouth daily     Quantity: 30    Pharmacy: [place] [id number] [facility]OM[facility]      Does the patient have enough for 3 days?   [] Yes   [x] No - Send as HP to POD

## 2025-01-27 ENCOUNTER — TELEPHONE (OUTPATIENT)
Age: 80
End: 2025-01-27

## 2025-01-27 DIAGNOSIS — G47.00 INSOMNIA, UNSPECIFIED TYPE: ICD-10-CM

## 2025-01-27 DIAGNOSIS — K92.2 GASTROINTESTINAL HEMORRHAGE, UNSPECIFIED GASTROINTESTINAL HEMORRHAGE TYPE: Primary | ICD-10-CM

## 2025-01-27 DIAGNOSIS — F33.41 RECURRENT MAJOR DEPRESSIVE DISORDER, IN PARTIAL REMISSION (HCC): ICD-10-CM

## 2025-01-27 RX ORDER — TRAZODONE HYDROCHLORIDE 100 MG/1
100 TABLET ORAL
Qty: 90 TABLET | Refills: 1 | Status: SHIPPED | OUTPATIENT
Start: 2025-01-27

## 2025-01-27 RX ORDER — BUPROPION HYDROCHLORIDE 150 MG/1
150 TABLET ORAL DAILY
Qty: 90 TABLET | Refills: 1 | Status: SHIPPED | OUTPATIENT
Start: 2025-01-27

## 2025-01-27 NOTE — TELEPHONE ENCOUNTER
Patient had called in and stated that he had picked up his medication and it was for a 30 day supply.    Patient needs medication placed in as a 90 day supply.please have the provider review the medication and  advise out to the patient.

## 2025-01-30 ENCOUNTER — TELEPHONE (OUTPATIENT)
Dept: NEUROLOGY | Facility: CLINIC | Age: 80
End: 2025-01-30

## 2025-01-30 NOTE — TELEPHONE ENCOUNTER
Spoke to pt and confirmed their 8am   appt on 2/3  w/ Dr. Mast. Reminded pt to arrive 15 mins prior to appt to check in with .

## 2025-01-31 NOTE — TELEPHONE ENCOUNTER
Patient called as he could not find the paper that he wrote appt info on. Advised him of appt time and location. Provided CV address and phone #.

## 2025-02-03 ENCOUNTER — OFFICE VISIT (OUTPATIENT)
Dept: NEUROLOGY | Facility: CLINIC | Age: 80
End: 2025-02-03
Payer: COMMERCIAL

## 2025-02-03 VITALS
OXYGEN SATURATION: 93 % | HEART RATE: 67 BPM | TEMPERATURE: 98.2 F | SYSTOLIC BLOOD PRESSURE: 110 MMHG | WEIGHT: 212.8 LBS | BODY MASS INDEX: 34.35 KG/M2 | DIASTOLIC BLOOD PRESSURE: 68 MMHG

## 2025-02-03 DIAGNOSIS — M54.41 CHRONIC BILATERAL LOW BACK PAIN WITH BILATERAL SCIATICA: ICD-10-CM

## 2025-02-03 DIAGNOSIS — M54.42 CHRONIC BILATERAL LOW BACK PAIN WITH BILATERAL SCIATICA: ICD-10-CM

## 2025-02-03 DIAGNOSIS — G89.29 CHRONIC BILATERAL LOW BACK PAIN WITH BILATERAL SCIATICA: ICD-10-CM

## 2025-02-03 DIAGNOSIS — F03.90 DEMENTIA (HCC): Primary | ICD-10-CM

## 2025-02-03 DIAGNOSIS — G62.9 POLYNEUROPATHY: ICD-10-CM

## 2025-02-03 DIAGNOSIS — I63.9 CEREBROVASCULAR ACCIDENT (CVA), UNSPECIFIED MECHANISM (HCC): ICD-10-CM

## 2025-02-03 PROCEDURE — 99215 OFFICE O/P EST HI 40 MIN: CPT | Performed by: STUDENT IN AN ORGANIZED HEALTH CARE EDUCATION/TRAINING PROGRAM

## 2025-02-03 PROCEDURE — G2211 COMPLEX E/M VISIT ADD ON: HCPCS | Performed by: STUDENT IN AN ORGANIZED HEALTH CARE EDUCATION/TRAINING PROGRAM

## 2025-02-03 RX ORDER — MEMANTINE HYDROCHLORIDE 5 MG/1
TABLET ORAL
Qty: 180 TABLET | Refills: 1 | Status: SHIPPED | OUTPATIENT
Start: 2025-02-03

## 2025-02-03 NOTE — PROGRESS NOTES
Review of Systems   Constitutional:  Negative for appetite change, fatigue and fever.   HENT: Negative.  Negative for hearing loss, tinnitus, trouble swallowing and voice change.    Eyes: Negative.  Negative for photophobia, pain and visual disturbance.   Respiratory: Negative.  Negative for shortness of breath.    Cardiovascular: Negative.  Negative for palpitations.   Gastrointestinal: Negative.  Negative for nausea and vomiting.   Endocrine: Negative.  Negative for cold intolerance.   Genitourinary: Negative.  Negative for dysuria, frequency and urgency.   Musculoskeletal:  Negative for back pain, gait problem, myalgias, neck pain and neck stiffness.   Skin: Negative.  Negative for rash.   Allergic/Immunologic: Negative.    Neurological: Negative.  Negative for dizziness, tremors, seizures, syncope, facial asymmetry, speech difficulty, weakness, light-headedness, numbness and headaches.        Memory issues   Hematological: Negative.  Does not bruise/bleed easily.   Psychiatric/Behavioral: Negative.  Negative for confusion, hallucinations and sleep disturbance.    All other systems reviewed and are negative.

## 2025-02-03 NOTE — PATIENT INSTRUCTIONS
-I do feel that your presentation is consistent with a dementia.  -I am okay with holding off on formal neuropsychometric testing for now   -We will start a medication called memantine  -Memantine is a medication used, often in conjunction with Aricept (donepezil) but sometimes on its own, to theoretically help slow progression of dementia.  -This is started at 5mg once daily, then increased 5mg twice daily.   -Dizziness is the most common side effect associated with memantine. Confusion and hallucinations are reported to occur at a low frequency, with rare instances of increased agitation and delusional behaviors in some patients with AD.   -I do agree with obtaining the on the road assessment  -Continue to follow with [facility].  -Continue to stay active physically, mentally, and socially.  -I am also placing a referral to physical therapy for balance therapy, to avoid falls with the peripheral neuro[nadeem.  -I am also looking at bloodwork to look for other reversible causes of neuropathy.   -Continue Plavix (clopidogrel) and atorvastatin for secondary stroke prevention  -Avoid or quit smoking, limit/moderate alcohol use, and keep your blood pressure, blood sugars/diabetes, and cholesterol under control  -Recommend regular exercise to help with cardiovascular and cerebrovascular health  -Recommend maintaining a healthy weight  -Continue to obtain adequate sleep (7-9 hours nightly), and practice good sleep hygiene.   -Let me know if you would want a repeat referral to PT for balance  -Continue following with ST for cognitive therapy.

## 2025-02-03 NOTE — ASSESSMENT & PLAN NOTE
This does continue to be bothersome for him; he did mention this towards the end of the visit today.  Upon chart review it appears that he has been following with pain management, although they tell me that he has had somewhat hit or miss success with interventions.  I encouraged him to continue to follow-up with pain management if this is still bothersome for him.

## 2025-02-03 NOTE — ASSESSMENT & PLAN NOTE
He denies any new symptoms, strokelike or otherwise, at today's appointment.    Continue Plavix (clopidogrel) and atorvastatin for secondary stroke prevention  Reviewed lifestyle factors important for primary stroke prevention (including avoiding/quitting smoking; avoiding/limiting alcohol use; and blood pressure, blood sugar/diabetes, and cholesterol control)  Recommend regular exercise to help with cardiovascular and cerebrovascular health  Recommend maintaining a healthy weight  Continue to obtain adequate sleep (7-9 hours nightly), and practice good sleep hygiene.

## 2025-02-03 NOTE — PROGRESS NOTES
Name: Nj Pimentel Jr.      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 He is accompanied by his daughter today.  Overall, he is doing well, largely stable compared to his last visit, although it sounds as though the slight degree of more impulsive spending that they were describing at his last visit has lessened, although some of his forgetfulness has worsened slightly.  He was unable to tolerate the Aricept, as below, so ceased it, which is perfectly reasonable.    We will trial the initiation of memantine (Namenda) on a titration up to 5 mg twice daily.  Could consider to increase up to 10 mg twice daily in the future.  Encouraged him to continue with cognitive therapy through his current speech therapists (reportedly through an organization known as [employer])  His fitness to drive results were interesting.  As noted, while he did not score well, his primary deficit appeared to be in memory alone.  Certainly, this could preclude him from driving safely, however given how well he did in other domains, I agree with the recommendation for a formal on the road driving assessment.  At this time, they are looking into pursuing this through [facility].  Encouraged him to stay active physically, mentally, socially  Encouraged him to continue to follow with [facility] per their recommendations  I am okay with holding off on formal neuropsychometric testing for now, as I I do not believe that it will significantly impact management at this time  I  agree with his moving into an independent living facility, which he and his family are currently working on    Orders:    memantine (NAMENDA) 5 mg tablet; Take 5mg daily for 1 week, then 5mg twice daily thereafter    Polyneuropathy  He is doing well from a balance standpoint at this time.  Reassuringly, despite an SPEP that was potentially positive for significant abnormality, the subsequent UPEP was unremarkable.  He did do well following PT.    Reviewed the above with him at length today  Encouraged him to continue to utilize the exercises given to him by PT  Low threshold to place a repeat referral to PT if balance worsens in the future.       Chronic bilateral low back pain with bilateral sciatica  This does continue to be bothersome for him; he did mention this towards the end of the visit today.  Upon chart review it appears that he has been following with pain management, although they tell me that he has had somewhat hit or miss success with interventions.  I encouraged him to continue to follow-up with pain management if this is still bothersome for him.       Chronic R Cerebellar Stroke  He denies any new symptoms, strokelike or otherwise, at today's appointment.    Continue Plavix (clopidogrel) and atorvastatin for secondary stroke prevention  Reviewed lifestyle factors important for primary stroke prevention (including avoiding/quitting smoking; avoiding/limiting alcohol use; and blood pressure, blood sugar/diabetes, and cholesterol control)  Recommend regular exercise to help with cardiovascular and cerebrovascular health  Recommend maintaining a healthy weight  Continue to obtain adequate sleep (7-9 hours nightly), and practice good sleep hygiene.            Follow-up:  He will Return in about 6 months (around 8/3/2025).      ________________________________________________________________________________________________    Per Last Visit Note (Date: 7/29/2024):  Flakito is a pleasant 79-year-old  gentleman with PMH stroke, history of alcohol abuse (in AA), HTN, PAD, CAD, atrial flutter, idiopathic peripheral neuropathy, 3A CKD, obesity, dyslipidemia presenting for memory concerns.  He is accompanied by his daughter today.  Given the pattern and severity of his memory deficits (outlined in the HPI), supplemented by a MoCA score of 16/30 today, I do feel that his presentation is most consistent with a mild to moderate level of dementia.     We had a long conversation today outlining the above diagnosis and its implications.  To help better discern which dementia category he fits into and help with future planning, I have placed an order for formal neuropsychometric testing.  I do recommend a fit to drive evaluation; discussed with him that this is primarily to protect him in the event of an accident if he is able to drive, however of course if he is unable to drive due to his cognitive deficits, this will also be apparent in this evaluation.  We did discussion about potential pharmacologic interventions today; we had a brief conversation about Lazaro, however I do not feel that he would be an appropriate candidate for this, and they agreed.  Will initiate Aricept at 5 mg daily for 1 month, then increase to 10 mg daily thereafter.  Referral placed to Senior care for their aid in monitoring and future planning.  Advised him to continue to stay active physically, mentally, and socially.  He does also have a peripheral neuropathy on examination and per his reports of some balance difficulties; I have thus ordered some blood work to look for reversible causes and placed a referral to physical therapy for balance therapy.   Due to his history of stroke, recommended he continue Plavix and Lipitor for secondary stroke prevention  Reviewed important lifestyle factors in primary stroke prevention.     He will Return in about 6 months (around 1/29/2025).            Imaging/Other Studies:  MRI lumbar spine without  "contrast 2/15/2024:  IMPRESSION:     Degenerative changes of the lumbar spine, as described above.     Modic type I endplate degenerative changes present at L2-L3.     Moderate to severe left foraminal narrowing is present at L4-L5 with impingement of the exiting nerve root. Multilevel moderate foraminal narrowing is also present from L2-L3 through L4-L5.        MRI cervical spine without contrast 11/22/2023:  IMPRESSION:     Advanced spondylotic degenerative changes are seen throughout the cervical spine with multilevel pam and retrolisthesis as described above. There is moderate multilevel central stenosis at nearly every cervical level with moderate multilevel foraminal   narrowing. No cord compression or cord signal abnormality.        MRI brain without contrast 5/21/2023:  IMPRESSION:     White matter changes suggestive of chronic microangiopathy.  No acute intracranial pathology. Chronic lacunar infarct right cerebellum.    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 Correlate with UPEP, serum immunoglobulin heavy chains (IgA, IgG, IgM, IgD, IgE) and serum and urine free light chains (kappa, lambda) for further characterization, as clinically indicated.       11/29/2024:  -UPEP: No monoclonal bands noted      Fitness to Drive evaluation 12/18/2024:  -Results indicated 93% chance/high risk of failing and on the road assessment, however while all domains were noted to be on the lower end, all were in the \"typical\" range aside from memory.      ________________________________________________________________________________________________    Subjective:      Nj Loren Sanders is a 80 y.o. male with a PMHx of prior stroke, history of alcohol abuse (currently in AA), HTN, PAD, CAD, atrial flutter, idiopathic peripheral neuropathy, 3A CKD, obesity, dyslipidemia who presents in follow up for dementia. He is " "accompanied by his daughter today.     They believe that things/symptoms are about the same, although may be \"a bit more forgetful\" as far as completing tasks goes.     Currently taking the ginko boloba.     Stopped the Aricept due to side effects (odd dreams, \"bathroom issues\").     Currently following with [facility].     He did undergo the FTD testing, which showed a 93% risk of failing an on-the-road examination. However, he only scored poorly in the memory portion; appeared to score \"typical\" in all other modalities. A ssuch, SC and OT recommended he undergo an on the road assessment. They are in the process of scheduling that now.     Living: On his own in his house. He will be moving to an Independent Living facility shortly.   Cooking: No issues  Driving: See above  Behavioral: No new issues  Hallucinations: Deny  Bills: Daughter still paying. They do feel he may be doing better from an impulsive spending aspect.     Did complete balance therapy through PT, which they think may have helped.    Now undergoing Cognitive Therapy through [facility].       He does tell me that he has some pain from his back into his groin. Had taken a pain pill from a friend, which helped, but when the daughter mentioned it to his PCP they said not to because it would interfere with his other medications.    -He is on gabapentin through his PCP   -Has seen PM in the past.       Review of Systems I have personally reviewed the MA's review of systems and made changes as necessary.    Current Outpatient Medications on File Prior to Visit   Medication Sig Dispense Refill    Ascorbic Acid (vitamin C) 1000 MG tablet Take 1,000 mg by mouth daily      aspirin (ECOTRIN LOW STRENGTH) 81 mg EC tablet Take 81 mg by mouth every other day 1 every other day      atorvastatin (LIPITOR) 40 mg tablet TAKE 1 TABLET BY MOUTH ONCE DAILY IN THE EVENING 90 tablet 0    B Complex-C (SUPER B COMPLEX PO) Take 1 capsule by mouth in the morning      " buPROPion (WELLBUTRIN XL) 150 mg 24 hr tablet Take 1 tablet (150 mg total) by mouth daily 90 tablet 1    clopidogrel (PLAVIX) 75 mg tablet Take 1 tablet by mouth once daily 90 tablet 1    escitalopram (LEXAPRO) 20 mg tablet Take 1 tablet (20 mg total) by mouth daily 90 tablet 1    ferrous sulfate 324 (65 Fe) mg Take 1 tablet (324 mg total) by mouth daily before breakfast 30 tablet 2    finasteride (PROSCAR) 5 mg tablet Take 1 tablet (5 mg total) by mouth daily 90 tablet 1    gabapentin (NEURONTIN) 400 mg capsule Take 2 capsules in the morning, 1 capsule in the afternoon and 1 capsule at  capsule 1    Ginkgo Biloba 120 MG CAPS Take 1 capsule by mouth in the morning      lisinopril (ZESTRIL) 10 mg tablet Take 1 tablet by mouth once daily 90 tablet 2    Magnesium 250 MG TABS Take 1 tablet (250 mg total) by mouth 2 (two) times a day 60 tablet 5    metoprolol succinate (TOPROL-XL) 25 mg 24 hr tablet Take 1 tablet by mouth once daily 90 tablet 1    Multiple Vitamin (multivitamin) capsule Take 1 capsule by mouth daily      omeprazole (PriLOSEC) 20 mg delayed release capsule Take 1 capsule by mouth once daily 90 capsule 1    tamsulosin (FLOMAX) 0.4 mg Take 1 capsule (0.4 mg total) by mouth daily with dinner 90 capsule 1    traZODone (DESYREL) 100 mg tablet Take 1 tablet (100 mg total) by mouth daily at bedtime 90 tablet 1    VITAMIN D, CHOLECALCIFEROL, PO Take 2,000 Units by mouth in the morning      [DISCONTINUED] donepezil (ARICEPT) 10 mg tablet Take 10 mg by mouth in the morning (Patient not taking: Reported on 11/8/2024)       No current facility-administered medications on file prior to visit.         Objective   /68 (BP Location: Right arm, Patient Position: Sitting, Cuff Size: Large)   Pulse 67   Temp 98.2 °F (36.8 °C) (Temporal)   Wt 96.5 kg (212 lb 12.8 oz)   SpO2 93%   BMI 34.35 kg/m²     Physical Exam  Constitutional:       General: He is awake.      Appearance: Normal appearance.   HENT:       Head: Normocephalic and atraumatic.   Eyes:      General: Lids are normal.      Extraocular Movements: Extraocular movements intact.      Pupils: Pupils are equal, round, and reactive to light.   Cardiovascular:      Comments: No evidence of respiratory distress, no accessory muscle use; no evidence of peripheral cyanosis    Neurological:      Mental Status: He is alert.      Motor: Motor strength is normal.     Deep Tendon Reflexes:      Reflex Scores:       Bicep reflexes are 2+ on the right side and 2+ on the left side.       Brachioradialis reflexes are 2+ on the right side and 2+ on the left side.       Patellar reflexes are 2+ on the right side and 2+ on the left side.  Psychiatric:         Behavior: Behavior is cooperative.       Neurological Exam  Mental Status  Awake and alert. Oriented only to person, place, time and situation. Orientation: Also oriented to date, month, year, day of the week..    Cranial Nerves  CN II: Visual fields full to confrontation.  CN III, IV, VI: Extraocular movements intact bilaterally. Normal lids and orbits bilaterally. Pupils equal round and reactive to light bilaterally.  CN V: Facial sensation is normal.  CN VII: Full and symmetric facial movement.  CN VIII: Hearing is normal.  CN IX, X: Palate elevates symmetrically. Normal gag reflex.  CN XI: Shoulder shrug strength is normal.  CN XII: Tongue midline without atrophy or fasciculations.    Motor  Normal muscle bulk throughout. Strength is 5/5 throughout all four extremities.    Sensory  Light touch is normal in upper and lower extremities. Temperature is normal in upper and lower extremities.     Reflexes                                            Right                      Left  Brachioradialis                    2+                         2+  Biceps                                 2+                         2+  Patellar                                2+                         2+    Gait  Casual gait is normal including  stance, stride, and arm swing.          Administrative Statements   I have spent a total time of 45-50 minutes in caring for this patient on the day of the visit/encounter including Diagnostic results, Prognosis, Risks and benefits of tx options, Instructions for management, Patient and family education, Importance of tx compliance, Risk factor reductions, Documenting in the medical record, Reviewing / ordering tests, medicine, procedures  , and Obtaining or reviewing history  .     Electronically signed by:    Brayan Mast DO  Board-Certified Neurology and Sleep Medicine  [facility]  02/03/25

## 2025-02-03 NOTE — ASSESSMENT & PLAN NOTE
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 He is accompanied by his daughter today.  Overall, he is doing well, largely stable compared to his last visit, although it sounds as though the slight degree of more impulsive spending that they were describing at his last visit has lessened, although some of his forgetfulness has worsened slightly.  He was unable to tolerate the Aricept, as below, so ceased it, which is perfectly reasonable.    We will trial the initiation of memantine (Namenda) on a titration up to 5 mg twice daily.  Could consider to increase up to 10 mg twice daily in the future.  Encouraged him to continue with cognitive therapy through his current speech therapists (reportedly through an organization known as [employer])  His fitness to drive results were interesting.  As noted, while he did not score well, his primary deficit appeared to be in memory alone.  Certainly, this could preclude him from driving safely, however given how well he did in other domains, I agree with the recommendation for a formal on the road driving assessment.  At this time, they are looking into pursuing this through [facility].  Encouraged him to stay active physically, mentally, socially  Encouraged him to continue to follow with [facility] per their recommendations  I am okay with holding off on formal neuropsychometric testing for now, as I I do not believe that it will significantly impact management at this time  I agree with his moving into an independent living facility, which he and his family are currently working on    Orders:    memantine (NAMENDA) 5 mg tablet; Take 5mg daily for 1 week, then 5mg twice daily thereafter

## 2025-02-06 ENCOUNTER — OFFICE VISIT (OUTPATIENT)
Dept: FAMILY MEDICINE CLINIC | Facility: HOSPITAL | Age: 80
End: 2025-02-06
Payer: COMMERCIAL

## 2025-02-06 VITALS
WEIGHT: 216.2 LBS | DIASTOLIC BLOOD PRESSURE: 70 MMHG | BODY MASS INDEX: 34.75 KG/M2 | OXYGEN SATURATION: 96 % | TEMPERATURE: 100.5 F | SYSTOLIC BLOOD PRESSURE: 120 MMHG | HEIGHT: 66 IN | HEART RATE: 88 BPM

## 2025-02-06 DIAGNOSIS — M48.061 LUMBAR FORAMINAL STENOSIS: ICD-10-CM

## 2025-02-06 DIAGNOSIS — J00 ACUTE RHINITIS: ICD-10-CM

## 2025-02-06 DIAGNOSIS — R50.9 FEVER, UNSPECIFIED FEVER CAUSE: ICD-10-CM

## 2025-02-06 DIAGNOSIS — M54.16 LUMBAR RADICULITIS: ICD-10-CM

## 2025-02-06 DIAGNOSIS — M54.42 CHRONIC BILATERAL LOW BACK PAIN WITH BILATERAL SCIATICA: ICD-10-CM

## 2025-02-06 DIAGNOSIS — G89.29 CHRONIC BILATERAL LOW BACK PAIN WITH BILATERAL SCIATICA: ICD-10-CM

## 2025-02-06 DIAGNOSIS — M54.41 CHRONIC BILATERAL LOW BACK PAIN WITH BILATERAL SCIATICA: ICD-10-CM

## 2025-02-06 DIAGNOSIS — J10.1 INFLUENZA A: Primary | ICD-10-CM

## 2025-02-06 LAB
SARS-COV-2 AG UPPER RESP QL IA: NEGATIVE
SL AMB POCT RAPID FLU A: POSITIVE
SL AMB POCT RAPID FLU B: ABNORMAL
VALID CONTROL: NORMAL

## 2025-02-06 PROCEDURE — 87811 SARS-COV-2 COVID19 W/OPTIC: CPT

## 2025-02-06 PROCEDURE — G2211 COMPLEX E/M VISIT ADD ON: HCPCS

## 2025-02-06 PROCEDURE — 99214 OFFICE O/P EST MOD 30 MIN: CPT

## 2025-02-06 PROCEDURE — 87804 INFLUENZA ASSAY W/OPTIC: CPT

## 2025-02-06 RX ORDER — FLUTICASONE PROPIONATE 50 MCG
2 SPRAY, SUSPENSION (ML) NASAL DAILY PRN
Qty: 15.8 ML | Refills: 0 | Status: SHIPPED | OUTPATIENT
Start: 2025-02-06 | End: 2025-02-06 | Stop reason: CLARIF

## 2025-02-06 NOTE — ASSESSMENT & PLAN NOTE
Chronic, noted on review of prior imaging, will treat with Voltaren gel and refer to spine and pain management for further management  Orders:    Ambulatory referral to Spine & Pain Management; Future

## 2025-02-06 NOTE — PROGRESS NOTES
"Name: Nj Pimentel Jr.      : 1945      MRN: [id number]  Encounter Provider: Sagar Yanze MD  Encounter Date: 2025   Encounter department: [facility] [address]  :  Assessment & Plan  Influenza A  Low-grade fever, influenza A positive, outside 48-hour treatment window for Tamiflu, will treat symptomatically, encourage oral hydration, treat fever and pain with Tylenol and ibuprofen, treat cough and congestion with Mucinex DM       Fever, unspecified fever cause    Orders:    POCT Rapid Covid Ag    POCT rapid flu A and B    Lumbar foraminal stenosis  Chronic, noted on review of prior imaging, will treat with Voltaren gel and refer to spine and pain management for further management  Orders:    Ambulatory referral to Spine & Pain Management; Future    Lumbar radiculitis    Orders:    Ambulatory referral to Spine & Pain Management; Future    Chronic bilateral low back pain with bilateral sciatica    Orders:    Ambulatory referral to Spine & Pain Management; Future    Diclofenac Sodium (VOLTAREN) 1 %; Apply 2 g topically 4 (four) times a day    Acute rhinitis    Orders:    dextromethorphan-guaifenesin (MUCINEX DM)  MG per 12 hr tablet; Take 1 tablet by mouth every 12 (twelve) hours as needed for cough           History of Present Illness   HPI  Chronic, Lumbar back pain radiating down hips/thighs,     cough      Review of Systems   Constitutional:  Positive for fever.   HENT:  Positive for congestion.    Respiratory:  Negative for shortness of breath and wheezing.    Cardiovascular:  Negative for chest pain.   Musculoskeletal:  Positive for back pain.       Objective   /70   Pulse 88   Temp 100.5 °F (38.1 °C) (Oral)   Ht 5' 6\" (1.676 m)   Wt 98.1 kg (216 lb 3.2 oz)   SpO2 96%   BMI 34.90 kg/m²      Physical Exam  Vitals and nursing note reviewed.   Constitutional:       General: He is not in acute distress.     Appearance: He is well-developed.   HENT:      " Head: Normocephalic and atraumatic.      Right Ear: Tympanic membrane, ear canal and external ear normal. There is no impacted cerumen.      Left Ear: Tympanic membrane, ear canal and external ear normal. There is no impacted cerumen.      Nose: Congestion present. No rhinorrhea.      Mouth/Throat:      Mouth: Mucous membranes are moist.      Pharynx: Oropharynx is clear. Posterior oropharyngeal erythema present. No oropharyngeal exudate.   Eyes:      Conjunctiva/sclera: Conjunctivae normal.   Cardiovascular:      Rate and Rhythm: Normal rate and regular rhythm.      Heart sounds: No murmur heard.  Pulmonary:      Effort: Pulmonary effort is normal. No respiratory distress.      Breath sounds: Normal breath sounds.   Abdominal:      General: Bowel sounds are normal. There is no distension.      Palpations: Abdomen is soft. There is no mass.      Tenderness: There is no abdominal tenderness.   Musculoskeletal:         General: No swelling.      Cervical back: Neck supple.   Lymphadenopathy:      Cervical: No cervical adenopathy.   Skin:     General: Skin is warm and dry.      Capillary Refill: Capillary refill takes less than 2 seconds.   Neurological:      Mental Status: He is alert and oriented to person, place, and time.      Motor: No weakness.   Psychiatric:         Mood and Affect: Mood normal.         Behavior: Behavior normal.

## 2025-02-06 NOTE — ASSESSMENT & PLAN NOTE
Orders:    dextromethorphan-guaifenesin (MUCINEX DM)  MG per 12 hr tablet; Take 1 tablet by mouth every 12 (twelve) hours as needed for cough

## 2025-02-06 NOTE — ASSESSMENT & PLAN NOTE
Orders:    Ambulatory referral to Spine & Pain Management; Future    Diclofenac Sodium (VOLTAREN) 1 %; Apply 2 g topically 4 (four) times a day

## 2025-02-17 ENCOUNTER — HOSPITAL ENCOUNTER (OUTPATIENT)
Dept: NON INVASIVE DIAGNOSTICS | Age: 80
Discharge: HOME | End: 2025-02-17
Payer: COMMERCIAL

## 2025-02-17 ENCOUNTER — NURSE TRIAGE (OUTPATIENT)
Age: 80
End: 2025-02-17

## 2025-02-17 DIAGNOSIS — I74.09 AORTOILIAC OCCLUSIVE DISEASE (HCC): ICD-10-CM

## 2025-02-17 DIAGNOSIS — I65.23 CAROTID STENOSIS, ASYMPTOMATIC, BILATERAL: ICD-10-CM

## 2025-02-17 PROCEDURE — 93922 UPR/L XTREMITY ART 2 LEVELS: CPT | Performed by: SURGERY

## 2025-02-17 PROCEDURE — 93923 UPR/LXTR ART STDY 3+ LVLS: CPT

## 2025-02-17 PROCEDURE — 93880 EXTRACRANIAL BILAT STUDY: CPT

## 2025-02-17 PROCEDURE — 93978 VASCULAR STUDY: CPT

## 2025-02-17 PROCEDURE — 93880 EXTRACRANIAL BILAT STUDY: CPT | Performed by: SURGERY

## 2025-02-17 PROCEDURE — 93978 VASCULAR STUDY: CPT | Performed by: SURGERY

## 2025-02-17 NOTE — TELEPHONE ENCOUNTER
Below message received from PEP.     Summary: Chest Congestion x 1.5 weeks requesting medication    Patient called the RX Refill Line.      Patient state he would like to have medication for a chest congestion he have for over 1 week going on two weeks, he said he used everything OTC and nothing is working, says he is coughing and bring stuff up and have a nasal spray for his runny nose.  Please contact patient at [phone]             No triage as pt only requesting medication.

## 2025-02-21 ENCOUNTER — OFFICE VISIT (OUTPATIENT)
Dept: FAMILY MEDICINE CLINIC | Facility: HOSPITAL | Age: 80
End: 2025-02-21
Payer: COMMERCIAL

## 2025-02-21 VITALS
TEMPERATURE: 97.5 F | HEART RATE: 58 BPM | HEIGHT: 66 IN | BODY MASS INDEX: 34.1 KG/M2 | WEIGHT: 212.2 LBS | SYSTOLIC BLOOD PRESSURE: 138 MMHG | OXYGEN SATURATION: 97 % | DIASTOLIC BLOOD PRESSURE: 69 MMHG

## 2025-02-21 DIAGNOSIS — F03.A0 MILD DEMENTIA WITHOUT BEHAVIORAL DISTURBANCE, PSYCHOTIC DISTURBANCE, MOOD DISTURBANCE, OR ANXIETY, UNSPECIFIED DEMENTIA TYPE (HCC): ICD-10-CM

## 2025-02-21 DIAGNOSIS — I73.9 PAD (PERIPHERAL ARTERY DISEASE) (HCC): ICD-10-CM

## 2025-02-21 DIAGNOSIS — F33.41 RECURRENT MAJOR DEPRESSIVE DISORDER, IN PARTIAL REMISSION (HCC): Primary | ICD-10-CM

## 2025-02-21 DIAGNOSIS — F33.41 RECURRENT MAJOR DEPRESSIVE DISORDER, IN PARTIAL REMISSION (HCC): ICD-10-CM

## 2025-02-21 PROCEDURE — 99214 OFFICE O/P EST MOD 30 MIN: CPT | Performed by: INTERNAL MEDICINE

## 2025-02-21 PROCEDURE — G2211 COMPLEX E/M VISIT ADD ON: HCPCS | Performed by: INTERNAL MEDICINE

## 2025-02-21 RX ORDER — GABAPENTIN 400 MG/1
CAPSULE ORAL
Start: 2025-02-21

## 2025-02-21 RX ORDER — BUPROPION HYDROCHLORIDE 300 MG/1
300 TABLET ORAL DAILY
Qty: 30 TABLET | Refills: 1 | Status: SHIPPED | OUTPATIENT
Start: 2025-02-21

## 2025-02-21 NOTE — ASSESSMENT & PLAN NOTE
Mood down, spoke with pts dgtr and mood went down about the time they signed him up for moving into a Independent Living,  this has been cancelled at this time, we will increase WBXL from 150 mg to 300 mg - med change d/w pts dgtr on the phone as she does his meds, did d/w pts dgt pt stating rare thoughts of wanting to drive into a tree, I do not feel pt is actively suicidal or a threat to himself or others, pts dgtr states in the past when he has had SI he has gone to the ED on his own, urged to watch closely,  d/w pt that it takes 4-6 wks to get maximum benefit of med and that med has to be taken every day and to not miss doses of med, SE reviewed, call with SE/new/worse symptoms/increase in SI/panic attacks, re-eval in 4-6 wks or sooner if needed  Orders:    buPROPion (WELLBUTRIN XL) 300 mg 24 hr tablet; Take 1 tablet (300 mg total) by mouth daily

## 2025-02-21 NOTE — PROGRESS NOTES
Name: Nj Pimentel Jr.      : 1945      MRN: [id number]  Encounter Provider: Dona Camp DO  Encounter Date: 2025   Encounter department: [facility] CARE [address]   :  Assessment & Plan  Recurrent major depressive disorder, in partial remission (HCC)  Mood down, spoke with pts dgtr and mood went down about the time they signed him up for moving into a Independent Living,  this has been cancelled at this time, we will increase WBXL from 150 mg to 300 mg - med change d/w pts dgtr on the phone as she does his meds, did d/w pts dgt pt stating rare thoughts of wanting to drive into a tree, I do not feel pt is actively suicidal or a threat to himself or others, pts dgtr states in the past when he has had SI he has gone to the ED on his own, urged to watch closely,  d/w pt that it takes 4-6 wks to get maximum benefit of med and that med has to be taken every day and to not miss doses of med, SE reviewed, call with SE/new/worse symptoms/increase in SI/panic attacks, re-eval in 4-6 wks or sooner if needed  Orders:    buPROPion (WELLBUTRIN XL) 300 mg 24 hr tablet; Take 1 tablet (300 mg total) by mouth daily    Mild dementia without behavioral disturbance, psychotic disturbance, mood disturbance, or anxiety, unspecified dementia type ([facility])  Geriatrics and Neuro notes reviewed, now on Namenda, will follow       PAD (peripheral artery disease) ([facility])  Noting B/L Leg pain, has had injections and is on Gabapentin w/o great benefit, discussed poss vascular component, recent LEAD reviewed, has f/u with vascular next week, will follow  Orders:    gabapentin (NEURONTIN) 400 mg capsule; Take 1 capsules in the morning, 1 capsule in the afternoon and 1 capsule at HS        BW      AWV scheduled for 4/3/25      History of Present Illness   HPI Pt here for an acute visit    Pt called 25 asking to adjust his mood medications.  He was told to make appt and is here today to discuss  this.  WBXL 150 mg 1 tab PO q day was started at our last appt in Nov.  Pt was to f/u in 6-8 wks but this was not done.  He also remains on Lexapro max dose and Trazodone qhs for sleep.   Of note he did see Syl in Nov for acute on chronic LBP and his Gabapentin was increased from 400 mg 1 tab PO tid to 800 mg in am and 400 mg at noon and pm. He did see Dr Grider on 2/6/25 for flu and was noting back pain and [facility]  He notes mood is still down at times and he notes more bad days then good days. He does get anxious at times.  He has had thoughts of wanting to run into a tree.  He states those thoughts are not common and he states he would never act upon them.  He has not had the thought in some time.  He notes no panic attacks.      Pt saw Geriatrics NP (Marielena Paz) in Nov and Jan for eval and f/u of dementia.  MOCA was 18/30.  Geriatric depression screen was 4/15. Pt was dx with mild dementia. It was felt to be mixed AD and vascular.  Speech therapy was started. Care conference was  had with tr [place].  Pt was then seen by Neuro 2/3/25 and started on Namenda.  He was told he needed a road driving assessment. Discussion was made about independent living facility. He was told to f/u in 6 mos.  He thinks he is taking the Namenda but he does not have an active med list today. He states his dgt does his meds.      Pt was seen by Uro PA (Tasia Mcdonald) in Nov and Jan for gross hematuria - OV note reviewed.  US was done in Uro office and was negative for blood/normal.  CT renal protocol and Urine Cytology were both sent. CT showed no masses and urine cytology was negative for high grade urothelial carcinoma. Was told to con't annual PSA for now and con't Flomax and Finasteride. He notes no recent gross hematuria or dysuria.       Review of Systems   Constitutional:  Negative for chills and fever.   Eyes:  Negative for pain and visual disturbance.   Respiratory:  Negative for cough  "and shortness of breath.    Cardiovascular:  Negative for chest pain and palpitations.   Gastrointestinal:  Negative for abdominal pain, diarrhea, nausea and vomiting.   Genitourinary:  Negative for difficulty urinating and dysuria.   Musculoskeletal:  Positive for arthralgias, back pain, gait problem and myalgias.   Skin:  Negative for rash and wound.   Neurological:  Negative for dizziness and headaches.   Psychiatric/Behavioral:  Positive for confusion and dysphoric mood. Negative for suicidal ideas.        Objective   /69 (BP Location: Left arm, Patient Position: Sitting, Cuff Size: Large)   Pulse 58   Temp 97.5 °F (36.4 °C)   Ht 5' 6\" (1.676 m)   Wt 96.3 kg (212 lb 3.2 oz)   SpO2 97%   BMI 34.25 kg/m²      Physical Exam  Vitals and nursing note reviewed.   Constitutional:       General: He is not in acute distress.     Appearance: He is well-developed. He is not ill-appearing.   HENT:      Head: Normocephalic and atraumatic.      Right Ear: Tympanic membrane and external ear normal.      Left Ear: External ear normal.   Eyes:      General:         Right eye: No discharge.         Left eye: No discharge.      Conjunctiva/sclera: Conjunctivae normal.   Neck:      Trachea: No tracheal deviation.   Cardiovascular:      Rate and Rhythm: Normal rate and regular rhythm.      Heart sounds: Murmur heard.   Pulmonary:      Effort: Pulmonary effort is normal. No respiratory distress.      Breath sounds: Normal breath sounds. No wheezing, rhonchi or rales.   Musculoskeletal:      Cervical back: Neck supple.   Skin:     General: Skin is warm and dry.      Coloration: Skin is not pale.      Findings: No rash.   Neurological:      General: No focal deficit present.      Mental Status: He is alert. Mental status is at baseline.      Motor: No abnormal muscle tone.      Gait: Gait normal.   Psychiatric:         Behavior: Behavior normal.         "

## 2025-02-21 NOTE — ASSESSMENT & PLAN NOTE
Noting B/L Leg pain, has had injections and is on Gabapentin w/o great benefit, discussed poss vascular component, recent LEAD reviewed, has f/u with vascular next week, will follow  Orders:    gabapentin (NEURONTIN) 400 mg capsule; Take 1 capsules in the morning, 1 capsule in the afternoon and 1 capsule at HS

## 2025-02-21 NOTE — TELEPHONE ENCOUNTER
Not a duplicate  Patient would like a 90 day supply.    Reason for call:   [x] Refill   [] Prior Auth  [] Other:     Office:   [x] PCP/Provider -  Dona Camp DO   [] Specialty/Provider -     Medication: buPROPion (WELLBUTRIN XL) 300 mg 24 hr tablet     Dose/Frequency: Take 1 tablet (300 mg total) by mouth daily     Quantity: 90    Pharmacy:  [facility] [address] [place] [address] LENORE GOMES     Does the patient have enough for 3 days?   [x] Yes   [] No - Send as HP to POD

## 2025-02-22 RX ORDER — BUPROPION HYDROCHLORIDE 300 MG/1
300 TABLET ORAL DAILY
Qty: 30 TABLET | Refills: 0 | OUTPATIENT
Start: 2025-02-22

## 2025-02-27 ENCOUNTER — OFFICE VISIT (OUTPATIENT)
Dept: FAMILY MEDICINE CLINIC | Facility: HOSPITAL | Age: 80
End: 2025-02-27
Payer: COMMERCIAL

## 2025-02-27 VITALS
HEIGHT: 66 IN | DIASTOLIC BLOOD PRESSURE: 78 MMHG | BODY MASS INDEX: 34.04 KG/M2 | OXYGEN SATURATION: 95 % | SYSTOLIC BLOOD PRESSURE: 128 MMHG | WEIGHT: 211.8 LBS | HEART RATE: 60 BPM | TEMPERATURE: 97 F

## 2025-02-27 DIAGNOSIS — J06.9 UPPER RESPIRATORY INFECTION WITH COUGH AND CONGESTION: Primary | ICD-10-CM

## 2025-02-27 PROCEDURE — 99213 OFFICE O/P EST LOW 20 MIN: CPT | Performed by: NURSE PRACTITIONER

## 2025-02-27 PROCEDURE — G2211 COMPLEX E/M VISIT ADD ON: HCPCS | Performed by: NURSE PRACTITIONER

## 2025-02-27 RX ORDER — FLUTICASONE PROPIONATE 50 MCG
1 SPRAY, SUSPENSION (ML) NASAL DAILY
Qty: 9.9 ML | Refills: 0 | Status: SHIPPED | OUTPATIENT
Start: 2025-02-27

## 2025-02-27 NOTE — PROGRESS NOTES
"Name: Nj Pimentel Jr.      : 1945      MRN: [id number]  Encounter Provider: BLAS Cunningham  Encounter Date: 2025   Encounter department: [facility] CARE [address]   :  Assessment & Plan  Upper respiratory infection with cough and congestion  I suspect lingering symptoms from flu a few weeks ago.   He looks very well and exam was normal.   Start Flonase.   Call with any worsening, sinus pain or pressure, sob or fever.   Orders:    fluticasone (FLONASE) 50 mcg/act nasal spray; 1 spray into each nostril daily           History of Present Illness   Has chest congestion for 1 month. Diagnosed with influenza A 25. Has nasal congestion and runny nose. Has been using nasal spray. Has a cough. Sometimes hurts with coughing. No sob or wheezing. No post nasal drip. No fever, chills. No headaches. No sinus pain or pressure. No sore throat or ear pain.       Review of Systems   Constitutional:  Negative for chills, fatigue and fever.   HENT:  Positive for congestion and rhinorrhea. Negative for ear pain, postnasal drip, sinus pressure, sinus pain and sore throat.    Respiratory:  Positive for cough. Negative for shortness of breath and wheezing.    Neurological:  Negative for headaches.       Objective   /78 (Patient Position: Sitting, Cuff Size: Standard)   Pulse 60   Temp (!) 97 °F (36.1 °C) (Tympanic)   Ht 5' 6\" (1.676 m)   Wt 96.1 kg (211 lb 12.8 oz)   SpO2 95%   BMI 34.19 kg/m²      Physical Exam  Vitals reviewed.   Constitutional:       General: He is not in acute distress.     Appearance: Normal appearance. He is not ill-appearing.   HENT:      Right Ear: Tympanic membrane, ear canal and external ear normal.      Left Ear: Tympanic membrane, ear canal and external ear normal.      Nose: Nose normal. No mucosal edema, congestion or rhinorrhea.   Cardiovascular:      Rate and Rhythm: Normal rate and regular rhythm.      Heart sounds: Murmur heard.   Pulmonary:      " Effort: Pulmonary effort is normal.      Breath sounds: Normal breath sounds.      Comments: No cough during visit  Lymphadenopathy:      Cervical: No cervical adenopathy.   Skin:     General: Skin is warm and dry.   Neurological:      Mental Status: He is alert and oriented to person, place, and time.   Psychiatric:         Mood and Affect: Mood normal.         Behavior: Behavior normal.         Thought Content: Thought content normal.         Judgment: Judgment normal.

## 2025-03-06 DIAGNOSIS — K29.00 OTHER ACUTE GASTRITIS WITHOUT HEMORRHAGE: ICD-10-CM

## 2025-03-07 RX ORDER — OMEPRAZOLE 20 MG/1
20 CAPSULE, DELAYED RELEASE ORAL DAILY
Qty: 90 CAPSULE | Refills: 0 | Status: SHIPPED | OUTPATIENT
Start: 2025-03-07

## 2025-03-18 DIAGNOSIS — E78.5 DYSLIPIDEMIA: ICD-10-CM

## 2025-03-18 DIAGNOSIS — I73.9 PAD (PERIPHERAL ARTERY DISEASE) (HCC): ICD-10-CM

## 2025-03-19 RX ORDER — GABAPENTIN 400 MG/1
400 CAPSULE ORAL 3 TIMES DAILY
Qty: 270 CAPSULE | Refills: 1 | Status: SHIPPED | OUTPATIENT
Start: 2025-03-19

## 2025-03-19 RX ORDER — ATORVASTATIN CALCIUM 40 MG/1
40 TABLET, FILM COATED ORAL EVERY EVENING
Qty: 90 TABLET | Refills: 1 | Status: SHIPPED | OUTPATIENT
Start: 2025-03-19

## 2025-03-20 ENCOUNTER — TELEPHONE (OUTPATIENT)
Dept: BARIATRICS | Facility: CLINIC | Age: 80
End: 2025-03-20

## 2025-03-20 NOTE — TELEPHONE ENCOUNTER
Pt contacted to reschedule apt for 3/19 due to no show. Pt does not recall scheduling a consult, not rescheduling at this time

## 2025-03-22 DIAGNOSIS — F32.1 CURRENT MODERATE EPISODE OF MAJOR DEPRESSIVE DISORDER WITHOUT PRIOR EPISODE (HCC): ICD-10-CM

## 2025-03-24 DIAGNOSIS — F32.1 CURRENT MODERATE EPISODE OF MAJOR DEPRESSIVE DISORDER WITHOUT PRIOR EPISODE (HCC): ICD-10-CM

## 2025-03-24 RX ORDER — ESCITALOPRAM OXALATE 20 MG/1
20 TABLET ORAL DAILY
Qty: 90 TABLET | Refills: 0 | Status: SHIPPED | OUTPATIENT
Start: 2025-03-24 | End: 2025-03-24 | Stop reason: SDUPTHER

## 2025-03-25 RX ORDER — ESCITALOPRAM OXALATE 20 MG/1
20 TABLET ORAL DAILY
Qty: 90 TABLET | Refills: 0 | Status: SHIPPED | OUTPATIENT
Start: 2025-03-25

## 2025-03-31 ENCOUNTER — OFFICE VISIT (OUTPATIENT)
Dept: FAMILY MEDICINE CLINIC | Facility: HOSPITAL | Age: 80
End: 2025-03-31
Payer: COMMERCIAL

## 2025-03-31 ENCOUNTER — TELEPHONE (OUTPATIENT)
Dept: NEUROLOGY | Facility: CLINIC | Age: 80
End: 2025-03-31

## 2025-03-31 VITALS
HEIGHT: 66 IN | WEIGHT: 219.2 LBS | OXYGEN SATURATION: 92 % | HEART RATE: 57 BPM | DIASTOLIC BLOOD PRESSURE: 63 MMHG | TEMPERATURE: 97.1 F | BODY MASS INDEX: 35.23 KG/M2 | SYSTOLIC BLOOD PRESSURE: 119 MMHG

## 2025-03-31 DIAGNOSIS — F03.A0 MILD DEMENTIA WITHOUT BEHAVIORAL DISTURBANCE, PSYCHOTIC DISTURBANCE, MOOD DISTURBANCE, OR ANXIETY, UNSPECIFIED DEMENTIA TYPE (HCC): ICD-10-CM

## 2025-03-31 DIAGNOSIS — F33.41 RECURRENT MAJOR DEPRESSIVE DISORDER, IN PARTIAL REMISSION (HCC): Primary | ICD-10-CM

## 2025-03-31 PROCEDURE — G2211 COMPLEX E/M VISIT ADD ON: HCPCS | Performed by: INTERNAL MEDICINE

## 2025-03-31 PROCEDURE — 99214 OFFICE O/P EST MOD 30 MIN: CPT | Performed by: INTERNAL MEDICINE

## 2025-03-31 RX ORDER — BUPROPION HYDROCHLORIDE 150 MG/1
150 TABLET ORAL DAILY
Qty: 90 TABLET | Refills: 1 | Status: SHIPPED | OUTPATIENT
Start: 2025-03-31

## 2025-03-31 NOTE — PROGRESS NOTES
Name: Nj Pimentel Jr.      : 1945      MRN: [id number]  Encounter Provider: Dona Camp DO  Encounter Date: 3/31/2025   Encounter department: [facility] CARE [address]   :  Assessment & Plan  Recurrent major depressive disorder, in partial remission (HCC)  Mood not at goal - pt just failed drivers test and is having license revoked causing his down and anxious mood, his WBXL was also stopped recently as family though the increase in the WBXL made him more unsteady - he did well with the 150 mg dose so this rx was sent, SE reviewed,  d/w pt that it takes 4-6 wks to get maximum benefit of med and that med has to be taken every day and to not miss doses of med, call with SE/new/worse mood/SI, has f/u appt later this week - will watch closely  Orders:    buPROPion (WELLBUTRIN XL) 150 mg 24 hr tablet; Take 1 tablet (150 mg total) by mouth daily    Mild dementia without behavioral disturbance, psychotic disturbance, mood disturbance, or anxiety, unspecified dementia type (HCC)  Dementia gradually worsening, stopped Namenda d/t SE and no benefit, just took drivers test and did not do well, Neuro filled out paperwork today revoking license, pt and dgtr informed of this, pt understandably upset, urged importance of not driving now he is aware of this            History of Present Illness   HPI Pt here for an acute visit.      Pt noting increase in depression and feeling stressed.  He notes symptoms have been worse the past week or so. He cannot id a specific trigger the past week making mood worse. He states sleep is good and appetite is good.  He notes no SI/panic attacks. He is unsure of his meds - has dementia.  Of note he is still driving.     I called pts daughter and she feels his down mood is d/t recently failing his driving test.       Review of Systems   Constitutional:  Negative for appetite change, chills and fever.   HENT:  Negative for congestion and sore throat.   "  Eyes:  Negative for pain and visual disturbance.   Respiratory:  Negative for cough and shortness of breath.    Cardiovascular:  Negative for chest pain and palpitations.   Gastrointestinal:  Negative for abdominal pain, diarrhea, nausea and vomiting.   Genitourinary:  Negative for difficulty urinating and dysuria.   Musculoskeletal:  Negative for gait problem.   Skin:  Negative for rash and wound.   Neurological:  Negative for dizziness and headaches.   Hematological:  Does not bruise/bleed easily.   Psychiatric/Behavioral:  Positive for confusion and dysphoric mood. Negative for sleep disturbance and suicidal ideas. The patient is nervous/anxious.        Objective   /63 (BP Location: Left arm, Patient Position: Sitting, Cuff Size: Standard)   Pulse 57   Temp (!) 97.1 °F (36.2 °C)   Ht 5' 6\" (1.676 m)   Wt 99.4 kg (219 lb 3.2 oz)   SpO2 92%   BMI 35.38 kg/m²      Physical Exam  Vitals and nursing note reviewed.   Constitutional:       General: He is not in acute distress.     Appearance: He is not ill-appearing.   HENT:      Head: Normocephalic and atraumatic.   Eyes:      General:         Right eye: No discharge.         Left eye: No discharge.      Conjunctiva/sclera: Conjunctivae normal.   Pulmonary:      Effort: Pulmonary effort is normal. No respiratory distress.   Skin:     Coloration: Skin is not pale.      Findings: No rash.   Neurological:      Mental Status: Mental status is at baseline.      Gait: Gait normal.   Psychiatric:         Mood and Affect: Mood normal.         Behavior: Behavior normal.         Judgment: Judgment normal.      Comments: Poor STM         "

## 2025-03-31 NOTE — ASSESSMENT & PLAN NOTE
Mood not at goal - pt just failed drivers test and is having license revoked causing his down and anxious mood, his WBXL was also stopped recently as family though the increase in the WBXL made him more unsteady - he did well with the 150 mg dose so this rx was sent, SE reviewed,  d/w pt that it takes 4-6 wks to get maximum benefit of med and that med has to be taken every day and to not miss doses of med, call with SE/new/worse mood/SI, has f/u appt later this week - will watch closely  Orders:    buPROPion (WELLBUTRIN XL) 150 mg 24 hr tablet; Take 1 tablet (150 mg total) by mouth daily

## 2025-03-31 NOTE — ASSESSMENT & PLAN NOTE
Dementia gradually worsening, stopped Namenda d/t SE and no benefit, just took drivers test and did not do well, Neuro filled out paperwork today revoking license, pt and dgtr informed of this, pt understandably upset, urged importance of not driving now he is aware of this

## 2025-04-03 ENCOUNTER — APPOINTMENT (OUTPATIENT)
Dept: LAB | Facility: HOSPITAL | Age: 80
End: 2025-04-03
Payer: COMMERCIAL

## 2025-04-03 ENCOUNTER — OFFICE VISIT (OUTPATIENT)
Dept: FAMILY MEDICINE CLINIC | Facility: HOSPITAL | Age: 80
End: 2025-04-03
Payer: COMMERCIAL

## 2025-04-03 ENCOUNTER — RESULTS FOLLOW-UP (OUTPATIENT)
Dept: FAMILY MEDICINE CLINIC | Facility: HOSPITAL | Age: 80
End: 2025-04-03

## 2025-04-03 VITALS
DIASTOLIC BLOOD PRESSURE: 72 MMHG | TEMPERATURE: 96.9 F | OXYGEN SATURATION: 96 % | SYSTOLIC BLOOD PRESSURE: 129 MMHG | HEART RATE: 45 BPM | HEIGHT: 66 IN | BODY MASS INDEX: 35 KG/M2 | WEIGHT: 217.8 LBS

## 2025-04-03 DIAGNOSIS — R73.01 IMPAIRED FASTING GLUCOSE: ICD-10-CM

## 2025-04-03 DIAGNOSIS — E66.01 SEVERE OBESITY (BMI 35.0-39.9) WITH COMORBIDITY (HCC): ICD-10-CM

## 2025-04-03 DIAGNOSIS — I12.9 BENIGN HYPERTENSION WITH CKD (CHRONIC KIDNEY DISEASE) STAGE III (HCC): ICD-10-CM

## 2025-04-03 DIAGNOSIS — F03.A0 MILD DEMENTIA WITHOUT BEHAVIORAL DISTURBANCE, PSYCHOTIC DISTURBANCE, MOOD DISTURBANCE, OR ANXIETY, UNSPECIFIED DEMENTIA TYPE (HCC): ICD-10-CM

## 2025-04-03 DIAGNOSIS — I35.0 AORTIC VALVE STENOSIS, ETIOLOGY OF CARDIAC VALVE DISEASE UNSPECIFIED: ICD-10-CM

## 2025-04-03 DIAGNOSIS — R97.20 ELEVATED PSA: ICD-10-CM

## 2025-04-03 DIAGNOSIS — Z00.00 MEDICARE ANNUAL WELLNESS VISIT, SUBSEQUENT: ICD-10-CM

## 2025-04-03 DIAGNOSIS — N18.30 BENIGN HYPERTENSION WITH CKD (CHRONIC KIDNEY DISEASE) STAGE III (HCC): ICD-10-CM

## 2025-04-03 DIAGNOSIS — F33.41 RECURRENT MAJOR DEPRESSIVE DISORDER, IN PARTIAL REMISSION (HCC): Primary | ICD-10-CM

## 2025-04-03 LAB
BASOPHILS # BLD AUTO: 0.03 THOUSANDS/ÂΜL (ref 0–0.1)
BASOPHILS NFR BLD AUTO: 0 % (ref 0–1)
EOSINOPHIL # BLD AUTO: 0.46 THOUSAND/ÂΜL (ref 0–0.61)
EOSINOPHIL NFR BLD AUTO: 7 % (ref 0–6)
ERYTHROCYTE [DISTWIDTH] IN BLOOD BY AUTOMATED COUNT: 12.6 % (ref 11.6–15.1)
HCT VFR BLD AUTO: 42.9 % (ref 36.5–49.3)
HGB BLD-MCNC: 14 G/DL (ref 12–17)
IMM GRANULOCYTES # BLD AUTO: 0.01 THOUSAND/UL (ref 0–0.2)
IMM GRANULOCYTES NFR BLD AUTO: 0 % (ref 0–2)
LYMPHOCYTES # BLD AUTO: 1.44 THOUSANDS/ÂΜL (ref 0.6–4.47)
LYMPHOCYTES NFR BLD AUTO: 21 % (ref 14–44)
MCH RBC QN AUTO: 32.3 PG (ref 26.8–34.3)
MCHC RBC AUTO-ENTMCNC: 32.6 G/DL (ref 31.4–37.4)
MCV RBC AUTO: 99 FL (ref 82–98)
MONOCYTES # BLD AUTO: 0.52 THOUSAND/ÂΜL (ref 0.17–1.22)
MONOCYTES NFR BLD AUTO: 8 % (ref 4–12)
NEUTROPHILS # BLD AUTO: 4.3 THOUSANDS/ÂΜL (ref 1.85–7.62)
NEUTS SEG NFR BLD AUTO: 64 % (ref 43–75)
NRBC BLD AUTO-RTO: 0 /100 WBCS
PLATELET # BLD AUTO: 182 THOUSANDS/UL (ref 149–390)
PMV BLD AUTO: 8.9 FL (ref 8.9–12.7)
PSA SERPL-MCNC: 3.82 NG/ML (ref 0–4)
RBC # BLD AUTO: 4.34 MILLION/UL (ref 3.88–5.62)
WBC # BLD AUTO: 6.76 THOUSAND/UL (ref 4.31–10.16)

## 2025-04-03 PROCEDURE — G0439 PPPS, SUBSEQ VISIT: HCPCS | Performed by: INTERNAL MEDICINE

## 2025-04-03 PROCEDURE — G2211 COMPLEX E/M VISIT ADD ON: HCPCS | Performed by: INTERNAL MEDICINE

## 2025-04-03 PROCEDURE — 84153 ASSAY OF PSA TOTAL: CPT

## 2025-04-03 PROCEDURE — 36415 COLL VENOUS BLD VENIPUNCTURE: CPT

## 2025-04-03 PROCEDURE — 99214 OFFICE O/P EST MOD 30 MIN: CPT | Performed by: INTERNAL MEDICINE

## 2025-04-03 NOTE — TELEPHONE ENCOUNTER
Patient called requesting refill for   buPROPion (WELLBUTRIN XL) 150 mg 24 hr tablet. Patient made aware medication was refilled on 3/31 for 90 with 1 refills to [facility] pharmacy. Patient instructed to contact the pharmacy to obtain refills of medication. Patient verbalized understanding.       Receipt confirmed by pharmacy (3/31/2025  1:05 PM EDT)        Dispensed Days Supply Quantity Provider Pharmacy  BUPROPION HCL  MG TABLET 03/31/2025 90 90 tablet Dona Camp DO [facility] [id number] ...

## 2025-04-03 NOTE — PATIENT INSTRUCTIONS
Medicare Preventive Visit Patient Instructions  Thank you for completing your Welcome to Medicare Visit or Medicare Annual Wellness Visit today. Your next wellness visit will be due in one year (4/4/2026).  The screening/preventive services that you may require over the next 5-10 years are detailed below. Some tests may not apply to you based off risk factors and/or age. Screening tests ordered at today's visit but not completed yet may show as past due. Also, please note that scanned in results may not display below.  Preventive Screenings:  Service Recommendations Previous Testing/Comments   Colorectal Cancer Screening  Colonoscopy    Fecal Occult Blood Test (FOBT)/Fecal Immunochemical Test (FIT)  Fecal DNA/Cologuard Test  Flexible Sigmoidoscopy Age: 45-75 years old   Colonoscopy: every 10 years (May be performed more frequently if at higher risk)  OR  FOBT/FIT: every 1 year  OR  Cologuard: every 3 years  OR  Sigmoidoscopy: every 5 years  Screening may be recommended earlier than age 45 if at higher risk for colorectal cancer. Also, an individualized decision between you and your healthcare provider will decide whether screening between the ages of 76-85 would be appropriate. Colonoscopy: 02/17/2021  FOBT/FIT: Not on file  Cologuard: Not on file  Sigmoidoscopy: Not on file    Screening Current     Prostate Cancer Screening Individualized decision between patient and health care provider in men between ages of 55-69   Medicare will cover every 12 months beginning on the day after your 50th birthday PSA: 3.12 ng/mL     Screening Not Indicated     Hepatitis C Screening Once for adults born between 1945 and 1965  More frequently in patients at high risk for Hepatitis C Hep C Antibody: 11/12/2020    Screening Current   Diabetes Screening 1-2 times per year if you're at risk for diabetes or have pre-diabetes Fasting glucose: 115 mg/dL (11/6/2024)  A1C: 5.7 % (11/6/2024)  Screening Current   Cholesterol Screening Once  every 5 years if you don't have a lipid disorder. May order more often based on risk factors. Lipid panel: 05/03/2024  Screening Current      Other Preventive Screenings Covered by Medicare:  Abdominal Aortic Aneurysm (AAA) Screening: covered once if your at risk. You're considered to be at risk if you have a family history of AAA or a male between the age of 65-75 who smoking at least 100 cigarettes in your lifetime.  Lung Cancer Screening: covers low dose CT scan once per year if you meet all of the following conditions: (1) Age 55-77; (2) No signs or symptoms of lung cancer; (3) Current smoker or have quit smoking within the last 15 years; (4) You have a tobacco smoking history of at least 20 pack years (packs per day x number of years you smoked); (5) You get a written order from a healthcare provider.  Glaucoma Screening: covered annually if you're considered high risk: (1) You have diabetes OR (2) Family history of glaucoma OR (3)  aged 50 and older OR (4)  American aged 65 and older  Osteoporosis Screening: covered every 2 years if you meet one of the following conditions: (1) Have a vertebral abnormality; (2) On glucocorticoid therapy for more than 3 months; (3) Have primary hyperparathyroidism; (4) On osteoporosis medications and need to assess response to drug therapy.  HIV Screening: covered annually if you're between the age of 15-65. Also covered annually if you are younger than 15 and older than 65 with risk factors for HIV infection. For pregnant patients, it is covered up to 3 times per pregnancy.    Immunizations:  Immunization Recommendations   Influenza Vaccine Annual influenza vaccination during flu season is recommended for all persons aged >= 6 months who do not have contraindications   Pneumococcal Vaccine   * Pneumococcal conjugate vaccine = PCV13 (Prevnar 13), PCV15 (Vaxneuvance), PCV20 (Prevnar 20)  * Pneumococcal polysaccharide vaccine = PPSV23 (Pneumovax) Adults  19-65 yo with certain risk factors or if 65+ yo  If never received any pneumonia vaccine: recommend Prevnar 20 (PCV20)  Give PCV20 if previously received 1 dose of PCV13 or PPSV23   Hepatitis B Vaccine 3 dose series if at intermediate or high risk (ex: diabetes, end stage renal disease, liver disease)   Respiratory syncytial virus (RSV) Vaccine - COVERED BY MEDICARE PART D  * RSVPreF3 (Arexvy) CDC recommends that adults 60 years of age and older may receive a single dose of RSV vaccine using shared clinical decision-making (SCDM)   Tetanus (Td) Vaccine - COST NOT COVERED BY MEDICARE PART B Following completion of primary series, a booster dose should be given every 10 years to maintain immunity against tetanus. Td may also be given as tetanus wound prophylaxis.   Tdap Vaccine - COST NOT COVERED BY MEDICARE PART B Recommended at least once for all adults. For pregnant patients, recommended with each pregnancy.   Shingles Vaccine (Shingrix) - COST NOT COVERED BY MEDICARE PART B  2 shot series recommended in those 19 years and older who have or will have weakened immune systems or those 50 years and older     Health Maintenance Due:      Topic Date Due   • Colorectal Cancer Screening  02/17/2026   • Hepatitis C Screening  Completed     Immunizations Due:      Topic Date Due   • COVID-19 Vaccine (9 - 2024-25 season) 09/01/2024     Advance Directives   What are advance directives?  Advance directives are legal documents that state your wishes and plans for medical care. These plans are made ahead of time in case you lose your ability to make decisions for yourself. Advance directives can apply to any medical decision, such as the treatments you want, and if you want to donate organs.   What are the types of advance directives?  There are many types of advance directives, and each state has rules about how to use them. You may choose a combination of any of the following:  Living will:  This is a written record of the  treatment you want. You can also choose which treatments you do not want, which to limit, and which to stop at a certain time. This includes surgery, medicine, IV fluid, and tube feedings.   Durable power of  for healthcare (DPAHC):  This is a written record that states who you want to make healthcare choices for you when you are unable to make them for yourself. This person, called a proxy, is usually a family member or a friend. You may choose more than 1 proxy.  Do not resuscitate (DNR) order:  A DNR order is used in case your heart stops beating or you stop breathing. It is a request not to have certain forms of treatment, such as CPR. A DNR order may be included in other types of advance directives.  Medical directive:  This covers the care that you want if you are in a coma, near death, or unable to make decisions for yourself. You can list the treatments you want for each condition. Treatment may include pain medicine, surgery, blood transfusions, dialysis, IV or tube feedings, and a ventilator (breathing machine).  Values history:  This document has questions about your views, beliefs, and how you feel and think about life. This information can help others choose the care that you would choose.  Why are advance directives important?  An advance directive helps you control your care. Although spoken wishes may be used, it is better to have your wishes written down. Spoken wishes can be misunderstood, or not followed. Treatments may be given even if you do not want them. An advance directive may make it easier for your family to make difficult choices about your care.   Fall Prevention    Fall prevention  includes ways to make your home and other areas safer. It also includes ways you can move more carefully to prevent a fall. Health conditions that cause changes in your blood pressure, vision, or muscle strength and coordination may increase your risk for falls. Medicines may also increase your risk  for falls if they make you dizzy, weak, or sleepy.   Fall prevention tips:   Stand or sit up slowly.    Use assistive devices as directed.    Wear shoes that fit well and have soles that .    Wear a personal alarm.    Stay active.    Manage your medical conditions.    Home Safety Tips:  Add items to prevent falls in the bathroom.    Keep paths clear.    Install bright lights in your home.    Keep items you use often on shelves within reach.    Paint or place reflective tape on the edges of your stairs.    Weight Management   Why it is important to manage your weight:  Being overweight increases your risk of health conditions such as heart disease, high blood pressure, type 2 diabetes, and certain types of cancer. It can also increase your risk for osteoarthritis, sleep apnea, and other respiratory problems. Aim for a slow, steady weight loss. Even a small amount of weight loss can lower your risk of health problems.  How to lose weight safely:  A safe and healthy way to lose weight is to eat fewer calories and get regular exercise. You can lose up about 1 pound a week by decreasing the number of calories you eat by 500 calories each day.   Healthy meal plan for weight management:  A healthy meal plan includes a variety of foods, contains fewer calories, and helps you stay healthy. A healthy meal plan includes the following:  Eat whole-grain foods more often.  A healthy meal plan should contain fiber. Fiber is the part of grains, fruits, and vegetables that is not broken down by your body. Whole-grain foods are healthy and provide extra fiber in your diet. Some examples of whole-grain foods are whole-wheat breads and pastas, oatmeal, brown rice, and bulgur.  Eat a variety of vegetables every day.  Include dark, leafy greens such as spinach, kale, joss greens, and mustard greens. Eat yellow and orange vegetables such as carrots, sweet potatoes, and winter squash.   Eat a variety of fruits every day.  Choose  fresh or canned fruit (canned in its own juice or light syrup) instead of juice. Fruit juice has very little or no fiber.  Eat low-fat dairy foods.  Drink fat-free (skim) milk or 1% milk. Eat fat-free yogurt and low-fat cottage cheese. Try low-fat cheeses such as mozzarella and other reduced-fat cheeses.  Choose meat and other protein foods that are low in fat.  Choose beans or other legumes such as split peas or lentils. Choose fish, skinless poultry (chicken or turkey), or lean cuts of red meat (beef or pork). Before you cook meat or poultry, cut off any visible fat.   Use less fat and oil.  Try baking foods instead of frying them. Add less fat, such as margarine, sour cream, regular salad dressing and mayonnaise to foods. Eat fewer high-fat foods. Some examples of high-fat foods include french fries, doughnuts, ice cream, and cakes.  Eat fewer sweets.  Limit foods and drinks that are high in sugar. This includes candy, cookies, regular soda, and sweetened drinks.  Exercise:  Exercise at least 30 minutes per day on most days of the week. Some examples of exercise include walking, biking, dancing, and swimming. You can also fit in more physical activity by taking the stairs instead of the elevator or parking farther away from stores. Ask your healthcare provider about the best exercise plan for you.      © Copyright [employer] 2018 Information is for End User's use only and may not be sold, redistributed or otherwise used for commercial purposes. All illustrations and images included in CareNotes® are the copyrighted property of A.D.A[employer]M., Inc. or [employer]

## 2025-04-03 NOTE — ASSESSMENT & PLAN NOTE
Urged healthy diet and regular formal exercise, recheck fasting BW in Nov - WILL ORDER AT NEXT APPT

## 2025-04-03 NOTE — PROGRESS NOTES
Name: Nj Pimentel Jr.      : 1945      MRN: [id number]  Encounter Provider: Dona Camp DO  Encounter Date: 4/3/2025   Encounter department: [facility] PRIMARY CARE [address]   :  Assessment & Plan  Recurrent major depressive disorder, in partial remission (HCC)  Just restarted WBXL earlier this week and notes no SE, [facility], recommended in the future he get all mood meds from psych as [facility] and that can results in medication issues, will follow       Mild dementia without behavioral disturbance, psychotic disturbance, mood disturbance, or anxiety, unspecified dementia type (HCC)  Seemingly progressing, pt doesn't recall our appt 2 days ago which makes me worried with his meds - his dgtr Janessa is doing his meds and was called and put on speaker during appt today with pts permission, discussed not driving with both parties, is following  with Neuro and Geriatrics       Benign hypertension with CKD (chronic kidney disease) stage III (HCC)  Lab Results   Component Value Date    EGFR 47 2024    EGFR 50 2024    EGFR 33 2024    CREATININE 1.41 (H) 2024    CREATININE 1.34 (H) 2024    CREATININE 1.85 (H) 2024   BP at goal, con't BW q 6 mos - WILL ORDER AT NEXT APPT         Aortic valve stenosis, etiology of cardiac valve disease unspecified  Echo from 10/24 reviewed, will re-eval in 1-2 yrs       Impaired fasting glucose  Urged healthy diet and regular formal exercise, recheck fasting BW in Nov - WILL ORDER AT NEXT APPT       Medicare annual wellness visit, subsequent         Severe obesity (BMI 35.0-39.9) with comorbidity (HCC)    Encouraged healthy diet and regular formal exercise and healthy t         Depression Screening and Follow-up Plan: Patient was screened for depression during today's encounter. They screened negative with a PHQ-9 score of 2.      Falls Plan of Care: balance,  strength, and gait training instructions were provided. Home safety education provided.       Preventive health issues were discussed with patient, and age appropriate screening tests were ordered as noted in patient's After Visit Summary. Personalized health advice and appropriate referrals for health education or preventive services given if needed, as noted in patient's After Visit Summary.    Colonoscopy no longer needed d/t age    PSA 4/24 - ordered by [facility] for 2025    BW 11/24      History of Present Illness     HPI  Pt here for follow up appt and AWV    Pt was seen earlier this week and restarted on WBXL for his depression. He has been taking the rx the past few days w/o SE.  He states mood is good today. He is sleeping well.  [facility] - most recent [facility] 4/1/25.      Pts dementia seems to be progressing. He is only on Gikgo Biloba. His Namenda was stopped d/t balance issues.  He saw Neuro in Feb 25 and was told to keep mentally active.  His dgtr states she bought him word puzzles and cross word puzzels.  He has a f/u with geriatrics July 25.     BP at goal today and meds were reviewed and no changes have occurred.  He denies missing doses of meds or SE with the meds.  He does not check his BP outside the office.  He notes no frequent HA's/dizziness/double vision/CP.     Pt has not seen Cardio since 2022 for his MR/AS.  He had an Echo 10/24 and MR and AS was still mild and EF 65%.  He notes no syncope/angina/dyspnea.      BW from Nov 24 reviewed : FBS/A1C 115/5.7.  He does not feel he has a sweet tooth and doesn't feel his diet is rich in carbs. He is walking for exercise.         Patient Care Team:  Dona Camp DO as PCP - General (Internal Medicine)  BLAS Mcintosh (Hematology and Oncology)  Ion De Anda MD (Otolaryngology)  Nitin Finley MD (Cardiology)  BLAS Mckeon as Nurse Practitioner (Urology)  Marj Schultz DO  "(Nephrology)    Review of Systems   Constitutional:  Negative for chills and fever.   HENT:  Negative for congestion and sore throat.    Eyes:  Negative for pain and visual disturbance.   Respiratory:  Negative for cough and shortness of breath.    Cardiovascular:  Negative for chest pain, palpitations and leg swelling.   Gastrointestinal:  Negative for abdominal pain, blood in stool, constipation, diarrhea, nausea and vomiting.   Genitourinary:  Negative for difficulty urinating, dysuria and hematuria.   Musculoskeletal:  Positive for arthralgias and back pain. Negative for gait problem.   Skin:  Negative for rash and wound.   Neurological:  Negative for dizziness, syncope and headaches.   Hematological:  Does not bruise/bleed easily.   Psychiatric/Behavioral:  Positive for confusion and dysphoric mood. Negative for sleep disturbance and suicidal ideas.      Medical History Reviewed by provider this encounter:  Tobacco  Allergies  Meds  Problems  Med Hx  Surg Hx  Fam Hx       Annual Wellness Visit Questionnaire   Nj is here for his Subsequent Wellness visit. Last Medicare Wellness visit information reviewed, patient interviewed and updates made to the record today.      Health Risk Assessment:   Patient rates overall health as fair. Patient feels that their physical health rating is slightly worse. Patient is satisfied with their life. Eyesight was rated as same. Hearing was rated as same. Patient feels that their emotional and mental health rating is same. Patients states they are sometimes angry. Patient states they are never, rarely unusually tired/fatigued. Pain experienced in the last 7 days has been some. Patient's pain rating has been 4/10. Patient states that he has experienced no weight loss or gain in last 6 months. Physical health is worse - \"cause I was feeling depressed\" and he has leg pains when he stands up    Depression Screening:   PHQ-9 Score: 2      Fall Risk Screening:   In the past " year, patient has experienced: history of falling in past year    Number of falls: 1  Injured during fall?: No    Feels unsteady when standing or walking?: No    Worried about falling?: No      Home Safety:  Patient does not have trouble with stairs inside or outside of their home. Patient has working smoke alarms and has working carbon monoxide detector. Home safety hazards include: none.     Nutrition:   Current diet is Regular.     Medications:   Patient is not currently taking any over-the-counter supplements. Patient is not able to manage medications. Daughter manages pt's medications.     Activities of Daily Living (ADLs)/Instrumental Activities of Daily Living (IADLs):   Walk and transfer into and out of bed and chair?: Yes  Dress and groom yourself?: Yes    Bathe or shower yourself?: Yes    Feed yourself? Yes  Do your laundry/housekeeping?: Yes  Manage your money, pay your bills and track your expenses?: Yes  Make your own meals?: Yes    Do your own shopping?: Yes    Previous Hospitalizations:   Any hospitalizations or ED visits within the last 12 months?: No      Advance Care Planning:   Living will: Yes    Durable POA for healthcare: Yes    Advanced directive: Yes      Cognitive Screening:   Provider or family/friend/caregiver concerned regarding cognition?: Yes    PREVENTIVE SCREENINGS      Cardiovascular Screening:    General: Screening Current and Risks and Benefits Discussed      Diabetes Screening:     General: Screening Current and Risks and Benefits Discussed      Colorectal Cancer Screening:     General: Screening Current      Prostate Cancer Screening:    General: Risks and Benefits Discussed    Due for: PSA      Osteoporosis Screening:    General: Risks and Benefits Discussed and Screening Not Indicated      Abdominal Aortic Aneurysm (AAA) Screening:    Risk factors include: tobacco use        General: Risks and Benefits Discussed and Patient Declines      Lung Cancer Screening:     General:  "Screening Not Indicated and Risks and Benefits Discussed      Hepatitis C Screening:    General: Screening Current and Risks and Benefits Discussed    Screening, Brief Intervention, and Referral to Treatment (SBIRT)     Screening  Typical number of drinks in a day: 0  Typical number of drinks in a week: 0  Interpretation: Low risk drinking behavior.    Single Item Drug Screening:  How often have you used an illegal drug (including marijuana) or a prescription medication for non-medical reasons in the past year? never    Single Item Drug Screen Score: 0  Interpretation: Negative screen for possible drug use disorder    Other Counseling Topics:   Car/seat belt/driving safety and regular weightbearing exercise.     Social Drivers of Health     Financial Resource Strain: Low Risk  (1/5/2024)    Overall Financial Resource Strain (CARDIA)     Difficulty of Paying Living Expenses: Not very hard   Food Insecurity: No Food Insecurity (4/3/2025)    Hunger Vital Sign     Worried About Running Out of Food in the Last Year: Never true     Ran Out of Food in the Last Year: Never true   Transportation Needs: No Transportation Needs (4/3/2025)    PRAPARE - Transportation     Lack of Transportation (Medical): No     Lack of Transportation (Non-Medical): No   Housing Stability: Low Risk  (4/3/2025)    Housing Stability Vital Sign     Unable to Pay for Housing in the Last Year: No     Number of Times Moved in the Last Year: 0     Homeless in the Last Year: No   Utilities: Not At Risk (4/3/2025)    [facility] Utilities     Threatened with loss of utilities: No     Vision Screening    Right eye Left eye Both eyes   Without correction 20/25 20/30 20/25   With correction          Objective   /72 (BP Location: Left arm, Patient Position: Sitting, Cuff Size: Large)   Pulse (!) 45   Temp (!) 96.9 °F (36.1 °C)   Ht 5' 6\" (1.676 m)   Wt 98.8 kg (217 lb 12.8 oz)   SpO2 96%   BMI 35.15 kg/m²     Physical Exam  Vitals and nursing note " reviewed.   Constitutional:       General: He is not in acute distress.     Appearance: He is well-developed. He is obese. He is not ill-appearing.   HENT:      Head: Normocephalic and atraumatic.      Right Ear: External ear normal. There is no impacted cerumen.      Left Ear: Tympanic membrane and external ear normal. There is no impacted cerumen.      Ears:      Comments: Small effusion R TM  Eyes:      General:         Right eye: No discharge.         Left eye: No discharge.      Conjunctiva/sclera: Conjunctivae normal.   Neck:      Vascular: No carotid bruit.      Trachea: No tracheal deviation.   Cardiovascular:      Rate and Rhythm: Normal rate and regular rhythm.      Heart sounds: Murmur heard.   Pulmonary:      Effort: Pulmonary effort is normal. No respiratory distress.      Breath sounds: Normal breath sounds. No wheezing, rhonchi or rales.   Abdominal:      General: There is no distension.      Palpations: Abdomen is soft.      Tenderness: There is no abdominal tenderness. There is no guarding or rebound.   Musculoskeletal:      Cervical back: Neck supple.      Right lower leg: No edema.      Left lower leg: No edema.   Lymphadenopathy:      Cervical: No cervical adenopathy.   Skin:     General: Skin is warm and dry.      Coloration: Skin is not pale.      Findings: No rash.   Neurological:      General: No focal deficit present.      Mental Status: He is alert. Mental status is at baseline.      Motor: No abnormal muscle tone.      Gait: Gait normal.   Psychiatric:         Mood and Affect: Mood normal.         Behavior: Behavior normal.      Comments: Dementia evident - poor STM

## 2025-04-03 NOTE — ASSESSMENT & PLAN NOTE
Just restarted WBXL earlier this week and notes no SE, [facility], recommended in the future he get all mood meds from psych as [facility] and that can results in medication issues, will follow

## 2025-04-03 NOTE — ASSESSMENT & PLAN NOTE
Lab Results   Component Value Date    EGFR 47 11/06/2024    EGFR 50 05/16/2024    EGFR 33 05/03/2024    CREATININE 1.41 (H) 11/06/2024    CREATININE 1.34 (H) 05/16/2024    CREATININE 1.85 (H) 05/03/2024   BP at goal, con't BW q 6 mos - WILL ORDER AT NEXT APPT

## 2025-04-03 NOTE — ASSESSMENT & PLAN NOTE
Seemingly progressing, pt doesn't recall our appt 2 days ago which makes me worried with his meds - his dgtr Janessa is doing his meds and was called and put on speaker during appt today with pts permission, discussed not driving with both parties, is following  with Neuro and Geriatrics

## 2025-04-08 ENCOUNTER — TELEPHONE (OUTPATIENT)
Dept: FAMILY MEDICINE CLINIC | Facility: HOSPITAL | Age: 80
End: 2025-04-08

## 2025-04-08 NOTE — TELEPHONE ENCOUNTER
Patient dropped off his memantine 5mg tabs and wellbutrin xl 300mg tabs.  States he was told to stop these meds.  OK to destroy these meds?    Patient feels that being on these meds contributed to him failing his 's test.    Patient asking if there is a process by which he can take the test over NOT being on these meds?

## 2025-04-08 NOTE — TELEPHONE ENCOUNTER
Please destroy the meds dropped off.  Please notify pt that he can review this with [facility] when he see's them for follow up.

## 2025-04-08 NOTE — TELEPHONE ENCOUNTER
Left msg on daughter Shayla's voicemail to return call to the office, and ask to be put thru to someone in the office. To discuss msg below regarding f/u of 's eval results.  Patient also needs to schedule 6 mo f/u with Neurology for August, appt was not scheduled at previous visit.

## 2025-04-09 ENCOUNTER — TELEPHONE (OUTPATIENT)
Age: 80
End: 2025-04-09

## 2025-04-09 NOTE — TELEPHONE ENCOUNTER
Patient stopped in today w/ his DMV forms.  Patient was advised to contact neuro to schedule appt.  He also asked them about the forms.  They will send a message to the provider to see if he can advise on patient retaking the driving test.

## 2025-04-09 NOTE — TELEPHONE ENCOUNTER
Patient called stating that he would like to have another 's test done as he was on medication that he feels altered his driving capabilities.  Patient states that he has forms for us to fill out. Could you please call him to discuss?    Thank you,  Helene

## 2025-04-10 ENCOUNTER — TELEPHONE (OUTPATIENT)
Dept: NEUROLOGY | Facility: CLINIC | Age: 80
End: 2025-04-10

## 2025-04-10 NOTE — TELEPHONE ENCOUNTER
What is the medication? He would need to undergo a repeat driving evaluation on the medication to prove that it is working before I would sign off on that.

## 2025-04-10 NOTE — TELEPHONE ENCOUNTER
Patient came into [facility] office and dropped off [place] forms to be completed by provider. He said that he does not want his DL taken away so he wanted to make you aware that he was taking a medication that he has since stopped that was making his reaction time slow. Please reach out to patient once complete and for any further questions.

## 2025-04-10 NOTE — TELEPHONE ENCOUNTER
Spoke to pt's daughter, she is aware if pt wants to re-take the test this needs to come from neuro, she didn't know pt had stopped in the office 3 days in a row, she is going to talk to her dad in regards to the drivers test and going forward

## 2025-04-10 NOTE — TELEPHONE ENCOUNTER
Patient called and hs forms that need to be filled out by neurology he stated.  Patient will drop off forms at the [place] office.

## 2025-04-10 NOTE — TELEPHONE ENCOUNTER
Incoming call from patient. Patient asked for the address for the [facility] office to drop off some papers. Provided patient the requested address. Patient was appreciative of my assistance. Nothing further needed.

## 2025-04-10 NOTE — TELEPHONE ENCOUNTER
Patient's Daughter returned call prior to the office opening. It is difficult for her to answer during working hours and asked if she could be called at noon or shortly after today. Thank you

## 2025-04-11 NOTE — TELEPHONE ENCOUNTER
LMOM for patient to return call back to the office, regarding medication he was taking but since stopped. See below message from provider.

## 2025-04-11 NOTE — TELEPHONE ENCOUNTER
Pt called in needed clarification on medication he was taking I tried to help base on his med list. Pt requested to talk to the office. I tried transferring, no answer. Offered to put message in for call back. Pt stated he will me making his way to the office to get the answered he needs.

## 2025-04-15 NOTE — TELEPHONE ENCOUNTER
While that certainly could have been a contributing factor, this is not necessarily a side effect of Wellbutrin that I am personally aware of. At this point, I would recommend that, if he is truly that convinced that it was what was affecting his driving, he repeat the testing, as otherwise we have no way of objectively guaranteeing that the medication was what was causing the abnormalities. I could order another FTD test through OT, but he would need to bear in mind that if this were still abnormal, our recommendations would be the same as previously, namely that he undergo a repeat on-the-road driving assessment.

## 2025-04-15 NOTE — TELEPHONE ENCOUNTER
Pt reported  he was prescribed Wellbutrin 300 mg daily but weaned off medication completely x2 weeks ago. Pt reported that his license will be taken away tomorrow and is requesting to discuss further with Dr. Mast. Pt would like to get another driving test completed ASAP.

## 2025-04-15 NOTE — TELEPHONE ENCOUNTER
Patient aware of recommendation;  He is receptive to retake his 's test;  said he is due to turn in his license tomorrow.Please enter order if in agreement.  Thank you.    Patient requesting cb when order placed.

## 2025-04-16 DIAGNOSIS — F03.90 DEMENTIA (HCC): Primary | ICD-10-CM

## 2025-04-16 NOTE — TELEPHONE ENCOUNTER
Pt daughter Janessa called in regarding pt taking a retest. They received a letter of recommendation for the pt. Pt daughter stated that it was to her understanding that this would be discussed at the next follow up appt. She also mentioned that she thought the follow up would be sooner before  leaves.     She is requesting a call back from  to discussed what the next step is with the driving test and the medication.       Janessa #: [phone]

## 2025-04-18 ENCOUNTER — TELEPHONE (OUTPATIENT)
Dept: NEUROLOGY | Facility: CLINIC | Age: 80
End: 2025-04-18

## 2025-04-18 NOTE — TELEPHONE ENCOUNTER
Called and spoke with patient's daughter today (daughter is listed as alternate  in chart).  Explained that, following the results of his mother driving assessment through [facility], the recommendation was that he cease driving, thus the appropriate form was filled out and sent in (either to [facility] to then be sent to [facility] or faxed directly to [facility] from our office).  Discussed that, to my knowledge, the Wellbutrin likely would not affected the results of the on the road driving assessment, and as such I cannot in good conscience recommend that he resume driving simply after having ceased that medication.  Discussed that my recommendation is actually that he, if he wishes to verify if he is driving deficiencies are still present off of the medication, complete a repeat on the road assessment through [facility]; I have placed an order for a fit to drive evaluation through OT as this is what I am able to place a referral for, however I do feel that the on the road assessment would be more accurate.    Answered further questions that she had to the best of my ability.  I agree with the scheduled follow-up noted per chart review.

## 2025-04-21 ENCOUNTER — TELEPHONE (OUTPATIENT)
Age: 80
End: 2025-04-21

## 2025-04-21 NOTE — TELEPHONE ENCOUNTER
Patient called as he had an on the road driving test through [facility] recently, unsure of exact date. He was informed he did not do well, and then was informed he would lose his license. 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    I read provider's note below, as pt informed he did speak to his daughter yesterday, not sure if she forgot to mention this information due to the holiday.    Pt advised when he was told he was going to lose his license he stopped taking the Wellbutrin 2 days after getting those results,  as he realized he did not feel like himself taking it and noticed he felt more hazy and things seem slower during that time when taking Wellbutrin. Since he stopped taking Wellbutrin he feels more clear minded and like himself.    Pt aware to contact his PCP to make her aware he stopped Wellbutrin.     Pt is willing to do the [facility] Fit to drive test, but prefers the on the road test be  redone with [facility]. I provided phone numbers for pt to call both locations to discuss to schedule test.    Pt has no further questions at this time

## 2025-04-21 NOTE — TELEPHONE ENCOUNTER
Patient wanted Dr. Camp know that he has stopped taking the Wellbutrin.  See previous note from patient's Neurologist regarding the patient getting his license back.

## 2025-04-22 NOTE — TELEPHONE ENCOUNTER
Pt called re: msg he recd. He did not listen to msg. Advised pt he did speak w/RN yesterday. Went over note below. Pt verbalized understanding. He will f/u [facility] re: on the road driving evaluation.

## 2025-04-24 NOTE — ASSESSMENT & PLAN NOTE
Reviewed carotid ultrasound from 2/17/25  R ICA widely patent  L ICA < 50% stenosis w/ velocities 121/18 ratio 1.54    -Denies symptoms of numbness/ tingling/ weakness on one side of the body, facial droop, slurred speech or blindness in one eye.   -Reviewed most recent carotid ultrasound results in detail with pt and discussed pathophysiology of arterial disease and indication for vascular intervention. No vascular intervention planned at this time. Discussed that we will continue with medical management and non-invasive imaging at this time.     Plan  -Complete carotid ultrasound in one year and return to the office for review.  -Continue Aspirin 81mg and Plavix daily.  -Continue Statin medication daily as prescribed by PCP.  -Call 911/ Go to the ER with any S/S of TIA/ CVA.  -Continue with BP control      Orders:    VAS carotid complete study; Future

## 2025-04-24 NOTE — PROGRESS NOTES
Name: Nj Pimentel Jr.      : 1945      MRN: [id number]  Encounter Provider: BLAS Kurtz  Encounter Date: 2025   Encounter department: [facility] [place]    80 year-old male with pmhx HTN, AVS, CAD, A-flutter, carotid stenosis with hx of R CEA 1/10/20 , PAD, aortoiliac occlusive disease w/ hx of R aortoiliac, CFA, SFA and PFA angio and EIA stent placement 23, OA, lumbar foraminal stenosis, nephrolithiasis, CKD stage 3, anemia, gout, HLD and obesity returns to the office for review of his non-invasive vascular studies.   Assessment & Plan  PAD (peripheral artery disease) (HCC)  Reviewed LEAD from 25  Rt: Diffuse dx Evidence suggestive of TP disease.  FARRAH: 1.90 (Prior 1.13)/ MP 82 mmHg/ GTP 51 mmHg     Lt:  50-75% stenosis within the CFA/ prox SFA and diffuse disease within the mid SFA. TP dx  FARRAH: NC (Prior: NC )/ MP 98mmHg/ GTP  39mmHg    -Denies true claudication, denies true rest pain, no wounds/ tissue loss.   -Reports b/l proximal medial thigh discomfort when going from sitting to standing.   -Pt reports L great ingrown toe removal with podiatry- area covered in the office. Discussed if he has any issues with healing he should call our office.   -Doppler B/L DP and PT signals.   -B/L feet are are warm and dry, no discoloration.    -Reviewed lower extremity ultrasound in detail with pt and answered all questions. Educated pt on peripheral arterial disease and indication for vascular intervention. No indications for vascular intervention at this time. Recommending continued surveillance with non-invasive imagining yearly with medical management at this time. Pt verbalized understanding.    Recommendations:  -Complete LEAD in one year and return to the office for review.  -Call the office with any new or worsening leg pain, discoloration, swelling, new wounds/ tissue loss.  -Continue to take aspirin 81 mg daily.  -Continue Plavix prescribed by PCP  -Continue to take  statin medication daily as per PCP.  -Do daily foot checks, food protection, wear well fitted shoes.  -Increase daily walking for 20-30 min everyday.      Orders:    VAS ARTERIAL DUPLEX- LOWER LIMB BILATERAL; Future    Aortoiliac occlusive disease (HCC)  2/17/25 AOIL 50-75% stenosis is noted in the right distal ONDINA diffuse dx in ONDINA patent right EIA/ stent.   > 70% stenosis is noted in the celiac artery.    -Pt denies any mesenteric ischemic symptoms such as post prandial abdominal pain, food fear or unintentional weight loss.  -Continue with yearly AOIL   -Call the office for any new or worsening symptoms  -Continue ASA/ palvix and statin therapy prescribed by PCP.      Orders:    VAS ABDOMINAL AORTA/ILIACS; WITH FARRAH'S; Future    Carotid stenosis, asymptomatic, bilateral  Reviewed carotid ultrasound from 2/17/25  R ICA widely patent  L ICA < 50% stenosis w/ velocities 121/18 ratio 1.54    -Denies symptoms of numbness/ tingling/ weakness on one side of the body, facial droop, slurred speech or blindness in one eye.   -Reviewed most recent carotid ultrasound results in detail with pt and discussed pathophysiology of arterial disease and indication for vascular intervention. No vascular intervention planned at this time. Discussed that we will continue with medical management and non-invasive imaging at this time.     Plan  -Complete carotid ultrasound in one year and return to the office for review.  -Continue Aspirin 81mg and Plavix daily.  -Continue Statin medication daily as prescribed by PCP.  -Call 911/ Go to the ER with any S/S of TIA/ CVA.  -Continue with BP control      Orders:    VAS carotid complete study; Future        History of Present Illness   HPI  Nj Pimentel . is a 80 y.o. male who presents HTN, AVS, CAD, A-flutter, carotid stenosis with hx of R CEA 1/10/20 , PAD, aortoiliac occlusive disease w/ hx of R aortoiliac, CFA, SFA and PFA angio and EIA stent placement 4/24/23, OA, lumbar foraminal  stenosis, nephrolithiasis, CKD stage 3, anemia, gout, HLD and obesity returns to the office for review of his non-invasive vascular studies.   Presents to the office with his daughter. His only complaint is b/l medial thigh pain when going from sitting to standing, reports an aching/ pressure sensation. We discussed this is not caused by vascular etiology.     -Pt denies any mesenteric ischemic symptoms such as post prandial abdominal pain, food fear or unintentional weight loss.   -Denies true claudication, denies rest pain, no wounds/ tissue.   -Recent L great ingrown toe removal, currently covered with podiatric dressing. Discussed if he has any issues with wound healing he should reach out to our office.   -Denies symptoms of numbness/ tingling/ weakness on one side of the body, facial droop, slurred speech or blindness in one eye.   -Compliant with ASA and atorvastatin daily.    History obtained from: patient    Review of Systems   Cardiovascular:  Positive for leg swelling.     Current Outpatient Medications on File Prior to Visit   Medication Sig Dispense Refill    Ascorbic Acid (vitamin C) 1000 MG tablet Take 1,000 mg by mouth daily      aspirin (ECOTRIN LOW STRENGTH) 81 mg EC tablet Take 81 mg by mouth every other day 1 every other day      atorvastatin (LIPITOR) 40 mg tablet TAKE 1 TABLET BY MOUTH ONCE DAILY IN THE EVENING 90 tablet 1    B Complex-C (SUPER B COMPLEX PO) Take 1 capsule by mouth in the morning      clopidogrel (PLAVIX) 75 mg tablet Take 1 tablet by mouth once daily 90 tablet 1    dextromethorphan-guaifenesin (MUCINEX DM)  MG per 12 hr tablet Take 1 tablet by mouth every 12 (twelve) hours as needed for cough 60 tablet 0    Diclofenac Sodium (VOLTAREN) 1 % Apply 2 g topically 4 (four) times a day 240 g 2    escitalopram (LEXAPRO) 20 mg tablet Take 1 tablet (20 mg total) by mouth daily 90 tablet 0    ferrous sulfate 324 (65 Fe) mg Take 1 tablet (324 mg total) by mouth daily before  "breakfast 30 tablet 2    finasteride (PROSCAR) 5 mg tablet Take 1 tablet (5 mg total) by mouth daily 90 tablet 1    fluticasone (FLONASE) 50 mcg/act nasal spray 1 spray into each nostril daily 9.9 mL 0    gabapentin (NEURONTIN) 400 mg capsule TAKE 1 CAPSULE BY MOUTH THREE TIMES DAILY 270 capsule 1    Ginkgo Biloba 120 MG CAPS Take 1 capsule by mouth in the morning      lisinopril (ZESTRIL) 10 mg tablet Take 1 tablet by mouth once daily 90 tablet 2    Magnesium 250 MG TABS Take 1 tablet (250 mg total) by mouth 2 (two) times a day 60 tablet 5    metoprolol succinate (TOPROL-XL) 25 mg 24 hr tablet Take 1 tablet by mouth once daily 90 tablet 1    Multiple Vitamin (multivitamin) capsule Take 1 capsule by mouth daily      omeprazole (PriLOSEC) 20 mg delayed release capsule Take 1 capsule by mouth once daily 90 capsule 0    tamsulosin (FLOMAX) 0.4 mg Take 1 capsule (0.4 mg total) by mouth daily with dinner 90 capsule 1    traZODone (DESYREL) 100 mg tablet Take 1 tablet (100 mg total) by mouth daily at bedtime 90 tablet 1    VITAMIN D, CHOLECALCIFEROL, PO Take 2,000 Units by mouth in the morning      buPROPion (WELLBUTRIN XL) 150 mg 24 hr tablet Take 1 tablet (150 mg total) by mouth daily (Patient not taking: Reported on 4/25/2025) 90 tablet 1     No current facility-administered medications on file prior to visit.         Objective   /78 (BP Location: Right arm, Patient Position: Sitting)   Pulse (!) 54   Ht 5' 6\" (1.676 m)   Wt 99.8 kg (220 lb)   SpO2 97%   BMI 35.51 kg/m²      Physical Exam  Vitals reviewed.   Constitutional:       General: He is not in acute distress.     Appearance: Normal appearance. He is obese. He is not ill-appearing.   HENT:      Head: Normocephalic and atraumatic.   Cardiovascular:      Rate and Rhythm: Normal rate and regular rhythm.      Pulses:           Radial pulses are 1+ on the right side and 1+ on the left side.        Dorsalis pedis pulses are detected w/ Doppler on the right " side and detected w/ Doppler on the left side.        Posterior tibial pulses are detected w/ Doppler on the right side and detected w/ Doppler on the left side.      Heart sounds: Murmur heard.   Pulmonary:      Effort: Pulmonary effort is normal. No respiratory distress.      Breath sounds: Normal breath sounds.   Musculoskeletal:         General: No tenderness.      Cervical back: Normal range of motion. No rigidity.      Right lower leg: No edema.      Left lower leg: No edema.   Skin:     General: Skin is warm and dry.      Findings: No bruising or erythema.   Neurological:      General: No focal deficit present.      Mental Status: He is alert and oriented to person, place, and time.      Sensory: No sensory deficit.      Motor: No weakness.      Gait: Gait normal.   Psychiatric:         Mood and Affect: Mood normal.         Behavior: Behavior normal.         Administrative Statements   I have spent a total time of 29 minutes in caring for this patient on the day of the visit/encounter including Diagnostic results, Instructions for management, Patient and family education, Risk factor reductions, Impressions, Documenting in the medical record, and Obtaining or reviewing history  .

## 2025-04-24 NOTE — ASSESSMENT & PLAN NOTE
2/17/25 AOIL 50-75% stenosis is noted in the right distal ONDINA diffuse dx in ONDINA patent right EIA/ stent.   > 70% stenosis is noted in the celiac artery.    -Pt denies any mesenteric ischemic symptoms such as post prandial abdominal pain, food fear or unintentional weight loss.  -Continue with yearly AOIL   -Call the office for any new or worsening symptoms  -Continue ASA/ palvix and statin therapy prescribed by PCP.      Orders:    VAS ABDOMINAL AORTA/ILIACS; WITH FARRAH'S; Future

## 2025-04-24 NOTE — ASSESSMENT & PLAN NOTE
Reviewed LEAD from 1/2/25  Rt: Diffuse dx Evidence suggestive of TP disease.  FARRAH: 1.90 (Prior 1.13)/ MP 82 mmHg/ GTP 51 mmHg     Lt:  50-75% stenosis within the CFA/ prox SFA and diffuse disease within the mid SFA. TP dx  FARRAH: NC (Prior: NC )/ MP 98mmHg/ GTP  39mmHg    -Denies true claudication, denies true rest pain, no wounds/ tissue loss.   -Reports b/l proximal medial thigh discomfort when going from sitting to standing.   -Pt reports L great ingrown toe removal with podiatry- area covered in the office. Discussed if he has any issues with healing he should call our office.   -Doppler B/L DP and PT signals.   -B/L feet are are warm and dry, no discoloration.    -Reviewed lower extremity ultrasound in detail with pt and answered all questions. Educated pt on peripheral arterial disease and indication for vascular intervention. No indications for vascular intervention at this time. Recommending continued surveillance with non-invasive imagining yearly with medical management at this time. Pt verbalized understanding.    Recommendations:  -Complete LEAD in one year and return to the office for review.  -Call the office with any new or worsening leg pain, discoloration, swelling, new wounds/ tissue loss.  -Continue to take aspirin 81 mg daily.  -Continue Plavix prescribed by PCP  -Continue to take statin medication daily as per PCP.  -Do daily foot checks, food protection, wear well fitted shoes.  -Increase daily walking for 20-30 min everyday.      Orders:    VAS ARTERIAL DUPLEX- LOWER LIMB BILATERAL; Future

## 2025-04-25 ENCOUNTER — OFFICE VISIT (OUTPATIENT)
Dept: VASCULAR SURGERY | Facility: CLINIC | Age: 80
End: 2025-04-25
Payer: COMMERCIAL

## 2025-04-25 VITALS
HEIGHT: 66 IN | WEIGHT: 220 LBS | SYSTOLIC BLOOD PRESSURE: 117 MMHG | BODY MASS INDEX: 35.36 KG/M2 | DIASTOLIC BLOOD PRESSURE: 78 MMHG | OXYGEN SATURATION: 97 % | HEART RATE: 54 BPM

## 2025-04-25 DIAGNOSIS — I74.09 AORTOILIAC OCCLUSIVE DISEASE (HCC): ICD-10-CM

## 2025-04-25 DIAGNOSIS — I73.9 PAD (PERIPHERAL ARTERY DISEASE) (HCC): Primary | ICD-10-CM

## 2025-04-25 DIAGNOSIS — I65.23 CAROTID STENOSIS, ASYMPTOMATIC, BILATERAL: ICD-10-CM

## 2025-04-25 PROCEDURE — 99214 OFFICE O/P EST MOD 30 MIN: CPT | Performed by: NURSE PRACTITIONER

## 2025-05-05 ENCOUNTER — TELEPHONE (OUTPATIENT)
Dept: FAMILY MEDICINE CLINIC | Facility: HOSPITAL | Age: 80
End: 2025-05-05

## 2025-05-05 NOTE — TELEPHONE ENCOUNTER
Patient asking if you can write a letter that he is no longer taking Namenda.  Patient feels the Namenda is what caused him to not do well on his previous driving exam.  He is scheduled to do another  exam 5/16/25.    Pls advise.

## 2025-05-13 DIAGNOSIS — I73.9 PERIPHERAL ARTERY DISEASE (HCC): ICD-10-CM

## 2025-05-13 RX ORDER — CLOPIDOGREL BISULFATE 75 MG/1
75 TABLET ORAL DAILY
Qty: 90 TABLET | Refills: 1 | Status: SHIPPED | OUTPATIENT
Start: 2025-05-13

## 2025-05-16 ENCOUNTER — OFFICE VISIT (OUTPATIENT)
Dept: OCCUPATIONAL THERAPY | Facility: CLINIC | Age: 80
End: 2025-05-16
Attending: STUDENT IN AN ORGANIZED HEALTH CARE EDUCATION/TRAINING PROGRAM
Payer: COMMERCIAL

## 2025-05-16 DIAGNOSIS — F03.90 DEMENTIA, UNSPECIFIED DEMENTIA SEVERITY, UNSPECIFIED DEMENTIA TYPE, UNSPECIFIED WHETHER BEHAVIORAL, PSYCHOTIC, OR MOOD DISTURBANCE OR ANXIETY (HCC): Primary | ICD-10-CM

## 2025-05-16 PROCEDURE — 97166 OT EVAL MOD COMPLEX 45 MIN: CPT

## 2025-05-16 PROCEDURE — 96125 COGNITIVE TEST BY HC PRO: CPT
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
## 2025-05-28 DIAGNOSIS — I10 PRIMARY HYPERTENSION: Chronic | ICD-10-CM

## 2025-05-30 DIAGNOSIS — I10 PRIMARY HYPERTENSION: Chronic | ICD-10-CM

## 2025-05-30 RX ORDER — LISINOPRIL 10 MG/1
10 TABLET ORAL DAILY
Qty: 90 TABLET | Refills: 1 | Status: SHIPPED | OUTPATIENT
Start: 2025-05-30

## 2025-05-30 RX ORDER — LISINOPRIL 10 MG/1
10 TABLET ORAL DAILY
Qty: 90 TABLET | Refills: 0 | OUTPATIENT
Start: 2025-05-30

## 2025-06-03 DIAGNOSIS — K29.00 OTHER ACUTE GASTRITIS WITHOUT HEMORRHAGE: ICD-10-CM

## 2025-06-04 RX ORDER — OMEPRAZOLE 20 MG/1
20 CAPSULE, DELAYED RELEASE ORAL DAILY
Qty: 90 CAPSULE | Refills: 1 | Status: SHIPPED | OUTPATIENT
Start: 2025-06-04

## 2025-06-09 ENCOUNTER — APPOINTMENT (OUTPATIENT)
Dept: RADIOLOGY | Facility: HOSPITAL | Age: 80
DRG: 871 | End: 2025-06-09
Payer: COMMERCIAL

## 2025-06-09 ENCOUNTER — APPOINTMENT (EMERGENCY)
Dept: CT IMAGING | Facility: HOSPITAL | Age: 80
DRG: 871 | End: 2025-06-09
Payer: COMMERCIAL

## 2025-06-09 ENCOUNTER — HOSPITAL ENCOUNTER (INPATIENT)
Facility: HOSPITAL | Age: 80
LOS: 2 days | Discharge: HOME | DRG: 871 | End: 2025-06-11
Attending: EMERGENCY MEDICINE | Admitting: STUDENT IN AN ORGANIZED HEALTH CARE EDUCATION/TRAINING PROGRAM
Payer: COMMERCIAL

## 2025-06-09 DIAGNOSIS — A41.9 SEPSIS DUE TO PNEUMONIA (HCC): ICD-10-CM

## 2025-06-09 DIAGNOSIS — J18.9 SEPSIS DUE TO PNEUMONIA (HCC): ICD-10-CM

## 2025-06-09 DIAGNOSIS — J18.9 PNEUMONIA: Primary | ICD-10-CM

## 2025-06-09 PROBLEM — R93.89 ABNORMAL CT SCAN: Status: ACTIVE | Noted: 2025-06-09

## 2025-06-09 LAB
2HR DELTA HS TROPONIN: 4 NG/L
ALBUMIN SERPL BCG-MCNC: 4.5 G/DL (ref 3.5–5)
ALP SERPL-CCNC: 47 U/L (ref 34–104)
ALT SERPL W P-5'-P-CCNC: 14 U/L (ref 7–52)
ANION GAP SERPL CALCULATED.3IONS-SCNC: 8 MMOL/L (ref 4–13)
APTT PPP: 27 SECONDS (ref 23–34)
AST SERPL W P-5'-P-CCNC: 23 U/L (ref 13–39)
BASOPHILS # BLD AUTO: 0.03 THOUSANDS/ÂΜL (ref 0–0.1)
BASOPHILS NFR BLD AUTO: 0 % (ref 0–1)
BILIRUB SERPL-MCNC: 0.97 MG/DL (ref 0.2–1)
BUN SERPL-MCNC: 27 MG/DL (ref 5–25)
CALCIUM SERPL-MCNC: 9.6 MG/DL (ref 8.4–10.2)
CARDIAC TROPONIN I PNL SERPL HS: 11 NG/L (ref ?–50)
CARDIAC TROPONIN I PNL SERPL HS: 7 NG/L (ref ?–50)
CHLORIDE SERPL-SCNC: 103 MMOL/L (ref 96–108)
CO2 SERPL-SCNC: 29 MMOL/L (ref 21–32)
CREAT SERPL-MCNC: 1.43 MG/DL (ref 0.6–1.3)
EOSINOPHIL # BLD AUTO: 0.09 THOUSAND/ÂΜL (ref 0–0.61)
EOSINOPHIL NFR BLD AUTO: 1 % (ref 0–6)
ERYTHROCYTE [DISTWIDTH] IN BLOOD BY AUTOMATED COUNT: 11.9 % (ref 11.6–15.1)
FLUAV RNA RESP QL NAA+PROBE: NEGATIVE
FLUBV RNA RESP QL NAA+PROBE: NEGATIVE
GFR SERPL CREATININE-BSD FRML MDRD: 45 ML/MIN/1.73SQ M
GLUCOSE SERPL-MCNC: 155 MG/DL (ref 65–140)
HCT VFR BLD AUTO: 45.6 % (ref 36.5–49.3)
HGB BLD-MCNC: 15.1 G/DL (ref 12–17)
IMM GRANULOCYTES # BLD AUTO: 0.06 THOUSAND/UL (ref 0–0.2)
IMM GRANULOCYTES NFR BLD AUTO: 1 % (ref 0–2)
INR PPP: 1.12 (ref 0.85–1.19)
LACTATE SERPL-SCNC: 1.4 MMOL/L (ref 0.5–2)
LYMPHOCYTES # BLD AUTO: 0.57 THOUSANDS/ÂΜL (ref 0.6–4.47)
LYMPHOCYTES NFR BLD AUTO: 5 % (ref 14–44)
MCH RBC QN AUTO: 31.9 PG (ref 26.8–34.3)
MCHC RBC AUTO-ENTMCNC: 33.1 G/DL (ref 31.4–37.4)
MCV RBC AUTO: 96 FL (ref 82–98)
MONOCYTES # BLD AUTO: 0.62 THOUSAND/ÂΜL (ref 0.17–1.22)
MONOCYTES NFR BLD AUTO: 5 % (ref 4–12)
NEUTROPHILS # BLD AUTO: 10.32 THOUSANDS/ÂΜL (ref 1.85–7.62)
NEUTS SEG NFR BLD AUTO: 88 % (ref 43–75)
NRBC BLD AUTO-RTO: 0 /100 WBCS
PLATELET # BLD AUTO: 166 THOUSANDS/UL (ref 149–390)
PMV BLD AUTO: 8.9 FL (ref 8.9–12.7)
POTASSIUM SERPL-SCNC: 4.4 MMOL/L (ref 3.5–5.3)
PROCALCITONIN SERPL-MCNC: 1.47 NG/ML
PROT SERPL-MCNC: 7.1 G/DL (ref 6.4–8.4)
PROTHROMBIN TIME: 15 SECONDS (ref 12.3–15)
RBC # BLD AUTO: 4.74 MILLION/UL (ref 3.88–5.62)
RSV RNA RESP QL NAA+PROBE: NEGATIVE
SARS-COV-2 RNA RESP QL NAA+PROBE: NEGATIVE
SODIUM SERPL-SCNC: 140 MMOL/L (ref 135–147)
WBC # BLD AUTO: 11.69 THOUSAND/UL (ref 4.31–10.16)

## 2025-06-09 PROCEDURE — 85025 COMPLETE CBC W/AUTO DIFF WBC: CPT | Performed by: EMERGENCY MEDICINE

## 2025-06-09 PROCEDURE — 80053 COMPREHEN METABOLIC PANEL: CPT | Performed by: EMERGENCY MEDICINE

## 2025-06-09 PROCEDURE — 96374 THER/PROPH/DIAG INJ IV PUSH: CPT

## 2025-06-09 PROCEDURE — 36415 COLL VENOUS BLD VENIPUNCTURE: CPT | Performed by: EMERGENCY MEDICINE

## 2025-06-09 PROCEDURE — 87081 CULTURE SCREEN ONLY: CPT | Performed by: STUDENT IN AN ORGANIZED HEALTH CARE EDUCATION/TRAINING PROGRAM

## 2025-06-09 PROCEDURE — 71046 X-RAY EXAM CHEST 2 VIEWS: CPT

## 2025-06-09 PROCEDURE — 84484 ASSAY OF TROPONIN QUANT: CPT | Performed by: EMERGENCY MEDICINE

## 2025-06-09 PROCEDURE — 83605 ASSAY OF LACTIC ACID: CPT | Performed by: EMERGENCY MEDICINE

## 2025-06-09 PROCEDURE — 99223 1ST HOSP IP/OBS HIGH 75: CPT | Performed by: STUDENT IN AN ORGANIZED HEALTH CARE EDUCATION/TRAINING PROGRAM

## 2025-06-09 PROCEDURE — 99285 EMERGENCY DEPT VISIT HI MDM: CPT | Performed by: EMERGENCY MEDICINE

## 2025-06-09 PROCEDURE — 84145 PROCALCITONIN (PCT): CPT | Performed by: EMERGENCY MEDICINE

## 2025-06-09 PROCEDURE — 71275 CT ANGIOGRAPHY CHEST: CPT

## 2025-06-09 PROCEDURE — 0241U HB NFCT DS VIR RESP RNA 4 TRGT: CPT | Performed by: STUDENT IN AN ORGANIZED HEALTH CARE EDUCATION/TRAINING PROGRAM

## 2025-06-09 PROCEDURE — 87040 BLOOD CULTURE FOR BACTERIA: CPT | Performed by: EMERGENCY MEDICINE

## 2025-06-09 PROCEDURE — 85610 PROTHROMBIN TIME: CPT | Performed by: EMERGENCY MEDICINE

## 2025-06-09 PROCEDURE — 93005 ELECTROCARDIOGRAM TRACING: CPT

## 2025-06-09 PROCEDURE — 85730 THROMBOPLASTIN TIME PARTIAL: CPT | Performed by: EMERGENCY MEDICINE

## 2025-06-09 PROCEDURE — 99285 EMERGENCY DEPT VISIT HI MDM: CPT

## 2025-06-09 PROCEDURE — 94664 DEMO&/EVAL PT USE INHALER: CPT

## 2025-06-09 RX ORDER — ACETAMINOPHEN 325 MG/1
650 TABLET ORAL EVERY 6 HOURS PRN
Status: DISCONTINUED | OUTPATIENT
Start: 2025-06-09 | End: 2025-06-11 | Stop reason: HOSPADM

## 2025-06-09 RX ORDER — TAMSULOSIN HYDROCHLORIDE 0.4 MG/1
0.4 CAPSULE ORAL
Status: DISCONTINUED | OUTPATIENT
Start: 2025-06-09 | End: 2025-06-11 | Stop reason: HOSPADM

## 2025-06-09 RX ORDER — CEFTRIAXONE 1 G/50ML
1000 INJECTION, SOLUTION INTRAVENOUS EVERY 24 HOURS
Status: DISCONTINUED | OUTPATIENT
Start: 2025-06-10 | End: 2025-06-11 | Stop reason: HOSPADM

## 2025-06-09 RX ORDER — ESCITALOPRAM OXALATE 20 MG/1
20 TABLET ORAL DAILY
Status: DISCONTINUED | OUTPATIENT
Start: 2025-06-10 | End: 2025-06-11 | Stop reason: HOSPADM

## 2025-06-09 RX ORDER — HEPARIN SODIUM 5000 [USP'U]/ML
5000 INJECTION, SOLUTION INTRAVENOUS; SUBCUTANEOUS EVERY 8 HOURS SCHEDULED
Status: DISCONTINUED | OUTPATIENT
Start: 2025-06-09 | End: 2025-06-11 | Stop reason: HOSPADM

## 2025-06-09 RX ORDER — LISINOPRIL 10 MG/1
10 TABLET ORAL DAILY
Status: DISCONTINUED | OUTPATIENT
Start: 2025-06-10 | End: 2025-06-11 | Stop reason: HOSPADM

## 2025-06-09 RX ORDER — METOPROLOL SUCCINATE 25 MG/1
25 TABLET, EXTENDED RELEASE ORAL DAILY
Status: DISCONTINUED | OUTPATIENT
Start: 2025-06-10 | End: 2025-06-11 | Stop reason: HOSPADM

## 2025-06-09 RX ORDER — ASPIRIN 81 MG/1
81 TABLET ORAL EVERY OTHER DAY
Status: DISCONTINUED | OUTPATIENT
Start: 2025-06-09 | End: 2025-06-11 | Stop reason: HOSPADM

## 2025-06-09 RX ORDER — MAGNESIUM HYDROXIDE/ALUMINUM HYDROXICE/SIMETHICONE 120; 1200; 1200 MG/30ML; MG/30ML; MG/30ML
30 SUSPENSION ORAL EVERY 6 HOURS PRN
Status: DISCONTINUED | OUTPATIENT
Start: 2025-06-09 | End: 2025-06-11 | Stop reason: HOSPADM

## 2025-06-09 RX ORDER — CLOPIDOGREL BISULFATE 75 MG/1
75 TABLET ORAL DAILY
Status: DISCONTINUED | OUTPATIENT
Start: 2025-06-10 | End: 2025-06-11 | Stop reason: HOSPADM

## 2025-06-09 RX ORDER — PANTOPRAZOLE SODIUM 40 MG/1
40 TABLET, DELAYED RELEASE ORAL
Status: DISCONTINUED | OUTPATIENT
Start: 2025-06-10 | End: 2025-06-11 | Stop reason: HOSPADM

## 2025-06-09 RX ORDER — TRAZODONE HYDROCHLORIDE 100 MG/1
100 TABLET ORAL
Status: DISCONTINUED | OUTPATIENT
Start: 2025-06-09 | End: 2025-06-11 | Stop reason: HOSPADM

## 2025-06-09 RX ORDER — FINASTERIDE 5 MG/1
5 TABLET, FILM COATED ORAL DAILY
Status: DISCONTINUED | OUTPATIENT
Start: 2025-06-10 | End: 2025-06-11 | Stop reason: HOSPADM

## 2025-06-09 RX ORDER — ONDANSETRON 2 MG/ML
4 INJECTION INTRAMUSCULAR; INTRAVENOUS EVERY 4 HOURS PRN
Status: DISCONTINUED | OUTPATIENT
Start: 2025-06-09 | End: 2025-06-11 | Stop reason: HOSPADM

## 2025-06-09 RX ORDER — CEFEPIME HYDROCHLORIDE 2 G/50ML
2000 INJECTION, SOLUTION INTRAVENOUS ONCE
Status: COMPLETED | OUTPATIENT
Start: 2025-06-09 | End: 2025-06-09

## 2025-06-09 RX ORDER — GUAIFENESIN 600 MG/1
600 TABLET, EXTENDED RELEASE ORAL 2 TIMES DAILY
Status: DISCONTINUED | OUTPATIENT
Start: 2025-06-09 | End: 2025-06-11 | Stop reason: HOSPADM

## 2025-06-09 RX ORDER — POLYETHYLENE GLYCOL 3350 17 G/17G
17 POWDER, FOR SOLUTION ORAL DAILY PRN
Status: DISCONTINUED | OUTPATIENT
Start: 2025-06-09 | End: 2025-06-11 | Stop reason: HOSPADM

## 2025-06-09 RX ORDER — BENZONATATE 100 MG/1
100 CAPSULE ORAL 3 TIMES DAILY PRN
Status: DISCONTINUED | OUTPATIENT
Start: 2025-06-09 | End: 2025-06-11 | Stop reason: HOSPADM

## 2025-06-09 RX ORDER — GABAPENTIN 400 MG/1
400 CAPSULE ORAL 3 TIMES DAILY
Status: DISCONTINUED | OUTPATIENT
Start: 2025-06-09 | End: 2025-06-11 | Stop reason: HOSPADM

## 2025-06-09 RX ORDER — SODIUM CHLORIDE, SODIUM GLUCONATE, SODIUM ACETATE, POTASSIUM CHLORIDE, MAGNESIUM CHLORIDE, SODIUM PHOSPHATE, DIBASIC, AND POTASSIUM PHOSPHATE .53; .5; .37; .037; .03; .012; .00082 G/100ML; G/100ML; G/100ML; G/100ML; G/100ML; G/100ML; G/100ML
1000 INJECTION, SOLUTION INTRAVENOUS ONCE
Status: COMPLETED | OUTPATIENT
Start: 2025-06-09 | End: 2025-06-10

## 2025-06-09 RX ORDER — ATORVASTATIN CALCIUM 40 MG/1
40 TABLET, FILM COATED ORAL EVERY EVENING
Status: DISCONTINUED | OUTPATIENT
Start: 2025-06-09 | End: 2025-06-11 | Stop reason: HOSPADM

## 2025-06-09 RX ADMIN — CEFEPIME HYDROCHLORIDE 2000 MG: 2 INJECTION, SOLUTION INTRAVENOUS at 16:43

## 2025-06-09 RX ADMIN — HEPARIN SODIUM 5000 UNITS: 5000 INJECTION INTRAVENOUS; SUBCUTANEOUS at 21:30

## 2025-06-09 RX ADMIN — ATORVASTATIN CALCIUM 40 MG: 40 TABLET, FILM COATED ORAL at 18:11

## 2025-06-09 RX ADMIN — SODIUM CHLORIDE, SODIUM GLUCONATE, SODIUM ACETATE, POTASSIUM CHLORIDE, MAGNESIUM CHLORIDE, SODIUM PHOSPHATE, DIBASIC, AND POTASSIUM PHOSPHATE 1000 ML: .53; .5; .37; .037; .03; .012; .00082 INJECTION, SOLUTION INTRAVENOUS at 17:49

## 2025-06-09 RX ADMIN — GUAIFENESIN 600 MG: 600 TABLET ORAL at 18:11

## 2025-06-09 RX ADMIN — TAMSULOSIN HYDROCHLORIDE 0.4 MG: 0.4 CAPSULE ORAL at 18:11

## 2025-06-09 RX ADMIN — IOHEXOL 85 ML: 350 INJECTION, SOLUTION INTRAVENOUS at 13:48

## 2025-06-09 RX ADMIN — GABAPENTIN 400 MG: 400 CAPSULE ORAL at 21:30

## 2025-06-09 RX ADMIN — TRAZODONE HYDROCHLORIDE 100 MG: 100 TABLET ORAL at 21:30

## 2025-06-09 RX ADMIN — ASPIRIN 81 MG: 81 TABLET, COATED ORAL at 18:11

## 2025-06-09 NOTE — ED PROVIDER NOTES
Time reflects when diagnosis was documented in both MDM as applicable and the Disposition within this note       Time User Action Codes Description Comment    6/9/2025  4:37 PM Brent Saez Add [J18.9] Pneumonia           ED Disposition       ED Disposition   Admit    Condition   Stable    Date/Time   Mon Jun 9, 2025  4:37 PM    Comment   Case was discussed with ZACH and the patient's admission status was agreed to be Admission Status: inpatient status to the service of Dr. Mireles .               Assessment & Plan       Medical Decision Making  80-year-old male presenting with chest pain and shortness of breath.  Noted to be tachycardic.  Obtain cardiopulmonary evaluation with labs, EKG, CTA PE study given evidence of pleurisy.    Chest x-ray with possible pneumonia.  Will add further sepsis evaluation with procalcitonin, lactic acid.    Amount and/or Complexity of Data Reviewed  Labs: ordered.  Radiology: ordered.    Risk  Prescription drug management.  Decision regarding hospitalization.             Medications   cefTRIAXone (ROCEPHIN) IVPB (premix in dextrose) 1,000 mg 50 mL (has no administration in time range)   acetaminophen (TYLENOL) tablet 650 mg (has no administration in time range)   ondansetron (ZOFRAN) injection 4 mg (has no administration in time range)   polyethylene glycol (MIRALAX) packet 17 g (has no administration in time range)   iohexol (OMNIPAQUE) 350 MG/ML injection (MULTI-DOSE) 85 mL (85 mL Intravenous Given 6/9/25 1348)   cefepime (MAXIPIME) IVPB (premix in dextrose) 2,000 mg 50 mL (0 mg Intravenous Stopped 6/9/25 1713)   multi-electrolyte (Plasmalyte-A/Isolyte-S PH 7.4/Normosol-R) IV solution 1,000 mL (has no administration in time range)       ED Risk Strat Scores                    No data recorded        SBIRT 22yo+      Flowsheet Row Most Recent Value   Initial Alcohol Screen: US AUDIT-C     1. How often do you have a drink containing alcohol? 0 Filed at: 06/09/2025 1211   2. How many  drinks containing alcohol do you have on a typical day you are drinking?  0 Filed at: 06/09/2025 1211   3b. FEMALE Any Age, or MALE 65+: How often do you have 4 or more drinks on one occassion? 0 Filed at: 06/09/2025 1211   Audit-C Score 0 Filed at: 06/09/2025 1211   JERONIMO: How many times in the past year have you...    Used an illegal drug or used a prescription medication for non-medical reasons? Never Filed at: 06/09/2025 1211                            History of Present Illness       Chief Complaint   Patient presents with    Chest Pain     Pt states that started with chest pain soon after he woke up this morning while he was driving. Pt states that he then started to become dizzy whenever he would take a deep breath and reports that his CP worsens when taking a deep breath.       Past Medical History[1]   Past Surgical History[2]   Family History[3]   Social History[4]   E-Cigarette/Vaping    E-Cigarette Use Never User       E-Cigarette/Vaping Substances    Nicotine No     THC No     CBD No     Flavoring No     Other No     Unknown No       I have reviewed and agree with the history as documented.     80-year-old male presents for evaluation of chest pain that started this morning.  Patient states pain is improved since initial onset.  Exacerbated with deep breathing.  Denies having similar symptoms previously.  Denies fever. Does report non productive cough over the last few weeks.         Review of Systems   Respiratory:  Positive for shortness of breath.    Cardiovascular:  Positive for chest pain.           Objective       ED Triage Vitals [06/09/25 1211]   Temperature Pulse Blood Pressure Respirations SpO2 Patient Position - Orthostatic VS   98.6 °F (37 °C) (!) 117 110/61 18 97 % Sitting      Temp Source Heart Rate Source BP Location FiO2 (%) Pain Score    Temporal Monitor Left arm -- 5      Vitals      Date and Time Temp Pulse SpO2 Resp BP Pain Score FACES Pain Rating User   06/10/25 9754 -- -- -- -- --  No Pain -- DS   06/10/25 0736 97.9 °F (36.6 °C) -- -- 20 -- -- -- DS   06/10/25 0736 -- 60 90 % -- 141/66 -- -- DII   06/09/25 2235 -- -- -- -- -- No Pain -- KA   06/09/25 2147 98.2 °F (36.8 °C) -- -- -- -- -- -- AG   06/09/25 2147 -- 60 93 % -- 120/61 -- -- DII   06/09/25 1831 -- -- -- -- -- No Pain -- EA   06/09/25 1815 -- -- 89 % -- -- -- -- MG   06/09/25 1814 -- 83 89 % -- -- -- -- MG   06/09/25 1805 -- -- -- -- -- No Pain -- EA   06/09/25 1804 98.6 °F (37 °C) 95 -- 20 115/62 -- -- EA   06/09/25 1757 -- 95 89 % -- 115/62 -- -- DII   06/09/25 1755 98.6 °F (37 °C) 95 90 % 20 115/62 -- -- EA   06/09/25 1651 -- -- -- 26 -- -- -- AM   06/09/25 1600 -- 94 -- -- -- -- -- AM   06/09/25 1600 -- -- 95 % 19 105/57 -- -- AD   06/09/25 1328 -- 110 97 % 18 137/70 -- -- ML   06/09/25 1211 98.6 °F (37 °C) 117 97 % 18 110/61 5 -- RN            Physical Exam  Vitals and nursing note reviewed.   Constitutional:       General: He is not in acute distress.     Appearance: He is well-developed.   HENT:      Head: Normocephalic and atraumatic.      Right Ear: External ear normal.      Left Ear: External ear normal.      Nose: Nose normal.     Eyes:      General: No scleral icterus.    Pulmonary:      Effort: Pulmonary effort is normal. No respiratory distress.   Abdominal:      General: There is no distension.      Palpations: Abdomen is soft.     Musculoskeletal:         General: No deformity. Normal range of motion.      Cervical back: Normal range of motion and neck supple.     Skin:     General: Skin is warm.      Findings: No rash.     Neurological:      General: No focal deficit present.      Mental Status: He is alert.      Gait: Gait normal.     Psychiatric:         Mood and Affect: Mood normal.         Results Reviewed       Procedure Component Value Units Date/Time    Legionella antigen, urine [[id number]]  (Normal) Collected: 06/10/25 0207    Lab Status: Final result Specimen: Urine, Other Updated: 06/10/25 0727      Legionella Urinary Antigen Negative    Strep Pneumoniae, Urine [[id number]]  (Normal) Collected: 06/10/25 0207    Lab Status: Final result Specimen: Urine, Other Updated: 06/10/25 0711     Strep pneumoniae antigen, urine Negative    Blood culture #1 [[id number]] Collected: 06/09/25 1533    Lab Status: Preliminary result Specimen: Blood from Arm, Right Updated: 06/09/25 2123     Blood Culture Received in Microbiology Lab. Culture in Progress.    Blood culture #2 [[id number]] Collected: 06/09/25 1533    Lab Status: Preliminary result Specimen: Blood from Arm, Right Updated: 06/09/25 2101     Blood Culture Received in Microbiology Lab. Culture in Progress.    COVID19, Influenza A/B, RSV PCR, [facility] [[id number]]  (Normal) Collected: 06/09/25 1752    Lab Status: Final result Specimen: Nares from Nose Updated: 06/09/25 1842     SARS-CoV-2 Negative     INFLUENZA A PCR Negative     INFLUENZA B PCR Negative     RSV PCR Negative    Narrative:      This test has been performed using the CoV-2/Flu/RSV plus assay on the my[employer] platform. This test has been validated by the  and verified by the performing laboratory.     This test is designed to amplify and detect the following: nucleocapsid (N), envelope (E), and RNA-dependent RNA polymerase (RdRP) genes of the SARS-CoV-2 genome; matrix (M), basic polymerase (PB2), and acidic protein (PA) segments of the influenza A genome; matrix (M) and non-structural protein (NS) segments of the influenza B genome, and the nucleocapsid genes of RSV A and RSV B.     Positive results are indicative of the presence of Flu A, Flu B, RSV, and/or SARS-CoV-2 RNA. Positive results for SARS-CoV-2 or suspected novel influenza should be reported to state, local, or federal health departments according to local reporting requirements.      All results should be assessed in conjunction with clinical presentation and other laboratory markers for clinical management.     FOR PEDIATRIC  PATIENTS - copy/paste COVID Guidelines URL to browser: [email or web address]       MRSA culture [[id number]] Collected: 06/09/25 1752    Lab Status: In process Specimen: Nares from Nose Updated: 06/09/25 1803    Sputum culture and Gram stain [[id number]]     Lab Status: No result Specimen: Sputum     Procalcitonin [[id number]]  (Abnormal) Collected: 06/09/25 1533    Lab Status: Final result Specimen: Blood from Arm, Right Updated: 06/09/25 1633     Procalcitonin 1.47 ng/ml     Protime-INR [[id number]]  (Normal) Collected: 06/09/25 1533    Lab Status: Final result Specimen: Blood from Arm, Right Updated: 06/09/25 1557     Protime 15.0 seconds      INR 1.12    Narrative:      INR Therapeutic Range    Indication                                             INR Range      Atrial Fibrillation                                               2.0-3.0  Hypercoagulable State                                    2.0.2.3  Left Ventricular Asist Device                            2.0-3.0  Mechanical Heart Valve                                  -    Aortic(with afib, MI, embolism, HF, LA enlargement,    and/or coagulopathy)                                     2.0-3.0 (2.5-3.5)     Mitral                                                             2.5-3.5  Prosthetic/Bioprosthetic Heart Valve               2.0-3.0  Venous thromboembolism (VTE: VT, PE        2.0-3.0    APTT [[id number]]  (Normal) Collected: 06/09/25 1533    Lab Status: Final result Specimen: Blood from Arm, Right Updated: 06/09/25 1557     PTT 27 seconds     Lactic acid [[id number]]  (Normal) Collected: 06/09/25 1533    Lab Status: Final result Specimen: Blood from Arm, Right Updated: 06/09/25 1556     LACTIC ACID 1.4 mmol/L     Narrative:      Result may be elevated if tourniquet was used during collection.    HS Troponin I 2hr [[id number]]  (Normal) Collected: 06/09/25 1435    Lab Status: Final result Specimen: Blood from  Arm, Left Updated: 06/09/25 1509     hs TnI 2hr 11 ng/L      Delta 2hr hsTnI 4 ng/L     HS Troponin 0hr (reflex protocol) [[id number]]  (Normal) Collected: 06/09/25 1215    Lab Status: Final result Specimen: Blood from Arm, Right Updated: 06/09/25 1257     hs TnI 0hr 7 ng/L     Comprehensive metabolic panel [[id number]]  (Abnormal) Collected: 06/09/25 1215    Lab Status: Final result Specimen: Blood from Arm, Right Updated: 06/09/25 1241     Sodium 140 mmol/L      Potassium 4.4 mmol/L      Chloride 103 mmol/L      CO2 29 mmol/L      ANION GAP 8 mmol/L      BUN 27 mg/dL      Creatinine 1.43 mg/dL      Glucose 155 mg/dL      Calcium 9.6 mg/dL      AST 23 U/L      ALT 14 U/L      Alkaline Phosphatase 47 U/L      Total Protein 7.1 g/dL      Albumin 4.5 g/dL      Total Bilirubin 0.97 mg/dL      eGFR 45 ml/min/1.73sq m     Narrative:      National Kid[employer] Foundation guidelines for Chronic Kidney Disease (CKD):     Stage 1 with normal or high GFR (GFR > 90 mL/min/1.73 square meters)    Stage 2 Mild CKD (GFR = 60-89 mL/min/1.73 square meters)    Stage 3A Moderate CKD (GFR = 45-59 mL/min/1.73 square meters)    Stage 3B Moderate CKD (GFR = 30-44 mL/min/1.73 square meters)    Stage 4 Severe CKD (GFR = 15-29 mL/min/1.73 square meters)    Stage 5 End Stage CKD (GFR <15 mL/min/1.73 square meters)  Note: GFR calculation is accurate only with a steady state creatinine    CBC and differential [[id number]]  (Abnormal) Collected: 06/09/25 1215    Lab Status: Final result Specimen: Blood from Arm, Right Updated: 06/09/25 1221     WBC 11.69 Thousand/uL      RBC 4.74 Million/uL      Hemoglobin 15.1 g/dL      Hematocrit 45.6 %      MCV 96 fL      MCH 31.9 pg      MCHC 33.1 g/dL      RDW 11.9 %      MPV 8.9 fL      Platelets 166 Thousands/uL      nRBC 0 /100 WBCs      Segmented % 88 %      Immature Grans % 1 %      Lymphocytes % 5 %      Monocytes % 5 %      Eosinophils Relative 1 %      Basophils Relative 0 %      Absolute Neutrophils  10.32 Thousands/µL      Absolute Immature Grans 0.06 Thousand/uL      Absolute Lymphocytes 0.57 Thousands/µL      Absolute Monocytes 0.62 Thousand/µL      Eosinophils Absolute 0.09 Thousand/µL      Basophils Absolute 0.03 Thousands/µL             CTA chest pe study   Final Interpretation by Kalen Arcos MD (06/09 4208)      1. Asymmetric left pulmonary groundglass opacities, likely infectious or inflammatory. Consider follow-up chest CT in 3 months to ensure resolution/stability.      2. No pulmonary emboli.      3.  Ectasia of the ascending thoracic aorta, measuring up to 41 mm. Recommend annual surveillance with low-dose chest CT.      Note: The study was marked in EPIC for immediate notification. Imaging follow-up reminder notifications were scheduled in the electronic medical record.      Resident: MILAD MONTIEL I, the attending radiologist, have reviewed the images and agree with the final report above.      Workstation performed: [id number]         XR chest 2 views   Final Interpretation by Jose Pillai MD (06/09 1484)      Patchy bibasilar opacities, favoring atelectasis. However, pneumonia can not be entirely excluded.      Moderate-sized hiatal hernia            Workstation performed: [id number]             Procedures    ED Medication and Procedure Management   Prior to Admission Medications   Prescriptions Last Dose Informant Patient Reported? Taking?   Ascorbic Acid (vitamin C) 1000 MG tablet  Self Yes No   Sig: Take 1,000 mg by mouth daily   B Complex-C (SUPER B COMPLEX PO)  Self Yes No   Sig: Take 1 capsule by mouth in the morning   Diclofenac Sodium (VOLTAREN) 1 %   No No   Sig: Apply 2 g topically 4 (four) times a day   Ginkgo Biloba 120 MG CAPS  Self Yes No   Sig: Take 1 capsule by mouth in the morning   Magnesium 250 MG TABS  Self No No   Sig: Take 1 tablet (250 mg total) by mouth 2 (two) times a day   Multiple Vitamin (multivitamin) capsule  Self Yes No   Sig: Take 1 capsule by  mouth daily   VITAMIN D, CHOLECALCIFEROL, PO  Self Yes No   Sig: Take 2,000 Units by mouth in the morning   aspirin (ECOTRIN LOW STRENGTH) 81 mg EC tablet  Self Yes No   Sig: Take 81 mg by mouth every other day 1 every other day   atorvastatin (LIPITOR) 40 mg tablet   No No   Sig: TAKE 1 TABLET BY MOUTH ONCE DAILY IN THE EVENING   buPROPion (WELLBUTRIN XL) 150 mg 24 hr tablet   No No   Sig: Take 1 tablet (150 mg total) by mouth daily   Patient not taking: Reported on 2025   clopidogrel (PLAVIX) 75 mg tablet   No No   Sig: Take 1 tablet by mouth once daily   dextromethorphan-guaifenesin (MUCINEX DM)  MG per 12 hr tablet   No No   Sig: Take 1 tablet by mouth every 12 (twelve) hours as needed for cough   escitalopram (LEXAPRO) 20 mg tablet   No No   Sig: Take 1 tablet (20 mg total) by mouth daily   ferrous sulfate 324 (65 Fe) mg  Self No No   Sig: Take 1 tablet (324 mg total) by mouth daily before breakfast   finasteride (PROSCAR) 5 mg tablet  Self No No   Sig: Take 1 tablet (5 mg total) by mouth daily   fluticasone (FLONASE) 50 mcg/act nasal spray   No No   Si spray into each nostril daily   gabapentin (NEURONTIN) 400 mg capsule   No No   Sig: TAKE 1 CAPSULE BY MOUTH THREE TIMES DAILY   lisinopril (ZESTRIL) 10 mg tablet   No No   Sig: Take 1 tablet by mouth once daily   metoprolol succinate (TOPROL-XL) 25 mg 24 hr tablet  Self No No   Sig: Take 1 tablet by mouth once daily   omeprazole (PriLOSEC) 20 mg delayed release capsule   No No   Sig: Take 1 capsule by mouth once daily   tamsulosin (FLOMAX) 0.4 mg  Self No No   Sig: Take 1 capsule (0.4 mg total) by mouth daily with dinner   traZODone (DESYREL) 100 mg tablet  Self No No   Sig: Take 1 tablet (100 mg total) by mouth daily at bedtime      Facility-Administered Medications: None     Current Discharge Medication List        CONTINUE these medications which have NOT CHANGED    Details   Ascorbic Acid (vitamin C) 1000 MG tablet Take 1,000 mg by mouth  daily      aspirin (ECOTRIN LOW STRENGTH) 81 mg EC tablet Take 81 mg by mouth every other day 1 every other day      atorvastatin (LIPITOR) 40 mg tablet TAKE 1 TABLET BY MOUTH ONCE DAILY IN THE EVENING  Qty: 90 tablet, Refills: 1    Associated Diagnoses: Dyslipidemia      B Complex-C (SUPER B COMPLEX PO) Take 1 capsule by mouth in the morning      buPROPion (WELLBUTRIN XL) 150 mg 24 hr tablet Take 1 tablet (150 mg total) by mouth daily  Qty: 90 tablet, Refills: 1    Associated Diagnoses: Recurrent major depressive disorder, in partial remission (HCC)      clopidogrel (PLAVIX) 75 mg tablet Take 1 tablet by mouth once daily  Qty: 90 tablet, Refills: 1    Associated Diagnoses: Peripheral artery disease (HCC)      dextromethorphan-guaifenesin (MUCINEX DM)  MG per 12 hr tablet Take 1 tablet by mouth every 12 (twelve) hours as needed for cough  Qty: 60 tablet, Refills: 0    Associated Diagnoses: Acute rhinitis      Diclofenac Sodium (VOLTAREN) 1 % Apply 2 g topically 4 (four) times a day  Qty: 240 g, Refills: 2    Associated Diagnoses: Chronic bilateral low back pain with bilateral sciatica      escitalopram (LEXAPRO) 20 mg tablet Take 1 tablet (20 mg total) by mouth daily  Qty: 90 tablet, Refills: 0    Associated Diagnoses: Current moderate episode of major depressive disorder without prior episode ([facility])      ferrous sulfate 324 (65 Fe) mg Take 1 tablet (324 mg total) by mouth daily before breakfast  Qty: 30 tablet, Refills: 2    Associated Diagnoses: Iron deficiency anemia, unspecified iron deficiency anemia type      finasteride (PROSCAR) 5 mg tablet Take 1 tablet (5 mg total) by mouth daily  Qty: 90 tablet, Refills: 1    Associated Diagnoses: Benign prostatic hyperplasia with urinary frequency      fluticasone (FLONASE) 50 mcg/act nasal spray 1 spray into each nostril daily  Qty: 9.9 mL, Refills: 0    Associated Diagnoses: Upper respiratory infection with cough and congestion      gabapentin (NEURONTIN) 400 mg  capsule TAKE 1 CAPSULE BY MOUTH THREE TIMES DAILY  Qty: 270 capsule, Refills: 1    Associated Diagnoses: PAD (peripheral artery disease) ([facility])      Ginkgo Biloba 120 MG CAPS Take 1 capsule by mouth in the morning      lisinopril (ZESTRIL) 10 mg tablet Take 1 tablet by mouth once daily  Qty: 90 tablet, Refills: 1    Associated Diagnoses: Primary hypertension      Magnesium 250 MG TABS Take 1 tablet (250 mg total) by mouth 2 (two) times a day  Qty: 60 tablet, Refills: 5    Associated Diagnoses: Hypomagnesemia      metoprolol succinate (TOPROL-XL) 25 mg 24 hr tablet Take 1 tablet by mouth once daily  Qty: 90 tablet, Refills: 1    Associated Diagnoses: Coronary artery disease involving native coronary artery of native heart without angina pectoris      Multiple Vitamin (multivitamin) capsule Take 1 capsule by mouth daily      omeprazole (PriLOSEC) 20 mg delayed release capsule Take 1 capsule by mouth once daily  Qty: 90 capsule, Refills: 1    Associated Diagnoses: Other acute gastritis without hemorrhage      tamsulosin (FLOMAX) 0.4 mg Take 1 capsule (0.4 mg total) by mouth daily with dinner  Qty: 90 capsule, Refills: 1    Associated Diagnoses: Urinary frequency      traZODone (DESYREL) 100 mg tablet Take 1 tablet (100 mg total) by mouth daily at bedtime  Qty: 90 tablet, Refills: 1    Associated Diagnoses: Insomnia, unspecified type      VITAMIN D, CHOLECALCIFEROL, PO Take 2,000 Units by mouth in the morning           No discharge procedures on file.  ED SEPSIS DOCUMENTATION   Time reflects when diagnosis was documented in both MDM as applicable and the Disposition within this note       Time User Action Codes Description Comment    6/9/2025  4:37 PM Brent Saez [J18.9] Pneumonia                      [1]   Past Medical History:  Diagnosis Date    Alcoholism (HCC)     Anxiety     Folliculitis 03/15/2024    GERD (gastroesophageal reflux disease)     Gout     Insomnia     Stroke (HCC)    [2]   Past Surgical  History:  Procedure Laterality Date    ANKLE SURGERY Right     CARDIAC CATHETERIZATION      no findings    COLONOSCOPY  01/25/2013    polypectomy; complete - 3 yrs d/t polyp - benign submucosal lipoma- path c/w lipoma    COLONOSCOPY  04/25/2017    complete - tubular adenoma - repeat 5yrs    CYSTOSCOPY      CYSTOTOMY      bladder  w/ basket extraction of calculus    HERNIA REPAIR      inguinal ; sliding    INGUINAL HERNIA REPAIR Right     unilateral    IR LOWER EXTREMITY ANGIOGRAM  04/24/2023    KNEE ARTHROSCOPY Right     w/medial menisectomy    LASIK      corneal    LITHOTRIPSY      renal    DE COLONOSCOPY FLX DX W/COLLJ SPEC WHEN PFRMD N/A 04/25/2017    Procedure: SCREENING COLONOSCOPY;  Surgeon: Paul Jacome MD;  Location: [facility];  Service: General    DE SLCTV CATHJ 3RD+ ORD SLCTV ABDL PEL/LXTR BRNCH Right 04/24/2023    Procedure: ARTERIOGRAM;  Surgeon: Alivia Blum MD;  Location: [facility];  Service: Vascular    DE TEAEC W/PATCH GRF CAROTID VERTB SUBCLAV NECK INC Right 01/10/2020    Procedure: ENDARTERECTOMY ARTERY CAROTID;  Surgeon: Aaron Smith MD;  Location: [facility];  Service: Vascular    DE TEAEC W/WO PATCH GRAFT COMMON FEMORAL Right 04/24/2023    Procedure: ENDARTERECTOMY ARTERIAL FEMORAL, RETROGRADE ILIAC INTERVENTION;  Surgeon: Alivia Blum MD;  Location: [facility];  Service: Vascular    ROTATOR CUFF REPAIR Bilateral     TONSILLECTOMY     [3]   Family History  Problem Relation Name Age of Onset    Alzheimer's disease Mother      Alzheimer's disease Father Nj Romanomarcelo Sr.     Heart disease Father Njjuliana Pimentel Sr.         CAD    Other Father Nj Hodanlilibeth Sr.         prediabetes    Diabetes Father Njjuliana Pimentel Sr.     Breast cancer Other spouse     Arthritis Family      Hypertension Family     [4]   Social History  Tobacco Use    Smoking status: Former     Current packs/day: 0.00     Average packs/day: 1 pack/day for 22.0 years (22.0 ttl pk-yrs)     Types: Cigarettes     Start  date: 1972     Quit date: 1985     Years since quittin.4     Passive exposure: Past    Smokeless tobacco: Never   Vaping Use    Vaping status: Never Used   Substance Use Topics    Alcohol use: Not Currently     Comment: stopped drinking alcohol; AA x23yrs sober    Drug use: No        Brent Saez DO  06/10/25 1202

## 2025-06-09 NOTE — ASSESSMENT & PLAN NOTE
Ectasia of the ascending thoracic aorta, measuring up to 41 mm. Recommend annual surveillance with low-dose chest CT.

## 2025-06-09 NOTE — ASSESSMENT & PLAN NOTE
Patient presenting due to chest pain pleuritic in nature associated with lightheadedness    Patient meeting SIRS criteria with tachycardia and tachypnea  Source being pneumonia  CTA PE showing inflammatory vs infectious etiology   Lactic acid negative  Procalcitonin 1.47-> continue to trend  No other endorgan dysfunction  Patient given Vanco and cefepime in the ED    Will transition the patient to ceftriaxone and will add azithromycin  Urine antigens  Sputum culture  MRSA culture  Blood cultures pending  COVID flu RSV pending  Supportive treatments   Gentle IVF monitor resp status d/t Aortic stenosis

## 2025-06-09 NOTE — H&P
H&P - Hospitalist   Name: Nj Pimentel Jr. 80 y.o. male I MRN: [id number]  Unit/Bed#: ED 04 I Date of Admission: 6/9/2025   Date of Service: 6/9/2025 I Hospital Day: 0     Assessment & Plan  Sepsis due to pneumonia (HCC)  Patient presenting due to chest pain pleuritic in nature associated with lightheadedness    Patient meeting SIRS criteria with tachycardia and tachypnea  Source being pneumonia  CTA PE showing inflammatory vs infectious etiology   Lactic acid negative  Procalcitonin 1.47-> continue to trend  No other endorgan dysfunction  Patient given Vanco and cefepime in the ED    Will transition the patient to ceftriaxone and will add azithromycin  Urine antigens  Sputum culture  MRSA culture  Blood cultures pending  COVID flu RSV pending  Supportive treatments   Gentle IVF monitor resp status d/t Aortic stenosis  Benign hypertension with CKD (chronic kidney disease) stage III ([facility])  Lab Results   Component Value Date    EGFR 45 06/09/2025    EGFR 47 11/06/2024    EGFR 50 05/16/2024    CREATININE 1.43 (H) 06/09/2025    CREATININE 1.41 (H) 11/06/2024    CREATININE 1.34 (H) 05/16/2024     Cr on admission 1.43  Baseline appears to be 1.3-1.5  I's and O's  Avoid nephrotoxic agents hypotension and contrast  Patient received contrast for CTA PE continue to trend  Giving gentle IV fluids for 8 hours  Continue to trend    Continue home regimen of lisinopril and metoprolol at higher parameters  Severe obesity (BMI 35.0-39.9) with comorbidity (HCC)  Lifestyle modification  Aortic valve stenosis  Echo done on 10/4/2024 showed an LVEF of 65% with grade 1 diastolic dysfunction aortic leaflets moderately calcified mild stenosis    Monitor resp status with IVF  Abnormal CT scan  Ectasia of the ascending thoracic aorta, measuring up to 41 mm. Recommend annual surveillance with low-dose chest CT.       VTE Pharmacologic Prophylaxis: VTE Score: 6 High Risk (Score >/= 5) - Pharmacological DVT Prophylaxis Ordered: heparin.  Sequential Compression Devices Ordered.  Code Status: Prior full code      Anticipated Length of Stay: Patient will be admitted on an inpatient basis with an anticipated length of stay of greater than 2 midnights secondary to pna.    History of Present Illness   Chief Complaint: jeni Pimentel Jr. is a 80 y.o. male with a PMH of dementia, hypertension, CKD, aortic stenosis who presents with chest pain and lightheadedness while driving.  Patient describes pleuritic chest pain and a cough that has been ongoing for a few days.  Patient meeting SIRS criteria with tachycardia and tachypnea.  Patient started on vancomycin and cefepime.  This will be transition to ceftriaxone pneumonia workup and sepsis workup in progress    Review of Systems   All other systems reviewed and are negative.      Historical Information   Past Medical History[1]  Past Surgical History[2]  Social History[3]  E-Cigarette/Vaping    E-Cigarette Use Never User      E-Cigarette/Vaping Substances    Nicotine No     THC No     CBD No     Flavoring No     Other No     Unknown No      Family History[4]  Social History:  Marital Status:    Occupation:   Patient Pre-hospital Living Situation: Home  Patient Pre-hospital Level of Mobility: walks  Patient Pre-hospital Diet Restrictions: none    Meds/Allergies   I have reviewed home medications using recent Epic encounter.  Prior to Admission medications    Medication Sig Start Date End Date Taking? Authorizing Provider   Ascorbic Acid (vitamin C) 1000 MG tablet Take 1,000 mg by mouth daily    Historical Provider, MD   aspirin (ECOTRIN LOW STRENGTH) 81 mg EC tablet Take 81 mg by mouth every other day 1 every other day    Historical Provider, MD   atorvastatin (LIPITOR) 40 mg tablet TAKE 1 TABLET BY MOUTH ONCE DAILY IN THE EVENING 3/19/25   Nitin Finley MD   B Complex-C (SUPER B COMPLEX PO) Take 1 capsule by mouth in the morning    Historical Provider, MD   buPROPion (WELLBUTRIN XL) 150 mg 24  hr tablet Take 1 tablet (150 mg total) by mouth daily  Patient not taking: Reported on 4/25/2025 3/31/25   Dona Camp DO   clopidogrel (PLAVIX) 75 mg tablet Take 1 tablet by mouth once daily 5/13/25   Dona Camp DO   dextromethorphan-guaifenesin (MUCINEX DM)  MG per 12 hr tablet Take 1 tablet by mouth every 12 (twelve) hours as needed for cough 2/6/25   Sagar Grider MD   Diclofenac Sodium (VOLTAREN) 1 % Apply 2 g topically 4 (four) times a day 2/6/25   Sagar Grider MD   escitalopram (LEXAPRO) 20 mg tablet Take 1 tablet (20 mg total) by mouth daily 3/25/25   Dona Camp DO   ferrous sulfate 324 (65 Fe) mg Take 1 tablet (324 mg total) by mouth daily before breakfast 11/13/20   Dona Camp DO   finasteride (PROSCAR) 5 mg tablet Take 1 tablet (5 mg total) by mouth daily 1/22/25   Kirti Mcdonald PA-C   fluticasone (FLONASE) 50 mcg/act nasal spray 1 spray into each nostril daily 2/27/25   BLAS Cunningham   gabapentin (NEURONTIN) 400 mg capsule TAKE 1 CAPSULE BY MOUTH THREE TIMES DAILY 3/19/25   Dona Camp DO   Ginkgo Biloba 120 MG CAPS Take 1 capsule by mouth in the morning    Historical Provider, MD   lisinopril (ZESTRIL) 10 mg tablet Take 1 tablet by mouth once daily 5/30/25   Dona Camp DO   Magnesium 250 MG TABS Take 1 tablet (250 mg total) by mouth 2 (two) times a day 7/26/24   BLAS Nascimento   metoprolol succinate (TOPROL-XL) 25 mg 24 hr tablet Take 1 tablet by mouth once daily 12/19/24   Dona Camp DO   Multiple Vitamin (multivitamin) capsule Take 1 capsule by mouth daily    Historical Provider, MD   omeprazole (PriLOSEC) 20 mg delayed release capsule Take 1 capsule by mouth once daily 6/4/25   Dona Camp DO   tamsulosin (FLOMAX) 0.4 mg Take 1 capsule (0.4 mg total) by mouth daily with dinner 1/3/25   Anisa Quinn PA-C   traZODone (DESYREL) 100 mg tablet Take 1 tablet (100 mg total) by  mouth daily at bedtime 1/27/25   Dona Camp DO   VITAMIN D, CHOLECALCIFEROL, PO Take 2,000 Units by mouth in the morning    Historical Provider, MD     No Known Allergies    Objective :  Temp:  [98.6 °F (37 °C)] 98.6 °F (37 °C)  HR:  [] 94  BP: (105-137)/(57-70) 105/57  Resp:  [18-26] 26  SpO2:  [95 %-97 %] 95 %  O2 Device: None (Room air)    Physical Exam  Vitals and nursing note reviewed.   Constitutional:       General: He is not in acute distress.     Appearance: He is ill-appearing.   HENT:      Head: Normocephalic and atraumatic.     Cardiovascular:      Rate and Rhythm: Regular rhythm. Tachycardia present.      Pulses: Normal pulses.      Heart sounds: Normal heart sounds.   Pulmonary:      Effort: Pulmonary effort is normal.      Breath sounds: Rhonchi present.   Abdominal:      General: Abdomen is flat. Bowel sounds are normal.      Palpations: Abdomen is soft.     Musculoskeletal:      Right lower leg: No edema.      Left lower leg: No edema.     Skin:     General: Skin is warm.     Neurological:      General: No focal deficit present.      Mental Status: He is alert and oriented to person, place, and time.          Lines/Drains:            Lab Results: I have reviewed the following results:  Results from last 7 days   Lab Units 06/09/25  1215   WBC Thousand/uL 11.69*   HEMOGLOBIN g/dL 15.1   HEMATOCRIT % 45.6   PLATELETS Thousands/uL 166   SEGS PCT % 88*   LYMPHO PCT % 5*   MONO PCT % 5   EOS PCT % 1     Results from last 7 days   Lab Units 06/09/25  1215   SODIUM mmol/L 140   POTASSIUM mmol/L 4.4   CHLORIDE mmol/L 103   CO2 mmol/L 29   BUN mg/dL 27*   CREATININE mg/dL 1.43*   ANION GAP mmol/L 8   CALCIUM mg/dL 9.6   ALBUMIN g/dL 4.5   TOTAL BILIRUBIN mg/dL 0.97   ALK PHOS U/L 47   ALT U/L 14   AST U/L 23   GLUCOSE RANDOM mg/dL 155*     Results from last 7 days   Lab Units 06/09/25  1533   INR  1.12         Lab Results   Component Value Date    HGBA1C 5.7 (H) 11/06/2024    HGBA1C 6.0 (H)  05/03/2024    HGBA1C 5.3 01/18/2023     Results from last 7 days   Lab Units 06/09/25  1533   LACTIC ACID mmol/L 1.4   PROCALCITONIN ng/ml 1.47*       Imaging Results Review: I personally reviewed the following image studies in PACS and associated radiology reports: CT chest. My interpretation of the radiology images/reports is: left sided pna.  Other Study Results Review: EKG was reviewed.  EKG was personally reviewed and my interpretation is: Personally Reviewed. Sinus Tachycardia. ..    Administrative Statements   I have spent a total time of 81 minutes in caring for this patient on the day of the visit/encounter including Diagnostic results, Prognosis, Risks and benefits of tx options, Instructions for management, Patient and family education, Importance of tx compliance, Risk factor reductions, Impressions, Counseling / Coordination of care, Documenting in the medical record, Reviewing/placing orders in the medical record (including tests, medications, and/or procedures), Obtaining or reviewing history  , and Communicating with other healthcare professionals .    ** Please Note: This note has been constructed using a voice recognition system. **         [1]   Past Medical History:  Diagnosis Date    Alcoholism (HCC)     Anxiety     Folliculitis 03/15/2024    GERD (gastroesophageal reflux disease)     Gout     Insomnia     Stroke (HCC)    [2]   Past Surgical History:  Procedure Laterality Date    ANKLE SURGERY Right     CARDIAC CATHETERIZATION      no findings    COLONOSCOPY  01/25/2013    polypectomy; complete - 3 yrs d/t polyp - benign submucosal lipoma- path c/w lipoma    COLONOSCOPY  04/25/2017    complete - tubular adenoma - repeat 5yrs    CYSTOSCOPY      CYSTOTOMY      bladder  w/ basket extraction of calculus    HERNIA REPAIR      inguinal ; sliding    INGUINAL HERNIA REPAIR Right     unilateral    IR LOWER EXTREMITY ANGIOGRAM  04/24/2023    KNEE ARTHROSCOPY Right     w/medial menisectomy    LASIK       corneal    LITHOTRIPSY      renal    DE COLONOSCOPY FLX DX W/COLLJ SPEC WHEN PFRMD N/A 2017    Procedure: SCREENING COLONOSCOPY;  Surgeon: Paul Jacome MD;  Location: [facility];  Service: General    DE SLCTV CATHJ 3RD+ ORD SLCTV ABDL PEL/LXTR BRNCH Right 2023    Procedure: ARTERIOGRAM;  Surgeon: Alivia Blum MD;  Location: [facility];  Service: Vascular    DE TEAEC W/PATCH GRF CAROTID VERTB SUBCLAV NECK INC Right 01/10/2020    Procedure: ENDARTERECTOMY ARTERY CAROTID;  Surgeon: Aaron Smith MD;  Location: [facility];  Service: Vascular    DE TEAEC W/WO PATCH GRAFT COMMON FEMORAL Right 2023    Procedure: ENDARTERECTOMY ARTERIAL FEMORAL, RETROGRADE ILIAC INTERVENTION;  Surgeon: Alivia Blum MD;  [facility];  Service: Vascular    ROTATOR CUFF REPAIR Bilateral     TONSILLECTOMY     [3]   Social History  Tobacco Use    Smoking status: Former     Current packs/day: 0.00     Average packs/day: 1 pack/day for 22.0 years (22.0 ttl pk-yrs)     Types: Cigarettes     Start date: 1972     Quit date: 1985     Years since quittin.4     Passive exposure: Past    Smokeless tobacco: Never   Vaping Use    Vaping status: Never Used   Substance and Sexual Activity    Alcohol use: Not Currently     Comment: stopped drinking alcohol; AA x23yrs sober    Drug use: No    Sexual activity: Not Currently     Partners: Female   [4]   Family History  Problem Relation Name Age of Onset    Alzheimer's disease Mother      Alzheimer's disease Father Nj Pimentel Sr.     Heart disease Father Nj Pimentel Sr.         CAD    Other Father Nj Pimentel Sr.         prediabetes    Diabetes Father Nj Loren Sr.     Breast cancer Other spouse     Arthritis Family      Hypertension Family

## 2025-06-09 NOTE — ASSESSMENT & PLAN NOTE
Echo done on 10/4/2024 showed an LVEF of 65% with grade 1 diastolic dysfunction aortic leaflets moderately calcified mild stenosis    Monitor resp status with IVF

## 2025-06-09 NOTE — ASSESSMENT & PLAN NOTE
Lab Results   Component Value Date    EGFR 45 06/09/2025    EGFR 47 11/06/2024    EGFR 50 05/16/2024    CREATININE 1.43 (H) 06/09/2025    CREATININE 1.41 (H) 11/06/2024    CREATININE 1.34 (H) 05/16/2024     Cr on admission 1.43  Baseline appears to be 1.3-1.5  I's and O's  Avoid nephrotoxic agents hypotension and contrast  Patient received contrast for CTA PE continue to trend  Giving gentle IV fluids for 8 hours  Continue to trend    Continue home regimen of lisinopril and metoprolol at higher parameters

## 2025-06-09 NOTE — RESPIRATORY THERAPY NOTE
"RT Protocol Note  Nj Pimentel Jr. 80 y.o. male MRN: [id number]  Unit/Bed#: -01 Encounter: [id number]    Assessment    Principal Problem:    Sepsis due to pneumonia (HCC)  Active Problems:    Benign hypertension with CKD (chronic kidney disease) stage III ([facility])    Severe obesity (BMI 35.0-39.9) with comorbidity (HCC)    Aortic valve stenosis    Abnormal CT scan      Home Pulmonary Medications:         Past Medical History[1]  Social History[2]    Subjective         Objective    Physical Exam:        Vitals:  Blood pressure 115/62, pulse 83, temperature 98.6 °F (37 °C), temperature source Temporal, resp. rate 20, height 5' 9\" (1.753 m), weight 97.5 kg (215 lb), SpO2 (!) 89%.          Imaging and other studies:           Plan    Respiratory Plan: Mild Distress pathway  Airway Clearance Plan: Incentive Spirometer              [1]   Past Medical History:  Diagnosis Date    Alcoholism (HCC)     Anxiety     Folliculitis 03/15/2024    GERD (gastroesophageal reflux disease)     Gout     Insomnia     Stroke ([facility])    [2]   Social History  Socioeconomic History    Marital status:     Number of children: 1   Occupational History    Occupation: Retired     Comment: working full time   Tobacco Use    Smoking status: Former     Current packs/day: 0.00     Average packs/day: 1 pack/day for 22.0 years (22.0 ttl pk-yrs)     Types: Cigarettes     Start date: 1972     Quit date: 1985     Years since quittin.4     Passive exposure: Past    Smokeless tobacco: Never   Vaping Use    Vaping status: Never Used   Substance and Sexual Activity    Alcohol use: Not Currently     Comment: stopped drinking alcohol; AA x23yrs sober    Drug use: No    Sexual activity: Not Currently     Partners: Female   Social History Narrative    , lives alone, working full time     Social Drivers of Health     Financial Resource Strain: Low Risk  (2024)    Overall Financial Resource Strain (CARDIA)     Difficulty of Paying " Living Expenses: Not very hard   Food Insecurity: No Food Insecurity (4/3/2025)    Nursing - Inadequate Food Risk Classification     Worried About Running Out of Food in the Last Year: Never true     Ran Out of Food in the Last Year: Never true   Transportation Needs: No Transportation Needs (4/3/2025)    PRAPARE - Transportation     Lack of Transportation (Medical): No     Lack of Transportation (Non-Medical): No   Housing Stability: Low Risk  (4/3/2025)    Housing Stability Vital Sign     Unable to Pay for Housing in the Last Year: No     Number of Times Moved in the Last Year: 0     Homeless in the Last Year: No

## 2025-06-10 LAB
ALBUMIN SERPL BCG-MCNC: 4.1 G/DL (ref 3.5–5)
ALP SERPL-CCNC: 41 U/L (ref 34–104)
ALT SERPL W P-5'-P-CCNC: 11 U/L (ref 7–52)
ANION GAP SERPL CALCULATED.3IONS-SCNC: 9 MMOL/L (ref 4–13)
AST SERPL W P-5'-P-CCNC: 18 U/L (ref 13–39)
ATRIAL RATE: 118 BPM
BASOPHILS # BLD AUTO: 0.03 THOUSANDS/ÂΜL (ref 0–0.1)
BASOPHILS NFR BLD AUTO: 0 % (ref 0–1)
BILIRUB SERPL-MCNC: 1.05 MG/DL (ref 0.2–1)
BUN SERPL-MCNC: 30 MG/DL (ref 5–25)
CALCIUM SERPL-MCNC: 9.1 MG/DL (ref 8.4–10.2)
CHLORIDE SERPL-SCNC: 101 MMOL/L (ref 96–108)
CO2 SERPL-SCNC: 30 MMOL/L (ref 21–32)
CREAT SERPL-MCNC: 1.61 MG/DL (ref 0.6–1.3)
EOSINOPHIL # BLD AUTO: 0.13 THOUSAND/ÂΜL (ref 0–0.61)
EOSINOPHIL NFR BLD AUTO: 1 % (ref 0–6)
ERYTHROCYTE [DISTWIDTH] IN BLOOD BY AUTOMATED COUNT: 12.1 % (ref 11.6–15.1)
GFR SERPL CREATININE-BSD FRML MDRD: 39 ML/MIN/1.73SQ M
GLUCOSE SERPL-MCNC: 92 MG/DL (ref 65–140)
HCT VFR BLD AUTO: 44.1 % (ref 36.5–49.3)
HGB BLD-MCNC: 14.8 G/DL (ref 12–17)
IMM GRANULOCYTES # BLD AUTO: 0.08 THOUSAND/UL (ref 0–0.2)
IMM GRANULOCYTES NFR BLD AUTO: 1 % (ref 0–2)
L PNEUMO1 AG UR QL IA.RAPID: NEGATIVE
LYMPHOCYTES # BLD AUTO: 1.69 THOUSANDS/ÂΜL (ref 0.6–4.47)
LYMPHOCYTES NFR BLD AUTO: 13 % (ref 14–44)
MAGNESIUM SERPL-MCNC: 1.9 MG/DL (ref 1.9–2.7)
MCH RBC QN AUTO: 32.5 PG (ref 26.8–34.3)
MCHC RBC AUTO-ENTMCNC: 33.6 G/DL (ref 31.4–37.4)
MCV RBC AUTO: 97 FL (ref 82–98)
MONOCYTES # BLD AUTO: 0.81 THOUSAND/ÂΜL (ref 0.17–1.22)
MONOCYTES NFR BLD AUTO: 6 % (ref 4–12)
MRSA NOSE QL CULT: NORMAL
NEUTROPHILS # BLD AUTO: 10.72 THOUSANDS/ÂΜL (ref 1.85–7.62)
NEUTS SEG NFR BLD AUTO: 79 % (ref 43–75)
NRBC BLD AUTO-RTO: 0 /100 WBCS
P AXIS: 38 DEGREES
PLATELET # BLD AUTO: 164 THOUSANDS/UL (ref 149–390)
PMV BLD AUTO: 9.4 FL (ref 8.9–12.7)
POTASSIUM SERPL-SCNC: 4 MMOL/L (ref 3.5–5.3)
PR INTERVAL: 198 MS
PROCALCITONIN SERPL-MCNC: 3.08 NG/ML
PROT SERPL-MCNC: 6.6 G/DL (ref 6.4–8.4)
QRS AXIS: 24 DEGREES
QRSD INTERVAL: 88 MS
QT INTERVAL: 306 MS
QTC INTERVAL: 429 MS
RBC # BLD AUTO: 4.56 MILLION/UL (ref 3.88–5.62)
S PNEUM AG UR QL: NEGATIVE
SODIUM SERPL-SCNC: 140 MMOL/L (ref 135–147)
T WAVE AXIS: 58 DEGREES
VENTRICULAR RATE: 118 BPM
WBC # BLD AUTO: 13.46 THOUSAND/UL (ref 4.31–10.16)

## 2025-06-10 PROCEDURE — 99233 SBSQ HOSP IP/OBS HIGH 50: CPT | Performed by: STUDENT IN AN ORGANIZED HEALTH CARE EDUCATION/TRAINING PROGRAM

## 2025-06-10 PROCEDURE — 83735 ASSAY OF MAGNESIUM: CPT | Performed by: STUDENT IN AN ORGANIZED HEALTH CARE EDUCATION/TRAINING PROGRAM

## 2025-06-10 PROCEDURE — 85025 COMPLETE CBC W/AUTO DIFF WBC: CPT | Performed by: STUDENT IN AN ORGANIZED HEALTH CARE EDUCATION/TRAINING PROGRAM

## 2025-06-10 PROCEDURE — 84145 PROCALCITONIN (PCT): CPT | Performed by: STUDENT IN AN ORGANIZED HEALTH CARE EDUCATION/TRAINING PROGRAM

## 2025-06-10 PROCEDURE — 87449 NOS EACH ORGANISM AG IA: CPT | Performed by: STUDENT IN AN ORGANIZED HEALTH CARE EDUCATION/TRAINING PROGRAM

## 2025-06-10 PROCEDURE — 80053 COMPREHEN METABOLIC PANEL: CPT | Performed by: STUDENT IN AN ORGANIZED HEALTH CARE EDUCATION/TRAINING PROGRAM

## 2025-06-10 PROCEDURE — 87205 SMEAR GRAM STAIN: CPT | Performed by: STUDENT IN AN ORGANIZED HEALTH CARE EDUCATION/TRAINING PROGRAM

## 2025-06-10 PROCEDURE — 93010 ELECTROCARDIOGRAM REPORT: CPT | Performed by: INTERNAL MEDICINE

## 2025-06-10 PROCEDURE — 87070 CULTURE OTHR SPECIMN AEROBIC: CPT | Performed by: STUDENT IN AN ORGANIZED HEALTH CARE EDUCATION/TRAINING PROGRAM

## 2025-06-10 RX ADMIN — GUAIFENESIN 600 MG: 600 TABLET ORAL at 09:29

## 2025-06-10 RX ADMIN — METOPROLOL SUCCINATE 25 MG: 25 TABLET, EXTENDED RELEASE ORAL at 09:30

## 2025-06-10 RX ADMIN — GABAPENTIN 400 MG: 400 CAPSULE ORAL at 09:30

## 2025-06-10 RX ADMIN — ATORVASTATIN CALCIUM 40 MG: 40 TABLET, FILM COATED ORAL at 17:45

## 2025-06-10 RX ADMIN — DICLOFENAC SODIUM 2 G: 10 GEL TOPICAL at 12:32

## 2025-06-10 RX ADMIN — GABAPENTIN 400 MG: 400 CAPSULE ORAL at 20:50

## 2025-06-10 RX ADMIN — GUAIFENESIN 600 MG: 600 TABLET ORAL at 17:45

## 2025-06-10 RX ADMIN — CLOPIDOGREL 75 MG: 75 TABLET ORAL at 09:29

## 2025-06-10 RX ADMIN — CEFTRIAXONE 1000 MG: 1 INJECTION, SOLUTION INTRAVENOUS at 05:21

## 2025-06-10 RX ADMIN — LISINOPRIL 10 MG: 10 TABLET ORAL at 09:30

## 2025-06-10 RX ADMIN — PANTOPRAZOLE SODIUM 40 MG: 40 TABLET, DELAYED RELEASE ORAL at 09:33

## 2025-06-10 RX ADMIN — TAMSULOSIN HYDROCHLORIDE 0.4 MG: 0.4 CAPSULE ORAL at 17:45

## 2025-06-10 RX ADMIN — GABAPENTIN 400 MG: 400 CAPSULE ORAL at 17:45

## 2025-06-10 RX ADMIN — FINASTERIDE 5 MG: 5 TABLET, FILM COATED ORAL at 09:30

## 2025-06-10 RX ADMIN — TRAZODONE HYDROCHLORIDE 100 MG: 100 TABLET ORAL at 20:50

## 2025-06-10 RX ADMIN — HEPARIN SODIUM 5000 UNITS: 5000 INJECTION INTRAVENOUS; SUBCUTANEOUS at 13:24

## 2025-06-10 RX ADMIN — HEPARIN SODIUM 5000 UNITS: 5000 INJECTION INTRAVENOUS; SUBCUTANEOUS at 05:21

## 2025-06-10 RX ADMIN — ESCITALOPRAM OXALATE 20 MG: 20 TABLET ORAL at 09:30

## 2025-06-10 RX ADMIN — DICLOFENAC SODIUM 2 G: 10 GEL TOPICAL at 17:46

## 2025-06-10 RX ADMIN — HEPARIN SODIUM 5000 UNITS: 5000 INJECTION INTRAVENOUS; SUBCUTANEOUS at 20:50

## 2025-06-10 NOTE — CASE MANAGEMENT
Case Management Assessment & Discharge Planning Note    Patient name Nj Pimentel Jr.  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    Chronic pain of both shoulders 10/21/2024    Sacroiliitis ([facility]) 2024    Left hip pain 06/10/2024    Median neuropathy at forearm, left 2024    History of hyperkalemia 2024    Chronic kidney disease-mineral and bone disorder (CKD-MBD) 2024    Lumbar foraminal stenosis 2024    Lumbar radiculitis 2024    Cervical spinal stenosis 2024    Chronic bilateral low back pain with bilateral sciatica 2023    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[facility]) 2023    Dementia ([facility]) 05/15/2023    Aortoiliac occlusive disease ([facility]) 2023    Idiopathic peripheral neuropathy 2023    Platelets decreased ([facility]) 2023    PAD (peripheral artery disease) ([facility]) 2023    Recurrent major depressive disorder, in partial remission ([facility]) 2023    Atrial flutter, unspecified type ([facility]) 2021    Rambo lesion, chronic 2021    Paraesophageal hernia 2021    Stage 3a chronic kidney disease ([facility]) 12/15/2020    Carotid stenosis, asymptomatic, bilateral 2020    Coronary artery disease involving native coronary artery 2020    S/P carotid endarterectomy 01/10/2020    Stroke ([facility]) 2020    Aortic valve stenosis 2020    Elevated PSA 2018    Severe obesity (BMI 35.0-39.9) with comorbidity ([facility]) 2018    Nephrolithiasis 2018    Prolonged QT interval  09/07/2017    Osteoarthritis 02/28/2017    Rhinitis 11/22/2016    Impaired fasting glucose 10/08/2015    Insomnia 07/17/2015    Gout 02/20/2013    Tubular adenoma of colon 01/29/2013    Carpal tunnel syndrome 11/27/2012    Other disorder of calcium metabolism 11/27/2012    Dyslipidemia 05/31/2012    Benign hypertension with CKD (chronic kidney disease) stage III (HCC) 05/31/2012      LOS (days): 1  Geometric Mean LOS (GMLOS) (days): 4.9  Days to GMLOS:4.1     OBJECTIVE:    Risk of Unplanned Readmission Score: 14.86         Current admission status: Inpatient       Preferred Pharmacy:   [facility] [place], [place] - [address] [employer]  [address] [facility] [address]  Phone: [phone] Fax: [phone]    Primary Care Provider: Dona Camp DO    Primary Insurance: BLUE CROSS [employer] REP  Secondary Insurance:     ASSESSMENT:  Active Health Care Proxies    There are no active Health Care Proxies on file.       Advance Directives  Does patient have a Health Care POA?: Yes  Does patient have Advance Directives?: Yes  Advance Directives: Living will, Power of  for health care  Primary Contact: Indigo Cheng         Readmission Root Cause  30 Day Readmission: No    Patient Information  Admitted from:: Home  Mental Status: Alert  During Assessment patient was accompanied by: Not accompanied during assessment  Assessment information provided by:: Patient  Primary Caregiver: Self  Support Systems: Daughter  County of Residence: [place]  What city do you live in?: [place]  Home entry access options. Select all that apply.: Stairs  Number of steps to enter home.: 2  Do the steps have railings?: No  Type of Current Residence: [facility]  Living Arrangements: Lives Alone  Is patient a ?: Yes  [facility]: No    Activities of Daily Living Prior to Admission  Functional Status: Independent  Completes ADLs independently?: Yes  Ambulates  independently?: Yes  Does patient use assisted devices?: No  Does patient currently own DME?: Yes  What DME does the patient currently own?: [employer]  Does patient have a history of Outpatient Therapy (PT/OT)?: Yes  Does the patient have a history of Short-Term Rehab?: No  Does patient have a history of HHC?: Yes  Does patient currently have HHC?: No         Patient Information Continued  Income Source: Pension/halfway  Does patient have prescription coverage?: Yes  Can the patient afford their medications and any related supplies (such as glucometers or test strips)?: Yes  Does patient receive dialysis treatments?: No  Does patient have a history of substance abuse?: Yes  Is patient currently in treatment for substance abuse?: N/A - sober  Does patient have a history of Mental Health Diagnosis?: Yes  Is patient receiving treatment for mental health?: Yes  Has patient received inpatient treatment related to mental health in the last 2 years?: No         Means of Transportation  Means of Transport to Appts:: Friends          DISCHARGE DETAILS:    Discharge planning discussed with:: patient  Freedom of Choice: Yes     CM contacted family/caregiver?: No- see comments (patient alert and in cotact with patient)  Were Treatment Team discharge recommendations reviewed with patient/caregiver?: Yes  Did patient/caregiver verbalize understanding of patient care needs?: Yes  Were patient/caregiver advised of the risks associated with not following Treatment Team discharge recommendations?: Yes                                Expected Discharge Disposition: Home or Self Care  Additional Discharge Dispositions: Home or Self Care         Met with patient to review the role of CM and any needs patient may have at discharge. Patient resides alone in a ranch home with 2 ROHAN. He reports being independent of ADL's, he has a SPC but does not use it. He reports a h/o OPPT/HHC/D&A rehab at [facility] yrs ago/[facility] stay. He  prefers [facility] in [place] for his paula. He plans to go home and anticipates no discharge needs. His friend will transport him home.

## 2025-06-10 NOTE — ASSESSMENT & PLAN NOTE
Patient presenting due to chest pain pleuritic in nature associated with lightheadedness    Patient meeting SIRS criteria with tachycardia and tachypnea  Source being pneumonia  CTA PE showing inflammatory vs infectious etiology   Lactic acid negative  Procalcitonin 1.47-> 3.08 continue to trend  No other endorgan dysfunction  Patient given Vanco and cefepime in the ED    continue ceftriaxone day 1  Urine antigens negative  Sputum culture not yet collected  MRSA culture pending  Blood cultures pending  COVID flu RSV negative  Supportive treatments   IVFdc'd

## 2025-06-10 NOTE — ASSESSMENT & PLAN NOTE
Echo done on 10/4/2024 showed an LVEF of 65% with grade 1 diastolic dysfunction aortic leaflets moderately calcified mild stenosis    S/p IVF

## 2025-06-10 NOTE — PROGRESS NOTES
Progress Note - Hospitalist   Name: Nj Pimentel Jr. 80 y.o. male I MRN: [id number]  Unit/Bed#: -01 I Date of Admission: 6/9/2025   Date of Service: 6/10/2025 I Hospital Day: 1    Assessment & Plan  Sepsis due to pneumonia (HCC)  Patient presenting due to chest pain pleuritic in nature associated with lightheadedness    Patient meeting SIRS criteria with tachycardia and tachypnea  Source being pneumonia  CTA PE showing inflammatory vs infectious etiology   Lactic acid negative  Procalcitonin 1.47-> 3.08 continue to trend  No other endorgan dysfunction  Patient given Vanco and cefepime in the ED    continue ceftriaxone day 1  Urine antigens negative  Sputum culture not yet collected  MRSA culture pending  Blood cultures pending  COVID flu RSV negative  Supportive treatments   IVFdc'd  Benign hypertension with CKD (chronic kidney disease) stage III (HCC)  Lab Results   Component Value Date    EGFR 39 06/10/2025    EGFR 45 06/09/2025    EGFR 47 11/06/2024    CREATININE 1.61 (H) 06/10/2025    CREATININE 1.43 (H) 06/09/2025    CREATININE 1.41 (H) 11/06/2024     Cr on admission 1.43  Baseline appears to be 1.3-1.5  I's and O's  Avoid nephrotoxic agents hypotension and contrast  Patient received contrast for CTA PE continue to trend  S/p IV fluids for 8 hours  Cr increased  Continue to trend    Continue home regimen of lisinopril and metoprolol at higher parameters  Severe obesity (BMI 35.0-39.9) with comorbidity (HCC)  Lifestyle modification  Aortic valve stenosis  Echo done on 10/4/2024 showed an LVEF of 65% with grade 1 diastolic dysfunction aortic leaflets moderately calcified mild stenosis    S/p IVF  Abnormal CT scan  Ectasia of the ascending thoracic aorta, measuring up to 41 mm. Recommend annual surveillance with low-dose chest CT. Discussed with pt    VTE Pharmacologic Prophylaxis: VTE Score: 6 High Risk (Score >/= 5) - Pharmacological DVT Prophylaxis Ordered: heparin. Sequential Compression Devices  Ordered.    Mobility:   Basic Mobility Inpatient Raw Score: 24  JH-HLM Goal: 8: Walk 250 feet or more  JH-HLM Achieved: 6: Walk 10 steps or more  JH-HLM Goal NOT achieved. Continue with multidisciplinary rounding and encourage appropriate mobility to improve upon JH-HLM goals.    Patient Centered Rounds: I performed bedside rounds with nursing staff today.   Discussions with Specialists or Other Care Team Provider: BRIANNA    Education and Discussions with Family / Patient: Updated  (daughter) via phone.    Current Length of Stay: 1 day(s)  Current Patient Status: Inpatient   Certification Statement: The patient will continue to require additional inpatient hospital stay due to elevated Cr  Discharge Plan: Anticipate discharge in 24-48 hrs to home.    Code Status: Level 1 - Full Code    Subjective   Skip was seen and examined at bedside.  No acute events overnight.  Discussed plan of care.  All questions and concerns were answered and addressed.  Has no acute complaints at this time.  States that he feels well and denies further shortness of breath lightheadedness or diaphoresis.  Also denies any chest pain.  Feels well.  Will continue to trend creatinine.  Continue ceftriaxone and azithromycin.    Objective :  Temp:  [98.2 °F (36.8 °C)-98.6 °F (37 °C)] 98.2 °F (36.8 °C)  HR:  [] 60  BP: (105-137)/(57-70) 120/61  Resp:  [18-26] 20  SpO2:  [89 %-97 %] 93 %  O2 Device: None (Room air)    Body mass index is 31.75 kg/m².     Input and Output Summary (last 24 hours):     Intake/Output Summary (Last 24 hours) at 6/10/2025 0636  Last data filed at 6/10/2025 0602  Gross per 24 hour   Intake 1609.17 ml   Output 975 ml   Net 634.17 ml       Physical Exam  Vitals and nursing note reviewed.   Constitutional:       General: He is not in acute distress.     Appearance: He is obese. He is not ill-appearing.   HENT:      Head: Normocephalic and atraumatic.     Cardiovascular:      Rate and Rhythm: Normal rate and  regular rhythm.      Pulses: Normal pulses.      Heart sounds: Murmur heard.   Pulmonary:      Effort: Pulmonary effort is normal.      Breath sounds: Rhonchi present.      Comments: Distant breath sounds  Abdominal:      General: Abdomen is flat. Bowel sounds are normal.      Palpations: Abdomen is soft.     Musculoskeletal:      Right lower leg: No edema.      Left lower leg: No edema.     Skin:     General: Skin is warm.     Neurological:      General: No focal deficit present.      Mental Status: He is alert and oriented to person, place, and time.           Lines/Drains:              Lab Results: I have reviewed the following results:   Results from last 7 days   Lab Units 06/10/25  0207   WBC Thousand/uL 13.46*   HEMOGLOBIN g/dL 14.8   HEMATOCRIT % 44.1   PLATELETS Thousands/uL 164   SEGS PCT % 79*   LYMPHO PCT % 13*   MONO PCT % 6   EOS PCT % 1     Results from last 7 days   Lab Units 06/10/25  0207   SODIUM mmol/L 140   POTASSIUM mmol/L 4.0   CHLORIDE mmol/L 101   CO2 mmol/L 30   BUN mg/dL 30*   CREATININE mg/dL 1.61*   ANION GAP mmol/L 9   CALCIUM mg/dL 9.1   ALBUMIN g/dL 4.1   TOTAL BILIRUBIN mg/dL 1.05*   ALK PHOS U/L 41   ALT U/L 11   AST U/L 18   GLUCOSE RANDOM mg/dL 92     Results from last 7 days   Lab Units 06/09/25  1533   INR  1.12             Results from last 7 days   Lab Units 06/10/25  0207 06/09/25  1533   LACTIC ACID mmol/L  --  1.4   PROCALCITONIN ng/ml 3.08* 1.47*       Recent Cultures (last 7 days):   Results from last 7 days   Lab Units 06/09/25  1533   BLOOD CULTURE  Received in Microbiology Lab. Culture in Progress.  Received in Microbiology Lab. Culture in Progress.       Imaging Results Review: No pertinent imaging studies reviewed.  Other Study Results Review: No additional pertinent studies reviewed.    Last 24 Hours Medication List:     Current Facility-Administered Medications:     acetaminophen (TYLENOL) tablet 650 mg, Q6H PRN    aluminum-magnesium hydroxide-simethicone (MAALOX)  oral suspension 30 mL, Q6H PRN    aspirin (ECOTRIN LOW STRENGTH) EC tablet 81 mg, Every Other Day    atorvastatin (LIPITOR) tablet 40 mg, QPM    benzonatate (TESSALON PERLES) capsule 100 mg, TID PRN    cefTRIAXone (ROCEPHIN) IVPB (premix in dextrose) 1,000 mg 50 mL, Q24H, Last Rate: 100 mL/hr at 06/10/25 0602    clopidogrel (PLAVIX) tablet 75 mg, Daily    Diclofenac Sodium (VOLTAREN) 1 % topical gel 2 g, 4x Daily    escitalopram (LEXAPRO) tablet 20 mg, Daily    finasteride (PROSCAR) tablet 5 mg, Daily    gabapentin (NEURONTIN) capsule 400 mg, TID    guaiFENesin (MUCINEX) 12 hr tablet 600 mg, BID    heparin (porcine) subcutaneous injection 5,000 Units, Q8H GISELL    lisinopril (ZESTRIL) tablet 10 mg, Daily    metoprolol succinate (TOPROL-XL) 24 hr tablet 25 mg, Daily    ondansetron (ZOFRAN) injection 4 mg, Q4H PRN    pantoprazole (PROTONIX) EC tablet 40 mg, Daily Before Breakfast    polyethylene glycol (MIRALAX) packet 17 g, Daily PRN    tamsulosin (FLOMAX) capsule 0.4 mg, Daily With Dinner    traZODone (DESYREL) tablet 100 mg, HS    Administrative Statements   Today, Patient Was Seen By: Leticia Mireles MD  I have spent a total time of 54 minutes in caring for this patient on the day of the visit/encounter including Diagnostic results, Prognosis, Risks and benefits of tx options, Instructions for management, Patient and family education, Importance of tx compliance, Risk factor reductions, Impressions, Counseling / Coordination of care, Documenting in the medical record, Reviewing/placing orders in the medical record (including tests, medications, and/or procedures), Obtaining or reviewing history  , and Communicating with other healthcare professionals .    **Please Note: This note may have been constructed using a voice recognition system.**

## 2025-06-10 NOTE — APP STUDENT NOTE
"[facility] STUDENT  Inpatient Progress Note for TRAINING ONLY  Not Part of Legal Medical Record       Progress Note - Nj Pimentel Jr. 80 y.o. male MRN: [id number]    Unit/Bed#: [facility] Encounter: [id number]      Assessment and Plan:  Sepsis due to pneumonia   Patient presented to ED yesterday chest pain and lightheadedness while he was driving. Workup was negative for PE and cardiac causes.   CT revealed \"Asymmetric left pulmonary groundglass opacities, likely infectious or inflammatory. Consider follow-up chest CT in 3 months to ensure resolution/stability.\"  Patient was given 2,000 mg cefepime on 6/9  Today, patient is without complaints and feels well overall. Denies recurrence of symptoms from time of presentation  Continue ceftriaxone (initiated 6/10) and add azithromycin. Mucinex 600 mg BID  Awaiting blood cultures. Sputum culture has not been collected  Urine antigens were negative. Lactic was 1.4. Covid/flu/RSV negative  Procalcitonin increased to 3.08 from 1.47. Follow this lab   WBC 13.46 today from 11.69. Trend WBC and fever curve   Hypertension   Lisinopril 10 mg daily   Metoprolol succinate 25 mg  CKD Stage 3  Monitor Cr. Today, BUN 30 and Cr 1.61  Baseline Cr 1.3-1.5  Avoid nephrotoxic agents   Aortic Stenosis   October 2024 Echo demonstrated mild stenosis of aortic valve  History of Stroke  ASA 81 mg every other day   Plavix 75 mg daily  Atorvastatin 40 mg   Ectasia of the ascending thoracic aorta  Incidental finding on CT: \"Ectasia of the ascending thoracic aorta, measuring up to 41 mm. Recommend annual surveillance with low-dose chest CT. \"  Patient to follow up with outpatient PCP for management      Subjective:   Patient is an 80 year old male with PMH aortic stenosis, HTN, CKD stage 3, dementia, and prior stroke who presents on hospital day 1 with sepsis due to pneumonia. Today he feels well overall. He denies recurrence of symptoms that caused his presentation yesterday. Denies SOB, chest pain, " "palpitations, N/V, abdominal pain, and significant cough. He is able to bring up some mucus but it is not bothersome. He feels like he could go home today.    Objective:     Vitals: Blood pressure 141/66, pulse 60, temperature 97.9 °F (36.6 °C), temperature source Temporal, resp. rate 20, height 5' 9\" (1.753 m), weight 97.5 kg (215 lb), SpO2 90%.,Body mass index is 31.75 kg/m².      Intake/Output Summary (Last 24 hours) at 6/10/2025 1220  Last data filed at 6/10/2025 0823  Gross per 24 hour   Intake 1989.17 ml   Output 1625 ml   Net 364.17 ml       Physical Exam  Vitals reviewed.   Constitutional:       General: He is not in acute distress.     Appearance: He is obese. He is not ill-appearing or toxic-appearing.      Comments: Appears comfortable and in no acute distress   HENT:      Head: Normocephalic and atraumatic.      Nose: Nose normal. No congestion.      Mouth/Throat:      Mouth: Mucous membranes are moist.     Eyes:      General: No scleral icterus.     Conjunctiva/sclera: Conjunctivae normal.       Cardiovascular:      Rate and Rhythm: Normal rate and regular rhythm.      Heart sounds: Murmur heard.   Pulmonary:      Effort: Pulmonary effort is normal.      Breath sounds: Rhonchi present.      Comments: Breath sounds somewhat decreased with rhonchi. No respiratory distress  Abdominal:      General: There is no distension.      Palpations: Abdomen is soft. There is no mass.      Tenderness: There is no abdominal tenderness. There is no guarding.     Musculoskeletal:         General: Normal range of motion.      Cervical back: Normal range of motion and neck supple.     Skin:     General: Skin is warm and dry.     Neurological:      General: No focal deficit present.      Mental Status: He is alert.     Psychiatric:         Mood and Affect: Mood normal.         Behavior: Behavior normal.      Comments: Affect is appropriate and pleasant          Invasive Devices       Peripheral Intravenous Line  Duration "             Peripheral IV 06/09/25 Distal;Dorsal (posterior);Right Forearm <1 day    Peripheral IV 06/09/25 Distal;Left;Upper;Ventral (anterior) Arm <1 day                    Lab, Imaging and other studies: Results Review Statement: I reviewed radiology reports from this admission including: CT chest.  VTE Pharmacologic Prophylaxis: VTE covered by:  heparin (porcine), Subcutaneous, 5,000 Units at 06/10/25 0521      VTE Mechanical Prophylaxis: sequential compression device

## 2025-06-10 NOTE — ASSESSMENT & PLAN NOTE
Lab Results   Component Value Date    EGFR 39 06/10/2025    EGFR 45 06/09/2025    EGFR 47 11/06/2024    CREATININE 1.61 (H) 06/10/2025    CREATININE 1.43 (H) 06/09/2025    CREATININE 1.41 (H) 11/06/2024     Cr on admission 1.43  Baseline appears to be 1.3-1.5  I's and O's  Avoid nephrotoxic agents hypotension and contrast  Patient received contrast for CTA PE continue to trend  S/p IV fluids for 8 hours  Cr increased  Continue to trend    Continue home regimen of lisinopril and metoprolol at higher parameters

## 2025-06-10 NOTE — UTILIZATION REVIEW
Initial Clinical Review    Admission: Date/Time/Statement:   Admission Orders (From admission, onward)       Ordered        06/09/25 1652  INPATIENT ADMISSION  Once                          Orders Placed This Encounter   Procedures    INPATIENT ADMISSION     Standing Status:   Standing     Number of Occurrences:   1     Level of Care:   Med Surg [16]     Estimated length of stay:   More than 2 Midnights     Certification:   I certify that inpatient services are medically necessary for this patient for a duration of greater than two midnights. See H&P and MD Progress Notes for additional information about the patient's course of treatment.     ED Arrival Information       Expected   -    Arrival   6/9/2025 11:57    Acuity   Emergent              Means of arrival   Wheelchair    Escorted by   [place]    Service   Hospitalist    Admission type   Emergency              Arrival complaint   lightheaded, chest pain             Chief Complaint   Patient presents with    Chest Pain     Pt states that started with chest pain soon after he woke up this morning while he was driving. Pt states that he then started to become dizzy whenever he would take a deep breath and reports that his CP worsens when taking a deep breath.       Initial Presentation: 80 y.o. male  to ED via private vehicle from home.    Admitted to inpatient with Dx: Sepsis due to Pneumonia.  Presented to ED with chest pain with driving on morning of arrival.  Dizziness with deep breath.  Has had cough few days.   . PMHx:dementia, hypertension, CKD, aortic stenosis . On exam: appears ill.  tachycardia and tachypnea.  Lungs rhonchi.   Wbc 11.69.  bun 27. Creatinine 1.43 with baseline of 1.3 - 1.5.  procal 1.47.    Imaging shows left sided pneumonia.  ED treatment:  given cefepime.    Plan includes to continue antibiotics, transition to ceftriaxone and add azithromycin.  Check urine antigens and sputum culture.  Start IVF bolus and trend BMP.  Monitor respiratory  status.  .    Anticipated Length of Stay/Certification Statement:  Patient will be admitted on an inpatient basis with an anticipated length of stay of greater than 2 midnights secondary to pna.     Date: 6/10/25    Day 2:  no complaints.    on exam: diminished breath sound.   Rhonchi.  Cardiac murmur.   Bruising left arm, LUE edema.   Continue ceftriaxone. Aspiration precautions.  Incentive spirometry.   Monitor BMP.      Patient has crossed 3 midnights and requires ongoing care    6/11/2025 .  Patient presents with  no overnight events.  Chest pain and shortness of breath resolved.     On exam:  obese.  Cardiac murmur.   Lungs Rhonchi.  Distant breath sounds.   Abnormal labs or imaging:  bun 28. Creatinine 1.37.    Diagnosis/Plan    Sepsis due to pneumonia/ Hypertension with CKD/AS.  Continue ceftriaxone.    Trend BMP.        ED Treatment-Medication Administration from 06/09/2025 1156 to 06/09/2025 1734         Date/Time Order Dose Route Action     06/09/2025 1643 cefepime (MAXIPIME) IVPB (premix in dextrose) 2,000 mg 50 mL 2,000 mg Intravenous New Bag          Scheduled Medications:  aspirin, 81 mg, Oral, Every Other Day  atorvastatin, 40 mg, Oral, QPM  cefTRIAXone, 1,000 mg, Intravenous, Q24H  clopidogrel, 75 mg, Oral, Daily  Diclofenac Sodium, 2 g, Topical, 4x Daily  escitalopram, 20 mg, Oral, Daily  finasteride, 5 mg, Oral, Daily  gabapentin, 400 mg, Oral, TID  guaiFENesin, 600 mg, Oral, BID  heparin (porcine), 5,000 Units, Subcutaneous, Q8H GISELL  lisinopril, 10 mg, Oral, Daily  metoprolol succinate, 25 mg, Oral, Daily  pantoprazole, 40 mg, Oral, Daily Before Breakfast  tamsulosin, 0.4 mg, Oral, Daily With Dinner  traZODone, 100 mg, Oral, HS    multi-electrolyte (Plasmalyte-A/Isolyte-S PH 7.4/Normosol-R) IV solution 1,000 mL  Dose: 1,000 mL  Freq: Once Route: IV  Last Dose: Stopped (06/10/25 0203)  Start: 06/09/25 1715 End: 06/10/25 0203     Continuous IV Infusions:     PRN Meds: not used.     acetaminophen,  650 mg, Oral, Q6H PRN  aluminum-magnesium hydroxide-simethicone, 30 mL, Oral, Q6H PRN  benzonatate, 100 mg, Oral, TID PRN  ondansetron, 4 mg, Intravenous, Q4H PRN  polyethylene glycol, 17 g, Oral, Daily PRN      ED Triage Vitals [06/09/25 1211]   Temperature Pulse Respirations Blood Pressure SpO2 Pain Score   98.6 °F (37 °C) (!) 117 18 110/61 97 % 5     Weight (last 2 days) before discharge       Date/Time Weight    06/09/25 1804 97.5 (215)          Vital Signs (last 3 days) before discharge       Date/Time Temp Pulse Resp BP MAP (mmHg) SpO2 Calculated FIO2 (%) - Nasal Cannula Nasal Cannula O2 Flow Rate (L/min) O2 Device Patient Position - Orthostatic VS [place] Coma Scale Score Pain    06/11/25 0900 98.5 °F (36.9 °C) -- -- -- -- -- -- -- -- -- -- --    06/11/25 08:59:19 -- 61 -- 129/65 86 93 % -- -- -- -- -- --    06/11/25 0745 -- -- -- -- -- -- -- -- -- -- 15 No Pain    06/11/25 04:21:38 -- 62 -- 145/69 94 97 % -- -- -- -- -- --    06/11/25 0413 -- -- -- -- -- 97 % 28 2 L/min Nasal cannula -- -- --    06/10/25 2300 -- -- -- -- -- -- -- -- -- -- 15 No Pain    06/10/25 21:07:56 -- 61 12 124/50 75 94 % -- -- -- -- -- --    06/10/25 14:24:40 97.4 °F (36.3 °C) 53 18 126/54 78 94 % -- -- -- -- -- --    06/10/25 0945 -- -- -- -- -- -- -- -- -- -- 15 No Pain    06/10/25 07:36:14 97.9 °F (36.6 °C) 60 20 141/66 91 90 % -- -- None (Room air) -- -- --    06/09/25 2235 -- -- -- -- -- -- -- -- -- -- -- No Pain    06/09/25 2147 98.2 °F (36.8 °C) 60 -- 120/61 81 93 % -- -- None (Room air) Lying -- --    06/09/25 2012 -- -- -- -- -- -- -- -- None (Room air) -- 15 --    06/09/25 1831 -- -- -- -- -- -- -- -- None (Room air) -- 15 No Pain    06/09/25 1815 -- -- -- -- -- 89 % -- -- -- -- -- --    06/09/25 1814 -- 83 -- -- -- 89 % -- -- -- -- -- --    06/09/25 1805 -- -- -- -- -- -- -- -- -- -- -- No Pain    06/09/25 1804 98.6 °F (37 °C) 95 20 115/62 -- -- -- -- -- -- -- --    06/09/25 17:57:09 -- 95 -- 115/62 80 89 % -- -- -- -- -- --     06/09/25 1755 98.6 °F (37 °C) 95 20 115/62 -- 90 % -- -- -- -- -- --    06/09/25 1651 -- -- 26 -- -- -- -- -- -- -- -- --    06/09/25 1600 -- 94 19 105/57 77 95 % -- -- None (Room air) -- -- --    06/09/25 1328 -- 110 18 137/70 -- 97 % -- -- None (Room air) -- -- --    06/09/25 1327 -- -- -- -- -- -- -- -- -- -- 15 --    06/09/25 1211 98.6 °F (37 °C) 117 18 110/61 -- 97 % -- -- None (Room air) Sitting -- 5          Pertinent Labs/Diagnostic Test Results:   Radiology:  CTA chest pe study   Final Interpretation by Kalen Arcos MD (06/09 1538)      1. Asymmetric left pulmonary groundglass opacities, likely infectious or inflammatory. Consider follow-up chest CT in 3 months to ensure resolution/stability.      2. No pulmonary emboli.      3.  Ectasia of the ascending thoracic aorta, measuring up to 41 mm. Recommend annual surveillance with low-dose chest CT.      Note: The study was marked in EPIC for immediate notification. Imaging follow-up reminder notifications were scheduled in the electronic medical record.      Resident: MILAD MONTIEL I, the attending radiologist, have reviewed the images and agree with the final report above.      Workstation performed: [id number]         XR chest 2 views   Final Interpretation by Jose Pillai MD (06/09 0416)      Patchy bibasilar opacities, favoring atelectasis. However, pneumonia can not be entirely excluded.      Moderate-sized hiatal hernia            Workstation performed: [id number]           Cardiology:  ECG 12 lead   Final Result by Bryant Bradley MD (06/10 1620)   Sinus tachycardia   Otherwise normal ECG   When compared with ECG of 29-Mar-2023 16:56,   Vent. rate has increased by  42 bpm   Confirmed by Bryant Bradley ([id number]) on 6/10/2025 4:19:55 PM        GI:  No orders to display     Results from last 7 days   Lab Units 06/09/25  1752   SARS-COV-2  Negative     Results from last 7 days   Lab Units 06/11/25  0417 06/10/25  0201  06/09/25  1215   WBC Thousand/uL 8.78 13.46* 11.69*   HEMOGLOBIN g/dL 13.6 14.8 15.1   HEMATOCRIT % 41.6 44.1 45.6   PLATELETS Thousands/uL 160 164 166   TOTAL NEUT ABS Thousands/µL  --  10.72* 10.32*     Results from last 7 days   Lab Units 06/11/25 0417 06/10/25  0207 06/09/25  1215   SODIUM mmol/L 142 140 140   POTASSIUM mmol/L 4.1 4.0 4.4   CHLORIDE mmol/L 106 101 103   CO2 mmol/L 28 30 29   ANION GAP mmol/L 8 9 8   BUN mg/dL 28* 30* 27*   CREATININE mg/dL 1.37* 1.61* 1.43*   EGFR ml/min/1.73sq m 48 39 45   CALCIUM mg/dL 9.0 9.1 9.6   MAGNESIUM mg/dL 1.8* 1.9  --      Results from last 7 days   Lab Units 06/11/25  0417 06/10/25  0207 06/09/25  1215   AST U/L 19 18 23   ALT U/L 10 11 14   ALK PHOS U/L 39 41 47   TOTAL PROTEIN g/dL 6.4 6.6 7.1   ALBUMIN g/dL 4.0 4.1 4.5   TOTAL BILIRUBIN mg/dL 0.55 1.05* 0.97     Results from last 7 days   Lab Units 06/11/25  0417 06/10/25  0207 06/09/25  1215   GLUCOSE RANDOM mg/dL 104 92 155*     Results from last 7 days   Lab Units 06/09/25  1435 06/09/25  1215   HS TNI 0HR ng/L  --  7   HS TNI 2HR ng/L 11  --    HSTNI D2 ng/L 4  --      Results from last 7 days   Lab Units 06/09/25  1533   PROTIME seconds 15.0   INR  1.12   PTT seconds 27     Results from last 7 days   Lab Units 06/11/25  0417 06/10/25  0207 06/09/25  1533   PROCALCITONIN ng/ml 1.85* 3.08* 1.47*     Results from last 7 days   Lab Units 06/09/25  1533   LACTIC ACID mmol/L 1.4     Results from last 7 days   Lab Units 06/10/25  0207 06/09/25  1752   STREP PNEUMONIAE ANTIGEN, URINE  Negative  --    LEGIONELLA URINARY ANTIGEN  Negative  --    INFLUENZA A PCR   --  Negative   INFLUENZA B PCR   --  Negative   RSV PCR   --  Negative     Results from last 7 days   Lab Units 06/10/25  1426 06/09/25  1533   BLOOD CULTURE   --  No Growth at 24 hrs.  No Growth at 24 hrs.   SPUTUM CULTURE  1+ Growth of  --    GRAM STAIN RESULT  1+ Epithelial cells per low power field*  1+ Polys*  1+ Gram positive cocci in pairs, chains  and clusters*  1+ Gram positive rods*  Rare Gram negative rods*  Rare Yeast*  --        Past Medical History[1]  Present on Admission:   Benign hypertension with CKD (chronic kidney disease) stage III (HCC)   Aortic valve stenosis   Severe obesity (BMI 35.0-39.9) with comorbidity (HCC)      Admitting Diagnosis: Chest pain [R07.9]  Pneumonia [J18.9]  Age/Sex: 80 y.o. male    Network Utilization Review Department  ATTENTION: Please call with any questions or concerns to [phone] and carefully listen to the prompts so that you are directed to the right person. All voicemails are confidential.   For Discharge needs, contact Care Management DC Support Team at [phone] opt. 2  Send all requests for admission clinical reviews, approved or denied determinations and any other requests to dedicated fax number below belonging to the campus where the patient is receiving treatment. List of dedicated fax numbers for the Facilities:  FACILITY NAME UR FAX NUMBER   ADMISSION DENIALS (Administrative/Medical Necessity) [phone]   DISCHARGE SUPPORT TEAM (NETWORK) [phone]   PARENT CHILD HEALTH (Maternity/NICU/Pediatrics) [phone]   [facility] [phone]   [facility] [phone]   [facility] [phone]   [facility] [phone]   [facility] [phone]   [facility] [phone]   [facility] [phone]   [facility] [phone]   [facility] [phone]   [facility] [phone]   [facility] [phone]   [facility] [phone]              [1]   Past Medical History:  Diagnosis Date    Alcoholism (HCC)     Anxiety     Folliculitis 03/15/2024    GERD  (gastroesophageal reflux disease)     Gout     Insomnia     Stroke (HCC)

## 2025-06-10 NOTE — PLAN OF CARE
Problem: INFECTION - ADULT  Goal: Absence or prevention of progression during hospitalization  Description: INTERVENTIONS:  - Assess and monitor for signs and symptoms of infection  - Monitor lab/diagnostic results  - Monitor all insertion sites, i.e. indwelling lines, tubes, and drains  - Monitor endotracheal if appropriate and nasal secretions for changes in amount and color  - [place] appropriate cooling/warming therapies per order  - Administer medications as ordered  - Instruct and encourage patient and family to use good hand hygiene technique  - Identify and instruct in appropriate isolation precautions for identified infection/condition  Outcome: Progressing  Goal: Absence of fever/infection during neutropenic period  Description: INTERVENTIONS:  - Monitor WBC  - Perform strict hand hygiene  - Limit to healthy visitors only  - No plants, dried, fresh or silk flowers with winslow in patient room  Outcome: Progressing

## 2025-06-10 NOTE — PLAN OF CARE
Problem: PAIN - ADULT  Goal: Verbalizes/displays adequate comfort level or baseline comfort level  Description: Interventions:  - Encourage patient to monitor pain and request assistance  - Assess pain using appropriate pain scale  - Administer analgesics as ordered based on type and severity of pain and evaluate response  - Implement non-pharmacological measures as appropriate and evaluate response  - Consider cultural and social influences on pain and pain management  - Notify physician/advanced practitioner if interventions unsuccessful or patient reports new pain  - Educate patient/family on pain management process including their role and importance of  reporting pain   - Provide non-pharmacologic/complimentary pain relief interventions  Outcome: Progressing     Problem: INFECTION - ADULT  Goal: Absence or prevention of progression during hospitalization  Description: INTERVENTIONS:  - Assess and monitor for signs and symptoms of infection  - Monitor lab/diagnostic results  - Monitor all insertion sites, i.e. indwelling lines, tubes, and drains  - Monitor endotracheal if appropriate and nasal secretions for changes in amount and color  - [place] appropriate cooling/warming therapies per order  - Administer medications as ordered  - Instruct and encourage patient and family to use good hand hygiene technique  - Identify and instruct in appropriate isolation precautions for identified infection/condition  Outcome: Progressing  Goal: Absence of fever/infection during neutropenic period  Description: INTERVENTIONS:  - Monitor WBC  - Perform strict hand hygiene  - Limit to healthy visitors only  - No plants, dried, fresh or silk flowers with winslow in patient room  Outcome: Progressing     Problem: SAFETY ADULT  Goal: Patient will remain free of falls  Description: INTERVENTIONS:  - Educate patient/family on patient safety including physical limitations  - Instruct patient to call for assistance with activity   -  Consider consulting OT/PT to assist with strengthening/mobility based on AM PAC & JH-HLM score  - Consult OT/PT to assist with strengthening/mobility   - Keep Call bell within reach  - Keep bed low and locked with side rails adjusted as appropriate  - Keep care items and personal belongings within reach  - Initiate and maintain comfort rounds  - Make Fall Risk Sign visible to staff  - Offer Toileting every  Hours, in advance of need  - Initiate/Maintain alarm  - Obtain necessary fall risk management equipment:   - Apply yellow socks and bracelet for high fall risk patients  - Consider moving patient to room near nurses station  Outcome: Progressing  Goal: Maintain or return to baseline ADL function  Description: INTERVENTIONS:  -  Assess patient's ability to carry out ADLs; assess patient's baseline for ADL function and identify physical deficits which impact ability to perform ADLs (bathing, care of mouth/teeth, toileting, grooming, dressing, etc.)  - Assess/evaluate cause of self-care deficits   - Assess range of motion  - Assess patient's mobility; develop plan if impaired  - Assess patient's need for assistive devices and provide as appropriate  - Encourage maximum independence but intervene and supervise when necessary  - Involve family in performance of ADLs  - Assess for home care needs following discharge   - Consider OT consult to assist with ADL evaluation and planning for discharge  - Provide patient education as appropriate  - Monitor functional capacity and physical performance, use of AM PAC & JH-HLM   - Monitor gait, balance and fatigue with ambulation    Outcome: Progressing  Goal: Maintains/Returns to pre admission functional level  Description: INTERVENTIONS:  - Perform AM-PAC 6 Click Basic Mobility/ Daily Activity assessment daily.  - Set and communicate daily mobility goal to care team and patient/family/caregiver.   - Collaborate with rehabilitation services on mobility goals if consulted  -  Perform Range of Motion  times a day.  - Reposition patient every  hours.  - Dangle patient  times a day  - Stand patient  times a day  - Ambulate patient  times a day  - Out of bed to chair  times a day   - Out of bed for me times a day  - Out of bed for toileting  - Record patient progress and toleration of activity level   Outcome: Progressing     Problem: DISCHARGE PLANNING  Goal: Discharge to home or other facility with appropriate resources  Description: INTERVENTIONS:  - Identify barriers to discharge w/patient and caregiver  - Arrange for needed discharge resources and transportation as appropriate  - Identify discharge learning needs (meds, wound care, etc.)  - Arrange for interpretive services to assist at discharge as needed  - Refer to Case Management Department for coordinating discharge planning if the patient needs post-hospital services based on physician/advanced practitioner order or complex needs related to functional status, cognitive ability, or social support system  Outcome: Progressing     Problem: Knowledge Deficit  Goal: Patient/family/caregiver demonstrates understanding of disease process, treatment plan, medications, and discharge instructions  Description: Complete learning assessment and assess knowledge base.  Interventions:  - Provide teaching at level of understanding  - Provide teaching via preferred learning methods  Outcome: Progressing

## 2025-06-10 NOTE — ASSESSMENT & PLAN NOTE
Ectasia of the ascending thoracic aorta, measuring up to 41 mm. Recommend annual surveillance with low-dose chest CT. Discussed with pt

## 2025-06-11 ENCOUNTER — TRANSITIONAL CARE MANAGEMENT (OUTPATIENT)
Dept: FAMILY MEDICINE CLINIC | Facility: HOSPITAL | Age: 80
End: 2025-06-11

## 2025-06-11 VITALS
HEIGHT: 69 IN | BODY MASS INDEX: 31.84 KG/M2 | RESPIRATION RATE: 12 BRPM | TEMPERATURE: 98.5 F | HEART RATE: 61 BPM | OXYGEN SATURATION: 93 % | WEIGHT: 215 LBS | DIASTOLIC BLOOD PRESSURE: 65 MMHG | SYSTOLIC BLOOD PRESSURE: 129 MMHG

## 2025-06-11 LAB
ALBUMIN SERPL BCG-MCNC: 4 G/DL (ref 3.5–5)
ALP SERPL-CCNC: 39 U/L (ref 34–104)
ALT SERPL W P-5'-P-CCNC: 10 U/L (ref 7–52)
ANION GAP SERPL CALCULATED.3IONS-SCNC: 8 MMOL/L (ref 4–13)
AST SERPL W P-5'-P-CCNC: 19 U/L (ref 13–39)
BILIRUB SERPL-MCNC: 0.55 MG/DL (ref 0.2–1)
BUN SERPL-MCNC: 28 MG/DL (ref 5–25)
CALCIUM SERPL-MCNC: 9 MG/DL (ref 8.4–10.2)
CHLORIDE SERPL-SCNC: 106 MMOL/L (ref 96–108)
CO2 SERPL-SCNC: 28 MMOL/L (ref 21–32)
CREAT SERPL-MCNC: 1.37 MG/DL (ref 0.6–1.3)
ERYTHROCYTE [DISTWIDTH] IN BLOOD BY AUTOMATED COUNT: 12.2 % (ref 11.6–15.1)
GFR SERPL CREATININE-BSD FRML MDRD: 48 ML/MIN/1.73SQ M
GLUCOSE SERPL-MCNC: 104 MG/DL (ref 65–140)
HCT VFR BLD AUTO: 41.6 % (ref 36.5–49.3)
HGB BLD-MCNC: 13.6 G/DL (ref 12–17)
MAGNESIUM SERPL-MCNC: 1.8 MG/DL (ref 1.9–2.7)
MCH RBC QN AUTO: 31.9 PG (ref 26.8–34.3)
MCHC RBC AUTO-ENTMCNC: 32.7 G/DL (ref 31.4–37.4)
MCV RBC AUTO: 98 FL (ref 82–98)
PLATELET # BLD AUTO: 160 THOUSANDS/UL (ref 149–390)
PMV BLD AUTO: 9.6 FL (ref 8.9–12.7)
POTASSIUM SERPL-SCNC: 4.1 MMOL/L (ref 3.5–5.3)
PROCALCITONIN SERPL-MCNC: 1.85 NG/ML
PROT SERPL-MCNC: 6.4 G/DL (ref 6.4–8.4)
RBC # BLD AUTO: 4.26 MILLION/UL (ref 3.88–5.62)
SODIUM SERPL-SCNC: 142 MMOL/L (ref 135–147)
WBC # BLD AUTO: 8.78 THOUSAND/UL (ref 4.31–10.16)

## 2025-06-11 PROCEDURE — 99239 HOSP IP/OBS DSCHRG MGMT >30: CPT | Performed by: STUDENT IN AN ORGANIZED HEALTH CARE EDUCATION/TRAINING PROGRAM

## 2025-06-11 PROCEDURE — 83735 ASSAY OF MAGNESIUM: CPT | Performed by: STUDENT IN AN ORGANIZED HEALTH CARE EDUCATION/TRAINING PROGRAM

## 2025-06-11 PROCEDURE — 85027 COMPLETE CBC AUTOMATED: CPT | Performed by: STUDENT IN AN ORGANIZED HEALTH CARE EDUCATION/TRAINING PROGRAM

## 2025-06-11 PROCEDURE — 80053 COMPREHEN METABOLIC PANEL: CPT | Performed by: STUDENT IN AN ORGANIZED HEALTH CARE EDUCATION/TRAINING PROGRAM

## 2025-06-11 PROCEDURE — 84145 PROCALCITONIN (PCT): CPT | Performed by: STUDENT IN AN ORGANIZED HEALTH CARE EDUCATION/TRAINING PROGRAM

## 2025-06-11 RX ORDER — CEFDINIR 300 MG/1
300 CAPSULE ORAL EVERY 12 HOURS SCHEDULED
Qty: 6 CAPSULE | Refills: 0 | Status: SHIPPED | OUTPATIENT
Start: 2025-06-11 | End: 2025-06-14

## 2025-06-11 RX ADMIN — FINASTERIDE 5 MG: 5 TABLET, FILM COATED ORAL at 08:51

## 2025-06-11 RX ADMIN — METOPROLOL SUCCINATE 25 MG: 25 TABLET, EXTENDED RELEASE ORAL at 08:52

## 2025-06-11 RX ADMIN — ESCITALOPRAM OXALATE 20 MG: 20 TABLET ORAL at 08:52

## 2025-06-11 RX ADMIN — CEFTRIAXONE 1000 MG: 1 INJECTION, SOLUTION INTRAVENOUS at 05:25

## 2025-06-11 RX ADMIN — CLOPIDOGREL 75 MG: 75 TABLET ORAL at 08:52

## 2025-06-11 RX ADMIN — GUAIFENESIN 600 MG: 600 TABLET ORAL at 08:52

## 2025-06-11 RX ADMIN — PANTOPRAZOLE SODIUM 40 MG: 40 TABLET, DELAYED RELEASE ORAL at 05:29

## 2025-06-11 RX ADMIN — DICLOFENAC SODIUM 2 G: 10 GEL TOPICAL at 11:43

## 2025-06-11 RX ADMIN — ASPIRIN 81 MG: 81 TABLET, COATED ORAL at 08:52

## 2025-06-11 RX ADMIN — GABAPENTIN 400 MG: 400 CAPSULE ORAL at 08:52

## 2025-06-11 RX ADMIN — HEPARIN SODIUM 5000 UNITS: 5000 INJECTION INTRAVENOUS; SUBCUTANEOUS at 05:25

## 2025-06-11 RX ADMIN — LISINOPRIL 10 MG: 10 TABLET ORAL at 08:52

## 2025-06-11 NOTE — INCIDENTAL FINDINGS
The following findings require follow up:  Radiographic finding   Finding: Ectasia of the ascending thoracic aorta, measuring up to 41 mm. Recommend annual surveillance with low-dose chest CT.,    Follow up required: yes   Follow up should be done within 1 year     Please notify the following clinician to assist with the follow up:   PCP    Incidental finding results were discussed with the Patient and Patient's POA by Leticia Mireles MD on 06/11/25.   They expressed understanding and all questions answered.

## 2025-06-11 NOTE — APP STUDENT NOTE
"[facility] STUDENT  Inpatient Progress Note for TRAINING ONLY  Not Part of Legal Medical Record       Progress Note - Nj Pimentel Jr. 80 y.o. male MRN: [id number]    Unit/Bed#: [facility] Encounter: [id number]      Assessment and Plan:  Sepsis due to pneumonia   Patient presented to ED 6/9 with chest pain and lightheadedness that occurred while he was driving. Workup was negative for PE and cardiac causes.   CT revealed \"Asymmetric left pulmonary groundglass opacities, likely infectious or inflammatory. Consider follow-up chest CT in 3 months to ensure resolution/stability.\"  Patient was given 2,000 mg cefepime on 6/9  Ceftriaxone was initiated 6/10  Today, patient is without complaints and feels well overall. He is looking forward to discharge  Suggest d/c with Cefdinir 300 mg BID for total treatment course of 5 days. Patient received 1 dose of cefepime (6/9), 2 doses (2 days) of ceftriaxone inpatient, so treatment course would extend 2 additional days (last day abx 6/13).  Mucinex 600 mg BID  Blood cultures are negative at 24 hours   Sputum culture: 1+ epithelial cells, 1+ polys, 1+ gram positive cocci in pairs/chains/clusters, 1+ gram positive rods, rare gram negative rods, rare yeast   Urine antigens were negative. Lactic was 1.4. Covid/flu/RSV negative  Procalcitonin decreased to 1.85 from 3.08.   WBC improved 8.78 from 13.46 yesterday  Hypertension   Lisinopril 10 mg daily   Metoprolol succinate 25 mg  CKD Stage 3  Have been monitoring Cr after patient received IV contrast  Today, BUN 28 and Cr 1.37  Baseline Cr 1.3-1.5  Avoid nephrotoxic agents   Aortic Stenosis   October 2024 Echo demonstrated mild stenosis of aortic valve  History of Stroke  ASA 81 mg every other day   Plavix 75 mg daily  Atorvastatin 40 mg   Ectasia of the ascending thoracic aorta  Incidental finding on CT: \"Ectasia of the ascending thoracic aorta, measuring up to 41 mm. Recommend annual surveillance with low-dose chest CT. \"  Patient to " "follow up with outpatient PCP for management      Subjective:   Patient is an 80 year old male with PMH aortic stenosis, HTN, CKD stage 3, dementia, and prior stroke who presents on hospital day 2 with admitting diagnosis of sepsis due to pneumonia. Today he feels well overall. He denies recurrence of symptoms that caused his presentation. Denies SOB, chest pain, palpitations, N/V, abdominal pain, and significant cough. He is looking forward to discharge.    Objective:     Vitals: Blood pressure 145/69, pulse 62, temperature (!) 97.4 °F (36.3 °C), temperature source Oral, resp. rate 12, height 5' 9\" (1.753 m), weight 97.5 kg (215 lb), SpO2 97%.,Body mass index is 31.75 kg/m².      Intake/Output Summary (Last 24 hours) at 6/11/2025 0848  Last data filed at 6/10/2025 2301  Gross per 24 hour   Intake 900 ml   Output 450 ml   Net 450 ml       Physical Exam  Vitals reviewed.   Constitutional:       General: He is not in acute distress.     Appearance: He is obese. He is not ill-appearing or toxic-appearing.      Comments: Appears comfortable and in no acute distress   HENT:      Head: Normocephalic and atraumatic.      Nose: Nose normal. No congestion.      Mouth/Throat:      Mouth: Mucous membranes are moist.     Eyes:      General: No scleral icterus.     Conjunctiva/sclera: Conjunctivae normal.       Cardiovascular:      Rate and Rhythm: Normal rate and regular rhythm.      Heart sounds: Murmur heard.   Pulmonary:      Effort: Pulmonary effort is normal.      Comments: Breath sounds somewhat decreased. Rhonchi less prominent. No respiratory distress  Abdominal:      General: There is no distension.      Palpations: Abdomen is soft. There is no mass.      Tenderness: There is no abdominal tenderness. There is no guarding.     Musculoskeletal:         General: Normal range of motion.      Cervical back: Normal range of motion and neck supple.     Skin:     General: Skin is warm and dry.     Neurological:      General: " No focal deficit present.      Mental Status: He is alert.     Psychiatric:         Mood and Affect: Mood normal.         Behavior: Behavior normal.      Comments: Affect is appropriate and pleasant          Invasive Devices       Peripheral Intravenous Line  Duration             Peripheral IV 06/09/25 Distal;Dorsal (posterior);Right Forearm 1 day    Peripheral IV 06/09/25 Distal;Left;Upper;Ventral (anterior) Arm 1 day                    Lab, Imaging and other studies: Results Review Statement: I reviewed radiology reports from this admission including: CT chest.  VTE Pharmacologic Prophylaxis: VTE covered by:  heparin (porcine), Subcutaneous, 5,000 Units at 06/11/25 0525      VTE Mechanical Prophylaxis: sequential compression device

## 2025-06-11 NOTE — UTILIZATION REVIEW
NOTIFICATION OF INPATIENT ADMISSION   AUTHORIZATION REQUEST   SERVICING FACILITY:   [place] [address]  Tax ID: [id number]  NPI: [id number] ATTENDING PROVIDER:  Attending Name and NPI#: Leticia Mireles Md [[id number]]  Address: [address] [place] [address]  Phone: [phone]   ADMISSION INFORMATION:  Place of Service: Inpatient [facility]  Place of Service Code: 21  Inpatient Admission Date/Time: 6/9/25  4:52 PM  Discharge Date/Time: 6/11/2025  1:35 PM  Admitting Diagnosis Code/Description:  Chest pain [R07.9]  Pneumonia [J18.9]     UTILIZATION REVIEW CONTACT:  Cherri Brown, Utilization   Network Utilization Review Department  Phone: [phone]  Fax: [phone]  Email: [email or web address]  Contact for approvals/pending authorizations, clinical reviews, and discharge.     PHYSICIAN ADVISORY SERVICES:  Medical Necessity Denial & Tlfd-vb-Qtrr Review  Phone: [phone]  Fax: [phone]  Email: [email or web address]     DISCHARGE SUPPORT TEAM:  For Patients Discharge Needs & Updates  Phone: [phone] opt. 2 Fax: [phone]  Email: [email or web address]

## 2025-06-11 NOTE — DISCHARGE SUMMARY
Discharge Summary - Hospitalist   Name: Nj Pimentel Jr. 80 y.o. male I MRN: [id number]  Unit/Bed#: -01 I Date of Admission: 6/9/2025   Date of Service: 6/11/2025 I Hospital Day: 2     Assessment & Plan  Sepsis due to pneumonia (HCC)  Patient presenting due to chest pain pleuritic in nature associated with lightheadedness    Patient meeting SIRS criteria with tachycardia and tachypnea  Source being pneumonia  CTA PE showing inflammatory vs infectious etiology   Lactic acid negative  Procalcitonin 1.47-> 3.08-> 1.85 continue to trend  No other endorgan dysfunction  Patient given Vanco and cefepime in the ED    continue ceftriaxone day 2  Urine antigens negative  Sputum culture mixed  MRSA culture negative  Blood cultures NGTD @ 24h  COVID flu RSV negative  Supportive treatments   IVFdc'd  resolved    F/u with PCP and discharge on cefdinir 300 mg bid for 3 days  Benign hypertension with CKD (chronic kidney disease) stage III ([facility])  Lab Results   Component Value Date    EGFR 48 06/11/2025    EGFR 39 06/10/2025    EGFR 45 06/09/2025    CREATININE 1.37 (H) 06/11/2025    CREATININE 1.61 (H) 06/10/2025    CREATININE 1.43 (H) 06/09/2025     Cr on admission 1.43  Baseline appears to be 1.3-1.5  I's and O's  Avoid nephrotoxic agents hypotension and contrast  Patient received contrast for CTA PE continue to trend  S/p IV fluids for 8 hours  Cr downward trending  BMP in 1 week  Continue to trend    Continue home regimen of lisinopril and metoprolol at higher parameters  Severe obesity (BMI 35.0-39.9) with comorbidity (HCC)  Lifestyle modification  Aortic valve stenosis  Echo done on 10/4/2024 showed an LVEF of 65% with grade 1 diastolic dysfunction aortic leaflets moderately calcified mild stenosis    S/p IVF  Abnormal CT scan  Ectasia of the ascending thoracic aorta, measuring up to 41 mm. Recommend annual surveillance with low-dose chest CT. Discussed with pt     Medical Problems       Resolved Problems  Date  Reviewed: 5/16/2025   None       Discharging Physician / Practitioner: Leticia Mireles MD  PCP: Dona Camp DO  Admission Date:   Admission Orders (From admission, onward)       Ordered        06/09/25 1652  INPATIENT ADMISSION  Once                          Discharge Date: 06/11/25    Next Steps for Physician/AP Assuming Care:  F/u with PCP  BMP in 1 week    Test Results Pending at Discharge (will require follow up):  Bld cx x2    Medication Changes for Discharge & Rationale:   See above and below  See after visit summary for reconciled discharge medications provided to patient and/or family.     Consultations During Hospital Stay:  CM    Procedures Performed:   CTA chest pe study   Final Result      1. Asymmetric left pulmonary groundglass opacities, likely infectious or inflammatory. Consider follow-up chest CT in 3 months to ensure resolution/stability.      2. No pulmonary emboli.      3.  Ectasia of the ascending thoracic aorta, measuring up to 41 mm. Recommend annual surveillance with low-dose chest CT.      Note: The study was marked in EPIC for immediate notification. Imaging follow-up reminder notifications were scheduled in the electronic medical record.      Resident: MILAD MONTIEL I, the attending radiologist, have reviewed the images and agree with the final report above.      Workstation performed: [id number]         XR chest 2 views   Final Result      Patchy bibasilar opacities, favoring atelectasis. However, pneumonia can not be entirely excluded.      Moderate-sized hiatal hernia            Workstation performed: [id number]               Significant Findings / Test Results:   See above    Incidental Findings:   See above   I reviewed the above mentioned incidental findings with the patient and/or family and they expressed understanding.    Hospital Course:   Nj Pimentel Jr. is a 80 y.o. male patient who originally presented to the hospital on 6/9/2025 due to chest pain and  "lightheadedness.  Patient was meeting sepsis criteria and found to have pneumonia.  Pneumonia and sepsis workup was obtained.  Patient improved the following day however creatinine elevated due to contrast being used for CTA PE on admission.  Creatinine leukocytosis and procalcitonin improved the following day.  Patient remains on room air.  Patient has otherwise remained hemodynamically stable afebrile in no acute respiratory distress.  Patient has been medically cleared for discharge.  Patient is to follow-up with his PCP in 1 to 2 weeks and complete a BMP in 1 week.  Patient will be discharged on cefdinir 300 mg twice daily for 3 days.    No notes on file      Please see above list of diagnoses and related plan for additional information.     Discharge Day Visit / Exam:   Subjective:  Skip seen and examined at bedside.  No acute events overnight.  Discussed plan of care.  All questions and concerns were answered and addressed.  Has no acute complaints at this time.  Feels well has no further chest pain or shortness of breath.  Vitals: Blood Pressure: 129/65 (06/11/25 0859)  Pulse: 61 (06/11/25 0859)  Temperature: 98.5 °F (36.9 °C) (06/11/25 0900)  Temp Source: Oral (06/11/25 0900)  Respirations: 12 (06/10/25 2107)  Height: 5' 9\" (175.3 cm) (06/09/25 1804)  Weight - Scale: 97.5 kg (215 lb) (06/09/25 1804)  SpO2: 93 % (06/11/25 0859)  Physical Exam   Vitals and nursing note reviewed.   Constitutional:       General: He is not in acute distress.     Appearance: He is obese. He is not ill-appearing.   HENT:      Head: Normocephalic and atraumatic.      Cardiovascular:      Rate and Rhythm: Normal rate and regular rhythm.      Pulses: Normal pulses.      Heart sounds: Murmur heard.   Pulmonary:      Effort: Pulmonary effort is normal.      Breath sounds: Rhonchi present.      Comments: Distant breath sounds  Abdominal:      General: Abdomen is flat. Bowel sounds are normal.      Palpations: Abdomen is soft.    "   Musculoskeletal:      Right lower leg: No edema.      Left lower leg: No edema.      Skin:     General: Skin is warm.      Neurological:      General: No focal deficit present.      Mental Status: He is alert and oriented to person, place, and time    Discussion with Family: Updated  (daughter) via phone.    Discharge instructions/Information to patient and family:   See after visit summary for information provided to patient and family.      Provisions for Follow-Up Care:  See after visit summary for information related to follow-up care and any pertinent home health orders.      Mobility at time of Discharge:   Basic Mobility Inpatient Raw Score: 24  JH-HLM Goal: 8: Walk 250 feet or more  JH-HLM Achieved: 7: Walk 25 feet or more  HLM Goal NOT achieved. Continue to encourage mobility in post discharge setting.     Disposition:   Home    Planned Readmission: no    Administrative Statements   Discharge Statement:  I have spent a total time of 40 minutes in caring for this patient on the day of the visit/encounter. >30 minutes of time was spent on: Diagnostic results, Prognosis, Risks and benefits of tx options, Instructions for management, Patient and family education, Importance of tx compliance, Risk factor reductions, Impressions, Counseling / Coordination of care, Documenting in the medical record, Reviewing / ordering tests, medicine, procedures  , and Communicating with other healthcare professionals .    **Please Note: This note may have been constructed using a voice recognition system**

## 2025-06-11 NOTE — DISCHARGE INSTR - AVS FIRST PAGE
You are to follow-up with your PCP in 1 to 2 weeks    Here to complete blood work in 1 week    You will be discharged with the following:  Cefdinir 300 mg to be taken twice a day for 3 days starting tomorrow 6/12/25 morning

## 2025-06-11 NOTE — ASSESSMENT & PLAN NOTE
Patient presenting due to chest pain pleuritic in nature associated with lightheadedness    Patient meeting SIRS criteria with tachycardia and tachypnea  Source being pneumonia  CTA PE showing inflammatory vs infectious etiology   Lactic acid negative  Procalcitonin 1.47-> 3.08-> 1.85 continue to trend  No other endorgan dysfunction  Patient given Vanco and cefepime in the ED    continue ceftriaxone day 2  Urine antigens negative  Sputum culture mixed  MRSA culture negative  Blood cultures NGTD @ 24h  COVID flu RSV negative  Supportive treatments   IVFdc'd  resolved    F/u with PCP and discharge on cefdinir 300 mg bid for 3 days

## 2025-06-11 NOTE — PLAN OF CARE
Problem: PAIN - ADULT  Goal: Verbalizes/displays adequate comfort level or baseline comfort level  Description: Interventions:  - Encourage patient to monitor pain and request assistance  - Assess pain using appropriate pain scale  - Administer analgesics as ordered based on type and severity of pain and evaluate response  - Implement non-pharmacological measures as appropriate and evaluate response  - Consider cultural and social influences on pain and pain management  - Notify physician/advanced practitioner if interventions unsuccessful or patient reports new pain  - Educate patient/family on pain management process including their role and importance of  reporting pain   - Provide non-pharmacologic/complimentary pain relief interventions  Outcome: Progressing     Problem: INFECTION - ADULT  Goal: Absence or prevention of progression during hospitalization  Description: INTERVENTIONS:  - Assess and monitor for signs and symptoms of infection  - Monitor lab/diagnostic results  - Monitor all insertion sites, i.e. indwelling lines, tubes, and drains  - Monitor endotracheal if appropriate and nasal secretions for changes in amount and color  - [place] appropriate cooling/warming therapies per order  - Administer medications as ordered  - Instruct and encourage patient and family to use good hand hygiene technique  - Identify and instruct in appropriate isolation precautions for identified infection/condition  Outcome: Progressing  Goal: Absence of fever/infection during neutropenic period  Description: INTERVENTIONS:  - Monitor WBC  - Perform strict hand hygiene  - Limit to healthy visitors only  - No plants, dried, fresh or silk flowers with winslow in patient room  Outcome: Progressing     Problem: SAFETY ADULT  Goal: Patient will remain free of falls  Description: INTERVENTIONS:  - Educate patient/family on patient safety including physical limitations  - Instruct patient to call for assistance with activity   -  Consider consulting OT/PT to assist with strengthening/mobility based on AM PAC & JH-HLM score  - Consult OT/PT to assist with strengthening/mobility   - Keep Call bell within reach  - Keep bed low and locked with side rails adjusted as appropriate  - Keep care items and personal belongings within reach  - Initiate and maintain comfort rounds  - Make Fall Risk Sign visible to staff  - Offer Toileting every 2 Hours, in advance of need  - Initiate/Maintain bed alarm  - Obtain necessary fall risk management equipment  - Apply yellow socks and bracelet for high fall risk patients  - Consider moving patient to room near nurses station  Outcome: Progressing  Goal: Maintain or return to baseline ADL function  Description: INTERVENTIONS:  -  Assess patient's ability to carry out ADLs; assess patient's baseline for ADL function and identify physical deficits which impact ability to perform ADLs (bathing, care of mouth/teeth, toileting, grooming, dressing, etc.)  - Assess/evaluate cause of self-care deficits   - Assess range of motion  - Assess patient's mobility; develop plan if impaired  - Assess patient's need for assistive devices and provide as appropriate  - Encourage maximum independence but intervene and supervise when necessary  - Involve family in performance of ADLs  - Assess for home care needs following discharge   - Consider OT consult to assist with ADL evaluation and planning for discharge  - Provide patient education as appropriate  - Monitor functional capacity and physical performance, use of AM PAC & JH-HLM   - Monitor gait, balance and fatigue with ambulation    Outcome: Progressing  Goal: Maintains/Returns to pre admission functional level  Description: INTERVENTIONS:  - Perform AM-PAC 6 Click Basic Mobility/ Daily Activity assessment daily.  - Set and communicate daily mobility goal to care team and patient/family/caregiver.   - Collaborate with rehabilitation services on mobility goals if consulted  -  Perform Range of Motion 3 times a day.  - Reposition patient every 2 hours.  - Dangle patient 3 times a day  - Stand patient 3 times a day  - Ambulate patient 3 times a day  - Out of bed to chair 3 times a day   - Out of bed for meals 3 times a day  - Out of bed for toileting  - Record patient progress and toleration of activity level   Outcome: Progressing     Problem: DISCHARGE PLANNING  Goal: Discharge to home or other facility with appropriate resources  Description: INTERVENTIONS:  - Identify barriers to discharge w/patient and caregiver  - Arrange for needed discharge resources and transportation as appropriate  - Identify discharge learning needs (meds, wound care, etc.)  - Arrange for interpretive services to assist at discharge as needed  - Refer to Case Management Department for coordinating discharge planning if the patient needs post-hospital services based on physician/advanced practitioner order or complex needs related to functional status, cognitive ability, or social support system  Outcome: Progressing     Problem: Knowledge Deficit  Goal: Patient/family/caregiver demonstrates understanding of disease process, treatment plan, medications, and discharge instructions  Description: Complete learning assessment and assess knowledge base.  Interventions:  - Provide teaching at level of understanding  - Provide teaching via preferred learning methods  Outcome: Progressing

## 2025-06-11 NOTE — ASSESSMENT & PLAN NOTE
Lab Results   Component Value Date    EGFR 48 06/11/2025    EGFR 39 06/10/2025    EGFR 45 06/09/2025    CREATININE 1.37 (H) 06/11/2025    CREATININE 1.61 (H) 06/10/2025    CREATININE 1.43 (H) 06/09/2025     Cr on admission 1.43  Baseline appears to be 1.3-1.5  I's and O's  Avoid nephrotoxic agents hypotension and contrast  Patient received contrast for CTA PE continue to trend  S/p IV fluids for 8 hours  Cr downward trending  BMP in 1 week  Continue to trend    Continue home regimen of lisinopril and metoprolol at higher parameters

## 2025-06-12 ENCOUNTER — RESULTS FOLLOW-UP (OUTPATIENT)
Dept: EMERGENCY DEPT | Facility: HOSPITAL | Age: 80
End: 2025-06-12

## 2025-06-12 PROBLEM — I77.810 THORACIC AORTIC ECTASIA (HCC): Status: ACTIVE | Noted: 2025-06-12

## 2025-06-12 LAB
BACTERIA SPT RESP CULT: ABNORMAL
GRAM STN SPEC: ABNORMAL

## 2025-06-12 NOTE — UTILIZATION REVIEW
NOTIFICATION OF ADMISSION DISCHARGE   This is a Notification of Discharge from [facility]. Please be advised that this patient has been discharge from our facility. Below you will find the admission and discharge date and time including the patient’s disposition.   UTILIZATION REVIEW CONTACT:  Utilization Review Assistants  Network Utilization Review Department  Phone: [phone] x carefully listen to the prompts. All voicemails are confidential.  Email: [email or web address]     ADMISSION INFORMATION  PRESENTATION DATE: 6/9/2025  1:13 PM  OBERVATION ADMISSION DATE: N/A  INPATIENT ADMISSION DATE: 6/9/25  4:52 PM   DISCHARGE DATE: 6/11/2025  1:35 PM   DISPOSITION:Home/Self Care    Network Utilization Review Department  ATTENTION: Please call with any questions or concerns to [phone] and carefully listen to the prompts so that you are directed to the right person. All voicemails are confidential.   For Discharge needs, contact Care Management DC Support Team at [phone] opt. 2  Send all requests for admission clinical reviews, approved or denied determinations and any other requests to dedicated fax number below belonging to the campus where the patient is receiving treatment. List of dedicated fax numbers for the Facilities:  FACILITY NAME UR FAX NUMBER   ADMISSION DENIALS (Administrative/Medical Necessity) [phone]   DISCHARGE SUPPORT TEAM ([facility]) [phone]   PARENT CHILD HEALTH (Maternity/NICU/Pediatrics) [phone]   [facility] [phone]   [facility] [phone]   [facility] [phone]   [facility] [phone]   [facility] [phone]   [facility] [phone]   [facility] [phone]   [facility] [phone]   [facility]  [facility] [phone]   [facility] [phone]   [facility] [phone]   [facility] [phone]

## 2025-06-14 LAB
BACTERIA BLD CULT: NORMAL
BACTERIA BLD CULT: NORMAL

## 2025-06-15 DIAGNOSIS — I25.10 CORONARY ARTERY DISEASE INVOLVING NATIVE CORONARY ARTERY OF NATIVE HEART WITHOUT ANGINA PECTORIS: ICD-10-CM

## 2025-06-15 RX ORDER — METOPROLOL SUCCINATE 25 MG/1
25 TABLET, EXTENDED RELEASE ORAL DAILY
Qty: 90 TABLET | Refills: 1 | Status: SHIPPED | OUTPATIENT
Start: 2025-06-15

## 2025-06-23 ENCOUNTER — APPOINTMENT (OUTPATIENT)
Dept: LAB | Facility: HOSPITAL | Age: 80
End: 2025-06-23
Payer: COMMERCIAL

## 2025-06-23 ENCOUNTER — OFFICE VISIT (OUTPATIENT)
Dept: FAMILY MEDICINE CLINIC | Facility: HOSPITAL | Age: 80
End: 2025-06-23
Payer: COMMERCIAL

## 2025-06-23 VITALS
DIASTOLIC BLOOD PRESSURE: 58 MMHG | HEIGHT: 69 IN | TEMPERATURE: 97.2 F | BODY MASS INDEX: 32.73 KG/M2 | SYSTOLIC BLOOD PRESSURE: 128 MMHG | OXYGEN SATURATION: 94 % | WEIGHT: 221 LBS | HEART RATE: 50 BPM

## 2025-06-23 DIAGNOSIS — Z09 HOSPITAL DISCHARGE FOLLOW-UP: ICD-10-CM

## 2025-06-23 DIAGNOSIS — J18.9 SEPSIS DUE TO PNEUMONIA (HCC): Primary | ICD-10-CM

## 2025-06-23 DIAGNOSIS — A41.9 SEPSIS DUE TO PNEUMONIA (HCC): ICD-10-CM

## 2025-06-23 DIAGNOSIS — J18.9 SEPSIS DUE TO PNEUMONIA (HCC): ICD-10-CM

## 2025-06-23 DIAGNOSIS — M89.9 CHRONIC KIDNEY DISEASE-MINERAL AND BONE DISORDER (CKD-MBD): ICD-10-CM

## 2025-06-23 DIAGNOSIS — A41.9 SEPSIS DUE TO PNEUMONIA (HCC): Primary | ICD-10-CM

## 2025-06-23 DIAGNOSIS — N18.9 CHRONIC KIDNEY DISEASE-MINERAL AND BONE DISORDER (CKD-MBD): ICD-10-CM

## 2025-06-23 DIAGNOSIS — I77.810 THORACIC AORTIC ECTASIA (HCC): ICD-10-CM

## 2025-06-23 DIAGNOSIS — E83.9 CHRONIC KIDNEY DISEASE-MINERAL AND BONE DISORDER (CKD-MBD): ICD-10-CM

## 2025-06-23 LAB
ANION GAP SERPL CALCULATED.3IONS-SCNC: 9 MMOL/L (ref 4–13)
BUN SERPL-MCNC: 24 MG/DL (ref 5–25)
CALCIUM SERPL-MCNC: 9.3 MG/DL (ref 8.4–10.2)
CHLORIDE SERPL-SCNC: 103 MMOL/L (ref 96–108)
CO2 SERPL-SCNC: 31 MMOL/L (ref 21–32)
CREAT SERPL-MCNC: 1.33 MG/DL (ref 0.6–1.3)
GFR SERPL CREATININE-BSD FRML MDRD: 50 ML/MIN/1.73SQ M
GLUCOSE SERPL-MCNC: 89 MG/DL (ref 65–140)
POTASSIUM SERPL-SCNC: 4.4 MMOL/L (ref 3.5–5.3)
SODIUM SERPL-SCNC: 143 MMOL/L (ref 135–147)

## 2025-06-23 PROCEDURE — 36415 COLL VENOUS BLD VENIPUNCTURE: CPT

## 2025-06-23 PROCEDURE — 80048 BASIC METABOLIC PNL TOTAL CA: CPT

## 2025-06-23 PROCEDURE — 99495 TRANSJ CARE MGMT MOD F2F 14D: CPT | Performed by: NURSE PRACTITIONER

## 2025-06-23 NOTE — PROGRESS NOTES
Name: jN Pimentel Jr.      : 1945      MRN: [id number]  Encounter Provider: BLAS Cunningham  Encounter Date: 2025   Encounter department: [facility] C[facility] [address]   :  Assessment & Plan  Sepsis due to pneumonia (HCC)  He has completed antibiotic therapy.   He is feeling well, afebrile with no cough.        Thoracic aortic ectasia (HCC)  Discussed this with pt and his son.   Yearly surveillance through PCP.        Chronic kidney disease-mineral and bone disorder (CKD-MBD)  Lab Results   Component Value Date    EGFR 48 2025    EGFR 39 06/10/2025    EGFR 45 2025    CREATININE 1.37 (H) 2025    CREATININE 1.61 (H) 06/10/2025    CREATININE 1.43 (H) 2025     He needs to do repeat BMP.   He will get this done after his appointment.        Hospital discharge follow-up                History of Present Illness   Hospitalized -25 for pneumonia. Hospitalization per discharge summary below:    Hospital Course:   Nj Pimentel Jr. is a 80 y.o. male patient who originally presented to the hospital on 2025 due to chest pain and lightheadedness.  Patient was meeting sepsis criteria and found to have pneumonia.  Pneumonia and sepsis workup was obtained.  Patient improved the following day however creatinine elevated due to contrast being used for CTA PE on admission.  Creatinine leukocytosis and procalcitonin improved the following day.  Patient remains on room air.  Patient has otherwise remained hemodynamically stable afebrile in no acute respiratory distress.  Patient has been medically cleared for discharge.  Patient is to follow-up with his PCP in 1 to 2 weeks and complete a BMP in 1 week.  Patient will be discharged on cefdinir 300 mg twice daily for 3 days.    He is feeling well. No fever or chills. No cough. Completed oral antibiotics. BMP was not completed.         Review of Systems   Constitutional:  Negative for chills, fatigue and fever.  "  Respiratory:  Negative for cough, shortness of breath and wheezing.    Cardiovascular:  Negative for chest pain.       Objective   /58 (BP Location: Left arm, Patient Position: Sitting)   Pulse (!) 50   Temp (!) 97.2 °F (36.2 °C)   Ht 5' 9\" (1.753 m)   Wt 100 kg (221 lb)   SpO2 94%   BMI 32.64 kg/m²      Physical Exam  Vitals reviewed.   Constitutional:       General: He is not in acute distress.     Appearance: Normal appearance. He is not ill-appearing.     Cardiovascular:      Rate and Rhythm: Normal rate and regular rhythm.      Heart sounds: Murmur heard.   Pulmonary:      Effort: Pulmonary effort is normal.      Breath sounds: Examination of the left-lower field reveals decreased breath sounds. Decreased breath sounds present. No wheezing, rhonchi or rales.     Skin:     General: Skin is warm and dry.     Neurological:      Mental Status: He is alert and oriented to person, place, and time.     Psychiatric:         Mood and Affect: Mood normal.         Behavior: Behavior normal.         Thought Content: Thought content normal.         Judgment: Judgment normal.         "

## 2025-06-23 NOTE — ASSESSMENT & PLAN NOTE
Lab Results   Component Value Date    EGFR 48 06/11/2025    EGFR 39 06/10/2025    EGFR 45 06/09/2025    CREATININE 1.37 (H) 06/11/2025    CREATININE 1.61 (H) 06/10/2025    CREATININE 1.43 (H) 06/09/2025     He needs to do repeat BMP.   He will get this done after his appointment.

## 2025-06-28 DIAGNOSIS — R35.0 URINARY FREQUENCY: ICD-10-CM

## 2025-06-30 RX ORDER — TAMSULOSIN HYDROCHLORIDE 0.4 MG/1
CAPSULE ORAL
Qty: 90 CAPSULE | Refills: 1 | Status: SHIPPED | OUTPATIENT
Start: 2025-06-30

## 2025-07-08 ENCOUNTER — TELEPHONE (OUTPATIENT)
Age: 80
End: 2025-07-08

## 2025-07-08 NOTE — TELEPHONE ENCOUNTER
Patient called to let the office know that alexander Mcclelland with [employer] will be calling to discuss about return of patient's license?    Thank you

## 2025-07-09 PROBLEM — A41.9 SEPSIS DUE TO PNEUMONIA (HCC): Status: RESOLVED | Noted: 2025-06-09 | Resolved: 2025-07-09

## 2025-07-09 PROBLEM — J18.9 SEPSIS DUE TO PNEUMONIA (HCC): Status: RESOLVED | Noted: 2025-06-09 | Resolved: 2025-07-09

## 2025-07-19 NOTE — TELEPHONE ENCOUNTER
Pt reached answering service, he would like update on status of pp work sent for reinstatement of his license. Could you please call pt.

## 2025-07-24 ENCOUNTER — APPOINTMENT (EMERGENCY)
Dept: CT IMAGING | Facility: HOSPITAL | Age: 80
End: 2025-07-24
Payer: COMMERCIAL

## 2025-07-24 ENCOUNTER — HOSPITAL ENCOUNTER (EMERGENCY)
Facility: HOSPITAL | Age: 80
Discharge: HOME | End: 2025-07-24
Attending: EMERGENCY MEDICINE
Payer: COMMERCIAL

## 2025-07-24 ENCOUNTER — NURSE TRIAGE (OUTPATIENT)
Age: 80
End: 2025-07-24

## 2025-07-24 ENCOUNTER — APPOINTMENT (OUTPATIENT)
Dept: RADIOLOGY | Facility: HOSPITAL | Age: 80
End: 2025-07-24
Payer: COMMERCIAL

## 2025-07-24 VITALS
DIASTOLIC BLOOD PRESSURE: 63 MMHG | SYSTOLIC BLOOD PRESSURE: 142 MMHG | OXYGEN SATURATION: 95 % | RESPIRATION RATE: 18 BRPM | HEART RATE: 60 BPM | TEMPERATURE: 97.3 F

## 2025-07-24 DIAGNOSIS — R05.9 COUGH: ICD-10-CM

## 2025-07-24 DIAGNOSIS — J18.9 LEFT LOWER LOBE PNEUMONIA: Primary | ICD-10-CM

## 2025-07-24 LAB
2HR DELTA HS TROPONIN: -1 NG/L
ALBUMIN SERPL BCG-MCNC: 4.1 G/DL (ref 3.5–5)
ALP SERPL-CCNC: 49 U/L (ref 34–104)
ALT SERPL W P-5'-P-CCNC: 16 U/L (ref 7–52)
ANION GAP SERPL CALCULATED.3IONS-SCNC: 9 MMOL/L (ref 4–13)
APTT PPP: 31 SECONDS (ref 23–34)
AST SERPL W P-5'-P-CCNC: 25 U/L (ref 13–39)
BACTERIA UR QL AUTO: NORMAL /HPF
BASOPHILS # BLD AUTO: 0.02 THOUSANDS/ÂΜL (ref 0–0.1)
BASOPHILS NFR BLD AUTO: 0 % (ref 0–1)
BILIRUB SERPL-MCNC: 0.7 MG/DL (ref 0.2–1)
BILIRUB UR QL STRIP: NEGATIVE
BNP SERPL-MCNC: 91 PG/ML (ref 0–100)
BUN SERPL-MCNC: 25 MG/DL (ref 5–25)
CALCIUM SERPL-MCNC: 9.8 MG/DL (ref 8.4–10.2)
CARDIAC TROPONIN I PNL SERPL HS: 11 NG/L (ref ?–50)
CARDIAC TROPONIN I PNL SERPL HS: 12 NG/L (ref ?–50)
CHLORIDE SERPL-SCNC: 103 MMOL/L (ref 96–108)
CLARITY UR: CLEAR
CO2 SERPL-SCNC: 29 MMOL/L (ref 21–32)
COLOR UR: YELLOW
CREAT SERPL-MCNC: 1.27 MG/DL (ref 0.6–1.3)
D DIMER PPP FEU-MCNC: 1.24 UG/ML FEU
EOSINOPHIL # BLD AUTO: 0.08 THOUSAND/ÂΜL (ref 0–0.61)
EOSINOPHIL NFR BLD AUTO: 1 % (ref 0–6)
ERYTHROCYTE [DISTWIDTH] IN BLOOD BY AUTOMATED COUNT: 11.9 % (ref 11.6–15.1)
FLUAV RNA RESP QL NAA+PROBE: NEGATIVE
FLUBV RNA RESP QL NAA+PROBE: NEGATIVE
GFR SERPL CREATININE-BSD FRML MDRD: 53 ML/MIN/1.73SQ M
GLUCOSE SERPL-MCNC: 111 MG/DL (ref 65–140)
GLUCOSE UR STRIP-MCNC: NEGATIVE MG/DL
HCT VFR BLD AUTO: 41.3 % (ref 36.5–49.3)
HGB BLD-MCNC: 13.3 G/DL (ref 12–17)
HGB UR QL STRIP.AUTO: NEGATIVE
IMM GRANULOCYTES # BLD AUTO: 0.07 THOUSAND/UL (ref 0–0.2)
IMM GRANULOCYTES NFR BLD AUTO: 1 % (ref 0–2)
INR PPP: 1.24 (ref 0.85–1.19)
KETONES UR STRIP-MCNC: NEGATIVE MG/DL
LACTATE SERPL-SCNC: 1.2 MMOL/L (ref 0.5–2)
LEUKOCYTE ESTERASE UR QL STRIP: NEGATIVE
LYMPHOCYTES # BLD AUTO: 1.09 THOUSANDS/ÂΜL (ref 0.6–4.47)
LYMPHOCYTES NFR BLD AUTO: 11 % (ref 14–44)
MAGNESIUM SERPL-MCNC: 1.8 MG/DL (ref 1.9–2.7)
MCH RBC QN AUTO: 31.7 PG (ref 26.8–34.3)
MCHC RBC AUTO-ENTMCNC: 32.2 G/DL (ref 31.4–37.4)
MCV RBC AUTO: 98 FL (ref 82–98)
MONOCYTES # BLD AUTO: 0.57 THOUSAND/ÂΜL (ref 0.17–1.22)
MONOCYTES NFR BLD AUTO: 6 % (ref 4–12)
NEUTROPHILS # BLD AUTO: 7.87 THOUSANDS/ÂΜL (ref 1.85–7.62)
NEUTS SEG NFR BLD AUTO: 81 % (ref 43–75)
NITRITE UR QL STRIP: NEGATIVE
NON-SQ EPI CELLS URNS QL MICRO: NORMAL /HPF
NRBC BLD AUTO-RTO: 0 /100 WBCS
PH UR STRIP.AUTO: 7 [PH]
PLATELET # BLD AUTO: 208 THOUSANDS/UL (ref 149–390)
PMV BLD AUTO: 8.8 FL (ref 8.9–12.7)
POTASSIUM SERPL-SCNC: 4.4 MMOL/L (ref 3.5–5.3)
PROT SERPL-MCNC: 7.1 G/DL (ref 6.4–8.4)
PROT UR STRIP-MCNC: ABNORMAL MG/DL
PROTHROMBIN TIME: 16.1 SECONDS (ref 12.3–15)
RBC # BLD AUTO: 4.2 MILLION/UL (ref 3.88–5.62)
RBC #/AREA URNS AUTO: NORMAL /HPF
RSV RNA RESP QL NAA+PROBE: NEGATIVE
S PYO DNA THROAT QL NAA+PROBE: NOT DETECTED
SARS-COV-2 RNA RESP QL NAA+PROBE: NEGATIVE
SODIUM SERPL-SCNC: 141 MMOL/L (ref 135–147)
SP GR UR STRIP.AUTO: 1.01 (ref 1–1.03)
UROBILINOGEN UR STRIP-ACNC: <2 MG/DL
WBC # BLD AUTO: 9.7 THOUSAND/UL (ref 4.31–10.16)
WBC #/AREA URNS AUTO: NORMAL /HPF

## 2025-07-24 PROCEDURE — 83880 ASSAY OF NATRIURETIC PEPTIDE: CPT | Performed by: EMERGENCY MEDICINE

## 2025-07-24 PROCEDURE — 99285 EMERGENCY DEPT VISIT HI MDM: CPT

## 2025-07-24 PROCEDURE — 71275 CT ANGIOGRAPHY CHEST: CPT

## 2025-07-24 PROCEDURE — 84484 ASSAY OF TROPONIN QUANT: CPT

## 2025-07-24 PROCEDURE — 83735 ASSAY OF MAGNESIUM: CPT | Performed by: EMERGENCY MEDICINE

## 2025-07-24 PROCEDURE — 83605 ASSAY OF LACTIC ACID: CPT | Performed by: EMERGENCY MEDICINE

## 2025-07-24 PROCEDURE — 87637 SARSCOV2&INF A&B&RSV AMP PRB: CPT | Performed by: EMERGENCY MEDICINE

## 2025-07-24 PROCEDURE — 85610 PROTHROMBIN TIME: CPT | Performed by: EMERGENCY MEDICINE

## 2025-07-24 PROCEDURE — 71046 X-RAY EXAM CHEST 2 VIEWS: CPT

## 2025-07-24 PROCEDURE — 94640 AIRWAY INHALATION TREATMENT: CPT

## 2025-07-24 PROCEDURE — 96375 TX/PRO/DX INJ NEW DRUG ADDON: CPT

## 2025-07-24 PROCEDURE — 85379 FIBRIN DEGRADATION QUANT: CPT | Performed by: EMERGENCY MEDICINE

## 2025-07-24 PROCEDURE — 80053 COMPREHEN METABOLIC PANEL: CPT

## 2025-07-24 PROCEDURE — 99285 EMERGENCY DEPT VISIT HI MDM: CPT | Performed by: EMERGENCY MEDICINE

## 2025-07-24 PROCEDURE — 81001 URINALYSIS AUTO W/SCOPE: CPT | Performed by: EMERGENCY MEDICINE

## 2025-07-24 PROCEDURE — 96365 THER/PROPH/DIAG IV INF INIT: CPT

## 2025-07-24 PROCEDURE — 36415 COLL VENOUS BLD VENIPUNCTURE: CPT

## 2025-07-24 PROCEDURE — 74177 CT ABD & PELVIS W/CONTRAST: CPT

## 2025-07-24 PROCEDURE — 85730 THROMBOPLASTIN TIME PARTIAL: CPT | Performed by: EMERGENCY MEDICINE

## 2025-07-24 PROCEDURE — 87086 URINE CULTURE/COLONY COUNT: CPT | Performed by: EMERGENCY MEDICINE

## 2025-07-24 PROCEDURE — 85025 COMPLETE CBC W/AUTO DIFF WBC: CPT

## 2025-07-24 PROCEDURE — 93005 ELECTROCARDIOGRAM TRACING: CPT

## 2025-07-24 PROCEDURE — 87651 STREP A DNA AMP PROBE: CPT | Performed by: EMERGENCY MEDICINE

## 2025-07-24 RX ORDER — ALBUTEROL SULFATE 90 UG/1
2 INHALANT RESPIRATORY (INHALATION) EVERY 6 HOURS PRN
Qty: 18 G | Refills: 0 | Status: SHIPPED | OUTPATIENT
Start: 2025-07-24

## 2025-07-24 RX ORDER — MAGNESIUM SULFATE HEPTAHYDRATE 40 MG/ML
2 INJECTION, SOLUTION INTRAVENOUS ONCE
Status: COMPLETED | OUTPATIENT
Start: 2025-07-24 | End: 2025-07-24

## 2025-07-24 RX ORDER — PREDNISONE 50 MG/1
50 TABLET ORAL DAILY
Qty: 5 TABLET | Refills: 0 | Status: SHIPPED | OUTPATIENT
Start: 2025-07-24 | End: 2025-07-29

## 2025-07-24 RX ORDER — DEXAMETHASONE SODIUM PHOSPHATE 10 MG/ML
10 INJECTION, SOLUTION INTRAMUSCULAR; INTRAVENOUS ONCE
Status: COMPLETED | OUTPATIENT
Start: 2025-07-24 | End: 2025-07-24

## 2025-07-24 RX ORDER — AMOXICILLIN 500 MG/1
1000 CAPSULE ORAL EVERY 12 HOURS SCHEDULED
Qty: 21 CAPSULE | Refills: 0 | Status: SHIPPED | OUTPATIENT
Start: 2025-07-24 | End: 2025-07-29

## 2025-07-24 RX ORDER — ALBUTEROL SULFATE 5 MG/ML
5 SOLUTION RESPIRATORY (INHALATION) ONCE
Status: DISCONTINUED | OUTPATIENT
Start: 2025-07-24 | End: 2025-07-24 | Stop reason: HOSPADM

## 2025-07-24 RX ORDER — AMOXICILLIN 250 MG/1
1000 CAPSULE ORAL ONCE
Status: COMPLETED | OUTPATIENT
Start: 2025-07-24 | End: 2025-07-24

## 2025-07-24 RX ORDER — BENZONATATE 100 MG/1
100 CAPSULE ORAL EVERY 8 HOURS
Qty: 30 CAPSULE | Refills: 0 | Status: SHIPPED | OUTPATIENT
Start: 2025-07-24

## 2025-07-24 RX ADMIN — DEXAMETHASONE SODIUM PHOSPHATE 10 MG: 10 INJECTION, SOLUTION INTRAMUSCULAR; INTRAVENOUS at 16:13

## 2025-07-24 RX ADMIN — IOHEXOL 100 ML: 350 INJECTION, SOLUTION INTRAVENOUS at 15:46

## 2025-07-24 RX ADMIN — MAGNESIUM SULFATE HEPTAHYDRATE 2 G: 2 INJECTION, SOLUTION INTRAVENOUS at 16:16

## 2025-07-24 RX ADMIN — AMOXICILLIN 1000 MG: 250 CAPSULE ORAL at 17:02

## 2025-07-24 NOTE — TELEPHONE ENCOUNTER
"REASON FOR CONVERSATION: Pain With Breathing    SYMPTOMS: Patient reports non-radiating midsternal chest pain with deep breaths for 1-2 weeks. Patient also reports pain in groin with movement that started around the same time, unsure if related. Patient denies recent illness, strenuous activity/injury, and difficulty breathing.     OTHER HEALTH INFORMATION: History of HTN, CKD, atrial flutter and stroke. Patient takes aspirin and Plavix daily.    PROTOCOL DISPOSITION: Go to ED Now    CARE ADVICE PROVIDED: Discussed concern with symptoms and medical history and protocol recommendation for same-day ER evaluation. Patient verbalized understanding and agreeable. Will leave now and follow up with office, as needed.    PRACTICE FOLLOW-UP: Nothing further needed at this time, pending ER results/disposition.    Reason for Disposition   History of prior 'blood clot' in leg or lungs (i.e., deep vein thrombosis, pulmonary embolism)    Answer Assessment - Initial Assessment Questions  1. LOCATION: \"Where does it hurt?\"        Center of chest  2. RADIATION: \"Does the pain go anywhere else?\" (e.g., into neck, jaw, arms, back)      Denies  3. ONSET: \"When did the chest pain begin?\" (Minutes, hours or days)       1-2 weeks ago  4. PATTERN: \"Does the pain come and go, or has it been constant since it started?\"  \"Does it get worse with exertion?\"       Pain is felt with every deep breath  5. DURATION: \"How long does it last\" (e.g., seconds, minutes, hours)      A few seconds  6. SEVERITY: \"How bad is the pain?\"  (e.g., Scale 1-10; mild, moderate, or severe)      Moderate to severe  7. CARDIAC RISK FACTORS: \"Do you have any history of heart problems or risk factors for heart disease?\" (e.g., angina, prior heart attack; diabetes, high blood pressure, high cholesterol, smoker, or strong family history of heart disease)      Yes - history of atrial flutter, HTN, CKD and thrombotic stroke  8. PULMONARY RISK FACTORS: \"Do you have any " "history of lung disease?\"  (e.g., blood clots in lung, asthma, emphysema, birth control pills)      Denies  9. CAUSE: \"What do you think is causing the chest pain?\"      Unsure  10. OTHER SYMPTOMS: \"Do you have any other symptoms?\" (e.g., dizziness, nausea, vomiting, sweating, fever, difficulty breathing, cough)       Also reports pain in groin that started around the same time    Protocols used: Chest Pain-Adult-[place]    "

## 2025-07-24 NOTE — ED PROVIDER NOTES
Time reflects when diagnosis was documented in both MDM as applicable and the Disposition within this note       Time User Action Codes Description Comment    7/24/2025  5:00 PM Bassam Harper Add [J18.9] Left lower lobe pneumonia     7/24/2025  5:01 PM Bassam Harper Add [R05.9] Cough           ED Disposition       ED Disposition   Discharge    Condition   Stable    Date/Time   Thu Jul 24, 2025  5:00 PM    Comment   Nj Pimentel Jr. discharge to home/self care.                   Assessment & Plan       Medical Decision Making  Obtain blood work, EKG, troponin, D-dimer, BNP, chest x-ray  Give steroids, neb treatment and continue to monitor patient for any worsening symptoms.    I went over all lab and radiology studies with patient.  Patient refused neb treatment.  Patient's D-dimer was elevated.  Subsequently CTA lung PE study was obtained that showed acute on chronic left lower lobe pneumonia.  Patient placed on steroids, albuterol inhaler, antibiotics and discharged with follow-up to PCP.  Patient did not have any leukocytosis or lactic acidosis suggestive of systemic infection.      Close return instructions given to return to the ER for any worsening symptoms.  Patient agrees with discharge plan.  Patient well appearing at time of discharge.    Please Note: Fluency Direct voice recognition software may have been used in the creation of this document. Wrong words or sound a like substitutions may have occurred due to the inherent limitations of the voice software.         Amount and/or Complexity of Data Reviewed  External Data Reviewed: labs and ECG.  Labs: ordered. Decision-making details documented in ED Course.  Radiology: ordered. Decision-making details documented in ED Course.  ECG/medicine tests: ordered and independent interpretation performed. Decision-making details documented in ED Course.    Risk  Prescription drug management.        ED Course as of 07/24/25 1832   Thu Jul 24, 2025   1528  D-Dimer, Quant(!): 1.24  CTA lung PE study with abdomen/pelvis ordered.   1633 Refused neb treatments.       Medications   ipratropium (ATROVENT) 0.02 % inhalation solution 0.5 mg (0.5 mg Nebulization Not Given 7/24/25 1542)   albuterol inhalation solution 5 mg (5 mg Nebulization Not Given 7/24/25 1542)   magnesium sulfate 2 g/50 mL IVPB (premix) 2 g (0 g Intravenous Stopped 7/24/25 1636)   dexamethasone (PF) (DECADRON) injection 10 mg (10 mg Intravenous Given 7/24/25 1613)   iohexol (OMNIPAQUE) 350 MG/ML injection (MULTI-DOSE) 100 mL (100 mL Intravenous Given 7/24/25 1546)   amoxicillin (AMOXIL) capsule 1,000 mg (1,000 mg Oral Given 7/24/25 1702)       ED Risk Strat Scores                    No data recorded                            History of Present Illness       Chief Complaint   Patient presents with    Cough     Patient reports to ED c/o intermittent cough with associated chest pain for multiple weeks. Reports small amount of blood noted in sputum this morning.     Groin Pain     Patient reports bilateral groin pain for multiple weeks. Associated with urinary frequency but denies burning.       Past Medical History[1]   Past Surgical History[2]   Family History[3]   Social History[4]   E-Cigarette/Vaping    E-Cigarette Use Never User       E-Cigarette/Vaping Substances    Nicotine No     THC No     CBD No     Flavoring No     Other No     Unknown No       I have reviewed and agree with the history as documented.     80-year-old male with past history of gout, insomnia, GERD, alcoholism, CVA, anxiety, folliculitis, presents to the ED for evaluation of intermittent chest tightness with some productive cough over the past 2 weeks.  Patient noted some blood-tinged sputum today and became concerned.  Patient does have some anterior chest tightness with deep inspiration.  Subsequently brother brought patient to the ED for further evaluation.  Patient denies any fevers or chills.  Patient denies any headaches,  weakness, dizziness, or any focal neurodeficits.      Cough  Associated symptoms: no chest pain, no chills, no ear pain, no fever, no rash, no shortness of breath and no sore throat    Groin Pain  Presenting symptoms: no dysuria    Associated symptoms: groin pain    Associated symptoms: no abdominal pain, no fever, no hematuria and no vomiting        Review of Systems   Constitutional:  Negative for chills and fever.   HENT:  Negative for ear pain and sore throat.    Eyes:  Negative for pain and visual disturbance.   Respiratory:  Positive for cough and chest tightness. Negative for shortness of breath.    Cardiovascular:  Negative for chest pain and palpitations.   Gastrointestinal:  Negative for abdominal pain and vomiting.   Genitourinary:  Negative for dysuria and hematuria.   Musculoskeletal:  Negative for arthralgias and back pain.   Skin:  Negative for color change and rash.   Neurological:  Negative for seizures and syncope.   All other systems reviewed and are negative.          Objective       ED Triage Vitals   Temperature Pulse Blood Pressure Respirations SpO2 Patient Position - Orthostatic VS   07/24/25 1352 07/24/25 1352 07/24/25 1352 07/24/25 1352 07/24/25 1352 --   (!) 97.3 °F (36.3 °C) 74 149/79 18 100 %       Temp src Heart Rate Source BP Location FiO2 (%) Pain Score    -- 07/24/25 1534 07/24/25 1534 -- --     Monitor Left arm        Vitals      Date and Time Temp Pulse SpO2 Resp BP Pain Score FACES Pain Rating User   07/24/25 1534 -- 60 95 % 18 142/63 -- -- LK   07/24/25 1352 97.3 °F (36.3 °C) 74 100 % 18 149/79 -- -- RD            Physical Exam  Vitals and nursing note reviewed.   Constitutional:       General: He is not in acute distress.     Appearance: He is well-developed.   HENT:      Head: Normocephalic and atraumatic.     Eyes:      Conjunctiva/sclera: Conjunctivae normal.       Cardiovascular:      Rate and Rhythm: Normal rate and regular rhythm.      Heart sounds: No murmur  heard.  Pulmonary:      Effort: Pulmonary effort is normal. No respiratory distress.      Breath sounds: Normal breath sounds.      Comments: Lungs are essentially clear to auscultation.  No chest wall tenderness noted to palpation.  Abdominal:      Palpations: Abdomen is soft.      Tenderness: There is no abdominal tenderness.     Musculoskeletal:         General: No swelling.      Cervical back: Neck supple.     Skin:     General: Skin is warm and dry.      Capillary Refill: Capillary refill takes less than 2 seconds.     Neurological:      Mental Status: He is alert.     Psychiatric:         Mood and Affect: Mood normal.         Results Reviewed       Procedure Component Value Units Date/Time    HS Troponin I 2hr [[id number]]  (Normal) Collected: 07/24/25 1610    Lab Status: Final result Specimen: Blood from Arm, Right Updated: 07/24/25 1639     hs TnI 2hr 11 ng/L      Delta 2hr hsTnI -1 ng/L     HS Troponin I 4hr [[id number]]     Lab Status: No result Specimen: Blood     D-Dimer [[id number]]  (Abnormal) Collected: 07/24/25 1425    Lab Status: Final result Specimen: Blood from Arm, Right Updated: 07/24/25 1520     D-Dimer, Quant 1.24 ug/ml FEU     Narrative:      In the evaluation for possible pulmonary embolism, in the appropriate (Well's Score of 4 or less) patient, the age adjusted d-dimer cutoff for this patient can be calculated as:    Age x 0.01 (in ug/mL) for Age-adjusted D-dimer exclusion threshold for a patient over 50 years.    B-Type Natriuretic Peptide(BNP) [[id number]]  (Normal) Collected: 07/24/25 1357    Lab Status: Final result Specimen: Blood from Arm, Right Updated: 07/24/25 1514     BNP 91 pg/mL     Urine Microscopic [[id number]]  (Normal) Collected: 07/24/25 1447    Lab Status: Final result Specimen: Urine, Clean Catch Updated: 07/24/25 1513     RBC, UA None Seen /hpf      WBC, UA None Seen /hpf      Epithelial Cells None Seen /hpf      Bacteria, UA None Seen /hpf     FLU/RSV/COVID - if FLU/RSV  clinically relevant (2hr TAT) [[id number]]  (Normal) Collected: 07/24/25 1425    Lab Status: Final result Specimen: Nares from Nose Updated: 07/24/25 1511     SARS-CoV-2 Negative     INFLUENZA A PCR Negative     INFLUENZA B PCR Negative     RSV PCR Negative    Narrative:      This test has been performed using the CoV-2/Flu/RSV plus assay on the Reactivity GeneXpert platform. This test has been validated by the  and verified by the performing laboratory.     This test is designed to amplify and detect the following: nucleocapsid (N), envelope (E), and RNA-dependent RNA polymerase (RdRP) genes of the SARS-CoV-2 genome; matrix (M), basic polymerase (PB2), and acidic protein (PA) segments of the influenza A genome; matrix (M) and non-structural protein (NS) segments of the influenza B genome, and the nucleocapsid genes of RSV A and RSV B.     Positive results are indicative of the presence of Flu A, Flu B, RSV, and/or SARS-CoV-2 RNA. Positive results for SARS-CoV-2 or suspected novel influenza should be reported to state, local, or federal health departments according to local reporting requirements.      All results should be assessed in conjunction with clinical presentation and other laboratory markers for clinical management.     FOR PEDIATRIC PATIENTS - copy/paste COVID Guidelines URL to browser: [email or web address]       Protime-INR [[id number]]  (Abnormal) Collected: 07/24/25 1425    Lab Status: Final result Specimen: Blood from Arm, Right Updated: 07/24/25 1502     Protime 16.1 seconds      INR 1.24    Narrative:      INR Therapeutic Range    Indication                                             INR Range      Atrial Fibrillation                                               2.0-3.0  Hypercoagulable State                                    2.0.2.3  Left Ventricular Asist Device                            2.0-3.0  Mechanical Heart Valve                                   -    Aortic(with afib, MI, embolism, HF, LA enlargement,    and/or coagulopathy)                                     2.0-3.0 (2.5-3.5)     Mitral                                                             2.5-3.5  Prosthetic/Bioprosthetic Heart Valve               2.0-3.0  Venous thromboembolism (VTE: VT, PE        2.0-3.0    APTT [[id number]]  (Normal) Collected: 07/24/25 1425    Lab Status: Final result Specimen: Blood from Arm, Right Updated: 07/24/25 1502     PTT 31 seconds     Strep A PCR [[id number]]  (Normal) Collected: 07/24/25 1425    Lab Status: Final result Specimen: Throat Updated: 07/24/25 1459     STREP A PCR Not Detected    UA (URINE) with reflex to Scope [[id number]]  (Abnormal) Collected: 07/24/25 1447    Lab Status: Final result Specimen: Urine, Clean Catch Updated: 07/24/25 1458     Color, UA Yellow     Clarity, UA Clear     Specific Gravity, UA 1.015     pH, UA 7.0     Leukocytes, UA Negative     Nitrite, UA Negative     Protein, UA Trace mg/dl      Glucose, UA Negative mg/dl      Ketones, UA Negative mg/dl      Urobilinogen, UA <2.0 mg/dl      Bilirubin, UA Negative     Occult Blood, UA Negative    Urine culture [[id number]] Collected: 07/24/25 1447    Lab Status: In process Specimen: Urine, Clean Catch Updated: 07/24/25 1453    Magnesium [[id number]]  (Abnormal) Collected: 07/24/25 1425    Lab Status: Final result Specimen: Blood from Arm, Right Updated: 07/24/25 1447     Magnesium 1.8 mg/dL     Lactic acid, plasma (w/reflex if result > 2.0) [[id number]]  (Normal) Collected: 07/24/25 1425    Lab Status: Final result Specimen: Blood from Arm, Right Updated: 07/24/25 1447     LACTIC ACID 1.2 mmol/L     Narrative:      Result may be elevated if tourniquet was used during collection.    Comprehensive metabolic panel [[id number]] Collected: 07/24/25 1357    Lab Status: Final result Specimen: Blood from Arm, Right Updated: 07/24/25 1441     Sodium 141 mmol/L      Potassium 4.4 mmol/L       Chloride 103 mmol/L      CO2 29 mmol/L      ANION GAP 9 mmol/L      BUN 25 mg/dL      Creatinine 1.27 mg/dL      Glucose 111 mg/dL      Calcium 9.8 mg/dL      AST 25 U/L      ALT 16 U/L      Alkaline Phosphatase 49 U/L      Total Protein 7.1 g/dL      Albumin 4.1 g/dL      Total Bilirubin 0.70 mg/dL      eGFR 53 ml/min/1.73sq m     Narrative:      National Kid[employer] Foundation guidelines for Chronic Kidney Disease (CKD):     Stage 1 with normal or high GFR (GFR > 90 mL/min/1.73 square meters)    Stage 2 Mild CKD (GFR = 60-89 mL/min/1.73 square meters)    Stage 3A Moderate CKD (GFR = 45-59 mL/min/1.73 square meters)    Stage 3B Moderate CKD (GFR = 30-44 mL/min/1.73 square meters)    Stage 4 Severe CKD (GFR = 15-29 mL/min/1.73 square meters)    Stage 5 End Stage CKD (GFR <15 mL/min/1.73 square meters)  Note: GFR calculation is accurate only with a steady state creatinine    HS Troponin 0hr (reflex protocol) [[id number]]  (Normal) Collected: 07/24/25 1357    Lab Status: Final result Specimen: Blood from Arm, Right Updated: 07/24/25 1436     hs TnI 0hr 12 ng/L     CBC and differential [[id number]]  (Abnormal) Collected: 07/24/25 1357    Lab Status: Final result Specimen: Blood from Arm, Right Updated: 07/24/25 1416     WBC 9.70 Thousand/uL      RBC 4.20 Million/uL      Hemoglobin 13.3 g/dL      Hematocrit 41.3 %      MCV 98 fL      MCH 31.7 pg      MCHC 32.2 g/dL      RDW 11.9 %      MPV 8.8 fL      Platelets 208 Thousands/uL      nRBC 0 /100 WBCs      Segmented % 81 %      Immature Grans % 1 %      Lymphocytes % 11 %      Monocytes % 6 %      Eosinophils Relative 1 %      Basophils Relative 0 %      Absolute Neutrophils 7.87 Thousands/µL      Absolute Immature Grans 0.07 Thousand/uL      Absolute Lymphocytes 1.09 Thousands/µL      Absolute Monocytes 0.57 Thousand/µL      Eosinophils Absolute 0.08 Thousand/µL      Basophils Absolute 0.02 Thousands/µL             CT pe study w abdomen pelvis w contrast   Final  Interpretation by Kennedy Thomason MD (07/24 0262)         1. Left lower lobe atelectasis and airspace consolidation may represent chronic pneumonia. Resolution of airspace disease in the left upper lobe.   2. No evidence of acute pulmonary embolus or thoracic aortic aneurysm.   3. No acute intra-abdominal or pelvic process.                  Computerized Assisted Algorithm (CAA) may have aided analysis of applicable images.      Workstation performed: [id number]         XR chest 2 views   Final Interpretation by Brayan Medina MD (07/24 1436)      Decreased left lung airspace opacities compared to the prior study. No new airspace opacities.            Workstation performed: [id number]             ECG 12 Lead Documentation Only    Date/Time: 7/24/2025 3:58 PM    Performed by: Bassam Harper DO  Authorized by: Bassam Harper DO    Indications / Diagnosis:  Chest pain  ECG reviewed by me, the ED Provider: yes    Patient location:  ED  Previous ECG:     Previous ECG:  Compared to current    Similarity:  Changes noted    Comparison to cardiac monitor: Yes    Interpretation:     Interpretation: normal    Comments:      Sinus rhythm, rate 68, normal axis, normal intervals, no acute ST/T wave abnormalities noted, otherwise unremarkable EKG, previous EKG showed sinus tachycardia that is no longer present      ED Medication and Procedure Management   Prior to Admission Medications   Prescriptions Last Dose Informant Patient Reported? Taking?   Ascorbic Acid (vitamin C) 1000 MG tablet  Self Yes No   Sig: Take 1,000 mg by mouth in the morning.   B Complex-C (SUPER B COMPLEX PO)  Self Yes No   Sig: Take 1 capsule by mouth in the morning   Diclofenac Sodium (VOLTAREN) 1 %   No No   Sig: Apply 2 g topically 4 (four) times a day   Ginkgo Biloba 120 MG CAPS  Self Yes No   Sig: Take 1 capsule by mouth in the morning   Magnesium 250 MG TABS  Self No No   Sig: Take 1 tablet (250 mg total) by mouth 2 (two) times a day    Multiple Vitamin (multivitamin) capsule  Self Yes No   Sig: Take 1 capsule by mouth in the morning.   VITAMIN D, CHOLECALCIFEROL, PO  Self Yes No   Sig: Take 2,000 Units by mouth in the morning   aspirin (ECOTRIN LOW STRENGTH) 81 mg EC tablet  Self Yes No   Sig: Take 81 mg by mouth every other day 1 every other day   atorvastatin (LIPITOR) 40 mg tablet   No No   Sig: TAKE 1 TABLET BY MOUTH ONCE DAILY IN THE EVENING   clopidogrel (PLAVIX) 75 mg tablet   No No   Sig: Take 1 tablet by mouth once daily   dextromethorphan-guaifenesin (MUCINEX DM)  MG per 12 hr tablet   No No   Sig: Take 1 tablet by mouth every 12 (twelve) hours as needed for cough   escitalopram (LEXAPRO) 20 mg tablet   No No   Sig: Take 1 tablet (20 mg total) by mouth daily   ferrous sulfate 324 (65 Fe) mg  Self No No   Sig: Take 1 tablet (324 mg total) by mouth daily before breakfast   finasteride (PROSCAR) 5 mg tablet  Self No No   Sig: Take 1 tablet (5 mg total) by mouth daily   fluticasone (FLONASE) 50 mcg/act nasal spray   No No   Si spray into each nostril daily   gabapentin (NEURONTIN) 400 mg capsule   No No   Sig: TAKE 1 CAPSULE BY MOUTH THREE TIMES DAILY   lisinopril (ZESTRIL) 10 mg tablet   No No   Sig: Take 1 tablet by mouth once daily   metoprolol succinate (TOPROL-XL) 25 mg 24 hr tablet   No No   Sig: Take 1 tablet by mouth once daily   omeprazole (PriLOSEC) 20 mg delayed release capsule   No No   Sig: Take 1 capsule by mouth once daily   tamsulosin (FLOMAX) 0.4 mg   No No   Sig: TAKE 1 CAPSULE BY MOUTH ONCE DAILY WITH DINNER   traZODone (DESYREL) 100 mg tablet  Self No No   Sig: Take 1 tablet (100 mg total) by mouth daily at bedtime      Facility-Administered Medications: None     Discharge Medication List as of 2025  5:02 PM        START taking these medications    Details   albuterol (Ventolin HFA) 90 mcg/act inhaler Inhale 2 puffs every 6 (six) hours as needed for wheezing, Starting Thu 2025, Normal       amoxicillin (AMOXIL) 500 mg capsule Take 2 capsules (1,000 mg total) by mouth every 12 (twelve) hours for 5 days, Starting Thu 7/24/2025, Until Tue 7/29/2025, Normal      benzonatate (TESSALON PERLES) 100 mg capsule Take 1 capsule (100 mg total) by mouth every 8 (eight) hours, Starting Thu 7/24/2025, Normal      predniSONE 50 mg tablet Take 1 tablet (50 mg total) by mouth daily for 5 days, Starting Thu 7/24/2025, Until Tue 7/29/2025, Normal           CONTINUE these medications which have NOT CHANGED    Details   Ascorbic Acid (vitamin C) 1000 MG tablet Take 1,000 mg by mouth in the morning., Historical Med      aspirin (ECOTRIN LOW STRENGTH) 81 mg EC tablet Take 81 mg by mouth every other day 1 every other day, Historical Med      atorvastatin (LIPITOR) 40 mg tablet TAKE 1 TABLET BY MOUTH ONCE DAILY IN THE EVENING, Starting Wed 3/19/2025, Normal      B Complex-C (SUPER B COMPLEX PO) Take 1 capsule by mouth in the morning, Historical Med      clopidogrel (PLAVIX) 75 mg tablet Take 1 tablet by mouth once daily, Starting Tue 5/13/2025, Normal      dextromethorphan-guaifenesin (MUCINEX DM)  MG per 12 hr tablet Take 1 tablet by mouth every 12 (twelve) hours as needed for cough, Starting Thu 2/6/2025, Normal      Diclofenac Sodium (VOLTAREN) 1 % Apply 2 g topically 4 (four) times a day, Starting Thu 2/6/2025, Normal      escitalopram (LEXAPRO) 20 mg tablet Take 1 tablet (20 mg total) by mouth daily, Starting Tue 3/25/2025, Normal      ferrous sulfate 324 (65 Fe) mg Take 1 tablet (324 mg total) by mouth daily before breakfast, Starting Fri 11/13/2020, Normal      finasteride (PROSCAR) 5 mg tablet Take 1 tablet (5 mg total) by mouth daily, Starting Wed 1/22/2025, Normal      fluticasone (FLONASE) 50 mcg/act nasal spray 1 spray into each nostril daily, Starting Thu 2/27/2025, Normal      gabapentin (NEURONTIN) 400 mg capsule TAKE 1 CAPSULE BY MOUTH THREE TIMES DAILY, Starting Wed 3/19/2025, Normal      Ginkgo Biloba  120 MG CAPS Take 1 capsule by mouth in the morning, Historical Med      lisinopril (ZESTRIL) 10 mg tablet Take 1 tablet by mouth once daily, Starting Fri 5/30/2025, Normal      Magnesium 250 MG TABS Take 1 tablet (250 mg total) by mouth 2 (two) times a day, Starting Fri 7/26/2024, Normal      metoprolol succinate (TOPROL-XL) 25 mg 24 hr tablet Take 1 tablet by mouth once daily, Starting Sun 6/15/2025, Normal      Multiple Vitamin (multivitamin) capsule Take 1 capsule by mouth in the morning., Historical Med      omeprazole (PriLOSEC) 20 mg delayed release capsule Take 1 capsule by mouth once daily, Starting Wed 6/4/2025, Normal      tamsulosin (FLOMAX) 0.4 mg TAKE 1 CAPSULE BY MOUTH ONCE DAILY WITH DINNER, Normal      traZODone (DESYREL) 100 mg tablet Take 1 tablet (100 mg total) by mouth daily at bedtime, Starting Mon 1/27/2025, Normal      VITAMIN D, CHOLECALCIFEROL, PO Take 2,000 Units by mouth in the morning, Historical Med           No discharge procedures on file.  ED SEPSIS DOCUMENTATION   Time reflects when diagnosis was documented in both MDM as applicable and the Disposition within this note       Time User Action Codes Description Comment    7/24/2025  5:00 PM Bassam Harper [J18.9] Left lower lobe pneumonia     7/24/2025  5:01 PM Bassam Harper Add [R05.9] Cough                    [1]   Past Medical History:  Diagnosis Date    Alcoholism (HCC)     Anxiety     Folliculitis 03/15/2024    GERD (gastroesophageal reflux disease)     Gout     Insomnia     Stroke (HCC)    [2]   Past Surgical History:  Procedure Laterality Date    ANKLE SURGERY Right     CARDIAC CATHETERIZATION      no findings    COLONOSCOPY  01/25/2013    polypectomy; complete - 3 yrs d/t polyp - benign submucosal lipoma- path c/w lipoma    COLONOSCOPY  04/25/2017    complete - tubular adenoma - repeat 5yrs    CYSTOSCOPY      CYSTOTOMY      bladder  w/ basket extraction of calculus    HERNIA REPAIR      inguinal ; sliding    INGUINAL  HERNIA REPAIR Right     unilateral    IR LOWER EXTREMITY ANGIOGRAM  2023    KNEE ARTHROSCOPY Right     w/medial menisectomy    LASIK      corneal    LITHOTRIPSY      renal    TN COLONOSCOPY FLX DX W/COLLJ SPEC WHEN PFRMD N/A 2017    Procedure: SCREENING COLONOSCOPY;  Surgeon: Paul Jacome MD;  Location: [facility];  Service: General    TN SLCTV CATHJ 3RD+ ORD SLCTV ABDL PEL/LXTR BRNCH Right 2023    Procedure: ARTERIOGRAM;  Surgeon: Alivia Blum MD;  Location: [facility];  Service: Vascular    TN TEAEC W/PATCH GRF CAROTID VERTB SUBCLAV NECK INC Right 01/10/2020    Procedure: ENDARTERECTOMY ARTERY CAROTID;  Surgeon: Aaron Smith MD;  Location: [facility];  Service: Vascular    TN TEAEC W/WO PATCH GRAFT COMMON FEMORAL Right 2023    Procedure: ENDARTERECTOMY ARTERIAL FEMORAL, RETROGRADE ILIAC INTERVENTION;  Surgeon: Alivia Blum MD;  Location: [facility];  Service: Vascular    ROTATOR CUFF REPAIR Bilateral     TONSILLECTOMY     [3]   Family History  Problem Relation Name Age of Onset    Alzheimer's disease Mother      Alzheimer's disease Father Nj Pimentel Sr.     Heart disease Father Nj Pimentel Sr.         CAD    Other Father Nj Pimentel Sr.         prediabetes    Diabetes Father Nj Pimentel Sr.     Breast cancer Other spouse     Arthritis Family      Hypertension Family     [4]   Social History  Tobacco Use    Smoking status: Former     Current packs/day: 0.00     Average packs/day: 1 pack/day for 22.0 years (22.0 ttl pk-yrs)     Types: Cigarettes     Start date: 1972     Quit date: 1985     Years since quittin.5     Passive exposure: Past    Smokeless tobacco: Never   Vaping Use    Vaping status: Never Used   Substance Use Topics    Alcohol use: Not Currently     Comment: stopped drinking alcohol; AA x23yrs sober    Drug use: No        Bassam Harper DO  [phone]

## 2025-07-25 ENCOUNTER — VBI (OUTPATIENT)
Dept: FAMILY MEDICINE CLINIC | Facility: HOSPITAL | Age: 80
End: 2025-07-25

## 2025-07-25 LAB
ATRIAL RATE: 68 BPM
BACTERIA UR CULT: NORMAL
P AXIS: 41 DEGREES
PR INTERVAL: 192 MS
QRS AXIS: -9 DEGREES
QRSD INTERVAL: 88 MS
QT INTERVAL: 398 MS
QTC INTERVAL: 424 MS
T WAVE AXIS: 48 DEGREES
VENTRICULAR RATE: 68 BPM

## 2025-07-25 PROCEDURE — 93010 ELECTROCARDIOGRAM REPORT: CPT | Performed by: INTERNAL MEDICINE

## 2025-07-25 NOTE — TELEPHONE ENCOUNTER
07/25/25 10:24 AM    Patient contacted post ED visit, [facility] department spoke with patient/caregiver and outreach was successful.    Thank you.  Chloe Ramirez  [facility]

## 2025-07-29 ENCOUNTER — OFFICE VISIT (OUTPATIENT)
Age: 80
End: 2025-07-29
Payer: COMMERCIAL

## 2025-07-29 VITALS
BODY MASS INDEX: 31.78 KG/M2 | HEART RATE: 57 BPM | WEIGHT: 214.6 LBS | OXYGEN SATURATION: 93 % | TEMPERATURE: 97.1 F | SYSTOLIC BLOOD PRESSURE: 124 MMHG | DIASTOLIC BLOOD PRESSURE: 52 MMHG | HEIGHT: 69 IN

## 2025-07-29 DIAGNOSIS — G89.29 CHRONIC BILATERAL LOW BACK PAIN WITH BILATERAL SCIATICA: ICD-10-CM

## 2025-07-29 DIAGNOSIS — M54.41 CHRONIC BILATERAL LOW BACK PAIN WITH BILATERAL SCIATICA: ICD-10-CM

## 2025-07-29 DIAGNOSIS — F33.41 RECURRENT MAJOR DEPRESSIVE DISORDER, IN PARTIAL REMISSION (HCC): ICD-10-CM

## 2025-07-29 DIAGNOSIS — F03.A0 MILD DEMENTIA WITHOUT BEHAVIORAL DISTURBANCE, PSYCHOTIC DISTURBANCE, MOOD DISTURBANCE, OR ANXIETY, UNSPECIFIED DEMENTIA TYPE (HCC): Primary | ICD-10-CM

## 2025-07-29 DIAGNOSIS — Z91.89 DRIVING SAFETY ISSUE: ICD-10-CM

## 2025-07-29 DIAGNOSIS — M54.42 CHRONIC BILATERAL LOW BACK PAIN WITH BILATERAL SCIATICA: ICD-10-CM

## 2025-07-29 DIAGNOSIS — G47.00 INSOMNIA, UNSPECIFIED TYPE: ICD-10-CM

## 2025-07-29 DIAGNOSIS — R26.2 AMBULATORY DYSFUNCTION: ICD-10-CM

## 2025-07-29 PROCEDURE — G2211 COMPLEX E/M VISIT ADD ON: HCPCS | Performed by: NURSE PRACTITIONER

## 2025-07-29 PROCEDURE — 99214 OFFICE O/P EST MOD 30 MIN: CPT | Performed by: NURSE PRACTITIONER

## 2025-07-29 RX ORDER — TOBRAMYCIN AND DEXAMETHASONE 3; 1 MG/ML; MG/ML
1 SUSPENSION/ DROPS OPHTHALMIC
COMMUNITY
Start: 2025-07-11

## 2025-08-01 ENCOUNTER — TELEPHONE (OUTPATIENT)
Age: 80
End: 2025-08-01

## 2025-08-03 ENCOUNTER — NURSE TRIAGE (OUTPATIENT)
Dept: OTHER | Facility: OTHER | Age: 80
End: 2025-08-03

## 2025-08-03 ENCOUNTER — APPOINTMENT (EMERGENCY)
Dept: RADIOLOGY | Facility: HOSPITAL | Age: 80
End: 2025-08-03
Payer: COMMERCIAL

## 2025-08-03 ENCOUNTER — HOSPITAL ENCOUNTER (EMERGENCY)
Facility: HOSPITAL | Age: 80
Discharge: HOME | End: 2025-08-03
Attending: EMERGENCY MEDICINE | Admitting: EMERGENCY MEDICINE
Payer: COMMERCIAL

## 2025-08-03 VITALS
DIASTOLIC BLOOD PRESSURE: 60 MMHG | SYSTOLIC BLOOD PRESSURE: 114 MMHG | OXYGEN SATURATION: 96 % | BODY MASS INDEX: 32.72 KG/M2 | WEIGHT: 220.9 LBS | TEMPERATURE: 98.9 F | HEART RATE: 67 BPM | HEIGHT: 69 IN | RESPIRATION RATE: 16 BRPM

## 2025-08-03 DIAGNOSIS — R06.02 SHORTNESS OF BREATH: ICD-10-CM

## 2025-08-03 DIAGNOSIS — R07.9 CHEST PAIN, UNSPECIFIED: Primary | ICD-10-CM

## 2025-08-03 DIAGNOSIS — R25.2 MUSCLE CRAMPS: ICD-10-CM

## 2025-08-03 DIAGNOSIS — M54.9 BACK PAIN: ICD-10-CM

## 2025-08-03 LAB
2HR DELTA HS TROPONIN: 2 NG/L
ALBUMIN SERPL BCG-MCNC: 3.7 G/DL (ref 3.5–5)
ALP SERPL-CCNC: 46 U/L (ref 34–104)
ALT SERPL W P-5'-P-CCNC: 18 U/L (ref 7–52)
ANION GAP SERPL CALCULATED.3IONS-SCNC: 8 MMOL/L (ref 4–13)
AST SERPL W P-5'-P-CCNC: 19 U/L (ref 13–39)
BASOPHILS # BLD AUTO: 0.02 THOUSANDS/ÂΜL (ref 0–0.1)
BASOPHILS NFR BLD AUTO: 0 % (ref 0–1)
BILIRUB SERPL-MCNC: 0.69 MG/DL (ref 0.2–1)
BNP SERPL-MCNC: 52 PG/ML (ref 0–100)
BUN SERPL-MCNC: 30 MG/DL (ref 5–25)
CALCIUM SERPL-MCNC: 9 MG/DL (ref 8.4–10.2)
CARDIAC TROPONIN I PNL SERPL HS: 10 NG/L (ref ?–50)
CARDIAC TROPONIN I PNL SERPL HS: 8 NG/L (ref ?–50)
CHLORIDE SERPL-SCNC: 102 MMOL/L (ref 96–108)
CK SERPL-CCNC: 66 U/L (ref 39–308)
CO2 SERPL-SCNC: 29 MMOL/L (ref 21–32)
CREAT SERPL-MCNC: 1.3 MG/DL (ref 0.6–1.3)
EOSINOPHIL # BLD AUTO: 0.21 THOUSAND/ÂΜL (ref 0–0.61)
EOSINOPHIL NFR BLD AUTO: 1 % (ref 0–6)
ERYTHROCYTE [DISTWIDTH] IN BLOOD BY AUTOMATED COUNT: 12 % (ref 11.6–15.1)
GFR SERPL CREATININE-BSD FRML MDRD: 51 ML/MIN/1.73SQ M
GLUCOSE SERPL-MCNC: 130 MG/DL (ref 65–140)
HCT VFR BLD AUTO: 41.3 % (ref 36.5–49.3)
HGB BLD-MCNC: 13.7 G/DL (ref 12–17)
IMM GRANULOCYTES # BLD AUTO: 0.08 THOUSAND/UL (ref 0–0.2)
IMM GRANULOCYTES NFR BLD AUTO: 1 % (ref 0–2)
LYMPHOCYTES # BLD AUTO: 0.96 THOUSANDS/ÂΜL (ref 0.6–4.47)
LYMPHOCYTES NFR BLD AUTO: 7 % (ref 14–44)
MAGNESIUM SERPL-MCNC: 1.7 MG/DL (ref 1.9–2.7)
MCH RBC QN AUTO: 31.8 PG (ref 26.8–34.3)
MCHC RBC AUTO-ENTMCNC: 33.2 G/DL (ref 31.4–37.4)
MCV RBC AUTO: 96 FL (ref 82–98)
MONOCYTES # BLD AUTO: 1.14 THOUSAND/ÂΜL (ref 0.17–1.22)
MONOCYTES NFR BLD AUTO: 8 % (ref 4–12)
NEUTROPHILS # BLD AUTO: 12.2 THOUSANDS/ÂΜL (ref 1.85–7.62)
NEUTS SEG NFR BLD AUTO: 83 % (ref 43–75)
NRBC BLD AUTO-RTO: 0 /100 WBCS
PLATELET # BLD AUTO: 240 THOUSANDS/UL (ref 149–390)
PMV BLD AUTO: 8.7 FL (ref 8.9–12.7)
POTASSIUM SERPL-SCNC: 4.3 MMOL/L (ref 3.5–5.3)
PROT SERPL-MCNC: 6.6 G/DL (ref 6.4–8.4)
RBC # BLD AUTO: 4.31 MILLION/UL (ref 3.88–5.62)
SODIUM SERPL-SCNC: 139 MMOL/L (ref 135–147)
WBC # BLD AUTO: 14.61 THOUSAND/UL (ref 4.31–10.16)

## 2025-08-03 PROCEDURE — 99285 EMERGENCY DEPT VISIT HI MDM: CPT

## 2025-08-03 PROCEDURE — 84484 ASSAY OF TROPONIN QUANT: CPT | Performed by: EMERGENCY MEDICINE

## 2025-08-03 PROCEDURE — 80053 COMPREHEN METABOLIC PANEL: CPT | Performed by: EMERGENCY MEDICINE

## 2025-08-03 PROCEDURE — 82550 ASSAY OF CK (CPK): CPT | Performed by: EMERGENCY MEDICINE

## 2025-08-03 PROCEDURE — 36415 COLL VENOUS BLD VENIPUNCTURE: CPT | Performed by: EMERGENCY MEDICINE

## 2025-08-03 PROCEDURE — 85025 COMPLETE CBC W/AUTO DIFF WBC: CPT | Performed by: EMERGENCY MEDICINE

## 2025-08-03 PROCEDURE — 83880 ASSAY OF NATRIURETIC PEPTIDE: CPT | Performed by: EMERGENCY MEDICINE

## 2025-08-03 PROCEDURE — 96375 TX/PRO/DX INJ NEW DRUG ADDON: CPT

## 2025-08-03 PROCEDURE — 96365 THER/PROPH/DIAG IV INF INIT: CPT

## 2025-08-03 PROCEDURE — 99285 EMERGENCY DEPT VISIT HI MDM: CPT | Performed by: EMERGENCY MEDICINE

## 2025-08-03 PROCEDURE — 93005 ELECTROCARDIOGRAM TRACING: CPT

## 2025-08-03 PROCEDURE — 71046 X-RAY EXAM CHEST 2 VIEWS: CPT

## 2025-08-03 PROCEDURE — 83735 ASSAY OF MAGNESIUM: CPT | Performed by: EMERGENCY MEDICINE

## 2025-08-03 RX ORDER — ACETAMINOPHEN 10 MG/ML
1000 INJECTION, SOLUTION INTRAVENOUS ONCE
Status: COMPLETED | OUTPATIENT
Start: 2025-08-03 | End: 2025-08-03

## 2025-08-03 RX ORDER — SENNOSIDES 8.6 MG
650 CAPSULE ORAL EVERY 8 HOURS PRN
Qty: 30 TABLET | Refills: 0 | Status: SHIPPED | OUTPATIENT
Start: 2025-08-03

## 2025-08-03 RX ORDER — FLUTICASONE PROPIONATE 110 UG/1
2 AEROSOL, METERED RESPIRATORY (INHALATION) 2 TIMES DAILY
Qty: 12 G | Refills: 0 | Status: SHIPPED | OUTPATIENT
Start: 2025-08-03 | End: 2025-08-09

## 2025-08-03 RX ORDER — ALBUTEROL SULFATE 90 UG/1
2 INHALANT RESPIRATORY (INHALATION) EVERY 6 HOURS PRN
Qty: 18 G | Refills: 0 | Status: SHIPPED | OUTPATIENT
Start: 2025-08-03 | End: 2025-08-09

## 2025-08-03 RX ORDER — MAGNESIUM SULFATE HEPTAHYDRATE 40 MG/ML
2 INJECTION, SOLUTION INTRAVENOUS ONCE
Status: COMPLETED | OUTPATIENT
Start: 2025-08-03 | End: 2025-08-03

## 2025-08-03 RX ORDER — ALBUTEROL SULFATE 5 MG/ML
5 SOLUTION RESPIRATORY (INHALATION) ONCE
Status: COMPLETED | OUTPATIENT
Start: 2025-08-03 | End: 2025-08-03

## 2025-08-03 RX ORDER — MAGNESIUM SULFATE HEPTAHYDRATE 40 MG/ML
2 INJECTION, SOLUTION INTRAVENOUS ONCE
Status: DISCONTINUED | OUTPATIENT
Start: 2025-08-03 | End: 2025-08-03

## 2025-08-03 RX ADMIN — ACETAMINOPHEN 1000 MG: 10 INJECTION INTRAVENOUS at 12:53

## 2025-08-03 RX ADMIN — IPRATROPIUM BROMIDE 0.5 MG: 0.5 SOLUTION RESPIRATORY (INHALATION) at 14:35

## 2025-08-03 RX ADMIN — MAGNESIUM SULFATE IN WATER 2 G: 40 INJECTION, SOLUTION INTRAVENOUS at 13:07

## 2025-08-03 RX ADMIN — ALBUTEROL SULFATE 5 MG: 2.5 SOLUTION RESPIRATORY (INHALATION) at 14:35

## 2025-08-03 RX ADMIN — SODIUM CHLORIDE 500 ML: 0.9 INJECTION, SOLUTION INTRAVENOUS at 12:53

## 2025-08-04 ENCOUNTER — VBI (OUTPATIENT)
Dept: FAMILY MEDICINE CLINIC | Facility: HOSPITAL | Age: 80
End: 2025-08-04

## 2025-08-04 LAB
ATRIAL RATE: 78 BPM
P AXIS: 55 DEGREES
PR INTERVAL: 194 MS
QRS AXIS: 11 DEGREES
QRSD INTERVAL: 84 MS
QT INTERVAL: 376 MS
QTC INTERVAL: 428 MS
T WAVE AXIS: 65 DEGREES
VENTRICULAR RATE: 78 BPM

## 2025-08-04 PROCEDURE — 93010 ELECTROCARDIOGRAM REPORT: CPT | Performed by: INTERNAL MEDICINE

## 2025-08-06 ENCOUNTER — NURSE TRIAGE (OUTPATIENT)
Age: 80
End: 2025-08-06

## 2025-08-06 ENCOUNTER — OFFICE VISIT (OUTPATIENT)
Dept: FAMILY MEDICINE CLINIC | Facility: HOSPITAL | Age: 80
End: 2025-08-06
Payer: COMMERCIAL

## 2025-08-06 VITALS
HEIGHT: 69 IN | OXYGEN SATURATION: 94 % | DIASTOLIC BLOOD PRESSURE: 65 MMHG | WEIGHT: 216.6 LBS | TEMPERATURE: 97 F | BODY MASS INDEX: 32.08 KG/M2 | HEART RATE: 89 BPM | SYSTOLIC BLOOD PRESSURE: 131 MMHG

## 2025-08-06 DIAGNOSIS — R07.89 OTHER CHEST PAIN: Primary | ICD-10-CM

## 2025-08-06 DIAGNOSIS — E83.42 HYPOMAGNESEMIA: ICD-10-CM

## 2025-08-06 PROCEDURE — G2211 COMPLEX E/M VISIT ADD ON: HCPCS | Performed by: NURSE PRACTITIONER

## 2025-08-06 PROCEDURE — 99213 OFFICE O/P EST LOW 20 MIN: CPT | Performed by: NURSE PRACTITIONER

## 2025-08-07 ENCOUNTER — APPOINTMENT (EMERGENCY)
Dept: CT IMAGING | Facility: HOSPITAL | Age: 80
End: 2025-08-07
Payer: COMMERCIAL

## 2025-08-07 ENCOUNTER — HOSPITAL ENCOUNTER (EMERGENCY)
Facility: HOSPITAL | Age: 80
Discharge: HOME | End: 2025-08-07
Attending: EMERGENCY MEDICINE
Payer: COMMERCIAL

## 2025-08-07 ENCOUNTER — NURSE TRIAGE (OUTPATIENT)
Age: 80
End: 2025-08-07

## 2025-08-09 ENCOUNTER — HOSPITAL ENCOUNTER (EMERGENCY)
Facility: HOSPITAL | Age: 80
Discharge: HOME | End: 2025-08-09
Attending: EMERGENCY MEDICINE | Admitting: EMERGENCY MEDICINE
Payer: COMMERCIAL

## 2025-08-12 ENCOUNTER — TELEPHONE (OUTPATIENT)
Dept: SLEEP CENTER | Facility: CLINIC | Age: 80
End: 2025-08-12

## 2025-08-12 ENCOUNTER — CONSULT (OUTPATIENT)
Age: 80
End: 2025-08-12
Payer: COMMERCIAL

## 2025-08-15 ENCOUNTER — OFFICE VISIT (OUTPATIENT)
Age: 80
End: 2025-08-15

## 2025-08-20 ENCOUNTER — TELEPHONE (OUTPATIENT)
Age: 80
End: 2025-08-20

## 2025-08-20 DIAGNOSIS — E83.42 HYPOMAGNESEMIA: ICD-10-CM

## 2025-08-20 RX ORDER — PNV NO.95/FERROUS FUM/FOLIC AC 28MG-0.8MG
1 TABLET ORAL 2 TIMES DAILY
Qty: 60 TABLET | Refills: 5 | Status: SHIPPED | OUTPATIENT
Start: 2025-08-20

## (undated) DEVICE — ARTHROSCOPY FLOOR MAT

## (undated) DEVICE — SUT PROLENE 5-0 C-1/C-1 36 IN 8720H

## (undated) DEVICE — GLOVE SRG BIOGEL 7

## (undated) DEVICE — SURGIFOAM 8.5 X 12.5

## (undated) DEVICE — LIGACLIP MCA MULTIPLE CLIP APPLIERS, 20 MEDIUM CLIPS: Brand: LIGACLIP

## (undated) DEVICE — X-RAY DETECTABLE SPONGES,16 PLY: Brand: VISTEC

## (undated) DEVICE — SUT PROLENE 6-0 BV130 30 IN 8709H

## (undated) DEVICE — 3M™ IOBAN™ 2 ANTIMICROBIAL INCISE DRAPE 6650EZ: Brand: IOBAN™ 2

## (undated) DEVICE — GLOVE INDICATOR PI UNDERGLOVE SZ 6.5 BLUE

## (undated) DEVICE — SUT SILK 4-0 30 IN A303H

## (undated) DEVICE — GLOVE SRG BIOGEL ECLIPSE 6.5

## (undated) DEVICE — Device

## (undated) DEVICE — GAUZE SPONGES,USP TYPE VII GAUZE, 12 PLY: Brand: CURITY

## (undated) DEVICE — BETHL CAROTID ENDARTERECTOMY: Brand: CARDINAL HEALTH

## (undated) DEVICE — SNAP KOVER: Brand: UNBRANDED

## (undated) DEVICE — STERILE ICS CARDIOVASCULAR PK: Brand: CARDINAL HEALTH

## (undated) DEVICE — GLOVE SRG BIOGEL 6

## (undated) DEVICE — CHLORAPREP HI-LITE 26ML ORANGE

## (undated) DEVICE — VIAL DECANTER

## (undated) DEVICE — FOGARTY SPRING CLIPS 6MM: Brand: FOGARTY SOFTJAW

## (undated) DEVICE — SUT PROLENE 7-0 BV 175-6 24 IN 8735H

## (undated) DEVICE — IV CATH INTROCAN 18G X 1 1/4 SAFETY

## (undated) DEVICE — LUBRICANT SURGILUBE TUBE 4 OZ  FLIP TOP

## (undated) DEVICE — ADHESIVE SKIN HIGH VISCOSITY EXOFIN 1ML

## (undated) DEVICE — PINNACLE R/O II INTRODUCER SHEATH WITH RADIOPAQUE MARKER: Brand: PINNACLE

## (undated) DEVICE — INTENDED FOR TISSUE SEPARATION, AND OTHER PROCEDURES THAT REQUIRE A SHARP SURGICAL BLADE TO PUNCTURE OR CUT.: Brand: BARD-PARKER SAFETY BLADES SIZE 15, STERILE

## (undated) DEVICE — 3M™ STERI-STRIP™ REINFORCED ADHESIVE SKIN CLOSURES, R1546, 1/4 IN X 4 IN (6 MM X 100 MM), 10 STRIPS/ENVELOPE: Brand: 3M™ STERI-STRIP™

## (undated) DEVICE — 10FR FRAZIER SUCTION HANDLE: Brand: CARDINAL HEALTH

## (undated) DEVICE — DRAPE ISO IRRIGATION POUCH 1016

## (undated) DEVICE — INTENDED FOR TISSUE SEPARATION, AND OTHER PROCEDURES THAT REQUIRE A SHARP SURGICAL BLADE TO PUNCTURE OR CUT.: Brand: BARD-PARKER SAFETY BLADES SIZE 11, STERILE

## (undated) DEVICE — NEEDLE 25G X 1 1/2

## (undated) DEVICE — ASTOUND STANDARD SURGICAL GOWN, XL: Brand: CONVERTORS

## (undated) DEVICE — BULB SYRINGE,IRRIGATION WITH PROTECTIVE CAP: Brand: DOVER

## (undated) DEVICE — SUT SILK 3-0 30 IN A304H

## (undated) DEVICE — DRAPE PROBE NEO-PROBE/ULTRASOUND

## (undated) DEVICE — BAG DECANTER

## (undated) DEVICE — SUT MONOCRYL 4-0 PS-2 27 IN Y426H

## (undated) DEVICE — RADIFOCUS TORQUE DEVICE MULTI-TORQUE VISE: Brand: RADIFOCUS TORQUE DEVICE

## (undated) DEVICE — SUT SILK 2-0 30 IN A305H

## (undated) DEVICE — CAROTID ARTERY SHUNT KIT,RADIOPAQUE LINE, STRAIGHT: Brand: ARGYLE

## (undated) DEVICE — GLOVE INDICATOR PI UNDERGLOVE SZ 8 BLUE

## (undated) DEVICE — 3M™ TEGADERM™ TRANSPARENT FILM DRESSING FRAME STYLE, 1626W, 4 IN X 4-3/4 IN (10 CM X 12 CM), 50/CT 4CT/CASE: Brand: 3M™ TEGADERM™

## (undated) DEVICE — 1200CC GUARDIAN II: Brand: GUARDIAN

## (undated) DEVICE — SUT MONOCRYL 3-0 SH 27 IN Y416H

## (undated) DEVICE — TRAY FOLEY 16FR URIMETER SURESTEP

## (undated) DEVICE — PERCUTANEOUS ENTRY THINWALL NEEDLE  ONE-PART: Brand: COOK

## (undated) DEVICE — PAD GROUNDING ADULT

## (undated) DEVICE — SYRINGE 30ML LL

## (undated) DEVICE — TUBING SUCTION 5MM X 12 FT

## (undated) DEVICE — INTENDED FOR TISSUE SEPARATION, AND OTHER PROCEDURES THAT REQUIRE A SHARP SURGICAL BLADE TO PUNCTURE OR CUT.: Brand: BARD-PARKER ® CARBON RIB-BACK BLADES

## (undated) DEVICE — 40529 DERMAPROX PAD 11'' X 15'' X 1'': Brand: 40529 DERMAPROX PAD 11'' X 15'' X 1''

## (undated) DEVICE — THYROID SHEET: Brand: CONVERTORS

## (undated) DEVICE — CATH BAL CHARGER 8 X 80MM X 75CM

## (undated) DEVICE — FLUID MANAGEMENT KIT - IR

## (undated) DEVICE — SYRINGE 50ML LL

## (undated) DEVICE — SURGICEL FIBRILLAR 1 X 2

## (undated) DEVICE — VESSEL LOOPS X-RAY DETECTABLE: Brand: DEROYAL

## (undated) DEVICE — CATH BAL CHARGER 6 X 80MM X 75CM

## (undated) DEVICE — MICROPUNCTURE INTRODUCER SET SILHOUETTE TRANSITIONLESS PUSH-PLUS DESIGN - STIFFENED CANNULA WITH STAINLESS STEEL WIRE GUIDE: Brand: MICROPUNCTURE

## (undated) DEVICE — GLOVE INDICATOR PI UNDERGLOVE SZ 7.5 BLUE

## (undated) DEVICE — CATH DIAG 5FR .035 65CM 6S OMMI-FLUSH

## (undated) DEVICE — PROVE COVER: Brand: UNBRANDED

## (undated) DEVICE — SUT SILK 3-0 SH 30 IN K832H

## (undated) DEVICE — DECANTER: Brand: UNBRANDED

## (undated) DEVICE — PROXIMATE PLUS MD MULTI-DIRECTIONAL RELEASE SKIN STAPLERS CONTAINS 35 STAINLESS STEEL STAPLES APPROXIMATE CLOSED DIMENSIONS: 6.9MM X 3.9MM WIDE: Brand: PROXIMATE

## (undated) DEVICE — GLOVE SRG BIOGEL 7.5

## (undated) DEVICE — PETRI DISH STERILE

## (undated) DEVICE — STERILE BETHLEHEM FEM POP PACK: Brand: CARDINAL HEALTH